# Patient Record
Sex: MALE | Race: WHITE | Employment: OTHER | URBAN - METROPOLITAN AREA
[De-identification: names, ages, dates, MRNs, and addresses within clinical notes are randomized per-mention and may not be internally consistent; named-entity substitution may affect disease eponyms.]

---

## 2017-01-09 ENCOUNTER — ANTI-COAG VISIT (OUTPATIENT)
Dept: INTERNAL MEDICINE CLINIC | Age: 72
End: 2017-01-09

## 2017-01-09 LAB — INR BLD: 2.1

## 2017-01-11 ENCOUNTER — OFFICE VISIT (OUTPATIENT)
Dept: INTERNAL MEDICINE CLINIC | Age: 72
End: 2017-01-11

## 2017-01-11 DIAGNOSIS — I48.0 PAROXYSMAL ATRIAL FIBRILLATION (HCC): ICD-10-CM

## 2017-01-11 DIAGNOSIS — E66.01 MORBID OBESITY WITH BMI OF 40.0-44.9, ADULT (HCC): ICD-10-CM

## 2017-01-11 DIAGNOSIS — N30.00 ACUTE CYSTITIS WITHOUT HEMATURIA: Primary | ICD-10-CM

## 2017-01-11 DIAGNOSIS — E21.3 HYPERPARATHYROIDISM (HCC): ICD-10-CM

## 2017-01-11 LAB
BILIRUBIN, POC: ABNORMAL
BLOOD URINE, POC: ABNORMAL
CLARITY, POC: ABNORMAL
COLOR, POC: ABNORMAL
GLUCOSE URINE, POC: ABNORMAL
KETONES, POC: ABNORMAL
LEUKOCYTE EST, POC: ABNORMAL
NITRITE, POC: ABNORMAL
PH, POC: ABNORMAL
PROTEIN, POC: ABNORMAL
SPECIFIC GRAVITY, POC: ABNORMAL
UROBILINOGEN, POC: ABNORMAL

## 2017-01-11 PROCEDURE — 81002 URINALYSIS NONAUTO W/O SCOPE: CPT | Performed by: NURSE PRACTITIONER

## 2017-01-11 PROCEDURE — 3017F COLORECTAL CA SCREEN DOC REV: CPT | Performed by: NURSE PRACTITIONER

## 2017-01-11 PROCEDURE — 1123F ACP DISCUSS/DSCN MKR DOCD: CPT | Performed by: NURSE PRACTITIONER

## 2017-01-11 PROCEDURE — 1036F TOBACCO NON-USER: CPT | Performed by: NURSE PRACTITIONER

## 2017-01-11 PROCEDURE — 4040F PNEUMOC VAC/ADMIN/RCVD: CPT | Performed by: NURSE PRACTITIONER

## 2017-01-11 PROCEDURE — 99213 OFFICE O/P EST LOW 20 MIN: CPT | Performed by: NURSE PRACTITIONER

## 2017-01-11 PROCEDURE — G8427 DOCREV CUR MEDS BY ELIG CLIN: HCPCS | Performed by: NURSE PRACTITIONER

## 2017-01-11 PROCEDURE — G8484 FLU IMMUNIZE NO ADMIN: HCPCS | Performed by: NURSE PRACTITIONER

## 2017-01-11 PROCEDURE — G8419 CALC BMI OUT NRM PARAM NOF/U: HCPCS | Performed by: NURSE PRACTITIONER

## 2017-01-11 RX ORDER — CEFUROXIME AXETIL 250 MG/1
250 TABLET ORAL 2 TIMES DAILY
Qty: 14 TABLET | Refills: 0 | Status: SHIPPED | OUTPATIENT
Start: 2017-01-11 | End: 2017-01-18

## 2017-01-11 ASSESSMENT — ENCOUNTER SYMPTOMS
NAUSEA: 0
SHORTNESS OF BREATH: 0
COUGH: 1
VOMITING: 0
RHINORRHEA: 1

## 2017-01-18 ENCOUNTER — TELEPHONE (OUTPATIENT)
Dept: INTERNAL MEDICINE CLINIC | Age: 72
End: 2017-01-18

## 2017-01-18 RX ORDER — SULFAMETHOXAZOLE AND TRIMETHOPRIM 800; 160 MG/1; MG/1
1 TABLET ORAL 2 TIMES DAILY
Qty: 14 TABLET | Refills: 0 | Status: SHIPPED | OUTPATIENT
Start: 2017-01-18 | End: 2017-01-18 | Stop reason: SDUPTHER

## 2017-01-18 RX ORDER — SULFAMETHOXAZOLE AND TRIMETHOPRIM 800; 160 MG/1; MG/1
1 TABLET ORAL 2 TIMES DAILY
Qty: 14 TABLET | Refills: 0 | Status: SHIPPED | OUTPATIENT
Start: 2017-01-18 | End: 2017-01-25

## 2017-01-18 RX ORDER — NITROFURANTOIN 25; 75 MG/1; MG/1
100 CAPSULE ORAL 2 TIMES DAILY
Qty: 14 CAPSULE | Refills: 0 | Status: SHIPPED | OUTPATIENT
Start: 2017-01-18 | End: 2017-01-25

## 2017-01-23 ENCOUNTER — ANTI-COAG VISIT (OUTPATIENT)
Dept: INTERNAL MEDICINE CLINIC | Age: 72
End: 2017-01-23

## 2017-01-23 ENCOUNTER — TELEPHONE (OUTPATIENT)
Dept: INTERNAL MEDICINE CLINIC | Age: 72
End: 2017-01-23

## 2017-01-23 LAB — INR BLD: 2.3

## 2017-01-24 VITALS
WEIGHT: 315 LBS | HEIGHT: 75 IN | HEART RATE: 70 BPM | DIASTOLIC BLOOD PRESSURE: 70 MMHG | RESPIRATION RATE: 16 BRPM | SYSTOLIC BLOOD PRESSURE: 120 MMHG | BODY MASS INDEX: 39.17 KG/M2

## 2017-01-25 ENCOUNTER — OFFICE VISIT (OUTPATIENT)
Dept: INTERNAL MEDICINE CLINIC | Age: 72
End: 2017-01-25

## 2017-01-25 VITALS
RESPIRATION RATE: 14 BRPM | BODY MASS INDEX: 39.17 KG/M2 | SYSTOLIC BLOOD PRESSURE: 110 MMHG | HEART RATE: 70 BPM | WEIGHT: 315 LBS | HEIGHT: 75 IN | DIASTOLIC BLOOD PRESSURE: 70 MMHG

## 2017-01-25 DIAGNOSIS — I48.0 PAROXYSMAL ATRIAL FIBRILLATION (HCC): ICD-10-CM

## 2017-01-25 DIAGNOSIS — E21.3 HYPERPARATHYROIDISM (HCC): ICD-10-CM

## 2017-01-25 DIAGNOSIS — R53.83 FATIGUE, UNSPECIFIED TYPE: ICD-10-CM

## 2017-01-25 DIAGNOSIS — R53.83 FATIGUE, UNSPECIFIED TYPE: Primary | ICD-10-CM

## 2017-01-25 DIAGNOSIS — I10 BENIGN ESSENTIAL HTN: ICD-10-CM

## 2017-01-25 LAB
A/G RATIO: 1.3 (ref 1.1–2.2)
ALBUMIN SERPL-MCNC: 3.9 G/DL (ref 3.4–5)
ALP BLD-CCNC: 92 U/L (ref 40–129)
ALT SERPL-CCNC: 22 U/L (ref 10–40)
ANION GAP SERPL CALCULATED.3IONS-SCNC: 16 MMOL/L (ref 3–16)
AST SERPL-CCNC: 19 U/L (ref 15–37)
BASOPHILS ABSOLUTE: 0.1 K/UL (ref 0–0.2)
BASOPHILS RELATIVE PERCENT: 1 %
BILIRUB SERPL-MCNC: 0.3 MG/DL (ref 0–1)
BUN BLDV-MCNC: 27 MG/DL (ref 7–20)
CALCIUM SERPL-MCNC: 8.9 MG/DL (ref 8.3–10.6)
CHLORIDE BLD-SCNC: 103 MMOL/L (ref 99–110)
CO2: 24 MMOL/L (ref 21–32)
CREAT SERPL-MCNC: 1 MG/DL (ref 0.8–1.3)
EOSINOPHILS ABSOLUTE: 0.4 K/UL (ref 0–0.6)
EOSINOPHILS RELATIVE PERCENT: 5 %
GFR AFRICAN AMERICAN: >60
GFR NON-AFRICAN AMERICAN: >60
GLOBULIN: 2.9 G/DL
GLUCOSE BLD-MCNC: 124 MG/DL (ref 70–99)
HCT VFR BLD CALC: 40.5 % (ref 40.5–52.5)
HEMOGLOBIN: 13.1 G/DL (ref 13.5–17.5)
LYMPHOCYTES ABSOLUTE: 1.4 K/UL (ref 1–5.1)
LYMPHOCYTES RELATIVE PERCENT: 18.4 %
MCH RBC QN AUTO: 28.6 PG (ref 26–34)
MCHC RBC AUTO-ENTMCNC: 32.3 G/DL (ref 31–36)
MCV RBC AUTO: 88.5 FL (ref 80–100)
MONOCYTES ABSOLUTE: 0.7 K/UL (ref 0–1.3)
MONOCYTES RELATIVE PERCENT: 9.8 %
NEUTROPHILS ABSOLUTE: 4.9 K/UL (ref 1.7–7.7)
NEUTROPHILS RELATIVE PERCENT: 65.8 %
PDW BLD-RTO: 16.8 % (ref 12.4–15.4)
PLATELET # BLD: 210 K/UL (ref 135–450)
PMV BLD AUTO: 9.3 FL (ref 5–10.5)
POTASSIUM SERPL-SCNC: 4.5 MMOL/L (ref 3.5–5.1)
RBC # BLD: 4.57 M/UL (ref 4.2–5.9)
SODIUM BLD-SCNC: 143 MMOL/L (ref 136–145)
TOTAL PROTEIN: 6.8 G/DL (ref 6.4–8.2)
WBC # BLD: 7.5 K/UL (ref 4–11)

## 2017-01-25 PROCEDURE — 3017F COLORECTAL CA SCREEN DOC REV: CPT | Performed by: NURSE PRACTITIONER

## 2017-01-25 PROCEDURE — 99213 OFFICE O/P EST LOW 20 MIN: CPT | Performed by: NURSE PRACTITIONER

## 2017-01-25 PROCEDURE — 1123F ACP DISCUSS/DSCN MKR DOCD: CPT | Performed by: NURSE PRACTITIONER

## 2017-01-25 PROCEDURE — 4040F PNEUMOC VAC/ADMIN/RCVD: CPT | Performed by: NURSE PRACTITIONER

## 2017-01-25 PROCEDURE — G8419 CALC BMI OUT NRM PARAM NOF/U: HCPCS | Performed by: NURSE PRACTITIONER

## 2017-01-25 PROCEDURE — 1036F TOBACCO NON-USER: CPT | Performed by: NURSE PRACTITIONER

## 2017-01-25 PROCEDURE — G8427 DOCREV CUR MEDS BY ELIG CLIN: HCPCS | Performed by: NURSE PRACTITIONER

## 2017-01-25 PROCEDURE — G8484 FLU IMMUNIZE NO ADMIN: HCPCS | Performed by: NURSE PRACTITIONER

## 2017-01-25 ASSESSMENT — ENCOUNTER SYMPTOMS
COUGH: 0
NAUSEA: 0
SORE THROAT: 0
VOMITING: 0
ABDOMINAL PAIN: 0

## 2017-01-30 ENCOUNTER — OFFICE VISIT (OUTPATIENT)
Dept: PULMONOLOGY | Age: 72
End: 2017-01-30

## 2017-01-30 VITALS
TEMPERATURE: 97.6 F | HEIGHT: 75 IN | SYSTOLIC BLOOD PRESSURE: 112 MMHG | HEART RATE: 65 BPM | DIASTOLIC BLOOD PRESSURE: 74 MMHG | OXYGEN SATURATION: 98 % | WEIGHT: 315 LBS | BODY MASS INDEX: 39.17 KG/M2 | RESPIRATION RATE: 18 BRPM

## 2017-01-30 DIAGNOSIS — G47.33 OSA (OBSTRUCTIVE SLEEP APNEA): Primary | ICD-10-CM

## 2017-01-30 DIAGNOSIS — R68.2 DRY MOUTH: ICD-10-CM

## 2017-01-30 DIAGNOSIS — E66.01 OBESITY, CLASS III, BMI 40-49.9 (MORBID OBESITY) (HCC): ICD-10-CM

## 2017-01-30 PROBLEM — E66.813 OBESITY, CLASS III, BMI 40-49.9 (MORBID OBESITY) (HCC): Status: ACTIVE | Noted: 2017-01-30

## 2017-01-30 PROCEDURE — G8484 FLU IMMUNIZE NO ADMIN: HCPCS | Performed by: NURSE PRACTITIONER

## 2017-01-30 PROCEDURE — G8427 DOCREV CUR MEDS BY ELIG CLIN: HCPCS | Performed by: NURSE PRACTITIONER

## 2017-01-30 PROCEDURE — G8419 CALC BMI OUT NRM PARAM NOF/U: HCPCS | Performed by: NURSE PRACTITIONER

## 2017-01-30 PROCEDURE — 1123F ACP DISCUSS/DSCN MKR DOCD: CPT | Performed by: NURSE PRACTITIONER

## 2017-01-30 PROCEDURE — 1036F TOBACCO NON-USER: CPT | Performed by: NURSE PRACTITIONER

## 2017-01-30 PROCEDURE — 3017F COLORECTAL CA SCREEN DOC REV: CPT | Performed by: NURSE PRACTITIONER

## 2017-01-30 PROCEDURE — 99213 OFFICE O/P EST LOW 20 MIN: CPT | Performed by: NURSE PRACTITIONER

## 2017-01-30 PROCEDURE — 4040F PNEUMOC VAC/ADMIN/RCVD: CPT | Performed by: NURSE PRACTITIONER

## 2017-01-30 ASSESSMENT — SLEEP AND FATIGUE QUESTIONNAIRES
HOW LIKELY ARE YOU TO NOD OFF OR FALL ASLEEP WHILE WATCHING TV: 3
HOW LIKELY ARE YOU TO NOD OFF OR FALL ASLEEP WHILE LYING DOWN TO REST IN THE AFTERNOON WHEN CIRCUMSTANCES PERMIT: 3
HOW LIKELY ARE YOU TO NOD OFF OR FALL ASLEEP WHEN YOU ARE A PASSENGER IN A CAR FOR AN HOUR WITHOUT A BREAK: 0
HOW LIKELY ARE YOU TO NOD OFF OR FALL ASLEEP WHILE SITTING AND READING: 2
NECK CIRCUMFERENCE (INCHES): 19.75
HOW LIKELY ARE YOU TO NOD OFF OR FALL ASLEEP WHILE SITTING QUIETLY AFTER LUNCH WITHOUT ALCOHOL: 3
HOW LIKELY ARE YOU TO NOD OFF OR FALL ASLEEP WHILE SITTING INACTIVE IN A PUBLIC PLACE: 0
ESS TOTAL SCORE: 11
HOW LIKELY ARE YOU TO NOD OFF OR FALL ASLEEP IN A CAR, WHILE STOPPED FOR A FEW MINUTES IN TRAFFIC: 0
HOW LIKELY ARE YOU TO NOD OFF OR FALL ASLEEP WHILE SITTING AND TALKING TO SOMEONE: 0

## 2017-02-03 RX ORDER — MONTELUKAST SODIUM 10 MG/1
10 TABLET ORAL DAILY
Qty: 90 TABLET | Refills: 0 | Status: SHIPPED | OUTPATIENT
Start: 2017-02-03 | End: 2017-03-24 | Stop reason: SDUPTHER

## 2017-02-03 RX ORDER — SPIRONOLACTONE 25 MG/1
25 TABLET ORAL DAILY
Qty: 180 TABLET | Refills: 0 | Status: SHIPPED | OUTPATIENT
Start: 2017-02-03 | End: 2017-02-13 | Stop reason: SDUPTHER

## 2017-02-06 ENCOUNTER — ANTI-COAG VISIT (OUTPATIENT)
Dept: INTERNAL MEDICINE CLINIC | Age: 72
End: 2017-02-06

## 2017-02-06 LAB — INR BLD: 2.3

## 2017-02-08 ENCOUNTER — HOSPITAL ENCOUNTER (OUTPATIENT)
Dept: CT IMAGING | Age: 72
Discharge: HOME | End: 2017-02-08
Attending: UROLOGY | Admitting: UROLOGY

## 2017-02-08 DIAGNOSIS — N39.0 URINARY TRACT INFECTION, SITE NOT SPECIFIED: ICD-10-CM

## 2017-02-08 DIAGNOSIS — N39.0 URINARY TRACT INFECTION: ICD-10-CM

## 2017-02-13 ENCOUNTER — OFFICE VISIT (OUTPATIENT)
Dept: CARDIOLOGY CLINIC | Age: 72
End: 2017-02-13

## 2017-02-13 ENCOUNTER — OFFICE VISIT (OUTPATIENT)
Dept: INTERNAL MEDICINE CLINIC | Age: 72
End: 2017-02-13

## 2017-02-13 VITALS
HEART RATE: 80 BPM | HEIGHT: 75 IN | WEIGHT: 315 LBS | OXYGEN SATURATION: 96 % | BODY MASS INDEX: 39.17 KG/M2 | SYSTOLIC BLOOD PRESSURE: 118 MMHG | DIASTOLIC BLOOD PRESSURE: 64 MMHG

## 2017-02-13 VITALS
HEIGHT: 75 IN | HEART RATE: 60 BPM | RESPIRATION RATE: 18 BRPM | DIASTOLIC BLOOD PRESSURE: 60 MMHG | SYSTOLIC BLOOD PRESSURE: 100 MMHG | BODY MASS INDEX: 39.17 KG/M2 | WEIGHT: 315 LBS

## 2017-02-13 DIAGNOSIS — I10 BENIGN ESSENTIAL HTN: Primary | ICD-10-CM

## 2017-02-13 DIAGNOSIS — I49.3 PVC (PREMATURE VENTRICULAR CONTRACTION): ICD-10-CM

## 2017-02-13 DIAGNOSIS — I10 BENIGN ESSENTIAL HTN: ICD-10-CM

## 2017-02-13 DIAGNOSIS — I48.0 PAROXYSMAL ATRIAL FIBRILLATION (HCC): Primary | ICD-10-CM

## 2017-02-13 DIAGNOSIS — G62.9 POLYNEUROPATHY: ICD-10-CM

## 2017-02-13 DIAGNOSIS — I48.0 PAROXYSMAL ATRIAL FIBRILLATION (HCC): ICD-10-CM

## 2017-02-13 DIAGNOSIS — G47.33 OSA (OBSTRUCTIVE SLEEP APNEA): ICD-10-CM

## 2017-02-13 PROCEDURE — 4040F PNEUMOC VAC/ADMIN/RCVD: CPT | Performed by: INTERNAL MEDICINE

## 2017-02-13 PROCEDURE — G8484 FLU IMMUNIZE NO ADMIN: HCPCS | Performed by: INTERNAL MEDICINE

## 2017-02-13 PROCEDURE — G8417 CALC BMI ABV UP PARAM F/U: HCPCS | Performed by: INTERNAL MEDICINE

## 2017-02-13 PROCEDURE — 3017F COLORECTAL CA SCREEN DOC REV: CPT | Performed by: INTERNAL MEDICINE

## 2017-02-13 PROCEDURE — 1036F TOBACCO NON-USER: CPT | Performed by: INTERNAL MEDICINE

## 2017-02-13 PROCEDURE — G8427 DOCREV CUR MEDS BY ELIG CLIN: HCPCS | Performed by: INTERNAL MEDICINE

## 2017-02-13 PROCEDURE — 1123F ACP DISCUSS/DSCN MKR DOCD: CPT | Performed by: INTERNAL MEDICINE

## 2017-02-13 PROCEDURE — 99214 OFFICE O/P EST MOD 30 MIN: CPT | Performed by: INTERNAL MEDICINE

## 2017-02-13 RX ORDER — SOTALOL HYDROCHLORIDE 80 MG/1
40 TABLET ORAL 2 TIMES DAILY
Qty: 90 TABLET | Refills: 3 | Status: SHIPPED | OUTPATIENT
Start: 2017-02-13 | End: 2017-05-11 | Stop reason: SINTOL

## 2017-02-13 RX ORDER — FUROSEMIDE 40 MG/1
40 TABLET ORAL DAILY
Qty: 90 TABLET | Refills: 3 | Status: SHIPPED | OUTPATIENT
Start: 2017-02-13 | End: 2017-05-11 | Stop reason: SDUPTHER

## 2017-02-13 RX ORDER — SPIRONOLACTONE 25 MG/1
25 TABLET ORAL DAILY
Qty: 90 TABLET | Refills: 3 | Status: SHIPPED | OUTPATIENT
Start: 2017-02-13 | End: 2017-06-15 | Stop reason: SDUPTHER

## 2017-02-13 ASSESSMENT — ENCOUNTER SYMPTOMS
CHEST TIGHTNESS: 0
COLOR CHANGE: 0
RHINORRHEA: 0

## 2017-02-17 ENCOUNTER — TELEPHONE (OUTPATIENT)
Dept: INTERNAL MEDICINE CLINIC | Age: 72
End: 2017-02-17

## 2017-02-17 DIAGNOSIS — R73.09 ELEVATED GLUCOSE: ICD-10-CM

## 2017-02-17 DIAGNOSIS — R73.09 ELEVATED GLUCOSE: Primary | ICD-10-CM

## 2017-02-18 LAB
ESTIMATED AVERAGE GLUCOSE: 131.2 MG/DL
HBA1C MFR BLD: 6.2 %

## 2017-02-20 ENCOUNTER — TELEPHONE (OUTPATIENT)
Dept: INTERNAL MEDICINE CLINIC | Age: 72
End: 2017-02-20

## 2017-02-20 ENCOUNTER — ANTI-COAG VISIT (OUTPATIENT)
Dept: INTERNAL MEDICINE CLINIC | Age: 72
End: 2017-02-20

## 2017-02-20 LAB — INR BLD: 2.1

## 2017-03-06 ENCOUNTER — ANTI-COAG VISIT (OUTPATIENT)
Dept: INTERNAL MEDICINE CLINIC | Age: 72
End: 2017-03-06

## 2017-03-20 ENCOUNTER — ANTI-COAG VISIT (OUTPATIENT)
Dept: INTERNAL MEDICINE CLINIC | Age: 72
End: 2017-03-20

## 2017-04-03 ENCOUNTER — ANTI-COAG VISIT (OUTPATIENT)
Dept: INTERNAL MEDICINE CLINIC | Age: 72
End: 2017-04-03

## 2017-04-03 ENCOUNTER — OFFICE VISIT (OUTPATIENT)
Dept: INTERNAL MEDICINE CLINIC | Age: 72
End: 2017-04-03

## 2017-04-03 VITALS
WEIGHT: 315 LBS | RESPIRATION RATE: 18 BRPM | SYSTOLIC BLOOD PRESSURE: 100 MMHG | BODY MASS INDEX: 39.17 KG/M2 | DIASTOLIC BLOOD PRESSURE: 70 MMHG | HEART RATE: 76 BPM | HEIGHT: 75 IN

## 2017-04-03 DIAGNOSIS — R42 DIZZY SPELLS: ICD-10-CM

## 2017-04-03 DIAGNOSIS — I10 BENIGN ESSENTIAL HTN: ICD-10-CM

## 2017-04-03 DIAGNOSIS — I48.0 PAROXYSMAL ATRIAL FIBRILLATION (HCC): Primary | ICD-10-CM

## 2017-04-03 LAB
BILIRUBIN, POC: NORMAL
BLOOD URINE, POC: NORMAL
CLARITY, POC: NORMAL
COLOR, POC: NORMAL
GLUCOSE URINE, POC: NORMAL
INR BLD: 2.1
KETONES, POC: NORMAL
LEUKOCYTE EST, POC: NORMAL
NITRITE, POC: NORMAL
PH, POC: NORMAL
PROTEIN, POC: NORMAL
SPECIFIC GRAVITY, POC: NORMAL
UROBILINOGEN, POC: NORMAL

## 2017-04-03 PROCEDURE — 99214 OFFICE O/P EST MOD 30 MIN: CPT | Performed by: INTERNAL MEDICINE

## 2017-04-03 PROCEDURE — 81002 URINALYSIS NONAUTO W/O SCOPE: CPT | Performed by: INTERNAL MEDICINE

## 2017-04-03 PROCEDURE — 4040F PNEUMOC VAC/ADMIN/RCVD: CPT | Performed by: INTERNAL MEDICINE

## 2017-04-03 PROCEDURE — G8427 DOCREV CUR MEDS BY ELIG CLIN: HCPCS | Performed by: INTERNAL MEDICINE

## 2017-04-03 PROCEDURE — 93010 ELECTROCARDIOGRAM REPORT: CPT | Performed by: INTERNAL MEDICINE

## 2017-04-03 PROCEDURE — G8417 CALC BMI ABV UP PARAM F/U: HCPCS | Performed by: INTERNAL MEDICINE

## 2017-04-03 PROCEDURE — 3017F COLORECTAL CA SCREEN DOC REV: CPT | Performed by: INTERNAL MEDICINE

## 2017-04-03 PROCEDURE — 1036F TOBACCO NON-USER: CPT | Performed by: INTERNAL MEDICINE

## 2017-04-03 PROCEDURE — 1123F ACP DISCUSS/DSCN MKR DOCD: CPT | Performed by: INTERNAL MEDICINE

## 2017-04-03 PROCEDURE — 93005 ELECTROCARDIOGRAM TRACING: CPT | Performed by: INTERNAL MEDICINE

## 2017-04-03 ASSESSMENT — ENCOUNTER SYMPTOMS
SHORTNESS OF BREATH: 1
CHEST TIGHTNESS: 0
COLOR CHANGE: 0
RHINORRHEA: 0
COUGH: 0

## 2017-04-06 ENCOUNTER — OFFICE VISIT (OUTPATIENT)
Dept: CARDIOLOGY CLINIC | Age: 72
End: 2017-04-06

## 2017-04-06 ENCOUNTER — TELEPHONE (OUTPATIENT)
Dept: CARDIOLOGY CLINIC | Age: 72
End: 2017-04-06

## 2017-04-06 VITALS
BODY MASS INDEX: 39.17 KG/M2 | DIASTOLIC BLOOD PRESSURE: 64 MMHG | HEIGHT: 75 IN | WEIGHT: 315 LBS | HEART RATE: 104 BPM | OXYGEN SATURATION: 96 % | SYSTOLIC BLOOD PRESSURE: 110 MMHG

## 2017-04-06 DIAGNOSIS — I49.3 PVC (PREMATURE VENTRICULAR CONTRACTION): ICD-10-CM

## 2017-04-06 DIAGNOSIS — I48.92 ATRIAL FLUTTER, UNSPECIFIED TYPE (HCC): ICD-10-CM

## 2017-04-06 DIAGNOSIS — R00.1 BRADYCARDIA: Primary | ICD-10-CM

## 2017-04-06 DIAGNOSIS — I48.0 PAROXYSMAL ATRIAL FIBRILLATION (HCC): ICD-10-CM

## 2017-04-06 PROCEDURE — 3017F COLORECTAL CA SCREEN DOC REV: CPT | Performed by: INTERNAL MEDICINE

## 2017-04-06 PROCEDURE — 1123F ACP DISCUSS/DSCN MKR DOCD: CPT | Performed by: INTERNAL MEDICINE

## 2017-04-06 PROCEDURE — 99214 OFFICE O/P EST MOD 30 MIN: CPT | Performed by: INTERNAL MEDICINE

## 2017-04-06 PROCEDURE — 1036F TOBACCO NON-USER: CPT | Performed by: INTERNAL MEDICINE

## 2017-04-06 PROCEDURE — 4040F PNEUMOC VAC/ADMIN/RCVD: CPT | Performed by: INTERNAL MEDICINE

## 2017-04-06 PROCEDURE — G8427 DOCREV CUR MEDS BY ELIG CLIN: HCPCS | Performed by: INTERNAL MEDICINE

## 2017-04-06 PROCEDURE — G8417 CALC BMI ABV UP PARAM F/U: HCPCS | Performed by: INTERNAL MEDICINE

## 2017-04-11 ENCOUNTER — TELEPHONE (OUTPATIENT)
Dept: CARDIOLOGY CLINIC | Age: 72
End: 2017-04-11

## 2017-04-14 ENCOUNTER — CARE COORDINATION (OUTPATIENT)
Dept: CASE MANAGEMENT | Age: 72
End: 2017-04-14

## 2017-04-16 ENCOUNTER — CARE COORDINATION (OUTPATIENT)
Dept: CASE MANAGEMENT | Age: 72
End: 2017-04-16

## 2017-04-17 ENCOUNTER — TELEPHONE (OUTPATIENT)
Dept: CARDIOLOGY CLINIC | Age: 72
End: 2017-04-17

## 2017-04-18 ENCOUNTER — ANTI-COAG VISIT (OUTPATIENT)
Dept: INTERNAL MEDICINE CLINIC | Age: 72
End: 2017-04-18

## 2017-04-18 ENCOUNTER — TELEPHONE (OUTPATIENT)
Dept: INTERNAL MEDICINE CLINIC | Age: 72
End: 2017-04-18

## 2017-04-18 ENCOUNTER — CARE COORDINATION (OUTPATIENT)
Dept: CASE MANAGEMENT | Age: 72
End: 2017-04-18

## 2017-04-18 LAB — INR BLD: 2.5

## 2017-04-20 ENCOUNTER — CARE COORDINATION (OUTPATIENT)
Dept: CASE MANAGEMENT | Age: 72
End: 2017-04-20

## 2017-04-25 ENCOUNTER — CARE COORDINATION (OUTPATIENT)
Dept: CASE MANAGEMENT | Age: 72
End: 2017-04-25

## 2017-04-25 ENCOUNTER — TELEPHONE (OUTPATIENT)
Dept: CARDIOLOGY CLINIC | Age: 72
End: 2017-04-25

## 2017-04-27 ENCOUNTER — TELEPHONE (OUTPATIENT)
Dept: CARDIOLOGY CLINIC | Age: 72
End: 2017-04-27

## 2017-04-27 DIAGNOSIS — I48.0 PAROXYSMAL ATRIAL FIBRILLATION (HCC): Primary | ICD-10-CM

## 2017-05-02 ENCOUNTER — TELEPHONE (OUTPATIENT)
Dept: INTERNAL MEDICINE CLINIC | Age: 72
End: 2017-05-02

## 2017-05-02 ENCOUNTER — ANTI-COAG VISIT (OUTPATIENT)
Dept: INTERNAL MEDICINE CLINIC | Age: 72
End: 2017-05-02

## 2017-05-02 LAB — INR BLD: 2.5

## 2017-05-09 ENCOUNTER — CARE COORDINATION (OUTPATIENT)
Dept: CARE COORDINATION | Age: 72
End: 2017-05-09

## 2017-05-11 ENCOUNTER — OFFICE VISIT (OUTPATIENT)
Dept: CARDIOLOGY CLINIC | Age: 72
End: 2017-05-11

## 2017-05-11 VITALS
OXYGEN SATURATION: 97 % | HEIGHT: 75 IN | WEIGHT: 315 LBS | SYSTOLIC BLOOD PRESSURE: 110 MMHG | DIASTOLIC BLOOD PRESSURE: 74 MMHG | BODY MASS INDEX: 39.17 KG/M2 | HEART RATE: 81 BPM

## 2017-05-11 DIAGNOSIS — R00.1 BRADYCARDIA: ICD-10-CM

## 2017-05-11 DIAGNOSIS — I48.0 PAROXYSMAL ATRIAL FIBRILLATION (HCC): Primary | ICD-10-CM

## 2017-05-11 DIAGNOSIS — I49.3 PVC (PREMATURE VENTRICULAR CONTRACTION): ICD-10-CM

## 2017-05-11 DIAGNOSIS — I48.4 ATYPICAL ATRIAL FLUTTER (HCC): ICD-10-CM

## 2017-05-11 DIAGNOSIS — R53.81 OTHER MALAISE: ICD-10-CM

## 2017-05-11 PROCEDURE — G8427 DOCREV CUR MEDS BY ELIG CLIN: HCPCS | Performed by: NURSE PRACTITIONER

## 2017-05-11 PROCEDURE — 1036F TOBACCO NON-USER: CPT | Performed by: NURSE PRACTITIONER

## 2017-05-11 PROCEDURE — 99214 OFFICE O/P EST MOD 30 MIN: CPT | Performed by: NURSE PRACTITIONER

## 2017-05-11 PROCEDURE — G8417 CALC BMI ABV UP PARAM F/U: HCPCS | Performed by: NURSE PRACTITIONER

## 2017-05-11 PROCEDURE — 1123F ACP DISCUSS/DSCN MKR DOCD: CPT | Performed by: NURSE PRACTITIONER

## 2017-05-11 PROCEDURE — 93000 ELECTROCARDIOGRAM COMPLETE: CPT | Performed by: NURSE PRACTITIONER

## 2017-05-11 PROCEDURE — 4040F PNEUMOC VAC/ADMIN/RCVD: CPT | Performed by: NURSE PRACTITIONER

## 2017-05-11 PROCEDURE — 3017F COLORECTAL CA SCREEN DOC REV: CPT | Performed by: NURSE PRACTITIONER

## 2017-05-11 RX ORDER — AMIODARONE HYDROCHLORIDE 200 MG/1
200 TABLET ORAL 2 TIMES DAILY
Qty: 45 TABLET | Refills: 3 | Status: SHIPPED | OUTPATIENT
Start: 2017-05-11 | End: 2017-05-15 | Stop reason: SINTOL

## 2017-05-11 RX ORDER — FUROSEMIDE 40 MG/1
20 TABLET ORAL DAILY
Qty: 90 TABLET | Refills: 3 | Status: ON HOLD
Start: 2017-05-11 | End: 2017-09-20 | Stop reason: DRUGHIGH

## 2017-05-12 ENCOUNTER — HOSPITAL ENCOUNTER (OUTPATIENT)
Dept: OTHER | Age: 72
Discharge: HOME | End: 2017-05-12
Attending: NURSE PRACTITIONER | Admitting: NURSE PRACTITIONER

## 2017-05-12 LAB — TSH REFLEX: 0.93 UIU/ML (ref 0.27–4.2)

## 2017-05-15 ENCOUNTER — ANTI-COAG VISIT (OUTPATIENT)
Dept: INTERNAL MEDICINE CLINIC | Age: 72
End: 2017-05-15

## 2017-05-15 ENCOUNTER — TELEPHONE (OUTPATIENT)
Dept: INTERNAL MEDICINE CLINIC | Age: 72
End: 2017-05-15

## 2017-05-15 LAB — INR BLD: 2.4

## 2017-05-15 PROCEDURE — 93272 ECG/REVIEW INTERPRET ONLY: CPT | Performed by: INTERNAL MEDICINE

## 2017-05-15 RX ORDER — DILTIAZEM HYDROCHLORIDE 180 MG/1
180 CAPSULE, COATED, EXTENDED RELEASE ORAL DAILY
Qty: 30 CAPSULE | Refills: 3 | Status: SHIPPED | OUTPATIENT
Start: 2017-05-15 | End: 2017-06-13

## 2017-05-17 DIAGNOSIS — I48.0 PAROXYSMAL ATRIAL FIBRILLATION (HCC): ICD-10-CM

## 2017-05-26 ENCOUNTER — OFFICE VISIT (OUTPATIENT)
Dept: CARDIOLOGY CLINIC | Age: 72
End: 2017-05-26

## 2017-05-26 VITALS
HEART RATE: 78 BPM | SYSTOLIC BLOOD PRESSURE: 122 MMHG | WEIGHT: 315 LBS | DIASTOLIC BLOOD PRESSURE: 76 MMHG | HEIGHT: 75 IN | BODY MASS INDEX: 39.17 KG/M2

## 2017-05-26 DIAGNOSIS — I49.3 PVC (PREMATURE VENTRICULAR CONTRACTION): Primary | ICD-10-CM

## 2017-05-26 DIAGNOSIS — I48.0 PAROXYSMAL ATRIAL FIBRILLATION (HCC): ICD-10-CM

## 2017-05-26 PROCEDURE — 99214 OFFICE O/P EST MOD 30 MIN: CPT | Performed by: INTERNAL MEDICINE

## 2017-05-26 PROCEDURE — 4040F PNEUMOC VAC/ADMIN/RCVD: CPT | Performed by: INTERNAL MEDICINE

## 2017-05-26 PROCEDURE — 1036F TOBACCO NON-USER: CPT | Performed by: INTERNAL MEDICINE

## 2017-05-26 PROCEDURE — G8417 CALC BMI ABV UP PARAM F/U: HCPCS | Performed by: INTERNAL MEDICINE

## 2017-05-26 PROCEDURE — 93000 ELECTROCARDIOGRAM COMPLETE: CPT | Performed by: INTERNAL MEDICINE

## 2017-05-26 PROCEDURE — G8427 DOCREV CUR MEDS BY ELIG CLIN: HCPCS | Performed by: INTERNAL MEDICINE

## 2017-05-26 PROCEDURE — 3017F COLORECTAL CA SCREEN DOC REV: CPT | Performed by: INTERNAL MEDICINE

## 2017-05-26 PROCEDURE — 1123F ACP DISCUSS/DSCN MKR DOCD: CPT | Performed by: INTERNAL MEDICINE

## 2017-05-30 ENCOUNTER — ANTI-COAG VISIT (OUTPATIENT)
Dept: INTERNAL MEDICINE CLINIC | Age: 72
End: 2017-05-30

## 2017-05-30 ENCOUNTER — TELEPHONE (OUTPATIENT)
Dept: INTERNAL MEDICINE CLINIC | Age: 72
End: 2017-05-30

## 2017-05-30 LAB — INR BLD: 2.5

## 2017-06-12 ENCOUNTER — TELEPHONE (OUTPATIENT)
Dept: CARDIOLOGY CLINIC | Age: 72
End: 2017-06-12

## 2017-06-13 ENCOUNTER — ANTI-COAG VISIT (OUTPATIENT)
Dept: INTERNAL MEDICINE CLINIC | Age: 72
End: 2017-06-13

## 2017-06-13 LAB — INR BLD: 2

## 2017-06-14 ENCOUNTER — TELEPHONE (OUTPATIENT)
Dept: CARDIOLOGY CLINIC | Age: 72
End: 2017-06-14

## 2017-06-15 DIAGNOSIS — G62.9 POLYNEUROPATHY: ICD-10-CM

## 2017-06-15 PROBLEM — I48.4 ATYPICAL ATRIAL FLUTTER (HCC): Status: ACTIVE | Noted: 2017-04-06

## 2017-06-15 RX ORDER — MONTELUKAST SODIUM 10 MG/1
TABLET ORAL
Qty: 90 TABLET | Refills: 0 | Status: SHIPPED | OUTPATIENT
Start: 2017-06-15 | End: 2017-09-11 | Stop reason: SDUPTHER

## 2017-06-15 RX ORDER — AMITRIPTYLINE HYDROCHLORIDE 50 MG/1
50 TABLET, FILM COATED ORAL NIGHTLY
Qty: 90 TABLET | Refills: 0 | Status: ON HOLD | OUTPATIENT
Start: 2017-06-15 | End: 2017-09-20 | Stop reason: ALTCHOICE

## 2017-06-15 RX ORDER — WARFARIN SODIUM 5 MG/1
TABLET ORAL
Qty: 82 TABLET | Refills: 11 | Status: ON HOLD | OUTPATIENT
Start: 2017-06-15 | End: 2017-09-19 | Stop reason: DRUGHIGH

## 2017-06-15 RX ORDER — SPIRONOLACTONE 25 MG/1
25 TABLET ORAL DAILY
Qty: 90 TABLET | Refills: 0 | Status: SHIPPED | OUTPATIENT
Start: 2017-06-15 | End: 2017-10-26 | Stop reason: SDUPTHER

## 2017-06-16 ENCOUNTER — TELEPHONE (OUTPATIENT)
Dept: PHARMACY | Facility: CLINIC | Age: 72
End: 2017-06-16

## 2017-06-23 ENCOUNTER — HOSPITAL ENCOUNTER (OUTPATIENT)
Dept: CARDIOLOGY | Facility: CLINIC | Age: 72
Discharge: HOME | End: 2017-06-23
Attending: INTERNAL MEDICINE | Admitting: INTERNAL MEDICINE

## 2017-06-23 LAB
LV EF: 58 %
LVEF MODALITY: NORMAL

## 2017-06-26 ENCOUNTER — TELEPHONE (OUTPATIENT)
Dept: CARDIOLOGY CLINIC | Age: 72
End: 2017-06-26

## 2017-06-26 ENCOUNTER — ANTI-COAG VISIT (OUTPATIENT)
Dept: INTERNAL MEDICINE CLINIC | Age: 72
End: 2017-06-26

## 2017-06-26 LAB — INR BLD: 2.5

## 2017-07-10 ENCOUNTER — ANTI-COAG VISIT (OUTPATIENT)
Dept: INTERNAL MEDICINE CLINIC | Age: 72
End: 2017-07-10

## 2017-07-10 ENCOUNTER — OFFICE VISIT (OUTPATIENT)
Dept: CARDIOLOGY CLINIC | Age: 72
End: 2017-07-10

## 2017-07-10 VITALS
HEIGHT: 75 IN | BODY MASS INDEX: 39.17 KG/M2 | SYSTOLIC BLOOD PRESSURE: 116 MMHG | HEART RATE: 78 BPM | OXYGEN SATURATION: 97 % | WEIGHT: 315 LBS | DIASTOLIC BLOOD PRESSURE: 64 MMHG

## 2017-07-10 DIAGNOSIS — I48.0 PAROXYSMAL ATRIAL FIBRILLATION (HCC): ICD-10-CM

## 2017-07-10 DIAGNOSIS — I10 BENIGN ESSENTIAL HTN: ICD-10-CM

## 2017-07-10 DIAGNOSIS — I48.4 ATYPICAL ATRIAL FLUTTER (HCC): Primary | ICD-10-CM

## 2017-07-10 DIAGNOSIS — R42 DIZZINESS: ICD-10-CM

## 2017-07-10 LAB — INR BLD: 2.3

## 2017-07-10 PROCEDURE — 1111F DSCHRG MED/CURRENT MED MERGE: CPT | Performed by: NURSE PRACTITIONER

## 2017-07-10 PROCEDURE — 3017F COLORECTAL CA SCREEN DOC REV: CPT | Performed by: NURSE PRACTITIONER

## 2017-07-10 PROCEDURE — 4040F PNEUMOC VAC/ADMIN/RCVD: CPT | Performed by: NURSE PRACTITIONER

## 2017-07-10 PROCEDURE — G8417 CALC BMI ABV UP PARAM F/U: HCPCS | Performed by: NURSE PRACTITIONER

## 2017-07-10 PROCEDURE — 1123F ACP DISCUSS/DSCN MKR DOCD: CPT | Performed by: NURSE PRACTITIONER

## 2017-07-10 PROCEDURE — 93000 ELECTROCARDIOGRAM COMPLETE: CPT | Performed by: NURSE PRACTITIONER

## 2017-07-10 PROCEDURE — 99214 OFFICE O/P EST MOD 30 MIN: CPT | Performed by: NURSE PRACTITIONER

## 2017-07-10 PROCEDURE — G8427 DOCREV CUR MEDS BY ELIG CLIN: HCPCS | Performed by: NURSE PRACTITIONER

## 2017-07-10 PROCEDURE — 93225 XTRNL ECG REC<48 HRS REC: CPT | Performed by: INTERNAL MEDICINE

## 2017-07-10 PROCEDURE — 1036F TOBACCO NON-USER: CPT | Performed by: NURSE PRACTITIONER

## 2017-07-13 ENCOUNTER — TELEPHONE (OUTPATIENT)
Dept: CARDIOLOGY CLINIC | Age: 72
End: 2017-07-13

## 2017-07-14 PROCEDURE — 93227 XTRNL ECG REC<48 HR R&I: CPT | Performed by: INTERNAL MEDICINE

## 2017-07-21 DIAGNOSIS — I48.0 PAROXYSMAL ATRIAL FIBRILLATION (HCC): Primary | ICD-10-CM

## 2017-07-24 ENCOUNTER — ANTI-COAG VISIT (OUTPATIENT)
Dept: INTERNAL MEDICINE CLINIC | Age: 72
End: 2017-07-24

## 2017-07-24 LAB — INR BLD: 2.9

## 2017-07-27 ENCOUNTER — TELEPHONE (OUTPATIENT)
Dept: CARDIOLOGY CLINIC | Age: 72
End: 2017-07-27

## 2017-08-01 RX ORDER — DOFETILIDE 0.5 MG/1
500 CAPSULE ORAL EVERY 12 HOURS SCHEDULED
Qty: 60 CAPSULE | Refills: 3 | Status: CANCELLED | OUTPATIENT
Start: 2017-08-01

## 2017-08-01 RX ORDER — DOFETILIDE 0.5 MG/1
500 CAPSULE ORAL EVERY 12 HOURS SCHEDULED
Qty: 60 CAPSULE | Refills: 3 | Status: SHIPPED | OUTPATIENT
Start: 2017-08-01 | End: 2017-12-11 | Stop reason: SDUPTHER

## 2017-08-01 RX ORDER — DILTIAZEM HYDROCHLORIDE 120 MG/1
120 TABLET, FILM COATED ORAL DAILY
Qty: 120 TABLET | Refills: 3 | Status: CANCELLED | OUTPATIENT
Start: 2017-08-01

## 2017-08-01 RX ORDER — DILTIAZEM HYDROCHLORIDE 120 MG/1
120 CAPSULE, COATED, EXTENDED RELEASE ORAL DAILY
Qty: 30 CAPSULE | Refills: 3 | Status: SHIPPED | OUTPATIENT
Start: 2017-08-01 | End: 2017-09-28 | Stop reason: SDUPTHER

## 2017-08-07 ENCOUNTER — TELEPHONE (OUTPATIENT)
Dept: INTERNAL MEDICINE CLINIC | Age: 72
End: 2017-08-07

## 2017-08-07 ENCOUNTER — ANTI-COAG VISIT (OUTPATIENT)
Dept: INTERNAL MEDICINE CLINIC | Age: 72
End: 2017-08-07

## 2017-08-07 LAB — INR BLD: 2.3

## 2017-08-21 ENCOUNTER — ANTI-COAG VISIT (OUTPATIENT)
Dept: INTERNAL MEDICINE CLINIC | Age: 72
End: 2017-08-21

## 2017-08-21 ENCOUNTER — TELEPHONE (OUTPATIENT)
Dept: INTERNAL MEDICINE CLINIC | Age: 72
End: 2017-08-21

## 2017-08-21 LAB — INR BLD: 2.7

## 2017-08-31 ENCOUNTER — CARE COORDINATION (OUTPATIENT)
Dept: CARE COORDINATION | Age: 72
End: 2017-08-31

## 2017-09-05 ENCOUNTER — ANTI-COAG VISIT (OUTPATIENT)
Dept: INTERNAL MEDICINE CLINIC | Age: 72
End: 2017-09-05

## 2017-09-05 LAB — INR BLD: 2.3

## 2017-09-06 ENCOUNTER — CARE COORDINATION (OUTPATIENT)
Dept: CARE COORDINATION | Age: 72
End: 2017-09-06

## 2017-09-11 ENCOUNTER — TELEPHONE (OUTPATIENT)
Dept: CARDIOLOGY CLINIC | Age: 72
End: 2017-09-11

## 2017-09-11 RX ORDER — MONTELUKAST SODIUM 10 MG/1
TABLET ORAL
Qty: 90 TABLET | Refills: 0 | Status: ON HOLD | OUTPATIENT
Start: 2017-09-11 | End: 2017-10-30 | Stop reason: SDUPTHER

## 2017-09-18 ENCOUNTER — TELEPHONE (OUTPATIENT)
Dept: CARDIOLOGY CLINIC | Age: 72
End: 2017-09-18

## 2017-09-18 ENCOUNTER — ANTI-COAG VISIT (OUTPATIENT)
Dept: INTERNAL MEDICINE CLINIC | Age: 72
End: 2017-09-18

## 2017-09-18 LAB — INR BLD: 2.3

## 2017-09-22 ENCOUNTER — CARE COORDINATION (OUTPATIENT)
Dept: CASE MANAGEMENT | Age: 72
End: 2017-09-22

## 2017-09-22 ENCOUNTER — TELEPHONE (OUTPATIENT)
Dept: PHARMACY | Facility: CLINIC | Age: 72
End: 2017-09-22

## 2017-09-26 ENCOUNTER — CARE COORDINATION (OUTPATIENT)
Dept: CASE MANAGEMENT | Age: 72
End: 2017-09-26

## 2017-09-28 RX ORDER — DILTIAZEM HYDROCHLORIDE 120 MG/1
120 CAPSULE, COATED, EXTENDED RELEASE ORAL DAILY
Qty: 90 CAPSULE | Refills: 0 | Status: SHIPPED | OUTPATIENT
Start: 2017-09-28 | End: 2017-10-26 | Stop reason: SDUPTHER

## 2017-09-29 ENCOUNTER — CARE COORDINATION (OUTPATIENT)
Dept: MEDSURG UNIT | Age: 72
End: 2017-09-29

## 2017-10-02 ENCOUNTER — CARE COORDINATION (OUTPATIENT)
Dept: CASE MANAGEMENT | Age: 72
End: 2017-10-02

## 2017-10-02 ENCOUNTER — ANTI-COAG VISIT (OUTPATIENT)
Dept: INTERNAL MEDICINE CLINIC | Age: 72
End: 2017-10-02

## 2017-10-03 ENCOUNTER — OFFICE VISIT (OUTPATIENT)
Dept: INTERNAL MEDICINE CLINIC | Age: 72
End: 2017-10-03

## 2017-10-03 VITALS
WEIGHT: 315 LBS | HEART RATE: 85 BPM | BODY MASS INDEX: 41.5 KG/M2 | SYSTOLIC BLOOD PRESSURE: 110 MMHG | DIASTOLIC BLOOD PRESSURE: 70 MMHG | RESPIRATION RATE: 16 BRPM

## 2017-10-03 DIAGNOSIS — R35.1 NOCTURIA: ICD-10-CM

## 2017-10-03 DIAGNOSIS — R35.0 URINARY FREQUENCY: Primary | ICD-10-CM

## 2017-10-03 PROCEDURE — 4040F PNEUMOC VAC/ADMIN/RCVD: CPT | Performed by: NURSE PRACTITIONER

## 2017-10-03 PROCEDURE — 99213 OFFICE O/P EST LOW 20 MIN: CPT | Performed by: NURSE PRACTITIONER

## 2017-10-03 PROCEDURE — G8417 CALC BMI ABV UP PARAM F/U: HCPCS | Performed by: NURSE PRACTITIONER

## 2017-10-03 PROCEDURE — G8427 DOCREV CUR MEDS BY ELIG CLIN: HCPCS | Performed by: NURSE PRACTITIONER

## 2017-10-03 PROCEDURE — 3017F COLORECTAL CA SCREEN DOC REV: CPT | Performed by: NURSE PRACTITIONER

## 2017-10-03 PROCEDURE — 1111F DSCHRG MED/CURRENT MED MERGE: CPT | Performed by: NURSE PRACTITIONER

## 2017-10-03 PROCEDURE — G8484 FLU IMMUNIZE NO ADMIN: HCPCS | Performed by: NURSE PRACTITIONER

## 2017-10-03 PROCEDURE — 1036F TOBACCO NON-USER: CPT | Performed by: NURSE PRACTITIONER

## 2017-10-03 PROCEDURE — 1123F ACP DISCUSS/DSCN MKR DOCD: CPT | Performed by: NURSE PRACTITIONER

## 2017-10-03 ASSESSMENT — ENCOUNTER SYMPTOMS
NAUSEA: 1
WHEEZING: 1
COUGH: 1
SHORTNESS OF BREATH: 1

## 2017-10-04 ENCOUNTER — CARE COORDINATION (OUTPATIENT)
Dept: CASE MANAGEMENT | Age: 72
End: 2017-10-04

## 2017-10-04 NOTE — CARE COORDINATION
[facility]    10/4/2017    Patient: Georgina Ocampo  Patient : 1945   MRN: [id number]  Reason for Admission: Ablation   Discharge Date: 17 RARS: Risk Score: 20.75 CMRS 10      left for patient informing him of final transition call. Advised patient that [facility] at PCP office would be coordinating care moving forward and provided patient with that contact information.      Follow Up  Future Appointments  Date Time Provider Arslan Ardon   10/11/2017 1:00 PM Estiven Park MD Cint [facility]   10/26/2017 10:40 AM Salo Cedeno MD [place] [facility] None   2017 12:45 PM Linh Poole MD P CLER CAR [facility]   2018 11:30 AM [facility]       Hansa Owen RN

## 2017-10-11 ENCOUNTER — OFFICE VISIT (OUTPATIENT)
Dept: CARDIOLOGY CLINIC | Age: 72
End: 2017-10-11

## 2017-10-11 VITALS
WEIGHT: 315 LBS | DIASTOLIC BLOOD PRESSURE: 72 MMHG | HEART RATE: 72 BPM | HEIGHT: 75 IN | SYSTOLIC BLOOD PRESSURE: 114 MMHG | BODY MASS INDEX: 39.17 KG/M2

## 2017-10-11 DIAGNOSIS — I48.4 ATYPICAL ATRIAL FLUTTER (HCC): ICD-10-CM

## 2017-10-11 DIAGNOSIS — I48.0 PAROXYSMAL ATRIAL FIBRILLATION (HCC): Primary | ICD-10-CM

## 2017-10-11 PROCEDURE — 4040F PNEUMOC VAC/ADMIN/RCVD: CPT | Performed by: INTERNAL MEDICINE

## 2017-10-11 PROCEDURE — 3017F COLORECTAL CA SCREEN DOC REV: CPT | Performed by: INTERNAL MEDICINE

## 2017-10-11 PROCEDURE — 1036F TOBACCO NON-USER: CPT | Performed by: INTERNAL MEDICINE

## 2017-10-11 PROCEDURE — 93000 ELECTROCARDIOGRAM COMPLETE: CPT | Performed by: INTERNAL MEDICINE

## 2017-10-11 PROCEDURE — 99214 OFFICE O/P EST MOD 30 MIN: CPT | Performed by: INTERNAL MEDICINE

## 2017-10-11 PROCEDURE — G8417 CALC BMI ABV UP PARAM F/U: HCPCS | Performed by: INTERNAL MEDICINE

## 2017-10-11 PROCEDURE — G8484 FLU IMMUNIZE NO ADMIN: HCPCS | Performed by: INTERNAL MEDICINE

## 2017-10-11 PROCEDURE — G8427 DOCREV CUR MEDS BY ELIG CLIN: HCPCS | Performed by: INTERNAL MEDICINE

## 2017-10-11 PROCEDURE — 1111F DSCHRG MED/CURRENT MED MERGE: CPT | Performed by: INTERNAL MEDICINE

## 2017-10-11 PROCEDURE — 1123F ACP DISCUSS/DSCN MKR DOCD: CPT | Performed by: INTERNAL MEDICINE

## 2017-10-11 RX ORDER — FUROSEMIDE 40 MG/1
40 TABLET ORAL 2 TIMES DAILY
Qty: 30 TABLET | Refills: 5 | Status: ON HOLD
Start: 2017-10-11 | End: 2017-10-30 | Stop reason: DRUGHIGH

## 2017-10-11 NOTE — PROGRESS NOTES
circulation, collateral; Cancer ([facility] [address] DVT (deep venous thrombosis) ([facility] [address] Histoplasmosis; History of knee replacement procedure of right knee; Hx of blood clots; Hypertension; Knee osteoarthritis; Left TKR; Neuromuscular disorder ([facility] [address] Palpitations; Sleep apnea; Stone, kidney; and UTI (urinary tract infection). Past Surgical History:   has a past surgical history that includes Cystoscopy; Tonsillectomy; Knee arthroscopy (4/19/2011); Colonoscopy; Cystocopy (2/8/13); ablation of dysrhythmic focus (2013); Carpal tunnel release (Right, 9-30-15); Bariatric Surgery (2013); Carpal tunnel release (Left, 3/20/16); Cardiac surgery (2013); joint replacement (Bilateral); skin biopsy; and Diagnostic Cardiac Cath Lab Procedure. Allergies:  Bactrim [sulfamethoxazole-trimethoprim]; Ciprofloxacin; Macrobid [nitrofurantoin monohyd macro]; Theophylline; Cefuroxime axetil; Cipro xr; Other; and Tape [adhesive tape]    Social History:   reports that he quit smoking about 41 years ago. His smoking use included Cigarettes. He has a 34.00 pack-year smoking history. He has never used smokeless tobacco. He reports that he does not drink alcohol or use drugs. Family History: family history includes Arthritis in his father; Cancer in his mother; Kidney Disease in his mother; Other in his father; Stroke in his father and mother; Substance Abuse in his father. Home Medications:    Prior to Admission medications    Medication Sig Start Date End Date Taking?  Authorizing Provider   diltiazem (CARDIZEM CD) 120 MG extended release capsule Take 1 capsule by mouth daily 9/28/17  Yes Shakila Sheikh MD   furosemide (LASIX) 40 MG tablet Take 1 tablet by mouth daily Indications: correct dose and frequency per patient 9/21/17  Yes Darron Lopez CNP   Biotin 5 MG TABS Take 5 mg by mouth daily   Yes Historical Provider, MD   Cholecalciferol (VITAMIN D3) 5000 units TABS Take 5,000 Units by mouth daily   Yes Historical Provider, MD Cyanocobalamin (VITAMIN B-12 CR PO) Take 1 tablet by mouth See Admin Instructions Three times a week    Yes Historical Provider, MD   warfarin (COUMADIN) 5 MG tablet Take 5 mg by mouth daily   Yes Historical Provider, MD   montelukast (SINGULAIR) 10 MG tablet Take 1 tablet by mouth  daily 9/11/17  Yes Vinicius Mariee MD   dofetilide (TIKOSYN) 500 MCG capsule Take 1 capsule by mouth every 12 hours 8/1/17  Yes Kim Fernando MD   spironolactone (ALDACTONE) 25 MG tablet Take 1 tablet by mouth daily 6/15/17  Yes Vinicius Mariee MD   Multiple Vitamins-Minerals (THERAPEUTIC MULTIVITAMIN-MINERALS) tablet Take 2 tablets by mouth daily    Yes Historical Provider, MD   magnesium oxide (MAG-OX) 400 MG tablet Take 400 mg by mouth daily. Yes Historical Provider, MD          REVIEW OF SYSTEMS:    · Constitutional: there has been no unanticipated weight loss. There's been no change in energy level, sleep pattern, or activity level. · Eyes: No visual changes or diplopia. No scleral icterus. · ENT: No Headaches, hearing loss or vertigo. No mouth sores or sore throat. · Cardiovascular: No for chest pain, Yes for dyspnea on exertion, Yes for palpitations or No for loss of consciousness. No cough, hemoptysis, No for pleuritic pain, or phlebitis. · Respiratory: No for cough or wheezing, no sputum production. No hematemesis. · Gastrointestinal: No abdominal pain, appetite loss, blood in stools. No change in bowel or bladder habits. · Genitourinary: No dysuria, trouble voiding, or hematuria. · Musculoskeletal:  No gait disturbance, No for weakness or joint complaints. · Integumentary: No rash or pruritis. · Neurological: No headache, diplopia, change in muscle strength, numbness or tingling. No change in gait, balance, coordination, mood, affect, memory,  mentation, behavior. · Psychiatric: No anxiety, or depression. · Endocrine: No temperature intolerance. No excessive thirst, fluid intake, or urination. No tremor. · Hematologic/Lymphatic: No abnormal bruising or bleeding, blood clots or swollen lymph nodes. · Allergic/Immunologic: No nasal congestion or hives. Physical Examination:    /72   Pulse 72   Ht 6' 3\" (1.905 m)   Wt (!) 335 lb (152 kg)   BMI 41.87 kg/m²      Constitutional and General Appearance:    alert, cooperative, no distress and appears stated age  [de-identified]:    PERRLA, no cervical lymphadenopathy. No masses palpable. Normal oral mucosa  Respiratory:  · Normal excursion and expansion without use of accessory muscles  · Resp Auscultation: Normal breath sounds without dullness or wheezing  Cardiovascular:  · The apical impulse is not displaced  · Heart tones are crisp and normal. regular S1 and S2.  · Jugular venous pulsation Normal  · The carotid upstroke is normal in amplitude and contour without delay or bruit  · Peripheral pulses are symmetrical and full  Abdomen:  · No masses or tenderness  · Bowel sounds present  Extremities:  ·  No Cyanosis or Clubbing  ·  Lower extremity edema: BLE 2+ edema   · Skin: Warm and dry  Neurological:  · Alert and oriented. · Moves all extremities well  · No abnormalities of mood, affect, memory, mentation, or behavior are noted    DATA:    ECG: SR 72  ECHO:     IMPRESSION:    1. Paroxysmal atrial flutter s/p ablation 4/2017, 9/19/17  2. Paroxysmal atrial fibrillation   3. Multiple different flutters      RECOMMENDATIONS:  1. Cardiac event monitor to attempt to further evaluate your symptoms. 2 week cardiac event monitor. 2. Increase Lasix to 40 mg twice daily. 3. Check BMP in 1 week. 4. Follow up with Jose Navarrete CNP in 4-6 weeks. QUALITY MEASURES  1. Tobacco Cessation Counseling: NA  2. Retake of BP if >140/90:   NA  3. Documentation to PCP/referring for new patient:  Sent to PCP at close of office visit  4. CAD patient on anti-platelet: NA  5. CAD patient on STATIN therapy:  NA  6.  Patient with CHF and aFib on anticoagulation:  Yes; Coumadin        All questions and concerns were addressed to the patient/family. Alternatives to my treatment were discussed. LIZZ Shah F.A.C.C. [facility] [address].   [place] [address]  Phone: [phone]  Fax: [phone]

## 2017-10-12 ENCOUNTER — TELEPHONE (OUTPATIENT)
Dept: CARDIOLOGY CLINIC | Age: 72
End: 2017-10-12

## 2017-10-13 ENCOUNTER — CARE COORDINATION (OUTPATIENT)
Dept: CARE COORDINATION | Age: 72
End: 2017-10-13

## 2017-10-13 NOTE — CARE COORDINATION
Ambulatory Care Coordination Note  10/13/2017  CM Risk Score: 10  Candis Mortality Risk Score: 6.03    ACC: Yoselin Barbosa RN    Summary Note: ACC spoke with patient today for follow up. He continues to have palpitations at times. Not much improved after ablation. He has completed his follow up with Dr. Jerod Murdock. Lasix was recently increased to 40 mg bid. He had some increased edema in his legs. Not weighing daily currently. Spoke to him about doing this to determine effectiveness of the medication. Stated he will start. He is in the process of trying to move and it has been hectic. He occasionally has positional dizziness but that is not new. No falls. He is now doing a holter monitor for the next 2 weeks. BMP planned in 1 week. Educated on how to monitor for s/s off chf.     Past Medical History:   Diagnosis Date    Arthritis     Asthma     past hx    Atrial fibrillation ([address]     Blood circulation, collateral     Cancer ([address]     RIGHT LUNG    DVT (deep venous thrombosis) (HCC)     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    History of knee replacement procedure of right knee     Hx of blood clots     2 DVT's    Hypertension     Knee osteoarthritis 1/17/2012    Left TKR 1/18/2012    Neuromuscular disorder (HCC)     Palpitations     stable with atenolol    Sleep apnea     uses CPAP    Stone, kidney     UTI (urinary tract infection) 01/23/2017     Plan   Ongoing care coordination  chf support  Evaluation of symptoms and services  Repeat bmp due in 1 week.      Ambulatory Care Coordination Assessment    Care Coordination Protocol  Program Enrollment:  Complex Care  Referral from Primary Care Provider:  No  Week 1 - Initial Assessment     Do you have all of your prescriptions and are they filled?:  Yes (Comment: no new prescriptions ordered)  Barriers to medication adherence:  None  Are you able to afford your medications?:  Yes  How often do you have trouble taking your medications the way you have been told to

## 2017-10-16 ENCOUNTER — ANTI-COAG VISIT (OUTPATIENT)
Dept: INTERNAL MEDICINE CLINIC | Age: 72
End: 2017-10-16

## 2017-10-16 LAB — INR BLD: 2.6

## 2017-10-19 ENCOUNTER — HOSPITAL ENCOUNTER (OUTPATIENT)
Dept: OTHER | Age: 72
Discharge: HOME | End: 2017-10-19
Attending: INTERNAL MEDICINE | Admitting: INTERNAL MEDICINE

## 2017-10-19 LAB
ANION GAP SERPL CALCULATED.3IONS-SCNC: 11 MMOL/L (ref 3–16)
BUN BLDV-MCNC: 25 MG/DL (ref 7–20)
CALCIUM SERPL-MCNC: 9.2 MG/DL (ref 8.3–10.6)
CHLORIDE BLD-SCNC: 100 MMOL/L (ref 99–110)
CO2: 29 MMOL/L (ref 21–32)
CREAT SERPL-MCNC: 1 MG/DL (ref 0.8–1.3)
GFR AFRICAN AMERICAN: >60
GFR NON-AFRICAN AMERICAN: >60
GLUCOSE BLD-MCNC: 120 MG/DL (ref 70–99)
POTASSIUM SERPL-SCNC: 4.2 MMOL/L (ref 3.5–5.1)
SODIUM BLD-SCNC: 140 MMOL/L (ref 136–145)

## 2017-10-20 ENCOUNTER — TELEPHONE (OUTPATIENT)
Dept: CARDIOLOGY CLINIC | Age: 72
End: 2017-10-20

## 2017-10-20 NOTE — TELEPHONE ENCOUNTER
Instructed pt to decrease lasix to once daily. Pt stated the increase dose of lasix did not help his SOB.

## 2017-10-20 NOTE — TELEPHONE ENCOUNTER
----- Message from Carmen Knox MD sent at 10/20/2017 11:52 AM EDT -----  So far heart monitor picked up one episode of heart rate of 110 otherwise normal. If he is having symptoms, increase cardizem to 120 bid  ----- Message -----  From: Charmaine Valencia  Sent: 10/19/2017   3:06 PM  To: [address], MD    Reports so far.

## 2017-10-26 ENCOUNTER — OFFICE VISIT (OUTPATIENT)
Dept: INTERNAL MEDICINE CLINIC | Age: 72
End: 2017-10-26

## 2017-10-26 ENCOUNTER — CARE COORDINATION (OUTPATIENT)
Dept: CARE COORDINATION | Age: 72
End: 2017-10-26

## 2017-10-26 VITALS
BODY MASS INDEX: 39.17 KG/M2 | HEIGHT: 75 IN | RESPIRATION RATE: 18 BRPM | DIASTOLIC BLOOD PRESSURE: 60 MMHG | HEART RATE: 60 BPM | WEIGHT: 315 LBS | SYSTOLIC BLOOD PRESSURE: 100 MMHG

## 2017-10-26 DIAGNOSIS — G47.33 OSA (OBSTRUCTIVE SLEEP APNEA): ICD-10-CM

## 2017-10-26 DIAGNOSIS — I48.0 PAROXYSMAL ATRIAL FIBRILLATION (HCC): ICD-10-CM

## 2017-10-26 DIAGNOSIS — I10 BENIGN ESSENTIAL HTN: Primary | ICD-10-CM

## 2017-10-26 DIAGNOSIS — G62.9 POLYNEUROPATHY: ICD-10-CM

## 2017-10-26 PROCEDURE — G8484 FLU IMMUNIZE NO ADMIN: HCPCS | Performed by: INTERNAL MEDICINE

## 2017-10-26 PROCEDURE — 99213 OFFICE O/P EST LOW 20 MIN: CPT | Performed by: INTERNAL MEDICINE

## 2017-10-26 PROCEDURE — G8427 DOCREV CUR MEDS BY ELIG CLIN: HCPCS | Performed by: INTERNAL MEDICINE

## 2017-10-26 PROCEDURE — 1123F ACP DISCUSS/DSCN MKR DOCD: CPT | Performed by: INTERNAL MEDICINE

## 2017-10-26 PROCEDURE — G8417 CALC BMI ABV UP PARAM F/U: HCPCS | Performed by: INTERNAL MEDICINE

## 2017-10-26 PROCEDURE — 3017F COLORECTAL CA SCREEN DOC REV: CPT | Performed by: INTERNAL MEDICINE

## 2017-10-26 PROCEDURE — 1036F TOBACCO NON-USER: CPT | Performed by: INTERNAL MEDICINE

## 2017-10-26 PROCEDURE — 4040F PNEUMOC VAC/ADMIN/RCVD: CPT | Performed by: INTERNAL MEDICINE

## 2017-10-26 RX ORDER — DILTIAZEM HYDROCHLORIDE 120 MG/1
240 CAPSULE, COATED, EXTENDED RELEASE ORAL DAILY
Qty: 180 CAPSULE | Refills: 0 | Status: ON HOLD | OUTPATIENT
Start: 2017-10-26 | End: 2017-11-01

## 2017-10-26 RX ORDER — SPIRONOLACTONE 25 MG/1
25 TABLET ORAL DAILY
Qty: 90 TABLET | Refills: 0 | Status: SHIPPED | OUTPATIENT
Start: 2017-10-26 | End: 2017-12-28 | Stop reason: SDUPTHER

## 2017-10-26 ASSESSMENT — PATIENT HEALTH QUESTIONNAIRE - PHQ9
SUM OF ALL RESPONSES TO PHQ9 QUESTIONS 1 & 2: 2
1. LITTLE INTEREST OR PLEASURE IN DOING THINGS: 1
2. FEELING DOWN, DEPRESSED OR HOPELESS: 1
SUM OF ALL RESPONSES TO PHQ QUESTIONS 1-9: 2

## 2017-10-26 NOTE — PROGRESS NOTES
MG tablet Take 1 tablet by mouth daily 90 tablet 0    Multiple Vitamins-Minerals (THERAPEUTIC MULTIVITAMIN-MINERALS) tablet Take 2 tablets by mouth daily       magnesium oxide (MAG-OX) 400 MG tablet Take 400 mg by mouth daily. No current facility-administered medications for this visit. Review of Systems  As above    Objective:   Physical Exam       General: obese, healthy appearing male  Awake, alert and oriented. Appears to be not in any distress  Mucous Membranes:  Pink , anicteric  Neck: No JVD, no carotid bruit, no thyromegaly  Chest:  Clear to auscultation bilaterally, no added sounds  Cardiovascular:  RRR S1S2 heard, no murmurs or gallops  Abdomen:  Soft, undistended, non tender, no organomegaly, BS present  Extremities: 2+ pedal edema  Distal pulses well felt  Neurological : grossly normal    ECHO   Normal systolic function with an estimated ejection fraction of 55-60%.  Mild to moderate concentric left ventricular hypertrophy.   No regional wall motion abnormalities are seen.   Left ventricular diastolic filling pressure is indeterminate.   Left atrium is moderately dilated.   No change from last echo on 11/24/1015. Assessment:      1. Benign essential HTN     2. Paroxysmal atrial fibrillation (HCC)     3. MARIA VICTORIA (obstructive sleep apnea)     4. Polyneuropathy ([facility] [address]             Plan:         AFibb - s/p ablation - f/w Dr. Mata Riggs in 2014 . With persistent symptoms,   Off amio, sotalol for severe symptoms  Had repeat Ablation in 4/17 and 9/17 with persistent symptoms. Now on cardizem  Just completed EVENT monitor    More sob, dizziness, fatigue noted   Pt has been frustated with unsuccessful meds and procedures .  Acknowledges MD attempts to help him but he is not feeling any better  Asking about going to [address] [place] [address] or other options        Polyneuropathy- seen Dr. Lamberto Luis and EMG showed sensorymotor neuropathy  Off B6 supplements as advised      Right lung nodule /mass- s.p excision  by

## 2017-10-26 NOTE — CARE COORDINATION
Ambulatory Care Coordination Note  10/26/2017  CM Risk Score: 10  Candis Mortality Risk Score: 4.54    ACC: Justin Escobar RN    Summary Note: ACC completed face to face visit in the office with patient. Patient was also seen by Dr. Jamar Romero today. He continues to have issues with fatigue and a pounding heart rate after his ablation. He is feeling like the racing feeling has improved but still feels a pounding. He denies any pain. He does have fatigue all the time. He reports to me today that he has had a 7 lb weight gain over the last week since he was decreased on his Lasix to 40 mg daily by cardiology. Advised him to follow up with cardiology related to weight increase. He also just finished a 2 week holter monitor and is awaiting results. Reinforced education today related to low sodium diet and monitoring fluid intake. Patient does not feel that he adds that much     Congestive Heart Failure Assessment           Symptoms:               Care Coordination Interventions    Program Enrollment:  Complex Care  Referral from Primary Care Provider:  No  Suggested Interventions and Community Resources         Goals Addressed     None          Prior to Admission medications    Medication Sig Start Date End Date Taking?  Authorizing Provider   diltiazem (CARDIZEM CD) 120 MG extended release capsule Take 2 capsules by mouth daily 10/26/17   Kaitlynn Kim MD   spironolactone (ALDACTONE) 25 MG tablet Take 1 tablet by mouth daily 10/26/17   Kaitlynn Kim MD   furosemide (LASIX) 40 MG tablet Take 1 tablet by mouth 2 times daily 10/11/17   Sadie Taylor MD   Biotin 5 MG TABS Take 5 mg by mouth daily    Historical Provider, MD   Cholecalciferol (VITAMIN D3) 5000 units TABS Take 5,000 Units by mouth daily    Historical Provider, MD   Cyanocobalamin (VITAMIN B-12 CR PO) Take 1 tablet by mouth See Admin Instructions Three times a week     Historical Provider, MD   warfarin (COUMADIN) 5 MG tablet Take 5 mg by mouth daily    Historical Provider, MD   montelukast (SINGULAIR) 10 MG tablet Take 1 tablet by mouth  daily 9/11/17   Aminah Gallagher MD   [facility]) 500 MCG capsule Take 1 capsule by mouth every 12 hours 8/1/17   Maureen Weinstein MD   Multiple Vitamins-Minerals (THERAPEUTIC MULTIVITAMIN-MINERALS) tablet Take 2 tablets by mouth daily     Historical Provider, MD   magnesium oxide (MAG-OX) 400 MG tablet Take 400 mg by mouth daily.     Historical Provider, MD       Future Appointments  Date Time Provider Arlsan Ardon   11/21/2017 11:30 AM LISSA Us [facility]   12/19/2017 12:45 PM Nando Tompkins MD P CLER CAR [facility]   2/19/2018 11:30 AM LISSA Roberts [facility]

## 2017-10-29 NOTE — COMMUNICATION BODY
circulation, collateral; Cancer ([facility] [address] DVT (deep venous thrombosis) ([facility] [address] Histoplasmosis; History of knee replacement procedure of right knee; Hx of blood clots; Hypertension; Knee osteoarthritis; Left TKR; Neuromuscular disorder ([facility] [address] Palpitations; Sleep apnea; Stone, kidney; and UTI (urinary tract infection). Past Surgical History:   has a past surgical history that includes Cystoscopy; Tonsillectomy; Knee arthroscopy (4/19/2011); Colonoscopy; Cystocopy (2/8/13); ablation of dysrhythmic focus (2013); Carpal tunnel release (Right, 9-30-15); Bariatric Surgery (2013); Carpal tunnel release (Left, 3/20/16); Cardiac surgery (2013); joint replacement (Bilateral); skin biopsy; and Diagnostic Cardiac Cath Lab Procedure. Allergies:  Bactrim [sulfamethoxazole-trimethoprim]; Ciprofloxacin; Macrobid [nitrofurantoin monohyd macro]; Theophylline; Cefuroxime axetil; Cipro xr; Other; and Tape [adhesive tape]    Social History:   reports that he quit smoking about 41 years ago. His smoking use included Cigarettes. He has a 34.00 pack-year smoking history. He has never used smokeless tobacco. He reports that he does not drink alcohol or use drugs. Family History: family history includes Arthritis in his father; Cancer in his mother; Kidney Disease in his mother; Other in his father; Stroke in his father and mother; Substance Abuse in his father. Home Medications:    Prior to Admission medications    Medication Sig Start Date End Date Taking?  Authorizing Provider   diltiazem (CARDIZEM CD) 120 MG extended release capsule Take 1 capsule by mouth daily 9/28/17  Yes Nicole Caba MD   furosemide (LASIX) 40 MG tablet Take 1 tablet by mouth daily Indications: correct dose and frequency per patient 9/21/17  Yes Jose Angel Stallings CNP   Biotin 5 MG TABS Take 5 mg by mouth daily   Yes Historical Provider, MD   Cholecalciferol (VITAMIN D3) 5000 units TABS Take 5,000 Units by mouth daily   Yes Historical Provider, MD No tremor. · Hematologic/Lymphatic: No abnormal bruising or bleeding, blood clots or swollen lymph nodes. · Allergic/Immunologic: No nasal congestion or hives. Physical Examination:    /72   Pulse 72   Ht 6' 3\" (1.905 m)   Wt (!) 335 lb (152 kg)   BMI 41.87 kg/m²       Constitutional and General Appearance:    alert, cooperative, no distress and appears stated age  [de-identified]:    PERRLA, no cervical lymphadenopathy. No masses palpable. Normal oral mucosa  Respiratory:  · Normal excursion and expansion without use of accessory muscles  · Resp Auscultation: Normal breath sounds without dullness or wheezing  Cardiovascular:  · The apical impulse is not displaced  · Heart tones are crisp and normal. regular S1 and S2.  · Jugular venous pulsation Normal  · The carotid upstroke is normal in amplitude and contour without delay or bruit  · Peripheral pulses are symmetrical and full  Abdomen:  · No masses or tenderness  · Bowel sounds present  Extremities:  ·  No Cyanosis or Clubbing  ·  Lower extremity edema: BLE 2+ edema   · Skin: Warm and dry  Neurological:  · Alert and oriented. · Moves all extremities well  · No abnormalities of mood, affect, memory, mentation, or behavior are noted    DATA:    ECG: SR 72  ECHO:     IMPRESSION:    1. Paroxysmal atrial flutter s/p ablation 4/2017, 9/19/17  2. Paroxysmal atrial fibrillation   3. Multiple different flutters      RECOMMENDATIONS:  1. Cardiac event monitor to attempt to further evaluate your symptoms. 2 week cardiac event monitor. 2. Increase Lasix to 40 mg twice daily. 3. Check BMP in 1 week. 4. Follow up with Majo Laboy CNP in 4-6 weeks. QUALITY MEASURES  1. Tobacco Cessation Counseling: NA  2. Retake of BP if >140/90:   NA  3. Documentation to PCP/referring for new patient:  Sent to PCP at close of office visit  4. CAD patient on anti-platelet: NA  5. CAD patient on STATIN therapy:  NA  6.  Patient with CHF and aFib on anticoagulation:  Yes;

## 2017-10-30 ENCOUNTER — ANTI-COAG VISIT (OUTPATIENT)
Dept: INTERNAL MEDICINE CLINIC | Age: 72
End: 2017-10-30

## 2017-10-30 ENCOUNTER — TELEPHONE (OUTPATIENT)
Dept: INTERNAL MEDICINE CLINIC | Age: 72
End: 2017-10-30

## 2017-10-30 ENCOUNTER — TELEPHONE (OUTPATIENT)
Dept: CARDIOLOGY CLINIC | Age: 72
End: 2017-10-30

## 2017-10-30 LAB — INR BLD: 2.5

## 2017-10-30 RX ORDER — MONTELUKAST SODIUM 10 MG/1
TABLET ORAL
Qty: 90 TABLET | Refills: 0 | Status: SHIPPED | OUTPATIENT
Start: 2017-10-30 | End: 2018-01-02 | Stop reason: SDUPTHER

## 2017-10-30 NOTE — TELEPHONE ENCOUNTER
----- Message from Juan Pina MD sent at 10/30/2017  2:15 PM EDT -----  Contact: [phone]  I told him    ----- Message -----  From: Paul Delacruz  Sent: 10/30/2017   1:30 PM  To: Juan Pina MD    Should I still call him, or will this be taken care of in the hospital?   ----- Message -----  From: Juan Pina MD  Sent: 10/30/2017   1:27 PM  To:  Niki Villalobos    No change  He is in hospital    ----- Message -----  From: Paul Delacruz  Sent: 10/30/2017   9:41 AM  To: Juan Pina MD        ----- Message -----  From: Paul Delacruz  Sent: 10/30/2017   9:41 AM  To: Ca Chappell MD    INR: 2.5

## 2017-10-30 NOTE — TELEPHONE ENCOUNTER
Pt called stating he has not been feeling well today. Symptoms have been persistent since visit on 10/11. Extreme fatigue with little to no activity. Ongoing SOB. Pt states he has sudden onset chest discomfort. States it is not pain, just discomfort. Non-exertional. Vitals are stable per pt. HR in 80s. Taking meds as prescribed. Please advise.

## 2017-11-01 PROCEDURE — 93272 ECG/REVIEW INTERPRET ONLY: CPT | Performed by: INTERNAL MEDICINE

## 2017-11-02 ENCOUNTER — CARE COORDINATION (OUTPATIENT)
Dept: CASE MANAGEMENT | Age: 72
End: 2017-11-02

## 2017-11-03 ENCOUNTER — OFFICE VISIT (OUTPATIENT)
Dept: PULMONOLOGY | Age: 72
End: 2017-11-03

## 2017-11-03 VITALS
HEIGHT: 75 IN | RESPIRATION RATE: 16 BRPM | DIASTOLIC BLOOD PRESSURE: 81 MMHG | TEMPERATURE: 98.3 F | SYSTOLIC BLOOD PRESSURE: 124 MMHG | HEART RATE: 80 BPM | BODY MASS INDEX: 39.17 KG/M2 | WEIGHT: 315 LBS | OXYGEN SATURATION: 97 %

## 2017-11-03 DIAGNOSIS — G47.33 OSA (OBSTRUCTIVE SLEEP APNEA): Primary | ICD-10-CM

## 2017-11-03 DIAGNOSIS — G47.00 INSOMNIA, UNSPECIFIED TYPE: ICD-10-CM

## 2017-11-03 DIAGNOSIS — E66.01 OBESITY, CLASS III, BMI 40-49.9 (MORBID OBESITY) (HCC): ICD-10-CM

## 2017-11-03 DIAGNOSIS — R53.83 FATIGUE, UNSPECIFIED TYPE: ICD-10-CM

## 2017-11-03 PROCEDURE — G8417 CALC BMI ABV UP PARAM F/U: HCPCS | Performed by: NURSE PRACTITIONER

## 2017-11-03 PROCEDURE — G8484 FLU IMMUNIZE NO ADMIN: HCPCS | Performed by: NURSE PRACTITIONER

## 2017-11-03 PROCEDURE — 1036F TOBACCO NON-USER: CPT | Performed by: NURSE PRACTITIONER

## 2017-11-03 PROCEDURE — 4040F PNEUMOC VAC/ADMIN/RCVD: CPT | Performed by: NURSE PRACTITIONER

## 2017-11-03 PROCEDURE — 99213 OFFICE O/P EST LOW 20 MIN: CPT | Performed by: NURSE PRACTITIONER

## 2017-11-03 PROCEDURE — 3017F COLORECTAL CA SCREEN DOC REV: CPT | Performed by: NURSE PRACTITIONER

## 2017-11-03 PROCEDURE — G8427 DOCREV CUR MEDS BY ELIG CLIN: HCPCS | Performed by: NURSE PRACTITIONER

## 2017-11-03 PROCEDURE — 1123F ACP DISCUSS/DSCN MKR DOCD: CPT | Performed by: NURSE PRACTITIONER

## 2017-11-03 ASSESSMENT — SLEEP AND FATIGUE QUESTIONNAIRES
HOW LIKELY ARE YOU TO NOD OFF OR FALL ASLEEP IN A CAR, WHILE STOPPED FOR A FEW MINUTES IN TRAFFIC: 0
HOW LIKELY ARE YOU TO NOD OFF OR FALL ASLEEP WHILE SITTING INACTIVE IN A PUBLIC PLACE: 0
HOW LIKELY ARE YOU TO NOD OFF OR FALL ASLEEP WHILE WATCHING TV: 1
NECK CIRCUMFERENCE (INCHES): 18.5
ESS TOTAL SCORE: 5
HOW LIKELY ARE YOU TO NOD OFF OR FALL ASLEEP WHEN YOU ARE A PASSENGER IN A CAR FOR AN HOUR WITHOUT A BREAK: 0
HOW LIKELY ARE YOU TO NOD OFF OR FALL ASLEEP WHILE SITTING AND TALKING TO SOMEONE: 0
HOW LIKELY ARE YOU TO NOD OFF OR FALL ASLEEP WHILE SITTING QUIETLY AFTER LUNCH WITHOUT ALCOHOL: 1
HOW LIKELY ARE YOU TO NOD OFF OR FALL ASLEEP WHILE LYING DOWN TO REST IN THE AFTERNOON WHEN CIRCUMSTANCES PERMIT: 2
HOW LIKELY ARE YOU TO NOD OFF OR FALL ASLEEP WHILE SITTING AND READING: 1

## 2017-11-03 NOTE — CARE COORDINATION
SLEEP None   11/21/2017 11:30 AM LISSA Tracy [facility]   12/19/2017 12:45 PM Jeneen Holstein, MD [facility]   2/19/2018 11:30 AM LISSA Flaherty [facility]       Grace Rodrigues RN

## 2017-11-03 NOTE — PATIENT INSTRUCTIONS
Uncomfortable bedding can prevent good sleep. Ensure your bedroom is quiet and comfortable. A cooler room along with enough blankets to stay warm is recommended. If your room is too noisy, try a white noise machine. If too bright, try black out shades or an eye mask. Dont take worries to bed. Leave worries about work, school etc. behind you when you go to bed. Some people find it helpful to assign a worry period in the evening or late afternoon to write down your worries and get them out of your system. CPAP Equipment Cleaning and Disinfecting Schedule  Equipment Cleaning Frequency Instructions  Disinfecting Frequency   Non-Disposable Filters  Weekly Mild soapy water, Rinse, Air Dry Not Required   Disposable Filters Change as needed  2-4 weeks Do Not Wash Not Required   Hose/tubing Daily Mild soapy water, Rinse, Air Dry Once a week   Mask / Nasal Pillows Daily Mild soapy water, Rinse, Air Dry Once a week   Headgear Weekly Hand wash, Mild soapy water, Rinse, Dry  Not Required   Humidifier Daily Empty water daily  Mild soapy water, Rinse well, Air Dry  Once a week   CPAP Unit As Needed Dust with damp cloth,  No detergents or sprays Not Required         Disinfect (per schedule) with 1 part white vinegar and 3 parts water- soak mask and water chamber for 30 minutes every 1-2 weeks, more often if sick. Allow water/vinegar mixture to run through tubing. Allow all equipment to air dry. Drying Hints:   Always hang tubing away from direct sunlight, as this will cause the tubing to become yellow, brittle and crack over a period of time. DO NOT attach the wet tubing to your CPAP unit to blow-dry it. The moisture from the tubing can drain back into your machine. Moisture in your unit can cause sudden pressure increases or short circuits  DO's and DON'Ts:  - Don't use alcohol-based products to clean your mask, because it can cause the materials to become hard and brittle.    - Don't put headgear in the washer or dryer  - Don't use any caustic or household cleaning solutions such as bleach on your CPAP   equipment.  - Do follow the recommended cleaning schedule. - Do change your disposable filter frequently. Adapted From: [employer]/cleaning. shtm. These are general suggestions for all models please follow specific s recommendations and specific instructions    Recommend Go! To Sleep online program with [facility]. Six weeks' worth of effective sleep therapy, online sleep log, daily sleep score, daily sleep improvement recommendations, activities to help get the sleep needed, daily e-mails from program , daily articles to help get the most out of the program, personal progress charts, six specially crafted relaxation practices, and motivational tips.

## 2017-11-03 NOTE — PROGRESS NOTES
CHIEF COMPLAINT: MARIA VICTORIA    Sanam Sarah is a 71 y.o. male in office for MARIA VICTORIA follow up. He was recently seen in ER for chest pain and was told to follow up here for ? new titration. He has had 2 ablations for afib. States he feels better after 2nd ablation. States has been more fatigued over past few years. Also he is not sleeping well. Patient is using CPAP 6-8 hrs/night. Using humidifier. No snoring on CPAP. The pressure is well tolerated. The mask is comfortable-nasal pillows. No mask leak. Some daytime sleepiness. No nodding off when driving. No dry nose or throat. ++fatigue. States more fatigue with exertion. Bedtime is  pm and rise time is 4-5 am.  Sometimes falls asleep at 830 in chair. Sleep onset is 5 minutes. Wakes up 4-6 times at night total. 2 nocturia. It takes 30-45 minutes to fall back a sleep. Sometimes naps during the day. No headache in am. No weight gain. 1 caffienated beverages during the day. No alcohol.  ESS is 5        Past Medical History:   Diagnosis Date    Arthritis     Asthma     past hx    Atrial fibrillation ([address]     Blood circulation, collateral     Cancer ([address]     RIGHT LUNG; nodule pt states that it was non-cancerous    DVT (deep venous thrombosis) ([address]     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    History of knee replacement procedure of right knee     Hx of blood clots     2 DVT's    Hypertension     Knee osteoarthritis 1/17/2012    Left TKR 1/18/2012    Neuromuscular disorder (HCC)     Palpitations     stable with atenolol    Sleep apnea     uses CPAP    Stone, kidney     UTI (urinary tract infection) 01/23/2017     Past Surgical History:   Procedure Laterality Date    ABLATION OF DYSRHYTHMIC FOCUS  2013    a-fib    BARIATRIC SURGERY  2013    GASTRIC SLEEVE    CARDIAC SURGERY  2013    ablation    CARPAL TUNNEL RELEASE Right 9-30-15    CARPAL TUNNEL RELEASE Left 3/20/16    COLONOSCOPY      CYSTOSCOPY      with removal stone    CYSTOSCOPY  2/8/13    with Laser Vaporization of Enlarged Prostate    DIAGNOSTIC CARDIAC CATH LAB PROCEDURE      JOINT REPLACEMENT Bilateral     right knee (2002) and left knee (2012)    KNEE ARTHROSCOPY  4/19/2011     left  knee    SKIN BIOPSY      skin Ca/SQUAMOUS CELL    TONSILLECTOMY       Allergies   Allergen Reactions    Bactrim [Sulfamethoxazole-Trimethoprim] Diarrhea    Ciprofloxacin      Bad headaches    Macrobid [Nitrofurantoin Monohyd Macro]     Theophylline      Theodur-caused severe headaches    Cefuroxime Axetil Rash and Itching    Cipro Xr Rash    Other Rash and Other (See Comments)     Ekg leads-glue    Tape [place] Tape] Rash     Skin irritaion  Eats away at skin, paper tape OK  Plastic tape         Current Medications:    Current Outpatient Prescriptions:     diltiazem (CARDIZEM 12 HR) 120 MG extended release capsule, Take 120 mg by mouth 2 times daily, Disp: , Rfl:     montelukast (SINGULAIR) 10 MG tablet, TAKE 1 TABLET BY MOUTH  DAILY, Disp: 90 tablet, Rfl: 0    furosemide (LASIX) 40 MG tablet, Take 40 mg by mouth daily, Disp: , Rfl:     spironolactone (ALDACTONE) 25 MG tablet, Take 1 tablet by mouth daily, Disp: 90 tablet, Rfl: 0    Biotin 5 MG TABS, Take 5 mg by mouth daily, Disp: , Rfl:     Cholecalciferol (VITAMIN D3) 5000 units TABS, Take 5,000 Units by mouth daily, Disp: , Rfl:     Cyanocobalamin (VITAMIN B-12 CR PO), Take 1 tablet by mouth See Admin Instructions Three times a week; takes Mon, Wed, and Fri, Disp: , Rfl:     warfarin (COUMADIN) 5 MG tablet, Take 5 mg by mouth daily, Disp: , Rfl:     dofetilide (TIKOSYN) 500 MCG capsule, Take 1 capsule by mouth every 12 hours, Disp: 60 capsule, Rfl: 3    Multiple Vitamins-Minerals (THERAPEUTIC MULTIVITAMIN-MINERALS) tablet, Take 2 tablets by mouth daily , Disp: , Rfl:     magnesium oxide (MAG-OX) 400 MG tablet, Take 400 mg by mouth daily. , Disp: , Rfl:         Objective:   PHYSICAL EXAM:    Blood pressure 124/81, pulse 80, temperature 98.3 °F hygiene  Cognitive behavioral therapy was discussed with patient including stimulus control and sleep restriction  Recommend Go! To Sleep online program with [facility] [place][note 2]. Six weeks' worth of effective sleep therapy, online sleep log, daily sleep score, daily sleep improvement recommendations, activities to help get the sleep needed, daily e-mails from program , daily articles to help get the most out of the program, personal progress charts, six specially crafted relaxation practices, and motivational tips. Set bed/wake times, no naps in daytime  Avoid sedatives, alcohol and caffeinated drinks at bed time. Patient counseled to never drive or operate heavy machinery while fatigue, drowsy or sleepy. Weight loss is recommended as a long-term intervention. Complications of MARIA VICTORIA if not treated were discussed with patient patient, including: systemic hypertension, pulmonary hypertension, cardiovascular morbidities, car accidents and all cause mortality.   Patient education handout provided regarding sleep tips and CPAP cleaning recommendations

## 2017-11-06 ENCOUNTER — HOSPITAL ENCOUNTER (OUTPATIENT)
Dept: SLEEP MEDICINE | Age: 72
Discharge: HOME | End: 2017-11-08

## 2017-11-06 DIAGNOSIS — I48.0 PAROXYSMAL ATRIAL FIBRILLATION (HCC): ICD-10-CM

## 2017-11-06 DIAGNOSIS — I48.4 ATYPICAL ATRIAL FLUTTER (HCC): ICD-10-CM

## 2017-11-06 DIAGNOSIS — R91.1 SOLITARY PULMONARY NODULE: ICD-10-CM

## 2017-11-07 ENCOUNTER — CARE COORDINATION (OUTPATIENT)
Dept: CASE MANAGEMENT | Age: 72
End: 2017-11-07

## 2017-11-13 ENCOUNTER — CARE COORDINATION (OUTPATIENT)
Dept: CASE MANAGEMENT | Age: 72
End: 2017-11-13

## 2017-11-13 NOTE — CARE COORDINATION
[facility] Transitions Follow Up Call    2017    Patient: Darlene Cabral  Patient : 1945   MRN: [id number]  Reason for Admission: CP obs  Discharge Date: 17 RARS: Risk Score: 20.75       Spoke with: Elza Rodríguez (the patient)    Non-face-to-face services provided:  Education of patient/family/caregiver/guardian to support self-management-review of CNP cardio appt      Care Transitions Subsequent and Final Call    Subsequent and Final Calls  Do you have any ongoing symptoms?:  No  Have your medications changed?:  No  Do you have any questions related to your medications?:  No  Do you currently have any active services?:  No  Do you have any needs or concerns that I can assist you with?:  No  Identified Barriers:  None  Care Transitions Interventions  No Identified Needs  Other Interventions:          Cristóbalyvan Anne denies any further episodes of CP, palpitations. Denies questions about medications. Has f/up with cardio NP next week. Handoff to [facility] at this time.      Future Appointments  Date Time Provider Arslan Ardon   2017 11:30 AM LISSA Spring [facility]   2017 12:45 PM MD SNEHA Dooley [facility]   2018 11:30 AM LISSA Trejo [facility]       Angela Ybarra RN

## 2017-11-14 ENCOUNTER — ANTI-COAG VISIT (OUTPATIENT)
Dept: INTERNAL MEDICINE CLINIC | Age: 72
End: 2017-11-14

## 2017-11-14 ENCOUNTER — TELEPHONE (OUTPATIENT)
Dept: INTERNAL MEDICINE CLINIC | Age: 72
End: 2017-11-14

## 2017-11-14 LAB — INR BLD: 2.5

## 2017-11-17 ENCOUNTER — CARE COORDINATION (OUTPATIENT)
Dept: CARE COORDINATION | Age: 72
End: 2017-11-17

## 2017-11-17 NOTE — PATIENT INSTRUCTIONS
you to chew 1 adult-strength or 2 to 4 low-dose aspirin. Wait for an ambulance. Do not try to drive yourself. Follow-up care is a key part of your treatment and safety. Be sure to make and go to all appointments, and call your doctor if you are having problems. It's also a good idea to know your test results and keep a list of the medicines you take. Where can you learn more? Go to https://[email or web address].Snap Trends. org and sign in to your Plex[employer] account. Enter T174 in the Viverae box to learn more about \"Learning About Heart Failure Zones. \"     If you do not have an account, please click on the \"Sign Up Now\" link. Current as of: February 23, 2017  Content Version: 11.3  © 3292-3853 [employer]. Care instructions adapted under license by [facility] ([facility]). If you have questions about a medical condition or this instruction, always ask your healthcare professional. [place] [address] any warranty or liability for your use of this information.

## 2017-11-21 ENCOUNTER — OFFICE VISIT (OUTPATIENT)
Dept: CARDIOLOGY CLINIC | Age: 72
End: 2017-11-21

## 2017-11-21 VITALS
DIASTOLIC BLOOD PRESSURE: 76 MMHG | BODY MASS INDEX: 39.17 KG/M2 | OXYGEN SATURATION: 97 % | WEIGHT: 315 LBS | HEIGHT: 75 IN | SYSTOLIC BLOOD PRESSURE: 124 MMHG | HEART RATE: 74 BPM

## 2017-11-21 DIAGNOSIS — I48.0 PAF (PAROXYSMAL ATRIAL FIBRILLATION) (HCC): Primary | ICD-10-CM

## 2017-11-21 DIAGNOSIS — I48.4 ATYPICAL ATRIAL FLUTTER (HCC): ICD-10-CM

## 2017-11-21 DIAGNOSIS — I10 ESSENTIAL HYPERTENSION: ICD-10-CM

## 2017-11-21 PROCEDURE — 3017F COLORECTAL CA SCREEN DOC REV: CPT | Performed by: NURSE PRACTITIONER

## 2017-11-21 PROCEDURE — 1123F ACP DISCUSS/DSCN MKR DOCD: CPT | Performed by: NURSE PRACTITIONER

## 2017-11-21 PROCEDURE — 93000 ELECTROCARDIOGRAM COMPLETE: CPT | Performed by: NURSE PRACTITIONER

## 2017-11-21 PROCEDURE — G8417 CALC BMI ABV UP PARAM F/U: HCPCS | Performed by: NURSE PRACTITIONER

## 2017-11-21 PROCEDURE — 99214 OFFICE O/P EST MOD 30 MIN: CPT | Performed by: NURSE PRACTITIONER

## 2017-11-21 PROCEDURE — G8427 DOCREV CUR MEDS BY ELIG CLIN: HCPCS | Performed by: NURSE PRACTITIONER

## 2017-11-21 PROCEDURE — 1036F TOBACCO NON-USER: CPT | Performed by: NURSE PRACTITIONER

## 2017-11-21 PROCEDURE — 4040F PNEUMOC VAC/ADMIN/RCVD: CPT | Performed by: NURSE PRACTITIONER

## 2017-11-21 PROCEDURE — G8484 FLU IMMUNIZE NO ADMIN: HCPCS | Performed by: NURSE PRACTITIONER

## 2017-11-21 NOTE — PATIENT INSTRUCTIONS
1. No changes today  2. Continue aerobic exercise  3. Consider cardiac rehab referral   4.  Follow up in January

## 2017-11-21 NOTE — PROGRESS NOTES
[address]   Electrophysiology  Note              Date:  November 21, 2017  Patient name: Elier Martins  YOB: 1945    Primary Care physician: Doree Hodgkin, MD    HISTORY OF PRESENT ILLNESS: The patient is a 71 y.o.  male with a history of paroxysmal atrial fibrillation, atrial flutter, AT, s/p PVC ablation, HTN, DVT, histoplasmosis (s/p thoracotomy), MARIA VICTORIA, chronic BLE edema, and polyneuropathy. He had a PVC ablation in 2014 and was followed by Dr. Matt Muse. He was seen by Dr. Jo Beltre on 4/6/2017 and his rhythm was atrial tach. On 4/12/2017 he had a cardioversion for atrial fibrillation, EP study, RFCA of typical atrial flutter, RFCA for bidirectional atrial flutter, and RFCA of left sided atrial flutter. He was discharged home and complained of complained of a fast heart rate. A 2 week monitor showed PAF and atypical atrial flutter. He was admitted on 6/13/2017 for Tikosyn initiation. Rhythm on admission was AT and he converted to sinus after his first dose of Tikosyn but he had recurrent AF on Tikosyn. On 9/19/2017 and had an EP study, PVI, and RFCA in the left atrium. In 10/2017 he complained of a fast heart rate and shortness of breath. A 2 week monitor worn in 10/2017 showed NSR and sinus tach. He was admitted on 10/30/2017 with chest pain. He had a normal stress test on 11/1/2017. Today he is being seen for paroxysmal atrial fibrillation and atrial flutter. EKG shows sinus rhythm. He complains of significant fatigue and states his HR gets up to 120 with minimal activity. He also complains of chronic neuropathic pain in his feet and legs. Denies chest pain and shortness of breath. Candy Bernal reports he is starting to exercise and started water aerobics 2 weeks ago. Past Medical History:   has a past medical history of Arthritis; Asthma; Atrial fibrillation ([address] Blood circulation, collateral; Cancer ([address] DVT (deep venous thrombosis) ([address] Histoplasmosis;  History of knee replacement procedure of right knee; Hx of blood clots; Hypertension; Knee osteoarthritis; Left TKR; Neuromuscular disorder ([address] Palpitations; Sleep apnea; Stone, kidney; and UTI (urinary tract infection). Past Surgical History:   has a past surgical history that includes Cystoscopy; Tonsillectomy; Knee arthroscopy (4/19/2011); Colonoscopy; Cystocopy (2/8/13); ablation of dysrhythmic focus (2013); Carpal tunnel release (Right, 9-30-15); Bariatric Surgery (2013); Carpal tunnel release (Left, 3/20/16); skin biopsy; Diagnostic Cardiac Cath Lab Procedure; joint replacement (Bilateral); Cardiac surgery (2013); and thoracotomy. Home Medications:    Prior to Admission medications    Medication Sig Start Date End Date Taking? Authorizing Provider   diltiazem (CARDIZEM 12 HR) 120 MG extended release capsule Take 120 mg by mouth 2 times daily   Yes Historical Provider, MD   montelukast (SINGULAIR) 10 MG tablet TAKE 1 TABLET BY MOUTH  DAILY 10/30/17  Yes Donald Young MD   furosemide (LASIX) 40 MG tablet Take 40 mg by mouth daily   Yes Historical Provider, MD   spironolactone (ALDACTONE) 25 MG tablet Take 1 tablet by mouth daily 10/26/17  Yes Donald Young MD   Biotin 5 MG TABS Take 5 mg by mouth daily   Yes Historical Provider, MD   Cholecalciferol (VITAMIN D3) 5000 units TABS Take 5,000 Units by mouth daily   Yes Historical Provider, MD   Cyanocobalamin (VITAMIN B-12 CR PO) Take 1 tablet by mouth See Admin Instructions Three times a week; takes Mon, Wed, and Fri   Yes Historical Provider, MD   warfarin (COUMADIN) 5 MG tablet Take 5 mg by mouth daily   Yes Historical Provider, MD   dofetilide (TIKOSYN) 500 MCG capsule Take 1 capsule by mouth every 12 hours 8/1/17  Yes J Luis De La Rosa MD   Multiple Vitamins-Minerals (THERAPEUTIC MULTIVITAMIN-MINERALS) tablet Take 2 tablets by mouth daily    Yes Historical Provider, MD   magnesium oxide (MAG-OX) 400 MG tablet Take 400 mg by mouth daily.    Yes Historical Provider, MD       Allergies:  Bactrim [sulfamethoxazole-trimethoprim]; Ciprofloxacin; Macrobid [nitrofurantoin monohyd macro]; Theophylline; Cefuroxime axetil; Cipro xr; Other; and Tape [adhesive tape]    Social History:   reports that he quit smoking about 41 years ago. His smoking use included Cigarettes. He has a 34.00 pack-year smoking history. He has never used smokeless tobacco. He reports that he does not drink alcohol or use drugs. Family History: family history includes Arthritis in his father; Cancer in his mother; Kidney Disease in his mother; Other in his father; Stroke in his father and mother; Substance Abuse in his father. Review of Systems   Constitutional: + fatigue   HENT: Negative. Eyes: Negative. Respiratory: Negative  Cardiovascular: see HPI  Gastrointestinal: Negative. Genitourinary: Negative. Musculoskeletal: Negative. Skin: chronic BLE discoloration with swelling  Neurological: + chronic pain in legs and feet  Hematological: Negative  Psychiatric/Behavioral: Negative. PHYSICAL EXAM:    Physical Examination:    /76   Pulse 74   Ht 6' 3\" (1.905 m)   Wt (!) 337 lb (152.9 kg)   SpO2 97%   BMI 42.12 kg/m²      Constitutional and general appearance: alert, cooperative, no distress and appears stated age  HEENT: PERRL, no cervical lymphadenopathy. No masses palpable.  Normal oral mucosa  Respiratory:  · Normal excursion and expansion without use of accessory muscles  · Resp auscultation: Normal breath sounds without dullness or wheezing  Cardiovascular:  · The apical impulse is not displaced  · Heart tones are crisp and normal. Regular S1 and S2.  · Jugular venous pulsation Normal  · The carotid upstroke is normal in amplitude and contour without delay or bruit  · Peripheral pulses are symmetrical and full   Abdomen:  · No masses or tenderness  · Bowel sounds present  Extremities:  ·  BLE steven  ·  Chronic 1+ BLE edema  ·  Skin: warm and

## 2017-11-27 ENCOUNTER — TELEPHONE (OUTPATIENT)
Dept: INTERNAL MEDICINE CLINIC | Age: 72
End: 2017-11-27

## 2017-11-27 ENCOUNTER — ANTI-COAG VISIT (OUTPATIENT)
Dept: INTERNAL MEDICINE CLINIC | Age: 72
End: 2017-11-27

## 2017-11-27 LAB — INR BLD: 2.4

## 2017-11-27 NOTE — TELEPHONE ENCOUNTER
----- Message from Guru Pennington MD sent at 11/27/2017  1:30 PM EST -----  Contact: [phone]  No change    ----- Message -----  From: Jae Alexander  Sent: 11/27/2017  12:32 PM  To: Guru Pennington MD    INR: 2.4

## 2017-12-01 NOTE — CARE COORDINATION
Ambulatory Care Coordination Note  11/17/17  CM Risk Score: 9  Candis Mortality Risk Score: 4.54    ACC: Vicky Horne RN    Summary Note: ACC spoke with patient over the phone for follow up. He feels he is having less issues with palpitations since ablation. He still is fatigued. Attempting to keep active. Follow up with cardiology next week. No s/s of chf noted. Reinforced daily weight, watching sodium and fluid intake. 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    History of knee replacement procedure of right knee     Hx of blood clots     2 DVT's    Hypertension     Knee osteoarthritis 1/17/2012    Left TKR 1/18/2012    Neuromuscular disorder (HCC)     Palpitations     stable with atenolol    Sleep apnea     uses CPAP    Stone, kidney     UTI (urinary tract infection) 01/23/2017     Plan     Ongoing care coordination  chf zones  Symptom monitoring         Care Coordination Interventions    Program Enrollment:  Complex Care  Referral from Primary Care Provider:  No  Suggested Interventions and Community Resources  Fall Risk Prevention:  Declined  Zone Management Tools: In Process (Comment: chf zones)         Goals Addressed     None          Prior to Admission medications    Medication Sig Start Date End Date Taking?  Authorizing Provider   diltiazem (CARDIZEM 12 HR) 120 MG extended release capsule Take 120 mg by mouth 2 times daily    Historical Provider, MD   montelukast (SINGULAIR) 10 MG tablet TAKE 1 TABLET BY MOUTH  DAILY 10/30/17   Clayela Gordon MD   furosemide (LASIX) 40 MG tablet Take 40 mg by mouth daily    Historical Provider, MD   spironolactone (ALDACTONE) 25 MG tablet Take 1 tablet by mouth daily 10/26/17   Clay Gordon MD

## 2017-12-11 ENCOUNTER — TELEPHONE (OUTPATIENT)
Dept: INTERNAL MEDICINE CLINIC | Age: 72
End: 2017-12-11

## 2017-12-11 ENCOUNTER — ANTI-COAG VISIT (OUTPATIENT)
Dept: INTERNAL MEDICINE CLINIC | Age: 72
End: 2017-12-11

## 2017-12-11 LAB — INR BLD: 2.2

## 2017-12-11 NOTE — TELEPHONE ENCOUNTER
----- Message from Zachery Belcher MD sent at 12/11/2017  1:31 PM EST -----  No change  Did he get sleep study    ----- Message -----  From: Prachi Ochoa  Sent: 12/11/2017  11:21 AM  To: Zachery Belcher MD    INR 2.2  Taking 5mg daily

## 2017-12-12 ENCOUNTER — TELEPHONE (OUTPATIENT)
Dept: CARDIOLOGY CLINIC | Age: 72
End: 2017-12-12

## 2017-12-12 NOTE — TELEPHONE ENCOUNTER
Mr Gilma Gentile notified that Tikosyn was sent into the pharmacy. We do not have any samples available.

## 2017-12-26 ENCOUNTER — ANTI-COAG VISIT (OUTPATIENT)
Dept: INTERNAL MEDICINE CLINIC | Age: 72
End: 2017-12-26

## 2017-12-26 ENCOUNTER — TELEPHONE (OUTPATIENT)
Dept: INTERNAL MEDICINE CLINIC | Age: 72
End: 2017-12-26

## 2017-12-26 LAB — INR BLD: 2

## 2017-12-26 NOTE — TELEPHONE ENCOUNTER
----- Message from Chanel Julian MD sent at 12/26/2017  3:37 PM EST -----  No antonio    ----- Message -----  From: Melvin Olivas  Sent: 12/26/2017   3:27 PM  To: Chanel Julian MD    INR 2

## 2017-12-28 RX ORDER — SPIRONOLACTONE 25 MG/1
25 TABLET ORAL DAILY
Qty: 90 TABLET | Refills: 0 | Status: SHIPPED | OUTPATIENT
Start: 2017-12-28 | End: 2018-02-16 | Stop reason: SDUPTHER

## 2018-01-02 RX ORDER — DILTIAZEM HYDROCHLORIDE 120 MG/1
120 CAPSULE, EXTENDED RELEASE ORAL 2 TIMES DAILY
Qty: 180 CAPSULE | Refills: 0 | Status: SHIPPED | OUTPATIENT
Start: 2018-01-02 | End: 2018-03-01 | Stop reason: SDUPTHER

## 2018-01-02 RX ORDER — MONTELUKAST SODIUM 10 MG/1
TABLET ORAL
Qty: 90 TABLET | Refills: 0 | Status: SHIPPED | OUTPATIENT
Start: 2018-01-02 | End: 2018-04-25 | Stop reason: SDUPTHER

## 2018-01-08 ENCOUNTER — ANTI-COAG VISIT (OUTPATIENT)
Dept: INTERNAL MEDICINE CLINIC | Age: 73
End: 2018-01-08

## 2018-01-08 ENCOUNTER — TELEPHONE (OUTPATIENT)
Dept: INTERNAL MEDICINE CLINIC | Age: 73
End: 2018-01-08

## 2018-01-08 LAB — INR BLD: 2.2

## 2018-01-22 ENCOUNTER — ANTI-COAG VISIT (OUTPATIENT)
Dept: INTERNAL MEDICINE CLINIC | Age: 73
End: 2018-01-22

## 2018-01-22 LAB — INR BLD: 2.3

## 2018-01-29 ENCOUNTER — OFFICE VISIT (OUTPATIENT)
Dept: CARDIOLOGY CLINIC | Age: 73
End: 2018-01-29

## 2018-01-29 ENCOUNTER — CARE COORDINATION (OUTPATIENT)
Dept: CARE COORDINATION | Age: 73
End: 2018-01-29

## 2018-01-29 VITALS
SYSTOLIC BLOOD PRESSURE: 118 MMHG | WEIGHT: 315 LBS | DIASTOLIC BLOOD PRESSURE: 62 MMHG | HEIGHT: 75 IN | HEART RATE: 76 BPM | BODY MASS INDEX: 39.17 KG/M2

## 2018-01-29 DIAGNOSIS — I48.0 PAF (PAROXYSMAL ATRIAL FIBRILLATION) (HCC): Primary | ICD-10-CM

## 2018-01-29 DIAGNOSIS — I48.4 ATYPICAL ATRIAL FLUTTER (HCC): ICD-10-CM

## 2018-01-29 PROCEDURE — G8484 FLU IMMUNIZE NO ADMIN: HCPCS | Performed by: INTERNAL MEDICINE

## 2018-01-29 PROCEDURE — 1036F TOBACCO NON-USER: CPT | Performed by: INTERNAL MEDICINE

## 2018-01-29 PROCEDURE — G8427 DOCREV CUR MEDS BY ELIG CLIN: HCPCS | Performed by: INTERNAL MEDICINE

## 2018-01-29 PROCEDURE — 1123F ACP DISCUSS/DSCN MKR DOCD: CPT | Performed by: INTERNAL MEDICINE

## 2018-01-29 PROCEDURE — 3017F COLORECTAL CA SCREEN DOC REV: CPT | Performed by: INTERNAL MEDICINE

## 2018-01-29 PROCEDURE — 99214 OFFICE O/P EST MOD 30 MIN: CPT | Performed by: INTERNAL MEDICINE

## 2018-01-29 PROCEDURE — 4040F PNEUMOC VAC/ADMIN/RCVD: CPT | Performed by: INTERNAL MEDICINE

## 2018-01-29 PROCEDURE — 93000 ELECTROCARDIOGRAM COMPLETE: CPT | Performed by: INTERNAL MEDICINE

## 2018-01-29 PROCEDURE — G8417 CALC BMI ABV UP PARAM F/U: HCPCS | Performed by: INTERNAL MEDICINE

## 2018-01-29 NOTE — PROGRESS NOTES
[address]   Electrophysiology  Note                Reason for visit: follow up for a history of atrial flutter     HISTORY OF PRESENT ILLNESS: Rodríguez Michelle is a 71 y.o. male who presents for follow up for a history of atrial flutter. He underwent a CV, a RFCA for typical atrial flutter on 4/12/17. He has a history of PVC ablation in 2014. His EKG from today shows sinus rhythm rate 77. He has been intolerant to amiodarone. He reports that after his ablation, he felt his symptoms of palpitations and dizziness were worse. He attributes this to his medications. He reports he has been adjusting his medications, Cardizem and Lasix, and feels well with the changes he has made. He has has not yet started his Cardizem and decreased his Lasix to 20 mg daily. He states he continues to have occasional episodes of palpitations. These episodes resolve on there own. He continues to take his Coumadin, which is managed by his PCP. He has MARIA VICTORIA that he uses CPAP for. He underwent EPS and aflutter ablation on 9/19/17    Interval history since last office visit:    Today he reports he has been having increased lower back and left hip pain. He is unable to obtain an appointment with [facility] soon. He has increased fatigue. He has been unable to exercise due to his back pain. He denies CP, SOB, dizziness or syncope. Patient had one episode of rapid pulse(140)lasting few minutes. Patient currently denies any weight gain, edema, palpitations, chest pain, shortness of breath, dizziness, and syncope. Past Medical History:   has a past medical history of Arthritis; Asthma; Atrial fibrillation ([address] Blood circulation, collateral; Cancer ([address] DVT (deep venous thrombosis) ([address] Histoplasmosis; History of knee replacement procedure of right knee; Hx of blood clots; Hypertension; Knee osteoarthritis; Left TKR; Neuromuscular disorder ([address] Palpitations; Sleep apnea; Stone, kidney; and UTI (urinary tract infection).     Past BY MOUTH EVERY 12 HOURS 12/11/17  Yes Carmela Jain MD   furosemide (LASIX) 40 MG tablet Take 40 mg by mouth daily   Yes Historical Provider, MD   Biotin 5 MG TABS Take 5 mg by mouth daily   Yes Historical Provider, MD   Cholecalciferol (VITAMIN D3) 5000 units TABS Take 5,000 Units by mouth daily   Yes Historical Provider, MD   Cyanocobalamin (VITAMIN B-12 CR PO) Take 1 tablet by mouth See Admin Instructions Three times a week; takes Mon, Wed, and Fri   Yes Historical Provider, MD   warfarin (COUMADIN) 5 MG tablet Take 5 mg by mouth daily   Yes Historical Provider, MD   Multiple Vitamins-Minerals (THERAPEUTIC MULTIVITAMIN-MINERALS) tablet Take 2 tablets by mouth daily    Yes Historical Provider, MD   magnesium oxide (MAG-OX) 400 MG tablet Take 400 mg by mouth daily. Yes Historical Provider, MD          REVIEW OF SYSTEMS:    · Constitutional: there has been no unanticipated weight loss. There's been no change in energy level, sleep pattern, or activity level. · Eyes: No visual changes or diplopia. No scleral icterus. · ENT: No Headaches, hearing loss or vertigo. No mouth sores or sore throat. · Cardiovascular: No for chest pain, Yes for dyspnea on exertion, Yes for palpitations or No for loss of consciousness. No cough, hemoptysis, No for pleuritic pain, or phlebitis. · Respiratory: No for cough or wheezing, no sputum production. No hematemesis. · Gastrointestinal: No abdominal pain, appetite loss, blood in stools. No change in bowel or bladder habits. · Genitourinary: No dysuria, trouble voiding, or hematuria. · Musculoskeletal:  No gait disturbance, No for weakness or joint complaints. · Integumentary: No rash or pruritis. · Neurological: No headache, diplopia, change in muscle strength, numbness or tingling. No change in gait, balance, coordination, mood, affect, memory,  mentation, behavior. · Psychiatric: No anxiety, or depression. · Endocrine: No temperature intolerance.  No excessive thirst, fluid intake, or urination. No tremor. · Hematologic/Lymphatic: No abnormal bruising or bleeding, blood clots or swollen lymph nodes. · Allergic/Immunologic: No nasal congestion or hives. Physical Examination:    /62   Pulse 76   Ht 6' 3\" (1.905 m)   Wt (!) 339 lb (153.8 kg)   BMI 42.37 kg/m²      Constitutional and General Appearance:    alert, cooperative, no distress and appears stated age  [de-identified]:    PERRLA, no cervical lymphadenopathy. No masses palpable. Normal oral mucosa  Respiratory:  · Normal excursion and expansion without use of accessory muscles  · Resp Auscultation: Normal breath sounds without dullness or wheezing  Cardiovascular:  · The apical impulse is not displaced  · Heart tones are crisp and normal. regular S1 and S2.  · Jugular venous pulsation Normal  · The carotid upstroke is normal in amplitude and contour without delay or bruit  · Peripheral pulses are symmetrical and full  Abdomen:  · No masses or tenderness  · Bowel sounds present  Extremities:  ·  No Cyanosis or Clubbing  ·  Lower extremity edema: BLE 2+ edema   · Skin: Warm and dry  Neurological:  · Alert and oriented. · Moves all extremities well  · No abnormalities of mood, affect, memory, mentation, or behavior are noted    DATA:    ECG: SR 72  ECHO: 06/23/17  Summary   Normal systolic function with an estimated ejection fraction of 55-60%. Mild to moderate concentric left ventricular hypertrophy. No regional wall motion abnormalities are seen. Left ventricular diastolic filling pressure is indeterminate. Left atrium is moderately dilated. No change from last echo on 11/24/1015. IMPRESSION:    1. Paroxysmal atrial flutter s/p ablation 4/2017, 9/19/17  2. Paroxysmal atrial fibrillation, no recent episodes  3. Multiple different flutters      RECOMMENDATIONS:  1. Will stay the course  2. No new rhythm issues  3. Follow up with Donya with back/hip pain  3.  Follow up with Jesus Bright NP in 1 year  4. Obesity: The patient is asked to make an attempt to improve diet and exercise patterns to aid in medical management of this problem. QUALITY MEASURES  1. Tobacco Cessation Counseling: NA  2. Retake of BP if >140/90:   NA  3. Documentation to PCP/referring for new patient:  Sent to PCP at close of office visit  4. CAD patient on anti-platelet: NA  5. CAD patient on STATIN therapy:  NA  6. Patient with CHF and aFib on anticoagulation:  Yes; Coumadin        All questions and concerns were addressed to the patient/family. Alternatives to my treatment were discussed. LIZZ Shah F.A.C.C. [facility]. [address] [place] [address]. [address].   [place], [id number]  Phone: [phone]  Fax: [phone]

## 2018-01-29 NOTE — LETTER
pulse(140)lasting few minutes. Patient currently denies any weight gain, edema, palpitations, chest pain, shortness of breath, dizziness, and syncope. Past Medical History:   has a past medical history of Arthritis; Asthma; Atrial fibrillation ([facility] [address] Blood circulation, collateral; Cancer ([facility] [address] DVT (deep venous thrombosis) ([facility] [address] Histoplasmosis; History of knee replacement procedure of right knee; Hx of blood clots; Hypertension; Knee osteoarthritis; Left TKR; Neuromuscular disorder ([facility] [address] Palpitations; Sleep apnea; Stone, kidney; and UTI (urinary tract infection). Past Surgical History:   has a past surgical history that includes Cystoscopy; Tonsillectomy; Knee arthroscopy (4/19/2011); Colonoscopy; Cystocopy (2/8/13); ablation of dysrhythmic focus (2013); Carpal tunnel release (Right, 9-30-15); Bariatric Surgery (2013); Carpal tunnel release (Left, 3/20/16); skin biopsy; Diagnostic Cardiac Cath Lab Procedure; joint replacement (Bilateral); Cardiac surgery (2013); and thoracotomy. Allergies:  Bactrim [sulfamethoxazole-trimethoprim]; Ciprofloxacin; Macrobid [nitrofurantoin monohyd macro]; Theophylline; Cefuroxime axetil; Cipro xr; Other; and Tape [adhesive tape]    Social History:   reports that he quit smoking about 41 years ago. His smoking use included Cigarettes. He has a 34.00 pack-year smoking history. He has never used smokeless tobacco. He reports that he does not drink alcohol or use drugs. Family History: family history includes Arthritis in his father; Cancer in his mother; Kidney Disease in his mother; Other in his father; Stroke in his father and mother; Substance Abuse in his father. Home Medications:    Prior to Admission medications    Medication Sig Start Date End Date Taking? Authorizing Provider   HYDROcodone-acetaminophen (NORCO) 5-325 MG per tablet Take 1 tablet by mouth every 6 hours as needed for Pain for up to 3 days .  Take lowest dose possible to manage pain. Earliest Fill Date: 1/25/18 1/26/18 1/29/18 Yes Christelle Cook PA-C   ondansetron (ZOFRAN) 4 MG tablet Take 1 tablet by mouth every 8 hours as needed for Nausea 1/26/18  Yes Christelle Cook PA-C   diltiazem (CARDIZEM 12 HR) 120 MG extended release capsule Take 1 capsule by mouth 2 times daily 1/2/18  Yes Riaz Carter MD   montelukast (SINGULAIR) 10 MG tablet TAKE 1 TABLET BY MOUTH  DAILY 1/2/18  Yes Riaz Carter MD   spironolactone (ALDACTONE) 25 MG tablet TAKE 1 TABLET BY MOUTH  DAILY 12/28/17  Yes Maritza Ayers MD   dofetilide (TIKOSYN) 500 MCG capsule TAKE 1 CAPSULE BY MOUTH EVERY 12 HOURS 12/11/17  Yes Nora Valencia MD   furosemide (LASIX) 40 MG tablet Take 40 mg by mouth daily   Yes Historical Provider, MD   Biotin 5 MG TABS Take 5 mg by mouth daily   Yes Historical Provider, MD   Cholecalciferol (VITAMIN D3) 5000 units TABS Take 5,000 Units by mouth daily   Yes Historical Provider, MD   Cyanocobalamin (VITAMIN B-12 CR PO) Take 1 tablet by mouth See Admin Instructions Three times a week; takes Mon, Wed, and Fri   Yes Historical Provider, MD   warfarin (COUMADIN) 5 MG tablet Take 5 mg by mouth daily   Yes Historical Provider, MD   Multiple Vitamins-Minerals (THERAPEUTIC MULTIVITAMIN-MINERALS) tablet Take 2 tablets by mouth daily    Yes Historical Provider, MD   magnesium oxide (MAG-OX) 400 MG tablet Take 400 mg by mouth daily. Yes Historical Provider, MD          REVIEW OF SYSTEMS:    · Constitutional: there has been no unanticipated weight loss. There's been no change in energy level, sleep pattern, or activity level. · Eyes: No visual changes or diplopia. No scleral icterus. · ENT: No Headaches, hearing loss or vertigo. No mouth sores or sore throat. · Cardiovascular: No for chest pain, Yes for dyspnea on exertion, Yes for palpitations or No for loss of consciousness. No cough, hemoptysis, No for pleuritic pain, or phlebitis.

## 2018-01-30 ENCOUNTER — CARE COORDINATION (OUTPATIENT)
Dept: CARE COORDINATION | Age: 73
End: 2018-01-30

## 2018-01-30 ENCOUNTER — OFFICE VISIT (OUTPATIENT)
Dept: ORTHOPEDIC SURGERY | Age: 73
End: 2018-01-30

## 2018-01-30 VITALS
DIASTOLIC BLOOD PRESSURE: 76 MMHG | SYSTOLIC BLOOD PRESSURE: 125 MMHG | HEART RATE: 70 BPM | BODY MASS INDEX: 39.17 KG/M2 | HEIGHT: 75 IN | WEIGHT: 315 LBS

## 2018-01-30 DIAGNOSIS — M25.552 LEFT HIP PAIN: ICD-10-CM

## 2018-01-30 DIAGNOSIS — M16.12 PRIMARY OSTEOARTHRITIS OF LEFT HIP: Primary | ICD-10-CM

## 2018-01-30 PROCEDURE — 1123F ACP DISCUSS/DSCN MKR DOCD: CPT | Performed by: PHYSICIAN ASSISTANT

## 2018-01-30 PROCEDURE — 4040F PNEUMOC VAC/ADMIN/RCVD: CPT | Performed by: PHYSICIAN ASSISTANT

## 2018-01-30 PROCEDURE — G8427 DOCREV CUR MEDS BY ELIG CLIN: HCPCS | Performed by: PHYSICIAN ASSISTANT

## 2018-01-30 PROCEDURE — 99213 OFFICE O/P EST LOW 20 MIN: CPT | Performed by: PHYSICIAN ASSISTANT

## 2018-01-30 PROCEDURE — 1036F TOBACCO NON-USER: CPT | Performed by: PHYSICIAN ASSISTANT

## 2018-01-30 PROCEDURE — G8417 CALC BMI ABV UP PARAM F/U: HCPCS | Performed by: PHYSICIAN ASSISTANT

## 2018-01-30 PROCEDURE — 3017F COLORECTAL CA SCREEN DOC REV: CPT | Performed by: PHYSICIAN ASSISTANT

## 2018-01-30 PROCEDURE — G8484 FLU IMMUNIZE NO ADMIN: HCPCS | Performed by: PHYSICIAN ASSISTANT

## 2018-01-30 NOTE — PROGRESS NOTES
slight   degenerative anterolisthesis of L4           Impression   1. No acute process demonstrated   2. Cholelithiasis   3. Colonic diverticulosis         Impression:  Encounter Diagnoses   Name Primary?  Left hip pain     Primary osteoarthritis of left hip Yes       Office Procedures:  Orders Placed This Encounter   Procedures    XR HIP LEFT (2-3 VIEWS)     [id number]     Order Specific Question:   Reason for exam:     Answer:   Pain       Treatment Plan:      I am concerned the patient may be developing AVN of his left hip. He also has evidence of inguinal hernia on CT scan. Have recommended he be evaluated by Dr. Deborah Jarrell for inguinal hernia. I have ordered him an MRI of the left hip to evaluate for AVN. Recommended that he use a walker max 50% weightbearing at this time. We have also discussed medical optimization, specifically his BMI is 42.38. If the patient does not have AVN would recommend medical optimization with returning to aquatic therapy.

## 2018-01-31 ENCOUNTER — HOSPITAL ENCOUNTER (OUTPATIENT)
Dept: MRI IMAGING | Age: 73
Discharge: HOME | End: 2018-01-31
Attending: PHYSICIAN ASSISTANT | Admitting: PHYSICIAN ASSISTANT

## 2018-01-31 DIAGNOSIS — M16.12 PRIMARY OSTEOARTHRITIS OF LEFT HIP: ICD-10-CM

## 2018-01-31 DIAGNOSIS — M25.552 LEFT HIP PAIN: ICD-10-CM

## 2018-02-05 ENCOUNTER — TELEPHONE (OUTPATIENT)
Dept: INTERNAL MEDICINE CLINIC | Age: 73
End: 2018-02-05

## 2018-02-05 ENCOUNTER — ANTI-COAG VISIT (OUTPATIENT)
Dept: INTERNAL MEDICINE CLINIC | Age: 73
End: 2018-02-05

## 2018-02-05 LAB — INR BLD: 2.1

## 2018-02-06 ENCOUNTER — OFFICE VISIT (OUTPATIENT)
Dept: ORTHOPEDIC SURGERY | Age: 73
End: 2018-02-06

## 2018-02-06 VITALS — WEIGHT: 315 LBS | HEIGHT: 75 IN | BODY MASS INDEX: 39.17 KG/M2

## 2018-02-06 DIAGNOSIS — M16.12 PRIMARY OSTEOARTHRITIS OF LEFT HIP: Primary | ICD-10-CM

## 2018-02-06 PROCEDURE — G8417 CALC BMI ABV UP PARAM F/U: HCPCS | Performed by: PHYSICIAN ASSISTANT

## 2018-02-06 PROCEDURE — G8427 DOCREV CUR MEDS BY ELIG CLIN: HCPCS | Performed by: PHYSICIAN ASSISTANT

## 2018-02-06 PROCEDURE — G8484 FLU IMMUNIZE NO ADMIN: HCPCS | Performed by: PHYSICIAN ASSISTANT

## 2018-02-06 PROCEDURE — 99213 OFFICE O/P EST LOW 20 MIN: CPT | Performed by: PHYSICIAN ASSISTANT

## 2018-02-06 PROCEDURE — 3017F COLORECTAL CA SCREEN DOC REV: CPT | Performed by: PHYSICIAN ASSISTANT

## 2018-02-06 PROCEDURE — 4040F PNEUMOC VAC/ADMIN/RCVD: CPT | Performed by: PHYSICIAN ASSISTANT

## 2018-02-06 PROCEDURE — 1036F TOBACCO NON-USER: CPT | Performed by: PHYSICIAN ASSISTANT

## 2018-02-06 PROCEDURE — 1123F ACP DISCUSS/DSCN MKR DOCD: CPT | Performed by: PHYSICIAN ASSISTANT

## 2018-02-06 NOTE — PROGRESS NOTES
PROVIDED HISTORY: Left hip pain   TECHNOLOGIST PROVIDED HISTORY:   Technologist Provided Reason for Exam: Symptoms started after water exercise   at the [facility] 2 weeks ago.  After shower patient was bending to put socks on   and pain started.  Left Hip pain.  Trouble walking.  ? AVN on xrays   Acuity: Acute   Type of Encounter: Subsequent/Follow-up       71-year-old male with left hip pain; difficulty walking; evaluate for AVN;   follow-up       FINDINGS:   BONE MARROW: Mild degenerative changes in the lower lumbar/lumbosacral spine. Visualized portions of the sacral ala, iliac wings, pubic rami, and proximal   femurs demonstrate normal bone marrow signal intensity.  No acute fracture or   gross dislocation is evident.  No signal changes at the femoral heads to   suggest femoral head AVN.     Subcortical cystic changes of the right acetabular labrum.       Subcortical cystic changes in the left acetabular labrum.       HIP JOINT: Both femoral heads properly located within the bilateral acetabula   without clear evidence for acute fracture, dislocation or femoral head   flattening.  Mild to moderate narrowing of the bilateral hip joint spaces   with associated marginal osteophyte spur formation.  This is consistent with   mild to moderate bilateral hip osteoarthrosis.  No sizable hip joint   effusions identified.       Mild to moderate chondromalacia of the bilateral hip joints.       LABRUM: Evaluation of the acetabular labrum limited in the absence of   intracapsular contrast administration.       Paralabral cyst formation along the anterior and superior aspect of the left   acetabular labrum suggesting subjacent degenerative labral tearing.    Bilateral diffuse labral degeneration is present.       BURSAE: No obvious iliopsoas or greater trochanteric bursal fluid collection   identified.       SCIATIC NERVE: Visualized portions of the bilateral proximal sciatic nerves   demonstrate normal course, contour, and caliber on limited coronal T1   weighted imaging.       MUSCLES / TENDONS: Low-grade partial tearing of the origin of the hamstring   musculature from the ischial tuberosities on coronal images 19 and 20, series   4.  Remainder of the visualized muscle/tendons appear grossly intact without   evidence of tearing.       INTRAPELVIC CONTENTS / SOFT TISSUES:  Enlarged prostate.  Correlate with PSA. Moderate to severe sigmoid diverticulosis.  Limited visualization of   intrapelvic and intra-abdominal structures is otherwise grossly unremarkable.           Impression   1. Mild to moderate bilateral hip osteoarthrosis. 2. No evidence for femoral head AVN. 3. Paralabral cyst formation along the anterior and superior left acetabular   labrum suggesting subjacent degenerative labral tearing.  Bilateral diffuse   labral degeneration. 4. Low-grade partial tearing of the origin of the hamstring musculature from   the ischial tuberosities. 5. Enlarged prostate.  Correlate with PSA. 6. Moderate to severe sigmoid diverticulosis. Impression:  Encounter Diagnosis   Name Primary?  Primary osteoarthritis of left hip Yes       Office Procedures:  No orders of the defined types were placed in this encounter. Treatment Plan:      Patient has no evidence of AVN on his MRI of his left hip. He can gradually discontinue his walker and consider using a cane for pain control and stability. He already has an appointment set up with Dr. Kylee Kruger to be evaluated for bilateral inguinal hernias seen on CT scan. He will discuss his enlarged prostate and his diverticulosis seen on MRI with his primary care physician. If his left hip pain continues to improve further orthopedic intervention will not be necessary. If his symptoms return as his activity level increases consideration for diagnostic versus therapeutic cortisone intra-articular injection in the operating room under sterile conditions with IV antibiotics.   In

## 2018-02-08 ENCOUNTER — INITIAL CONSULT (OUTPATIENT)
Dept: SURGERY | Age: 73
End: 2018-02-08

## 2018-02-08 VITALS
DIASTOLIC BLOOD PRESSURE: 78 MMHG | SYSTOLIC BLOOD PRESSURE: 132 MMHG | HEIGHT: 75 IN | WEIGHT: 315 LBS | BODY MASS INDEX: 39.17 KG/M2

## 2018-02-08 DIAGNOSIS — M25.552 PAIN OF LEFT HIP JOINT: Primary | ICD-10-CM

## 2018-02-08 DIAGNOSIS — K40.21 BILATERAL RECURRENT INGUINAL HERNIA WITHOUT OBSTRUCTION OR GANGRENE: ICD-10-CM

## 2018-02-08 PROCEDURE — 1036F TOBACCO NON-USER: CPT | Performed by: SURGERY

## 2018-02-08 PROCEDURE — G8427 DOCREV CUR MEDS BY ELIG CLIN: HCPCS | Performed by: SURGERY

## 2018-02-08 PROCEDURE — 99202 OFFICE O/P NEW SF 15 MIN: CPT | Performed by: SURGERY

## 2018-02-08 PROCEDURE — G8484 FLU IMMUNIZE NO ADMIN: HCPCS | Performed by: SURGERY

## 2018-02-08 PROCEDURE — G8417 CALC BMI ABV UP PARAM F/U: HCPCS | Performed by: SURGERY

## 2018-02-08 PROCEDURE — 4040F PNEUMOC VAC/ADMIN/RCVD: CPT | Performed by: SURGERY

## 2018-02-08 PROCEDURE — 3017F COLORECTAL CA SCREEN DOC REV: CPT | Performed by: SURGERY

## 2018-02-08 PROCEDURE — 1123F ACP DISCUSS/DSCN MKR DOCD: CPT | Performed by: SURGERY

## 2018-02-09 ENCOUNTER — OFFICE VISIT (OUTPATIENT)
Dept: INTERNAL MEDICINE CLINIC | Age: 73
End: 2018-02-09

## 2018-02-09 VITALS
RESPIRATION RATE: 16 BRPM | DIASTOLIC BLOOD PRESSURE: 78 MMHG | SYSTOLIC BLOOD PRESSURE: 136 MMHG | HEIGHT: 75 IN | TEMPERATURE: 98.9 F | WEIGHT: 315 LBS | BODY MASS INDEX: 39.17 KG/M2 | HEART RATE: 85 BPM

## 2018-02-09 DIAGNOSIS — J20.9 ACUTE BRONCHITIS, UNSPECIFIED ORGANISM: Primary | ICD-10-CM

## 2018-02-09 PROCEDURE — 99213 OFFICE O/P EST LOW 20 MIN: CPT | Performed by: NURSE PRACTITIONER

## 2018-02-09 PROCEDURE — G8417 CALC BMI ABV UP PARAM F/U: HCPCS | Performed by: NURSE PRACTITIONER

## 2018-02-09 PROCEDURE — G8484 FLU IMMUNIZE NO ADMIN: HCPCS | Performed by: NURSE PRACTITIONER

## 2018-02-09 PROCEDURE — 1036F TOBACCO NON-USER: CPT | Performed by: NURSE PRACTITIONER

## 2018-02-09 PROCEDURE — 1123F ACP DISCUSS/DSCN MKR DOCD: CPT | Performed by: NURSE PRACTITIONER

## 2018-02-09 PROCEDURE — G8427 DOCREV CUR MEDS BY ELIG CLIN: HCPCS | Performed by: NURSE PRACTITIONER

## 2018-02-09 PROCEDURE — 4040F PNEUMOC VAC/ADMIN/RCVD: CPT | Performed by: NURSE PRACTITIONER

## 2018-02-09 PROCEDURE — 3017F COLORECTAL CA SCREEN DOC REV: CPT | Performed by: NURSE PRACTITIONER

## 2018-02-09 RX ORDER — BENZONATATE 100 MG/1
100 CAPSULE ORAL 3 TIMES DAILY PRN
Qty: 21 CAPSULE | Refills: 0 | Status: SHIPPED | OUTPATIENT
Start: 2018-02-09 | End: 2018-02-16

## 2018-02-09 RX ORDER — PREDNISONE 20 MG/1
20 TABLET ORAL DAILY
Qty: 5 TABLET | Refills: 0 | Status: SHIPPED | OUTPATIENT
Start: 2018-02-09 | End: 2018-02-14

## 2018-02-09 RX ORDER — DOXYCYCLINE HYCLATE 100 MG
100 TABLET ORAL 2 TIMES DAILY
Qty: 14 TABLET | Refills: 0 | Status: SHIPPED | OUTPATIENT
Start: 2018-02-09 | End: 2018-02-16

## 2018-02-09 ASSESSMENT — ENCOUNTER SYMPTOMS
FLU SYMPTOMS: 1
ABDOMINAL PAIN: 0
SORE THROAT: 1
NAUSEA: 0
COUGH: 1
VOMITING: 0

## 2018-02-09 NOTE — PROGRESS NOTES
education: N/A     Occupational History    Not on file. Social History Main Topics    Smoking status: Former Smoker     Packs/day: 2.00     Years: 17.00     Types: Cigarettes     Quit date: 4/18/1976    Smokeless tobacco: Never Used    Alcohol use No    Drug use: No    Sexual activity: No     Other Topics Concern    Not on file     Social History Narrative    No narrative on file          Vitals:    02/08/18 0910   BP: 132/78   Weight: (!) 340 lb (154.2 kg)   Height: 6' 3\" (1.905 m)     Body mass index is 42.5 kg/m². Wt Readings from Last 3 Encounters:   02/08/18 (!) 340 lb (154.2 kg)   02/06/18 (!) 339 lb 1.1 oz (153.8 kg)   01/30/18 (!) 339 lb 1.1 oz (153.8 kg)     BP Readings from Last 3 Encounters:   02/08/18 132/78   01/30/18 125/76   01/29/18 118/62        ROS:  As per HPI, otherwise reviewed ×10 systems and negative    PE:  Constitutional:  Well developed, well nourished, no acute distress, non-toxic appearance   Eyes:  PERRL, conjunctiva normal   HENT:  Atraumatic, external ears normal, nose normal. Neck- normal range of motion, no tenderness, supple   Respiratory:  No respiratory distress, normal breath sounds, no rales, no wheezing   Cardiovascular:  Normal rate, normal rhythm  GI:  Bowel sounds positive, soft, nontender. On inguinal exam he has small, nontender, bilateral inguinal bulges  :  No costovertebral angle tenderness   Integument:  Well hydrated, no rash   Lymphatic:  No lymphadenopathy noted   Neurologic:  Alert & oriented x 3, no focal deficits noted   Psychiatric:  Speech and behavior appropriate       DATA:  Radiology Review:  CT images reviewed and show bilateral fat-containing inguinal hernias. MRI shows labral degeneration and degenerative labral tearing      Assessment:  1. Pain of left hip joint    2. Bilateral recurrent inguinal hernia without obstruction or gangrene        Plan: I suspect the pain is more related to the degenerative findings on the MRI.   His

## 2018-02-13 ENCOUNTER — OFFICE VISIT (OUTPATIENT)
Dept: INTERNAL MEDICINE CLINIC | Age: 73
End: 2018-02-13

## 2018-02-13 VITALS
HEIGHT: 75 IN | RESPIRATION RATE: 18 BRPM | DIASTOLIC BLOOD PRESSURE: 75 MMHG | SYSTOLIC BLOOD PRESSURE: 110 MMHG | WEIGHT: 315 LBS | BODY MASS INDEX: 39.17 KG/M2 | HEART RATE: 70 BPM

## 2018-02-13 DIAGNOSIS — G47.33 OSA (OBSTRUCTIVE SLEEP APNEA): ICD-10-CM

## 2018-02-13 DIAGNOSIS — E66.01 MORBID OBESITY WITH BMI OF 40.0-44.9, ADULT (HCC): ICD-10-CM

## 2018-02-13 DIAGNOSIS — I48.0 PAF (PAROXYSMAL ATRIAL FIBRILLATION) (HCC): ICD-10-CM

## 2018-02-13 DIAGNOSIS — N42.9 PROSTATE DISORDER: ICD-10-CM

## 2018-02-13 DIAGNOSIS — I10 ESSENTIAL HYPERTENSION: Primary | ICD-10-CM

## 2018-02-13 PROCEDURE — G8427 DOCREV CUR MEDS BY ELIG CLIN: HCPCS | Performed by: INTERNAL MEDICINE

## 2018-02-13 PROCEDURE — 1036F TOBACCO NON-USER: CPT | Performed by: INTERNAL MEDICINE

## 2018-02-13 PROCEDURE — 99214 OFFICE O/P EST MOD 30 MIN: CPT | Performed by: INTERNAL MEDICINE

## 2018-02-13 PROCEDURE — 4040F PNEUMOC VAC/ADMIN/RCVD: CPT | Performed by: INTERNAL MEDICINE

## 2018-02-13 PROCEDURE — G8417 CALC BMI ABV UP PARAM F/U: HCPCS | Performed by: INTERNAL MEDICINE

## 2018-02-13 PROCEDURE — G8484 FLU IMMUNIZE NO ADMIN: HCPCS | Performed by: INTERNAL MEDICINE

## 2018-02-13 PROCEDURE — 3017F COLORECTAL CA SCREEN DOC REV: CPT | Performed by: INTERNAL MEDICINE

## 2018-02-13 PROCEDURE — 1123F ACP DISCUSS/DSCN MKR DOCD: CPT | Performed by: INTERNAL MEDICINE

## 2018-02-14 NOTE — COMMUNICATION BODY
[facility]   Electrophysiology  Note                Reason for visit: follow up for a history of atrial flutter     HISTORY OF PRESENT ILLNESS: Jolee Goodpasture is a 71 y.o. male who presents for follow up for a history of atrial flutter. He underwent a CV, a RFCA for typical atrial flutter on 4/12/17. He has a history of PVC ablation in 2014. His EKG from today shows sinus rhythm rate 77. He has been intolerant to amiodarone. He reports that after his ablation, he felt his symptoms of palpitations and dizziness were worse. He attributes this to his medications. He reports he has been adjusting his medications, Cardizem and Lasix, and feels well with the changes he has made. He has has not yet started his Cardizem and decreased his Lasix to 20 mg daily. He states he continues to have occasional episodes of palpitations. These episodes resolve on there own. He continues to take his Coumadin, which is managed by his PCP. He has MARIA VICTORIA that he uses CPAP for. He underwent EPS and aflutter ablation on 9/19/17    Interval history since last office visit:    Today he reports he has been having increased lower back and left hip pain. He is unable to obtain an appointment with [facility] soon. He has increased fatigue. He has been unable to exercise due to his back pain. He denies CP, SOB, dizziness or syncope. Patient had one episode of rapid pulse(140)lasting few minutes. Patient currently denies any weight gain, edema, palpitations, chest pain, shortness of breath, dizziness, and syncope. Past Medical History:   has a past medical history of Arthritis; Asthma; Atrial fibrillation ([address] Blood circulation, collateral; Cancer ([address] DVT (deep venous thrombosis) ([address] Histoplasmosis; History of knee replacement procedure of right knee; Hx of blood clots; Hypertension; Knee osteoarthritis; Left TKR; Neuromuscular disorder ([address] Palpitations; Sleep apnea; Stone, kidney; and UTI (urinary tract infection).     Past Surgical History:   has a past surgical history that includes Cystoscopy; Tonsillectomy; Knee arthroscopy (4/19/2011); Colonoscopy; Cystocopy (2/8/13); ablation of dysrhythmic focus (2013); Carpal tunnel release (Right, 9-30-15); Bariatric Surgery (2013); Carpal tunnel release (Left, 3/20/16); skin biopsy; Diagnostic Cardiac Cath Lab Procedure; joint replacement (Bilateral); Cardiac surgery (2013); and thoracotomy. Allergies:  Bactrim [sulfamethoxazole-trimethoprim]; Ciprofloxacin; Macrobid [nitrofurantoin monohyd macro]; Theophylline; Cefuroxime axetil; Cipro xr; Other; and Tape [adhesive tape]    Social History:   reports that he quit smoking about 41 years ago. His smoking use included Cigarettes. He has a 34.00 pack-year smoking history. He has never used smokeless tobacco. He reports that he does not drink alcohol or use drugs. Family History: family history includes Arthritis in his father; Cancer in his mother; Kidney Disease in his mother; Other in his father; Stroke in his father and mother; Substance Abuse in his father. Home Medications:    Prior to Admission medications    Medication Sig Start Date End Date Taking? Authorizing Provider   HYDROcodone-acetaminophen (NORCO) 5-325 MG per tablet Take 1 tablet by mouth every 6 hours as needed for Pain for up to 3 days . Take lowest dose possible to manage pain.  Earliest Fill Date: 1/25/18 1/26/18 1/29/18 Yes Christelle Cook PA-C   ondansetron (ZOFRAN) 4 MG tablet Take 1 tablet by mouth every 8 hours as needed for Nausea 1/26/18  Yes Christelle Cook PA-C   diltiazem (CARDIZEM 12 HR) 120 MG extended release capsule Take 1 capsule by mouth 2 times daily 1/2/18  Yes Lewis Morrison MD   montelukast (SINGULAIR) 10 MG tablet TAKE 1 TABLET BY MOUTH  DAILY 1/2/18  Yes Lewis Morrison MD   spironolactone (ALDACTONE) 25 MG tablet TAKE 1 TABLET BY MOUTH  DAILY 12/28/17  Yes Rajeev Doran MD   dofetilide (TIKOSYN) 500 MCG capsule TAKE 1 CAPSULE BY MOUTH EVERY 12 HOURS 12/11/17  Yes Rachael Resendiz MD   furosemide (LASIX) 40 MG tablet Take 40 mg by mouth daily   Yes Historical Provider, MD   Biotin 5 MG TABS Take 5 mg by mouth daily   Yes Historical Provider, MD   Cholecalciferol (VITAMIN D3) 5000 units TABS Take 5,000 Units by mouth daily   Yes Historical Provider, MD   Cyanocobalamin (VITAMIN B-12 CR PO) Take 1 tablet by mouth See Admin Instructions Three times a week; takes Mon, Wed, and Fri   Yes Historical Provider, MD   warfarin (COUMADIN) 5 MG tablet Take 5 mg by mouth daily   Yes Historical Provider, MD   Multiple Vitamins-Minerals (THERAPEUTIC MULTIVITAMIN-MINERALS) tablet Take 2 tablets by mouth daily    Yes Historical Provider, MD   magnesium oxide (MAG-OX) 400 MG tablet Take 400 mg by mouth daily. Yes Historical Provider, MD          REVIEW OF SYSTEMS:    · Constitutional: there has been no unanticipated weight loss. There's been no change in energy level, sleep pattern, or activity level. · Eyes: No visual changes or diplopia. No scleral icterus. · ENT: No Headaches, hearing loss or vertigo. No mouth sores or sore throat. · Cardiovascular: No for chest pain, Yes for dyspnea on exertion, Yes for palpitations or No for loss of consciousness. No cough, hemoptysis, No for pleuritic pain, or phlebitis. · Respiratory: No for cough or wheezing, no sputum production. No hematemesis. · Gastrointestinal: No abdominal pain, appetite loss, blood in stools. No change in bowel or bladder habits. · Genitourinary: No dysuria, trouble voiding, or hematuria. · Musculoskeletal:  No gait disturbance, No for weakness or joint complaints. · Integumentary: No rash or pruritis. · Neurological: No headache, diplopia, change in muscle strength, numbness or tingling. No change in gait, balance, coordination, mood, affect, memory,  mentation, behavior. · Psychiatric: No anxiety, or depression. · Endocrine: No temperature intolerance.  No excessive in 1 year  4. Obesity: The patient is asked to make an attempt to improve diet and exercise patterns to aid in medical management of this problem. QUALITY MEASURES  1. Tobacco Cessation Counseling: NA  2. Retake of BP if >140/90:   NA  3. Documentation to PCP/referring for new patient:  Sent to PCP at close of office visit  4. CAD patient on anti-platelet: NA  5. CAD patient on STATIN therapy:  NA  6. Patient with CHF and aFib on anticoagulation:  Yes; Coumadin        All questions and concerns were addressed to the patient/family. Alternatives to my treatment were discussed. LIZZ Shah F.A.C.C. Electrophysiology  [facility]. [address]. [address].   [place], [facility]  Phone: [phone]  Fax: [phone]

## 2018-02-16 RX ORDER — SPIRONOLACTONE 25 MG/1
25 TABLET ORAL DAILY
Qty: 90 TABLET | Refills: 2 | Status: SHIPPED | OUTPATIENT
Start: 2018-02-16 | End: 2018-09-12 | Stop reason: SDUPTHER

## 2018-02-19 ENCOUNTER — OFFICE VISIT (OUTPATIENT)
Dept: PULMONOLOGY | Age: 73
End: 2018-02-19

## 2018-02-19 VITALS
WEIGHT: 315 LBS | BODY MASS INDEX: 39.17 KG/M2 | OXYGEN SATURATION: 98 % | RESPIRATION RATE: 18 BRPM | DIASTOLIC BLOOD PRESSURE: 72 MMHG | HEART RATE: 78 BPM | SYSTOLIC BLOOD PRESSURE: 110 MMHG | HEIGHT: 75 IN | TEMPERATURE: 97.6 F

## 2018-02-19 DIAGNOSIS — R68.2 DRY MOUTH: ICD-10-CM

## 2018-02-19 DIAGNOSIS — R53.83 OTHER FATIGUE: ICD-10-CM

## 2018-02-19 DIAGNOSIS — E66.01 OBESITY, CLASS III, BMI 40-49.9 (MORBID OBESITY) (HCC): ICD-10-CM

## 2018-02-19 DIAGNOSIS — G47.33 OSA (OBSTRUCTIVE SLEEP APNEA): Primary | ICD-10-CM

## 2018-02-19 PROCEDURE — G8417 CALC BMI ABV UP PARAM F/U: HCPCS | Performed by: NURSE PRACTITIONER

## 2018-02-19 PROCEDURE — 1123F ACP DISCUSS/DSCN MKR DOCD: CPT | Performed by: NURSE PRACTITIONER

## 2018-02-19 PROCEDURE — 3017F COLORECTAL CA SCREEN DOC REV: CPT | Performed by: NURSE PRACTITIONER

## 2018-02-19 PROCEDURE — 99213 OFFICE O/P EST LOW 20 MIN: CPT | Performed by: NURSE PRACTITIONER

## 2018-02-19 PROCEDURE — 1036F TOBACCO NON-USER: CPT | Performed by: NURSE PRACTITIONER

## 2018-02-19 PROCEDURE — G8484 FLU IMMUNIZE NO ADMIN: HCPCS | Performed by: NURSE PRACTITIONER

## 2018-02-19 PROCEDURE — 4040F PNEUMOC VAC/ADMIN/RCVD: CPT | Performed by: NURSE PRACTITIONER

## 2018-02-19 PROCEDURE — G8427 DOCREV CUR MEDS BY ELIG CLIN: HCPCS | Performed by: NURSE PRACTITIONER

## 2018-02-19 ASSESSMENT — SLEEP AND FATIGUE QUESTIONNAIRES
ESS TOTAL SCORE: 14
HOW LIKELY ARE YOU TO NOD OFF OR FALL ASLEEP WHILE SITTING AND TALKING TO SOMEONE: 0
HOW LIKELY ARE YOU TO NOD OFF OR FALL ASLEEP WHILE SITTING QUIETLY AFTER LUNCH WITHOUT ALCOHOL: 3
HOW LIKELY ARE YOU TO NOD OFF OR FALL ASLEEP WHILE SITTING INACTIVE IN A PUBLIC PLACE: 0
HOW LIKELY ARE YOU TO NOD OFF OR FALL ASLEEP WHEN YOU ARE A PASSENGER IN A CAR FOR AN HOUR WITHOUT A BREAK: 2
NECK CIRCUMFERENCE (INCHES): 18.25
HOW LIKELY ARE YOU TO NOD OFF OR FALL ASLEEP WHILE SITTING AND READING: 3
HOW LIKELY ARE YOU TO NOD OFF OR FALL ASLEEP IN A CAR, WHILE STOPPED FOR A FEW MINUTES IN TRAFFIC: 0
HOW LIKELY ARE YOU TO NOD OFF OR FALL ASLEEP WHILE LYING DOWN TO REST IN THE AFTERNOON WHEN CIRCUMSTANCES PERMIT: 3
HOW LIKELY ARE YOU TO NOD OFF OR FALL ASLEEP WHILE WATCHING TV: 3

## 2018-02-19 NOTE — PROGRESS NOTES
CHIEF COMPLAINT: MARIA VICTORIA    Karla Nj is a 71 y.o. male in office for MARIA VICTORIA follow up. CPAP titration was reviewed by me and noted below. Results were dicussed with patient and multiple good questions were answered. Patient is using CPAP 7-8 hrs/night. Sleeps better with CPAP, looks forward to using it. He is requesting new CPAP, knob on his is broken. Using humidifier. No snoring on CPAP. The pressure is well tolerated. The mask is comfortable- nasal pillows. No mask leak. Some daytime sleepiness. No nodding off when driving. +dry mouth. Some fatigue- he attributes to his heart condition. Bedtime is  pm and rise time is 530-7 am. Sleep onset is few minutes. Wakes up 1-2 times at night total. 1-2 nocturia. It takes usually few minutes to fall back a sleep. 1 naps during the day 60 min. No headache in am. No weight gain. 1 caffienated beverages during the day. No alcohol. ESS is 14    PLMS on titration.   Patient denies RLS symptoms    Past Medical History:   Diagnosis Date    Arthritis     Asthma     past hx    Atrial fibrillation ([address]     Blood circulation, collateral     Cancer ([address]     RIGHT LUNG; nodule pt states that it was non-cancerous    DVT (deep venous thrombosis) ([address]     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    History of knee replacement procedure of right knee     Hx of blood clots     2 DVT's    Hypertension     Knee osteoarthritis 1/17/2012    Left TKR 1/18/2012    Neuromuscular disorder (HCC)     Palpitations     stable with atenolol    Sleep apnea     uses CPAP    Stone, kidney     UTI (urinary tract infection) 01/23/2017     Past Surgical History:   Procedure Laterality Date    ABLATION OF DYSRHYTHMIC FOCUS  2013    a-fib    BARIATRIC SURGERY  2013    GASTRIC SLEEVE    CARDIAC SURGERY  2013    ablation    CARPAL TUNNEL RELEASE Right 9-30-15    CARPAL TUNNEL RELEASE Left 3/20/16    COLONOSCOPY      CYSTOSCOPY      with removal stone    CYSTOSCOPY  2/8/13    with (THERAPEUTIC MULTIVITAMIN-MINERALS) tablet, Take 2 tablets by mouth daily , Disp: , Rfl:     magnesium oxide (MAG-OX) 400 MG tablet, Take 400 mg by mouth daily. , Disp: , Rfl:         Objective:   PHYSICAL EXAM:    Blood pressure 110/72, pulse 78, temperature 97.6 °F (36.4 °C), temperature source Oral, resp. rate 18, height 6' 3\" (1.905 m), weight (!) 338 lb (153.3 kg), SpO2 98 %.' on RA  Gen: No acute distress. Obese. BMI 42.25  Eyes: PERRL. No sclera icterus. No conjunctival injection. ENT: No discharge. Pharynx clear. Mallampati class II. Neck: Trachea midline. No obvious mass. Neck circumference 18.25\"  Resp: No accessory muscle use. No crackles. No wheezes. No rhonchi. CV: Regular rate. Regular rhythm. No murmur or rub. Skin: Warm and dry. No nodule on exposed extremities. M/S: No cyanosis. No obvious joint deformity. Neuro: Awake. Alert. Moves all four extremities. Psych: Oriented x 3. No anxiety. DATA:   12/27/12 PSG AHI 18.8/ REM AHI 40.7  CPAP titration 2/14/2013 CPAP 9 cmH2O  11/6/17 CPAP titration controlled sleep-related breathing with CPAP, PLMS 20.2, recommendation CPAP 12 cm H2O    Echo 10/31/17 EF 55%    CPAP compliance data:  Compliance download report from 10/4/17 to 11/2/17 showed patient is using machine 7:21 hrs/night with 100% compliance and AHI 0.3 within this time frame. 30/30days with greater than 4 hours of machine use. CPAP 10 cm H20    Compliance download report from 1/20/18 to 2/18/18 reviewed today by me and showed patient is using machine 7:51 hrs/night with 100% compliance and AHI 0.6 within this time frame. 30/30days with greater than 4 hours of machine use. CPAP 12 cm H20    Assessment:       · Moderate MARIA VICTORIA. CPAP 12 cmH2O. Nasal pillow. Optimal compliance and efficacy on review today.   · Hypersomnia/fatigue  · HTN  · Afib s/p ablation x2 on cardizem and tikosyn  · Dry mouth      Plan:       Order for new CPAP 12 cm H2O  Heated tubing with new CPAP  Consider

## 2018-02-20 ENCOUNTER — ANTI-COAG VISIT (OUTPATIENT)
Dept: INTERNAL MEDICINE CLINIC | Age: 73
End: 2018-02-20

## 2018-02-20 LAB — INR BLD: 2.9

## 2018-03-01 RX ORDER — DILTIAZEM HYDROCHLORIDE 120 MG/1
CAPSULE, EXTENDED RELEASE ORAL
Qty: 180 CAPSULE | Refills: 2 | Status: SHIPPED | OUTPATIENT
Start: 2018-03-01 | End: 2018-07-30 | Stop reason: SDUPTHER

## 2018-03-02 ENCOUNTER — CARE COORDINATION (OUTPATIENT)
Dept: CARE COORDINATION | Age: 73
End: 2018-03-02

## 2018-03-02 NOTE — CARE COORDINATION
Ambulatory Care Coordination Note  3/2/2018  CM Risk Score: 10  [place]is Mortality Risk Score: 4.54    ACC: Millie Knight, RN    Summary Note: ACC spoke with patient today for follow up. He was brief on phone. Denies any issues currently. He still has some Left hip pain but it has greatly improved. He completed follow up for the hip and was told he could get a steroid injection if needed. Patient has resumed his exercise routine at [facility] and water aerobics. No further respiratory issues . Bronchitis has improved. No medication review at this time.    Past Medical History:   Diagnosis Date    Arthritis     Asthma     past hx    [facility] [address] [address]     Blood circulation, collateral     Cancer (HCC)     RIGHT LUNG; nodule pt states that it was non-cancerous    DVT (deep venous thrombosis) (HCC)     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    History of knee replacement procedure of right knee     Hx of blood clots     2 DVT's    Hypertension     Knee osteoarthritis 1/17/2012    Left TKR 1/18/2012    Neuromuscular disorder (HCC)     Palpitations     stable with atenolol    Sleep apnea     uses CPAP    Stone, kidney     UTI (urinary tract infection) 01/23/2017     Plan      Ongoing surveillance calls for care coordination         Care Coordination Interventions    Program Enrollment:  Complex Care  Referral from Primary Care Provider:  No  Suggested Interventions and Community Resources  Fall Risk Prevention:  [address]:  Declined  Senior Services:  Declined  Transportation Support:  Declined  Zone Management Tools:  Completed (Comment: chf zones)         Goals Addressed             Most Recent       Care Coordination     Patient Stated (pt-stated)   On track (3/2/2018)             Plan to start daily weight and monitoring    Barriers: stress  Plan for overcoming my barriers: N/A  Confidence: 8/10  Anticipated Goal Completion Date: 11/17         General     Care Coordination Self Management

## 2018-03-07 ENCOUNTER — ANTI-COAG VISIT (OUTPATIENT)
Dept: INTERNAL MEDICINE CLINIC | Age: 73
End: 2018-03-07

## 2018-03-07 ENCOUNTER — TELEPHONE (OUTPATIENT)
Dept: CARDIOLOGY CLINIC | Age: 73
End: 2018-03-07

## 2018-03-07 LAB — INR BLD: 2.8

## 2018-03-07 NOTE — TELEPHONE ENCOUNTER
Pt wants to speak with Beba again about dofetilide (TIKOSYN) 500 MCG capsule     Pt sts has been 2 weeks and has not heard anything.

## 2018-03-08 NOTE — TELEPHONE ENCOUNTER
Pt called again requesting a call about Tykosin. He states he will be needing it soon and is trying to figure out what's going on with it.

## 2018-03-19 ENCOUNTER — ANTI-COAG VISIT (OUTPATIENT)
Dept: INTERNAL MEDICINE CLINIC | Age: 73
End: 2018-03-19

## 2018-03-19 LAB — INR BLD: 2.6

## 2018-04-02 ENCOUNTER — TELEPHONE (OUTPATIENT)
Dept: INTERNAL MEDICINE CLINIC | Age: 73
End: 2018-04-02

## 2018-04-02 ENCOUNTER — ANTI-COAG VISIT (OUTPATIENT)
Dept: INTERNAL MEDICINE CLINIC | Age: 73
End: 2018-04-02

## 2018-04-02 LAB — INR BLD: 2.1

## 2018-04-16 ENCOUNTER — ANTI-COAG VISIT (OUTPATIENT)
Dept: INTERNAL MEDICINE CLINIC | Age: 73
End: 2018-04-16

## 2018-04-16 ENCOUNTER — TELEPHONE (OUTPATIENT)
Dept: INTERNAL MEDICINE CLINIC | Age: 73
End: 2018-04-16

## 2018-04-16 ENCOUNTER — TELEPHONE (OUTPATIENT)
Dept: CARDIOLOGY CLINIC | Age: 73
End: 2018-04-16

## 2018-04-16 LAB — INR BLD: 2.3

## 2018-04-23 ENCOUNTER — OFFICE VISIT (OUTPATIENT)
Dept: PULMONOLOGY | Age: 73
End: 2018-04-23

## 2018-04-23 VITALS
SYSTOLIC BLOOD PRESSURE: 128 MMHG | BODY MASS INDEX: 39.17 KG/M2 | WEIGHT: 315 LBS | DIASTOLIC BLOOD PRESSURE: 69 MMHG | RESPIRATION RATE: 22 BRPM | HEIGHT: 75 IN | OXYGEN SATURATION: 98 % | TEMPERATURE: 97.5 F | HEART RATE: 81 BPM

## 2018-04-23 DIAGNOSIS — Z71.89 CPAP USE COUNSELING: ICD-10-CM

## 2018-04-23 DIAGNOSIS — R68.2 DRY MOUTH: ICD-10-CM

## 2018-04-23 DIAGNOSIS — Z72.821 POOR SLEEP HYGIENE: ICD-10-CM

## 2018-04-23 DIAGNOSIS — G47.33 OSA (OBSTRUCTIVE SLEEP APNEA): Primary | ICD-10-CM

## 2018-04-23 DIAGNOSIS — E66.01 OBESITY, CLASS III, BMI 40-49.9 (MORBID OBESITY) (HCC): ICD-10-CM

## 2018-04-23 PROCEDURE — 1123F ACP DISCUSS/DSCN MKR DOCD: CPT | Performed by: NURSE PRACTITIONER

## 2018-04-23 PROCEDURE — G8417 CALC BMI ABV UP PARAM F/U: HCPCS | Performed by: NURSE PRACTITIONER

## 2018-04-23 PROCEDURE — 1036F TOBACCO NON-USER: CPT | Performed by: NURSE PRACTITIONER

## 2018-04-23 PROCEDURE — 4040F PNEUMOC VAC/ADMIN/RCVD: CPT | Performed by: NURSE PRACTITIONER

## 2018-04-23 PROCEDURE — 99213 OFFICE O/P EST LOW 20 MIN: CPT | Performed by: NURSE PRACTITIONER

## 2018-04-23 PROCEDURE — 3017F COLORECTAL CA SCREEN DOC REV: CPT | Performed by: NURSE PRACTITIONER

## 2018-04-23 PROCEDURE — G8427 DOCREV CUR MEDS BY ELIG CLIN: HCPCS | Performed by: NURSE PRACTITIONER

## 2018-04-23 ASSESSMENT — SLEEP AND FATIGUE QUESTIONNAIRES
NECK CIRCUMFERENCE (INCHES): 18.25
HOW LIKELY ARE YOU TO NOD OFF OR FALL ASLEEP WHILE SITTING AND READING: 1
HOW LIKELY ARE YOU TO NOD OFF OR FALL ASLEEP WHILE WATCHING TV: 1
HOW LIKELY ARE YOU TO NOD OFF OR FALL ASLEEP WHEN YOU ARE A PASSENGER IN A CAR FOR AN HOUR WITHOUT A BREAK: 0
HOW LIKELY ARE YOU TO NOD OFF OR FALL ASLEEP WHILE SITTING QUIETLY AFTER LUNCH WITHOUT ALCOHOL: 1
HOW LIKELY ARE YOU TO NOD OFF OR FALL ASLEEP IN A CAR, WHILE STOPPED FOR A FEW MINUTES IN TRAFFIC: 0
ESS TOTAL SCORE: 5
HOW LIKELY ARE YOU TO NOD OFF OR FALL ASLEEP WHILE SITTING AND TALKING TO SOMEONE: 0
HOW LIKELY ARE YOU TO NOD OFF OR FALL ASLEEP WHILE LYING DOWN TO REST IN THE AFTERNOON WHEN CIRCUMSTANCES PERMIT: 2
HOW LIKELY ARE YOU TO NOD OFF OR FALL ASLEEP WHILE SITTING INACTIVE IN A PUBLIC PLACE: 0

## 2018-04-26 RX ORDER — MONTELUKAST SODIUM 10 MG/1
TABLET ORAL
Qty: 90 TABLET | Refills: 1 | Status: SHIPPED | OUTPATIENT
Start: 2018-04-26 | End: 2018-09-12 | Stop reason: SDUPTHER

## 2018-04-30 ENCOUNTER — ANTI-COAG VISIT (OUTPATIENT)
Dept: INTERNAL MEDICINE CLINIC | Age: 73
End: 2018-04-30

## 2018-04-30 ENCOUNTER — TELEPHONE (OUTPATIENT)
Dept: INTERNAL MEDICINE CLINIC | Age: 73
End: 2018-04-30

## 2018-04-30 LAB — INR BLD: 2.2

## 2018-05-15 ENCOUNTER — OFFICE VISIT (OUTPATIENT)
Dept: INTERNAL MEDICINE CLINIC | Age: 73
End: 2018-05-15

## 2018-05-15 ENCOUNTER — TELEPHONE (OUTPATIENT)
Dept: INTERNAL MEDICINE CLINIC | Age: 73
End: 2018-05-15

## 2018-05-15 ENCOUNTER — ANTI-COAG VISIT (OUTPATIENT)
Dept: INTERNAL MEDICINE CLINIC | Age: 73
End: 2018-05-15

## 2018-05-15 VITALS — BODY MASS INDEX: 39.17 KG/M2 | HEIGHT: 75 IN | WEIGHT: 315 LBS

## 2018-05-15 DIAGNOSIS — L30.9 DERMATITIS: Primary | ICD-10-CM

## 2018-05-15 LAB — INR BLD: 2.2

## 2018-05-15 PROCEDURE — 99212 OFFICE O/P EST SF 10 MIN: CPT | Performed by: INTERNAL MEDICINE

## 2018-05-15 PROCEDURE — G8417 CALC BMI ABV UP PARAM F/U: HCPCS | Performed by: INTERNAL MEDICINE

## 2018-05-15 PROCEDURE — 1036F TOBACCO NON-USER: CPT | Performed by: INTERNAL MEDICINE

## 2018-05-15 PROCEDURE — 4040F PNEUMOC VAC/ADMIN/RCVD: CPT | Performed by: INTERNAL MEDICINE

## 2018-05-15 PROCEDURE — 3017F COLORECTAL CA SCREEN DOC REV: CPT | Performed by: INTERNAL MEDICINE

## 2018-05-15 PROCEDURE — G8427 DOCREV CUR MEDS BY ELIG CLIN: HCPCS | Performed by: INTERNAL MEDICINE

## 2018-05-15 PROCEDURE — 1123F ACP DISCUSS/DSCN MKR DOCD: CPT | Performed by: INTERNAL MEDICINE

## 2018-05-15 RX ORDER — TRIAMCINOLONE ACETONIDE 0.25 MG/G
OINTMENT TOPICAL
Qty: 60 G | Refills: 1 | Status: SHIPPED | OUTPATIENT
Start: 2018-05-15 | End: 2018-05-22

## 2018-05-15 ASSESSMENT — ENCOUNTER SYMPTOMS
ABDOMINAL PAIN: 0
DIARRHEA: 0

## 2018-05-18 ENCOUNTER — TELEPHONE (OUTPATIENT)
Dept: CARDIOLOGY CLINIC | Age: 73
End: 2018-05-18

## 2018-05-23 ENCOUNTER — CARE COORDINATION (OUTPATIENT)
Dept: CARE COORDINATION | Age: 73
End: 2018-05-23

## 2018-05-29 ENCOUNTER — TELEPHONE (OUTPATIENT)
Dept: INTERNAL MEDICINE CLINIC | Age: 73
End: 2018-05-29

## 2018-05-29 ENCOUNTER — ANTI-COAG VISIT (OUTPATIENT)
Dept: INTERNAL MEDICINE CLINIC | Age: 73
End: 2018-05-29

## 2018-05-29 LAB — INR BLD: 2.2

## 2018-06-11 ENCOUNTER — ANTI-COAG VISIT (OUTPATIENT)
Dept: INTERNAL MEDICINE CLINIC | Age: 73
End: 2018-06-11

## 2018-06-11 ENCOUNTER — TELEPHONE (OUTPATIENT)
Dept: INTERNAL MEDICINE CLINIC | Age: 73
End: 2018-06-11

## 2018-06-11 LAB — INR BLD: 2.4

## 2018-06-26 ENCOUNTER — ANTI-COAG VISIT (OUTPATIENT)
Dept: INTERNAL MEDICINE CLINIC | Age: 73
End: 2018-06-26

## 2018-06-26 ENCOUNTER — TELEPHONE (OUTPATIENT)
Dept: INTERNAL MEDICINE CLINIC | Age: 73
End: 2018-06-26

## 2018-06-26 LAB — INR BLD: 2.4

## 2018-07-09 ENCOUNTER — TELEPHONE (OUTPATIENT)
Dept: INTERNAL MEDICINE CLINIC | Age: 73
End: 2018-07-09

## 2018-07-09 ENCOUNTER — ANTI-COAG VISIT (OUTPATIENT)
Dept: INTERNAL MEDICINE CLINIC | Age: 73
End: 2018-07-09

## 2018-07-09 LAB — INR BLD: 2.3

## 2018-07-23 ENCOUNTER — TELEPHONE (OUTPATIENT)
Dept: CARDIOLOGY CLINIC | Age: 73
End: 2018-07-23

## 2018-07-23 ENCOUNTER — ANTI-COAG VISIT (OUTPATIENT)
Dept: INTERNAL MEDICINE CLINIC | Age: 73
End: 2018-07-23

## 2018-07-23 ENCOUNTER — TELEPHONE (OUTPATIENT)
Dept: INTERNAL MEDICINE CLINIC | Age: 73
End: 2018-07-23

## 2018-07-23 LAB — INR BLD: 2.1

## 2018-07-24 NOTE — TELEPHONE ENCOUNTER
Pt is trying to figure out if he needs to order or if they will autoship his Tykosin. Please advise. Ok to leave message.

## 2018-07-25 RX ORDER — WARFARIN SODIUM 5 MG/1
TABLET ORAL
Qty: 98 TABLET | Refills: 0 | Status: SHIPPED | OUTPATIENT
Start: 2018-07-25 | End: 2018-11-06

## 2018-07-25 NOTE — TELEPHONE ENCOUNTER
Pt called requesting to receive a return call from [employer] to explain the process of refilling his Tikosyn. Pt states he can be reached @ [phone] and if he is unable to get to the phone to please leave a message.

## 2018-07-30 RX ORDER — DILTIAZEM HYDROCHLORIDE 120 MG/1
CAPSULE, EXTENDED RELEASE ORAL
Qty: 180 CAPSULE | Refills: 2 | Status: SHIPPED | OUTPATIENT
Start: 2018-07-30 | End: 2019-01-15 | Stop reason: CLARIF

## 2018-08-01 ENCOUNTER — TELEPHONE (OUTPATIENT)
Dept: CARDIOLOGY CLINIC | Age: 73
End: 2018-08-01

## 2018-08-01 RX ORDER — DILTIAZEM HYDROCHLORIDE 120 MG/1
240 CAPSULE, COATED, EXTENDED RELEASE ORAL DAILY
Qty: 180 CAPSULE | Refills: 0 | Status: SHIPPED | OUTPATIENT
Start: 2018-08-01 | End: 2018-08-06

## 2018-08-03 ENCOUNTER — OFFICE VISIT (OUTPATIENT)
Dept: INTERNAL MEDICINE CLINIC | Age: 73
End: 2018-08-03

## 2018-08-03 VITALS
SYSTOLIC BLOOD PRESSURE: 125 MMHG | WEIGHT: 315 LBS | HEART RATE: 70 BPM | DIASTOLIC BLOOD PRESSURE: 75 MMHG | HEIGHT: 75 IN | BODY MASS INDEX: 39.17 KG/M2 | RESPIRATION RATE: 18 BRPM

## 2018-08-03 DIAGNOSIS — I48.0 PAF (PAROXYSMAL ATRIAL FIBRILLATION) (HCC): ICD-10-CM

## 2018-08-03 DIAGNOSIS — I48.4 ATYPICAL ATRIAL FLUTTER (HCC): ICD-10-CM

## 2018-08-03 DIAGNOSIS — L71.9 ROSACEA: ICD-10-CM

## 2018-08-03 DIAGNOSIS — E66.01 OBESITY, CLASS III, BMI 40-49.9 (MORBID OBESITY) (HCC): ICD-10-CM

## 2018-08-03 DIAGNOSIS — G47.33 OSA (OBSTRUCTIVE SLEEP APNEA): ICD-10-CM

## 2018-08-03 DIAGNOSIS — I10 ESSENTIAL HYPERTENSION: Primary | ICD-10-CM

## 2018-08-03 LAB
BASOPHILS ABSOLUTE: 0.1 K/UL (ref 0–0.2)
BASOPHILS RELATIVE PERCENT: 0.9 %
EOSINOPHILS ABSOLUTE: 0.1 K/UL (ref 0–0.6)
EOSINOPHILS RELATIVE PERCENT: 1.7 %
HCT VFR BLD CALC: 40.9 % (ref 40.5–52.5)
HEMOGLOBIN: 13.5 G/DL (ref 13.5–17.5)
LYMPHOCYTES ABSOLUTE: 1.3 K/UL (ref 1–5.1)
LYMPHOCYTES RELATIVE PERCENT: 15.3 %
MCH RBC QN AUTO: 28.7 PG (ref 26–34)
MCHC RBC AUTO-ENTMCNC: 32.9 G/DL (ref 31–36)
MCV RBC AUTO: 87.4 FL (ref 80–100)
MONOCYTES ABSOLUTE: 0.8 K/UL (ref 0–1.3)
MONOCYTES RELATIVE PERCENT: 9.1 %
NEUTROPHILS ABSOLUTE: 6.1 K/UL (ref 1.7–7.7)
NEUTROPHILS RELATIVE PERCENT: 73 %
PDW BLD-RTO: 16.2 % (ref 12.4–15.4)
PLATELET # BLD: 253 K/UL (ref 135–450)
PMV BLD AUTO: 8.3 FL (ref 5–10.5)
RBC # BLD: 4.68 M/UL (ref 4.2–5.9)
WBC # BLD: 8.4 K/UL (ref 4–11)

## 2018-08-03 PROCEDURE — 1123F ACP DISCUSS/DSCN MKR DOCD: CPT | Performed by: INTERNAL MEDICINE

## 2018-08-03 PROCEDURE — 1101F PT FALLS ASSESS-DOCD LE1/YR: CPT | Performed by: INTERNAL MEDICINE

## 2018-08-03 PROCEDURE — 99213 OFFICE O/P EST LOW 20 MIN: CPT | Performed by: INTERNAL MEDICINE

## 2018-08-03 PROCEDURE — G8427 DOCREV CUR MEDS BY ELIG CLIN: HCPCS | Performed by: INTERNAL MEDICINE

## 2018-08-03 PROCEDURE — 4040F PNEUMOC VAC/ADMIN/RCVD: CPT | Performed by: INTERNAL MEDICINE

## 2018-08-03 PROCEDURE — 3017F COLORECTAL CA SCREEN DOC REV: CPT | Performed by: INTERNAL MEDICINE

## 2018-08-03 PROCEDURE — 1036F TOBACCO NON-USER: CPT | Performed by: INTERNAL MEDICINE

## 2018-08-03 PROCEDURE — G8417 CALC BMI ABV UP PARAM F/U: HCPCS | Performed by: INTERNAL MEDICINE

## 2018-08-03 RX ORDER — METRONIDAZOLE 7.5 MG/G
GEL TOPICAL
Qty: 120 G | Refills: 0 | Status: SHIPPED | OUTPATIENT
Start: 2018-08-03 | End: 2020-01-03

## 2018-08-04 LAB
A/G RATIO: 1.5 (ref 1.1–2.2)
ALBUMIN SERPL-MCNC: 4.3 G/DL (ref 3.4–5)
ALP BLD-CCNC: 95 U/L (ref 40–129)
ALT SERPL-CCNC: 22 U/L (ref 10–40)
ANION GAP SERPL CALCULATED.3IONS-SCNC: 15 MMOL/L (ref 3–16)
AST SERPL-CCNC: 18 U/L (ref 15–37)
BILIRUB SERPL-MCNC: 0.6 MG/DL (ref 0–1)
BUN BLDV-MCNC: 24 MG/DL (ref 7–20)
CALCIUM SERPL-MCNC: 9.6 MG/DL (ref 8.3–10.6)
CHLORIDE BLD-SCNC: 101 MMOL/L (ref 99–110)
CO2: 26 MMOL/L (ref 21–32)
CREAT SERPL-MCNC: 1.3 MG/DL (ref 0.8–1.3)
GFR AFRICAN AMERICAN: >60
GFR NON-AFRICAN AMERICAN: 54
GLOBULIN: 2.8 G/DL
GLUCOSE BLD-MCNC: 116 MG/DL (ref 70–99)
POTASSIUM SERPL-SCNC: 4.3 MMOL/L (ref 3.5–5.1)
SODIUM BLD-SCNC: 142 MMOL/L (ref 136–145)
TOTAL PROTEIN: 7.1 G/DL (ref 6.4–8.2)

## 2018-08-06 ENCOUNTER — OFFICE VISIT (OUTPATIENT)
Dept: CARDIOLOGY CLINIC | Age: 73
End: 2018-08-06

## 2018-08-06 ENCOUNTER — TELEPHONE (OUTPATIENT)
Dept: INTERNAL MEDICINE CLINIC | Age: 73
End: 2018-08-06

## 2018-08-06 ENCOUNTER — ANTI-COAG VISIT (OUTPATIENT)
Dept: INTERNAL MEDICINE CLINIC | Age: 73
End: 2018-08-06

## 2018-08-06 VITALS
SYSTOLIC BLOOD PRESSURE: 126 MMHG | OXYGEN SATURATION: 96 % | HEIGHT: 75 IN | WEIGHT: 315 LBS | BODY MASS INDEX: 39.17 KG/M2 | DIASTOLIC BLOOD PRESSURE: 72 MMHG | HEART RATE: 82 BPM

## 2018-08-06 DIAGNOSIS — I10 ESSENTIAL HYPERTENSION: ICD-10-CM

## 2018-08-06 DIAGNOSIS — I48.4 ATYPICAL ATRIAL FLUTTER (HCC): ICD-10-CM

## 2018-08-06 DIAGNOSIS — I48.0 PAF (PAROXYSMAL ATRIAL FIBRILLATION) (HCC): Primary | ICD-10-CM

## 2018-08-06 LAB — INR BLD: 2

## 2018-08-06 PROCEDURE — G8427 DOCREV CUR MEDS BY ELIG CLIN: HCPCS | Performed by: INTERNAL MEDICINE

## 2018-08-06 PROCEDURE — 1101F PT FALLS ASSESS-DOCD LE1/YR: CPT | Performed by: INTERNAL MEDICINE

## 2018-08-06 PROCEDURE — 1123F ACP DISCUSS/DSCN MKR DOCD: CPT | Performed by: INTERNAL MEDICINE

## 2018-08-06 PROCEDURE — 99214 OFFICE O/P EST MOD 30 MIN: CPT | Performed by: INTERNAL MEDICINE

## 2018-08-06 PROCEDURE — 1036F TOBACCO NON-USER: CPT | Performed by: INTERNAL MEDICINE

## 2018-08-06 PROCEDURE — G8417 CALC BMI ABV UP PARAM F/U: HCPCS | Performed by: INTERNAL MEDICINE

## 2018-08-06 PROCEDURE — 3017F COLORECTAL CA SCREEN DOC REV: CPT | Performed by: INTERNAL MEDICINE

## 2018-08-06 PROCEDURE — 4040F PNEUMOC VAC/ADMIN/RCVD: CPT | Performed by: INTERNAL MEDICINE

## 2018-08-06 NOTE — LETTER
[address]  [address] [place] [id number]  Phone: [phone]  Fax: [phone]    Emerita Nazario MD        August 6, 2018     Juan Yanes, [address] [address]    Patient: Troy Anthony  MR Number: [id number]  YOB: 1945  Date of Visit: 8/6/2018    Dear Dr. Juan Yanes:    Thank you for the request for consultation for Zahrabrett Ambriz to me for the evaluation. Below are the relevant portions of my assessment and plan of care. [facility] Office Note  8/6/2018     Subjective:  Mr. Lina Lauren is a 68 y.o. male that presents today for cardiology follow up for PAF, HTN, palpitations. Today he reports at times  he can feel his heart beating fast.  He states he is very dizzy with these palpitations. It does not occur that often. Last week while going to post office he could feel his heart beating fast and felt dizzy/lightheaded. He denies chest pain, syncope, or shortness of breath. He remains on coumadin managed by coumadin clinic. He reports swimming at [facility] twice per week water aerobic classes. HPI: Troy Anthony  presented to the hospital on 7/31/2016 with complaints of palpitations. Patient is a prior patient of Dr. Aurelio Ryan with [facility]. Patient presents with prior history of PAF (sp ablation 2014), hypertension and MARIA VICTORIA (uses CPAP). Patient reported prior to admission he has had off an on felt palpitations that have been gradually getting worse over time. Stated palpitations occur with activity or when sitting up. Patient felt bad all day 7/30/2016 and was weak dizzy brought to ED found to be bradycardiac with PVC's. Patient became dizzy and lightheaded with associated nausea, thus he presented to the ER. Palpitations occur on a daily basis. Stated they last for a few minutes and then resolve on their own.  He was  on sotalol 120 mg in am and 80 mg in pm. Currently on coumadin and INR has been therapeutic. Patient reported to having HR in the 30's in the ER. EKG with NSR with first degree block. Echo with EF of 55-60%. Chest Xray with no disease process noted. Questionable R pulmonary nodule. Most recent 48 hour holter 01/05/201 Normal sinus rhythm with no significant pauses. PVCs (1200 / 24 hours) and NSVT (longest run 6 beats). Rare PACs, no sustained atrial arrhythmias. Symptoms noted during NSR. Lab values of BUN/Crea 23/1.0, , trop negative x 1, TSH 1.21 and H/H 13.1 and 40.4. 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 He was readmitted to [facility] in November 2016 AFIB. He has had an ablation in 2012 for PVC's (Dr. Camron Garcia). Review of Systems:   12 point ROS negative in all areas as listed below except as in [place]  Constitutional, EENT, Cardiovascular, pulmonary, GI, , Musculoskeletal, skin, neurological, hematological, endocrine, Psychiatric    Reviewed past medical history, social, and family history.    Former smoker  Mother: no cardiac history, CVA, cancer   Father: no cardiac history     Past Medical History:   Diagnosis Date    Arthritis     Asthma     past hx    Atrial fibrillation ([address]     Blood circulation, collateral     Cancer ([address]     RIGHT LUNG; nodule pt states that it was non-cancerous    DVT (deep venous thrombosis) ([address]     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    History of knee replacement procedure of right knee     Hx of blood clots     2 DVT's    Hypertension     Knee osteoarthritis 1/17/2012    Left TKR 1/18/2012    Neuromuscular disorder ([address]     Palpitations     stable with atenolol    Sleep apnea     uses CPAP    Stone, kidney     UTI (urinary tract infection) 01/23/2017     Past Surgical History:   Procedure Laterality Date    ABLATION OF DYSRHYTHMIC FOCUS  2013    a-fib    BARIATRIC SURGERY  2013    GASTRIC SLEEVE   [facility] CARDIAC SURGERY  2013    ablation  montelukast (SINGULAIR) 10 MG tablet TAKE 1 TABLET BY MOUTH  DAILY 90 tablet 1    spironolactone (ALDACTONE) 25 MG tablet TAKE 1 TABLET BY MOUTH  DAILY 90 tablet 2    furosemide (LASIX) 40 MG tablet TAKE 1 TABLET BY MOUTH  DAILY (Patient taking differently: TAKE 1 TABLET BY MOUTH  DAILY On Monday, Wednesday and Friday he takes 80 mg qd) 90 tablet 3    dofetilide (TIKOSYN) 500 MCG capsule TAKE 1 CAPSULE BY MOUTH EVERY 12 HOURS 60 capsule 5    Biotin 5 MG TABS Take 5 mg by mouth daily      Cholecalciferol (VITAMIN D3) 5000 units TABS Take 5,000 Units by mouth daily      Cyanocobalamin (VITAMIN B-12 CR PO) Take 1 tablet by mouth See Admin Instructions Three times a week; takes Mon, Wed, and Fri      warfarin (COUMADIN) 5 MG tablet Take 5 mg by mouth daily      Multiple Vitamins-Minerals (THERAPEUTIC MULTIVITAMIN-MINERALS) tablet Take 2 tablets by mouth daily       magnesium oxide (MAG-OX) 400 MG tablet Take 400 mg by mouth daily.  metroNIDAZOLE (METROGEL) 0.75 % gel Apply topically 2 times daily. 120 g 0     No facility-administered encounter medications on file as of 8/6/2018.          Lab Data:  CBC:   Recent Labs      08/03/18   1339   WBC  8.4   HGB  13.5   HCT  40.9   MCV  87.4   PLT  253     BMP:   Recent Labs      08/03/18   1339   NA  142   K  4.3   CL  101   CO2  26   BUN  24*   CREATININE  1.3     LIVER PROFILE:   Recent Labs      08/03/18   1339   AST  18   ALT  22   BILITOT  0.6   ALKPHOS  95     LIPID:   Lab Results   Component Value Date    CHOL 182 10/31/2017    CHOL 208 (H) 11/11/2016     Lab Results   Component Value Date    TRIG 126 10/31/2017    TRIG 161 (H) 11/11/2016     Lab Results   Component Value Date    HDL 33 (L) 10/31/2017    HDL 35 (L) 11/11/2016     Lab Results   Component Value Date    LDLCALC 124 (H) 10/31/2017    LDLCALC 141 (H) 11/11/2016     Lab Results   Component Value Date    LABVLDL 25 10/31/2017    LABVLDL 32 11/11/2016     No results found for: CHOLHDLRATIO PT/INR:   Recent Labs     08/06/18   INR  2.00     A1C:   Lab Results   Component Value Date    LABA1C 6.2 10/31/2017     BNP:  No results for input(s): BNP in the last 72 hours. IMAGING:  EKG 1/29/18  Sinus  Rhythm  -First degree A-V block   Yuli = 296 Nonspecific T-abnormality. Stress 11/1/17  Summary  There is no evidence of stress induced ischemia. Normal LV function with  ejection fraction of 66%. There are no regional wall motion abnormalities. Low risk study. ECHO 10/31/17  Summary   This is a limited study pericardial effusion and shunt.   Normal left ventricular systolic function with an estimated ejection   fraction of 55%.   A bubble study was performed and fails to show evidence of shunting.  Marcha Sang is a very small circumferential pericardial effusion noted. Assessment:  Brunilda Graham was seen today for 6 month follow-up, hypertension, atrial fibrillation, discuss labs, shortness of breath, dizziness, palpitations, fatigue and edema. Diagnoses and all orders for this visit:    PAF (paroxysmal atrial fibrillation) (HCC)    Atypical atrial flutter (HCC)    Essential hypertension        Afib in past s/p ablation and on sotalol dose recently reduced due to symptomatic bradycardia. Plan:  1. Continue all current medications reviewed ad no refills warranted today  2. No testing warranted at th is time  3. Follow up with me in 12 months   He has bilat leg edema  I advised him to wear compression socks. Blood work in epic reviewed      [address]  1. Tobacco Cessation Counseling: NA  2. Retake of BP if >140/90:   NA  3. Documentation to PCP/referring for new patient:  Sent to PCP at close of office visit  4. CAD patient on anti-platelet: on warfarin  5. CAD patient on STATIN therapy:  NA  6. Patient with CHF and aFib on anticoagulation:  Yes coumadin        [address] [place], MD 8/6/2018 12:54 PM           If you have questions, please do not hesitate to call me.  I look forward

## 2018-08-06 NOTE — TELEPHONE ENCOUNTER
----- Message from Sofya Connelly MD sent at 8/6/2018 10:56 AM EDT -----  Contact: [phone]  No change  ----- Message -----  From: Lian Gu  Sent: 8/6/2018  10:04 AM  To: Sofya Connelly MD    INR: 2.0

## 2018-08-06 NOTE — COMMUNICATION BODY
[address] Office Note  8/6/2018     Subjective:  Mr. James Del Rosario is a 68 y.o. male that presents today for cardiology follow up for PAF, HTN, palpitations. Today he reports at times  he can feel his heart beating fast.  He states he is very dizzy with these palpitations. It does not occur that often. Last week while going to post office he could feel his heart beating fast and felt dizzy/lightheaded. He denies chest pain, syncope, or shortness of breath. He remains on coumadin managed by coumadin clinic. He reports swimming at [employer] twice per week water aerobic classes. HPI: Jan Anderson  presented to the hospital on 7/31/2016 with complaints of palpitations. Patient is a prior patient of Dr. Marah Conde with [address] [facility]. Patient presents with prior history of PAF (sp ablation 2014), hypertension and MARIA VICTORIA (uses CPAP). Patient reported prior to admission he has had off an on felt palpitations that have been gradually getting worse over time. Stated palpitations occur with activity or when sitting up. Patient felt bad all day 7/30/2016 and was weak dizzy brought to ED found to be bradycardiac with PVC's. Patient became dizzy and lightheaded with associated nausea, thus he presented to the ER. Palpitations occur on a daily basis. Stated they last for a few minutes and then resolve on their own. He was  on sotalol 120 mg in am and 80 mg in pm. Currently on coumadin and INR has been therapeutic. Patient reported to having HR in the 30's in the ER. EKG with NSR with first degree block. Echo with EF of 55-60%. Chest Xray with no disease process noted. Questionable R pulmonary nodule. Most recent 48 hour holter 01/05/201 Normal sinus rhythm with no significant pauses. PVCs (1200 / 24 hours) and NSVT (longest run 6 beats). Rare PACs, no sustained atrial arrhythmias. Symptoms noted during NSR. Lab values of BUN/Crea 23/1.0, , trop negative x 1, TSH 1.21 and H/H 13.1 and 40.4.  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 He was readmitted to [facility] in November 2016 AFIB. He has had an ablation in 2012 for PVC's (Dr. Benito Reardon). Review of Systems:   12 point ROS negative in all areas as listed below except as in [place]  Constitutional, EENT, Cardiovascular, pulmonary, GI, , Musculoskeletal, skin, neurological, hematological, endocrine, Psychiatric    Reviewed past medical history, social, and family history.    Former smoker  Mother: no cardiac history, CVA, cancer   Father: no cardiac history     Past Medical History:   Diagnosis Date    Arthritis     Asthma     past hx    Atrial fibrillation ([address]     Blood circulation, collateral     Cancer ([address]     RIGHT LUNG; nodule pt states that it was non-cancerous    DVT (deep venous thrombosis) ([address]     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    History of knee replacement procedure of right knee     Hx of blood clots     2 DVT's    Hypertension     Knee osteoarthritis 1/17/2012    Left TKR 1/18/2012    Neuromuscular disorder ([address]     Palpitations     stable with atenolol    Sleep apnea     uses CPAP    Stone, kidney     UTI (urinary tract infection) 01/23/2017     Past Surgical History:   Procedure Laterality Date    ABLATION OF DYSRHYTHMIC FOCUS  2013    a-fib    BARIATRIC SURGERY  2013    GASTRIC SLEEVE    CARDIAC SURGERY  2013    ablation    CARPAL TUNNEL RELEASE Right 9-30-15    CARPAL TUNNEL RELEASE Left 3/20/16    COLONOSCOPY      CYSTOSCOPY      with removal stone    CYSTOSCOPY  2/8/13    with Laser Vaporization of Enlarged Prostate    DIAGNOSTIC CARDIAC CATH LAB PROCEDURE      JOINT REPLACEMENT Bilateral     right knee (2002) and left knee (2012)    KNEE ARTHROSCOPY  4/19/2011     left  knee    SKIN BIOPSY      skin Ca/SQUAMOUS CELL    THORACOTOMY      wedge resection    TONSILLECTOMY         Objective:   /72   Pulse 82   Ht 6' 3\" (1.905 m)   Wt (!) 345 lb (156.5 kg)   SpO2 96%   BMI 43.12 kg/m²      Wt Readings from Last 3 Encounters:   08/06/18 (!) 345 lb (156.5 kg)   08/03/18 (!) 342 lb (155.1 kg)   05/15/18 (!) 339 lb (153.8 kg)       Physical Exam:  General: No Respiratory distress, appears well developed and well nourished. Eyes:  Sclera nonicteric  Nose/Sinuses:  negative findings: nose shows no deformity, asymmetry, or inflammation, nasal mucosa normal, septum midline with no perforation or bleeding  Back:  no pain to palpation  Joint:  no active joint inflammation  Musculoskeletal:  negative  Skin:  Warm and dry  Neck:  Negative for JVD and Carotid Bruits. Chest:  Clear to auscultation, respiration easy  Cardiovascular:  RRR, S1S2 normal, no murmur, no rub or thrill.   Abdomen:  Soft normal liver and spleen  Extremities:   2+ bilat leg edema from chronic venous insuff, no clubbing, cyanosis,  Pulses: Femoral and pedal pulses are normal.  Neuro: intact    Medications:   Outpatient Encounter Prescriptions as of 8/6/2018   Medication Sig Dispense Refill    diltiazem (CARDIZEM CD) 120 MG extended release capsule Take 2 capsules by mouth daily 180 capsule 0    diltiazem (CARDIZEM 12 HR) 120 MG extended release capsule TAKE 1 CAPSULE BY MOUTH TWO TIMES DAILY 180 capsule 2    warfarin (COUMADIN) 5 MG tablet TAKE 1 TABLET BY MOUTH  DAILY, EXCEPT TAKE 1 AND  1/2 TABLETS ON WEDNESDAY AS DIRECTED 98 tablet 0    montelukast (SINGULAIR) 10 MG tablet TAKE 1 TABLET BY MOUTH  DAILY 90 tablet 1    spironolactone (ALDACTONE) 25 MG tablet TAKE 1 TABLET BY MOUTH  DAILY 90 tablet 2    furosemide (LASIX) 40 MG tablet TAKE 1 TABLET BY MOUTH  DAILY (Patient taking differently: TAKE 1 TABLET BY MOUTH  DAILY On Monday, Wednesday and Friday he takes 80 mg qd) 90 tablet 3    dofetilide (TIKOSYN) 500 MCG capsule TAKE 1 CAPSULE BY MOUTH EVERY 12 HOURS 60 capsule 5    Biotin 5 MG TABS Take 5 mg by mouth daily      Cholecalciferol (VITAMIN D3) 5000 units TABS Take 5,000 Units by mouth daily      Cyanocobalamin (VITAMIN B-12 CR PO) Take 1 tablet by mouth See Admin Instructions Three times a week; takes Mon, Wed, and Fri      warfarin (COUMADIN) 5 MG tablet Take 5 mg by mouth daily      Multiple Vitamins-Minerals (THERAPEUTIC MULTIVITAMIN-MINERALS) tablet Take 2 tablets by mouth daily       magnesium oxide (MAG-OX) 400 MG tablet Take 400 mg by mouth daily.  metroNIDAZOLE (METROGEL) 0.75 % gel Apply topically 2 times daily. 120 g 0     No facility-administered encounter medications on file as of 8/6/2018. Lab Data:  CBC:   Recent Labs      08/03/18   1339   WBC  8.4   HGB  13.5   HCT  40.9   MCV  87.4   PLT  253     BMP:   Recent Labs      08/03/18   1339   NA  142   K  4.3   CL  101   CO2  26   BUN  24*   CREATININE  1.3     LIVER PROFILE:   Recent Labs      08/03/18   1339   AST  18   ALT  22   BILITOT  0.6   ALKPHOS  95     LIPID:   Lab Results   Component Value Date    CHOL 182 10/31/2017    CHOL 208 (H) 11/11/2016     Lab Results   Component Value Date    TRIG 126 10/31/2017    TRIG 161 (H) 11/11/2016     Lab Results   Component Value Date    HDL 33 (L) 10/31/2017    HDL 35 (L) 11/11/2016     Lab Results   Component Value Date    LDLCALC 124 (H) 10/31/2017    LDLCALC 141 (H) 11/11/2016     Lab Results   Component Value Date    LABVLDL 25 10/31/2017    LABVLDL 32 11/11/2016     No results found for: CHOLHDLRATIO  PT/INR:   Recent Labs     08/06/18   INR  2.00     A1C:   Lab Results   Component Value Date    LABA1C 6.2 10/31/2017     BNP:  No results for input(s): BNP in the last 72 hours. IMAGING:  EKG 1/29/18  Sinus  Rhythm  -First degree A-V block   Yuli = 296 Nonspecific T-abnormality. Stress 11/1/17  Summary  There is no evidence of stress induced ischemia. Normal LV function with  ejection fraction of 66%. There are no regional wall motion abnormalities. Low risk study.      ECHO 10/31/17  Summary   This is a limited study pericardial effusion and shunt.   Normal left ventricular systolic

## 2018-08-06 NOTE — PROGRESS NOTES
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 He was readmitted to [facility] in November 2016 AFIB. He has had an ablation in 2012 for PVC's (Dr. Thomas Haro). Review of Systems:   12 point ROS negative in all areas as listed below except as in [place]  Constitutional, EENT, Cardiovascular, pulmonary, GI, , Musculoskeletal, skin, neurological, hematological, endocrine, Psychiatric    Reviewed past medical history, social, and family history.    Former smoker  Mother: no cardiac history, CVA, cancer   Father: no cardiac history     Past Medical History:   Diagnosis Date    Arthritis     Asthma     past hx    Atrial fibrillation ([address]     Blood circulation, collateral     Cancer ([address]     RIGHT LUNG; nodule pt states that it was non-cancerous    DVT (deep venous thrombosis) ([address]     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    History of knee replacement procedure of right knee     Hx of blood clots     2 DVT's    Hypertension     Knee osteoarthritis 1/17/2012    Left TKR 1/18/2012    Neuromuscular disorder ([address]     Palpitations     stable with atenolol    Sleep apnea     uses CPAP    Stone, kidney     UTI (urinary tract infection) 01/23/2017     Past Surgical History:   Procedure Laterality Date    ABLATION OF DYSRHYTHMIC FOCUS  2013    a-fib    BARIATRIC SURGERY  2013    GASTRIC SLEEVE    CARDIAC SURGERY  2013    ablation    CARPAL TUNNEL RELEASE Right 9-30-15    CARPAL TUNNEL RELEASE Left 3/20/16    COLONOSCOPY      CYSTOSCOPY      with removal stone    CYSTOSCOPY  2/8/13    with Laser Vaporization of Enlarged Prostate    DIAGNOSTIC CARDIAC CATH LAB PROCEDURE      JOINT REPLACEMENT Bilateral     right knee (2002) and left knee (2012)    KNEE ARTHROSCOPY  4/19/2011     left  knee    SKIN BIOPSY      skin Ca/SQUAMOUS CELL    THORACOTOMY      wedge resection    TONSILLECTOMY         Objective:   /72   Pulse 82   Ht 6' 3\" (1.905 m)   Wt (!) 345 lb (156.5 kg)   SpO2 96%   BMI 43.12 kg/m²     Wt Readings from Last 3 Encounters:   08/06/18 (!) 345 lb (156.5 kg)   08/03/18 (!) 342 lb (155.1 kg)   05/15/18 (!) 339 lb (153.8 kg)       Physical Exam:  General: No Respiratory distress, appears well developed and well nourished. Eyes:  Sclera nonicteric  Nose/Sinuses:  negative findings: nose shows no deformity, asymmetry, or inflammation, nasal mucosa normal, septum midline with no perforation or bleeding  Back:  no pain to palpation  Joint:  no active joint inflammation  Musculoskeletal:  negative  Skin:  Warm and dry  Neck:  Negative for JVD and Carotid Bruits. Chest:  Clear to auscultation, respiration easy  Cardiovascular:  RRR, S1S2 normal, no murmur, no rub or thrill.   Abdomen:  Soft normal liver and spleen  Extremities:   2+ bilat leg edema from chronic venous insuff, no clubbing, cyanosis,  Pulses: Femoral and pedal pulses are normal.  Neuro: intact    Medications:   Outpatient Encounter Prescriptions as of 8/6/2018   Medication Sig Dispense Refill    diltiazem (CARDIZEM CD) 120 MG extended release capsule Take 2 capsules by mouth daily 180 capsule 0    diltiazem (CARDIZEM 12 HR) 120 MG extended release capsule TAKE 1 CAPSULE BY MOUTH TWO TIMES DAILY 180 capsule 2    warfarin (COUMADIN) 5 MG tablet TAKE 1 TABLET BY MOUTH  DAILY, EXCEPT TAKE 1 AND  1/2 TABLETS ON WEDNESDAY AS DIRECTED 98 tablet 0    montelukast (SINGULAIR) 10 MG tablet TAKE 1 TABLET BY MOUTH  DAILY 90 tablet 1    spironolactone (ALDACTONE) 25 MG tablet TAKE 1 TABLET BY MOUTH  DAILY 90 tablet 2    furosemide (LASIX) 40 MG tablet TAKE 1 TABLET BY MOUTH  DAILY (Patient taking differently: TAKE 1 TABLET BY MOUTH  DAILY On Monday, Wednesday and Friday he takes 80 mg qd) 90 tablet 3    dofetilide (TIKOSYN) 500 MCG capsule TAKE 1 CAPSULE BY MOUTH EVERY 12 HOURS 60 capsule 5    Biotin 5 MG TABS Take 5 mg by mouth daily      Cholecalciferol (VITAMIN D3) 5000 units TABS Take 5,000 Units by mouth daily      Cyanocobalamin (VITAMIN B-12 CR PO) Take 1 tablet by mouth See Admin Instructions Three times a week; takes Mon, Wed, and Fri      warfarin (COUMADIN) 5 MG tablet Take 5 mg by mouth daily      Multiple Vitamins-Minerals (THERAPEUTIC MULTIVITAMIN-MINERALS) tablet Take 2 tablets by mouth daily       magnesium oxide (MAG-OX) 400 MG tablet Take 400 mg by mouth daily.  metroNIDAZOLE (METROGEL) 0.75 % gel Apply topically 2 times daily. 120 g 0     No facility-administered encounter medications on file as of 8/6/2018. Lab Data:  CBC:   Recent Labs      08/03/18   1339   WBC  8.4   HGB  13.5   HCT  40.9   MCV  87.4   PLT  253     BMP:   Recent Labs      08/03/18   1339   NA  142   K  4.3   CL  101   CO2  26   BUN  24*   CREATININE  1.3     LIVER PROFILE:   Recent Labs      08/03/18   1339   AST  18   ALT  22   BILITOT  0.6   ALKPHOS  95     LIPID:   Lab Results   Component Value Date    CHOL 182 10/31/2017    CHOL 208 (H) 11/11/2016     Lab Results   Component Value Date    TRIG 126 10/31/2017    TRIG 161 (H) 11/11/2016     Lab Results   Component Value Date    HDL 33 (L) 10/31/2017    HDL 35 (L) 11/11/2016     Lab Results   Component Value Date    LDLCALC 124 (H) 10/31/2017    LDLCALC 141 (H) 11/11/2016     Lab Results   Component Value Date    LABVLDL 25 10/31/2017    LABVLDL 32 11/11/2016     No results found for: CHOLHDLRATIO  PT/INR:   Recent Labs     08/06/18   INR  2.00     A1C:   Lab Results   Component Value Date    LABA1C 6.2 10/31/2017     BNP:  No results for input(s): BNP in the last 72 hours. IMAGING:  EKG 1/29/18  Sinus  Rhythm  -First degree A-V block   Yuli = 296 Nonspecific T-abnormality. Stress 11/1/17  Summary  There is no evidence of stress induced ischemia. Normal LV function with  ejection fraction of 66%. There are no regional wall motion abnormalities. Low risk study.      ECHO 10/31/17  Summary   This is a limited study pericardial effusion and shunt.   Normal left ventricular systolic

## 2018-08-20 ENCOUNTER — ANTI-COAG VISIT (OUTPATIENT)
Dept: INTERNAL MEDICINE CLINIC | Age: 73
End: 2018-08-20

## 2018-08-20 LAB — INR BLD: 2.3

## 2018-08-22 NOTE — PROGRESS NOTES
After Visit Summary   8/22/2018    Olena Gilmore    MRN: [id number]           Patient Information     Date Of Birth          2004        Visit Information        Provider Department      8/22/2018 3:30 PM Natasha Shi RD [facility]         Follow-ups after your visit        Your next 10 appointments already scheduled     Sep 24, 2018  3:30 PM CDT   Return Visit with Carmen Brock MD   [facility] ([facility]    [address] [place] [id number] [phone]              Who to contact     If you have questions or need follow up information about today's clinic visit or your schedule please contact [facility] directly at [phone].  Normal or non-critical lab and imaging results will be communicated to you by Beaminghart, letter or phone within 4 business days after the clinic has received the results. If you do not hear from us within 7 days, please contact the clinic through Beaminghart or phone. If you have a critical or abnormal lab result, we will notify you by phone as soon as possible.  Submit refill requests through TVbeat or call your pharmacy and they will forward the refill request to us. Please allow 3 business days for your refill to be completed.          Additional Information About Your Visit        Beaminghart Information     TVbeat gives you secure access to your electronic health record. If you see a primary care provider, you can also send messages to your care team and make appointments. If you have questions, please call your primary care clinic.  If you do not have a primary care provider, please call [phone] and they will assist you.      TVbeat is an electronic gateway that provides easy, online access to your medical records. With TVbeat, you can request a clinic appointment, read your test results, renew a prescription or communicate with your care team.     To access your  "existing account, please contact your [facility] or call [phone] for assistance.        Care EveryWhere ID     This is your Care EveryWhere ID. This could be used by other organizations to access your [place] medical records  [id number]        Your Vitals Were     Height BMI (Body Mass Index)                1.685 m (5' 6.34\") 26.44 kg/m2           Blood Pressure from Last 3 Encounters:   07/16/18 118/78   07/02/18 119/84   04/09/18 132/80    Weight from Last 3 Encounters:   08/22/18 75.1 kg (165 lb 8 oz) (97 %)*   07/16/18 73.2 kg (161 lb 6.4 oz) (97 %)*   07/02/18 72.9 kg (160 lb 11.2 oz) (97 %)*     * Growth percentiles are based on [facility] 2-20 Years data.              Today, you had the following     No orders found for display       Primary Care Provider Office Phone # Fax #    Katt Clark [phone] [address] [facility] [address]        Equal Access to Services     [facility]: Hadii aad ku hadasho Soomaali, waaxda luqadaha, qaybta kaalmada adeegyada, esha moreno . So [facility] [phone].    ATENCIÓN: Si habla español, tiene a arenas disposición servicios gratuitos de asistencia lingüística. [facility] [phone].    We comply with applicable federal civil rights laws and [place] laws. We do not discriminate on the basis of race, color, national origin, age, disability, sex, sexual orientation, or gender identity.            Thank you!     Thank you for choosing [facility]  for your care. Our goal is always to provide you with excellent care. Hearing back from our patients is one way we can continue to improve our services. Please take a few minutes to complete the written survey that you may receive in the mail after your visit with us. Thank you!             Your Updated Medication List - Protect others around you: Learn how to safely use, store and throw away your medicines at " (150.6 kg)  BMI 41.5 kg/m2    Current Medications  Current Outpatient Prescriptions   Medication Sig Dispense Refill    diltiazem (CARDIZEM CD) 120 MG extended release capsule Take 1 capsule by mouth daily 90 capsule 0    furosemide (LASIX) 40 MG tablet Take 1 tablet by mouth daily Indications: correct dose and frequency per patient 30 tablet 5    Biotin 5 MG TABS Take 5 mg by mouth daily      Cholecalciferol (VITAMIN D3) 5000 units TABS Take 5,000 Units by mouth daily      Cyanocobalamin (VITAMIN B-12 CR PO) Take 1 tablet by mouth See Admin Instructions Three times a week       warfarin (COUMADIN) 5 MG tablet Take 5 mg by mouth daily      montelukast (SINGULAIR) 10 MG tablet Take 1 tablet by mouth  daily 90 tablet 0    dofetilide (TIKOSYN) 500 MCG capsule Take 1 capsule by mouth every 12 hours 60 capsule 3    spironolactone (ALDACTONE) 25 MG tablet Take 1 tablet by mouth daily 90 tablet 0    Multiple Vitamins-Minerals (THERAPEUTIC MULTIVITAMIN-MINERALS) tablet Take 2 tablets by mouth daily       magnesium oxide (MAG-OX) 400 MG tablet Take 400 mg by mouth daily. No current facility-administered medications for this visit.         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          This list is accurate as of 8/22/18  4:06 PM.  Always use your most recent med list.                   Brand Name Dispense Instructions for use Diagnosis    ADVIL PO      Take by mouth as needed for moderate pain        CHILDRENS TYLENOL OR      Take by mouth as needed        hydrocortisone 2.5 % ointment     20 g    Apply topically 2 times daily    Other eczema       KIDS GUMMY BEAR VITAMINS PO      Take  by mouth.        MELATONIN PO      Take 3 mg by mouth At Bedtime        mupirocin 2 % ointment    BACTROBAN    1 g    Apply topically 2 times daily    Rash and nonspecific skin eruption          a normal mood and affect. His behavior is normal. Judgment and thought content normal.         Assessment and Plan    Urinary frequency, nocturia  - UA negative. He was started on Lasix and he is also on Aldactone. Right ear itching  - avoid using Q-tips. May use OTC ear solution. Has upcoming follow up with Dr. Josefina Doran. S/p Ablation for paroxysmal a-fib. If cough or other symptoms worsen or do not improve, follow with provider.       Vivi TIERNEY  10/3/2017

## 2018-09-04 ENCOUNTER — ANTI-COAG VISIT (OUTPATIENT)
Dept: INTERNAL MEDICINE CLINIC | Age: 73
End: 2018-09-04

## 2018-09-04 ENCOUNTER — TELEPHONE (OUTPATIENT)
Dept: INTERNAL MEDICINE CLINIC | Age: 73
End: 2018-09-04

## 2018-09-04 LAB
INR BLD: 2.5
INR BLD: 2.5

## 2018-09-04 NOTE — TELEPHONE ENCOUNTER
----- Message from Chuy Mendoza MD sent at 9/4/2018 11:06 AM EDT -----  Contact: [phone]  No changes  ----- Message -----  From: Miguel Angel Sahni  Sent: 9/4/2018   9:26 AM  To: Chuy Mendoza MD    INR 2.5  Taking 5 mg daily    Pt cyntiha not be able to answer phone- ok to leave message

## 2018-09-13 RX ORDER — MONTELUKAST SODIUM 10 MG/1
TABLET ORAL
Qty: 90 TABLET | Refills: 0 | Status: SHIPPED | OUTPATIENT
Start: 2018-09-13 | End: 2018-11-14 | Stop reason: SDUPTHER

## 2018-09-13 RX ORDER — SPIRONOLACTONE 25 MG/1
25 TABLET ORAL DAILY
Qty: 90 TABLET | Refills: 0 | Status: SHIPPED | OUTPATIENT
Start: 2018-09-13 | End: 2018-11-14 | Stop reason: SDUPTHER

## 2018-09-17 ENCOUNTER — TELEPHONE (OUTPATIENT)
Dept: INTERNAL MEDICINE CLINIC | Age: 73
End: 2018-09-17

## 2018-09-17 ENCOUNTER — ANTI-COAG VISIT (OUTPATIENT)
Dept: INTERNAL MEDICINE CLINIC | Age: 73
End: 2018-09-17

## 2018-09-17 LAB — INR BLD: 2.1

## 2018-10-01 ENCOUNTER — TELEPHONE (OUTPATIENT)
Dept: INTERNAL MEDICINE CLINIC | Age: 73
End: 2018-10-01

## 2018-10-01 ENCOUNTER — ANTI-COAG VISIT (OUTPATIENT)
Dept: INTERNAL MEDICINE CLINIC | Age: 73
End: 2018-10-01

## 2018-10-01 LAB — INR BLD: 2.4

## 2018-10-15 ENCOUNTER — ANTI-COAG VISIT (OUTPATIENT)
Dept: INTERNAL MEDICINE CLINIC | Age: 73
End: 2018-10-15

## 2018-10-15 ENCOUNTER — TELEPHONE (OUTPATIENT)
Dept: INTERNAL MEDICINE CLINIC | Age: 73
End: 2018-10-15

## 2018-10-15 LAB — INR BLD: 2.6

## 2018-10-23 ENCOUNTER — TELEPHONE (OUTPATIENT)
Dept: CARDIOLOGY CLINIC | Age: 73
End: 2018-10-23

## 2018-10-23 ENCOUNTER — OFFICE VISIT (OUTPATIENT)
Dept: INTERNAL MEDICINE CLINIC | Age: 73
End: 2018-10-23

## 2018-10-23 VITALS — HEIGHT: 75 IN | WEIGHT: 315 LBS | BODY MASS INDEX: 39.17 KG/M2

## 2018-10-23 DIAGNOSIS — I10 ESSENTIAL HYPERTENSION: ICD-10-CM

## 2018-10-23 DIAGNOSIS — G47.33 OSA (OBSTRUCTIVE SLEEP APNEA): ICD-10-CM

## 2018-10-23 DIAGNOSIS — J06.9 ACUTE URI: Primary | ICD-10-CM

## 2018-10-23 DIAGNOSIS — I48.0 PAF (PAROXYSMAL ATRIAL FIBRILLATION) (HCC): ICD-10-CM

## 2018-10-23 PROCEDURE — 4040F PNEUMOC VAC/ADMIN/RCVD: CPT | Performed by: INTERNAL MEDICINE

## 2018-10-23 PROCEDURE — G8427 DOCREV CUR MEDS BY ELIG CLIN: HCPCS | Performed by: INTERNAL MEDICINE

## 2018-10-23 PROCEDURE — 99213 OFFICE O/P EST LOW 20 MIN: CPT | Performed by: INTERNAL MEDICINE

## 2018-10-23 PROCEDURE — G8417 CALC BMI ABV UP PARAM F/U: HCPCS | Performed by: INTERNAL MEDICINE

## 2018-10-23 PROCEDURE — 1123F ACP DISCUSS/DSCN MKR DOCD: CPT | Performed by: INTERNAL MEDICINE

## 2018-10-23 PROCEDURE — 1101F PT FALLS ASSESS-DOCD LE1/YR: CPT | Performed by: INTERNAL MEDICINE

## 2018-10-23 PROCEDURE — 1036F TOBACCO NON-USER: CPT | Performed by: INTERNAL MEDICINE

## 2018-10-23 PROCEDURE — G8484 FLU IMMUNIZE NO ADMIN: HCPCS | Performed by: INTERNAL MEDICINE

## 2018-10-23 PROCEDURE — 3017F COLORECTAL CA SCREEN DOC REV: CPT | Performed by: INTERNAL MEDICINE

## 2018-10-23 RX ORDER — AMOXICILLIN 500 MG/1
500 CAPSULE ORAL 3 TIMES DAILY
Qty: 15 CAPSULE | Refills: 0 | Status: SHIPPED | OUTPATIENT
Start: 2018-10-23 | End: 2018-10-28

## 2018-10-23 NOTE — PROGRESS NOTES
oriented. Appears to be not in any distress  Mucous Membranes:  Pink , anicteric  Neck: No JVD, no carotid bruit, no thyromegaly  HEENT - MM clear. TM normal. Pharynx clear  No Submandibular LN palpable  Chest:  Clear to auscultation bilaterally, no added sounds  Cardiovascular:  RRR S1S2 heard, no murmurs or gallops  Abdomen:  Soft, undistended, non tender, no organomegaly, BS present  Extremities: 2+ pedal edema  Distal pulses well felt  Facial erythematous rash on both cheeks, frontal area noted  Neurological : grossly normal          Assessment:       Diagnosis Orders   1. Acute URI     2. PAF (paroxysmal atrial fibrillation) ([address]     3. Essential hypertension     4. MARIA VICTORIA (obstructive sleep apnea)             Plan:        Acute URI   - no fevers, mild sorethroat and fatigue, sinus pain   - add amoxil TID x 5 days  - monitor INR       AFibb -   s/p ablation -  in 2014 at [facility]. Had repeat Ablation in 4/17 and 9/17 with persistent symptoms.    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 Zakia Alford  No cancer per path - histoplasmosis noted  No treatment as it was excised completely     Elevated A1c - need f/w

## 2018-10-29 ENCOUNTER — TELEPHONE (OUTPATIENT)
Dept: INTERNAL MEDICINE CLINIC | Age: 73
End: 2018-10-29

## 2018-10-29 ENCOUNTER — HOSPITAL ENCOUNTER (OUTPATIENT)
Age: 73
Discharge: HOME | End: 2018-10-29
Payer: MEDICARE

## 2018-10-29 ENCOUNTER — HOSPITAL ENCOUNTER (OUTPATIENT)
Dept: GENERAL RADIOLOGY | Age: 73
Discharge: HOME | End: 2018-10-29
Payer: MEDICARE

## 2018-10-29 ENCOUNTER — ANTI-COAG VISIT (OUTPATIENT)
Dept: INTERNAL MEDICINE CLINIC | Age: 73
End: 2018-10-29

## 2018-10-29 DIAGNOSIS — R05.9 COUGH: ICD-10-CM

## 2018-10-29 DIAGNOSIS — R05.9 COUGH: Primary | ICD-10-CM

## 2018-10-29 LAB — INR BLD: 2.9

## 2018-10-29 PROCEDURE — 71046 X-RAY EXAM CHEST 2 VIEWS: CPT

## 2018-10-29 NOTE — TELEPHONE ENCOUNTER
----- Message from Lanny Cramer MD sent at 10/29/2018  2:21 PM EDT -----  No change    ----- Message -----  From: Álvaro Fatima  Sent: 10/29/2018   2:07 PM  To: Lanny Cramer MD    INR: 2.9

## 2018-10-31 ENCOUNTER — NURSE ONLY (OUTPATIENT)
Dept: INTERNAL MEDICINE CLINIC | Age: 73
End: 2018-10-31

## 2018-10-31 DIAGNOSIS — Z23 NEED FOR INFLUENZA VACCINATION: Primary | ICD-10-CM

## 2018-10-31 PROCEDURE — G0008 ADMIN INFLUENZA VIRUS VAC: HCPCS | Performed by: INTERNAL MEDICINE

## 2018-10-31 PROCEDURE — 90662 IIV NO PRSV INCREASED AG IM: CPT | Performed by: INTERNAL MEDICINE

## 2018-11-02 ENCOUNTER — TELEPHONE (OUTPATIENT)
Dept: CARDIOLOGY CLINIC | Age: 73
End: 2018-11-02

## 2018-11-06 ENCOUNTER — OFFICE VISIT (OUTPATIENT)
Dept: INTERNAL MEDICINE CLINIC | Age: 73
End: 2018-11-06

## 2018-11-06 VITALS
HEIGHT: 75 IN | BODY MASS INDEX: 39.17 KG/M2 | HEART RATE: 78 BPM | DIASTOLIC BLOOD PRESSURE: 75 MMHG | WEIGHT: 315 LBS | TEMPERATURE: 97.4 F | SYSTOLIC BLOOD PRESSURE: 120 MMHG

## 2018-11-06 DIAGNOSIS — R05.9 COUGH: Primary | ICD-10-CM

## 2018-11-06 PROCEDURE — 4040F PNEUMOC VAC/ADMIN/RCVD: CPT | Performed by: PHYSICIAN ASSISTANT

## 2018-11-06 PROCEDURE — G8427 DOCREV CUR MEDS BY ELIG CLIN: HCPCS | Performed by: PHYSICIAN ASSISTANT

## 2018-11-06 PROCEDURE — 1123F ACP DISCUSS/DSCN MKR DOCD: CPT | Performed by: PHYSICIAN ASSISTANT

## 2018-11-06 PROCEDURE — 1101F PT FALLS ASSESS-DOCD LE1/YR: CPT | Performed by: PHYSICIAN ASSISTANT

## 2018-11-06 PROCEDURE — 1036F TOBACCO NON-USER: CPT | Performed by: PHYSICIAN ASSISTANT

## 2018-11-06 PROCEDURE — 3017F COLORECTAL CA SCREEN DOC REV: CPT | Performed by: PHYSICIAN ASSISTANT

## 2018-11-06 PROCEDURE — G8482 FLU IMMUNIZE ORDER/ADMIN: HCPCS | Performed by: PHYSICIAN ASSISTANT

## 2018-11-06 PROCEDURE — G8417 CALC BMI ABV UP PARAM F/U: HCPCS | Performed by: PHYSICIAN ASSISTANT

## 2018-11-06 PROCEDURE — 99213 OFFICE O/P EST LOW 20 MIN: CPT | Performed by: PHYSICIAN ASSISTANT

## 2018-11-06 RX ORDER — AZELASTINE 1 MG/ML
1 SPRAY, METERED NASAL 2 TIMES DAILY
Qty: 1 BOTTLE | Refills: 1 | Status: SHIPPED | OUTPATIENT
Start: 2018-11-06 | End: 2019-02-14 | Stop reason: ALTCHOICE

## 2018-11-06 ASSESSMENT — ENCOUNTER SYMPTOMS
RHINORRHEA: 0
NAUSEA: 0
TROUBLE SWALLOWING: 0
WHEEZING: 0
SHORTNESS OF BREATH: 0
VOMITING: 0
COUGH: 1
ABDOMINAL PAIN: 0
SORE THROAT: 1

## 2018-11-06 NOTE — PATIENT INSTRUCTIONS
Patient Education     Try the azelastine nasal spray. If symptoms do not improve you can start using flonase with it. Chronic Cough: Care Instructions  Your Care Instructions    A cough is your body's response to something that bothers your throat or airways. Many things can cause a cough. You might cough because of a cold or the flu, bronchitis, or asthma. Smoking, postnasal drip, allergies, and stomach acid that backs up into your throat also can cause a cough. A cough can be short-term (acute) or long-term (chronic). A chronic cough lasts more than 3 weeks. A chronic cough is often caused by a long-term problem, such as asthma. Another cause might be a medicine, such as an ACE inhibitor. A cough is a symptom, not a disease. To treat a chronic cough, you may need to treat the problem that causes it. You can take a few steps at home to cough less and feel better. Some people cough or clear their throat out of habit for no clear reason. Follow-up care is a key part of your treatment and safety. Be sure to make and go to all appointments, and call your doctor if you are having problems. It's also a good idea to know your test results and keep a list of the medicines you take. How can you care for yourself at home? · Drink plenty of water and other fluids. This may help soothe a dry or sore throat. Honey or lemon juice in hot water or tea may ease a dry cough. · Prop up your head on pillows to help you breathe and ease a cough. · Do not smoke or allow others to smoke around you. Smoke can make a cough worse. If you need help quitting, talk to your doctor about stop-smoking programs and medicines. These can increase your chances of quitting for good. · Avoid exposure to smoke, dust, or other pollutants, or wear a face mask. Check with your doctor or pharmacist to find out which type of face mask will give you the most benefit. · Take cough medicine as directed by your doctor.   · Try cough drops to

## 2018-11-06 NOTE — PROGRESS NOTES
Uvula is midline, oropharynx is clear and moist and mucous membranes are normal.   Eyes: Conjunctivae are normal. Right eye exhibits no discharge. Left eye exhibits no discharge. Neck: Normal range of motion and full passive range of motion without pain. Neck supple. No JVD present. Cardiovascular: Normal rate and regular rhythm. 1+ BLE edema - he states this is unchanged from his baseline    Pulmonary/Chest: Effort normal and breath sounds normal. He has no decreased breath sounds. He has no wheezes. He has no rhonchi. He has no rales. Musculoskeletal: Normal range of motion. Neurological: He is alert and oriented to person, place, and time. Skin: Skin is warm and dry. He is not diaphoretic. Psychiatric: He has a normal mood and affect. Nursing note and vitals reviewed. CXR 10/29/18  No acute cardiopulmonary disease is appreciated. Assessment/Plan     1. Cough  - symptoms now persistent x >3 weeks. CXR clear. Treated with amoxil for 5 days without improvement  - has had weight gain, but unchanged peripheral edema, no history of CHF. He increased his aldactone for 5 days without improvement. Continues on lasix and aldactone now   - complains mostly of throat irritation and post nasal drip which would explain his current symptoms. Start:   - azelastine (ASTELIN) 0.1 % nasal spray; 1 spray by Nasal route 2 times daily Use in each nostril as directed  Dispense: 1 Bottle;  Refill: 1   - can use flonase OTC with this  - if symptoms persist will need further evaluation with chest CT considering his history of lung mass  - discussed with Dr. Shimon Aylaa.  Patient will call with symptom update in 1 week     Fatou Kennedy PA-C  11/6/2018

## 2018-11-12 ENCOUNTER — ANTI-COAG VISIT (OUTPATIENT)
Dept: INTERNAL MEDICINE CLINIC | Age: 73
End: 2018-11-12

## 2018-11-12 LAB — INR BLD: 3

## 2018-11-14 RX ORDER — MONTELUKAST SODIUM 10 MG/1
TABLET ORAL
Qty: 90 TABLET | Refills: 0 | Status: SHIPPED | OUTPATIENT
Start: 2018-11-14 | End: 2019-01-16 | Stop reason: SDUPTHER

## 2018-11-14 RX ORDER — SPIRONOLACTONE 25 MG/1
25 TABLET ORAL DAILY
Qty: 90 TABLET | Refills: 0 | Status: SHIPPED | OUTPATIENT
Start: 2018-11-14 | End: 2019-03-11 | Stop reason: SDUPTHER

## 2018-11-15 RX ORDER — FUROSEMIDE 40 MG/1
TABLET ORAL
Qty: 90 TABLET | Refills: 3 | Status: SHIPPED | OUTPATIENT
Start: 2018-11-15 | End: 2019-08-12 | Stop reason: ALTCHOICE

## 2018-11-19 ENCOUNTER — TELEPHONE (OUTPATIENT)
Dept: INTERNAL MEDICINE CLINIC | Age: 73
End: 2018-11-19

## 2018-11-26 ENCOUNTER — ANTI-COAG VISIT (OUTPATIENT)
Dept: INTERNAL MEDICINE CLINIC | Age: 73
End: 2018-11-26

## 2018-11-26 LAB — INR BLD: 2.7

## 2018-12-03 ENCOUNTER — OFFICE VISIT (OUTPATIENT)
Dept: INTERNAL MEDICINE CLINIC | Age: 73
End: 2018-12-03

## 2018-12-03 VITALS
WEIGHT: 315 LBS | RESPIRATION RATE: 18 BRPM | SYSTOLIC BLOOD PRESSURE: 115 MMHG | HEIGHT: 75 IN | DIASTOLIC BLOOD PRESSURE: 75 MMHG | BODY MASS INDEX: 39.17 KG/M2 | HEART RATE: 67 BPM

## 2018-12-03 DIAGNOSIS — G47.33 OSA (OBSTRUCTIVE SLEEP APNEA): ICD-10-CM

## 2018-12-03 DIAGNOSIS — Z00.00 MEDICARE ANNUAL WELLNESS VISIT, INITIAL: Primary | ICD-10-CM

## 2018-12-03 DIAGNOSIS — E66.01 OBESITY, CLASS III, BMI 40-49.9 (MORBID OBESITY) (HCC): ICD-10-CM

## 2018-12-03 DIAGNOSIS — Z13.6 SCREENING FOR CARDIOVASCULAR CONDITION: ICD-10-CM

## 2018-12-03 DIAGNOSIS — Z11.59 ENCOUNTER FOR HEPATITIS C SCREENING TEST FOR LOW RISK PATIENT: ICD-10-CM

## 2018-12-03 DIAGNOSIS — I48.4 ATYPICAL ATRIAL FLUTTER (HCC): ICD-10-CM

## 2018-12-03 DIAGNOSIS — Z00.00 ROUTINE GENERAL MEDICAL EXAMINATION AT A HEALTH CARE FACILITY: ICD-10-CM

## 2018-12-03 DIAGNOSIS — G62.9 POLYNEUROPATHY: ICD-10-CM

## 2018-12-03 DIAGNOSIS — I48.0 PAF (PAROXYSMAL ATRIAL FIBRILLATION) (HCC): ICD-10-CM

## 2018-12-03 DIAGNOSIS — I10 ESSENTIAL HYPERTENSION: ICD-10-CM

## 2018-12-03 PROCEDURE — G0438 PPPS, INITIAL VISIT: HCPCS | Performed by: INTERNAL MEDICINE

## 2018-12-03 PROCEDURE — G0446 INTENS BEHAVE THER CARDIO DX: HCPCS | Performed by: INTERNAL MEDICINE

## 2018-12-03 PROCEDURE — G8482 FLU IMMUNIZE ORDER/ADMIN: HCPCS | Performed by: INTERNAL MEDICINE

## 2018-12-03 PROCEDURE — 4040F PNEUMOC VAC/ADMIN/RCVD: CPT | Performed by: INTERNAL MEDICINE

## 2018-12-03 PROCEDURE — 81002 URINALYSIS NONAUTO W/O SCOPE: CPT | Performed by: INTERNAL MEDICINE

## 2018-12-03 ASSESSMENT — ANXIETY QUESTIONNAIRES
GAD7 TOTAL SCORE: 0
GAD7 TOTAL SCORE: 0

## 2018-12-03 ASSESSMENT — PATIENT HEALTH QUESTIONNAIRE - PHQ9
SUM OF ALL RESPONSES TO PHQ QUESTIONS 1-9: 0

## 2018-12-03 ASSESSMENT — LIFESTYLE VARIABLES: HOW OFTEN DO YOU HAVE A DRINK CONTAINING ALCOHOL: 0

## 2018-12-03 NOTE — PATIENT INSTRUCTIONS
white-flour pasta. ? Try low-fat cheeses and low-fat yogurt. ? Add more fruits and vegetables to meals and have them for snacks. ? Add lettuce, tomato, cucumber, and onion to sandwiches. ? Add fruit to yogurt and cereal.  Enjoy food  · You can still eat your favorite foods. You just may need to eat less of them. If your favorite foods are high in fat, salt, and sugar, limit how often you eat them, but do not cut them out entirely. · Eat a wide variety of foods. Make healthy choices when eating out  · The type of restaurant you choose can help you make healthy choices. Even fast-food chains are now offering more low-fat or healthier choices on the menu. · Choose smaller portions, or take half of your meal home. · When eating out, try:  ? A veggie pizza with a whole wheat crust or grilled chicken (instead of sausage or pepperoni). ? Pasta with roasted vegetables, grilled chicken, or marinara sauce instead of cream sauce. ? A vegetable wrap or grilled chicken wrap. ? Broiled or poached food instead of fried or breaded items. Make healthy choices easy  · Buy packaged, prewashed, ready-to-eat fresh vegetables and fruits, such as baby carrots, salad mixes, and chopped or shredded broccoli and cauliflower. · Buy packaged, presliced fruits, such as melon or pineapple. · Choose 100% fruit or vegetable juice instead of soda. Limit juice intake to 4 to 6 oz (½ to ¾ cup) a day. · Blend low-fat yogurt, fruit juice, and canned or frozen fruit to make a smoothie for breakfast or a snack. Where can you learn more? Go to [email or web address]://[employer]PI [email or web address] org and sign in to your RNDOMN account. Enter S183 in the [facility] box to learn more about \"Eating Healthy Foods: Care Instructions. \"     If you do not have an account, please click on the \"Sign Up Now\" link. Current as of: March 29, 2018  Content Version: 11.8  © 4708-7093 Healthwise, Incorporated.  Care instructions adapted under license by [facility] ([facility]). If you have questions about a medical condition or this instruction, always ask your healthcare professional. [place] [address] any warranty or liability for your use of this information. Personalized Preventive Plan for Jennifer Single - 12/3/2018  Medicare offers a range of preventive health benefits. Some of the tests and screenings are paid in full while other may be subject to a deductible, co-insurance, and/or copay. Some of these benefits include a comprehensive review of your medical history including lifestyle, illnesses that may run in your family, and various assessments and screenings as appropriate. After reviewing your medical record and screening and assessments performed today your provider may have ordered immunizations, labs, imaging, and/or referrals for you. A list of these orders (if applicable) as well as your Preventive Care list are included within your After Visit Summary for your review. Other Preventive Recommendations:    · A preventive eye exam performed by an eye specialist is recommended every 1-2 years to screen for glaucoma; cataracts, macular degeneration, and other eye disorders. · A preventive dental visit is recommended every 6 months. · Try to get at least 150 minutes of exercise per week or 10,000 steps per day on a pedometer . · Order or download the FREE \"Exercise & Physical Activity: Your Everyday Guide\" from [employer]. Call 4-201.421.7577 or search [employer] online. · You need 1180-7711 mg of calcium and 4101-9070 IU of vitamin D per day. It is possible to meet your calcium requirement with diet alone, but a vitamin D supplement is usually necessary to meet this goal.  · When exposed to the sun, use a sunscreen that protects against both UVA and UVB radiation with an SPF of 30 or greater. Reapply every 2 to 3 hours or after sweating, drying off with a towel, or swimming.   · Always

## 2018-12-11 ENCOUNTER — TELEPHONE (OUTPATIENT)
Dept: INTERNAL MEDICINE CLINIC | Age: 73
End: 2018-12-11

## 2018-12-11 ENCOUNTER — ANTI-COAG VISIT (OUTPATIENT)
Dept: INTERNAL MEDICINE CLINIC | Age: 73
End: 2018-12-11

## 2018-12-11 LAB — INR BLD: 2.6

## 2018-12-19 DIAGNOSIS — Z00.00 MEDICARE ANNUAL WELLNESS VISIT, INITIAL: ICD-10-CM

## 2018-12-19 DIAGNOSIS — Z11.59 ENCOUNTER FOR HEPATITIS C SCREENING TEST FOR LOW RISK PATIENT: ICD-10-CM

## 2018-12-19 LAB
A/G RATIO: 1.4 (ref 1.1–2.2)
ALBUMIN SERPL-MCNC: 4.2 G/DL (ref 3.4–5)
ALP BLD-CCNC: 95 U/L (ref 40–129)
ALT SERPL-CCNC: 18 U/L (ref 10–40)
ANION GAP SERPL CALCULATED.3IONS-SCNC: 12 MMOL/L (ref 3–16)
AST SERPL-CCNC: 16 U/L (ref 15–37)
BASOPHILS ABSOLUTE: 0.1 K/UL (ref 0–0.2)
BASOPHILS RELATIVE PERCENT: 1.1 %
BILIRUB SERPL-MCNC: 0.6 MG/DL (ref 0–1)
BUN BLDV-MCNC: 25 MG/DL (ref 7–20)
CALCIUM SERPL-MCNC: 9.2 MG/DL (ref 8.3–10.6)
CHLORIDE BLD-SCNC: 101 MMOL/L (ref 99–110)
CHOLESTEROL, TOTAL: 222 MG/DL (ref 0–199)
CO2: 27 MMOL/L (ref 21–32)
CREAT SERPL-MCNC: 1 MG/DL (ref 0.8–1.3)
EOSINOPHILS ABSOLUTE: 0.3 K/UL (ref 0–0.6)
EOSINOPHILS RELATIVE PERCENT: 3.1 %
GFR AFRICAN AMERICAN: >60
GFR NON-AFRICAN AMERICAN: >60
GLOBULIN: 2.9 G/DL
GLUCOSE BLD-MCNC: 154 MG/DL (ref 70–99)
HCT VFR BLD CALC: 42 % (ref 40.5–52.5)
HDLC SERPL-MCNC: 40 MG/DL (ref 40–60)
HEMOGLOBIN: 13.5 G/DL (ref 13.5–17.5)
HEPATITIS C ANTIBODY INTERPRETATION: NORMAL
LDL CHOLESTEROL CALCULATED: 150 MG/DL
LYMPHOCYTES ABSOLUTE: 1.3 K/UL (ref 1–5.1)
LYMPHOCYTES RELATIVE PERCENT: 15.9 %
MCH RBC QN AUTO: 28.5 PG (ref 26–34)
MCHC RBC AUTO-ENTMCNC: 32.2 G/DL (ref 31–36)
MCV RBC AUTO: 88.4 FL (ref 80–100)
MONOCYTES ABSOLUTE: 0.7 K/UL (ref 0–1.3)
MONOCYTES RELATIVE PERCENT: 8.4 %
NEUTROPHILS ABSOLUTE: 5.7 K/UL (ref 1.7–7.7)
NEUTROPHILS RELATIVE PERCENT: 71.5 %
PDW BLD-RTO: 15.9 % (ref 12.4–15.4)
PLATELET # BLD: 248 K/UL (ref 135–450)
PMV BLD AUTO: 8.4 FL (ref 5–10.5)
POTASSIUM SERPL-SCNC: 4.2 MMOL/L (ref 3.5–5.1)
PROSTATE SPECIFIC ANTIGEN: 0.5 NG/ML (ref 0–4)
RBC # BLD: 4.75 M/UL (ref 4.2–5.9)
SODIUM BLD-SCNC: 140 MMOL/L (ref 136–145)
TOTAL PROTEIN: 7.1 G/DL (ref 6.4–8.2)
TRIGL SERPL-MCNC: 159 MG/DL (ref 0–150)
TSH REFLEX: 1.33 UIU/ML (ref 0.27–4.2)
URIC ACID, SERUM: 6.8 MG/DL (ref 3.5–7.2)
VLDLC SERPL CALC-MCNC: 32 MG/DL
WBC # BLD: 8 K/UL (ref 4–11)

## 2018-12-20 ENCOUNTER — TELEPHONE (OUTPATIENT)
Dept: INTERNAL MEDICINE CLINIC | Age: 73
End: 2018-12-20

## 2018-12-20 DIAGNOSIS — R73.09 ELEVATED GLUCOSE: Primary | ICD-10-CM

## 2018-12-20 DIAGNOSIS — R73.09 ELEVATED GLUCOSE: ICD-10-CM

## 2018-12-20 LAB
ESTIMATED AVERAGE GLUCOSE: 171.4 MG/DL
HBA1C MFR BLD: 7.6 %

## 2018-12-21 ENCOUNTER — TELEPHONE (OUTPATIENT)
Dept: INTERNAL MEDICINE CLINIC | Age: 73
End: 2018-12-21

## 2018-12-21 RX ORDER — LANCETS
EACH MISCELLANEOUS
Qty: 100 EACH | Refills: 11 | Status: SHIPPED | OUTPATIENT
Start: 2018-12-21 | End: 2019-02-25 | Stop reason: SDUPTHER

## 2018-12-22 RX ORDER — BLOOD-GLUCOSE METER
EACH MISCELLANEOUS
Qty: 1 DEVICE | Refills: 0 | Status: SHIPPED | OUTPATIENT
Start: 2018-12-22

## 2018-12-24 ENCOUNTER — ANTI-COAG VISIT (OUTPATIENT)
Dept: INTERNAL MEDICINE CLINIC | Age: 73
End: 2018-12-24

## 2018-12-24 ENCOUNTER — TELEPHONE (OUTPATIENT)
Dept: INTERNAL MEDICINE CLINIC | Age: 73
End: 2018-12-24

## 2018-12-24 LAB — INR BLD: 2.7

## 2018-12-26 RX ORDER — LANCETS 32 GAUGE
EACH MISCELLANEOUS
Qty: 100 EACH | Refills: 3 | Status: SHIPPED | OUTPATIENT
Start: 2018-12-26 | End: 2020-03-23

## 2018-12-26 RX ORDER — LANCING DEVICE
EACH MISCELLANEOUS
Qty: 1 EACH | Refills: 0 | Status: SHIPPED | OUTPATIENT
Start: 2018-12-26

## 2019-01-07 ENCOUNTER — TELEPHONE (OUTPATIENT)
Dept: INTERNAL MEDICINE CLINIC | Age: 74
End: 2019-01-07

## 2019-01-07 ENCOUNTER — ANTI-COAG VISIT (OUTPATIENT)
Dept: INTERNAL MEDICINE CLINIC | Age: 74
End: 2019-01-07

## 2019-01-07 LAB — INR BLD: 2.2

## 2019-01-08 ENCOUNTER — TELEPHONE (OUTPATIENT)
Dept: CARDIOLOGY CLINIC | Age: 74
End: 2019-01-08

## 2019-01-08 ENCOUNTER — NURSE ONLY (OUTPATIENT)
Dept: CARDIOLOGY CLINIC | Age: 74
End: 2019-01-08

## 2019-01-08 VITALS — DIASTOLIC BLOOD PRESSURE: 52 MMHG | HEART RATE: 70 BPM | SYSTOLIC BLOOD PRESSURE: 84 MMHG

## 2019-01-08 DIAGNOSIS — I49.9 IRREGULAR HEART BEAT: Primary | ICD-10-CM

## 2019-01-08 PROCEDURE — 93000 ELECTROCARDIOGRAM COMPLETE: CPT | Performed by: INTERNAL MEDICINE

## 2019-01-08 RX ORDER — DILTIAZEM HYDROCHLORIDE 120 MG/1
120 CAPSULE, COATED, EXTENDED RELEASE ORAL DAILY
Qty: 30 CAPSULE | Refills: 0
Start: 2019-01-08 | End: 2019-01-08

## 2019-01-09 RX ORDER — WARFARIN SODIUM 5 MG/1
5 TABLET ORAL DAILY
Qty: 90 TABLET | Refills: 3 | Status: SHIPPED | OUTPATIENT
Start: 2019-01-09 | End: 2019-10-21 | Stop reason: SDUPTHER

## 2019-01-11 ENCOUNTER — TELEPHONE (OUTPATIENT)
Dept: CARDIOLOGY CLINIC | Age: 74
End: 2019-01-11

## 2019-01-15 RX ORDER — DILTIAZEM HYDROCHLORIDE 120 MG/1
120 TABLET, FILM COATED ORAL 2 TIMES DAILY
Qty: 180 TABLET | Refills: 1 | OUTPATIENT
Start: 2019-01-15 | End: 2019-07-09 | Stop reason: SDUPTHER

## 2019-01-16 RX ORDER — MONTELUKAST SODIUM 10 MG/1
TABLET ORAL
Qty: 90 TABLET | Refills: 0 | Status: SHIPPED | OUTPATIENT
Start: 2019-01-16 | End: 2019-07-09 | Stop reason: SDUPTHER

## 2019-01-21 ENCOUNTER — ANTI-COAG VISIT (OUTPATIENT)
Dept: INTERNAL MEDICINE CLINIC | Age: 74
End: 2019-01-21

## 2019-01-21 ENCOUNTER — TELEPHONE (OUTPATIENT)
Dept: INTERNAL MEDICINE CLINIC | Age: 74
End: 2019-01-21

## 2019-01-21 LAB — INR BLD: 2.9

## 2019-02-01 ENCOUNTER — TELEPHONE (OUTPATIENT)
Dept: CARDIOLOGY CLINIC | Age: 74
End: 2019-02-01

## 2019-02-04 ENCOUNTER — ANTI-COAG VISIT (OUTPATIENT)
Dept: INTERNAL MEDICINE CLINIC | Age: 74
End: 2019-02-04

## 2019-02-04 LAB — INR BLD: 2.2

## 2019-02-14 ENCOUNTER — HOSPITAL ENCOUNTER (EMERGENCY)
Age: 74
Discharge: HOME | End: 2019-02-15
Attending: EMERGENCY MEDICINE
Payer: MEDICARE

## 2019-02-14 ENCOUNTER — APPOINTMENT (OUTPATIENT)
Dept: GENERAL RADIOLOGY | Age: 74
End: 2019-02-14
Payer: MEDICARE

## 2019-02-14 ENCOUNTER — APPOINTMENT (OUTPATIENT)
Dept: CT IMAGING | Age: 74
End: 2019-02-14
Payer: MEDICARE

## 2019-02-14 DIAGNOSIS — R10.13 EPIGASTRIC PAIN: ICD-10-CM

## 2019-02-14 DIAGNOSIS — R11.2 NON-INTRACTABLE VOMITING WITH NAUSEA, UNSPECIFIED VOMITING TYPE: ICD-10-CM

## 2019-02-14 DIAGNOSIS — R07.89 OTHER CHEST PAIN: Primary | ICD-10-CM

## 2019-02-14 LAB
A/G RATIO: 1.2 (ref 1.1–2.2)
ALBUMIN SERPL-MCNC: 3.9 G/DL (ref 3.4–5)
ALP BLD-CCNC: 107 U/L (ref 40–129)
ALT SERPL-CCNC: 63 U/L (ref 10–40)
ANION GAP SERPL CALCULATED.3IONS-SCNC: 13 MMOL/L (ref 3–16)
AST SERPL-CCNC: 90 U/L (ref 15–37)
BASOPHILS ABSOLUTE: 0.1 K/UL (ref 0–0.2)
BASOPHILS RELATIVE PERCENT: 0.9 %
BILIRUB SERPL-MCNC: 0.8 MG/DL (ref 0–1)
BUN BLDV-MCNC: 27 MG/DL (ref 7–20)
CALCIUM SERPL-MCNC: 9 MG/DL (ref 8.3–10.6)
CHLORIDE BLD-SCNC: 98 MMOL/L (ref 99–110)
CO2: 24 MMOL/L (ref 21–32)
CREAT SERPL-MCNC: 1.2 MG/DL (ref 0.8–1.3)
EOSINOPHILS ABSOLUTE: 0.2 K/UL (ref 0–0.6)
EOSINOPHILS RELATIVE PERCENT: 1.9 %
GFR AFRICAN AMERICAN: >60
GFR NON-AFRICAN AMERICAN: 59
GLOBULIN: 3.3 G/DL
GLUCOSE BLD-MCNC: 157 MG/DL (ref 70–99)
HCT VFR BLD CALC: 38.6 % (ref 40.5–52.5)
HEMOGLOBIN: 12.9 G/DL (ref 13.5–17.5)
INR BLD: 2.66 (ref 0.86–1.14)
LIPASE: 69 U/L (ref 13–60)
LYMPHOCYTES ABSOLUTE: 1.4 K/UL (ref 1–5.1)
LYMPHOCYTES RELATIVE PERCENT: 15.5 %
MCH RBC QN AUTO: 29.2 PG (ref 26–34)
MCHC RBC AUTO-ENTMCNC: 33.4 G/DL (ref 31–36)
MCV RBC AUTO: 87.2 FL (ref 80–100)
MONOCYTES ABSOLUTE: 0.7 K/UL (ref 0–1.3)
MONOCYTES RELATIVE PERCENT: 8 %
NEUTROPHILS ABSOLUTE: 6.8 K/UL (ref 1.7–7.7)
NEUTROPHILS RELATIVE PERCENT: 73.7 %
PDW BLD-RTO: 15.9 % (ref 12.4–15.4)
PLATELET # BLD: 217 K/UL (ref 135–450)
PMV BLD AUTO: 7.1 FL (ref 5–10.5)
POTASSIUM SERPL-SCNC: 3.9 MMOL/L (ref 3.5–5.1)
PROTHROMBIN TIME: 30.3 SEC (ref 9.8–13)
RBC # BLD: 4.43 M/UL (ref 4.2–5.9)
SODIUM BLD-SCNC: 135 MMOL/L (ref 136–145)
TOTAL PROTEIN: 7.2 G/DL (ref 6.4–8.2)
TROPONIN: <0.01 NG/ML
WBC # BLD: 9.2 K/UL (ref 4–11)

## 2019-02-14 PROCEDURE — 85025 COMPLETE CBC W/AUTO DIFF WBC: CPT

## 2019-02-14 PROCEDURE — 6360000004 HC RX CONTRAST MEDICATION: Performed by: EMERGENCY MEDICINE

## 2019-02-14 PROCEDURE — 96361 HYDRATE IV INFUSION ADD-ON: CPT

## 2019-02-14 PROCEDURE — 71260 CT THORAX DX C+: CPT

## 2019-02-14 PROCEDURE — 93005 ELECTROCARDIOGRAM TRACING: CPT | Performed by: EMERGENCY MEDICINE

## 2019-02-14 PROCEDURE — 84484 ASSAY OF TROPONIN QUANT: CPT

## 2019-02-14 PROCEDURE — 99285 EMERGENCY DEPT VISIT HI MDM: CPT

## 2019-02-14 PROCEDURE — 96374 THER/PROPH/DIAG INJ IV PUSH: CPT

## 2019-02-14 PROCEDURE — 71046 X-RAY EXAM CHEST 2 VIEWS: CPT

## 2019-02-14 PROCEDURE — 83690 ASSAY OF LIPASE: CPT

## 2019-02-14 PROCEDURE — 2580000003 HC RX 258: Performed by: EMERGENCY MEDICINE

## 2019-02-14 PROCEDURE — 85610 PROTHROMBIN TIME: CPT

## 2019-02-14 PROCEDURE — 6360000002 HC RX W HCPCS: Performed by: EMERGENCY MEDICINE

## 2019-02-14 PROCEDURE — 96375 TX/PRO/DX INJ NEW DRUG ADDON: CPT

## 2019-02-14 PROCEDURE — 80053 COMPREHEN METABOLIC PANEL: CPT

## 2019-02-14 RX ORDER — 0.9 % SODIUM CHLORIDE 0.9 %
500 INTRAVENOUS SOLUTION INTRAVENOUS ONCE
Status: COMPLETED | OUTPATIENT
Start: 2019-02-14 | End: 2019-02-14

## 2019-02-14 RX ORDER — MORPHINE SULFATE 4 MG/ML
4 INJECTION, SOLUTION INTRAMUSCULAR; INTRAVENOUS ONCE
Status: COMPLETED | OUTPATIENT
Start: 2019-02-14 | End: 2019-02-14

## 2019-02-14 RX ORDER — ONDANSETRON 2 MG/ML
4 INJECTION INTRAMUSCULAR; INTRAVENOUS ONCE
Status: COMPLETED | OUTPATIENT
Start: 2019-02-14 | End: 2019-02-14

## 2019-02-14 RX ADMIN — IOPAMIDOL 75 ML: 755 INJECTION, SOLUTION INTRAVENOUS at 23:24

## 2019-02-14 RX ADMIN — MORPHINE SULFATE 4 MG: 4 INJECTION INTRAVENOUS at 22:27

## 2019-02-14 RX ADMIN — ONDANSETRON 4 MG: 2 INJECTION INTRAMUSCULAR; INTRAVENOUS at 22:27

## 2019-02-14 RX ADMIN — SODIUM CHLORIDE 500 ML: 9 INJECTION, SOLUTION INTRAVENOUS at 22:27

## 2019-02-14 ASSESSMENT — PAIN DESCRIPTION - PAIN TYPE: TYPE: ACUTE PAIN

## 2019-02-14 ASSESSMENT — PAIN SCALES - GENERAL
PAINLEVEL_OUTOF10: 7
PAINLEVEL_OUTOF10: 0
PAINLEVEL_OUTOF10: 6
PAINLEVEL_OUTOF10: 7

## 2019-02-14 ASSESSMENT — PAIN DESCRIPTION - LOCATION: LOCATION: CHEST

## 2019-02-14 ASSESSMENT — PAIN DESCRIPTION - DESCRIPTORS: DESCRIPTORS: ACHING;SHARP

## 2019-02-14 ASSESSMENT — PAIN DESCRIPTION - FREQUENCY: FREQUENCY: CONTINUOUS

## 2019-02-15 ENCOUNTER — APPOINTMENT (OUTPATIENT)
Dept: CT IMAGING | Age: 74
DRG: 418 | End: 2019-02-15
Payer: MEDICARE

## 2019-02-15 ENCOUNTER — TELEPHONE (OUTPATIENT)
Dept: INTERNAL MEDICINE CLINIC | Age: 74
End: 2019-02-15

## 2019-02-15 ENCOUNTER — APPOINTMENT (OUTPATIENT)
Dept: GENERAL RADIOLOGY | Age: 74
DRG: 418 | End: 2019-02-15
Payer: MEDICARE

## 2019-02-15 ENCOUNTER — HOSPITAL ENCOUNTER (INPATIENT)
Age: 74
LOS: 5 days | Discharge: HOME | DRG: 418 | End: 2019-02-20
Attending: EMERGENCY MEDICINE | Admitting: INTERNAL MEDICINE
Payer: MEDICARE

## 2019-02-15 VITALS
DIASTOLIC BLOOD PRESSURE: 69 MMHG | HEART RATE: 68 BPM | TEMPERATURE: 98.2 F | OXYGEN SATURATION: 95 % | RESPIRATION RATE: 18 BRPM | SYSTOLIC BLOOD PRESSURE: 126 MMHG | HEIGHT: 75 IN | WEIGHT: 315 LBS | BODY MASS INDEX: 39.17 KG/M2

## 2019-02-15 DIAGNOSIS — K81.9 CHOLECYSTITIS: Primary | ICD-10-CM

## 2019-02-15 DIAGNOSIS — K80.81 BILIARY CALCULUS OF OTHER SITE WITH OBSTRUCTION: ICD-10-CM

## 2019-02-15 DIAGNOSIS — R79.89 ELEVATED LFTS: ICD-10-CM

## 2019-02-15 PROBLEM — R10.9 ABDOMINAL PAIN: Status: ACTIVE | Noted: 2019-02-15

## 2019-02-15 LAB
A/G RATIO: 1.1 (ref 1.1–2.2)
ALBUMIN SERPL-MCNC: 4.1 G/DL (ref 3.4–5)
ALP BLD-CCNC: 164 U/L (ref 40–129)
ALT SERPL-CCNC: 357 U/L (ref 10–40)
ANION GAP SERPL CALCULATED.3IONS-SCNC: 13 MMOL/L (ref 3–16)
APTT: 45.6 SEC (ref 26–36)
AST SERPL-CCNC: 330 U/L (ref 15–37)
BASOPHILS ABSOLUTE: 0.1 K/UL (ref 0–0.2)
BASOPHILS RELATIVE PERCENT: 1 %
BILIRUB SERPL-MCNC: 1.9 MG/DL (ref 0–1)
BUN BLDV-MCNC: 22 MG/DL (ref 7–20)
CALCIUM SERPL-MCNC: 9.3 MG/DL (ref 8.3–10.6)
CHLORIDE BLD-SCNC: 96 MMOL/L (ref 99–110)
CO2: 22 MMOL/L (ref 21–32)
CREAT SERPL-MCNC: 1.2 MG/DL (ref 0.8–1.3)
EKG ATRIAL RATE: 74 BPM
EKG ATRIAL RATE: 85 BPM
EKG DIAGNOSIS: NORMAL
EKG DIAGNOSIS: NORMAL
EKG P AXIS: 101 DEGREES
EKG P AXIS: 80 DEGREES
EKG P-R INTERVAL: 234 MS
EKG P-R INTERVAL: 246 MS
EKG Q-T INTERVAL: 410 MS
EKG Q-T INTERVAL: 428 MS
EKG QRS DURATION: 74 MS
EKG QRS DURATION: 84 MS
EKG QTC CALCULATION (BAZETT): 475 MS
EKG QTC CALCULATION (BAZETT): 487 MS
EKG R AXIS: -1 DEGREES
EKG R AXIS: -2 DEGREES
EKG T AXIS: 15 DEGREES
EKG T AXIS: 22 DEGREES
EKG VENTRICULAR RATE: 74 BPM
EKG VENTRICULAR RATE: 85 BPM
EOSINOPHILS ABSOLUTE: 0.3 K/UL (ref 0–0.6)
EOSINOPHILS RELATIVE PERCENT: 2.7 %
GFR AFRICAN AMERICAN: >60
GFR NON-AFRICAN AMERICAN: 59
GLOBULIN: 3.9 G/DL
GLUCOSE BLD-MCNC: 106 MG/DL (ref 70–99)
GLUCOSE BLD-MCNC: 161 MG/DL (ref 70–99)
HCT VFR BLD CALC: 41.9 % (ref 40.5–52.5)
HEMOGLOBIN: 13.9 G/DL (ref 13.5–17.5)
INR BLD: 2.29 (ref 0.86–1.14)
LIPASE: 73 U/L (ref 13–60)
LYMPHOCYTES ABSOLUTE: 1.2 K/UL (ref 1–5.1)
LYMPHOCYTES RELATIVE PERCENT: 12.6 %
MCH RBC QN AUTO: 28.7 PG (ref 26–34)
MCHC RBC AUTO-ENTMCNC: 33.1 G/DL (ref 31–36)
MCV RBC AUTO: 86.9 FL (ref 80–100)
MONOCYTES ABSOLUTE: 0.9 K/UL (ref 0–1.3)
MONOCYTES RELATIVE PERCENT: 9.2 %
NEUTROPHILS ABSOLUTE: 7.1 K/UL (ref 1.7–7.7)
NEUTROPHILS RELATIVE PERCENT: 74.5 %
PDW BLD-RTO: 16.2 % (ref 12.4–15.4)
PERFORMED ON: ABNORMAL
PLATELET # BLD: 229 K/UL (ref 135–450)
PMV BLD AUTO: 7.7 FL (ref 5–10.5)
POTASSIUM SERPL-SCNC: 4.1 MMOL/L (ref 3.5–5.1)
PROTHROMBIN TIME: 26.1 SEC (ref 9.8–13)
RBC # BLD: 4.83 M/UL (ref 4.2–5.9)
SODIUM BLD-SCNC: 131 MMOL/L (ref 136–145)
TOTAL PROTEIN: 8 G/DL (ref 6.4–8.2)
TROPONIN: <0.01 NG/ML
WBC # BLD: 9.5 K/UL (ref 4–11)

## 2019-02-15 PROCEDURE — 85610 PROTHROMBIN TIME: CPT

## 2019-02-15 PROCEDURE — 2500000003 HC RX 250 WO HCPCS: Performed by: EMERGENCY MEDICINE

## 2019-02-15 PROCEDURE — 84484 ASSAY OF TROPONIN QUANT: CPT

## 2019-02-15 PROCEDURE — 93005 ELECTROCARDIOGRAM TRACING: CPT | Performed by: EMERGENCY MEDICINE

## 2019-02-15 PROCEDURE — 99285 EMERGENCY DEPT VISIT HI MDM: CPT

## 2019-02-15 PROCEDURE — 74177 CT ABD & PELVIS W/CONTRAST: CPT

## 2019-02-15 PROCEDURE — 96365 THER/PROPH/DIAG IV INF INIT: CPT

## 2019-02-15 PROCEDURE — 2060000000 HC ICU INTERMEDIATE R&B

## 2019-02-15 PROCEDURE — 2580000003 HC RX 258: Performed by: INTERNAL MEDICINE

## 2019-02-15 PROCEDURE — 96367 TX/PROPH/DG ADDL SEQ IV INF: CPT

## 2019-02-15 PROCEDURE — 83690 ASSAY OF LIPASE: CPT

## 2019-02-15 PROCEDURE — 6360000002 HC RX W HCPCS: Performed by: INTERNAL MEDICINE

## 2019-02-15 PROCEDURE — 85025 COMPLETE CBC W/AUTO DIFF WBC: CPT

## 2019-02-15 PROCEDURE — 93010 ELECTROCARDIOGRAM REPORT: CPT | Performed by: INTERNAL MEDICINE

## 2019-02-15 PROCEDURE — 6360000004 HC RX CONTRAST MEDICATION: Performed by: EMERGENCY MEDICINE

## 2019-02-15 PROCEDURE — 80053 COMPREHEN METABOLIC PANEL: CPT

## 2019-02-15 PROCEDURE — 2580000003 HC RX 258: Performed by: EMERGENCY MEDICINE

## 2019-02-15 PROCEDURE — 71045 X-RAY EXAM CHEST 1 VIEW: CPT

## 2019-02-15 PROCEDURE — 85730 THROMBOPLASTIN TIME PARTIAL: CPT

## 2019-02-15 RX ORDER — SODIUM CHLORIDE 0.9 % (FLUSH) 0.9 %
10 SYRINGE (ML) INJECTION EVERY 12 HOURS SCHEDULED
Status: DISCONTINUED | OUTPATIENT
Start: 2019-02-15 | End: 2019-02-20 | Stop reason: HOSPADM

## 2019-02-15 RX ORDER — ONDANSETRON 2 MG/ML
4 INJECTION INTRAMUSCULAR; INTRAVENOUS EVERY 6 HOURS PRN
Status: DISCONTINUED | OUTPATIENT
Start: 2019-02-15 | End: 2019-02-15

## 2019-02-15 RX ORDER — DOFETILIDE 0.5 MG/1
500 CAPSULE ORAL EVERY 12 HOURS SCHEDULED
Status: DISCONTINUED | OUTPATIENT
Start: 2019-02-15 | End: 2019-02-20 | Stop reason: HOSPADM

## 2019-02-15 RX ORDER — DILTIAZEM HYDROCHLORIDE 60 MG/1
120 TABLET, FILM COATED ORAL 2 TIMES DAILY
Status: DISCONTINUED | OUTPATIENT
Start: 2019-02-15 | End: 2019-02-20 | Stop reason: HOSPADM

## 2019-02-15 RX ORDER — ACETAMINOPHEN 325 MG/1
650 TABLET ORAL EVERY 4 HOURS PRN
Status: DISCONTINUED | OUTPATIENT
Start: 2019-02-15 | End: 2019-02-20 | Stop reason: HOSPADM

## 2019-02-15 RX ORDER — DEXTROSE MONOHYDRATE 50 MG/ML
100 INJECTION, SOLUTION INTRAVENOUS PRN
Status: DISCONTINUED | OUTPATIENT
Start: 2019-02-15 | End: 2019-02-20 | Stop reason: HOSPADM

## 2019-02-15 RX ORDER — SODIUM CHLORIDE 9 MG/ML
INJECTION, SOLUTION INTRAVENOUS CONTINUOUS
Status: DISCONTINUED | OUTPATIENT
Start: 2019-02-15 | End: 2019-02-19

## 2019-02-15 RX ORDER — SODIUM CHLORIDE 0.9 % (FLUSH) 0.9 %
10 SYRINGE (ML) INJECTION PRN
Status: DISCONTINUED | OUTPATIENT
Start: 2019-02-15 | End: 2019-02-20 | Stop reason: HOSPADM

## 2019-02-15 RX ORDER — MONTELUKAST SODIUM 10 MG/1
10 TABLET ORAL NIGHTLY
Status: DISCONTINUED | OUTPATIENT
Start: 2019-02-15 | End: 2019-02-20 | Stop reason: HOSPADM

## 2019-02-15 RX ORDER — ONDANSETRON 4 MG/1
4 TABLET, ORALLY DISINTEGRATING ORAL EVERY 8 HOURS PRN
Qty: 8 TABLET | Refills: 0 | Status: ON HOLD | OUTPATIENT
Start: 2019-02-15 | End: 2019-02-20 | Stop reason: HOSPADM

## 2019-02-15 RX ORDER — NICOTINE POLACRILEX 4 MG
15 LOZENGE BUCCAL PRN
Status: DISCONTINUED | OUTPATIENT
Start: 2019-02-15 | End: 2019-02-20 | Stop reason: HOSPADM

## 2019-02-15 RX ORDER — DEXTROSE MONOHYDRATE 25 G/50ML
12.5 INJECTION, SOLUTION INTRAVENOUS PRN
Status: DISCONTINUED | OUTPATIENT
Start: 2019-02-15 | End: 2019-02-20 | Stop reason: HOSPADM

## 2019-02-15 RX ADMIN — IOPAMIDOL 75 ML: 755 INJECTION, SOLUTION INTRAVENOUS at 17:21

## 2019-02-15 RX ADMIN — AZTREONAM 1 G: 1 INJECTION, POWDER, LYOPHILIZED, FOR SOLUTION INTRAMUSCULAR; INTRAVENOUS at 21:16

## 2019-02-15 RX ADMIN — SODIUM CHLORIDE: 9 INJECTION, SOLUTION INTRAVENOUS at 22:03

## 2019-02-15 RX ADMIN — METRONIDAZOLE 500 MG: 500 INJECTION, SOLUTION INTRAVENOUS at 20:09

## 2019-02-15 RX ADMIN — MEROPENEM 1 G: 1 INJECTION, POWDER, FOR SOLUTION INTRAVENOUS at 22:03

## 2019-02-15 ASSESSMENT — PAIN SCALES - GENERAL
PAINLEVEL_OUTOF10: 8
PAINLEVEL_OUTOF10: 0

## 2019-02-16 ENCOUNTER — APPOINTMENT (OUTPATIENT)
Dept: MRI IMAGING | Age: 74
DRG: 418 | End: 2019-02-16
Payer: MEDICARE

## 2019-02-16 LAB
A/G RATIO: 1.1 (ref 1.1–2.2)
ALBUMIN SERPL-MCNC: 3.5 G/DL (ref 3.4–5)
ALP BLD-CCNC: 171 U/L (ref 40–129)
ALT SERPL-CCNC: 336 U/L (ref 10–40)
ANION GAP SERPL CALCULATED.3IONS-SCNC: 7 MMOL/L (ref 3–16)
AST SERPL-CCNC: 209 U/L (ref 15–37)
BASOPHILS ABSOLUTE: 0 K/UL (ref 0–0.2)
BASOPHILS RELATIVE PERCENT: 0.5 %
BILIRUB SERPL-MCNC: 1 MG/DL (ref 0–1)
BUN BLDV-MCNC: 18 MG/DL (ref 7–20)
CALCIUM SERPL-MCNC: 8.4 MG/DL (ref 8.3–10.6)
CHLORIDE BLD-SCNC: 99 MMOL/L (ref 99–110)
CO2: 26 MMOL/L (ref 21–32)
CREAT SERPL-MCNC: 1 MG/DL (ref 0.8–1.3)
EOSINOPHILS ABSOLUTE: 0.3 K/UL (ref 0–0.6)
EOSINOPHILS RELATIVE PERCENT: 4.7 %
GFR AFRICAN AMERICAN: >60
GFR NON-AFRICAN AMERICAN: >60
GLOBULIN: 3.1 G/DL
GLUCOSE BLD-MCNC: 104 MG/DL (ref 70–99)
GLUCOSE BLD-MCNC: 110 MG/DL (ref 70–99)
GLUCOSE BLD-MCNC: 116 MG/DL (ref 70–99)
GLUCOSE BLD-MCNC: 122 MG/DL (ref 70–99)
GLUCOSE BLD-MCNC: 125 MG/DL (ref 70–99)
GLUCOSE BLD-MCNC: 99 MG/DL (ref 70–99)
HCT VFR BLD CALC: 37.7 % (ref 40.5–52.5)
HEMOGLOBIN: 12.4 G/DL (ref 13.5–17.5)
INR BLD: 2.63 (ref 0.86–1.14)
LYMPHOCYTES ABSOLUTE: 0.8 K/UL (ref 1–5.1)
LYMPHOCYTES RELATIVE PERCENT: 11.9 %
MCH RBC QN AUTO: 28.7 PG (ref 26–34)
MCHC RBC AUTO-ENTMCNC: 33 G/DL (ref 31–36)
MCV RBC AUTO: 86.9 FL (ref 80–100)
MONOCYTES ABSOLUTE: 0.6 K/UL (ref 0–1.3)
MONOCYTES RELATIVE PERCENT: 8.6 %
NEUTROPHILS ABSOLUTE: 5.3 K/UL (ref 1.7–7.7)
NEUTROPHILS RELATIVE PERCENT: 74.3 %
PDW BLD-RTO: 15.8 % (ref 12.4–15.4)
PERFORMED ON: ABNORMAL
PERFORMED ON: NORMAL
PLATELET # BLD: 190 K/UL (ref 135–450)
PMV BLD AUTO: 7.4 FL (ref 5–10.5)
POTASSIUM REFLEX MAGNESIUM: 3.7 MMOL/L (ref 3.5–5.1)
PROTHROMBIN TIME: 30 SEC (ref 9.8–13)
RBC # BLD: 4.34 M/UL (ref 4.2–5.9)
SODIUM BLD-SCNC: 132 MMOL/L (ref 136–145)
TOTAL PROTEIN: 6.6 G/DL (ref 6.4–8.2)
WBC # BLD: 7.1 K/UL (ref 4–11)

## 2019-02-16 PROCEDURE — 2060000000 HC ICU INTERMEDIATE R&B

## 2019-02-16 PROCEDURE — 6370000000 HC RX 637 (ALT 250 FOR IP): Performed by: INTERNAL MEDICINE

## 2019-02-16 PROCEDURE — 36415 COLL VENOUS BLD VENIPUNCTURE: CPT

## 2019-02-16 PROCEDURE — 85025 COMPLETE CBC W/AUTO DIFF WBC: CPT

## 2019-02-16 PROCEDURE — 74183 MRI ABD W/O CNTR FLWD CNTR: CPT

## 2019-02-16 PROCEDURE — 80053 COMPREHEN METABOLIC PANEL: CPT

## 2019-02-16 PROCEDURE — G0009 ADMIN PNEUMOCOCCAL VACCINE: HCPCS | Performed by: INTERNAL MEDICINE

## 2019-02-16 PROCEDURE — 6360000002 HC RX W HCPCS: Performed by: INTERNAL MEDICINE

## 2019-02-16 PROCEDURE — 2580000003 HC RX 258: Performed by: INTERNAL MEDICINE

## 2019-02-16 PROCEDURE — 99222 1ST HOSP IP/OBS MODERATE 55: CPT | Performed by: SURGERY

## 2019-02-16 PROCEDURE — 85610 PROTHROMBIN TIME: CPT

## 2019-02-16 PROCEDURE — 6360000004 HC RX CONTRAST MEDICATION: Performed by: INTERNAL MEDICINE

## 2019-02-16 PROCEDURE — A9579 GAD-BASE MR CONTRAST NOS,1ML: HCPCS | Performed by: INTERNAL MEDICINE

## 2019-02-16 PROCEDURE — 2500000003 HC RX 250 WO HCPCS: Performed by: EMERGENCY MEDICINE

## 2019-02-16 PROCEDURE — 90670 PCV13 VACCINE IM: CPT | Performed by: INTERNAL MEDICINE

## 2019-02-16 PROCEDURE — 99232 SBSQ HOSP IP/OBS MODERATE 35: CPT | Performed by: INTERNAL MEDICINE

## 2019-02-16 RX ORDER — DOFETILIDE 0.5 MG/1
500 CAPSULE ORAL ONCE
Status: COMPLETED | OUTPATIENT
Start: 2019-02-16 | End: 2019-02-16

## 2019-02-16 RX ADMIN — DOFETILIDE 500 MCG: 0.5 CAPSULE ORAL at 20:57

## 2019-02-16 RX ADMIN — GADOTERIDOL 30 ML: 279.3 INJECTION, SOLUTION INTRAVENOUS at 09:41

## 2019-02-16 RX ADMIN — MONTELUKAST SODIUM 10 MG: 10 TABLET, FILM COATED ORAL at 20:57

## 2019-02-16 RX ADMIN — DOFETILIDE 500 MCG: 0.5 CAPSULE ORAL at 10:52

## 2019-02-16 RX ADMIN — DILTIAZEM HYDROCHLORIDE 120 MG: 60 TABLET, FILM COATED ORAL at 10:52

## 2019-02-16 RX ADMIN — MEROPENEM 1 G: 1 INJECTION, POWDER, FOR SOLUTION INTRAVENOUS at 05:25

## 2019-02-16 RX ADMIN — METRONIDAZOLE 500 MG: 500 INJECTION, SOLUTION INTRAVENOUS at 03:24

## 2019-02-16 RX ADMIN — DILTIAZEM HYDROCHLORIDE 120 MG: 60 TABLET, FILM COATED ORAL at 20:57

## 2019-02-16 RX ADMIN — MEROPENEM 1 G: 1 INJECTION, POWDER, FOR SOLUTION INTRAVENOUS at 14:31

## 2019-02-16 RX ADMIN — ACETAMINOPHEN 650 MG: 325 TABLET ORAL at 05:31

## 2019-02-16 RX ADMIN — MEROPENEM 1 G: 1 INJECTION, POWDER, FOR SOLUTION INTRAVENOUS at 20:57

## 2019-02-16 RX ADMIN — PNEUMOCOCCAL 13-VALENT CONJUGATE VACCINE 0.5 ML: 2.2; 2.2; 2.2; 2.2; 2.2; 4.4; 2.2; 2.2; 2.2; 2.2; 2.2; 2.2; 2.2 INJECTION, SUSPENSION INTRAMUSCULAR at 14:31

## 2019-02-16 RX ADMIN — SODIUM CHLORIDE: 9 INJECTION, SOLUTION INTRAVENOUS at 14:34

## 2019-02-16 RX ADMIN — ENOXAPARIN SODIUM 150 MG: 150 INJECTION, SOLUTION INTRAVENOUS; SUBCUTANEOUS at 20:57

## 2019-02-16 ASSESSMENT — PAIN DESCRIPTION - LOCATION: LOCATION: HEAD

## 2019-02-16 ASSESSMENT — PAIN DESCRIPTION - PROGRESSION: CLINICAL_PROGRESSION: NOT CHANGED

## 2019-02-16 ASSESSMENT — PAIN DESCRIPTION - DESCRIPTORS: DESCRIPTORS: CONSTANT

## 2019-02-16 ASSESSMENT — PAIN - FUNCTIONAL ASSESSMENT: PAIN_FUNCTIONAL_ASSESSMENT: ACTIVITIES ARE NOT PREVENTED

## 2019-02-16 ASSESSMENT — PAIN DESCRIPTION - PAIN TYPE: TYPE: ACUTE PAIN

## 2019-02-16 ASSESSMENT — PAIN SCALES - GENERAL: PAINLEVEL_OUTOF10: 4

## 2019-02-16 ASSESSMENT — PAIN DESCRIPTION - ONSET: ONSET: ON-GOING

## 2019-02-16 ASSESSMENT — PAIN DESCRIPTION - FREQUENCY: FREQUENCY: CONTINUOUS

## 2019-02-17 LAB
ABO/RH: NORMAL
ALBUMIN SERPL-MCNC: 3.5 G/DL (ref 3.4–5)
ALP BLD-CCNC: 178 U/L (ref 40–129)
ALT SERPL-CCNC: 239 U/L (ref 10–40)
ANION GAP SERPL CALCULATED.3IONS-SCNC: 7 MMOL/L (ref 3–16)
ANTIBODY SCREEN: NORMAL
AST SERPL-CCNC: 99 U/L (ref 15–37)
BASOPHILS ABSOLUTE: 0 K/UL (ref 0–0.2)
BASOPHILS RELATIVE PERCENT: 0.7 %
BILIRUB SERPL-MCNC: 0.7 MG/DL (ref 0–1)
BILIRUBIN DIRECT: 0.3 MG/DL (ref 0–0.3)
BILIRUBIN, INDIRECT: 0.4 MG/DL (ref 0–1)
BUN BLDV-MCNC: 13 MG/DL (ref 7–20)
CALCIUM SERPL-MCNC: 8.5 MG/DL (ref 8.3–10.6)
CHLORIDE BLD-SCNC: 103 MMOL/L (ref 99–110)
CO2: 23 MMOL/L (ref 21–32)
CREAT SERPL-MCNC: 0.9 MG/DL (ref 0.8–1.3)
EOSINOPHILS ABSOLUTE: 0.4 K/UL (ref 0–0.6)
EOSINOPHILS RELATIVE PERCENT: 5.9 %
GFR AFRICAN AMERICAN: >60
GFR NON-AFRICAN AMERICAN: >60
GLUCOSE BLD-MCNC: 110 MG/DL (ref 70–99)
GLUCOSE BLD-MCNC: 111 MG/DL (ref 70–99)
GLUCOSE BLD-MCNC: 111 MG/DL (ref 70–99)
GLUCOSE BLD-MCNC: 88 MG/DL (ref 70–99)
GLUCOSE BLD-MCNC: 91 MG/DL (ref 70–99)
HCT VFR BLD CALC: 38.9 % (ref 40.5–52.5)
HEMOGLOBIN: 12.7 G/DL (ref 13.5–17.5)
INR BLD: 2.46 (ref 0.86–1.14)
LYMPHOCYTES ABSOLUTE: 1.1 K/UL (ref 1–5.1)
LYMPHOCYTES RELATIVE PERCENT: 15.5 %
MAGNESIUM: 2.3 MG/DL (ref 1.8–2.4)
MCH RBC QN AUTO: 29.1 PG (ref 26–34)
MCHC RBC AUTO-ENTMCNC: 32.7 G/DL (ref 31–36)
MCV RBC AUTO: 89 FL (ref 80–100)
MONOCYTES ABSOLUTE: 0.6 K/UL (ref 0–1.3)
MONOCYTES RELATIVE PERCENT: 8.8 %
NEUTROPHILS ABSOLUTE: 5.1 K/UL (ref 1.7–7.7)
NEUTROPHILS RELATIVE PERCENT: 69.1 %
PDW BLD-RTO: 16 % (ref 12.4–15.4)
PERFORMED ON: ABNORMAL
PERFORMED ON: ABNORMAL
PERFORMED ON: NORMAL
PERFORMED ON: NORMAL
PHOSPHORUS: 2.2 MG/DL (ref 2.5–4.9)
PLATELET # BLD: 202 K/UL (ref 135–450)
PMV BLD AUTO: 7.4 FL (ref 5–10.5)
POTASSIUM SERPL-SCNC: 4.5 MMOL/L (ref 3.5–5.1)
PROTHROMBIN TIME: 28.1 SEC (ref 9.8–13)
RBC # BLD: 4.37 M/UL (ref 4.2–5.9)
SODIUM BLD-SCNC: 133 MMOL/L (ref 136–145)
TOTAL PROTEIN: 6.7 G/DL (ref 6.4–8.2)
WBC # BLD: 7.4 K/UL (ref 4–11)

## 2019-02-17 PROCEDURE — 6360000002 HC RX W HCPCS: Performed by: INTERNAL MEDICINE

## 2019-02-17 PROCEDURE — 94761 N-INVAS EAR/PLS OXIMETRY MLT: CPT

## 2019-02-17 PROCEDURE — 84100 ASSAY OF PHOSPHORUS: CPT

## 2019-02-17 PROCEDURE — 2060000000 HC ICU INTERMEDIATE R&B

## 2019-02-17 PROCEDURE — 36430 TRANSFUSION BLD/BLD COMPNT: CPT

## 2019-02-17 PROCEDURE — 6370000000 HC RX 637 (ALT 250 FOR IP): Performed by: INTERNAL MEDICINE

## 2019-02-17 PROCEDURE — 80048 BASIC METABOLIC PNL TOTAL CA: CPT

## 2019-02-17 PROCEDURE — 86850 RBC ANTIBODY SCREEN: CPT

## 2019-02-17 PROCEDURE — 86900 BLOOD TYPING SEROLOGIC ABO: CPT

## 2019-02-17 PROCEDURE — 86901 BLOOD TYPING SEROLOGIC RH(D): CPT

## 2019-02-17 PROCEDURE — 85610 PROTHROMBIN TIME: CPT

## 2019-02-17 PROCEDURE — 83735 ASSAY OF MAGNESIUM: CPT

## 2019-02-17 PROCEDURE — 2580000003 HC RX 258: Performed by: INTERNAL MEDICINE

## 2019-02-17 PROCEDURE — 85025 COMPLETE CBC W/AUTO DIFF WBC: CPT

## 2019-02-17 PROCEDURE — 99231 SBSQ HOSP IP/OBS SF/LOW 25: CPT | Performed by: SURGERY

## 2019-02-17 PROCEDURE — 80076 HEPATIC FUNCTION PANEL: CPT

## 2019-02-17 PROCEDURE — P9017 PLASMA 1 DONOR FRZ W/IN 8 HR: HCPCS

## 2019-02-17 PROCEDURE — 36415 COLL VENOUS BLD VENIPUNCTURE: CPT

## 2019-02-17 PROCEDURE — 99233 SBSQ HOSP IP/OBS HIGH 50: CPT | Performed by: INTERNAL MEDICINE

## 2019-02-17 RX ORDER — 0.9 % SODIUM CHLORIDE 0.9 %
250 INTRAVENOUS SOLUTION INTRAVENOUS ONCE
Status: COMPLETED | OUTPATIENT
Start: 2019-02-17 | End: 2019-02-18

## 2019-02-17 RX ORDER — DIMETHICONE, OXYBENZONE, AND PADIMATE O 2; 2.5; 6.6 G/100G; G/100G; G/100G
STICK TOPICAL
Status: DISPENSED
Start: 2019-02-17 | End: 2019-02-18

## 2019-02-17 RX ADMIN — ACETAMINOPHEN 650 MG: 325 TABLET ORAL at 16:08

## 2019-02-17 RX ADMIN — ENOXAPARIN SODIUM 150 MG: 150 INJECTION, SOLUTION INTRAVENOUS; SUBCUTANEOUS at 08:47

## 2019-02-17 RX ADMIN — MONTELUKAST SODIUM 10 MG: 10 TABLET, FILM COATED ORAL at 21:01

## 2019-02-17 RX ADMIN — DOFETILIDE 500 MCG: 0.5 CAPSULE ORAL at 21:02

## 2019-02-17 RX ADMIN — MEROPENEM 1 G: 1 INJECTION, POWDER, FOR SOLUTION INTRAVENOUS at 21:02

## 2019-02-17 RX ADMIN — DILTIAZEM HYDROCHLORIDE 120 MG: 60 TABLET, FILM COATED ORAL at 08:45

## 2019-02-17 RX ADMIN — MEROPENEM 1 G: 1 INJECTION, POWDER, FOR SOLUTION INTRAVENOUS at 05:49

## 2019-02-17 RX ADMIN — MEROPENEM 1 G: 1 INJECTION, POWDER, FOR SOLUTION INTRAVENOUS at 14:17

## 2019-02-17 RX ADMIN — DOFETILIDE 500 MCG: 0.5 CAPSULE ORAL at 08:45

## 2019-02-17 RX ADMIN — SODIUM CHLORIDE 250 ML: 9 INJECTION, SOLUTION INTRAVENOUS at 12:36

## 2019-02-17 RX ADMIN — DILTIAZEM HYDROCHLORIDE 120 MG: 60 TABLET, FILM COATED ORAL at 21:01

## 2019-02-17 RX ADMIN — PROCHLORPERAZINE EDISYLATE 10 MG: 5 INJECTION INTRAMUSCULAR; INTRAVENOUS at 18:15

## 2019-02-17 RX ADMIN — SODIUM CHLORIDE: 9 INJECTION, SOLUTION INTRAVENOUS at 18:00

## 2019-02-17 RX ADMIN — SODIUM CHLORIDE: 9 INJECTION, SOLUTION INTRAVENOUS at 02:02

## 2019-02-17 RX ADMIN — Medication 10 ML: at 08:47

## 2019-02-17 ASSESSMENT — PAIN DESCRIPTION - PAIN TYPE: TYPE: ACUTE PAIN

## 2019-02-17 ASSESSMENT — PAIN DESCRIPTION - ONSET: ONSET: GRADUAL

## 2019-02-17 ASSESSMENT — PAIN DESCRIPTION - DESCRIPTORS: DESCRIPTORS: ACHING;HEADACHE

## 2019-02-17 ASSESSMENT — PAIN DESCRIPTION - ORIENTATION: ORIENTATION: MID

## 2019-02-17 ASSESSMENT — PAIN SCALES - GENERAL
PAINLEVEL_OUTOF10: 2
PAINLEVEL_OUTOF10: 4

## 2019-02-17 ASSESSMENT — PAIN DESCRIPTION - LOCATION: LOCATION: HEAD

## 2019-02-17 ASSESSMENT — PAIN DESCRIPTION - FREQUENCY: FREQUENCY: CONTINUOUS

## 2019-02-17 ASSESSMENT — PAIN DESCRIPTION - PROGRESSION
CLINICAL_PROGRESSION: GRADUALLY WORSENING
CLINICAL_PROGRESSION: GRADUALLY IMPROVING

## 2019-02-18 ENCOUNTER — ANESTHESIA (OUTPATIENT)
Dept: ENDOSCOPY | Age: 74
DRG: 418 | End: 2019-02-18
Payer: MEDICARE

## 2019-02-18 ENCOUNTER — TELEPHONE (OUTPATIENT)
Dept: INTERNAL MEDICINE CLINIC | Age: 74
End: 2019-02-18

## 2019-02-18 ENCOUNTER — ANESTHESIA EVENT (OUTPATIENT)
Dept: ENDOSCOPY | Age: 74
DRG: 418 | End: 2019-02-18
Payer: MEDICARE

## 2019-02-18 ENCOUNTER — TELEPHONE (OUTPATIENT)
Dept: CARDIOLOGY CLINIC | Age: 74
End: 2019-02-18

## 2019-02-18 ENCOUNTER — APPOINTMENT (OUTPATIENT)
Dept: GENERAL RADIOLOGY | Age: 74
DRG: 418 | End: 2019-02-18
Payer: MEDICARE

## 2019-02-18 VITALS
SYSTOLIC BLOOD PRESSURE: 119 MMHG | DIASTOLIC BLOOD PRESSURE: 73 MMHG | OXYGEN SATURATION: 99 % | RESPIRATION RATE: 16 BRPM

## 2019-02-18 LAB
BLOOD BANK DISPENSE STATUS: NORMAL
BLOOD BANK PRODUCT CODE: NORMAL
BPU ID: NORMAL
DESCRIPTION BLOOD BANK: NORMAL
GLUCOSE BLD-MCNC: 103 MG/DL (ref 70–99)
GLUCOSE BLD-MCNC: 106 MG/DL (ref 70–99)
GLUCOSE BLD-MCNC: 87 MG/DL (ref 70–99)
GLUCOSE BLD-MCNC: 98 MG/DL (ref 70–99)
INR BLD: 1.7 (ref 0.86–1.14)
PERFORMED ON: ABNORMAL
PERFORMED ON: ABNORMAL
PERFORMED ON: NORMAL
PERFORMED ON: NORMAL
PROTHROMBIN TIME: 19.4 SEC (ref 9.8–13)

## 2019-02-18 PROCEDURE — 6370000000 HC RX 637 (ALT 250 FOR IP): Performed by: INTERNAL MEDICINE

## 2019-02-18 PROCEDURE — 99232 SBSQ HOSP IP/OBS MODERATE 35: CPT | Performed by: INTERNAL MEDICINE

## 2019-02-18 PROCEDURE — 99231 SBSQ HOSP IP/OBS SF/LOW 25: CPT | Performed by: SURGERY

## 2019-02-18 PROCEDURE — 0FC98ZZ EXTIRPATION OF MATTER FROM COMMON BILE DUCT, VIA NATURAL OR ARTIFICIAL OPENING ENDOSCOPIC: ICD-10-PCS | Performed by: INTERNAL MEDICINE

## 2019-02-18 PROCEDURE — 6360000002 HC RX W HCPCS: Performed by: NURSE ANESTHETIST, CERTIFIED REGISTERED

## 2019-02-18 PROCEDURE — 7100000010 HC PHASE II RECOVERY - FIRST 15 MIN: Performed by: INTERNAL MEDICINE

## 2019-02-18 PROCEDURE — 3700000000 HC ANESTHESIA ATTENDED CARE: Performed by: INTERNAL MEDICINE

## 2019-02-18 PROCEDURE — 6360000002 HC RX W HCPCS: Performed by: INTERNAL MEDICINE

## 2019-02-18 PROCEDURE — 2580000003 HC RX 258: Performed by: INTERNAL MEDICINE

## 2019-02-18 PROCEDURE — 36415 COLL VENOUS BLD VENIPUNCTURE: CPT

## 2019-02-18 PROCEDURE — 2060000000 HC ICU INTERMEDIATE R&B

## 2019-02-18 PROCEDURE — BF131ZZ FLUOROSCOPY OF GALLBLADDER AND BILE DUCTS USING LOW OSMOLAR CONTRAST: ICD-10-PCS | Performed by: INTERNAL MEDICINE

## 2019-02-18 PROCEDURE — C1769 GUIDE WIRE: HCPCS | Performed by: INTERNAL MEDICINE

## 2019-02-18 PROCEDURE — 2720000010 HC SURG SUPPLY STERILE: Performed by: INTERNAL MEDICINE

## 2019-02-18 PROCEDURE — 36430 TRANSFUSION BLD/BLD COMPNT: CPT

## 2019-02-18 PROCEDURE — 74330 X-RAY BILE/PANC ENDOSCOPY: CPT

## 2019-02-18 PROCEDURE — 7100000011 HC PHASE II RECOVERY - ADDTL 15 MIN: Performed by: INTERNAL MEDICINE

## 2019-02-18 PROCEDURE — 85610 PROTHROMBIN TIME: CPT

## 2019-02-18 PROCEDURE — 2709999900 HC NON-CHARGEABLE SUPPLY: Performed by: INTERNAL MEDICINE

## 2019-02-18 PROCEDURE — P9017 PLASMA 1 DONOR FRZ W/IN 8 HR: HCPCS

## 2019-02-18 PROCEDURE — 3700000001 HC ADD 15 MINUTES (ANESTHESIA): Performed by: INTERNAL MEDICINE

## 2019-02-18 PROCEDURE — 2500000003 HC RX 250 WO HCPCS: Performed by: NURSE ANESTHETIST, CERTIFIED REGISTERED

## 2019-02-18 PROCEDURE — 3609014900 HC ERCP W/SPHINCTEROTOMY &/OR PAPILLOTOMY: Performed by: INTERNAL MEDICINE

## 2019-02-18 PROCEDURE — 2580000003 HC RX 258: Performed by: NURSE ANESTHETIST, CERTIFIED REGISTERED

## 2019-02-18 PROCEDURE — 3609015200 HC ERCP REMOVE CALCULI/DEBRIS BILIARY/PANCREAS DUCT: Performed by: INTERNAL MEDICINE

## 2019-02-18 RX ORDER — LABETALOL HYDROCHLORIDE 5 MG/ML
5 INJECTION, SOLUTION INTRAVENOUS EVERY 10 MIN PRN
Status: DISCONTINUED | OUTPATIENT
Start: 2019-02-18 | End: 2019-02-18

## 2019-02-18 RX ORDER — MEPERIDINE HYDROCHLORIDE 25 MG/ML
12.5 INJECTION INTRAMUSCULAR; INTRAVENOUS; SUBCUTANEOUS EVERY 5 MIN PRN
Status: DISCONTINUED | OUTPATIENT
Start: 2019-02-18 | End: 2019-02-18

## 2019-02-18 RX ORDER — MORPHINE SULFATE 10 MG/ML
2 INJECTION, SOLUTION INTRAMUSCULAR; INTRAVENOUS EVERY 5 MIN PRN
Status: DISCONTINUED | OUTPATIENT
Start: 2019-02-18 | End: 2019-02-18

## 2019-02-18 RX ORDER — 0.9 % SODIUM CHLORIDE 0.9 %
250 INTRAVENOUS SOLUTION INTRAVENOUS ONCE
Status: COMPLETED | OUTPATIENT
Start: 2019-02-18 | End: 2019-02-18

## 2019-02-18 RX ORDER — SODIUM CHLORIDE, SODIUM LACTATE, POTASSIUM CHLORIDE, CALCIUM CHLORIDE 600; 310; 30; 20 MG/100ML; MG/100ML; MG/100ML; MG/100ML
INJECTION, SOLUTION INTRAVENOUS CONTINUOUS PRN
Status: DISCONTINUED | OUTPATIENT
Start: 2019-02-18 | End: 2019-02-18 | Stop reason: SDUPTHER

## 2019-02-18 RX ORDER — HYDROMORPHONE HCL 110MG/55ML
0.5 PATIENT CONTROLLED ANALGESIA SYRINGE INTRAVENOUS EVERY 5 MIN PRN
Status: DISCONTINUED | OUTPATIENT
Start: 2019-02-18 | End: 2019-02-18

## 2019-02-18 RX ORDER — MORPHINE SULFATE 10 MG/ML
1 INJECTION, SOLUTION INTRAMUSCULAR; INTRAVENOUS EVERY 5 MIN PRN
Status: DISCONTINUED | OUTPATIENT
Start: 2019-02-18 | End: 2019-02-18

## 2019-02-18 RX ORDER — OXYCODONE HYDROCHLORIDE AND ACETAMINOPHEN 5; 325 MG/1; MG/1
1 TABLET ORAL PRN
Status: DISCONTINUED | OUTPATIENT
Start: 2019-02-18 | End: 2019-02-18

## 2019-02-18 RX ORDER — ROCURONIUM BROMIDE 10 MG/ML
INJECTION, SOLUTION INTRAVENOUS PRN
Status: DISCONTINUED | OUTPATIENT
Start: 2019-02-18 | End: 2019-02-18 | Stop reason: SDUPTHER

## 2019-02-18 RX ORDER — HYDRALAZINE HYDROCHLORIDE 20 MG/ML
5 INJECTION INTRAMUSCULAR; INTRAVENOUS EVERY 10 MIN PRN
Status: DISCONTINUED | OUTPATIENT
Start: 2019-02-18 | End: 2019-02-18

## 2019-02-18 RX ORDER — OXYCODONE HYDROCHLORIDE AND ACETAMINOPHEN 5; 325 MG/1; MG/1
2 TABLET ORAL PRN
Status: DISCONTINUED | OUTPATIENT
Start: 2019-02-18 | End: 2019-02-18

## 2019-02-18 RX ORDER — FUROSEMIDE 40 MG/1
40 TABLET ORAL DAILY
Status: DISCONTINUED | OUTPATIENT
Start: 2019-02-18 | End: 2019-02-20 | Stop reason: HOSPADM

## 2019-02-18 RX ORDER — DIPHENHYDRAMINE HYDROCHLORIDE 50 MG/ML
12.5 INJECTION INTRAMUSCULAR; INTRAVENOUS
Status: DISCONTINUED | OUTPATIENT
Start: 2019-02-18 | End: 2019-02-18

## 2019-02-18 RX ORDER — PROPOFOL 10 MG/ML
INJECTION, EMULSION INTRAVENOUS PRN
Status: DISCONTINUED | OUTPATIENT
Start: 2019-02-18 | End: 2019-02-18 | Stop reason: SDUPTHER

## 2019-02-18 RX ORDER — OXYCODONE HYDROCHLORIDE AND ACETAMINOPHEN 5; 325 MG/1; MG/1
1 TABLET ORAL ONCE
Status: COMPLETED | OUTPATIENT
Start: 2019-02-18 | End: 2019-02-18

## 2019-02-18 RX ORDER — GLYCOPYRROLATE 0.2 MG/ML
INJECTION INTRAMUSCULAR; INTRAVENOUS PRN
Status: DISCONTINUED | OUTPATIENT
Start: 2019-02-18 | End: 2019-02-18 | Stop reason: SDUPTHER

## 2019-02-18 RX ORDER — HYDROMORPHONE HCL 110MG/55ML
0.25 PATIENT CONTROLLED ANALGESIA SYRINGE INTRAVENOUS EVERY 5 MIN PRN
Status: DISCONTINUED | OUTPATIENT
Start: 2019-02-18 | End: 2019-02-18

## 2019-02-18 RX ORDER — FENTANYL CITRATE 50 UG/ML
INJECTION, SOLUTION INTRAMUSCULAR; INTRAVENOUS PRN
Status: DISCONTINUED | OUTPATIENT
Start: 2019-02-18 | End: 2019-02-18 | Stop reason: SDUPTHER

## 2019-02-18 RX ORDER — PROMETHAZINE HYDROCHLORIDE 25 MG/ML
6.25 INJECTION, SOLUTION INTRAMUSCULAR; INTRAVENOUS
Status: DISCONTINUED | OUTPATIENT
Start: 2019-02-18 | End: 2019-02-18

## 2019-02-18 RX ADMIN — DOFETILIDE 500 MCG: 0.5 CAPSULE ORAL at 09:09

## 2019-02-18 RX ADMIN — OXYCODONE HYDROCHLORIDE AND ACETAMINOPHEN 1 TABLET: 5; 325 TABLET ORAL at 06:23

## 2019-02-18 RX ADMIN — MEROPENEM 1 G: 1 INJECTION, POWDER, FOR SOLUTION INTRAVENOUS at 14:08

## 2019-02-18 RX ADMIN — ACETAMINOPHEN 650 MG: 325 TABLET ORAL at 16:46

## 2019-02-18 RX ADMIN — SODIUM CHLORIDE 250 ML: 9 INJECTION, SOLUTION INTRAVENOUS at 11:41

## 2019-02-18 RX ADMIN — SUGAMMADEX 400 MG: 100 INJECTION, SOLUTION INTRAVENOUS at 15:20

## 2019-02-18 RX ADMIN — ROCURONIUM BROMIDE 60 MG: 10 INJECTION, SOLUTION INTRAVENOUS at 14:58

## 2019-02-18 RX ADMIN — SODIUM CHLORIDE: 9 INJECTION, SOLUTION INTRAVENOUS at 06:25

## 2019-02-18 RX ADMIN — SODIUM CHLORIDE, POTASSIUM CHLORIDE, SODIUM LACTATE AND CALCIUM CHLORIDE: 600; 310; 30; 20 INJECTION, SOLUTION INTRAVENOUS at 14:55

## 2019-02-18 RX ADMIN — ACETAMINOPHEN 650 MG: 325 TABLET ORAL at 04:53

## 2019-02-18 RX ADMIN — DILTIAZEM HYDROCHLORIDE 120 MG: 60 TABLET, FILM COATED ORAL at 20:42

## 2019-02-18 RX ADMIN — FENTANYL CITRATE 100 MCG: 50 INJECTION INTRAMUSCULAR; INTRAVENOUS at 14:57

## 2019-02-18 RX ADMIN — MEROPENEM 1 G: 1 INJECTION, POWDER, FOR SOLUTION INTRAVENOUS at 21:32

## 2019-02-18 RX ADMIN — DILTIAZEM HYDROCHLORIDE 120 MG: 60 TABLET, FILM COATED ORAL at 09:09

## 2019-02-18 RX ADMIN — GLYCOPYRROLATE 0.2 MG: 0.2 INJECTION, SOLUTION INTRAMUSCULAR; INTRAVENOUS at 14:57

## 2019-02-18 RX ADMIN — MONTELUKAST SODIUM 10 MG: 10 TABLET, FILM COATED ORAL at 20:42

## 2019-02-18 RX ADMIN — FUROSEMIDE 40 MG: 40 TABLET ORAL at 16:46

## 2019-02-18 RX ADMIN — MEROPENEM 1 G: 1 INJECTION, POWDER, FOR SOLUTION INTRAVENOUS at 04:53

## 2019-02-18 RX ADMIN — DOFETILIDE 500 MCG: 0.5 CAPSULE ORAL at 20:43

## 2019-02-18 RX ADMIN — PROPOFOL 200 MG: 10 INJECTION, EMULSION INTRAVENOUS at 14:58

## 2019-02-18 ASSESSMENT — PAIN DESCRIPTION - LOCATION
LOCATION: HEAD
LOCATION: BACK
LOCATION: BACK;HIP;LEG

## 2019-02-18 ASSESSMENT — PULMONARY FUNCTION TESTS
PIF_VALUE: 24
PIF_VALUE: 23
PIF_VALUE: 24
PIF_VALUE: 16
PIF_VALUE: 3
PIF_VALUE: 4
PIF_VALUE: 23
PIF_VALUE: 1
PIF_VALUE: 25
PIF_VALUE: 25
PIF_VALUE: 24
PIF_VALUE: 25
PIF_VALUE: 22
PIF_VALUE: 18
PIF_VALUE: 24
PIF_VALUE: 0
PIF_VALUE: 11
PIF_VALUE: 21
PIF_VALUE: 2
PIF_VALUE: 1
PIF_VALUE: 24
PIF_VALUE: 5
PIF_VALUE: 15
PIF_VALUE: 23
PIF_VALUE: 24
PIF_VALUE: 25
PIF_VALUE: 23
PIF_VALUE: 38
PIF_VALUE: 2
PIF_VALUE: 14
PIF_VALUE: 23
PIF_VALUE: 23
PIF_VALUE: 21

## 2019-02-18 ASSESSMENT — PAIN SCALES - GENERAL
PAINLEVEL_OUTOF10: 1
PAINLEVEL_OUTOF10: 5
PAINLEVEL_OUTOF10: 4
PAINLEVEL_OUTOF10: 7
PAINLEVEL_OUTOF10: 2

## 2019-02-18 ASSESSMENT — PAIN DESCRIPTION - DESCRIPTORS
DESCRIPTORS: ACHING;HEADACHE
DESCRIPTORS: SPASM
DESCRIPTORS: ACHING;CONSTANT;DISCOMFORT;SHARP

## 2019-02-18 ASSESSMENT — PAIN DESCRIPTION - PROGRESSION
CLINICAL_PROGRESSION: GRADUALLY WORSENING
CLINICAL_PROGRESSION: GRADUALLY WORSENING
CLINICAL_PROGRESSION: RAPIDLY WORSENING

## 2019-02-18 ASSESSMENT — PAIN - FUNCTIONAL ASSESSMENT
PAIN_FUNCTIONAL_ASSESSMENT: ACTIVITIES ARE NOT PREVENTED
PAIN_FUNCTIONAL_ASSESSMENT: ACTIVITIES ARE NOT PREVENTED
PAIN_FUNCTIONAL_ASSESSMENT: PREVENTS OR INTERFERES SOME ACTIVE ACTIVITIES AND ADLS

## 2019-02-18 ASSESSMENT — PAIN DESCRIPTION - PAIN TYPE
TYPE: ACUTE PAIN

## 2019-02-18 ASSESSMENT — PAIN DESCRIPTION - ONSET
ONSET: PROGRESSIVE
ONSET: GRADUAL
ONSET: PROGRESSIVE

## 2019-02-18 ASSESSMENT — PAIN DESCRIPTION - ORIENTATION
ORIENTATION: RIGHT;LEFT;LOWER
ORIENTATION: MID
ORIENTATION: LEFT;LOWER

## 2019-02-18 ASSESSMENT — PAIN DESCRIPTION - FREQUENCY
FREQUENCY: CONTINUOUS
FREQUENCY: INTERMITTENT

## 2019-02-19 ENCOUNTER — ANESTHESIA (OUTPATIENT)
Dept: OPERATING ROOM | Age: 74
DRG: 418 | End: 2019-02-19
Payer: MEDICARE

## 2019-02-19 ENCOUNTER — APPOINTMENT (OUTPATIENT)
Dept: GENERAL RADIOLOGY | Age: 74
DRG: 418 | End: 2019-02-19
Payer: MEDICARE

## 2019-02-19 ENCOUNTER — ANESTHESIA EVENT (OUTPATIENT)
Dept: OPERATING ROOM | Age: 74
DRG: 418 | End: 2019-02-19
Payer: MEDICARE

## 2019-02-19 VITALS
DIASTOLIC BLOOD PRESSURE: 77 MMHG | OXYGEN SATURATION: 99 % | SYSTOLIC BLOOD PRESSURE: 147 MMHG | RESPIRATION RATE: 5 BRPM

## 2019-02-19 LAB
ALBUMIN SERPL-MCNC: 3.8 G/DL (ref 3.4–5)
ALP BLD-CCNC: 304 U/L (ref 40–129)
ALT SERPL-CCNC: 208 U/L (ref 10–40)
ANION GAP SERPL CALCULATED.3IONS-SCNC: 10 MMOL/L (ref 3–16)
AST SERPL-CCNC: 113 U/L (ref 15–37)
BASOPHILS ABSOLUTE: 0.1 K/UL (ref 0–0.2)
BASOPHILS RELATIVE PERCENT: 0.7 %
BILIRUB SERPL-MCNC: 0.7 MG/DL (ref 0–1)
BILIRUBIN DIRECT: 0.3 MG/DL (ref 0–0.3)
BILIRUBIN, INDIRECT: 0.4 MG/DL (ref 0–1)
BUN BLDV-MCNC: 9 MG/DL (ref 7–20)
CALCIUM SERPL-MCNC: 9 MG/DL (ref 8.3–10.6)
CHLORIDE BLD-SCNC: 103 MMOL/L (ref 99–110)
CO2: 24 MMOL/L (ref 21–32)
CREAT SERPL-MCNC: 0.9 MG/DL (ref 0.8–1.3)
EOSINOPHILS ABSOLUTE: 0.4 K/UL (ref 0–0.6)
EOSINOPHILS RELATIVE PERCENT: 4.9 %
GFR AFRICAN AMERICAN: >60
GFR NON-AFRICAN AMERICAN: >60
GLUCOSE BLD-MCNC: 100 MG/DL (ref 70–99)
GLUCOSE BLD-MCNC: 101 MG/DL (ref 70–99)
GLUCOSE BLD-MCNC: 102 MG/DL (ref 70–99)
GLUCOSE BLD-MCNC: 130 MG/DL (ref 70–99)
GLUCOSE BLD-MCNC: 130 MG/DL (ref 70–99)
HCT VFR BLD CALC: 38.7 % (ref 40.5–52.5)
HEMOGLOBIN: 12.8 G/DL (ref 13.5–17.5)
INR BLD: 1.49 (ref 0.86–1.14)
LYMPHOCYTES ABSOLUTE: 1.1 K/UL (ref 1–5.1)
LYMPHOCYTES RELATIVE PERCENT: 14.8 %
MAGNESIUM: 2.2 MG/DL (ref 1.8–2.4)
MCH RBC QN AUTO: 29.4 PG (ref 26–34)
MCHC RBC AUTO-ENTMCNC: 33.1 G/DL (ref 31–36)
MCV RBC AUTO: 88.8 FL (ref 80–100)
MONOCYTES ABSOLUTE: 0.6 K/UL (ref 0–1.3)
MONOCYTES RELATIVE PERCENT: 8.5 %
NEUTROPHILS ABSOLUTE: 5.2 K/UL (ref 1.7–7.7)
NEUTROPHILS RELATIVE PERCENT: 71.1 %
PDW BLD-RTO: 15.9 % (ref 12.4–15.4)
PERFORMED ON: ABNORMAL
PHOSPHORUS: 2.2 MG/DL (ref 2.5–4.9)
PLATELET # BLD: 203 K/UL (ref 135–450)
PMV BLD AUTO: 7.5 FL (ref 5–10.5)
POTASSIUM SERPL-SCNC: 4.1 MMOL/L (ref 3.5–5.1)
PROTHROMBIN TIME: 17 SEC (ref 9.8–13)
RBC # BLD: 4.35 M/UL (ref 4.2–5.9)
SODIUM BLD-SCNC: 137 MMOL/L (ref 136–145)
TOTAL PROTEIN: 7.4 G/DL (ref 6.4–8.2)
WBC # BLD: 7.3 K/UL (ref 4–11)

## 2019-02-19 PROCEDURE — 3700000000 HC ANESTHESIA ATTENDED CARE: Performed by: SURGERY

## 2019-02-19 PROCEDURE — 3600000004 HC SURGERY LEVEL 4 BASE: Performed by: SURGERY

## 2019-02-19 PROCEDURE — 84100 ASSAY OF PHOSPHORUS: CPT

## 2019-02-19 PROCEDURE — 80048 BASIC METABOLIC PNL TOTAL CA: CPT

## 2019-02-19 PROCEDURE — 47563 LAPARO CHOLECYSTECTOMY/GRAPH: CPT | Performed by: SURGERY

## 2019-02-19 PROCEDURE — 2580000003 HC RX 258: Performed by: SURGERY

## 2019-02-19 PROCEDURE — 6370000000 HC RX 637 (ALT 250 FOR IP): Performed by: SURGERY

## 2019-02-19 PROCEDURE — 6360000004 HC RX CONTRAST MEDICATION: Performed by: SURGERY

## 2019-02-19 PROCEDURE — 2580000003 HC RX 258: Performed by: INTERNAL MEDICINE

## 2019-02-19 PROCEDURE — 85025 COMPLETE CBC W/AUTO DIFF WBC: CPT

## 2019-02-19 PROCEDURE — 7100000001 HC PACU RECOVERY - ADDTL 15 MIN: Performed by: SURGERY

## 2019-02-19 PROCEDURE — 6360000002 HC RX W HCPCS: Performed by: NURSE ANESTHETIST, CERTIFIED REGISTERED

## 2019-02-19 PROCEDURE — 6360000002 HC RX W HCPCS: Performed by: ANESTHESIOLOGY

## 2019-02-19 PROCEDURE — 2580000003 HC RX 258: Performed by: NURSE ANESTHETIST, CERTIFIED REGISTERED

## 2019-02-19 PROCEDURE — 3700000001 HC ADD 15 MINUTES (ANESTHESIA): Performed by: SURGERY

## 2019-02-19 PROCEDURE — 2500000003 HC RX 250 WO HCPCS: Performed by: SURGERY

## 2019-02-19 PROCEDURE — 36415 COLL VENOUS BLD VENIPUNCTURE: CPT

## 2019-02-19 PROCEDURE — 83735 ASSAY OF MAGNESIUM: CPT

## 2019-02-19 PROCEDURE — 85610 PROTHROMBIN TIME: CPT

## 2019-02-19 PROCEDURE — 6370000000 HC RX 637 (ALT 250 FOR IP): Performed by: INTERNAL MEDICINE

## 2019-02-19 PROCEDURE — 2060000000 HC ICU INTERMEDIATE R&B

## 2019-02-19 PROCEDURE — 88304 TISSUE EXAM BY PATHOLOGIST: CPT

## 2019-02-19 PROCEDURE — 7100000000 HC PACU RECOVERY - FIRST 15 MIN: Performed by: SURGERY

## 2019-02-19 PROCEDURE — 2709999900 HC NON-CHARGEABLE SUPPLY: Performed by: SURGERY

## 2019-02-19 PROCEDURE — 3600000014 HC SURGERY LEVEL 4 ADDTL 15MIN: Performed by: SURGERY

## 2019-02-19 PROCEDURE — 2500000003 HC RX 250 WO HCPCS: Performed by: NURSE ANESTHETIST, CERTIFIED REGISTERED

## 2019-02-19 PROCEDURE — 6360000002 HC RX W HCPCS: Performed by: INTERNAL MEDICINE

## 2019-02-19 PROCEDURE — BF131ZZ FLUOROSCOPY OF GALLBLADDER AND BILE DUCTS USING LOW OSMOLAR CONTRAST: ICD-10-PCS | Performed by: SURGERY

## 2019-02-19 PROCEDURE — 99232 SBSQ HOSP IP/OBS MODERATE 35: CPT | Performed by: INTERNAL MEDICINE

## 2019-02-19 PROCEDURE — 80076 HEPATIC FUNCTION PANEL: CPT

## 2019-02-19 PROCEDURE — 74300 X-RAY BILE DUCTS/PANCREAS: CPT

## 2019-02-19 PROCEDURE — 0FT44ZZ RESECTION OF GALLBLADDER, PERCUTANEOUS ENDOSCOPIC APPROACH: ICD-10-PCS | Performed by: SURGERY

## 2019-02-19 RX ORDER — LABETALOL HYDROCHLORIDE 5 MG/ML
5 INJECTION, SOLUTION INTRAVENOUS EVERY 10 MIN PRN
Status: DISCONTINUED | OUTPATIENT
Start: 2019-02-19 | End: 2019-02-19 | Stop reason: HOSPADM

## 2019-02-19 RX ORDER — MORPHINE SULFATE 10 MG/ML
1 INJECTION, SOLUTION INTRAMUSCULAR; INTRAVENOUS EVERY 5 MIN PRN
Status: DISCONTINUED | OUTPATIENT
Start: 2019-02-19 | End: 2019-02-19 | Stop reason: HOSPADM

## 2019-02-19 RX ORDER — ROCURONIUM BROMIDE 10 MG/ML
INJECTION, SOLUTION INTRAVENOUS PRN
Status: DISCONTINUED | OUTPATIENT
Start: 2019-02-19 | End: 2019-02-19 | Stop reason: SDUPTHER

## 2019-02-19 RX ORDER — HYDROMORPHONE HCL 110MG/55ML
0.25 PATIENT CONTROLLED ANALGESIA SYRINGE INTRAVENOUS EVERY 5 MIN PRN
Status: DISCONTINUED | OUTPATIENT
Start: 2019-02-19 | End: 2019-02-19 | Stop reason: HOSPADM

## 2019-02-19 RX ORDER — FENTANYL CITRATE 50 UG/ML
INJECTION, SOLUTION INTRAMUSCULAR; INTRAVENOUS PRN
Status: DISCONTINUED | OUTPATIENT
Start: 2019-02-19 | End: 2019-02-19 | Stop reason: SDUPTHER

## 2019-02-19 RX ORDER — MAGNESIUM HYDROXIDE 1200 MG/15ML
LIQUID ORAL CONTINUOUS PRN
Status: COMPLETED | OUTPATIENT
Start: 2019-02-19 | End: 2019-02-19

## 2019-02-19 RX ORDER — OXYCODONE HYDROCHLORIDE 5 MG/1
5 TABLET ORAL EVERY 4 HOURS PRN
Status: DISCONTINUED | OUTPATIENT
Start: 2019-02-19 | End: 2019-02-20 | Stop reason: HOSPADM

## 2019-02-19 RX ORDER — MEPERIDINE HYDROCHLORIDE 25 MG/ML
12.5 INJECTION INTRAMUSCULAR; INTRAVENOUS; SUBCUTANEOUS EVERY 5 MIN PRN
Status: DISCONTINUED | OUTPATIENT
Start: 2019-02-19 | End: 2019-02-19 | Stop reason: HOSPADM

## 2019-02-19 RX ORDER — SODIUM CHLORIDE, SODIUM LACTATE, POTASSIUM CHLORIDE, AND CALCIUM CHLORIDE .6; .31; .03; .02 G/100ML; G/100ML; G/100ML; G/100ML
IRRIGANT IRRIGATION PRN
Status: DISCONTINUED | OUTPATIENT
Start: 2019-02-19 | End: 2019-02-19 | Stop reason: ALTCHOICE

## 2019-02-19 RX ORDER — SODIUM CHLORIDE 9 MG/ML
INJECTION, SOLUTION INTRAVENOUS CONTINUOUS
Status: DISCONTINUED | OUTPATIENT
Start: 2019-02-19 | End: 2019-02-20 | Stop reason: HOSPADM

## 2019-02-19 RX ORDER — OXYCODONE HYDROCHLORIDE AND ACETAMINOPHEN 5; 325 MG/1; MG/1
2 TABLET ORAL PRN
Status: DISCONTINUED | OUTPATIENT
Start: 2019-02-19 | End: 2019-02-19 | Stop reason: HOSPADM

## 2019-02-19 RX ORDER — OXYCODONE HYDROCHLORIDE 5 MG/1
10 TABLET ORAL EVERY 4 HOURS PRN
Status: DISCONTINUED | OUTPATIENT
Start: 2019-02-19 | End: 2019-02-20 | Stop reason: HOSPADM

## 2019-02-19 RX ORDER — HYDROMORPHONE HCL 110MG/55ML
0.5 PATIENT CONTROLLED ANALGESIA SYRINGE INTRAVENOUS EVERY 5 MIN PRN
Status: DISCONTINUED | OUTPATIENT
Start: 2019-02-19 | End: 2019-02-19 | Stop reason: HOSPADM

## 2019-02-19 RX ORDER — HYDRALAZINE HYDROCHLORIDE 20 MG/ML
5 INJECTION INTRAMUSCULAR; INTRAVENOUS
Status: DISCONTINUED | OUTPATIENT
Start: 2019-02-19 | End: 2019-02-19 | Stop reason: HOSPADM

## 2019-02-19 RX ORDER — PROPOFOL 10 MG/ML
INJECTION, EMULSION INTRAVENOUS PRN
Status: DISCONTINUED | OUTPATIENT
Start: 2019-02-19 | End: 2019-02-19 | Stop reason: SDUPTHER

## 2019-02-19 RX ORDER — WARFARIN SODIUM 5 MG/1
5 TABLET ORAL
Status: COMPLETED | OUTPATIENT
Start: 2019-02-19 | End: 2019-02-19

## 2019-02-19 RX ORDER — SODIUM CHLORIDE, SODIUM LACTATE, POTASSIUM CHLORIDE, CALCIUM CHLORIDE 600; 310; 30; 20 MG/100ML; MG/100ML; MG/100ML; MG/100ML
INJECTION, SOLUTION INTRAVENOUS CONTINUOUS PRN
Status: DISCONTINUED | OUTPATIENT
Start: 2019-02-19 | End: 2019-02-19 | Stop reason: SDUPTHER

## 2019-02-19 RX ORDER — BUPIVACAINE HYDROCHLORIDE 5 MG/ML
INJECTION, SOLUTION EPIDURAL; INTRACAUDAL PRN
Status: DISCONTINUED | OUTPATIENT
Start: 2019-02-19 | End: 2019-02-19 | Stop reason: ALTCHOICE

## 2019-02-19 RX ORDER — ONDANSETRON 2 MG/ML
INJECTION INTRAMUSCULAR; INTRAVENOUS PRN
Status: DISCONTINUED | OUTPATIENT
Start: 2019-02-19 | End: 2019-02-19 | Stop reason: SDUPTHER

## 2019-02-19 RX ORDER — MORPHINE SULFATE 10 MG/ML
2 INJECTION, SOLUTION INTRAMUSCULAR; INTRAVENOUS EVERY 5 MIN PRN
Status: DISCONTINUED | OUTPATIENT
Start: 2019-02-19 | End: 2019-02-19 | Stop reason: HOSPADM

## 2019-02-19 RX ORDER — GLYCOPYRROLATE 0.2 MG/ML
INJECTION INTRAMUSCULAR; INTRAVENOUS PRN
Status: DISCONTINUED | OUTPATIENT
Start: 2019-02-19 | End: 2019-02-19 | Stop reason: SDUPTHER

## 2019-02-19 RX ORDER — DIPHENHYDRAMINE HCL 25 MG
25 TABLET ORAL ONCE
Status: COMPLETED | OUTPATIENT
Start: 2019-02-19 | End: 2019-02-19

## 2019-02-19 RX ORDER — OXYCODONE HYDROCHLORIDE AND ACETAMINOPHEN 5; 325 MG/1; MG/1
1 TABLET ORAL PRN
Status: DISCONTINUED | OUTPATIENT
Start: 2019-02-19 | End: 2019-02-19 | Stop reason: HOSPADM

## 2019-02-19 RX ADMIN — OXYCODONE HYDROCHLORIDE 10 MG: 5 TABLET ORAL at 21:26

## 2019-02-19 RX ADMIN — PROPOFOL 200 MG: 10 INJECTION, EMULSION INTRAVENOUS at 08:45

## 2019-02-19 RX ADMIN — DIPHENHYDRAMINE HCL 25 MG: 25 TABLET ORAL at 15:29

## 2019-02-19 RX ADMIN — DOFETILIDE 500 MCG: 0.5 CAPSULE ORAL at 08:14

## 2019-02-19 RX ADMIN — HYDROMORPHONE HYDROCHLORIDE 0.5 MG: 2 INJECTION INTRAMUSCULAR; INTRAVENOUS; SUBCUTANEOUS at 10:08

## 2019-02-19 RX ADMIN — ROCURONIUM BROMIDE 60 MG: 10 INJECTION, SOLUTION INTRAVENOUS at 08:45

## 2019-02-19 RX ADMIN — ONDANSETRON 4 MG: 2 INJECTION, SOLUTION INTRAMUSCULAR; INTRAVENOUS at 09:44

## 2019-02-19 RX ADMIN — FENTANYL CITRATE 100 MCG: 50 INJECTION, SOLUTION INTRAMUSCULAR; INTRAVENOUS at 09:02

## 2019-02-19 RX ADMIN — MEROPENEM 1 G: 1 INJECTION, POWDER, FOR SOLUTION INTRAVENOUS at 15:29

## 2019-02-19 RX ADMIN — WARFARIN SODIUM 5 MG: 5 TABLET ORAL at 16:51

## 2019-02-19 RX ADMIN — MONTELUKAST SODIUM 10 MG: 10 TABLET, FILM COATED ORAL at 21:27

## 2019-02-19 RX ADMIN — GLYCOPYRROLATE 0.2 MG: 0.2 INJECTION, SOLUTION INTRAMUSCULAR; INTRAVENOUS at 08:44

## 2019-02-19 RX ADMIN — FENTANYL CITRATE 150 MCG: 50 INJECTION, SOLUTION INTRAMUSCULAR; INTRAVENOUS at 08:44

## 2019-02-19 RX ADMIN — DILTIAZEM HYDROCHLORIDE 120 MG: 60 TABLET, FILM COATED ORAL at 21:27

## 2019-02-19 RX ADMIN — OXYCODONE HYDROCHLORIDE 10 MG: 5 TABLET ORAL at 12:22

## 2019-02-19 RX ADMIN — OXYCODONE HYDROCHLORIDE 10 MG: 5 TABLET ORAL at 16:51

## 2019-02-19 RX ADMIN — SODIUM CHLORIDE: 9 INJECTION, SOLUTION INTRAVENOUS at 02:10

## 2019-02-19 RX ADMIN — DOFETILIDE 500 MCG: 0.5 CAPSULE ORAL at 21:26

## 2019-02-19 RX ADMIN — SUGAMMADEX 200 MG: 100 INJECTION, SOLUTION INTRAVENOUS at 09:44

## 2019-02-19 RX ADMIN — POTASSIUM PHOSPHATE, MONOBASIC AND POTASSIUM PHOSPHATE, DIBASIC 15 MMOL: 224; 236 INJECTION, SOLUTION INTRAVENOUS at 11:30

## 2019-02-19 RX ADMIN — DILTIAZEM HYDROCHLORIDE 120 MG: 60 TABLET, FILM COATED ORAL at 08:14

## 2019-02-19 RX ADMIN — MEROPENEM 1 G: 1 INJECTION, POWDER, FOR SOLUTION INTRAVENOUS at 21:27

## 2019-02-19 RX ADMIN — HYDROMORPHONE HYDROCHLORIDE 0.5 MG: 2 INJECTION INTRAMUSCULAR; INTRAVENOUS; SUBCUTANEOUS at 10:23

## 2019-02-19 RX ADMIN — SODIUM CHLORIDE: 9 INJECTION, SOLUTION INTRAVENOUS at 15:30

## 2019-02-19 RX ADMIN — MEROPENEM 1 G: 1 INJECTION, POWDER, FOR SOLUTION INTRAVENOUS at 06:36

## 2019-02-19 RX ADMIN — SODIUM CHLORIDE, POTASSIUM CHLORIDE, SODIUM LACTATE AND CALCIUM CHLORIDE: 600; 310; 30; 20 INJECTION, SOLUTION INTRAVENOUS at 08:38

## 2019-02-19 ASSESSMENT — PULMONARY FUNCTION TESTS
PIF_VALUE: 24
PIF_VALUE: 30
PIF_VALUE: 22
PIF_VALUE: 1
PIF_VALUE: 23
PIF_VALUE: 25
PIF_VALUE: 23
PIF_VALUE: 23
PIF_VALUE: 25
PIF_VALUE: 24
PIF_VALUE: 0
PIF_VALUE: 2
PIF_VALUE: 20
PIF_VALUE: 24
PIF_VALUE: 23
PIF_VALUE: 23
PIF_VALUE: 21
PIF_VALUE: 25
PIF_VALUE: 25
PIF_VALUE: 19
PIF_VALUE: 21
PIF_VALUE: 28
PIF_VALUE: 23
PIF_VALUE: 20
PIF_VALUE: 10
PIF_VALUE: 22
PIF_VALUE: 23
PIF_VALUE: 24
PIF_VALUE: 3
PIF_VALUE: 19
PIF_VALUE: 24
PIF_VALUE: 20
PIF_VALUE: 13
PIF_VALUE: 24
PIF_VALUE: 21
PIF_VALUE: 19
PIF_VALUE: 24
PIF_VALUE: 24
PIF_VALUE: 25
PIF_VALUE: 2
PIF_VALUE: 20
PIF_VALUE: 24
PIF_VALUE: 1
PIF_VALUE: 19
PIF_VALUE: 26
PIF_VALUE: 22
PIF_VALUE: 19
PIF_VALUE: 20
PIF_VALUE: 24
PIF_VALUE: 24
PIF_VALUE: 23
PIF_VALUE: 23
PIF_VALUE: 0
PIF_VALUE: 22
PIF_VALUE: 2
PIF_VALUE: 24
PIF_VALUE: 23
PIF_VALUE: 3
PIF_VALUE: 24
PIF_VALUE: 2
PIF_VALUE: 23
PIF_VALUE: 24
PIF_VALUE: 20
PIF_VALUE: 0
PIF_VALUE: 24
PIF_VALUE: 20
PIF_VALUE: 24
PIF_VALUE: 20

## 2019-02-19 ASSESSMENT — PAIN DESCRIPTION - ORIENTATION
ORIENTATION: MID

## 2019-02-19 ASSESSMENT — PAIN DESCRIPTION - PAIN TYPE
TYPE: SURGICAL PAIN
TYPE: ACUTE PAIN
TYPE: ACUTE PAIN;SURGICAL PAIN
TYPE: ACUTE PAIN;SURGICAL PAIN

## 2019-02-19 ASSESSMENT — PAIN SCALES - GENERAL
PAINLEVEL_OUTOF10: 0
PAINLEVEL_OUTOF10: 6
PAINLEVEL_OUTOF10: 6
PAINLEVEL_OUTOF10: 5
PAINLEVEL_OUTOF10: 6
PAINLEVEL_OUTOF10: 8
PAINLEVEL_OUTOF10: 7
PAINLEVEL_OUTOF10: 0
PAINLEVEL_OUTOF10: 9
PAINLEVEL_OUTOF10: 0
PAINLEVEL_OUTOF10: 0
PAINLEVEL_OUTOF10: 7
PAINLEVEL_OUTOF10: 4

## 2019-02-19 ASSESSMENT — PAIN DESCRIPTION - LOCATION
LOCATION: ABDOMEN

## 2019-02-19 ASSESSMENT — PAIN DESCRIPTION - PROGRESSION: CLINICAL_PROGRESSION: GRADUALLY WORSENING

## 2019-02-19 ASSESSMENT — PAIN DESCRIPTION - DESCRIPTORS
DESCRIPTORS: ACHING
DESCRIPTORS: ACHING

## 2019-02-19 ASSESSMENT — PAIN - FUNCTIONAL ASSESSMENT: PAIN_FUNCTIONAL_ASSESSMENT: PREVENTS OR INTERFERES SOME ACTIVE ACTIVITIES AND ADLS

## 2019-02-19 ASSESSMENT — PAIN DESCRIPTION - ONSET: ONSET: PROGRESSIVE

## 2019-02-19 ASSESSMENT — PAIN DESCRIPTION - FREQUENCY: FREQUENCY: CONTINUOUS

## 2019-02-20 VITALS
TEMPERATURE: 96.9 F | WEIGHT: 315 LBS | OXYGEN SATURATION: 97 % | HEIGHT: 75 IN | SYSTOLIC BLOOD PRESSURE: 117 MMHG | DIASTOLIC BLOOD PRESSURE: 66 MMHG | BODY MASS INDEX: 39.17 KG/M2 | HEART RATE: 73 BPM | RESPIRATION RATE: 16 BRPM

## 2019-02-20 LAB
GLUCOSE BLD-MCNC: 106 MG/DL (ref 70–99)
GLUCOSE BLD-MCNC: 95 MG/DL (ref 70–99)
INR BLD: 1.52 (ref 0.86–1.14)
PERFORMED ON: ABNORMAL
PERFORMED ON: NORMAL
PROTHROMBIN TIME: 17.3 SEC (ref 9.8–13)

## 2019-02-20 PROCEDURE — 6370000000 HC RX 637 (ALT 250 FOR IP): Performed by: SURGERY

## 2019-02-20 PROCEDURE — 6360000002 HC RX W HCPCS: Performed by: INTERNAL MEDICINE

## 2019-02-20 PROCEDURE — 6370000000 HC RX 637 (ALT 250 FOR IP): Performed by: INTERNAL MEDICINE

## 2019-02-20 PROCEDURE — 36415 COLL VENOUS BLD VENIPUNCTURE: CPT

## 2019-02-20 PROCEDURE — 2580000003 HC RX 258: Performed by: INTERNAL MEDICINE

## 2019-02-20 PROCEDURE — 99238 HOSP IP/OBS DSCHRG MGMT 30/<: CPT | Performed by: INTERNAL MEDICINE

## 2019-02-20 PROCEDURE — 99024 POSTOP FOLLOW-UP VISIT: CPT | Performed by: NURSE PRACTITIONER

## 2019-02-20 PROCEDURE — 85610 PROTHROMBIN TIME: CPT

## 2019-02-20 RX ORDER — WARFARIN SODIUM 5 MG/1
5 TABLET ORAL ONCE
Status: COMPLETED | OUTPATIENT
Start: 2019-02-20 | End: 2019-02-20

## 2019-02-20 RX ORDER — SPIRONOLACTONE 25 MG/1
25 TABLET ORAL DAILY
Status: DISCONTINUED | OUTPATIENT
Start: 2019-02-20 | End: 2019-02-20 | Stop reason: HOSPADM

## 2019-02-20 RX ORDER — OXYCODONE HYDROCHLORIDE 5 MG/1
5 TABLET ORAL EVERY 6 HOURS PRN
Qty: 20 TABLET | Refills: 0 | Status: SHIPPED | OUTPATIENT
Start: 2019-02-20 | End: 2019-02-25

## 2019-02-20 RX ORDER — DIPHENHYDRAMINE HCL 25 MG
25 TABLET ORAL EVERY 6 HOURS PRN
Status: DISCONTINUED | OUTPATIENT
Start: 2019-02-20 | End: 2019-02-20 | Stop reason: HOSPADM

## 2019-02-20 RX ADMIN — DIPHENHYDRAMINE HCL 25 MG: 25 TABLET ORAL at 01:17

## 2019-02-20 RX ADMIN — OXYCODONE HYDROCHLORIDE 10 MG: 5 TABLET ORAL at 10:11

## 2019-02-20 RX ADMIN — Medication 10 ML: at 08:54

## 2019-02-20 RX ADMIN — MEROPENEM 1 G: 1 INJECTION, POWDER, FOR SOLUTION INTRAVENOUS at 05:43

## 2019-02-20 RX ADMIN — WARFARIN SODIUM 5 MG: 5 TABLET ORAL at 09:28

## 2019-02-20 RX ADMIN — FUROSEMIDE 40 MG: 40 TABLET ORAL at 08:53

## 2019-02-20 RX ADMIN — DOFETILIDE 500 MCG: 0.5 CAPSULE ORAL at 08:53

## 2019-02-20 RX ADMIN — OXYCODONE HYDROCHLORIDE 10 MG: 5 TABLET ORAL at 05:43

## 2019-02-20 RX ADMIN — DIPHENHYDRAMINE HCL 25 MG: 25 TABLET ORAL at 09:28

## 2019-02-20 RX ADMIN — SPIRONOLACTONE 25 MG: 25 TABLET ORAL at 08:53

## 2019-02-20 RX ADMIN — DILTIAZEM HYDROCHLORIDE 120 MG: 60 TABLET, FILM COATED ORAL at 08:53

## 2019-02-20 ASSESSMENT — PAIN SCALES - GENERAL
PAINLEVEL_OUTOF10: 6
PAINLEVEL_OUTOF10: 9
PAINLEVEL_OUTOF10: 4

## 2019-02-21 ENCOUNTER — CARE COORDINATION (OUTPATIENT)
Dept: CASE MANAGEMENT | Age: 74
End: 2019-02-21

## 2019-02-21 DIAGNOSIS — I10 ESSENTIAL HYPERTENSION: Primary | ICD-10-CM

## 2019-02-21 PROCEDURE — 1111F DSCHRG MED/CURRENT MED MERGE: CPT | Performed by: INTERNAL MEDICINE

## 2019-02-22 ENCOUNTER — ANTI-COAG VISIT (OUTPATIENT)
Dept: INTERNAL MEDICINE CLINIC | Age: 74
End: 2019-02-22

## 2019-02-22 ENCOUNTER — TELEPHONE (OUTPATIENT)
Dept: INTERNAL MEDICINE CLINIC | Age: 74
End: 2019-02-22

## 2019-02-22 LAB — INR BLD: 1.8

## 2019-02-25 ENCOUNTER — OFFICE VISIT (OUTPATIENT)
Dept: INTERNAL MEDICINE CLINIC | Age: 74
End: 2019-02-25

## 2019-02-25 VITALS
TEMPERATURE: 97.9 F | SYSTOLIC BLOOD PRESSURE: 130 MMHG | DIASTOLIC BLOOD PRESSURE: 85 MMHG | WEIGHT: 315 LBS | BODY MASS INDEX: 39.17 KG/M2 | HEART RATE: 72 BPM | HEIGHT: 75 IN

## 2019-02-25 DIAGNOSIS — E11.9 TYPE 2 DIABETES MELLITUS WITHOUT COMPLICATION, WITHOUT LONG-TERM CURRENT USE OF INSULIN (HCC): ICD-10-CM

## 2019-02-25 DIAGNOSIS — Z09 HOSPITAL DISCHARGE FOLLOW-UP: Primary | ICD-10-CM

## 2019-02-25 DIAGNOSIS — K80.50 CHOLEDOCHOLITHIASIS: ICD-10-CM

## 2019-02-25 DIAGNOSIS — I48.0 PAF (PAROXYSMAL ATRIAL FIBRILLATION) (HCC): ICD-10-CM

## 2019-02-25 DIAGNOSIS — I10 ESSENTIAL HYPERTENSION: ICD-10-CM

## 2019-02-25 PROCEDURE — G8482 FLU IMMUNIZE ORDER/ADMIN: HCPCS | Performed by: PHYSICIAN ASSISTANT

## 2019-02-25 PROCEDURE — 1111F DSCHRG MED/CURRENT MED MERGE: CPT | Performed by: PHYSICIAN ASSISTANT

## 2019-02-25 PROCEDURE — 1101F PT FALLS ASSESS-DOCD LE1/YR: CPT | Performed by: PHYSICIAN ASSISTANT

## 2019-02-25 PROCEDURE — 1036F TOBACCO NON-USER: CPT | Performed by: PHYSICIAN ASSISTANT

## 2019-02-25 PROCEDURE — G8427 DOCREV CUR MEDS BY ELIG CLIN: HCPCS | Performed by: PHYSICIAN ASSISTANT

## 2019-02-25 PROCEDURE — 4040F PNEUMOC VAC/ADMIN/RCVD: CPT | Performed by: PHYSICIAN ASSISTANT

## 2019-02-25 PROCEDURE — G8417 CALC BMI ABV UP PARAM F/U: HCPCS | Performed by: PHYSICIAN ASSISTANT

## 2019-02-25 PROCEDURE — 2022F DILAT RTA XM EVC RTNOPTHY: CPT | Performed by: PHYSICIAN ASSISTANT

## 2019-02-25 PROCEDURE — 3017F COLORECTAL CA SCREEN DOC REV: CPT | Performed by: PHYSICIAN ASSISTANT

## 2019-02-25 PROCEDURE — 1123F ACP DISCUSS/DSCN MKR DOCD: CPT | Performed by: PHYSICIAN ASSISTANT

## 2019-02-25 PROCEDURE — 99214 OFFICE O/P EST MOD 30 MIN: CPT | Performed by: PHYSICIAN ASSISTANT

## 2019-02-25 PROCEDURE — 3046F HEMOGLOBIN A1C LEVEL >9.0%: CPT | Performed by: PHYSICIAN ASSISTANT

## 2019-02-25 RX ORDER — LANCETS
EACH MISCELLANEOUS
Qty: 100 EACH | Refills: 2 | Status: SHIPPED | OUTPATIENT
Start: 2019-02-25 | End: 2019-03-11 | Stop reason: SDUPTHER

## 2019-02-25 ASSESSMENT — ENCOUNTER SYMPTOMS
VOMITING: 0
CONSTIPATION: 0
NAUSEA: 0
ABDOMINAL PAIN: 0
SHORTNESS OF BREATH: 0
COUGH: 0
DIARRHEA: 0

## 2019-02-28 ENCOUNTER — CARE COORDINATION (OUTPATIENT)
Dept: CASE MANAGEMENT | Age: 74
End: 2019-02-28

## 2019-03-01 ENCOUNTER — ANTI-COAG VISIT (OUTPATIENT)
Dept: INTERNAL MEDICINE CLINIC | Age: 74
End: 2019-03-01

## 2019-03-01 ENCOUNTER — TELEPHONE (OUTPATIENT)
Dept: INTERNAL MEDICINE CLINIC | Age: 74
End: 2019-03-01

## 2019-03-01 LAB — INR BLD: 2.3

## 2019-03-06 ENCOUNTER — OFFICE VISIT (OUTPATIENT)
Dept: SURGERY | Age: 74
End: 2019-03-06

## 2019-03-06 VITALS
SYSTOLIC BLOOD PRESSURE: 124 MMHG | DIASTOLIC BLOOD PRESSURE: 70 MMHG | BODY MASS INDEX: 39.17 KG/M2 | WEIGHT: 315 LBS | HEIGHT: 75 IN

## 2019-03-06 DIAGNOSIS — Z09 SURGICAL FOLLOW-UP CARE: Primary | ICD-10-CM

## 2019-03-06 PROCEDURE — 99024 POSTOP FOLLOW-UP VISIT: CPT | Performed by: SURGERY

## 2019-03-07 DIAGNOSIS — E11.9 TYPE 2 DIABETES MELLITUS WITHOUT COMPLICATION, WITHOUT LONG-TERM CURRENT USE OF INSULIN (HCC): ICD-10-CM

## 2019-03-11 DIAGNOSIS — E11.9 TYPE 2 DIABETES MELLITUS WITHOUT COMPLICATION, WITHOUT LONG-TERM CURRENT USE OF INSULIN (HCC): ICD-10-CM

## 2019-03-11 RX ORDER — SPIRONOLACTONE 25 MG/1
25 TABLET ORAL DAILY
Qty: 90 TABLET | Refills: 0 | Status: SHIPPED | OUTPATIENT
Start: 2019-03-11 | End: 2019-07-18 | Stop reason: SDUPTHER

## 2019-03-11 RX ORDER — LANCETS
EACH MISCELLANEOUS
Qty: 100 EACH | Refills: 11 | Status: SHIPPED | OUTPATIENT
Start: 2019-03-11

## 2019-03-18 ENCOUNTER — ANTI-COAG VISIT (OUTPATIENT)
Dept: INTERNAL MEDICINE CLINIC | Age: 74
End: 2019-03-18

## 2019-03-18 ENCOUNTER — TELEPHONE (OUTPATIENT)
Dept: INTERNAL MEDICINE CLINIC | Age: 74
End: 2019-03-18

## 2019-03-18 LAB — INR BLD: 2.1

## 2019-04-02 ENCOUNTER — TELEPHONE (OUTPATIENT)
Dept: INTERNAL MEDICINE CLINIC | Age: 74
End: 2019-04-02

## 2019-04-02 ENCOUNTER — ANTI-COAG VISIT (OUTPATIENT)
Dept: INTERNAL MEDICINE CLINIC | Age: 74
End: 2019-04-02

## 2019-04-02 LAB — INR BLD: 1.9

## 2019-04-02 NOTE — TELEPHONE ENCOUNTER
----- Message from Shama Calle MD sent at 4/2/2019 12:42 PM EDT -----  No change  2 weeks  ----- Message -----  From: Lefty Khan  Sent: 4/2/2019  10:45 AM  To: Shama Calle MD    INR 1.9  Unable to reach pt for dose.

## 2019-04-02 NOTE — TELEPHONE ENCOUNTER
----- Message from Samy Clarke sent at 4/2/2019  9:04 AM EDT -----  Contact: pt [phone]  Pt called and said Dr. Dori Calvert diagnosed him with type 2 diabetes and he wanted him to go to an endocronology education clinic at [facility], but they need a referral.  Fax: [phone]  Phone: [phone] ext 1  Last appt. 12-3-19. Next appt. 4-3-19.

## 2019-04-03 ENCOUNTER — OFFICE VISIT (OUTPATIENT)
Dept: INTERNAL MEDICINE CLINIC | Age: 74
End: 2019-04-03

## 2019-04-03 VITALS
SYSTOLIC BLOOD PRESSURE: 122 MMHG | RESPIRATION RATE: 18 BRPM | HEIGHT: 75 IN | DIASTOLIC BLOOD PRESSURE: 82 MMHG | WEIGHT: 315 LBS | BODY MASS INDEX: 39.17 KG/M2 | HEART RATE: 89 BPM

## 2019-04-03 DIAGNOSIS — G62.9 POLYNEUROPATHY: ICD-10-CM

## 2019-04-03 DIAGNOSIS — I48.4 ATYPICAL ATRIAL FLUTTER (HCC): ICD-10-CM

## 2019-04-03 DIAGNOSIS — G47.33 OSA (OBSTRUCTIVE SLEEP APNEA): ICD-10-CM

## 2019-04-03 DIAGNOSIS — I10 ESSENTIAL HYPERTENSION: ICD-10-CM

## 2019-04-03 DIAGNOSIS — E11.40 TYPE 2 DIABETES MELLITUS WITH DIABETIC NEUROPATHY, WITHOUT LONG-TERM CURRENT USE OF INSULIN (HCC): ICD-10-CM

## 2019-04-03 DIAGNOSIS — E11.40 TYPE 2 DIABETES MELLITUS WITH DIABETIC NEUROPATHY, WITHOUT LONG-TERM CURRENT USE OF INSULIN (HCC): Primary | ICD-10-CM

## 2019-04-03 DIAGNOSIS — E66.01 MORBID OBESITY WITH BMI OF 40.0-44.9, ADULT (HCC): ICD-10-CM

## 2019-04-03 PROBLEM — R42 DIZZINESS: Status: RESOLVED | Noted: 2017-07-10 | Resolved: 2019-04-03

## 2019-04-03 PROBLEM — R10.9 ABDOMINAL PAIN: Status: RESOLVED | Noted: 2019-02-15 | Resolved: 2019-04-03

## 2019-04-03 LAB
A/G RATIO: 1.4 (ref 1.1–2.2)
ALBUMIN SERPL-MCNC: 4.5 G/DL (ref 3.4–5)
ALP BLD-CCNC: 110 U/L (ref 40–129)
ALT SERPL-CCNC: 22 U/L (ref 10–40)
ANION GAP SERPL CALCULATED.3IONS-SCNC: 13 MMOL/L (ref 3–16)
AST SERPL-CCNC: 24 U/L (ref 15–37)
BILIRUB SERPL-MCNC: 0.5 MG/DL (ref 0–1)
BILIRUBIN, POC: NORMAL
BLOOD URINE, POC: NORMAL
BUN BLDV-MCNC: 26 MG/DL (ref 7–20)
CALCIUM SERPL-MCNC: 9.4 MG/DL (ref 8.3–10.6)
CHLORIDE BLD-SCNC: 100 MMOL/L (ref 99–110)
CLARITY, POC: NORMAL
CO2: 26 MMOL/L (ref 21–32)
COLOR, POC: NORMAL
CREAT SERPL-MCNC: 1.1 MG/DL (ref 0.8–1.3)
CREATININE URINE: 61 MG/DL (ref 39–259)
GFR AFRICAN AMERICAN: >60
GFR NON-AFRICAN AMERICAN: >60
GLOBULIN: 3.2 G/DL
GLUCOSE BLD-MCNC: 130 MG/DL (ref 70–99)
GLUCOSE URINE, POC: NORMAL
KETONES, POC: NORMAL
LEUKOCYTE EST, POC: NORMAL
MICROALBUMIN UR-MCNC: <1.2 MG/DL
MICROALBUMIN/CREAT UR-RTO: NORMAL MG/G (ref 0–30)
NITRITE, POC: NORMAL
PH, POC: NORMAL
POTASSIUM SERPL-SCNC: 4.4 MMOL/L (ref 3.5–5.1)
PROTEIN, POC: NORMAL
SODIUM BLD-SCNC: 139 MMOL/L (ref 136–145)
SPECIFIC GRAVITY, POC: NORMAL
TOTAL PROTEIN: 7.7 G/DL (ref 6.4–8.2)
UROBILINOGEN, POC: NORMAL

## 2019-04-03 PROCEDURE — 81002 URINALYSIS NONAUTO W/O SCOPE: CPT | Performed by: INTERNAL MEDICINE

## 2019-04-03 PROCEDURE — G8427 DOCREV CUR MEDS BY ELIG CLIN: HCPCS | Performed by: INTERNAL MEDICINE

## 2019-04-03 PROCEDURE — 2022F DILAT RTA XM EVC RTNOPTHY: CPT | Performed by: INTERNAL MEDICINE

## 2019-04-03 PROCEDURE — 3046F HEMOGLOBIN A1C LEVEL >9.0%: CPT | Performed by: INTERNAL MEDICINE

## 2019-04-03 PROCEDURE — 99214 OFFICE O/P EST MOD 30 MIN: CPT | Performed by: INTERNAL MEDICINE

## 2019-04-03 PROCEDURE — 3017F COLORECTAL CA SCREEN DOC REV: CPT | Performed by: INTERNAL MEDICINE

## 2019-04-03 PROCEDURE — G8417 CALC BMI ABV UP PARAM F/U: HCPCS | Performed by: INTERNAL MEDICINE

## 2019-04-03 PROCEDURE — 1123F ACP DISCUSS/DSCN MKR DOCD: CPT | Performed by: INTERNAL MEDICINE

## 2019-04-03 PROCEDURE — 1036F TOBACCO NON-USER: CPT | Performed by: INTERNAL MEDICINE

## 2019-04-03 PROCEDURE — 4040F PNEUMOC VAC/ADMIN/RCVD: CPT | Performed by: INTERNAL MEDICINE

## 2019-04-03 ASSESSMENT — PATIENT HEALTH QUESTIONNAIRE - PHQ9
2. FEELING DOWN, DEPRESSED OR HOPELESS: 0
SUM OF ALL RESPONSES TO PHQ9 QUESTIONS 1 & 2: 0
1. LITTLE INTEREST OR PLEASURE IN DOING THINGS: 0
SUM OF ALL RESPONSES TO PHQ QUESTIONS 1-9: 0
SUM OF ALL RESPONSES TO PHQ QUESTIONS 1-9: 0

## 2019-04-03 NOTE — PATIENT INSTRUCTIONS
Patient Self-Management Goal for Health Maintenance  Goal: I will schedule a yearly preventative care visit.   Barriers: none  Plan for overcoming my barriers: N/A  Confidence: 10/10  Anticipated Goal Completion Date: 07/03/19

## 2019-04-03 NOTE — PROGRESS NOTES
Subjective:      Patient ID: Rodriguez Gallardo is a 68 y.o. male. HPI     68 y.o. 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 ,obestiy, MARIA VICTORIA , pedal edema here for sick visit      Since last time had cholecystectomy and did well in 2/19   Made a trip to [place] and no issues    DM- 2 dx in 12/18- now on metformin 500 mg daily   Fasting range from 110 - 170   No low sugars  Has chronic tingling numbness in feet  Reports constipation with metformin     Afibb s.p ablation, stable    pulm nodule - 2016 - Right sided  pulmonary nodule was taken out with resection by Dr. Leonides Jimenez 9/16  Pathology showed histoplasmosis    Obesity - has gastric sleeve surgery in 2013 and lost 100 lbs    MARIA VICTORIA - on home cpap, compliant with it      Current Outpatient Medications   Medication Sig Dispense Refill    spironolactone (ALDACTONE) 25 MG tablet TAKE 1 TABLET BY MOUTH  DAILY 90 tablet 0    ACCU-CHEK MULTICLIX LANCETS MISC Check sugars once daily. Dx;E11.9 100 each 11    blood glucose test strips (ASCENSIA AUTODISC VI;ONE TOUCH ULTRA TEST VI) strip Use to check daily. DX: E11.9 100 each 11    montelukast (SINGULAIR) 10 MG tablet TAKE 1 TABLET BY MOUTH  DAILY (Patient taking differently: Take 10 mg by mouth nightly TAKE 1 TABLET BY MOUTH  DAILY) 90 tablet 0    diltiazem (CARDIZEM) 120 MG tablet Take 1 tablet by mouth 2 times daily Take one tablet every 12 hours 180 tablet 1    metFORMIN (GLUCOPHAGE) 500 MG tablet Take 1 tablet daily. 90 tablet 3    warfarin (COUMADIN) 5 MG tablet Take 1 tablet by mouth daily 90 tablet 3    EASY TOUCH LANCETS 32G MISC Test Daily. DX: E11.9 100 each 3    Lancet Devices (EASY TOUCH LANCING DEVICE) MISC Test Daily. DX: E11.9 1 each 0    Blood Glucose Monitoring Suppl (TRUE METRIX METER) VINNIE Use to check daily. DX;E11.9 1 Device 0    Lancets Misc. (ACCU-CHEK MULTICLIX LANCET DEV) KIT Check sugars once daily.  DX;E11.9 1 kit 0    furosemide (LASIX) 40 MG tablet TAKE 1 TABLET BY MOUTH  DAILY 90 tablet 3    metroNIDAZOLE (METROGEL) 0.75 % gel Apply topically 2 times daily. 120 g 0    dofetilide (TIKOSYN) 500 MCG capsule TAKE 1 CAPSULE BY MOUTH EVERY 12 HOURS 60 capsule 5    Biotin 5 MG TABS Take 5 mg by mouth daily      Cholecalciferol (VITAMIN D3) 5000 units TABS Take 5,000 Units by mouth daily      Cyanocobalamin (VITAMIN B-12 CR PO) Take 1 tablet by mouth See Admin Instructions Three times a week; takes Mon, Wed, and Fri      Multiple Vitamins-Minerals (THERAPEUTIC MULTIVITAMIN-MINERALS) tablet Take 2 tablets by mouth daily       magnesium oxide (MAG-OX) 400 MG tablet Take 400 mg by mouth daily. No current facility-administered medications for this visit. Review of Systems  As above    Objective:   Physical Exam   Vitals:    04/03/19 0938   BP: 122/82   Pulse: 89   Resp: 18           General: obese, healthy appearing male  Awake, alert and oriented.  Appears to be not in any distress  Mucous Membranes:  Pink , anicteric  Neck: No JVD, no carotid bruit, no thyromegaly  Chest:  Clear to auscultation bilaterally, no added sounds  Cardiovascular:  RRR S1S2 heard, no murmurs or gallops  Abdomen:  Soft, morbidly obese, undistended, non tender, no organomegaly, BS present  Extremities: 1+ pedal edema  Distal pulses well felt  Facial erythematous rash on both cheeks, frontal area noted  Neurological : grossly normal  Visual inspection:  Deformity/amputation: absent  Skin lesions/pre-ulcerative calluses: absent  Edema: right- negative, left- trace    Sensory exam:  Monofilament sensation: impaired  (minimum of 5 random plantar locations tested, avoiding callused areas - > 1 area with absence of sensation is + for neuropathy)    Plus at least one of the following:  Pulses: normal,   Pinprick: Intact  Proprioception: Impaired  Vibration (128 Hz): impaired          Stress test 10/17- neg       TTE 10/31/2017     Summary   This is a limited study pericardial constipation  Have you stopped taking any of your medications? Is so, why? -  no    Have you seen any other physician or provider since your last visit? Yes - Records Obtained Dr will review in care everywhere  Have you had any other diagnostic tests since your last visit? No  Have you been seen in the emergency room and/or had an admission to a hospital since we last saw you? Yes - Records Obtained Dr will review in care everywhere  Have you had your annual diabetic retinal (eye) exam? No  Have you had your routine dental cleaning in the past 6 months? no    Have you activated your [employer] account? If not, what are your barriers?  Yes     Patient Care Team:  Shama Calle MD as PCP - General (Internal Medicine)  Shama Calle MD as PCP - S Attributed Provider  SERENA Ayoub - CNP as Consulting Physician (Family Nurse Practitioner)  Gilbert Auguste MD as Consulting Physician (Cardiology)         Medical History Review  Past Medical, Family, and Social History reviewed and does not contribute to the patient presenting condition    Health Maintenance   Topic Date Due    Diabetic foot exam  04/13/1955    Diabetic retinal exam  04/13/1956    Diabetic microalbuminuria test  04/13/1964    DTaP/Tdap/Td vaccine (1 - Tdap) 04/13/1965    Shingles Vaccine (1 of 2) 04/13/1996    A1C test (Diabetic or Prediabetic)  12/19/2019    Lipid screen  12/19/2019    Potassium monitoring  02/19/2020    Creatinine monitoring  02/19/2020    Colon cancer screen colonoscopy  03/10/2025    Flu vaccine  Completed    Pneumococcal 65+ years Vaccine  Completed    AAA screen  Completed    Hepatitis C screen  Completed

## 2019-04-04 LAB
ESTIMATED AVERAGE GLUCOSE: 154.2 MG/DL
HBA1C MFR BLD: 7 %

## 2019-04-15 ENCOUNTER — ANTI-COAG VISIT (OUTPATIENT)
Dept: INTERNAL MEDICINE CLINIC | Age: 74
End: 2019-04-15

## 2019-04-15 LAB — INR BLD: 1.8

## 2019-04-22 ENCOUNTER — OFFICE VISIT (OUTPATIENT)
Dept: PULMONOLOGY | Age: 74
End: 2019-04-22
Payer: MEDICARE

## 2019-04-22 VITALS
DIASTOLIC BLOOD PRESSURE: 61 MMHG | SYSTOLIC BLOOD PRESSURE: 113 MMHG | OXYGEN SATURATION: 97 % | WEIGHT: 315 LBS | TEMPERATURE: 97.7 F | BODY MASS INDEX: 39.17 KG/M2 | HEIGHT: 75 IN | HEART RATE: 84 BPM | RESPIRATION RATE: 16 BRPM

## 2019-04-22 DIAGNOSIS — G47.33 OSA (OBSTRUCTIVE SLEEP APNEA): Primary | ICD-10-CM

## 2019-04-22 DIAGNOSIS — Z71.89 CPAP USE COUNSELING: ICD-10-CM

## 2019-04-22 DIAGNOSIS — E66.01 OBESITY, CLASS III, BMI 40-49.9 (MORBID OBESITY) (HCC): ICD-10-CM

## 2019-04-22 DIAGNOSIS — R68.2 DRY MOUTH: ICD-10-CM

## 2019-04-22 PROCEDURE — 1036F TOBACCO NON-USER: CPT | Performed by: NURSE PRACTITIONER

## 2019-04-22 PROCEDURE — 3017F COLORECTAL CA SCREEN DOC REV: CPT | Performed by: NURSE PRACTITIONER

## 2019-04-22 PROCEDURE — G8417 CALC BMI ABV UP PARAM F/U: HCPCS | Performed by: NURSE PRACTITIONER

## 2019-04-22 PROCEDURE — G8427 DOCREV CUR MEDS BY ELIG CLIN: HCPCS | Performed by: NURSE PRACTITIONER

## 2019-04-22 PROCEDURE — 1123F ACP DISCUSS/DSCN MKR DOCD: CPT | Performed by: NURSE PRACTITIONER

## 2019-04-22 PROCEDURE — 99213 OFFICE O/P EST LOW 20 MIN: CPT | Performed by: NURSE PRACTITIONER

## 2019-04-22 PROCEDURE — 4040F PNEUMOC VAC/ADMIN/RCVD: CPT | Performed by: NURSE PRACTITIONER

## 2019-04-22 ASSESSMENT — SLEEP AND FATIGUE QUESTIONNAIRES
HOW LIKELY ARE YOU TO NOD OFF OR FALL ASLEEP WHEN YOU ARE A PASSENGER IN A CAR FOR AN HOUR WITHOUT A BREAK: 0
ESS TOTAL SCORE: 4
HOW LIKELY ARE YOU TO NOD OFF OR FALL ASLEEP WHILE WATCHING TV: 1
HOW LIKELY ARE YOU TO NOD OFF OR FALL ASLEEP WHILE SITTING AND READING: 0
HOW LIKELY ARE YOU TO NOD OFF OR FALL ASLEEP WHILE SITTING INACTIVE IN A PUBLIC PLACE: 0
HOW LIKELY ARE YOU TO NOD OFF OR FALL ASLEEP WHILE LYING DOWN TO REST IN THE AFTERNOON WHEN CIRCUMSTANCES PERMIT: 3
NECK CIRCUMFERENCE (INCHES): 18.25
HOW LIKELY ARE YOU TO NOD OFF OR FALL ASLEEP IN A CAR, WHILE STOPPED FOR A FEW MINUTES IN TRAFFIC: 0
HOW LIKELY ARE YOU TO NOD OFF OR FALL ASLEEP WHILE SITTING AND TALKING TO SOMEONE: 0
HOW LIKELY ARE YOU TO NOD OFF OR FALL ASLEEP WHILE SITTING QUIETLY AFTER LUNCH WITHOUT ALCOHOL: 0

## 2019-04-22 NOTE — PROGRESS NOTES
CHIEF COMPLAINT: MARIA VICTORIA    Sandy Kapoor is a 69 y.o. male in office for MARIA VICTORIA follow up. Patient is using CPAP  6-8 hrs/night. Using humidifier. Possibly snoring on CPAP- unsure if snore or mask/oral leak. The pressure is well tolerated. The mask is comfortable- nasal pillows- states chin strap was too small. Some mask leak. No significant daytime sleepiness. No nodding off when driving. No dry nose or throat. No fatigue. Bedtime is  pm and rise time is 630 am. Sleep onset is few minutes. Wakes up 1-2 times at night total. 1 nocturia. It takes few minutes to fall back a sleep. Occasional naps during the day. No headache in am. No weight gain. 1 caffienated beverages during the day. No alcohol. ESS is 4.     Past Medical History:   Diagnosis Date    Arthritis     Asthma     past hx    Atrial fibrillation ([address]     Blood circulation, collateral     Cancer ([address]     RIGHT LUNG; nodule pt states that it was non-cancerous    Diabetes mellitus ([address] 12/24/2018    DVT (deep venous thrombosis) (HCC)     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    History of knee replacement procedure of right knee     Hx of blood clots     2 DVT's    Hypertension     Knee osteoarthritis 1/17/2012    Left TKR 1/18/2012    Neuromuscular disorder (HCC)     Palpitations     stable with atenolol    Sleep apnea     uses CPAP    Stone, kidney     UTI (urinary tract infection) 01/23/2017     Past Surgical History:   Procedure Laterality Date    ABLATION OF DYSRHYTHMIC FOCUS  2013    a-fib    BARIATRIC SURGERY  2013    GASTRIC SLEEVE    CARDIAC SURGERY  2013    ablation    CARPAL TUNNEL RELEASE Right 9-30-15    CARPAL TUNNEL RELEASE Left 3/20/16    CHOLECYSTECTOMY, LAPAROSCOPIC N/A 2/19/2019    LAPAROSCOPIC CHOLECYSTECTOMY WITH CHOLANGIOGRAMS performed by Jenny Bhatt MD at [address]      with removal stone    CYSTOSCOPY  2/8/13    with Laser Vaporization of Enlarged Prostate    DIAGNOSTIC CARDIAC CATH LAB PROCEDURE      ERCP  02/18/2019    Sphincterotomy, stone removal    ERCP N/A 2/18/2019    ERCP SPHINCTER/PAPILLOTOMY performed by Tran Patton MD at [address] ERCP  2/18/2019    ERCP STONE REMOVAL performed by Tran Patton MD at [address] Bilateral     right knee (2002) and left knee (2012)    KNEE ARTHROSCOPY  4/19/2011     left  knee    SKIN BIOPSY      skin Ca/SQUAMOUS CELL    THORACOTOMY      wedge resection    TONSILLECTOMY       Allergies   Allergen Reactions    Bactrim [Sulfamethoxazole-Trimethoprim] Diarrhea    Ciprofloxacin      Bad headaches    Macrobid [Nitrofurantoin Monohyd Macro]     Theophylline      Theodur-caused severe headaches    Cefuroxime Axetil Rash and Itching    Cipro Xr Rash    Other Rash and Other (See Comments)     Ekg leads-glue    Tape [Adhesive Tape] Rash     Skin irritaion  Eats away at skin, paper tape OK  Plastic tape         Current Medications:    Current Outpatient Medications:     spironolactone (ALDACTONE) 25 MG tablet, TAKE 1 TABLET BY MOUTH  DAILY, Disp: 90 tablet, Rfl: 0    ACCU-CHEK MULTICLIX LANCETS MISC, Check sugars once daily. Dx;E11.9, Disp: 100 each, Rfl: 11    blood glucose test strips (ASCENSIA AUTODISC VI;ONE TOUCH ULTRA TEST VI) strip, Use to check daily. DX: E11.9, Disp: 100 each, Rfl: 11    montelukast (SINGULAIR) 10 MG tablet, TAKE 1 TABLET BY MOUTH  DAILY (Patient taking differently: Take 10 mg by mouth nightly TAKE 1 TABLET BY MOUTH  DAILY), Disp: 90 tablet, Rfl: 0    diltiazem (CARDIZEM) 120 MG tablet, Take 1 tablet by mouth 2 times daily Take one tablet every 12 hours, Disp: 180 tablet, Rfl: 1    metFORMIN (GLUCOPHAGE) 500 MG tablet, Take 1 tablet daily. , Disp: 90 tablet, Rfl: 3    warfarin (COUMADIN) 5 MG tablet, Take 1 tablet by mouth daily, Disp: 90 tablet, Rfl: 3    EASY TOUCH LANCETS 32G MISC, Test Daily.  DX: E11.9, Disp: 100 each, Rfl: 3  

## 2019-04-22 NOTE — PROGRESS NOTES
Orders verbalized by Vipin Mejía CNP;  1 yr F/U  Orders faxed to [facility]: FF Mask, Nasal mask Mask fitting

## 2019-04-22 NOTE — PATIENT INSTRUCTIONS
.Please keep all of your future appointments scheduled by [facility] Pulmonary office. Out of respect for other patients and providers, you may be asked to reschedule your appointment if you arrive later than your scheduled appointment time. Appointments cancelled less than 24hrs in advance will be considered a no show. Patients with three missed appointments within 1 year or four missed appointments within 2 years can be dismissed from the practice. Remember to bring your sleep machine, cord and mask to each appointment    You should have a follow up appointment scheduled with a sleep medicine provider within 12 months. Routine parts, masks, tubing and filters should all be obtainable from your DME (equipment) provider. Any problems related to mask fit, comfort or functioning of equipment should also be addressed directly with your DME provider. If you are unable to resolve problems, then please notify our office and schedule an appointment to be seen sooner than the usual follow up appointment. For all patients who are evaluated for sleep disorders, never drive or operate motorized equipment while sleepy, fatigued or groggy. Please bring in all of your sleep equipment to all of your appointments, including machine, mask, cords and hoses. Here are some tips to to getting better sleep  1- Avoid napping during the day: This will ensure you are tired at bedtime. If you have to take a nap, sleep less than one hour, before 3 pm.   2- Exercise regularly, but not right before bed: but the timing of the workout is important. Exercising in the morning or early afternoon will not interfere with sleep. Exercising within two hours before bedtime can decrease your ability to fall asleep. Regular exercise is recommended to help you deepen the sleep. 3- Avoid heavy, spicy, or sugary foods 4-6 hours before bedtime: These can affect your ability to stay asleep.   4- Have a light snack before bed: Having an empty stomach can interfere with your sleep. Dairy products and turkey contain tryptophan, which acts as a natural sleep inducer. 5- Stay away from caffeine, nicotine and alcohol at least 4-6 hours before bed: Caffeine and nicotine are stimulants that interfere with your ability to fall asleep. While alcohol has an immediate sleep-inducing effect, a few hours later, as alcohol levels in your blood start to fall, there is a stimulant effect and you will experience fragmented sleep. 6- Take a hot bath 90 minutes before bedtime:  A hot bath will raise your body temperature, but it is the drop in body temperature that may leave you feeling sleepy  7- Develop sleep rituals: it is important to give your body cues that it is time to slow down and sleep. Listen to relaxing music, read something soothing for 15 minutes, have a cup of caffeine free tea, or do relaxation exercises such as yoga or deep breathing help relieve anxiety and reduce muscle tension. 8- Fix a bedtime and an awakening time: Even on weekends! When your sleep cycle has a regular rhythm, you will feel better. 9- Sleep only when sleepy: This reduces the time you are awake in bed. 10- Get into your favorite sleeping position: If you can't fall asleep within 15-30 minutes, get up and do something boring until you feel sleepy. Sit quietly in the dark or read the warranty on your refrigerator. Don't expose yourself to bright light while you are up, it gives cues to your brain that it is time to wake up. 11- Only use your bed for sleeping: Dont use the bed as an office, workroom or recreation room. Let your body \"know\" that the bed is associated with sleeping  12- Use comfortable bedding. Uncomfortable bedding can prevent good sleep. Evaluate whether or not this is a source of your problem, and make appropriate changes. 13- Make sure your bed and bedroom are quiet and comfortable: A hot room can be uncomfortable.  A cooler room, along with enough blankets to stay warm is recommended. Get a blackout shade or wear a slumber mask and wear earplugs or get a \"white noise\" machine for light and noise distractions. 14- Use sunlight to set your biological clock: When you get up in the morning, go outside and turn your face to the sun for 15 minutes. 13- Dont take your worries to bed: Leave worries about job, school, daily life, etc., behind when you go to bed. Some people find it useful to assign a \"worry period\" during the evening or afternoon for these issues. CPAP Equipment Cleaning and Disinfecting Schedule  Equipment Cleaning Frequency Instructions  Disinfecting Frequency   Non-Disposable Filters  Weekly Mild soapy water, Rinse, Air Dry Not Required   Disposable Filters Change as needed  2-4 weeks Do Not Wash Not Required   Hose/tubing Daily Mild soapy water, Rinse, Air Dry Once a week   Mask / Nasal Pillows Daily Mild soapy water, Rinse, Air Dry Once a week   Headgear Weekly Hand wash, Mild soapy water, Rinse, Dry  Not Required   Humidifier Daily Empty water daily  Mild soapy water, Rinse well, Air Dry  Once a week   CPAP Unit As Needed Dust with damp cloth,  No detergents or sprays Not Required         Disinfect (per schedule) with 1 part white vinegar and 3 parts water- soak mask and water chamber for 30 minutes every 1-2 weeks, more often if sick. Allow water/vinegar mixture to run through tubing. Allow all equipment to air dry. Drying Hints:   Always hang tubing away from direct sunlight, as this will cause the tubing to become yellow, brittle and crack over a period of time. DO NOT attach the wet tubing to your CPAP unit to blow-dry it. The moisture from the tubing can drain back into your machine. Moisture in your unit can cause sudden pressure increases or short circuits  DO's and DON'Ts:  - Don't use alcohol-based products to clean your mask, because it can cause the materials to become hard and brittle.    - Don't put headgear in the washer or dryer  - Don't use any caustic or household cleaning solutions such as bleach on your CPAP   equipment.  - Do follow the recommended cleaning schedule. - Do change your disposable filter frequently. Adapted From: [employer]/cleaning. shtm.   These are general suggestions for all models please follow specific s recommendations and specific instructions

## 2019-04-29 ENCOUNTER — ANTI-COAG VISIT (OUTPATIENT)
Dept: INTERNAL MEDICINE CLINIC | Age: 74
End: 2019-04-29

## 2019-04-29 ENCOUNTER — TELEPHONE (OUTPATIENT)
Dept: INTERNAL MEDICINE CLINIC | Age: 74
End: 2019-04-29

## 2019-04-29 LAB — INR BLD: 1.8

## 2019-04-29 NOTE — TELEPHONE ENCOUNTER
----- Message from Jimmy Hwang MD sent at 4/29/2019  9:43 AM EDT -----  Why low  No change 2 weeks    ----- Message -----  From: Jaime Noriega  Sent: 4/29/2019   9:31 AM  To: Jimmy Hwang MD    INR 1.8  Taking 5 mg daily

## 2019-05-06 ENCOUNTER — TELEPHONE (OUTPATIENT)
Dept: PULMONOLOGY | Age: 74
End: 2019-05-06

## 2019-05-06 DIAGNOSIS — G47.33 OSA (OBSTRUCTIVE SLEEP APNEA): Primary | ICD-10-CM

## 2019-05-07 ENCOUNTER — TELEPHONE (OUTPATIENT)
Dept: CARDIOLOGY CLINIC | Age: 74
End: 2019-05-07

## 2019-05-13 ENCOUNTER — ANTI-COAG VISIT (OUTPATIENT)
Dept: INTERNAL MEDICINE CLINIC | Age: 74
End: 2019-05-13

## 2019-05-13 ENCOUNTER — TELEPHONE (OUTPATIENT)
Dept: PULMONOLOGY | Age: 74
End: 2019-05-13

## 2019-05-13 DIAGNOSIS — G47.33 MODERATE OBSTRUCTIVE SLEEP APNEA: Primary | ICD-10-CM

## 2019-05-13 LAB — INR BLD: 2.2

## 2019-05-13 NOTE — TELEPHONE ENCOUNTER
[facility] called and stated that they need an order for heated tubing placed and faxed. Order pended    4/22/19  Assessment:       · Moderate MARIA VICTORIA. CPAP 12 cmH2O. Nasal pillow. Optimal compliance and efficacy on review today. · Hypersomnia/fatigue-improved  · HTN  · Afib s/p ablation x2 on cardizem and tikosyn  · Dry mouth      Plan:       · Continue CPAP 12 cm H2O  · Heated tubing with new CPAP- will resend heated tubing order  · Consider trial bigger chin strap with nasal pillows or different full face mask to help with dry mouth  · Mask fit in office with RT-okay per patient  · Mask fit at [facility] resmed F30, lalito view or dreamwear full face may be good options  · Patient to call if snoring on CPAP    · Consider humidifier in room if continues dry mouth  · Advised to use CPAP 6-8 hrs at night and during naps. · Replacement of mask, tubing, head straps every 3-6 months or sooner if damaged. · Patient instructed to contact DME company for any mask, tubing or machine trouble shooting if problems arise. · Sleep hygiene  · Cognitive behavioral therapy was discussed with patient including stimulus control and sleep restriction  · Set bed/wake times, no naps in daytime  · Avoid sedatives, alcohol and caffeinated drinks at bed time. · Patient counseled to never drive or operate heavy machinery while fatigue, drowsy or sleepy. · Weight loss is recommended as a long-term intervention. · Complications of MARIA VICTORIA if not treated were discussed with patient patient, including: systemic hypertension, pulmonary hypertension, cardiovascular morbidities, car accidents and all cause mortality.   · Patient education handout provided regarding sleep tips and CPAP cleaning recommendations      Follow up 1 year, sooner if needed            Other Notes   All notes       Progress Notes from Parish Wall LPN

## 2019-05-24 ENCOUNTER — TELEPHONE (OUTPATIENT)
Dept: INTERNAL MEDICINE CLINIC | Age: 74
End: 2019-05-24

## 2019-05-24 RX ORDER — BENZONATATE 200 MG/1
200 CAPSULE ORAL 3 TIMES DAILY PRN
Qty: 30 CAPSULE | Refills: 0 | Status: SHIPPED | OUTPATIENT
Start: 2019-05-24 | End: 2019-05-31

## 2019-05-24 NOTE — TELEPHONE ENCOUNTER
----- Message from Tala Newman MD sent at 5/24/2019  4:34 PM EDT -----  Contact: pt called [phone]  Tessalon 200 mg po tid # 30  ----- Message -----  From: Oriana Lala  Sent: 5/24/2019   3:48 PM  To: Tala Newman MD    Pt of dr. Ousmane Lee has a really bad cough and would like something called in for him. He is on a really strong heart medication, and is unsure what he can take with it. Could you let him know or call him something into [facility] [phone]. Pt's phone [phone]. Symptoms started last night.

## 2019-05-28 ENCOUNTER — ANTI-COAG VISIT (OUTPATIENT)
Dept: INTERNAL MEDICINE CLINIC | Age: 74
End: 2019-05-28

## 2019-05-28 LAB — INR BLD: 2.1

## 2019-05-28 NOTE — TELEPHONE ENCOUNTER
----- Message from Pool Tolentino MD sent at 5/28/2019 11:47 AM EDT -----  Contact: pt  Please do refill as needed   Twice daily    ----- Message -----  From: Laney Lees  Sent: 5/28/2019   9:57 AM  To: Pool Tolentino MD    Pt called and said he has a device to test for his diabetes, he said his device is requiring him to test at least twice daily now so he needs a new prescription for his True Metrix test strips because he is running out. [address] [place] [address] appt. 4-3-19. Next appt. 8-14-19.

## 2019-06-03 ENCOUNTER — OFFICE VISIT (OUTPATIENT)
Dept: INTERNAL MEDICINE CLINIC | Age: 74
End: 2019-06-03

## 2019-06-03 VITALS
RESPIRATION RATE: 18 BRPM | BODY MASS INDEX: 39.17 KG/M2 | WEIGHT: 315 LBS | SYSTOLIC BLOOD PRESSURE: 145 MMHG | HEIGHT: 75 IN | HEART RATE: 70 BPM | DIASTOLIC BLOOD PRESSURE: 75 MMHG

## 2019-06-03 DIAGNOSIS — J20.9 ACUTE BRONCHITIS, UNSPECIFIED ORGANISM: Primary | ICD-10-CM

## 2019-06-03 PROCEDURE — 1123F ACP DISCUSS/DSCN MKR DOCD: CPT | Performed by: INTERNAL MEDICINE

## 2019-06-03 PROCEDURE — 99213 OFFICE O/P EST LOW 20 MIN: CPT | Performed by: INTERNAL MEDICINE

## 2019-06-03 PROCEDURE — G8417 CALC BMI ABV UP PARAM F/U: HCPCS | Performed by: INTERNAL MEDICINE

## 2019-06-03 PROCEDURE — 3017F COLORECTAL CA SCREEN DOC REV: CPT | Performed by: INTERNAL MEDICINE

## 2019-06-03 PROCEDURE — 4040F PNEUMOC VAC/ADMIN/RCVD: CPT | Performed by: INTERNAL MEDICINE

## 2019-06-03 PROCEDURE — 1036F TOBACCO NON-USER: CPT | Performed by: INTERNAL MEDICINE

## 2019-06-03 PROCEDURE — G8427 DOCREV CUR MEDS BY ELIG CLIN: HCPCS | Performed by: INTERNAL MEDICINE

## 2019-06-03 RX ORDER — ALBUTEROL SULFATE 90 UG/1
2 AEROSOL, METERED RESPIRATORY (INHALATION) 4 TIMES DAILY PRN
Qty: 1 INHALER | Refills: 0 | Status: SHIPPED | OUTPATIENT
Start: 2019-06-03 | End: 2019-06-26 | Stop reason: SDUPTHER

## 2019-06-03 RX ORDER — AZITHROMYCIN 250 MG/1
TABLET, FILM COATED ORAL
Qty: 1 PACKET | Refills: 0 | Status: SHIPPED | OUTPATIENT
Start: 2019-06-03 | End: 2019-08-14 | Stop reason: ALTCHOICE

## 2019-06-03 NOTE — PROGRESS NOTES
Subjective:      Patient ID: Odalis Patino is a 69 y.o. male. HPI     69 y.o. 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 ,obestiy, MARIA VICTORIA , pedal edema here for sick visit    Cough x 1 week, with clear sputum, wheeze + sob  No new pedal edema  No fevers or chills  No sick contacts  Remote hx of asthma but not active >4 weeks            Current Outpatient Medications   Medication Sig Dispense Refill    blood glucose test strips (TRUE METRIX BLOOD GLUCOSE TEST) strip Test twice daily. DX: E11.9 100 each 11    spironolactone (ALDACTONE) 25 MG tablet TAKE 1 TABLET BY MOUTH  DAILY 90 tablet 0    ACCU-CHEK MULTICLIX LANCETS MISC Check sugars once daily. Dx;E11.9 100 each 11    blood glucose test strips (ASCENSIA AUTODISC VI;ONE TOUCH ULTRA TEST VI) strip Use to check daily. DX: E11.9 100 each 11    montelukast (SINGULAIR) 10 MG tablet TAKE 1 TABLET BY MOUTH  DAILY (Patient taking differently: Take 10 mg by mouth nightly TAKE 1 TABLET BY MOUTH  DAILY) 90 tablet 0    diltiazem (CARDIZEM) 120 MG tablet Take 1 tablet by mouth 2 times daily Take one tablet every 12 hours 180 tablet 1    metFORMIN (GLUCOPHAGE) 500 MG tablet Take 1 tablet daily. 90 tablet 3    warfarin (COUMADIN) 5 MG tablet Take 1 tablet by mouth daily 90 tablet 3    EASY TOUCH LANCETS 32G MISC Test Daily. DX: E11.9 100 each 3    Lancet Devices (EASY TOUCH LANCING DEVICE) MISC Test Daily. DX: E11.9 1 each 0    Blood Glucose Monitoring Suppl (TRUE METRIX METER) VINNIE Use to check daily. DX;E11.9 1 Device 0    Lancets Misc. (ACCU-CHEK MULTICLIX LANCET DEV) KIT Check sugars once daily. DX;E11.9 1 kit 0    furosemide (LASIX) 40 MG tablet TAKE 1 TABLET BY MOUTH  DAILY 90 tablet 3    metroNIDAZOLE (METROGEL) 0.75 % gel Apply topically 2 times daily.  120 g 0    dofetilide (TIKOSYN) 500 MCG capsule TAKE 1 CAPSULE BY MOUTH EVERY 12 HOURS 60 capsule 5    Biotin 5 MG TABS Take 5 mg by mouth daily      Cholecalciferol (VITAMIN D3) 5000 units TABS Take 5,000 Units by mouth daily      Cyanocobalamin (VITAMIN B-12 CR PO) Take 1 tablet by mouth See Admin Instructions Three times a week; takes Mon, Wed, and Fri      Multiple Vitamins-Minerals (THERAPEUTIC MULTIVITAMIN-MINERALS) tablet Take 2 tablets by mouth daily       magnesium oxide (MAG-OX) 400 MG tablet Take 400 mg by mouth daily. No current facility-administered medications for this visit. Review of Systems  As above    Objective:   Physical Exam   Vitals:    06/03/19 1606   BP: (!) 145/75   Pulse: 70   Resp: 18           General: obese, healthy appearing male  Awake, alert and oriented.  Appears to be not in any distress  Mucous Membranes:  Pink , anicteric  Neck: No JVD, no carotid bruit, no thyromegaly  Chest: sergio air entry with scattered wheeze clears with coughing  Cardiovascular:  RRR S1S2 heard, no murmurs or gallops  Abdomen:  Soft, morbidly obese, undistended, non tender, no organomegaly, BS present  Extremities: 1+ pedal edema  Distal pulses well felt  Facial erythematous rash on both cheeks, frontal area noted  Neurological : grossly normal    Assessment and Plan    Acute bronchitis  With cough  - add z pack , albuterol inhaler   - has cough meds at home

## 2019-06-10 ENCOUNTER — TELEPHONE (OUTPATIENT)
Dept: INTERNAL MEDICINE CLINIC | Age: 74
End: 2019-06-10

## 2019-06-10 ENCOUNTER — ANTI-COAG VISIT (OUTPATIENT)
Dept: INTERNAL MEDICINE CLINIC | Age: 74
End: 2019-06-10

## 2019-06-10 LAB — INR BLD: 1.7

## 2019-06-10 NOTE — TELEPHONE ENCOUNTER
----- Message from Abdoulaye Mendieta MD sent at 6/10/2019  2:01 PM EDT -----  No change  1 week    ----- Message -----  From: Ladonna Clarke MA  Sent: 6/10/2019   1:51 PM  To: Abdoulaye Mendieta MD    INR is 1.7, currently taking 5 mg daily. Please advise.

## 2019-06-17 ENCOUNTER — TELEPHONE (OUTPATIENT)
Dept: INTERNAL MEDICINE CLINIC | Age: 74
End: 2019-06-17

## 2019-06-17 ENCOUNTER — ANTI-COAG VISIT (OUTPATIENT)
Dept: INTERNAL MEDICINE CLINIC | Age: 74
End: 2019-06-17

## 2019-06-17 LAB — INR BLD: 1.7

## 2019-06-17 NOTE — TELEPHONE ENCOUNTER
----- Message from Casimiro Valentin MD sent at 6/17/2019  4:41 PM EDT -----  Low why  Take extra one today, resume regular dose in am  1 week    ----- Message -----  From: Neo Nayak MA  Sent: 6/17/2019   3:30 PM  To: Casimiro Valentin MD    INR is 1.7, currently taking 5 mg daily. Please advise.

## 2019-06-21 ENCOUNTER — HOSPITAL ENCOUNTER (OUTPATIENT)
Age: 74
Discharge: HOME | End: 2019-06-21
Payer: MEDICARE

## 2019-06-21 LAB
A/G RATIO: 1.3 (ref 1.1–2.2)
ALBUMIN SERPL-MCNC: 4.4 G/DL (ref 3.4–5)
ALP BLD-CCNC: 105 U/L (ref 40–129)
ALT SERPL-CCNC: 22 U/L (ref 10–40)
ANION GAP SERPL CALCULATED.3IONS-SCNC: 13 MMOL/L (ref 3–16)
AST SERPL-CCNC: 19 U/L (ref 15–37)
BILIRUB SERPL-MCNC: 0.6 MG/DL (ref 0–1)
BUN BLDV-MCNC: 19 MG/DL (ref 7–20)
CALCIUM SERPL-MCNC: 9.6 MG/DL (ref 8.3–10.6)
CHLORIDE BLD-SCNC: 99 MMOL/L (ref 99–110)
CO2: 26 MMOL/L (ref 21–32)
CREAT SERPL-MCNC: 1.1 MG/DL (ref 0.8–1.3)
FERRITIN: 100.1 NG/ML (ref 30–400)
FOLATE: >20 NG/ML (ref 4.78–24.2)
GFR AFRICAN AMERICAN: >60
GFR NON-AFRICAN AMERICAN: >60
GLOBULIN: 3.3 G/DL
GLUCOSE BLD-MCNC: 123 MG/DL (ref 70–99)
HCT VFR BLD CALC: 38.6 % (ref 40.5–52.5)
HEMOGLOBIN: 12.7 G/DL (ref 13.5–17.5)
IRON SATURATION: 22 % (ref 20–50)
IRON: 59 UG/DL (ref 59–158)
MCH RBC QN AUTO: 29.6 PG (ref 26–34)
MCHC RBC AUTO-ENTMCNC: 33 G/DL (ref 31–36)
MCV RBC AUTO: 89.5 FL (ref 80–100)
PDW BLD-RTO: 16.5 % (ref 12.4–15.4)
PLATELET # BLD: 238 K/UL (ref 135–450)
PMV BLD AUTO: 8.2 FL (ref 5–10.5)
POTASSIUM SERPL-SCNC: 4.4 MMOL/L (ref 3.5–5.1)
RBC # BLD: 4.31 M/UL (ref 4.2–5.9)
SODIUM BLD-SCNC: 138 MMOL/L (ref 136–145)
TOTAL IRON BINDING CAPACITY: 264 UG/DL (ref 260–445)
TOTAL PROTEIN: 7.7 G/DL (ref 6.4–8.2)
TSH SERPL DL<=0.05 MIU/L-ACNC: 1.15 UIU/ML (ref 0.27–4.2)
VITAMIN B-12: >2000 PG/ML (ref 211–911)
VITAMIN D 25-HYDROXY: 66.6 NG/ML
WBC # BLD: 7.5 K/UL (ref 4–11)

## 2019-06-21 PROCEDURE — 84443 ASSAY THYROID STIM HORMONE: CPT

## 2019-06-21 PROCEDURE — 83550 IRON BINDING TEST: CPT

## 2019-06-21 PROCEDURE — 84207 ASSAY OF VITAMIN B-6: CPT

## 2019-06-21 PROCEDURE — 82728 ASSAY OF FERRITIN: CPT

## 2019-06-21 PROCEDURE — 82746 ASSAY OF FOLIC ACID SERUM: CPT

## 2019-06-21 PROCEDURE — 84630 ASSAY OF ZINC: CPT

## 2019-06-21 PROCEDURE — 85027 COMPLETE CBC AUTOMATED: CPT

## 2019-06-21 PROCEDURE — 82306 VITAMIN D 25 HYDROXY: CPT

## 2019-06-21 PROCEDURE — 84446 ASSAY OF VITAMIN E: CPT

## 2019-06-21 PROCEDURE — 80053 COMPREHEN METABOLIC PANEL: CPT

## 2019-06-21 PROCEDURE — 82607 VITAMIN B-12: CPT

## 2019-06-21 PROCEDURE — 83540 ASSAY OF IRON: CPT

## 2019-06-21 PROCEDURE — 36415 COLL VENOUS BLD VENIPUNCTURE: CPT

## 2019-06-21 PROCEDURE — 84425 ASSAY OF VITAMIN B-1: CPT

## 2019-06-24 ENCOUNTER — ANTI-COAG VISIT (OUTPATIENT)
Dept: INTERNAL MEDICINE CLINIC | Age: 74
End: 2019-06-24

## 2019-06-24 ENCOUNTER — TELEPHONE (OUTPATIENT)
Dept: INTERNAL MEDICINE CLINIC | Age: 74
End: 2019-06-24

## 2019-06-24 LAB
ALPHA-TOCOPHEROL: 10 MG/L (ref 5.5–18)
GAMMA-TOCOPHEROL: 1 MG/L (ref 0–6)
INR BLD: 1.8
REASON FOR REJECTION: NORMAL
REJECTED TEST: NORMAL

## 2019-06-24 NOTE — TELEPHONE ENCOUNTER
----- Message from Rosa Robbins MD sent at 6/24/2019  3:45 PM EDT -----  No change  1 week    ----- Message -----  From: Tasha Galeas MA  Sent: 6/24/2019   2:24 PM  To: Rosa Robbins MD    INR 1.8.  5 mg daily. Please advise.

## 2019-06-25 LAB
VITAMIN B1 WHOLE BLOOD: 74 NMOL/L (ref 70–180)
VITAMIN B6: 65.7 NMOL/L (ref 20–125)

## 2019-06-27 RX ORDER — ALBUTEROL SULFATE 90 UG/1
2 AEROSOL, METERED RESPIRATORY (INHALATION) 4 TIMES DAILY PRN
Qty: 1 INHALER | Refills: 1 | Status: SHIPPED | OUTPATIENT
Start: 2019-06-27 | End: 2020-10-04

## 2019-07-02 ENCOUNTER — TELEPHONE (OUTPATIENT)
Dept: INTERNAL MEDICINE CLINIC | Age: 74
End: 2019-07-02

## 2019-07-02 ENCOUNTER — ANTI-COAG VISIT (OUTPATIENT)
Dept: INTERNAL MEDICINE CLINIC | Age: 74
End: 2019-07-02

## 2019-07-02 LAB
INR BLD: 1.5
INR BLD: 1.5

## 2019-07-08 ENCOUNTER — TELEPHONE (OUTPATIENT)
Dept: INTERNAL MEDICINE CLINIC | Age: 74
End: 2019-07-08

## 2019-07-08 ENCOUNTER — ANTI-COAG VISIT (OUTPATIENT)
Dept: INTERNAL MEDICINE CLINIC | Age: 74
End: 2019-07-08

## 2019-07-08 LAB — INR BLD: 2

## 2019-07-09 RX ORDER — MONTELUKAST SODIUM 10 MG/1
TABLET ORAL
Qty: 90 TABLET | Refills: 0 | Status: SHIPPED | OUTPATIENT
Start: 2019-07-09 | End: 2019-10-21 | Stop reason: SDUPTHER

## 2019-07-09 RX ORDER — DILTIAZEM HYDROCHLORIDE 120 MG/1
120 TABLET, FILM COATED ORAL 2 TIMES DAILY
Qty: 180 TABLET | Refills: 0 | Status: SHIPPED | OUTPATIENT
Start: 2019-07-09 | End: 2019-08-12 | Stop reason: SDUPTHER

## 2019-07-15 ENCOUNTER — ANTI-COAG VISIT (OUTPATIENT)
Dept: INTERNAL MEDICINE CLINIC | Age: 74
End: 2019-07-15

## 2019-07-15 ENCOUNTER — TELEPHONE (OUTPATIENT)
Dept: INTERNAL MEDICINE CLINIC | Age: 74
End: 2019-07-15

## 2019-07-15 LAB — INR BLD: 2

## 2019-07-18 RX ORDER — SPIRONOLACTONE 25 MG/1
25 TABLET ORAL DAILY
Qty: 90 TABLET | Refills: 0 | Status: SHIPPED | OUTPATIENT
Start: 2019-07-18 | End: 2019-08-12 | Stop reason: SDUPTHER

## 2019-07-22 ENCOUNTER — TELEPHONE (OUTPATIENT)
Dept: CARDIOLOGY CLINIC | Age: 74
End: 2019-07-22

## 2019-07-29 ENCOUNTER — TELEPHONE (OUTPATIENT)
Dept: INTERNAL MEDICINE CLINIC | Age: 74
End: 2019-07-29

## 2019-07-29 ENCOUNTER — ANTI-COAG VISIT (OUTPATIENT)
Dept: INTERNAL MEDICINE CLINIC | Age: 74
End: 2019-07-29

## 2019-07-29 LAB — INR BLD: 2.1

## 2019-07-29 NOTE — TELEPHONE ENCOUNTER
----- Message from Roxanna Love MD sent at 7/29/2019  1:26 PM EDT -----  No change  2 weeks  ----- Message -----  From: Kael Warner  Sent: 7/29/2019   1:04 PM EDT  To: Roxanna Love MD    INR 2.1

## 2019-08-08 ENCOUNTER — TELEPHONE (OUTPATIENT)
Dept: INTERNAL MEDICINE CLINIC | Age: 74
End: 2019-08-08

## 2019-08-08 NOTE — TELEPHONE ENCOUNTER
Agency informed. ----- Message from Lee Canada MD sent at 8/8/2019  4:37 PM EDT -----  Contact: [facility], [address] to do every 2 weeks    ----- Message -----  From: Jp Ewing  Sent: 8/8/2019   4:17 PM EDT  To: Lee Canada MD    [facility], [phone], called requesting verbal orders for pt to test INR with his meter at home 2 times per month instead of once a week.

## 2019-08-08 NOTE — PROGRESS NOTES
was readmitted to [facility] in November 2016 AFIB. He has had an ablation in 2012 for PVC's (Dr. Diogo Orozco). Review of Systems:   12 point ROS negative in all areas as listed below except as in [place]  Constitutional, EENT, Cardiovascular, pulmonary, GI, , Musculoskeletal, skin, neurological, hematological, endocrine, Psychiatric    Reviewed past medical history, social, and family history.    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    History of knee replacement procedure of right knee     Hx of blood clots     2 DVT's    Hypertension     Knee osteoarthritis 1/17/2012    Left TKR 1/18/2012    Neuromuscular disorder (HCC)     Palpitations     stable with atenolol    Sleep apnea     uses CPAP    Stone, kidney     UTI (urinary tract infection) 01/23/2017     Past Surgical History:   Procedure Laterality Date    ABLATION OF DYSRHYTHMIC FOCUS  2013    a-fib    BARIATRIC SURGERY  2013    GASTRIC SLEEVE    CARDIAC SURGERY  2013    ablation    CARPAL TUNNEL RELEASE Right 9-30-15    CARPAL TUNNEL RELEASE Left 3/20/16    CHOLECYSTECTOMY, LAPAROSCOPIC N/A 2/19/2019    LAPAROSCOPIC CHOLECYSTECTOMY WITH CHOLANGIOGRAMS performed by Citlaly Brink MD at [address]      with removal stone    CYSTOSCOPY  2/8/13    with Laser Vaporization of Enlarged Prostate    DIAGNOSTIC CARDIAC CATH LAB PROCEDURE      ERCP  02/18/2019    Sphincterotomy, stone removal    ERCP N/A 2/18/2019    ERCP SPHINCTER/PAPILLOTOMY performed by Sheba James MD at [address] [facility] ERCP  2/18/2019    ERCP STONE REMOVAL performed by Latanya Weiner MD at [facility] Bilateral     right knee (2002) and left knee (2012)    KNEE ARTHROSCOPY  4/19/2011     left  knee    SKIN BIOPSY      skin Ca/SQUAMOUS CELL    THORACOTOMY      wedge resection    TONSILLECTOMY         Objective:   /64   Pulse 69   Ht 6' 3\" (1.905 m)   Wt (!) 337 lb (152.9 kg)   SpO2 98%   BMI 42.12 kg/m²     Wt Readings from Last 3 Encounters:   08/12/19 (!) 337 lb (152.9 kg)   06/03/19 (!) 329 lb (149.2 kg)   04/22/19 (!) 330 lb (149.7 kg)       Physical Exam:  General: No Respiratory distress, appears well developed and well nourished. Eyes:  Sclera nonicteric  Nose/Sinuses:  negative findings: nose shows no deformity, asymmetry, or inflammation, nasal mucosa normal, septum midline with no perforation or bleeding  Back:  no pain to palpation  Joint:  no active joint inflammation  Musculoskeletal:  negative  Skin:  Warm and dry  Neck:  Negative for JVD and Carotid Bruits. Chest:  Clear to auscultation, respiration easy  Cardiovascular:  RRR, S1S2 normal, no murmur, no rub or thrill. Abdomen:  Soft normal liver and spleen  Extremities:   2+ bilat leg edema from chronic venous insuff, no clubbing, cyanosis,  Pulses:  pedal pulses are normal.  Neuro: intact    Medications:   Outpatient Encounter Medications as of 8/12/2019   Medication Sig Dispense Refill    atorvastatin (LIPITOR) 40 MG tablet Take 40 mg by mouth daily      spironolactone (ALDACTONE) 25 MG tablet Take 1 tablet by mouth daily 90 tablet 0    diltiazem (CARDIZEM) 120 MG tablet Take 1 tablet by mouth 2 times daily Take one tablet every 12 hours 180 tablet 0    montelukast (SINGULAIR) 10 MG tablet TAKE 1 TABLET BY MOUTH  DAILY 90 tablet 0    blood glucose test strips (TRUE METRIX BLOOD GLUCOSE TEST) strip Test twice daily. DX: E11.9 100 each 11    ACCU-CHEK MULTICLIX LANCETS MISC Check sugars once daily.  Dx;E11.9 100 each 11    blood glucose test strips (ASCENSIA AUTODISC VI;ONE TOUCH ULTRA TEST VI) strip Use to check daily. DX: E11.9 100 each 11    metFORMIN (GLUCOPHAGE) 500 MG tablet Take 1 tablet daily. (Patient taking differently: 2 times daily (with meals) Take 1 tablet daily.) 90 tablet 3    warfarin (COUMADIN) 5 MG tablet Take 1 tablet by mouth daily 90 tablet 3    EASY TOUCH LANCETS 32G MISC Test Daily. DX: E11.9 100 each 3    Lancet Devices (EASY TOUCH LANCING DEVICE) MISC Test Daily. DX: E11.9 1 each 0    Blood Glucose Monitoring Suppl (TRUE METRIX METER) VINNIE Use to check daily. DX;E11.9 1 Device 0    Lancets Misc. (ACCU-CHEK MULTICLIX LANCET DEV) KIT Check sugars once daily. DX;E11.9 1 kit 0    dofetilide (TIKOSYN) 500 MCG capsule TAKE 1 CAPSULE BY MOUTH EVERY 12 HOURS 60 capsule 5    Biotin 5 MG TABS Take 5 mg by mouth daily      Cholecalciferol (VITAMIN D3) 5000 units TABS Take 5,000 Units by mouth daily      Cyanocobalamin (VITAMIN B-12 CR PO) Take 1 tablet by mouth See Admin Instructions Once a week      Multiple Vitamins-Minerals (THERAPEUTIC MULTIVITAMIN-MINERALS) tablet Take 2 tablets by mouth daily       magnesium oxide (MAG-OX) 400 MG tablet Take 400 mg by mouth daily.  albuterol sulfate  (90 Base) MCG/ACT inhaler Inhale 2 puffs into the lungs 4 times daily as needed for Wheezing (Patient not taking: Reported on 8/12/2019) 1 Inhaler 1    azithromycin (ZITHROMAX Z-CHITO) 250 MG tablet Use as directed (Patient not taking: Reported on 8/12/2019) 1 packet 0    [DISCONTINUED] furosemide (LASIX) 40 MG tablet TAKE 1 TABLET BY MOUTH  DAILY 90 tablet 3    metroNIDAZOLE (METROGEL) 0.75 % gel Apply topically 2 times daily. (Patient not taking: Reported on 8/12/2019) 120 g 0     No facility-administered encounter medications on file as of 8/12/2019. Lab Data:  CBC:   No results for input(s): WBC, HGB, HCT, MCV, PLT in the last 72 hours.   BMP:   No results for input(s): NA, K, CL, CO2, PHOS, BUN, CREATININE in the last 72 hours. Invalid input(s): CA  LIVER PROFILE:   No results for input(s): AST, ALT, LIPASE, BILIDIR, BILITOT, ALKPHOS in the last 72 hours. Invalid input(s): AMYLASE,  ALB  LIPID:   Lab Results   Component Value Date    CHOL 222 (H) 12/19/2018    CHOL 182 10/31/2017    CHOL 208 (H) 11/11/2016     Lab Results   Component Value Date    TRIG 159 (H) 12/19/2018    TRIG 126 10/31/2017    TRIG 161 (H) 11/11/2016     Lab Results   Component Value Date    HDL 40 12/19/2018    HDL 33 (L) 10/31/2017    HDL 35 (L) 11/11/2016     Lab Results   Component Value Date    LDLCALC 150 (H) 12/19/2018    LDLCALC 124 (H) 10/31/2017    LDLCALC 141 (H) 11/11/2016     Lab Results   Component Value Date    LABVLDL 32 12/19/2018    LABVLDL 25 10/31/2017    LABVLDL 32 11/11/2016     No results found for: CHOLHDLRATIO  PT/INR:   No results for input(s): PROTIME, INR in the last 72 hours. A1C:   Lab Results   Component Value Date    LABA1C 7.0 04/03/2019     BNP:  No results for input(s): BNP in the last 72 hours. IMAGING:  EKG 8/12/19  Sinus bradycardia  1 degree AV block QTc normal     EKG 2/15/19   Sinus rhythm with 1st degree A-V block with Premature atrial complexesOtherwise normal ECGWhen compared with ECG of 14-FEB-2019 21:34, (unconfirmed)Premature atrial complexes are now PresentConfirmed by Teresita Sparrow MD, [address] ([id number]) on 2/15/2019 1:53:11 PM    Chest Xray 2/15/19  No acute process. EKG 1/29/18  Sinus  Rhythm  -First degree A-V block   Yuli = 296 Nonspecific T-abnormality. Stress 11/1/17  Summary  There is no evidence of stress induced ischemia. Normal LV function with  ejection fraction of 66%. There are no regional wall motion abnormalities. Low risk study.      ECHO 10/31/17  Summary   This is a limited study pericardial effusion and shunt.   Normal left ventricular systolic function with an estimated ejection   fraction of 55%.   A bubble study was performed and fails to show evidence of

## 2019-08-12 ENCOUNTER — OFFICE VISIT (OUTPATIENT)
Dept: CARDIOLOGY CLINIC | Age: 74
End: 2019-08-12
Payer: MEDICARE

## 2019-08-12 ENCOUNTER — ANTI-COAG VISIT (OUTPATIENT)
Dept: INTERNAL MEDICINE CLINIC | Age: 74
End: 2019-08-12

## 2019-08-12 ENCOUNTER — TELEPHONE (OUTPATIENT)
Dept: INTERNAL MEDICINE CLINIC | Age: 74
End: 2019-08-12

## 2019-08-12 VITALS
BODY MASS INDEX: 39.17 KG/M2 | HEART RATE: 69 BPM | SYSTOLIC BLOOD PRESSURE: 114 MMHG | WEIGHT: 315 LBS | DIASTOLIC BLOOD PRESSURE: 64 MMHG | HEIGHT: 75 IN | OXYGEN SATURATION: 98 %

## 2019-08-12 DIAGNOSIS — I87.2 EDEMA OF BOTH LOWER LEGS DUE TO PERIPHERAL VENOUS INSUFFICIENCY: Primary | ICD-10-CM

## 2019-08-12 DIAGNOSIS — I48.0 PAF (PAROXYSMAL ATRIAL FIBRILLATION) (HCC): ICD-10-CM

## 2019-08-12 DIAGNOSIS — I10 ESSENTIAL HYPERTENSION: ICD-10-CM

## 2019-08-12 DIAGNOSIS — R60.0 EDEMA OF BOTH LOWER LEGS DUE TO PERIPHERAL VENOUS INSUFFICIENCY: Primary | ICD-10-CM

## 2019-08-12 LAB — INR BLD: 1.9

## 2019-08-12 PROCEDURE — 99214 OFFICE O/P EST MOD 30 MIN: CPT | Performed by: INTERNAL MEDICINE

## 2019-08-12 PROCEDURE — G8417 CALC BMI ABV UP PARAM F/U: HCPCS | Performed by: INTERNAL MEDICINE

## 2019-08-12 PROCEDURE — 93000 ELECTROCARDIOGRAM COMPLETE: CPT | Performed by: INTERNAL MEDICINE

## 2019-08-12 PROCEDURE — 1036F TOBACCO NON-USER: CPT | Performed by: INTERNAL MEDICINE

## 2019-08-12 PROCEDURE — G8427 DOCREV CUR MEDS BY ELIG CLIN: HCPCS | Performed by: INTERNAL MEDICINE

## 2019-08-12 PROCEDURE — 3017F COLORECTAL CA SCREEN DOC REV: CPT | Performed by: INTERNAL MEDICINE

## 2019-08-12 PROCEDURE — 4040F PNEUMOC VAC/ADMIN/RCVD: CPT | Performed by: INTERNAL MEDICINE

## 2019-08-12 PROCEDURE — 1123F ACP DISCUSS/DSCN MKR DOCD: CPT | Performed by: INTERNAL MEDICINE

## 2019-08-12 RX ORDER — ATORVASTATIN CALCIUM 40 MG/1
40 TABLET, FILM COATED ORAL DAILY
COMMUNITY
End: 2019-08-12 | Stop reason: SDUPTHER

## 2019-08-12 RX ORDER — DILTIAZEM HYDROCHLORIDE 120 MG/1
120 TABLET, FILM COATED ORAL 2 TIMES DAILY
Qty: 180 TABLET | Refills: 0 | Status: SHIPPED | OUTPATIENT
Start: 2019-08-12 | End: 2020-01-08

## 2019-08-12 RX ORDER — SPIRONOLACTONE 25 MG/1
25 TABLET ORAL DAILY
Qty: 90 TABLET | Refills: 3 | Status: SHIPPED | OUTPATIENT
Start: 2019-08-12 | End: 2020-09-03 | Stop reason: SDUPTHER

## 2019-08-12 RX ORDER — TORSEMIDE 20 MG/1
20 TABLET ORAL 2 TIMES DAILY
Qty: 180 TABLET | Refills: 3 | Status: SHIPPED | OUTPATIENT
Start: 2019-08-12 | End: 2020-05-27

## 2019-08-12 RX ORDER — ATORVASTATIN CALCIUM 40 MG/1
40 TABLET, FILM COATED ORAL DAILY
Qty: 90 TABLET | Refills: 3 | Status: SHIPPED | OUTPATIENT
Start: 2019-08-12 | End: 2020-08-17

## 2019-08-12 NOTE — LETTER
fungal histoplasmosis. He was readmitted to [facility] in November 2016 AFIB. He has had an ablation in 2012 for PVC's (Dr. Aranza Bautista). Review of Systems:   12 point ROS negative in all areas as listed below except as in [place]  Constitutional, EENT, Cardiovascular, pulmonary, GI, , Musculoskeletal, skin, neurological, hematological, endocrine, Psychiatric    Reviewed past medical history, social, and family history.    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    History of knee replacement procedure of right knee     Hx of blood clots     2 DVT's    Hypertension     Knee osteoarthritis 1/17/2012    Left TKR 1/18/2012    Neuromuscular disorder (HCC)     Palpitations     stable with atenolol    Sleep apnea     uses CPAP    Stone, kidney     UTI (urinary tract infection) 01/23/2017     Past Surgical History:   Procedure Laterality Date    ABLATION OF DYSRHYTHMIC FOCUS  2013    a-fib    BARIATRIC SURGERY  2013    GASTRIC SLEEVE    CARDIAC SURGERY  2013    ablation    CARPAL TUNNEL RELEASE Right 9-30-15    CARPAL TUNNEL RELEASE Left 3/20/16    CHOLECYSTECTOMY, LAPAROSCOPIC N/A 2/19/2019    LAPAROSCOPIC CHOLECYSTECTOMY WITH CHOLANGIOGRAMS performed by Florecita Chavira MD at [address]      with removal stone    CYSTOSCOPY  2/8/13    with Laser Vaporization of Enlarged Prostate    DIAGNOSTIC CARDIAC CATH LAB PROCEDURE      ERCP  02/18/2019    Sphincterotomy, stone removal    ERCP N/A 2/18/2019    ERCP SPHINCTER/PAPILLOTOMY performed by Kaylyn Rea MD at [address] [facility] none known

## 2019-08-12 NOTE — TELEPHONE ENCOUNTER
----- Message from Roxanna Love MD sent at 8/12/2019  3:23 PM EDT -----  No change  2 weeks    ----- Message -----  From: Radha Keys  Sent: 8/12/2019  11:11 AM EDT  To: Roxanna Love MD    INR: 1.9    5 MG everyday but Wednesday and Friday is 7.5 mg QD

## 2019-08-12 NOTE — PATIENT INSTRUCTIONS
Plan:  1. Meds reviewed. Refills as warranted   2. Keep track of your daily fluid consumption. Limit yourself to 64oz daily. 3. Follow low salt/sodium diet   4. Obtain OTC compression socks and wear for swelling. 5. Stop lasix and start taking torsemide 20mg 2 times daily   6. Ok to take lasix 80mg daily until you obtain torsemide   7. BMP and mag level in 3 weeks   8.  Follow up with me in 6 months

## 2019-08-14 ENCOUNTER — OFFICE VISIT (OUTPATIENT)
Dept: INTERNAL MEDICINE CLINIC | Age: 74
End: 2019-08-14

## 2019-08-14 VITALS
HEIGHT: 75 IN | WEIGHT: 315 LBS | BODY MASS INDEX: 39.17 KG/M2 | DIASTOLIC BLOOD PRESSURE: 65 MMHG | SYSTOLIC BLOOD PRESSURE: 105 MMHG | RESPIRATION RATE: 18 BRPM | HEART RATE: 65 BPM

## 2019-08-14 DIAGNOSIS — E66.01 MORBID OBESITY WITH BMI OF 40.0-44.9, ADULT (HCC): ICD-10-CM

## 2019-08-14 DIAGNOSIS — E11.9 TYPE 2 DIABETES MELLITUS WITHOUT COMPLICATION, WITHOUT LONG-TERM CURRENT USE OF INSULIN (HCC): ICD-10-CM

## 2019-08-14 DIAGNOSIS — G47.33 OSA (OBSTRUCTIVE SLEEP APNEA): ICD-10-CM

## 2019-08-14 DIAGNOSIS — I10 ESSENTIAL HYPERTENSION: ICD-10-CM

## 2019-08-14 DIAGNOSIS — E11.40 TYPE 2 DIABETES MELLITUS WITH DIABETIC NEUROPATHY, WITHOUT LONG-TERM CURRENT USE OF INSULIN (HCC): Primary | ICD-10-CM

## 2019-08-14 PROCEDURE — 1123F ACP DISCUSS/DSCN MKR DOCD: CPT | Performed by: INTERNAL MEDICINE

## 2019-08-14 PROCEDURE — G8427 DOCREV CUR MEDS BY ELIG CLIN: HCPCS | Performed by: INTERNAL MEDICINE

## 2019-08-14 PROCEDURE — 3045F PR MOST RECENT HEMOGLOBIN A1C LEVEL 7.0-9.0%: CPT | Performed by: INTERNAL MEDICINE

## 2019-08-14 PROCEDURE — 99213 OFFICE O/P EST LOW 20 MIN: CPT | Performed by: INTERNAL MEDICINE

## 2019-08-14 PROCEDURE — G8417 CALC BMI ABV UP PARAM F/U: HCPCS | Performed by: INTERNAL MEDICINE

## 2019-08-14 PROCEDURE — 4040F PNEUMOC VAC/ADMIN/RCVD: CPT | Performed by: INTERNAL MEDICINE

## 2019-08-14 PROCEDURE — 3017F COLORECTAL CA SCREEN DOC REV: CPT | Performed by: INTERNAL MEDICINE

## 2019-08-14 PROCEDURE — 2022F DILAT RTA XM EVC RTNOPTHY: CPT | Performed by: INTERNAL MEDICINE

## 2019-08-14 PROCEDURE — 1036F TOBACCO NON-USER: CPT | Performed by: INTERNAL MEDICINE

## 2019-08-14 NOTE — PROGRESS NOTES
Subjective:      Patient ID: Kyaw John is a 69 y.o. male. HPI     69 y.o. 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 ,obestiy, MARIA VICTORIA , pedal edema here for regular fw. DM- 2 dx in 12/18- now on metformin 500 mg bid  Fasting range from 110 - 140   No low sugars  Has chronic tingling numbness in feet  Reports constipation with metformin     Afibb s.p ablation, stable    pulm nodule - 2016 - Right sided  pulmonary nodule was taken out with resection by Dr. Elizabeth Vinson 9/16  Pathology showed histoplasmosis    Obesity - has gastric sleeve surgery in 2013 and lost 100 lbs    MARIA VICTORIA - on home cpap, compliant with it      Current Outpatient Medications   Medication Sig Dispense Refill    atorvastatin (LIPITOR) 40 MG tablet Take 1 tablet by mouth daily 90 tablet 3    spironolactone (ALDACTONE) 25 MG tablet Take 1 tablet by mouth daily 90 tablet 3    diltiazem (CARDIZEM) 120 MG tablet Take 1 tablet by mouth 2 times daily Take one tablet every 12 hours 180 tablet 0    torsemide (DEMADEX) 20 MG tablet Take 1 tablet by mouth 2 times daily 180 tablet 3    montelukast (SINGULAIR) 10 MG tablet TAKE 1 TABLET BY MOUTH  DAILY 90 tablet 0    albuterol sulfate  (90 Base) MCG/ACT inhaler Inhale 2 puffs into the lungs 4 times daily as needed for Wheezing (Patient not taking: Reported on 8/12/2019) 1 Inhaler 1    azithromycin (ZITHROMAX Z-CHITO) 250 MG tablet Use as directed (Patient not taking: Reported on 8/12/2019) 1 packet 0    blood glucose test strips (TRUE METRIX BLOOD GLUCOSE TEST) strip Test twice daily. DX: E11.9 100 each 11    ACCU-CHEK MULTICLIX LANCETS MISC Check sugars once daily. Dx;E11.9 100 each 11    blood glucose test strips (ASCENSIA AUTODISC VI;ONE TOUCH ULTRA TEST VI) strip Use to check daily. DX: E11.9 100 each 11    metFORMIN (GLUCOPHAGE) 500 MG tablet Take 1 tablet daily.  (Patient taking differently: 2 times daily (with meals) Take 1 tablet daily.) 90

## 2019-08-26 ENCOUNTER — ANTI-COAG VISIT (OUTPATIENT)
Dept: INTERNAL MEDICINE CLINIC | Age: 74
End: 2019-08-26

## 2019-08-26 LAB — INR BLD: 2.4

## 2019-09-10 ENCOUNTER — ANTI-COAG VISIT (OUTPATIENT)
Dept: INTERNAL MEDICINE CLINIC | Age: 74
End: 2019-09-10

## 2019-09-10 ENCOUNTER — TELEPHONE (OUTPATIENT)
Dept: INTERNAL MEDICINE CLINIC | Age: 74
End: 2019-09-10

## 2019-09-10 LAB — INR BLD: 2.5

## 2019-09-11 DIAGNOSIS — I10 ESSENTIAL HYPERTENSION: ICD-10-CM

## 2019-09-11 DIAGNOSIS — E11.9 TYPE 2 DIABETES MELLITUS WITHOUT COMPLICATION, WITHOUT LONG-TERM CURRENT USE OF INSULIN (HCC): ICD-10-CM

## 2019-09-11 DIAGNOSIS — I48.0 PAF (PAROXYSMAL ATRIAL FIBRILLATION) (HCC): ICD-10-CM

## 2019-09-11 LAB
ANION GAP SERPL CALCULATED.3IONS-SCNC: 16 MMOL/L (ref 3–16)
BUN BLDV-MCNC: 22 MG/DL (ref 7–20)
CALCIUM SERPL-MCNC: 9.4 MG/DL (ref 8.3–10.6)
CHLORIDE BLD-SCNC: 99 MMOL/L (ref 99–110)
CHOLESTEROL, FASTING: 143 MG/DL (ref 0–199)
CO2: 28 MMOL/L (ref 21–32)
CREAT SERPL-MCNC: 1.1 MG/DL (ref 0.8–1.3)
GFR AFRICAN AMERICAN: >60
GFR NON-AFRICAN AMERICAN: >60
GLUCOSE BLD-MCNC: 154 MG/DL (ref 70–99)
HDLC SERPL-MCNC: 40 MG/DL (ref 40–60)
LDL CHOLESTEROL CALCULATED: 72 MG/DL
MAGNESIUM: 2.4 MG/DL (ref 1.8–2.4)
POTASSIUM SERPL-SCNC: 4 MMOL/L (ref 3.5–5.1)
SODIUM BLD-SCNC: 143 MMOL/L (ref 136–145)
TRIGLYCERIDE, FASTING: 153 MG/DL (ref 0–150)
VLDLC SERPL CALC-MCNC: 31 MG/DL

## 2019-09-12 LAB
ESTIMATED AVERAGE GLUCOSE: 171.4 MG/DL
HBA1C MFR BLD: 7.6 %

## 2019-09-23 ENCOUNTER — ANTI-COAG VISIT (OUTPATIENT)
Dept: INTERNAL MEDICINE CLINIC | Age: 74
End: 2019-09-23

## 2019-09-23 LAB — INR BLD: 2

## 2019-10-08 ENCOUNTER — TELEPHONE (OUTPATIENT)
Dept: INTERNAL MEDICINE CLINIC | Age: 74
End: 2019-10-08

## 2019-10-08 ENCOUNTER — ANTI-COAG VISIT (OUTPATIENT)
Dept: INTERNAL MEDICINE CLINIC | Age: 74
End: 2019-10-08

## 2019-10-08 LAB — INR BLD: 2.4

## 2019-10-21 ENCOUNTER — TELEPHONE (OUTPATIENT)
Dept: CARDIOLOGY CLINIC | Age: 74
End: 2019-10-21

## 2019-10-21 RX ORDER — WARFARIN SODIUM 5 MG/1
5 TABLET ORAL DAILY
Qty: 90 TABLET | Refills: 0 | Status: SHIPPED | OUTPATIENT
Start: 2019-10-21 | End: 2020-03-24

## 2019-10-21 RX ORDER — MONTELUKAST SODIUM 10 MG/1
TABLET ORAL
Qty: 90 TABLET | Refills: 0 | Status: SHIPPED | OUTPATIENT
Start: 2019-10-21 | End: 2019-12-09 | Stop reason: SDUPTHER

## 2019-10-24 ENCOUNTER — ANTI-COAG VISIT (OUTPATIENT)
Dept: INTERNAL MEDICINE CLINIC | Age: 74
End: 2019-10-24

## 2019-10-24 ENCOUNTER — TELEPHONE (OUTPATIENT)
Dept: INTERNAL MEDICINE CLINIC | Age: 74
End: 2019-10-24

## 2019-10-24 LAB — INR BLD: 2.7

## 2019-11-04 ENCOUNTER — TELEPHONE (OUTPATIENT)
Dept: INTERNAL MEDICINE CLINIC | Age: 74
End: 2019-11-04

## 2019-11-04 ENCOUNTER — ANTI-COAG VISIT (OUTPATIENT)
Dept: INTERNAL MEDICINE CLINIC | Age: 74
End: 2019-11-04

## 2019-11-04 LAB — INR BLD: 2.6

## 2019-11-18 ENCOUNTER — ANTI-COAG VISIT (OUTPATIENT)
Dept: INTERNAL MEDICINE CLINIC | Age: 74
End: 2019-11-18

## 2019-11-18 ENCOUNTER — TELEPHONE (OUTPATIENT)
Dept: INTERNAL MEDICINE CLINIC | Age: 74
End: 2019-11-18

## 2019-11-18 LAB — INR BLD: 2.5

## 2019-12-02 ENCOUNTER — ANTI-COAG VISIT (OUTPATIENT)
Dept: INTERNAL MEDICINE CLINIC | Age: 74
End: 2019-12-02

## 2019-12-02 ENCOUNTER — TELEPHONE (OUTPATIENT)
Dept: INTERNAL MEDICINE CLINIC | Age: 74
End: 2019-12-02

## 2019-12-02 LAB — INR BLD: 3.2

## 2019-12-03 ENCOUNTER — TELEPHONE (OUTPATIENT)
Dept: CARDIOLOGY CLINIC | Age: 74
End: 2019-12-03

## 2019-12-04 ENCOUNTER — OFFICE VISIT (OUTPATIENT)
Dept: INTERNAL MEDICINE CLINIC | Age: 74
End: 2019-12-04

## 2019-12-04 VITALS
HEIGHT: 75 IN | HEART RATE: 70 BPM | WEIGHT: 315 LBS | BODY MASS INDEX: 39.17 KG/M2 | SYSTOLIC BLOOD PRESSURE: 115 MMHG | DIASTOLIC BLOOD PRESSURE: 75 MMHG | RESPIRATION RATE: 18 BRPM

## 2019-12-04 DIAGNOSIS — Z00.00 MEDICARE ANNUAL WELLNESS VISIT, SUBSEQUENT: ICD-10-CM

## 2019-12-04 DIAGNOSIS — I10 ESSENTIAL HYPERTENSION: ICD-10-CM

## 2019-12-04 DIAGNOSIS — Z00.00 ROUTINE GENERAL MEDICAL EXAMINATION AT A HEALTH CARE FACILITY: ICD-10-CM

## 2019-12-04 DIAGNOSIS — E66.01 MORBID OBESITY WITH BMI OF 40.0-44.9, ADULT (HCC): ICD-10-CM

## 2019-12-04 DIAGNOSIS — Z13.6 SCREENING FOR CARDIOVASCULAR CONDITION: ICD-10-CM

## 2019-12-04 DIAGNOSIS — I48.4 ATYPICAL ATRIAL FLUTTER (HCC): ICD-10-CM

## 2019-12-04 DIAGNOSIS — G62.9 POLYNEUROPATHY: ICD-10-CM

## 2019-12-04 DIAGNOSIS — E11.40 TYPE 2 DIABETES MELLITUS WITH DIABETIC NEUROPATHY, WITHOUT LONG-TERM CURRENT USE OF INSULIN (HCC): ICD-10-CM

## 2019-12-04 DIAGNOSIS — E11.40 TYPE 2 DIABETES MELLITUS WITH DIABETIC NEUROPATHY, WITHOUT LONG-TERM CURRENT USE OF INSULIN (HCC): Primary | ICD-10-CM

## 2019-12-04 DIAGNOSIS — I48.0 PAF (PAROXYSMAL ATRIAL FIBRILLATION) (HCC): ICD-10-CM

## 2019-12-04 DIAGNOSIS — G47.33 OSA (OBSTRUCTIVE SLEEP APNEA): ICD-10-CM

## 2019-12-04 PROBLEM — E66.813 OBESITY, CLASS III, BMI 40-49.9 (MORBID OBESITY): Status: RESOLVED | Noted: 2017-01-30 | Resolved: 2019-12-04

## 2019-12-04 LAB
A/G RATIO: 1.2 (ref 1.1–2.2)
ALBUMIN SERPL-MCNC: 4.5 G/DL (ref 3.4–5)
ALP BLD-CCNC: 108 U/L (ref 40–129)
ALT SERPL-CCNC: 24 U/L (ref 10–40)
ANION GAP SERPL CALCULATED.3IONS-SCNC: 17 MMOL/L (ref 3–16)
AST SERPL-CCNC: 25 U/L (ref 15–37)
BASOPHILS ABSOLUTE: 0.1 K/UL (ref 0–0.2)
BASOPHILS RELATIVE PERCENT: 0.6 %
BILIRUB SERPL-MCNC: 0.8 MG/DL (ref 0–1)
BILIRUBIN, POC: NORMAL
BLOOD URINE, POC: NORMAL
BUN BLDV-MCNC: 24 MG/DL (ref 7–20)
CALCIUM SERPL-MCNC: 9.7 MG/DL (ref 8.3–10.6)
CHLORIDE BLD-SCNC: 98 MMOL/L (ref 99–110)
CHOLESTEROL, FASTING: 140 MG/DL (ref 0–199)
CLARITY, POC: NORMAL
CO2: 22 MMOL/L (ref 21–32)
COLOR, POC: NORMAL
CREAT SERPL-MCNC: 1.2 MG/DL (ref 0.8–1.3)
EOSINOPHILS ABSOLUTE: 0.2 K/UL (ref 0–0.6)
EOSINOPHILS RELATIVE PERCENT: 2.1 %
GFR AFRICAN AMERICAN: >60
GFR NON-AFRICAN AMERICAN: 59
GLOBULIN: 3.9 G/DL
GLUCOSE BLD-MCNC: 148 MG/DL (ref 70–99)
GLUCOSE URINE, POC: NORMAL
HCT VFR BLD CALC: 41.3 % (ref 40.5–52.5)
HDLC SERPL-MCNC: 43 MG/DL (ref 40–60)
HEMOGLOBIN: 13.6 G/DL (ref 13.5–17.5)
KETONES, POC: NORMAL
LDL CHOLESTEROL CALCULATED: 66 MG/DL
LEUKOCYTE EST, POC: NORMAL
LYMPHOCYTES ABSOLUTE: 1.4 K/UL (ref 1–5.1)
LYMPHOCYTES RELATIVE PERCENT: 14 %
MCH RBC QN AUTO: 29.7 PG (ref 26–34)
MCHC RBC AUTO-ENTMCNC: 33 G/DL (ref 31–36)
MCV RBC AUTO: 90.1 FL (ref 80–100)
MONOCYTES ABSOLUTE: 0.8 K/UL (ref 0–1.3)
MONOCYTES RELATIVE PERCENT: 8.3 %
NEUTROPHILS ABSOLUTE: 7.3 K/UL (ref 1.7–7.7)
NEUTROPHILS RELATIVE PERCENT: 75 %
NITRITE, POC: NORMAL
PDW BLD-RTO: 17 % (ref 12.4–15.4)
PH, POC: NORMAL
PLATELET # BLD: 255 K/UL (ref 135–450)
PMV BLD AUTO: 8.7 FL (ref 5–10.5)
POTASSIUM SERPL-SCNC: 4.3 MMOL/L (ref 3.5–5.1)
PROSTATE SPECIFIC ANTIGEN: 0.44 NG/ML (ref 0–4)
PROTEIN, POC: NORMAL
RBC # BLD: 4.58 M/UL (ref 4.2–5.9)
SODIUM BLD-SCNC: 137 MMOL/L (ref 136–145)
SPECIFIC GRAVITY, POC: NORMAL
TOTAL PROTEIN: 8.4 G/DL (ref 6.4–8.2)
TRIGLYCERIDE, FASTING: 157 MG/DL (ref 0–150)
TSH REFLEX: 0.93 UIU/ML (ref 0.27–4.2)
UROBILINOGEN, POC: NORMAL
VLDLC SERPL CALC-MCNC: 31 MG/DL
WBC # BLD: 9.7 K/UL (ref 4–11)

## 2019-12-04 PROCEDURE — G0446 INTENS BEHAVE THER CARDIO DX: HCPCS | Performed by: INTERNAL MEDICINE

## 2019-12-04 PROCEDURE — 4040F PNEUMOC VAC/ADMIN/RCVD: CPT | Performed by: INTERNAL MEDICINE

## 2019-12-04 PROCEDURE — 1123F ACP DISCUSS/DSCN MKR DOCD: CPT | Performed by: INTERNAL MEDICINE

## 2019-12-04 PROCEDURE — G8482 FLU IMMUNIZE ORDER/ADMIN: HCPCS | Performed by: INTERNAL MEDICINE

## 2019-12-04 PROCEDURE — G0439 PPPS, SUBSEQ VISIT: HCPCS | Performed by: INTERNAL MEDICINE

## 2019-12-04 PROCEDURE — 81002 URINALYSIS NONAUTO W/O SCOPE: CPT | Performed by: INTERNAL MEDICINE

## 2019-12-04 PROCEDURE — 3017F COLORECTAL CA SCREEN DOC REV: CPT | Performed by: INTERNAL MEDICINE

## 2019-12-04 RX ORDER — CLOBETASOL PROPIONATE 0.5 MG/G
OINTMENT TOPICAL
Qty: 60 G | Refills: 0 | Status: SHIPPED | OUTPATIENT
Start: 2019-12-04 | End: 2019-12-31

## 2019-12-04 ASSESSMENT — PATIENT HEALTH QUESTIONNAIRE - PHQ9
SUM OF ALL RESPONSES TO PHQ QUESTIONS 1-9: 0
SUM OF ALL RESPONSES TO PHQ QUESTIONS 1-9: 0
SUM OF ALL RESPONSES TO PHQ9 QUESTIONS 1 & 2: 0
SUM OF ALL RESPONSES TO PHQ QUESTIONS 1-9: 0
2. FEELING DOWN, DEPRESSED OR HOPELESS: 0
SUM OF ALL RESPONSES TO PHQ QUESTIONS 1-9: 0
1. LITTLE INTEREST OR PLEASURE IN DOING THINGS: 0

## 2019-12-04 ASSESSMENT — LIFESTYLE VARIABLES: HOW OFTEN DO YOU HAVE A DRINK CONTAINING ALCOHOL: 0

## 2019-12-05 LAB
ESTIMATED AVERAGE GLUCOSE: 171.4 MG/DL
HBA1C MFR BLD: 7.6 %

## 2019-12-09 RX ORDER — MONTELUKAST SODIUM 10 MG/1
TABLET ORAL
Qty: 90 TABLET | Refills: 0 | Status: SHIPPED | OUTPATIENT
Start: 2019-12-09 | End: 2020-03-10

## 2019-12-17 ENCOUNTER — ANTI-COAG VISIT (OUTPATIENT)
Dept: INTERNAL MEDICINE CLINIC | Age: 74
End: 2019-12-17

## 2019-12-17 ENCOUNTER — TELEPHONE (OUTPATIENT)
Dept: INTERNAL MEDICINE CLINIC | Age: 74
End: 2019-12-17

## 2019-12-17 LAB — INR BLD: 2.9

## 2019-12-31 RX ORDER — CLOBETASOL PROPIONATE 0.5 MG/G
OINTMENT TOPICAL
Qty: 60 G | Refills: 0 | Status: SHIPPED | OUTPATIENT
Start: 2019-12-31 | End: 2021-10-04

## 2020-01-02 NOTE — H&P
The H&P was reviewed, the patient was examined, and no change has occurred in the patient's condition since the H&P was completed.     Nicole Cuello MD, PhD, FACS

## 2020-01-02 NOTE — OP NOTE
had Betadine and Alphagan solutions placed on the eye. The eye was covered with a metal shield. The patient went to the PACU in excellent condition, having tolerated the procedure extremely well, and will follow up with me tomorrow for postop day 1 care.     Nicole Cuello MD, PhD, FACS

## 2020-01-03 ENCOUNTER — OFFICE VISIT (OUTPATIENT)
Dept: INTERNAL MEDICINE CLINIC | Age: 75
End: 2020-01-03

## 2020-01-03 VITALS
WEIGHT: 315 LBS | HEIGHT: 75 IN | HEART RATE: 72 BPM | SYSTOLIC BLOOD PRESSURE: 105 MMHG | TEMPERATURE: 97.6 F | BODY MASS INDEX: 39.17 KG/M2 | DIASTOLIC BLOOD PRESSURE: 60 MMHG

## 2020-01-03 PROBLEM — H26.9 CATARACT OF BOTH EYES: Status: ACTIVE | Noted: 2020-01-03

## 2020-01-03 PROBLEM — Z86.718 HISTORY OF VENOUS THROMBOEMBOLISM: Status: ACTIVE | Noted: 2020-01-03

## 2020-01-03 PROCEDURE — G8427 DOCREV CUR MEDS BY ELIG CLIN: HCPCS | Performed by: PHYSICIAN ASSISTANT

## 2020-01-03 PROCEDURE — G8482 FLU IMMUNIZE ORDER/ADMIN: HCPCS | Performed by: PHYSICIAN ASSISTANT

## 2020-01-03 PROCEDURE — 1036F TOBACCO NON-USER: CPT | Performed by: PHYSICIAN ASSISTANT

## 2020-01-03 PROCEDURE — 2022F DILAT RTA XM EVC RTNOPTHY: CPT | Performed by: PHYSICIAN ASSISTANT

## 2020-01-03 PROCEDURE — 1123F ACP DISCUSS/DSCN MKR DOCD: CPT | Performed by: PHYSICIAN ASSISTANT

## 2020-01-03 PROCEDURE — 3017F COLORECTAL CA SCREEN DOC REV: CPT | Performed by: PHYSICIAN ASSISTANT

## 2020-01-03 PROCEDURE — G8417 CALC BMI ABV UP PARAM F/U: HCPCS | Performed by: PHYSICIAN ASSISTANT

## 2020-01-03 PROCEDURE — 4040F PNEUMOC VAC/ADMIN/RCVD: CPT | Performed by: PHYSICIAN ASSISTANT

## 2020-01-03 PROCEDURE — 99214 OFFICE O/P EST MOD 30 MIN: CPT | Performed by: PHYSICIAN ASSISTANT

## 2020-01-03 PROCEDURE — 3046F HEMOGLOBIN A1C LEVEL >9.0%: CPT | Performed by: PHYSICIAN ASSISTANT

## 2020-01-03 NOTE — PATIENT INSTRUCTIONS
carbohydrate serving. Count carbs  Counting carbs lets you know how much rapid-acting insulin to take before you eat. If you use an insulin pump, you get a constant rate of insulin during the day. So the pump must be programmed at meals. This gives you extra insulin to cover the rise in blood sugar after meals. If you take insulin:  · Learn your own insulin-to-carb ratio. You and your diabetes health professional will figure out the ratio. You can do this by testing your blood sugar after meals. For example, you may need a certain amount of insulin for every 15 grams of carbs. · Add up the carb grams in a meal. Then you can figure out how many units of insulin to take based on your insulin-to-carb ratio. · Exercise lowers blood sugar. You can use less insulin than you would if you were not doing exercise. Keep in mind that timing matters. If you exercise within 1 hour after a meal, your body may need less insulin for that meal than it would if you exercised 3 hours after the meal. Test your blood sugar to find out how exercise affects your need for insulin. If you do or don't take insulin:  · Look at labels on packaged foods. This can tell you how many carbs are in a serving. You can also use guides from the American Diabetes Association. · Be aware of portions, or serving sizes. If a package has two servings and you eat the whole package, you need to double the number of grams of carbohydrate listed for one serving. · Protein, fat, and fiber do not raise blood sugar as much as carbs do. If you eat a lot of these nutrients in a meal, your blood sugar will rise more slowly than it would otherwise. Eat from all food groups  · Eat at least three meals a day. · Plan meals to include food from all the food groups. The food groups include grains, fruits, dairy, proteins, and vegetables. · Talk to your dietitian or diabetes educator about ways to add limited amounts of sweets into your meal plan.   · If you drink restaurant food. · If the meal you order has too much carbohydrate (such as potatoes, corn, or baked beans), ask to have a low-carbohydrate food instead. Ask for a salad or green vegetables. · If you use insulin, check your blood sugar before and after eating out to help you plan how much to eat in the future. · If you eat more carbohydrate at a meal than you had planned, take a walk or do other exercise. This will help lower your blood sugar. What else should you know? · Limit saturated fat, such as the fat from meat and dairy products. This is a healthy choice because people who have diabetes are at higher risk of heart disease. So choose lean cuts of meat and nonfat or low-fat dairy products. Use olive or canola oil instead of butter or shortening when cooking. · Don't skip meals. Your blood sugar may drop too low if you skip meals and take insulin or certain medicines for diabetes. · Check with your doctor before you drink alcohol. Alcohol can cause your blood sugar to drop too low. Alcohol can also cause a bad reaction if you take certain diabetes medicines. Follow-up care is a key part of your treatment and safety. Be sure to make and go to all appointments, and call your doctor if you are having problems. It's also a good idea to know your test results and keep a list of the medicines you take. Where can you learn more? Go to https://Aelurosazaleaeb.Zoom. org and sign in to your RewardMe account. Enter E273 in the [facility] box to learn more about \"Learning About Diabetes Food Guidelines. \"     If you do not have an account, please click on the \"Sign Up Now\" link. Current as of: July 25, 2018  Content Version: 12.1  © 0310-0401 [employer]orporated. Care instructions adapted under license by [facility] ([facility]).  If you have questions about a medical condition or this instruction, always ask your healthcare professional. Lydia Hatch disclaims any warranty or liability out.  · Write down your questions about using the plate format. Talk to your doctor, a dietitian, or a diabetes educator about your concerns. Carbohydrate counting  With carbohydrate counting, you plan meals based on the amount of carbohydrate in each food. Carbohydrate raises blood sugar higher and more quickly than any other nutrient. It is found in desserts, breads and cereals, and fruit. It's also found in starchy vegetables such as potatoes and corn, grains such as rice and pasta, and milk and yogurt. Spreading carbohydrate throughout the day helps keep your blood sugar levels within your target range. Your daily amount depends on several things, including your weight, how active you are, which diabetes medicines you take, and what your goals are for your blood sugar levels. A registered dietitian or diabetes educator can help you plan how much carbohydrate to include in each meal and snack. A guideline for your daily amount of carbohydrate is:  · 45 to 60 grams at each meal. That's about the same as 3 to 4 carbohydrate servings. · 15 to 20 grams at each snack. That's about the same as 1 carbohydrate serving. The Nutrition Facts label on packaged foods tells you how much carbohydrate is in a serving of the food. First, look at the serving size on the food label. Is that the amount you eat in a serving? All of the nutrition information on a food label is based on that serving size. So if you eat more or less than that, you'll need to adjust the other numbers. Total carbohydrate is the next thing you need to look for on the label. If you count carbohydrate servings, one serving of carbohydrate is 15 grams. For foods that don't come with labels, such as fresh fruits and vegetables, you'll need a guide that lists carbohydrate in these foods. Ask your doctor, dietitian, or diabetes educator about books or other nutrition guides you can use.   If you take insulin, you need to know how many grams of carbohydrate

## 2020-01-03 NOTE — PROGRESS NOTES
Subjective:      Michell Girard is a 69 y.o. male with atrial fibrillation s/p ablation on warfarin who presents to the office today for a preoperative consultation at the request of surgeon Dr. Jillian Fenton who plans on performing phacoemulsification of cataract of right eye on 1/14/20 and left eye on 1/21/20.    Planned anesthesia is [address]    Patient Active Problem List    Diagnosis Date Noted    MARIA VICTORIA (obstructive sleep apnea) 01/04/2016     Priority: High    Essential hypertension 07/31/2015     Priority: High    PAF (paroxysmal atrial fibrillation) ([facility] 02/05/2012     Priority: High    Arthritis      Priority: Low    History of venous thromboembolism 01/03/2020    Cataract of both eyes 01/03/2020    Morbid obesity with BMI of 40.0-44.9, adult ([facility] 04/03/2019    Type 2 diabetes mellitus with diabetic neuropathy ([facility] 02/25/2019    Atypical atrial flutter ([facility] 04/06/2017    S/P thoracotomy     Polyneuropathy 01/04/2016    Numbness and tingling of both legs 01/04/2016    Carpal tunnel syndrome 08/31/2015     Past Surgical History:   Procedure Laterality Date    ABLATION OF DYSRHYTHMIC FOCUS  2013    a-fib    BARIATRIC SURGERY  2013    GASTRIC SLEEVE    CARDIAC SURGERY  2013    ablation    CARPAL TUNNEL RELEASE Right 9-30-15    CARPAL TUNNEL RELEASE Left 3/20/16    CHOLECYSTECTOMY, LAPAROSCOPIC N/A 2/19/2019    LAPAROSCOPIC CHOLECYSTECTOMY WITH CHOLANGIOGRAMS performed by Claribel Pope MD at [address]      with removal stone    CYSTOSCOPY  2/8/13    with Laser Vaporization of Enlarged Prostate    DIAGNOSTIC CARDIAC CATH LAB PROCEDURE      ERCP  02/18/2019    Sphincterotomy, stone removal    ERCP N/A 2/18/2019    ERCP SPHINCTER/PAPILLOTOMY performed by Armando Mcgrath MD at [address] ERCP  2/18/2019    ERCP STONE REMOVAL performed by Armando Mcgrath MD at [address] Bilateral     right knee Sig Dispense Refill    clobetasol (TEMOVATE) 0.05 % ointment APPLY TOPICALLY 2 TIMES DAILY. 60 g 0    montelukast (SINGULAIR) 10 MG tablet TAKE 1 TABLET BY MOUTH  DAILY 90 tablet 0    warfarin (COUMADIN) 5 MG tablet TAKE 1 TABLET BY MOUTH  DAILY 90 tablet 0    atorvastatin (LIPITOR) 40 MG tablet Take 1 tablet by mouth daily 90 tablet 3    spironolactone (ALDACTONE) 25 MG tablet Take 1 tablet by mouth daily 90 tablet 3    diltiazem (CARDIZEM) 120 MG tablet Take 1 tablet by mouth 2 times daily Take one tablet every 12 hours 180 tablet 0    torsemide (DEMADEX) 20 MG tablet Take 1 tablet by mouth 2 times daily 180 tablet 3    albuterol sulfate  (90 Base) MCG/ACT inhaler Inhale 2 puffs into the lungs 4 times daily as needed for Wheezing 1 Inhaler 1    metFORMIN (GLUCOPHAGE) 500 MG tablet Take 1 tablet daily. (Patient taking differently: 500 mg 2 times daily (with meals) ) 90 tablet 3    dofetilide (TIKOSYN) 500 MCG capsule TAKE 1 CAPSULE BY MOUTH EVERY 12 HOURS 60 capsule 5    Biotin 5 MG TABS Take 5 mg by mouth daily      Cholecalciferol (VITAMIN D3) 5000 units TABS Take 5,000 Units by mouth daily      Cyanocobalamin (VITAMIN B-12 CR PO) Take 5,000 mcg by mouth See Admin Instructions M,W,F      Multiple Vitamins-Minerals (THERAPEUTIC MULTIVITAMIN-MINERALS) tablet Take 2 tablets by mouth daily       magnesium oxide (MAG-OX) 400 MG tablet Take 400 mg by mouth daily.  blood glucose test strips (TRUE METRIX BLOOD GLUCOSE TEST) strip Test twice daily. DX: E11.9 100 each 11    ACCU-CHEK MULTICLIX LANCETS MISC Check sugars once daily. Dx;E11.9 100 each 11    blood glucose test strips (ASCENSIA AUTODISC VI;ONE TOUCH ULTRA TEST VI) strip Use to check daily. DX: E11.9 100 each 11    EASY TOUCH LANCETS 32G MISC Test Daily. DX: E11.9 100 each 3    Lancet Devices (EASY TOUCH LANCING DEVICE) MISC Test Daily.  DX: E11.9 1 each 0    Blood Glucose Monitoring Suppl (TRUE METRIX METER) VINNIE Use to check daily.  DX;E11.9 1 Device 0    Lancets Misc. (ACCU-CHEK MULTICLIX LANCET DEV) KIT Check sugars once daily. DX;E11.9 1 kit 0     No current facility-administered medications for this visit. Allergies   Allergen Reactions    Bactrim [Sulfamethoxazole-Trimethoprim] Diarrhea    Ciprofloxacin      Bad headaches    Macrobid [Nitrofurantoin Monohyd Macro]     Sulfamethoxazole Diarrhea    Theophylline      Theodur-caused severe headaches    Cefuroxime Axetil Rash and Itching    Cipro Xr Rash    Other Rash and Other (See Comments)     Ekg leads-glue    Tape [Adhesive Tape] Rash     Skin irritaion  Eats away at skin, paper tape OK  Plastic tape     Review of Systems  A comprehensive review of systems was negative. Planned anesthesia:  [address] [place] [address]  Known anesthesia problems: Denies  Bleeding risk:  Warfarin   Personal or FH of DVT/PE: Yes, recurrent   Patient objection to receiving blood products: Denies      Objective:     /60   Pulse 72   Temp 97.6 °F (36.4 °C)   Ht 6' 3\" (1.905 m)   Wt (!) 334 lb (151.5 kg)   BMI 41.75 kg/m²     Gen: No distress. Alert. Eyes: PERRL. No sclera icterus. No conjunctival injection. ENT: No discharge. Pharynx clear. Neck: No JVD. Trachea midline. Resp: No accessory muscle use. No crackles. No wheezes. No rhonchi. CV: Regular rate. Regular rhythm. No murmur. No rub. Trace BLE edema. GI: Non-tender. Non-distended. Normal bowel sounds. Skin: Warm and dry. No nodule on exposed extremities. No rash on exposed extremities. M/S: No cyanosis. No joint deformity. No clubbing. Neuro: Awake. Grossly nonfocal    Psych: Oriented x 3. No anxiety or agitation. Assessment:       Diagnosis Orders   1. Preop exam for internal medicine     2. Cataract of both eyes, unspecified cataract type     3. PAF (paroxysmal atrial fibrillation) ([address]     4. Type 2 diabetes mellitus with diabetic neuropathy, without long-term current use of insulin ([address]     5.  Essential hypertension     6. MARIA VICTORIA (obstructive sleep apnea)     7. History of venous thromboembolism           69 y.o. male with planned surgery as above. Plan:     Patient medically cleared for above planned procedure. He is on warfarin, he will verify with Dr. Janine Lux office that he does not need to hold this prior to procedure. If he is required to hold, he will call our office for instructions.      Frankie Oakley PA-C  1/3/2020 9:11 AM

## 2020-01-08 RX ORDER — DILTIAZEM HYDROCHLORIDE 120 MG/1
TABLET, FILM COATED ORAL
Qty: 180 TABLET | Refills: 3 | Status: SHIPPED | OUTPATIENT
Start: 2020-01-08 | End: 2020-09-03 | Stop reason: SDUPTHER

## 2020-01-09 ENCOUNTER — TELEPHONE (OUTPATIENT)
Dept: CARDIOLOGY CLINIC | Age: 75
End: 2020-01-09

## 2020-01-09 NOTE — TELEPHONE ENCOUNTER
Patient called requesting a written prescription for Tikosyn that he can  and take to the [facility] to be filled. Please call patient when prescription is ready to be picked up.  Patient states ok to LM

## 2020-01-10 RX ORDER — DOFETILIDE 0.5 MG/1
500 CAPSULE ORAL EVERY 12 HOURS SCHEDULED
Qty: 60 CAPSULE | Refills: 5 | Status: SHIPPED | OUTPATIENT
Start: 2020-01-10 | End: 2020-03-09 | Stop reason: SDUPTHER

## 2020-01-13 ENCOUNTER — TELEPHONE (OUTPATIENT)
Dept: INTERNAL MEDICINE CLINIC | Age: 75
End: 2020-01-13

## 2020-01-13 ENCOUNTER — ANTI-COAG VISIT (OUTPATIENT)
Dept: INTERNAL MEDICINE CLINIC | Age: 75
End: 2020-01-13

## 2020-01-13 ENCOUNTER — ANESTHESIA EVENT (OUTPATIENT)
Dept: OPERATING ROOM | Age: 75
End: 2020-01-13
Payer: MEDICARE

## 2020-01-13 LAB — INR BLD: 2.1

## 2020-01-13 NOTE — OP NOTE
Azamrachel Marcelino Gallegos    OPERATIVE NOTE    Preoperative Diagnosis: Cataract left eye    Postoperative Diagnosis: Cataract left eye     Procedure: Phacoemulsification with intraocular lens inplantation, left eye    Surgeon: Shantell Cheney M.D., Ph.D. Anesthesia: MAC with topical    Complications: none    Specimens: none    Estimated blood loss: none    Indications for procedure: The patient is a 69y.o. year old with decreased vision, glare and halos around lights, and trouble with activities of daily living. Examination revealed a visually significant cataract in the left eye. Risks, benefits, and alternatives to surgery were discussed with the patient and the patient elected to proceed with phacoemulsification with lens implantation. Details of the procedure: Following informed consent, the patient was taken to the operating room and placed in the supine position. The eye was prepped and draped in the usual sterile fashion using aseptic technique for cataract surgery. Following topical tetracaine drops a side port incision was made in the inferotemporal cornea. The eye was filled with Trypan blue followed by balanced salt solution. The eye was filled with 1% non-preserved lidocaine. The eye was filled with viscoelastic and a 2.2 mm keratome blade was used to make a 3-plane clear corneal incision in the superotemporal cornea. The cystitome was used to make a tear in the anterior capsule and a Utrata forceps was used to make a complete curvilinear capsulorrhexis. The lens was hydrodissected and freely rotated. Phacoemulsification was performed. Irrigation/aspiration was used to remove all cortical material from the capsular bag. The eye was filled with viscoelastic and a foldable posterior chamber intraocular lens was injected into the capsular bag. Irrigation/aspiration was used to remove all excess viscoelastic. The eye was pressurized and the wounds were check for leaks and none were found.   The

## 2020-01-14 ENCOUNTER — HOSPITAL ENCOUNTER (OUTPATIENT)
Age: 75
Setting detail: OUTPATIENT SURGERY
Discharge: HOME | End: 2020-01-14
Attending: OPHTHALMOLOGY | Admitting: OPHTHALMOLOGY
Payer: MEDICARE

## 2020-01-14 ENCOUNTER — ANESTHESIA (OUTPATIENT)
Dept: OPERATING ROOM | Age: 75
End: 2020-01-14
Payer: MEDICARE

## 2020-01-14 VITALS
SYSTOLIC BLOOD PRESSURE: 129 MMHG | RESPIRATION RATE: 18 BRPM | HEIGHT: 75 IN | WEIGHT: 315 LBS | HEART RATE: 66 BPM | BODY MASS INDEX: 39.17 KG/M2 | TEMPERATURE: 98 F | DIASTOLIC BLOOD PRESSURE: 71 MMHG | OXYGEN SATURATION: 98 %

## 2020-01-14 VITALS — SYSTOLIC BLOOD PRESSURE: 127 MMHG | DIASTOLIC BLOOD PRESSURE: 74 MMHG | OXYGEN SATURATION: 100 %

## 2020-01-14 LAB
GLUCOSE BLD-MCNC: 139 MG/DL (ref 70–99)
PERFORMED ON: ABNORMAL

## 2020-01-14 PROCEDURE — 2709999900 HC NON-CHARGEABLE SUPPLY: Performed by: OPHTHALMOLOGY

## 2020-01-14 PROCEDURE — 6360000002 HC RX W HCPCS: Performed by: OPHTHALMOLOGY

## 2020-01-14 PROCEDURE — 2580000003 HC RX 258: Performed by: ANESTHESIOLOGY

## 2020-01-14 PROCEDURE — 6370000000 HC RX 637 (ALT 250 FOR IP): Performed by: OPHTHALMOLOGY

## 2020-01-14 PROCEDURE — 3600000003 HC SURGERY LEVEL 3 BASE: Performed by: OPHTHALMOLOGY

## 2020-01-14 PROCEDURE — 6360000002 HC RX W HCPCS: Performed by: NURSE ANESTHETIST, CERTIFIED REGISTERED

## 2020-01-14 PROCEDURE — 3700000000 HC ANESTHESIA ATTENDED CARE: Performed by: OPHTHALMOLOGY

## 2020-01-14 PROCEDURE — 2500000003 HC RX 250 WO HCPCS: Performed by: OPHTHALMOLOGY

## 2020-01-14 PROCEDURE — V2632 POST CHMBR INTRAOCULAR LENS: HCPCS | Performed by: OPHTHALMOLOGY

## 2020-01-14 PROCEDURE — 7100000011 HC PHASE II RECOVERY - ADDTL 15 MIN: Performed by: OPHTHALMOLOGY

## 2020-01-14 PROCEDURE — 2580000003 HC RX 258: Performed by: OPHTHALMOLOGY

## 2020-01-14 PROCEDURE — 7100000010 HC PHASE II RECOVERY - FIRST 15 MIN: Performed by: OPHTHALMOLOGY

## 2020-01-14 DEVICE — ACRYSOF(R) IQ ASPHERIC IOL SP ACRYLIC FOLDABLELENS WULTRASERT(TM) DELIVERY SYSTEM UV WBLUE LIGHT FILTER. 13.0MM LENGTH 6.0MM ANTERIORASYMMETRIC BICONVEX OPTIC PLANAR HAPTICS.
Type: IMPLANTABLE DEVICE | Site: EYE | Status: FUNCTIONAL
Brand: ACRYSOF ULTRASERT

## 2020-01-14 RX ORDER — FLURBIPROFEN SODIUM 0.3 MG/ML
1 SOLUTION/ DROPS OPHTHALMIC EVERY 5 MIN PRN
Status: COMPLETED | OUTPATIENT
Start: 2020-01-14 | End: 2020-01-14

## 2020-01-14 RX ORDER — SODIUM CHLORIDE 0.9 % (FLUSH) 0.9 %
10 SYRINGE (ML) INJECTION EVERY 12 HOURS SCHEDULED
Status: DISCONTINUED | OUTPATIENT
Start: 2020-01-14 | End: 2020-01-14 | Stop reason: HOSPADM

## 2020-01-14 RX ORDER — TROPICAMIDE 10 MG/ML
1 SOLUTION/ DROPS OPHTHALMIC EVERY 5 MIN PRN
Status: COMPLETED | OUTPATIENT
Start: 2020-01-14 | End: 2020-01-14

## 2020-01-14 RX ORDER — MIDAZOLAM HYDROCHLORIDE 1 MG/ML
INJECTION INTRAMUSCULAR; INTRAVENOUS PRN
Status: DISCONTINUED | OUTPATIENT
Start: 2020-01-14 | End: 2020-01-14 | Stop reason: SDUPTHER

## 2020-01-14 RX ORDER — CYCLOPENTOLATE HYDROCHLORIDE 10 MG/ML
1 SOLUTION/ DROPS OPHTHALMIC PRN
Status: COMPLETED | OUTPATIENT
Start: 2020-01-14 | End: 2020-01-14

## 2020-01-14 RX ORDER — PHENYLEPHRINE HCL 2.5 %
1 DROPS OPHTHALMIC (EYE) EVERY 5 MIN PRN
Status: COMPLETED | OUTPATIENT
Start: 2020-01-14 | End: 2020-01-14

## 2020-01-14 RX ORDER — SODIUM CHLORIDE, SODIUM LACTATE, POTASSIUM CHLORIDE, CALCIUM CHLORIDE 600; 310; 30; 20 MG/100ML; MG/100ML; MG/100ML; MG/100ML
INJECTION, SOLUTION INTRAVENOUS CONTINUOUS
Status: DISCONTINUED | OUTPATIENT
Start: 2020-01-14 | End: 2020-01-14 | Stop reason: HOSPADM

## 2020-01-14 RX ORDER — SODIUM CHLORIDE 0.9 % (FLUSH) 0.9 %
10 SYRINGE (ML) INJECTION PRN
Status: DISCONTINUED | OUTPATIENT
Start: 2020-01-14 | End: 2020-01-14 | Stop reason: HOSPADM

## 2020-01-14 RX ORDER — MAGNESIUM HYDROXIDE 1200 MG/15ML
LIQUID ORAL PRN
Status: DISCONTINUED | OUTPATIENT
Start: 2020-01-14 | End: 2020-01-14 | Stop reason: ALTCHOICE

## 2020-01-14 RX ORDER — FENTANYL CITRATE 50 UG/ML
INJECTION, SOLUTION INTRAMUSCULAR; INTRAVENOUS PRN
Status: DISCONTINUED | OUTPATIENT
Start: 2020-01-14 | End: 2020-01-14 | Stop reason: SDUPTHER

## 2020-01-14 RX ADMIN — PHENYLEPHRINE HYDROCHLORIDE 1 DROP: 25 SOLUTION/ DROPS OPHTHALMIC at 12:30

## 2020-01-14 RX ADMIN — PHENYLEPHRINE HYDROCHLORIDE 1 DROP: 25 SOLUTION/ DROPS OPHTHALMIC at 12:20

## 2020-01-14 RX ADMIN — SODIUM CHLORIDE, POTASSIUM CHLORIDE, SODIUM LACTATE AND CALCIUM CHLORIDE: 600; 310; 30; 20 INJECTION, SOLUTION INTRAVENOUS at 12:28

## 2020-01-14 RX ADMIN — TROPICAMIDE 1 DROP: 10 SOLUTION/ DROPS OPHTHALMIC at 12:50

## 2020-01-14 RX ADMIN — FLURBIPROFEN SODIUM 1 DROP: 0.3 SOLUTION/ DROPS OPHTHALMIC at 12:50

## 2020-01-14 RX ADMIN — CYCLOPENTOLATE HYDROCHLORIDE 1 DROP: 10 SOLUTION/ DROPS OPHTHALMIC at 12:50

## 2020-01-14 RX ADMIN — CYCLOPENTOLATE HYDROCHLORIDE 1 DROP: 10 SOLUTION/ DROPS OPHTHALMIC at 12:30

## 2020-01-14 RX ADMIN — CYCLOPENTOLATE HYDROCHLORIDE 1 DROP: 10 SOLUTION/ DROPS OPHTHALMIC at 12:40

## 2020-01-14 RX ADMIN — TROPICAMIDE 1 DROP: 10 SOLUTION/ DROPS OPHTHALMIC at 12:40

## 2020-01-14 RX ADMIN — MIDAZOLAM HYDROCHLORIDE 2 MG: 2 INJECTION, SOLUTION INTRAMUSCULAR; INTRAVENOUS at 13:35

## 2020-01-14 RX ADMIN — FLURBIPROFEN SODIUM 1 DROP: 0.3 SOLUTION/ DROPS OPHTHALMIC at 12:30

## 2020-01-14 RX ADMIN — FLURBIPROFEN SODIUM 1 DROP: 0.3 SOLUTION/ DROPS OPHTHALMIC at 12:20

## 2020-01-14 RX ADMIN — PHENYLEPHRINE HYDROCHLORIDE 1 DROP: 25 SOLUTION/ DROPS OPHTHALMIC at 12:50

## 2020-01-14 RX ADMIN — FENTANYL CITRATE 50 MCG: 50 INJECTION INTRAMUSCULAR; INTRAVENOUS at 13:35

## 2020-01-14 RX ADMIN — SODIUM CHLORIDE, POTASSIUM CHLORIDE, SODIUM LACTATE AND CALCIUM CHLORIDE: 600; 310; 30; 20 INJECTION, SOLUTION INTRAVENOUS at 13:35

## 2020-01-14 RX ADMIN — TROPICAMIDE 1 DROP: 10 SOLUTION/ DROPS OPHTHALMIC at 12:20

## 2020-01-14 RX ADMIN — FLURBIPROFEN SODIUM 1 DROP: 0.3 SOLUTION/ DROPS OPHTHALMIC at 12:40

## 2020-01-14 RX ADMIN — TROPICAMIDE 1 DROP: 10 SOLUTION/ DROPS OPHTHALMIC at 12:30

## 2020-01-14 RX ADMIN — CYCLOPENTOLATE HYDROCHLORIDE 1 DROP: 10 SOLUTION/ DROPS OPHTHALMIC at 12:20

## 2020-01-14 RX ADMIN — PHENYLEPHRINE HYDROCHLORIDE 1 DROP: 25 SOLUTION/ DROPS OPHTHALMIC at 12:40

## 2020-01-14 ASSESSMENT — PULMONARY FUNCTION TESTS
PIF_VALUE: 0
PIF_VALUE: 1
PIF_VALUE: 0

## 2020-01-14 ASSESSMENT — PAIN SCALES - GENERAL: PAINLEVEL_OUTOF10: 0

## 2020-01-14 ASSESSMENT — PAIN - FUNCTIONAL ASSESSMENT: PAIN_FUNCTIONAL_ASSESSMENT: 0-10

## 2020-01-14 NOTE — PROGRESS NOTES
POCT      Blood Glucose:              Was 139      (Normal Range 70-99)    PT:      (Normal Range 9.8 - 13)    INR:      (Normal Range 0.86-1.14)

## 2020-01-14 NOTE — ANESTHESIA PRE PROCEDURE
mL Intravenous PRN Remy Overton MD        famotidine (PEPCID) injection 20 mg  20 mg Intravenous Once Remy Overton MD        epinephrine and lidocaine 2% PF in BSS injection (1 mL)   Intraocular Once Gilles Saucedo MD           Allergies:     Allergies   Allergen Reactions    Bactrim [Sulfamethoxazole-Trimethoprim] Diarrhea    Ciprofloxacin      Bad headaches    Macrobid [Nitrofurantoin Monohyd Macro]     Sulfamethoxazole Diarrhea    Theophylline      Theodur-caused severe headaches    Cefuroxime Axetil Rash and Itching    Cipro Xr Rash    Other Rash and Other (See Comments)     Ekg leads-glue    Tape [Adhesive Tape] Rash     Skin irritaion  Eats away at skin, paper tape OK  Plastic tape       Problem List:    Patient Active Problem List   Diagnosis Code    Arthritis M19.90    PAF (paroxysmal atrial fibrillation) ([facility]) I48.0    Essential hypertension I10    Carpal tunnel syndrome G56.00    Polyneuropathy G62.9    Numbness and tingling of both legs R20.0, R20.2    MARIA VICTORIA (obstructive sleep apnea) G47.33    S/P thoracotomy Z98.890    Atypical atrial flutter ([facility]) I48.4    Type 2 diabetes mellitus with diabetic neuropathy ([facility]) E11.40    Morbid obesity with BMI of 40.0-44.9, adult ([facility]) E66.01, Z68.41    History of venous thromboembolism Z86.718    Cataract of both eyes H26.9       Past Medical History:        Diagnosis Date    Arthritis     Asthma     past hx    Atrial fibrillation ([address]     Blood circulation, collateral     Diabetes mellitus ([facility] 12/24/2018    DVT (deep venous thrombosis) (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)     Palpitations     stable with atenolol    Sleep apnea     uses CPAP    Stone, kidney     UTI (urinary tract infection) 2017       Past Surgical History:        Procedure Laterality Date    ABLATION OF DYSRHYTHMIC FOCUS      a-fib    BARIATRIC SURGERY      GASTRIC SLEEVE    CARDIAC SURGERY  2013    ablation    CARPAL TUNNEL RELEASE Right 9-30-15    CARPAL TUNNEL RELEASE Left 3/20/16    CHOLECYSTECTOMY, LAPAROSCOPIC N/A 2019    LAPAROSCOPIC CHOLECYSTECTOMY WITH CHOLANGIOGRAMS performed by Augusta Mancia MD at [address]      with removal stone    CYSTOSCOPY  13    with Laser Vaporization of Enlarged Prostate    DIAGNOSTIC CARDIAC CATH LAB PROCEDURE      ERCP  2019    Sphincterotomy, stone removal    ERCP N/A 2019    ERCP SPHINCTER/PAPILLOTOMY performed by Chelsie Arias MD at [address] [facility] ERCP  2019    ERCP STONE REMOVAL performed by Chelsie Arias MD at [address] Bilateral     right knee () and left knee ()    KNEE ARTHROSCOPY  2011     left  knee    SKIN BIOPSY      skin Ca/SQUAMOUS CELL    THORACOTOMY      wedge resection    TONSILLECTOMY         Social History:    Social History     Tobacco Use    Smoking status: Former Smoker     Packs/day: 2.00     Years: 17.00     Pack years: 34.00     Types: Cigarettes     Last attempt to quit: 1976     Years since quittin.7    Smokeless tobacco: Never Used   Substance Use Topics    Alcohol use:  No                                Counseling given: Not Answered      Vital Signs (Current):   Vitals:    20 0943 20 1228   BP:  132/63   Pulse:  65   Resp:  20   Temp:  98 °F (36.7 °C)   TempSrc:  Tympanic   SpO2:  98%   Weight: (!) 334 lb (151.5 kg)    Height: 6' 3\" (1.905 m)                                               BP Readings from Last 3 Encounters:   20 132/63   20 105/60   19 115/75       NPO Status: Time of last liquid consumption: 0600

## 2020-01-14 NOTE — ANESTHESIA POSTPROCEDURE EVALUATION
Department of Anesthesiology  Postprocedure Note    Patient: Chikis Nash  MRN: [id number]  YOB: 1945  Date of evaluation: 1/14/2020  Time:  2:51 PM     Procedure Summary     Date:  01/14/20 Room / Location:  [facility] [address]    Anesthesia Start:  1533 Anesthesia Stop:  3930    Procedure:  PHACOEMULSIFICATION OF CATARACT RIGHT EYE WITH INTRAOCULAR LENS IMPLANT (Right Eye) Diagnosis:       Age-related nuclear cataract of right eye      (Age-related nuclear cataract of right eye [H25.11])    Surgeon:  Chely Lomas MD Responsible Provider:  Karissa Gonzales MD    Anesthesia Type:  MAC ASA Status:  3          Anesthesia Type: MAC    Jean Marie Phase I: Jean Marie Score: 10    Jean Marie Phase II: Jean Marie Score: 10    Last vitals: Reviewed and per EMR flowsheets.        Anesthesia Post Evaluation    Patient location during evaluation: PACU  Level of consciousness: awake  Airway patency: patent  Nausea & Vomiting: no nausea  Complications: no  Cardiovascular status: blood pressure returned to baseline  Respiratory status: acceptable  Hydration status: euvolemic

## 2020-01-20 ENCOUNTER — ANESTHESIA EVENT (OUTPATIENT)
Dept: OPERATING ROOM | Age: 75
End: 2020-01-20
Payer: MEDICARE

## 2020-01-21 ENCOUNTER — HOSPITAL ENCOUNTER (OUTPATIENT)
Age: 75
Setting detail: OUTPATIENT SURGERY
Discharge: HOME | End: 2020-01-21
Attending: OPHTHALMOLOGY | Admitting: OPHTHALMOLOGY
Payer: MEDICARE

## 2020-01-21 ENCOUNTER — ANESTHESIA (OUTPATIENT)
Dept: OPERATING ROOM | Age: 75
End: 2020-01-21
Payer: MEDICARE

## 2020-01-21 VITALS — DIASTOLIC BLOOD PRESSURE: 64 MMHG | SYSTOLIC BLOOD PRESSURE: 131 MMHG | OXYGEN SATURATION: 100 %

## 2020-01-21 VITALS
SYSTOLIC BLOOD PRESSURE: 113 MMHG | HEART RATE: 63 BPM | DIASTOLIC BLOOD PRESSURE: 65 MMHG | WEIGHT: 315 LBS | RESPIRATION RATE: 16 BRPM | TEMPERATURE: 97.2 F | OXYGEN SATURATION: 100 % | BODY MASS INDEX: 39.17 KG/M2 | HEIGHT: 75 IN

## 2020-01-21 LAB
GLUCOSE BLD-MCNC: 115 MG/DL (ref 70–99)
GLUCOSE BLD-MCNC: 136 MG/DL (ref 70–99)
PERFORMED ON: ABNORMAL
PERFORMED ON: ABNORMAL

## 2020-01-21 PROCEDURE — 7100000011 HC PHASE II RECOVERY - ADDTL 15 MIN: Performed by: OPHTHALMOLOGY

## 2020-01-21 PROCEDURE — 7100000010 HC PHASE II RECOVERY - FIRST 15 MIN: Performed by: OPHTHALMOLOGY

## 2020-01-21 PROCEDURE — 6370000000 HC RX 637 (ALT 250 FOR IP): Performed by: OPHTHALMOLOGY

## 2020-01-21 PROCEDURE — 2580000003 HC RX 258: Performed by: ANESTHESIOLOGY

## 2020-01-21 PROCEDURE — 3600000003 HC SURGERY LEVEL 3 BASE: Performed by: OPHTHALMOLOGY

## 2020-01-21 PROCEDURE — 3700000000 HC ANESTHESIA ATTENDED CARE: Performed by: OPHTHALMOLOGY

## 2020-01-21 PROCEDURE — V2632 POST CHMBR INTRAOCULAR LENS: HCPCS | Performed by: OPHTHALMOLOGY

## 2020-01-21 PROCEDURE — 2709999900 HC NON-CHARGEABLE SUPPLY: Performed by: OPHTHALMOLOGY

## 2020-01-21 PROCEDURE — 6360000002 HC RX W HCPCS: Performed by: OPHTHALMOLOGY

## 2020-01-21 PROCEDURE — 2500000003 HC RX 250 WO HCPCS: Performed by: OPHTHALMOLOGY

## 2020-01-21 PROCEDURE — 6360000002 HC RX W HCPCS: Performed by: NURSE ANESTHETIST, CERTIFIED REGISTERED

## 2020-01-21 PROCEDURE — 2580000003 HC RX 258: Performed by: NURSE ANESTHETIST, CERTIFIED REGISTERED

## 2020-01-21 DEVICE — ACRYSOF(R) IQ ASPHERIC IOL SP ACRYLIC FOLDABLELENS WULTRASERT(TM) DELIVERY SYSTEM UV WBLUE LIGHT FILTER. 13.0MM LENGTH 6.0MM ANTERIORASYMMETRIC BICONVEX OPTIC PLANAR HAPTICS.
Type: IMPLANTABLE DEVICE | Site: EYE | Status: FUNCTIONAL
Brand: ACRYSOF ULTRASERT

## 2020-01-21 RX ORDER — OXYCODONE HYDROCHLORIDE AND ACETAMINOPHEN 5; 325 MG/1; MG/1
2 TABLET ORAL PRN
Status: DISCONTINUED | OUTPATIENT
Start: 2020-01-21 | End: 2020-01-21 | Stop reason: HOSPADM

## 2020-01-21 RX ORDER — HYDRALAZINE HYDROCHLORIDE 20 MG/ML
5 INJECTION INTRAMUSCULAR; INTRAVENOUS EVERY 10 MIN PRN
Status: DISCONTINUED | OUTPATIENT
Start: 2020-01-21 | End: 2020-01-21 | Stop reason: HOSPADM

## 2020-01-21 RX ORDER — FENTANYL CITRATE 50 UG/ML
25 INJECTION, SOLUTION INTRAMUSCULAR; INTRAVENOUS EVERY 5 MIN PRN
Status: DISCONTINUED | OUTPATIENT
Start: 2020-01-21 | End: 2020-01-21 | Stop reason: HOSPADM

## 2020-01-21 RX ORDER — FLURBIPROFEN SODIUM 0.3 MG/ML
1 SOLUTION/ DROPS OPHTHALMIC EVERY 5 MIN PRN
Status: DISCONTINUED | OUTPATIENT
Start: 2020-01-21 | End: 2020-01-21 | Stop reason: HOSPADM

## 2020-01-21 RX ORDER — ONDANSETRON 2 MG/ML
4 INJECTION INTRAMUSCULAR; INTRAVENOUS
Status: DISCONTINUED | OUTPATIENT
Start: 2020-01-21 | End: 2020-01-21 | Stop reason: HOSPADM

## 2020-01-21 RX ORDER — FENTANYL CITRATE 50 UG/ML
INJECTION, SOLUTION INTRAMUSCULAR; INTRAVENOUS PRN
Status: DISCONTINUED | OUTPATIENT
Start: 2020-01-21 | End: 2020-01-21 | Stop reason: SDUPTHER

## 2020-01-21 RX ORDER — CYCLOPENTOLATE HYDROCHLORIDE 10 MG/ML
1 SOLUTION/ DROPS OPHTHALMIC EVERY 5 MIN PRN
Status: DISCONTINUED | OUTPATIENT
Start: 2020-01-21 | End: 2020-01-21 | Stop reason: HOSPADM

## 2020-01-21 RX ORDER — PHENYLEPHRINE HCL 2.5 %
1 DROPS OPHTHALMIC (EYE) EVERY 5 MIN PRN
Status: DISCONTINUED | OUTPATIENT
Start: 2020-01-21 | End: 2020-01-21 | Stop reason: HOSPADM

## 2020-01-21 RX ORDER — MEPERIDINE HYDROCHLORIDE 50 MG/ML
12.5 INJECTION INTRAMUSCULAR; INTRAVENOUS; SUBCUTANEOUS EVERY 5 MIN PRN
Status: DISCONTINUED | OUTPATIENT
Start: 2020-01-21 | End: 2020-01-21 | Stop reason: HOSPADM

## 2020-01-21 RX ORDER — PROMETHAZINE HYDROCHLORIDE 25 MG/ML
6.25 INJECTION, SOLUTION INTRAMUSCULAR; INTRAVENOUS
Status: DISCONTINUED | OUTPATIENT
Start: 2020-01-21 | End: 2020-01-21 | Stop reason: HOSPADM

## 2020-01-21 RX ORDER — TETRACAINE HYDROCHLORIDE 5 MG/ML
SOLUTION OPHTHALMIC PRN
Status: DISCONTINUED | OUTPATIENT
Start: 2020-01-21 | End: 2020-01-21 | Stop reason: ALTCHOICE

## 2020-01-21 RX ORDER — SODIUM CHLORIDE, SODIUM LACTATE, POTASSIUM CHLORIDE, CALCIUM CHLORIDE 600; 310; 30; 20 MG/100ML; MG/100ML; MG/100ML; MG/100ML
INJECTION, SOLUTION INTRAVENOUS CONTINUOUS
Status: DISCONTINUED | OUTPATIENT
Start: 2020-01-21 | End: 2020-01-21 | Stop reason: HOSPADM

## 2020-01-21 RX ORDER — SODIUM CHLORIDE, SODIUM LACTATE, POTASSIUM CHLORIDE, CALCIUM CHLORIDE 600; 310; 30; 20 MG/100ML; MG/100ML; MG/100ML; MG/100ML
INJECTION, SOLUTION INTRAVENOUS CONTINUOUS PRN
Status: DISCONTINUED | OUTPATIENT
Start: 2020-01-21 | End: 2020-01-21 | Stop reason: SDUPTHER

## 2020-01-21 RX ORDER — SODIUM CHLORIDE, POTASSIUM CHLORIDE, CALCIUM CHLORIDE, MAGNESIUM CHLORIDE, SODIUM ACETATE, AND SODIUM CITRATE 6.4; .75; .48; .3; 3.9; 1.7 MG/ML; MG/ML; MG/ML; MG/ML; MG/ML; MG/ML
SOLUTION IRRIGATION PRN
Status: DISCONTINUED | OUTPATIENT
Start: 2020-01-21 | End: 2020-01-21 | Stop reason: ALTCHOICE

## 2020-01-21 RX ORDER — OXYCODONE HYDROCHLORIDE AND ACETAMINOPHEN 5; 325 MG/1; MG/1
1 TABLET ORAL PRN
Status: DISCONTINUED | OUTPATIENT
Start: 2020-01-21 | End: 2020-01-21 | Stop reason: HOSPADM

## 2020-01-21 RX ORDER — MIDAZOLAM HYDROCHLORIDE 1 MG/ML
INJECTION INTRAMUSCULAR; INTRAVENOUS PRN
Status: DISCONTINUED | OUTPATIENT
Start: 2020-01-21 | End: 2020-01-21 | Stop reason: SDUPTHER

## 2020-01-21 RX ORDER — TROPICAMIDE 10 MG/ML
1 SOLUTION/ DROPS OPHTHALMIC EVERY 5 MIN PRN
Status: DISCONTINUED | OUTPATIENT
Start: 2020-01-21 | End: 2020-01-21 | Stop reason: HOSPADM

## 2020-01-21 RX ADMIN — CYCLOPENTOLATE HYDROCHLORIDE 1 DROP: 10 SOLUTION/ DROPS OPHTHALMIC at 12:27

## 2020-01-21 RX ADMIN — PHENYLEPHRINE HYDROCHLORIDE 1 DROP: 25 SOLUTION/ DROPS OPHTHALMIC at 12:08

## 2020-01-21 RX ADMIN — TROPICAMIDE 1 DROP: 10 SOLUTION/ DROPS OPHTHALMIC at 12:28

## 2020-01-21 RX ADMIN — PHENYLEPHRINE HYDROCHLORIDE 1 DROP: 25 SOLUTION/ DROPS OPHTHALMIC at 12:28

## 2020-01-21 RX ADMIN — FLURBIPROFEN SODIUM 1 DROP: 0.3 SOLUTION/ DROPS OPHTHALMIC at 12:07

## 2020-01-21 RX ADMIN — FLURBIPROFEN SODIUM 1 DROP: 0.3 SOLUTION/ DROPS OPHTHALMIC at 12:28

## 2020-01-21 RX ADMIN — CYCLOPENTOLATE HYDROCHLORIDE 1 DROP: 10 SOLUTION/ DROPS OPHTHALMIC at 12:22

## 2020-01-21 RX ADMIN — TROPICAMIDE 1 DROP: 10 SOLUTION/ DROPS OPHTHALMIC at 12:22

## 2020-01-21 RX ADMIN — FENTANYL CITRATE 50 MCG: 50 INJECTION INTRAMUSCULAR; INTRAVENOUS at 14:16

## 2020-01-21 RX ADMIN — FLURBIPROFEN SODIUM 1 DROP: 0.3 SOLUTION/ DROPS OPHTHALMIC at 12:16

## 2020-01-21 RX ADMIN — SODIUM CHLORIDE, POTASSIUM CHLORIDE, SODIUM LACTATE AND CALCIUM CHLORIDE: 600; 310; 30; 20 INJECTION, SOLUTION INTRAVENOUS at 14:16

## 2020-01-21 RX ADMIN — SODIUM CHLORIDE, POTASSIUM CHLORIDE, SODIUM LACTATE AND CALCIUM CHLORIDE: 600; 310; 30; 20 INJECTION, SOLUTION INTRAVENOUS at 12:40

## 2020-01-21 RX ADMIN — PHENYLEPHRINE HYDROCHLORIDE 1 DROP: 25 SOLUTION/ DROPS OPHTHALMIC at 12:22

## 2020-01-21 RX ADMIN — CYCLOPENTOLATE HYDROCHLORIDE 1 DROP: 10 SOLUTION/ DROPS OPHTHALMIC at 12:06

## 2020-01-21 RX ADMIN — PHENYLEPHRINE HYDROCHLORIDE 1 DROP: 25 SOLUTION/ DROPS OPHTHALMIC at 12:16

## 2020-01-21 RX ADMIN — TROPICAMIDE 1 DROP: 10 SOLUTION/ DROPS OPHTHALMIC at 12:09

## 2020-01-21 RX ADMIN — MIDAZOLAM HYDROCHLORIDE 2 MG: 2 INJECTION, SOLUTION INTRAMUSCULAR; INTRAVENOUS at 14:16

## 2020-01-21 RX ADMIN — TROPICAMIDE 1 DROP: 10 SOLUTION/ DROPS OPHTHALMIC at 12:16

## 2020-01-21 RX ADMIN — CYCLOPENTOLATE HYDROCHLORIDE 1 DROP: 10 SOLUTION/ DROPS OPHTHALMIC at 12:16

## 2020-01-21 RX ADMIN — FLURBIPROFEN SODIUM 1 DROP: 0.3 SOLUTION/ DROPS OPHTHALMIC at 12:22

## 2020-01-21 ASSESSMENT — PULMONARY FUNCTION TESTS
PIF_VALUE: 0

## 2020-01-21 ASSESSMENT — PAIN - FUNCTIONAL ASSESSMENT: PAIN_FUNCTIONAL_ASSESSMENT: 0-10

## 2020-01-27 ENCOUNTER — ANTI-COAG VISIT (OUTPATIENT)
Dept: INTERNAL MEDICINE CLINIC | Age: 75
End: 2020-01-27

## 2020-01-27 ENCOUNTER — TELEPHONE (OUTPATIENT)
Dept: INTERNAL MEDICINE CLINIC | Age: 75
End: 2020-01-27

## 2020-01-27 LAB — INR BLD: 1.9

## 2020-01-27 NOTE — TELEPHONE ENCOUNTER
----- Message from Radha Vásquez MD sent at 1/27/2020  1:44 PM EST -----  No change  2 weeks  ----- Message -----  From: Vivian Palomo  Sent: 1/27/2020  11:21 AM EST  To: Radha Vásquez MD    INR: 1.9  Currently taking 5 mg daily except on Wed and Friday 7.5 mg

## 2020-02-10 ENCOUNTER — ANTI-COAG VISIT (OUTPATIENT)
Dept: INTERNAL MEDICINE CLINIC | Age: 75
End: 2020-02-10

## 2020-02-10 ENCOUNTER — TELEPHONE (OUTPATIENT)
Dept: INTERNAL MEDICINE CLINIC | Age: 75
End: 2020-02-10

## 2020-02-10 LAB — INR BLD: 2.2

## 2020-02-10 NOTE — TELEPHONE ENCOUNTER
----- Message from Radha Vásquez MD sent at 2/10/2020  1:41 PM EST -----  No change  2 weeks  ----- Message -----  From: Heath Barajas  Sent: 2/10/2020  10:10 AM EST  To: Radha Vásquez MD    INR 2.2  Taking 5 mg daily except W and F takes 7.5 mg

## 2020-02-25 ENCOUNTER — TELEPHONE (OUTPATIENT)
Dept: INTERNAL MEDICINE CLINIC | Age: 75
End: 2020-02-25

## 2020-02-25 ENCOUNTER — ANTI-COAG VISIT (OUTPATIENT)
Dept: INTERNAL MEDICINE CLINIC | Age: 75
End: 2020-02-25

## 2020-02-25 LAB — INR BLD: 2.4

## 2020-03-09 ENCOUNTER — OFFICE VISIT (OUTPATIENT)
Dept: CARDIOLOGY CLINIC | Age: 75
End: 2020-03-09
Payer: MEDICARE

## 2020-03-09 VITALS — HEART RATE: 66 BPM | DIASTOLIC BLOOD PRESSURE: 72 MMHG | OXYGEN SATURATION: 98 % | SYSTOLIC BLOOD PRESSURE: 120 MMHG

## 2020-03-09 PROCEDURE — 93000 ELECTROCARDIOGRAM COMPLETE: CPT | Performed by: INTERNAL MEDICINE

## 2020-03-09 PROCEDURE — 1036F TOBACCO NON-USER: CPT | Performed by: INTERNAL MEDICINE

## 2020-03-09 PROCEDURE — G8417 CALC BMI ABV UP PARAM F/U: HCPCS | Performed by: INTERNAL MEDICINE

## 2020-03-09 PROCEDURE — G8482 FLU IMMUNIZE ORDER/ADMIN: HCPCS | Performed by: INTERNAL MEDICINE

## 2020-03-09 PROCEDURE — 4040F PNEUMOC VAC/ADMIN/RCVD: CPT | Performed by: INTERNAL MEDICINE

## 2020-03-09 PROCEDURE — G8427 DOCREV CUR MEDS BY ELIG CLIN: HCPCS | Performed by: INTERNAL MEDICINE

## 2020-03-09 PROCEDURE — 1123F ACP DISCUSS/DSCN MKR DOCD: CPT | Performed by: INTERNAL MEDICINE

## 2020-03-09 PROCEDURE — 3017F COLORECTAL CA SCREEN DOC REV: CPT | Performed by: INTERNAL MEDICINE

## 2020-03-09 PROCEDURE — 99214 OFFICE O/P EST MOD 30 MIN: CPT | Performed by: INTERNAL MEDICINE

## 2020-03-09 RX ORDER — DOFETILIDE 0.5 MG/1
500 CAPSULE ORAL EVERY 12 HOURS SCHEDULED
Qty: 180 CAPSULE | Refills: 3 | Status: SHIPPED | OUTPATIENT
Start: 2020-03-09 | End: 2021-06-18 | Stop reason: SDUPTHER

## 2020-03-09 RX ORDER — SPIRONOLACTONE 25 MG/1
25 TABLET ORAL DAILY
Qty: 90 TABLET | Refills: 3 | Status: CANCELLED | OUTPATIENT
Start: 2020-03-09

## 2020-03-09 RX ORDER — TORSEMIDE 20 MG/1
20 TABLET ORAL 2 TIMES DAILY
Qty: 180 TABLET | Refills: 3 | Status: CANCELLED | OUTPATIENT
Start: 2020-03-09

## 2020-03-09 RX ORDER — ATORVASTATIN CALCIUM 40 MG/1
40 TABLET, FILM COATED ORAL DAILY
Qty: 90 TABLET | Refills: 3 | Status: CANCELLED | OUTPATIENT
Start: 2020-03-09

## 2020-03-09 RX ORDER — WARFARIN SODIUM 5 MG/1
5 TABLET ORAL DAILY
Qty: 90 TABLET | Refills: 0 | Status: CANCELLED | OUTPATIENT
Start: 2020-03-09

## 2020-03-09 RX ORDER — DILTIAZEM HYDROCHLORIDE 120 MG/1
TABLET, FILM COATED ORAL
Qty: 180 TABLET | Refills: 3 | Status: CANCELLED | OUTPATIENT
Start: 2020-03-09

## 2020-03-09 NOTE — PROGRESS NOTES
2016 AFIB. He has had an ablation in 2012 for PVC's (Dr. Benjamin Macias). Review of Systems:   12 point ROS negative in all areas as listed below except as in [place]  Constitutional, EENT, Cardiovascular, pulmonary, GI, , Musculoskeletal, skin, neurological, hematological, endocrine, Psychiatric    Reviewed past medical history, social, and family history.    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    History of knee replacement procedure of right knee     Hx of blood clots     2 DVT's    Hyperlipidemia     Hypertension     Knee osteoarthritis 1/17/2012    Left TKR 1/18/2012    Lung nodule     due to histoplasmosis    Neuromuscular disorder ([address]     Palpitations     stable with atenolol    Sleep apnea     uses CPAP    Stone, kidney     UTI (urinary tract infection) 01/23/2017     Past Surgical History:   Procedure Laterality Date    ABLATION OF DYSRHYTHMIC FOCUS  2013    a-fib    BARIATRIC SURGERY  2013    GASTRIC SLEEVE    CARDIAC SURGERY  2013    ablation    CARPAL TUNNEL RELEASE Right 9-30-15    CARPAL TUNNEL RELEASE Left 3/20/16    CHOLECYSTECTOMY, LAPAROSCOPIC N/A 2/19/2019    LAPAROSCOPIC CHOLECYSTECTOMY WITH CHOLANGIOGRAMS performed by Tomi Elmore MD at [address] [facility] [address]      with removal stone    CYSTOSCOPY  2/8/13    with Laser Vaporization of Enlarged Prostate    DIAGNOSTIC CARDIAC CATH LAB PROCEDURE      ERCP  02/18/2019    Sphincterotomy, stone removal    ERCP N/A 2/18/2019    ERCP SPHINCTER/PAPILLOTOMY performed by Marcela Whitney MD at [address] ERCP  2/18/2019    ERCP STONE REMOVAL performed by Bertha Delaney TAKE 1 TABLET BY MOUTH  DAILY 90 tablet 0    atorvastatin (LIPITOR) 40 MG tablet Take 1 tablet by mouth daily 90 tablet 3    spironolactone (ALDACTONE) 25 MG tablet Take 1 tablet by mouth daily 90 tablet 3    torsemide (DEMADEX) 20 MG tablet Take 1 tablet by mouth 2 times daily 180 tablet 3    albuterol sulfate  (90 Base) MCG/ACT inhaler Inhale 2 puffs into the lungs 4 times daily as needed for Wheezing 1 Inhaler 1    ACCU-CHEK MULTICLIX LANCETS MISC Check sugars once daily. Dx;E11.9 100 each 11    metFORMIN (GLUCOPHAGE) 500 MG tablet Take 1 tablet daily. (Patient taking differently: 500 mg 2 times daily (with meals) ) 90 tablet 3    Biotin 5 MG TABS Take 5 mg by mouth daily      Cholecalciferol (VITAMIN D3) 5000 units TABS Take 5,000 Units by mouth daily      Cyanocobalamin (VITAMIN B-12 CR PO) Take 5,000 mcg by mouth every 7 days       Multiple Vitamins-Minerals (THERAPEUTIC MULTIVITAMIN-MINERALS) tablet Take 2 tablets by mouth daily       magnesium oxide (MAG-OX) 400 MG tablet Take 400 mg by mouth daily.  [DISCONTINUED] dofetilide (TIKOSYN) 500 MCG capsule Take 1 capsule by mouth every 12 hours 60 capsule 5    clobetasol (TEMOVATE) 0.05 % ointment APPLY TOPICALLY 2 TIMES DAILY. 60 g 0    blood glucose test strips (TRUE METRIX BLOOD GLUCOSE TEST) strip Test twice daily. DX: E11.9 100 each 11    blood glucose test strips (ASCENSIA AUTODISC VI;ONE TOUCH ULTRA TEST VI) strip Use to check daily. DX: E11.9 100 each 11    EASY TOUCH LANCETS 32G MISC Test Daily. DX: E11.9 100 each 3    Lancet Devices (EASY TOUCH LANCING DEVICE) MISC Test Daily. DX: E11.9 1 each 0    Blood Glucose Monitoring Suppl (TRUE METRIX METER) VINNIE Use to check daily. DX;E11.9 1 Device 0    Lancets Misc. (ACCU-CHEK MULTICLIX LANCET DEV) KIT Check sugars once daily. DX;E11.9 1 kit 0     No facility-administered encounter medications on file as of 3/9/2020.          Lab Data:  CBC:   No results for input(s): WBC, HGB,

## 2020-03-09 NOTE — LETTER
[address] Office Note  3/9/2020     Subjective:  Mr. Ena Rutherford is a 69 y.o. male that presents today for cardiology follow up for PAF, HTN. [place]:  Today he reports feeling well. Denies chest pain, shortness of breath, edema, dizziness, palpitations and syncope. He reports he is in [facility] unable to afford TE2 Matteo was offering assistance but is no longer available. He has appointment at [facility] tomorrow to get [place] Rx refilled. He follows Dr. Nancy Herndon for PT/INR checks. [facility]   Armando Jenkins  presented to the hospital on 7/31/2016 with complaints of palpitations. Patient is a prior patient of Dr. Richter Degree with [address]. Patient presents with prior history of PAF (sp ablation 2014), hypertension and MARIA VICTORIA (uses CPAP). Patient reported prior to admission he has had off an on felt palpitations that have been gradually getting worse over time. Stated palpitations occur with activity or when sitting up. Patient felt bad all day 7/30/2016 and was weak dizzy brought to ED found to be bradycardiac with PVC's. Patient became dizzy and lightheaded with associated nausea, thus he presented to the ER. Palpitations occur on a daily basis. Stated they last for a few minutes and then resolve on their own. He was  on sotalol 120 mg in am and 80 mg in pm. Currently on coumadin and INR has been therapeutic. Patient reported to having HR in the 30's in the ER. EKG with NSR with first degree block. Echo with EF of 55-60%. Chest Xray with no disease process noted. Questionable R pulmonary nodule. Most recent 48 hour holter 01/05/201 Normal sinus rhythm with no significant pauses. PVCs (1200 / 24 hours) and NSVT (longest run 6 beats). Rare PACs, no sustained atrial arrhythmias. Symptoms noted during NSR. Lab values of BUN/Crea 23/1.0, , trop negative x 1, TSH 1.21 and H/H 13.1 and 40.4.  He underwent lung nodule biopsy 9/29/16 which revealed  montelukast (SINGULAIR) 10 MG tablet TAKE 1 TABLET BY MOUTH  DAILY 90 tablet 0    warfarin (COUMADIN) 5 MG tablet TAKE 1 TABLET BY MOUTH  DAILY 90 tablet 0    atorvastatin (LIPITOR) 40 MG tablet Take 1 tablet by mouth daily 90 tablet 3    spironolactone (ALDACTONE) 25 MG tablet Take 1 tablet by mouth daily 90 tablet 3    torsemide (DEMADEX) 20 MG tablet Take 1 tablet by mouth 2 times daily 180 tablet 3    albuterol sulfate  (90 Base) MCG/ACT inhaler Inhale 2 puffs into the lungs 4 times daily as needed for Wheezing 1 Inhaler 1    ACCU-CHEK MULTICLIX LANCETS MISC Check sugars once daily. Dx;E11.9 100 each 11    metFORMIN (GLUCOPHAGE) 500 MG tablet Take 1 tablet daily. (Patient taking differently: 500 mg 2 times daily (with meals) ) 90 tablet 3    Biotin 5 MG TABS Take 5 mg by mouth daily      Cholecalciferol (VITAMIN D3) 5000 units TABS Take 5,000 Units by mouth daily      Cyanocobalamin (VITAMIN B-12 CR PO) Take 5,000 mcg by mouth every 7 days       Multiple Vitamins-Minerals (THERAPEUTIC MULTIVITAMIN-MINERALS) tablet Take 2 tablets by mouth daily       magnesium oxide (MAG-OX) 400 MG tablet Take 400 mg by mouth daily.  [DISCONTINUED] dofetilide (TIKOSYN) 500 MCG capsule Take 1 capsule by mouth every 12 hours 60 capsule 5    clobetasol (TEMOVATE) 0.05 % ointment APPLY TOPICALLY 2 TIMES DAILY. 60 g 0    blood glucose test strips (TRUE METRIX BLOOD GLUCOSE TEST) strip Test twice daily. DX: E11.9 100 each 11    blood glucose test strips (ASCENSIA AUTODISC VI;ONE TOUCH ULTRA TEST VI) strip Use to check daily. DX: E11.9 100 each 11    EASY TOUCH LANCETS 32G MISC Test Daily. DX: E11.9 100 each 3    Lancet Devices (EASY TOUCH LANCING DEVICE) MISC Test Daily. DX: E11.9 1 each 0    Blood Glucose Monitoring Suppl (TRUE METRIX METER) VINNIE Use to check daily. DX;E11.9 1 Device 0    Lancets Misc. (ACCU-CHEK MULTICLIX LANCET DEV) KIT Check sugars once daily.  DX;E11.9 1 kit 0 function with  ejection fraction of 66%. There are no regional wall motion abnormalities. Low risk study. ECHO 10/31/17  Summary   This is a limited study pericardial effusion and shunt.   Normal left ventricular systolic function with an estimated ejection   fraction of 55%.   A bubble study was performed and fails to show evidence of shunting.  Lupilloie Bill is a very small circumferential pericardial effusion noted. Assessment:  Vishnu Reyes was seen today for follow-up. Diagnoses and all orders for this visit:    PAF (paroxysmal atrial fibrillation) (HCC)  -     EKG 12 lead  -     EKG 12 lead  -     EKG 12 lead; Future  -     EKG 12 lead    Essential hypertension  -     EKG 12 lead  -     EKG 12 lead    Other orders  -     dofetilide (TIKOSYN) 500 MCG capsule; Take 1 capsule by mouth every 12 hours        Afib in past s/p ablation   Edema of legs due to chronic venous insufficiency both legs. Plan:  1. Medications reviewed. No refills per pt request   2. Sent paper RX for Tikosyn   3. Follow up in 6 months       This note was scribed in the presence of Shell Foster MD by Gideon Reagan RN    QUALITY MEASURES  1. Tobacco Cessation Counseling: NA  2. Retake of BP if >140/90:   NA  3. Documentation to PCP/referring for new patient:  Sent to PCP at close of office visit  4. CAD patient on anti-platelet: on warfarin  5. CAD patient on STATIN therapy:  NA  6. Patient with CHF and aFib on anticoagulation:  Yes coumadin       I, Dr. Alise Bobo, personally performed the services described in this documentation, as scribed by the above signed scribe in my presence. It is both accurate and complete to my knowledge. I agree with the details independently gathered by the clinical support staff, while the remaining scribed note accurately describes my personal service to the patient.          Santana Richard MD 3/9/2020 1:10 PM

## 2020-03-09 NOTE — LETTER
[id number]  [place] [address] [address]  Phone: [phone]  Fax: [phone]     Kika Juarez MD     3/18/2020     Dinh Caraballo MD   [address] [place] [address] [place] [id number]     Patient: Chikis Nash   MR Number: <[id number]>   YOB: 1945   Date of Visit: 3/9/2020     Dear Dr. Dinh Caraballo     Today I saw our mutual patient named above. Below are the relevant portions of my assessment and plan of care. If you have questions, please do not hesitate to call me. I look forward to following Mimi Memory along with you. [address] Office Note  3/9/2020     Subjective:  Mr. Chinedu Plaza is a 69 y.o. male that presents today for cardiology follow up for PAF, HTN. [place]:  Today he reports feeling well. Denies chest pain, shortness of breath, edema, dizziness, palpitations and syncope. He reports he is in [facility] unable to afford Adri[employer] / ?????.?? was offering assistance but is no longer available. He has appointment at [facility] tomorrow to get [place] Rx refilled. He follows Dr. Yosvany Junior for PT/INR checks. PMH   Chikis Nash  presented to the hospital on 7/31/2016 with complaints of palpitations. Patient is a prior patient of Dr. Kerline Tavarez with [address]. Patient presents with prior history of PAF (sp ablation 2014), hypertension and MARIA VICTORIA (uses CPAP). Patient reported prior to admission he has had off an on felt palpitations that have been gradually getting worse over time. Stated palpitations occur with activity or when sitting up. Patient felt bad all day 7/30/2016 and was weak dizzy brought to ED found to be bradycardiac with PVC's. Patient became dizzy and lightheaded with associated nausea, thus he presented to the ER. Palpitations occur on a daily basis. Stated they last for a few minutes and then resolve on their own. He was  on sotalol 120 mg in am and 80 mg in pm. Currently on coumadin and INR has been therapeutic.  Patient reported to having HR in the 30's in the ER. EKG with NSR with first degree block. Echo with EF of 55-60%. Chest Xray with no disease process noted. Questionable R pulmonary nodule. Most recent 48 hour holter 01/05/201 Normal sinus rhythm with no significant pauses. PVCs (1200 / 24 hours) and NSVT (longest run 6 beats). Rare PACs, no sustained atrial arrhythmias. Symptoms noted during NSR. Lab values of BUN/Crea 23/1.0, , trop negative x 1, TSH 1.21 and H/H 13.1 and 40.4. 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 He was readmitted to [facility] in November 2016 AFIB. He has had an ablation in 2012 for PVC's (Dr. Maria T Govea). Review of Systems:   12 point ROS negative in all areas as listed below except as in [place]  Constitutional, EENT, Cardiovascular, pulmonary, GI, , Musculoskeletal, skin, neurological, hematological, endocrine, Psychiatric    Reviewed past medical history, social, and family history.    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    History of knee replacement procedure of right knee     Hx of blood clots     2 DVT's    Hyperlipidemia     Hypertension     Knee osteoarthritis 1/17/2012    Left TKR 1/18/2012    Lung nodule     due to histoplasmosis    Neuromuscular disorder ([address]     Palpitations     stable with atenolol    Sleep apnea     uses CPAP    Stone, kidney     UTI (urinary tract infection) 01/23/2017     Past Surgical History:   Procedure Laterality Date    ABLATION OF DYSRHYTHMIC FOCUS  2013    a-fib    BARIATRIC SURGERY  2013    GASTRIC SLEEVE    CARDIAC SURGERY  2013    ablation    CARPAL TUNNEL RELEASE Right 9-30-15  CARPAL TUNNEL RELEASE Left 3/20/16    CHOLECYSTECTOMY, LAPAROSCOPIC N/A 2/19/2019    LAPAROSCOPIC CHOLECYSTECTOMY WITH CHOLANGIOGRAMS performed by Risa Merlos MD at [address]      with removal stone    CYSTOSCOPY  2/8/13    with Laser Vaporization of Enlarged Prostate    DIAGNOSTIC CARDIAC CATH LAB PROCEDURE      ERCP  02/18/2019    Sphincterotomy, stone removal    ERCP N/A 2/18/2019    ERCP SPHINCTER/PAPILLOTOMY performed by Elliot Cintron MD at [facility] ERCP  2/18/2019    ERCP STONE REMOVAL performed by Elliot Cintron MD at [address] Right 1/14/2020    PHACOEMULSIFICATION OF CATARACT RIGHT EYE WITH INTRAOCULAR LENS IMPLANT performed by Annel Lowery MD at [facility] Left 1/21/2020    PHACOEMULSIFICATION OF CATARACT LEFT EYE WITH INTRAOCULAR LENS IMPLANT -SLEEP APNEA- performed by Annel Lowery MD at [place] [address] Bilateral     right knee (2002) and left knee (2012)    KNEE ARTHROSCOPY  4/19/2011     left  knee    SKIN BIOPSY      skin Ca/SQUAMOUS CELL    THORACOTOMY      wedge resection    TONSILLECTOMY         Objective:   /72   Pulse 66   SpO2 98%     Wt Readings from Last 3 Encounters:   01/21/20 (!) 334 lb (151.5 kg)   01/08/20 (!) 334 lb (151.5 kg)   01/03/20 (!) 334 lb (151.5 kg)       Physical Exam:  General: No Respiratory distress, appears well developed and well nourished. Eyes:  Sclera nonicteric  Nose/Sinuses:  negative findings: nose shows no deformity, asymmetry, or inflammation, nasal mucosa normal, septum midline with no perforation or bleeding  Back:  no pain to palpation  Joint:  no active joint inflammation  Musculoskeletal:  negative  Skin:  Warm and dry  Neck:  Negative for JVD and Carotid Bruits.    Chest:  Clear to auscultation, respiration easy Sinus rhythm with 1st degree A-V block with Premature atrial complexesOtherwise normal ECGWhen compared with ECG of 14-FEB-2019 21:34, (unconfirmed)Premature atrial complexes are now PresentConfirmed by Vinicio Hastings MD, [address] (1986) on 2/15/2019 1:53:11 PM    Chest Xray 2/15/19  No acute process. EKG 1/29/18  Sinus  Rhythm  -First degree A-V block   Yuli = 296 Nonspecific T-abnormality. Stress 11/1/17  Summary  There is no evidence of stress induced ischemia. Normal LV function with  ejection fraction of 66%. There are no regional wall motion abnormalities. Low risk study. ECHO 10/31/17  Summary   This is a limited study pericardial effusion and shunt.   Normal left ventricular systolic function with an estimated ejection   fraction of 55%.   A bubble study was performed and fails to show evidence of shunting.  Jerome Chiquita is a very small circumferential pericardial effusion noted. Assessment:  Mimi Mendez was seen today for follow-up. Diagnoses and all orders for this visit:    PAF (paroxysmal atrial fibrillation) (HCC)  -     EKG 12 lead  -     EKG 12 lead  -     EKG 12 lead; Future  -     EKG 12 lead    Essential hypertension  -     EKG 12 lead  -     EKG 12 lead    Other orders  -     dofetilide (TIKOSYN) 500 MCG capsule; Take 1 capsule by mouth every 12 hours        Afib in past s/p ablation   Edema of legs due to chronic venous insufficiency both legs. Plan:  1. Medications reviewed. No refills per pt request   2. Sent paper RX for Tikosyn   3. Follow up in 6 months       This note was scribed in the presence of Paul Greenberg MD by Patria Alva RN    QUALITY MEASURES  1. Tobacco Cessation Counseling: NA  2. Retake of BP if >140/90:   NA  3. Documentation to PCP/referring for new patient:  Sent to PCP at close of office visit  4. CAD patient on anti-platelet: on warfarin  5. CAD patient on STATIN therapy:  NA  6.  Patient with CHF and aFib on anticoagulation:  Yes coumadin

## 2020-03-10 ENCOUNTER — ANTI-COAG VISIT (OUTPATIENT)
Dept: INTERNAL MEDICINE CLINIC | Age: 75
End: 2020-03-10

## 2020-03-10 ENCOUNTER — TELEPHONE (OUTPATIENT)
Dept: INTERNAL MEDICINE CLINIC | Age: 75
End: 2020-03-10

## 2020-03-10 LAB — INR BLD: 2.2

## 2020-03-10 RX ORDER — MONTELUKAST SODIUM 10 MG/1
TABLET ORAL
Qty: 90 TABLET | Refills: 0 | Status: SHIPPED | OUTPATIENT
Start: 2020-03-10 | End: 2020-05-26

## 2020-03-10 NOTE — TELEPHONE ENCOUNTER
----- Message from Fox Rocha MD sent at 3/10/2020  5:04 PM EDT -----  No change   2 week  ----- Message -----  From: Titi Hobbs  Sent: 3/10/2020   4:34 PM EDT  To: Fox Rocha MD    INR: 2.2

## 2020-03-23 ENCOUNTER — ANTI-COAG VISIT (OUTPATIENT)
Dept: INTERNAL MEDICINE CLINIC | Age: 75
End: 2020-03-23

## 2020-03-23 LAB — INR BLD: 2.8

## 2020-03-23 RX ORDER — LANCETS 32 GAUGE
EACH MISCELLANEOUS
Qty: 100 EACH | Refills: 3 | Status: SHIPPED | OUTPATIENT
Start: 2020-03-23

## 2020-03-23 RX ORDER — CALCIUM CITRATE/VITAMIN D3 200MG-6.25
TABLET ORAL
Qty: 100 EACH | Refills: 3 | Status: SHIPPED | OUTPATIENT
Start: 2020-03-23

## 2020-03-23 NOTE — TELEPHONE ENCOUNTER
----- Message from Buckley Bence, MD sent at 3/23/2020  4:22 PM EDT -----  No change  2 weeks  ----- Message -----  From: Angelica Coker  Sent: 3/23/2020   1:47 PM EDT  To: Buckley Bence, MD    INR- 2.8    Taking 7.5 mg on Wednesdays and Fridays, and 5 mg all other days.

## 2020-03-24 RX ORDER — WARFARIN SODIUM 5 MG/1
5 TABLET ORAL DAILY
Qty: 90 TABLET | Refills: 0 | Status: SHIPPED | OUTPATIENT
Start: 2020-03-24 | End: 2020-06-26

## 2020-04-07 ENCOUNTER — ANTI-COAG VISIT (OUTPATIENT)
Dept: INTERNAL MEDICINE CLINIC | Age: 75
End: 2020-04-07

## 2020-04-07 ENCOUNTER — TELEPHONE (OUTPATIENT)
Dept: INTERNAL MEDICINE CLINIC | Age: 75
End: 2020-04-07

## 2020-04-07 LAB — INR BLD: 2.9

## 2020-04-13 ENCOUNTER — TELEPHONE (OUTPATIENT)
Dept: PULMONOLOGY | Age: 75
End: 2020-04-13

## 2020-04-13 NOTE — TELEPHONE ENCOUNTER
the provision of medical care and treatment via Telehealth and the Service and you may not be able to receive diagnosis and/or treatment through the Service for every condition for which you seek diagnosis and/or treatment. 11. There are potential risks to the use of Telehealth, including but not limited to the risks described in this Telehealth Consent. 12. Your Provider(s) have discussed the use of Telehealth and the Service with you, including the benefits and risks of such and you have provided oral consent to your Provider(s) for the use of Telehealth and the Service. 15. You understand that it is your duty to provide your Provider(s) truthful, accurate and complete information, including all relevant information regarding care that you may have received or may be receiving from other healthcare providers outside of the Service. 14. You understand that each of your Provider(s) may determine in his or sole discretion that your condition is not suitable for diagnosis and/or treatment using the Service, and that you may need to seek medical care and treatment a specialist or other healthcare provider, outside of the Service. 15. You understand that you are fully responsible for payment for all services provided by Provider(s) or through use of the Service and that you may not be able to use third-party insurance. 16. You represent that (a) you have read this Telehealth Consent carefully, (b) you understand the risks and benefits of the Service and the use of Telehealth in the medical care and treatment provided to you by Provider(s) using the Service, and (c) you have the legal capacity and authority to provide this consent for yourself and/or the minor for which you are consenting under applicable federal and state laws, including laws relating to the age of [de-identified] and/or parental/guardian consent.    17. You give your informed consent to the use of Telehealth by Provider(s) using the Service under

## 2020-04-14 ENCOUNTER — VIRTUAL VISIT (OUTPATIENT)
Dept: INTERNAL MEDICINE CLINIC | Age: 75
End: 2020-04-14

## 2020-04-14 PROCEDURE — 99213 OFFICE O/P EST LOW 20 MIN: CPT | Performed by: INTERNAL MEDICINE

## 2020-04-14 NOTE — PROGRESS NOTES
torsemide (DEMADEX) 20 MG tablet Take 1 tablet by mouth 2 times daily  Dinesh Gardner MD   albuterol sulfate  (90 Base) MCG/ACT inhaler Inhale 2 puffs into the lungs 4 times daily as needed for Wheezing  Jeff Cornejo MD   blood glucose test strips (TRUE METRIX BLOOD GLUCOSE TEST) strip Test twice daily. DX: E11.9  Jeff Cornejo MD   ACCU-CHEK MULTICLIX LANCETS MISC Check sugars once daily. Dx;E11.9  Jeff Cornejo MD   metFORMIN (GLUCOPHAGE) 500 MG tablet Take 1 tablet daily. Patient taking differently: 500 mg 2 times daily (with meals)   Jeff Cornejo MD   Lancet Devices (EASY TOUCH LANCING DEVICE) MISC Test Daily. DX: E11.9  Jeff Corenjo MD   Blood Glucose Monitoring Suppl (TRUE METRIX METER) VINNIE Use to check daily. DX;E11.9  Jeff Cornejo MD   Lancets Misc. (ACCU-CHEK MULTICLIX LANCET DEV) KIT Check sugars once daily. DX;E11.9  Jeff Cornejo MD   Biotin 5 MG TABS Take 5 mg by mouth daily  Historical Provider, MD   Cholecalciferol (VITAMIN D3) 5000 units TABS Take 5,000 Units by mouth daily  Historical Provider, MD   Cyanocobalamin (VITAMIN B-12 CR PO) Take 5,000 mcg by mouth every 7 days   Historical Provider, MD   Multiple Vitamins-Minerals (THERAPEUTIC MULTIVITAMIN-MINERALS) tablet Take 2 tablets by mouth daily   Historical Provider, MD   magnesium oxide (MAG-OX) 400 MG tablet Take 400 mg by mouth daily.   Historical Provider, MD       Social History     Tobacco Use    Smoking status: Former Smoker     Packs/day: 2.00     Years: 17.00     Pack years: 34.00     Types: Cigarettes     Last attempt to quit: 1976     Years since quittin.0    Smokeless tobacco: Never Used   Substance Use Topics    Alcohol use: No    Drug use: No        Allergies   Allergen Reactions    Bactrim [Sulfamethoxazole-Trimethoprim] Diarrhea    Ciprofloxacin      Bad headaches    Macrobid [Nitrofurantoin Monohyd Macro]     Sulfamethoxazole Diarrhea   

## 2020-04-20 ENCOUNTER — TELEPHONE (OUTPATIENT)
Dept: INTERNAL MEDICINE CLINIC | Age: 75
End: 2020-04-20

## 2020-04-20 ENCOUNTER — VIRTUAL VISIT (OUTPATIENT)
Dept: PULMONOLOGY | Age: 75
End: 2020-04-20
Payer: MEDICARE

## 2020-04-20 ENCOUNTER — ANTI-COAG VISIT (OUTPATIENT)
Dept: INTERNAL MEDICINE CLINIC | Age: 75
End: 2020-04-20

## 2020-04-20 ENCOUNTER — TELEPHONE (OUTPATIENT)
Dept: PULMONOLOGY | Age: 75
End: 2020-04-20

## 2020-04-20 VITALS — HEIGHT: 75 IN | BODY MASS INDEX: 39.17 KG/M2 | WEIGHT: 315 LBS

## 2020-04-20 LAB — INR BLD: 2.6

## 2020-04-20 PROCEDURE — 1123F ACP DISCUSS/DSCN MKR DOCD: CPT | Performed by: NURSE PRACTITIONER

## 2020-04-20 PROCEDURE — G8427 DOCREV CUR MEDS BY ELIG CLIN: HCPCS | Performed by: NURSE PRACTITIONER

## 2020-04-20 PROCEDURE — 99213 OFFICE O/P EST LOW 20 MIN: CPT | Performed by: NURSE PRACTITIONER

## 2020-04-20 PROCEDURE — 3017F COLORECTAL CA SCREEN DOC REV: CPT | Performed by: NURSE PRACTITIONER

## 2020-04-20 PROCEDURE — 4040F PNEUMOC VAC/ADMIN/RCVD: CPT | Performed by: NURSE PRACTITIONER

## 2020-04-20 ASSESSMENT — SLEEP AND FATIGUE QUESTIONNAIRES
HOW LIKELY ARE YOU TO NOD OFF OR FALL ASLEEP WHILE LYING DOWN TO REST IN THE AFTERNOON WHEN CIRCUMSTANCES PERMIT: 2
HOW LIKELY ARE YOU TO NOD OFF OR FALL ASLEEP IN A CAR, WHILE STOPPED FOR A FEW MINUTES IN TRAFFIC: 0
HOW LIKELY ARE YOU TO NOD OFF OR FALL ASLEEP WHILE SITTING INACTIVE IN A PUBLIC PLACE: 0
HOW LIKELY ARE YOU TO NOD OFF OR FALL ASLEEP WHILE SITTING AND READING: 2
HOW LIKELY ARE YOU TO NOD OFF OR FALL ASLEEP WHILE SITTING QUIETLY AFTER LUNCH WITHOUT ALCOHOL: 2
HOW LIKELY ARE YOU TO NOD OFF OR FALL ASLEEP WHILE SITTING AND TALKING TO SOMEONE: 0
HOW LIKELY ARE YOU TO NOD OFF OR FALL ASLEEP WHEN YOU ARE A PASSENGER IN A CAR FOR AN HOUR WITHOUT A BREAK: 0
ESS TOTAL SCORE: 9
HOW LIKELY ARE YOU TO NOD OFF OR FALL ASLEEP WHILE WATCHING TV: 3

## 2020-04-20 NOTE — PROGRESS NOTES
Disp: 180 tablet, Rfl: 3    clobetasol (TEMOVATE) 0.05 % ointment, APPLY TOPICALLY 2 TIMES DAILY. , Disp: 60 g, Rfl: 0    atorvastatin (LIPITOR) 40 MG tablet, Take 1 tablet by mouth daily, Disp: 90 tablet, Rfl: 3    spironolactone (ALDACTONE) 25 MG tablet, Take 1 tablet by mouth daily, Disp: 90 tablet, Rfl: 3    torsemide (DEMADEX) 20 MG tablet, Take 1 tablet by mouth 2 times daily, Disp: 180 tablet, Rfl: 3    albuterol sulfate  (90 Base) MCG/ACT inhaler, Inhale 2 puffs into the lungs 4 times daily as needed for Wheezing, Disp: 1 Inhaler, Rfl: 1    metFORMIN (GLUCOPHAGE) 500 MG tablet, Take 1 tablet daily. (Patient taking differently: 500 mg 2 times daily (with meals) ), Disp: 90 tablet, Rfl: 3    Biotin 5 MG TABS, Take 5 mg by mouth daily, Disp: , Rfl:     Cholecalciferol (VITAMIN D3) 5000 units TABS, Take 5,000 Units by mouth daily, Disp: , Rfl:     Cyanocobalamin (VITAMIN B-12 CR PO), Take 5,000 mcg by mouth every 7 days , Disp: , Rfl:     Multiple Vitamins-Minerals (THERAPEUTIC MULTIVITAMIN-MINERALS) tablet, Take 2 tablets by mouth daily , Disp: , Rfl:     magnesium oxide (MAG-OX) 400 MG tablet, Take 400 mg by mouth daily. , Disp: , Rfl:     Easy Touch Lancets 32G/Twist MISC, TEST DAILY. DX: E11.9, Disp: 100 each, Rfl: 3    blood glucose test strips (TRUE METRIX BLOOD GLUCOSE TEST) strip, USE TO CHECK BLOOD GLUCOSE DAILY. DX: E11.9, Disp: 100 each, Rfl: 3    blood glucose test strips (TRUE METRIX BLOOD GLUCOSE TEST) strip, Test twice daily. DX: E11.9, Disp: 100 each, Rfl: 11    ACCU-CHEK MULTICLIX LANCETS MISC, Check sugars once daily. Dx;E11.9, Disp: 100 each, Rfl: 11    Lancet Devices (EASY TOUCH LANCING DEVICE) MISC, Test Daily. DX: E11.9, Disp: 1 each, Rfl: 0    Blood Glucose Monitoring Suppl (TRUE METRIX METER) VINNIE, Use to check daily. DX;E11.9, Disp: 1 Device, Rfl: 0    Lancets Misc. (ACCU-CHEK MULTICLIX LANCET DEV) KIT, Check sugars once daily.  DX;E11.9, Disp: 1 kit, Rfl: 0        Objective:   PHYSICAL EXAM:    Height 6' 3\" (1.905 m), weight (!) 332 lb (150.6 kg). ' on RA  Exam:  Gen: No acute distress, does not appear to be in pain. Resp:No visualized signs of difficulty breathing or respiratory distress, speaking in full sentences  Neuro: Awake. Alert. Psych: Oriented x 3. No anxiety. DATA:   12/27/12 PSG AHI 18.8/ REM AHI 40.7  CPAP titration 2/14/2013 CPAP 9 cmH2O  11/6/17 CPAP titration controlled sleep-related breathing with CPAP, PLMS 20.2, recommendation CPAP 12 cm H2O    Echo 10/31/17 EF 55%    CPAP compliance data:  Compliance download report from 10/4/17 to 11/2/17 showed patient is using machine 7:21 hrs/night with 100% compliance and AHI 0.3 within this time frame. 30/30days with greater than 4 hours of machine use. CPAP 10 cm H20    Compliance download report from 1/20/18 to 2/18/18showed patient is using machine 7:51 hrs/night with 100% compliance and AHI 0.6 within this time frame. 30/30days with greater than 4 hours of machine use. CPAP 12 cm H20    Compliance download report from 3/24/18to 4/22/18 showed patient is using machine 7:40 hrs/night with 100% compliance and AHI 0.7 within this time frame. 30/30days with greater than 4 hours of machine use. CPAP 12 cm H20    Compliance download report from 3/23/19 to 4/21/19 reviewed today by me and showed patient is using machine 7:55 hrs/night with 100% compliance and AHI 0.8 within this time frame. 30/30days with greater than 4 hours of machine use. CPAP 12 cm H20    4/20/20- unable to obtain CPAP compliance report    Assessment:       · Moderate MARIA VICTORIA. CPAP 12 cmH2O. Nasal pillow. Compliant per patient, symptoms appear well controlled  · Hypersomnia/fatigue-improved  · HTN  · Afib s/p ablation x2 on cardizem and tikosyn  · Dry mouth      Plan:       Continue CPAP 12 cm H2O  Heated tubing   Advised to use CPAP 6-8 hrs at night and during naps.   Replacement of mask, tubing, head straps every 3-6 months or sooner if damaged. Patient instructed to contact [employer] company for any mask, tubing or machine trouble shooting if problems arise. Sleep hygiene  Cognitive behavioral therapy was discussed with patient including stimulus control and sleep restriction  Set bed/wake times, no naps in daytime  Avoid sedatives, alcohol and caffeinated drinks at bed time. Patient counseled to never drive or operate heavy machinery while fatigue, drowsy or sleepy. Weight loss is recommended as a long-term intervention. Complications of MARIA VICTORIA if not treated were discussed with patient patient, including: systemic hypertension, pulmonary hypertension, cardiovascular morbidities, car accidents and all cause mortality. Patient education regarding sleep tips and CPAP cleaning recommendations     Follow up 1 year, sooner if needed    Consent for telehealth visit was obtained and is noted in chart      [address] Keagan Campa is a 77 y.o. male being evaluated by a Virtual Visit (video visit) encounter to address concerns as mentioned above. A caregiver was present when appropriate. Due to this being a TeleHealth encounter (During BFPKU-51 public health emergency), evaluation of the following organ systems was limited: Vitals/Constitutional/EENT/Resp/CV/GI//MS/Neuro/Skin/Heme-Lymph-Imm. Pursuant to the emergency declaration under the [facility] [address] authority and the [employer] Act, this Virtual Visit was conducted with patient's (and/or legal guardian's) consent, to reduce the patient's risk of exposure to COVID-19 and provide necessary medical care. The patient (and/or legal guardian) has also been advised to contact this office for worsening conditions or problems, and seek emergency medical treatment and/or call 911 if deemed necessary.      Services were provided through a video synchronous

## 2020-04-20 NOTE — TELEPHONE ENCOUNTER
Pt needs to bring machine into office for compliance download once COVID19 restrictions are lifted. Call pt to bring in machine for compliance download.

## 2020-04-22 ENCOUNTER — TELEPHONE (OUTPATIENT)
Dept: INTERNAL MEDICINE CLINIC | Age: 75
End: 2020-04-22

## 2020-04-22 RX ORDER — CEPHALEXIN 500 MG/1
500 CAPSULE ORAL 3 TIMES DAILY
Qty: 15 CAPSULE | Refills: 0 | Status: SHIPPED | OUTPATIENT
Start: 2020-04-22 | End: 2020-04-27

## 2020-04-22 NOTE — TELEPHONE ENCOUNTER
----- Message from Evelyn Gu MD sent at 4/22/2020 12:54 PM EDT -----  Lulu Harrington to do  ----- Message -----  From: Salome Turner  Sent: 4/22/2020  12:25 PM EDT  To: Evelyn Gu MD    Allergy contradiction to cefuroxime axetil  ----- Message -----  From: Evelyn Gu MD  Sent: 4/22/2020  12:05 PM EDT  To: Huy Ginathierry Maurice    Need abx  Keflex 500mg tid x 5 days  He has hx of uti  If not better, UA and urine cx  ----- Message -----  From: Salome Turner  Sent: 4/22/2020   8:16 AM EDT  To: Evelyn Gu MD    Pt is having burning pain when he urinates and possible restriction. This is the 3rd day he is experiencing these symptoms. No fever. Also having frequent urination during the night.     [employer]

## 2020-04-22 NOTE — TELEPHONE ENCOUNTER
----- Message from Evelyn Gu MD sent at 4/22/2020 12:05 PM EDT -----  Need abx  Keflex 500mg tid x 5 days  He has hx of uti  If not better, UA and urine cx  ----- Message -----  From: Salome Turner  Sent: 4/22/2020   8:16 AM EDT  To: Evelyn Gu MD    Pt is having burning pain when he urinates and possible restriction. This is the 3rd day he is experiencing these symptoms. No fever. Also having frequent urination during the night.     [employer]

## 2020-04-25 ENCOUNTER — APPOINTMENT (OUTPATIENT)
Dept: GENERAL RADIOLOGY | Age: 75
End: 2020-04-25
Payer: MEDICARE

## 2020-04-25 ENCOUNTER — HOSPITAL ENCOUNTER (EMERGENCY)
Age: 75
Discharge: HOME | End: 2020-04-25
Payer: MEDICARE

## 2020-04-25 VITALS
OXYGEN SATURATION: 97 % | RESPIRATION RATE: 21 BRPM | HEART RATE: 84 BPM | SYSTOLIC BLOOD PRESSURE: 141 MMHG | DIASTOLIC BLOOD PRESSURE: 85 MMHG | TEMPERATURE: 98.4 F | BODY MASS INDEX: 39.17 KG/M2 | WEIGHT: 315 LBS | HEIGHT: 75 IN

## 2020-04-25 LAB
A/G RATIO: 1 (ref 1.1–2.2)
ALBUMIN SERPL-MCNC: 3.7 G/DL (ref 3.4–5)
ALP BLD-CCNC: 109 U/L (ref 40–129)
ALT SERPL-CCNC: 99 U/L (ref 10–40)
ANION GAP SERPL CALCULATED.3IONS-SCNC: 14 MMOL/L (ref 3–16)
AST SERPL-CCNC: 93 U/L (ref 15–37)
BASOPHILS ABSOLUTE: 0.1 K/UL (ref 0–0.2)
BASOPHILS RELATIVE PERCENT: 1.2 %
BILIRUB SERPL-MCNC: 0.5 MG/DL (ref 0–1)
BILIRUBIN URINE: NEGATIVE
BLOOD, URINE: ABNORMAL
BUN BLDV-MCNC: 33 MG/DL (ref 7–20)
CALCIUM SERPL-MCNC: 9.1 MG/DL (ref 8.3–10.6)
CHLORIDE BLD-SCNC: 96 MMOL/L (ref 99–110)
CLARITY: ABNORMAL
CO2: 21 MMOL/L (ref 21–32)
COLOR: YELLOW
CREAT SERPL-MCNC: 1.2 MG/DL (ref 0.8–1.3)
EOSINOPHILS ABSOLUTE: 0.3 K/UL (ref 0–0.6)
EOSINOPHILS RELATIVE PERCENT: 3 %
EPITHELIAL CELLS, UA: ABNORMAL /HPF (ref 0–5)
GFR AFRICAN AMERICAN: >60
GFR NON-AFRICAN AMERICAN: 59
GLOBULIN: 3.6 G/DL
GLUCOSE BLD-MCNC: 189 MG/DL (ref 70–99)
GLUCOSE URINE: NEGATIVE MG/DL
HCT VFR BLD CALC: 36.7 % (ref 40.5–52.5)
HEMOGLOBIN: 12.1 G/DL (ref 13.5–17.5)
HYALINE CASTS: ABNORMAL /LPF (ref 0–2)
INR BLD: 3.36 (ref 0.86–1.14)
KETONES, URINE: NEGATIVE MG/DL
LEUKOCYTE ESTERASE, URINE: ABNORMAL
LIPASE: 21 U/L (ref 13–60)
LYMPHOCYTES ABSOLUTE: 1.3 K/UL (ref 1–5.1)
LYMPHOCYTES RELATIVE PERCENT: 12.5 %
MCH RBC QN AUTO: 29.1 PG (ref 26–34)
MCHC RBC AUTO-ENTMCNC: 33 G/DL (ref 31–36)
MCV RBC AUTO: 88.4 FL (ref 80–100)
MICROSCOPIC EXAMINATION: YES
MONOCYTES ABSOLUTE: 1.2 K/UL (ref 0–1.3)
MONOCYTES RELATIVE PERCENT: 11.8 %
NEUTROPHILS ABSOLUTE: 7.5 K/UL (ref 1.7–7.7)
NEUTROPHILS RELATIVE PERCENT: 71.5 %
NITRITE, URINE: NEGATIVE
PDW BLD-RTO: 16 % (ref 12.4–15.4)
PH UA: 6 (ref 5–8)
PLATELET # BLD: 235 K/UL (ref 135–450)
PMV BLD AUTO: 7.5 FL (ref 5–10.5)
POTASSIUM REFLEX MAGNESIUM: 4.7 MMOL/L (ref 3.5–5.1)
PRO-BNP: 640 PG/ML (ref 0–449)
PROTEIN UA: NEGATIVE MG/DL
PROTHROMBIN TIME: 39.5 SEC (ref 10–13.2)
RBC # BLD: 4.16 M/UL (ref 4.2–5.9)
RBC UA: ABNORMAL /HPF (ref 0–4)
SODIUM BLD-SCNC: 131 MMOL/L (ref 136–145)
SPECIFIC GRAVITY UA: 1.01 (ref 1–1.03)
TOTAL PROTEIN: 7.3 G/DL (ref 6.4–8.2)
TROPONIN: <0.01 NG/ML
URIC ACID, SERUM: 7.3 MG/DL (ref 3.5–7.2)
URINE REFLEX TO CULTURE: YES
URINE TYPE: ABNORMAL
UROBILINOGEN, URINE: 1 E.U./DL
WBC # BLD: 10.5 K/UL (ref 4–11)
WBC UA: ABNORMAL /HPF (ref 0–5)

## 2020-04-25 PROCEDURE — 99283 EMERGENCY DEPT VISIT LOW MDM: CPT

## 2020-04-25 PROCEDURE — 71046 X-RAY EXAM CHEST 2 VIEWS: CPT

## 2020-04-25 PROCEDURE — 87086 URINE CULTURE/COLONY COUNT: CPT

## 2020-04-25 PROCEDURE — 83880 ASSAY OF NATRIURETIC PEPTIDE: CPT

## 2020-04-25 PROCEDURE — 85610 PROTHROMBIN TIME: CPT

## 2020-04-25 PROCEDURE — 80053 COMPREHEN METABOLIC PANEL: CPT

## 2020-04-25 PROCEDURE — 85025 COMPLETE CBC W/AUTO DIFF WBC: CPT

## 2020-04-25 PROCEDURE — 81001 URINALYSIS AUTO W/SCOPE: CPT

## 2020-04-25 PROCEDURE — 84550 ASSAY OF BLOOD/URIC ACID: CPT

## 2020-04-25 PROCEDURE — 83690 ASSAY OF LIPASE: CPT

## 2020-04-25 PROCEDURE — 84484 ASSAY OF TROPONIN QUANT: CPT

## 2020-04-25 PROCEDURE — 51798 US URINE CAPACITY MEASURE: CPT

## 2020-04-25 PROCEDURE — 73140 X-RAY EXAM OF FINGER(S): CPT

## 2020-04-25 ASSESSMENT — ENCOUNTER SYMPTOMS
NAUSEA: 0
SORE THROAT: 0
SHORTNESS OF BREATH: 0
DIARRHEA: 0
RHINORRHEA: 0
COLOR CHANGE: 0
ABDOMINAL PAIN: 0
VOMITING: 0

## 2020-04-26 NOTE — ED PROVIDER NOTES
arthralgias and myalgias. Skin: Negative for color change and rash. Neurological: Negative for dizziness and light-headedness. Psychiatric/Behavioral: Negative for agitation. All other systems reviewed and are negative. Positives and Pertinent negatives as per HPI. Except as noted above in the ROS, all other systems were reviewed and negative.        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    History of knee replacement procedure of right knee     Hx of blood clots     2 DVT's    Hyperlipidemia     Hypertension     Knee osteoarthritis 1/17/2012    Left TKR 1/18/2012    Lung nodule     due to histoplasmosis    Neuromuscular disorder ([address]     Palpitations     stable with atenolol    Sleep apnea     uses CPAP    Stone, kidney     UTI (urinary tract infection) 01/23/2017         SURGICAL HISTORY       Past Surgical History:   Procedure Laterality Date    ABLATION OF DYSRHYTHMIC FOCUS  2013    a-fib    BARIATRIC SURGERY  2013    GASTRIC SLEEVE    CARDIAC SURGERY  2013    ablation    CARPAL TUNNEL RELEASE Right 9-30-15    CARPAL TUNNEL RELEASE Left 3/20/16    CHOLECYSTECTOMY, LAPAROSCOPIC N/A 2/19/2019    LAPAROSCOPIC CHOLECYSTECTOMY WITH CHOLANGIOGRAMS performed by Maureen Martinez MD at [address]      with removal stone    CYSTOSCOPY  2/8/13    with Laser Vaporization of Enlarged Prostate    DIAGNOSTIC CARDIAC CATH LAB PROCEDURE      ERCP  02/18/2019    Sphincterotomy, stone removal    ERCP N/A 2/18/2019    ERCP SPHINCTER/PAPILLOTOMY performed by Daphne Duron MD at [facility] ERCP  2/18/2019    ERCP STONE REMOVAL performed by Daphne Duron MD at [facility] INTRACAPSULAR CATARACT EXTRACTION Right 1/14/2020    PHACOEMULSIFICATION OF CATARACT RIGHT EYE WITH INTRAOCULAR LENS IMPLANT performed by Sissy Hernandez MD at [facility] Left 1/21/2020    PHACOEMULSIFICATION OF CATARACT LEFT EYE WITH INTRAOCULAR LENS IMPLANT -SLEEP APNEA- performed by Sissy Hernandez MD at [facility] Bilateral     right knee (2002) and left knee (2012)    KNEE ARTHROSCOPY  4/19/2011     left  knee    SKIN BIOPSY      skin Ca/SQUAMOUS CELL    THORACOTOMY      wedge resection    TONSILLECTOMY           CURRENT MEDICATIONS       Discharge Medication List as of 4/25/2020 10:40 PM      CONTINUE these medications which have NOT CHANGED    Details   cephALEXin (KEFLEX) 500 MG capsule Take 1 capsule by mouth 3 times daily for 5 days, Disp-15 capsule, R-0Normal      warfarin (COUMADIN) 5 MG tablet TAKE 1 TABLET BY MOUTH  DAILY, Disp-90 tablet, R-0Normal      montelukast (SINGULAIR) 10 MG tablet TAKE 1 TABLET BY MOUTH  DAILY, Disp-90 tablet, R-0Normal      dofetilide (TIKOSYN) 500 MCG capsule Take 1 capsule by mouth every 12 hours, Disp-180 capsule, R-3Print      diltiazem (CARDIZEM) 120 MG tablet TAKE 1 TABLET BY MOUTH  EVERY 12 HOURS, Disp-180 tablet, R-3Normal      clobetasol (TEMOVATE) 0.05 % ointment APPLY TOPICALLY 2 TIMES DAILY. , Disp-60 g, R-0, Normal      atorvastatin (LIPITOR) 40 MG tablet Take 1 tablet by mouth daily, Disp-90 tablet, R-3Normal      spironolactone (ALDACTONE) 25 MG tablet Take 1 tablet by mouth daily, Disp-90 tablet, R-3Normal      torsemide (DEMADEX) 20 MG tablet Take 1 tablet by mouth 2 times daily, Disp-180 tablet, R-3Normal      albuterol sulfate  (90 Base) MCG/ACT inhaler Inhale 2 puffs into the lungs 4 times daily as needed for Wheezing, Disp-1 Inhaler, R-1Normal      metFORMIN (GLUCOPHAGE) 500 MG tablet Take 1 tablet daily. , Disp-90 tablet, R-3Normal      Biotin 5 MG TABS Take 5 mg by mouth dailyHistorical Med      Cholecalciferol (VITAMIN D3) 5000 units TABS Take 5,000 Units by mouth dailyHistorical Med      Cyanocobalamin (VITAMIN B-12 CR PO) Take 5,000 mcg by mouth every 7 days Historical Med      Multiple Vitamins-Minerals (THERAPEUTIC MULTIVITAMIN-MINERALS) tablet Take 2 tablets by mouth daily       magnesium oxide (MAG-OX) 400 MG tablet Take 400 mg by mouth daily. !! Easy Touch Lancets 32G/Twist MISC Disp-100 each, R-3, NormalTEST DAILY. DX: E11.9      !! blood glucose test strips (TRUE METRIX BLOOD GLUCOSE TEST) strip USE TO CHECK BLOOD GLUCOSE DAILY. DX: E11.9, Disp-100 each, R-3Normal      !! blood glucose test strips (TRUE METRIX BLOOD GLUCOSE TEST) strip Disp-100 each, R-11, NormalTest twice daily. DX: E11.9      ! ! ACCU-CHEK MULTICLIX LANCETS MISC Disp-100 each, R-11, NormalCheck sugars once daily. Dx;E11.9      Lancet Devices (EASY TOUCH LANCING DEVICE) MISC Disp-1 each, R-0, NormalTest Daily. DX: E11.9      Blood Glucose Monitoring Suppl (TRUE METRIX METER) VINNIE Use to check daily. DX;E11.9, Disp-1 Device, R-0Normal      Lancets Misc. (ACCU-CHEK MULTICLIX LANCET DEV) KIT Disp-1 kit, R-0, NormalCheck sugars once daily. DX;E11.9       ! ! - Potential duplicate medications found. Please discuss with provider. ALLERGIES     Bactrim [sulfamethoxazole-trimethoprim]; Ciprofloxacin; Macrobid [nitrofurantoin monohyd macro]; Sulfamethoxazole; Theophylline; Cefuroxime axetil; Cipro xr;  Other; and Tape [adhesive tape]    FAMILY HISTORY       Family History   Problem Relation Age of Onset    Cancer Mother         ABDOMIN    Kidney Disease Mother     Stroke Mother     Arthritis Father     Substance Abuse Father     Other Father         hardening of the arteries    Stroke Father           SOCIAL HISTORY       Social History     Socioeconomic History    Marital status:      Spouse name: None    Number of children: None    Years of education: None    Highest education level: None Tenderness: There is no abdominal tenderness. Genitourinary:     Penis: Circumcised. Musculoskeletal: Normal range of motion. Skin:     General: Skin is warm and dry. Neurological:      Mental Status: He is alert and oriented to person, place, and time.          DIAGNOSTIC RESULTS   LABS:    Labs Reviewed   CBC WITH AUTO DIFFERENTIAL - Abnormal; Notable for the following components:       Result Value    RBC 4.16 (*)     Hemoglobin 12.1 (*)     Hematocrit 36.7 (*)     RDW 16.0 (*)     All other components within normal limits    Narrative:     Performed at:  [facility],  [employer]   Phone [phone]   COMPREHENSIVE METABOLIC PANEL W/ REFLEX TO MG FOR LOW K - Abnormal; Notable for the following components:    Sodium 131 (*)     Chloride 96 (*)     Glucose 189 (*)     BUN 33 (*)     GFR Non-African American 59 (*)     Albumin/Globulin Ratio 1.0 (*)     ALT 99 (*)     AST 93 (*)     All other components within normal limits    Narrative:     Performed at:  [place] [address],  [employer]   Phone [phone]   URINE RT REFLEX TO CULTURE - Abnormal; Notable for the following components:    Clarity, UA SL CLOUDY (*)     Blood, Urine TRACE-LYSED (*)     Leukocyte Esterase, Urine SMALL (*)     All other components within normal limits    Narrative:     Performed at:  [facility]) - [facility],  [email or web address], [employer]   Phone [phone]   BRAIN NATRIURETIC PEPTIDE - Abnormal; Notable for the following components:    Pro- (*)     All other components within normal limits    Narrative:     Performed at:  [facility]) - [facility],  [place], [employer]   Phone [phone] - Abnormal; Notable for the following components:    Protime 39.5 (*)     INR 3.36 (*)     All other components within normal limits Narrative:     Performed at:  [facility]) - [facility],  [place], [email or web address]   Phone [phone]   URIC ACID - Abnormal; Notable for the following components:    Uric Acid, Serum 7.3 (*)     All other components within normal limits    Narrative:     Performed at:  [facility] [address],  [place], [email or web address]   Phone [phone]   MICROSCOPIC URINALYSIS - Abnormal; Notable for the following components:    WBC, UA 6-10 (*)     All other components within normal limits    Narrative:     Performed at:  [facility] [address],  [place]   Phone [phone]   CULTURE, URINE   TROPONIN    Narrative:     Performed at:  [facility] [address],  [place]   Phone [phone]   LIPASE    Narrative:     Performed at:  [facility]) - [facility],  [place], [email or web address]   Phone [phone]   POCT GLUCOSE       All other labs werewithin normal range or not returned as of this dictation. EKG: All EKG's are interpreted by the Emergency Department Physician who either signs or Co-signs this chart in the absence of acardiologist.  Please see their note for interpretation of EKG. RADIOLOGY:     Chest x-ray interpreted by radiologist for no acute disease. Left finger x-ray interpreted by radiologist for soft tissue swelling without acute osseous abnormality. Interpretation per the Radiologist below, if available at the time of this note:    XR CHEST STANDARD (2 VW)   Final Result   No acute disease. XR FINGER LEFT (MIN 2 VIEWS)   Final Result   Soft tissue swelling without acute osseous abnormality. No results found.       PROCEDURES   Unless otherwise noted below, none     Procedures     CRITICAL CARE TIME   N/A    CONSULTS:  None      EMERGENCYDEPARTMENT COURSE and DIFFERENTIAL DIAGNOSIS/MDM:   Vitals:    Vitals:    04/25/20 2102 04/25/20 2132 04/25/20 2202 04/25/20 2233   BP: (!) 148/81 138/81 (!) 154/90 (!) 141/85   Pulse: 86  90 84   Resp:   23 21   Temp:       TempSrc:       SpO2: 94% 97%     Weight:       Height:           Patient was given the following medications:  Medications - No data to display    Patient was seen and evaluated by myself. Patient here for concerns for urinary frequency and dysuria. Patient also reports of his left thumb has been painful and he denies any injury. On exam he is awake and alert hemodynamically stable nontoxic in appearance. Lab does have been reviewed and interpreted. Patient did have an elevated liver enzymes. He also had elevated uric acid. I suspect that his left thumb is gout. Patient is on Coumadin. He did have an elevated INR at 3.36. Patient reports that he is being currently treated by his primary care doctor with Keflex for a UTI. I was able to review the previous cultures and Keflex is a susceptible drug. Today's urine was reviewed. At this point patient was encouraged to used over-the-counter Motrin for his hand. He was encouraged to continue using his Keflex for his urinary tract infection. He was also encouraged to skip 1 dose of his Coumadin. Will be given a GI referral for his elevated liver enzymes. And a urology referral.  He was encouraged to follow-up with his primary care doctor in the next few days and return to the ED for any worsening symptoms. Patient was ultimately discharged home with all questions answered. The patient tolerated their visit well. I have evaluated thispatient. My supervising physician was available for consultation. The patient and / or the family were informed of the results of any tests, a time was given to answer questions, a plan was proposed and they agreed Caleen Opitz. FINAL IMPRESSION      1. Urinary frequency    2. History of UTI    3. Anticoagulated    4.

## 2020-04-27 LAB — URINE CULTURE, ROUTINE: NORMAL

## 2020-04-30 ENCOUNTER — TELEPHONE (OUTPATIENT)
Dept: INTERNAL MEDICINE CLINIC | Age: 75
End: 2020-04-30

## 2020-04-30 RX ORDER — PHENAZOPYRIDINE HYDROCHLORIDE 100 MG/1
100 TABLET, FILM COATED ORAL 2 TIMES DAILY
Qty: 20 TABLET | Refills: 0 | Status: SHIPPED | OUTPATIENT
Start: 2020-04-30 | End: 2020-05-10

## 2020-04-30 NOTE — TELEPHONE ENCOUNTER
----- Message from Evelyn Contreras MD sent at 4/30/2020 10:05 AM EDT -----  Urine cx with minimal bacteria  Would add pyridium 100 mg bid for dysuria #20    It will change urine to orange color - nothing to worry    ----- Message -----  From: Donald Adriane  Sent: 4/30/2020   8:12 AM EDT  To: Evelyn Contreras MD    Pt states you gave him abx for UTI last week. He finished abx and Saturday night felt bad. He called and spoke with on call dr who sent him to ER. He had blood test and urine that showed no infection. Sunday he felt good but last night  had burning with urination and cloudy urine this morning. Please advise. [address] [facility] to talk to pt's spouse per pt.

## 2020-05-05 ENCOUNTER — ANTI-COAG VISIT (OUTPATIENT)
Dept: INTERNAL MEDICINE CLINIC | Age: 75
End: 2020-05-05

## 2020-05-05 ENCOUNTER — TELEPHONE (OUTPATIENT)
Dept: INTERNAL MEDICINE CLINIC | Age: 75
End: 2020-05-05

## 2020-05-05 LAB — INR BLD: 3.7

## 2020-05-05 NOTE — TELEPHONE ENCOUNTER
----- Message from Keara Godinez MD sent at 5/5/2020  3:32 PM EDT -----  Contact: [phone]  Hold today. Resume tomorrow  1 week  ----- Message -----  From: Xena Zafar  Sent: 5/5/2020  12:15 PM EDT  To: Keara Godinez MD    Pt called to report INR. His INR yesterday was 3.7. He takes 5 mg of coumadin every day and takes an extra 2 1/2 mg on Wednesday and Friday.  He did not take the 5mg of Coumadin this morning because he is waiting to hear back on what to do.     [phone]

## 2020-05-11 ENCOUNTER — ANTI-COAG VISIT (OUTPATIENT)
Dept: INTERNAL MEDICINE CLINIC | Age: 75
End: 2020-05-11

## 2020-05-11 LAB — INR BLD: 3.4

## 2020-05-14 ENCOUNTER — TELEPHONE (OUTPATIENT)
Dept: INTERNAL MEDICINE CLINIC | Age: 75
End: 2020-05-14

## 2020-05-19 ENCOUNTER — HOSPITAL ENCOUNTER (OUTPATIENT)
Age: 75
Discharge: HOME | End: 2020-05-19
Payer: MEDICARE

## 2020-05-19 ENCOUNTER — HOSPITAL ENCOUNTER (OUTPATIENT)
Dept: ULTRASOUND IMAGING | Age: 75
End: 2020-05-19
Payer: MEDICARE

## 2020-05-19 ENCOUNTER — HOSPITAL ENCOUNTER (OUTPATIENT)
Dept: ULTRASOUND IMAGING | Age: 75
Discharge: HOME | End: 2020-05-19
Payer: MEDICARE

## 2020-05-19 LAB
A/G RATIO: 1.1 (ref 1.1–2.2)
ALBUMIN SERPL-MCNC: 3.8 G/DL (ref 3.4–5)
ALP BLD-CCNC: 102 U/L (ref 40–129)
ALT SERPL-CCNC: 28 U/L (ref 10–40)
ANION GAP SERPL CALCULATED.3IONS-SCNC: 10 MMOL/L (ref 3–16)
AST SERPL-CCNC: 20 U/L (ref 15–37)
BASOPHILS ABSOLUTE: 0.1 K/UL (ref 0–0.2)
BASOPHILS RELATIVE PERCENT: 1 %
BILIRUB SERPL-MCNC: 0.6 MG/DL (ref 0–1)
BUN BLDV-MCNC: 25 MG/DL (ref 7–20)
CALCIUM SERPL-MCNC: 9.2 MG/DL (ref 8.3–10.6)
CHLORIDE BLD-SCNC: 102 MMOL/L (ref 99–110)
CO2: 26 MMOL/L (ref 21–32)
CREAT SERPL-MCNC: 1.1 MG/DL (ref 0.8–1.3)
EOSINOPHILS ABSOLUTE: 0.3 K/UL (ref 0–0.6)
EOSINOPHILS RELATIVE PERCENT: 4.1 %
ESTIMATED AVERAGE GLUCOSE: 191.5 MG/DL
GFR AFRICAN AMERICAN: >60
GFR NON-AFRICAN AMERICAN: >60
GLOBULIN: 3.6 G/DL
GLUCOSE BLD-MCNC: 202 MG/DL (ref 70–99)
HAV IGM SER IA-ACNC: NORMAL
HBA1C MFR BLD: 8.3 %
HCT VFR BLD CALC: 35.9 % (ref 40.5–52.5)
HEMOGLOBIN: 11.9 G/DL (ref 13.5–17.5)
HEPATITIS B CORE IGM ANTIBODY: NORMAL
HEPATITIS B SURFACE ANTIGEN INTERPRETATION: NORMAL
HEPATITIS C ANTIBODY INTERPRETATION: NORMAL
INR BLD: 3.53 (ref 0.86–1.14)
LYMPHOCYTES ABSOLUTE: 1.1 K/UL (ref 1–5.1)
LYMPHOCYTES RELATIVE PERCENT: 14.5 %
MCH RBC QN AUTO: 29.3 PG (ref 26–34)
MCHC RBC AUTO-ENTMCNC: 33.1 G/DL (ref 31–36)
MCV RBC AUTO: 88.5 FL (ref 80–100)
MONOCYTES ABSOLUTE: 0.7 K/UL (ref 0–1.3)
MONOCYTES RELATIVE PERCENT: 9.4 %
NEUTROPHILS ABSOLUTE: 5.3 K/UL (ref 1.7–7.7)
NEUTROPHILS RELATIVE PERCENT: 71 %
PDW BLD-RTO: 16.6 % (ref 12.4–15.4)
PLATELET # BLD: 215 K/UL (ref 135–450)
PMV BLD AUTO: 7.7 FL (ref 5–10.5)
POTASSIUM SERPL-SCNC: 4.4 MMOL/L (ref 3.5–5.1)
PROTHROMBIN TIME: 41.5 SEC (ref 10–13.2)
RBC # BLD: 4.06 M/UL (ref 4.2–5.9)
SODIUM BLD-SCNC: 138 MMOL/L (ref 136–145)
TOTAL PROTEIN: 7.4 G/DL (ref 6.4–8.2)
WBC # BLD: 7.4 K/UL (ref 4–11)

## 2020-05-19 PROCEDURE — 80074 ACUTE HEPATITIS PANEL: CPT

## 2020-05-19 PROCEDURE — 36415 COLL VENOUS BLD VENIPUNCTURE: CPT

## 2020-05-19 PROCEDURE — 83036 HEMOGLOBIN GLYCOSYLATED A1C: CPT

## 2020-05-19 PROCEDURE — 85025 COMPLETE CBC W/AUTO DIFF WBC: CPT

## 2020-05-19 PROCEDURE — 76705 ECHO EXAM OF ABDOMEN: CPT

## 2020-05-19 PROCEDURE — 85610 PROTHROMBIN TIME: CPT

## 2020-05-19 PROCEDURE — 80053 COMPREHEN METABOLIC PANEL: CPT

## 2020-05-21 RX ORDER — GLIPIZIDE 5 MG/1
5 TABLET ORAL EVERY MORNING
Qty: 30 TABLET | Refills: 2 | Status: SHIPPED | OUTPATIENT
Start: 2020-05-21 | End: 2020-10-04

## 2020-05-22 ENCOUNTER — ANTI-COAG VISIT (OUTPATIENT)
Dept: INTERNAL MEDICINE CLINIC | Age: 75
End: 2020-05-22

## 2020-05-22 ENCOUNTER — TELEPHONE (OUTPATIENT)
Dept: INTERNAL MEDICINE CLINIC | Age: 75
End: 2020-05-22

## 2020-05-26 RX ORDER — MONTELUKAST SODIUM 10 MG/1
TABLET ORAL
Qty: 90 TABLET | Refills: 0 | Status: SHIPPED | OUTPATIENT
Start: 2020-05-26 | End: 2020-10-19

## 2020-05-27 RX ORDER — TORSEMIDE 20 MG/1
TABLET ORAL
Qty: 180 TABLET | Refills: 3 | Status: SHIPPED | OUTPATIENT
Start: 2020-05-27 | End: 2021-01-18 | Stop reason: SDUPTHER

## 2020-06-01 ENCOUNTER — TELEPHONE (OUTPATIENT)
Dept: INTERNAL MEDICINE CLINIC | Age: 75
End: 2020-06-01

## 2020-06-01 ENCOUNTER — ANTI-COAG VISIT (OUTPATIENT)
Dept: INTERNAL MEDICINE CLINIC | Age: 75
End: 2020-06-01

## 2020-06-01 LAB — INR BLD: 3.4

## 2020-06-15 ENCOUNTER — ANTI-COAG VISIT (OUTPATIENT)
Dept: INTERNAL MEDICINE CLINIC | Age: 75
End: 2020-06-15

## 2020-06-15 ENCOUNTER — TELEPHONE (OUTPATIENT)
Dept: INTERNAL MEDICINE CLINIC | Age: 75
End: 2020-06-15

## 2020-06-15 LAB — INR BLD: 2.6

## 2020-06-15 NOTE — TELEPHONE ENCOUNTER
----- Message from Amirah Lyle MD sent at 6/15/2020  2:29 PM EDT -----  No changes  2 weeks  ----- Message -----  From: Armando Humphrey  Sent: 6/15/2020   9:34 AM EDT  To: Amirah Lyle MD    INR 2.6  Taking 5 mg daily

## 2020-06-26 RX ORDER — WARFARIN SODIUM 5 MG/1
5 TABLET ORAL DAILY
Qty: 90 TABLET | Refills: 1 | Status: SHIPPED | OUTPATIENT
Start: 2020-06-26 | End: 2020-11-30

## 2020-07-02 ENCOUNTER — TELEPHONE (OUTPATIENT)
Dept: INTERNAL MEDICINE CLINIC | Age: 75
End: 2020-07-02

## 2020-07-02 ENCOUNTER — ANTI-COAG VISIT (OUTPATIENT)
Dept: INTERNAL MEDICINE CLINIC | Age: 75
End: 2020-07-02

## 2020-07-02 LAB — INR BLD: 2.7

## 2020-07-02 NOTE — TELEPHONE ENCOUNTER
----- Message from Robby Roy MD sent at 7/2/2020 12:54 PM EDT -----  No changes  2 weeks  ----- Message -----  From: Jeni Weller  Sent: 7/2/2020   8:04 AM EDT  To: Robby Roy MD    INR 2.7

## 2020-07-07 ENCOUNTER — OFFICE VISIT (OUTPATIENT)
Dept: INTERNAL MEDICINE CLINIC | Age: 75
End: 2020-07-07

## 2020-07-07 VITALS
HEIGHT: 75 IN | HEART RATE: 70 BPM | SYSTOLIC BLOOD PRESSURE: 115 MMHG | RESPIRATION RATE: 18 BRPM | DIASTOLIC BLOOD PRESSURE: 78 MMHG | BODY MASS INDEX: 39.17 KG/M2 | WEIGHT: 315 LBS

## 2020-07-07 PROCEDURE — 3017F COLORECTAL CA SCREEN DOC REV: CPT | Performed by: INTERNAL MEDICINE

## 2020-07-07 PROCEDURE — 99213 OFFICE O/P EST LOW 20 MIN: CPT | Performed by: INTERNAL MEDICINE

## 2020-07-07 PROCEDURE — G8427 DOCREV CUR MEDS BY ELIG CLIN: HCPCS | Performed by: INTERNAL MEDICINE

## 2020-07-07 PROCEDURE — 4040F PNEUMOC VAC/ADMIN/RCVD: CPT | Performed by: INTERNAL MEDICINE

## 2020-07-07 PROCEDURE — 1036F TOBACCO NON-USER: CPT | Performed by: INTERNAL MEDICINE

## 2020-07-07 PROCEDURE — 1123F ACP DISCUSS/DSCN MKR DOCD: CPT | Performed by: INTERNAL MEDICINE

## 2020-07-07 PROCEDURE — G8417 CALC BMI ABV UP PARAM F/U: HCPCS | Performed by: INTERNAL MEDICINE

## 2020-07-07 NOTE — PROGRESS NOTES
Subjective:      Patient ID: Chely More is a 77 y.o. male. HPI     77 y.o. 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 ,obestiy, MARIA VICTORIA , pedal edema here for ongoing dizzy spells x 1 month    Reports he has mild dull headache with sinus issues and also notes mild exertional sob, dizziness intermittently along with above symptoms  No fevers. No chills  No new meds except for glipizide a month ago  Sugars stable  Complaint with meds    No low sugars. No low BP noted  No falls  No pedal edema or chest pain   Limited activity with covid , not going to [facility] for restrictions      Current Outpatient Medications   Medication Sig Dispense Refill    warfarin (COUMADIN) 5 MG tablet TAKE 1 TABLET BY MOUTH  DAILY 90 tablet 1    torsemide (DEMADEX) 20 MG tablet TAKE 1 TABLET BY MOUTH TWO  TIMES DAILY 180 tablet 3    montelukast (SINGULAIR) 10 MG tablet TAKE 1 TABLET BY MOUTH ONCE DAILY 90 tablet 0    glipiZIDE (GLUCOTROL) 5 MG tablet Take 1 tablet by mouth every morning 30 tablet 2    Easy Touch Lancets 32G/Twist MISC TEST DAILY. DX: E11.9 100 each 3    blood glucose test strips (TRUE METRIX BLOOD GLUCOSE TEST) strip USE TO CHECK BLOOD GLUCOSE DAILY. DX: E11.9 100 each 3    dofetilide (TIKOSYN) 500 MCG capsule Take 1 capsule by mouth every 12 hours 180 capsule 3    diltiazem (CARDIZEM) 120 MG tablet TAKE 1 TABLET BY MOUTH  EVERY 12 HOURS 180 tablet 3    clobetasol (TEMOVATE) 0.05 % ointment APPLY TOPICALLY 2 TIMES DAILY. 60 g 0    atorvastatin (LIPITOR) 40 MG tablet Take 1 tablet by mouth daily 90 tablet 3    spironolactone (ALDACTONE) 25 MG tablet Take 1 tablet by mouth daily 90 tablet 3    albuterol sulfate  (90 Base) MCG/ACT inhaler Inhale 2 puffs into the lungs 4 times daily as needed for Wheezing 1 Inhaler 1    blood glucose test strips (TRUE METRIX BLOOD GLUCOSE TEST) strip Test twice daily.  DX: E11.9 100 each 11    ACCU-CHEK MULTICLIX LANCETS MISC Check sugars once daily. Dx;E11.9 100 each 11    metFORMIN (GLUCOPHAGE) 500 MG tablet Take 1 tablet daily. (Patient taking differently: 500 mg 2 times daily (with meals) ) 90 tablet 3    Lancet Devices (EASY TOUCH LANCING DEVICE) MISC Test Daily. DX: E11.9 1 each 0    Blood Glucose Monitoring Suppl (TRUE METRIX METER) VNINIE Use to check daily. DX;E11.9 1 Device 0    Lancets Misc. (ACCU-CHEK MULTICLIX LANCET DEV) KIT Check sugars once daily. DX;E11.9 1 kit 0    Biotin 5 MG TABS Take 5 mg by mouth daily      Cholecalciferol (VITAMIN D3) 5000 units TABS Take 5,000 Units by mouth daily      Cyanocobalamin (VITAMIN B-12 CR PO) Take 5,000 mcg by mouth every 7 days       Multiple Vitamins-Minerals (THERAPEUTIC MULTIVITAMIN-MINERALS) tablet Take 2 tablets by mouth daily       magnesium oxide (MAG-OX) 400 MG tablet Take 400 mg by mouth daily. No current facility-administered medications for this visit. Review of Systems  As above    Objective:   Physical Exam   Vitals:    07/07/20 0812   BP: 115/78   Pulse: 70   Resp: 18           General: obese, healthy appearing male  Awake, alert and oriented.  Appears to be not in any distress  Mucous Membranes:  Pink , anicteric  Neck: No JVD, no carotid bruit, no thyromegaly  Chest:  Clear to auscultation bilaterally, no added sounds  Cardiovascular:  RRR S1S2 heard, no murmurs or gallops  Abdomen:  Soft, morbidly obese, undistended, non tender, no organomegaly, BS present  Extremities: 2+ pedal edema  Distal pulses well felt  Facial erythematous rash on both cheeks, frontal area noted  Neurological : grossly normal            Stress test 10/17- neg       TTE 10/31/2017     Summary   This is a limited study pericardial effusion and shunt.   Normal left ventricular systolic function with an estimated ejection   fraction of 55%.   A bubble study was performed and fails to show evidence of shunting.   There is a very small circumferential pericardial effusion noted.    Assessment and Plan    Dizziness, sob , headache - unsure of significance  No abn findings noted  Stop glipizide and see  Can change to  [place] [facility] [place]

## 2020-07-10 ENCOUNTER — TELEPHONE (OUTPATIENT)
Dept: INTERNAL MEDICINE CLINIC | Age: 75
End: 2020-07-10

## 2020-07-10 ENCOUNTER — NURSE ONLY (OUTPATIENT)
Dept: INTERNAL MEDICINE CLINIC | Age: 75
End: 2020-07-10

## 2020-07-10 LAB
BILIRUBIN, POC: NORMAL
BLOOD URINE, POC: NORMAL
CLARITY, POC: NORMAL
COLOR, POC: NORMAL
GLUCOSE URINE, POC: NORMAL
KETONES, POC: NORMAL
LEUKOCYTE EST, POC: NORMAL
NITRITE, POC: NORMAL
PH, POC: NORMAL
PROTEIN, POC: NORMAL
SPECIFIC GRAVITY, POC: NORMAL
UROBILINOGEN, POC: NORMAL

## 2020-07-10 PROCEDURE — 81002 URINALYSIS NONAUTO W/O SCOPE: CPT | Performed by: INTERNAL MEDICINE

## 2020-07-10 NOTE — TELEPHONE ENCOUNTER
----- Message from Lily Zavaleta MD sent at 7/10/2020  3:18 PM EDT -----  Send urine for culture and await results  ----- Message -----  From: Neela Odell  Sent: 7/10/2020   2:58 PM EDT  To: MD Dr. Brenda Ramirez wanted pt to leave urine sample for possible UTI. UA showed:  Small leuk, and  trace blood. Please advise.

## 2020-07-13 ENCOUNTER — TELEPHONE (OUTPATIENT)
Dept: INTERNAL MEDICINE CLINIC | Age: 75
End: 2020-07-13

## 2020-07-13 ENCOUNTER — ANTI-COAG VISIT (OUTPATIENT)
Dept: INTERNAL MEDICINE CLINIC | Age: 75
End: 2020-07-13

## 2020-07-13 LAB
INR BLD: 2.2
ORGANISM: ABNORMAL
URINE CULTURE, ROUTINE: ABNORMAL

## 2020-07-13 RX ORDER — AMOXICILLIN 500 MG/1
500 CAPSULE ORAL 3 TIMES DAILY
Qty: 21 CAPSULE | Refills: 0 | Status: SHIPPED | OUTPATIENT
Start: 2020-07-13 | End: 2020-07-20

## 2020-07-13 NOTE — TELEPHONE ENCOUNTER
----- Message from Krystal Wang MD sent at 7/13/2020  4:31 PM EDT -----  Keflex 500 mg tid x 5 days  Hold coumadin on 3 rd day  ----- Message -----  From: Rocael Shaw  Sent: 7/13/2020   2:54 PM EDT  To: Krystal Wang MD    You wanted to call in cipro for pt. Pt has allergy to cipro.

## 2020-07-13 NOTE — TELEPHONE ENCOUNTER
----- Message from Destinee Prescott MD sent at 7/13/2020  4:31 PM EDT -----  Keflex 500 mg tid x 5 days  Hold coumadin on 3 rd day  ----- Message -----  From: Elliott Conroy  Sent: 7/13/2020   2:54 PM EDT  To: Destinee Prescott MD    You wanted to call in cipro for pt. Pt has allergy to cipro.

## 2020-07-13 NOTE — TELEPHONE ENCOUNTER
----- Message from Trevon Crowe MD sent at 7/13/2020  2:23 PM EDT -----  uti noted  Need cipro 500 mg bid   Need to hold coumadin on 3rd day  INR 1 week

## 2020-07-20 ENCOUNTER — TELEPHONE (OUTPATIENT)
Dept: INTERNAL MEDICINE CLINIC | Age: 75
End: 2020-07-20

## 2020-07-20 ENCOUNTER — ANTI-COAG VISIT (OUTPATIENT)
Dept: INTERNAL MEDICINE CLINIC | Age: 75
End: 2020-07-20

## 2020-07-20 LAB — INR BLD: 1.6

## 2020-07-20 NOTE — TELEPHONE ENCOUNTER
----- Message from Dorian Diana MD sent at 7/20/2020 10:22 AM EDT -----  Contact: [phone]  Continue Davi Countess, refill 90 days  Resume previous dose of coumadin  1 week  ----- Message -----  From: Magda Zafar  Sent: 7/20/2020  10:02 AM EDT  To: Dorian Diana MD    Pt called to report INR. INR 1.6. He is taking an antibiotic and he will finish the antibiotic at noon today. He is taking Zyrtec. He is also taking Januvia and states its working well. He wanted to know if he should continue taking Januvia, if so can you call in a prescription to [facility] 90 day supply.      [phone]

## 2020-07-20 NOTE — TELEPHONE ENCOUNTER
----- Message from Daniele Villalba MD sent at 7/20/2020 10:22 AM EDT -----  Contact: [phone]  Continue Mckenzie Mai, refill 90 days  Resume previous dose of coumadin  1 week  ----- Message -----  From: Jo Ann Zafar  Sent: 7/20/2020  10:02 AM EDT  To: Daniele Villalba MD    Pt called to report INR. INR 1.6. He is taking an antibiotic and he will finish the antibiotic at noon today. He is taking Zyrtec. He is also taking Januvia and states its working well. He wanted to know if he should continue taking Januvia, if so can you call in a prescription to [facility] 90 day supply.      [phone]

## 2020-07-23 ENCOUNTER — OFFICE VISIT (OUTPATIENT)
Dept: INTERNAL MEDICINE CLINIC | Age: 75
End: 2020-07-23

## 2020-07-23 VITALS
RESPIRATION RATE: 18 BRPM | SYSTOLIC BLOOD PRESSURE: 135 MMHG | HEART RATE: 70 BPM | WEIGHT: 315 LBS | DIASTOLIC BLOOD PRESSURE: 75 MMHG | BODY MASS INDEX: 39.17 KG/M2 | HEIGHT: 75 IN

## 2020-07-23 DIAGNOSIS — G44.011 INTRACTABLE EPISODIC CLUSTER HEADACHE: ICD-10-CM

## 2020-07-23 DIAGNOSIS — R42 DIZZINESS: ICD-10-CM

## 2020-07-23 PROCEDURE — G8427 DOCREV CUR MEDS BY ELIG CLIN: HCPCS | Performed by: INTERNAL MEDICINE

## 2020-07-23 PROCEDURE — G8417 CALC BMI ABV UP PARAM F/U: HCPCS | Performed by: INTERNAL MEDICINE

## 2020-07-23 PROCEDURE — 1036F TOBACCO NON-USER: CPT | Performed by: INTERNAL MEDICINE

## 2020-07-23 PROCEDURE — 1123F ACP DISCUSS/DSCN MKR DOCD: CPT | Performed by: INTERNAL MEDICINE

## 2020-07-23 PROCEDURE — 3017F COLORECTAL CA SCREEN DOC REV: CPT | Performed by: INTERNAL MEDICINE

## 2020-07-23 PROCEDURE — 2022F DILAT RTA XM EVC RTNOPTHY: CPT | Performed by: INTERNAL MEDICINE

## 2020-07-23 PROCEDURE — 4040F PNEUMOC VAC/ADMIN/RCVD: CPT | Performed by: INTERNAL MEDICINE

## 2020-07-23 PROCEDURE — 3052F HG A1C>EQUAL 8.0%<EQUAL 9.0%: CPT | Performed by: INTERNAL MEDICINE

## 2020-07-23 PROCEDURE — 99213 OFFICE O/P EST LOW 20 MIN: CPT | Performed by: INTERNAL MEDICINE

## 2020-07-23 NOTE — PROGRESS NOTES
undistended, non tender, no organomegaly, BS present  Extremities: 2+ pedal edema  Distal pulses well felt  Facial erythematous rash on both cheeks, frontal area noted  Neurological : grossly normal            Stress test 10/17- neg       TTE 10/31/2017     Summary   This is a limited study pericardial effusion and shunt.   Normal left ventricular systolic function with an estimated ejection   fraction of 55%.   A bubble study was performed and fails to show evidence of shunting.  Lesleigh Bart is a very small circumferential pericardial effusion noted.     Assessment and Plan    Dizziness, headache - unsure of  origin  No abn findings noted physically except for pain with flexion and extension of neck    - obtain Ct head for hx of fall on coumadin 3 months  - BPnormal   - sugars above 100 on home readings  - no new meds  - treated for UTI last week , no urinary symptoms now    - recent blood work normal

## 2020-07-24 ENCOUNTER — HOSPITAL ENCOUNTER (OUTPATIENT)
Dept: CT IMAGING | Age: 75
Discharge: HOME | End: 2020-07-24
Payer: MEDICARE

## 2020-07-24 LAB
ANION GAP SERPL CALCULATED.3IONS-SCNC: 11 MMOL/L (ref 3–16)
BASOPHILS ABSOLUTE: 0.1 K/UL (ref 0–0.2)
BASOPHILS RELATIVE PERCENT: 0.8 %
BUN BLDV-MCNC: 24 MG/DL (ref 7–20)
CALCIUM SERPL-MCNC: 9.4 MG/DL (ref 8.3–10.6)
CHLORIDE BLD-SCNC: 99 MMOL/L (ref 99–110)
CO2: 27 MMOL/L (ref 21–32)
CREAT SERPL-MCNC: 1.2 MG/DL (ref 0.8–1.3)
EOSINOPHILS ABSOLUTE: 0.2 K/UL (ref 0–0.6)
EOSINOPHILS RELATIVE PERCENT: 2.5 %
GFR AFRICAN AMERICAN: >60
GFR NON-AFRICAN AMERICAN: 59
GLUCOSE BLD-MCNC: 95 MG/DL (ref 70–99)
HCT VFR BLD CALC: 37.9 % (ref 40.5–52.5)
HEMOGLOBIN: 12.4 G/DL (ref 13.5–17.5)
LYMPHOCYTES ABSOLUTE: 1.3 K/UL (ref 1–5.1)
LYMPHOCYTES RELATIVE PERCENT: 14.1 %
MCH RBC QN AUTO: 29.8 PG (ref 26–34)
MCHC RBC AUTO-ENTMCNC: 32.8 G/DL (ref 31–36)
MCV RBC AUTO: 90.9 FL (ref 80–100)
MONOCYTES ABSOLUTE: 0.9 K/UL (ref 0–1.3)
MONOCYTES RELATIVE PERCENT: 10.3 %
NEUTROPHILS ABSOLUTE: 6.5 K/UL (ref 1.7–7.7)
NEUTROPHILS RELATIVE PERCENT: 72.3 %
PDW BLD-RTO: 16.7 % (ref 12.4–15.4)
PLATELET # BLD: 248 K/UL (ref 135–450)
PMV BLD AUTO: 8.4 FL (ref 5–10.5)
POTASSIUM SERPL-SCNC: 4.5 MMOL/L (ref 3.5–5.1)
RBC # BLD: 4.16 M/UL (ref 4.2–5.9)
SODIUM BLD-SCNC: 137 MMOL/L (ref 136–145)
WBC # BLD: 9 K/UL (ref 4–11)

## 2020-07-24 PROCEDURE — 70450 CT HEAD/BRAIN W/O DYE: CPT

## 2020-07-27 ENCOUNTER — TELEPHONE (OUTPATIENT)
Dept: INTERNAL MEDICINE CLINIC | Age: 75
End: 2020-07-27

## 2020-07-27 NOTE — TELEPHONE ENCOUNTER
----- Message from Maxx Prasad MD sent at 7/27/2020  4:12 PM EDT -----  Yes I have sent  ----- Message -----  From: Jordan Park  Sent: 7/27/2020   2:17 PM EDT  To: Maxx Prasad MD    Pt called stating he spoke with you during his appointment on 7/23/2020 about his [place] being too expensive, but his pharmacy has not received anything. Please advise.

## 2020-07-28 ENCOUNTER — TELEPHONE (OUTPATIENT)
Dept: INTERNAL MEDICINE CLINIC | Age: 75
End: 2020-07-28

## 2020-07-28 RX ORDER — PIOGLITAZONEHYDROCHLORIDE 15 MG/1
15 TABLET ORAL DAILY
Qty: 90 TABLET | Refills: 0 | Status: SHIPPED | OUTPATIENT
Start: 2020-07-28 | End: 2020-08-28

## 2020-07-28 NOTE — TELEPHONE ENCOUNTER
----- Message from Kaitlynn Kim MD sent at 7/28/2020  3:31 PM EDT -----  Contact: pt- [phone]  Yes 15 mg actos     Let pt know  Stop Anya Martinez after getting actos  ----- Message -----  From: Karyle Song  Sent: 7/28/2020   1:39 PM EDT  To: Kaitlynn Kim MD    Actos would be fully covered. Ok to send?    ----- Message -----  From: Kaitlynn Kim MD  Sent: 7/28/2020   1:28 PM EDT  To: Karyle Song    Onglyza 5 mg or actos 15 mg or  Trajenta 5 mg    ----- Message -----  From: Karyle Song  Sent: 7/28/2020   1:25 PM EDT  To: Kaitlynn Kim MD    Pharmacy could not tell me what is covered.   They would need names of medication  ----- Message -----  From: Alison Fried  Sent: 7/28/2020   1:13 PM EDT  To: Kaitlynn Kim MD    He said the onglyza is too expensive and cant afford it - wanted to know if you would prescribe something cheaper- last appt- 7-31-53-next appt- 14-7-35-[facility].- please let him know at [phone]

## 2020-07-28 NOTE — TELEPHONE ENCOUNTER
Pt informed and will contact pharmacy to see how expensive onlyza will be. If too expensive pt will call us back.

## 2020-07-30 NOTE — TELEPHONE ENCOUNTER
Spoke to Mora and she said that the pt has not brought machine in for a download. Spoke to pt and he said that he would like to bring his card to the [facility] office. Pt said he will do this within the next week.

## 2020-08-03 ENCOUNTER — ANTI-COAG VISIT (OUTPATIENT)
Dept: INTERNAL MEDICINE CLINIC | Age: 75
End: 2020-08-03

## 2020-08-03 ENCOUNTER — TELEPHONE (OUTPATIENT)
Dept: INTERNAL MEDICINE CLINIC | Age: 75
End: 2020-08-03

## 2020-08-03 LAB — INR BLD: 2

## 2020-08-03 NOTE — TELEPHONE ENCOUNTER
----- Message from Jane Vyas MD sent at 8/3/2020  4:01 PM EDT -----  No change  2weeks  ----- Message -----  From: Ruben Richard  Sent: 8/3/2020   3:58 PM EDT  To: Jane Vyas MD    INR- 2.0    Taking 5 mg daily.

## 2020-08-04 ENCOUNTER — ANTI-COAG VISIT (OUTPATIENT)
Dept: INTERNAL MEDICINE CLINIC | Age: 75
End: 2020-08-04

## 2020-08-04 LAB — INR BLD: 2

## 2020-08-05 NOTE — TELEPHONE ENCOUNTER
Pt came into the office and we were unable to get a download due to the pts machine not having a SD card. Pt got a SD card from [employer] and said that he will come to the office Friday.  Pt informed to call first.

## 2020-08-09 NOTE — TELEPHONE ENCOUNTER
CPAP compliance report from 7/8/2020-8/6/2020 on CPAP 12 cm H2O reviewed. Compliance is great 100%. AHI is great 0.8.

## 2020-08-17 ENCOUNTER — TELEPHONE (OUTPATIENT)
Dept: INTERNAL MEDICINE CLINIC | Age: 75
End: 2020-08-17

## 2020-08-17 ENCOUNTER — ANTI-COAG VISIT (OUTPATIENT)
Dept: INTERNAL MEDICINE CLINIC | Age: 75
End: 2020-08-17

## 2020-08-17 LAB — INR BLD: 2.2

## 2020-08-17 RX ORDER — ATORVASTATIN CALCIUM 40 MG/1
40 TABLET, FILM COATED ORAL DAILY
Qty: 90 TABLET | Refills: 3 | Status: SHIPPED | OUTPATIENT
Start: 2020-08-17 | End: 2021-01-18 | Stop reason: SDUPTHER

## 2020-08-17 NOTE — TELEPHONE ENCOUNTER
----- Message from Akil Chambers MD sent at 8/17/2020  2:35 PM EDT -----  Contact: [phone]  No change  2 weeks  ----- Message -----  From: Jose Drake  Sent: 8/17/2020   2:06 PM EDT  To: Akil Chambers MD    INR: 2.2

## 2020-08-27 NOTE — PROGRESS NOTES
Right 1/14/2020    PHACOEMULSIFICATION OF CATARACT RIGHT EYE WITH INTRAOCULAR LENS IMPLANT performed by Melo Boone MD at [facility] Left 1/21/2020    PHACOEMULSIFICATION OF CATARACT LEFT EYE WITH INTRAOCULAR LENS IMPLANT -SLEEP APNEA- performed by Melo Boone MD at [place] [address] Bilateral     right knee (2002) and left knee (2012)    KNEE ARTHROSCOPY  4/19/2011     left  knee    SKIN BIOPSY      skin Ca/SQUAMOUS CELL    THORACOTOMY      wedge resection    TONSILLECTOMY         Objective:   /64   Pulse 84   Temp 98.4 °F (36.9 °C)   Ht 6' 3\" (1.905 m)   Wt (!) 334 lb (151.5 kg)   SpO2 98%   BMI 41.75 kg/m²     Wt Readings from Last 3 Encounters:   09/03/20 (!) 334 lb (151.5 kg)   07/23/20 (!) 331 lb (150.1 kg)   07/07/20 (!) 338 lb (153.3 kg)       Physical Exam:  General: No Respiratory distress, appears well developed and well nourished. Eyes:  Sclera nonicteric  Nose/Sinuses:  negative findings: nose shows no deformity, asymmetry, or inflammation, nasal mucosa normal, septum midline with no perforation or bleeding  Back:  no pain to palpation  Joint:  no active joint inflammation  Musculoskeletal:  negative  Skin:  Warm and dry  Neck:  Negative for JVD and Carotid Bruits. Chest:  Clear to auscultation, respiration easy  Cardiovascular:  RRR, S1S2 normal, no murmur, no rub or thrill.   Extremities:   1+ bilat ankle edema, no clubbing, cyanosis,  Pulses:  pedal pulses are normal.  Neuro: intact    Medications:   Outpatient Encounter Medications as of 9/3/2020   Medication Sig Dispense Refill    pioglitazone (ACTOS) 15 MG tablet Take 15 mg by mouth daily      dilTIAZem (CARDIZEM) 120 MG tablet TAKE 1 TABLET BY MOUTH  EVERY 12 HOURS 180 tablet 3    spironolactone (ALDACTONE) 25 MG tablet Take 1 tablet by mouth daily 90 tablet 3    atorvastatin (LIPITOR) 40 MG tablet TAKE 1 TABLET BY MOUTH  DAILY 90 tablet 3    warfarin (COUMADIN) 5 MG tablet TAKE 1 TABLET BY MOUTH  DAILY 90 tablet 1    torsemide (DEMADEX) 20 MG tablet TAKE 1 TABLET BY MOUTH TWO  TIMES DAILY 180 tablet 3    montelukast (SINGULAIR) 10 MG tablet TAKE 1 TABLET BY MOUTH ONCE DAILY 90 tablet 0    Easy Touch Lancets 32G/Twist MISC TEST DAILY. DX: E11.9 100 each 3    blood glucose test strips (TRUE METRIX BLOOD GLUCOSE TEST) strip USE TO CHECK BLOOD GLUCOSE DAILY. DX: E11.9 100 each 3    dofetilide (TIKOSYN) 500 MCG capsule Take 1 capsule by mouth every 12 hours 180 capsule 3    albuterol sulfate  (90 Base) MCG/ACT inhaler Inhale 2 puffs into the lungs 4 times daily as needed for Wheezing 1 Inhaler 1    blood glucose test strips (TRUE METRIX BLOOD GLUCOSE TEST) strip Test twice daily. DX: E11.9 100 each 11    ACCU-CHEK MULTICLIX LANCETS MISC Check sugars once daily. Dx;E11.9 100 each 11    metFORMIN (GLUCOPHAGE) 500 MG tablet Take 1 tablet daily. (Patient taking differently: 500 mg 2 times daily (with meals) ) 90 tablet 3    Lancet Devices (EASY TOUCH LANCING DEVICE) MISC Test Daily. DX: E11.9 1 each 0    Blood Glucose Monitoring Suppl (TRUE METRIX METER) VINNIE Use to check daily. DX;E11.9 1 Device 0    Lancets Misc. (ACCU-CHEK MULTICLIX LANCET DEV) KIT Check sugars once daily. DX;E11.9 1 kit 0    Biotin 5 MG TABS Take 5 mg by mouth daily      Cholecalciferol (VITAMIN D3) 5000 units TABS Take 5,000 Units by mouth daily      Cyanocobalamin (VITAMIN B-12 CR PO) Take 5,000 mcg by mouth every 7 days       Multiple Vitamins-Minerals (THERAPEUTIC MULTIVITAMIN-MINERALS) tablet Take 2 tablets by mouth daily       magnesium oxide (MAG-OX) 400 MG tablet Take 400 mg by mouth daily.       SITagliptin (JANUVIA) 50 MG tablet Take 1 tablet by mouth daily (Patient not taking: Reported on 9/3/2020) 90 tablet 3    [DISCONTINUED] pioglitazone (ACTOS) 15 MG tablet Take 1 tablet by mouth daily 90 tablet 0    glipiZIDE (GLUCOTROL) 5 MG tablet Take 1 tablet by mouth every morning (Patient not taking: Reported on 9/3/2020) 30 tablet 2    [DISCONTINUED] diltiazem (CARDIZEM) 120 MG tablet TAKE 1 TABLET BY MOUTH  EVERY 12 HOURS 180 tablet 3    clobetasol (TEMOVATE) 0.05 % ointment APPLY TOPICALLY 2 TIMES DAILY. (Patient not taking: Reported on 9/3/2020) 60 g 0    [DISCONTINUED] spironolactone (ALDACTONE) 25 MG tablet Take 1 tablet by mouth daily 90 tablet 3     No facility-administered encounter medications on file as of 9/3/2020. Lab Data:  CBC:   No results for input(s): WBC, HGB, HCT, MCV, PLT in the last 72 hours. BMP:   No results for input(s): NA, K, CL, CO2, PHOS, BUN, CREATININE in the last 72 hours. Invalid input(s): CA  LIVER PROFILE:   No results for input(s): AST, ALT, LIPASE, BILIDIR, BILITOT, ALKPHOS in the last 72 hours. Invalid input(s): AMYLASE,  ALB  LIPID:   Lab Results   Component Value Date    CHOL 222 (H) 12/19/2018    CHOL 182 10/31/2017    CHOL 208 (H) 11/11/2016     Lab Results   Component Value Date    TRIG 159 (H) 12/19/2018    TRIG 126 10/31/2017    TRIG 161 (H) 11/11/2016     Lab Results   Component Value Date    HDL 43 12/04/2019    HDL 40 09/11/2019    HDL 40 12/19/2018     Lab Results   Component Value Date    LDLCALC 66 12/04/2019    LDLCALC 72 09/11/2019    LDLCALC 150 (H) 12/19/2018     Lab Results   Component Value Date    LABVLDL 31 12/04/2019    LABVLDL 31 09/11/2019    LABVLDL 32 12/19/2018     No results found for: CHOLHDLRATIO  PT/INR:   No results for input(s): PROTIME, INR in the last 72 hours. A1C:   Lab Results   Component Value Date    LABA1C 8.3 05/19/2020     BNP:  No results for input(s): BNP in the last 72 hours. IMAGING:  EKG 9.3.20  Intervals normal NSR  CT head 7/24/20   No acute intracranial abnormality. CXR 4/5/20   No acute disease.        EKG today  3.9.20  NSR 62/min first degree AV block  msec    EKG 8/12/19  Sinus bradycardia  1 degree AV block QTc normal     EKG 2/15/19   Sinus rhythm with 1st degree A-V block with Premature atrial complexesOtherwise normal ECGWhen compared with ECG of 14-FEB-2019 21:34, (unconfirmed)Premature atrial complexes are now PresentConfirmed by Farzaneh Landin MD, [address] ([id number]) on 2/15/2019 1:53:11 PM    Chest Xray 2/15/19  No aute process. interc  EKG 1/29/18  Sinus  Rhythm  -First degree A-V block   Yuli = 296 Nonspecific T-abnormality. Stress 11/1/17  Summary  There is no evidence of stress induced ischemia. Normal LV function with  ejection fraction of 66%. There are no regional wall motion abnormalities. Low risk study. ECHO 10/31/17  Summary   This is a limited study pericardial effusion and shunt.   Normal left ventricular systolic function with an estimated ejection   fraction of 55%.   A bubble study was performed and fails to show evidence of shunting.  Vonzell Cunningham is a very small circumferential pericardial effusion noted. Assessment:  Alicia Hartley was seen today for 6 month follow-up and other. Diagnoses and all orders for this visit:    PAF (paroxysmal atrial fibrillation) (HCC)  -     EKG 12 lead    Essential hypertension  -     EKG 12 lead    Other orders  -     dilTIAZem (CARDIZEM) 120 MG tablet; TAKE 1 TABLET BY MOUTH  EVERY 12 HOURS  -     spironolactone (ALDACTONE) 25 MG tablet; Take 1 tablet by mouth daily        Afib in past s/p ablation   Edema of legs due to chronic venous insufficiency both legs. Most recent lipids 12/4/19 reviewed in epic     Plan:  1. Meds reviewed. Refills as above under orders . 2. No changes today. Continue current meds   3. Follow up in 6 months     This note was scribed in the presence of Billy Almonte MD by Don Archuleta RN      QUALITY MEASURES  1. Tobacco Cessation Counseling: NA  2. Retake of BP if >140/90:   NA  3. Documentation to PCP/referring for new patient:  Sent to PCP at close of office visit  4. CAD patient on anti-platelet: on warfarin  5. CAD patient on STATIN therapy:  NA  6.  Patient with CHF and aFib on anticoagulation:  Yes coumadin       I, Dr. Osei Dalton, personally performed the services described in this documentation, as scribed by the above signed scribe in my presence. It is both accurate and complete to my knowledge. I agree with the details independently gathered by the clinical support staff, while the remaining scribed note accurately describes my personal service to the patient.              Bienvenido Sneed MD 9/3/2020 2:09 PM

## 2020-08-28 ENCOUNTER — TELEPHONE (OUTPATIENT)
Dept: INTERNAL MEDICINE CLINIC | Age: 75
End: 2020-08-28

## 2020-08-28 NOTE — TELEPHONE ENCOUNTER
----- Message from Jil Greenfield sent at 8/28/2020  4:28 PM EDT -----  Contact: [phone]  Yes, ok to do?   ----- Message -----  From: Maxx Prasad MD  Sent: 8/28/2020   4:12 PM EDT  To: Jil Greenfield    Does he need Sean Awa script then  ----- Message -----  From: Jil Credit  Sent: 8/28/2020   3:30 PM EDT  To: Maxx Prasad MD    Patient states the Actos is not working for him like the [place]. [facility].  ----- Message -----  From: Maxx Prasad MD  Sent: 8/28/2020   3:07 PM EDT  To: Jil Greenfield    Yes  Actos 15 mg because of cost. [facility] Piictu is too costly for him    ----- Message -----  From: [place] [employer]  Sent: 8/28/2020   2:59 PM EDT  To: Maxx Prasad MD    Didn't you switch the Januvia to Actos? Please advise.   ----- Message -----  From: Kailee Fried  Sent: 8/28/2020   2:50 PM EDT  To: Mortimer Expose requested his 50mg Januvia prescription and recent blood-work be faxed to the [facility] @ [phone], care of Margaret Pulido (nurse) or Dr. Theodora Alcala. His last appointment was 07/23/20. His next appointment is 12/08/20.   MARYELLEN

## 2020-08-28 NOTE — TELEPHONE ENCOUNTER
----- Message from Tanya Fried sent at 8/28/2020  2:50 PM EDT -----  Contact: [phone]  Usha Alvarez requested his 50mg Januvia prescription and recent blood-work be faxed to the [facility] @ [phone], care of Salma Thomas (nurse) or Dr. Emerald Baker. His last appointment was 07/23/20. His next appointment is 12/08/20.   RP

## 2020-08-31 ENCOUNTER — TELEPHONE (OUTPATIENT)
Dept: INTERNAL MEDICINE CLINIC | Age: 75
End: 2020-08-31

## 2020-08-31 ENCOUNTER — ANTI-COAG VISIT (OUTPATIENT)
Dept: INTERNAL MEDICINE CLINIC | Age: 75
End: 2020-08-31

## 2020-08-31 LAB — INR BLD: 2.3

## 2020-08-31 NOTE — TELEPHONE ENCOUNTER
----- Message from Ashli English MD sent at 8/31/2020 12:01 PM EDT -----  No change  2 weeks  ----- Message -----  From: Ryan Bird  Sent: 8/31/2020  11:46 AM EDT  To: Ashli English MD    INR- 2.3

## 2020-09-03 ENCOUNTER — OFFICE VISIT (OUTPATIENT)
Dept: CARDIOLOGY CLINIC | Age: 75
End: 2020-09-03
Payer: MEDICARE

## 2020-09-03 VITALS
OXYGEN SATURATION: 98 % | SYSTOLIC BLOOD PRESSURE: 110 MMHG | DIASTOLIC BLOOD PRESSURE: 64 MMHG | TEMPERATURE: 98.4 F | BODY MASS INDEX: 39.17 KG/M2 | HEART RATE: 84 BPM | WEIGHT: 315 LBS | HEIGHT: 75 IN

## 2020-09-03 PROCEDURE — 1123F ACP DISCUSS/DSCN MKR DOCD: CPT | Performed by: INTERNAL MEDICINE

## 2020-09-03 PROCEDURE — 1036F TOBACCO NON-USER: CPT | Performed by: INTERNAL MEDICINE

## 2020-09-03 PROCEDURE — 99214 OFFICE O/P EST MOD 30 MIN: CPT | Performed by: INTERNAL MEDICINE

## 2020-09-03 PROCEDURE — 4040F PNEUMOC VAC/ADMIN/RCVD: CPT | Performed by: INTERNAL MEDICINE

## 2020-09-03 PROCEDURE — 3017F COLORECTAL CA SCREEN DOC REV: CPT | Performed by: INTERNAL MEDICINE

## 2020-09-03 PROCEDURE — G8427 DOCREV CUR MEDS BY ELIG CLIN: HCPCS | Performed by: INTERNAL MEDICINE

## 2020-09-03 PROCEDURE — G8417 CALC BMI ABV UP PARAM F/U: HCPCS | Performed by: INTERNAL MEDICINE

## 2020-09-03 RX ORDER — PIOGLITAZONEHYDROCHLORIDE 15 MG/1
15 TABLET ORAL DAILY
COMMUNITY
End: 2020-09-17

## 2020-09-03 RX ORDER — DILTIAZEM HYDROCHLORIDE 120 MG/1
TABLET, FILM COATED ORAL
Qty: 180 TABLET | Refills: 3 | Status: SHIPPED | OUTPATIENT
Start: 2020-09-03 | End: 2022-09-16

## 2020-09-03 RX ORDER — SPIRONOLACTONE 25 MG/1
25 TABLET ORAL DAILY
Qty: 90 TABLET | Refills: 3 | Status: SHIPPED | OUTPATIENT
Start: 2020-09-03 | End: 2021-01-18 | Stop reason: SDUPTHER

## 2020-09-03 NOTE — PATIENT INSTRUCTIONS
Plan:  1. Meds reviewed. Refills as warranted. 2. No changes today. Continue current meds   3.  Follow up in 6 months

## 2020-09-04 PROCEDURE — 93000 ELECTROCARDIOGRAM COMPLETE: CPT | Performed by: INTERNAL MEDICINE

## 2020-09-14 ENCOUNTER — TELEPHONE (OUTPATIENT)
Dept: INTERNAL MEDICINE CLINIC | Age: 75
End: 2020-09-14

## 2020-09-14 ENCOUNTER — ANTI-COAG VISIT (OUTPATIENT)
Dept: INTERNAL MEDICINE CLINIC | Age: 75
End: 2020-09-14

## 2020-09-14 LAB — INR BLD: 1.8

## 2020-09-14 NOTE — TELEPHONE ENCOUNTER
----- Message from Hannah Keen MD sent at 9/14/2020  2:41 PM EDT -----  Slightly low why  No changes   2 weeks  ----- Message -----  From: Danny Barrera  Sent: 9/14/2020   2:17 PM EDT  To: Hannah Keen MD    INR 1.8  Taking 5 mg daily

## 2020-09-17 RX ORDER — PIOGLITAZONEHYDROCHLORIDE 15 MG/1
TABLET ORAL
Qty: 90 TABLET | Refills: 3 | Status: SHIPPED | OUTPATIENT
Start: 2020-09-17 | End: 2020-12-08 | Stop reason: ALTCHOICE

## 2020-09-28 ENCOUNTER — ANTI-COAG VISIT (OUTPATIENT)
Dept: INTERNAL MEDICINE CLINIC | Age: 75
End: 2020-09-28

## 2020-09-28 ENCOUNTER — TELEPHONE (OUTPATIENT)
Dept: INTERNAL MEDICINE CLINIC | Age: 75
End: 2020-09-28

## 2020-09-28 LAB — INR BLD: 2

## 2020-09-28 NOTE — TELEPHONE ENCOUNTER
----- Message from Shivani Rushing MD sent at 9/28/2020 11:46 AM EDT -----  Same 2 w  ----- Message -----  From: Roxanne Bright PA-C  Sent: 9/28/2020  11:29 AM EDT  To: Shivani Rushing MD      ----- Message -----  From: Wanda Johns  Sent: 9/28/2020  10:54 AM EDT  To: Roxanne Bright PA-C    INR 2  5 mg daily

## 2020-10-04 ENCOUNTER — APPOINTMENT (OUTPATIENT)
Dept: GENERAL RADIOLOGY | Age: 75
End: 2020-10-04
Payer: MEDICARE

## 2020-10-04 ENCOUNTER — HOSPITAL ENCOUNTER (EMERGENCY)
Age: 75
Discharge: HOME | End: 2020-10-04
Payer: MEDICARE

## 2020-10-04 VITALS
RESPIRATION RATE: 18 BRPM | HEART RATE: 79 BPM | SYSTOLIC BLOOD PRESSURE: 150 MMHG | OXYGEN SATURATION: 98 % | TEMPERATURE: 98.7 F | DIASTOLIC BLOOD PRESSURE: 86 MMHG

## 2020-10-04 PROCEDURE — 6370000000 HC RX 637 (ALT 250 FOR IP): Performed by: PHYSICIAN ASSISTANT

## 2020-10-04 PROCEDURE — 90471 IMMUNIZATION ADMIN: CPT | Performed by: PHYSICIAN ASSISTANT

## 2020-10-04 PROCEDURE — 99283 EMERGENCY DEPT VISIT LOW MDM: CPT

## 2020-10-04 PROCEDURE — 90715 TDAP VACCINE 7 YRS/> IM: CPT | Performed by: PHYSICIAN ASSISTANT

## 2020-10-04 PROCEDURE — 73140 X-RAY EXAM OF FINGER(S): CPT

## 2020-10-04 PROCEDURE — 10060 I&D ABSCESS SIMPLE/SINGLE: CPT

## 2020-10-04 PROCEDURE — 6360000002 HC RX W HCPCS: Performed by: PHYSICIAN ASSISTANT

## 2020-10-04 RX ORDER — CLINDAMYCIN HYDROCHLORIDE 150 MG/1
450 CAPSULE ORAL 3 TIMES DAILY
Qty: 90 CAPSULE | Refills: 0 | Status: SHIPPED | OUTPATIENT
Start: 2020-10-04 | End: 2020-10-14

## 2020-10-04 RX ORDER — CLINDAMYCIN HYDROCHLORIDE 150 MG/1
450 CAPSULE ORAL ONCE
Status: COMPLETED | OUTPATIENT
Start: 2020-10-04 | End: 2020-10-04

## 2020-10-04 RX ADMIN — TETANUS TOXOID, REDUCED DIPHTHERIA TOXOID AND ACELLULAR PERTUSSIS VACCINE, ADSORBED 0.5 ML: 5; 2.5; 8; 8; 2.5 SUSPENSION INTRAMUSCULAR at 19:07

## 2020-10-04 RX ADMIN — CLINDAMYCIN HYDROCHLORIDE 450 MG: 150 CAPSULE ORAL at 19:07

## 2020-10-04 ASSESSMENT — ENCOUNTER SYMPTOMS
EYE DISCHARGE: 0
EYE REDNESS: 0
SHORTNESS OF BREATH: 0
CHEST TIGHTNESS: 0
COLOR CHANGE: 1
RHINORRHEA: 0
SORE THROAT: 0
SINUS PAIN: 0
NAUSEA: 0
ABDOMINAL PAIN: 0
SINUS PRESSURE: 0
COUGH: 0
CONSTIPATION: 0
VOMITING: 0
DIARRHEA: 0

## 2020-10-04 ASSESSMENT — PAIN DESCRIPTION - LOCATION: LOCATION: FINGER (COMMENT WHICH ONE)

## 2020-10-04 ASSESSMENT — PAIN SCALES - GENERAL: PAINLEVEL_OUTOF10: 7

## 2020-10-04 ASSESSMENT — PAIN DESCRIPTION - PAIN TYPE: TYPE: ACUTE PAIN

## 2020-10-04 NOTE — ED PROVIDER NOTES
[address] ED  EMERGENCY DEPARTMENT ENCOUNTER        Pt Name: Victorina Anderson  MRN: [id number]  [place] 1945  Date of evaluation: 10/4/2020  Provider: Melvin Berger PA-C  PCP: Sobeida Nuñez MD  ED Attending: No att. providers found      This patient was not seen and evaluated by the attending physician No att. providers found. I have independently evaluated this patient. CHIEF COMPLAINT       Chief Complaint   Patient presents with    Finger Pain       HISTORY OF PRESENT ILLNESS   (Location/Symptom, Timing/Onset, Context/Setting, Quality, Duration, Modifying Factors, Severity)  Note limiting factors. Victorina Anderson is a 77 y.o. male with history of diabetes, atrial fibrillation, and hypertension for 3 days of right thumb pain. Patient noticed pain and swelling 3 days ago but over the last few hours prior to ED arrival he noticed changes in coloration and development of a pus pocket. At that time he sterilized a needle and drained some of the pus which improved pain. He presents to the ED with throbbing thumb pain and rates it at a 7/10. Has never had this before  Denies fever, chills, injury, trauma, chest pain, and shortness of breath. Nursing Notes were all reviewed and agreed with or any disagreements were addressed  in the HPI. REVIEW OF SYSTEMS  (2-9 systems for level 4, 10 or more for level 5)     Review of Systems   Constitutional: Negative for chills and fever. HENT: Negative. Negative for congestion, rhinorrhea, sinus pressure, sinus pain and sore throat. Eyes: Negative for discharge, redness and visual disturbance. Respiratory: Negative for cough, chest tightness and shortness of breath. Cardiovascular: Negative for chest pain and palpitations. Gastrointestinal: Negative for abdominal pain, constipation, diarrhea, nausea and vomiting. Genitourinary: Negative for difficulty urinating, dysuria and frequency.    Musculoskeletal: Negative. Skin: Positive for color change. Neurological: Negative. Negative for dizziness, weakness, numbness and headaches. Psychiatric/Behavioral: Negative. All other systems reviewed and are negative. Positivesand Pertinent negatives as per HPI. Except as noted above in the ROS, all other systems were reviewed and negative.        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    History of knee replacement procedure of right knee     Hx of blood clots     2 DVT's    Hyperlipidemia     Hypertension     Knee osteoarthritis 1/17/2012    Left TKR 1/18/2012    Lung nodule     due to histoplasmosis    Neuromuscular disorder ([address]     Palpitations     stable with atenolol    Sleep apnea     uses CPAP    Stone, kidney     UTI (urinary tract infection) 01/23/2017         SURGICAL HISTORY       Past Surgical History:   Procedure Laterality Date    ABLATION OF DYSRHYTHMIC FOCUS  2013    a-fib    BARIATRIC SURGERY  2013    GASTRIC SLEEVE    CARDIAC SURGERY  2013    ablation    CARPAL TUNNEL RELEASE Right 9-30-15    CARPAL TUNNEL RELEASE Left 3/20/16    CHOLECYSTECTOMY, LAPAROSCOPIC N/A 2/19/2019    LAPAROSCOPIC CHOLECYSTECTOMY WITH CHOLANGIOGRAMS performed by Danica Hall MD at [address]      with removal stone    CYSTOSCOPY  2/8/13    with Laser Vaporization of Enlarged Prostate    DIAGNOSTIC CARDIAC CATH LAB PROCEDURE      ERCP  02/18/2019    Sphincterotomy, stone removal    ERCP N/A 2/18/2019    ERCP SPHINCTER/PAPILLOTOMY performed by Rosana Watson MD at [address] ERCP  2/18/2019    ERCP STONE REMOVAL performed by Rosana Watson MD at [facility] EXTRACTION Right 1/14/2020    PHACOEMULSIFICATION OF CATARACT RIGHT EYE WITH INTRAOCULAR LENS IMPLANT performed by Shannon Govea MD at [facility] Left 1/21/2020    PHACOEMULSIFICATION OF CATARACT LEFT EYE WITH INTRAOCULAR LENS IMPLANT -SLEEP APNEA- performed by Shannon Govea MD at [place] [address] Bilateral     right knee (2002) and left knee (2012)    KNEE ARTHROSCOPY  4/19/2011     left  knee    SKIN BIOPSY      skin Ca/SQUAMOUS CELL    THORACOTOMY      wedge resection    TONSILLECTOMY           CURRENT MEDICATIONS       Discharge Medication List as of 10/4/2020  8:02 PM      CONTINUE these medications which have NOT CHANGED    Details   pioglitazone (ACTOS) 15 MG tablet TAKE 1 TABLET BY MOUTH  DAILY, Disp-90 tablet,R-3Requesting 1 year supplyNormal      dilTIAZem (CARDIZEM) 120 MG tablet TAKE 1 TABLET BY MOUTH  EVERY 12 HOURS, Disp-180 tablet,R-3Normal      spironolactone (ALDACTONE) 25 MG tablet Take 1 tablet by mouth daily, Disp-90 tablet,R-3Normal      atorvastatin (LIPITOR) 40 MG tablet TAKE 1 TABLET BY MOUTH  DAILY, Disp-90 tablet,R-3Requesting 1 year supplyNormal      warfarin (COUMADIN) 5 MG tablet TAKE 1 TABLET BY MOUTH  DAILY, Disp-90 tablet, R-1Normal      torsemide (DEMADEX) 20 MG tablet TAKE 1 TABLET BY MOUTH TWO  TIMES DAILY, Disp-180 tablet, R-3Normal      montelukast (SINGULAIR) 10 MG tablet TAKE 1 TABLET BY MOUTH ONCE DAILY, Disp-90 tablet, R-0Normal      !! Easy Touch Lancets 32G/Twist MISC Disp-100 each, R-3, NormalTEST DAILY. DX: E11.9      !! blood glucose test strips (TRUE METRIX BLOOD GLUCOSE TEST) strip USE TO CHECK BLOOD GLUCOSE DAILY. DX: E11.9, Disp-100 each, R-3Normal      dofetilide (TIKOSYN) 500 MCG capsule Take 1 capsule by mouth every 12 hours, Disp-180 capsule, R-3Print      clobetasol (TEMOVATE) 0.05 % ointment APPLY TOPICALLY 2 TIMES DAILY. , Disp-60 g, R-0, Normal      !! blood glucose test strips (TRUE METRIX BLOOD GLUCOSE TEST) strip Disp-100 each, R-11, NormalTest twice daily. DX: E11.9      ! ! ACCU-CHEK MULTICLIX LANCETS MISC Disp-100 each, R-11, NormalCheck sugars once daily. Dx;E11.9      metFORMIN (GLUCOPHAGE) 500 MG tablet Take 1 tablet daily. , Disp-90 tablet, R-3Normal      Lancet Devices (EASY TOUCH LANCING DEVICE) MISC Disp-1 each, R-0, NormalTest Daily. DX: E11.9      Blood Glucose Monitoring Suppl (TRUE METRIX METER) VINNIE Use to check daily. DX;E11.9, Disp-1 Device, R-0Normal      Lancets Misc. (ACCU-CHEK MULTICLIX LANCET DEV) KIT Disp-1 kit, R-0, NormalCheck sugars once daily. DX;E11.9      Biotin 5 MG TABS Take 5 mg by mouth dailyHistorical Med      Cholecalciferol (VITAMIN D3) 5000 units TABS Take 5,000 Units by mouth dailyHistorical Med      Cyanocobalamin (VITAMIN B-12 CR PO) Take 5,000 mcg by mouth every 7 days Historical Med      Multiple Vitamins-Minerals (THERAPEUTIC MULTIVITAMIN-MINERALS) tablet Take 2 tablets by mouth daily       magnesium oxide (MAG-OX) 400 MG tablet Take 400 mg by mouth daily. !! - Potential duplicate medications found. Please discuss with provider. ALLERGIES     Bactrim [sulfamethoxazole-trimethoprim]; Ciprofloxacin; Macrobid [nitrofurantoin monohyd macro]; Sulfamethoxazole; Theophylline; Cefuroxime axetil; Cipro xr;  Other; and Tape [adhesive tape]    FAMILY HISTORY       Family History   Problem Relation Age of Onset    Cancer Mother         ABDOMIN    Kidney Disease Mother     Stroke Mother     Arthritis Father     Substance Abuse Father     Other Father         hardening of the arteries    Stroke Father          SOCIAL HISTORY       Social History     Socioeconomic History    Marital status:      Spouse name: None    Number of children: None    Years of education: None    Highest education level: None   Occupational History    None   Social Needs    Financial resource strain: None    Food insecurity     Worry: None     Inability: None    Transportation needs     Medical: None     Non-medical: None   Tobacco Use    Smoking status: Former Smoker     Packs/day: 2.00     Years: 17.00     Pack years: 34.00     Types: Cigarettes     Last attempt to quit: 1976     Years since quittin.4    Smokeless tobacco: Never Used   Substance and Sexual Activity    Alcohol use: No    Drug use: No    Sexual activity: Never     Partners: Female   Lifestyle    Physical activity     Days per week: None     Minutes per session: None    Stress: None   Relationships    Social connections     Talks on phone: None     Gets together: None     Attends Latter-day service: None     Active member of club or organization: None     Attends meetings of clubs or organizations: None     Relationship status: None    Intimate partner violence     Fear of current or ex partner: None     Emotionally abused: None     Physically abused: None     Forced sexual activity: None   Other Topics Concern    None   Social History Narrative    None       SCREENINGS     NIH Score       [place]a Coma Scale  Eye Opening: Spontaneous  Best Verbal Response: Oriented  Best Motor Response: Obeys commands  [place] Coma Scale Score: 15    [facility]     Heart Score         PHYSICAL EXAM    (up to 7 for level 4, 8 ormore for level 5)     ED Triage Vitals [10/04/20 1805]   BP Temp Temp Source Pulse Resp SpO2 Height Weight   (!) 150/86 98.7 °F (37.1 °C) Oral 79 18 98 % -- --       Physical Exam  Vitals signs and nursing note reviewed. Constitutional:       Appearance: He is well-developed. HENT:      Head: Normocephalic and atraumatic. Nose: Nose normal.   Eyes:      General:         Right eye: No discharge. Left eye: No discharge. Conjunctiva/sclera: Conjunctivae normal.      Pupils: Pupils are equal, round, and reactive to light. Neck:      Musculoskeletal: Normal range of motion and neck supple.    Cardiovascular:      Rate and Rhythm: Normal rate and regular rhythm. Heart sounds: Normal heart sounds. No murmur. No friction rub. No gallop. Pulmonary:      Effort: Pulmonary effort is normal. No respiratory distress. Breath sounds: Normal breath sounds. No wheezing, rhonchi or rales. Musculoskeletal: Normal range of motion. General: Swelling and tenderness present. No deformity or signs of injury. Skin:     General: Skin is warm and dry. Coloration: Skin is not pale. Findings: Lesion present. Comments: Lesion with ecchymosis at the right lateral/proximal nail fold. Neurological:      Mental Status: He is alert and oriented to person, place, and time. Psychiatric:         Behavior: Behavior normal.         DIAGNOSTIC RESULTS   LABS:    Labs Reviewed - No data to display    All other labs were within normal range or notreturned as of this dictation. EKG: All EKG's are interpreted by the Emergency Department Physician who either signs or Co-signs this chart in the absence of a cardiologist.  Please see their note for interpretation of EKG. RADIOLOGY:         Interpretation per the Radiologist below, if available at the time of this note:    XR FINGER RIGHT (MIN 2 VIEWS)   Final Result   Unremarkable appearing thumb                 PROCEDURES   PROCEDURE:  INCISION & DRAINAGE  Amrik Gallegos or their surrogate had an opportunity to ask questions, and the risks, benefits, and alternatives were discussed. The abscess was prepped and draped to maintain a sterile field. A local anesthetic was used to completely anesthetize the abscess. A cruciate incision was made to keep the abscess open so it will continue to drain. It was copiously irrigated with its loculations broken down. There were no complications during the procedure.      CONSULTS:  None      EMERGENCY DEPARTMENT COURSE and DIFFERENTIAL DIAGNOSIS/MDM:   Vitals:    Vitals:    10/04/20 1805   BP: (!) 150/86   Pulse: 79   Resp: 18   Temp: 98.7 °F (37.1 °C)   TempSrc: Oral SpO2: 98%       Patient was given the following medications:  Medications   Tetanus-Diphth-Acell Pertussis (BOOSTRIX) injection 0.5 mL (0.5 mLs Intramuscular Given 10/4/20 1907)   clindamycin (CLEOCIN) capsule 450 mg (450 mg Oral Given 10/4/20 1907)         Afebrile, stable, patient presents to the ED for evaluation. Nontoxic patient no acute distress SPO2 on room air of 198. Patient's tetanus is updated due to patient not having 1 and having this open wound on his finger that he has tried to flaco on his own I&D is performed the wound is copiously irrigated and bandaged patient is discharged in stable condition after receiving first dose of antibiotics in the ER. Follow up in 2 days. All questions are answered. Indications for return to the ED are discussed. Patient is advised if any new or worsening symptoms arise they should immediately return to the emergency room. Follow-up with primary care in 1-2 days. The patient tolerated their visit well. The patient and / or the family were informed of the results of any tests, a time was given to answer questions, a plan was proposed and they agreed Jaspal Primus. No results found for this visit on 10/04/20. I estimate there is LOW risk for COMPARTMENT SYNDROME, DEEP VENOUS THROMBOSIS, SEPTIC ARTHRITIS, TENDON OR NEUROVASCULAR INJURY, thus I consider the discharge disposition reasonable. Michael Kahn and I have discussed the diagnosis and risks, and we agree with discharging home to follow-up with their primary doctor or the referral orthopedist. We also discussed returning to the Emergency Department immediately if new or worsening symptoms occur. We have discussed the symptoms which are most concerning (e.g., changing or worsening pain, numbness, weakness) that necessitate immediate return. FINAL IMPRESSION      1.  Paronychia of finger of right hand          DISPOSITION/PLAN   DISPOSITION Decision To Discharge 10/04/2020 07:43:18 PM      PATIENT REFERRED TO:  [address]  [facility]  [phone]      for a recheck in 2-3  days      DISCHARGE MEDICATIONS:  Discharge Medication List as of 10/4/2020  8:02 PM      START taking these medications    Details   clindamycin (CLEOCIN) 150 MG capsule Take 3 capsules by mouth 3 times daily for 10 days, Disp-90 capsule,R-0Print             DISCONTINUED MEDICATIONS:  Discharge Medication List as of 10/4/2020  8:02 PM      STOP taking these medications       SITagliptin (JANUVIA) 50 MG tablet Comments:   Reason for Stopping:         glipiZIDE (GLUCOTROL) 5 MG tablet Comments:   Reason for Stopping:         albuterol sulfate  (90 Base) MCG/ACT inhaler Comments:   Reason for Stopping:                      (Please note that portions of this note were completed with a voice recognition program.  Efforts were made to edit the dictations but occasionally words are mis-transcribed.)    Edwin Murphy PA-C (electronically signed)        Edwin Murphy PA-C  10/04/20 [address], PA-C  10/04/20 7216

## 2020-10-05 ENCOUNTER — TELEPHONE (OUTPATIENT)
Dept: INTERNAL MEDICINE CLINIC | Age: 75
End: 2020-10-05

## 2020-10-05 NOTE — ED NOTES
Cleaned patients wound with water and soap. Applied  Guaze and guaze wrap bulky to pt thumb.  Pt understood instructions on the dressing     Shola Peña  08/48[birth year]05 1950       Shola Peña  42/47/79 2009

## 2020-10-05 NOTE — TELEPHONE ENCOUNTER
----- Message from Ana Riley MD sent at 10/5/2020  2:55 PM EDT -----  Contact: [phone]  2 week self quarantine. Call for symptoms  ----- Message -----  From: Paul Jerez  Sent: 10/5/2020  12:29 PM EDT  To: Ana Riley MD    Patient has been exposed to Covid. They want to know what precautions they need to take. She is not having any symptoms at this time.

## 2020-10-06 ENCOUNTER — TELEPHONE (OUTPATIENT)
Dept: INTERNAL MEDICINE CLINIC | Age: 75
End: 2020-10-06

## 2020-10-06 NOTE — TELEPHONE ENCOUNTER
----- Message from Jarocho Cordova MD sent at 10/6/2020  2:58 PM EDT -----  Tele visit tomorrow 330 with me   ----- Message -----  From: Sam Fields  Sent: 10/6/2020   1:27 PM EDT  To: Jarocho Cordova MD    PA's cannot do televisits. He was scheduled today with her but Per Dr Davidson Ha and Per Gibran Guzman, since he was exposed to covid and has to self quarantine for 14 days, he is not allowed to come to the office. ----- Message -----  From: Jarocho Cordova MD  Sent: 10/6/2020   1:15 PM EDT  To: [address] [place] [address] visit or visit with Kira   ----- Message -----  From: Sam Fields  Sent: 10/6/2020  10:17 AM EDT  To: Jarocho Cordova MD    Patient was recently exposed to [place]. He is doing the self quarantine. He was seen in the ER for an infected finger. Was prescribed Clindaymycin 150 mg 3 capsules TID for 10 days. States that it's looking worse today and there is pus coming out of it. Patient wanting to know what he should do?

## 2020-10-08 ENCOUNTER — VIRTUAL VISIT (OUTPATIENT)
Dept: INTERNAL MEDICINE CLINIC | Age: 75
End: 2020-10-08

## 2020-10-08 PROCEDURE — 99213 OFFICE O/P EST LOW 20 MIN: CPT | Performed by: INTERNAL MEDICINE

## 2020-10-08 NOTE — PROGRESS NOTES
Subjective:      Patient ID: Mahamed Gilliland is a 77 y.o. male. 10/8/2020    TELEHEALTH EVALUATION -- Audio/Visual ([facility])    HPI:    Mahamed Gilliland (:  1945) has requested an audio/video evaluation for the following concern(s):    77 y.o. 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 ,obestiy, MARIA VICTORIA , pedal edema called for finger infection and fatigue    Recent visit to Er for right thumb paraonychia which was drained in Er on 10/4 and placed on clindamycin. Pt reports he was also exposed to covid pt , but denies any fevers but has fatigue , had some palpitations overnight and now resolved  No cough or sob . Finger appears to be doing well now      Review of Systems   As above     Prior to Visit Medications    Medication Sig Taking? Authorizing Provider   clindamycin (CLEOCIN) 150 MG capsule Take 3 capsules by mouth 3 times daily for 10 days  Christelle Cook PA-C   pioglitazone (ACTOS) 15 MG tablet TAKE 1 TABLET BY MOUTH  DAILY  Ana Riley MD   dilTIAZem (CARDIZEM) 120 MG tablet TAKE 1 TABLET BY MOUTH  EVERY 12 HOURS  Kortney Ludwig MD   spironolactone (ALDACTONE) 25 MG tablet Take 1 tablet by mouth daily  Kortney Ludwig MD   atorvastatin (LIPITOR) 40 MG tablet TAKE 1 TABLET BY MOUTH  DAILY  Kortney Ludwig MD   warfarin (COUMADIN) 5 MG tablet TAKE 1 TABLET BY MOUTH  DAILY  Ana Riley MD   torsemide (DEMADEX) 20 MG tablet TAKE 1 TABLET BY MOUTH TWO  TIMES DAILY  Kortney Ludwig MD   montelukast (SINGULAIR) 10 MG tablet TAKE 1 TABLET BY MOUTH ONCE DAILY  Ana Riley MD   Easy Touch Lancets 32G/Twist MISC TEST DAILY. DX: E11.9  Ana Riley MD   blood glucose test strips (TRUE METRIX BLOOD GLUCOSE TEST) strip USE TO CHECK BLOOD GLUCOSE DAILY.  DX: E11.9  Ana Riley MD   dofetilide (TIKOSYN) 500 MCG capsule Take 1 capsule by mouth every 12 hours  Kortney Ludwig MD   clobetasol (TEMOVATE) 0.05 % ointment APPLY TOPICALLY 2 TIMES DAILY. Patient not taking: Reported on 9/3/2020  Milan Tate MD   blood glucose test strips (TRUE METRIX BLOOD GLUCOSE TEST) strip Test twice daily. DX: E11.9  Milan Tate MD   ACCU-CHEK MULTICLIX LANCETS MISC Check sugars once daily. Dx;E11.9  Milan Tate MD   metFORMIN (GLUCOPHAGE) 500 MG tablet Take 1 tablet daily. Patient taking differently: 500 mg 2 times daily (with meals)   Milan Tate MD   Lancet Devices (EASY TOUCH LANCING DEVICE) MISC Test Daily. DX: E11.9  Milan Tate MD   Blood Glucose Monitoring Suppl (TRUE METRIX METER) VINNIE Use to check daily. DX;E11.9  Milan Tate MD   Lancets Misc. (ACCU-CHEK MULTICLIX LANCET DEV) KIT Check sugars once daily. DX;E11.9  Milan Tate MD   Biotin 5 MG TABS Take 5 mg by mouth daily  Historical Provider, MD   Cholecalciferol (VITAMIN D3) 5000 units TABS Take 5,000 Units by mouth daily  Historical Provider, MD   Cyanocobalamin (VITAMIN B-12 CR PO) Take 5,000 mcg by mouth every 7 days   Historical Provider, MD   Multiple Vitamins-Minerals (THERAPEUTIC MULTIVITAMIN-MINERALS) tablet Take 2 tablets by mouth daily   Historical Provider, MD   magnesium oxide (MAG-OX) 400 MG tablet Take 400 mg by mouth daily.   Historical Provider, MD       Social History     Tobacco Use    Smoking status: Former Smoker     Packs/day: 2.00     Years: 17.00     Pack years: 34.00     Types: Cigarettes     Last attempt to quit: 1976     Years since quittin.5    Smokeless tobacco: Never Used   Substance Use Topics    Alcohol use: No    Drug use: No        Allergies   Allergen Reactions    Bactrim [Sulfamethoxazole-Trimethoprim] Diarrhea    Ciprofloxacin      Bad headaches    Macrobid [Nitrofurantoin Monohyd Macro]     Sulfamethoxazole Diarrhea    Theophylline      Theodur-caused severe headaches    Cefuroxime Axetil Rash and Itching    Cipro Xr Rash    Other Rash and Other (See Comments)     Ekg leads-glue    Tape Monica Peaks Tape] Rash     Skin irritaion  Eats away at skin, paper tape OK  Plastic tape       PHYSICAL EXAMINATION:  [ INSTRUCTIONS:  \"[x]\" Indicates a positive item  \"[]\" Indicates a negative item  -- DELETE ALL ITEMS NOT EXAMINED]  Vital Signs: (As obtained by patient/caregiver or practitioner observation)    Constitutional: [x] Appears well-developed and well-nourished [x] No apparent distress      [] Abnormal-   Mental status  [x] Alert and awake  [x] Oriented to person/place/time [x]Able to follow commands      Eyes:  EOM    [x]  Normal  [] Abnormal-  Sclera  [x]  Normal  [] Abnormal -         Discharge []  None visible  [] Abnormal -    HENT:   [x] Normocephalic, atraumatic. [] Abnormal   [x] Mouth/Throat: Mucous membranes are moist.     External Ears [x] Normal  [] Abnormal-     Neck: [x] No visualized mass     Pulmonary/Chest: [x] Respiratory effort normal.  [] No visualized signs of difficulty breathing or respiratory distress        [] Abnormal-      Musculoskeletal:   [] Normal gait with no signs of ataxia         [x] Normal range of motion of neck        [] Abnormal-       Neurological:        [x] No Facial Asymmetry (Cranial nerve 7 motor function) (limited exam to video visit)          [x] No gaze palsy        [] Abnormal-   Rt Thumb skin appears excoriated, healing infection noted          Other pertinent observable physical exam findings-     ASSESSMENT/PLAN:   Diagnosis Orders   1. Paronychia of finger of right hand     2. Fatigue, unspecified type       Improving infection per imaging and video appearance  Continue clinda  No need for covid testing       Return in about 3 months (around 1/8/2021). Dinh Brand is a 77 y.o. male being evaluated by a Virtual Visit (video visit) encounter to address concerns as mentioned above. A caregiver was present when appropriate.  Due to this being a TeleHealth encounter (During [facility] public health emergency),

## 2020-10-12 ENCOUNTER — TELEPHONE (OUTPATIENT)
Dept: INTERNAL MEDICINE CLINIC | Age: 75
End: 2020-10-12

## 2020-10-12 ENCOUNTER — ANTI-COAG VISIT (OUTPATIENT)
Dept: INTERNAL MEDICINE CLINIC | Age: 75
End: 2020-10-12

## 2020-10-12 LAB — INR BLD: 2.7

## 2020-10-12 NOTE — TELEPHONE ENCOUNTER
----- Message from Sobeida Nuñez MD sent at 10/12/2020  1:33 PM EDT -----  No change  ----- Message -----  From: Joel Pate  Sent: 10/12/2020  12:26 PM EDT  To: Sobeida Nuñez MD    INR 2.7  Taking 5 mg daily

## 2020-10-26 ENCOUNTER — ANTI-COAG VISIT (OUTPATIENT)
Dept: INTERNAL MEDICINE CLINIC | Age: 75
End: 2020-10-26

## 2020-10-26 ENCOUNTER — TELEPHONE (OUTPATIENT)
Dept: INTERNAL MEDICINE CLINIC | Age: 75
End: 2020-10-26

## 2020-10-26 LAB — INR BLD: 1.7

## 2020-10-26 NOTE — TELEPHONE ENCOUNTER
----- Message from Rosita aVlle MD sent at 10/26/2020  9:28 AM EDT -----  Take 7.5 for two days then same recheck 10 days  ----- Message -----  From: Servando Aquino  Sent: 10/26/2020   8:50 AM EDT  To: MD Dr Zuleyma Kiser patient. INR: 1.7   Currently taking 5 mg daily. Has not missed any doses.     Last INR: 2.7 on 10/12/2020

## 2020-11-09 ENCOUNTER — TELEPHONE (OUTPATIENT)
Dept: INTERNAL MEDICINE CLINIC | Age: 75
End: 2020-11-09

## 2020-11-09 ENCOUNTER — ANTI-COAG VISIT (OUTPATIENT)
Dept: INTERNAL MEDICINE CLINIC | Age: 75
End: 2020-11-09

## 2020-11-09 LAB — INR BLD: 2.5

## 2020-11-23 ENCOUNTER — ANTI-COAG VISIT (OUTPATIENT)
Dept: INTERNAL MEDICINE CLINIC | Age: 75
End: 2020-11-23

## 2020-11-23 LAB — INR BLD: 2.2

## 2020-12-01 ASSESSMENT — PATIENT HEALTH QUESTIONNAIRE - PHQ9
SUM OF ALL RESPONSES TO PHQ QUESTIONS 1-9: 0
SUM OF ALL RESPONSES TO PHQ QUESTIONS 1-9: 0
1. LITTLE INTEREST OR PLEASURE IN DOING THINGS: 0
2. FEELING DOWN, DEPRESSED OR HOPELESS: 0

## 2020-12-01 ASSESSMENT — LIFESTYLE VARIABLES
HOW OFTEN DO YOU HAVE A DRINK CONTAINING ALCOHOL: NEVER
AUDIT TOTAL SCORE: INCOMPLETE
AUDIT-C TOTAL SCORE: INCOMPLETE

## 2020-12-03 ENCOUNTER — TELEPHONE (OUTPATIENT)
Dept: INTERNAL MEDICINE CLINIC | Age: 75
End: 2020-12-03

## 2020-12-03 ENCOUNTER — NURSE ONLY (OUTPATIENT)
Dept: INTERNAL MEDICINE CLINIC | Age: 75
End: 2020-12-03

## 2020-12-03 PROCEDURE — 81002 URINALYSIS NONAUTO W/O SCOPE: CPT | Performed by: INTERNAL MEDICINE

## 2020-12-03 NOTE — TELEPHONE ENCOUNTER
----- Message from Mabel Felty, MD sent at 12/3/2020  3:54 PM EST -----  Contact: [phone]  Should check UA as he had UTI before  ----- Message -----  From: Sheila Chaudhry  Sent: 12/3/2020   3:06 PM EST  To: Mabel Felty, MD    Patient has not been feeling good Chills and shaking after breakfast said he is achy second time this week. Happens really sudden his sugar has been steady at 146 and feels nauseas with a headache the rest of the day. Didn't know if he could come in for an appointment has not been running a fever.

## 2020-12-04 ENCOUNTER — OFFICE VISIT (OUTPATIENT)
Dept: PRIMARY CARE CLINIC | Age: 75
End: 2020-12-04
Payer: MEDICARE

## 2020-12-04 PROCEDURE — 99211 OFF/OP EST MAY X REQ PHY/QHP: CPT | Performed by: NURSE PRACTITIONER

## 2020-12-04 PROCEDURE — G8428 CUR MEDS NOT DOCUMENT: HCPCS | Performed by: NURSE PRACTITIONER

## 2020-12-04 PROCEDURE — G8417 CALC BMI ABV UP PARAM F/U: HCPCS | Performed by: NURSE PRACTITIONER

## 2020-12-04 NOTE — PATIENT INSTRUCTIONS

## 2020-12-04 NOTE — PROGRESS NOTES
Ludy Briones Rosy received a viral test for COVID-19. They were educated on isolation and quarantine as appropriate. For any symptoms, they were directed to seek care from their PCP, given contact information to establish with a doctor, directed to an urgent care or the emergency room.

## 2020-12-05 LAB — SARS-COV-2: NOT DETECTED

## 2020-12-07 ENCOUNTER — TELEPHONE (OUTPATIENT)
Dept: INTERNAL MEDICINE CLINIC | Age: 75
End: 2020-12-07

## 2020-12-07 ENCOUNTER — ANTI-COAG VISIT (OUTPATIENT)
Dept: INTERNAL MEDICINE CLINIC | Age: 75
End: 2020-12-07

## 2020-12-07 LAB — INR BLD: 2.4

## 2020-12-07 NOTE — TELEPHONE ENCOUNTER
----- Message from Rachel Melton MD sent at 12/7/2020 11:19 AM EST -----  No change   2 weeks  ----- Message -----  From: Sangeetha Kimble  Sent: 12/7/2020  10:11 AM EST  To: Rachel Melton MD    INR 2.4

## 2020-12-08 ENCOUNTER — OFFICE VISIT (OUTPATIENT)
Dept: INTERNAL MEDICINE CLINIC | Age: 75
End: 2020-12-08

## 2020-12-08 VITALS
WEIGHT: 315 LBS | HEIGHT: 75 IN | RESPIRATION RATE: 18 BRPM | BODY MASS INDEX: 39.17 KG/M2 | TEMPERATURE: 97.5 F | DIASTOLIC BLOOD PRESSURE: 75 MMHG | SYSTOLIC BLOOD PRESSURE: 110 MMHG | HEART RATE: 55 BPM

## 2020-12-08 DIAGNOSIS — E11.9 TYPE 2 DIABETES MELLITUS WITHOUT COMPLICATION, WITHOUT LONG-TERM CURRENT USE OF INSULIN (HCC): ICD-10-CM

## 2020-12-08 DIAGNOSIS — Z00.00 MEDICARE ANNUAL WELLNESS VISIT, SUBSEQUENT: Primary | ICD-10-CM

## 2020-12-08 DIAGNOSIS — Z00.00 ROUTINE GENERAL MEDICAL EXAMINATION AT A HEALTH CARE FACILITY: ICD-10-CM

## 2020-12-08 DIAGNOSIS — E11.40 TYPE 2 DIABETES MELLITUS WITH DIABETIC NEUROPATHY, WITHOUT LONG-TERM CURRENT USE OF INSULIN (HCC): ICD-10-CM

## 2020-12-08 DIAGNOSIS — Z00.00 MEDICARE ANNUAL WELLNESS VISIT, SUBSEQUENT: ICD-10-CM

## 2020-12-08 DIAGNOSIS — Z86.718 HISTORY OF VENOUS THROMBOEMBOLISM: ICD-10-CM

## 2020-12-08 DIAGNOSIS — Z13.220 SCREENING FOR HYPERLIPIDEMIA: ICD-10-CM

## 2020-12-08 DIAGNOSIS — I48.0 PAF (PAROXYSMAL ATRIAL FIBRILLATION) (HCC): ICD-10-CM

## 2020-12-08 DIAGNOSIS — G47.33 OSA (OBSTRUCTIVE SLEEP APNEA): ICD-10-CM

## 2020-12-08 DIAGNOSIS — I10 ESSENTIAL HYPERTENSION: ICD-10-CM

## 2020-12-08 LAB
BASOPHILS ABSOLUTE: 0.1 K/UL (ref 0–0.2)
BASOPHILS RELATIVE PERCENT: 0.7 %
EOSINOPHILS ABSOLUTE: 0.2 K/UL (ref 0–0.6)
EOSINOPHILS RELATIVE PERCENT: 2.2 %
HCT VFR BLD CALC: 38.6 % (ref 40.5–52.5)
HEMOGLOBIN: 12.8 G/DL (ref 13.5–17.5)
LYMPHOCYTES ABSOLUTE: 1.7 K/UL (ref 1–5.1)
LYMPHOCYTES RELATIVE PERCENT: 17.3 %
MCH RBC QN AUTO: 29.4 PG (ref 26–34)
MCHC RBC AUTO-ENTMCNC: 33.1 G/DL (ref 31–36)
MCV RBC AUTO: 88.8 FL (ref 80–100)
MONOCYTES ABSOLUTE: 0.9 K/UL (ref 0–1.3)
MONOCYTES RELATIVE PERCENT: 8.9 %
NEUTROPHILS ABSOLUTE: 6.9 K/UL (ref 1.7–7.7)
NEUTROPHILS RELATIVE PERCENT: 70.9 %
PDW BLD-RTO: 16.3 % (ref 12.4–15.4)
PLATELET # BLD: 238 K/UL (ref 135–450)
PMV BLD AUTO: 8.5 FL (ref 5–10.5)
RBC # BLD: 4.35 M/UL (ref 4.2–5.9)
WBC # BLD: 9.7 K/UL (ref 4–11)

## 2020-12-08 PROCEDURE — 4040F PNEUMOC VAC/ADMIN/RCVD: CPT | Performed by: INTERNAL MEDICINE

## 2020-12-08 PROCEDURE — G8482 FLU IMMUNIZE ORDER/ADMIN: HCPCS | Performed by: INTERNAL MEDICINE

## 2020-12-08 PROCEDURE — 3017F COLORECTAL CA SCREEN DOC REV: CPT | Performed by: INTERNAL MEDICINE

## 2020-12-08 PROCEDURE — 3052F HG A1C>EQUAL 8.0%<EQUAL 9.0%: CPT | Performed by: INTERNAL MEDICINE

## 2020-12-08 PROCEDURE — 1123F ACP DISCUSS/DSCN MKR DOCD: CPT | Performed by: INTERNAL MEDICINE

## 2020-12-08 PROCEDURE — G0439 PPPS, SUBSEQ VISIT: HCPCS | Performed by: INTERNAL MEDICINE

## 2020-12-08 NOTE — PROGRESS NOTES
Medicare Annual Wellness Visit  Are Name: Karolina Garces Date: 2020   MRN: [id number] Sex: Male   Age: 77 y.o. Ethnicity: Non-/Non    : 1945 Race: Modesto Velez is here for Medicare AWV    Screenings for behavioral, psychosocial and functional/safety risks, and cognitive dysfunction are all negative except as indicated below. These results, as well as other patient data from the [address] form, are documented in Flowsheets linked to this Encounter.       77 y.o. male  with known h.o paroxysmal Afibb, sp ablation , chronic Arthritis , HTN,   DM - 2 ,obestiy, MARIA VICTORIA , pedal edema here for annual exam         DM- 2 dx in - now on metformin 500 mg bid and nesina 25 mg   Fasting range from 110 - 150  No low sugars  Recent A1c at [facility] is 7.4 on 10/20  Has chronic tingling numbness in feet  Reports constipation with metformin   Tingling and numbness in feet is chronic     Afibb s.p ablation, stable on tikosyn, cost issues noted and goes to [facility] for refills   On Coumadin for [facility] with no bleeding issues    pulm nodule -  - Right sided  pulmonary nodule was taken out with resection by Dr. Marilyn Caban   Pathology showed histoplasmosis    Obesity - has gastric sleeve surgery in  and lost 100 lbs    MARIA VICTORIA - on home cpap, compliant with it    Reports one fall in barn this fall, unable to get up at that time needing help from EMS  Now [facility] has provided him a lift device if he has to fall again       Independent of ADL and IADL    and lives with wife       Allergies   Allergen Reactions    Bactrim [Sulfamethoxazole-Trimethoprim] Diarrhea    Ciprofloxacin      Bad headaches    Macrobid [Nitrofurantoin Monohyd Macro]     Sulfamethoxazole Diarrhea    Theophylline      Theodur-caused severe headaches    Cefuroxime Axetil Rash and Itching    Cipro Xr Rash    Other Rash and Other (See Comments)     Ekg leads-glue    Tape Ramírez Ora Tape] Rash     Skin irritaion  Eats away at skin, paper tape OK  Plastic tape         Prior to Visit Medications    Medication Sig Taking? Authorizing Provider   metFORMIN (GLUCOPHAGE) 500 MG tablet Take 1 tablet daily. Meliton Coon MD   warfarin (COUMADIN) 5 MG tablet TAKE 1 TABLET BY MOUTH  DAILY  Meliton Coon MD   montelukast (SINGULAIR) 10 MG tablet TAKE 1 TABLET BY MOUTH ONCE DAILY  Meliton Coon MD   dilTIAZem (CARDIZEM) 120 MG tablet TAKE 1 TABLET BY MOUTH  EVERY 12 HOURS  Tello Huitron MD   spironolactone (ALDACTONE) 25 MG tablet Take 1 tablet by mouth daily  Tello Huitron MD   atorvastatin (LIPITOR) 40 MG tablet TAKE 1 TABLET BY MOUTH  DAILY  Tello Huitron MD   torsemide (DEMADEX) 20 MG tablet TAKE 1 TABLET BY MOUTH TWO  TIMES DAILY  Tello Huitron MD   Easy Touch Lancets 32G/Twist MISC TEST DAILY. DX: E11.9  Meliton Coon MD   blood glucose test strips (TRUE METRIX BLOOD GLUCOSE TEST) strip USE TO CHECK BLOOD GLUCOSE DAILY. DX: E11.9  Meliton Coon MD   dofetilide (TIKOSYN) 500 MCG capsule Take 1 capsule by mouth every 12 hours  Tello Huitron MD   clobetasol (TEMOVATE) 0.05 % ointment APPLY TOPICALLY 2 TIMES DAILY. Patient not taking: Reported on 9/3/2020  Meliton Coon MD   blood glucose test strips (TRUE METRIX BLOOD GLUCOSE TEST) strip Test twice daily. DX: E11.9  Meliton Coon MD   ACCU-CHEK MULTICLIX LANCETS MISC Check sugars once daily. Dx;E11.9  Meliton Coon MD   Lancet Devices (EASY TOUCH LANCING DEVICE) MISC Test Daily. DX: E11.9  Meliton Coon MD   Blood Glucose Monitoring Suppl (TRUE METRIX METER) VINNIE Use to check daily. DX;E11.9  Meliton Coon MD   Lancets Misc. (ACCU-CHEK MULTICLIX LANCET DEV) KIT Check sugars once daily.  DX;E11.9  Meliton Coon MD   Biotin 5 MG TABS Take 5 mg by mouth daily  Historical Provider, MD   Cholecalciferol (VITAMIN D3) 5000 units TABS Take 5,000 Units by mouth daily  Historical Provider, MD WITH INTRAOCULAR LENS IMPLANT performed by Wanda Ventura MD at [facility] 1/21/2020    PHACOEMULSIFICATION OF CATARACT LEFT EYE WITH INTRAOCULAR LENS IMPLANT -SLEEP APNEA- performed by Wanda Ventura MD at [facility] Bilateral     right knee (2002) and left knee (2012)    KNEE ARTHROSCOPY  4/19/2011     left  knee    SKIN BIOPSY      skin Ca/SQUAMOUS CELL    THORACOTOMY      wedge resection    TONSILLECTOMY           Family History   Problem Relation Age of Onset    Cancer Mother         ABDOMIN    Kidney Disease Mother     Stroke Mother     Arthritis Father     Substance Abuse Father     Other Father         hardening of the arteries    Stroke Father        CareTeam (Including outside providers/suppliers regularly involved in providing care):   Patient Care Team:  Shabana Cuellar MD as PCP - General (Internal Medicine)  Shabana Cuellar MD as PCP - [facility] Empaneled Provider  SERENA Cordero - CNP as Consulting Physician (Family Nurse Practitioner)  Jaxon Gandhi MD as Consulting Physician (Cardiology)    Wt Readings from Last 3 Encounters:   12/08/20 (!) 340 lb (154.2 kg)   09/03/20 (!) 334 lb (151.5 kg)   07/23/20 (!) 331 lb (150.1 kg)      No flowsheet data found. Body mass index is 42.5 kg/m². Based upon direct observation of the patient, evaluation of cognition reveals recent and remote memory intact. General:  Elderly male, obese,  Awake, alert and oriented. Appears to be not in any distress  Mucous Membranes:  Pink , anicteric  Neck: No JVD, no carotid bruit, no thyromegaly  Chest:  Clear to auscultation bilaterally, no added sounds  Cardiovascular:  RRR S1S2 heard, no murmurs or gallops  Abdomen:  Soft, obese  undistended, non tender, no organomegaly, BS present  Extremities:bilateral 2+ chronic pedal edema .  Distal pulses well felt  Neurological : grossly normal  CN 2 to 12 intact  Coordination normal , frequent eye blinking noted  Memory intact  Gait steady       Patient's complete Health Risk Assessment and screening values have been reviewed and are found in Flowsheets. The following problems were reviewed today and where indicated follow up appointments were made and/or referrals ordered.     Positive Risk Factor Screenings with Interventions:     Health Habits/Nutrition:  Health Habits/Nutrition  Do you exercise for at least 20 minutes 2-3 times per week?: (!) No  Have you lost any weight without trying in the past 3 months?: No  Do you eat fewer than 2 meals per day?: No  Have you seen a dentist within the past year?: (!) No  Body mass index: (!) 42.49  Health Habits/Nutrition Interventions:  · Dental exam overdue:  patient encouraged to make appointment with his/her dentist    Hearing/Vision:  No exam data present  Hearing/Vision  Do you or your family notice any trouble with your hearing?: No  Do you have difficulty driving, watching TV, or doing any of your daily activities because of your eyesight?: No  Have you had an eye exam within the past year?: (!) No  Hearing/Vision Interventions:  · none seen    Personalized Preventive Plan   Current Health Maintenance Status  Immunization History   Administered Date(s) Administered    Hepatitis A Adult (Havrix, Vaqta) 09/03/2014    Influenza Vaccine, unspecified formulation 09/17/2014    Influenza Virus Vaccine 01/18/2012, 10/15/2013, 12/01/2014, 10/23/2015, 10/16/2016, 09/01/2017, 10/01/2018    Influenza, High Dose (Fluzone 65 yrs and older) 10/21/2016, 10/31/2018, 11/04/2019    Influenza, MDCK Quadv, IM, PF (Flucelvax 4 yrs and older) 09/20/2017    MMR 01/01/2005    Pneumococcal Conjugate 13-valent ([id number]) 02/16/2019    Pneumococcal Conjugate 7-valent (Prevnar7) 10/22/2012    Pneumococcal Conjugate Vaccine 10/22/2012    Pneumococcal Polysaccharide ([id number]) 01/01/2001, 12/13/2011, 01/01/2013    Polio IPV (IPOL) 09/03/2014    Td vaccine examination at a health care facility

## 2020-12-09 LAB
CHOLESTEROL, FASTING: 146 MG/DL (ref 0–199)
CREATININE URINE: 134.2 MG/DL (ref 39–259)
HDLC SERPL-MCNC: 41 MG/DL (ref 40–60)
LDL CHOLESTEROL CALCULATED: 77 MG/DL
MICROALBUMIN UR-MCNC: 3.4 MG/DL
MICROALBUMIN/CREAT UR-RTO: 25.3 MG/G (ref 0–30)
PROSTATE SPECIFIC ANTIGEN: 0.53 NG/ML (ref 0–4)
TRIGLYCERIDE, FASTING: 141 MG/DL (ref 0–150)
TSH REFLEX: 1.5 UIU/ML (ref 0.27–4.2)
VLDLC SERPL CALC-MCNC: 28 MG/DL

## 2020-12-21 ENCOUNTER — ANTI-COAG VISIT (OUTPATIENT)
Dept: INTERNAL MEDICINE CLINIC | Age: 75
End: 2020-12-21

## 2020-12-21 ENCOUNTER — TELEPHONE (OUTPATIENT)
Dept: INTERNAL MEDICINE CLINIC | Age: 75
End: 2020-12-21

## 2020-12-21 LAB — INR BLD: 2.5

## 2020-12-30 ENCOUNTER — OFFICE VISIT (OUTPATIENT)
Dept: PRIMARY CARE CLINIC | Age: 75
End: 2020-12-30
Payer: MEDICARE

## 2020-12-30 DIAGNOSIS — Z11.59 SCREENING FOR VIRAL DISEASE: Primary | ICD-10-CM

## 2020-12-30 PROCEDURE — 99211 OFF/OP EST MAY X REQ PHY/QHP: CPT | Performed by: NURSE PRACTITIONER

## 2020-12-30 PROCEDURE — G8428 CUR MEDS NOT DOCUMENT: HCPCS | Performed by: NURSE PRACTITIONER

## 2020-12-30 PROCEDURE — G8417 CALC BMI ABV UP PARAM F/U: HCPCS | Performed by: NURSE PRACTITIONER

## 2020-12-30 NOTE — PROGRESS NOTES
Monmouthtali Rochagorge received a viral test for COVID-19. They were educated on isolation and quarantine as appropriate. For any symptoms, they were directed to seek care from their PCP, given contact information to establish with a doctor, directed to an urgent care or the emergency room.

## 2020-12-30 NOTE — PATIENT INSTRUCTIONS

## 2021-01-01 LAB — SARS-COV-2, NAA: DETECTED

## 2021-01-04 ENCOUNTER — TELEPHONE (OUTPATIENT)
Dept: INTERNAL MEDICINE CLINIC | Age: 76
End: 2021-01-04

## 2021-01-04 ENCOUNTER — ANTI-COAG VISIT (OUTPATIENT)
Dept: INTERNAL MEDICINE CLINIC | Age: 76
End: 2021-01-04

## 2021-01-04 LAB — INR BLD: 2

## 2021-01-04 NOTE — TELEPHONE ENCOUNTER
----- Message from Blanca Guzman MD sent at 1/4/2021  1:32 PM EST -----  Contact: [phone]  No change   2 weeks  ----- Message -----  From: Melanie Calvo  Sent: 1/4/2021  11:18 AM EST  To: Blanca Guzman MD    INR: 2.0

## 2021-01-18 ENCOUNTER — TELEPHONE (OUTPATIENT)
Dept: INTERNAL MEDICINE CLINIC | Age: 76
End: 2021-01-18

## 2021-01-18 ENCOUNTER — ANTI-COAG VISIT (OUTPATIENT)
Dept: INTERNAL MEDICINE CLINIC | Age: 76
End: 2021-01-18

## 2021-01-18 LAB — INR BLD: 3.4

## 2021-01-18 RX ORDER — ATORVASTATIN CALCIUM 40 MG/1
40 TABLET, FILM COATED ORAL DAILY
Qty: 90 TABLET | Refills: 0 | Status: SHIPPED | OUTPATIENT
Start: 2021-01-18 | End: 2021-05-26

## 2021-01-18 RX ORDER — TORSEMIDE 20 MG/1
TABLET ORAL
Qty: 180 TABLET | Refills: 0 | Status: SHIPPED | OUTPATIENT
Start: 2021-01-18 | End: 2021-10-04

## 2021-01-18 RX ORDER — WARFARIN SODIUM 5 MG/1
5 TABLET ORAL DAILY
Qty: 90 TABLET | Refills: 0 | Status: SHIPPED | OUTPATIENT
Start: 2021-01-18 | End: 2021-05-26

## 2021-01-18 RX ORDER — SPIRONOLACTONE 25 MG/1
25 TABLET ORAL DAILY
Qty: 90 TABLET | Refills: 0 | Status: SHIPPED | OUTPATIENT
Start: 2021-01-18 | End: 2021-05-26

## 2021-01-18 RX ORDER — CALCIUM CITRATE/VITAMIN D3 200MG-6.25
TABLET ORAL
Qty: 100 EACH | Refills: 3 | Status: SHIPPED | OUTPATIENT
Start: 2021-01-18 | End: 2022-01-20

## 2021-01-22 NOTE — TELEPHONE ENCOUNTER
----- Message from Gloria Edwards MD sent at 1/22/2021  2:35 PM EST -----  Contact: Ronny Padilla [phone]  Bid  ----- Message -----  From: Remberto Ulrich  Sent: 1/22/2021   2:19 PM EST  To: Gloria Edwards MD    What are correct directions for pt's metformin?  ----- Message -----  From: Jayden Boudreaux  Sent: 1/22/2021   1:21 PM EST  To: Dedra Conde    Patient states the directions on his prescription for Metformin 500 mg is not correct. It reads 1 tablet by mouth daily and he states it should be 1 tablet in the morning and 1 tablet in the evening. This shorted his 90 day supply by 45 tablets. Can you please correct for him?   Thank you     [employer] [address] [place] [address], [address] [place] [address] [phone]

## 2021-01-25 ENCOUNTER — TELEPHONE (OUTPATIENT)
Dept: INTERNAL MEDICINE CLINIC | Age: 76
End: 2021-01-25

## 2021-01-25 ENCOUNTER — ANTI-COAG VISIT (OUTPATIENT)
Dept: INTERNAL MEDICINE CLINIC | Age: 76
End: 2021-01-25

## 2021-01-25 LAB — INR BLD: 2

## 2021-01-25 NOTE — TELEPHONE ENCOUNTER
----- Message from José Miguel Rock MD sent at 1/25/2021  2:19 PM EST -----  Contact: [phone]  No change  2 weeks  ----- Message -----  From: Cinthia Rowan  Sent: 1/25/2021   1:56 PM EST  To: José Miguel Rock MD    Pt checked his INR today it was 2.0 he is taking 5 mg daily

## 2021-01-25 NOTE — TELEPHONE ENCOUNTER
----- Message from Kiel Williamson MD sent at 1/25/2021  2:19 PM EST -----  Contact: [phone]  No change  2 weeks  ----- Message -----  From: Antione Gil  Sent: 1/25/2021   1:56 PM EST  To: Kiel Williamson MD    Pt checked his INR today it was 2.0 he is taking 5 mg daily

## 2021-02-01 ENCOUNTER — TELEPHONE (OUTPATIENT)
Dept: INTERNAL MEDICINE CLINIC | Age: 76
End: 2021-02-01

## 2021-02-01 ENCOUNTER — ANTI-COAG VISIT (OUTPATIENT)
Dept: INTERNAL MEDICINE CLINIC | Age: 76
End: 2021-02-01

## 2021-02-01 LAB — INR BLD: 1.6

## 2021-02-01 RX ORDER — MONTELUKAST SODIUM 10 MG/1
TABLET ORAL
Qty: 90 TABLET | Refills: 0 | Status: SHIPPED | OUTPATIENT
Start: 2021-02-01 | End: 2021-08-24 | Stop reason: SDUPTHER

## 2021-02-01 NOTE — TELEPHONE ENCOUNTER
----- Message from Jayme Rosario MD sent at 2/1/2021  1:32 PM EST -----  Why low  Take 7.5 mg today   Rest 5 mg   1 week  ----- Message -----  From: Collette Bulla  Sent: 2/1/2021  11:48 AM EST  To: Jayme Rosario MD    INR- 1.6    Taking 5 mg daily.

## 2021-02-08 ENCOUNTER — TELEPHONE (OUTPATIENT)
Dept: INTERNAL MEDICINE CLINIC | Age: 76
End: 2021-02-08

## 2021-02-15 ENCOUNTER — ANTI-COAG VISIT (OUTPATIENT)
Dept: INTERNAL MEDICINE CLINIC | Age: 76
End: 2021-02-15

## 2021-02-15 ENCOUNTER — TELEPHONE (OUTPATIENT)
Dept: INTERNAL MEDICINE CLINIC | Age: 76
End: 2021-02-15

## 2021-02-15 LAB — INR BLD: 2

## 2021-02-17 ENCOUNTER — IMMUNIZATION (OUTPATIENT)
Dept: PRIMARY CARE CLINIC | Age: 76
End: 2021-02-17
Payer: MEDICARE

## 2021-02-17 PROCEDURE — 0011A COVID-19, MODERNA VACCINE 100MCG/0.5ML DOSE: CPT | Performed by: FAMILY MEDICINE

## 2021-02-17 PROCEDURE — 91301 COVID-19, MODERNA VACCINE 100MCG/0.5ML DOSE: CPT | Performed by: FAMILY MEDICINE

## 2021-03-01 ENCOUNTER — ANTI-COAG VISIT (OUTPATIENT)
Dept: INTERNAL MEDICINE CLINIC | Age: 76
End: 2021-03-01

## 2021-03-01 ENCOUNTER — NURSE ONLY (OUTPATIENT)
Dept: CARDIOLOGY CLINIC | Age: 76
End: 2021-03-01
Payer: MEDICARE

## 2021-03-01 ENCOUNTER — TELEPHONE (OUTPATIENT)
Dept: CARDIOLOGY CLINIC | Age: 76
End: 2021-03-01

## 2021-03-01 ENCOUNTER — TELEPHONE (OUTPATIENT)
Dept: INTERNAL MEDICINE CLINIC | Age: 76
End: 2021-03-01

## 2021-03-01 DIAGNOSIS — I48.0 PAF (PAROXYSMAL ATRIAL FIBRILLATION) (HCC): Primary | ICD-10-CM

## 2021-03-01 LAB — INR BLD: 1.8

## 2021-03-01 PROCEDURE — 93000 ELECTROCARDIOGRAM COMPLETE: CPT | Performed by: INTERNAL MEDICINE

## 2021-03-01 NOTE — TELEPHONE ENCOUNTER
----- Message from Tyshawn Dupree MD sent at 3/1/2021  2:58 PM EST -----  1 week  ----- Message -----  From: J Carlos Morgan  Sent: 3/1/2021   2:50 PM EST  To: Tyshawn Dupree MD    When to recheck?  ----- Message -----  From: Tyshawn Dupree MD  Sent: 3/1/2021   2:48 PM EST  To: Lianne Maurice    Take 7.5 mg once a week, preferably mondays  ----- Message -----  From: J Carlos Morgan  Sent: 3/1/2021   1:53 PM EST  To: Tyshawn Dupree MD    Taking 5 mg daily  ----- Message -----  From: Tyshwan Dupree MD  Sent: 3/1/2021  12:21 PM EST  To: J Carlos Morgan    Low   Dosing ?  ----- Message -----  From: J Carlos Morgan  Sent: 3/1/2021  11:49 AM EST  To: Tyshawn Dupree MD    INR 1.8

## 2021-03-01 NOTE — TELEPHONE ENCOUNTER
Patient came in for 2nd ECG 2 hours post initial ECG. GWENDOLYN informed of results. Per GWENDOLYN, QT interval normal. Informed patient to go to the ED if he experiences increased dizziness. V/u. Scheduled for 4 week follow up with GWENDOLYN (former RP patient).

## 2021-03-01 NOTE — TELEPHONE ENCOUNTER
RKG, please advise. Pt took 2 Tikosyn. Pt is to take 500mcg every 12 hrs. Last ov 9/3/20  Assessment:  Jarett Chamberlain was seen today for 6 month follow-up and other.     Diagnoses and all orders for this visit:     PAF (paroxysmal atrial fibrillation) ([facility] [address]  -     EKG 12 lead     Essential hypertension  -     EKG 12 lead     Other orders  -     dilTIAZem (CARDIZEM) 120 MG tablet; TAKE 1 TABLET BY MOUTH  EVERY 12 HOURS  -     spironolactone (ALDACTONE) 25 MG tablet; Take 1 tablet by mouth daily         Afib in past s/p ablation   Edema of legs due to chronic venous insufficiency both legs. Most recent lipids 12/4/19 reviewed in epic      Plan:  1. Meds reviewed. Refills as above under orders . 2. No changes today. Continue current meds   3.  Follow up in 6 months

## 2021-03-01 NOTE — TELEPHONE ENCOUNTER
Patient called back. I spoke with Umair Mackay who wants patient to come to the [facility] office for an EKG now. Patient is aware and is on his way.

## 2021-03-02 NOTE — TELEPHONE ENCOUNTER
Called patient to check in today- He is feeling well. He has another ECG done at the [facility] today and they said his QT/QTc was within normal range. They are faxing the ECG and labs to our office. Dr. Sheila Vo- Patient wanted to keep you in the loop. Please see message below regarding patients accidental overdose on Tikosyn.

## 2021-03-08 ENCOUNTER — ANTI-COAG VISIT (OUTPATIENT)
Dept: INTERNAL MEDICINE CLINIC | Age: 76
End: 2021-03-08

## 2021-03-08 ENCOUNTER — TELEPHONE (OUTPATIENT)
Dept: INTERNAL MEDICINE CLINIC | Age: 76
End: 2021-03-08

## 2021-03-08 LAB — INR BLD: 2.3

## 2021-03-08 NOTE — TELEPHONE ENCOUNTER
----- Message from Melinda Colin MD sent at 3/8/2021 11:55 AM EST -----  No change  2 weeks  ----- Message -----  From: Avni Ndiaye  Sent: 3/8/2021  11:13 AM EST  To: Melinda Colin MD    INR 2.3  Taking 5 mg daily

## 2021-03-17 ENCOUNTER — IMMUNIZATION (OUTPATIENT)
Dept: PRIMARY CARE CLINIC | Age: 76
End: 2021-03-17
Payer: MEDICARE

## 2021-03-17 PROCEDURE — 91301 COVID-19, MODERNA VACCINE 100MCG/0.5ML DOSE: CPT | Performed by: FAMILY MEDICINE

## 2021-03-17 PROCEDURE — 0012A COVID-19, MODERNA VACCINE 100MCG/0.5ML DOSE: CPT | Performed by: FAMILY MEDICINE

## 2021-03-22 ENCOUNTER — ANTI-COAG VISIT (OUTPATIENT)
Dept: INTERNAL MEDICINE CLINIC | Age: 76
End: 2021-03-22

## 2021-03-22 ENCOUNTER — TELEPHONE (OUTPATIENT)
Dept: INTERNAL MEDICINE CLINIC | Age: 76
End: 2021-03-22

## 2021-03-22 LAB — INR BLD: 1.6

## 2021-03-22 NOTE — TELEPHONE ENCOUNTER
----- Message from Nikita Thompson MD sent at 3/22/2021  2:23 PM EDT -----  Take 7.5 on mondays , rest 5 mg daily   1 week  ----- Message -----  From: Raiza Nielsen  Sent: 3/22/2021   1:02 PM EDT  To: Nikita Thompson MD    5 mg daily.   Pt unsure why low  ----- Message -----  From: Nikita Thompson MD  Sent: 3/22/2021  12:43 PM EDT  To: Raiza Nielsen    Why low  ----- Message -----  From: Raiza Nielsen  Sent: 3/22/2021  11:47 AM EDT  To: Nikita Thompson MD    INR 1.6  Unable to reach for dose

## 2021-03-29 ENCOUNTER — TELEPHONE (OUTPATIENT)
Dept: INTERNAL MEDICINE CLINIC | Age: 76
End: 2021-03-29

## 2021-03-29 ENCOUNTER — ANTI-COAG VISIT (OUTPATIENT)
Dept: INTERNAL MEDICINE CLINIC | Age: 76
End: 2021-03-29

## 2021-03-29 LAB — INR BLD: 2

## 2021-03-29 NOTE — TELEPHONE ENCOUNTER
----- Message from Berna Carlson MD sent at 3/29/2021 12:30 PM EDT -----  No change  2 weeks  ----- Message -----  From: Rosie Adam  Sent: 3/29/2021  10:54 AM EDT  To: Berna Carlson MD    INR 2

## 2021-03-31 ENCOUNTER — TELEPHONE (OUTPATIENT)
Dept: CARDIOLOGY CLINIC | Age: 76
End: 2021-03-31

## 2021-03-31 ENCOUNTER — OFFICE VISIT (OUTPATIENT)
Dept: CARDIOLOGY CLINIC | Age: 76
End: 2021-03-31
Payer: MEDICARE

## 2021-03-31 VITALS
BODY MASS INDEX: 39.17 KG/M2 | DIASTOLIC BLOOD PRESSURE: 68 MMHG | HEIGHT: 75 IN | HEART RATE: 70 BPM | WEIGHT: 315 LBS | SYSTOLIC BLOOD PRESSURE: 112 MMHG

## 2021-03-31 DIAGNOSIS — I48.0 PAF (PAROXYSMAL ATRIAL FIBRILLATION) (HCC): Primary | ICD-10-CM

## 2021-03-31 DIAGNOSIS — I48.4 ATYPICAL ATRIAL FLUTTER (HCC): ICD-10-CM

## 2021-03-31 DIAGNOSIS — R00.2 PALPITATION: ICD-10-CM

## 2021-03-31 DIAGNOSIS — G47.33 MODERATE OBSTRUCTIVE SLEEP APNEA: ICD-10-CM

## 2021-03-31 DIAGNOSIS — I49.3 PVC (PREMATURE VENTRICULAR CONTRACTION): ICD-10-CM

## 2021-03-31 PROCEDURE — 93000 ELECTROCARDIOGRAM COMPLETE: CPT | Performed by: INTERNAL MEDICINE

## 2021-03-31 PROCEDURE — 99204 OFFICE O/P NEW MOD 45 MIN: CPT | Performed by: INTERNAL MEDICINE

## 2021-03-31 PROCEDURE — G8427 DOCREV CUR MEDS BY ELIG CLIN: HCPCS | Performed by: INTERNAL MEDICINE

## 2021-03-31 PROCEDURE — 4040F PNEUMOC VAC/ADMIN/RCVD: CPT | Performed by: INTERNAL MEDICINE

## 2021-03-31 PROCEDURE — 1036F TOBACCO NON-USER: CPT | Performed by: INTERNAL MEDICINE

## 2021-03-31 PROCEDURE — 1123F ACP DISCUSS/DSCN MKR DOCD: CPT | Performed by: INTERNAL MEDICINE

## 2021-03-31 PROCEDURE — G8484 FLU IMMUNIZE NO ADMIN: HCPCS | Performed by: INTERNAL MEDICINE

## 2021-03-31 PROCEDURE — G8417 CALC BMI ABV UP PARAM F/U: HCPCS | Performed by: INTERNAL MEDICINE

## 2021-03-31 PROCEDURE — 3017F COLORECTAL CA SCREEN DOC REV: CPT | Performed by: INTERNAL MEDICINE

## 2021-03-31 RX ORDER — ALOGLIPTIN 25 MG/1
TABLET, FILM COATED ORAL
COMMUNITY
Start: 2020-09-08 | End: 2021-11-18

## 2021-03-31 NOTE — PATIENT INSTRUCTIONS
Plan:  1. 30 day event monitor to assess palpitations and recurrence of atrial or ventricular arrhythmias. 2. Encourage activity as tolerated. 3. Continue medications as prescribed. 4. Follow up with EP NP in 3 months, GWENDOLYN 6 months.

## 2021-03-31 NOTE — LETTER
[facility]  Phone: [phone]  Fax: [phone]     José Miguel Whiting MD     4/5/2021     [address] [place] [address]     Patient: Anahi Sinclair   MR Number: <[id number]>   YOB: 1945   Date of Visit: 3/31/2021     Dear Dr. Yeny Whitney     Today I saw our mutual patient named above. Below are the relevant portions of my assessment and plan of care. If you have questions, please do not hesitate to call me. I look forward to following Kwame Willingham along with you. [facility]   Cardiac Consultation    Date: 3/31/21  Patient Name: Anahi Sinclair  YOB: 1945    Primary Care Physician: Yeny Whitney MD    CHIEF COMPLAINT:   Chief Complaint   Patient presents with    New Patient    Atrial Fibrillation     per holter monitor report 9/2013, tracings not available.  Atrial Flutter     typical; RFCA 4/2017, 9/2017    Other     PVCs- RFCA 2014       HPI:  Anahi Sinclair is a 77 y.o. male with a past medical history of PAF (per cardiac event monitor report 9/2013, no ECG tracings or burden information available), AFL, MARIA VICTORIA, PVCs, type 2 DM, and HTN. He has a history of PVC ablation in 2014. He stated he continued to have occasional episodes of palpitations and was found to have atrial flutter. He underwent a CV, and an unsuccessful RFCA for typical atrial flutter on 4/12/17. Patient was started on dofetilide in June of 2017. He underwent EPS and aflutter ablation on 9/19/17. On 1/2018, he reported he had increased fatigue. He reported he had been unable to exercise due to his back pain. These episodes resolve on there own. His coumadin is managed by his PCP. He has MARIA VICTORIA and reports he is compliant with wearing his CPAP. Yasemin Huitron Today, 3/31/2021, patient reports he is doing fairly well.  He reports he has inadvertently taking a double dose of dofetilide twice in the past, most recently being 3/1/2021. Patient came in the the office for two timed ECGs that did not show prolongation of QT interval. He reports he experiences palpitations about twice per month with associated fatigue. The palpitations can last anywhere from several minutes up to an hour. He reports his most recent episode occurred yesterday while working in the yard. He reports the episodes typically occur with activity. He reports he is taking all medications as prescribed and tolerates them well. Patient denies any bleeding or bruising issue. Patient denies current edema, chest pain, sob, dizziness or syncope. Past Medical History:   has a past medical history of Arthritis, Asthma, Atrial fibrillation ([address] Blood circulation, collateral, Diabetes mellitus ([address] DVT (deep venous thrombosis) ([address] Histoplasmosis, History of knee replacement procedure of right knee, Hx of blood clots, Hyperlipidemia, Hypertension, Knee osteoarthritis, Left TKR, Lung nodule, Neuromuscular disorder ([address] Palpitations, Sleep apnea, Stone, kidney, and UTI (urinary tract infection). Surgical History:   has a past surgical history that includes Cystoscopy; Tonsillectomy; Knee arthroscopy (4/19/2011); Colonoscopy; Cystocopy (2/8/13); ablation of dysrhythmic focus (2013); Carpal tunnel release (Right, 9-30-15); Bariatric Surgery (2013); Carpal tunnel release (Left, 3/20/16); skin biopsy; Diagnostic Cardiac Cath Lab Procedure; joint replacement (Bilateral); Cardiac surgery (2013); thoracotomy; ERCP (02/18/2019); ERCP (N/A, 2/18/2019); ERCP (2/18/2019); Cholecystectomy, laparoscopic (N/A, 2/19/2019); Intracapsular cataract extraction (Right, 1/14/2020); and Intracapsular cataract extraction (Left, 1/21/2020). Social History:   reports that he quit smoking about 44 years ago. His smoking use included cigarettes. He has a 34.00 pack-year smoking history. He has never used smokeless tobacco. He reports that he does not drink alcohol or use drugs. Family History:  family history includes Arthritis in his father; Cancer in his mother; Kidney Disease in his mother; Other in his father; Stroke in his father and mother; Substance Abuse in his father. Home Medications:  Outpatient Encounter Medications as of 3/31/2021   Medication Sig Dispense Refill    alogliptin (NESINA) 25 MG TABS tablet TAKE ONE TABLET BY MOUTH ONCE DAILY FOR BLOOD SUGAR      metFORMIN (GLUCOPHAGE) 500 MG tablet TAKE 1 TABLET BY MOUTH  DAILY (Patient taking differently: Take 500 mg by mouth 2 times daily (with meals) TAKE 1 TABLET BY MOUTH  DAILY) 90 tablet 1    montelukast (SINGULAIR) 10 MG tablet TAKE 1 TABLET BY MOUTH ONCE DAILY 90 tablet 0    atorvastatin (LIPITOR) 40 MG tablet Take 1 tablet by mouth daily 90 tablet 0    spironolactone (ALDACTONE) 25 MG tablet Take 1 tablet by mouth daily 90 tablet 0    torsemide (DEMADEX) 20 MG tablet TAKE 1 TABLET BY MOUTH TWO  TIMES DAILY (Patient taking differently: 20 mg daily ) 180 tablet 0    warfarin (COUMADIN) 5 MG tablet Take 1 tablet by mouth daily 90 tablet 0    blood glucose test strips (TRUE METRIX BLOOD GLUCOSE TEST) strip Test daily. DX: E11.9 100 each 3    dilTIAZem (CARDIZEM) 120 MG tablet TAKE 1 TABLET BY MOUTH  EVERY 12 HOURS 180 tablet 3    Easy Touch Lancets 32G/Twist MISC TEST DAILY. DX: E11.9 100 each 3    blood glucose test strips (TRUE METRIX BLOOD GLUCOSE TEST) strip USE TO CHECK BLOOD GLUCOSE DAILY. DX: E11.9 100 each 3    dofetilide (TIKOSYN) 500 MCG capsule Take 1 capsule by mouth every 12 hours 180 capsule 3    ACCU-CHEK MULTICLIX LANCETS MISC Check sugars once daily. Dx;E11.9 100 each 11    Lancet Devices (EASY TOUCH LANCING DEVICE) MISC Test Daily. DX: E11.9 1 each 0    Blood Glucose Monitoring Suppl (TRUE METRIX METER) VINNIE Use to check daily. DX;E11.9 1 Device 0    Lancets Misc. (ACCU-CHEK MULTICLIX LANCET DEV) KIT Check sugars once daily.  DX;E11.9 1 kit 0    Biotin 5 MG TABS Take 5 mg by mouth daily      Cholecalciferol (VITAMIN D3) 5000 units TABS Take 5,000 Units by mouth daily      Cyanocobalamin (VITAMIN B-12 CR PO) Take 5,000 mcg by mouth every 7 days       Multiple Vitamins-Minerals (THERAPEUTIC MULTIVITAMIN-MINERALS) tablet Take 2 tablets by mouth daily       magnesium oxide (MAG-OX) 400 MG tablet Take 400 mg by mouth daily.  clobetasol (TEMOVATE) 0.05 % ointment APPLY TOPICALLY 2 TIMES DAILY. (Patient not taking: Reported on 9/3/2020) 60 g 0     No facility-administered encounter medications on file as of 3/31/2021. Allergies:  Bactrim [sulfamethoxazole-trimethoprim], Ciprofloxacin, Macrobid [nitrofurantoin monohyd macro], Sulfamethoxazole, Theophylline, Cefuroxime axetil, Cipro xr, Other, and Tape [adhesive tape]     Review of Systems   Constitutional: Negative. HENT: Negative. Eyes: Negative. Respiratory: Negative. Cardiovascular: Negative. Gastrointestinal: Negative. Genitourinary: Negative. Musculoskeletal: Negative. Skin: Negative. Neurological: Negative. Hematological: Negative. Psychiatric/Behavioral: Negative. /68   Pulse 70   Ht 6' 3\" (1.905 m)   Wt (!) 335 lb (152 kg)   BMI 41.87 kg/m²     Data:     LIMITED ECHO 10/30/2017:   Summary   This is a limited study pericardial effusion and shunt. Normal left ventricular systolic function with an estimated ejection   fraction of 55%. A bubble study was performed and fails to show evidence of shunting. There is a very small circumferential pericardial effusion noted. Objective:  Physical Exam   Constitutional: He is oriented to person, place, and time. He appears well-developed and well-nourished. HENT:   Head: Normocephalic and atraumatic. Eyes: Pupils are equal, round, and reactive to light. Neck: Normal range of motion. Cardiovascular: Normal rate, regular rhythm and normal heart sounds. Pulmonary/Chest: Effort normal and breath sounds normal.   Abdominal: Soft. No tenderness. Musculoskeletal: Normal range of motion. He exhibits no edema. Neurological: He is alert and oriented to person, place, and time. Skin: Skin is warm and dry. Psychiatric: He has a normal mood and affect. Assessment:  1. Palpitations-the etiology of these palpitations is not clear. 30 day monitor to assess palpitations and recurrence of atrial arrhythmias. 2. PAF- ? None since 2013. Cardiac event monitor 8/2016 and 10/2017 demonstrated no atrial arrhythmias. 30 day monitor to assess palpitations and recurrence of atrial arrhythmias. Plan:  1. 30 day event monitor to assess palpitations and recurrence of atrial arrhythmias. 2. Encourage activity as tolerated. 3. Continue medications as prescribed. 4. Follow up with EP NP in 3 months, JMB 6 months. QUALITY MEASURES  1. Tobacco Cessation Counseling: NA  2. Retake of BP if >140/90:   NA  3. Documentation to PCP/referring for new patient:  Sent to PCP at close of office visit  4. CAD patient on anti-platelet: NA  5. CAD patient on STATIN therapy:  NA  6. Patient with CHF and aFib on anticoagulation:  Yes, coumadin. This note has been scribed in the presence of Iraida Beverly MD by Jocelyn Spangler RN.       I, Dr. Iraida Beverly, personally performed the services described in this documentation as scribed by Jocelyn Spangler RN. in my presence, and it is both accurate and complete. Iraida Beverly M.D.               Sincerely,      Iraida Beverly MD

## 2021-03-31 NOTE — PROGRESS NOTES
sob, dizziness or syncope. Past Medical History:   has a past medical history of Arthritis, Asthma, Atrial fibrillation ([address] Blood circulation, collateral, Diabetes mellitus ([address] DVT (deep venous thrombosis) ([address] Histoplasmosis, History of knee replacement procedure of right knee, Hx of blood clots, Hyperlipidemia, Hypertension, Knee osteoarthritis, Left TKR, Lung nodule, Neuromuscular disorder ([address] Palpitations, Sleep apnea, Stone, kidney, and UTI (urinary tract infection). Surgical History:   has a past surgical history that includes Cystoscopy; Tonsillectomy; Knee arthroscopy (4/19/2011); Colonoscopy; Cystocopy (2/8/13); ablation of dysrhythmic focus (2013); Carpal tunnel release (Right, 9-30-15); Bariatric Surgery (2013); Carpal tunnel release (Left, 3/20/16); skin biopsy; Diagnostic Cardiac Cath Lab Procedure; joint replacement (Bilateral); Cardiac surgery (2013); thoracotomy; ERCP (02/18/2019); ERCP (N/A, 2/18/2019); ERCP (2/18/2019); Cholecystectomy, laparoscopic (N/A, 2/19/2019); Intracapsular cataract extraction (Right, 1/14/2020); and Intracapsular cataract extraction (Left, 1/21/2020). Social History:   reports that he quit smoking about 44 years ago. His smoking use included cigarettes. He has a 34.00 pack-year smoking history. He has never used smokeless tobacco. He reports that he does not drink alcohol or use drugs. Family History:  family history includes Arthritis in his father; Cancer in his mother; Kidney Disease in his mother; Other in his father; Stroke in his father and mother; Substance Abuse in his father.      Home Medications:  Outpatient Encounter Medications as of 3/31/2021   Medication Sig Dispense Refill    alogliptin (NESINA) 25 MG TABS tablet TAKE ONE TABLET BY MOUTH ONCE DAILY FOR BLOOD SUGAR      metFORMIN (GLUCOPHAGE) 500 MG tablet TAKE 1 TABLET BY MOUTH  DAILY (Patient taking differently: Take 500 mg by mouth 2 times daily (with meals) TAKE 1 TABLET BY MOUTH  DAILY) 90 tablet 1    montelukast (SINGULAIR) 10 MG tablet TAKE 1 TABLET BY MOUTH ONCE DAILY 90 tablet 0    atorvastatin (LIPITOR) 40 MG tablet Take 1 tablet by mouth daily 90 tablet 0    spironolactone (ALDACTONE) 25 MG tablet Take 1 tablet by mouth daily 90 tablet 0    torsemide (DEMADEX) 20 MG tablet TAKE 1 TABLET BY MOUTH TWO  TIMES DAILY (Patient taking differently: 20 mg daily ) 180 tablet 0    warfarin (COUMADIN) 5 MG tablet Take 1 tablet by mouth daily 90 tablet 0    blood glucose test strips (TRUE METRIX BLOOD GLUCOSE TEST) strip Test daily. DX: E11.9 100 each 3    dilTIAZem (CARDIZEM) 120 MG tablet TAKE 1 TABLET BY MOUTH  EVERY 12 HOURS 180 tablet 3    Easy Touch Lancets 32G/Twist MISC TEST DAILY. DX: E11.9 100 each 3    blood glucose test strips (TRUE METRIX BLOOD GLUCOSE TEST) strip USE TO CHECK BLOOD GLUCOSE DAILY. DX: E11.9 100 each 3    dofetilide (TIKOSYN) 500 MCG capsule Take 1 capsule by mouth every 12 hours 180 capsule 3    ACCU-CHEK MULTICLIX LANCETS MISC Check sugars once daily. Dx;E11.9 100 each 11    Lancet Devices (EASY TOUCH LANCING DEVICE) MISC Test Daily. DX: E11.9 1 each 0    Blood Glucose Monitoring Suppl (TRUE METRIX METER) VINNIE Use to check daily. DX;E11.9 1 Device 0    Lancets Misc. (ACCU-CHEK MULTICLIX LANCET DEV) KIT Check sugars once daily. DX;E11.9 1 kit 0    Biotin 5 MG TABS Take 5 mg by mouth daily      Cholecalciferol (VITAMIN D3) 5000 units TABS Take 5,000 Units by mouth daily      Cyanocobalamin (VITAMIN B-12 CR PO) Take 5,000 mcg by mouth every 7 days       Multiple Vitamins-Minerals (THERAPEUTIC MULTIVITAMIN-MINERALS) tablet Take 2 tablets by mouth daily       magnesium oxide (MAG-OX) 400 MG tablet Take 400 mg by mouth daily.  clobetasol (TEMOVATE) 0.05 % ointment APPLY TOPICALLY 2 TIMES DAILY. (Patient not taking: Reported on 9/3/2020) 60 g 0     No facility-administered encounter medications on file as of 3/31/2021. Allergies:  Bactrim [sulfamethoxazole-trimethoprim], Ciprofloxacin, Macrobid [nitrofurantoin monohyd macro], Sulfamethoxazole, Theophylline, Cefuroxime axetil, Cipro xr, Other, and Tape [adhesive tape]     Review of Systems   Constitutional: Negative. HENT: Negative. Eyes: Negative. Respiratory: Negative. Cardiovascular: Negative. Gastrointestinal: Negative. Genitourinary: Negative. Musculoskeletal: Negative. Skin: Negative. Neurological: Negative. Hematological: Negative. Psychiatric/Behavioral: Negative. /68   Pulse 70   Ht 6' 3\" (1.905 m)   Wt (!) 335 lb (152 kg)   BMI 41.87 kg/m²     Data:     LIMITED ECHO 10/30/2017:   Summary   This is a limited study pericardial effusion and shunt. Normal left ventricular systolic function with an estimated ejection   fraction of 55%. A bubble study was performed and fails to show evidence of shunting. There is a very small circumferential pericardial effusion noted. Objective:  Physical Exam   Constitutional: He is oriented to person, place, and time. He appears well-developed and well-nourished. HENT:   Head: Normocephalic and atraumatic. Eyes: Pupils are equal, round, and reactive to light. Neck: Normal range of motion. Cardiovascular: Normal rate, regular rhythm and normal heart sounds. Pulmonary/Chest: Effort normal and breath sounds normal.   Abdominal: Soft. No tenderness. Musculoskeletal: Normal range of motion. He exhibits no edema. Neurological: He is alert and oriented to person, place, and time. Skin: Skin is warm and dry. Psychiatric: He has a normal mood and affect. Assessment:  1. Palpitations-the etiology of these palpitations is not clear. 30 day monitor to assess palpitations and recurrence of atrial arrhythmias. 2. PAF- ? None since 2013. Cardiac event monitor 8/2016 and 10/2017 demonstrated no atrial arrhythmias.  30 day monitor to assess palpitations and recurrence of atrial arrhythmias. Plan:  1. 30 day event monitor to assess palpitations and recurrence of atrial arrhythmias. 2. Encourage activity as tolerated. 3. Continue medications as prescribed. 4. Follow up with EP NP in 3 months, GWENDOLYN 6 months. QUALITY MEASURES  1. Tobacco Cessation Counseling: NA  2. Retake of BP if >140/90:   NA  3. Documentation to PCP/referring for new patient:  Sent to PCP at close of office visit  4. CAD patient on anti-platelet: NA  5. CAD patient on STATIN therapy:  NA  6. Patient with CHF and aFib on anticoagulation:  Yes, coumadin. This note has been scribed in the presence of Rey Hernandez MD by Oz Lama RN.       I, Dr. Rey Hernandez, personally performed the services described in this documentation as scribed by Oz Lama RN. in my presence, and it is both accurate and complete.       Rey Hernandez M.D.

## 2021-03-31 NOTE — TELEPHONE ENCOUNTER
Monitor placed by Bowen Reardon, [facility]  Length of monitor 30 days   Monitor ordered by [facility]   Monitor Number [id number]    Activation successful prior to pt leaving office?  Yes

## 2021-04-01 ENCOUNTER — TELEPHONE (OUTPATIENT)
Dept: INTERNAL MEDICINE CLINIC | Age: 76
End: 2021-04-01

## 2021-04-01 RX ORDER — AMOXICILLIN AND CLAVULANATE POTASSIUM 875; 125 MG/1; MG/1
1 TABLET, FILM COATED ORAL 2 TIMES DAILY
Qty: 14 TABLET | Refills: 0 | Status: SHIPPED | OUTPATIENT
Start: 2021-04-01 | End: 2021-04-08 | Stop reason: SDUPTHER

## 2021-04-01 NOTE — TELEPHONE ENCOUNTER
augmentin sent per Dr. Corrina Cano. Pt informed by Dr. Corrina Cano to hold coumadin for one day while on abx and recheck INR on Monday.

## 2021-04-01 NOTE — TELEPHONE ENCOUNTER
----- Message from Rafaela Kehr sent at 4/1/2021  1:43 PM EDT -----    ----- Message -----  From: Rafaela Kehr  Sent: 4/1/2021   1:41 PM EDT  To: Ara Brooks MD      ----- Message -----  From: Ara Brooks MD  Sent: 4/1/2021   1:40 PM EDT  To: [place] Amaya    Call him  ----- Message -----  From: Alejandro Alfredo  Sent: 4/1/2021   8:08 AM EDT  To: Ara Brooks MD    Thinks that when he took his meds yesterday it went down the wrong pipe. He throat has been burning and he has been really coughing He is still wheezing bad  Wants to talk to you said that at one time he saw pinkish red when he was coughing.  Please call and talk to him Thanks [facility] his number is [phone]

## 2021-04-01 NOTE — TELEPHONE ENCOUNTER
----- Message from Cecy Garcia sent at 4/1/2021  1:43 PM EDT -----    ----- Message -----  From: Cecy Garcia  Sent: 4/1/2021   1:41 PM EDT  To: Shabana Cuellar MD      ----- Message -----  From: Shabana Cuellar MD  Sent: 4/1/2021   1:40 PM EDT  To: Jenn Mckay    Call him  ----- Message -----  From: Isael Shields  Sent: 4/1/2021   8:08 AM EDT  To: Shabana Cuellar MD    Thinks that when he took his meds yesterday it went down the wrong pipe. He throat has been burning and he has been really coughing He is still wheezing bad  Wants to talk to you said that at one time he saw pinkish red when he was coughing.  Please call and talk to him Thanks [facility] his number is [phone]

## 2021-04-05 ENCOUNTER — ANTI-COAG VISIT (OUTPATIENT)
Dept: INTERNAL MEDICINE CLINIC | Age: 76
End: 2021-04-05

## 2021-04-05 ENCOUNTER — TELEPHONE (OUTPATIENT)
Dept: INTERNAL MEDICINE CLINIC | Age: 76
End: 2021-04-05

## 2021-04-05 LAB — INR BLD: 1.5

## 2021-04-05 NOTE — TELEPHONE ENCOUNTER
----- Message from Diego Hansen MD sent at 4/5/2021 11:44 AM EDT -----  Contact: [phone]  Continue same and check on Thursday  ----- Message -----  From: [facility]  Sent: 4/5/2021   9:15 AM EDT  To: MD Dr Jovi Jackson patient. INR: 1.5. Patient states that he takes 5 mg daily, he is currently on an antibiotic and Dr Jovi Myers told patient to hold his coumadin on Sunday's.       Last INR: 2.0 on 3/29/2021

## 2021-04-08 ENCOUNTER — TELEPHONE (OUTPATIENT)
Dept: INTERNAL MEDICINE CLINIC | Age: 76
End: 2021-04-08

## 2021-04-08 ENCOUNTER — ANTI-COAG VISIT (OUTPATIENT)
Dept: INTERNAL MEDICINE CLINIC | Age: 76
End: 2021-04-08

## 2021-04-08 LAB — INR BLD: 1.3

## 2021-04-08 RX ORDER — AMOXICILLIN AND CLAVULANATE POTASSIUM 875; 125 MG/1; MG/1
1 TABLET, FILM COATED ORAL 2 TIMES DAILY
Qty: 4 TABLET | Refills: 0 | Status: SHIPPED | OUTPATIENT
Start: 2021-04-08 | End: 2021-04-10

## 2021-04-08 NOTE — TELEPHONE ENCOUNTER
Patient states that he lost the last 2 days left on his antibiotic. Per Dr Bruna Michael ok to send in for 2 days.

## 2021-04-08 NOTE — TELEPHONE ENCOUNTER
----- Message from Herve Waddell MD sent at 4/8/2021 12:03 PM EDT -----  Contact: [phone]  Resume regular dose and check 1 week  ----- Message -----  From: Rafa Burgos  Sent: 4/8/2021   8:37 AM EDT  To: Herve Waddell MD    INR: 1.3  INR on Monday was 1.5, patient said he is taking an antibiotic and you told him to hold on Sundays. Per Dr Tita Cuadra patient informed to take 5 mg daily and recheck today.

## 2021-04-20 ENCOUNTER — TELEPHONE (OUTPATIENT)
Dept: PULMONOLOGY | Age: 76
End: 2021-04-20

## 2021-04-20 ENCOUNTER — VIRTUAL VISIT (OUTPATIENT)
Dept: PULMONOLOGY | Age: 76
End: 2021-04-20
Payer: MEDICARE

## 2021-04-20 DIAGNOSIS — G47.33 MODERATE OBSTRUCTIVE SLEEP APNEA: Primary | ICD-10-CM

## 2021-04-20 DIAGNOSIS — Z71.89 CPAP USE COUNSELING: ICD-10-CM

## 2021-04-20 DIAGNOSIS — E66.01 MORBID OBESITY WITH BMI OF 40.0-44.9, ADULT (HCC): ICD-10-CM

## 2021-04-20 PROCEDURE — 3017F COLORECTAL CA SCREEN DOC REV: CPT | Performed by: NURSE PRACTITIONER

## 2021-04-20 PROCEDURE — G8427 DOCREV CUR MEDS BY ELIG CLIN: HCPCS | Performed by: NURSE PRACTITIONER

## 2021-04-20 PROCEDURE — 1123F ACP DISCUSS/DSCN MKR DOCD: CPT | Performed by: NURSE PRACTITIONER

## 2021-04-20 PROCEDURE — 4040F PNEUMOC VAC/ADMIN/RCVD: CPT | Performed by: NURSE PRACTITIONER

## 2021-04-20 PROCEDURE — 99213 OFFICE O/P EST LOW 20 MIN: CPT | Performed by: NURSE PRACTITIONER

## 2021-04-20 ASSESSMENT — SLEEP AND FATIGUE QUESTIONNAIRES
HOW LIKELY ARE YOU TO NOD OFF OR FALL ASLEEP WHILE WATCHING TV: 2
HOW LIKELY ARE YOU TO NOD OFF OR FALL ASLEEP WHILE LYING DOWN TO REST IN THE AFTERNOON WHEN CIRCUMSTANCES PERMIT: 3
HOW LIKELY ARE YOU TO NOD OFF OR FALL ASLEEP WHILE SITTING AND READING: 2

## 2021-04-20 NOTE — PATIENT INSTRUCTIONS

## 2021-04-20 NOTE — PROGRESS NOTES
CHIEF COMPLAINT: MARIA VICTORIA    Radha Salas is a 77 y.o. male for televideo appointment via video and audio doxy. me virtual visit for MARIA VICTORIA follow up. Patient is using CPAP 7-8 hrs/night. Using humidifier. No snoring on CPAP. The pressure is well tolerated. The mask is comfortable- nasal pillows. No mask leak. No significant daytime sleepiness. No nodding off when driving. No dry nose or throat. Some fatigue. Bedtime is 9 pm and rise time is 5 am. Sleep onset is 1 minutes. Wakes up 0-1 times at night total. 0-1 nocturia. It takes few minutes to fall back a sleep. Occasional naps during the day. No headache in am. No weight gain. 1 caffienated beverages during the day. No alcohol.  ESS is 9    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    History of knee replacement procedure of right knee     Hx of blood clots     2 DVT's    Hyperlipidemia     Hypertension     Knee osteoarthritis 1/17/2012    Left TKR 1/18/2012    Lung nodule     due to histoplasmosis    Neuromuscular disorder ([address]     Palpitations     stable with atenolol    Sleep apnea     uses CPAP    Stone, kidney     UTI (urinary tract infection) 01/23/2017     Past Surgical History:   Procedure Laterality Date    ABLATION OF DYSRHYTHMIC FOCUS  2013    a-fib    BARIATRIC SURGERY  2013    GASTRIC SLEEVE    CARDIAC SURGERY  2013    ablation    CARPAL TUNNEL RELEASE Right 9-30-15    CARPAL TUNNEL RELEASE Left 3/20/16    CHOLECYSTECTOMY, LAPAROSCOPIC N/A 2/19/2019    LAPAROSCOPIC CHOLECYSTECTOMY WITH CHOLANGIOGRAMS performed by Hazel Hummel MD at [address]      with removal stone    CYSTOSCOPY  2/8/13    with Laser Vaporization of Enlarged Prostate    DIAGNOSTIC CARDIAC CATH LAB PROCEDURE      ERCP 02/18/2019    Sphincterotomy, stone removal    ERCP N/A 2/18/2019    ERCP SPHINCTER/PAPILLOTOMY performed by Landy Bailey MD at [address] [facility] ERCP  2/18/2019    ERCP STONE REMOVAL performed by Landy Bailey MD at [address] Right 1/14/2020    PHACOEMULSIFICATION OF CATARACT RIGHT EYE WITH INTRAOCULAR LENS IMPLANT performed by Shahab Dangelo MD at [facility] Left 1/21/2020    PHACOEMULSIFICATION OF CATARACT LEFT EYE WITH INTRAOCULAR LENS IMPLANT -SLEEP APNEA- performed by Shahab Dangelo MD at [address] Bilateral     right knee (2002) and left knee (2012)    KNEE ARTHROSCOPY  4/19/2011     left  knee    SKIN BIOPSY      skin Ca/SQUAMOUS CELL    THORACOTOMY      wedge resection    TONSILLECTOMY       Allergies   Allergen Reactions    Bactrim [Sulfamethoxazole-Trimethoprim] Diarrhea    Ciprofloxacin      Bad headaches    Macrobid [Nitrofurantoin Monohyd Macro]     Sulfamethoxazole Diarrhea    Theophylline      Theodur-caused severe headaches    Cefuroxime Axetil Rash and Itching    Cipro Xr Rash    Other Rash and Other (See Comments)     Ekg leads-glue    Tape [Adhesive Tape] Rash     Skin irritaion  Eats away at skin, paper tape OK  Plastic tape         Current Medications:    Current Outpatient Medications:     alogliptin (NESINA) 25 MG TABS tablet, TAKE ONE TABLET BY MOUTH ONCE DAILY FOR BLOOD SUGAR, Disp: , Rfl:     metFORMIN (GLUCOPHAGE) 500 MG tablet, TAKE 1 TABLET BY MOUTH  DAILY (Patient taking differently: Take 500 mg by mouth 2 times daily (with meals) TAKE 1 TABLET BY MOUTH  DAILY), Disp: 90 tablet, Rfl: 1    montelukast (SINGULAIR) 10 MG tablet, TAKE 1 TABLET BY MOUTH ONCE DAILY, Disp: 90 tablet, Rfl: 0    atorvastatin (LIPITOR) 40 MG tablet, Take 1 tablet by mouth daily, Disp: 90 tablet, Rfl: 0    spironolactone (ALDACTONE) 25 MG tablet, Take 1 tablet by mouth daily, Disp: 90 tablet, Rfl: 0    torsemide (DEMADEX) 20 MG tablet, TAKE 1 TABLET BY MOUTH TWO  TIMES DAILY (Patient taking differently: 20 mg daily ), Disp: 180 tablet, Rfl: 0    warfarin (COUMADIN) 5 MG tablet, Take 1 tablet by mouth daily, Disp: 90 tablet, Rfl: 0    blood glucose test strips (TRUE METRIX BLOOD GLUCOSE TEST) strip, Test daily. DX: E11.9, Disp: 100 each, Rfl: 3    dilTIAZem (CARDIZEM) 120 MG tablet, TAKE 1 TABLET BY MOUTH  EVERY 12 HOURS, Disp: 180 tablet, Rfl: 3    Easy Touch Lancets 32G/Twist MISC, TEST DAILY. DX: E11.9, Disp: 100 each, Rfl: 3    blood glucose test strips (TRUE METRIX BLOOD GLUCOSE TEST) strip, USE TO CHECK BLOOD GLUCOSE DAILY. DX: E11.9, Disp: 100 each, Rfl: 3    dofetilide (TIKOSYN) 500 MCG capsule, Take 1 capsule by mouth every 12 hours, Disp: 180 capsule, Rfl: 3    ACCU-CHEK MULTICLIX LANCETS MISC, Check sugars once daily. Dx;E11.9, Disp: 100 each, Rfl: 11    Lancet Devices (EASY TOUCH LANCING DEVICE) MISC, Test Daily. DX: E11.9, Disp: 1 each, Rfl: 0    Blood Glucose Monitoring Suppl (TRUE METRIX METER) VINNIE, Use to check daily. DX;E11.9, Disp: 1 Device, Rfl: 0    Lancets Misc. (ACCU-CHEK MULTICLIX LANCET DEV) KIT, Check sugars once daily. DX;E11.9, Disp: 1 kit, Rfl: 0    Biotin 5 MG TABS, Take 5 mg by mouth daily, Disp: , Rfl:     Cholecalciferol (VITAMIN D3) 5000 units TABS, Take 5,000 Units by mouth daily, Disp: , Rfl:     Cyanocobalamin (VITAMIN B-12 CR PO), Take 5,000 mcg by mouth every 7 days , Disp: , Rfl:     Multiple Vitamins-Minerals (THERAPEUTIC MULTIVITAMIN-MINERALS) tablet, Take 2 tablets by mouth daily , Disp: , Rfl:     magnesium oxide (MAG-OX) 400 MG tablet, Take 400 mg by mouth daily. , Disp: , Rfl:     clobetasol (TEMOVATE) 0.05 % ointment, APPLY TOPICALLY 2 TIMES DAILY.  (Patient not taking: Reported on 9/3/2020), Disp: 60 g, Rfl: 0        Objective:   PHYSICAL EXAM:    There were no vitals taken for this visit.' on RA  Exam:  Gen: No acute distress, does not appear to be in pain. Appears well developed and nourished. HENT: Head is normocephalic and atraumatic. Normal appearing nose. External Ears normal.   Neck: No visualized mass. Trachea is midline   Eyes: EOM intact. No visible discharge. Resp:No visualized signs of difficulty breathing or respiratory distress, speaking in full sentences. Respiratory effort normal.  Neuro: Awake. Alert. Able to follow commands. No facial asymmetry. Skin: No significant lesions or discoloration noted on facial skin    Musculoskeletal: Normal range of motion of the neck. Psych: Oriented x 3. No anxiety. Normal affect. DATA:   12/27/12 PSG AHI 18.8/ REM AHI 40.7  CPAP titration 2/14/2013 CPAP 9 cmH2O  11/6/17 CPAP titration controlled sleep-related breathing with CPAP, PLMS 20.2, recommendation CPAP 12 cm H2O    Echo 10/31/17 EF 55%    CPAP compliance data:  Compliance download report from 10/4/17 to 11/2/17 showed patient is using machine 7:21 hrs/night with 100% compliance and AHI 0.3 within this time frame. 30/30days with greater than 4 hours of machine use. CPAP 10 cm H20    Compliance download report from 1/20/18 to 2/18/18showed patient is using machine 7:51 hrs/night with 100% compliance and AHI 0.6 within this time frame. 30/30days with greater than 4 hours of machine use. CPAP 12 cm H20    Compliance download report from 3/24/18to 4/22/18 showed patient is using machine 7:40 hrs/night with 100% compliance and AHI 0.7 within this time frame. 30/30days with greater than 4 hours of machine use. CPAP 12 cm H20    Compliance download report from 3/23/19 to 4/21/19 reviewed today by me and showed patient is using machine 7:55 hrs/night with 100% compliance and AHI 0.8 within this time frame. 30/30days with greater than 4 hours of machine use.   CPAP 12 cm H20    4/20/20- unable to obtain CPAP compliance report    Compliance download report from 3/20/21 to 4/18/21

## 2021-04-20 NOTE — TELEPHONE ENCOUNTER
the provision of medical care and treatment via Telehealth and the Service and you may not be able to receive diagnosis and/or treatment through the Service for every condition for which you seek diagnosis and/or treatment. 11. There are potential risks to the use of Telehealth, including but not limited to the risks described in this Telehealth Consent. 12. Your Provider(s) have discussed the use of Telehealth and the Service with you, including the benefits and risks of such and you have provided oral consent to your Provider(s) for the use of Telehealth and the Service. 15. You understand that it is your duty to provide your Provider(s) truthful, accurate and complete information, including all relevant information regarding care that you may have received or may be receiving from other healthcare providers outside of the Service. 14. You understand that each of your Provider(s) may determine in his or sole discretion that your condition is not suitable for diagnosis and/or treatment using the Service, and that you may need to seek medical care and treatment a specialist or other healthcare provider, outside of the Service. 15. You understand that you are fully responsible for payment for all services provided by Provider(s) or through use of the Service and that you may not be able to use third-party insurance. 16. You represent that (a) you have read this Telehealth Consent carefully, (b) you understand the risks and benefits of the Service and the use of Telehealth in the medical care and treatment provided to you by Provider(s) using the Service, and (c) you have the legal capacity and authority to provide this consent for yourself and/or the minor for which you are consenting under applicable federal and state laws, including laws relating to the age of [de-identified] and/or parental/guardian consent.    17. You give your informed consent to the use of Telehealth by Provider(s) using the Service under the terms described in the Terms of Service and this Telehealth Consent. The patient was read the following statement and has consented to the visit as of 4/20/21. The patient has been scheduled for their first telehealth visit on 4/20/21 with Micky Schmitz CNP.

## 2021-04-26 ENCOUNTER — ANTI-COAG VISIT (OUTPATIENT)
Dept: INTERNAL MEDICINE CLINIC | Age: 76
End: 2021-04-26

## 2021-04-26 ENCOUNTER — TELEPHONE (OUTPATIENT)
Dept: INTERNAL MEDICINE CLINIC | Age: 76
End: 2021-04-26

## 2021-04-26 LAB — INR BLD: 1.7

## 2021-04-26 NOTE — TELEPHONE ENCOUNTER
----- Message from Bill Rothman MD sent at 4/26/2021  1:49 PM EDT -----  Contact: [phone]  Ok  1 week  No change  ----- Message -----  From: [facility]  Sent: 4/26/2021  12:46 PM EDT  To: Bill Rothman MD    Patient had a dental appointment last week and was prescribed antibiotic by Dr Bk Tate for 3 days.     ----- Message -----  From: Bill Rothman MD  Sent: 4/26/2021  12:34 PM EDT  To: [facility]    Why low  ----- Message -----  From: [facility]  Sent: 4/26/2021   9:42 AM EDT  To: Bill Rothman MD    INR: 1.7  Currently taking 5 mg daily

## 2021-05-03 PROCEDURE — 93272 ECG/REVIEW INTERPRET ONLY: CPT | Performed by: INTERNAL MEDICINE

## 2021-05-04 ENCOUNTER — ANTI-COAG VISIT (OUTPATIENT)
Dept: INTERNAL MEDICINE CLINIC | Age: 76
End: 2021-05-04

## 2021-05-04 ENCOUNTER — TELEPHONE (OUTPATIENT)
Dept: INTERNAL MEDICINE CLINIC | Age: 76
End: 2021-05-04

## 2021-05-04 LAB — INR BLD: 1.7

## 2021-05-10 ENCOUNTER — TELEPHONE (OUTPATIENT)
Dept: CARDIOLOGY CLINIC | Age: 76
End: 2021-05-10

## 2021-05-10 DIAGNOSIS — I49.3 PVC (PREMATURE VENTRICULAR CONTRACTION): ICD-10-CM

## 2021-05-10 DIAGNOSIS — I47.29 NSVT (NONSUSTAINED VENTRICULAR TACHYCARDIA): Primary | ICD-10-CM

## 2021-05-10 DIAGNOSIS — I48.4 ATYPICAL ATRIAL FLUTTER (HCC): ICD-10-CM

## 2021-05-10 DIAGNOSIS — R00.2 PALPITATION: ICD-10-CM

## 2021-05-10 DIAGNOSIS — R06.02 SOB (SHORTNESS OF BREATH): ICD-10-CM

## 2021-05-10 DIAGNOSIS — I48.0 PAF (PAROXYSMAL ATRIAL FIBRILLATION) (HCC): ICD-10-CM

## 2021-05-10 NOTE — TELEPHONE ENCOUNTER
1.) Informed patient of results. He would like to know if he can stop Tikosyn given no AF on monitor. 2. )Patient did have some episodes of NSVT, PSVT, and PVCs (see media) that were symptomatic. Would you like to make any changes at this time?

## 2021-05-11 ENCOUNTER — ANTI-COAG VISIT (OUTPATIENT)
Dept: INTERNAL MEDICINE CLINIC | Age: 76
End: 2021-05-11

## 2021-05-11 ENCOUNTER — TELEPHONE (OUTPATIENT)
Dept: INTERNAL MEDICINE CLINIC | Age: 76
End: 2021-05-11

## 2021-05-11 LAB — INR BLD: 1.9

## 2021-05-11 NOTE — TELEPHONE ENCOUNTER
Informed patient of plan. Your provider has ordered testing for further evaluation. An order/prescription has been included in your paper work.  To schedule outpatient testing, contact Central Scheduling by calling [address] [place] [address] ([phone]).

## 2021-05-11 NOTE — TELEPHONE ENCOUNTER
----- Message from Jan Leonard MD sent at 5/11/2021 11:50 AM EDT -----  No change  2 weeks  ----- Message -----  From: Delilah Hernández  Sent: 5/11/2021   9:41 AM EDT  To: Jan Leonard MD    INR- 1.9    Taking 5 mg daily.

## 2021-05-19 ENCOUNTER — HOSPITAL ENCOUNTER (OUTPATIENT)
Dept: NON INVASIVE DIAGNOSTICS | Age: 76
Discharge: HOME | End: 2021-05-19
Payer: MEDICARE

## 2021-05-19 DIAGNOSIS — I47.29 NSVT (NONSUSTAINED VENTRICULAR TACHYCARDIA): ICD-10-CM

## 2021-05-19 DIAGNOSIS — R06.02 SOB (SHORTNESS OF BREATH): ICD-10-CM

## 2021-05-19 LAB
LV EF: 63 %
LVEF MODALITY: NORMAL

## 2021-05-19 PROCEDURE — A9502 TC99M TETROFOSMIN: HCPCS | Performed by: INTERNAL MEDICINE

## 2021-05-19 PROCEDURE — 3430000000 HC RX DIAGNOSTIC RADIOPHARMACEUTICAL: Performed by: INTERNAL MEDICINE

## 2021-05-19 PROCEDURE — 93017 CV STRESS TEST TRACING ONLY: CPT | Performed by: INTERNAL MEDICINE

## 2021-05-19 PROCEDURE — 78452 HT MUSCLE IMAGE SPECT MULT: CPT | Performed by: INTERNAL MEDICINE

## 2021-05-19 PROCEDURE — 6360000002 HC RX W HCPCS: Performed by: INTERNAL MEDICINE

## 2021-05-19 RX ADMIN — TETROFOSMIN 30 MILLICURIE: 1.38 INJECTION, POWDER, LYOPHILIZED, FOR SOLUTION INTRAVENOUS at 09:47

## 2021-05-19 RX ADMIN — REGADENOSON 0.4 MG: 0.08 INJECTION, SOLUTION INTRAVENOUS at 09:42

## 2021-05-19 RX ADMIN — TETROFOSMIN 10 MILLICURIE: 1.38 INJECTION, POWDER, LYOPHILIZED, FOR SOLUTION INTRAVENOUS at 08:09

## 2021-05-24 ENCOUNTER — TELEPHONE (OUTPATIENT)
Dept: INTERNAL MEDICINE CLINIC | Age: 76
End: 2021-05-24

## 2021-05-24 ENCOUNTER — ANTI-COAG VISIT (OUTPATIENT)
Dept: INTERNAL MEDICINE CLINIC | Age: 76
End: 2021-05-24

## 2021-05-24 LAB — INR BLD: 2.1

## 2021-05-24 NOTE — TELEPHONE ENCOUNTER
----- Message from Gladys Benítez MD sent at 5/24/2021 11:59 AM EDT -----  No change  2 week  ----- Message -----  From: [place] Amaya  Sent: 5/24/2021   9:53 AM EDT  To: Gladys Benítez MD    INR- 2.1

## 2021-05-25 NOTE — PROGRESS NOTES
[facility]   Cardiac Followup    Referring Provider:  Adams Crowe MD     Chief Complaint   Patient presents with    New Patient    Abnormal Test Results     stress test    Hypertension    Atrial Fibrillation    Shortness of Breath     sometimes    Edema     lower legs and feet    Palpitations        History of Present Illness:    Shabbir Hennessy is a 77 y.o. male who is here today to discuss an abnormal stress test. He has a past medical history of paroxysmal atrial fibrillation- (per cardiac event monitor report 9/2013, no ECG tracings or burden information available), atrial flutter, sleep apnea, hypertension, hyperlipidemia, DVT's. He has a stress test in 2011 which showed a small sized reversible perfusion defect involving the anteroseptal wall of the left ventricle to mid heart concerning for myocardial ischemia. He has a history of PVC ablation in 2014. He stated he continued to have occasional episodes of palpitations and was found to have atrial flutter. He underwent a CV, and an unsuccessful RFCA for typical atrial flutter on 4/12/17. Patient was started on dofetilide in June of 2017. He underwent EPS and aflutter ablation on 9/19/17. He had a normal stress test in 2017. Most recent stress test 5/19/2021 was abnornmal consistent with mixed ischemia and scar. Today he states he has been having palpitations that feels like a fast heart rate and also a hard beat which was the indications for his stress test. He states he has SOB that started months ago and is related to activity, no assoc chest pain. SOB resolves after he stops and rest and does not occur when resting. He states his SOB can occur daily and last for 10-15 minutes. Patient currently denies any weight gain, edema, palpitations, chest pain, dizziness, and syncope.       Past Medical History:   has a past medical history of Arthritis, Asthma, Atrial fibrillation ([address] Blood circulation, collateral, Diabetes mellitus Yes Historical Provider, MD   magnesium oxide (MAG-OX) 400 MG tablet Take 400 mg by mouth daily. Yes Historical Provider, MD   clobetasol (TEMOVATE) 0.05 % ointment APPLY TOPICALLY 2 TIMES DAILY. Patient not taking: Reported on 9/3/2020 12/31/19   Akash Oquendo MD        Allergies:  Bactrim [sulfamethoxazole-trimethoprim], Ciprofloxacin, Macrobid [nitrofurantoin monohyd macro], Sulfamethoxazole, Theophylline, Cefuroxime axetil, Cipro xr, Other, and Tape [adhesive tape]     Review of Systems:   · Constitutional: there has been no unanticipated weight loss. There's been no change in energy level, sleep pattern, or activity level. · Eyes: No visual changes or diplopia. No scleral icterus. · ENT: No Headaches, hearing loss or vertigo. No mouth sores or sore throat. · Cardiovascular: Reviewed in HPI  · Respiratory: No cough or wheezing, no sputum production. No hematemesis. · Gastrointestinal: No abdominal pain, appetite loss, blood in stools. No change in bowel or bladder habits. · Genitourinary: No dysuria, trouble voiding, or hematuria. · Musculoskeletal:  No gait disturbance, weakness or joint complaints. · Integumentary: No rash or pruritis. · Neurological: No headache, diplopia, change in muscle strength, numbness or tingling. No change in gait, balance, coordination, mood, affect, memory, mentation, behavior. · Psychiatric: No anxiety, no depression. · Endocrine: No malaise, fatigue or temperature intolerance. No excessive thirst, fluid intake, or urination. No tremor. · Hematologic/Lymphatic: No abnormal bruising or bleeding, blood clots or swollen lymph nodes. · Allergic/Immunologic: No nasal congestion or hives.     Physical Examination:    Vitals:    05/26/21 1256   BP: 118/72   Pulse: 80   SpO2: 96%        Constitutional and General Appearance: NAD   Respiratory:  · Normal excursion and expansion without use of accessory muscles  · Resp Auscultation: Normal breath sounds without dullness  Cardiovascular:  · The apical impulses not displaced  · Heart tones are crisp and normal  · Cervical veins are not engorged  · The carotid upstroke is normal in amplitude and contour without delay or bruit  · Normal S1S2, No S3, No Murmur  · Peripheral pulses are symmetrical and full  · There is no clubbing, cyanosis of the extremities. · No edema  · Femoral Arteries: 2+ and equal  · Pedal Pulses: 2+ and equal   Abdomen:  · No masses or tenderness  · Liver/Spleen: No Abnormalities Noted  Neurological/Psychiatric:  · Alert and oriented in all spheres  · Moves all extremities well  · Exhibits normal gait balance and coordination  · No abnormalities of mood, affect, memory, mentation, or behavior are noted      Stress test 12/23/11  Impression- 1. Small sized reversible perfusion defect involving the anteroseptal wall of the left ventricle to mid heart concerning for myocardial ischemia. Please correlate with EKG findings. 2. Left ventricular hypertrophy. 3. LVEF 59 %     NAVNEET/Cardioversion 2/7/2012  Summary-   1. A Navneet was performed without complications. 2.  LVEF 55   3. No thrombus or valvular vegetation identified. CARDIOVERSION-      After an adequate level of sedation was achieved, 150J, then 200J in   biphasic synchronized delivery was administered. No change in   rhythm. The patient awoke without complications. A post procedure   12 L ECG was ordered and reviewed. There were no complications encountered. 1.  Unsuccessful attempt at cardioversion. 2.  Patient remains in atrial fibrillation. Echocardiogram 11/24/15  Conclusions   Summary   Moderate concentric left ventricular hypertrophy is present. Global ejection fraction is normal and estimated from 55 % to 60 %. No obvious regional wall motion abnormalities are noted. Diastolic filling parameters suggests diastolic dysfunction. Moderately dilated left atrium. Aortic valve appears sclerotic but opens adequately. Trivial mitral and tricuspid regurgitation. Systolic pulmonary artery pressure (SPAP) is normal and estimated at 23 mmHg   (RA pressure 3 mm Hg). Echocardiogram 6/23/17  Conclusions      Summary   Normal systolic function with an estimated ejection fraction of 55-60%. Mild to moderate concentric left ventricular hypertrophy. No regional wall motion abnormalities are seen. Left ventricular diastolic filling pressure is indeterminate. Left atrium is moderately dilated. No change from last echo on 11/24/1015. Limited Echocardiogram 11/31/2017   Conclusions      Summary   This is a limited study pericardial effusion and shunt. Normal left ventricular systolic function with an estimated ejection   fraction of 55%. A bubble study was performed and fails to show evidence of shunting. There is a very small circumferential pericardial effusion noted. Stress test 11/1/2017  Conclusions    Summary  There is no evidence of stress induced ischemia. Normal LV function with  ejection fraction of 66%. There are no regional wall motion abnormalities. Low risk study. Stress test 5/19/2021  Conclusions    Summary  The overall quality of the study is fair. There is subdiaphragmatic  attenuation. Left ventricular cavity is noted to be normal size. The right ventricle is  normal in size. SPECT images demonstrate a small area of mildly decreased perfusion in the  apical lateral and mid-inferior lateral segments. The defect is mixed  ischemia and scar. There is associated wall motion abnormality, consistent  with ischemia. Gated SPECT imaging reveals normal myocardial thickening and  wall motion. Sum stress score of 7. No visual TID. Calculated TID of 0.99. The left ventricular ejection fraction is calculated to be 63%. Myocardial perfusion is abnormal. Consistent with mixed ischemia and scar. Low risk scan.      REECE 3/31-4/30/2021  No atrial fib   Predominately NSR  PSVT- symptomatic

## 2021-05-26 ENCOUNTER — TELEPHONE (OUTPATIENT)
Dept: CARDIOLOGY CLINIC | Age: 76
End: 2021-05-26

## 2021-05-26 ENCOUNTER — OFFICE VISIT (OUTPATIENT)
Dept: CARDIOLOGY CLINIC | Age: 76
End: 2021-05-26
Payer: MEDICARE

## 2021-05-26 VITALS
DIASTOLIC BLOOD PRESSURE: 72 MMHG | OXYGEN SATURATION: 96 % | HEART RATE: 80 BPM | SYSTOLIC BLOOD PRESSURE: 118 MMHG | BODY MASS INDEX: 39.17 KG/M2 | HEIGHT: 75 IN | WEIGHT: 315 LBS

## 2021-05-26 DIAGNOSIS — I48.0 PAF (PAROXYSMAL ATRIAL FIBRILLATION) (HCC): Primary | ICD-10-CM

## 2021-05-26 DIAGNOSIS — I10 ESSENTIAL HYPERTENSION: ICD-10-CM

## 2021-05-26 DIAGNOSIS — R06.02 SOB (SHORTNESS OF BREATH): ICD-10-CM

## 2021-05-26 DIAGNOSIS — I48.4 ATYPICAL ATRIAL FLUTTER (HCC): ICD-10-CM

## 2021-05-26 PROCEDURE — G8417 CALC BMI ABV UP PARAM F/U: HCPCS | Performed by: INTERNAL MEDICINE

## 2021-05-26 PROCEDURE — G8427 DOCREV CUR MEDS BY ELIG CLIN: HCPCS | Performed by: INTERNAL MEDICINE

## 2021-05-26 PROCEDURE — 99215 OFFICE O/P EST HI 40 MIN: CPT | Performed by: INTERNAL MEDICINE

## 2021-05-26 RX ORDER — ATORVASTATIN CALCIUM 40 MG/1
TABLET, FILM COATED ORAL
Qty: 90 TABLET | Refills: 0 | Status: SHIPPED | OUTPATIENT
Start: 2021-05-26 | End: 2021-10-19

## 2021-05-26 RX ORDER — SPIRONOLACTONE 25 MG/1
TABLET ORAL
Qty: 90 TABLET | Refills: 0 | Status: SHIPPED | OUTPATIENT
Start: 2021-05-26 | End: 2021-08-24

## 2021-05-26 RX ORDER — WARFARIN SODIUM 5 MG/1
TABLET ORAL
Qty: 90 TABLET | Refills: 0 | Status: SHIPPED | OUTPATIENT
Start: 2021-05-26 | End: 2021-08-24

## 2021-05-26 NOTE — LETTER
[address]   Cardiac Followup    Referring Provider:  Maya Moura MD     Chief Complaint   Patient presents with    New Patient    Abnormal Test Results     stress test    Hypertension    Atrial Fibrillation    Shortness of Breath     sometimes    Edema     lower legs and feet    Palpitations        History of Present Illness:    Yari Kay is a 77 y.o. male who is here today to discuss an abnormal stress test. He has a past medical history of paroxysmal atrial fibrillation- (per cardiac event monitor report 9/2013, no ECG tracings or burden information available), atrial flutter, sleep apnea, hypertension, hyperlipidemia, DVT's. He has a stress test in 2011 which showed a small sized reversible perfusion defect involving the anteroseptal wall of the left ventricle to mid heart concerning for myocardial ischemia. He has a history of PVC ablation in 2014. He stated he continued to have occasional episodes of palpitations and was found to have atrial flutter. He underwent a CV, and an unsuccessful RFCA for typical atrial flutter on 4/12/17. Patient was started on dofetilide in June of 2017. He underwent EPS and aflutter ablation on 9/19/17. He had a normal stress test in 2017. Most recent stress test 5/19/2021 was abnornmal consistent with mixed ischemia and scar. Today he states he has been having palpitations that feels like a fast heart rate and also a hard beat which was the indications for his stress test. He states he has SOB that started months ago and is related to activity, no assoc chest pain. SOB resolves after he stops and rest and does not occur when resting. He states his SOB can occur daily and last for 10-15 minutes. Patient currently denies any weight gain, edema, palpitations, chest pain, dizziness, and syncope.       Past Medical History:   has a past medical history of Arthritis, Asthma, Atrial fibrillation ([address] Blood circulation, collateral, Diabetes mellitus Yes Historical Provider, MD   metFORMIN (GLUCOPHAGE) 500 MG tablet TAKE 1 TABLET BY MOUTH  DAILY  Patient taking differently: Take 500 mg by mouth 2 times daily (with meals) TAKE 1 TABLET BY MOUTH  DAILY 2/2/21  Yes Mirta Márquez MD   montelukast (SINGULAIR) 10 MG tablet TAKE 1 TABLET BY MOUTH ONCE DAILY 2/1/21  Yes Mirta Márquez MD   torsemide (DEMADEX) 20 MG tablet TAKE 1 TABLET BY MOUTH TWO  TIMES DAILY  Patient taking differently: 20 mg daily  1/18/21  Yes Mirta Márquez MD   blood glucose test strips (TRUE METRIX BLOOD GLUCOSE TEST) strip Test daily. DX: E11.9 1/18/21  Yes Mirta Márquez MD   dilTIAZem (CARDIZEM) 120 MG tablet TAKE 1 TABLET BY MOUTH  EVERY 12 HOURS 9/3/20  Yes Jason Gallagher MD   Easy Touch Lancets 32G/Twist MISC TEST DAILY. DX: E11.9 3/23/20  Yes Mirta Márquez MD   blood glucose test strips (TRUE METRIX BLOOD GLUCOSE TEST) strip USE TO CHECK BLOOD GLUCOSE DAILY. DX: E11.9 3/23/20  Yes Mirta Márquez MD   [facility]) 500 MCG capsule Take 1 capsule by mouth every 12 hours 3/9/20  Yes Jason Gallagher MD   ACCU-CHEK MULTICLIX LANCETS MISC Check sugars once daily. Dx;E11.9 3/11/19  Yes Mirta Márquez MD   Lancet Devices (EASY TOUCH LANCING DEVICE) MISC Test Daily. DX: E11.9 12/26/18  Yes Mirta Márquez MD   Blood Glucose Monitoring Suppl (TRUE METRIX METER) VINNIE Use to check daily. DX;E11.9 12/22/18  Yes Mirta Márquez MD   Lancets Misc. (ACCU-CHEK MULTICLIX LANCET DEV) KIT Check sugars once daily.  DX;E11.9 12/21/18  Yes Mirta Márquez MD   Biotin 5 MG TABS Take 5 mg by mouth daily   Yes Historical Provider, MD   Cholecalciferol (VITAMIN D3) 5000 units TABS Take 5,000 Units by mouth daily   Yes Historical Provider, MD   Cyanocobalamin (VITAMIN B-12 CR PO) Take 5,000 mcg by mouth every 7 days    Yes Historical Provider, MD   Multiple Vitamins-Minerals (THERAPEUTIC MULTIVITAMIN-MINERALS) tablet Take 2 tablets by mouth daily dullness  Cardiovascular:  · The apical impulses not displaced  · Heart tones are crisp and normal  · Cervical veins are not engorged  · The carotid upstroke is normal in amplitude and contour without delay or bruit  · Normal S1S2, No S3, No Murmur  · Peripheral pulses are symmetrical and full  · There is no clubbing, cyanosis of the extremities. · No edema  · Femoral Arteries: 2+ and equal  · Pedal Pulses: 2+ and equal   Abdomen:  · No masses or tenderness  · Liver/Spleen: No Abnormalities Noted  Neurological/Psychiatric:  · Alert and oriented in all spheres  · Moves all extremities well  · Exhibits normal gait balance and coordination  · No abnormalities of mood, affect, memory, mentation, or behavior are noted      Stress test 12/23/11  Impression- 1. Small sized reversible perfusion defect involving the anteroseptal wall of the left ventricle to mid heart concerning for myocardial ischemia. Please correlate with EKG findings. 2. Left ventricular hypertrophy. 3. LVEF 59 %     NAVNEET/Cardioversion 2/7/2012  Summary-   1. A Navneet was performed without complications. 2.  LVEF 55   3. No thrombus or valvular vegetation identified. CARDIOVERSION-      After an adequate level of sedation was achieved, 150J, then 200J in   biphasic synchronized delivery was administered. No change in   rhythm. The patient awoke without complications. A post procedure   12 L ECG was ordered and reviewed. There were no complications encountered. 1.  Unsuccessful attempt at cardioversion. 2.  Patient remains in atrial fibrillation. Echocardiogram 11/24/15  Conclusions   Summary   Moderate concentric left ventricular hypertrophy is present. Global ejection fraction is normal and estimated from 55 % to 60 %. No obvious regional wall motion abnormalities are noted. Diastolic filling parameters suggests diastolic dysfunction. Moderately dilated left atrium. Aortic valve appears sclerotic but opens adequately. NSVT/PVC's-  Symptomatic  Nocturnal bradycardia     Assessment:   PANG - possible angina equivalent   Abnormal stress test  - results discussed w/ pt  Atrial fibrillation - see above. Defer to EP  Hypertension - controlled    Hyperlipidemia - stable  Sleep apnea   History of DVT   Diabetes Mellitus   Asthma       Plan:  Recommend an angiogram to evaluate shortness of breath- hold coumadin for 3 days prior to procedure. We want INR less than 1.7 the day of the procedure. Check the morning of the procedure and call us with the result    Echocardiogram   Start Aspirin 81 mg daily along with coumadin   Call if symptoms increase   Cardiac medications reviewed including indications and pertinent side effects    Check blood pressure and heart rate at home a few times per week- keep a log with dates and times and bring to office visit   Regular exercise and following a healthy diet encouraged   Follow up with me after the procedure     The scribes documentation has been prepared under my direction and personally reviewed by me in its entirety. I confirm that the note above accurately reflects all work, treatment, procedures, and medical decision making performed by me. Dr. Gladys Wiggins MD        Scribe's attestation: This note was scribed in the presence of Dr. Gladys Wiggins M.D. By Elizabeth Prince RN    Thank you for allowing me to participate in the care of this individual.      Marta Goldberg.  Natalee Perea M.D., Tammy Monae

## 2021-05-26 NOTE — PATIENT INSTRUCTIONS
Plan:  Recommend an angiogram to evaluate shortness of breath- hold coumadin for 3 days prior to procedure. We want INR less than 1.7 the day of the procedure.  Check the morning of the procedure and call us with the result    Echocardiogram   Start Aspirin 81 mg daily along with coumadin   Call if symptoms increase   Cardiac medications reviewed including indications and pertinent side effects    Check blood pressure and heart rate at home a few times per week- keep a log with dates and times and bring to office visit   Regular exercise and following a healthy diet encouraged   Follow up with me after the procedure

## 2021-05-26 NOTE — TELEPHONE ENCOUNTER
Please call patient to schedule a left heart Cath. Patient needs to wait until after 6/5/2021. He should hold coumadin for 3 days prior. He will need an echocardiogram as well.

## 2021-05-27 NOTE — TELEPHONE ENCOUNTER
Spoke with patient. Patient is scheduled with Dr. Jocelyn Boucher for [facility] 7am to the Cath Lab. Please have patient arrive to the main entrance of [facility] and check in with the registration desk. Remind patient to be NPO after midnight (8 hours prior). Do not apply lotions/creams on skin the day of procedure.

## 2021-06-07 ENCOUNTER — TELEPHONE (OUTPATIENT)
Dept: INTERNAL MEDICINE CLINIC | Age: 76
End: 2021-06-07

## 2021-06-07 NOTE — TELEPHONE ENCOUNTER
----- Message from Lauro Medina sent at 6/7/2021  1:02 PM EDT -----  Contact: Kg Taylor from [address] [facility]. She received info on [facility]. She still needed Lab Results  from Renal Panel and the most recent EKG.       Fax #   [phone]    BLM

## 2021-06-10 NOTE — TELEPHONE ENCOUNTER
Spoke to pt. ANGELINA Doss from the [facility] is lowering pt's Tikosyn from 500 mg BID to 200 mg BID, due to low kidney function in most recent lab results . Pt would like to discuss further with GWENDOLYN. Pt's last OV 3/31/21JMB. JMB please advise. Pt can be reached at [phone]. If needed you can reach ANGELINA Okeefe at [phone].       TY

## 2021-06-11 NOTE — TELEPHONE ENCOUNTER
TREVA for ANGELINA Whitney at the [place] to have pt's most recent lab results faxed over to us regarding his Tikosyn medication dose change.

## 2021-06-14 ENCOUNTER — OFFICE VISIT (OUTPATIENT)
Dept: INTERNAL MEDICINE CLINIC | Age: 76
End: 2021-06-14

## 2021-06-14 VITALS
DIASTOLIC BLOOD PRESSURE: 80 MMHG | SYSTOLIC BLOOD PRESSURE: 120 MMHG | HEIGHT: 75 IN | BODY MASS INDEX: 39.17 KG/M2 | HEART RATE: 62 BPM | WEIGHT: 315 LBS | RESPIRATION RATE: 18 BRPM

## 2021-06-14 DIAGNOSIS — I48.0 PAF (PAROXYSMAL ATRIAL FIBRILLATION) (HCC): ICD-10-CM

## 2021-06-14 DIAGNOSIS — I10 ESSENTIAL HYPERTENSION: ICD-10-CM

## 2021-06-14 DIAGNOSIS — E11.40 TYPE 2 DIABETES MELLITUS WITH DIABETIC NEUROPATHY, WITHOUT LONG-TERM CURRENT USE OF INSULIN (HCC): ICD-10-CM

## 2021-06-14 DIAGNOSIS — R94.39 ABNORMAL STRESS TEST: Primary | ICD-10-CM

## 2021-06-14 PROCEDURE — 1036F TOBACCO NON-USER: CPT | Performed by: INTERNAL MEDICINE

## 2021-06-14 PROCEDURE — 99212 OFFICE O/P EST SF 10 MIN: CPT | Performed by: INTERNAL MEDICINE

## 2021-06-14 PROCEDURE — 3017F COLORECTAL CA SCREEN DOC REV: CPT | Performed by: INTERNAL MEDICINE

## 2021-06-14 PROCEDURE — G8417 CALC BMI ABV UP PARAM F/U: HCPCS | Performed by: INTERNAL MEDICINE

## 2021-06-14 PROCEDURE — G8427 DOCREV CUR MEDS BY ELIG CLIN: HCPCS | Performed by: INTERNAL MEDICINE

## 2021-06-14 PROCEDURE — 4040F PNEUMOC VAC/ADMIN/RCVD: CPT | Performed by: INTERNAL MEDICINE

## 2021-06-14 PROCEDURE — 2022F DILAT RTA XM EVC RTNOPTHY: CPT | Performed by: INTERNAL MEDICINE

## 2021-06-14 PROCEDURE — 1123F ACP DISCUSS/DSCN MKR DOCD: CPT | Performed by: INTERNAL MEDICINE

## 2021-06-14 PROCEDURE — 3046F HEMOGLOBIN A1C LEVEL >9.0%: CPT | Performed by: INTERNAL MEDICINE

## 2021-06-16 ENCOUNTER — HOSPITAL ENCOUNTER (OUTPATIENT)
Dept: NON INVASIVE DIAGNOSTICS | Age: 76
Discharge: HOME | DRG: 247 | End: 2021-06-16
Attending: INTERNAL MEDICINE
Payer: MEDICARE

## 2021-06-16 ENCOUNTER — HOSPITAL ENCOUNTER (INPATIENT)
Dept: CARDIAC CATH/INVASIVE PROCEDURES | Age: 76
LOS: 1 days | Discharge: HOME | DRG: 247 | End: 2021-06-17
Attending: INTERNAL MEDICINE | Admitting: INTERNAL MEDICINE
Payer: MEDICARE

## 2021-06-16 DIAGNOSIS — I48.0 PAF (PAROXYSMAL ATRIAL FIBRILLATION) (HCC): ICD-10-CM

## 2021-06-16 DIAGNOSIS — I10 ESSENTIAL HYPERTENSION: ICD-10-CM

## 2021-06-16 DIAGNOSIS — R06.02 SOB (SHORTNESS OF BREATH): ICD-10-CM

## 2021-06-16 PROBLEM — I20.89 ANGINA AT REST: Status: ACTIVE | Noted: 2021-06-16

## 2021-06-16 PROBLEM — I20.8 ANGINA AT REST (HCC): Status: ACTIVE | Noted: 2021-06-16

## 2021-06-16 LAB
A/G RATIO: 1.2 (ref 1.1–2.2)
ALBUMIN SERPL-MCNC: 4.1 G/DL (ref 3.4–5)
ALP BLD-CCNC: 103 U/L (ref 40–129)
ALT SERPL-CCNC: 24 U/L (ref 10–40)
ANION GAP SERPL CALCULATED.3IONS-SCNC: 12 MMOL/L (ref 3–16)
AST SERPL-CCNC: 23 U/L (ref 15–37)
BILIRUB SERPL-MCNC: 0.8 MG/DL (ref 0–1)
BUN BLDV-MCNC: 26 MG/DL (ref 7–20)
CALCIUM SERPL-MCNC: 9.3 MG/DL (ref 8.3–10.6)
CHLORIDE BLD-SCNC: 100 MMOL/L (ref 99–110)
CHOLESTEROL, TOTAL: 139 MG/DL (ref 0–199)
CO2: 25 MMOL/L (ref 21–32)
CREAT SERPL-MCNC: 1.2 MG/DL (ref 0.8–1.3)
EKG ATRIAL RATE: 72 BPM
EKG DIAGNOSIS: NORMAL
EKG P AXIS: 70 DEGREES
EKG P-R INTERVAL: 276 MS
EKG Q-T INTERVAL: 452 MS
EKG QRS DURATION: 90 MS
EKG QTC CALCULATION (BAZETT): 494 MS
EKG R AXIS: 8 DEGREES
EKG T AXIS: 10 DEGREES
EKG VENTRICULAR RATE: 72 BPM
GFR AFRICAN AMERICAN: >60
GFR NON-AFRICAN AMERICAN: 59
GLOBULIN: 3.4 G/DL
GLUCOSE BLD-MCNC: 153 MG/DL (ref 70–99)
GLUCOSE BLD-MCNC: 160 MG/DL (ref 70–99)
HCT VFR BLD CALC: 36.2 % (ref 40.5–52.5)
HDLC SERPL-MCNC: 39 MG/DL (ref 40–60)
HEMOGLOBIN: 12.1 G/DL (ref 13.5–17.5)
INR BLD: 1.29 (ref 0.86–1.14)
LDL CHOLESTEROL CALCULATED: 75 MG/DL
LV EF: 55 %
LVEF MODALITY: NORMAL
MCH RBC QN AUTO: 29.3 PG (ref 26–34)
MCHC RBC AUTO-ENTMCNC: 33.4 G/DL (ref 31–36)
MCV RBC AUTO: 87.9 FL (ref 80–100)
PDW BLD-RTO: 16.4 % (ref 12.4–15.4)
PERFORMED ON: ABNORMAL
PLATELET # BLD: 184 K/UL (ref 135–450)
PMV BLD AUTO: 7 FL (ref 5–10.5)
POTASSIUM SERPL-SCNC: 4 MMOL/L (ref 3.5–5.1)
PROTHROMBIN TIME: 15 SEC (ref 10–13.2)
RBC # BLD: 4.12 M/UL (ref 4.2–5.9)
SODIUM BLD-SCNC: 137 MMOL/L (ref 136–145)
TOTAL PROTEIN: 7.5 G/DL (ref 6.4–8.2)
TRIGL SERPL-MCNC: 126 MG/DL (ref 0–150)
VLDLC SERPL CALC-MCNC: 25 MG/DL
WBC # BLD: 8.3 K/UL (ref 4–11)

## 2021-06-16 PROCEDURE — C1874 STENT, COATED/COV W/DEL SYS: HCPCS

## 2021-06-16 PROCEDURE — 2580000003 HC RX 258

## 2021-06-16 PROCEDURE — C9600 PERC DRUG-EL COR STENT SING: HCPCS

## 2021-06-16 PROCEDURE — 85027 COMPLETE CBC AUTOMATED: CPT

## 2021-06-16 PROCEDURE — 2500000003 HC RX 250 WO HCPCS

## 2021-06-16 PROCEDURE — C1769 GUIDE WIRE: HCPCS

## 2021-06-16 PROCEDURE — C9601 PERC DRUG-EL COR STENT BRAN: HCPCS

## 2021-06-16 PROCEDURE — 80061 LIPID PANEL: CPT

## 2021-06-16 PROCEDURE — 6360000002 HC RX W HCPCS

## 2021-06-16 PROCEDURE — 93458 L HRT ARTERY/VENTRICLE ANGIO: CPT

## 2021-06-16 PROCEDURE — 92928 PRQ TCAT PLMT NTRAC ST 1 LES: CPT | Performed by: INTERNAL MEDICINE

## 2021-06-16 PROCEDURE — 027135Z DILATION OF CORONARY ARTERY, TWO ARTERIES WITH TWO DRUG-ELUTING INTRALUMINAL DEVICES, PERCUTANEOUS APPROACH: ICD-10-PCS | Performed by: INTERNAL MEDICINE

## 2021-06-16 PROCEDURE — 02703DZ DILATION OF CORONARY ARTERY, ONE ARTERY WITH INTRALUMINAL DEVICE, PERCUTANEOUS APPROACH: ICD-10-PCS | Performed by: INTERNAL MEDICINE

## 2021-06-16 PROCEDURE — C1725 CATH, TRANSLUMIN NON-LASER: HCPCS

## 2021-06-16 PROCEDURE — 6360000004 HC RX CONTRAST MEDICATION

## 2021-06-16 PROCEDURE — 93306 TTE W/DOPPLER COMPLETE: CPT

## 2021-06-16 PROCEDURE — 80053 COMPREHEN METABOLIC PANEL: CPT

## 2021-06-16 PROCEDURE — 6370000000 HC RX 637 (ALT 250 FOR IP): Performed by: INTERNAL MEDICINE

## 2021-06-16 PROCEDURE — 99152 MOD SED SAME PHYS/QHP 5/>YRS: CPT | Performed by: INTERNAL MEDICINE

## 2021-06-16 PROCEDURE — 93010 ELECTROCARDIOGRAM REPORT: CPT | Performed by: INTERNAL MEDICINE

## 2021-06-16 PROCEDURE — 4A023N7 MEASUREMENT OF CARDIAC SAMPLING AND PRESSURE, LEFT HEART, PERCUTANEOUS APPROACH: ICD-10-PCS | Performed by: INTERNAL MEDICINE

## 2021-06-16 PROCEDURE — 93458 L HRT ARTERY/VENTRICLE ANGIO: CPT | Performed by: INTERNAL MEDICINE

## 2021-06-16 PROCEDURE — 2060000000 HC ICU INTERMEDIATE R&B

## 2021-06-16 PROCEDURE — C1894 INTRO/SHEATH, NON-LASER: HCPCS

## 2021-06-16 PROCEDURE — 92929 PR PRQ TRLUML CORONARY STENT W/ANGIO ADDL ART/BRNCH: CPT | Performed by: INTERNAL MEDICINE

## 2021-06-16 PROCEDURE — B2111ZZ FLUOROSCOPY OF MULTIPLE CORONARY ARTERIES USING LOW OSMOLAR CONTRAST: ICD-10-PCS | Performed by: INTERNAL MEDICINE

## 2021-06-16 PROCEDURE — 2709999900 HC NON-CHARGEABLE SUPPLY

## 2021-06-16 PROCEDURE — 85347 COAGULATION TIME ACTIVATED: CPT

## 2021-06-16 PROCEDURE — 85610 PROTHROMBIN TIME: CPT

## 2021-06-16 PROCEDURE — 2580000003 HC RX 258: Performed by: INTERNAL MEDICINE

## 2021-06-16 PROCEDURE — 93005 ELECTROCARDIOGRAM TRACING: CPT | Performed by: INTERNAL MEDICINE

## 2021-06-16 PROCEDURE — 6370000000 HC RX 637 (ALT 250 FOR IP)

## 2021-06-16 PROCEDURE — C1887 CATHETER, GUIDING: HCPCS

## 2021-06-16 RX ORDER — MIDAZOLAM HYDROCHLORIDE 5 MG/ML
INJECTION INTRAMUSCULAR; INTRAVENOUS
Status: COMPLETED | OUTPATIENT
Start: 2021-06-16 | End: 2021-06-16

## 2021-06-16 RX ORDER — ALPRAZOLAM 0.25 MG/1
0.5 TABLET ORAL EVERY 6 HOURS PRN
Status: DISCONTINUED | OUTPATIENT
Start: 2021-06-16 | End: 2021-06-17 | Stop reason: HOSPADM

## 2021-06-16 RX ORDER — FENTANYL CITRATE 50 UG/ML
INJECTION, SOLUTION INTRAMUSCULAR; INTRAVENOUS
Status: COMPLETED | OUTPATIENT
Start: 2021-06-16 | End: 2021-06-16

## 2021-06-16 RX ORDER — WARFARIN SODIUM 5 MG/1
5 TABLET ORAL DAILY
Status: DISCONTINUED | OUTPATIENT
Start: 2021-06-16 | End: 2021-06-17 | Stop reason: HOSPADM

## 2021-06-16 RX ORDER — SODIUM CHLORIDE 9 MG/ML
INJECTION, SOLUTION INTRAVENOUS CONTINUOUS
Status: ACTIVE | OUTPATIENT
Start: 2021-06-16 | End: 2021-06-16

## 2021-06-16 RX ORDER — ASPIRIN 81 MG/1
81 TABLET, CHEWABLE ORAL DAILY
Status: DISCONTINUED | OUTPATIENT
Start: 2021-06-17 | End: 2021-06-17 | Stop reason: HOSPADM

## 2021-06-16 RX ORDER — ACETAMINOPHEN 325 MG/1
650 TABLET ORAL EVERY 4 HOURS PRN
Status: DISCONTINUED | OUTPATIENT
Start: 2021-06-16 | End: 2021-06-17 | Stop reason: HOSPADM

## 2021-06-16 RX ORDER — DILTIAZEM HYDROCHLORIDE 60 MG/1
120 TABLET, FILM COATED ORAL EVERY 12 HOURS SCHEDULED
Status: DISCONTINUED | OUTPATIENT
Start: 2021-06-16 | End: 2021-06-17 | Stop reason: HOSPADM

## 2021-06-16 RX ORDER — TORSEMIDE 20 MG/1
20 TABLET ORAL DAILY
Status: DISCONTINUED | OUTPATIENT
Start: 2021-06-16 | End: 2021-06-17 | Stop reason: HOSPADM

## 2021-06-16 RX ORDER — ATORVASTATIN CALCIUM 40 MG/1
40 TABLET, FILM COATED ORAL DAILY
Status: DISCONTINUED | OUTPATIENT
Start: 2021-06-16 | End: 2021-06-17 | Stop reason: HOSPADM

## 2021-06-16 RX ORDER — DOFETILIDE 0.25 MG/1
500 CAPSULE ORAL EVERY 12 HOURS SCHEDULED
Status: DISCONTINUED | OUTPATIENT
Start: 2021-06-16 | End: 2021-06-17 | Stop reason: HOSPADM

## 2021-06-16 RX ORDER — ONDANSETRON 2 MG/ML
4 INJECTION INTRAMUSCULAR; INTRAVENOUS EVERY 6 HOURS PRN
Status: DISCONTINUED | OUTPATIENT
Start: 2021-06-16 | End: 2021-06-17 | Stop reason: HOSPADM

## 2021-06-16 RX ORDER — SODIUM CHLORIDE 9 MG/ML
1000 INJECTION, SOLUTION INTRAVENOUS CONTINUOUS
Status: DISCONTINUED | OUTPATIENT
Start: 2021-06-16 | End: 2021-06-17 | Stop reason: HOSPADM

## 2021-06-16 RX ADMIN — FENTANYL CITRATE 100 MCG: 50 INJECTION, SOLUTION INTRAMUSCULAR; INTRAVENOUS at 12:32

## 2021-06-16 RX ADMIN — FENTANYL CITRATE 50 MCG: 50 INJECTION, SOLUTION INTRAMUSCULAR; INTRAVENOUS at 08:54

## 2021-06-16 RX ADMIN — FENTANYL CITRATE 50 MCG: 50 INJECTION, SOLUTION INTRAMUSCULAR; INTRAVENOUS at 12:49

## 2021-06-16 RX ADMIN — MIDAZOLAM HYDROCHLORIDE 2 MG: 5 INJECTION INTRAMUSCULAR; INTRAVENOUS at 12:32

## 2021-06-16 RX ADMIN — FENTANYL CITRATE 50 MCG: 50 INJECTION, SOLUTION INTRAMUSCULAR; INTRAVENOUS at 12:52

## 2021-06-16 RX ADMIN — MIDAZOLAM HYDROCHLORIDE 1 MG: 5 INJECTION INTRAMUSCULAR; INTRAVENOUS at 13:22

## 2021-06-16 RX ADMIN — MIDAZOLAM HYDROCHLORIDE 2 MG: 5 INJECTION INTRAMUSCULAR; INTRAVENOUS at 08:54

## 2021-06-16 RX ADMIN — MIDAZOLAM HYDROCHLORIDE 1 MG: 5 INJECTION INTRAMUSCULAR; INTRAVENOUS at 13:13

## 2021-06-16 RX ADMIN — FENTANYL CITRATE 50 MCG: 50 INJECTION, SOLUTION INTRAMUSCULAR; INTRAVENOUS at 08:59

## 2021-06-16 RX ADMIN — MIDAZOLAM HYDROCHLORIDE 1 MG: 5 INJECTION INTRAMUSCULAR; INTRAVENOUS at 12:49

## 2021-06-16 RX ADMIN — ATORVASTATIN CALCIUM 40 MG: 40 TABLET, FILM COATED ORAL at 19:04

## 2021-06-16 RX ADMIN — TORSEMIDE 20 MG: 20 TABLET ORAL at 19:04

## 2021-06-16 RX ADMIN — ALPRAZOLAM 0.5 MG: 0.25 TABLET ORAL at 07:58

## 2021-06-16 RX ADMIN — DILTIAZEM HYDROCHLORIDE 120 MG: 60 TABLET, FILM COATED ORAL at 20:42

## 2021-06-16 RX ADMIN — FENTANYL CITRATE 50 MCG: 50 INJECTION, SOLUTION INTRAMUSCULAR; INTRAVENOUS at 12:32

## 2021-06-16 RX ADMIN — TICAGRELOR 90 MG: 90 TABLET ORAL at 20:42

## 2021-06-16 RX ADMIN — MIDAZOLAM HYDROCHLORIDE 1 MG: 5 INJECTION INTRAMUSCULAR; INTRAVENOUS at 08:59

## 2021-06-16 RX ADMIN — FENTANYL CITRATE 50 MCG: 50 INJECTION, SOLUTION INTRAMUSCULAR; INTRAVENOUS at 13:22

## 2021-06-16 RX ADMIN — FENTANYL CITRATE 50 MCG: 50 INJECTION, SOLUTION INTRAMUSCULAR; INTRAVENOUS at 13:13

## 2021-06-16 RX ADMIN — MIDAZOLAM HYDROCHLORIDE 1 MG: 5 INJECTION INTRAMUSCULAR; INTRAVENOUS at 09:09

## 2021-06-16 RX ADMIN — DOFETILIDE 500 MCG: 0.25 CAPSULE ORAL at 20:42

## 2021-06-16 RX ADMIN — WARFARIN SODIUM 5 MG: 5 TABLET ORAL at 19:04

## 2021-06-16 RX ADMIN — SODIUM CHLORIDE: 9 INJECTION, SOLUTION INTRAVENOUS at 20:42

## 2021-06-16 RX ADMIN — MIDAZOLAM HYDROCHLORIDE 1 MG: 5 INJECTION INTRAMUSCULAR; INTRAVENOUS at 12:52

## 2021-06-16 RX ADMIN — FENTANYL CITRATE 50 MCG: 50 INJECTION, SOLUTION INTRAMUSCULAR; INTRAVENOUS at 13:06

## 2021-06-16 ASSESSMENT — PAIN SCALES - GENERAL: PAINLEVEL_OUTOF10: 0

## 2021-06-16 NOTE — POST SEDATION
Patient:  Radha Salas   :   1945    A pre-sedation re-evaluation was performed immediately at the end of the procedure. Procedure:  Cardiac cath  Medications: Procedural sedation with minimal conscious sedation  Complications: None  Estimated Blood Loss: none  Specimens: Were not obtained        [place] Medication and Procedural Reconciliation:  I agree that the documented medications and procedures performed are true. The medications were given under my order. The procedures were performed under my direct supervision.

## 2021-06-16 NOTE — TELEPHONE ENCOUNTER
TREVA for ANGELINA Mandel to fax us the pt's most recent lab results regarding Tikosyn medication change.      TY

## 2021-06-16 NOTE — PRE SEDATION
Brief Pre-Op Note/Sedation Assessment      Mary Tyler  1945  Cath Pool Rm/NONE      [id number]  7:48 AM    Planned Procedure: Cardiac Catheterization Procedure    Post Procedure Plan: Return to same level of care    Consent: I have discussed with the patient and/or the patient representative the indication, alternatives, and the possible risks and/or complications of the planned procedure and the anesthesia methods. The patient and/or patient representative appear to understand and agree to proceed. Chief Complaint: Anginal Equivalent  Dyspnea on Exertion      Indications for Cath Procedure:  Suspected CAD  Anginal Classification within 2 weeks:  CCS III - Symptoms with everyday living activities, i.e., moderate limitation  NYHA Heart Failure Class within 2 weeks: No symptoms  Is Cath Lab Visit Valve-related?: No  Surgical Risk: N/A  Functional Type: N/A    Anti- Anginal Meds within 2 weeks:   Yes: Ca Channel Blockers    Stress or Imaging Studies Performed (within 6 months):  Stress Test with SPECT Result: Positive:  apical Risk/Extent of Ischemia:  Low      Vital Signs:  Ht 6' 3\" (1.905 m)   Wt (!) 335 lb 6.4 oz (152.1 kg)   BMI 41.92 kg/m²     Allergies:   Allergies   Allergen Reactions    Bactrim [Sulfamethoxazole-Trimethoprim] Diarrhea    Ciprofloxacin      Bad headaches    Macrobid [Nitrofurantoin Monohyd Macro]     Sulfamethoxazole Diarrhea    Theophylline      Theodur-caused severe headaches    Cefuroxime Axetil Rash and Itching    Cipro Xr Rash    Other Rash and Other (See Comments)     Ekg leads-glue    Tape Anne Dank Tape] Rash     Skin irritaion  Eats away at skin, paper tape OK  Plastic tape       Past Medical History:  Past Medical History:   Diagnosis Date    Arthritis     Asthma     past hx    Atrial fibrillation ([address]     Blood circulation, collateral     Diabetes mellitus ([facility] 12/24/2018    DVT (deep venous thrombosis) (HCC)     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    Route Frequency Provider Last Rate Last Admin    0.9 % sodium chloride infusion  1,000 mL Intravenous Continuous Rena Monteiro MD               Pre-Sedation:    Pre-Sedation Documentation and Exam:  I have assessed the patient and agree with the H&P present on the chart. Prior History of Anesthesia Complications:   none    Modified Mallampati:  II (soft palate, uvula, fauces visible)    ASA Classification:  Class 2 - A normal healthy patient with mild systemic disease      Jean Marie Scale: Activity:  2 - Able to move 4 extremities voluntarily on command  Respiration:  2 - Able to breathe deeply and cough freely  Circulation:  2 - BP+/- 20mmHg of normal  Consciousness:  2 - Fully awake  Oxygen Saturation (color):  2 - Able to maintain oxygen saturation >92% on room air    Sedation/Anesthesia Plan:  Guard the patient's safety and welfare. Minimize physical discomfort and pain. Minimize negative psychological responses to treatment by providing sedation and analgesia and maximize the potential amnesia. Patient to meet pre-procedure discharge plan.     Medication Planned:  midazolam intravenously    Patient is an appropriate candidate for plan of sedation: yes      Electronically signed by Rena Monteiro MD on 6/16/2021 at 7:48 AM

## 2021-06-16 NOTE — H&P
[facility]   Cardiac Followup    Referring Provider:  Jan Leonard MD     No chief complaint on file. History of Present Illness:    Radha Isabel is a 77 y.o. male who is here today to discuss an abnormal stress test. He has a past medical history of paroxysmal atrial fibrillation- (per cardiac event monitor report 9/2013, no ECG tracings or burden information available), atrial flutter, sleep apnea, hypertension, hyperlipidemia, DVT's. He has a stress test in 2011 which showed a small sized reversible perfusion defect involving the anteroseptal wall of the left ventricle to mid heart concerning for myocardial ischemia. He has a history of PVC ablation in 2014. He stated he continued to have occasional episodes of palpitations and was found to have atrial flutter. He underwent a CV, and an unsuccessful RFCA for typical atrial flutter on 4/12/17. Patient was started on dofetilide in June of 2017. He underwent EPS and aflutter ablation on 9/19/17. He had a normal stress test in 2017. Most recent stress test 5/19/2021 was abnornmal consistent with mixed ischemia and scar. Today he states he has been having palpitations that feels like a fast heart rate and also a hard beat which was the indications for his stress test. He states he has SOB that started months ago and is related to activity, no assoc chest pain. SOB resolves after he stops and rest and does not occur when resting. He states his SOB can occur daily and last for 10-15 minutes. Patient currently denies any weight gain, edema, palpitations, chest pain, dizziness, and syncope.       Past Medical History:   has a past medical history of Arthritis, Asthma, Atrial fibrillation ([address] Blood circulation, collateral, Diabetes mellitus ([address] DVT (deep venous thrombosis) ([address] Histoplasmosis, History of knee replacement procedure of right knee, Hx of blood clots, Hyperlipidemia, Hypertension, Knee osteoarthritis, Left TKR, Lung nodule, Neuromuscular disorder ([facility] Palpitations, Sleep apnea, Stone, kidney, and UTI (urinary tract infection). Surgical History:   has a past surgical history that includes Cystoscopy; Tonsillectomy; Knee arthroscopy (4/19/2011); Colonoscopy; Cystocopy (2/8/13); ablation of dysrhythmic focus (2013); Carpal tunnel release (Right, 9-30-15); Bariatric Surgery (2013); Carpal tunnel release (Left, 3/20/16); skin biopsy; Diagnostic Cardiac Cath Lab Procedure; joint replacement (Bilateral); Cardiac surgery (2013); thoracotomy; ERCP (02/18/2019); ERCP (N/A, 2/18/2019); ERCP (2/18/2019); Cholecystectomy, laparoscopic (N/A, 2/19/2019); Intracapsular cataract extraction (Right, 1/14/2020); and Intracapsular cataract extraction (Left, 1/21/2020). Social History:   reports that he quit smoking about 45 years ago. His smoking use included cigarettes. He has a 34.00 pack-year smoking history. He has never used smokeless tobacco. He reports that he does not drink alcohol and does not use drugs. Family History:  family history includes Arthritis in his father; Cancer in his mother; Kidney Disease in his mother; Other in his father; Stroke in his father and mother; Substance Abuse in his father. Home Medications:  Prior to Admission medications    Medication Sig Start Date End Date Taking?  Authorizing Provider   warfarin (COUMADIN) 5 MG tablet TAKE ONE TABLET BY MOUTH DAILY 5/26/21   Sarah Jones MD   spironolactone (ALDACTONE) 25 MG tablet TAKE ONE TABLET BY MOUTH DAILY 5/26/21   Sarah Jones MD   atorvastatin (LIPITOR) 40 MG tablet TAKE ONE TABLET BY MOUTH DAILY 5/26/21   Sarah Jones MD   alogliptin (NESINA) 25 MG TABS tablet TAKE ONE TABLET BY MOUTH ONCE DAILY FOR BLOOD SUGAR 9/8/20   Historical Provider, MD   metFORMIN (GLUCOPHAGE) 500 MG tablet TAKE 1 TABLET BY MOUTH  DAILY  Patient taking differently: Take 500 mg by mouth 2 times daily (with meals) TAKE 1 TABLET BY MOUTH  DAILY 2/2/21   Balwinder Quinones Provider, MD        Allergies:  Bactrim [sulfamethoxazole-trimethoprim], Ciprofloxacin, Macrobid [nitrofurantoin monohyd macro], Sulfamethoxazole, Theophylline, Cefuroxime axetil, Cipro xr, Other, and Tape [adhesive tape]     Review of Systems:   · Constitutional: there has been no unanticipated weight loss. There's been no change in energy level, sleep pattern, or activity level. · Eyes: No visual changes or diplopia. No scleral icterus. · ENT: No Headaches, hearing loss or vertigo. No mouth sores or sore throat. · Cardiovascular: Reviewed in HPI  · Respiratory: No cough or wheezing, no sputum production. No hematemesis. · Gastrointestinal: No abdominal pain, appetite loss, blood in stools. No change in bowel or bladder habits. · Genitourinary: No dysuria, trouble voiding, or hematuria. · Musculoskeletal:  No gait disturbance, weakness or joint complaints. · Integumentary: No rash or pruritis. · Neurological: No headache, diplopia, change in muscle strength, numbness or tingling. No change in gait, balance, coordination, mood, affect, memory, mentation, behavior. · Psychiatric: No anxiety, no depression. · Endocrine: No malaise, fatigue or temperature intolerance. No excessive thirst, fluid intake, or urination. No tremor. · Hematologic/Lymphatic: No abnormal bruising or bleeding, blood clots or swollen lymph nodes. · Allergic/Immunologic: No nasal congestion or hives. Physical Examination:    There were no vitals filed for this visit.      Constitutional and General Appearance: NAD   Respiratory:  · Normal excursion and expansion without use of accessory muscles  · Resp Auscultation: Normal breath sounds without dullness  Cardiovascular:  · The apical impulses not displaced  · Heart tones are crisp and normal  · Cervical veins are not engorged  · The carotid upstroke is normal in amplitude and contour without delay or bruit  · Normal S1S2, No S3, No Murmur  · Peripheral pulses are symmetrical and full  · There is no clubbing, cyanosis of the extremities. · No edema  · Femoral Arteries: 2+ and equal  · Pedal Pulses: 2+ and equal   Abdomen:  · No masses or tenderness  · Liver/Spleen: No Abnormalities Noted  Neurological/Psychiatric:  · Alert and oriented in all spheres  · Moves all extremities well  · Exhibits normal gait balance and coordination  · No abnormalities of mood, affect, memory, mentation, or behavior are noted      Stress test 12/23/11  Impression- 1. Small sized reversible perfusion defect involving the anteroseptal wall of the left ventricle to mid heart concerning for myocardial ischemia. Please correlate with EKG findings. 2. Left ventricular hypertrophy. 3. LVEF 59 %     NAVNEET/Cardioversion 2/7/2012  Summary-   1. A Navneet was performed without complications. 2.  LVEF 55   3. No thrombus or valvular vegetation identified. CARDIOVERSION-      After an adequate level of sedation was achieved, 150J, then 200J in   biphasic synchronized delivery was administered. No change in   rhythm. The patient awoke without complications. A post procedure   12 L ECG was ordered and reviewed. There were no complications encountered. 1.  Unsuccessful attempt at cardioversion. 2.  Patient remains in atrial fibrillation. Echocardiogram 11/24/15  Conclusions   Summary   Moderate concentric left ventricular hypertrophy is present. Global ejection fraction is normal and estimated from 55 % to 60 %. No obvious regional wall motion abnormalities are noted. Diastolic filling parameters suggests diastolic dysfunction. Moderately dilated left atrium. Aortic valve appears sclerotic but opens adequately. Trivial mitral and tricuspid regurgitation. Systolic pulmonary artery pressure (SPAP) is normal and estimated at 23 mmHg   (RA pressure 3 mm Hg). Echocardiogram 6/23/17  Conclusions      Summary   Normal systolic function with an estimated ejection fraction of 55-60%.    Mild to moderate concentric left ventricular hypertrophy. No regional wall motion abnormalities are seen. Left ventricular diastolic filling pressure is indeterminate. Left atrium is moderately dilated. No change from last echo on 11/24/1015. Limited Echocardiogram 11/31/2017   Conclusions      Summary   This is a limited study pericardial effusion and shunt. Normal left ventricular systolic function with an estimated ejection   fraction of 55%. A bubble study was performed and fails to show evidence of shunting. There is a very small circumferential pericardial effusion noted. Stress test 11/1/2017  Conclusions    Summary  There is no evidence of stress induced ischemia. Normal LV function with  ejection fraction of 66%. There are no regional wall motion abnormalities. Low risk study. Stress test 5/19/2021  Conclusions    Summary  The overall quality of the study is fair. There is subdiaphragmatic  attenuation. Left ventricular cavity is noted to be normal size. The right ventricle is  normal in size. SPECT images demonstrate a small area of mildly decreased perfusion in the  apical lateral and mid-inferior lateral segments. The defect is mixed  ischemia and scar. There is associated wall motion abnormality, consistent  with ischemia. Gated SPECT imaging reveals normal myocardial thickening and  wall motion. Sum stress score of 7. No visual TID. Calculated TID of 0.99. The left ventricular ejection fraction is calculated to be 63%. Myocardial perfusion is abnormal. Consistent with mixed ischemia and scar. Low risk scan. REECE 3/31-4/30/2021  No atrial fib   Predominately NSR  PSVT- symptomatic   NSVT/PVC's-  Symptomatic  Nocturnal bradycardia     Assessment:   PANG - possible angina equivalent   Abnormal stress test  - results discussed w/ pt  Atrial fibrillation - see above.  Defer to EP  Hypertension - controlled    Hyperlipidemia - stable  Sleep apnea   History of DVT

## 2021-06-16 NOTE — PROCEDURES
[facility]       Cardiac Catheterization Lab Report    PATIENT: Harleen Chavez  DATE: 2021  MRN: [id number]  CSN: [id number]  : 1945      Performing Physician: Suly Aguirre MD, [id number], [facility]  Primary Care Physician: Mirta Márquez MD  Admitting/Referring provider: Dl Augustine MD     Procedures Performed:   1. Selective left heart catheterization  2. Percutaneous coronary intervention of the left anterior descending and diagonal artery artery using complex bifurcation technique. Procedure Findings:  1. Critical LAD diagonal disease with angina classification 0, 0, 1 along with mid LAD 70% stenosis    Indications:   Patient Active Problem List   Diagnosis    Arthritis    PAF (paroxysmal atrial fibrillation) (HCC)    Essential hypertension    Carpal tunnel syndrome    Polyneuropathy    Numbness and tingling of both legs    Moderate obstructive sleep apnea    S/P thoracotomy    Atypical atrial flutter (HCC)    Type 2 diabetes mellitus with diabetic neuropathy (HCC)    Morbid obesity with BMI of 40.0-44.9, adult ([facility] [address]    History of venous thromboembolism    Cataract of both eyes    Abnormal stress test       Details:   Harleen Chavez was brought to the cardiac catheterization lab in a fasting state after informed consent was obtained. If the patient was able to provide written consent, it was obtained. The patient's vitals were monitored through out the procedure. The patient was sedated using the appropriate levels of sedation and ASA guidelines. The appropriate access site area was prepped and drapped in a sterile fashion. The area was anesthetized with 2% lidocaine. Using the modified Seldinger technique, an arterial sheath was changed out for the existing sheath. An [place] sheath was placed. The sheath was flushed and prepped in usual fashion. The patient had undergone diagnostic coronary angiogram by Dr. Myriam Soria.   His coronary angiogram demonstrated critical disease of the LAD and diagonal.  Interventional cardiology was asked to take over the patient's case. Initial , access was obtained by Dr. Spaulding Officer. The interventional portion of the procedure was completed utilizing a 7 Synesis system. XB 3.5 guide cath advanced into the left main coronary ostium. Initially a BMW wire was advanced into the distal LAD. And a second BMW wire was advanced into the first diagonal branch. We completed balloon angioplasty of the first diagonal branch initially with a 2.5 mm x 12 m trek balloon. We subsequently did balloon angioplasty of the mid LAD with a 2.5 x 12 mm trek balloon also. We subsequently stented the mid LAD with a 2.5 mm x 12 mm Abbott vascular Xience 0 drug-eluting stent. Finally we followed up with mini crush technique of the LAD. We placed a 2.75 x 18mm Abbott vascular Xience Kelly drug-eluting stent into the diagonal.  This was pulled and withdrawn into the LAD. This was inflated to nominal pressure. We separately did post dilate the ostial portion of the diagonal with a 3.0 mm x 8 mm balloon. Eventually we did mini crush of the stent utilizing a 3.5 mm x 12 mm noncompliant trek balloon separately placed a stent into the LAD a 4.0 mm x 15 mm. Eventually we did kissing simultaneous angioplasty of the LAD and diagonal utilizing a 4.0 mm x 12 mm trek balloon along with a 3.0 mm x 12 mm trek balloon. These were inflated to rated burst pressure. Posterior views after excellent results. Ht 6' 3\" (1.905 m)   Wt (!) 335 lb 6.4 oz (152.1 kg)   BMI 41.92 kg/m²     The access site was controlled with manual pressure and/or appropriate closure device. Moderate Conscious Sedation Details: An independent trained observer pushed medications at my direction. We monitoring the patient's level of consciousness and vital signs/physiologic status throughout the procedure.   CPT codes 40255 and 01508    Start time: 1227  Stop time: 1345  ASA class: 3    Sedation totals:  Versad - 8mg  Fentanyl - 1400mcg    EBL - minimal <5 cc blood loss    The patient was monitored continuously with the ECG, pulse oximetry, blood pressure, and direct observation. CONCLUSIONS:    1. Successful complex LAD diagonal stenting utilizing mini crush technique. ASSESSMENT/RECOMMENDATIONS:    1. Dual antiplatelet therapy along with anticoagulation for 30 days. After 30 days we can discontinue dual antiplatelet therapy and continue with single antiplatelet therapy along with oral anticoagulation.       Lali Harkins MD, [facility], [place] [address] Cardiology  [place] [address]  [phone] [facility] central office  [phone] [address] [facility]  6/16/2021  1:52 PM

## 2021-06-17 VITALS
OXYGEN SATURATION: 99 % | RESPIRATION RATE: 18 BRPM | TEMPERATURE: 97.8 F | SYSTOLIC BLOOD PRESSURE: 142 MMHG | HEART RATE: 86 BPM | BODY MASS INDEX: 39.17 KG/M2 | WEIGHT: 315 LBS | HEIGHT: 75 IN | DIASTOLIC BLOOD PRESSURE: 89 MMHG

## 2021-06-17 LAB
ANION GAP SERPL CALCULATED.3IONS-SCNC: 9 MMOL/L (ref 3–16)
BUN BLDV-MCNC: 20 MG/DL (ref 7–20)
CALCIUM SERPL-MCNC: 8.8 MG/DL (ref 8.3–10.6)
CHLORIDE BLD-SCNC: 103 MMOL/L (ref 99–110)
CO2: 24 MMOL/L (ref 21–32)
CREAT SERPL-MCNC: 1 MG/DL (ref 0.8–1.3)
EKG ATRIAL RATE: 74 BPM
EKG ATRIAL RATE: 76 BPM
EKG DIAGNOSIS: NORMAL
EKG DIAGNOSIS: NORMAL
EKG P AXIS: 64 DEGREES
EKG P AXIS: 70 DEGREES
EKG P-R INTERVAL: 244 MS
EKG P-R INTERVAL: 258 MS
EKG Q-T INTERVAL: 426 MS
EKG Q-T INTERVAL: 440 MS
EKG QRS DURATION: 82 MS
EKG QRS DURATION: 82 MS
EKG QTC CALCULATION (BAZETT): 479 MS
EKG QTC CALCULATION (BAZETT): 488 MS
EKG R AXIS: 1 DEGREES
EKG R AXIS: 5 DEGREES
EKG T AXIS: 6 DEGREES
EKG T AXIS: 8 DEGREES
EKG VENTRICULAR RATE: 74 BPM
EKG VENTRICULAR RATE: 76 BPM
GFR AFRICAN AMERICAN: >60
GFR NON-AFRICAN AMERICAN: >60
GLUCOSE BLD-MCNC: 151 MG/DL (ref 70–99)
GLUCOSE BLD-MCNC: 152 MG/DL (ref 70–99)
HCT VFR BLD CALC: 34.5 % (ref 40.5–52.5)
HEMOGLOBIN: 11.4 G/DL (ref 13.5–17.5)
INR BLD: 1.34 (ref 0.86–1.14)
MAGNESIUM: 2.3 MG/DL (ref 1.8–2.4)
MCH RBC QN AUTO: 29 PG (ref 26–34)
MCHC RBC AUTO-ENTMCNC: 33.1 G/DL (ref 31–36)
MCV RBC AUTO: 87.6 FL (ref 80–100)
PDW BLD-RTO: 16.3 % (ref 12.4–15.4)
PERFORMED ON: ABNORMAL
PLATELET # BLD: 180 K/UL (ref 135–450)
PMV BLD AUTO: 7.3 FL (ref 5–10.5)
POTASSIUM SERPL-SCNC: 4.3 MMOL/L (ref 3.5–5.1)
PROTHROMBIN TIME: 15.6 SEC (ref 10–13.2)
RBC # BLD: 3.93 M/UL (ref 4.2–5.9)
SODIUM BLD-SCNC: 136 MMOL/L (ref 136–145)
WBC # BLD: 10.1 K/UL (ref 4–11)

## 2021-06-17 PROCEDURE — 6370000000 HC RX 637 (ALT 250 FOR IP): Performed by: INTERNAL MEDICINE

## 2021-06-17 PROCEDURE — 93005 ELECTROCARDIOGRAM TRACING: CPT | Performed by: INTERNAL MEDICINE

## 2021-06-17 PROCEDURE — 83735 ASSAY OF MAGNESIUM: CPT

## 2021-06-17 PROCEDURE — 93010 ELECTROCARDIOGRAM REPORT: CPT | Performed by: INTERNAL MEDICINE

## 2021-06-17 PROCEDURE — 85027 COMPLETE CBC AUTOMATED: CPT

## 2021-06-17 PROCEDURE — 36415 COLL VENOUS BLD VENIPUNCTURE: CPT

## 2021-06-17 PROCEDURE — 99238 HOSP IP/OBS DSCHRG MGMT 30/<: CPT | Performed by: NURSE PRACTITIONER

## 2021-06-17 PROCEDURE — 80048 BASIC METABOLIC PNL TOTAL CA: CPT

## 2021-06-17 PROCEDURE — 85610 PROTHROMBIN TIME: CPT

## 2021-06-17 RX ORDER — ASPIRIN 81 MG/1
81 TABLET, CHEWABLE ORAL DAILY
Qty: 30 TABLET | Refills: 3 | Status: SHIPPED
Start: 2021-06-18 | End: 2021-10-04 | Stop reason: ALTCHOICE

## 2021-06-17 RX ADMIN — TICAGRELOR 90 MG: 90 TABLET ORAL at 09:05

## 2021-06-17 RX ADMIN — DILTIAZEM HYDROCHLORIDE 120 MG: 60 TABLET, FILM COATED ORAL at 09:05

## 2021-06-17 RX ADMIN — ASPIRIN 81 MG: 81 TABLET, CHEWABLE ORAL at 09:05

## 2021-06-17 RX ADMIN — DOFETILIDE 500 MCG: 0.25 CAPSULE ORAL at 09:09

## 2021-06-17 RX ADMIN — TORSEMIDE 20 MG: 20 TABLET ORAL at 09:05

## 2021-06-17 RX ADMIN — ATORVASTATIN CALCIUM 40 MG: 40 TABLET, FILM COATED ORAL at 09:05

## 2021-06-17 ASSESSMENT — PAIN SCALES - GENERAL: PAINLEVEL_OUTOF10: 0

## 2021-06-17 NOTE — PLAN OF CARE
Problem: Pain:  Goal: Recognizes and communicates pain  Description: Recognizes and communicates pain  Outcome: Ongoing  Goal: Pain level will decrease  Description: Pain level will decrease  Outcome: Ongoing     Problem: Tissue Perfusion - Cardiopulmonary, Altered:  Goal: Absence of angina  Description: Absence of angina  Outcome: Ongoing  Goal: Hemodynamic stability will improve  Description: Hemodynamic stability will improve  Outcome: Ongoing

## 2021-06-17 NOTE — DISCHARGE SUMMARY
[facility]  Discharge Summary  Patient ID:  Harleen Chavez  [id number] y.o. 1945    Admit date: 6/16/2021    Discharge date: 6/17/2021    Admitting Physician: Dl Augustine MD     Discharge Provider: Arslan Walter Course: Harleen Chavez presented 6/16/2021 as an outpatient for coronary angiography following an abnormal stress test. Findings significant for LAD/Diag lesions, LAD treated with FELIZ x2 and Diag FELIZ x1. Rhythm overnight has been sinus. This morning he feels good. Denies chest pain, shortness of breath, palpitations and dizziness. he has tolerated diet, ambulated, and voided without difficulty. Assessment:  CAD: s/p FELIZ x2 LAD and FELIZ Diag 6/16/2021  Paroxysmal atrial arrhythmias (afib and aflutter): stable    - [id number] score >2 on coumadin through PCP   - s/p aflutter ablation 4/2017   - on dofetilide since 2017  HTN: stable  HLD  MARIA VICTORIA  DM  History of DVT  History of PVC ablation 2014    Plan:  1. Activity restrictions reviewed  2. Continue aspirin, brilinta, coumadin; after 7/16/2021, consider stopping aspirin to limit triple therapy  3. Continue diltiazem and dofetilide per EP; [facility] states [facility] to 250 mcg BID due to renal insufficiency however based on current renal panel and QTc on EKG, no dose adjustment needed  4.  Follow up in office 6/29/2021    Admission Diagnoses: Abnormal result of other cardiovascular function study [R94.39]  Angina at rest [facility]) [I20.8]    Discharge Diagnoses:   Patient Active Problem List   Diagnosis    Arthritis    [facility]ox[facility]mal [facility] fibrillation) ([facility]    Essential hypertension    Carpal tunnel syndrome    Polyneuropathy    Numbness and tingling of both legs    Moderate obstructive sleep apnea    S/P thoracotomy    Atypical atrial flutter (HCC)    Type 2 diabetes mellitus with diabetic neuropathy ([facility]    Morbid obesity with BMI of 40.0-44.9, adult ([facility]    History of venous thromboembolism    Cataract of both eyes    Abnormal stress test    Angina at rest [facility])      Discharged Condition: stable  The patient was seen and examined on day of discharge and this discharge summary is in conjunction with any daily progress note from day of discharge. Consults:  IP CONSULT TO CARDIAC REHAB  Physical Exam:  BP (!) 142/89   Pulse 86   Temp 97.8 °F (36.6 °C) (Oral)   Resp 18   Ht 6' 3\" (1.905 m)   Wt (!) 329 lb 11.2 oz (149.6 kg)   SpO2 99%   BMI 41.21 kg/m²       Intake/Output Summary (Last 24 hours) at 6/17/2021 1007  Last data filed at 6/17/2021 0535  Gross per 24 hour   Intake 0 ml   Output 0 ml   Net 0 ml     General:  Awake, alert, NAD, obese  Skin:  Warm and dry  Neck:  JVD<8, no bruit  Chest:  Clear to auscultation, no wheezes/rhonchi/rales  Cardiovascular:  RRR S1S2  Abdomen:  Soft, nontender, +bowel sounds  Extremities: No edema  Right radial site soft, no hematoma, 2+ pulse    Significant Diagnostic Studies:     Coronary angiogram (intervention) 6/16/021:  1. Selective left heart catheterization  2. Percutaneous coronary intervention of the left anterior descending and diagonal artery artery using complex bifurcation technique. Procedure Findings:  1. Critical LAD diagonal disease with angina classification 0, 0, 1 along with mid LAD 70% stenosis  Details:              Torrance Cockayne was brought to the cardiac catheterization lab in a fasting state after informed consent was obtained. If the patient was able to provide written consent, it was obtained. The patient's vitals were monitored through out the procedure. The patient was sedated using the appropriate levels of sedation and ASA guidelines. The appropriate access site area was prepped and drapped in a sterile fashion. The area was anesthetized with 2% lidocaine. Using the modified Seldinger technique, an arterial sheath was changed out for the existing sheath.   An Western Ashleigh sheath was placed. The sheath was flushed and prepped in usual fashion. The patient had undergone diagnostic coronary angiogram by Dr. Georgette Reyna. His coronary angiogram demonstrated critical disease of the LAD and diagonal.  Interventional cardiology was asked to take over the patient's case. Initial , access was obtained by Dr. Georgette Reyna. The interventional portion of the procedure was completed utilizing a 7 Western Ashleigh system. XB 3.5 guide cath advanced into the left main coronary ostium. Initially a BMW wire was advanced into the distal LAD. And a second BMW wire was advanced into the first diagonal branch. We completed balloon angioplasty of the first diagonal branch initially with a 2.5 mm x 12 m trek balloon. We subsequently did balloon angioplasty of the mid LAD with a 2.5 x 12 mm trek balloon also. We subsequently stented the mid LAD with a 2.5 mm x 12 mm Abbott vascular Xience 0 drug-eluting stent. Finally we followed up with mini crush technique of the LAD. We placed a 2.75 x 18mm Abbott vascular Xience Kelly drug-eluting stent into the diagonal.  This was pulled and withdrawn into the LAD. This was inflated to nominal pressure. We separately did post dilate the ostial portion of the diagonal with a 3.0 mm x 8 mm balloon. Eventually we did mini crush of the stent utilizing a 3.5 mm x 12 mm noncompliant trek balloon separately placed a stent into the LAD a 4.0 mm x 15 mm. Eventually we did kissing simultaneous angioplasty of the LAD and diagonal utilizing a 4.0 mm x 12 mm trek balloon along with a 3.0 mm x 12 mm trek balloon. These were inflated to rated burst pressure. Posterior views after excellent results. CONCLUSIONS:  1. Successful complex LAD diagonal stenting utilizing mini crush technique. ASSESSMENT/RECOMMENDATIONS:  1. Dual antiplatelet therapy along with anticoagulation for 30 days.   After 30 days we can discontinue dual antiplatelet therapy and continue with single antiplatelet therapy along with oral anticoagulation. Coronary angiogram (diagnostic) 6/16/2021:  LM <20%  LAD 30% prox, 50-70% mid              D1 70% ostium  Cx 30%  RCA 30%              RPL 50%  LVEF 55-60%  Plan  FFR LAD    Echo 6/16/2021:   Left ventricular systolic function is normal with a visually estimated   ejection fraction of 55%. The left ventricle is normal in size with mild concentric hypertrophy. No obvious regional wall motion abnormalities noted. Increased left ventricular filling pressure. The left atrium appears moderately enlarged. The right atrium is severely enlarged. Right ventricle is enlarged but   normal function. Inadequate tricuspid valve regurgitation to estimate systolic pulmonary   artery pressure. The IVC is dilated in size (>2.1 cm) but appears to collapse >50% with   respiration consistent with elevated right atrial pressures (8 mmHg) . Labs:   Lab Results   Component Value Date    CREATININE 1.0 06/17/2021    BUN 20 06/17/2021     06/17/2021    K 4.3 06/17/2021     06/17/2021    CO2 24 06/17/2021      Lab Results   Component Value Date    WBC 10.1 06/17/2021    HGB 11.4 (L) 06/17/2021    HCT 34.5 (L) 06/17/2021    MCV 87.6 06/17/2021     06/17/2021      Lab Results   Component Value Date    INR 1.34 (H) 06/17/2021    PROTIME 15.6 (H) 06/17/2021      Lab Results   Component Value Date    BNP 53 09/29/2013     Disposition: home    Patient Instructions:    Linda Rose   Home Medication Instructions SNE:[id number]    Printed on:06/17/21 1056   Medication Information                      ACCU-CHEK MULTICLIX LANCETS MISC  Check sugars once daily.  Dx;E11.9             alogliptin (NESINA) 25 MG TABS tablet  TAKE ONE TABLET BY MOUTH ONCE DAILY FOR BLOOD SUGAR             aspirin 81 MG chewable tablet  Take 1 tablet by mouth daily             atorvastatin (LIPITOR) 40 MG tablet  TAKE ONE TABLET BY MOUTH DAILY             Biotin 5 MG TABS  Take 5 mg by mouth daily             Blood Glucose Monitoring Suppl (TRUE METRIX METER) VINNIE  Use to check daily. DX;E11.9             blood glucose test strips (TRUE METRIX BLOOD GLUCOSE TEST) strip  USE TO CHECK BLOOD GLUCOSE DAILY. DX: E11.9             blood glucose test strips (TRUE METRIX BLOOD GLUCOSE TEST) strip  Test daily. DX: E11.9             Cholecalciferol (VITAMIN D3) 5000 units TABS  Take 5,000 Units by mouth daily             clobetasol (TEMOVATE) 0.05 % ointment  APPLY TOPICALLY 2 TIMES DAILY. Cyanocobalamin (VITAMIN B-12 CR PO)  Take 5,000 mcg by mouth every 7 days              dilTIAZem (CARDIZEM) 120 MG tablet  TAKE 1 TABLET BY MOUTH  EVERY 12 HOURS             dofetilide (TIKOSYN) 500 MCG capsule  Take 1 capsule by mouth every 12 hours             Easy Touch Lancets 32G/Twist MISC  TEST DAILY. DX: E11.9             Lancet Devices (EASY TOUCH LANCING DEVICE) MISC  Test Daily. DX: E11.9             Lancets Misc. (ACCU-CHEK MULTICLIX LANCET DEV) KIT  Check sugars once daily. DX;E11.9             magnesium oxide (MAG-OX) 400 MG tablet  Take 400 mg by mouth daily.              metFORMIN (GLUCOPHAGE) 500 MG tablet  TAKE 1 TABLET BY MOUTH  DAILY             montelukast (SINGULAIR) 10 MG tablet  TAKE 1 TABLET BY MOUTH ONCE DAILY             Multiple Vitamins-Minerals (THERAPEUTIC MULTIVITAMIN-MINERALS) tablet  Take 2 tablets by mouth daily              spironolactone (ALDACTONE) 25 MG tablet  TAKE ONE TABLET BY MOUTH DAILY             ticagrelor (BRILINTA) 90 MG TABS tablet  Take 1 tablet by mouth 2 times daily             torsemide (DEMADEX) 20 MG tablet  TAKE 1 TABLET BY MOUTH TWO  TIMES DAILY             warfarin (COUMADIN) 5 MG tablet  TAKE ONE TABLET BY MOUTH DAILY               Activity: no heavy lifting for 1 week  Diet: cardiac diet  Wound Care: keep wound clean and dry    Follow-up on 6/29/2021    Signed:  SERENA Mcgarry CNP,  6/17/2021, 10:07 AM

## 2021-06-17 NOTE — TELEPHONE ENCOUNTER
Will forward to EP    Patient currently admitted following LHC/PCI and being discharged today. He needs a refill for dofetilide. Current renal panel appears stable, please review and refill for 500 mcg BID if appropriate. He has 1 dose left. Thanks!

## 2021-06-17 NOTE — PLAN OF CARE
Problem: Pain:  Goal: Recognizes and communicates pain  Description: Recognizes and communicates pain  6/17/2021 0738 by Sid Bird RN  Outcome: Ongoing     Problem: Pain:  Goal: Pain level will decrease  Description: Pain level will decrease  6/17/2021 0738 by Sid Bird RN  Outcome: Ongoing     Problem: Tissue Perfusion - Cardiopulmonary, Altered:  Goal: Absence of angina  Description: Absence of angina  6/17/2021 0738 by Sid Bird RN  Outcome: Ongoing

## 2021-06-18 ENCOUNTER — CARE COORDINATION (OUTPATIENT)
Dept: CASE MANAGEMENT | Age: 76
End: 2021-06-18

## 2021-06-18 ENCOUNTER — TELEPHONE (OUTPATIENT)
Dept: CARDIOLOGY CLINIC | Age: 76
End: 2021-06-18

## 2021-06-18 DIAGNOSIS — I20.8 ANGINA AT REST (HCC): Primary | ICD-10-CM

## 2021-06-18 LAB — POC ACT LR: 250 SEC

## 2021-06-18 PROCEDURE — 1111F DSCHRG MED/CURRENT MED MERGE: CPT | Performed by: INTERNAL MEDICINE

## 2021-06-18 RX ORDER — DOFETILIDE 0.5 MG/1
500 CAPSULE ORAL EVERY 12 HOURS SCHEDULED
Qty: 180 CAPSULE | Refills: 3 | Status: SHIPPED | OUTPATIENT
Start: 2021-06-18 | End: 2021-07-07

## 2021-06-18 NOTE — TELEPHONE ENCOUNTER
Patient last seen by you 3/31/2021. [facility] but have recently decreased this to 250 mg BID due to abnormal kidney function patient would like you opinion on dosing. A request for labs and other testing has been sent to the [place].

## 2021-06-18 NOTE — CARE COORDINATION
[facility] Transitions Initial Follow Up Call    Call within 2 business days of discharge: Yes    Patient: Vee Rodríguez Patient : 1945   MRN: [id number]  Reason for Admission: s/p cardiac cath   Discharge Date: 21 RARS: Readmission Risk Score: 12      Last Discharge [facility]       Complaint Diagnosis Description Type Department Provider    21   Admission (Discharged) Joshua Murry MD           Spoke with: [address] [place] [address] Avenue: [facility]     Transitions of Care Initial Call    Was this an external facility discharge? No Discharge Facility: n/a      Challenges to be reviewed by the provider   Additional needs identified to be addressed with provider: No  none             Method of communication with provider : none      Advance Care Planning:   Does patient have an Advance Directive:  reviewed and current. Was this a readmission? No  Patient stated reason for admission: angina   Patients top risk factors for readmission: medical condition-angina     Care Transition Nurse (CTN) contacted the patient by telephone to perform post hospital discharge assessment. Verified name and  with patient as identifiers. Provided introduction to self, and explanation of the CTN role. CTN reviewed discharge instructions, medical action plan and red flags with patient who verbalized understanding. Patient given an opportunity to ask questions and does not have any further questions or concerns at this time. Were discharge instructions available to patient? Yes. Reviewed appropriate site of care based on symptoms and resources available to patient including: PCP, Specialist and When to call 911. The patient agrees to contact the PCP office for questions related to their healthcare. Medication reconciliation was performed with patient, who verbalizes understanding of administration of home medications. Advised obtaining a 90-day supply of all daily and as-needed medications. Reviewed and educated patient on any new and changed medications related to discharge diagnosis. CTN provided contact information. Plan for follow-up call in 5-7 days based on severity of symptoms and risk factors. Spoke with patient who reported he is doing well and denied any cp. Patient reported his wrist cardiac cath site is CD&I and denied any redness, drainage, swelling. Patient reported he has apt with Dr. Ghulam Lees on 6/29 and with PCP on 6/29. CTN reviewed all medications with patient who reported he is taking all as prescribed. Patient to reach out to PCP regarding Torsemide dose which patient reported is 20 mg QD and discharge instructions reports patient to take 20 mg Torsemide BID. Denies any acute needs at present time. Agreeable to f/u calls. Educated on the use of urgent care or physicians 24 hr access line if assistance is needed after hours.              Care Transitions 24 Hour Call    Do you have any ongoing symptoms?: No  Do you have a copy of your discharge instructions?: Yes  Do you have all of your prescriptions and are they filled?: Yes  Have you been contacted by a [employer] [facility]?: No  Have you scheduled your follow up appointment?: Yes  How are you going to get to your appointment?: Car - family or friend to transport  Were you discharged with any Home Care or Post Acute Services: No  Patient DME: Chair lift, Walker, Commode, Straight cane, Other  Other Patient DME: grab bars in shower and around toilet, [place] shower  Do you have support at home?: Partner/Spouse/SO  Do you feel like you have everything you need to keep you well at home?: Yes  Are you an active caregiver in your home?: No  Care Transitions Interventions         Follow Up  Future Appointments   Date Time Provider Arslan Ardon   6/29/2021 11:40 AM MD Med [facility] Int None   6/29/2021 12:30 PM MD SNEHA Bynum [facility]   4/22/2022  9:20 AM SERENA Millan - LISSA BONE PULROCKY Pereyra RN

## 2021-06-18 NOTE — TELEPHONE ENCOUNTER
[facility] stated [facility] Tikosyn medication. He usually gets 500 mg however the [facility] is stating they will only give 250 mg. Pt wants to make sure this dosage is appropriate for him. The [facility] lowered his dose due to blood work showing low kidney function. Pt is very anxious to get clarification today. Please advise.

## 2021-06-21 ENCOUNTER — TELEPHONE (OUTPATIENT)
Dept: INTERNAL MEDICINE CLINIC | Age: 76
End: 2021-06-21

## 2021-06-21 ENCOUNTER — ANTI-COAG VISIT (OUTPATIENT)
Dept: INTERNAL MEDICINE CLINIC | Age: 76
End: 2021-06-21

## 2021-06-21 DIAGNOSIS — I48.0 PAF (PAROXYSMAL ATRIAL FIBRILLATION) (HCC): Primary | ICD-10-CM

## 2021-06-21 LAB — INR BLD: 1.3

## 2021-06-21 NOTE — TELEPHONE ENCOUNTER
----- Message from Brie Troncoso MD sent at 6/18/2021 10:46 AM EDT -----  Contact: Dm Griffinharjeet [phone]  That is a surprise  Call him  ----- Message -----  From: Joey Hernández  Sent: 6/18/2021   8:25 AM EDT  To: Brie Troncoso MD    Patient states he had 3 stents placed in his heart on 6/16/21 and they started him on Brilinta 90 mg bid and was told to inform you. He also states he has only been taking Toresemide 20 mg once a day instead of bid and weight is stable and he feels good. He is asking if that is ok. Please advise.  Thank you

## 2021-06-21 NOTE — PROCEDURES
[address]                            CARDIAC CATHETERIZATION    PATIENT NAME: Trinidad Modi                      :        1945  MED REC NO:   [id number]                          ROOM:       [id number]  ACCOUNT NO:   [de-identified]                           ADMIT DATE: 2021  PROVIDER:     Hernan Chou MD    DATE OF PROCEDURE:  2021    INDICATIONS:  Suspected coronary artery disease, unstable angina class  III, abnormal stress test.    PROCEDURE PERFORMED:  1. Left heart catheterization. 2.  Left ventriculogram.  3.  Coronary angiography. 4.  Sedation. ESTIMATED BLOOD LOSS:  Less than 20 mL. PROCEDURE:  After informed consent was obtained, the patient was taken  to the cath lab, where he was sterilely prepped and draped in the usual  manner. He was sedated with total of 4 mg of Versed and 100 mg of  fentanyl. O2 saturation and hemodynamics were monitored throughout the  procedure. Total observed sedation time was 25 minutes. The patient  was sterilely draped in the usual manner. Access was obtained via the  right radial artery with a 5/6 Slender sheath. Coronary angiography  performed using 5-[place] Medtronic Ultra catheter. At the completion of  the procedure, images were reviewed and determined the patient would  benefit from coronary angioplasty. He was taken off the table and  transferred to the holding area with a radial sheath remaining in place. ACT was checked and noted to be adequate anticoagulation. After review  of the films with the interventionalist, it was determined that the  patient was appropriate for coronary artery intervention. He was loaded  with antiplatelet therapy with anticipation of coronary angioplasty. There were no complications. The patient tolerated the procedure well. FINDINGS:  1. Left main coronary artery.   The left main coronary artery shows mild  atherosclerosis of less than 20%. 2.  Left anterior descending artery. The left anterior descending  artery has a 30% proximal stenosis and a 50% to 70% mid stenosis. There  was a 70% stenosis in the ostium with first large diagonal artery. 3.  Circumflex artery. The circumflex artery is mild atherosclerosis of  approximately 30%. 4.  Right coronary artery. The right coronary artery is large dominant  arteries. He has atherosclerosis of approximately 30%. There is a  right posterolateral branch of the distal right coronary artery, which  has a 50% mid stenosis. 5.  Left ventriculogram.  Left ventriculogram shows uniformly normal  wall motion. Ejection fraction appears to be 55% to 60%. RECOMMENDATION:  After discussion with the interventionalist, the  patient will be taken back to the cath lab for angioplasty of his left  anterior descending and diagonal arteries. ELIZABETH Dahl MD    D: 06/21/2021 15:14:24       T: 06/21/2021 17:22:42     [id number]  Job#: [id number]     Doc#: [id number]    CC:

## 2021-06-21 NOTE — TELEPHONE ENCOUNTER
----- Message from Brie Troncoso MD sent at 6/21/2021  1:15 PM EDT -----  Still INR need to be close to 2   Inr on Friday   No change  ----- Message -----  From: Fede Mayers  Sent: 6/21/2021   9:52 AM EDT  To: Brie Troncoso MD    INR 1.3  Taking 5 mg daily. Pt was off coumadin a couple days before surgery and started again Thursday. Pt is also now on brilenta which they told him could lower his INR and baby aspirin.

## 2021-06-22 ENCOUNTER — TELEPHONE (OUTPATIENT)
Dept: CARDIOLOGY CLINIC | Age: 76
End: 2021-06-22

## 2021-06-22 RX ORDER — CLOPIDOGREL BISULFATE 75 MG/1
75 TABLET ORAL DAILY
Qty: 90 TABLET | Refills: 3 | Status: SHIPPED | OUTPATIENT
Start: 2021-06-22 | End: 2022-05-23 | Stop reason: SDUPTHER

## 2021-06-22 NOTE — TELEPHONE ENCOUNTER
Pt called to report symptoms. He started taking Brilinta on 06/16 and has been SOB. Last night at rest his HR was 100. Pt is very concerned. Please advise.

## 2021-06-23 NOTE — TELEPHONE ENCOUNTER
Routing encounter to [facility]- Patient wants to be sure that [facility] is aware of med changes and stent placement.

## 2021-06-25 ENCOUNTER — ANTI-COAG VISIT (OUTPATIENT)
Dept: INTERNAL MEDICINE CLINIC | Age: 76
End: 2021-06-25

## 2021-06-25 ENCOUNTER — TELEPHONE (OUTPATIENT)
Dept: INTERNAL MEDICINE CLINIC | Age: 76
End: 2021-06-25

## 2021-06-25 ENCOUNTER — CARE COORDINATION (OUTPATIENT)
Dept: CASE MANAGEMENT | Age: 76
End: 2021-06-25

## 2021-06-25 DIAGNOSIS — I48.0 PAF (PAROXYSMAL ATRIAL FIBRILLATION) (HCC): Primary | ICD-10-CM

## 2021-06-25 LAB — INR BLD: 1.4

## 2021-06-25 NOTE — TELEPHONE ENCOUNTER
----- Message from Daisy Nazario MD sent at 6/25/2021  1:19 PM EDT -----  Take 7.5 mg today .  Then 5 mg daily   1 week  ----- Message -----  From: Wolf Griffin  Sent: 6/25/2021   9:02 AM EDT  To: Daisy Nazario MD    INR 1.4  Taking 5 mg daily

## 2021-06-25 NOTE — CARE COORDINATION
[facility] Transitions Follow Up Call    2021    Patient: Torrance Cockayne  Patient : 1945   MRN: [id number]  Reason for Admission:   Discharge Date: 21 RARS: Readmission Risk Score: 12         Spoke with: Torrance Cockayne    Spoke with patient who reported he is doing well and denied any cp. Patient reported he does get a little sob sometimes but overall feels things are improving. Denies any acute needs at present time. Agreeable to f/u calls. Educated on the use of urgent care or physicians 24 hr access line if assistance is needed after hours. Care Transitions Subsequent and Final Call    Subsequent and Final Calls  Do you have any ongoing symptoms?: No  Have your medications changed?: No  Do you have any questions related to your medications?: No  Do you currently have any active services?: No  Do you have any needs or concerns that I can assist you with?: No  Identified Barriers: None  Care Transitions Interventions  Other Interventions:            Follow Up  Future Appointments   Date Time Provider Arslan Ardon   2021 11:40 AM MD Med [facility] Int None   2021 12:30 PM MD SNEHA Bynum [facility]   2022  9:20 AM SERENA Millan - CNP CLER[facility] PULROCKY Watkins RN

## 2021-06-28 NOTE — PROGRESS NOTES
feel quivering in his heart. History of AFIB. Breathing improved after changing Brilinta to Plavix. Denies chest pain, edema, dizziness, palpitations and syncope. BP normal when cks at home. Past Medical History:   has a past medical history of Arthritis, Asthma, Atrial fibrillation ([address] Blood circulation, collateral, Diabetes mellitus ([address] DVT (deep venous thrombosis) ([facility] [address] Histoplasmosis, History of knee replacement procedure of right knee, Hx of blood clots, Hyperlipidemia, Hypertension, Knee osteoarthritis, Left TKR, Lung nodule, Neuromuscular disorder ([address] Palpitations, Sleep apnea, Stone, kidney, and UTI (urinary tract infection). Surgical History:   has a past surgical history that includes Cystoscopy; Tonsillectomy; Knee arthroscopy (4/19/2011); Colonoscopy; Cystocopy (2/8/13); ablation of dysrhythmic focus (2013); Carpal tunnel release (Right, 9-30-15); Bariatric Surgery (2013); Carpal tunnel release (Left, 3/20/16); skin biopsy; Diagnostic Cardiac Cath Lab Procedure; joint replacement (Bilateral); Cardiac surgery (2013); thoracotomy; ERCP (02/18/2019); ERCP (N/A, 2/18/2019); ERCP (2/18/2019); Cholecystectomy, laparoscopic (N/A, 2/19/2019); Intracapsular cataract extraction (Right, 1/14/2020); and Intracapsular cataract extraction (Left, 1/21/2020). Social History:   reports that he quit smoking about 45 years ago. His smoking use included cigarettes. He has a 34.00 pack-year smoking history. He has never used smokeless tobacco. He reports that he does not drink alcohol and does not use drugs. Family History:  family history includes Arthritis in his father; Cancer in his mother; Kidney Disease in his mother; Other in his father; Stroke in his father and mother; Substance Abuse in his father. Home Medications:  Prior to Admission medications    Medication Sig Start Date End Date Taking?  Authorizing Provider   clopidogrel (PLAVIX) 75 MG tablet Take 1 tablet by mouth daily 6/22/21  Yes Zachery Hammond MD   dofetilide (TIKOSYN) 500 MCG capsule Take 1 capsule by mouth every 12 hours 6/18/21  Yes Bruce Alonso MD   aspirin 81 MG chewable tablet Take 1 tablet by mouth daily 6/18/21  Yes SERENA Galo CNP   warfarin (COUMADIN) 5 MG tablet TAKE ONE TABLET BY MOUTH DAILY 5/26/21  Yes Shabana Cuellar MD   spironolactone (ALDACTONE) 25 MG tablet TAKE ONE TABLET BY MOUTH DAILY 5/26/21  Yes Shabana Cuellar MD   atorvastatin (LIPITOR) 40 MG tablet TAKE ONE TABLET BY MOUTH DAILY 5/26/21  Yes Shabana Cuellar MD   alogliptin (NESINA) 25 MG TABS tablet TAKE ONE TABLET BY MOUTH ONCE DAILY FOR BLOOD SUGAR 9/8/20  Yes Mayuri Guzman MD   metFORMIN (GLUCOPHAGE) 500 MG tablet TAKE 1 TABLET BY MOUTH  DAILY  Patient taking differently: Take 500 mg by mouth 2 times daily (with meals) TAKE 1 TABLET BY MOUTH  DAILY 2/2/21  Yes Shabana Cuellar MD   montelukast (SINGULAIR) 10 MG tablet TAKE 1 TABLET BY MOUTH ONCE DAILY 2/1/21  Yes Shabana Cuellar MD   torsemide (DEMADEX) 20 MG tablet TAKE 1 TABLET BY MOUTH TWO  TIMES DAILY  Patient taking differently: 20 mg daily  1/18/21  Yes Shabana Cuellar MD   blood glucose test strips (TRUE METRIX BLOOD GLUCOSE TEST) strip Test daily. DX: E11.9 1/18/21  Yes Shabana Cuellar MD   dilTIAZem (CARDIZEM) 120 MG tablet TAKE 1 TABLET BY MOUTH  EVERY 12 HOURS 9/3/20  Yes Jaxon Ganhdi MD   Easy Touch Lancets 32G/Twist MISC TEST DAILY. DX: E11.9 3/23/20  Yes Shabana Cuellar MD   blood glucose test strips (TRUE METRIX BLOOD GLUCOSE TEST) strip USE TO CHECK BLOOD GLUCOSE DAILY. DX: E11.9 3/23/20  Yes Shabana Cuellar MD   ACCU-CHEK MULTICLIX LANCETS MISC Check sugars once daily. Dx;E11.9 3/11/19  Yes Shabana Cuellar MD   Lancet Devices (EASY TOUCH LANCING DEVICE) MISC Test Daily. DX: E11.9 12/26/18  Yes Shabana Cuellar MD   Blood Glucose Monitoring Suppl (TRUE METRIX METER) VINNIE Use to check daily. DX; E11.9 12/22/18  Yes Kirk Brady MD   Lancets Misc. (ACCU-CHEK MULTICLIX LANCET DEV) KIT Check sugars once daily. DX;E11.9 12/21/18  Yes Kirk Brady MD   Biotin 5 MG TABS Take 5 mg by mouth daily   Yes Historical Provider, MD   Cholecalciferol (VITAMIN D3) 5000 units TABS Take 5,000 Units by mouth daily   Yes Historical Provider, MD   Cyanocobalamin (VITAMIN B-12 CR PO) Take 5,000 mcg by mouth every 7 days    Yes Historical Provider, MD   Multiple Vitamins-Minerals (THERAPEUTIC MULTIVITAMIN-MINERALS) tablet Take 2 tablets by mouth daily    Yes Historical Provider, MD   magnesium oxide (MAG-OX) 400 MG tablet Take 400 mg by mouth daily. Yes Historical Provider, MD   clobetasol (TEMOVATE) 0.05 % ointment APPLY TOPICALLY 2 TIMES DAILY. Patient not taking: Reported on 6/18/2021 12/31/19   Kirk Brady MD        Allergies:  Bactrim [sulfamethoxazole-trimethoprim], Ciprofloxacin, Macrobid [nitrofurantoin monohyd macro], Sulfamethoxazole, Theophylline, Cefuroxime axetil, Cipro xr, Other, and Tape [adhesive tape]     Review of Systems:   · Constitutional: there has been no unanticipated weight loss. There's been no change in energy level, sleep pattern, or activity level. · Eyes: No visual changes or diplopia. No scleral icterus. · ENT: No Headaches, hearing loss or vertigo. No mouth sores or sore throat. · Cardiovascular: Reviewed in HPI  · Respiratory: No cough or wheezing, no sputum production. No hematemesis. · Gastrointestinal: No abdominal pain, appetite loss, blood in stools. No change in bowel or bladder habits. · Genitourinary: No dysuria, trouble voiding, or hematuria. · Musculoskeletal:  No gait disturbance, weakness or joint complaints. · Integumentary: No rash or pruritis. · Neurological: No headache, diplopia, change in muscle strength, numbness or tingling. No change in gait, balance, coordination, mood, affect, memory, mentation, behavior.   · Psychiatric: No anxiety, no depression. · Endocrine: No malaise, fatigue or temperature intolerance. No excessive thirst, fluid intake, or urination. No tremor. · Hematologic/Lymphatic: No abnormal bruising or bleeding, blood clots or swollen lymph nodes. · Allergic/Immunologic: No nasal congestion or hives. Physical Examination:    Vitals:    06/29/21 1228   BP: 120/70   Pulse: 94   Temp: 98.3 °F (36.8 °C)   SpO2: 94%        Wt Readings from Last 3 Encounters:   06/29/21 (!) 327 lb (148.3 kg)   06/29/21 (!) 330 lb (149.7 kg)   06/17/21 (!) 329 lb 11.2 oz (149.6 kg)       Constitutional and General Appearance: NAD   Respiratory:  · Normal excursion and expansion without use of accessory muscles  · Resp Auscultation: Normal breath sounds without dullness  Cardiovascular:  · The apical impulses not displaced  · Heart tones are crisp and normal  · Cervical veins are not engorged  · The carotid upstroke is normal in amplitude and contour without delay or bruit  · Normal S1S2, No S3, No Murmur  · Peripheral pulses are symmetrical and full  · There is no clubbing, cyanosis of the extremities.   · No edema  · Femoral Arteries: 2+ and equal  · Pedal Pulses: 2+ and equal   Abdomen:  · No masses or tenderness  · Liver/Spleen: No Abnormalities Noted  Neurological/Psychiatric:  · Alert and oriented in all spheres  · Moves all extremities well  · Exhibits normal gait balance and coordination  · No abnormalities of mood, affect, memory, mentation, or behavior are noted  Lab Results   Component Value Date    CHOL 139 06/16/2021    CHOL 222 (H) 12/19/2018    CHOL 182 10/31/2017     Lab Results   Component Value Date    TRIG 126 06/16/2021    TRIG 159 (H) 12/19/2018    TRIG 126 10/31/2017     Lab Results   Component Value Date    HDL 39 (L) 06/16/2021    HDL 41 12/08/2020    HDL 43 12/04/2019     Lab Results   Component Value Date    LDLCALC 75 06/16/2021    LDLCALC 77 12/08/2020    [address] 66 12/04/2019     Lab Results   Component Value Date LABVLDL 25 06/16/2021    LABVLDL 28 12/08/2020    LABVLDL 31 12/04/2019     No results found for: CHOLHDLRATIO      Stress test 12/23/11  Impression- 1. Small sized reversible perfusion defect involving the anteroseptal wall of the left ventricle to mid heart concerning for myocardial ischemia. Please correlate with EKG findings. 2. Left ventricular hypertrophy. 3. LVEF 59 %     NAVNEET/Cardioversion 2/7/2012  Summary-   1. A Navneet was performed without complications. 2.  LVEF 55   3. No thrombus or valvular vegetation identified. CARDIOVERSION-      After an adequate level of sedation was achieved, 150J, then 200J in   biphasic synchronized delivery was administered. No change in   rhythm. The patient awoke without complications. A post procedure   12 L ECG was ordered and reviewed. There were no complications encountered. 1.  Unsuccessful attempt at cardioversion. 2.  Patient remains in atrial fibrillation. Echocardiogram 11/24/15  Conclusions   Summary   Moderate concentric left ventricular hypertrophy is present. Global ejection fraction is normal and estimated from 55 % to 60 %. No obvious regional wall motion abnormalities are noted. Diastolic filling parameters suggests diastolic dysfunction. Moderately dilated left atrium. Aortic valve appears sclerotic but opens adequately. Trivial mitral and tricuspid regurgitation. Systolic pulmonary artery pressure (SPAP) is normal and estimated at 23 mmHg   (RA pressure 3 mm Hg). Echocardiogram 6/23/17  Conclusions      Summary   Normal systolic function with an estimated ejection fraction of 55-60%. Mild to moderate concentric left ventricular hypertrophy. No regional wall motion abnormalities are seen. Left ventricular diastolic filling pressure is indeterminate. Left atrium is moderately dilated. No change from last echo on 11/24/1015.     Limited Echocardiogram 11/31/2017   Conclusions      Summary   This is a limited study pericardial effusion and shunt. Normal left ventricular systolic function with an estimated ejection   fraction of 55%. A bubble study was performed and fails to show evidence of shunting. There is a very small circumferential pericardial effusion noted. Stress test 11/1/2017  Conclusions    Summary  There is no evidence of stress induced ischemia. Normal LV function with  ejection fraction of 66%. There are no regional wall motion abnormalities. Low risk study. REECE 3/31-4/30/2021  No atrial fib   Predominately NSR  PSVT- symptomatic   NSVT/PVC's-  Symptomatic  Nocturnal bradycardia     Stress test 5/19/2021  Conclusions    Summary  The overall quality of the study is fair. There is subdiaphragmatic  attenuation. Left ventricular cavity is noted to be normal size. The right ventricle is  normal in size. SPECT images demonstrate a small area of mildly decreased perfusion in the  apical lateral and mid-inferior lateral segments. The defect is mixed  ischemia and scar. There is associated wall motion abnormality, consistent  with ischemia. Gated SPECT imaging reveals normal myocardial thickening and  wall motion. Sum stress score of 7. No visual TID. Calculated TID of 0.99. The left ventricular ejection fraction is calculated to be 63%. Myocardial perfusion is abnormal. Consistent with mixed ischemia and scar. Low risk scan. EKG 6/16/21  Sinus rhythm with marked sinus arrhythmia with 1st degree A-V blockLow voltage QRSProlonged QTAbnormal ECGConfirmed by Beatriz Dahl MD, [address] [place] [address] ([id number]) on 6/16/2021 4:41:38 PM    ECHO 6/16/21  Summary   Left ventricular systolic function is normal with a visually estimated   ejection fraction of 55%. The left ventricle is normal in size with mild concentric hypertrophy. No obvious regional wall motion abnormalities noted. Increased left ventricular filling pressure. The left atrium appears moderately enlarged.    The right atrium is severely enlarged. Right ventricle is enlarged but   normal function. Inadequate tricuspid valve regurgitation to estimate systolic pulmonary   artery pressure. The IVC is dilated in size (>2.1 cm) but appears to collapse >50% with   respiration consistent with elevated right atrial pressures (8 mmHg) . EKG 6/16/21  Sinus rhythm with 1st degree A-V block with Premature atrial complexesOtherwise normal ECGConfirmed by Adriel Gracia MD [id number]) on 6/17/2021 6:58:06 AM    EKG 6/16/21  Sinus rhythm with sinus arrhythmia with 1st degree A-V blockSeptal infarct , age undeterminedAbnormal ECGConfirmed by Adriel Gracia MD ([id number]) on 6/17/2021 6:58:18 AM    Cath 6/16/21  FINDINGS:  1. Left main coronary artery. The left main coronary artery shows mild  atherosclerosis of less than 20%. 2.  Left anterior descending artery. The left anterior descending  artery has a 30% proximal stenosis and a 50% to 70% mid stenosis. There  was a 70% stenosis in the ostium with first large diagonal artery. 3.  Circumflex artery. The circumflex artery is mild atherosclerosis of  approximately 30%. 4.  Right coronary artery. The right coronary artery is large dominant  arteries. He has atherosclerosis of approximately 30%. There is a  right posterolateral branch of the distal right coronary artery, which  has a 50% mid stenosis. 5.  Left ventriculogram.  Left ventriculogram shows uniformly normal  wall motion. Ejection fraction appears to be 55% to 60%.     RECOMMENDATION:  After discussion with the interventionalist, the  patient will be taken back to the cath lab for angioplasty of his left  anterior descending and diagonal arteries. CATH 6/17/21  LM <20%  LAD 30% prox, 50-70% mid              D1 70% ostium  Cx 30%  RCA 30%              RPL 50%  LVEF 55-60%     Plan  FFR LAD    Assessment:   CAD - stable. No angina  PANG - decreased post PCI  SOB due to Brilinta.  Improved post change to plavix  Atrial fibrillation -

## 2021-06-29 ENCOUNTER — OFFICE VISIT (OUTPATIENT)
Dept: CARDIOLOGY CLINIC | Age: 76
End: 2021-06-29
Payer: MEDICARE

## 2021-06-29 ENCOUNTER — OFFICE VISIT (OUTPATIENT)
Dept: INTERNAL MEDICINE CLINIC | Age: 76
End: 2021-06-29

## 2021-06-29 VITALS
TEMPERATURE: 98.3 F | OXYGEN SATURATION: 94 % | HEIGHT: 75 IN | HEART RATE: 94 BPM | DIASTOLIC BLOOD PRESSURE: 70 MMHG | SYSTOLIC BLOOD PRESSURE: 120 MMHG | BODY MASS INDEX: 39.17 KG/M2 | WEIGHT: 315 LBS

## 2021-06-29 VITALS
DIASTOLIC BLOOD PRESSURE: 65 MMHG | RESPIRATION RATE: 18 BRPM | SYSTOLIC BLOOD PRESSURE: 125 MMHG | WEIGHT: 315 LBS | HEIGHT: 75 IN | BODY MASS INDEX: 39.17 KG/M2 | HEART RATE: 68 BPM

## 2021-06-29 DIAGNOSIS — E11.40 TYPE 2 DIABETES MELLITUS WITH DIABETIC NEUROPATHY, WITHOUT LONG-TERM CURRENT USE OF INSULIN (HCC): Primary | ICD-10-CM

## 2021-06-29 DIAGNOSIS — Z95.5 STATUS POST INSERTION OF DRUG-ELUTING STENT INTO LEFT ANTERIOR DESCENDING (LAD) ARTERY FOR CORONARY ARTERY DISEASE: ICD-10-CM

## 2021-06-29 DIAGNOSIS — I48.0 PAF (PAROXYSMAL ATRIAL FIBRILLATION) (HCC): Primary | ICD-10-CM

## 2021-06-29 DIAGNOSIS — I25.10 CORONARY ARTERY DISEASE INVOLVING NATIVE CORONARY ARTERY OF NATIVE HEART WITHOUT ANGINA PECTORIS: ICD-10-CM

## 2021-06-29 DIAGNOSIS — R94.39 ABNORMAL STRESS TEST: ICD-10-CM

## 2021-06-29 DIAGNOSIS — I48.0 PAF (PAROXYSMAL ATRIAL FIBRILLATION) (HCC): ICD-10-CM

## 2021-06-29 DIAGNOSIS — Z98.61 POSTSURGICAL PERCUTANEOUS TRANSLUMINAL CORONARY ANGIOPLASTY (PTCA) STATUS: ICD-10-CM

## 2021-06-29 DIAGNOSIS — I10 ESSENTIAL HYPERTENSION: ICD-10-CM

## 2021-06-29 DIAGNOSIS — I25.118 CORONARY ARTERY DISEASE OF NATIVE ARTERY OF NATIVE HEART WITH STABLE ANGINA PECTORIS (HCC): ICD-10-CM

## 2021-06-29 PROCEDURE — 4040F PNEUMOC VAC/ADMIN/RCVD: CPT | Performed by: INTERNAL MEDICINE

## 2021-06-29 PROCEDURE — G8428 CUR MEDS NOT DOCUMENT: HCPCS | Performed by: INTERNAL MEDICINE

## 2021-06-29 PROCEDURE — 3017F COLORECTAL CA SCREEN DOC REV: CPT | Performed by: INTERNAL MEDICINE

## 2021-06-29 PROCEDURE — 99214 OFFICE O/P EST MOD 30 MIN: CPT | Performed by: INTERNAL MEDICINE

## 2021-06-29 PROCEDURE — 1036F TOBACCO NON-USER: CPT | Performed by: INTERNAL MEDICINE

## 2021-06-29 PROCEDURE — 1123F ACP DISCUSS/DSCN MKR DOCD: CPT | Performed by: INTERNAL MEDICINE

## 2021-06-29 PROCEDURE — 1111F DSCHRG MED/CURRENT MED MERGE: CPT | Performed by: INTERNAL MEDICINE

## 2021-06-29 PROCEDURE — G8417 CALC BMI ABV UP PARAM F/U: HCPCS | Performed by: INTERNAL MEDICINE

## 2021-06-29 PROCEDURE — 2022F DILAT RTA XM EVC RTNOPTHY: CPT | Performed by: INTERNAL MEDICINE

## 2021-06-29 PROCEDURE — 99212 OFFICE O/P EST SF 10 MIN: CPT | Performed by: INTERNAL MEDICINE

## 2021-06-29 PROCEDURE — 3046F HEMOGLOBIN A1C LEVEL >9.0%: CPT | Performed by: INTERNAL MEDICINE

## 2021-06-29 PROCEDURE — G8427 DOCREV CUR MEDS BY ELIG CLIN: HCPCS | Performed by: INTERNAL MEDICINE

## 2021-06-29 RX ORDER — METOPROLOL SUCCINATE 25 MG/1
25 TABLET, EXTENDED RELEASE ORAL DAILY
Qty: 90 TABLET | Refills: 3 | Status: SHIPPED | OUTPATIENT
Start: 2021-06-29 | End: 2022-03-14 | Stop reason: ALTCHOICE

## 2021-06-29 NOTE — PROGRESS NOTES
500 MG tablet TAKE 1 TABLET BY MOUTH  DAILY (Patient taking differently: Take 500 mg by mouth 2 times daily (with meals) TAKE 1 TABLET BY MOUTH  DAILY) 90 tablet 1    montelukast (SINGULAIR) 10 MG tablet TAKE 1 TABLET BY MOUTH ONCE DAILY 90 tablet 0    torsemide (DEMADEX) 20 MG tablet TAKE 1 TABLET BY MOUTH TWO  TIMES DAILY (Patient taking differently: 20 mg daily ) 180 tablet 0    blood glucose test strips (TRUE METRIX BLOOD GLUCOSE TEST) strip Test daily. DX: E11.9 100 each 3    dilTIAZem (CARDIZEM) 120 MG tablet TAKE 1 TABLET BY MOUTH  EVERY 12 HOURS 180 tablet 3    Easy Touch Lancets 32G/Twist MISC TEST DAILY. DX: E11.9 100 each 3    blood glucose test strips (TRUE METRIX BLOOD GLUCOSE TEST) strip USE TO CHECK BLOOD GLUCOSE DAILY. DX: E11.9 100 each 3    clobetasol (TEMOVATE) 0.05 % ointment APPLY TOPICALLY 2 TIMES DAILY. (Patient not taking: Reported on 6/18/2021) 60 g 0    ACCU-CHEK MULTICLIX LANCETS MISC Check sugars once daily. Dx;E11.9 100 each 11    Lancet Devices (EASY TOUCH LANCING DEVICE) MISC Test Daily. DX: E11.9 1 each 0    Blood Glucose Monitoring Suppl (TRUE METRIX METER) VINNIE Use to check daily. DX;E11.9 1 Device 0    Lancets Misc. (ACCU-CHEK MULTICLIX LANCET DEV) KIT Check sugars once daily. DX;E11.9 1 kit 0    Biotin 5 MG TABS Take 5 mg by mouth daily      Cholecalciferol (VITAMIN D3) 5000 units TABS Take 5,000 Units by mouth daily      Cyanocobalamin (VITAMIN B-12 CR PO) Take 5,000 mcg by mouth every 7 days       Multiple Vitamins-Minerals (THERAPEUTIC MULTIVITAMIN-MINERALS) tablet Take 2 tablets by mouth daily       magnesium oxide (MAG-OX) 400 MG tablet Take 400 mg by mouth daily. No current facility-administered medications for this visit. Review of Systems  As above    Objective:   Physical Exam   There were no vitals filed for this visit. General: obese, healthy appearing male  Awake, alert and oriented.  Appears to be not in any distress  Mucous Membranes:  Pink , anicteric  Neck: No JVD, no carotid bruit, no thyromegaly  Chest:  Clear to auscultation bilaterally, no added sounds  Cardiovascular:  RRR S1S2 heard, no murmurs or gallops  Abdomen:  Soft, morbidly obese, undistended, non tender, no organomegaly, BS present  Extremities: chronic 2+ pedal edema  Distal pulses well felt  chronic erythematous rash on both cheek  Neurological : grossly normal      Wt Readings from Last 3 Encounters:   06/29/21 (!) 330 lb (149.7 kg)   06/17/21 (!) 329 lb 11.2 oz (149.6 kg)   06/14/21 (!) 337 lb (152.9 kg)           Stress test 5/21          The overall quality of the study is fair. There is subdiaphragmatic    attenuation.        Left ventricular cavity is noted to be normal size. The right ventricle is    normal in size.        SPECT images demonstrate a small area of mildly decreased perfusion in the    apical lateral and mid-inferior lateral segments. The defect is mixed    ischemia and scar. There is associated wall motion abnormality, consistent    with ischemia. Gated SPECT imaging reveals normal myocardial thickening and    wall motion.        Sum stress score of 7. No visual TID. Calculated TID of 0.99.        The left ventricular ejection fraction is calculated to be 63%.        Myocardial perfusion is abnormal. Consistent with mixed ischemia and scar.    Low risk scan.           Angiogram 6/16        The interventional portion of the procedure was completed utilizing a 7 Krave-N system. XB 3.5 guide cath advanced into the left main coronary ostium. Initially a BMW wire was advanced into the distal LAD. And a second BMW wire was advanced into the first diagonal branch. We completed balloon angioplasty of the first diagonal branch initially with a 2.5 mm x 12 m trek balloon. We subsequently did balloon angioplasty of the mid LAD with a 2.5 x 12 mm trek balloon also.   We subsequently stented the mid LAD with a 2.5 mm x 12 mm Abbott vascular Xience 0 drug-eluting stent. Finally we followed up with mini crush technique of the LAD. We placed a 2.75 x 18mm Abbott vascular Xience Kelly drug-eluting stent into the diagonal.  This was pulled and withdrawn into the LAD. This was inflated to nominal pressure. We separately did post dilate the ostial portion of the diagonal with a 3.0 mm x 8 mm balloon. Eventually we did mini crush of the stent utilizing a 3.5 mm x 12 mm noncompliant trek balloon separately placed a stent into the LAD a 4.0 mm x 15 mm. Eventually we did kissing simultaneous angioplasty of the LAD and diagonal utilizing a 4.0 mm x 12 mm trek balloon along with a 3.0 mm x 12 mm trek balloon. These were inflated to rated burst pressure. Posterior views after excellent results.       1. Successful complex LAD diagonal stenting utilizing mini crush technique.     ASSESSMENT/RECOMMENDATIONS:     1.  Dual antiplatelet therapy along with anticoagulation for 30 days. After 30 days we can discontinue dual antiplatelet therapy and continue with single antiplatelet therapy along with oral anticoagulation.         Assessment and Plan    CAD s.p LAD stents 6/21   - on Plavix , ASA , statins   - no bleeding issues while on coumadin  - developed dyspnea post stent placement ( 1 week ) , taken off brillinta and now on plavix   - also notes his demadex is cut down to 20 mg daily     DM- 2, well controlled  Last A1c at 8.3  Now on metformin 500 mg bid  Urine microalbumin stable   Eye exam normal this year per pt   On statins      AFibb - s/p ablation - f/w Dr. Tommy Whitley in 2014 . With persistent symptoms    Had repeat Ablation in 4/17 and 9/17 with persistent symptoms. Now doing well with TIKOSYN 250 mg bid  and cardizem 120 mg bid  No further sob,  Dizziness or recurrence   Continued on coumadin with no issues.  Monitored at home       Polyneuropathy- dx 4 yrs  before DM but now worsening      seen Dr. Speedy Link and EMG showed sensory motor neuropathy  Off B6 supplements as advised   Stable since last time       Right lung nodule /mass- s.p excision  by Dr. Coby Burgos  No cancer per path - histoplasmosis noted  No treatment as it was excised completely        Pedal edema - echo stable EF. chronic, improved  Now on  demadex 20 mg bid, aldactone daily for fluid retention       MARIA VICTORIA - CPAP  compliant - follow  by Dr. Adelaide Judd - improved since last time with no intervention     Obesity noted - weight loss and life style modification along with dietary changes, increased physical activity encouraged      Recurrent UTI /prostate enlargement - normal PSA, now f/w urology as needed      Has living will  Does see eye MD yearly  No dental or hearing issues    Colonoscopy in 2015   Check blood work    upto date on vaccines  Had pna, prevnar and shingrix vaccines

## 2021-06-29 NOTE — PATIENT INSTRUCTIONS
Plan:   OK to stop aspirin July 16th 1 month post procedure    Will emanuel to remain on Plavix 1 yr post stenting  Will add Toprol XL 25 mg 1 daily to help suppress rapid heart rates  Call if symptoms increase   Will refer to cardiac rehab  Cardiac medications reviewed including indications and pertinent side effects    Check blood pressure and heart rate at home a few times per week- keep a log with dates and times and bring to office visit   Regular exercise and following a healthy diet encouraged   Follow up with NP  in 3 months

## 2021-07-01 ENCOUNTER — CARE COORDINATION (OUTPATIENT)
Dept: CASE MANAGEMENT | Age: 76
End: 2021-07-01

## 2021-07-01 NOTE — CARE COORDINATION
[facility] Transitions Follow Up Call    2021    Patient: Abdulaziz Session  Patient : 1945   MRN: [id number]  Reason for Admission: angina   Discharge Date: 21 RARS: Readmission Risk Score: 12         Spoke with: Abdulaziz Session    Spoke with patient who reported he is doing alright but feels a little shaky today. Patient denied any issues with BP or BS. Patient did reported he had apt with Dr. Fadumo Mercedes yesterday and everything looked good and was started on metoprolol. CTN encouraged patient to reach out to the doctor if shakiness continues . Also discussed could be related to starting the new medication. CTN advised patient of use of urgent care or physicians 24 hr access line if assistance is needed after hours. Care Transitions Subsequent and Final Call    Subsequent and Final Calls  Do you have any ongoing symptoms?: No  Have your medications changed?: No  Do you have any questions related to your medications?: No  Do you currently have any active services?: No  Do you have any needs or concerns that I can assist you with?: No  Identified Barriers: None  Care Transitions Interventions  Other Interventions:            Follow Up  Future Appointments   Date Time Provider Arslan Ardon   10/4/2021  9:15 AM SERENA Lewis CNP [facility]   2021 11:30 AM MD [facility] [place] [facility] None   2022  9:20 AM SERENA Palomino CNP, RN

## 2021-07-02 ENCOUNTER — ANTI-COAG VISIT (OUTPATIENT)
Dept: INTERNAL MEDICINE CLINIC | Age: 76
End: 2021-07-02

## 2021-07-02 ENCOUNTER — TELEPHONE (OUTPATIENT)
Dept: INTERNAL MEDICINE CLINIC | Age: 76
End: 2021-07-02

## 2021-07-02 LAB — INR BLD: 1.6

## 2021-07-06 NOTE — TELEPHONE ENCOUNTER
MICHEL Spoke with patient states that he is tolerating Tikosyn 250 mg twice a day. He is denying any symptoms with the decrease in dose. Should he continue this since it's been 2 weeks?  Or go back up to 500 mg

## 2021-07-07 RX ORDER — DOFETILIDE 0.25 MG/1
250 CAPSULE ORAL EVERY 12 HOURS SCHEDULED
Qty: 60 CAPSULE | Refills: 0 | Status: ON HOLD
Start: 2021-07-07 | End: 2022-09-09 | Stop reason: HOSPADM

## 2021-07-09 ENCOUNTER — ANTI-COAG VISIT (OUTPATIENT)
Dept: INTERNAL MEDICINE CLINIC | Age: 76
End: 2021-07-09

## 2021-07-09 ENCOUNTER — CARE COORDINATION (OUTPATIENT)
Dept: CASE MANAGEMENT | Age: 76
End: 2021-07-09

## 2021-07-09 ENCOUNTER — TELEPHONE (OUTPATIENT)
Dept: INTERNAL MEDICINE CLINIC | Age: 76
End: 2021-07-09

## 2021-07-09 LAB — INR BLD: 2

## 2021-07-09 NOTE — CARE COORDINATION
[facility] Transitions Follow Up Call    2021    Patient: Nicole Jarvis  Patient : 1945   MRN: [id number]  Reason for Admission: angina   Discharge Date: 21 RARS: Readmission Risk Score: 12         Spoke with: [facility] Transitions Follow Up Call    Needs to be reviewed by the provider   Additional needs identified to be addressed with provider: No  none             Method of communication with provider : phone      Care Transition Nurse (CTN) contacted the patient by telephone to follow up after admission. Verified name and  with patient as identifiers. Addressed changes since last contact: none  Discussed follow-up appointments. If no appointment was previously scheduled, appointment scheduling offered: Yes. Is follow up appointment scheduled within 7 days of discharge? Yes. Advance Care Planning:   Does patient have an Advance Directive: reviewed and current. CTN reviewed discharge instructions, medical action plan and red flags with patient and discussed any barriers to care and/or understanding of plan of care after discharge. Discussed appropriate site of care based on symptoms and resources available to patient including: PCP and Specialist. The patient agrees to contact the PCP office for questions related to their healthcare. Patients top risk factors for readmission: medical condition-angina     Patient verified  and was pleasant and agreeable to transition calls. Patient states he's doing well. Staying busy. Denies CP. Reports still feeling a little shaky. LPN CC reiterated with patient that if problems persist he should report to MD. Patient verbalized understanding. Patient had not taken BP recently. LPN CC encouraged patient to incorporate into daily routine at breakfast or before bed. Patient verbalized understanding. Glucose 138 this AM. F/u cardiology 10/4. Denies any acute needs at present time. Agreeable to f/u calls.   Educated on the use of urgent care or physicians 24 hr access line if assistance is needed after hours. CTN provided contact information for future needs. Plan for follow-up call in 7-10 days based on severity of symptoms and risk factors. Nixon Bender, [address] Transitions  [phone]    Care Transitions Subsequent and Final Call    Subsequent and Final Calls  Do you currently have any active services?: No  Care Transitions Interventions  Other Interventions:            Follow Up  Future Appointments   Date Time Provider Arslan Ardon   10/4/2021  9:15 AM SERENA Serrano CNP [facility]   12/9/2021 11:30 AM Jessika Hernández MD [facility] [facility] None   4/22/2022  9:20 AM SERENA Duncan CNP [facility] Bender LPN

## 2021-07-09 NOTE — TELEPHONE ENCOUNTER
----- Message from Rufus Victoria MD sent at 7/9/2021 11:30 AM EDT -----  Contact: [phone]  No change   2 weeks  ----- Message -----  From: Trang Hidalgo  Sent: 7/9/2021   9:36 AM EDT  To: Rufus Victoria MD    INR: 2.0

## 2021-07-16 ENCOUNTER — CARE COORDINATION (OUTPATIENT)
Dept: CASE MANAGEMENT | Age: 76
End: 2021-07-16

## 2021-07-16 NOTE — CARE COORDINATION
[facility] Transitions Follow Up Call    2021    Patient: Victorina Anderson  Patient : 1945   MRN: [id number]  Reason for Admission: Angina   Discharge Date: 21 RARS: Readmission Risk Score: 12         Spoke with: Victorina Anderson    Spoke with patient who reported he is doing well today. Patient denied any shakiness and reported his blood sugar has been stable around 140-150. Patient denied any cp or sob and reported he will be going on vacation in a couple of weeks. Patient denied any further needs at this time. CTN advised patient of use of urgent care or physicians 24 hr access line if assistance is needed after hours. Care Transitions Subsequent and Final Call    Subsequent and Final Calls  Do you have any ongoing symptoms?: No  Have your medications changed?: No  Do you have any questions related to your medications?: No  Do you currently have any active services?: No  Do you have any needs or concerns that I can assist you with?: No  Identified Barriers: None  Care Transitions Interventions  Other Interventions:            Follow Up  Future Appointments   Date Time Provider Arslan Ardon   10/4/2021  9:15 AM SERENA Gutierrez CNP [facility]   2021 11:30 AM Sobeida Nuñez MD [facility] None   2022  9:20 AM SERENA Huang CNP [facility]       Sathya Pedraza RN

## 2021-07-19 ENCOUNTER — TELEPHONE (OUTPATIENT)
Dept: INTERNAL MEDICINE CLINIC | Age: 76
End: 2021-07-19

## 2021-07-19 ENCOUNTER — ANTI-COAG VISIT (OUTPATIENT)
Dept: INTERNAL MEDICINE CLINIC | Age: 76
End: 2021-07-19

## 2021-07-19 LAB — INR BLD: 1.6

## 2021-07-19 NOTE — TELEPHONE ENCOUNTER
----- Message from Carleen Haney MD sent at 7/19/2021 12:32 PM EDT -----  Contact: [phone]  Take 7.5 mg today , 1.5 pills  Rest same  Change to 7.5 on mondays, rest 5 mg daily   2 weeks  ----- Message -----  From: Ayala Bear  Sent: 7/19/2021   9:37 AM EDT  To: Carleen Haney MD    INR: 1.6  Currently taking 5 mg daily

## 2021-08-02 ENCOUNTER — ANTI-COAG VISIT (OUTPATIENT)
Dept: INTERNAL MEDICINE CLINIC | Age: 76
End: 2021-08-02

## 2021-08-02 ENCOUNTER — TELEPHONE (OUTPATIENT)
Dept: INTERNAL MEDICINE CLINIC | Age: 76
End: 2021-08-02

## 2021-08-02 LAB — INR BLD: 1.6

## 2021-08-02 NOTE — TELEPHONE ENCOUNTER
Dr. Jocelyn Coleman spoke with pt, pt informed him that he had missed a few doses. Pt informed per Dr. Jocelyn Coleman to continue the same dose of coumadin and recheck in 2 weeks.

## 2021-08-02 NOTE — TELEPHONE ENCOUNTER
----- Message from Deni Chavira MD sent at 8/2/2021 11:50 AM EDT -----  Why is it low  Call him  ----- Message -----  From: Toshia Lamb  Sent: 8/2/2021  10:37 AM EDT  To: Deni Chavira MD    INR- 1.6

## 2021-08-16 ENCOUNTER — ANTI-COAG VISIT (OUTPATIENT)
Dept: INTERNAL MEDICINE CLINIC | Age: 76
End: 2021-08-16

## 2021-08-16 ENCOUNTER — TELEPHONE (OUTPATIENT)
Dept: INTERNAL MEDICINE CLINIC | Age: 76
End: 2021-08-16

## 2021-08-16 LAB — INR BLD: 2.2

## 2021-08-16 NOTE — TELEPHONE ENCOUNTER
----- Message from Jaylon Boone MD sent at 8/16/2021  2:05 PM EDT -----  No change   2 weeks  ----- Message -----  From: Jacklyn Roberts  Sent: 8/16/2021  11:16 AM EDT  To: Jaylon Boone MD    INR- 2.2

## 2021-08-24 RX ORDER — MONTELUKAST SODIUM 10 MG/1
TABLET ORAL
Qty: 90 TABLET | Refills: 1 | Status: SHIPPED | OUTPATIENT
Start: 2021-08-24 | End: 2022-07-01

## 2021-08-24 RX ORDER — WARFARIN SODIUM 5 MG/1
TABLET ORAL
Qty: 90 TABLET | Refills: 1 | Status: SHIPPED | OUTPATIENT
Start: 2021-08-24 | End: 2022-02-21

## 2021-08-24 RX ORDER — SPIRONOLACTONE 25 MG/1
TABLET ORAL
Qty: 90 TABLET | Refills: 1 | Status: SHIPPED | OUTPATIENT
Start: 2021-08-24 | End: 2021-12-06

## 2021-08-30 ENCOUNTER — TELEPHONE (OUTPATIENT)
Dept: INTERNAL MEDICINE CLINIC | Age: 76
End: 2021-08-30

## 2021-08-30 ENCOUNTER — ANTI-COAG VISIT (OUTPATIENT)
Dept: INTERNAL MEDICINE CLINIC | Age: 76
End: 2021-08-30

## 2021-08-30 LAB — INR BLD: 2

## 2021-08-30 NOTE — TELEPHONE ENCOUNTER
----- Message from Armand Patterson MD sent at 8/30/2021 12:59 PM EDT -----  No change  2 week  ----- Message -----  From: Eliezer Lomas  Sent: 8/30/2021   9:55 AM EDT  To: Armand Patterson MD    INR 2

## 2021-09-01 ENCOUNTER — HOSPITAL ENCOUNTER (OUTPATIENT)
Dept: CARDIAC REHAB | Age: 76
Setting detail: THERAPIES SERIES
Discharge: HOME | End: 2021-09-01
Payer: MEDICARE

## 2021-09-13 ENCOUNTER — HOSPITAL ENCOUNTER (OUTPATIENT)
Dept: CARDIAC REHAB | Age: 76
Setting detail: THERAPIES SERIES
Discharge: HOME | End: 2021-09-13
Payer: MEDICARE

## 2021-09-13 PROCEDURE — 93798 PHYS/QHP OP CAR RHAB W/ECG: CPT

## 2021-09-15 ENCOUNTER — HOSPITAL ENCOUNTER (OUTPATIENT)
Dept: CARDIAC REHAB | Age: 76
Setting detail: THERAPIES SERIES
Discharge: HOME | End: 2021-09-15
Payer: MEDICARE

## 2021-09-15 PROCEDURE — 93798 PHYS/QHP OP CAR RHAB W/ECG: CPT

## 2021-09-16 ENCOUNTER — ANTI-COAG VISIT (OUTPATIENT)
Dept: INTERNAL MEDICINE CLINIC | Age: 76
End: 2021-09-16

## 2021-09-16 ENCOUNTER — TELEPHONE (OUTPATIENT)
Dept: INTERNAL MEDICINE CLINIC | Age: 76
End: 2021-09-16

## 2021-09-16 LAB — INR BLD: 2.2

## 2021-09-16 NOTE — TELEPHONE ENCOUNTER
----- Message from Jessika Hernández MD sent at 9/16/2021  9:57 AM EDT -----  No change  2 week  ----- Message -----  From: Patricia Villafana  Sent: 9/16/2021   9:15 AM EDT  To: Jessika Hernández MD    INR 2.2  Taking 5 mg daily except Mon and Fri taking 7.5 mg

## 2021-09-17 ENCOUNTER — HOSPITAL ENCOUNTER (OUTPATIENT)
Dept: CARDIAC REHAB | Age: 76
Setting detail: THERAPIES SERIES
Discharge: HOME | End: 2021-09-17
Payer: MEDICARE

## 2021-09-17 LAB
GLUCOSE BLD-MCNC: 130 MG/DL (ref 70–99)
PERFORMED ON: ABNORMAL

## 2021-09-17 PROCEDURE — 93798 PHYS/QHP OP CAR RHAB W/ECG: CPT

## 2021-09-20 ENCOUNTER — HOSPITAL ENCOUNTER (OUTPATIENT)
Dept: CARDIAC REHAB | Age: 76
Setting detail: THERAPIES SERIES
Discharge: HOME | End: 2021-09-20
Payer: MEDICARE

## 2021-09-20 PROCEDURE — 93798 PHYS/QHP OP CAR RHAB W/ECG: CPT

## 2021-09-22 ENCOUNTER — HOSPITAL ENCOUNTER (OUTPATIENT)
Dept: CARDIAC REHAB | Age: 76
Setting detail: THERAPIES SERIES
Discharge: HOME | End: 2021-09-22
Payer: MEDICARE

## 2021-09-22 PROCEDURE — 93798 PHYS/QHP OP CAR RHAB W/ECG: CPT

## 2021-09-24 ENCOUNTER — HOSPITAL ENCOUNTER (OUTPATIENT)
Dept: CARDIAC REHAB | Age: 76
Setting detail: THERAPIES SERIES
Discharge: HOME | End: 2021-09-24
Payer: MEDICARE

## 2021-09-24 PROCEDURE — 93798 PHYS/QHP OP CAR RHAB W/ECG: CPT

## 2021-09-27 ENCOUNTER — TELEPHONE (OUTPATIENT)
Dept: INTERNAL MEDICINE CLINIC | Age: 76
End: 2021-09-27

## 2021-09-27 ENCOUNTER — HOSPITAL ENCOUNTER (OUTPATIENT)
Dept: CARDIAC REHAB | Age: 76
Setting detail: THERAPIES SERIES
Discharge: HOME | End: 2021-09-27
Payer: MEDICARE

## 2021-09-27 ENCOUNTER — ANTI-COAG VISIT (OUTPATIENT)
Dept: INTERNAL MEDICINE CLINIC | Age: 76
End: 2021-09-27

## 2021-09-27 LAB — INR BLD: 2.4

## 2021-09-27 PROCEDURE — 93798 PHYS/QHP OP CAR RHAB W/ECG: CPT

## 2021-09-27 NOTE — TELEPHONE ENCOUNTER
----- Message from Tadeo Swenson MD sent at 9/27/2021  4:22 PM EDT -----  No change  2 week  ----- Message -----  From: Liz Cornejo  Sent: 9/27/2021   3:48 PM EDT  To: Tadeo Swenson MD    INR 2.4  Taking 7.5 mg  M and F, and 5 mg all other days
Left detailed message informing patient.
Full range of motion of upper and lower extremities, no joint tenderness/swelling.

## 2021-09-29 ENCOUNTER — HOSPITAL ENCOUNTER (OUTPATIENT)
Dept: CARDIAC REHAB | Age: 76
Setting detail: THERAPIES SERIES
Discharge: HOME | End: 2021-09-29
Payer: MEDICARE

## 2021-09-29 PROCEDURE — 93798 PHYS/QHP OP CAR RHAB W/ECG: CPT

## 2021-10-01 ENCOUNTER — HOSPITAL ENCOUNTER (OUTPATIENT)
Dept: CARDIAC REHAB | Age: 76
Setting detail: THERAPIES SERIES
Discharge: HOME | End: 2021-10-01
Payer: MEDICARE

## 2021-10-01 PROCEDURE — 93798 PHYS/QHP OP CAR RHAB W/ECG: CPT

## 2021-10-04 ENCOUNTER — OFFICE VISIT (OUTPATIENT)
Dept: CARDIOLOGY CLINIC | Age: 76
End: 2021-10-04
Payer: MEDICARE

## 2021-10-04 VITALS
DIASTOLIC BLOOD PRESSURE: 78 MMHG | TEMPERATURE: 96.4 F | BODY MASS INDEX: 39.17 KG/M2 | OXYGEN SATURATION: 97 % | SYSTOLIC BLOOD PRESSURE: 120 MMHG | WEIGHT: 315 LBS | HEIGHT: 75 IN | HEART RATE: 69 BPM

## 2021-10-04 DIAGNOSIS — I48.0 PAF (PAROXYSMAL ATRIAL FIBRILLATION) (HCC): Primary | ICD-10-CM

## 2021-10-04 DIAGNOSIS — I47.29 NSVT (NONSUSTAINED VENTRICULAR TACHYCARDIA): ICD-10-CM

## 2021-10-04 DIAGNOSIS — I49.3 PVC (PREMATURE VENTRICULAR CONTRACTION): ICD-10-CM

## 2021-10-04 DIAGNOSIS — Z95.5 STATUS POST INSERTION OF DRUG-ELUTING STENT INTO LEFT ANTERIOR DESCENDING (LAD) ARTERY FOR CORONARY ARTERY DISEASE: ICD-10-CM

## 2021-10-04 DIAGNOSIS — I48.4 ATYPICAL ATRIAL FLUTTER (HCC): ICD-10-CM

## 2021-10-04 DIAGNOSIS — G47.33 MODERATE OBSTRUCTIVE SLEEP APNEA: ICD-10-CM

## 2021-10-04 DIAGNOSIS — E11.40 TYPE 2 DIABETES MELLITUS WITH DIABETIC NEUROPATHY, WITHOUT LONG-TERM CURRENT USE OF INSULIN (HCC): ICD-10-CM

## 2021-10-04 DIAGNOSIS — I10 ESSENTIAL HYPERTENSION: ICD-10-CM

## 2021-10-04 DIAGNOSIS — I25.118 CORONARY ARTERY DISEASE OF NATIVE ARTERY OF NATIVE HEART WITH STABLE ANGINA PECTORIS (HCC): ICD-10-CM

## 2021-10-04 PROCEDURE — 3046F HEMOGLOBIN A1C LEVEL >9.0%: CPT | Performed by: NURSE PRACTITIONER

## 2021-10-04 PROCEDURE — G8427 DOCREV CUR MEDS BY ELIG CLIN: HCPCS | Performed by: NURSE PRACTITIONER

## 2021-10-04 PROCEDURE — 1036F TOBACCO NON-USER: CPT | Performed by: NURSE PRACTITIONER

## 2021-10-04 PROCEDURE — 4040F PNEUMOC VAC/ADMIN/RCVD: CPT | Performed by: NURSE PRACTITIONER

## 2021-10-04 PROCEDURE — 1123F ACP DISCUSS/DSCN MKR DOCD: CPT | Performed by: NURSE PRACTITIONER

## 2021-10-04 PROCEDURE — G8417 CALC BMI ABV UP PARAM F/U: HCPCS | Performed by: NURSE PRACTITIONER

## 2021-10-04 PROCEDURE — G8484 FLU IMMUNIZE NO ADMIN: HCPCS | Performed by: NURSE PRACTITIONER

## 2021-10-04 PROCEDURE — 3017F COLORECTAL CA SCREEN DOC REV: CPT | Performed by: NURSE PRACTITIONER

## 2021-10-04 PROCEDURE — 99214 OFFICE O/P EST MOD 30 MIN: CPT | Performed by: NURSE PRACTITIONER

## 2021-10-04 PROCEDURE — 2022F DILAT RTA XM EVC RTNOPTHY: CPT | Performed by: NURSE PRACTITIONER

## 2021-10-04 RX ORDER — TORSEMIDE 20 MG/1
20 TABLET ORAL DAILY
Qty: 90 TABLET | Refills: 1
Start: 2021-10-04 | End: 2022-10-04 | Stop reason: SDUPTHER

## 2021-10-04 RX ORDER — DOXYCYCLINE HYCLATE 100 MG/1
100 CAPSULE ORAL 2 TIMES DAILY
Qty: 20 CAPSULE | Refills: 0 | Status: SHIPPED | OUTPATIENT
Start: 2021-10-04 | End: 2021-10-14

## 2021-10-04 NOTE — PROGRESS NOTES
[facility]   Cardiac Evaluation    Primary Care Doctor: Amey Lesch, MD    Chief Complaint   Patient presents with    3 Month Follow-Up          Assessment:    1. PAF (paroxysmal atrial fibrillation) ([facility]    2. Coronary artery disease of native artery of native heart with stable angina pectoris ([facility]    3. Essential hypertension    4. Atypical atrial flutter ([facility]    5. PVC (premature ventricular contraction)    6. NSVT (nonsustained ventricular tachycardia) ([facility])    7. Moderate obstructive sleep apnea    8. Type 2 diabetes mellitus with diabetic neuropathy, without long-term current use of insulin ([facility]    9. Status post insertion of drug-eluting stent into left anterior descending (LAD) artery for coronary artery disease          Plan:   1. Doxycycline 100 mg twice daily for 10 days  2. No change in current heart medicines  3. Keep warfarin at 5 mg daily for 1 week (do not take the higher 7.5 mg dose while on antibiotic). Check INR in 5 days and call to Amey Lesch, MD   4. Skip cardiac rehab this week then return next Monday  Avoid over the counter cold medications that contain pseudo-ephredrines, phenylephrines, neosynephrines = decongestants  Okay to take antihistamines without the decongestant (Claritin, Allegra, Zyrtec, Benadryl without the \"D\")  Okay to take guaifenesin (Mucinex or Robitussion) to help clear phlegm, okay to take dextromethorphan (Delsym) as cough suppressant.   5.  Follow up with Dr. Carolyn Verduzco in 6 months       Vitals:    10/04/21 0835   BP: 120/78   Pulse: 69   Temp: 96.4 °F (35.8 °C)   SpO2: 97%   Weight: (!) 330 lb (149.7 kg)   Height: 6' 3\" (1.905 m)       History of Present Illness:   I had the pleasure of seeing Grace Murders in follow up for CAD s/p PCI to the LAD, PAF/ Aflutter/ AT s/p RFA of PVC's and Afib, anticoagulation with warfarin, hypertension, hyperlipidemia, diabetes mellitus, MARIA VICTORIA, obesity status post gastric sleeve as well as history of DVT and histoplasmosis s/p thoracotomy. He stopped the ASA 1 month following PCI, then continued on Plavix and warfarin. Toprol 25 mg daily was added to suppress tachycardia. He started with sore throat on Wednesday, turned into more of a cough on Friday. The sore throat has resolved. Also can be irritated by burning brush. The cough does seem to be improving. It was productive of yellowish sputum, now also getting better. He is doing fair in cardiac rehab. This is limited due to neuropathy pain in both legs. He has fallen a couple of times due to imbalance but not syncopal.    Sleep is fair, better last night. Appetite is good. No issues with medicines. Yi Gallegos describes symptoms including dyspnea, fatigue, edema, cough but denies chest pain, palpitations, orthopnea, PND, early saiety, syncope. NYHA:   III  ACC/ AHA Stage:    C    Past Medical History:   has a past medical history of Arthritis, Asthma, Atrial fibrillation ([address] Blood circulation, collateral, Diabetes mellitus ([address] DVT (deep venous thrombosis) ([address] Histoplasmosis, History of knee replacement procedure of right knee, Hx of blood clots, Hyperlipidemia, Hypertension, Knee osteoarthritis, Left TKR, Lung nodule, Neuromuscular disorder ([address] Palpitations, Sleep apnea, Stone, kidney, and UTI (urinary tract infection). Surgical History:   has a past surgical history that includes Cystoscopy; Tonsillectomy; Knee arthroscopy (4/19/2011); Colonoscopy; Cystocopy (2/8/13); ablation of dysrhythmic focus (2013); Carpal tunnel release (Right, 9-30-15); Bariatric Surgery (2013); Carpal tunnel release (Left, 3/20/16); skin biopsy; Diagnostic Cardiac Cath Lab Procedure; joint replacement (Bilateral); Cardiac surgery (2013); thoracotomy; ERCP (02/18/2019); ERCP (N/A, 2/18/2019); ERCP (2/18/2019); Cholecystectomy, laparoscopic (N/A, 2/19/2019);  Intracapsular cataract extraction (Right, 1/14/2020); and Intracapsular cataract extraction (Left, 1/21/2020). Social History:   reports that he quit smoking about 45 years ago. His smoking use included cigarettes. He has a 34.00 pack-year smoking history. He has never used smokeless tobacco. He reports that he does not drink alcohol and does not use drugs. Family History:   Family History   Problem Relation Age of Onset    Cancer Mother         ABDOMIN    Kidney Disease Mother     Stroke Mother     Arthritis Father     Substance Abuse Father     Other Father         hardening of the arteries    Stroke Father        Home Medications:  Prior to Admission medications    Medication Sig Start Date End Date Taking? Authorizing Provider   warfarin (COUMADIN) 5 MG tablet TAKE ONE TABLET BY MOUTH DAILY 8/24/21  Yes Luis Hernandez MD   spironolactone (ALDACTONE) 25 MG tablet TAKE ONE TABLET BY MOUTH DAILY 8/24/21  Yes Luis Hernandez MD   montelukast (SINGULAIR) 10 MG tablet TAKE 1 TABLET BY MOUTH ONCE DAILY 8/24/21  Yes Luis Hernandez MD   metFORMIN (GLUCOPHAGE) 500 MG tablet TAKE ONE TABLET BY MOUTH TWICE A DAY 7/9/21  Yes Luis Hernandez MD   dofetilide [facility]) 250 MCG capsule Take 1 capsule by mouth every 12 hours 7/7/21  Yes Linda Tanner MD   metoprolol succinate (TOPROL XL) 25 MG extended release tablet Take 1 tablet by mouth daily 6/29/21  Yes Linda Tanner MD   clopidogrel (PLAVIX) 75 MG tablet Take 1 tablet by mouth daily 6/22/21  Yes Linda Tanner MD   atorvastatin (LIPITOR) 40 MG tablet TAKE ONE TABLET BY MOUTH DAILY 5/26/21  Yes Luis Hernandez MD   alogliptin (NESINA) 25 MG TABS tablet TAKE ONE TABLET BY MOUTH ONCE DAILY FOR BLOOD SUGAR 9/8/20  Yes Historical Provider, MD   torsemide (DEMADEX) 20 MG tablet TAKE 1 TABLET BY MOUTH TWO  TIMES DAILY  Patient taking differently: 20 mg daily  1/18/21  Yes Luis Hernandez MD   blood glucose test strips (TRUE METRIX BLOOD GLUCOSE TEST) strip Test daily.  DX: E11.9 1/18/21  Yes True Mock Kendal Delgado MD   dilTIAZem (CARDIZEM) 120 MG tablet TAKE 1 TABLET BY MOUTH  EVERY 12 HOURS 9/3/20  Yes Rea Vu MD   Easy Touch Lancets 32G/Twist MISC TEST DAILY. DX: E11.9 3/23/20  Yes Duarte Bond MD   blood glucose test strips (TRUE METRIX BLOOD GLUCOSE TEST) strip USE TO CHECK BLOOD GLUCOSE DAILY. DX: E11.9 3/23/20  Yes Duarte Bond MD   ACCU-CHEK MULTICLIX LANCETS MISC Check sugars once daily. Dx;E11.9 3/11/19  Yes Duarte Bond MD   Lancet Devices (EASY TOUCH LANCING DEVICE) MISC Test Daily. DX: E11.9 12/26/18  Yes Duarte Bond MD   Blood Glucose Monitoring Suppl (TRUE METRIX METER) VINNIE Use to check daily. DX;E11.9 12/22/18  Yes Duarte Bond MD   Lancets Misc. (ACCU-CHEK MULTICLIX LANCET DEV) KIT Check sugars once daily. DX;E11.9 12/21/18  Yes Duarte Bond MD   Biotin 5 MG TABS Take 5 mg by mouth daily   Yes Historical Provider, MD   Cholecalciferol (VITAMIN D3) 5000 units TABS Take 5,000 Units by mouth daily   Yes Historical Provider, MD   Cyanocobalamin (VITAMIN B-12 CR PO) Take 5,000 mcg by mouth every 7 days    Yes Historical Provider, MD   Multiple Vitamins-Minerals (THERAPEUTIC MULTIVITAMIN-MINERALS) tablet Take 2 tablets by mouth daily    Yes Historical Provider, MD   magnesium oxide (MAG-OX) 400 MG tablet Take 400 mg by mouth daily. Yes Historical Provider, MD   aspirin 81 MG chewable tablet Take 1 tablet by mouth daily  Patient not taking: Reported on 10/4/2021 6/18/21   [employer], APRN - CNP   clobetasol (TEMOVATE) 0.05 % ointment APPLY TOPICALLY 2 TIMES DAILY.   Patient not taking: Reported on 6/18/2021 12/31/19   Duarte Bond MD        Allergies:  Bactrim [sulfamethoxazole-trimethoprim], Ciprofloxacin, Macrobid [nitrofurantoin monohyd macro], Sulfamethoxazole, Theophylline, Cefuroxime axetil, Cipro xr, Other, and Tape [adhesive tape]     Physical Examination:    Vitals:    10/04/21 0835   BP: 120/78   Pulse: 69   Temp: 96.4 °F (35.8 °C)   SpO2: 97%   Weight: (!) 330 lb (149.7 kg)   Height: 6' 3\" (1.905 m)     Constitutional and General Appearance: Warm and dry, no apparent distress, normal coloration  HEENT:  Normocephalic, atraumatic  Respiratory:  · Normal excursion and expansion without use of accessory muscles  · Resp Auscultation: course t/o posteriorly bilaterally, no rales or wheezes  Cardiovascular:  · The apical impulses not displaced  · Heart tones are crisp and normal  · JVP 8 cm H2O  · Regular rate and rhythm, normal S1S2, no m/g/r  · Peripheral pulses are symmetrical and full  · There is no clubbing, cyanosis of the extremities.   · 1+ pretibial BLE edema  · Pedal Pulses: 2+ and equal   Abdomen:  · No masses or tenderness  · Liver/Spleen: No Abnormalities Noted  Neurological/Psychiatric:  · Alert and oriented in all spheres  · Moves all extremities well  · Exhibits normal gait balance and coordination  · No abnormalities of mood, affect, memory, mentation, or behavior are noted    Lab Data:  Most recent lab results below reviewed in office    CBC:   Lab Results   Component Value Date    WBC 10.1 06/17/2021    WBC 8.3 06/16/2021    WBC 9.7 12/08/2020    RBC 3.93 06/17/2021    RBC 4.12 06/16/2021    RBC 4.35 12/08/2020    HGB 11.4 06/17/2021    HGB 12.1 06/16/2021    HGB 12.8 12/08/2020    HCT 34.5 06/17/2021    HCT 36.2 06/16/2021    HCT 38.6 12/08/2020    MCV 87.6 06/17/2021    MCV 87.9 06/16/2021    MCV 88.8 12/08/2020    RDW 16.3 06/17/2021    RDW 16.4 06/16/2021    RDW 16.3 12/08/2020     06/17/2021     06/16/2021     12/08/2020     BMP:  Lab Results   Component Value Date     06/17/2021     06/16/2021     07/23/2020    K 4.3 06/17/2021    K 4.0 06/16/2021    K 4.5 07/23/2020    K 4.7 04/25/2020    K 3.7 02/16/2019    K 4.2 01/26/2018     06/17/2021     06/16/2021    CL 99 07/23/2020    CO2 24 06/17/2021    CO2 25 06/16/2021    CO2 27 07/23/2020    PHOS 2.2 02/19/2019    PHOS 2.2 02/17/2019    PHOS 2.0 11/10/2016    BUN 20 06/17/2021    BUN 26 06/16/2021    BUN 24 07/23/2020    CREATININE 1.0 06/17/2021    CREATININE 1.2 06/16/2021    CREATININE 1.2 07/23/2020     BNP:   Lab Results   Component Value Date    PROBNP 640 04/25/2020    PROBNP 169 06/13/2017    PROBNP 800 11/10/2016     LIPID:   Lab Results   Component Value Date    TRIG 126 06/16/2021    TRIG 159 12/19/2018    HDL 39 06/16/2021    HDL 41 12/08/2020    LDLCALC 75 06/16/2021    LDLCALC 77 12/08/2020       Cardiac Imaging:  CARDIAC CATH/ PCI 6/17/2021;   Procedures Performed:   1. Selective left heart catheterization  2. Percutaneous coronary intervention of the left anterior descending and diagonal artery artery using complex bifurcation technique. Procedure Findings:  1. Critical LAD diagonal disease with angina classification 0, 0, 1 along with mid LAD 70% stenosis  Details:              Grace Serrano was brought to the cardiac catheterization lab in a fasting state after informed consent was obtained. If the patient was able to provide written consent, it was obtained. The patient's vitals were monitored through out the procedure. The patient was sedated using the appropriate levels of sedation and ASA guidelines. The appropriate access site area was prepped and drapped in a sterile fashion. The area was anesthetized with 2% lidocaine. Using the modified Seldinger technique, an arterial sheath was changed out for the existing sheath. An Western Ashleigh sheath was placed. The sheath was flushed and prepped in usual fashion. The patient had undergone diagnostic coronary angiogram by Dr. Carolyn Verduzco. His coronary angiogram demonstrated critical disease of the LAD and diagonal.  Interventional cardiology was asked to take over the patient's case. Initial , access was obtained by Dr. Carolyn Verduzco. The interventional portion of the procedure was completed utilizing a 7 [place] Ashleigh system.   XB 3.5 guide cath advanced into the left main coronary ostium. Initially a BMW wire was advanced into the distal LAD. And a second BMW wire was advanced into the first diagonal branch. We completed balloon angioplasty of the first diagonal branch initially with a 2.5 mm x 12 m trek balloon. We subsequently did balloon angioplasty of the mid LAD with a 2.5 x 12 mm trek balloon also. We subsequently stented the mid LAD with a 2.5 mm x 12 mm Abbott vascular Xience 0 drug-eluting stent. Finally we followed up with mini crush technique of the LAD. We placed a 2.75 x 18mm Abbott vascular Xience Kelly drug-eluting stent into the diagonal.  This was pulled and withdrawn into the LAD. This was inflated to nominal pressure. We separately did post dilate the ostial portion of the diagonal with a 3.0 mm x 8 mm balloon. Eventually we did mini crush of the stent utilizing a 3.5 mm x 12 mm noncompliant trek balloon separately placed a stent into the LAD a 4.0 mm x 15 mm. Eventually we did kissing simultaneous angioplasty of the LAD and diagonal utilizing a 4.0 mm x 12 mm trek balloon along with a 3.0 mm x 12 mm trek balloon. These were inflated to rated burst pressure. Posterior views after excellent results. CONCLUSIONS:   1. Successful complex LAD diagonal stenting utilizing mini crush technique. ASSESSMENT/RECOMMENDATIONS:   1. Dual antiplatelet therapy along with anticoagulation for 30 days. After 30 days we can discontinue dual antiplatelet therapy and continue with single antiplatelet therapy along with oral anticoagulation. Cath 6/16/21  FINDINGS:  1. Left main coronary artery. The left main coronary artery shows mild atherosclerosis of less than 20%. 2.  Left anterior descending artery. The left anterior descending artery has a 30% proximal stenosis and a 50% to 70% mid stenosis. There was a 70% stenosis in the ostium with first large diagonal artery. 3.  Circumflex artery.   The circumflex artery is mild atherosclerosis of approximately 30%. 4.  Right coronary artery. The right coronary artery is large dominant arteries. He has atherosclerosis of approximately 30%. There is a right posterolateral branch of the distal right coronary artery, which has a 50% mid stenosis. 5.  Left  ventriculogram.  Left ventriculogram shows uniformly normal wall motion. Ejection fraction appears to be 55% to 60%. RECOMMENDATION:  After discussion with the interventionalist, the patient will be taken back to the cath lab for angioplasty of his left anterior descending and diagonal arteries. ECHO 6/16/21  Summary   Left ventricular systolic function is normal with a visually estimated ejection fraction of 55%. The left ventricle is normal in size with mild concentric hypertrophy. No obvious regional wall motion abnormalities noted. Increased left ventricular filling pressure. The left atrium appears moderately enlarged. The right atrium is severely enlarged. Right ventricle is enlarged but normal function. Inadequate tricuspid valve regurgitation to estimate systolic pulmonary artery pressure. The IVC is dilated in size (>2.1 cm) but appears to collapse >50% with respiration consistent with elevated right atrial pressures (8 mmHg) . Stress test 5/19/2021  Conclusions    Summary  The overall quality of the study is fair. There is subdiaphragmatic  attenuation. Left ventricular cavity is noted to be normal size. The right ventricle is  normal in size. SPECT images demonstrate a small area of mildly decreased perfusion in the  apical lateral and mid-inferior lateral segments. The defect is mixed  ischemia and scar. There is associated wall motion abnormality, consistent  with ischemia. Gated SPECT imaging reveals normal myocardial thickening and  wall motion. Sum stress score of 7. No visual TID. Calculated TID of 0.99. The left ventricular ejection fraction is calculated to be 63%. Myocardial perfusion is abnormal. Consistent with mixed ischemia and scar. Low risk scan. REECE 3/31-4/30/2021  No atrial fib   Predominately NSR  PSVT- symptomatic   NSVT/PVC's-  Symptomatic  Nocturnal bradycardia      Limited echo 10/31/2017: This is a limited study pericardial effusion and shunt. Normal left ventricular systolic function with an estimated ejection   fraction of 55%. A bubble study was performed and fails to show evidence of shunting. There is a very small circumferential pericardial effusion noted. Echo 8/17/1908:  Normal systolic function with an estimated ejection fraction of 55-60%. Mild to moderate concentric left ventricular hypertrophy. No regional wall motion abnormalities are seen. Left ventricular diastolic filling pressure is indeterminate. Left atrium is moderately dilated. No change from last echo on 11/24/1015. Echo 11/24/2015: Moderate concentric left ventricular hypertrophy is present. Global ejection fraction is normal and estimated from 55 % to 60 %. No obvious regional wall motion abnormalities are noted. Diastolic filling parameters suggests diastolic dysfunction. Moderately dilated left atrium. Aortic valve appears sclerotic but opens adequately. Trivial mitral and tricuspid regurgitation. Systolic pulmonary artery pressure (SPAP) is normal and estimated at 23 mmHg (RA pressure 3 mm Hg). Stress test 11/1/2017: There is no evidence of stress induced ischemia. Normal LV function with    ejection fraction of 66%. There are no regional wall motion abnormalities. Low risk study. [facility] (per Dr. Holley Sicard note) 2012:  2012 [facility] - 50% LCx lesion, non-obstructive disease in the remainder of his coronaries. Stress test 12/22/2011:   1. Small sized reversible perfusion defect involving the   anteroseptal wall of the left ventricle to mid heart concerning   for myocardial ischemia. Please correlate with EKG findings.    2. Left ventricular hypertrophy.    3. LVEF 59 %           I appreciate the opportunity of cooperating in the care of this individual.    Kavitha Mackay, SERENA - CNP, 10/4/2021, 8:49 AM

## 2021-10-04 NOTE — PATIENT INSTRUCTIONS
1. Doxycycline 100 mg twice daily for 10 days  2. No change in current heart medicines  3. Keep warfarin at 5 mg daily for 1 week (do not take the higher 7.5 mg dose while on antibiotic). Check INR in 5 days and call to Pooja Salinas MD   4. Skip cardiac rehab this week then return next Monday  Avoid over the counter cold medications that contain pseudo-ephredrines, phenylephrines, neosynephrines = decongestants  Okay to take antihistamines without the decongestant (Claritin, Allegra, Zyrtec, Benadryl without the \"D\")  Okay to take guaifenesin (Mucinex or Robitussion) to help clear phlegm, okay to take dextromethorphan (Delsym) as cough suppressant.   5.  Follow up with Dr. Vernon Melissa in 6 months

## 2021-10-11 ENCOUNTER — HOSPITAL ENCOUNTER (OUTPATIENT)
Age: 76
Discharge: HOME | End: 2021-10-11
Payer: MEDICARE

## 2021-10-11 ENCOUNTER — TELEPHONE (OUTPATIENT)
Dept: INTERNAL MEDICINE CLINIC | Age: 76
End: 2021-10-11

## 2021-10-11 ENCOUNTER — HOSPITAL ENCOUNTER (OUTPATIENT)
Dept: GENERAL RADIOLOGY | Age: 76
Discharge: HOME | End: 2021-10-11
Payer: MEDICARE

## 2021-10-11 DIAGNOSIS — R05.9 COUGH: ICD-10-CM

## 2021-10-11 DIAGNOSIS — R05.9 COUGH: Primary | ICD-10-CM

## 2021-10-11 PROCEDURE — 71046 X-RAY EXAM CHEST 2 VIEWS: CPT

## 2021-10-11 NOTE — TELEPHONE ENCOUNTER
----- Message from Annamaria Evans MD sent at 10/11/2021 10:48 AM EDT -----  Contact: Doroteo Lucama [phone]  Complete abx  If symptomatic, can do chest xray  ----- Message -----  From: Ummtali Herminio  Sent: 10/11/2021   8:10 AM EDT  To: Annamaria Evans MD    Patient called and is concerned he has been to see a cardiologist and they had put him on antibiotics. He said he has been tested for covid that came back negative. Patient states he is very congested and is coughing a lot. And that when he coughs that on the left side of his head he gets a bad pain. He feels he needs to be seen to see what is still going on.         [facility] [address] [address] - F [phone]

## 2021-10-12 ENCOUNTER — TELEPHONE (OUTPATIENT)
Dept: INTERNAL MEDICINE CLINIC | Age: 76
End: 2021-10-12

## 2021-10-12 ENCOUNTER — ANTI-COAG VISIT (OUTPATIENT)
Dept: INTERNAL MEDICINE CLINIC | Age: 76
End: 2021-10-12

## 2021-10-12 LAB — INR BLD: 2.4

## 2021-10-12 NOTE — TELEPHONE ENCOUNTER
----- Message from Arvind Gregg MD sent at 10/12/2021  9:44 AM EDT -----  Bridgette Mccallum to mucinex  ----- Message -----  From: Connie Gonzalez  Sent: 10/12/2021   8:09 AM EDT  To: MD Dr Shabbir Palma patient. Had a bad cold last week, he tested negative for covid. Dr Stewart Alberts office prescribed patient Doxycycline BID for 10 days and his last day will be Thursday. Dr Darron Pendleton ordered chest xray that came back normal.  Patient states that he is still coughing and has sinus drainage. He wants to know if he can take Mucinex with all his other medications? Is there something else you suggest?  Please advise.

## 2021-10-12 NOTE — TELEPHONE ENCOUNTER
----- Message from Pippa Botello MD sent at 10/12/2021  9:45 AM EDT -----  Same   ----- Message -----  From: Cecily Elaine  Sent: 10/12/2021   8:26 AM EDT  To: MD Dr Dipak Hinds patient   INR: 2.4    Last INR: 2.4 on 9/27/21  Patient currently on doxycyline and will be finished on Thursday

## 2021-10-13 ENCOUNTER — HOSPITAL ENCOUNTER (OUTPATIENT)
Dept: CARDIAC REHAB | Age: 76
Setting detail: THERAPIES SERIES
Discharge: HOME | End: 2021-10-13
Payer: MEDICARE

## 2021-10-13 PROCEDURE — 93798 PHYS/QHP OP CAR RHAB W/ECG: CPT

## 2021-10-15 ENCOUNTER — HOSPITAL ENCOUNTER (OUTPATIENT)
Dept: CARDIAC REHAB | Age: 76
Setting detail: THERAPIES SERIES
Discharge: HOME | End: 2021-10-15
Payer: MEDICARE

## 2021-10-15 PROCEDURE — 93798 PHYS/QHP OP CAR RHAB W/ECG: CPT

## 2021-10-18 ENCOUNTER — HOSPITAL ENCOUNTER (OUTPATIENT)
Dept: CARDIAC REHAB | Age: 76
Setting detail: THERAPIES SERIES
Discharge: HOME | End: 2021-10-18
Payer: MEDICARE

## 2021-10-18 PROCEDURE — 93798 PHYS/QHP OP CAR RHAB W/ECG: CPT

## 2021-10-19 ENCOUNTER — TELEPHONE (OUTPATIENT)
Dept: CARDIOLOGY CLINIC | Age: 76
End: 2021-10-19

## 2021-10-19 RX ORDER — DOFETILIDE 0.25 MG/1
250 CAPSULE ORAL 2 TIMES DAILY
Qty: 60 CAPSULE | Refills: 3 | Status: ON HOLD
Start: 2021-10-19 | End: 2022-09-09 | Stop reason: HOSPADM

## 2021-10-19 RX ORDER — ATORVASTATIN CALCIUM 40 MG/1
TABLET, FILM COATED ORAL
Qty: 90 TABLET | Refills: 0 | Status: SHIPPED | OUTPATIENT
Start: 2021-10-19 | End: 2022-01-20

## 2021-10-19 NOTE — TELEPHONE ENCOUNTER
Last OV with 03/31/2021 with GWENDOLYN   BMP 06/17/2021  EKG 06/16/2021  NO upcoming OV with EP  Please advise BREANA   TY

## 2021-10-19 NOTE — TELEPHONE ENCOUNTER
Pt needs refill on dofetilide (TIKOSYN) 250 MCG capsule. [facility] [address], [address] - F [phone]     Pt only has enough for tomorrow. Pt usually gets through [facility] but they can't get it to him by tomorrow.

## 2021-10-20 ENCOUNTER — HOSPITAL ENCOUNTER (OUTPATIENT)
Dept: CARDIAC REHAB | Age: 76
Setting detail: THERAPIES SERIES
Discharge: HOME | End: 2021-10-20
Payer: MEDICARE

## 2021-10-20 PROCEDURE — 93798 PHYS/QHP OP CAR RHAB W/ECG: CPT

## 2021-10-22 ENCOUNTER — HOSPITAL ENCOUNTER (OUTPATIENT)
Dept: CARDIAC REHAB | Age: 76
Setting detail: THERAPIES SERIES
Discharge: HOME | End: 2021-10-22
Payer: MEDICARE

## 2021-10-22 PROCEDURE — 93798 PHYS/QHP OP CAR RHAB W/ECG: CPT

## 2021-10-25 ENCOUNTER — HOSPITAL ENCOUNTER (OUTPATIENT)
Dept: CARDIAC REHAB | Age: 76
Setting detail: THERAPIES SERIES
Discharge: HOME | End: 2021-10-25
Payer: MEDICARE

## 2021-10-25 PROCEDURE — 93798 PHYS/QHP OP CAR RHAB W/ECG: CPT

## 2021-10-27 ENCOUNTER — HOSPITAL ENCOUNTER (OUTPATIENT)
Dept: CARDIAC REHAB | Age: 76
Setting detail: THERAPIES SERIES
Discharge: HOME | End: 2021-10-27
Payer: MEDICARE

## 2021-10-27 PROCEDURE — 93798 PHYS/QHP OP CAR RHAB W/ECG: CPT

## 2021-10-29 ENCOUNTER — HOSPITAL ENCOUNTER (OUTPATIENT)
Dept: CARDIAC REHAB | Age: 76
Setting detail: THERAPIES SERIES
Discharge: HOME | End: 2021-10-29
Payer: MEDICARE

## 2021-10-29 PROCEDURE — 93798 PHYS/QHP OP CAR RHAB W/ECG: CPT

## 2021-11-01 ENCOUNTER — HOSPITAL ENCOUNTER (OUTPATIENT)
Dept: CARDIAC REHAB | Age: 76
Setting detail: THERAPIES SERIES
Discharge: HOME | End: 2021-11-01
Payer: MEDICARE

## 2021-11-01 ENCOUNTER — ANTI-COAG VISIT (OUTPATIENT)
Dept: INTERNAL MEDICINE CLINIC | Age: 76
End: 2021-11-01

## 2021-11-01 ENCOUNTER — TELEPHONE (OUTPATIENT)
Dept: INTERNAL MEDICINE CLINIC | Age: 76
End: 2021-11-01

## 2021-11-01 LAB — INR BLD: 2.6

## 2021-11-01 PROCEDURE — 93798 PHYS/QHP OP CAR RHAB W/ECG: CPT

## 2021-11-03 ENCOUNTER — HOSPITAL ENCOUNTER (OUTPATIENT)
Dept: CARDIAC REHAB | Age: 76
Setting detail: THERAPIES SERIES
Discharge: HOME | End: 2021-11-03
Payer: MEDICARE

## 2021-11-03 PROCEDURE — 93798 PHYS/QHP OP CAR RHAB W/ECG: CPT

## 2021-11-05 ENCOUNTER — HOSPITAL ENCOUNTER (OUTPATIENT)
Dept: CARDIAC REHAB | Age: 76
Setting detail: THERAPIES SERIES
Discharge: HOME | End: 2021-11-05
Payer: MEDICARE

## 2021-11-05 PROCEDURE — 93798 PHYS/QHP OP CAR RHAB W/ECG: CPT

## 2021-11-08 ENCOUNTER — HOSPITAL ENCOUNTER (OUTPATIENT)
Dept: CARDIAC REHAB | Age: 76
Setting detail: THERAPIES SERIES
Discharge: HOME | End: 2021-11-08
Payer: MEDICARE

## 2021-11-08 ENCOUNTER — ANTI-COAG VISIT (OUTPATIENT)
Dept: INTERNAL MEDICINE CLINIC | Age: 76
End: 2021-11-08

## 2021-11-08 ENCOUNTER — TELEPHONE (OUTPATIENT)
Dept: INTERNAL MEDICINE CLINIC | Age: 76
End: 2021-11-08

## 2021-11-08 LAB — INR BLD: 2.4

## 2021-11-08 PROCEDURE — 93798 PHYS/QHP OP CAR RHAB W/ECG: CPT

## 2021-11-08 NOTE — TELEPHONE ENCOUNTER
----- Message from Annamaria Evans MD sent at 11/8/2021  9:33 AM EST -----  No change  2 week  ----- Message -----  From: Clearnce Riedel  Sent: 11/8/2021   9:32 AM EST  To: Annamaria Evans MD    INR- 2.4

## 2021-11-10 ENCOUNTER — HOSPITAL ENCOUNTER (OUTPATIENT)
Dept: CARDIAC REHAB | Age: 76
Setting detail: THERAPIES SERIES
Discharge: HOME | End: 2021-11-10
Payer: MEDICARE

## 2021-11-10 PROCEDURE — 93798 PHYS/QHP OP CAR RHAB W/ECG: CPT

## 2021-11-11 ENCOUNTER — TELEPHONE (OUTPATIENT)
Dept: INTERNAL MEDICINE CLINIC | Age: 76
End: 2021-11-11

## 2021-11-11 RX ORDER — SENNA PLUS 8.6 MG/1
1 TABLET ORAL 2 TIMES DAILY
Qty: 60 TABLET | Refills: 0 | Status: ON HOLD
Start: 2021-11-11 | End: 2022-09-09 | Stop reason: HOSPADM

## 2021-11-11 NOTE — TELEPHONE ENCOUNTER
----- Message from Vee Delgado MD sent at 11/11/2021  2:41 PM EST -----  Contact: Mirian Wang [phone]  Start on sennakot 1 tab bid 80 mg   Stop OTC supplements  Daily miralax  ----- Message -----  From: Wilfrido Thomas  Sent: 11/11/2021   2:11 PM EST  To: Vee Delgado MD    Patient states he is using a [employer] brand stool softener. He is currently taking 3 tablets daily.   ----- Message -----  From: Vee Delgado MD  Sent: 11/11/2021   2:00 PM EST  To: Michell Aguila Josafat    Try vaseline to nose at nights to avoid dryness  Which stool softners is he taking  ----- Message -----  From: Henna Saeed  Sent: 11/11/2021  11:09 AM EST  To: Vee Delgado MD    Patient takes Plavix and Warfarin. He is experiencing heavy nose bleeds in the morning, severe constipation (taking stool softeners). Patient uses a C-pap at night with humidifier. His INR tested 2.4 on 11/1/21. He is asking if there is something else he needs to do or be prescribed.      Thank  you

## 2021-11-12 ENCOUNTER — HOSPITAL ENCOUNTER (OUTPATIENT)
Dept: CARDIAC REHAB | Age: 76
Setting detail: THERAPIES SERIES
Discharge: HOME | End: 2021-11-12
Payer: MEDICARE

## 2021-11-12 PROCEDURE — 93798 PHYS/QHP OP CAR RHAB W/ECG: CPT

## 2021-11-15 ENCOUNTER — HOSPITAL ENCOUNTER (OUTPATIENT)
Dept: CARDIAC REHAB | Age: 76
Setting detail: THERAPIES SERIES
Discharge: HOME | End: 2021-11-15
Payer: MEDICARE

## 2021-11-15 PROCEDURE — 93798 PHYS/QHP OP CAR RHAB W/ECG: CPT

## 2021-11-17 ENCOUNTER — HOSPITAL ENCOUNTER (OUTPATIENT)
Dept: CARDIAC REHAB | Age: 76
Setting detail: THERAPIES SERIES
Discharge: HOME | End: 2021-11-17
Payer: MEDICARE

## 2021-11-17 PROCEDURE — 93798 PHYS/QHP OP CAR RHAB W/ECG: CPT

## 2021-11-18 ENCOUNTER — TELEPHONE (OUTPATIENT)
Dept: INTERNAL MEDICINE CLINIC | Age: 76
End: 2021-11-18

## 2021-11-18 RX ORDER — SEMAGLUTIDE 1.34 MG/ML
INJECTION, SOLUTION SUBCUTANEOUS
COMMUNITY
End: 2022-02-02 | Stop reason: ALTCHOICE

## 2021-11-18 RX ORDER — SEMAGLUTIDE 1.34 MG/ML
INJECTION, SOLUTION SUBCUTANEOUS
Status: CANCELLED | OUTPATIENT
Start: 2021-11-18

## 2021-11-18 NOTE — TELEPHONE ENCOUNTER
----- Message from Francesca Mtz MD sent at 11/18/2021  1:48 PM EST -----  Contact: Miracle Michaels [phone]  Yes  ----- Message -----  From: Francis Batty  Sent: 11/18/2021  12:42 PM EST  To: Francesca Mtz MD      ----- Message -----  From: Nay Eid  Sent: 11/18/2021   8:35 AM EST  To: Chepe Dobbins MD    Patient states he talked with the [facility] about the Trulicity that was previously discussed with you to begin taking. [facility] will not [facility] the Trulicity but will cover Ozempic. Do you approve of the Ozempic instead of the Trulicity?     Thank you

## 2021-11-18 NOTE — TELEPHONE ENCOUNTER
----- Message from Can Clark MD sent at 11/18/2021  2:34 PM EST -----  Contact: Edwin Perez [phone]  Stop nesina when started on ozempic  Ozempic 0.25 mg injection subcut once a week x 4 weeks. Then 1 mg weekly  ----- Message -----  From: [facility]  Sent: 11/18/2021   2:11 PM EST  To: Can Clark MD    Dose and directions?   ----- Message -----  From: Can Clark MD  Sent: 11/18/2021   1:48 PM EST  To: Nikolas Jacobo    Yes  ----- Message -----  From: Nikolas Jacboo  Sent: 11/18/2021  12:42 PM EST  To: Can Clark MD      ----- Message -----  From: Ike Buckley  Sent: 11/18/2021   8:35 AM EST  To: Nancy Hawkins MD    Patient states he talked with the [place] about the Trulicity that was previously discussed with you to begin taking. The [facility] will not cover the Trulicity but will cover Ozempic. Do you approve of the Ozempic instead of the Trulicity?     Thank you

## 2021-11-18 NOTE — TELEPHONE ENCOUNTER
Patient informed and states the [facility] will prescribe Ozempic.   Patient voiced understanding to stop [place]

## 2021-11-19 ENCOUNTER — TELEPHONE (OUTPATIENT)
Dept: INTERNAL MEDICINE CLINIC | Age: 76
End: 2021-11-19

## 2021-11-19 ENCOUNTER — HOSPITAL ENCOUNTER (OUTPATIENT)
Dept: CARDIAC REHAB | Age: 76
Setting detail: THERAPIES SERIES
Discharge: HOME | End: 2021-11-19
Payer: MEDICARE

## 2021-11-19 PROCEDURE — 93798 PHYS/QHP OP CAR RHAB W/ECG: CPT

## 2021-11-19 NOTE — TELEPHONE ENCOUNTER
----- Message from Kristan Chapman MD sent at 11/19/2021  1:56 PM EST -----  Contact: [phone]  Next week if still symptomatic  Cant see today   ----- Message -----  From: Shira Ramires MA  Sent: 11/19/2021   1:41 PM EST  To: Kristan Chapman MD    Pt called with a complaint of weakness, fatigued blurred vision and dizzy wanted an appointment if possible said his blood sugars are fine but not sure why he is feeling bad.

## 2021-11-22 ENCOUNTER — ANTI-COAG VISIT (OUTPATIENT)
Dept: INTERNAL MEDICINE CLINIC | Age: 76
End: 2021-11-22

## 2021-11-22 ENCOUNTER — HOSPITAL ENCOUNTER (OUTPATIENT)
Dept: CARDIAC REHAB | Age: 76
Setting detail: THERAPIES SERIES
Discharge: HOME | End: 2021-11-22
Payer: MEDICARE

## 2021-11-22 ENCOUNTER — TELEPHONE (OUTPATIENT)
Dept: INTERNAL MEDICINE CLINIC | Age: 76
End: 2021-11-22

## 2021-11-22 LAB — INR BLD: 2.7

## 2021-11-22 PROCEDURE — 93798 PHYS/QHP OP CAR RHAB W/ECG: CPT

## 2021-11-24 ENCOUNTER — HOSPITAL ENCOUNTER (OUTPATIENT)
Dept: CARDIAC REHAB | Age: 76
Setting detail: THERAPIES SERIES
Discharge: HOME | End: 2021-11-24
Payer: MEDICARE

## 2021-11-24 PROCEDURE — 93798 PHYS/QHP OP CAR RHAB W/ECG: CPT

## 2021-11-26 ENCOUNTER — APPOINTMENT (OUTPATIENT)
Dept: CARDIAC REHAB | Age: 76
End: 2021-11-26
Payer: MEDICARE

## 2021-11-29 ENCOUNTER — HOSPITAL ENCOUNTER (OUTPATIENT)
Dept: CARDIAC REHAB | Age: 76
Setting detail: THERAPIES SERIES
Discharge: HOME | End: 2021-11-29
Payer: MEDICARE

## 2021-11-29 PROCEDURE — 93798 PHYS/QHP OP CAR RHAB W/ECG: CPT

## 2021-12-03 ENCOUNTER — HOSPITAL ENCOUNTER (OUTPATIENT)
Dept: CARDIAC REHAB | Age: 76
Setting detail: THERAPIES SERIES
Discharge: HOME | End: 2021-12-03
Payer: MEDICARE

## 2021-12-03 ENCOUNTER — OFFICE VISIT (OUTPATIENT)
Dept: CARDIOLOGY CLINIC | Age: 76
End: 2021-12-03
Payer: MEDICARE

## 2021-12-03 VITALS
HEART RATE: 80 BPM | BODY MASS INDEX: 39.17 KG/M2 | DIASTOLIC BLOOD PRESSURE: 62 MMHG | OXYGEN SATURATION: 98 % | HEIGHT: 75 IN | WEIGHT: 315 LBS | SYSTOLIC BLOOD PRESSURE: 110 MMHG

## 2021-12-03 DIAGNOSIS — I10 ESSENTIAL HYPERTENSION: ICD-10-CM

## 2021-12-03 DIAGNOSIS — I48.0 PAF (PAROXYSMAL ATRIAL FIBRILLATION) (HCC): Primary | ICD-10-CM

## 2021-12-03 DIAGNOSIS — I48.0 PAF (PAROXYSMAL ATRIAL FIBRILLATION) (HCC): ICD-10-CM

## 2021-12-03 DIAGNOSIS — R53.83 OTHER FATIGUE: ICD-10-CM

## 2021-12-03 LAB
A/G RATIO: 1.6 (ref 1.1–2.2)
ALBUMIN SERPL-MCNC: 4.4 G/DL (ref 3.4–5)
ALP BLD-CCNC: 104 U/L (ref 40–129)
ALT SERPL-CCNC: 23 U/L (ref 10–40)
ANION GAP SERPL CALCULATED.3IONS-SCNC: 15 MMOL/L (ref 3–16)
AST SERPL-CCNC: 20 U/L (ref 15–37)
BILIRUB SERPL-MCNC: 0.5 MG/DL (ref 0–1)
BUN BLDV-MCNC: 29 MG/DL (ref 7–20)
CALCIUM SERPL-MCNC: 9.2 MG/DL (ref 8.3–10.6)
CHLORIDE BLD-SCNC: 100 MMOL/L (ref 99–110)
CO2: 24 MMOL/L (ref 21–32)
CREAT SERPL-MCNC: 1.4 MG/DL (ref 0.8–1.3)
GFR AFRICAN AMERICAN: 60
GFR NON-AFRICAN AMERICAN: 49
GLUCOSE BLD-MCNC: 245 MG/DL (ref 70–99)
HCT VFR BLD CALC: 37.5 % (ref 40.5–52.5)
HEMOGLOBIN: 12.3 G/DL (ref 13.5–17.5)
MAGNESIUM: 2.1 MG/DL (ref 1.8–2.4)
MCH RBC QN AUTO: 28.8 PG (ref 26–34)
MCHC RBC AUTO-ENTMCNC: 32.7 G/DL (ref 31–36)
MCV RBC AUTO: 88.1 FL (ref 80–100)
PDW BLD-RTO: 16.5 % (ref 12.4–15.4)
PLATELET # BLD: 226 K/UL (ref 135–450)
PMV BLD AUTO: 8.9 FL (ref 5–10.5)
POTASSIUM SERPL-SCNC: 4.3 MMOL/L (ref 3.5–5.1)
RBC # BLD: 4.26 M/UL (ref 4.2–5.9)
SODIUM BLD-SCNC: 139 MMOL/L (ref 136–145)
TOTAL PROTEIN: 7.1 G/DL (ref 6.4–8.2)
TSH REFLEX FT4: 1.56 UIU/ML (ref 0.27–4.2)
WBC # BLD: 9.7 K/UL (ref 4–11)

## 2021-12-03 PROCEDURE — 4040F PNEUMOC VAC/ADMIN/RCVD: CPT | Performed by: INTERNAL MEDICINE

## 2021-12-03 PROCEDURE — 93798 PHYS/QHP OP CAR RHAB W/ECG: CPT

## 2021-12-03 PROCEDURE — 99214 OFFICE O/P EST MOD 30 MIN: CPT | Performed by: INTERNAL MEDICINE

## 2021-12-03 PROCEDURE — 1036F TOBACCO NON-USER: CPT | Performed by: INTERNAL MEDICINE

## 2021-12-03 PROCEDURE — G8427 DOCREV CUR MEDS BY ELIG CLIN: HCPCS | Performed by: INTERNAL MEDICINE

## 2021-12-03 PROCEDURE — G8484 FLU IMMUNIZE NO ADMIN: HCPCS | Performed by: INTERNAL MEDICINE

## 2021-12-03 PROCEDURE — 1123F ACP DISCUSS/DSCN MKR DOCD: CPT | Performed by: INTERNAL MEDICINE

## 2021-12-03 PROCEDURE — 3017F COLORECTAL CA SCREEN DOC REV: CPT | Performed by: INTERNAL MEDICINE

## 2021-12-03 PROCEDURE — G8417 CALC BMI ABV UP PARAM F/U: HCPCS | Performed by: INTERNAL MEDICINE

## 2021-12-03 NOTE — PROGRESS NOTES
[address]   Cardiac Followup    Referring Provider:  Nicolle Hewitt MD     Chief Complaint   Patient presents with    Follow-up    Fatigue    Shortness of Breath     on exertion    Edema     bilateral ankles and legs    Other     high HR      Wade Aguilar   1945    History of Present Illness:    Wade Aguilar is a 77 y.o. male who is here today for follow up for a past medical history of coronary artery disease, abnormal stress test, paroxysmal atrial fibrillation/atrial flutter, PVC's,  anticoagulation with warfarin, hypertension, hyperlipidemia, diabetes mellitus, MARIA VICTORIA, obesity status post gastric sleeve as well as history of DVT and histoplasmosis s/p thoracotomy. He has a stress test in 2011 which showed a small sized reversible perfusion defect involving the anteroseptal wall of the left ventricle to mid heart concerning for myocardial ischemia. He has a history of PVC ablation in 2014. He stated he continued to have occasional episodes of palpitations and was found to have atrial flutter. He underwent a CV, and an unsuccessful RFCA for typical atrial flutter on 4/12/17. Patient was started on dofetilide in June of 2017. He underwent EPS and aflutter ablation on 9/19/17. He had a normal stress test in 2017. Stress test 5/19/2021 was abnornmal consistent with mixed ischemia and scar. He had a left heart cath 6/16/2021- Findings significant for LAD/Diag lesions, LAD treated with FELIZ x2 and Diag FELIZ x1. ASA stopped one month post procedure and patient continued on Plavix and warfarin. Today he states he has been having increased fatigue. He has attended cardiac rehab around 30 times and has not noted any change in his exercise tolerance over this time. He had recent changes to his diabetes medications. His blood pressure at home runs 108/60. He states he has sleep apnea and wears his CPAP nightly. He is interested in diet education for a heart healthy diet.  He has some leg numbness from his knees down but no pain with walking. Patient currently denies any weight gain, edema, palpitations, chest pain, shortness of breath, dizziness, and syncope. Past Medical History:   has a past medical history of Arthritis, Asthma, Atrial fibrillation ([address] Blood circulation, collateral, Diabetes mellitus ([address] DVT (deep venous thrombosis) ([facility] [address] Histoplasmosis, History of knee replacement procedure of right knee, Hx of blood clots, Hyperlipidemia, Hypertension, Knee osteoarthritis, Left TKR, Lung nodule, Neuromuscular disorder ([address] Palpitations, Sleep apnea, Stone, kidney, and UTI (urinary tract infection). Surgical History:   has a past surgical history that includes Cystoscopy; Tonsillectomy; Knee arthroscopy (4/19/2011); Colonoscopy; Cystocopy (2/8/13); ablation of dysrhythmic focus (2013); Carpal tunnel release (Right, 9-30-15); Bariatric Surgery (2013); Carpal tunnel release (Left, 3/20/16); skin biopsy; Diagnostic Cardiac Cath Lab Procedure; joint replacement (Bilateral); Cardiac surgery (2013); thoracotomy; ERCP (02/18/2019); ERCP (N/A, 2/18/2019); ERCP (2/18/2019); Cholecystectomy, laparoscopic (N/A, 2/19/2019); Intracapsular cataract extraction (Right, 1/14/2020); and Intracapsular cataract extraction (Left, 1/21/2020). Social History:   reports that he quit smoking about 45 years ago. His smoking use included cigarettes. He has a 34.00 pack-year smoking history. He has never used smokeless tobacco. He reports that he does not drink alcohol and does not use drugs. Family History:  family history includes Arthritis in his father; Cancer in his mother; Kidney Disease in his mother; Other in his father; Stroke in his father and mother; Substance Abuse in his father. Home Medications:  Prior to Admission medications    Medication Sig Start Date End Date Taking?  Authorizing Provider   Semaglutide,0.25 or 0.5MG/DOS, (OZEMPIC, 0.25 OR 0.5 MG/DOSE,) 2 MG/1.5ML SOPN Inject into the skin Yes Mayuri Guzman MD   senna (SENOKOT) 8.6 MG tablet Take 1 tablet by mouth 2 times daily 11/11/21  Yes Ilene Gallegos MD   metFORMIN (GLUCOPHAGE) 500 MG tablet TAKE ONE TABLET BY MOUTH TWICE A DAY 10/19/21  Yes Ilene Gallegos MD   atorvastatin (LIPITOR) 40 MG tablet TAKE ONE TABLET BY MOUTH DAILY 10/19/21  Yes Ilene Gallegos MD   [facility]) 250 MCG capsule Take 1 capsule by mouth 2 times daily 10/19/21  Yes Sarah Medrano MD   torsemide (DEMADEX) 20 MG tablet Take 1 tablet by mouth daily 10/4/21  Yes Rheta Standard, APRN - CNP   warfarin (COUMADIN) 5 MG tablet TAKE ONE TABLET BY MOUTH DAILY 8/24/21  Yes Ilene Gallegos MD   spironolactone (ALDACTONE) 25 MG tablet TAKE ONE TABLET BY MOUTH DAILY 8/24/21  Yes Ilene Gallegos MD   montelukast (SINGULAIR) 10 MG tablet TAKE 1 TABLET BY MOUTH ONCE DAILY 8/24/21  Yes Ilene Gallegos MD   [facility]) 250 MCG capsule Take 1 capsule by mouth every 12 hours 7/7/21  Yes Antonio Vargas MD   metoprolol succinate (TOPROL XL) 25 MG extended release tablet Take 1 tablet by mouth daily 6/29/21  Yes Antonio Vargas MD   clopidogrel (PLAVIX) 75 MG tablet Take 1 tablet by mouth daily 6/22/21  Yes Antonio Vargas MD   blood glucose test strips (TRUE METRIX BLOOD GLUCOSE TEST) strip Test daily. DX: E11.9 1/18/21  Yes Ilene Gallegos MD   dilTIAZem (CARDIZEM) 120 MG tablet TAKE 1 TABLET BY MOUTH  EVERY 12 HOURS 9/3/20  Yes Fiona Chappell MD   Easy Touch Lancets 32G/Twist MISC TEST DAILY. DX: E11.9 3/23/20  Yes Ilene Gallegos MD   blood glucose test strips (TRUE METRIX BLOOD GLUCOSE TEST) strip USE TO CHECK BLOOD GLUCOSE DAILY. DX: E11.9 3/23/20  Yes Ilene Gallegos MD   ACCU-CHEK MULTICLIX LANCETS MISC Check sugars once daily. Dx;E11.9 3/11/19  Yes Ilene Gallegos MD   Lancet Devices (EASY TOUCH LANCING DEVICE) MISC Test Daily.  DX: E11.9 12/26/18  Yes Ilene Gallegos MD   Blood Glucose Monitoring Suppl (TRUE METRIX METER) VINNIE Use to check daily. DX;E11.9 12/22/18  Yes Timoteo Dhillon MD   Lancets Misc. (ACCU-CHEK MULTICLIX LANCET DEV) KIT Check sugars once daily. DX;E11.9 12/21/18  Yes Timoteo Dhillon MD   Biotin 5 MG TABS Take 5 mg by mouth daily   Yes Historical Provider, MD   Cholecalciferol (VITAMIN D3) 5000 units TABS Take 5,000 Units by mouth daily   Yes Historical Provider, MD   Cyanocobalamin (VITAMIN B-12 CR PO) Take 5,000 mcg by mouth every 7 days    Yes Historical Provider, MD   Multiple Vitamins-Minerals (THERAPEUTIC MULTIVITAMIN-MINERALS) tablet Take 2 tablets by mouth daily    Yes Historical Provider, MD   magnesium oxide (MAG-OX) 400 MG tablet Take 400 mg by mouth daily. Yes Historical Provider, MD        Allergies:  Bactrim [sulfamethoxazole-trimethoprim], Ciprofloxacin, Macrobid [nitrofurantoin monohyd macro], Sulfamethoxazole, Theophylline, Cefuroxime axetil, Cipro xr, Other, and Tape [adhesive tape]     Review of Systems:   · Constitutional: there has been no unanticipated weight loss. There's been no change in energy level, sleep pattern, or activity level. · Eyes: No visual changes or diplopia. No scleral icterus. · ENT: No Headaches, hearing loss or vertigo. No mouth sores or sore throat. · Cardiovascular: Reviewed in HPI  · Respiratory: No cough or wheezing, no sputum production. No hematemesis. · Gastrointestinal: No abdominal pain, appetite loss, blood in stools. No change in bowel or bladder habits. · Genitourinary: No dysuria, trouble voiding, or hematuria. · Musculoskeletal:  No gait disturbance, weakness or joint complaints. · Integumentary: No rash or pruritis. · Neurological: No headache, diplopia, change in muscle strength, numbness or tingling. No change in gait, balance, coordination, mood, affect, memory, mentation, behavior. · Psychiatric: No anxiety, no depression. · Endocrine: No malaise, fatigue or temperature intolerance. No excessive thirst, fluid intake, or urination. No tremor. · Hematologic/Lymphatic: No abnormal bruising or bleeding, blood clots or swollen lymph nodes. · Allergic/Immunologic: No nasal congestion or hives. Physical Examination:    Vitals:    12/03/21 1310   BP: 110/62   Pulse: 80   SpO2: 98%        Wt Readings from Last 3 Encounters:   12/03/21 (!) 327 lb 3.2 oz (148.4 kg)   10/04/21 (!) 330 lb (149.7 kg)   06/29/21 (!) 327 lb (148.3 kg)       Constitutional and General Appearance: NAD   Respiratory:  · Normal excursion and expansion without use of accessory muscles  · Resp Auscultation: Normal breath sounds without dullness  Cardiovascular:  · The apical impulses not displaced  · Heart tones are crisp and normal  · Cervical veins are not engorged  · The carotid upstroke is normal in amplitude and contour without delay or bruit  · Normal S1S2, No S3, No Murmur  · Peripheral pulses are symmetrical and full  · There is no clubbing, cyanosis of the extremities.   · No edema  · Femoral Arteries: 2+ and equal  · Pedal Pulses: 2+ and equal   Abdomen:  · No masses or tenderness  · Liver/Spleen: No Abnormalities Noted  Neurological/Psychiatric:  · Alert and oriented in all spheres  · Moves all extremities well  · Exhibits normal gait balance and coordination  · No abnormalities of mood, affect, memory, mentation, or behavior are noted  Lab Results   Component Value Date    CHOL 139 06/16/2021    CHOL 222 (H) 12/19/2018    CHOL 182 10/31/2017     Lab Results   Component Value Date    TRIG 126 06/16/2021    TRIG 159 (H) 12/19/2018    TRIG 126 10/31/2017     Lab Results   Component Value Date    HDL 39 (L) 06/16/2021    HDL 41 12/08/2020    HDL 43 12/04/2019     Lab Results   Component Value Date    LDLCALC 75 06/16/2021    LDLCALC 77 12/08/2020    LDLCALC 66 12/04/2019     Lab Results   Component Value Date    LABVLDL 25 06/16/2021    LABVLDL 28 12/08/2020    LABVLDL 31 12/04/2019     No results found for: CHOLHDLRATIO      Stress test 12/23/11  Impression- 1. Small sized reversible perfusion defect involving the anteroseptal wall of the left ventricle to mid heart concerning for myocardial ischemia. Please correlate with EKG findings. 2. Left ventricular hypertrophy. 3. LVEF 59 %     NAVNEET/Cardioversion 2/7/2012  Summary-   1. A Navneet was performed without complications. 2.  LVEF 55   3. No thrombus or valvular vegetation identified. CARDIOVERSION-      After an adequate level of sedation was achieved, 150J, then 200J in   biphasic synchronized delivery was administered. No change in   rhythm. The patient awoke without complications. A post procedure   12 L ECG was ordered and reviewed. There were no complications encountered. 1.  Unsuccessful attempt at cardioversion. 2.  Patient remains in atrial fibrillation. Echocardiogram 11/24/15  Conclusions   Summary   Moderate concentric left ventricular hypertrophy is present. Global ejection fraction is normal and estimated from 55 % to 60 %. No obvious regional wall motion abnormalities are noted. Diastolic filling parameters suggests diastolic dysfunction. Moderately dilated left atrium. Aortic valve appears sclerotic but opens adequately. Trivial mitral and tricuspid regurgitation. Systolic pulmonary artery pressure (SPAP) is normal and estimated at 23 mmHg   (RA pressure 3 mm Hg). Echocardiogram 6/23/17  Conclusions      Summary   Normal systolic function with an estimated ejection fraction of 55-60%. Mild to moderate concentric left ventricular hypertrophy. No regional wall motion abnormalities are seen. Left ventricular diastolic filling pressure is indeterminate. Left atrium is moderately dilated. No change from last echo on 11/24/1015. Limited Echocardiogram 11/31/2017   Conclusions      Summary   This is a limited study pericardial effusion and shunt.    Normal left ventricular systolic function with an estimated ejection   fraction of 55%. A bubble study was performed and fails to show evidence of shunting. There is a very small circumferential pericardial effusion noted. Stress test 11/1/2017  Conclusions    Summary  There is no evidence of stress induced ischemia. Normal LV function with  ejection fraction of 66%. There are no regional wall motion abnormalities. Low risk study. REECE 3/31-4/30/2021  No atrial fib   Predominately NSR  PSVT- symptomatic   NSVT/PVC's-  Symptomatic  Nocturnal bradycardia     Stress test 5/19/2021  Conclusions    Summary  The overall quality of the study is fair. There is subdiaphragmatic  attenuation. Left ventricular cavity is noted to be normal size. The right ventricle is  normal in size. SPECT images demonstrate a small area of mildly decreased perfusion in the  apical lateral and mid-inferior lateral segments. The defect is mixed  ischemia and scar. There is associated wall motion abnormality, consistent  with ischemia. Gated SPECT imaging reveals normal myocardial thickening and  wall motion. Sum stress score of 7. No visual TID. Calculated TID of 0.99. The left ventricular ejection fraction is calculated to be 63%. Myocardial perfusion is abnormal. Consistent with mixed ischemia and scar. Low risk scan. EKG 6/16/21  Sinus rhythm with marked sinus arrhythmia with 1st degree A-V blockLow voltage QRSProlonged QTAbnormal ECGConfirmed by Josiah Montgomery MD, Shelley White ([id number]) on 6/16/2021 4:41:38 PM    ECHO 6/16/21  Summary   Left ventricular systolic function is normal with a visually estimated   ejection fraction of 55%. The left ventricle is normal in size with mild concentric hypertrophy. No obvious regional wall motion abnormalities noted. Increased left ventricular filling pressure. The left atrium appears moderately enlarged. The right atrium is severely enlarged. Right ventricle is enlarged but   normal function.    Inadequate tricuspid valve regurgitation to estimate systolic pulmonary   artery pressure. The IVC is dilated in size (>2.1 cm) but appears to collapse >50% with   respiration consistent with elevated right atrial pressures (8 mmHg) . EKG 6/16/21  Sinus rhythm with 1st degree A-V block with Premature atrial complexesOtherwise normal ECGConfirmed by Christine Bruner MD ([id number]) on 6/17/2021 6:58:06 AM    EKG 6/16/21  Sinus rhythm with sinus arrhythmia with 1st degree A-V blockSeptal infarct , age undeterminedAbnormal ECGConfirmed by Christine Brnuer MD ([id number]) on 6/17/2021 6:58:18 AM    Left heart cath 6/16/2021 [facility]  Procedures Performed:   1. Selective left heart catheterization  2. Percutaneous coronary intervention of the left anterior descending and diagonal artery artery using complex bifurcation technique. Procedure Findings:  1. Critical LAD diagonal disease with angina classification 0, 0, 1 along with mid LAD 70% stenosis       Assessment:   CAD - stable. No angina  PANG - decreased post PCI  Atrial fibrillation - stable  Hypertension - controlled    Hyperlipidemia - stable, controlled  Sleep apnea   History of DVT   Diabetes Mellitus   Asthma   Tachycardia - intermittent  Fatigue - do not suspect cardiac     Plan:  labs- CBC, CMP, magnesium, and TSH   Recommend follow up with sleep center for management of sleep apnea  Recommend the Mediterranean diet for a heart healthy option. For weight loss recommend counting calories with a program like Weight Watchers. also dicussed referral to the weight loss team   Cardiac medications reviewed including indications and pertinent side effects. No changes at this time. Check blood pressure and heart rate at home a few times per week- keep a log with dates and times and bring to office visit   Regular exercise and following a healthy diet encouraged   Follow up with me in 6 months       Scribe's attestation:   This note was scribed in the presence of Dr. Fito Box M.D. By Yovani Hunter Sameer Reyes RN    Thank you for allowing me to participate in the care of this individual.      Myrna John.  Burnis Osler, M.D., [place]

## 2021-12-03 NOTE — LETTER
[address]   Cardiac Followup    Referring Provider:  Angel Flaherty MD     Chief Complaint   Patient presents with    Follow-up    Fatigue    Shortness of Breath     on exertion    Edema     bilateral ankles and legs    Other     high HR      Kyle Gaines   1945    History of Present Illness:    Kyle Gaines is a 77 y.o. male who is here today for follow up for a past medical history of coronary artery disease, abnormal stress test, paroxysmal atrial fibrillation/atrial flutter, PVC's,  anticoagulation with warfarin, hypertension, hyperlipidemia, diabetes mellitus, MARIA VICTORIA, obesity status post gastric sleeve as well as history of DVT and histoplasmosis s/p thoracotomy. He has a stress test in 2011 which showed a small sized reversible perfusion defect involving the anteroseptal wall of the left ventricle to mid heart concerning for myocardial ischemia. He has a history of PVC ablation in 2014. He stated he continued to have occasional episodes of palpitations and was found to have atrial flutter. He underwent a CV, and an unsuccessful RFCA for typical atrial flutter on 4/12/17. Patient was started on dofetilide in June of 2017. He underwent EPS and aflutter ablation on 9/19/17. He had a normal stress test in 2017. Stress test 5/19/2021 was abnornmal consistent with mixed ischemia and scar. He had a left heart cath 6/16/2021- Findings significant for LAD/Diag lesions, LAD treated with FELIZ x2 and Diag FELIZ x1. ASA stopped one month post procedure and patient continued on Plavix and warfarin. Today he states he has been having increased fatigue. He has attended cardiac rehab around 30 times and has not noted any change in his exercise tolerance over this time. He had recent changes to his diabetes medications. His blood pressure at home runs 108/60. He states he has sleep apnea and wears his CPAP nightly. He is interested in diet education for a heart healthy diet.  He has some leg numbness from his knees down but no pain with walking. Patient currently denies any weight gain, edema, palpitations, chest pain, shortness of breath, dizziness, and syncope. Past Medical History:   has a past medical history of Arthritis, Asthma, Atrial fibrillation ([address] Blood circulation, collateral, Diabetes mellitus ([address] DVT (deep venous thrombosis) ([address] Histoplasmosis, History of knee replacement procedure of right knee, Hx of blood clots, Hyperlipidemia, Hypertension, Knee osteoarthritis, Left TKR, Lung nodule, Neuromuscular disorder ([address] Palpitations, Sleep apnea, Stone, kidney, and UTI (urinary tract infection). Surgical History:   has a past surgical history that includes Cystoscopy; Tonsillectomy; Knee arthroscopy (4/19/2011); Colonoscopy; Cystocopy (2/8/13); ablation of dysrhythmic focus (2013); Carpal tunnel release (Right, 9-30-15); Bariatric Surgery (2013); Carpal tunnel release (Left, 3/20/16); skin biopsy; Diagnostic Cardiac Cath Lab Procedure; joint replacement (Bilateral); Cardiac surgery (2013); thoracotomy; ERCP (02/18/2019); ERCP (N/A, 2/18/2019); ERCP (2/18/2019); Cholecystectomy, laparoscopic (N/A, 2/19/2019); Intracapsular cataract extraction (Right, 1/14/2020); and Intracapsular cataract extraction (Left, 1/21/2020). Social History:   reports that he quit smoking about 45 years ago. His smoking use included cigarettes. He has a 34.00 pack-year smoking history. He has never used smokeless tobacco. He reports that he does not drink alcohol and does not use drugs. Family History:  family history includes Arthritis in his father; Cancer in his mother; Kidney Disease in his mother; Other in his father; Stroke in his father and mother; Substance Abuse in his father. Home Medications:  Prior to Admission medications    Medication Sig Start Date End Date Taking?  Authorizing Provider   Semaglutide,0.25 or 0.5MG/DOS, (OZEMPIC, 0.25 OR 0.5 MG/DOSE,) 2 MG/1.5ML SOPN Inject into Blood Glucose Monitoring Suppl (TRUE METRIX METER) VINNIE Use to check daily. DX;E11.9 12/22/18  Yes Nicolle Hewitt MD   Lancets Misc. (ACCU-CHEK MULTICLIX LANCET DEV) KIT Check sugars once daily. DX;E11.9 12/21/18  Yes Nicolle Hewitt MD   Biotin 5 MG TABS Take 5 mg by mouth daily   Yes Historical Provider, MD   Cholecalciferol (VITAMIN D3) 5000 units TABS Take 5,000 Units by mouth daily   Yes Historical Provider, MD   Cyanocobalamin (VITAMIN B-12 CR PO) Take 5,000 mcg by mouth every 7 days    Yes Historical Provider, MD   Multiple Vitamins-Minerals (THERAPEUTIC MULTIVITAMIN-MINERALS) tablet Take 2 tablets by mouth daily    Yes Historical Provider, MD   magnesium oxide (MAG-OX) 400 MG tablet Take 400 mg by mouth daily. Yes Historical Provider, MD        Allergies:  Bactrim [sulfamethoxazole-trimethoprim], Ciprofloxacin, Macrobid [nitrofurantoin monohyd macro], Sulfamethoxazole, Theophylline, Cefuroxime axetil, Cipro xr, Other, and Tape [adhesive tape]     Review of Systems:   · Constitutional: there has been no unanticipated weight loss. There's been no change in energy level, sleep pattern, or activity level. · Eyes: No visual changes or diplopia. No scleral icterus. · ENT: No Headaches, hearing loss or vertigo. No mouth sores or sore throat. · Cardiovascular: Reviewed in HPI  · Respiratory: No cough or wheezing, no sputum production. No hematemesis. · Gastrointestinal: No abdominal pain, appetite loss, blood in stools. No change in bowel or bladder habits. · Genitourinary: No dysuria, trouble voiding, or hematuria. · Musculoskeletal:  No gait disturbance, weakness or joint complaints. · Integumentary: No rash or pruritis. · Neurological: No headache, diplopia, change in muscle strength, numbness or tingling. No change in gait, balance, coordination, mood, affect, memory, mentation, behavior. · Psychiatric: No anxiety, no depression.   · Endocrine: No malaise, fatigue or temperature intolerance. No excessive thirst, fluid intake, or urination. No tremor. · Hematologic/Lymphatic: No abnormal bruising or bleeding, blood clots or swollen lymph nodes. · Allergic/Immunologic: No nasal congestion or hives. Physical Examination:    Vitals:    12/03/21 1310   BP: 110/62   Pulse: 80   SpO2: 98%        Wt Readings from Last 3 Encounters:   12/03/21 (!) 327 lb 3.2 oz (148.4 kg)   10/04/21 (!) 330 lb (149.7 kg)   06/29/21 (!) 327 lb (148.3 kg)       Constitutional and General Appearance: NAD   Respiratory:  · Normal excursion and expansion without use of accessory muscles  · Resp Auscultation: Normal breath sounds without dullness  Cardiovascular:  · The apical impulses not displaced  · Heart tones are crisp and normal  · Cervical veins are not engorged  · The carotid upstroke is normal in amplitude and contour without delay or bruit  · Normal S1S2, No S3, No Murmur  · Peripheral pulses are symmetrical and full  · There is no clubbing, cyanosis of the extremities.   · No edema  · Femoral Arteries: 2+ and equal  · Pedal Pulses: 2+ and equal   Abdomen:  · No masses or tenderness  · Liver/Spleen: No Abnormalities Noted  Neurological/Psychiatric:  · Alert and oriented in all spheres  · Moves all extremities well  · Exhibits normal gait balance and coordination  · No abnormalities of mood, affect, memory, mentation, or behavior are noted  Lab Results   Component Value Date    CHOL 139 06/16/2021    CHOL 222 (H) 12/19/2018    CHOL 182 10/31/2017     Lab Results   Component Value Date    TRIG 126 06/16/2021    TRIG 159 (H) 12/19/2018    TRIG 126 10/31/2017     Lab Results   Component Value Date    HDL 39 (L) 06/16/2021    HDL 41 12/08/2020    HDL 43 12/04/2019     Lab Results   Component Value Date    LDLCALC 75 06/16/2021    LDLCALC 77 12/08/2020    LDLCALC 66 12/04/2019     Lab Results   Component Value Date    LABVLDL 25 06/16/2021    LABVLDL 28 12/08/2020    LABVLDL 31 12/04/2019     No results found for: CHOLHDLRATIO      Stress test 12/23/11  Impression- 1. Small sized reversible perfusion defect involving the anteroseptal wall of the left ventricle to mid heart concerning for myocardial ischemia. Please correlate with EKG findings. 2. Left ventricular hypertrophy. 3. LVEF 59 %     NAVNEET/Cardioversion 2/7/2012  Summary-   1. A Navneet was performed without complications. 2.  LVEF 55   3. No thrombus or valvular vegetation identified. CARDIOVERSION-      After an adequate level of sedation was achieved, 150J, then 200J in   biphasic synchronized delivery was administered. No change in   rhythm. The patient awoke without complications. A post procedure   12 L ECG was ordered and reviewed. There were no complications encountered. 1.  Unsuccessful attempt at cardioversion. 2.  Patient remains in atrial fibrillation. Echocardiogram 11/24/15  Conclusions   Summary   Moderate concentric left ventricular hypertrophy is present. Global ejection fraction is normal and estimated from 55 % to 60 %. No obvious regional wall motion abnormalities are noted. Diastolic filling parameters suggests diastolic dysfunction. Moderately dilated left atrium. Aortic valve appears sclerotic but opens adequately. Trivial mitral and tricuspid regurgitation. Systolic pulmonary artery pressure (SPAP) is normal and estimated at 23 mmHg   (RA pressure 3 mm Hg). Echocardiogram 6/23/17  Conclusions      Summary   Normal systolic function with an estimated ejection fraction of 55-60%. Mild to moderate concentric left ventricular hypertrophy. No regional wall motion abnormalities are seen. Left ventricular diastolic filling pressure is indeterminate. Left atrium is moderately dilated. No change from last echo on 11/24/1015. Limited Echocardiogram 11/31/2017   Conclusions      Summary   This is a limited study pericardial effusion and shunt.    Normal left ventricular systolic function with an estimated ejection   fraction of 55%. A bubble study was performed and fails to show evidence of shunting. There is a very small circumferential pericardial effusion noted. Stress test 11/1/2017  Conclusions    Summary  There is no evidence of stress induced ischemia. Normal LV function with  ejection fraction of 66%. There are no regional wall motion abnormalities. Low risk study. REECE 3/31-4/30/2021  No atrial fib   Predominately NSR  PSVT- symptomatic   NSVT/PVC's-  Symptomatic  Nocturnal bradycardia     Stress test 5/19/2021  Conclusions    Summary  The overall quality of the study is fair. There is subdiaphragmatic  attenuation. Left ventricular cavity is noted to be normal size. The right ventricle is  normal in size. SPECT images demonstrate a small area of mildly decreased perfusion in the  apical lateral and mid-inferior lateral segments. The defect is mixed  ischemia and scar. There is associated wall motion abnormality, consistent  with ischemia. Gated SPECT imaging reveals normal myocardial thickening and  wall motion. Sum stress score of 7. No visual TID. Calculated TID of 0.99. The left ventricular ejection fraction is calculated to be 63%. Myocardial perfusion is abnormal. Consistent with mixed ischemia and scar. Low risk scan. EKG 6/16/21  Sinus rhythm with marked sinus arrhythmia with 1st degree A-V blockLow voltage QRSProlonged QTAbnormal ECGConfirmed by Shabnam Dozier MD, Ramesh Ng ([id number]) on 6/16/2021 4:41:38 PM    ECHO 6/16/21  Summary   Left ventricular systolic function is normal with a visually estimated   ejection fraction of 55%. The left ventricle is normal in size with mild concentric hypertrophy. No obvious regional wall motion abnormalities noted. Increased left ventricular filling pressure. The left atrium appears moderately enlarged. The right atrium is severely enlarged. Right ventricle is enlarged but   normal function.    Inadequate tricuspid valve regurgitation to estimate systolic pulmonary   artery pressure. The IVC is dilated in size (>2.1 cm) but appears to collapse >50% with   respiration consistent with elevated right atrial pressures (8 mmHg) . EKG 6/16/21  Sinus rhythm with 1st degree A-V block with Premature atrial complexesOtherwise normal ECGConfirmed by Ezella Riles MD, Jacqulin Olszewski ([id number]) on 6/17/2021 6:58:06 AM    EKG 6/16/21  Sinus rhythm with sinus arrhythmia with 1st degree A-V blockSeptal infarct , age undeterminedAbnormal ECGConfirmed by Ezella Riles MD, Jacqulin Olszewski ([id number]) on 6/17/2021 6:58:18 AM    Left heart cath 6/16/2021 VSP  Procedures Performed:   1. Selective left heart catheterization  2. Percutaneous coronary intervention of the left anterior descending and diagonal artery artery using complex bifurcation technique. Procedure Findings:  1. Critical LAD diagonal disease with angina classification 0, 0, 1 along with mid LAD 70% stenosis       Assessment:   CAD - stable. No angina  PANG - decreased post PCI  Atrial fibrillation - stable  Hypertension - controlled    Hyperlipidemia - stable, controlled  Sleep apnea   History of DVT   Diabetes Mellitus   Asthma   Tachycardia - intermittent  Fatigue - do not suspect cardiac     Plan:  labs- CBC, CMP, magnesium, and TSH   Recommend follow up with sleep center for management of sleep apnea  Recommend the Mediterranean diet for a heart healthy option. For weight loss recommend counting calories with a program like Weight Watchers. also dicussed referral to the weight loss team   Cardiac medications reviewed including indications and pertinent side effects. No changes at this time. Check blood pressure and heart rate at home a few times per week- keep a log with dates and times and bring to office visit   Regular exercise and following a healthy diet encouraged   Follow up with me in 6 months       Scribe's attestation:   This note was scribed in the presence of Dr. Maria Dolores Saldaña M.D. By Elsie Adler

## 2021-12-03 NOTE — PATIENT INSTRUCTIONS
Plan:  labs- CBC, CMP, magnesium, and TSH   Recommend follow up with sleep center for management of sleep   Recommend the Mediterranean diet for a heart healthy option. For weight loss recommend counting calories with a program like Weight Watchers.  also dicussed referral to the weight loss team- Dr. Mal Ni   Cardiac medications reviewed including indications and pertinent side effects    Check blood pressure and heart rate at home a few times per week- keep a log with dates and times and bring to office visit   Regular exercise and following a healthy diet encouraged   Follow up with me in 6 months

## 2021-12-06 ENCOUNTER — TELEPHONE (OUTPATIENT)
Dept: INTERNAL MEDICINE CLINIC | Age: 76
End: 2021-12-06

## 2021-12-06 ENCOUNTER — HOSPITAL ENCOUNTER (OUTPATIENT)
Dept: CARDIAC REHAB | Age: 76
Setting detail: THERAPIES SERIES
Discharge: HOME | End: 2021-12-06
Payer: MEDICARE

## 2021-12-06 ENCOUNTER — ANTI-COAG VISIT (OUTPATIENT)
Dept: INTERNAL MEDICINE CLINIC | Age: 76
End: 2021-12-06

## 2021-12-06 ENCOUNTER — TELEPHONE (OUTPATIENT)
Dept: CARDIOLOGY CLINIC | Age: 76
End: 2021-12-06

## 2021-12-06 DIAGNOSIS — R79.89 ELEVATED SERUM CREATININE: Primary | ICD-10-CM

## 2021-12-06 LAB — INR BLD: 2.3

## 2021-12-06 PROCEDURE — 93798 PHYS/QHP OP CAR RHAB W/ECG: CPT

## 2021-12-06 NOTE — TELEPHONE ENCOUNTER
----- Message from Vee Delgado MD sent at 12/6/2021  3:39 PM EST -----  No change   2 weeks   ----- Message -----  From: Isha Diaz  Sent: 12/6/2021   2:05 PM EST  To: Vee Delgado MD    INR- 2.3

## 2021-12-06 NOTE — TELEPHONE ENCOUNTER
----- Message from Tristen Arias MD sent at 12/6/2021  8:25 AM EST -----  Call  Labs ok except kidney fxn is abnormal - not tolerating the 2 water pills  Stop aldactone  BMP in 10 days - order placed

## 2021-12-08 ENCOUNTER — HOSPITAL ENCOUNTER (OUTPATIENT)
Dept: CARDIAC REHAB | Age: 76
Setting detail: THERAPIES SERIES
Discharge: HOME | End: 2021-12-08
Payer: MEDICARE

## 2021-12-08 PROCEDURE — 93798 PHYS/QHP OP CAR RHAB W/ECG: CPT

## 2021-12-10 ENCOUNTER — HOSPITAL ENCOUNTER (OUTPATIENT)
Dept: CARDIAC REHAB | Age: 76
Setting detail: THERAPIES SERIES
Discharge: HOME | End: 2021-12-10
Payer: MEDICARE

## 2021-12-10 PROCEDURE — 93798 PHYS/QHP OP CAR RHAB W/ECG: CPT

## 2021-12-13 ENCOUNTER — HOSPITAL ENCOUNTER (OUTPATIENT)
Dept: CARDIAC REHAB | Age: 76
Setting detail: THERAPIES SERIES
Discharge: HOME | End: 2021-12-13
Payer: MEDICARE

## 2021-12-13 PROCEDURE — 93798 PHYS/QHP OP CAR RHAB W/ECG: CPT

## 2021-12-15 ENCOUNTER — HOSPITAL ENCOUNTER (OUTPATIENT)
Dept: CARDIAC REHAB | Age: 76
Setting detail: THERAPIES SERIES
Discharge: HOME | End: 2021-12-15
Payer: MEDICARE

## 2021-12-15 PROCEDURE — 93798 PHYS/QHP OP CAR RHAB W/ECG: CPT

## 2021-12-16 ENCOUNTER — HOSPITAL ENCOUNTER (OUTPATIENT)
Age: 76
Discharge: HOME | End: 2021-12-16
Payer: MEDICARE

## 2021-12-16 DIAGNOSIS — R79.89 ELEVATED SERUM CREATININE: ICD-10-CM

## 2021-12-16 LAB
ANION GAP SERPL CALCULATED.3IONS-SCNC: 12 MMOL/L (ref 3–16)
BUN BLDV-MCNC: 31 MG/DL (ref 7–20)
CALCIUM SERPL-MCNC: 9.2 MG/DL (ref 8.3–10.6)
CHLORIDE BLD-SCNC: 100 MMOL/L (ref 99–110)
CO2: 26 MMOL/L (ref 21–32)
CREAT SERPL-MCNC: 1.2 MG/DL (ref 0.8–1.3)
GFR AFRICAN AMERICAN: >60
GFR NON-AFRICAN AMERICAN: 59
GLUCOSE BLD-MCNC: 194 MG/DL (ref 70–99)
POTASSIUM SERPL-SCNC: 4.5 MMOL/L (ref 3.5–5.1)
SODIUM BLD-SCNC: 138 MMOL/L (ref 136–145)

## 2021-12-16 PROCEDURE — 36415 COLL VENOUS BLD VENIPUNCTURE: CPT

## 2021-12-16 PROCEDURE — 80048 BASIC METABOLIC PNL TOTAL CA: CPT

## 2021-12-17 ENCOUNTER — TELEPHONE (OUTPATIENT)
Dept: CARDIOLOGY CLINIC | Age: 76
End: 2021-12-17

## 2021-12-17 ENCOUNTER — HOSPITAL ENCOUNTER (OUTPATIENT)
Dept: CARDIAC REHAB | Age: 76
Setting detail: THERAPIES SERIES
Discharge: HOME | End: 2021-12-17
Payer: MEDICARE

## 2021-12-17 PROCEDURE — 93798 PHYS/QHP OP CAR RHAB W/ECG: CPT

## 2021-12-17 NOTE — TELEPHONE ENCOUNTER
----- Message from Celina Ovalle MD sent at 12/17/2021 11:27 AM EST -----  Call  Labs look ok  No changes

## 2021-12-20 ENCOUNTER — TELEPHONE (OUTPATIENT)
Dept: INTERNAL MEDICINE CLINIC | Age: 76
End: 2021-12-20

## 2021-12-20 ENCOUNTER — ANTI-COAG VISIT (OUTPATIENT)
Dept: INTERNAL MEDICINE CLINIC | Age: 76
End: 2021-12-20

## 2021-12-20 ENCOUNTER — APPOINTMENT (OUTPATIENT)
Dept: CARDIAC REHAB | Age: 76
End: 2021-12-20
Payer: MEDICARE

## 2021-12-20 LAB — INR BLD: 2.7

## 2021-12-20 NOTE — TELEPHONE ENCOUNTER
----- Message from Sharon Guerrero MD sent at 12/20/2021 12:18 PM EST -----  No change  2 weeks  ----- Message -----  From: Stephen Bond  Sent: 12/20/2021  12:03 PM EST  To: Sharon Guerrero MD    INR 2.7  Taking 5 mg daily, except taking 7.5 mg on Monday, Friday

## 2021-12-20 NOTE — TELEPHONE ENCOUNTER
----- Message from Duarte Bond MD sent at 12/20/2021  9:42 AM EST -----  Contact: Roni Laughlin [phone]  OTC zyrtec and mucinex daily  No abx  ----- Message -----  From: Ike Bowman  Sent: 12/20/2021   8:32 AM EST  To: Duarte Bond MD    Patient has headache, congestion, cough, fatigue. He has tested at [facility] for flu and Covid and both were negative on 12/19/21.      Ricardo Sandoval    Thank you

## 2021-12-21 ENCOUNTER — TELEPHONE (OUTPATIENT)
Dept: INTERNAL MEDICINE CLINIC | Age: 76
End: 2021-12-21

## 2021-12-21 NOTE — TELEPHONE ENCOUNTER
----- Message from Ilene Gallegos MD sent at 12/20/2021  5:36 PM EST -----    His hb has been around 12.3 for a long time    See results , check stool fit test      ----- Message -----  From: Raimundo Melton  Sent: 12/20/2021  12:40 PM EST  To: Ilene Gallegos MD    Pt is concerned because he had labs drawn for his cardiologist on 12/3/21 and is concerned because his hemoglobin was 12.3 and his cardiologist told him he could possibly have a slow-bleed and might need to follow up with you and possibly have a colonoscopy done. Pt thought they were going to recheck his hemoglobin when he had his blood drawn on 12/16/21 but they didn't. Wants to know if he should be concerned and what he should do. Please advise.

## 2021-12-22 ENCOUNTER — APPOINTMENT (OUTPATIENT)
Dept: CARDIAC REHAB | Age: 76
End: 2021-12-22
Payer: MEDICARE

## 2021-12-24 ENCOUNTER — APPOINTMENT (OUTPATIENT)
Dept: CARDIAC REHAB | Age: 76
End: 2021-12-24
Payer: MEDICARE

## 2021-12-27 ENCOUNTER — TELEPHONE (OUTPATIENT)
Dept: INTERNAL MEDICINE CLINIC | Age: 76
End: 2021-12-27

## 2021-12-27 ENCOUNTER — APPOINTMENT (OUTPATIENT)
Dept: CARDIAC REHAB | Age: 76
End: 2021-12-27
Payer: MEDICARE

## 2021-12-27 DIAGNOSIS — J06.9 UPPER RESPIRATORY TRACT INFECTION, UNSPECIFIED TYPE: Primary | ICD-10-CM

## 2021-12-27 RX ORDER — AZITHROMYCIN 250 MG/1
250 TABLET, FILM COATED ORAL SEE ADMIN INSTRUCTIONS
Qty: 6 TABLET | Refills: 0 | Status: SHIPPED
Start: 2021-12-27 | End: 2021-12-27 | Stop reason: ALTCHOICE

## 2021-12-27 RX ORDER — DOXYCYCLINE HYCLATE 100 MG
100 TABLET ORAL 2 TIMES DAILY
Qty: 14 TABLET | Refills: 0 | Status: SHIPPED | OUTPATIENT
Start: 2021-12-27 | End: 2022-01-03

## 2021-12-27 NOTE — TELEPHONE ENCOUNTER
----- Message from [facility] sent at 12/27/2021 11:21 AM EST -----  Contact: Sathya Wayne [phone]    ----- Message -----  From: Elisa Lewis MD  Sent: 12/27/2021  11:03 AM EST  To: Tristan nevarez  ----- Message -----  From: Vanessa Barnes  Sent: 12/27/2021  10:34 AM EST  To: Elisa Lewis MD    Patient of Dr. Taisha Quinones    Patient has had congestion, cough, and headache for 2 weeks. He has tested negative for Covid. He is doing much better but is still experiencing a clogged head and ears.  He can hardly hear and has been taking Mucinex and Zyrtec    Phineas Buckle [address] [facility], [address] [place] [address] Jacqui Cifuentes [phone]    Thank you

## 2021-12-27 NOTE — TELEPHONE ENCOUNTER
----- Message from Bobo Arnold MD sent at 12/27/2021  1:41 PM EST -----  Doxy 100 twice daily #14  ----- Message -----  From: Aracelis Cortez  Sent: 12/27/2021   1:24 PM EST  To: MD Dr. Shabbir Rodrigues pt-  Pharmacy called regarding zpack. Want to make sure you are okay with prescribing pt this as pt has afib, is on coumadin, and on Tikosyn. Please advise.     [employer]

## 2021-12-28 ENCOUNTER — NURSE ONLY (OUTPATIENT)
Dept: INTERNAL MEDICINE CLINIC | Age: 76
End: 2021-12-28

## 2021-12-28 DIAGNOSIS — Z12.11 COLON CANCER SCREENING: Primary | ICD-10-CM

## 2021-12-28 LAB
CONTROL: NORMAL
HEMOCCULT STL QL: NORMAL

## 2021-12-28 PROCEDURE — 82274 ASSAY TEST FOR BLOOD FECAL: CPT | Performed by: INTERNAL MEDICINE

## 2021-12-29 ENCOUNTER — APPOINTMENT (OUTPATIENT)
Dept: CARDIAC REHAB | Age: 76
End: 2021-12-29
Payer: MEDICARE

## 2021-12-31 ENCOUNTER — APPOINTMENT (OUTPATIENT)
Dept: CARDIAC REHAB | Age: 76
End: 2021-12-31
Payer: MEDICARE

## 2022-01-03 ENCOUNTER — ANTI-COAG VISIT (OUTPATIENT)
Dept: INTERNAL MEDICINE CLINIC | Age: 77
End: 2022-01-03

## 2022-01-03 ENCOUNTER — TELEPHONE (OUTPATIENT)
Dept: INTERNAL MEDICINE CLINIC | Age: 77
End: 2022-01-03

## 2022-01-03 DIAGNOSIS — I10 ESSENTIAL HYPERTENSION: Primary | ICD-10-CM

## 2022-01-03 LAB — INR BLD: 2.6

## 2022-01-03 NOTE — TELEPHONE ENCOUNTER
----- Message from Evan Alatorre sent at 1/3/2022  1:24 PM EST -----  Contact: Eduardo Barbour [phone]  Yes. Check BMP in 1 week. Per Dr. Isael Altman  ----- Message -----  From: Evan Alatorre  Sent: 1/3/2022  12:48 PM EST  To: Dayna Alvarado MD    Pt's wife wants to know if pt should restart the spironolactone or not. Please advise.  ----- Message -----  From: Dayna Alvarado MD  Sent: 1/3/2022  12:41 PM EST  To: Stephy Maurice    Take 2 torsemide pills for the next 5 days  And then go back to once a day   ----- Message -----  From: Butch Alford  Sent: 1/3/2022  10:58 AM EST  To: MD Dr. Julianna Powers patient    Patient's legs and feet are extremely swollen and tight. He takes torsemide (DEMADEX) 20 MG tablet. Dr. Andrew Vazquez, Cardiologist dc'd his Spironolactone (Aldactone) 25 mg on 12/6/21 due to kidney values. He would like to be seen.     Thank you

## 2022-01-03 NOTE — TELEPHONE ENCOUNTER
----- Message from Thom Snyder MD sent at 1/3/2022 12:41 PM EST -----  Contact: Katlyn Méndez [phone]  Take 2 torsemide pills for the next 5 days  And then go back to once a day   ----- Message -----  From: Ranjana Earl  Sent: 1/3/2022  10:58 AM EST  To: MD Dr. Kristian Davidson patient    Patient's legs and feet are extremely swollen and tight. He takes torsemide (DEMADEX) 20 MG tablet. Dr. Sarai Carrillo, Cardiologist dc'd his Spironolactone (Aldactone) 25 mg on 12/6/21 due to kidney values. He would like to be seen.     Thank you

## 2022-01-10 ENCOUNTER — HOSPITAL ENCOUNTER (OUTPATIENT)
Age: 77
Discharge: HOME | End: 2022-01-10
Payer: MEDICARE

## 2022-01-10 DIAGNOSIS — I10 ESSENTIAL HYPERTENSION: ICD-10-CM

## 2022-01-10 LAB
ANION GAP SERPL CALCULATED.3IONS-SCNC: 14 MMOL/L (ref 3–16)
BUN BLDV-MCNC: 30 MG/DL (ref 7–20)
CALCIUM SERPL-MCNC: 9.4 MG/DL (ref 8.3–10.6)
CHLORIDE BLD-SCNC: 101 MMOL/L (ref 99–110)
CO2: 24 MMOL/L (ref 21–32)
CREAT SERPL-MCNC: 1.2 MG/DL (ref 0.8–1.3)
GFR AFRICAN AMERICAN: >60
GFR NON-AFRICAN AMERICAN: 59
GLUCOSE BLD-MCNC: 179 MG/DL (ref 70–99)
POTASSIUM SERPL-SCNC: 3.9 MMOL/L (ref 3.5–5.1)
SODIUM BLD-SCNC: 139 MMOL/L (ref 136–145)

## 2022-01-10 PROCEDURE — 80048 BASIC METABOLIC PNL TOTAL CA: CPT

## 2022-01-10 PROCEDURE — 36415 COLL VENOUS BLD VENIPUNCTURE: CPT

## 2022-01-18 NOTE — TELEPHONE ENCOUNTER
Order faxed for nasal pillow mask to [facility]. Subjective   Rebeka Servin is a 61 y.o. female  Anxiety (follow up on anxiety, refill on alprazolam and Celexa) and Hyperlipidemia (follow up on cholesterol, refill on simvastatin)      History of Present Illness  Patient is a pleasant 61-year-old white female who comes in for follow-up of anxiety needs refill of Xanax and Celexa meds working well no problems or complaints no shortness of breath no chest pain, no SI/HI comes in for follow-up of hyperlipidemia needs refill on cholesterol medicine and lab work has been watching her diet and exercise.  Patient is past due on screening mammogram  The following portions of the patient's history were reviewed and updated as appropriate: allergies, current medications, past social history and problem list    Review of Systems   Constitutional: Negative for fatigue and unexpected weight change.   Respiratory: Negative for cough, chest tightness and shortness of breath.    Cardiovascular: Negative for chest pain, palpitations and leg swelling.   Gastrointestinal: Negative for nausea.   Skin: Negative for color change and rash.   Neurological: Negative for dizziness, syncope, weakness and headaches.       Objective     Vitals:    01/18/22 1629   BP: 118/76   Pulse: 71   Temp: 97.2 °F (36.2 °C)   SpO2: 97%       Physical Exam  Vitals and nursing note reviewed.   Constitutional:       General: She is not in acute distress.     Appearance: Normal appearance. She is well-developed and normal weight. She is not ill-appearing, toxic-appearing or diaphoretic.   Neck:      Vascular: No carotid bruit or JVD.   Cardiovascular:      Rate and Rhythm: Normal rate and regular rhythm.      Pulses: Normal pulses.      Heart sounds: Normal heart sounds. No murmur heard.      Pulmonary:      Effort: Pulmonary effort is normal. No respiratory distress.      Breath sounds: Normal breath sounds.   Abdominal:      Palpations: Abdomen is soft.      Tenderness: There is no abdominal tenderness.    Skin:     General: Skin is warm and dry.   Neurological:      Mental Status: She is alert.   Psychiatric:         Mood and Affect: Mood normal.         Behavior: Behavior normal.         Thought Content: Thought content normal.         Judgment: Judgment normal.         Assessment/Plan     Diagnoses and all orders for this visit:    1. Hyperlipidemia, unspecified hyperlipidemia type (Primary)  -     Lipid Panel; Future  -     Hepatic Function Panel; Future  -     simvastatin (ZOCOR) 40 MG tablet; Take 1 tablet by mouth Every Evening.  Dispense: 90 tablet; Refill: 3    2. Depression, unspecified depression type  -     citalopram (CeleXA) 20 MG tablet; Take 1 tablet by mouth Daily.  Dispense: 90 tablet; Refill: 3    3. Encounter for screening mammogram for malignant neoplasm of breast  -     Mammo Screening Digital Tomosynthesis Bilateral With CAD    Xanax 0.5 mg 1 p.o. 3 times daily dispense 90x3 refills      I spent 15 minutes in patient care: Reviewing records prior to the visit, examining the patient, entering orders and documentation    Part of this note may be an electronic transcription/translation of spoken language to printed text using the Dragon Dictation System.

## 2022-01-20 RX ORDER — ATORVASTATIN CALCIUM 40 MG/1
TABLET, FILM COATED ORAL
Qty: 90 TABLET | Refills: 0 | Status: SHIPPED | OUTPATIENT
Start: 2022-01-20 | End: 2022-04-11

## 2022-02-02 ENCOUNTER — TELEPHONE (OUTPATIENT)
Dept: INTERNAL MEDICINE CLINIC | Age: 77
End: 2022-02-02

## 2022-02-02 ENCOUNTER — ANTI-COAG VISIT (OUTPATIENT)
Dept: INTERNAL MEDICINE CLINIC | Age: 77
End: 2022-02-02

## 2022-02-02 LAB — INR BLD: 2.7

## 2022-02-02 RX ORDER — DULAGLUTIDE 0.75 MG/.5ML
0.75 INJECTION, SOLUTION SUBCUTANEOUS WEEKLY
COMMUNITY

## 2022-02-02 NOTE — TELEPHONE ENCOUNTER
----- Message from Yumiko Sotelo MD sent at 2/2/2022  8:29 AM EST -----  Contact: [phone]  No change  2 weeks  ----- Message -----  From: Niraj Lopez  Sent: 2/2/2022   7:37 AM EST  To: Yumiko Sotelo MD    INR: 2.7

## 2022-02-03 LAB
CATARACTS: NEGATIVE
DIABETIC RETINOPATHY: NEGATIVE
GLAUCOMA: NEGATIVE
INTRAOCULAR PRESSURE EYE: NORMAL
VISUAL ACUITY DISTANCE LEFT EYE: NORMAL
VISUAL ACUITY DISTANCE RIGHT EYE: NORMAL

## 2022-02-14 ENCOUNTER — ANTI-COAG VISIT (OUTPATIENT)
Dept: INTERNAL MEDICINE CLINIC | Age: 77
End: 2022-02-14

## 2022-02-14 ENCOUNTER — TELEPHONE (OUTPATIENT)
Dept: INTERNAL MEDICINE CLINIC | Age: 77
End: 2022-02-14

## 2022-02-14 LAB — INR BLD: 2.4

## 2022-02-14 NOTE — TELEPHONE ENCOUNTER
----- Message from Elgin Irving MD sent at 2/14/2022  1:12 PM EST -----  Contact: [phone]  No change  2 weeks  ----- Message -----  From: Daria Cobb  Sent: 2/14/2022   8:39 AM EST  To: Elgin Irving MD    INR:  2.4

## 2022-02-16 ENCOUNTER — TELEPHONE (OUTPATIENT)
Dept: CARDIOLOGY CLINIC | Age: 77
End: 2022-02-16

## 2022-02-16 NOTE — TELEPHONE ENCOUNTER
Pt stated that he has been sob and has been exhausted lately. Pt noticed this morning that his hr after taking a shower was 141. Before he took a shower he noticed that his hr was 71. Pt is lightheaded occasionally. Pt is not experiencing any chest pain or pressure at this time. Pt said we could leave a vm if if need be. Please advise.

## 2022-02-21 RX ORDER — WARFARIN SODIUM 5 MG/1
TABLET ORAL
Qty: 90 TABLET | Refills: 1 | Status: SHIPPED | OUTPATIENT
Start: 2022-02-21 | End: 2022-08-11

## 2022-02-22 ENCOUNTER — TELEPHONE (OUTPATIENT)
Dept: CARDIOLOGY CLINIC | Age: 77
End: 2022-02-22

## 2022-02-22 ENCOUNTER — HOSPITAL ENCOUNTER (OUTPATIENT)
Dept: GENERAL RADIOLOGY | Age: 77
Discharge: HOME | End: 2022-02-22
Payer: MEDICARE

## 2022-02-22 ENCOUNTER — OFFICE VISIT (OUTPATIENT)
Dept: CARDIOLOGY CLINIC | Age: 77
End: 2022-02-22
Payer: MEDICARE

## 2022-02-22 VITALS
BODY MASS INDEX: 39.17 KG/M2 | DIASTOLIC BLOOD PRESSURE: 76 MMHG | OXYGEN SATURATION: 97 % | SYSTOLIC BLOOD PRESSURE: 118 MMHG | HEART RATE: 92 BPM | WEIGHT: 315 LBS | HEIGHT: 75 IN

## 2022-02-22 DIAGNOSIS — R06.02 SOB (SHORTNESS OF BREATH): ICD-10-CM

## 2022-02-22 DIAGNOSIS — I10 ESSENTIAL HYPERTENSION: ICD-10-CM

## 2022-02-22 DIAGNOSIS — I48.0 PAF (PAROXYSMAL ATRIAL FIBRILLATION) (HCC): ICD-10-CM

## 2022-02-22 DIAGNOSIS — R94.39 ABNORMAL STRESS TEST: ICD-10-CM

## 2022-02-22 DIAGNOSIS — Z95.5 STATUS POST INSERTION OF DRUG-ELUTING STENT INTO LEFT ANTERIOR DESCENDING (LAD) ARTERY FOR CORONARY ARTERY DISEASE: ICD-10-CM

## 2022-02-22 DIAGNOSIS — I48.0 PAF (PAROXYSMAL ATRIAL FIBRILLATION) (HCC): Primary | ICD-10-CM

## 2022-02-22 LAB
HCT VFR BLD CALC: 39.3 % (ref 40.5–52.5)
HEMOGLOBIN: 13.1 G/DL (ref 13.5–17.5)
MCH RBC QN AUTO: 29.1 PG (ref 26–34)
MCHC RBC AUTO-ENTMCNC: 33.4 G/DL (ref 31–36)
MCV RBC AUTO: 87.1 FL (ref 80–100)
PDW BLD-RTO: 16.3 % (ref 12.4–15.4)
PLATELET # BLD: 221 K/UL (ref 135–450)
PMV BLD AUTO: 8.2 FL (ref 5–10.5)
PRO-BNP: 171 PG/ML (ref 0–449)
RBC # BLD: 4.51 M/UL (ref 4.2–5.9)
WBC # BLD: 10.1 K/UL (ref 4–11)

## 2022-02-22 PROCEDURE — G8417 CALC BMI ABV UP PARAM F/U: HCPCS | Performed by: INTERNAL MEDICINE

## 2022-02-22 PROCEDURE — G8427 DOCREV CUR MEDS BY ELIG CLIN: HCPCS | Performed by: INTERNAL MEDICINE

## 2022-02-22 PROCEDURE — 1123F ACP DISCUSS/DSCN MKR DOCD: CPT | Performed by: INTERNAL MEDICINE

## 2022-02-22 PROCEDURE — 1036F TOBACCO NON-USER: CPT | Performed by: INTERNAL MEDICINE

## 2022-02-22 PROCEDURE — 71046 X-RAY EXAM CHEST 2 VIEWS: CPT

## 2022-02-22 PROCEDURE — 99214 OFFICE O/P EST MOD 30 MIN: CPT | Performed by: INTERNAL MEDICINE

## 2022-02-22 PROCEDURE — G8484 FLU IMMUNIZE NO ADMIN: HCPCS | Performed by: INTERNAL MEDICINE

## 2022-02-22 PROCEDURE — 4040F PNEUMOC VAC/ADMIN/RCVD: CPT | Performed by: INTERNAL MEDICINE

## 2022-02-22 PROCEDURE — 3017F COLORECTAL CA SCREEN DOC REV: CPT | Performed by: INTERNAL MEDICINE

## 2022-02-22 NOTE — PROGRESS NOTES
[address]   Cardiac Followup    Referring Provider:  Robyn Foy MD     Chief Complaint   Patient presents with    Follow-up    Hypertension    Atrial Fibrillation    Shortness of Breath     a lot of sob    Palpitations     at times, feels racing    Fatigue     more tired. No energy. Jenny Benavidez   1945    History of Present Illness:    Jenny Benaivdez is a 77 y.o. male who is here today for follow up for a past medical history of coronary artery disease, abnormal stress test, paroxysmal atrial fibrillation/atrial flutter, PVC's,  anticoagulation with warfarin, hypertension, hyperlipidemia, diabetes mellitus, MARIA VICTORIA, obesity status post gastric sleeve as well as history of DVT and histoplasmosis s/p thoracotomy. He has a stress test in 2011 which showed a small sized reversible perfusion defect involving the anteroseptal wall of the left ventricle to mid heart concerning for myocardial ischemia. He has a history of PVC ablation in 2014. He stated he continued to have occasional episodes of palpitations and was found to have atrial flutter. He underwent a CV, and an unsuccessful RFCA for typical atrial flutter on 4/12/17. Patient was started on dofetilide in June of 2017. He underwent EPS and aflutter ablation on 9/19/17. He had a normal stress test in 2017. Stress test 5/19/2021 was abnornmal consistent with mixed ischemia and scar. He had a left heart cath 6/16/2021- Findings significant for LAD/Diag lesions, LAD treated with FELIZ x2 and Diag FELIZ x1. ASA stopped one month post procedure and patient continued on Plavix and warfarin. At his past visit he was advised to follow up with sleep center for management of CPAP and fatigue. Today he states he has been having fatigue and well as increased SOB with light activity. He states he has never had chest pain even prior to his last angiogram. SOB started a few months ago and seems to be getting worse.  Feels like he has to stop and rest frequently. He also noted his heart racing at times. Patient currently denies any weight gain, dizziness, and syncope. He has been attempting to lose weight. Past Medical History:   has a past medical history of Arthritis, Asthma, Atrial fibrillation ([address] Blood circulation, collateral, Diabetes mellitus ([address] DVT (deep venous thrombosis) ([facility] Histoplasmosis, History of knee replacement procedure of right knee, Hx of blood clots, Hyperlipidemia, Hypertension, Knee osteoarthritis, Left TKR, Lung nodule, Neuromuscular disorder ([facility] Palpitations, Sleep apnea, Stone, kidney, and UTI (urinary tract infection). Surgical History:   has a past surgical history that includes Cystoscopy; Tonsillectomy; Knee arthroscopy (4/19/2011); Colonoscopy; Cystocopy (2/8/13); ablation of dysrhythmic focus (2013); Carpal tunnel release (Right, 9-30-15); Bariatric Surgery (2013); Carpal tunnel release (Left, 3/20/16); skin biopsy; Diagnostic Cardiac Cath Lab Procedure; joint replacement (Bilateral); Cardiac surgery (2013); thoracotomy; ERCP (02/18/2019); ERCP (N/A, 2/18/2019); ERCP (2/18/2019); Cholecystectomy, laparoscopic (N/A, 2/19/2019); Intracapsular cataract extraction (Right, 1/14/2020); and Intracapsular cataract extraction (Left, 1/21/2020). Social History:   reports that he quit smoking about 45 years ago. His smoking use included cigarettes. He has a 34.00 pack-year smoking history. He has never used smokeless tobacco. He reports that he does not drink alcohol and does not use drugs. Family History:  family history includes Arthritis in his father; Cancer in his mother; Kidney Disease in his mother; Other in his father; Stroke in his father and mother; Substance Abuse in his father. Home Medications:  Prior to Admission medications    Medication Sig Start Date End Date Taking?  Authorizing Provider   warfarin (COUMADIN) 5 MG tablet TAKE ONE TABLET BY MOUTH DAILY 2/21/22  Yes Cleven Puff Emily Navarro MD   Dulaglutide (TRULICITY) 8.89 AJ/7.3IM SOPN Inject 0.75 mg into the skin once a week   Yes Mayuri Guzman MD   blood glucose test strips (TRUE METRIX BLOOD GLUCOSE TEST) strip TEST ONCE DAILY. DX:E11.9 1/20/22  Yes Tony Anderson MD   atorvastatin (LIPITOR) 40 MG tablet TAKE ONE TABLET BY MOUTH DAILY 1/20/22  Yes Tony Anderson MD   metFORMIN (GLUCOPHAGE) 500 MG tablet TAKE ONE TABLET BY MOUTH TWICE A DAY  Patient taking differently: 1,000 mg 2 times daily (with meals) TAKE ONE TABLET BY MOUTH TWICE A DAY 1/6/22  Yes Tony Anderson MD   [facility]) 250 MCG capsule Take 1 capsule by mouth 2 times daily 10/19/21  Yes Nickolas Irene MD   torsemide (DEMADEX) 20 MG tablet Take 1 tablet by mouth daily 10/4/21  Yes SERENA Verde - CNP   montelukast (SINGULAIR) 10 MG tablet TAKE 1 TABLET BY MOUTH ONCE DAILY 8/24/21  Yes Tony Anderson MD   [facility]) 250 MCG capsule Take 1 capsule by mouth every 12 hours 7/7/21  Yes Jaret Cui MD   clopidogrel (PLAVIX) 75 MG tablet Take 1 tablet by mouth daily 6/22/21  Yes Jaret Cui MD   dilTIAZem (CARDIZEM) 120 MG tablet TAKE 1 TABLET BY MOUTH  EVERY 12 HOURS 9/3/20  Yes Marysol Novak MD   Easy Touch Lancets 32G/Twist MISC TEST DAILY. DX: E11.9 3/23/20  Yes Tony Anderson MD   blood glucose test strips (TRUE METRIX BLOOD GLUCOSE TEST) strip USE TO CHECK BLOOD GLUCOSE DAILY. DX: E11.9 3/23/20  Yes Tony Anderson MD   ACCU-CHEK MULTICLIX LANCETS MISC Check sugars once daily. Dx;E11.9 3/11/19  Yes Tony Anderson MD   Lancet Devices (EASY TOUCH LANCING DEVICE) MISC Test Daily. DX: E11.9 12/26/18  Yes Tony Anderson MD   Blood Glucose Monitoring Suppl (TRUE METRIX METER) VINNIE Use to check daily. DX;E11.9 12/22/18  Yes Tony Anderson MD   Lancets Misc. (ACCU-CHEK MULTICLIX LANCET DEV) KIT Check sugars once daily.  DX;E11.9 12/21/18  Yes Tony Anderson MD Biotin 5 MG TABS Take 5 mg by mouth daily   Yes Historical Provider, MD   Cholecalciferol (VITAMIN D3) 5000 units TABS Take 5,000 Units by mouth daily   Yes Historical Provider, MD   Cyanocobalamin (VITAMIN B-12 CR PO) Take 5,000 mcg by mouth every 7 days    Yes Historical Provider, MD   Multiple Vitamins-Minerals (THERAPEUTIC MULTIVITAMIN-MINERALS) tablet Take 2 tablets by mouth daily    Yes Historical Provider, MD   magnesium oxide (MAG-OX) 400 MG tablet Take 400 mg by mouth daily. Yes Historical Provider, MD   senna (SENOKOT) 8.6 MG tablet Take 1 tablet by mouth 2 times daily  Patient not taking: Reported on 2/22/2022 11/11/21   Maegan Ojeda MD   metoprolol succinate (TOPROL XL) 25 MG extended release tablet Take 1 tablet by mouth daily  Patient not taking: Reported on 2/22/2022 6/29/21   Zoraida Miller MD        Allergies:  Bactrim [sulfamethoxazole-trimethoprim], Brilinta [ticagrelor], Ciprofloxacin, Macrobid [nitrofurantoin monohyd macro], Ozempic (0.25 or 0.5 mg-dose) [semaglutide(0.25 or 0.5mg-dos)], Sulfamethoxazole, Theophylline, Cefuroxime axetil, Cipro xr, Other, and Tape [adhesive tape]     Review of Systems:   · Constitutional: there has been no unanticipated weight loss. There's been no change in energy level, sleep pattern, or activity level. · Eyes: No visual changes or diplopia. No scleral icterus. · ENT: No Headaches, hearing loss or vertigo. No mouth sores or sore throat. · Cardiovascular: Reviewed in HPI  · Respiratory: No cough or wheezing, no sputum production. No hematemesis. · Gastrointestinal: No abdominal pain, appetite loss, blood in stools. No change in bowel or bladder habits. · Genitourinary: No dysuria, trouble voiding, or hematuria. · Musculoskeletal:  No gait disturbance, weakness or joint complaints. · Integumentary: No rash or pruritis. · Neurological: No headache, diplopia, change in muscle strength, numbness or tingling.  No change in gait, balance, coordination, mood, affect, memory, mentation, behavior. · Psychiatric: No anxiety, no depression. · Endocrine: No malaise, fatigue or temperature intolerance. No excessive thirst, fluid intake, or urination. No tremor. · Hematologic/Lymphatic: No abnormal bruising or bleeding, blood clots or swollen lymph nodes. · Allergic/Immunologic: No nasal congestion or hives. Physical Examination:    Vitals:    02/22/22 1017   BP: 118/76   Pulse: 92   SpO2: 97%        Wt Readings from Last 3 Encounters:   02/22/22 (!) 315 lb (142.9 kg)   12/03/21 (!) 327 lb 3.2 oz (148.4 kg)   10/04/21 (!) 330 lb (149.7 kg)       Constitutional and General Appearance: NAD   Respiratory:  · Normal excursion and expansion without use of accessory muscles  · Resp Auscultation: Normal breath sounds without dullness  Cardiovascular:  · The apical impulses not displaced  · Heart tones are crisp and normal  · Cervical veins are not engorged  · The carotid upstroke is normal in amplitude and contour without delay or bruit  · Normal S1S2, No S3, No Murmur  · Peripheral pulses are symmetrical and full  · There is no clubbing, cyanosis of the extremities.   · Trace BLE edema  · Femoral Arteries: 2+ and equal  · Pedal Pulses: 2+ and equal   Abdomen:  · No masses or tenderness  · Liver/Spleen: No Abnormalities Noted  Neurological/Psychiatric:  · Alert and oriented in all spheres  · Moves all extremities well  · Exhibits normal gait balance and coordination  · No abnormalities of mood, affect, memory, mentation, or behavior are noted  Lab Results   Component Value Date    CHOL 139 06/16/2021    CHOL 222 (H) 12/19/2018    CHOL 182 10/31/2017     Lab Results   Component Value Date    TRIG 126 06/16/2021    TRIG 159 (H) 12/19/2018    TRIG 126 10/31/2017     Lab Results   Component Value Date    HDL 39 (L) 06/16/2021    HDL 41 12/08/2020    HDL 43 12/04/2019     Lab Results   Component Value Date    LDLCALC 75 06/16/2021    [address] 12/08/2020 LDLCALC 66 12/04/2019     Lab Results   Component Value Date    LABVLDL 25 06/16/2021    LABVLDL 28 12/08/2020    LABVLDL 31 12/04/2019     No results found for: CHOLHDLRATIO      Stress test 12/23/11  Impression- 1. Small sized reversible perfusion defect involving the anteroseptal wall of the left ventricle to mid heart concerning for myocardial ischemia. Please correlate with EKG findings. 2. Left ventricular hypertrophy. 3. LVEF 59 %     NAVNEET/Cardioversion 2/7/2012  Summary-   1. A Navneet was performed without complications. 2.  LVEF 55   3. No thrombus or valvular vegetation identified. CARDIOVERSION-      After an adequate level of sedation was achieved, 150J, then 200J in   biphasic synchronized delivery was administered. No change in   rhythm. The patient awoke without complications. A post procedure   12 L ECG was ordered and reviewed. There were no complications encountered. 1.  Unsuccessful attempt at cardioversion. 2.  Patient remains in atrial fibrillation. Echocardiogram 11/24/15  Conclusions   Summary   Moderate concentric left ventricular hypertrophy is present. Global ejection fraction is normal and estimated from 55 % to 60 %. No obvious regional wall motion abnormalities are noted. Diastolic filling parameters suggests diastolic dysfunction. Moderately dilated left atrium. Aortic valve appears sclerotic but opens adequately. Trivial mitral and tricuspid regurgitation. Systolic pulmonary artery pressure (SPAP) is normal and estimated at 23 mmHg   (RA pressure 3 mm Hg). Echocardiogram 6/23/17  Conclusions      Summary   Normal systolic function with an estimated ejection fraction of 55-60%. Mild to moderate concentric left ventricular hypertrophy. No regional wall motion abnormalities are seen. Left ventricular diastolic filling pressure is indeterminate. Left atrium is moderately dilated. No change from last echo on 11/24/1015.     Limited Echocardiogram 11/31/2017   Conclusions      Summary   This is a limited study pericardial effusion and shunt. Normal left ventricular systolic function with an estimated ejection   fraction of 55%. A bubble study was performed and fails to show evidence of shunting. There is a very small circumferential pericardial effusion noted. Stress test 11/1/2017  Conclusions    Summary  There is no evidence of stress induced ischemia. Normal LV function with  ejection fraction of 66%. There are no regional wall motion abnormalities. Low risk study. REECE 3/31-4/30/2021  No atrial fib   Predominately NSR  PSVT- symptomatic   NSVT/PVC's-  Symptomatic  Nocturnal bradycardia     Stress test 5/19/2021  Conclusions    Summary  The overall quality of the study is fair. There is subdiaphragmatic  attenuation. Left ventricular cavity is noted to be normal size. The right ventricle is  normal in size. SPECT images demonstrate a small area of mildly decreased perfusion in the  apical lateral and mid-inferior lateral segments. The defect is mixed  ischemia and scar. There is associated wall motion abnormality, consistent  with ischemia. Gated SPECT imaging reveals normal myocardial thickening and  wall motion. Sum stress score of 7. No visual TID. Calculated TID of 0.99. The left ventricular ejection fraction is calculated to be 63%. Myocardial perfusion is abnormal. Consistent with mixed ischemia and scar. Low risk scan. EKG 6/16/21  Sinus rhythm with marked sinus arrhythmia with 1st degree A-V blockLow voltage QRSProlonged QTAbnormal ECGConfirmed by Sarah Abbott MD, Bello Hammond ([id number]) on 6/16/2021 4:41:38 PM    ECHO 6/16/21  Summary   Left ventricular systolic function is normal with a visually estimated   ejection fraction of 55%. The left ventricle is normal in size with mild concentric hypertrophy. No obvious regional wall motion abnormalities noted. Increased left ventricular filling pressure.    The left atrium appears moderately enlarged. The right atrium is severely enlarged. Right ventricle is enlarged but   normal function. Inadequate tricuspid valve regurgitation to estimate systolic pulmonary   artery pressure. The IVC is dilated in size (>2.1 cm) but appears to collapse >50% with   respiration consistent with elevated right atrial pressures (8 mmHg) . EKG 6/16/21  Sinus rhythm with 1st degree A-V block with Premature atrial complexesOtherwise normal ECGConfirmed by Nori Heart MD ([id number]) on 6/17/2021 6:58:06 AM    EKG 6/16/21  Sinus rhythm with sinus arrhythmia with 1st degree A-V blockSeptal infarct , age undeterminedAbnormal ECGConfirmed by Nori Heart MD ([id number]) on 6/17/2021 6:58:18 AM    Left heart cath 6/16/2021 VSP  Procedures Performed:   1. Selective left heart catheterization  2. Percutaneous coronary intervention of the left anterior descending and diagonal artery artery using complex bifurcation technique. Procedure Findings:  1. Critical LAD diagonal disease with angina classification 0, 0, 1 along with mid LAD 70% stenosis       Assessment:   CAD - consider progression   PANG - decreased post PCI but now recurrent. Suspect sig component deconditioning but consider recurrent ischemia   Atrial fibrillation - stable  Hypertension - controlled    Hyperlipidemia - stable, controlled  Sleep apnea   History of DVT   Diabetes Mellitus   Asthma   Tachycardia - intermittent  Fatigue - do not suspect cardiac     Plan:  2 weeks Cardiac event monitor to be mailed   Stress test- PanOpticaview   Chest Xray  Labs- BNP, BMP, CBC   Cardiac medications reviewed including indications and pertinent side effects. No changes pending test results. Refills as needed.     Check blood pressure and heart rate at home a few times per week- keep a log with dates and times and bring to office visit   Regular exercise and following a healthy diet encouraged   Follow up with me in 8 weeks     The scribes documentation has been prepared under my direction and personally reviewed by me in its entirety. I confirm that the note above accurately reflects all work, treatment, procedures, and medical decision making performed by me. Dr. Harshad Felipe MD    Scribsiri's attestation: This note was scribed in the presence of Dr. Harshad Felipe M.D. By Katharina Hopkins RN    Thank you for allowing me to participate in the care of this individual.      Kristyn Sotelo.  Karlos Abbott M.D., [place]

## 2022-02-22 NOTE — PATIENT INSTRUCTIONS
Plan:  2 weeks Cardiac event monitor to be mailed   Stress test- [employer] Myoview   Chest Xray  Labs- BNP, BMP, CBC   Cardiac medications reviewed including indications and pertinent side effects    Check blood pressure and heart rate at home a few times per week- keep a log with dates and times and bring to office visit   Regular exercise and following a healthy diet encouraged   Follow up with me in 8 weeks   Your provider has ordered testing for further evaluation. An order/prescription has been included in your paper work.  To schedule outpatient testing, contact Central Scheduling by calling [address] [place] [address] ([phone]).

## 2022-02-22 NOTE — TELEPHONE ENCOUNTER
Monitor placed by To be mailed to pt home  [address]  Length of monitor 14 days  Monitor ordered by Ronni Smith  Serial number   Kit ID   Activation successful prior to pt leaving office? NA        PATIENT INFORMATION  Enrollment date: 2022-Feb-22  Patient's first name: Angie Lawson  Patient's last name: Hernan Yoder  YOB: 1945-Apr-13  Gender:  M  F  : Not set       Street address: [address]ff[address] President: [facility]: [place]  ZIP: [address]  Language: Vivian Mattson       Cell phone: [phone]  Home phone:   Medical record ID:   Pacemaker: No / Unknown  Email address:   Notes and/or medical history:  [address] [place] [address] name: Medicare Policy no.: [id number] Group no.: plan [id number]  SECONDARY No    Insurance company name: None Policy no.:  Group no.:   ICD-10 CODES AS DOCUMENTED IN THE PATIENT'S MEDICAL RECORD  Selected codes: I48.0 - Paroxysmal atrial fibrillation  SERVICE TYPE SELECTION  Monitor type:PocketECG Telemetry Duration:14 days  Coverage Indications for Telemetry monitoring. The patient experiences infrequent symptoms (less than daily), such that Holter and/or other types of monitoring or testing is unlikely and/or not expected to capture and record the dysrhythmia.   Hookup Type:   Send to patient's home   In-office hookup   Ship as soon as possible   Delay monitor shipment  PHYSICIAN INFORMATION  Referring physician:   Ordering physician: Kristian Sheehan name: [address] [place] [address]  Address: [address] [place] [address]  Phone: [phone]  Fax: [phone]; [phone]    Enrolled by: Nani Martinez  Interpreting physician:   Group name:   Address:   Phone:   Fax:

## 2022-02-23 ENCOUNTER — TELEPHONE (OUTPATIENT)
Dept: CARDIOLOGY CLINIC | Age: 77
End: 2022-02-23

## 2022-02-25 PROCEDURE — 93228 REMOTE 30 DAY ECG REV/REPORT: CPT | Performed by: INTERNAL MEDICINE

## 2022-02-28 ENCOUNTER — HOSPITAL ENCOUNTER (OUTPATIENT)
Dept: NON INVASIVE DIAGNOSTICS | Age: 77
Discharge: HOME | End: 2022-02-28
Payer: MEDICARE

## 2022-02-28 ENCOUNTER — TELEPHONE (OUTPATIENT)
Dept: INTERNAL MEDICINE CLINIC | Age: 77
End: 2022-02-28

## 2022-02-28 ENCOUNTER — ANTI-COAG VISIT (OUTPATIENT)
Dept: INTERNAL MEDICINE CLINIC | Age: 77
End: 2022-02-28

## 2022-02-28 ENCOUNTER — HOSPITAL ENCOUNTER (OUTPATIENT)
Dept: NUCLEAR MEDICINE | Age: 77
Discharge: HOME | End: 2022-02-28
Payer: MEDICARE

## 2022-02-28 DIAGNOSIS — I48.0 PAF (PAROXYSMAL ATRIAL FIBRILLATION) (HCC): ICD-10-CM

## 2022-02-28 DIAGNOSIS — R06.02 SOB (SHORTNESS OF BREATH): ICD-10-CM

## 2022-02-28 LAB
INR BLD: 2.7
LV EF: 60 %
LVEF MODALITY: NORMAL

## 2022-02-28 PROCEDURE — 3430000000 HC RX DIAGNOSTIC RADIOPHARMACEUTICAL: Performed by: INTERNAL MEDICINE

## 2022-02-28 PROCEDURE — 78452 HT MUSCLE IMAGE SPECT MULT: CPT

## 2022-02-28 PROCEDURE — A9502 TC99M TETROFOSMIN: HCPCS | Performed by: INTERNAL MEDICINE

## 2022-02-28 PROCEDURE — 93017 CV STRESS TEST TRACING ONLY: CPT

## 2022-02-28 PROCEDURE — 6360000002 HC RX W HCPCS: Performed by: INTERNAL MEDICINE

## 2022-02-28 RX ADMIN — REGADENOSON 0.4 MG: 0.08 INJECTION, SOLUTION INTRAVENOUS at 09:20

## 2022-02-28 RX ADMIN — TETROFOSMIN 11.5 MILLICURIE: 1.38 INJECTION, POWDER, LYOPHILIZED, FOR SOLUTION INTRAVENOUS at 07:50

## 2022-02-28 RX ADMIN — TETROFOSMIN 34 MILLICURIE: 1.38 INJECTION, POWDER, LYOPHILIZED, FOR SOLUTION INTRAVENOUS at 09:20

## 2022-02-28 NOTE — TELEPHONE ENCOUNTER
----- Message from Maegan Ojeda MD sent at 2/28/2022  9:08 AM EST -----  No changes  2 weeks  ----- Message -----  From: Jackie Hearn  Sent: 2/28/2022   8:33 AM EST  To: Maegan Ojeda MD    INR- 2.7

## 2022-03-01 ENCOUNTER — TELEPHONE (OUTPATIENT)
Dept: CARDIOLOGY CLINIC | Age: 77
End: 2022-03-01

## 2022-03-01 NOTE — TELEPHONE ENCOUNTER
Created telephone encounter. Spoke with Monalisa Grimaldo relayed message per [address] regarding stress test. Pt verbalized understanding.

## 2022-03-01 NOTE — TELEPHONE ENCOUNTER
----- Message from Rae Escudero MD sent at 2/28/2022 12:30 PM EST -----  Call  Stress test is normal

## 2022-03-14 ENCOUNTER — OFFICE VISIT (OUTPATIENT)
Dept: INTERNAL MEDICINE CLINIC | Age: 77
End: 2022-03-14

## 2022-03-14 VITALS
DIASTOLIC BLOOD PRESSURE: 75 MMHG | HEART RATE: 78 BPM | RESPIRATION RATE: 18 BRPM | WEIGHT: 311 LBS | SYSTOLIC BLOOD PRESSURE: 105 MMHG | BODY MASS INDEX: 38.67 KG/M2 | HEIGHT: 75 IN

## 2022-03-14 DIAGNOSIS — I48.0 PAF (PAROXYSMAL ATRIAL FIBRILLATION) (HCC): ICD-10-CM

## 2022-03-14 DIAGNOSIS — I10 ESSENTIAL HYPERTENSION: ICD-10-CM

## 2022-03-14 DIAGNOSIS — I25.118 CORONARY ARTERY DISEASE OF NATIVE ARTERY OF NATIVE HEART WITH STABLE ANGINA PECTORIS (HCC): ICD-10-CM

## 2022-03-14 DIAGNOSIS — E11.40 TYPE 2 DIABETES MELLITUS WITH DIABETIC NEUROPATHY, WITHOUT LONG-TERM CURRENT USE OF INSULIN (HCC): ICD-10-CM

## 2022-03-14 DIAGNOSIS — Z00.00 MEDICARE ANNUAL WELLNESS VISIT, SUBSEQUENT: Primary | ICD-10-CM

## 2022-03-14 DIAGNOSIS — E66.01 MORBID OBESITY WITH BMI OF 40.0-44.9, ADULT (HCC): ICD-10-CM

## 2022-03-14 DIAGNOSIS — E11.9 TYPE 2 DIABETES MELLITUS WITHOUT COMPLICATION, WITHOUT LONG-TERM CURRENT USE OF INSULIN (HCC): ICD-10-CM

## 2022-03-14 DIAGNOSIS — E66.01 SEVERE OBESITY (BMI 35.0-39.9) WITH COMORBIDITY (HCC): ICD-10-CM

## 2022-03-14 PROCEDURE — 4040F PNEUMOC VAC/ADMIN/RCVD: CPT | Performed by: INTERNAL MEDICINE

## 2022-03-14 PROCEDURE — 81002 URINALYSIS NONAUTO W/O SCOPE: CPT | Performed by: INTERNAL MEDICINE

## 2022-03-14 PROCEDURE — G0439 PPPS, SUBSEQ VISIT: HCPCS | Performed by: INTERNAL MEDICINE

## 2022-03-14 PROCEDURE — 1123F ACP DISCUSS/DSCN MKR DOCD: CPT | Performed by: INTERNAL MEDICINE

## 2022-03-14 PROCEDURE — G8484 FLU IMMUNIZE NO ADMIN: HCPCS | Performed by: INTERNAL MEDICINE

## 2022-03-14 PROCEDURE — 3017F COLORECTAL CA SCREEN DOC REV: CPT | Performed by: INTERNAL MEDICINE

## 2022-03-14 PROCEDURE — 3046F HEMOGLOBIN A1C LEVEL >9.0%: CPT | Performed by: INTERNAL MEDICINE

## 2022-03-14 RX ORDER — SPIRONOLACTONE 25 MG/1
25 TABLET ORAL DAILY
Qty: 90 TABLET | Refills: 0
Start: 2022-03-14 | End: 2022-03-28

## 2022-03-14 ASSESSMENT — PATIENT HEALTH QUESTIONNAIRE - PHQ9
SUM OF ALL RESPONSES TO PHQ QUESTIONS 1-9: 0
1. LITTLE INTEREST OR PLEASURE IN DOING THINGS: 0
SUM OF ALL RESPONSES TO PHQ QUESTIONS 1-9: 0
SUM OF ALL RESPONSES TO PHQ9 QUESTIONS 1 & 2: 0
SUM OF ALL RESPONSES TO PHQ QUESTIONS 1-9: 0
2. FEELING DOWN, DEPRESSED OR HOPELESS: 0
2. FEELING DOWN, DEPRESSED OR HOPELESS: 0
SUM OF ALL RESPONSES TO PHQ QUESTIONS 1-9: 0
1. LITTLE INTEREST OR PLEASURE IN DOING THINGS: 0
SUM OF ALL RESPONSES TO PHQ9 QUESTIONS 1 & 2: 0
SUM OF ALL RESPONSES TO PHQ QUESTIONS 1-9: 0

## 2022-03-14 ASSESSMENT — LIFESTYLE VARIABLES: HOW OFTEN DO YOU HAVE A DRINK CONTAINING ALCOHOL: NEVER

## 2022-03-14 NOTE — PATIENT INSTRUCTIONS
Personalized Preventive Plan for Nagi Schwartz - 3/14/2022  Medicare offers a range of preventive health benefits. Some of the tests and screenings are paid in full while other may be subject to a deductible, co-insurance, and/or copay. Some of these benefits include a comprehensive review of your medical history including lifestyle, illnesses that may run in your family, and various assessments and screenings as appropriate. After reviewing your medical record and screening and assessments performed today your provider may have ordered immunizations, labs, imaging, and/or referrals for you. A list of these orders (if applicable) as well as your Preventive Care list are included within your After Visit Summary for your review. Other Preventive Recommendations:    · A preventive eye exam performed by an eye specialist is recommended every 1-2 years to screen for glaucoma; cataracts, macular degeneration, and other eye disorders. · A preventive dental visit is recommended every 6 months. · Try to get at least 150 minutes of exercise per week or 10,000 steps per day on a pedometer . · Order or download the FREE \"Exercise & Physical Activity: Your Everyday Guide\" from [employer]. Call 5-423.906.8466 or search [employer] online. · You need 2657-5676 mg of calcium and 4364-6831 IU of vitamin D per day. It is possible to meet your calcium requirement with diet alone, but a vitamin D supplement is usually necessary to meet this goal.  · When exposed to the sun, use a sunscreen that protects against both UVA and UVB radiation with an SPF of 30 or greater. Reapply every 2 to 3 hours or after sweating, drying off with a towel, or swimming. · Always wear a seat belt when traveling in a car. Always wear a helmet when riding a bicycle or motorcycle.

## 2022-03-14 NOTE — PROGRESS NOTES
Medicare Annual Wellness Visit    Chelsie Park is here for Medicare AWV    Assessment & Plan   Medicare annual wellness visit, subsequent  Type 2 diabetes mellitus with diabetic neuropathy, without long-term current use of insulin ([facility]  -      DIABETES FOOT EXAM  Coronary artery disease of native artery of native heart with stable angina pectoris (HCC)  PAF (paroxysmal atrial fibrillation) ([facility]  Essential hypertension  Type 2 diabetes mellitus without complication, without long-term current use of insulin (HCC)  Severe obesity (BMI 35.0-39. 9) with comorbidity ([facility]  Morbid obesity with BMI of 40.0-44.9, adult [facility])      Recommendations for Preventive Services Due: see orders and patient instructions/AVS.  Recommended screening schedule for the next 5-10 years is provided to the patient in written form: see Patient Instructions/AVS.     Return in about 4 months (around 7/14/2022) for htn dm. Subjective        77 y.o. 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 ,obestiy, MARIA VICTORIA , pedal edema here for annual exam     6/21- CAD with abnormal stress test leading to angiogram and  3 LAD stents. Placed on brillinta in addition to ASA but due increasing sob, changed to plavix and remains stable  Recent stress test stable with no ischemia     Did  well for few months but persistent dyspnea on minimal exertion and fatigue noted.  Recent stress test neg   Reports he has frequent palpitations and had holter done last week and awaiting results        Afibb s.p ablation, stable on tikosyn 250 mcg bid  Reports his Tikosyn has been decreased for elevated BUN recently    cost issues noted and goes to [place] for refills   On Coumadin for [facility] with no bleeding issues    DM- 2 dx in 12/18- now on metformin 1000 mg bid and now on trulicty weekly    Fasting range from 110 - 150  No low sugars  Recent A1c at [place] is 8.3 on 5/21 at outside lab  Has chronic tingling numbness in feet      pulm nodule - 2016 - Right sided  pulmonary nodule was taken out with resection by Dr. Triny Mondragon 9/16  Pathology showed histoplasmosis    Obesity - has gastric sleeve surgery in 2013 and lost 100 lbs    MARIA VICTORIA - on home cpap, compliant with it    Chronic pedal edema - on demadex and aldactone       Independent of ADL and IADL    and lives with wife   No depression issues         The following acute and/or chronic problems were also addressed today:  Palpitations    Patient's complete Health Risk Assessment and screening values have been reviewed and are found in Flowsheets. The following problems were reviewed today and where indicated follow up appointments were made and/or referrals ordered. Positive Risk Factor Screenings with Interventions:              Health Habits/Nutrition:     Physical Activity: Insufficiently Active    Days of Exercise per Week: 2 days    Minutes of Exercise per Session: 60 min     Have you lost any weight without trying in the past 3 months?: No  Body mass index: (!) 38.87  Have you seen the dentist within the past year?: Yes    Health Habits/Nutrition Interventions:  · Inadequate physical activity:  patient is not ready to increase his/her physical activity level at this time             Objective   Vitals:    03/14/22 1321   Weight: (!) 311 lb (141.1 kg)   Height: 6' 3\" (1.905 m)      Body mass index is 38.87 kg/m². General: obese, healthy appearing male  Awake, alert and oriented.  Appears to be not in any distress  Mucous Membranes:  Pink , anicteric  Neck: No JVD, no carotid bruit, no thyromegaly  Chest:  Clear to auscultation bilaterally, no added sounds  Cardiovascular:  RRR S1S2 heard, no murmurs or gallops  Abdomen:  Soft, morbidly obese, undistended, non tender, no organomegaly, BS present  Extremities: chronic 1+ pedal edema  Distal pulses well felt    Neurological : grossly normal  CN 2 to 12 intact, coordination normal   Gait steady  Visual inspection:  Deformity/amputation: absent  Skin lesions/pre-ulcerative calluses: absent  Edema: right- trace, left- trace    Sensory exam:  Monofilament sensation: diminished  (minimum of 5 random plantar locations tested, avoiding callused areas - > 1 area with absence of sensation is + for neuropathy)    Plus at least one of the following:  Pulses: normal,   Pinprick: Impaired  Proprioception: Impaired  Vibration (128 Hz): Impaired        Stress test 5/21          The overall quality of the study is fair. There is subdiaphragmatic    attenuation.        Left ventricular cavity is noted to be normal size. The right ventricle is    normal in size.        SPECT images demonstrate a small area of mildly decreased perfusion in the    apical lateral and mid-inferior lateral segments. The defect is mixed    ischemia and scar. There is associated wall motion abnormality, consistent    with ischemia. Gated SPECT imaging reveals normal myocardial thickening and    wall motion.        Sum stress score of 7. No visual TID. Calculated TID of 0.99.        The left ventricular ejection fraction is calculated to be 63%.        Myocardial perfusion is abnormal. Consistent with mixed ischemia and scar.    Low risk scan.           Angiogram 6/16        The interventional portion of the procedure was completed utilizing a [employer] system. XB 3.5 guide cath advanced into the left main coronary ostium. Initially a BMW wire was advanced into the distal LAD. And a second BMW wire was advanced into the first diagonal branch. We completed balloon angioplasty of the first diagonal branch initially with a 2.5 mm x 12 m trek balloon. We subsequently did balloon angioplasty of the mid LAD with a 2.5 x 12 mm trek balloon also. We subsequently stented the mid LAD with a 2.5 mm x 12 mm Abbott vascular Xience 0 drug-eluting stent. Finally we followed up with mini crush technique of the LAD.   We placed a 2.75 x 18mm Abbott vascular Xience Kelly drug-eluting stent into the diagonal.  This was pulled and withdrawn into the LAD. This was inflated to nominal pressure. We separately did post dilate the ostial portion of the diagonal with a 3.0 mm x 8 mm balloon. Eventually we did mini crush of the stent utilizing a 3.5 mm x 12 mm noncompliant trek balloon separately placed a stent into the LAD a 4.0 mm x 15 mm. Eventually we did kissing simultaneous angioplasty of the LAD and diagonal utilizing a 4.0 mm x 12 mm trek balloon along with a 3.0 mm x 12 mm trek balloon. These were inflated to rated burst pressure. Posterior views after excellent results.       1. Successful complex LAD diagonal stenting utilizing mini crush technique.     ASSESSMENT/RECOMMENDATIONS:     1.  Dual antiplatelet therapy along with anticoagulation for 30 days. After 30 days we can discontinue dual antiplatelet therapy and continue with single antiplatelet therapy along with oral anticoagulation.       Wt Readings from Last 3 Encounters:   03/14/22 (!) 311 lb (141.1 kg)   02/22/22 (!) 315 lb (142.9 kg)   12/03/21 (!) 327 lb 3.2 oz (148.4 kg)           Assessment and Plan    CAD s.p LAD stents 6/21   - on Plavix , statins   - no bleeding issues while on coumadin  - also notes his demadex is cut down to 20 mg daily   - off BB for fatigue     DM- 2, well controlled  Last A1c at 8.3  Now on metformin 4120 mg bid and trulicity- need to repeat A1c  Urine microalbumin stable   Eye exam normal this year per pt   On statins      AFibb - s/p ablation - f/w Dr. Morgan Hearn in 2014 . With persistent symptoms    Had repeat Ablation in 4/17 and 9/17 with persistent symptoms. Now doing well with TIKOSYN 250 mg bid  and cardizem 120 mg bid  Still with exertional  sob,  Dizziness   Continued on coumadin with no issues.  Monitored at home       Polyneuropathy- dx 4 yrs  before DM but now worsening      seen Dr. Bharti Palomo and EMG showed sensory motor neuropathy  Off B6 supplements as advised   Stable since last time       Right lung nodule /mass- s.p excision  by Dr. Ronal Christianson  No cancer per path - histoplasmosis noted  No treatment as it was excised completely        Pedal edema - echo stable EF. chronic, improved  Now on  demadex 20 mg bid, aldactone daily for fluid retention       MARIA VICTORIA - CPAP  compliant - follow  by Dr. Misty Mckinney - improved since last time with no intervention     Obesity noted - weight loss and life style modification along with dietary changes, increased physical activity encouraged      Recurrent UTI /prostate enlargement - normal PSA, now f/w urology as needed      Has living will  Does see eye MD yearly  No dental or hearing issues    Colonoscopy in 2015   Check blood work    upto date on vaccines  Had pna, prevnar and shingrix vaccines          Allergies   Allergen Reactions    Bactrim [Sulfamethoxazole-Trimethoprim] Diarrhea    Brilinta [Ticagrelor]      dyspnea    Ciprofloxacin      Bad headaches    Macrobid [Nitrofurantoin Monohyd Macro]     Ozempic (0.25 Or 0.5 Mg-Dose) [Semaglutide(0.25 Or 0.5mg-Dos)]     Sulfamethoxazole Diarrhea    Theophylline      Theodur-caused severe headaches    Cefuroxime Axetil Rash and Itching    Cipro Xr Rash    Other Rash and Other (See Comments)     Ekg leads-glue    Tape [Adhesive Tape] Rash     Skin irritaion  Eats away at skin, paper tape OK  Plastic tape     Prior to Visit Medications    Medication Sig Taking? Authorizing Provider   spironolactone (ALDACTONE) 25 MG tablet Take 1 tablet by mouth daily Yes Keara Wright MD   warfarin (COUMADIN) 5 MG tablet TAKE ONE TABLET BY MOUTH DAILY  Keara Wright MD   Dulaglutide (TRULICITY) 6.85 CA/3.4KO SOPN Inject 0.75 mg into the skin once a week  Historical Provider, MD   blood glucose test strips (TRUE METRIX BLOOD GLUCOSE TEST) strip TEST ONCE DAILY.   DX:E11.9  Keara Wright MD   atorvastatin (LIPITOR) 40 MG tablet TAKE ONE TABLET BY MOUTH DAILY  Keara Wright MD   metFORMIN (GLUCOPHAGE) 500 MG tablet TAKE ONE TABLET BY MOUTH TWICE A DAY  Patient taking differently: 1,000 mg 2 times daily (with meals) TAKE ONE TABLET BY MOUTH TWICE A DAY  Brandee Bernard MD   senna (SENOKOT) 8.6 MG tablet Take 1 tablet by mouth 2 times daily  Patient not taking: Reported on 2022  Brandee Bernard MD   dofetilide (TIKOSYN) 250 MCG capsule Take 1 capsule by mouth 2 times daily  Orbie Apgar., MD   torsemide (DEMADEX) 20 MG tablet Take 1 tablet by mouth daily  SERENA Elena - CNP   montelukast (SINGULAIR) 10 MG tablet TAKE 1 TABLET BY MOUTH ONCE DAILY  Brandee Bernard MD   dofetilide (TIKOSYN) 250 MCG capsule Take 1 capsule by mouth every 12 hours  Evin Whitney MD   clopidogrel (PLAVIX) 75 MG tablet Take 1 tablet by mouth daily  Evin Whitney MD   dilTIAZem (CARDIZEM) 120 MG tablet TAKE 1 TABLET BY MOUTH  EVERY 12 HOURS  Loyd Thapa MD   Easy Touch Lancets 32G/Twist MISC TEST DAILY. DX: E11.9  Brandee Bernard MD   blood glucose test strips (TRUE METRIX BLOOD GLUCOSE TEST) strip USE TO CHECK BLOOD GLUCOSE DAILY. DX: E11.9  Brandee Bernard MD   ACCU-CHEK MULTICLIX LANCETS MISC Check sugars once daily. Dx;E11.9  Brandee Bernard MD   Lancet Devices (EASY TOUCH LANCING DEVICE) MISC Test Daily. DX: E11.9  Brandee Bernard MD   Blood Glucose Monitoring Suppl (TRUE METRIX METER) VINNIE Use to check daily. DX;E11.9  Brandee Bernard MD   Lancets Misc. (ACCU-CHEK MULTICLIX LANCET DEV) KIT Check sugars once daily.  DX;E11.9  Brandee Bernard MD   Biotin 5 MG TABS Take 5 mg by mouth daily  Historical Provider, MD   Cholecalciferol (VITAMIN D3) 5000 units TABS Take 5,000 Units by mouth daily  Historical Provider, MD   Cyanocobalamin (VITAMIN B-12 CR PO) Take 5,000 mcg by mouth every 7 days   Historical Provider, MD   Multiple Vitamins-Minerals (THERAPEUTIC MULTIVITAMIN-MINERALS) tablet Take 2 tablets by mouth daily   Historical Provider, MD   magnesium oxide (MAG-OX) 400 MG tablet Take 400 mg by mouth daily. Historical Provider, MD Patel (Including outside providers/suppliers regularly involved in providing care):   Patient Care Team:  Tony Anderson MD as PCP - General (Internal Medicine)  Tony Anderson MD as PCP - [facility] Empaneled Provider  SERENA Sagastume CNP as Consulting Physician (Family Nurse Practitioner)  Marysol Novak MD as Consulting Physician (Cardiology)    Reviewed and updated this visit:  Tobacco  Allergies  Meds  Problems  Med Hx  Surg Hx  Soc Hx  Fam Hx                  Advance Care Planning   Advanced Care Planning: Discussed the patients choices for care and treatment in case of a health event that adversely affects decision-making abilities. Also discussed the patients long-term treatment options. Reviewed with the patient the appropriate state-specific advance directive documents. Reviewed the process of designating a competent adult as an Agent (or -in-fact) that could take make health care decisions for the patient if incompetent. Patient was asked to complete the declaration forms, either acknowledge the forms by a public notary or an eligible witness and provide a signed copy to the practice office.   Time spent (minutes): 2 min

## 2022-03-16 ENCOUNTER — TELEPHONE (OUTPATIENT)
Dept: INTERNAL MEDICINE CLINIC | Age: 77
End: 2022-03-16

## 2022-03-16 ENCOUNTER — ANTI-COAG VISIT (OUTPATIENT)
Dept: INTERNAL MEDICINE CLINIC | Age: 77
End: 2022-03-16

## 2022-03-16 LAB — INR BLD: 3.3

## 2022-03-16 NOTE — TELEPHONE ENCOUNTER
----- Message from Janessa Pro MD sent at 3/16/2022  4:40 PM EDT -----  Hold today   Resume tomorrow  One week  ----- Message -----  From: Kristian Fuentes  Sent: 3/16/2022   1:32 PM EDT  To: Janessa Pro MD    INR- 3.3    Taking 7.5 mg on Mondays and Fridays and 5 mg all other days. States he was starting to feel sick when he was in to see you on Monday and then started having diarrhea and vomiting but is feeling better now and did not take any medications for this.

## 2022-03-24 ENCOUNTER — HOSPITAL ENCOUNTER (OUTPATIENT)
Age: 77
Discharge: HOME | End: 2022-03-24
Payer: MEDICARE

## 2022-03-24 DIAGNOSIS — E11.9 TYPE 2 DIABETES MELLITUS WITHOUT COMPLICATION, WITHOUT LONG-TERM CURRENT USE OF INSULIN (HCC): ICD-10-CM

## 2022-03-24 DIAGNOSIS — I10 ESSENTIAL HYPERTENSION: ICD-10-CM

## 2022-03-24 DIAGNOSIS — R06.02 SOB (SHORTNESS OF BREATH): ICD-10-CM

## 2022-03-24 DIAGNOSIS — E11.40 TYPE 2 DIABETES MELLITUS WITH DIABETIC NEUROPATHY, WITHOUT LONG-TERM CURRENT USE OF INSULIN (HCC): ICD-10-CM

## 2022-03-24 DIAGNOSIS — Z00.00 MEDICARE ANNUAL WELLNESS VISIT, SUBSEQUENT: ICD-10-CM

## 2022-03-24 LAB
BILIRUBIN URINE: NEGATIVE
BLOOD, URINE: NEGATIVE
CHOLESTEROL, FASTING: 140 MG/DL (ref 0–199)
CLARITY: CLEAR
COLOR: YELLOW
CREATININE URINE: 23.1 MG/DL (ref 39–259)
GLUCOSE URINE: NEGATIVE MG/DL
HDLC SERPL-MCNC: 38 MG/DL (ref 40–60)
KETONES, URINE: NEGATIVE MG/DL
LDL CHOLESTEROL CALCULATED: 70 MG/DL
LEUKOCYTE ESTERASE, URINE: NEGATIVE
MICROALBUMIN UR-MCNC: <1.2 MG/DL
MICROALBUMIN/CREAT UR-RTO: ABNORMAL MG/G (ref 0–30)
MICROSCOPIC EXAMINATION: NORMAL
NITRITE, URINE: NEGATIVE
PH UA: 6.5 (ref 5–8)
PRO-BNP: 283 PG/ML (ref 0–449)
PROSTATE SPECIFIC ANTIGEN: 0.45 NG/ML (ref 0–4)
PROTEIN UA: NEGATIVE MG/DL
SPECIFIC GRAVITY UA: 1.01 (ref 1–1.03)
TRIGLYCERIDE, FASTING: 160 MG/DL (ref 0–150)
TSH REFLEX FT4: 1.36 UIU/ML (ref 0.27–4.2)
URIC ACID, SERUM: 8.9 MG/DL (ref 3.5–7.2)
URINE TYPE: NORMAL
UROBILINOGEN, URINE: 0.2 E.U./DL
VLDLC SERPL CALC-MCNC: 32 MG/DL

## 2022-03-24 PROCEDURE — 81003 URINALYSIS AUTO W/O SCOPE: CPT

## 2022-03-24 PROCEDURE — 82043 UR ALBUMIN QUANTITATIVE: CPT

## 2022-03-24 PROCEDURE — 82570 ASSAY OF URINE CREATININE: CPT

## 2022-03-24 PROCEDURE — 36415 COLL VENOUS BLD VENIPUNCTURE: CPT

## 2022-03-24 PROCEDURE — 80061 LIPID PANEL: CPT

## 2022-03-24 PROCEDURE — 84153 ASSAY OF PSA TOTAL: CPT

## 2022-03-24 PROCEDURE — 83880 ASSAY OF NATRIURETIC PEPTIDE: CPT

## 2022-03-24 PROCEDURE — 83036 HEMOGLOBIN GLYCOSYLATED A1C: CPT

## 2022-03-24 PROCEDURE — 84443 ASSAY THYROID STIM HORMONE: CPT

## 2022-03-24 PROCEDURE — 84550 ASSAY OF BLOOD/URIC ACID: CPT

## 2022-03-25 ENCOUNTER — TELEPHONE (OUTPATIENT)
Dept: CARDIOLOGY CLINIC | Age: 77
End: 2022-03-25

## 2022-03-25 ENCOUNTER — TELEPHONE (OUTPATIENT)
Dept: INTERNAL MEDICINE CLINIC | Age: 77
End: 2022-03-25

## 2022-03-25 LAB
ESTIMATED AVERAGE GLUCOSE: 162.8 MG/DL
HBA1C MFR BLD: 7.3 %

## 2022-03-25 RX ORDER — METOPROLOL SUCCINATE 25 MG/1
25 TABLET, EXTENDED RELEASE ORAL DAILY
Qty: 30 TABLET | Refills: 3 | Status: SHIPPED | OUTPATIENT
Start: 2022-03-25 | End: 2022-07-01

## 2022-03-25 NOTE — TELEPHONE ENCOUNTER
----- Message from Billy Burnett MD sent at 3/25/2022  3:25 PM EDT -----  Gout  ----- Message -----  From: Mukund Ozuna  Sent: 3/25/2022   2:25 PM EDT  To: Billy Burnett MD    Spouse wanting to know what problems could arise from high uric acid. Please advise.

## 2022-03-25 NOTE — TELEPHONE ENCOUNTER
----- Message from Natividad Cosby MD sent at 3/25/2022  7:50 AM EDT -----  Call  Still w/ occ fast heart rhythms  Add toprol 25 mg daily - script sent

## 2022-03-25 NOTE — TELEPHONE ENCOUNTER
I called patient and left a message to call back for monitor results.  Still w/ occ fast heart rhythms  Add toprol 25 mg daily - script sent

## 2022-03-28 ENCOUNTER — TELEPHONE (OUTPATIENT)
Dept: INTERNAL MEDICINE CLINIC | Age: 77
End: 2022-03-28

## 2022-03-28 RX ORDER — SPIRONOLACTONE 25 MG/1
TABLET ORAL
Qty: 90 TABLET | Refills: 0 | Status: SHIPPED | OUTPATIENT
Start: 2022-03-28 | End: 2022-07-01

## 2022-03-28 NOTE — TELEPHONE ENCOUNTER
----- Message from Nenita Almaguer sent at 3/28/2022  4:46 PM EDT -----  Contact: [phone] (H)  Pt called and stated they need refill(s) on:  metFORMIN (GLUCOPHAGE) 500 MG tablet    Sent to:  [address] [place] [address] Drive [address] [place] [address], [facility] [address] [phone] (Ph: [phone])    Thank you

## 2022-03-29 NOTE — TELEPHONE ENCOUNTER
Spoke with Chaparro Osorio relayed message per [address] regarding Monitor results. Pt verbalized understanding, and started medication this morning. David Abbott

## 2022-03-29 NOTE — TELEPHONE ENCOUNTER
----- Message from Kim Kwok sent at 3/29/2022  8:07 AM EDT -----  Contact: Remberto Madsen [phone]  Patient states he is now taking 1000 mgs of Metformin twice a day per Dr. Reyna Melissa and 500 mg were called in for refill. He is asking for the 1000 mg to be called in.      Maged Al [address] [place] [address], [address] [place] [address] [phone]    Thank you

## 2022-04-05 ENCOUNTER — TELEPHONE (OUTPATIENT)
Dept: INTERNAL MEDICINE CLINIC | Age: 77
End: 2022-04-05

## 2022-04-05 ENCOUNTER — ANTI-COAG VISIT (OUTPATIENT)
Dept: INTERNAL MEDICINE CLINIC | Age: 77
End: 2022-04-05

## 2022-04-05 LAB — INR BLD: 3

## 2022-04-05 NOTE — TELEPHONE ENCOUNTER
----- Message from Elvia Gallo MD sent at 4/5/2022 11:19 AM EDT -----  No change   2 week  ----- Message -----  From: Stacy Tatum  Sent: 4/5/2022   8:17 AM EDT  To: Elvia Gallo MD    INR- 3.0    Taking 5 mg daily.

## 2022-04-08 ENCOUNTER — OFFICE VISIT (OUTPATIENT)
Dept: CARDIOLOGY CLINIC | Age: 77
End: 2022-04-08
Payer: MEDICARE

## 2022-04-08 VITALS
OXYGEN SATURATION: 97 % | SYSTOLIC BLOOD PRESSURE: 118 MMHG | HEART RATE: 73 BPM | BODY MASS INDEX: 38.79 KG/M2 | WEIGHT: 312 LBS | DIASTOLIC BLOOD PRESSURE: 82 MMHG | HEIGHT: 75 IN

## 2022-04-08 DIAGNOSIS — I10 ESSENTIAL HYPERTENSION: ICD-10-CM

## 2022-04-08 DIAGNOSIS — I48.0 PAF (PAROXYSMAL ATRIAL FIBRILLATION) (HCC): Primary | ICD-10-CM

## 2022-04-08 PROCEDURE — 1123F ACP DISCUSS/DSCN MKR DOCD: CPT | Performed by: INTERNAL MEDICINE

## 2022-04-08 PROCEDURE — G8417 CALC BMI ABV UP PARAM F/U: HCPCS | Performed by: INTERNAL MEDICINE

## 2022-04-08 PROCEDURE — 1036F TOBACCO NON-USER: CPT | Performed by: INTERNAL MEDICINE

## 2022-04-08 PROCEDURE — 3017F COLORECTAL CA SCREEN DOC REV: CPT | Performed by: INTERNAL MEDICINE

## 2022-04-08 PROCEDURE — 4040F PNEUMOC VAC/ADMIN/RCVD: CPT | Performed by: INTERNAL MEDICINE

## 2022-04-08 PROCEDURE — 99214 OFFICE O/P EST MOD 30 MIN: CPT | Performed by: INTERNAL MEDICINE

## 2022-04-08 PROCEDURE — G8427 DOCREV CUR MEDS BY ELIG CLIN: HCPCS | Performed by: INTERNAL MEDICINE

## 2022-04-08 NOTE — PATIENT INSTRUCTIONS
Plan:  Cardiac medications reviewed including indications and pertinent side effects. Medication list updated at this visit.    Check blood pressure and heart rate at home a few times per week- keep a log with dates and times and bring to office visit   Regular exercise and following a healthy diet encouraged   Follow up with me in 6 months

## 2022-04-08 NOTE — PROGRESS NOTES
[address]   Cardiac Followup    Referring Provider:  Mel Goff MD     Chief Complaint   Patient presents with    Follow-up    Atrial Fibrillation    Hypertension    Shortness of Breath     at times      Sarah Cobb   1945    History of Present Illness:    Sarah Cobb is a 77 y.o. male who is here today for follow up for a past medical history of coronary artery disease, abnormal stress test, paroxysmal atrial fibrillation/atrial flutter, PVC's,  anticoagulation with warfarin, hypertension, hyperlipidemia, diabetes mellitus, MARIA VICTORIA, obesity status post gastric sleeve as well as history of DVT and histoplasmosis s/p thoracotomy. He has a stress test in 2011 which showed a small sized reversible perfusion defect involving the anteroseptal wall of the left ventricle to mid heart concerning for myocardial ischemia. He has a history of PVC ablation in 2014. He stated he continued to have occasional episodes of palpitations and was found to have atrial flutter. He underwent a CV, and an unsuccessful RFCA for typical atrial flutter on 4/12/17. Patient was started on dofetilide in June of 2017. He underwent EPS and aflutter ablation on 9/19/17. He had a normal stress test in 2017. Stress test 5/19/2021 was abnornmal consistent with mixed ischemia and scar. He had a left heart cath 6/16/2021- Findings significant for LAD/Diag lesions, LAD treated with FELIZ x2 and Diag FEILZ x1. ASA stopped one month post procedure and patient continued on Plavix and warfarin. Advised to have sleep study. Cardiac event monitor 2/25-3/11/2022  Average heart rate 79 (), nocturnal bradycardia 2:1 conduction PSVT, NSVT. Stress test 2/2022 showed no ischemia. Chest Xray negative. Today he states he has noted a decrease in palpitations on Metoprolol. He is compliant with his CPAP and wears it nightly.  A few weeks ago he had an episode of feeling weak and shaky, blood pressure was low and blood pressure monitor gave an irregular alert. Symptoms resolves after a few minutes. Blood work is monitored through the [facility]. Uric acid has been elevated and he plans to change his diet to decrease red meats and proteins. He continues to have some fatigue and weakness in his legs after standing for a long period. Patient currently denies any weight gain, chest pain, shortness of breath, dizziness, and syncope. He has lost 34 lbs due to watching his diet. Coumadin is managed by Armand Patterson MD      Past Medical History:   has a past medical history of Arthritis, Asthma, Atrial fibrillation ([address] Blood circulation, collateral, Diabetes mellitus ([address] DVT (deep venous thrombosis) ([address] Histoplasmosis, History of knee replacement procedure of right knee, Hx of blood clots, Hyperlipidemia, Hypertension, Knee osteoarthritis, Left TKR, Lung nodule, Neuromuscular disorder ([address] Palpitations, Sleep apnea, Stone, kidney, and UTI (urinary tract infection). Surgical History:   has a past surgical history that includes Cystoscopy; Tonsillectomy; Knee arthroscopy (4/19/2011); Colonoscopy; Cystocopy (2/8/13); ablation of dysrhythmic focus (2013); Carpal tunnel release (Right, 9-30-15); Bariatric Surgery (2013); Carpal tunnel release (Left, 3/20/16); skin biopsy; Diagnostic Cardiac Cath Lab Procedure; joint replacement (Bilateral); Cardiac surgery (2013); thoracotomy; ERCP (02/18/2019); ERCP (N/A, 2/18/2019); ERCP (2/18/2019); Cholecystectomy, laparoscopic (N/A, 2/19/2019); Intracapsular cataract extraction (Right, 1/14/2020); and Intracapsular cataract extraction (Left, 1/21/2020). Social History:   reports that he quit smoking about 46 years ago. His smoking use included cigarettes. He has a 34.00 pack-year smoking history. He has never used smokeless tobacco. He reports that he does not drink alcohol and does not use drugs.      Family History:  family history includes Arthritis in his father; Cancer in his mother; Kidney Disease in his mother; Other in his father; Stroke in his father and mother; Substance Abuse in his father. Home Medications:  Prior to Admission medications    Medication Sig Start Date End Date Taking? Authorizing Provider   metFORMIN (GLUCOPHAGE) 1000 MG tablet Take 1 tablet by mouth 2 times daily (with meals) 3/29/22  Yes Alena Amaya MD   spironolactone (ALDACTONE) 25 MG tablet TAKE ONE TABLET BY MOUTH DAILY 3/28/22  Yes Alena Amaya MD   metoprolol succinate (TOPROL XL) 25 MG extended release tablet Take 1 tablet by mouth daily 3/25/22  Yes Hayley Miller MD   warfarin (COUMADIN) 5 MG tablet TAKE ONE TABLET BY MOUTH DAILY 2/21/22  Yes Alena Amaya MD   Dulaglutide (TRULICITY) 0.64 SL/9.7DD SOPN Inject 0.75 mg into the skin once a week   Yes Historical Provider, MD   blood glucose test strips (TRUE METRIX BLOOD GLUCOSE TEST) strip TEST ONCE DAILY. DX:E11.9 1/20/22  Yes Alena Amaya MD   atorvastatin (LIPITOR) 40 MG tablet TAKE ONE TABLET BY MOUTH DAILY 1/20/22  Yes Alena Amaya MD   [facility]) 250 MCG capsule Take 1 capsule by mouth 2 times daily 10/19/21  Yes Sapphire De La Fuente MD   torsemide (DEMADEX) 20 MG tablet Take 1 tablet by mouth daily 10/4/21  Yes SERENA Lucero - CNP   montelukast (SINGULAIR) 10 MG tablet TAKE 1 TABLET BY MOUTH ONCE DAILY 8/24/21  Yes Alena Amaya MD   [facility]) 250 MCG capsule Take 1 capsule by mouth every 12 hours 7/7/21  Yes Hayley Miller MD   clopidogrel (PLAVIX) 75 MG tablet Take 1 tablet by mouth daily 6/22/21  Yes Hayley Miller MD   dilTIAZem (CARDIZEM) 120 MG tablet TAKE 1 TABLET BY MOUTH  EVERY 12 HOURS 9/3/20  Yes Lucila Peres MD   Easy Touch Lancets 32G/Twist MISC TEST DAILY. DX: E11.9 3/23/20  Yes Alena Amaya MD   blood glucose test strips (TRUE METRIX BLOOD GLUCOSE TEST) strip USE TO CHECK BLOOD GLUCOSE DAILY.  DX: E11.9 3/23/20  Yes Alena Amaya MD   ACCU-CHEK MULTICLIX LANCETS MISC Check sugars once daily. Dx;E11.9 3/11/19  Yes Carleen Haney MD   Lancet Devices (EASY TOUCH LANCING DEVICE) MISC Test Daily. DX: E11.9 12/26/18  Yes Carleen Haney MD   Blood Glucose Monitoring Suppl (TRUE METRIX METER) VINNIE Use to check daily. DX;E11.9 12/22/18  Yes Carleen Haney MD   Lancets Misc. (ACCU-CHEK MULTICLIX LANCET DEV) KIT Check sugars once daily. DX;E11.9 12/21/18  Yes Carleen Haney MD   Biotin 5 MG TABS Take 5 mg by mouth daily   Yes Historical Provider, MD   Cholecalciferol (VITAMIN D3) 5000 units TABS Take 5,000 Units by mouth daily   Yes Historical Provider, MD   Cyanocobalamin (VITAMIN B-12 CR PO) Take 5,000 mcg by mouth every 7 days    Yes Historical Provider, MD   Multiple Vitamins-Minerals (THERAPEUTIC MULTIVITAMIN-MINERALS) tablet Take 2 tablets by mouth daily    Yes Historical Provider, MD   magnesium oxide (MAG-OX) 400 MG tablet Take 400 mg by mouth daily. Yes Historical Provider, MD   senna (SENOKOT) 8.6 MG tablet Take 1 tablet by mouth 2 times daily  Patient not taking: Reported on 2/22/2022 11/11/21   Carleen Haney MD        Allergies:  Bactrim [sulfamethoxazole-trimethoprim], Brilinta [ticagrelor], Ciprofloxacin, Macrobid [nitrofurantoin monohyd macro], Ozempic (0.25 or 0.5 mg-dose) [semaglutide(0.25 or 0.5mg-dos)], Sulfamethoxazole, Theophylline, Cefuroxime axetil, Cipro xr, Other, and Tape [adhesive tape]     Review of Systems:   · Constitutional: there has been no unanticipated weight loss. There's been no change in energy level, sleep pattern, or activity level. · Eyes: No visual changes or diplopia. No scleral icterus. · ENT: No Headaches, hearing loss or vertigo. No mouth sores or sore throat. · Cardiovascular: Reviewed in HPI  · Respiratory: No cough or wheezing, no sputum production. No hematemesis. · Gastrointestinal: No abdominal pain, appetite loss, blood in stools.  No change in bowel or bladder habits. · Genitourinary: No dysuria, trouble voiding, or hematuria. · Musculoskeletal:  No gait disturbance, weakness or joint complaints. · Integumentary: No rash or pruritis. · Neurological: No headache, diplopia, change in muscle strength, numbness or tingling. No change in gait, balance, coordination, mood, affect, memory, mentation, behavior. · Psychiatric: No anxiety, no depression. · Endocrine: No malaise, fatigue or temperature intolerance. No excessive thirst, fluid intake, or urination. No tremor. · Hematologic/Lymphatic: No abnormal bruising or bleeding, blood clots or swollen lymph nodes. · Allergic/Immunologic: No nasal congestion or hives. Physical Examination:    Vitals:    04/08/22 1005   BP: 118/82   Pulse: 73   SpO2: 97%        Wt Readings from Last 3 Encounters:   04/08/22 (!) 312 lb (141.5 kg)   03/14/22 (!) 311 lb (141.1 kg)   02/22/22 (!) 315 lb (142.9 kg)       Constitutional and General Appearance: NAD   Respiratory:  · Normal excursion and expansion without use of accessory muscles  · Resp Auscultation: Normal breath sounds without dullness  Cardiovascular:  · The apical impulses not displaced  · Heart tones are crisp and normal  · Cervical veins are not engorged  · The carotid upstroke is normal in amplitude and contour without delay or bruit  · Normal S1S2, No S3, No Murmur  · Peripheral pulses are symmetrical and full  · There is no clubbing, cyanosis of the extremities.   · Trace 1+ BLE edema  · Femoral Arteries: 2+ and equal  · Pedal Pulses: 2+ and equal   Abdomen:  · No masses or tenderness  · Liver/Spleen: No Abnormalities Noted  Neurological/Psychiatric:  · Alert and oriented in all spheres  · Moves all extremities well  · Exhibits normal gait balance and coordination  · No abnormalities of mood, affect, memory, mentation, or behavior are noted  Lab Results   Component Value Date    CHOL 139 06/16/2021    CHOL 222 (H) 12/19/2018    CHOL 182 10/31/2017     Lab Results Component Value Date    TRIG 126 06/16/2021    TRIG 159 (H) 12/19/2018    TRIG 126 10/31/2017     Lab Results   Component Value Date    HDL 38 (L) 03/24/2022    HDL 39 (L) 06/16/2021    HDL 41 12/08/2020     Lab Results   Component Value Date    LDLCALC 70 03/24/2022    LDLCALC 75 06/16/2021    LDLCALC 77 12/08/2020     Lab Results   Component Value Date    LABVLDL 32 03/24/2022    LABVLDL 25 06/16/2021    LABVLDL 28 12/08/2020     No results found for: CHOLHDLRATIO      Stress test 12/23/11  Impression- 1. Small sized reversible perfusion defect involving the anteroseptal wall of the left ventricle to mid heart concerning for myocardial ischemia. Please correlate with EKG findings. 2. Left ventricular hypertrophy. 3. LVEF 59 %     NAVNEET/Cardioversion 2/7/2012  Summary-   1. A Navneet was performed without complications. 2.  LVEF 55   3. No thrombus or valvular vegetation identified. CARDIOVERSION-      After an adequate level of sedation was achieved, 150J, then 200J in   biphasic synchronized delivery was administered. No change in   rhythm. The patient awoke without complications. A post procedure   12 L ECG was ordered and reviewed. There were no complications encountered. 1.  Unsuccessful attempt at cardioversion. 2.  Patient remains in atrial fibrillation. Echocardiogram 11/24/15  Conclusions   Summary   Moderate concentric left ventricular hypertrophy is present. Global ejection fraction is normal and estimated from 55 % to 60 %. No obvious regional wall motion abnormalities are noted. Diastolic filling parameters suggests diastolic dysfunction. Moderately dilated left atrium. Aortic valve appears sclerotic but opens adequately. Trivial mitral and tricuspid regurgitation. Systolic pulmonary artery pressure (SPAP) is normal and estimated at 23 mmHg   (RA pressure 3 mm Hg).     Echocardiogram 6/23/17  Conclusions      Summary   Normal systolic function with an estimated ejection fraction of 55-60%. Mild to moderate concentric left ventricular hypertrophy. No regional wall motion abnormalities are seen. Left ventricular diastolic filling pressure is indeterminate. Left atrium is moderately dilated. No change from last echo on 11/24/1015. Limited Echocardiogram 11/31/2017   Conclusions      Summary   This is a limited study pericardial effusion and shunt. Normal left ventricular systolic function with an estimated ejection   fraction of 55%. A bubble study was performed and fails to show evidence of shunting. There is a very small circumferential pericardial effusion noted. Stress test 11/1/2017  Conclusions    Summary  There is no evidence of stress induced ischemia. Normal LV function with  ejection fraction of 66%. There are no regional wall motion abnormalities. Low risk study. REECE 3/31-4/30/2021  No atrial fib   Predominately NSR  PSVT- symptomatic   NSVT/PVC's-  Symptomatic  Nocturnal bradycardia     Stress test 5/19/2021  Conclusions    Summary  The overall quality of the study is fair. There is subdiaphragmatic  attenuation. Left ventricular cavity is noted to be normal size. The right ventricle is  normal in size. SPECT images demonstrate a small area of mildly decreased perfusion in the  apical lateral and mid-inferior lateral segments. The defect is mixed  ischemia and scar. There is associated wall motion abnormality, consistent  with ischemia. Gated SPECT imaging reveals normal myocardial thickening and  wall motion. Sum stress score of 7. No visual TID. Calculated TID of 0.99. The left ventricular ejection fraction is calculated to be 63%. Myocardial perfusion is abnormal. Consistent with mixed ischemia and scar. Low risk scan.      EKG 6/16/21  Sinus rhythm with marked sinus arrhythmia with 1st degree A-V blockLow voltage QRSProlonged QTAbnormal ECGConfirmed by Cyndie Schlatter MD, Tisha Morales ([id number]) on 6/16/2021 4:41:38 PM    ECHO 6/16/21  Summary   Left ventricular systolic function is normal with a visually estimated   ejection fraction of 55%. The left ventricle is normal in size with mild concentric hypertrophy. No obvious regional wall motion abnormalities noted. Increased left ventricular filling pressure. The left atrium appears moderately enlarged. The right atrium is severely enlarged. Right ventricle is enlarged but   normal function. Inadequate tricuspid valve regurgitation to estimate systolic pulmonary   artery pressure. The IVC is dilated in size (>2.1 cm) but appears to collapse >50% with   respiration consistent with elevated right atrial pressures (8 mmHg) . EKG 6/16/21  Sinus rhythm with 1st degree A-V block with Premature atrial complexesOtherwise normal ECGConfirmed by Pancho Pitts MD ([id number]) on 6/17/2021 6:58:06 AM    EKG 6/16/21  Sinus rhythm with sinus arrhythmia with 1st degree A-V blockSeptal infarct , age undeterminedAbnormal ECGConfirmed by Pancho Pitts MD ([id number]) on 6/17/2021 6:58:18 AM    Left heart cath 6/16/2021 [facility]  Procedures Performed:   1. Selective left heart catheterization  2. Percutaneous coronary intervention of the left anterior descending and diagonal artery artery using complex bifurcation technique. Procedure Findings:  1. Critical LAD diagonal disease with angina classification 0, 0, 1 along with mid LAD 70% stenosis       Echocardiogram 6/16/2021  Summary   Left ventricular systolic function is normal with a visually estimated   ejection fraction of 55%. The left ventricle is normal in size with mild concentric hypertrophy. No obvious regional wall motion abnormalities noted. Increased left ventricular filling pressure. The left atrium appears moderately enlarged. The right atrium is severely enlarged. Right ventricle is enlarged but   normal function. Inadequate tricuspid valve regurgitation to estimate systolic pulmonary   artery pressure.    The IVC is dilated in size (>2.1 cm) but appears to collapse >50% with   respiration consistent with elevated right atrial pressures (8 mmHg) . Chest Xray 2/22/2022  No acute process. Stress test 2/28/2022  Conclusions    Summary  Normal LV function. There is normal isotope uptake at stress and rest. There is no evidence of  myocardial ischemia or scar. Cardiac event monitor 2/25-3/11/2022  Average heart rate 79 (), nocturnal bradycardia 2:1 conduction PSVT, NSVT        Assessment:   CAD - stable. Stress test reviewed w/ pt   PANG - unchangedSuspect sig component deconditioning and obesity. Less sig cardiac   Atrial fibrillation - stable  Nocturnal bradycardia 2:1 due to MARIA VICTORIA  Hypertension - controlled    Hyperlipidemia - stable, controlled  Sleep apnea   History of DVT   Diabetes Mellitus   Asthma   Tachycardia - intermittent  Fatigue - do not suspect cardiac     Plan:  Continue Plavix. R/B/A/E discussed    Cardiac medications reviewed including indications and pertinent side effects. Medication list updated at this visit. Check blood pressure and heart rate at home a few times per week- keep a log with dates and times and bring to office visit   Regular exercise and following a healthy diet encouraged   Follow up with me in 6 months     Scribe's attestation: This note was scribed in the presence of Dr. Paul Phelps M.D. By Quinten Olea RN    The scribes documentation has been prepared under my direction and personally reviewed by me in its entirety. I confirm that the note above accurately reflects all work, treatment, procedures, and medical decision making performed by me. Dr. Paul Phelps MD    Thank you for allowing me to participate in the care of this individual.      Samantha Chiang.  Arleth Brewer M.D., Christie Lusby

## 2022-04-11 ENCOUNTER — TELEPHONE (OUTPATIENT)
Dept: PULMONOLOGY | Age: 77
End: 2022-04-11

## 2022-04-11 RX ORDER — ATORVASTATIN CALCIUM 40 MG/1
TABLET, FILM COATED ORAL
Qty: 90 TABLET | Refills: 0 | Status: SHIPPED | OUTPATIENT
Start: 2022-04-11 | End: 2022-07-01

## 2022-04-11 NOTE — TELEPHONE ENCOUNTER
CPAP download report from 3/12/2022-4/10/2022 on CPAP 12 cm H2O reviewed. Compliance is good 100%. AHI is good 1.1. Report is showing leak 69 L/min. AHI and compliance are good. Report shows patient is averaging over 8 hours a night with CPAP. If he feels CPAP is not functioning correctly he should take it to DME to check for functionality. Can send order if needed. Report is also showing large leak. This could be either mask leak or oral leak. Can send order for mask fitting if needed also can send order for chinstrap if needed.   Patient does have follow-up appointment 4/22/2022 however should call with further questions sooner if needed

## 2022-04-11 NOTE — TELEPHONE ENCOUNTER
Patient called stating that he feels that he stops breathing at night and then his cpap machine shuts down. He states that machine comes back on when he takes a deep breath and starts breath again. Compliance Scanned. Please advise.        Assessment: 4/20/21      · Moderate MARIA VICTORIA. CPAP 12 cmH2O. Nasal pillow. Optimal compliance and efficacy on review today. · Hypersomnia/fatigue-improved  · HTN  · Afib s/p ablation x2 on cardizem and tikosyn  · Dry mouth      Plan:       · Continue CPAP 12 cm H2O  · Heated tubing   · Advised to use CPAP 6-8 hrs at night and during naps. · Replacement of mask, tubing, head straps every 3-6 months or sooner if damaged. · Patient instructed to contact DME company for any mask, tubing or machine trouble shooting if problems arise. · Sleep hygiene  · Cognitive behavioral therapy was discussed with patient including stimulus control and sleep restriction  · Set bed/wake times, no naps in daytime  · Avoid sedatives, alcohol and caffeinated drinks at bed time. · Patient counseled to never drive or operate heavy machinery while fatigue, drowsy or sleepy. · Weight loss is recommended as a long-term intervention. · Complications of MARIA VICTORIA if not treated were discussed with patient patient, including: systemic hypertension, pulmonary hypertension, cardiovascular morbidities, car accidents and all cause mortality.   · Patient education regarding sleep tips and CPAP cleaning recommendations      Follow up 1 year, sooner if needed

## 2022-04-12 NOTE — TELEPHONE ENCOUNTER
Relayed Le's message to pt. He has tried the chinstrap & didn't like it and is currently using the nasal pillows. He doesn't recall whether he's using [address] ([id number]) of [facility] for CPAP supplies so he's going to call both to find out. Then if we need to send orders for functionality check and/or mask fitting, he'll let us know.

## 2022-04-18 ENCOUNTER — ANTI-COAG VISIT (OUTPATIENT)
Dept: INTERNAL MEDICINE CLINIC | Age: 77
End: 2022-04-18

## 2022-04-18 ENCOUNTER — TELEPHONE (OUTPATIENT)
Dept: INTERNAL MEDICINE CLINIC | Age: 77
End: 2022-04-18

## 2022-04-18 LAB — INR BLD: 2.1

## 2022-04-18 NOTE — TELEPHONE ENCOUNTER
----- Message from Tania Ambrose MD sent at 4/18/2022  9:06 AM EDT -----  No change  2 week  ----- Message -----  From: Sukhjinder Wright  Sent: 4/18/2022   8:58 AM EDT  To: Tania Ambrose MD    INR 2.1

## 2022-04-22 ENCOUNTER — TELEPHONE (OUTPATIENT)
Dept: PULMONOLOGY | Age: 77
End: 2022-04-22

## 2022-04-22 ENCOUNTER — SCHEDULED TELEPHONE ENCOUNTER (OUTPATIENT)
Dept: PULMONOLOGY | Age: 77
End: 2022-04-22
Payer: MEDICARE

## 2022-04-22 DIAGNOSIS — Z71.89 CPAP USE COUNSELING: ICD-10-CM

## 2022-04-22 DIAGNOSIS — R68.2 DRY MOUTH: ICD-10-CM

## 2022-04-22 DIAGNOSIS — E66.9 OBESITY (BMI 30-39.9): ICD-10-CM

## 2022-04-22 DIAGNOSIS — G47.33 MODERATE OBSTRUCTIVE SLEEP APNEA: Primary | ICD-10-CM

## 2022-04-22 PROCEDURE — 99442 PR PHYS/QHP TELEPHONE EVALUATION 11-20 MIN: CPT | Performed by: NURSE PRACTITIONER

## 2022-04-22 ASSESSMENT — SLEEP AND FATIGUE QUESTIONNAIRES
HOW LIKELY ARE YOU TO NOD OFF OR FALL ASLEEP WHEN YOU ARE A PASSENGER IN A CAR FOR AN HOUR WITHOUT A BREAK: 0
ESS TOTAL SCORE: 10
HOW LIKELY ARE YOU TO NOD OFF OR FALL ASLEEP WHILE SITTING INACTIVE IN A PUBLIC PLACE: 0
HOW LIKELY ARE YOU TO NOD OFF OR FALL ASLEEP IN A CAR, WHILE STOPPED FOR A FEW MINUTES IN TRAFFIC: 0
HOW LIKELY ARE YOU TO NOD OFF OR FALL ASLEEP WHILE SITTING AND TALKING TO SOMEONE: 0
HOW LIKELY ARE YOU TO NOD OFF OR FALL ASLEEP WHILE SITTING QUIETLY AFTER LUNCH WITHOUT ALCOHOL: 2
HOW LIKELY ARE YOU TO NOD OFF OR FALL ASLEEP WHILE WATCHING TV: 3
HOW LIKELY ARE YOU TO NOD OFF OR FALL ASLEEP WHILE SITTING AND READING: 2
HOW LIKELY ARE YOU TO NOD OFF OR FALL ASLEEP WHILE LYING DOWN TO REST IN THE AFTERNOON WHEN CIRCUMSTANCES PERMIT: 3

## 2022-04-22 NOTE — PROGRESS NOTES
CHIEF COMPLAINT: MARIA VICTORIA    Sarah Cobb is a 69 y.o. male for telephone only virtual visit for MARIA VICTORIA follow up. States he is doing pretty well with CPAP. States he felt like his CPAP was shutting off, states he went to DME to check it states also tried a chin strap but does not really like it, cannot tolerate chin strap. States he seems to be doing better on CPAP now not having issues with turning off at night. Patient is using CPAP 7-8 hrs/night. Using humidifier. No snoring on CPAP. The pressure is well tolerated. The mask is comfortable- nasal pillows. No mask leak. No significant daytime sleepiness. No nodding off when driving. No dry nose or throat. Some fatigue. Bedtime is 830-10 pm and rise time is 5-530 am. Sleep onset is few minutes. Wakes up 1-2 times at night total. 1 nocturia. It takes usually few minutes to fall back a sleep. Occasional naps during the day. No headache in am. No weight gain. 1 caffienated beverages during the day. No alcohol. ESS is 10.       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    History of knee replacement procedure of right knee     Hx of blood clots     2 DVT's    Hyperlipidemia     Hypertension     Knee osteoarthritis 1/17/2012    Left TKR 1/18/2012    Lung nodule     due to histoplasmosis    Neuromuscular disorder ([address]     Palpitations     stable with atenolol    Sleep apnea     uses CPAP    Stone, kidney     UTI (urinary tract infection) 01/23/2017     Past Surgical History:   Procedure Laterality Date    ABLATION OF DYSRHYTHMIC FOCUS  2013    a-fib    BARIATRIC SURGERY  2013    GASTRIC SLEEVE    CARDIAC SURGERY  2013    ablation    CARPAL TUNNEL RELEASE Right 9-30-15    CARPAL TUNNEL RELEASE Left 3/20/16    CHOLECYSTECTOMY, LAPAROSCOPIC N/A 2/19/2019    LAPAROSCOPIC CHOLECYSTECTOMY WITH CHOLANGIOGRAMS performed by Renan Venegas MD at [address]      with removal stone    CYSTOSCOPY  2/8/13    with Laser Vaporization of Enlarged Prostate    DIAGNOSTIC CARDIAC CATH LAB PROCEDURE      ERCP  02/18/2019    Sphincterotomy, stone removal    ERCP N/A 2/18/2019    ERCP SPHINCTER/PAPILLOTOMY performed by Monika Gee MD at [address] [facility] ERCP  2/18/2019    ERCP STONE REMOVAL performed by Monika Gee MD at [address] Right 1/14/2020    PHACOEMULSIFICATION OF CATARACT RIGHT EYE WITH INTRAOCULAR LENS IMPLANT performed by Tk Marsh MD at [facility] Left 1/21/2020    PHACOEMULSIFICATION OF CATARACT LEFT EYE WITH INTRAOCULAR LENS IMPLANT -SLEEP APNEA- performed by Tk Marsh MD at [place] [address] Bilateral     right knee (2002) and left knee (2012)    KNEE ARTHROSCOPY  4/19/2011     left  knee    SKIN BIOPSY      skin Ca/SQUAMOUS CELL    THORACOTOMY      wedge resection    TONSILLECTOMY       Allergies   Allergen Reactions    Bactrim [Sulfamethoxazole-Trimethoprim] Diarrhea    Brilinta [Ticagrelor]      dyspnea    Ciprofloxacin      Bad headaches    Macrobid [Nitrofurantoin Monohyd Macro]     Ozempic (0.25 Or 0.5 Mg-Dose) [Semaglutide(0.25 Or 0.5mg-Dos)]     Sulfamethoxazole Diarrhea    Theophylline      Theodur-caused severe headaches    Cefuroxime Axetil Rash and Itching    Cipro Xr Rash    Other Rash and Other (See Comments)     Ekg leads-glue    Tape [Adhesive Tape] Rash     Skin irritaion  Eats away at skin, paper tape OK  Plastic tape         Current Medications:    Current Outpatient Medications:     atorvastatin (LIPITOR) 40 MG tablet, TAKE ONE TABLET BY MOUTH DAILY, Disp: 90 tablet, Rfl: 0    metFORMIN (GLUCOPHAGE) 1000 MG tablet, Take 1 tablet by mouth 2 times daily (with meals), Disp: 180 tablet, Rfl: 0    spironolactone (ALDACTONE) 25 MG tablet, TAKE ONE TABLET BY MOUTH DAILY, Disp: 90 tablet, Rfl: 0    metoprolol succinate (TOPROL XL) 25 MG extended release tablet, Take 1 tablet by mouth daily, Disp: 30 tablet, Rfl: 3    warfarin (COUMADIN) 5 MG tablet, TAKE ONE TABLET BY MOUTH DAILY, Disp: 90 tablet, Rfl: 1    Dulaglutide (TRULICITY) 4.00 SK/6.6RW SOPN, Inject 0.75 mg into the skin once a week, Disp: , Rfl:     blood glucose test strips (TRUE METRIX BLOOD GLUCOSE TEST) strip, TEST ONCE DAILY. DX:E11.9, Disp: 100 strip, Rfl: 3    senna (SENOKOT) 8.6 MG tablet, Take 1 tablet by mouth 2 times daily, Disp: 60 tablet, Rfl: 0    dofetilide (TIKOSYN) 250 MCG capsule, Take 1 capsule by mouth 2 times daily, Disp: 60 capsule, Rfl: 3    torsemide (DEMADEX) 20 MG tablet, Take 1 tablet by mouth daily, Disp: 90 tablet, Rfl: 1    montelukast (SINGULAIR) 10 MG tablet, TAKE 1 TABLET BY MOUTH ONCE DAILY, Disp: 90 tablet, Rfl: 1    dofetilide (TIKOSYN) 250 MCG capsule, Take 1 capsule by mouth every 12 hours, Disp: 60 capsule, Rfl: 0    clopidogrel (PLAVIX) 75 MG tablet, Take 1 tablet by mouth daily, Disp: 90 tablet, Rfl: 3    dilTIAZem (CARDIZEM) 120 MG tablet, TAKE 1 TABLET BY MOUTH  EVERY 12 HOURS, Disp: 180 tablet, Rfl: 3    Easy Touch Lancets 32G/Twist MISC, TEST DAILY. DX: E11.9, Disp: 100 each, Rfl: 3    blood glucose test strips (TRUE METRIX BLOOD GLUCOSE TEST) strip, USE TO CHECK BLOOD GLUCOSE DAILY. DX: E11.9, Disp: 100 each, Rfl: 3    ACCU-CHEK MULTICLIX LANCETS MISC, Check sugars once daily. Dx;E11.9, Disp: 100 each, Rfl: 11    Lancet Devices (EASY TOUCH LANCING DEVICE) MISC, Test Daily. DX: E11.9, Disp: 1 each, Rfl: 0    Blood Glucose Monitoring Suppl (TRUE METRIX METER) VINNIE, Use to check daily. DX;E11.9, Disp: 1 Device, Rfl: 0    Lancets Misc. (ACCU-CHEK MULTICLIX LANCET DEV) KIT, Check sugars once daily.  DX;E11.9, Disp: 1 kit, Rfl: 0    Biotin 5 MG TABS, Take 5 mg by mouth daily, Disp: , Rfl:     Cholecalciferol (VITAMIN D3) 5000 units TABS, Take 5,000 Units by mouth daily, Disp: , Rfl:     Cyanocobalamin (VITAMIN B-12 CR PO), Take 5,000 mcg by mouth every 7 days , Disp: , Rfl:     Multiple Vitamins-Minerals (THERAPEUTIC MULTIVITAMIN-MINERALS) tablet, Take 2 tablets by mouth daily , Disp: , Rfl:     magnesium oxide (MAG-OX) 400 MG tablet, Take 400 mg by mouth daily. , Disp: , Rfl:         Objective:   PHYSICAL EXAM:    There were no vitals taken for this visit.' on RA         DATA:   12/27/12 PSG AHI 18.8/ REM AHI 40.7  CPAP titration 2/14/2013 CPAP 9 cmH2O  11/6/17 CPAP titration controlled sleep-related breathing with CPAP, PLMS 20.2, recommendation CPAP 12 cm H2O    Echo 10/31/17 EF 55%    CPAP compliance data:  Compliance download report from 10/4/17 to 11/2/17 showed patient is using machine 7:21 hrs/night with 100% compliance and AHI 0.3 within this time frame. 30/30days with greater than 4 hours of machine use. CPAP 10 cm H20    Compliance download report from 1/20/18 to 2/18/18showed patient is using machine 7:51 hrs/night with 100% compliance and AHI 0.6 within this time frame. 30/30days with greater than 4 hours of machine use. CPAP 12 cm H20    Compliance download report from 3/24/18to 4/22/18 showed patient is using machine 7:40 hrs/night with 100% compliance and AHI 0.7 within this time frame. 30/30days with greater than 4 hours of machine use. CPAP 12 cm H20    Compliance download report from 3/23/19 to 4/21/19 reviewed today by me and showed patient is using machine 7:55 hrs/night with 100% compliance and AHI 0.8 within this time frame. 30/30days with greater than 4 hours of machine use.   CPAP 12 cm H20    4/20/20- unable to obtain CPAP compliance report    Compliance download report from 3/20/21 to 4/18/21 reviewed today by me and showed patient is using machine 7:52 hrs/night with 100% compliance and AHI 0.7 within this time frame. 30/30days with greater than 4 hours of machine use. CPAP 12 cm H20     Compliance download report from 3/19/22 to 4/17/22 reviewed today by me and showed patient is using machine 8:16 hrs/night with 100% compliance and AHI 1.3 within this time frame. 30/30days with greater than 4 hours of machine use. CPAP 12 cm H20     Assessment:       · Moderate MARIA VICTORIA. CPAP 12 cmH2O. Nasal pillow. Optimal compliance and efficacy on review today. · Hypersomnia/fatigue-improved  · HTN  · Afib s/p ablation x2 on cardizem and tikosyn  · Dry mouth      Plan:       Continue CPAP 12 cm H2O  Heated tubing   Advised to use CPAP 6-8 hrs at night and during naps. Replacement of mask, tubing, head straps every 3-6 months or sooner if damaged. Patient instructed to contact [employer] company for any mask, tubing or machine trouble shooting if problems arise. Sleep hygiene  Cognitive behavioral therapy was discussed with patient including stimulus control and sleep restriction  Set bed/wake times, no naps in daytime  Avoid sedatives, alcohol and caffeinated drinks at bed time. Patient counseled to never drive or operate heavy machinery while fatigue, drowsy or sleepy. Weight loss is recommended as a long-term intervention. Complications of MARIA VICTORIA if not treated were discussed with patient patient, including: systemic hypertension, pulmonary hypertension, cardiovascular morbidities, car accidents and all cause mortality. Patient education regarding sleep tips and CPAP cleaning recommendations     Follow up 1 year, sooner if needed    Shawn Martinez is a 69 y.o. male evaluated via telephone on 4/22/2022 for 1 Year Follow Up (sleep)  . Total Time: minutes: 11-20 minutes    Shawn Martinez was evaluated through a synchronous (real-time) audio encounter. Patient identification was verified at the start of the visit. He (or guardian if applicable) is aware that this is a billable service, which includes applicable co-pays.  This visit was conducted with the patient's (and/or legal guardian's) verbal consent. He has not had a related appointment within my department in the past 7 days or scheduled within the next 24 hours. The patient was located in a state where the provider was licensed to provide care.     Note: not billable if this call serves to triage the patient into an appointment for the relevant concern    Thuy Auguste, APRN - CNP

## 2022-04-22 NOTE — PATIENT INSTRUCTIONS

## 2022-04-22 NOTE — TELEPHONE ENCOUNTER
Within this Telehealth Consent, the terms you and yours refer to the person using the Telehealth Service (Service), or in the case of a use of the Service by or on behalf of a minor, you and yours refer to and include (i) the parent or legal guardian who provides consent to the use of the Service by such minor or uses the Service on behalf of such minor, and (ii) the minor for whom consent is being provided or on whose behalf the Service is being utilized. When using Service, you will be consulting with your health care providers via the use of Telehealth.   Telehealth involves the delivery of healthcare services using electronic communications, information technology or other means between a healthcare provider and a patient who are not in the same physical location. Telehealth may be used for diagnosis, treatment, follow-up and/or patient education, and may include, but is not limited to, one or more of the following:    Electronic transmission of medical records, photo images, personal health information or other data between a patient and a healthcare provider    Interactions between a patient and healthcare provider via audio, video and/or data communications    Use of output data from medical devices, sound and video files    Anticipated Benefits   The use of Telehealth by your Provider(s) through the Service may have the following possible benefits:    Making it easier and more efficient for you to access medical care and treatment for the conditions treated by such Provider(s) utilizing the Service    Allowing you to obtain medical care and treatment by Provider(s) at times that are convenient for you    Enabling you to interact with Provider(s) without the necessity of an in-office appointment     Possible Risks   While the use of Telehealth can provide potential benefits for you, there are also potential risks associated with the use of Telehealth.  These risks include, but may not be limited to the following:    Your Provider(s) may not able to provide medical treatment for your particular condition and you may be required to seek alternative healthcare or emergency care services.  The electronic systems or other security protocols or safeguards used in the Service could fail, causing a breach of privacy of your medical or other information.  Given regulatory requirements in certain jurisdictions, your Provider(s) diagnosis and/or treatment options, especially pertaining to certain prescriptions, may be limited. Acceptance   1. You understand that Services will be provided via Telehealth. This process involves the use of HIPAA compliant and secure, real-time audio-visual interfacing with a qualified and appropriately trained provider located at [facility]. 2. You understand that, under no circumstances, will this session be recorded. 3. You understand that the Provider(s) at [facility] and other clinical participants will be party to the information obtained during the Telehealth session in accordance with best medical practices. 4. You understand that the information obtained during the Telehealth session will be used to help determine the most appropriate treatment options. 5. You understand that You have the right to revoke this consent at any point in time. 6. You understand that Telehealth is voluntary, and that continued treatment is not dependent upon consent. 7. You understand that, in the event of non-consent to Telehealth services and/or technical difficulties, you will obtain services as typically provided in the absence of Telehealth technology. 8. You understand that this consent will be kept in Your medical record. 9. No potential benefits from the use of Telehealth or specific results can be guaranteed. Your condition may not be cured or improved and, in some cases, may get worse.    10. There are limitations in the provision of medical care and treatment via Telehealth and the Service and you may not be able to receive diagnosis and/or treatment through the Service for every condition for which you seek diagnosis and/or treatment. 11. There are potential risks to the use of Telehealth, including but not limited to the risks described in this Telehealth Consent. 12. Your Provider(s) have discussed the use of Telehealth and the Service with you, including the benefits and risks of such and you have provided oral consent to your Provider(s) for the use of Telehealth and the Service. 15. You understand that it is your duty to provide your Provider(s) truthful, accurate and complete information, including all relevant information regarding care that you may have received or may be receiving from other healthcare providers outside of the Service. 14. You understand that each of your Provider(s) may determine in his or sole discretion that your condition is not suitable for diagnosis and/or treatment using the Service, and that you may need to seek medical care and treatment a specialist or other healthcare provider, outside of the Service. 15. You understand that you are fully responsible for payment for all services provided by Provider(s) or through use of the Service and that you may not be able to use third-party insurance. 16. You represent that (a) you have read this Telehealth Consent carefully, (b) you understand the risks and benefits of the Service and the use of Telehealth in the medical care and treatment provided to you by Provider(s) using the Service, and (c) you have the legal capacity and authority to provide this consent for yourself and/or the minor for which you are consenting under applicable federal and state laws, including laws relating to the age of [de-identified] and/or parental/guardian consent.    17. You give your informed consent to the use of Telehealth by Provider(s) using the Service under the terms described in the Terms of Service and this Telehealth Consent. The patient was read the following statement and has consented to the visit as of 4/22/22. The patient has been scheduled for their first telehealth visit on 04/22/2022 with Frank Garcia CNP.

## 2022-05-02 ENCOUNTER — ANTI-COAG VISIT (OUTPATIENT)
Dept: INTERNAL MEDICINE CLINIC | Age: 77
End: 2022-05-02

## 2022-05-02 ENCOUNTER — TELEPHONE (OUTPATIENT)
Dept: INTERNAL MEDICINE CLINIC | Age: 77
End: 2022-05-02

## 2022-05-02 LAB — INR BLD: 2.8

## 2022-05-02 NOTE — TELEPHONE ENCOUNTER
----- Message from Carleen Haney MD sent at 5/2/2022  2:43 PM EDT -----  No change  2 week  ----- Message -----  From: Taiwo Finney  Sent: 5/2/2022   1:51 PM EDT  To: Carleen Haney MD    INR 2.8  Taking 5 mg daily, except 7.5 on Wednesday

## 2022-05-16 ENCOUNTER — ANTI-COAG VISIT (OUTPATIENT)
Dept: INTERNAL MEDICINE CLINIC | Age: 77
End: 2022-05-16

## 2022-05-16 ENCOUNTER — TELEPHONE (OUTPATIENT)
Dept: INTERNAL MEDICINE CLINIC | Age: 77
End: 2022-05-16

## 2022-05-16 LAB — INR BLD: 2.9

## 2022-05-16 NOTE — TELEPHONE ENCOUNTER
----- Message from Toni Jacques MD sent at 5/16/2022 12:37 PM EDT -----  No change   2 week  ----- Message -----  From: JAGDISH [facility]  Sent: 5/16/2022   8:59 AM EDT  To: Toni Jacques MD    INR: 2.9

## 2022-05-23 RX ORDER — CLOPIDOGREL BISULFATE 75 MG/1
75 TABLET ORAL DAILY
Qty: 90 TABLET | Refills: 3 | Status: ON HOLD | OUTPATIENT
Start: 2022-05-23 | End: 2022-09-09 | Stop reason: SDUPTHER

## 2022-05-30 LAB — INR BLD: 2.1

## 2022-06-02 ENCOUNTER — ANTI-COAG VISIT (OUTPATIENT)
Dept: INTERNAL MEDICINE CLINIC | Age: 77
End: 2022-06-02

## 2022-06-02 ENCOUNTER — TELEPHONE (OUTPATIENT)
Dept: INTERNAL MEDICINE CLINIC | Age: 77
End: 2022-06-02

## 2022-06-02 NOTE — TELEPHONE ENCOUNTER
----- Message from Mahamed Sheridan MD sent at 6/2/2022  8:46 AM EDT -----  Contact: [phone]  No changes  2 weeks  ----- Message -----  From: Sancho Mckinney  Sent: 6/2/2022   8:01 AM EDT  To: Mahamed Sheridan MD    INR: 2.1 on 5/30/2022

## 2022-06-13 ENCOUNTER — ANTI-COAG VISIT (OUTPATIENT)
Dept: INTERNAL MEDICINE CLINIC | Age: 77
End: 2022-06-13

## 2022-06-13 ENCOUNTER — TELEPHONE (OUTPATIENT)
Dept: INTERNAL MEDICINE CLINIC | Age: 77
End: 2022-06-13

## 2022-06-13 LAB — INR BLD: 2.2

## 2022-06-13 NOTE — TELEPHONE ENCOUNTER
----- Message from Jessika Hernández MD sent at 6/13/2022 10:46 AM EDT -----  Contact: [phone]  No change  2 week  ----- Message -----  From: Francisca Wright  Sent: 6/13/2022   9:53 AM EDT  To: Jessika Hernández MD    INR: 2.2

## 2022-06-28 ENCOUNTER — TELEPHONE (OUTPATIENT)
Dept: INTERNAL MEDICINE CLINIC | Age: 77
End: 2022-06-28

## 2022-06-28 ENCOUNTER — ANTI-COAG VISIT (OUTPATIENT)
Dept: INTERNAL MEDICINE CLINIC | Age: 77
End: 2022-06-28

## 2022-06-28 LAB — INR BLD: 1.9

## 2022-06-28 NOTE — TELEPHONE ENCOUNTER
----- Message from Diamond Hansen MD sent at 6/28/2022  4:45 PM EDT -----  No change   2 week  ----- Message -----  From: Kala Rico  Sent: 6/28/2022   1:47 PM EDT  To: Diamond Hansen MD    Pt rechecked his INR and it was 1.9 after he took his 5 mg of coumadin. Please advise.  ----- Message -----  From: Kala Rico  Sent: 6/28/2022   1:36 PM EDT  To: Diamond Hansen MD    Pt has not missed any doses of coumadin, is not on any abx, and has not changed his diet any. States he did check his INR before he took his coumadin.  Please advise.  ----- Message -----  From: Diamond Hansen MD  Sent: 6/28/2022   1:24 PM EDT  To: Kala Rico    Low today why  ----- Message -----  From: Kala Rico  Sent: 6/28/2022   8:44 AM EDT  To: Diamond Hansen MD    INR- 1.7    Taking 5 mg daily

## 2022-07-01 RX ORDER — SPIRONOLACTONE 25 MG/1
TABLET ORAL
Qty: 90 TABLET | Refills: 0 | Status: SHIPPED | OUTPATIENT
Start: 2022-07-01 | End: 2022-09-15

## 2022-07-01 RX ORDER — MONTELUKAST SODIUM 10 MG/1
TABLET ORAL
Qty: 90 TABLET | Refills: 1 | Status: SHIPPED | OUTPATIENT
Start: 2022-07-01

## 2022-07-01 RX ORDER — ATORVASTATIN CALCIUM 40 MG/1
TABLET, FILM COATED ORAL
Qty: 90 TABLET | Refills: 0 | Status: SHIPPED | OUTPATIENT
Start: 2022-07-01 | End: 2022-10-17

## 2022-07-01 RX ORDER — METOPROLOL SUCCINATE 25 MG/1
TABLET, EXTENDED RELEASE ORAL
Qty: 90 TABLET | Refills: 3 | Status: ON HOLD
Start: 2022-07-01 | End: 2022-09-21 | Stop reason: HOSPADM

## 2022-07-13 ENCOUNTER — ANTI-COAG VISIT (OUTPATIENT)
Dept: INTERNAL MEDICINE CLINIC | Age: 77
End: 2022-07-13

## 2022-07-13 LAB — INR BLD: 2.1

## 2022-07-20 ENCOUNTER — OFFICE VISIT (OUTPATIENT)
Dept: INTERNAL MEDICINE CLINIC | Age: 77
End: 2022-07-20

## 2022-07-20 VITALS — HEIGHT: 75 IN | WEIGHT: 305 LBS | BODY MASS INDEX: 37.92 KG/M2

## 2022-07-20 DIAGNOSIS — E11.40 TYPE 2 DIABETES MELLITUS WITH DIABETIC NEUROPATHY, WITHOUT LONG-TERM CURRENT USE OF INSULIN (HCC): Primary | ICD-10-CM

## 2022-07-20 PROCEDURE — G8427 DOCREV CUR MEDS BY ELIG CLIN: HCPCS | Performed by: INTERNAL MEDICINE

## 2022-07-20 PROCEDURE — 1036F TOBACCO NON-USER: CPT | Performed by: INTERNAL MEDICINE

## 2022-07-20 PROCEDURE — 99212 OFFICE O/P EST SF 10 MIN: CPT | Performed by: INTERNAL MEDICINE

## 2022-07-20 PROCEDURE — 1123F ACP DISCUSS/DSCN MKR DOCD: CPT | Performed by: INTERNAL MEDICINE

## 2022-07-20 PROCEDURE — G8417 CALC BMI ABV UP PARAM F/U: HCPCS | Performed by: INTERNAL MEDICINE

## 2022-07-20 PROCEDURE — 3051F HG A1C>EQUAL 7.0%<8.0%: CPT | Performed by: INTERNAL MEDICINE

## 2022-07-20 NOTE — PROGRESS NOTES
Subjective:      Patient ID: Wenda Harada is a 69 y.o. male. HPI     69 y.o. 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 ,obestiy, MARIA VICTORIA , pedal edema here for regular f.w     Since last time, pt had another covid infection , symptoms of fatigue continued for 2 weeks now and lost some weight   No fevers  no sob or cough    6/21- CAD s.p  3 LAD stents. Placed on brillinta in addition to ASA but due increasing sob, eventually changed to plavix   Now off ASA, and continued on plavix    Doing well wiith occasional dyspnea.  No bleed, INR numbers remain low   Remains on low dose tikosyn    DM- 2 dx in 12/18- now on metformin 057 mg bid and Trulicity  Recently W2C at 6.8 at [place]    Fasting range from 110 - 150  No low sugars  Has chronic tingling numbness in feet      Afibb s.p ablation, stable on tikosyn, cost issues noted and goes to [place] for refills   On Coumadin for [facility] with no bleeding issues  Home readings remain close to 2    pulm nodule - 2016 - Right sided  pulmonary nodule was taken out with resection by Dr. Carrie Seay 9/16  Pathology showed histoplasmosis    Obesity - has gastric sleeve surgery in 2013 and lost 100 lbs    MARIA VICTORIA - on home cpap, compliant with it    Chronic pedal edema - on demadex and aldactone       Independent of ADL and IADL    and lives with wife     Allergies   Allergen Reactions    Bactrim [Sulfamethoxazole-Trimethoprim] Diarrhea    Brilinta [Ticagrelor]      dyspnea    Ciprofloxacin      Bad headaches    Macrobid [Nitrofurantoin Monohyd Macro]     Ozempic (0.25 Or 0.5 Mg-Dose) [Semaglutide(0.25 Or 0.5mg-Dos)]     Sulfamethoxazole Diarrhea    Theophylline      Theodur-caused severe headaches    Cefuroxime Axetil Rash and Itching    Cipro Xr Rash    Other Rash and Other (See Comments)     Ekg leads-glue    Tape [Adhesive Tape] Rash     Skin irritaion  Eats away at skin, paper tape OK  Plastic tape         Current Outpatient Medications   Medication Sig Dispense Refill    montelukast (SINGULAIR) 10 MG tablet TAKE ONE TABLET BY MOUTH DAILY 90 tablet 1    spironolactone (ALDACTONE) 25 MG tablet TAKE ONE TABLET BY MOUTH DAILY 90 tablet 0    metoprolol succinate (TOPROL XL) 25 MG extended release tablet TAKE ONE TABLET BY MOUTH DAILY 90 tablet 3    atorvastatin (LIPITOR) 40 MG tablet TAKE ONE TABLET BY MOUTH DAILY 90 tablet 0    clopidogrel (PLAVIX) 75 MG tablet Take 1 tablet by mouth daily 90 tablet 3    metFORMIN (GLUCOPHAGE) 1000 MG tablet Take 1 tablet by mouth 2 times daily (with meals) 180 tablet 0    warfarin (COUMADIN) 5 MG tablet TAKE ONE TABLET BY MOUTH DAILY 90 tablet 1    Dulaglutide (TRULICITY) 0.21 IV/6.1PX SOPN Inject 0.75 mg into the skin once a week      blood glucose test strips (TRUE METRIX BLOOD GLUCOSE TEST) strip TEST ONCE DAILY. DX:E11.9 100 strip 3    senna (SENOKOT) 8.6 MG tablet Take 1 tablet by mouth 2 times daily 60 tablet 0    dofetilide (TIKOSYN) 250 MCG capsule Take 1 capsule by mouth 2 times daily 60 capsule 3    torsemide (DEMADEX) 20 MG tablet Take 1 tablet by mouth daily 90 tablet 1    dofetilide (TIKOSYN) 250 MCG capsule Take 1 capsule by mouth every 12 hours 60 capsule 0    dilTIAZem (CARDIZEM) 120 MG tablet TAKE 1 TABLET BY MOUTH  EVERY 12 HOURS 180 tablet 3    Easy Touch Lancets 32G/Twist MISC TEST DAILY. DX: E11.9 100 each 3    blood glucose test strips (TRUE METRIX BLOOD GLUCOSE TEST) strip USE TO CHECK BLOOD GLUCOSE DAILY. DX: E11.9 100 each 3    ACCU-CHEK MULTICLIX LANCETS MISC Check sugars once daily. Dx;E11.9 100 each 11    Lancet Devices (EASY TOUCH LANCING DEVICE) MISC Test Daily. DX: E11.9 1 each 0    Blood Glucose Monitoring Suppl (TRUE METRIX METER) VINNIE Use to check daily. DX;E11.9 1 Device 0    Lancets Misc. (ACCU-CHEK MULTICLIX LANCET DEV) KIT Check sugars once daily.  DX;E11.9 1 kit 0    Biotin 5 MG TABS Take 5 mg by mouth daily      Cholecalciferol (VITAMIN D3) 5000 units TABS Take 5,000 Units by mouth daily Cyanocobalamin (VITAMIN B-12 CR PO) Take 5,000 mcg by mouth every 7 days       Multiple Vitamins-Minerals (THERAPEUTIC MULTIVITAMIN-MINERALS) tablet Take 2 tablets by mouth daily       magnesium oxide (MAG-OX) 400 MG tablet Take 400 mg by mouth daily. No current facility-administered medications for this visit. Review of Systems  As above    Objective:   Physical Exam   There were no vitals filed for this visit. General: obese, healthy appearing male  Awake, alert and oriented. Appears to be not in any distress  Mucous Membranes:  Pink , anicteric  Neck: No JVD, no carotid bruit, no thyromegaly  Chest:  Clear to auscultation bilaterally, no added sounds  Cardiovascular:  RRR S1S2 heard, no murmurs or gallops  Abdomen:  Soft, morbidly obese, undistended, non tender, no organomegaly, BS present  Extremities: chronic 1+ pedal edema  Distal pulses well felt          Stress test 5/21          The overall quality of the study is fair. There is subdiaphragmatic    attenuation. Left ventricular cavity is noted to be normal size. The right ventricle is    normal in size. SPECT images demonstrate a small area of mildly decreased perfusion in the    apical lateral and mid-inferior lateral segments. The defect is mixed    ischemia and scar. There is associated wall motion abnormality, consistent    with ischemia. Gated SPECT imaging reveals normal myocardial thickening and    wall motion. Sum stress score of 7. No visual TID. Calculated TID of 0.99. The left ventricular ejection fraction is calculated to be 63%. Myocardial perfusion is abnormal. Consistent with mixed ischemia and scar. Low risk scan. Angiogram 6/16        The interventional portion of the procedure was completed utilizing a Intapp system. XB 3.5 guide cath advanced into the left main coronary ostium. Initially a BMW wire was advanced into the distal LAD.   And a second BMW wire was advanced into the first diagonal branch. We completed balloon angioplasty of the first diagonal branch initially with a 2.5 mm x 12 m trek balloon. We subsequently did balloon angioplasty of the mid LAD with a 2.5 x 12 mm trek balloon also. We subsequently stented the mid LAD with a 2.5 mm x 12 mm Abbott vascular Xience 0 drug-eluting stent. Finally we followed up with mini crush technique of the LAD. We placed a 2.75 x 18mm Abbott vascular Xience Kelly drug-eluting stent into the diagonal.  This was pulled and withdrawn into the LAD. This was inflated to nominal pressure. We separately did post dilate the ostial portion of the diagonal with a 3.0 mm x 8 mm balloon. Eventually we did mini crush of the stent utilizing a 3.5 mm x 12 mm noncompliant trek balloon separately placed a stent into the LAD a 4.0 mm x 15 mm. Eventually we did kissing simultaneous angioplasty of the LAD and diagonal utilizing a 4.0 mm x 12 mm trek balloon along with a 3.0 mm x 12 mm trek balloon. These were inflated to rated burst pressure. Posterior views after excellent results. 1.  Successful complex LAD diagonal stenting utilizing mini crush technique. ASSESSMENT/RECOMMENDATIONS:     1. Dual antiplatelet therapy along with anticoagulation for 30 days. After 30 days we can discontinue dual antiplatelet therapy and continue with single antiplatelet therapy along with oral anticoagulation. Wt Readings from Last 3 Encounters:   07/20/22 (!) 305 lb (138.3 kg)   04/08/22 (!) 312 lb (141.5 kg)   03/14/22 (!) 311 lb (141.1 kg)           Assessment and Plan    CAD s.p LAD stents 6/21   - on Plavix , statins   - no bleeding issues while on coumadin  - off BB for fatigue     DM- 2, well controlled  Last A1c at 6.8  Now on metformin 756 mg bid and trulicity- need to repeat A1c  Urine microalbumin stable   Eye exam normal this year per pt   On statins      AFibb - s/p ablation - f/w Dr. Dewey Grimaldo in 2014 .    With persistent symptoms    Had repeat Ablation in 4/17 and 9/17 with persistent symptoms. Now doing well with TIKOSYN 250 mg bid  and cardizem 120 mg bid  Still with exertional  sob,  Dizziness   Remains in NSR per recent EKG at [place]   Continued on coumadin with no issues.  Monitored at home       Polyneuropathy- dx 4 yrs  before DM     seen Dr. Arielle Smith and EMG showed sensory motor neuropathy  Off B6 supplements as advised   Stable since last time       Right lung nodule /mass- s.p excision  by Dr. Juana Pichardo  No cancer per path - histoplasmosis noted  No treatment as it was excised completely        Pedal edema - echo stable EF. chronic, improved  Now on  demadex 20 mg bid, aldactone daily for fluid retention  Hold for ongoing poor oral intake for covid        MARIA VICTORIA - CPAP  compliant - follow  by Dr. Payton Snyder     Obesity noted - weight loss and life style modification along with dietary changes, increased physical activity encouraged      Recurrent UTI /prostate enlargement - normal PSA,   No recent issues  now f/w urology as needed      Has living will  Does see eye MD yearly  No dental or hearing issues    Colonoscopy in 2015   Check blood work    upto date on vaccines  Had pna, prevnar and shingrix vaccines

## 2022-07-25 ENCOUNTER — ANTI-COAG VISIT (OUTPATIENT)
Dept: INTERNAL MEDICINE CLINIC | Age: 77
End: 2022-07-25

## 2022-07-25 ENCOUNTER — TELEPHONE (OUTPATIENT)
Dept: INTERNAL MEDICINE CLINIC | Age: 77
End: 2022-07-25

## 2022-07-25 LAB — INR BLD: 2.5

## 2022-07-25 NOTE — TELEPHONE ENCOUNTER
----- Message from Jimi Saldana MD sent at 7/25/2022  8:57 AM EDT -----  Same 2 w  ----- Message -----  From: Lauryn Irving  Sent: 7/25/2022   8:35 AM EDT  To: MD Dr. Alma Delia Woods pt-    INR- 2.5    Last INR- 2.1

## 2022-08-01 ENCOUNTER — TELEPHONE (OUTPATIENT)
Dept: INTERNAL MEDICINE CLINIC | Age: 77
End: 2022-08-01

## 2022-08-01 NOTE — TELEPHONE ENCOUNTER
----- Message from Vivi Parks MD sent at 8/1/2022 12:53 PM EDT -----  Contact: [phone] (H)  Double up o nthe torsemide for about 5 days and see if it makes a difference   Check daily weights   ----- Message -----  From: Shari Francois MA  Sent: 8/1/2022  12:42 PM EDT  To: MD Dr Trini Ramírez Pt called stating he noticed over the weekend his right leg is leaking fluid and his left had blood spots.  Both of his legs are swollen no pain or difficulty walking he does take a water pill but not sure why his legs are leaking fluid, please advise        Davidson Carroll [id number] - [facility], [place] - [address] - F [phone] (Ph: [phone])

## 2022-08-02 ENCOUNTER — TELEPHONE (OUTPATIENT)
Dept: CARDIOLOGY CLINIC | Age: 77
End: 2022-08-02

## 2022-08-02 DIAGNOSIS — R06.02 SOB (SHORTNESS OF BREATH): Primary | ICD-10-CM

## 2022-08-02 NOTE — TELEPHONE ENCOUNTER
According to our records he is on torsemide, not furosemide  Regardless, wendi to double cm, but not just 3 days, stay on the higher dose  BMP and BNP in 2 weeks  Sched next avail OV if he would like

## 2022-08-02 NOTE — TELEPHONE ENCOUNTER
Pt sts he extremely fatigued. Both legs are swollen but the right leg is weeping so bad pt thought his ceiling was leaking when he looked down at floor. Pt called his PCP who instructed him to double up on his Lasix for 3 days. Pt is SOB at times, denies any CP, or dizziness. Today pt's weight is 308 and has been that for awhile pt states. Pt also said he is anxious, easily gets upset. Pt had Covid 2-3 weeks ago, was being followed by PCP, pt states feels better in that regard. Please advise.

## 2022-08-02 NOTE — TELEPHONE ENCOUNTER
Last seen 4/8/2022. Assessment:  CAD - stable. Stress test reviewed w/ pt   PANG - unchangedSuspect sig component deconditioning and obesity. Less sig cardiac   Atrial fibrillation - stable  Nocturnal bradycardia 2:1 due to MARIA VICTORIA  Hypertension - controlled    Hyperlipidemia - stable, controlled  Sleep apnea  History of DVT  Diabetes Mellitus  Asthma  Tachycardia - intermittent  Fatigue - do not suspect cardiac     Plan:  Continue Plavix. R/B/A/E discussed    Cardiac medications reviewed including indications and pertinent side effects. Medication list updated at this visit.   Check blood pressure and heart rate at home a few times per week- keep a log with dates and times and bring to office visit  Regular exercise and following a healthy diet encouraged  Follow up with me in 6 months

## 2022-08-02 NOTE — TELEPHONE ENCOUNTER
Pt called back. Gave pt [facility] message. Pt is going on vacation starting /12/22 thru 8/29. Pt wanted know if [facility] [place] ok. If pt goes on vacation he will be OOT when [facility] wants labs done. Pt said he could do before going OOT on  8/11 if that's ok with [facility]. Pt will continue to take increased dosage of medication. Please advise.

## 2022-08-09 LAB — INR BLD: 2.2

## 2022-08-10 ENCOUNTER — ANTI-COAG VISIT (OUTPATIENT)
Dept: INTERNAL MEDICINE CLINIC | Age: 77
End: 2022-08-10

## 2022-08-11 ENCOUNTER — HOSPITAL ENCOUNTER (OUTPATIENT)
Age: 77
Discharge: HOME | End: 2022-08-11
Payer: MEDICARE

## 2022-08-11 DIAGNOSIS — R06.02 SOB (SHORTNESS OF BREATH): ICD-10-CM

## 2022-08-11 LAB
ANION GAP SERPL CALCULATED.3IONS-SCNC: 18 MMOL/L (ref 3–16)
BUN BLDV-MCNC: 31 MG/DL (ref 7–20)
CALCIUM SERPL-MCNC: 9.1 MG/DL (ref 8.3–10.6)
CHLORIDE BLD-SCNC: 98 MMOL/L (ref 99–110)
CO2: 26 MMOL/L (ref 21–32)
CREAT SERPL-MCNC: 1.6 MG/DL (ref 0.8–1.3)
GFR AFRICAN AMERICAN: 51
GFR NON-AFRICAN AMERICAN: 42
GLUCOSE BLD-MCNC: 150 MG/DL (ref 70–99)
POTASSIUM SERPL-SCNC: 4.1 MMOL/L (ref 3.5–5.1)
PRO-BNP: 422 PG/ML (ref 0–449)
SODIUM BLD-SCNC: 142 MMOL/L (ref 136–145)

## 2022-08-11 PROCEDURE — 36415 COLL VENOUS BLD VENIPUNCTURE: CPT

## 2022-08-11 PROCEDURE — 83880 ASSAY OF NATRIURETIC PEPTIDE: CPT

## 2022-08-11 PROCEDURE — 80048 BASIC METABOLIC PNL TOTAL CA: CPT

## 2022-08-11 RX ORDER — WARFARIN SODIUM 5 MG/1
TABLET ORAL
Qty: 90 TABLET | Refills: 1 | Status: ON HOLD | OUTPATIENT
Start: 2022-08-11 | End: 2022-10-19 | Stop reason: SDUPTHER

## 2022-08-12 ENCOUNTER — TELEPHONE (OUTPATIENT)
Dept: CARDIOLOGY CLINIC | Age: 77
End: 2022-08-12

## 2022-08-12 DIAGNOSIS — R06.02 SOB (SHORTNESS OF BREATH): Primary | ICD-10-CM

## 2022-08-12 NOTE — TELEPHONE ENCOUNTER
----- Message from Perez Solis MD sent at 8/12/2022  9:35 AM EDT -----  Please notify patient that their lab results show kidneys not tolerating the higher dose of water pill  Try taking  1 and 1/2 tab torsemide daily - ie 30 mg daily  Repeat BMP in 7-10 days

## 2022-08-22 ENCOUNTER — TELEPHONE (OUTPATIENT)
Dept: INTERNAL MEDICINE CLINIC | Age: 77
End: 2022-08-22

## 2022-08-22 ENCOUNTER — ANTI-COAG VISIT (OUTPATIENT)
Dept: INTERNAL MEDICINE CLINIC | Age: 77
End: 2022-08-22

## 2022-08-22 LAB — INR BLD: 2.3

## 2022-08-22 NOTE — TELEPHONE ENCOUNTER
----- Message from Pooja Salinas MD sent at 8/22/2022 11:05 AM EDT -----  No change   2 weeks  ----- Message -----  From: Adams Mendez  Sent: 8/22/2022  10:25 AM EDT  To: Pooja Salinas MD    INR 2.3

## 2022-08-23 ENCOUNTER — TELEPHONE (OUTPATIENT)
Dept: CARDIOLOGY CLINIC | Age: 77
End: 2022-08-23

## 2022-08-23 NOTE — TELEPHONE ENCOUNTER
Pt called back sts while at hospital they also told him he was in A-fib. Pt sts he does not feel well today. Pt is going to get Keflex filled but if [facility] was him to take something else let him know.

## 2022-08-23 NOTE — TELEPHONE ENCOUNTER
Will defer to [place] provider but seems appropriate   Be aware antibiotics cabn effect warfarin level and need to have closer monitoring.   Should contact whoever is adjusting the dose for instructions

## 2022-08-23 NOTE — TELEPHONE ENCOUNTER
Pt currently in [place]. Pt went to a [facility] last night due to swelling,redness on right leg/ankle. Pt was Dx with cellulitis and started on Ceflex 500 mg 4 x's a day. Pt wants to make sure this is compatible to his heart medications. If not pt wants [facility] to call something to [facility] in [address].

## 2022-08-24 ENCOUNTER — TELEPHONE (OUTPATIENT)
Dept: INTERNAL MEDICINE CLINIC | Age: 77
End: 2022-08-24

## 2022-08-24 NOTE — TELEPHONE ENCOUNTER
----- Message from Pooja Salinas MD sent at 8/24/2022  2:28 PM EDT -----  Contact: Paulo Michelle wife [phone]  Next week yes    ----- Message -----  From: Adams Mendez  Sent: 8/24/2022   9:13 AM EDT  To: Pooja Salinas MD    Spouse wanting to know if you want to see patient when he gets back? Spouse also wanting to let you know that patient was in afib  ----- Message -----  From: Pooja Salinas MD  Sent: 8/24/2022   8:45 AM EDT  To: Adams Mendez    Yes ok to travel back but for any worsening fevers, go to ER    ----- Message -----  From: Vipin Perea  Sent: 8/24/2022   8:25 AM EDT  To: Pooja Salinas MD    Caller states the patient went to the ER and was diagnosed with cellulitis in his right leg and was prescribed cephalexin. Caller states the patient has been shaking when standing, short of breath often, and weak. Caller states where the patients cellulitis is now showing up, that leg split open about 2 weeks ago and was draining water so his water pill dose was increased. Patient is in [place] and asking if it would be safe to travel back. Please advise.

## 2022-08-29 ENCOUNTER — HOSPITAL ENCOUNTER (OUTPATIENT)
Age: 77
Discharge: HOME | End: 2022-08-29
Payer: MEDICARE

## 2022-08-29 ENCOUNTER — OFFICE VISIT (OUTPATIENT)
Dept: INTERNAL MEDICINE CLINIC | Age: 77
End: 2022-08-29

## 2022-08-29 ENCOUNTER — HOSPITAL ENCOUNTER (OUTPATIENT)
Dept: GENERAL RADIOLOGY | Age: 77
Discharge: HOME | End: 2022-08-29
Payer: MEDICARE

## 2022-08-29 VITALS
WEIGHT: 309 LBS | DIASTOLIC BLOOD PRESSURE: 78 MMHG | RESPIRATION RATE: 18 BRPM | HEIGHT: 75 IN | HEART RATE: 62 BPM | BODY MASS INDEX: 38.42 KG/M2 | SYSTOLIC BLOOD PRESSURE: 136 MMHG

## 2022-08-29 DIAGNOSIS — R06.02 SHORTNESS OF BREATH: Primary | ICD-10-CM

## 2022-08-29 DIAGNOSIS — I50.31 ACUTE DIASTOLIC CONGESTIVE HEART FAILURE (HCC): ICD-10-CM

## 2022-08-29 DIAGNOSIS — R06.02 SHORTNESS OF BREATH: ICD-10-CM

## 2022-08-29 DIAGNOSIS — L03.115 CELLULITIS OF RIGHT LEG: ICD-10-CM

## 2022-08-29 DIAGNOSIS — E11.40 TYPE 2 DIABETES MELLITUS WITH DIABETIC NEUROPATHY, WITHOUT LONG-TERM CURRENT USE OF INSULIN (HCC): ICD-10-CM

## 2022-08-29 DIAGNOSIS — I48.4 ATYPICAL ATRIAL FLUTTER (HCC): ICD-10-CM

## 2022-08-29 DIAGNOSIS — I48.0 PAF (PAROXYSMAL ATRIAL FIBRILLATION) (HCC): ICD-10-CM

## 2022-08-29 PROCEDURE — G8427 DOCREV CUR MEDS BY ELIG CLIN: HCPCS | Performed by: INTERNAL MEDICINE

## 2022-08-29 PROCEDURE — 71046 X-RAY EXAM CHEST 2 VIEWS: CPT

## 2022-08-29 PROCEDURE — 93000 ELECTROCARDIOGRAM COMPLETE: CPT | Performed by: INTERNAL MEDICINE

## 2022-08-29 PROCEDURE — 1036F TOBACCO NON-USER: CPT | Performed by: INTERNAL MEDICINE

## 2022-08-29 PROCEDURE — 1123F ACP DISCUSS/DSCN MKR DOCD: CPT | Performed by: INTERNAL MEDICINE

## 2022-08-29 PROCEDURE — 99213 OFFICE O/P EST LOW 20 MIN: CPT | Performed by: INTERNAL MEDICINE

## 2022-08-29 PROCEDURE — 81002 URINALYSIS NONAUTO W/O SCOPE: CPT | Performed by: INTERNAL MEDICINE

## 2022-08-29 PROCEDURE — 3051F HG A1C>EQUAL 7.0%<8.0%: CPT | Performed by: INTERNAL MEDICINE

## 2022-08-29 PROCEDURE — G8417 CALC BMI ABV UP PARAM F/U: HCPCS | Performed by: INTERNAL MEDICINE

## 2022-08-29 NOTE — PROGRESS NOTES
Subjective:      Patient ID: Alexander Christie is a 69 y.o. male. HPI     69 y.o. 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 ,obestiy, MARIA VICTORIA , pedal edema here for  recent hospital dc f/w      Since last time, pt had another covid infection in 7/22  , symptoms of fatigue continued with no new sob or cough or fevers. Seen Dr. Rick Ferrer for regular f/w , noted increased pedal edema, changed demadex to 40 mg daily but creatinine increased and he was instructed to take 30 mg daily   Last week he went to [place] for vacation and reports continued fatigue and developed new blister in right Leg and started weeping with development of cellulitis . Seen at urgent care, venous doppler neg for blood clot. Given keflex and improved over last week . But today pt continues to complain of fatigue and exertional dyspnea, unable to do any activity and gets tired easily . No fevers or chills. No dysuria. No diarrhea        6/21- CAD s.p  3 LAD stents.  Placed on brillinta in addition to ASA but due increasing sob, eventually changed to plavix   Now off ASA, and continued on plavix     DM- 2 dx in 12/18- now on metformin 432 mg bid and Trulicity  Recently N3C at 6.8 at [place]    Fasting range from 110 - 150  No low sugars  Has chronic tingling numbness in feet      Afibb s.p ablation, stable on tikosyn, cost issues noted and goes to [place] for refills   On Coumadin for [facility] with no bleeding issues  Home readings remain stable  pt reports recurrence of Afibb on monitor noted in ER but no EKG noted     pulm nodule - 2016 - Right sided  pulmonary nodule was taken out with resection by Dr. Kaylene Serrano 9/16  Pathology showed histoplasmosis    Obesity - has gastric sleeve surgery in 2013 and lost 100 lbs    MARIA VICTORIA - on home cpap, compliant with it      Independent of ADL and IADL    and lives with wife     Allergies   Allergen Reactions    Bactrim [Sulfamethoxazole-Trimethoprim] Diarrhea    Brilinta [Ticagrelor] dyspnea    Ciprofloxacin      Bad headaches    Macrobid [Nitrofurantoin Monohyd Macro]     Ozempic (0.25 Or 0.5 Mg-Dose) [Semaglutide(0.25 Or 0.5mg-Dos)]     Sulfamethoxazole Diarrhea    Theophylline      Theodur-caused severe headaches    Cefuroxime Axetil Rash and Itching    Cipro Xr Rash    Other Rash and Other (See Comments)     Ekg leads-glue    Tape Clemetine [place] Tape] Rash     Skin irritaion  Eats away at skin, paper tape OK  Plastic tape         Current Outpatient Medications   Medication Sig Dispense Refill    warfarin (COUMADIN) 5 MG tablet TAKE ONE TABLET BY MOUTH DAILY 90 tablet 1    metFORMIN (GLUCOPHAGE) 1000 MG tablet Take 0.5 tablets by mouth in the morning and 0.5 tablets in the evening. Take with meals. 180 tablet 0    montelukast (SINGULAIR) 10 MG tablet TAKE ONE TABLET BY MOUTH DAILY 90 tablet 1    spironolactone (ALDACTONE) 25 MG tablet TAKE ONE TABLET BY MOUTH DAILY 90 tablet 0    metoprolol succinate (TOPROL XL) 25 MG extended release tablet TAKE ONE TABLET BY MOUTH DAILY 90 tablet 3    atorvastatin (LIPITOR) 40 MG tablet TAKE ONE TABLET BY MOUTH DAILY 90 tablet 0    clopidogrel (PLAVIX) 75 MG tablet Take 1 tablet by mouth daily 90 tablet 3    Dulaglutide (TRULICITY) 5.04 DR/3.0AE SOPN Inject 0.75 mg into the skin once a week      blood glucose test strips (TRUE METRIX BLOOD GLUCOSE TEST) strip TEST ONCE DAILY. DX:E11.9 100 strip 3    senna (SENOKOT) 8.6 MG tablet Take 1 tablet by mouth 2 times daily 60 tablet 0    dofetilide (TIKOSYN) 250 MCG capsule Take 1 capsule by mouth 2 times daily 60 capsule 3    torsemide (DEMADEX) 20 MG tablet Take 1 tablet by mouth daily 90 tablet 1    dofetilide (TIKOSYN) 250 MCG capsule Take 1 capsule by mouth every 12 hours 60 capsule 0    dilTIAZem (CARDIZEM) 120 MG tablet TAKE 1 TABLET BY MOUTH  EVERY 12 HOURS 180 tablet 3    Easy Touch Lancets 32G/Twist MISC TEST DAILY.  DX: E11.9 100 each 3    blood glucose test strips (TRUE METRIX BLOOD GLUCOSE TEST) strip USE TO CHECK BLOOD GLUCOSE DAILY. DX: E11.9 100 each 3    ACCU-CHEK MULTICLIX LANCETS MISC Check sugars once daily. Dx;E11.9 100 each 11    Lancet Devices (EASY TOUCH LANCING DEVICE) MISC Test Daily. DX: E11.9 1 each 0    Blood Glucose Monitoring Suppl (TRUE METRIX METER) VINNIE Use to check daily. DX;E11.9 1 Device 0    Lancets Misc. (ACCU-CHEK MULTICLIX LANCET DEV) KIT Check sugars once daily. DX;E11.9 1 kit 0    Biotin 5 MG TABS Take 5 mg by mouth daily      Cholecalciferol (VITAMIN D3) 5000 units TABS Take 5,000 Units by mouth daily      Cyanocobalamin (VITAMIN B-12 CR PO) Take 5,000 mcg by mouth every 7 days       Multiple Vitamins-Minerals (THERAPEUTIC MULTIVITAMIN-MINERALS) tablet Take 2 tablets by mouth daily       magnesium oxide (MAG-OX) 400 MG tablet Take 400 mg by mouth daily. No current facility-administered medications for this visit. Review of Systems  As above    Objective:   Physical Exam   Vitals:    08/29/22 1335   BP: 136/78   Pulse: 62   Resp: 18             General: obese, healthy appearing male  Awake, alert and oriented. Appears to be not in any distress  Mucous Membranes:  Pink , anicteric  Neck: No JVD, no carotid bruit, no thyromegaly  Chest:  Clear to auscultation bilaterally,diminished in bases with crackles   Cardiovascular:  RRR S1S2 heard, no murmurs or gallops  Abdomen:  Soft, morbidly obese, undistended, non tender, no organomegaly, BS present  Extremities: chronic 1+ pedal edema  resolving 2 + edema on right leg  No cellulitis noted Distal pulses well felt          Stress test 5/21          The overall quality of the study is fair. There is subdiaphragmatic    attenuation. Left ventricular cavity is noted to be normal size. The right ventricle is    normal in size. SPECT images demonstrate a small area of mildly decreased perfusion in the    apical lateral and mid-inferior lateral segments. The defect is mixed    ischemia and scar.  There is associated wall motion abnormality, consistent    with ischemia. Gated SPECT imaging reveals normal myocardial thickening and    wall motion. Sum stress score of 7. No visual TID. Calculated TID of 0.99. The left ventricular ejection fraction is calculated to be 63%. Myocardial perfusion is abnormal. Consistent with mixed ischemia and scar. Low risk scan. Angiogram 6/16        The interventional portion of the procedure was completed utilizing a 7 ViSra system. XB 3.5 guide cath advanced into the left main coronary ostium. Initially a BMW wire was advanced into the distal LAD. And a second BMW wire was advanced into the first diagonal branch. We completed balloon angioplasty of the first diagonal branch initially with a 2.5 mm x 12 m trek balloon. We subsequently did balloon angioplasty of the mid LAD with a 2.5 x 12 mm trek balloon also. We subsequently stented the mid LAD with a 2.5 mm x 12 mm Abbott vascular Xience 0 drug-eluting stent. Finally we followed up with mini crush technique of the LAD. We placed a 2.75 x 18mm Abbott vascular Xience Kelly drug-eluting stent into the diagonal.  This was pulled and withdrawn into the LAD. This was inflated to nominal pressure. We separately did post dilate the ostial portion of the diagonal with a 3.0 mm x 8 mm balloon. Eventually we did mini crush of the stent utilizing a 3.5 mm x 12 mm noncompliant trek balloon separately placed a stent into the LAD a 4.0 mm x 15 mm. Eventually we did kissing simultaneous angioplasty of the LAD and diagonal utilizing a 4.0 mm x 12 mm trek balloon along with a 3.0 mm x 12 mm trek balloon. These were inflated to rated burst pressure. Posterior views after excellent results. 1.  Successful complex LAD diagonal stenting utilizing mini crush technique. ASSESSMENT/RECOMMENDATIONS:     1. Dual antiplatelet therapy along with anticoagulation for 30 days.   After 30 days we can discontinue dual antiplatelet therapy and continue with single antiplatelet therapy along with oral anticoagulation. Wt Readings from Last 3 Encounters:   08/29/22 (!) 309 lb (140.2 kg)   07/20/22 (!) 305 lb (138.3 kg)   04/08/22 (!) 312 lb (141.5 kg)           Assessment and Plan    Cellulitis of right leg - resolved. Can stop keflex today     Pedal edema /dyspnea on exertion and at rest - consistent with CHF acute on chronic diastolic   - pt already on increased dose of demadex   - continue adactone  - check BNP, BMP, CBC   - obtain cxr 2 view     CAD s.p LAD stents 6/21   - on Plavix , statins   - no bleeding issues while on coumadin  - off BB for fatigue        AFibb - s/p ablation - f/w Dr. Carlo Oneal in 2014 . With persistent symptoms    Had repeat Ablation in 4/17 and 9/17 with persistent symptoms. Now doing well with TIKOSYN 250 mg bid  and cardizem 120 mg bid  Still with exertional  sob,  Dizziness   Remains in NSR per recent EKG at [place]   Continued on coumadin with no issues.  Monitored at home       DM- 2, well controlled  Last A1c at 6.8  Now on metformin 898 mg bid and trulicity- need to repeat A1c  Urine microalbumin stable   Eye exam normal this year per pt   On statins     Polyneuropathy- dx 4 yrs  before DM     seen Dr. Mary Ann Porras and EMG showed sensory motor neuropathy  Off B6 supplements as advised   Stable since last time       Right lung nodule /mass- s.p excision  by Dr. Adalberto Contreras  No cancer per path - histoplasmosis noted  No treatment as it was excised completely        Pedal edema - echo stable EF. chronic, improved  Now on  demadex 30 mg daily , aldactone daily for fluid retention       MARIA VICTORIA - CPAP  compliant - follow  by Dr. Ashanti Bernal

## 2022-08-30 LAB
ANION GAP SERPL CALCULATED.3IONS-SCNC: 13 MMOL/L (ref 3–16)
BUN BLDV-MCNC: 26 MG/DL (ref 7–20)
CALCIUM SERPL-MCNC: 9.2 MG/DL (ref 8.3–10.6)
CHLORIDE BLD-SCNC: 96 MMOL/L (ref 99–110)
CO2: 25 MMOL/L (ref 21–32)
CREAT SERPL-MCNC: 1.2 MG/DL (ref 0.8–1.3)
ESTIMATED AVERAGE GLUCOSE: 157.1 MG/DL
GFR AFRICAN AMERICAN: >60
GFR NON-AFRICAN AMERICAN: 59
GLUCOSE BLD-MCNC: 125 MG/DL (ref 70–99)
HBA1C MFR BLD: 7.1 %
HCT VFR BLD CALC: 35.1 % (ref 40.5–52.5)
HEMOGLOBIN: 11.8 G/DL (ref 13.5–17.5)
MCH RBC QN AUTO: 30 PG (ref 26–34)
MCHC RBC AUTO-ENTMCNC: 33.8 G/DL (ref 31–36)
MCV RBC AUTO: 88.8 FL (ref 80–100)
PDW BLD-RTO: 15.9 % (ref 12.4–15.4)
PLATELET # BLD: 290 K/UL (ref 135–450)
PMV BLD AUTO: 7.6 FL (ref 5–10.5)
POTASSIUM SERPL-SCNC: 4.1 MMOL/L (ref 3.5–5.1)
PRO-BNP: 510 PG/ML (ref 0–449)
RBC # BLD: 3.95 M/UL (ref 4.2–5.9)
SODIUM BLD-SCNC: 134 MMOL/L (ref 136–145)
WBC # BLD: 9.6 K/UL (ref 4–11)

## 2022-09-02 ENCOUNTER — TELEPHONE (OUTPATIENT)
Dept: CARDIOLOGY CLINIC | Age: 77
End: 2022-09-02

## 2022-09-02 ENCOUNTER — OFFICE VISIT (OUTPATIENT)
Dept: CARDIOLOGY CLINIC | Age: 77
End: 2022-09-02
Payer: MEDICARE

## 2022-09-02 VITALS
HEIGHT: 75 IN | SYSTOLIC BLOOD PRESSURE: 108 MMHG | BODY MASS INDEX: 37.92 KG/M2 | DIASTOLIC BLOOD PRESSURE: 74 MMHG | OXYGEN SATURATION: 97 % | HEART RATE: 78 BPM | WEIGHT: 305 LBS

## 2022-09-02 DIAGNOSIS — I48.0 PAF (PAROXYSMAL ATRIAL FIBRILLATION) (HCC): ICD-10-CM

## 2022-09-02 DIAGNOSIS — I20.0 UNSTABLE ANGINA (HCC): Primary | ICD-10-CM

## 2022-09-02 PROCEDURE — G8417 CALC BMI ABV UP PARAM F/U: HCPCS | Performed by: INTERNAL MEDICINE

## 2022-09-02 PROCEDURE — G8427 DOCREV CUR MEDS BY ELIG CLIN: HCPCS | Performed by: INTERNAL MEDICINE

## 2022-09-02 PROCEDURE — 99215 OFFICE O/P EST HI 40 MIN: CPT | Performed by: INTERNAL MEDICINE

## 2022-09-02 PROCEDURE — 1036F TOBACCO NON-USER: CPT | Performed by: INTERNAL MEDICINE

## 2022-09-02 PROCEDURE — 1123F ACP DISCUSS/DSCN MKR DOCD: CPT | Performed by: INTERNAL MEDICINE

## 2022-09-02 NOTE — TELEPHONE ENCOUNTER
~ NPO (nothing to eat or drink) after midnight  ~ Have transportation arrangements  ~ Discontinue Coumadin 3 days prior to procedure  ~ Hold metformin 48 hours prior to procedure  ~ Hold Torsemide and Spironolactone morning of procedure  ~ Check blood glucose prior to coming into hospital  ~ No lotions of creams applied to skin.

## 2022-09-02 NOTE — TELEPHONE ENCOUNTER
Monitor placed by [address] [place] [address] [employer]  Length of monitor 28 days   Monitor ordered by [facility] number [id number]  Activation successful prior to pt leaving office?  Yes

## 2022-09-02 NOTE — PROGRESS NOTES
[address]   Cardiac Followup    Referring Provider:  Can Clark MD     Chief Complaint   Patient presents with    Follow-up    Atrial Fibrillation    Hypertension    Chest Pain     Discomfort yesteday    Shortness of Breath     With any movement    Fatigue     Tired. Edema     Lower legs and feet. Right ankle had cellulitis a week ago. Paul Vo   1945    History of Present Illness:    Paul Vo is a 69 y.o. male who is here today for follow up for a past medical history of coronary artery disease, abnormal stress test, paroxysmal atrial fibrillation/atrial flutter, PVC's,  anticoagulation with warfarin, hypertension, hyperlipidemia, diabetes mellitus, MARIA VICTORAI, obesity status post gastric sleeve as well as history of DVT and histoplasmosis s/p thoracotomy. He has a stress test in 2011 which showed a small sized reversible perfusion defect involving the anteroseptal wall of the left ventricle to mid heart concerning for myocardial ischemia. He has a history of PVC ablation in 2014. He stated he continued to have occasional episodes of palpitations and was found to have atrial flutter. He underwent a CV, and an unsuccessful RFCA for typical atrial flutter on 4/12/17. Patient was started on dofetilide in June of 2017. He underwent EPS and aflutter ablation on 9/19/17. He had a normal stress test in 2017. Stress test 5/19/2021 was abnornmal consistent with mixed ischemia and scar. He had a left heart cath 6/16/2021- Findings significant for LAD/Diag lesions, LAD treated with FELIZ x2 and Diag FELIZ x1. ASA stopped one month post procedure and patient continued on Plavix and warfarin. Advised to have sleep study. Cardiac event monitor 2/25-3/11/2022  Average heart rate 79 (), nocturnal bradycardia 2:1 conduction PSVT, NSVT. Coumadin is managed by Can Clark MD Stress test 2/2022 showed no ischemia. Chest Xray negative.     Last office visit patient reported decrease in palpitations on Metoprolol. He reported compliant with his CPAP and wears it nightly. Today he states his cellulitis to RLE is healing well. He has completed antibiotic therapy. He is currently taking 30 mg torsemide, tolerating well. Swelling improved. He states SOB is worse. He states walking from bed room to living room. He had COVID diagnosis July 2022 but believes the SOB pre-dated this. Gets associated chest tightness w/ the SOB. He had a follow up chest xray negative. He reports Echo was completed at [address] [facility]. Patient currently denies any weight gain, palpitations, chest pain, dizziness, and syncope. He reports 30 pound weight loss. He is compliant with CPAP. Past Medical History:   has a past medical history of Arthritis, Asthma, Atrial fibrillation ([address] Blood circulation, collateral, Diabetes mellitus ([address] DVT (deep venous thrombosis) ([address] Histoplasmosis, History of knee replacement procedure of right knee, Hx of blood clots, Hyperlipidemia, Hypertension, Knee osteoarthritis, Left TKR, Lung nodule, Neuromuscular disorder ([address] Palpitations, Sleep apnea, Stone, kidney, and UTI (urinary tract infection). Surgical History:   has a past surgical history that includes Cystoscopy; Tonsillectomy; Knee arthroscopy (4/19/2011); Colonoscopy; Cystocopy (2/8/13); ablation of dysrhythmic focus (2013); Carpal tunnel release (Right, 9-30-15); Bariatric Surgery (2013); Carpal tunnel release (Left, 3/20/16); skin biopsy; Diagnostic Cardiac Cath Lab Procedure; joint replacement (Bilateral); Cardiac surgery (2013); thoracotomy; ERCP (02/18/2019); ERCP (N/A, 2/18/2019); ERCP (2/18/2019); Cholecystectomy, laparoscopic (N/A, 2/19/2019); Intracapsular cataract extraction (Right, 1/14/2020); and Intracapsular cataract extraction (Left, 1/21/2020). Social History:   reports that he quit smoking about 46 years ago. His smoking use included cigarettes. He has a 34.00 pack-year smoking history.  He has never used smokeless tobacco. He reports that he does not drink alcohol and does not use drugs. Family History:  family history includes Arthritis in his father; Cancer in his mother; Kidney Disease in his mother; Other in his father; Stroke in his father and mother; Substance Abuse in his father. Home Medications:  Prior to Admission medications    Medication Sig Start Date End Date Taking? Authorizing Provider   warfarin (COUMADIN) 5 MG tablet TAKE ONE TABLET BY MOUTH DAILY 8/11/22  Yes Camryn Lockett MD   metFORMIN (GLUCOPHAGE) 1000 MG tablet Take 0.5 tablets by mouth in the morning and 0.5 tablets in the evening. Take with meals. 7/20/22  Yes Camryn Lockett MD   montelukast (SINGULAIR) 10 MG tablet TAKE ONE TABLET BY MOUTH DAILY 7/1/22  Yes Camryn Lockett MD   spironolactone (ALDACTONE) 25 MG tablet TAKE ONE TABLET BY MOUTH DAILY 7/1/22  Yes Camryn Lockett MD   metoprolol succinate (TOPROL XL) 25 MG extended release tablet TAKE ONE TABLET BY MOUTH DAILY 7/1/22  Yes Perez Solis MD   atorvastatin (LIPITOR) 40 MG tablet TAKE ONE TABLET BY MOUTH DAILY 7/1/22  Yes Camryn Lockett MD   clopidogrel (PLAVIX) 75 MG tablet Take 1 tablet by mouth daily 5/23/22  Yes Perez Solis MD   Dulaglutide (TRULICITY) 9.93 PH/9.2BS SOPN Inject 0.75 mg into the skin once a week   Yes Historical Provider, MD   blood glucose test strips (TRUE METRIX BLOOD GLUCOSE TEST) strip TEST ONCE DAILY.   DX:E11.9 1/20/22  Yes Camryn Lockett MD   dofetilide [facility]) 250 MCG capsule Take 1 capsule by mouth 2 times daily 10/19/21  Yes Sherif Mendez MD   torsemide (DEMADEX) 20 MG tablet Take 1 tablet by mouth daily  Patient taking differently: Take 20 mg by mouth daily Takes 30 mg daily= 1.5 tablets daily 10/4/21  Yes SERENA Coker - CNP   dofetilide (TIKOSYN) 250 MCG capsule Take 1 capsule by mouth every 12 hours 7/7/21  Yes Perez Solis MD   dilTIAZem (CARDIZEM) 120 MG tablet TAKE 1 TABLET BY MOUTH  EVERY 12 HOURS 9/3/20  Yes Chapincito Curry MD   Easy Touch Lancets 32G/Twist MISC TEST DAILY. DX: E11.9 3/23/20  Yes Kristan Chapman MD   blood glucose test strips (TRUE METRIX BLOOD GLUCOSE TEST) strip USE TO CHECK BLOOD GLUCOSE DAILY. DX: E11.9 3/23/20  Yes Kristan Chapman MD   ACCU-CHEK MULTICLIX LANCETS MISC Check sugars once daily. Dx;E11.9 3/11/19  Yes Kristan Chapman MD   Lancet Devices (EASY TOUCH LANCING DEVICE) MISC Test Daily. DX: E11.9 12/26/18  Yes Kristan Chapman MD   Blood Glucose Monitoring Suppl (TRUE METRIX METER) VINNIE Use to check daily. DX;E11.9 12/22/18  Yes Kristan Chapman MD   Lancets Misc. (ACCU-CHEK MULTICLIX LANCET DEV) KIT Check sugars once daily. DX;E11.9 12/21/18  Yes Kristan Chapman MD   Biotin 5 MG TABS Take 5 mg by mouth daily   Yes Historical Provider, MD   Cholecalciferol (VITAMIN D3) 5000 units TABS Take 5,000 Units by mouth daily   Yes Historical Provider, MD   Cyanocobalamin (VITAMIN B-12 CR PO) Take 5,000 mcg by mouth every 7 days    Yes Historical Provider, MD   Multiple Vitamins-Minerals (THERAPEUTIC MULTIVITAMIN-MINERALS) tablet Take 2 tablets by mouth daily    Yes Historical Provider, MD   magnesium oxide (MAG-OX) 400 MG tablet Take 400 mg by mouth daily. Yes Historical Provider, MD   senna (SENOKOT) 8.6 MG tablet Take 1 tablet by mouth 2 times daily  Patient not taking: Reported on 9/2/2022 11/11/21   Kristan Chapman MD        Allergies:  Bactrim [sulfamethoxazole-trimethoprim], Brilinta [ticagrelor], Ciprofloxacin, Macrobid [nitrofurantoin monohyd macro], Ozempic (0.25 or 0.5 mg-dose) [semaglutide(0.25 or 0.5mg-dos)], Sulfamethoxazole, Theophylline, Cefuroxime axetil, Cipro xr, Other, and Tape [adhesive tape]     Review of Systems:   Constitutional: there has been no unanticipated weight loss. There's been no change in energy level, sleep pattern, or activity level. Eyes: No visual changes or diplopia.  No scleral icterus. ENT: No Headaches, hearing loss or vertigo. No mouth sores or sore throat. Cardiovascular: Reviewed in HPI  Respiratory: No cough or wheezing, no sputum production. No hematemesis. Gastrointestinal: No abdominal pain, appetite loss, blood in stools. No change in bowel or bladder habits. Genitourinary: No dysuria, trouble voiding, or hematuria. Musculoskeletal:  No gait disturbance, weakness or joint complaints. Integumentary: No rash or pruritis. Neurological: No headache, diplopia, change in muscle strength, numbness or tingling. No change in gait, balance, coordination, mood, affect, memory, mentation, behavior. Psychiatric: No anxiety, no depression. Endocrine: No malaise, fatigue or temperature intolerance. No excessive thirst, fluid intake, or urination. No tremor. Hematologic/Lymphatic: No abnormal bruising or bleeding, blood clots or swollen lymph nodes. Allergic/Immunologic: No nasal congestion or hives. Physical Examination:    Vitals:    09/02/22 1300   BP: 108/74   Pulse: 78   SpO2: 97%          Wt Readings from Last 3 Encounters:   09/02/22 (!) 305 lb (138.3 kg)   08/29/22 (!) 309 lb (140.2 kg)   07/20/22 (!) 305 lb (138.3 kg)       Constitutional and General Appearance: NAD   Respiratory:  Normal excursion and expansion without use of accessory muscles  Resp Auscultation: Normal breath sounds without dullness  Cardiovascular: The apical impulses not displaced  Heart tones are crisp and normal  Cervical veins are not engorged  The carotid upstroke is normal in amplitude and contour without delay or bruit  Normal S1S2, No S3, No Murmur  Peripheral pulses are symmetrical and full  There is no clubbing, cyanosis of the extremities.   Trace 1+ BLE edema  Femoral Arteries: 2+ and equal  Pedal Pulses: 2+ and equal   Abdomen:  No masses or tenderness  Liver/Spleen: No Abnormalities Noted  Neurological/Psychiatric:  Alert and oriented in all spheres  Moves all extremities well  Exhibits normal gait balance and coordination  No abnormalities of mood, affect, memory, mentation, or behavior are noted  Lab Results   Component Value Date    CHOL 139 06/16/2021    CHOL 222 (H) 12/19/2018    CHOL 182 10/31/2017     Lab Results   Component Value Date    TRIG 126 06/16/2021    TRIG 159 (H) 12/19/2018    TRIG 126 10/31/2017     Lab Results   Component Value Date    HDL 38 (L) 03/24/2022    HDL 39 (L) 06/16/2021    HDL 41 12/08/2020     Lab Results   Component Value Date    LDLCALC 70 03/24/2022    LDLCALC 75 06/16/2021    LDLCALC 77 12/08/2020     Lab Results   Component Value Date    LABVLDL 32 03/24/2022    LABVLDL 25 06/16/2021    LABVLDL 28 12/08/2020     No results found for: CHOLHDLRATIO      Stress test 12/23/11  Impression- 1. Small sized reversible perfusion defect involving the anteroseptal wall of the left ventricle to mid heart concerning for myocardial ischemia. Please correlate with EKG findings. 2. Left ventricular hypertrophy. 3. LVEF 59 %     NAVNEET/Cardioversion 2/7/2012  Summary-   1. A Navneet was performed without complications. 2.  LVEF 55   3. No thrombus or valvular vegetation identified. CARDIOVERSION-      After an adequate level of sedation was achieved, 150J, then 200J in   biphasic synchronized delivery was administered. No change in   rhythm. The patient awoke without complications. A post procedure   12 L ECG was ordered and reviewed. There were no complications encountered. 1.  Unsuccessful attempt at cardioversion. 2.  Patient remains in atrial fibrillation. Echocardiogram 11/24/15  Conclusions   Summary   Moderate concentric left ventricular hypertrophy is present. Global ejection fraction is normal and estimated from 55 % to 60 %. No obvious regional wall motion abnormalities are noted. Diastolic filling parameters suggests diastolic dysfunction. Moderately dilated left atrium. Aortic valve appears sclerotic but opens adequately.    Trivial mitral and tricuspid regurgitation. Systolic pulmonary artery pressure (SPAP) is normal and estimated at 23 mmHg   (RA pressure 3 mm Hg). Echocardiogram 6/23/17  Conclusions      Summary   Normal systolic function with an estimated ejection fraction of 55-60%. Mild to moderate concentric left ventricular hypertrophy. No regional wall motion abnormalities are seen. Left ventricular diastolic filling pressure is indeterminate. Left atrium is moderately dilated. No change from last echo on 11/24/1015. Limited Echocardiogram 11/31/2017   Conclusions      Summary   This is a limited study pericardial effusion and shunt. Normal left ventricular systolic function with an estimated ejection   fraction of 55%. A bubble study was performed and fails to show evidence of shunting. There is a very small circumferential pericardial effusion noted. Stress test 11/1/2017  Conclusions    Summary  There is no evidence of stress induced ischemia. Normal LV function with  ejection fraction of 66%. There are no regional wall motion abnormalities. Low risk study. REECE 3/31-4/30/2021  No atrial fib   Predominately NSR  PSVT- symptomatic   NSVT/PVC's-  Symptomatic  Nocturnal bradycardia     Stress test 5/19/2021  Conclusions    Summary  The overall quality of the study is fair. There is subdiaphragmatic  attenuation. Left ventricular cavity is noted to be normal size. The right ventricle is  normal in size. SPECT images demonstrate a small area of mildly decreased perfusion in the  apical lateral and mid-inferior lateral segments. The defect is mixed  ischemia and scar. There is associated wall motion abnormality, consistent  with ischemia. Gated SPECT imaging reveals normal myocardial thickening and  wall motion. Sum stress score of 7. No visual TID. Calculated TID of 0.99. The left ventricular ejection fraction is calculated to be 63%.     Myocardial perfusion is abnormal. Consistent with mixed ischemia and scar. Low risk scan. EKG 6/16/21  Sinus rhythm with marked sinus arrhythmia with 1st degree A-V blockLow voltage QRSProlonged QTAbnormal ECGConfirmed by Tere Lewis MD ([id number]) on 6/16/2021 4:41:38 PM    ECHO 6/16/21  Summary   Left ventricular systolic function is normal with a visually estimated   ejection fraction of 55%. The left ventricle is normal in size with mild concentric hypertrophy. No obvious regional wall motion abnormalities noted. Increased left ventricular filling pressure. The left atrium appears moderately enlarged. The right atrium is severely enlarged. Right ventricle is enlarged but   normal function. Inadequate tricuspid valve regurgitation to estimate systolic pulmonary   artery pressure. The IVC is dilated in size (>2.1 cm) but appears to collapse >50% with   respiration consistent with elevated right atrial pressures (8 mmHg) . EKG 6/16/21  Sinus rhythm with 1st degree A-V block with Premature atrial complexesOtherwise normal ECGConfirmed by Tere Lewis MD ([id number]) on 6/17/2021 6:58:06 AM    EKG 6/16/21  Sinus rhythm with sinus arrhythmia with 1st degree A-V blockSeptal infarct , age undeterminedAbnormal ECGConfirmed by Tere Lewis MD ([id number]) on 6/17/2021 6:58:18 AM    Left heart cath 6/16/2021 [facility]  Procedures Performed:   1. Selective left heart catheterization  2. Percutaneous coronary intervention of the left anterior descending and diagonal artery artery using complex bifurcation technique. Procedure Findings:  1. Critical LAD diagonal disease with angina classification 0, 0, 1 along with mid LAD 70% stenosis       Echocardiogram 6/16/2021  Summary   Left ventricular systolic function is normal with a visually estimated   ejection fraction of 55%. The left ventricle is normal in size with mild concentric hypertrophy. No obvious regional wall motion abnormalities noted. Increased left ventricular filling pressure.    The left atrium appears moderately enlarged. The right atrium is severely enlarged. Right ventricle is enlarged but   normal function. Inadequate tricuspid valve regurgitation to estimate systolic pulmonary   artery pressure. The IVC is dilated in size (>2.1 cm) but appears to collapse >50% with   respiration consistent with elevated right atrial pressures (8 mmHg) . Chest Xray 2/22/2022  No acute process. Stress test 2/28/2022  Conclusions    Summary  Normal LV function. There is normal isotope uptake at stress and rest. There is no evidence of  myocardial ischemia or scar. Cardiac event monitor 2/25-3/11/2022  Average heart rate 79 (), nocturnal bradycardia 2:1 conduction PSVT, NSVT        Assessment:   CAD - stable. Stress test normal 2/2022  PANG - significant progression to unable to perform even minima activities. Attempt to increase diuretics has lead to elevated Cr and litle improvement in symptoms. Although worse since covid 6 weeks ago, it was severe prior to covid. Cardiac w/u earlier this year unremarkable. Patient would like to proceed with cardiac cath, which is reasonable given his history and severity of symptoms. Atrial fibrillation - possible recurrent. NSR today. Nocturnal bradycardia 2:1 due to MARIA VICTORIA  Hypertension - controlled    Hyperlipidemia - stable, controlled  Sleep apnea on CPAP  History of DVT   Diabetes Mellitus   Asthma   Tachycardia - intermittent  Fatigue     Plan:  Order left heart catheterization and right heart catheterization ~ unstable angina. R/B/A/E discussed. Explained elevated risk due to CKD.   ~ NPO (nothing to eat or drink) after midnight  ~ Have transportation arrangements  ~ Discontinue Coumadin 3 days prior to procedure  ~ Hold metformin 48 hours prior to procedure  ~ Hold Torsemide and Spironolactone morning of procedure  ~ Check blood glucose prior to coming into hospital  ~ No lotions of creams applied to skin.      Discussion of long COVID syndrome may be culprit to symptom complex  Order cardiac event monitor ~ Vital connect 4 weeks assess for atrial fibrillation   Follow up in one month  Cardiac medications reviewed including indications and pertinent side effects. Medication list updated at this visit. Remain on torsemide at 30 mg daily. Check blood pressure and heart rate at home a few times per week- keep a log with dates and times and bring to office visit   Regular exercise and following a healthy diet encouraged     Scribe's attestation: This note was scribed in the presence of Dr. Valerie Mcgregor M.D. By Jimi Gamez RN     The scribes documentation has been prepared under my direction and personally reviewed by me in its entirety. I confirm that the note above accurately reflects all work, treatment, procedures, and medical decision making performed by me. Dr. Valerie Mcgregor MD      Thank you for allowing me to participate in the care of this individual.      Sarah Marvin.  Saman Davila M.D., Monserrat Medrano

## 2022-09-02 NOTE — PATIENT INSTRUCTIONS
Plan:  Order left heart catheterization and right heart catheterization ~ unstable angina   ~ NPO (nothing to eat or drink) after midnight  ~ Have transportation arrangements  ~ Discontinue Coumadin 3 days prior to procedure  ~ Hold metformin 48 hours prior to procedure  ~ Hold Torsemide and Spironolactone morning of procedure  ~ Check blood glucose prior to coming into hospital  ~ No lotions of creams applied to skin.      Discussion of long COVID syndrome may be culprit to symptom complex  Order cardiac event monitor ~ Vital connect 4 weeks assess for atrial fibrillation   Follow up in one month

## 2022-09-04 ENCOUNTER — APPOINTMENT (OUTPATIENT)
Dept: GENERAL RADIOLOGY | Age: 77
DRG: 178 | End: 2022-09-04
Payer: MEDICARE

## 2022-09-04 ENCOUNTER — HOSPITAL ENCOUNTER (INPATIENT)
Age: 77
LOS: 3 days | Discharge: ANOTHER HOSPITAL | DRG: 178 | End: 2022-09-07
Attending: STUDENT IN AN ORGANIZED HEALTH CARE EDUCATION/TRAINING PROGRAM | Admitting: INTERNAL MEDICINE
Payer: MEDICARE

## 2022-09-04 DIAGNOSIS — R53.1 GENERALIZED WEAKNESS: ICD-10-CM

## 2022-09-04 DIAGNOSIS — R79.89 ELEVATED BRAIN NATRIURETIC PEPTIDE (BNP) LEVEL: ICD-10-CM

## 2022-09-04 DIAGNOSIS — U07.1 COVID-19: Primary | ICD-10-CM

## 2022-09-04 LAB
A/G RATIO: 1.1 (ref 1.1–2.2)
ALBUMIN SERPL-MCNC: 3.9 G/DL (ref 3.4–5)
ALP BLD-CCNC: 111 U/L (ref 40–129)
ALT SERPL-CCNC: 23 U/L (ref 10–40)
ANION GAP SERPL CALCULATED.3IONS-SCNC: 11 MMOL/L (ref 3–16)
AST SERPL-CCNC: 19 U/L (ref 15–37)
BASOPHILS ABSOLUTE: 0.1 K/UL (ref 0–0.2)
BASOPHILS RELATIVE PERCENT: 1 %
BILIRUB SERPL-MCNC: 0.8 MG/DL (ref 0–1)
BILIRUBIN URINE: NEGATIVE
BLOOD, URINE: NEGATIVE
BUN BLDV-MCNC: 31 MG/DL (ref 7–20)
CALCIUM SERPL-MCNC: 9.6 MG/DL (ref 8.3–10.6)
CHLORIDE BLD-SCNC: 97 MMOL/L (ref 99–110)
CLARITY: CLEAR
CO2: 26 MMOL/L (ref 21–32)
COLOR: NORMAL
CREAT SERPL-MCNC: 1.3 MG/DL (ref 0.8–1.3)
EKG ATRIAL RATE: 288 BPM
EKG DIAGNOSIS: NORMAL
EKG Q-T INTERVAL: 424 MS
EKG QRS DURATION: 80 MS
EKG QTC CALCULATION (BAZETT): 510 MS
EKG R AXIS: 8 DEGREES
EKG T AXIS: 20 DEGREES
EKG VENTRICULAR RATE: 87 BPM
EOSINOPHILS ABSOLUTE: 0.2 K/UL (ref 0–0.6)
EOSINOPHILS RELATIVE PERCENT: 1.6 %
GFR AFRICAN AMERICAN: >60
GFR NON-AFRICAN AMERICAN: 54
GLUCOSE BLD-MCNC: 111 MG/DL (ref 70–99)
GLUCOSE BLD-MCNC: 192 MG/DL (ref 70–99)
GLUCOSE URINE: NEGATIVE MG/DL
HCT VFR BLD CALC: 38.1 % (ref 40.5–52.5)
HEMOGLOBIN: 12.5 G/DL (ref 13.5–17.5)
INFLUENZA A: NOT DETECTED
INFLUENZA B: NOT DETECTED
INR BLD: 2.12 (ref 0.87–1.14)
KETONES, URINE: NEGATIVE MG/DL
LACTIC ACID, SEPSIS: 1.5 MMOL/L (ref 0.4–1.9)
LACTIC ACID, SEPSIS: 2.5 MMOL/L (ref 0.4–1.9)
LEUKOCYTE ESTERASE, URINE: NEGATIVE
LIPASE: 25 U/L (ref 13–60)
LYMPHOCYTES ABSOLUTE: 1.2 K/UL (ref 1–5.1)
LYMPHOCYTES RELATIVE PERCENT: 12.5 %
MCH RBC QN AUTO: 28.7 PG (ref 26–34)
MCHC RBC AUTO-ENTMCNC: 32.8 G/DL (ref 31–36)
MCV RBC AUTO: 87.3 FL (ref 80–100)
MICROSCOPIC EXAMINATION: NORMAL
MONOCYTES ABSOLUTE: 0.9 K/UL (ref 0–1.3)
MONOCYTES RELATIVE PERCENT: 9.1 %
NEUTROPHILS ABSOLUTE: 7.2 K/UL (ref 1.7–7.7)
NEUTROPHILS RELATIVE PERCENT: 75.8 %
NITRITE, URINE: NEGATIVE
PDW BLD-RTO: 16.4 % (ref 12.4–15.4)
PERFORMED ON: ABNORMAL
PH UA: 6.5 (ref 5–8)
PLATELET # BLD: 248 K/UL (ref 135–450)
PMV BLD AUTO: 7 FL (ref 5–10.5)
POTASSIUM REFLEX MAGNESIUM: 3.9 MMOL/L (ref 3.5–5.1)
PRO-BNP: 1755 PG/ML (ref 0–449)
PROTEIN UA: NEGATIVE MG/DL
PROTHROMBIN TIME: 23.6 SEC (ref 11.7–14.5)
RBC # BLD: 4.36 M/UL (ref 4.2–5.9)
SARS-COV-2 RNA, RT PCR: DETECTED
SODIUM BLD-SCNC: 134 MMOL/L (ref 136–145)
SPECIFIC GRAVITY UA: <=1.005 (ref 1–1.03)
TOTAL PROTEIN: 7.6 G/DL (ref 6.4–8.2)
TROPONIN: <0.01 NG/ML
TSH SERPL DL<=0.05 MIU/L-ACNC: 2.1 UIU/ML (ref 0.27–4.2)
URINE REFLEX TO CULTURE: NORMAL
URINE TYPE: NORMAL
UROBILINOGEN, URINE: 0.2 E.U./DL
WBC # BLD: 9.6 K/UL (ref 4–11)

## 2022-09-04 PROCEDURE — 84439 ASSAY OF FREE THYROXINE: CPT

## 2022-09-04 PROCEDURE — 36415 COLL VENOUS BLD VENIPUNCTURE: CPT

## 2022-09-04 PROCEDURE — 83690 ASSAY OF LIPASE: CPT

## 2022-09-04 PROCEDURE — 83036 HEMOGLOBIN GLYCOSYLATED A1C: CPT

## 2022-09-04 PROCEDURE — 84484 ASSAY OF TROPONIN QUANT: CPT

## 2022-09-04 PROCEDURE — 80053 COMPREHEN METABOLIC PANEL: CPT

## 2022-09-04 PROCEDURE — 71046 X-RAY EXAM CHEST 2 VIEWS: CPT

## 2022-09-04 PROCEDURE — 93005 ELECTROCARDIOGRAM TRACING: CPT | Performed by: STUDENT IN AN ORGANIZED HEALTH CARE EDUCATION/TRAINING PROGRAM

## 2022-09-04 PROCEDURE — 85610 PROTHROMBIN TIME: CPT

## 2022-09-04 PROCEDURE — 87040 BLOOD CULTURE FOR BACTERIA: CPT

## 2022-09-04 PROCEDURE — 2060000000 HC ICU INTERMEDIATE R&B

## 2022-09-04 PROCEDURE — 93010 ELECTROCARDIOGRAM REPORT: CPT | Performed by: INTERNAL MEDICINE

## 2022-09-04 PROCEDURE — 83605 ASSAY OF LACTIC ACID: CPT

## 2022-09-04 PROCEDURE — 6370000000 HC RX 637 (ALT 250 FOR IP): Performed by: INTERNAL MEDICINE

## 2022-09-04 PROCEDURE — 2580000003 HC RX 258: Performed by: INTERNAL MEDICINE

## 2022-09-04 PROCEDURE — 84443 ASSAY THYROID STIM HORMONE: CPT

## 2022-09-04 PROCEDURE — 85025 COMPLETE CBC W/AUTO DIFF WBC: CPT

## 2022-09-04 PROCEDURE — 83880 ASSAY OF NATRIURETIC PEPTIDE: CPT

## 2022-09-04 PROCEDURE — 87636 SARSCOV2 & INF A&B AMP PRB: CPT

## 2022-09-04 PROCEDURE — 81003 URINALYSIS AUTO W/O SCOPE: CPT

## 2022-09-04 PROCEDURE — 99285 EMERGENCY DEPT VISIT HI MDM: CPT

## 2022-09-04 PROCEDURE — 6360000002 HC RX W HCPCS: Performed by: INTERNAL MEDICINE

## 2022-09-04 RX ORDER — DOFETILIDE 0.25 MG/1
250 CAPSULE ORAL EVERY 12 HOURS SCHEDULED
Status: DISCONTINUED | OUTPATIENT
Start: 2022-09-04 | End: 2022-09-05

## 2022-09-04 RX ORDER — SODIUM CHLORIDE 0.9 % (FLUSH) 0.9 %
5-40 SYRINGE (ML) INJECTION PRN
Status: DISCONTINUED | OUTPATIENT
Start: 2022-09-04 | End: 2022-09-07 | Stop reason: HOSPADM

## 2022-09-04 RX ORDER — LANOLIN ALCOHOL/MO/W.PET/CERES
400 CREAM (GRAM) TOPICAL DAILY
Status: DISCONTINUED | OUTPATIENT
Start: 2022-09-05 | End: 2022-09-07 | Stop reason: HOSPADM

## 2022-09-04 RX ORDER — BIOTIN 5 MG
5 TABLET ORAL DAILY
Status: DISCONTINUED | OUTPATIENT
Start: 2022-09-04 | End: 2022-09-04 | Stop reason: CLARIF

## 2022-09-04 RX ORDER — WARFARIN SODIUM 5 MG/1
1 TABLET ORAL DAILY
Status: DISCONTINUED | OUTPATIENT
Start: 2022-09-04 | End: 2022-09-04 | Stop reason: DRUGHIGH

## 2022-09-04 RX ORDER — MONTELUKAST SODIUM 10 MG/1
10 TABLET ORAL DAILY
Status: DISCONTINUED | OUTPATIENT
Start: 2022-09-05 | End: 2022-09-07 | Stop reason: HOSPADM

## 2022-09-04 RX ORDER — M-VIT,TX,IRON,MINS/CALC/FOLIC 27MG-0.4MG
2 TABLET ORAL DAILY
Status: DISCONTINUED | OUTPATIENT
Start: 2022-09-05 | End: 2022-09-07 | Stop reason: HOSPADM

## 2022-09-04 RX ORDER — ATORVASTATIN CALCIUM 40 MG/1
40 TABLET, FILM COATED ORAL DAILY
Status: DISCONTINUED | OUTPATIENT
Start: 2022-09-04 | End: 2022-09-07 | Stop reason: HOSPADM

## 2022-09-04 RX ORDER — POLYETHYLENE GLYCOL 3350 17 G/17G
17 POWDER, FOR SOLUTION ORAL DAILY PRN
Status: DISCONTINUED | OUTPATIENT
Start: 2022-09-04 | End: 2022-09-07 | Stop reason: HOSPADM

## 2022-09-04 RX ORDER — METOPROLOL SUCCINATE 25 MG/1
25 TABLET, EXTENDED RELEASE ORAL DAILY
Status: DISCONTINUED | OUTPATIENT
Start: 2022-09-05 | End: 2022-09-07 | Stop reason: HOSPADM

## 2022-09-04 RX ORDER — SPIRONOLACTONE 25 MG/1
25 TABLET ORAL DAILY
Status: DISCONTINUED | OUTPATIENT
Start: 2022-09-05 | End: 2022-09-07 | Stop reason: HOSPADM

## 2022-09-04 RX ORDER — CLOPIDOGREL BISULFATE 75 MG/1
75 TABLET ORAL DAILY
Status: DISCONTINUED | OUTPATIENT
Start: 2022-09-04 | End: 2022-09-07 | Stop reason: HOSPADM

## 2022-09-04 RX ORDER — LISINOPRIL 5 MG/1
5 TABLET ORAL DAILY
Status: DISCONTINUED | OUTPATIENT
Start: 2022-09-04 | End: 2022-09-07 | Stop reason: HOSPADM

## 2022-09-04 RX ORDER — ONDANSETRON 4 MG/1
4 TABLET, ORALLY DISINTEGRATING ORAL EVERY 8 HOURS PRN
Status: DISCONTINUED | OUTPATIENT
Start: 2022-09-04 | End: 2022-09-04 | Stop reason: ALTCHOICE

## 2022-09-04 RX ORDER — SODIUM CHLORIDE 0.9 % (FLUSH) 0.9 %
5-40 SYRINGE (ML) INJECTION EVERY 12 HOURS SCHEDULED
Status: DISCONTINUED | OUTPATIENT
Start: 2022-09-04 | End: 2022-09-07 | Stop reason: HOSPADM

## 2022-09-04 RX ORDER — FUROSEMIDE 10 MG/ML
40 INJECTION INTRAMUSCULAR; INTRAVENOUS 2 TIMES DAILY
Status: DISCONTINUED | OUTPATIENT
Start: 2022-09-04 | End: 2022-09-05

## 2022-09-04 RX ORDER — DEXTROSE MONOHYDRATE 100 MG/ML
INJECTION, SOLUTION INTRAVENOUS CONTINUOUS PRN
Status: DISCONTINUED | OUTPATIENT
Start: 2022-09-04 | End: 2022-09-07 | Stop reason: HOSPADM

## 2022-09-04 RX ORDER — ACETAMINOPHEN 325 MG/1
650 TABLET ORAL EVERY 6 HOURS PRN
Status: DISCONTINUED | OUTPATIENT
Start: 2022-09-04 | End: 2022-09-07 | Stop reason: HOSPADM

## 2022-09-04 RX ORDER — INSULIN LISPRO 100 [IU]/ML
0-4 INJECTION, SOLUTION INTRAVENOUS; SUBCUTANEOUS NIGHTLY
Status: DISCONTINUED | OUTPATIENT
Start: 2022-09-04 | End: 2022-09-07 | Stop reason: HOSPADM

## 2022-09-04 RX ORDER — ONDANSETRON 2 MG/ML
4 INJECTION INTRAMUSCULAR; INTRAVENOUS EVERY 6 HOURS PRN
Status: DISCONTINUED | OUTPATIENT
Start: 2022-09-04 | End: 2022-09-04 | Stop reason: ALTCHOICE

## 2022-09-04 RX ORDER — INSULIN LISPRO 100 [IU]/ML
0-4 INJECTION, SOLUTION INTRAVENOUS; SUBCUTANEOUS
Status: DISCONTINUED | OUTPATIENT
Start: 2022-09-04 | End: 2022-09-07 | Stop reason: HOSPADM

## 2022-09-04 RX ORDER — SODIUM CHLORIDE 9 MG/ML
INJECTION, SOLUTION INTRAVENOUS PRN
Status: DISCONTINUED | OUTPATIENT
Start: 2022-09-04 | End: 2022-09-07 | Stop reason: HOSPADM

## 2022-09-04 RX ORDER — DILTIAZEM HYDROCHLORIDE 60 MG/1
120 TABLET, FILM COATED ORAL EVERY 12 HOURS SCHEDULED
Status: DISCONTINUED | OUTPATIENT
Start: 2022-09-04 | End: 2022-09-07 | Stop reason: HOSPADM

## 2022-09-04 RX ORDER — CHOLECALCIFEROL (VITAMIN D3) 125 MCG
5000 CAPSULE ORAL
Status: DISCONTINUED | OUTPATIENT
Start: 2022-09-11 | End: 2022-09-07 | Stop reason: HOSPADM

## 2022-09-04 RX ORDER — ACETAMINOPHEN 650 MG/1
650 SUPPOSITORY RECTAL EVERY 6 HOURS PRN
Status: DISCONTINUED | OUTPATIENT
Start: 2022-09-04 | End: 2022-09-07 | Stop reason: HOSPADM

## 2022-09-04 RX ADMIN — FUROSEMIDE 40 MG: 10 INJECTION, SOLUTION INTRAMUSCULAR; INTRAVENOUS at 17:54

## 2022-09-04 RX ADMIN — DILTIAZEM HYDROCHLORIDE 120 MG: 60 TABLET, FILM COATED ORAL at 21:47

## 2022-09-04 RX ADMIN — SODIUM CHLORIDE, PRESERVATIVE FREE 5 ML: 5 INJECTION INTRAVENOUS at 21:53

## 2022-09-04 RX ADMIN — DOFETILIDE 250 MCG: 0.25 CAPSULE ORAL at 21:52

## 2022-09-04 ASSESSMENT — ENCOUNTER SYMPTOMS
SORE THROAT: 0
RHINORRHEA: 0
BACK PAIN: 0
NAUSEA: 0
DIARRHEA: 0
VOMITING: 0
EYE PAIN: 0
ABDOMINAL PAIN: 0
CONSTIPATION: 0
COUGH: 0
SHORTNESS OF BREATH: 1

## 2022-09-04 ASSESSMENT — PAIN - FUNCTIONAL ASSESSMENT: PAIN_FUNCTIONAL_ASSESSMENT: NONE - DENIES PAIN

## 2022-09-04 NOTE — ED PROVIDER NOTES
[address] ED  EMERGENCY DEPARTMENT ENCOUNTER        Pt Name: Wade Aguilar  MRN: [id number]  [place] 1945  Date of evaluation: 9/4/2022  Provider: Eliana Burleson PA-C  PCP: Nicolle Hewitt MD  Note Started: 2:34 PM EDT       I have seen and evaluated this patient with my supervising physician Kayy Cleaning MD.      [address] GEORGE 50.       Chief Complaint   Patient presents with    Fatigue     Dx with cellulitis x 1 week ago. Seen in office for fatigue, on cardiac monitor. Reports increasing generalized weakness, shortness of breath         HISTORY OF PRESENT ILLNESS   (Location/Symptom, Timing/Onset, Context/Setting, Quality, Duration, Modifying Factors, Severity)  Note limiting factors. Chief Complaint: Generalized weakness and fatigue    Wade Aguilar is a 69 y.o. male who presents to the emergency department, the patient states that he just felt weak, fatigued, short of breath, generally not well for the past several days, but it is much worse today. Recently traveled to [place], was diagnosed with cellulitis, this was about 10 days ago. He was on antibiotics for 7 or 8 days, he came back home, saw his family doctor for 5 days ago, who did an EKG, chest x-ray, blood test, stated that this looked all relatively good but that if it continued or his fatigue got worse that he would be admitted. He states that recently his cardiologist changed his Lasix from 20 mg up to 40 mg because of increased leg swelling, when he saw Dr. Fidel Su his kidney function was abnormal and he was reduced down to 30 mg of Lasix orally. 3 days ago he saw Dr. Daphne More, and he states that he is supposed to call Tuesday to get/make arrangements for an angiogram.  His last stent was a year ago. He admits to just generally not feeling well, denies any chest pain, denies any increased leg swelling. Denies any nausea or vomiting.     Nursing Notes were all reviewed and agreed with or any disagreements were addressed in the HPI. REVIEW OF SYSTEMS    (2-9 systems for level 4, 10 or more for level 5)     Review of Systems   Constitutional:  Positive for fatigue. Negative for chills, diaphoresis and fever. HENT:  Negative for congestion, ear pain, rhinorrhea and sore throat. Eyes:  Negative for pain and visual disturbance. Respiratory:  Positive for shortness of breath. Negative for cough. Cardiovascular:  Negative for chest pain, palpitations and leg swelling. Gastrointestinal:  Negative for abdominal pain, constipation, diarrhea, nausea and vomiting. Genitourinary:  Negative for decreased urine volume, dysuria, frequency and urgency. Musculoskeletal:  Negative for back pain and neck pain. Skin:  Negative for rash and wound. Neurological:  Negative for dizziness and light-headedness.      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    History of knee replacement procedure of right knee     Hx of blood clots     2 DVT's    Hyperlipidemia     Hypertension     Knee osteoarthritis 1/17/2012    Left TKR 1/18/2012    Lung nodule     due to histoplasmosis    Neuromuscular disorder ([address]     Palpitations     stable with atenolol    Sleep apnea     uses CPAP    Stone, kidney     UTI (urinary tract infection) 01/23/2017       SURGICAL HISTORY     Past Surgical History:   Procedure Laterality Date    ABLATION OF DYSRHYTHMIC FOCUS  2013    a-fib    BARIATRIC SURGERY  2013    GASTRIC SLEEVE    CARDIAC SURGERY  2013    ablation    CARPAL TUNNEL RELEASE Right 9-30-15    CARPAL TUNNEL RELEASE Left 3/20/16    CHOLECYSTECTOMY, LAPAROSCOPIC N/A 2/19/2019    LAPAROSCOPIC CHOLECYSTECTOMY WITH CHOLANGIOGRAMS performed by Sabrina Ugalde MD at [facility] [employer] CYSTOSCOPY      with removal stone    CYSTOSCOPY  2/8/13    with Laser Vaporization of Enlarged Prostate    DIAGNOSTIC CARDIAC CATH LAB PROCEDURE      ERCP  02/18/2019    Sphincterotomy, stone removal    ERCP N/A 2/18/2019    ERCP SPHINCTER/PAPILLOTOMY performed by Gabby Parker MD at [address]    ERCP  2/18/2019    ERCP STONE REMOVAL performed by Gabby Parker MD at [facility] Right 1/14/2020    PHACOEMULSIFICATION OF CATARACT RIGHT EYE WITH INTRAOCULAR LENS IMPLANT performed by Alvina Dumont MD at [facility] Left 1/21/2020    PHACOEMULSIFICATION OF CATARACT LEFT EYE WITH INTRAOCULAR LENS IMPLANT -SLEEP APNEA- performed by Alvina Dumont MD at [address] Bilateral     right knee (2002) and left knee (2012)    KNEE ARTHROSCOPY  4/19/2011     left  knee    SKIN BIOPSY      skin Ca/SQUAMOUS CELL    THORACOTOMY      wedge resection    TONSILLECTOMY         CURRENTMEDICATIONS       Previous Medications    ACCU-CHEK MULTICLIX LANCETS MISC    Check sugars once daily. Dx;E11.9    ATORVASTATIN (LIPITOR) 40 MG TABLET    TAKE ONE TABLET BY MOUTH DAILY    BIOTIN 5 MG TABS    Take 5 mg by mouth daily    BLOOD GLUCOSE MONITORING SUPPL (TRUE METRIX METER) VINNIE    Use to check daily. DX;E11.9    BLOOD GLUCOSE TEST STRIPS (TRUE METRIX BLOOD GLUCOSE TEST) STRIP    USE TO CHECK BLOOD GLUCOSE DAILY. DX: E11.9    BLOOD GLUCOSE TEST STRIPS (TRUE METRIX BLOOD GLUCOSE TEST) STRIP    TEST ONCE DAILY.   DX:E11.9    CHOLECALCIFEROL (VITAMIN D3) 5000 UNITS TABS    Take 5,000 Units by mouth daily    CLOPIDOGREL (PLAVIX) 75 MG TABLET    Take 1 tablet by mouth daily    CYANOCOBALAMIN (VITAMIN B-12 CR PO)    Take 5,000 mcg by mouth every 7 days     DILTIAZEM (CARDIZEM) 120 MG TABLET    TAKE 1 TABLET BY MOUTH  EVERY 12 HOURS    DOFETILIDE (TIKOSYN) 250 MCG CAPSULE    Take 1 capsule by mouth every 12 hours DOFETILIDE (TIKOSYN) 250 MCG CAPSULE    Take 1 capsule by mouth 2 times daily    DULAGLUTIDE (TRULICITY) 1.84 KA/1.7QU SOPN    Inject 0.75 mg into the skin once a week    EASY TOUCH LANCETS 32G/TWIST MISC    TEST DAILY. DX: E11.9    LANCET DEVICES (EASY TOUCH LANCING DEVICE) MISC    Test Daily. DX: E11.9    LANCETS MISC. (ACCU-CHEK MULTICLIX LANCET DEV) KIT    Check sugars once daily. DX;E11.9    MAGNESIUM OXIDE (MAG-OX) 400 MG TABLET    Take 400 mg by mouth daily. METFORMIN (GLUCOPHAGE) 1000 MG TABLET    Take 0.5 tablets by mouth in the morning and 0.5 tablets in the evening. Take with meals.     METOPROLOL SUCCINATE (TOPROL XL) 25 MG EXTENDED RELEASE TABLET    TAKE ONE TABLET BY MOUTH DAILY    MONTELUKAST (SINGULAIR) 10 MG TABLET    TAKE ONE TABLET BY MOUTH DAILY    MULTIPLE VITAMINS-MINERALS (THERAPEUTIC MULTIVITAMIN-MINERALS) TABLET    Take 2 tablets by mouth daily     SENNA (SENOKOT) 8.6 MG TABLET    Take 1 tablet by mouth 2 times daily    SPIRONOLACTONE (ALDACTONE) 25 MG TABLET    TAKE ONE TABLET BY MOUTH DAILY    TORSEMIDE (DEMADEX) 20 MG TABLET    Take 1 tablet by mouth daily    WARFARIN (COUMADIN) 5 MG TABLET    TAKE ONE TABLET BY MOUTH DAILY       ALLERGIES     Bactrim [sulfamethoxazole-trimethoprim], Brilinta [ticagrelor], Ciprofloxacin, Macrobid [nitrofurantoin monohyd macro], Ozempic (0.25 or 0.5 mg-dose) [semaglutide(0.25 or 0.5mg-dos)], Sulfamethoxazole, Theophylline, Cefuroxime axetil, Cipro xr, Other, and Tape [adhesive tape]    FAMILYHISTORY       Family History   Problem Relation Age of Onset    Cancer Mother         ABDOMIN    Kidney Disease Mother     Stroke Mother     Arthritis Father     Substance Abuse Father     Other Father         hardening of the arteries    Stroke Father         SOCIAL HISTORY       Social History     Socioeconomic History    Marital status:      Spouse name: None    Number of children: None    Years of education: None    Highest education level: None   Tobacco Use    Smoking status: Former     Packs/day: 2.00     Years: 17.00     Pack years: 34.00     Types: Cigarettes     Quit date: 1976     Years since quittin.4    Smokeless tobacco: Never   Vaping Use    Vaping Use: Never used   Substance and Sexual Activity    Alcohol use: No    Drug use: No    Sexual activity: Never     Partners: Female     Social Determinants of Health     Physical Activity: Insufficiently Active    Days of Exercise per Week: 2 days    Minutes of Exercise per Session: 60 min       SCREENINGS    [place] Coma Scale  Eye Opening: Spontaneous  Best Verbal Response: Oriented  Best Motor Response: Obeys commands  [place] Coma Scale Score: 15        PHYSICAL EXAM    (up to 7 for level 4, 8 or more for level 5)     ED Triage Vitals [22 1128]   BP Temp Temp Source Heart Rate Resp SpO2 Height Weight   116/73 98.4 °F (36.9 °C) Oral 92 16 100 % 6' 3\" (1.905 m) (!) 305 lb (138.3 kg)       Physical Exam  Vitals and nursing note reviewed. Constitutional:       Appearance: Normal appearance. He is well-developed. He is not ill-appearing or diaphoretic. HENT:      Head: Normocephalic and atraumatic. Right Ear: External ear normal.      Left Ear: External ear normal.      Nose: Nose normal.   Eyes:      General:         Right eye: No discharge. Left eye: No discharge. Cardiovascular:      Rate and Rhythm: Normal rate and regular rhythm. Heart sounds: Normal heart sounds. No murmur heard. No friction rub. No gallop. Pulmonary:      Effort: Pulmonary effort is normal. No respiratory distress. Breath sounds: Normal breath sounds. No stridor. No wheezing, rhonchi or rales. Chest:      Chest wall: No tenderness. Musculoskeletal:         General: Normal range of motion. Cervical back: Normal range of motion and neck supple. Right lower leg: Edema present. Left lower leg: Edema present. Skin:     General: Skin is warm and dry.       Coloration: Skin is not pale. Neurological:      General: No focal deficit present. Mental Status: He is alert and oriented to person, place, and time. Psychiatric:         Mood and Affect: Mood normal.         Behavior: Behavior normal.       DIAGNOSTIC RESULTS   LABS:    Labs Reviewed   COVID-19 & INFLUENZA COMBO - Abnormal; Notable for the following components:       Result Value    SARS-CoV-2 RNA, RT PCR DETECTED (*)     All other components within normal limits    Narrative:     Ophelia Miller tel. [phone],  Hematology results called to and read back by Ritesh Lassitre RN, 09/04/2022  14:23, by [place] [employer]   CBC WITH AUTO DIFFERENTIAL - Abnormal; Notable for the following components:    Hemoglobin 12.5 (*)     Hematocrit 38.1 (*)     RDW 16.4 (*)     All other components within normal limits   COMPREHENSIVE METABOLIC PANEL W/ REFLEX TO MG FOR LOW K - Abnormal; Notable for the following components:    Sodium 134 (*)     Chloride 97 (*)     Glucose 111 (*)     BUN 31 (*)     GFR Non- 54 (*)     All other components within normal limits   BRAIN NATRIURETIC PEPTIDE - Abnormal; Notable for the following components:    Pro-BNP 1,755 (*)     All other components within normal limits   CULTURE, BLOOD 1   CULTURE, BLOOD 2   LIPASE   LACTATE, SEPSIS   TROPONIN   URINALYSIS WITH REFLEX TO CULTURE   LACTATE, SEPSIS       When ordered, only abnormal lab results are displayed. All other labs were within normal range or not returned as of this dictation. EKG: When ordered, EKG's are interpreted by the Emergency Department Physician in the absence of a cardiologist.  Please see their note for interpretation of EKG.       RADIOLOGY:   Non-plain film images such as CT, Ultrasound and MRI are read by the radiologist. Plain radiographic images are visualized andpreliminarily interpreted by the  ED Provider with the below findings:        Interpretation [facility] Radiologist below, if available at the time of this note:    XR CHEST (2 VW)   Final Result   No acute cardiopulmonary disease. XR CHEST (2 VW)    Result Date: 9/4/2022  EXAMINATION: TWO XRAY VIEWS OF THE CHEST 9/4/2022 12:51 pm COMPARISON: 08/29/2022 HISTORY: ORDERING SYSTEM PROVIDED HISTORY: SOB TECHNOLOGIST PROVIDED HISTORY: Reason for exam:->SOB Reason for Exam: SOB, fatigue FINDINGS: Cardiac leads project over the chest.  Generator pack projects over the left chest.  No confluent airspace disease. No pleural effusion or pneumothorax. Cardiomegaly. Cardiac and mediastinal silhouettes are similar to prior. DISH of the thoracic spine. Hypertrophy of the anterior 1st ribs bilaterally, similar to prior. No acute cardiopulmonary disease. PROCEDURES   Unless otherwise noted below, none     Procedures    CRITICAL CARE TIME   N/A    CONSULTS:  None      EMERGENCY DEPARTMENT COURSE and DIFFERENTIAL DIAGNOSIS/MDM:   Vitals:    Vitals:    09/04/22 1128 09/04/22 1345   BP: 116/73 113/61   Pulse: 92 72   Resp: 16 13   Temp: 98.4 °F (36.9 °C)    TempSrc: Oral    SpO2: 100% 96%   Weight: (!) 305 lb (138.3 kg)    Height: 6' 3\" (1.905 m)        Patient was given thefollowing medications:  Medications - No data to display     Is this patient to be included in the SEP-1 Core Measure due to severe sepsis or septic shock? No   Exclusion criteria - the patient is NOT to be included for SEP-1 Core Measure due to: Infection is not suspected    The differential diagnosis does include multiple etiologies for generalized fatigue. There is no evidence of pneumonia, his BNP was elevated, implying that his Lasix should actually be closer to 40 mg. He does have COVID-19, because of the COVID-19 and elevated BNP we will admit the patient for continued evaluation. There was no evidence of pneumonia or hypoxia at this time. I am the Primary Clinician of Record. FINAL IMPRESSION      1. COVID-19    2. Elevated brain natriuretic peptide (BNP) level    3.  Generalized weakness          DISPOSITION/PLAN   DISPOSITION Admitted 09/04/2022 03:52:04 PM        PATIENT REFERREDTO:  No follow-up provider specified.     DISCHARGE MEDICATIONS:  New Prescriptions    No medications on file       DISCONTINUED MEDICATIONS:  Discontinued Medications    No medications on file              (Please note that portions ofthis note were completed with a voice recognition program.  Efforts were made to edit the dictations but occasionally words are mis-transcribed.)    Kailey Cowan PA-C (electronically signed)             Kailey Cowan PA-C  09/04/22 0963

## 2022-09-04 NOTE — PROGRESS NOTES
4 Eyes Skin Assessment     The patient is being assess for   Admission    I agree that 2 RN's have performed a thorough Head to Toe Skin Assessment on the patient. ALL assessment sites listed below have been assessed. Areas assessed for pressure by both nurses:   [x]   Head, Face, and Ears   [x]   Shoulders, Back, and Chest, Abdomen  [x]   Arms, Elbows, and Hands   [x]   Coccyx, Sacrum, and Ischium  [x]   Legs, Feet, and Heels  Scattered bruises and abrasions. Small abrasion to L elbow     Skin Assessed Under all Medical Devices by both nurses:  none               All Mepilex Borders were peeled back and area peeked at by both nurses:  No: none present on admissions  Please list where Mepilex Borders are located:  none present on admission             **SHARE this note so that the co-signing nurse is able to place an eSignature**    Co-signer eSignature: Electronically signed by Minna Cano RN on 9/4/22 at 7:05 PM EDT    Does the Patient have Skin Breakdown related to pressure?   No     (Insert Photo here)         Ferdinand Prevention initiated:  No   Wound Care Orders initiated:  No      WOC nurse consulted for Pressure Injury (Stage 3,4, Unstageable, DTI, NWPT, Complex wounds)and New or Established Ostomies:  No      Primary Nurse eSignature: Electronically signed by Eligio Chung RN on 9/4/22 at 7:04 PM EDT

## 2022-09-04 NOTE — ED NOTES
Provider @ bedside; pt updated on plan of care; pt taken box lunch and water       Yvan Montero RN  09/04/22 2166

## 2022-09-04 NOTE — ED NOTES
1200  ECG completed, given to Dr. Teresa Hanson for review     [facility] Tammy  09/04/22 1200

## 2022-09-04 NOTE — ED NOTES
1st and 2nd set of blood cultures obtained from 2 sites; using sterile technique, pt tolerated procedure well.       Yvan Montero RN  09/04/22 1003

## 2022-09-04 NOTE — PLAN OF CARE
28-year-old male presenting with increasing fatigue for 1 week. Recently treated for leg cellulitis, has been having issues with leg edema, torsemide dose being adjusted as an outpatient. .    Work-up in the ED COVID-19 positive, patient not hypoxic. BNP elevated. .  Creatinine stable at 1.3.. Hemoglobin stable. .    Admitted for further management of fatigue, likely from decompensated CHF. He is also positive for COVID-19 although unsure if this is contributing. His sats are stable. Patient recently saw cardiologist and was supposed to get an outpatient cardiac cath. Admit to PCU, hold torsemide and start IV Lasix. Check echocardiogram  Droplet plus precautions. Sliding scale insulin, hold metformin.   Continue Coumadin

## 2022-09-04 NOTE — ED NOTES
Tele sent from PCU; placed on pt; pt left ED in stable condition.  BP (!) (P) 116/51   Pulse (P) 62   Temp (P) 98.2 °F (36.8 °C) (Oral)   Resp (P) 15   Ht 6' 3\" (1.905 m)   Wt (!) 305 lb (138.3 kg)   SpO2 (P) 94%   BMI 38.12 kg/m²       Angelica Matos RN  09/04/22 6441

## 2022-09-04 NOTE — FLOWSHEET NOTE
09/04/22 1710   Vital Signs   Temp 98.2 °F (36.8 °C)   Temp Source Oral   Heart Rate 62   Heart Rate Source Monitor   Resp 15   BP (!) 116/51   BP Location Right upper arm   BP Method Automatic   MAP (Calculated) 72.67   Patient Position Sitting   Level of Consciousness 0   MEWS Score 1   Pain Assessment   Pain Assessment None - Denies Pain   Oxygen Therapy   SpO2 94 %   Pulse Oximetry Type Intermittent   O2 Device None (Room air)   Patient admitted to PCU. Telemetry confirmed with [facility]. Orders released. Patient is able to demonstrate the ability to move from a reclining position to an upright position within the recliner. Patient states he took his meds this AM. Holter monitor placed from cardiology office is present. Patient states he had an echo on Tuesday with the [facility]. Will attempt to retrieve results.

## 2022-09-04 NOTE — PROGRESS NOTES
[facility]   Herbal and Nutritional Product Restrictions      The following herbal, alternative, and/or nutritional/dietary supplement product(s) has been discontinued per [employer] approved policy:    Biotin 5 mg daily    Please reorder upon discharge if appropriate.     Thank you,  Kimberly Redmond, [facility]  9/4/2022 5:31 PM

## 2022-09-05 ENCOUNTER — APPOINTMENT (OUTPATIENT)
Dept: GENERAL RADIOLOGY | Age: 77
DRG: 178 | End: 2022-09-05
Payer: MEDICARE

## 2022-09-05 PROBLEM — I25.83 CORONARY ARTERY DISEASE DUE TO LIPID RICH PLAQUE: Status: ACTIVE | Noted: 2022-09-05

## 2022-09-05 PROBLEM — I25.10 CORONARY ARTERY DISEASE DUE TO LIPID RICH PLAQUE: Status: ACTIVE | Noted: 2022-09-05

## 2022-09-05 PROBLEM — I50.33 ACUTE ON CHRONIC HEART FAILURE WITH PRESERVED EJECTION FRACTION (HCC): Status: ACTIVE | Noted: 2022-09-05

## 2022-09-05 LAB
A/G RATIO: 1.1 (ref 1.1–2.2)
ALBUMIN SERPL-MCNC: 3.6 G/DL (ref 3.4–5)
ALP BLD-CCNC: 101 U/L (ref 40–129)
ALT SERPL-CCNC: 19 U/L (ref 10–40)
ANION GAP SERPL CALCULATED.3IONS-SCNC: 9 MMOL/L (ref 3–16)
AST SERPL-CCNC: 15 U/L (ref 15–37)
BASOPHILS ABSOLUTE: 0.1 K/UL (ref 0–0.2)
BASOPHILS RELATIVE PERCENT: 0.8 %
BILIRUB SERPL-MCNC: 0.7 MG/DL (ref 0–1)
BUN BLDV-MCNC: 28 MG/DL (ref 7–20)
CALCIUM SERPL-MCNC: 9.2 MG/DL (ref 8.3–10.6)
CHLORIDE BLD-SCNC: 99 MMOL/L (ref 99–110)
CHOLESTEROL, TOTAL: 119 MG/DL (ref 0–199)
CO2: 27 MMOL/L (ref 21–32)
CREAT SERPL-MCNC: 1.2 MG/DL (ref 0.8–1.3)
EKG ATRIAL RATE: 79 BPM
EKG DIAGNOSIS: NORMAL
EKG Q-T INTERVAL: 388 MS
EKG QRS DURATION: 80 MS
EKG QTC CALCULATION (BAZETT): 485 MS
EKG R AXIS: 14 DEGREES
EKG T AXIS: 41 DEGREES
EKG VENTRICULAR RATE: 94 BPM
EOSINOPHILS ABSOLUTE: 0.2 K/UL (ref 0–0.6)
EOSINOPHILS RELATIVE PERCENT: 3.2 %
ESTIMATED AVERAGE GLUCOSE: 157.1 MG/DL
GFR AFRICAN AMERICAN: >60
GFR NON-AFRICAN AMERICAN: 59
GLUCOSE BLD-MCNC: 134 MG/DL (ref 70–99)
GLUCOSE BLD-MCNC: 140 MG/DL (ref 70–99)
GLUCOSE BLD-MCNC: 157 MG/DL (ref 70–99)
GLUCOSE BLD-MCNC: 162 MG/DL (ref 70–99)
GLUCOSE BLD-MCNC: 165 MG/DL (ref 70–99)
HBA1C MFR BLD: 7.1 %
HCT VFR BLD CALC: 34.6 % (ref 40.5–52.5)
HDLC SERPL-MCNC: 30 MG/DL (ref 40–60)
HEMOGLOBIN: 11.9 G/DL (ref 13.5–17.5)
INR BLD: 2.36 (ref 0.87–1.14)
LDL CHOLESTEROL CALCULATED: 60 MG/DL
LYMPHOCYTES ABSOLUTE: 1.2 K/UL (ref 1–5.1)
LYMPHOCYTES RELATIVE PERCENT: 15.5 %
MAGNESIUM: 2.3 MG/DL (ref 1.8–2.4)
MCH RBC QN AUTO: 29.4 PG (ref 26–34)
MCHC RBC AUTO-ENTMCNC: 34.3 G/DL (ref 31–36)
MCV RBC AUTO: 85.7 FL (ref 80–100)
MONOCYTES ABSOLUTE: 0.8 K/UL (ref 0–1.3)
MONOCYTES RELATIVE PERCENT: 10.9 %
NEUTROPHILS ABSOLUTE: 5.3 K/UL (ref 1.7–7.7)
NEUTROPHILS RELATIVE PERCENT: 69.6 %
PDW BLD-RTO: 16.1 % (ref 12.4–15.4)
PERFORMED ON: ABNORMAL
PLATELET # BLD: 230 K/UL (ref 135–450)
PMV BLD AUTO: 6.7 FL (ref 5–10.5)
POTASSIUM REFLEX MAGNESIUM: 4 MMOL/L (ref 3.5–5.1)
PROTHROMBIN TIME: 25.7 SEC (ref 11.7–14.5)
RBC # BLD: 4.03 M/UL (ref 4.2–5.9)
SODIUM BLD-SCNC: 135 MMOL/L (ref 136–145)
T4 FREE: 1.3 NG/DL (ref 0.9–1.8)
TOTAL PROTEIN: 7 G/DL (ref 6.4–8.2)
TRIGL SERPL-MCNC: 147 MG/DL (ref 0–150)
VITAMIN D 25-HYDROXY: 82.4 NG/ML
VLDLC SERPL CALC-MCNC: 29 MG/DL
WBC # BLD: 7.6 K/UL (ref 4–11)

## 2022-09-05 PROCEDURE — 6360000002 HC RX W HCPCS: Performed by: INTERNAL MEDICINE

## 2022-09-05 PROCEDURE — 36415 COLL VENOUS BLD VENIPUNCTURE: CPT

## 2022-09-05 PROCEDURE — 99223 1ST HOSP IP/OBS HIGH 75: CPT | Performed by: INTERNAL MEDICINE

## 2022-09-05 PROCEDURE — 83735 ASSAY OF MAGNESIUM: CPT

## 2022-09-05 PROCEDURE — 6370000000 HC RX 637 (ALT 250 FOR IP): Performed by: INTERNAL MEDICINE

## 2022-09-05 PROCEDURE — 2060000000 HC ICU INTERMEDIATE R&B

## 2022-09-05 PROCEDURE — 93005 ELECTROCARDIOGRAM TRACING: CPT | Performed by: INTERNAL MEDICINE

## 2022-09-05 PROCEDURE — 82306 VITAMIN D 25 HYDROXY: CPT

## 2022-09-05 PROCEDURE — 85610 PROTHROMBIN TIME: CPT

## 2022-09-05 PROCEDURE — 80061 LIPID PANEL: CPT

## 2022-09-05 PROCEDURE — 71045 X-RAY EXAM CHEST 1 VIEW: CPT

## 2022-09-05 PROCEDURE — 85025 COMPLETE CBC W/AUTO DIFF WBC: CPT

## 2022-09-05 PROCEDURE — 80053 COMPREHEN METABOLIC PANEL: CPT

## 2022-09-05 PROCEDURE — 2580000003 HC RX 258: Performed by: INTERNAL MEDICINE

## 2022-09-05 PROCEDURE — 93010 ELECTROCARDIOGRAM REPORT: CPT | Performed by: INTERNAL MEDICINE

## 2022-09-05 RX ORDER — DIPHENHYDRAMINE HCL 25 MG
25 TABLET ORAL ONCE
Status: COMPLETED | OUTPATIENT
Start: 2022-09-05 | End: 2022-09-05

## 2022-09-05 RX ORDER — DIPHENHYDRAMINE HCL 25 MG
25 TABLET ORAL EVERY 6 HOURS PRN
Status: DISCONTINUED | OUTPATIENT
Start: 2022-09-05 | End: 2022-09-07 | Stop reason: HOSPADM

## 2022-09-05 RX ORDER — WARFARIN SODIUM 5 MG/1
5 TABLET ORAL
Status: COMPLETED | OUTPATIENT
Start: 2022-09-05 | End: 2022-09-05

## 2022-09-05 RX ADMIN — DILTIAZEM HYDROCHLORIDE 120 MG: 60 TABLET, FILM COATED ORAL at 09:18

## 2022-09-05 RX ADMIN — CLOPIDOGREL BISULFATE 75 MG: 75 TABLET ORAL at 09:18

## 2022-09-05 RX ADMIN — DILTIAZEM HYDROCHLORIDE 120 MG: 60 TABLET, FILM COATED ORAL at 21:16

## 2022-09-05 RX ADMIN — ATORVASTATIN CALCIUM 40 MG: 40 TABLET, FILM COATED ORAL at 09:19

## 2022-09-05 RX ADMIN — DOFETILIDE 250 MCG: 0.25 CAPSULE ORAL at 09:19

## 2022-09-05 RX ADMIN — WARFARIN SODIUM 5 MG: 5 TABLET ORAL at 16:24

## 2022-09-05 RX ADMIN — Medication 400 MG: at 09:19

## 2022-09-05 RX ADMIN — SODIUM CHLORIDE, PRESERVATIVE FREE 5 ML: 5 INJECTION INTRAVENOUS at 21:19

## 2022-09-05 RX ADMIN — MULTIPLE VITAMINS W/ MINERALS TAB 2 TABLET: TAB at 09:19

## 2022-09-05 RX ADMIN — DIPHENHYDRAMINE HCL 25 MG: 25 TABLET ORAL at 10:02

## 2022-09-05 RX ADMIN — SPIRONOLACTONE 25 MG: 25 TABLET ORAL at 09:19

## 2022-09-05 RX ADMIN — Medication 5000 UNITS: at 09:18

## 2022-09-05 RX ADMIN — METOPROLOL SUCCINATE 25 MG: 25 TABLET, EXTENDED RELEASE ORAL at 09:19

## 2022-09-05 RX ADMIN — DIPHENHYDRAMINE HYDROCHLORIDE 25 MG: 25 TABLET, FILM COATED ORAL at 16:24

## 2022-09-05 RX ADMIN — SODIUM CHLORIDE, PRESERVATIVE FREE 10 ML: 5 INJECTION INTRAVENOUS at 09:19

## 2022-09-05 RX ADMIN — MONTELUKAST SODIUM 10 MG: 10 TABLET, COATED ORAL at 09:18

## 2022-09-05 RX ADMIN — FUROSEMIDE 40 MG: 10 INJECTION, SOLUTION INTRAMUSCULAR; INTRAVENOUS at 09:19

## 2022-09-05 NOTE — FLOWSHEET NOTE
09/04/22 2100   Vital Signs   Temp 98.2 °F (36.8 °C)   Temp Source Oral   Heart Rate 60   Heart Rate Source Monitor   Resp 16   BP (!) 115/54   BP Location Left upper arm   BP Method Automatic   MAP (Calculated) 74.33   Patient Position Semi fowlers   Level of Consciousness 0   MEWS Score 1   Oxygen Therapy   SpO2 98 %   O2 Device None (Room air)     Shift assessment completed see flow sheet. Patient in bed alert and oriented X4. Patient on room air, showing no signs of distress. Evening medications given per order. No other needs at this time. Call light in reach.

## 2022-09-05 NOTE — CARE COORDINATION
Case Management Assessment  Initial Evaluation      Patient Name: Laquita Hopson  YOB: 1945  Diagnosis: Generalized weakness [R53.1]  Elevated brain natriuretic peptide (BNP) level [R79.89]  COVID-19 [U07.1]  Date / Time: 9/4/2022 11:24 AM    Admission status/Date:09/04/2022 Inpatient   Chart Reviewed: Yes      Patient Interviewed: Yes   Family Interviewed:  No      Hospitalization in the last 30 days:  No    Health Care Decision Maker :   Primary Decision Maker: Kayy Gallegos - Spouse - [phone]    (CM - must 1st enter selection under Navigator - emergency contact- [place] Relationship and pick relationship)   Who do you trust or have selected to make healthcare decisions for you    Met with: pt   Interview conducted  (bedside/phone): cell phone [phone]    Current PCP: Jailene Ralph MD    Financial  Medicare and [facility] [address] required for SNF : N          3 night stay required -  N    ADLS  Support Systems/Care Needs: Spouse/Significant Other, Family Members  Transportation: self    Meal Preparation: wife mostly but he can when needed    Housing  Living Arrangements: pt lives at home with his wife  Steps: 1  Intent for return to present living arrangements: Yes  Identified Issues: Michele Golden  Active with [address] : No Agency:(Services)  Type of Home Care Services: None  Passport/Waiver : No  :                      Phone Number:    Passport/Waiver Services: n/a          Durable Medical Equiptment   DME Provider: n/a  Equipment:   Walker___Cane___RTS___ BSC___Shower Chair___Hospital Bed___W/C____Other_electric scooter that he uses in the community at times; pulse ox_______  02 at ____Liter(s)---wears(frequency)_______ HHN _x (doesn't use)__ CPAP_x__ BiPap___   N/A____    Home O2 Use :  No ; on room air per vitals in [facility]     Community Service Affiliation  Dialysis:  No    Agency:  Location:  Dialysis Schedule:  Phone: Fax:    Other Community Services: n/a    DISCHARGE PLAN: Explained Case Management role/services. Chart review completed. Spoke with pt via his cell phone ([phone]) due to covid isolation. Pt stated he is independent at home and will return when discharged. He stated he has been ambulating in his room. Discussed skilled [facility] for RN/OT/PT and he declined stating he won't need this. He stated he will have a ride home. CM will follow due to covid isolation. Please notify CM if needs or concerns arise.      Caitlin Gibson MSW, DOUG-S

## 2022-09-05 NOTE — PROGRESS NOTES
Hand off report give to ANGELINA Castaneda. Patient is stable showing no signs of distress and has no current needs at this time. Call light is in reach and bed is in lowest position. Care is transferred at this time.

## 2022-09-05 NOTE — PROGRESS NOTES
Comprehensive Nutrition Assessment    Type and Reason for Visit:  Initial, Positive Nutrition Screen, Consult (+ screen for MST = 3; consult for CHF nutrition education)    Nutrition Recommendations/Plan:   Continue ADULT DIET; Regular; 4 carb choices per meal; Low-Na diet order. Monitor appetite and po intake. Monitor nutrition-related labs, bowel function, and weight trends. Malnutrition Assessment:  Malnutrition Status: At risk for malnutrition (09/05/22 1238)    Context:  Acute Illness     Findings of the 6 clinical characteristics of malnutrition:  Energy Intake:  Mild decrease in energy intake   Weight Loss:  No significant weight loss     Body Fat Loss:  Unable to assess (COVID-19 +)     Muscle Mass Loss:  Unable to assess (COVID-19 +)    Fluid Accumulation:  No significant fluid accumulation     Strength:  Not Performed    Nutrition Assessment:    patient was nutritionally compromised upon admission AEB feeling weak/fatigued, cardiac dysfunction, and diagnosis of COVID-19 + upon admission, however, he is improved from a nutritional standpoint AEB he consumed % of his breakfast this am; will continue ADULT DIET; Regular; 4 carb choices per meal; Low-Na diet order and monitor nutrition status    Nutrition Related Findings:    patient is A & O x 4; patient presented to ED with c/o fatigue, generalized weakness, and SOB; he has low-dose SSI, vitamin D3, vitamin B12, and MVI ordered at this time; he consumed % of his breakfast today; patient is from home where he lives with his wife; patient's wife does most of the meal prep but patient can if he needs to; he was IPTA Wound Type: None       Current Nutrition Intake & Therapies:    Average Meal Intake: % (breakfast this am)  Average Supplements Intake: None Ordered  ADULT DIET; Regular; 4 carb choices (60 gm/meal);  Low Sodium (2 gm)    Anthropometric Measures:  Height: 6' 3\" (190.5 cm)  Ideal Body Weight (IBW): 196 lbs (89 kg) Admission Body Weight: 308 lb 9.6 oz (140 kg) (obtained on 9/5/22; actual weight)  Current Body Weight: 308 lb 9.6 oz (140 kg) (obtained on 9/5/22; actual weight), 157.4 % IBW.  Weight Source: Bed Scale  Current BMI (kg/m2): 38.6  Usual Body Weight: 327 lb 3 oz (148.4 kg) (obtained on 12/3/21; actual weight)  % Weight Change (Calculated): -5.7  Weight Adjustment For: No Adjustment                 BMI Categories: Obese Class 2 (BMI 35.0 -39.9)    Estimated Daily Nutrient Needs:  Energy Requirements Based On: Kcal/kg  Weight Used for Energy Requirements: Current  Energy (kcal/day): 2100 - 2520 kcals based on 15-18 kcals/kg/CBW  Weight Used for Protein Requirements: Ideal  Protein (g/day): 107 - 134 g protein based on 1.2-1.5 g/kg/IBW  Method Used for Fluid Requirements: 1 ml/kcal  Fluid (ml/day): 2100 - 2520 ml    Nutrition Diagnosis:   Altered nutrition-related lab values related to cardiac dysfunction, impaired respiratory function as evidenced by lab values    Nutrition Interventions:   Food and/or Nutrient Delivery: Continue Current Diet  Nutrition Education/Counseling: Education not appropriate (patient is COVID-19 +)  Coordination of Nutrition Care: Continue to monitor while inpatient, Coordination of Care       Goals:     Goals: Meet at least 75% of estimated needs, by next RD assessment       Nutrition Monitoring and Evaluation:   Behavioral-Environmental Outcomes: None Identified  Food/Nutrient Intake Outcomes: Food and Nutrient Intake, Vitamin/Mineral Intake  Physical Signs/Symptoms Outcomes: Biochemical Data, Meal Time Behavior, Skin, Weight    Discharge Planning:    Continue current diet     Miroslava Olsen, [address]  Contact: [phone]

## 2022-09-05 NOTE — PROGRESS NOTES
PRN benadryl given for patient complains of itchiness related to electrodes. RN attempted to find hypoallergenic electrodes. Franck sanches also attempted to locate electrodes.

## 2022-09-05 NOTE — FLOWSHEET NOTE
09/05/22 0230   Vital Signs   Temp 96.9 °F (36.1 °C)   Temp Source Oral   Heart Rate 84   Heart Rate Source Monitor   Resp 18   BP (!) 115/57   BP Method Automatic   MAP (Calculated) 76.33   Patient Position Sitting   Level of Consciousness 0   MEWS Score 1   Oxygen Therapy   SpO2 96 %   O2 Device None (Room air)   Height and Weight   Weight (!) 308 lb 9.6 oz (140 kg)   Weight Method Bed scale   BMI (Calculated) 38.7     Vitals taken, shown above. Patient is stable with no current needs at this time. Call light in reach. Bed in lowest position.

## 2022-09-05 NOTE — FLOWSHEET NOTE
09/05/22 1339   Vital Signs   Temp 97.3 °F (36.3 °C)   Temp Source Oral   Heart Rate 78   Heart Rate Source Monitor   Resp 18   Blood Pressure Lying 117/58   Pulse Lying 78 PER MINUTE   Blood Pressure Sitting 94/64   Pulse Sitting 62 PER MINUTE   Blood Pressure Standing 94/60   Pulse Standing 62 PER MINUTE   Level of Consciousness 0   Pain Assessment   Pain Assessment None - Denies Pain   Oxygen Therapy   SpO2 98 %   Pulse Oximetry Type Intermittent   O2 Device None (Room air)   Patient resting. Wife at bedside. Patient symptomatic. Lightheaded when standing. Bed alarm placed, results sent to

## 2022-09-05 NOTE — PROGRESS NOTES
[facility]  [phone]      Chief Complaint   Patient presents with    Fatigue     Dx with cellulitis x 1 week ago. Seen in office for fatigue, on cardiac monitor. Reports increasing generalized weakness, shortness of breath            History of Present Illness:  Casandra Burkitt is a 69 y.o. patient who presented to the hospital with complaints of shortness of breath. I have been asked to provide consultation regarding further management and testing. The history was obtained from the patient. He reports that he has been having worsening shortness of breath. He states that the symptoms are severe. They are associated with dizziness and lightheadedness. He reports some improvement with rest. He recently saw his outpatient cardiologist, who recommended a rhc/lhc to evaluate his symptoms. He states that he came to the hospital because he felt worse. No chest pain or pressure. He does not know when he is in afib. He was found to have covid 19. He is currently wearing a holter monitor, which he states makes him itchy      Past Medical History:   has a past medical history of Arthritis, Asthma, Atrial fibrillation ([address] Blood circulation, collateral, Diabetes mellitus ([address] DVT (deep venous thrombosis) ([address] Histoplasmosis, History of knee replacement procedure of right knee, Hx of blood clots, Hyperlipidemia, Hypertension, Knee osteoarthritis, Left TKR, Lung nodule, Neuromuscular disorder ([address] Palpitations, Sleep apnea, Stone, kidney, and UTI (urinary tract infection). Surgical History:   has a past surgical history that includes Cystoscopy; Tonsillectomy; Knee arthroscopy (4/19/2011); Colonoscopy; Cystocopy (2/8/13); ablation of dysrhythmic focus (2013); Carpal tunnel release (Right, 9-30-15); Bariatric Surgery (2013); Carpal tunnel release (Left, 3/20/16); skin biopsy; Diagnostic Cardiac Cath Lab Procedure; joint replacement (Bilateral);  Cardiac surgery (2013); thoracotomy; ERCP (02/18/2019); ERCP (N/A, 2/18/2019); ERCP (2/18/2019); Cholecystectomy, laparoscopic (N/A, 2/19/2019); Intracapsular cataract extraction (Right, 1/14/2020); and Intracapsular cataract extraction (Left, 1/21/2020). Social History:   reports that he quit smoking about 46 years ago. His smoking use included cigarettes. He has a 34.00 pack-year smoking history. He has never used smokeless tobacco. He reports that he does not drink alcohol and does not use drugs. Family History:  No family history of premature coronary artery disease, aortic disease, or valve disease. Home Medications:  Were reviewed and are listed in nursing record. and/or listed below  Prior to Admission medications    Medication Sig Start Date End Date Taking? Authorizing Provider   warfarin (COUMADIN) 5 MG tablet TAKE ONE TABLET BY MOUTH DAILY 8/11/22   Kaylin Golden MD   metFORMIN (GLUCOPHAGE) 1000 MG tablet Take 0.5 tablets by mouth in the morning and 0.5 tablets in the evening. Take with meals. 7/20/22   Kaylin Golden MD   montelukast (SINGULAIR) 10 MG tablet TAKE ONE TABLET BY MOUTH DAILY 7/1/22   Kaylin Golden MD   spironolactone (ALDACTONE) 25 MG tablet TAKE ONE TABLET BY MOUTH DAILY 7/1/22   Kaylin Golden MD   metoprolol succinate (TOPROL XL) 25 MG extended release tablet TAKE ONE TABLET BY MOUTH DAILY 7/1/22   Damian Servin MD   atorvastatin (LIPITOR) 40 MG tablet TAKE ONE TABLET BY MOUTH DAILY 7/1/22   Kaylin Golden MD   clopidogrel (PLAVIX) 75 MG tablet Take 1 tablet by mouth daily 5/23/22   Damian Servin MD   Dulaglutide (TRULICITY) 3.73 NO/3.3VO SOPN Inject 0.75 mg into the skin once a week    Historical Provider, MD   blood glucose test strips (TRUE METRIX BLOOD GLUCOSE TEST) strip TEST ONCE DAILY.   DX:E11.9 1/20/22   Kaylin Golden MD   senna (SENOKOT) 8.6 MG tablet Take 1 tablet by mouth 2 times daily  Patient not taking: Reported on 9/2/2022 11/11/21   Micky Macdonald Jeff Kaufman MD   dofetilide [facility]) 250 MCG capsule Take 1 capsule by mouth 2 times daily 10/19/21   SOTERO Hanna MD   torsemide (DEMADEX) 20 MG tablet Take 1 tablet by mouth daily  Patient taking differently: Take 20 mg by mouth daily Takes 30 mg daily= 1.5 tablets daily 10/4/21   Jackeline Ashraf, APRN - CNP   dofetilide (TIKOSYN) 250 MCG capsule Take 1 capsule by mouth every 12 hours 7/7/21   Melissa Mcginnis MD   dilTIAZem (CARDIZEM) 120 MG tablet TAKE 1 TABLET BY MOUTH  EVERY 12 HOURS 9/3/20   Mike Castellon MD   Easy Touch Lancets 32G/Twist MISC TEST DAILY. DX: E11.9 3/23/20   Asha Trejo MD   blood glucose test strips (TRUE METRIX BLOOD GLUCOSE TEST) strip USE TO CHECK BLOOD GLUCOSE DAILY. DX: E11.9 3/23/20   Asha Trejo MD   ACCU-CHEK MULTICLIX LANCETS MISC Check sugars once daily. Dx;E11.9 3/11/19   Asha Trejo MD   Lancet Devices (EASY TOUCH LANCING DEVICE) MISC Test Daily. DX: E11.9 12/26/18   Asha Trejo MD   Blood Glucose Monitoring Suppl (TRUE METRIX METER) VINNIE Use to check daily. DX;E11.9 12/22/18   Asha Trejo MD   Lancets Misc. (ACCU-CHEK MULTICLIX LANCET DEV) KIT Check sugars once daily. DX;E11.9 12/21/18   Asha Trejo MD   Biotin 5 MG TABS Take 5 mg by mouth daily    Historical Provider, MD   Cholecalciferol (VITAMIN D3) 5000 units TABS Take 5,000 Units by mouth daily    Historical Provider, MD   Cyanocobalamin (VITAMIN B-12 CR PO) Take 5,000 mcg by mouth every 7 days     Historical Provider, MD   Multiple Vitamins-Minerals (THERAPEUTIC MULTIVITAMIN-MINERALS) tablet Take 2 tablets by mouth daily     Historical Provider, MD   magnesium oxide (MAG-OX) 400 MG tablet Take 400 mg by mouth daily.     Historical Provider, MD        Current Medications:  Current Facility-Administered Medications   Medication Dose Route Frequency Provider Last Rate Last Admin    warfarin (COUMADIN) tablet 5 mg  5 mg Oral Once Abril Donaldson MD atorvastatin (LIPITOR) tablet 40 mg  40 mg Oral Daily Kenia Pat MD        vitamin D3 (CHOLECALCIFEROL) tablet 5,000 Units  5,000 Units Oral Daily Kenia Pat MD        clopidogrel (PLAVIX) tablet 75 mg  75 mg Oral Daily Kenia Pat MD        [START ON 9/11/2022] vitamin B-12 (CYANOCOBALAMIN) tablet 5,000 mcg  5,000 mcg Oral Q7 Days Kenia Pat MD        dilTIAZem (CARDIZEM) tablet 120 mg  120 mg Oral 2 times per day Kenia Pat MD   120 mg at 09/04/22 2147    dofetilide (TIKOSYN) capsule 250 mcg (Patient Supplied)  250 mcg Oral 2 times per day Kenia Pat MD   250 mcg at 09/04/22 2152    magnesium oxide (MAG-OX) tablet 400 mg  400 mg Oral Daily Kenia Pat MD        metoprolol succinate (TOPROL XL) extended release tablet 25 mg  25 mg Oral Daily Kenia Pat MD        montelukast (SINGULAIR) tablet 10 mg  10 mg Oral Daily Kenia Pat MD        therapeutic multivitamin-minerals 2 tablet  2 tablet Oral Daily Kenia Pat MD        spironolactone (ALDACTONE) tablet 25 mg  25 mg Oral Daily Kenia Pat MD        glucose chewable tablet 16 g  4 tablet Oral PRN Kenia Pat MD        dextrose bolus 10% 125 mL  125 mL IntraVENous PRN Kenia Pat MD        Or    dextrose bolus 10% 250 mL  250 mL IntraVENous PRN Kenia Pat MD        glucagon (rDNA) injection 1 mg  1 mg SubCUTAneous PRN Kenia Pat MD        dextrose 10 % infusion   IntraVENous Continuous PRN Kenia Pat MD        sodium chloride flush 0.9 % injection 5-40 mL  5-40 mL IntraVENous 2 times per day Kenia Pat MD   5 mL at 09/04/22 2153    sodium chloride flush 0.9 % injection 5-40 mL  5-40 mL IntraVENous PRN Kenia Pat MD        0.9 % sodium chloride infusion   IntraVENous PRN Kenia Pat MD        polyethylene glycol (GLYCOLAX) packet 17 g  17 g Oral Daily PRN Kenia Pat MD        acetaminophen (TYLENOL) tablet 650 mg  650 mg Oral Q6H PRN Kenia Pat MD        Or acetaminophen (TYLENOL) suppository 650 mg  650 mg Rectal Q6H PRN Kirsty Feng MD        insulin lispro (HUMALOG) injection vial 0-4 Units  0-4 Units SubCUTAneous TID WC Kirsty Feng MD        insulin lispro (HUMALOG) injection vial 0-4 Units  0-4 Units SubCUTAneous Nightly Kirsty Feng MD        lisinopril (PRINIVIL;ZESTRIL) tablet 5 mg  5 mg Oral Daily Kirsty Feng MD        furosemide (LASIX) injection 40 mg  40 mg IntraVENous BID Kirsty Feng MD   40 mg at 09/04/22 1754    warfarin placeholder: dosing by pharmacy   Other [id number] Bhanu Choudhury MD            Allergies:  Bactrim [sulfamethoxazole-trimethoprim], Brilinta [ticagrelor], Ciprofloxacin, Macrobid [nitrofurantoin monohyd macro], Ozempic (0.25 or 0.5 mg-dose) [semaglutide(0.25 or 0.5mg-dos)], Sulfamethoxazole, Theophylline, Cefuroxime axetil, Cipro xr, Other, and Tape [adhesive tape]     Review of Systems:     Constitutional: there has been no unanticipated weight loss. +fatigue  Eyes: No visual changes or diplopia. No scleral icterus. ENT: No Headaches, hearing loss or vertigo. No mouth sores or sore throat. Cardiovascular: Reviewed in HPI  Respiratory: +shortness of breath  Gastrointestinal: No abdominal pain, appetite loss, blood in stools. No change in bowel or bladder habits. Genitourinary: No dysuria, trouble voiding, or hematuria. Musculoskeletal:  No gait disturbance, weakness or joint complaints. Integumentary: No rash or pruritis. Neurological: No headache, diplopia, change in muscle strength, numbness or tingling. No change in gait, balance, coordination, mood, affect, memory, mentation, behavior. Psychiatric: No anxiety, no depression. Endocrine: No malaise, fatigue or temperature intolerance. No excessive thirst, fluid intake, or urination. No tremor. Hematologic/Lymphatic: No abnormal bruising or bleeding, blood clots or swollen lymph nodes. Allergic/Immunologic: No nasal congestion or hives.       Physical Examination:    Vitals:    09/05/22 0230   BP: (!) 115/57   Pulse: 84   Resp: 18   Temp: 96.9 °F (36.1 °C)   SpO2: 96%    Weight: (!) 308 lb 9.6 oz (140 kg)         General Appearance:  Alert, +anxious   Head:  Normocephalic, without obvious abnormality, atraumatic   Eyes:  PERRL, conjunctiva/corneas clear, fluttering tic of eyelids       Nose: Nares normal, no drainage or sinus tenderness   Throat: Lips, mucosa, and tongue normal   Neck: Supple, symmetrical, trachea midline, no adenopathy, thyroid: not enlarged, symmetric, no tenderness/mass/nodules, no carotid bruit or JVD       Lungs:   Clear to auscultation bilaterally, respirations unlabored   Chest Wall:  No tenderness or deformity   Heart:  Irregularly irregular, 1/6 murmur   Abdomen:   Soft, non-tender, bowel sounds active all four quadrants,  no masses, no organomegaly           Extremities: Extremities normal, atraumatic, +1 edema   Pulses: 2+ and symmetric   Skin: Skin color, texture, turgor normal, no rashes or lesions   Pysch: Normal mood and affect   Neurologic: Normal gross motor and sensory exam.         Labs  CBC:   Lab Results   Component Value Date/Time    WBC 7.6 09/05/2022 04:37 AM    RBC 4.03 09/05/2022 04:37 AM    HGB 11.9 09/05/2022 04:37 AM    HCT 34.6 09/05/2022 04:37 AM    MCV 85.7 09/05/2022 04:37 AM    RDW 16.1 09/05/2022 04:37 AM     09/05/2022 04:37 AM     CMP:    Lab Results   Component Value Date/Time     09/05/2022 04:37 AM    K 4.0 09/05/2022 04:37 AM    CL 99 09/05/2022 04:37 AM    CO2 27 09/05/2022 04:37 AM    BUN 28 09/05/2022 04:37 AM    CREATININE 1.2 09/05/2022 04:37 AM    GFRAA >60 09/05/2022 04:37 AM    GFRAA >60 03/15/2013 09:34 AM    AGRATIO 1.1 09/05/2022 04:37 AM    LABGLOM 59 09/05/2022 04:37 AM    LABGLOM 65 09/24/2015 12:10 PM    GLUCOSE 140 09/05/2022 04:37 AM    PROT 7.0 09/05/2022 04:37 AM    PROT 7.1 10/27/2012 02:50 PM    CALCIUM 9.2 09/05/2022 04:37 AM    BILITOT 0.7 09/05/2022 04:37 AM    ALKPHOS 101 09/05/2022 04:37 AM    AST 15 09/05/2022 04:37 AM    ALT 19 09/05/2022 04:37 AM     PT/INR:  No results found for: PTINR  Lab Results   Component Value Date    TROPONINI <0.01 09/04/2022       EKG:  I have reviewed EKG with the following interpretation:  Impression:  atrial fibrillation     Echo:  Left ventricular systolic function is normal with a visually estimated   ejection fraction of 55%. The left ventricle is normal in size with mild concentric hypertrophy. No obvious regional wall motion abnormalities noted. Increased left ventricular filling pressure. The left atrium appears moderately enlarged. The right atrium is severely enlarged. Right ventricle is enlarged but   normal function. Inadequate tricuspid valve regurgitation to estimate systolic pulmonary   artery pressure. The IVC is dilated in size (>2.1 cm) but appears to collapse >50% with   respiration consistent with elevated right atrial pressures (8 mmHg) . Stress:    Summary    Normal LV function. There is normal isotope uptake at stress and rest. There is no evidence of    myocardial ischemia or scar. Cath: Left heart cath 6/16/2021 [facility]  Procedures Performed:   1. Selective left heart catheterization  2. Percutaneous coronary intervention of the left anterior descending and diagonal artery artery using complex bifurcation technique. Procedure Findings:  1.   Critical LAD diagonal disease with angina classification 0, 0, 1 along with mid LAD 70% stenosis    Old notes reviewed  Telemetry reviewd  Ekg personally reviewed  Chest xray personally reviewed  Echo, cath, and   Medications and labs reviewed  high complexity/medical decision making due to extensive data review, extensive history review, independent review of data  high risk due to acute illness, evaluation of drug-drug interactions, medication management and diagnostic interventions      Assessment  Patient Active Problem List   Diagnosis    Arthritis    PAF (paroxysmal atrial fibrillation) (HCC)    Essential hypertension    Carpal tunnel syndrome    Polyneuropathy    Numbness and tingling of both legs    Moderate obstructive sleep apnea    S/P thoracotomy    Atypical atrial flutter (HCC)    Type 2 diabetes mellitus with diabetic neuropathy (HCC)    Morbid obesity with BMI of 40.0-44.9, adult (HCC)    History of venous thromboembolism    Cataract of both eyes    Abnormal stress test    Angina at rest [facility])    Status post insertion of drug-eluting stent into left anterior descending (LAD) artery for coronary artery disease    Other fatigue    Obesity (BMI 30-39. 9)    COVID-19         Plan:    I had the opportunity to review the clinical symptoms and presentation of Treva Cruz. Assessment/Plan:  Covid  - new problem  Plan:  - steroids    2. CAD s/p pci '21  - stable  - ruled out for mi  Plan:  - continue betablocker, statin, clopidogrel  - echo  - may need transfer to [facility] [place] pending echo results    3. Symptomatic Afib  - new problem  - wearing monitor  Plan:  - hold warfarin given potential need for procedures, start lovenox 1mg/kg when INR < 2  - continue metoprolol and dofetilide  - check tsh and mag  - daily ekg to monitor qtc on dofetilide. I would prefer to stop it has been ineffective and qtc is prolonged    4. Hefpef  - new problem  Plan:  - diuresis    5.  Episode of 15 s nsvt on holter earlier this year  History of PVC and flutter ablation   Plan:  - EP consult      Becky Barrera DO, 9/5/2022 8:18 AM

## 2022-09-05 NOTE — CONSULTS
Warfarin Consult  Dx: A-Fib  Goal INR range 2-3   Home Warfarin dose: 5 mg daily     Date                 INR                  Warfarin  9/4                  2.12                      5 mg (given at home)  9/5                  2.36                      5 mg      Recommend Warfarin 5 mg tonight x1. Daily INR ordered. Rx will continue to manage therapy per consult order.     Anjel Hall, PharmD 9/5/2022 7:41 AM

## 2022-09-05 NOTE — ACP (ADVANCE CARE PLANNING)
Advance Care Planning     General Advance Care Planning (ACP) Conversation    Date of Conversation: 9/4/2022  Conducted with: Patient with Decision Making Capacity    Healthcare Decision Maker:    Primary Decision Maker: Yayo Barnesr - [phone]  Click here to complete Healthcare Decision Makers including selection of the Healthcare Decision Maker Relationship (ie \"Primary\"). Today we documented Decision Maker(s) consistent with Legal Next of Kin hierarchy.     Content/Action Overview:    Reviewed DNR/DNI and patient elects Full Code (Attempt Resuscitation)    Length of Voluntary ACP Conversation in minutes:  <16 minutes (Non-Billable)    Norris Kirkpatrick MSW, DOUG-S

## 2022-09-05 NOTE — H&P
Hospital Medicine History & Physical      PCP: Vee Delgado MD    Date of Admission: 9/4/2022    Date of Service: Pt seen/examined on 9/5/2022  and Admitted to Inpatient      Chief Complaint:  SOB. History Of Present Illness: The patient is a 69 y.o. male Hx of Afib, on tikosyn, warfarin, Also Hx of CAD on plavix, who presents with SOB. Pt reports few weeks ago he noticed markedly worsening b/l LE edema - called his cardiologist, started increased dose lasix. Excellent diuresis afterwards and felt better, though follow up lab work showed Cr bump and so lasix dose was decreased. He then went to [place] with his wife. While in [place], developed RLE foot and ankle cellulitis and treated with Abx. Cut the trip short and returned to [place]. Seen his PCP. Was supposed to call Tuesday for cath lab procedure to be scheduled per his cardiologist recommendation. Friday developing worsening dyspnea and came to ED instead. Pt today complains primarily of feeling lightheaded and SOB with ambulation and body position changes. He feels comfortable at rest. He is lying flat in bed and denies orthopnea today as well.          Past Medical History:        Diagnosis Date    Arthritis     Asthma     past hx    Atrial fibrillation ([address]     Blood circulation, collateral     Diabetes mellitus ([address] 12/24/2018    DVT (deep venous thrombosis) (HCC)     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    History of knee replacement procedure of right knee     Hx of blood clots     2 DVT's    Hyperlipidemia     Hypertension     Knee osteoarthritis 1/17/2012    Left TKR 1/18/2012    Lung nodule     due to histoplasmosis    Neuromuscular disorder ([address]     Palpitations     stable with atenolol    Sleep apnea     uses CPAP    Stone, kidney     UTI (urinary tract infection) 01/23/2017 Past Surgical History:        Procedure Laterality Date    ABLATION OF DYSRHYTHMIC FOCUS  2013    a-fib    BARIATRIC SURGERY  2013    GASTRIC SLEEVE    CARDIAC SURGERY  2013    ablation    CARPAL TUNNEL RELEASE Right 9-30-15    CARPAL TUNNEL RELEASE Left 3/20/16    CHOLECYSTECTOMY, LAPAROSCOPIC N/A 2/19/2019    LAPAROSCOPIC CHOLECYSTECTOMY WITH CHOLANGIOGRAMS performed by Lidia Varner MD at [address] [place] [address]      with removal stone    CYSTOSCOPY  2/8/13    with Laser Vaporization of Enlarged Prostate    DIAGNOSTIC CARDIAC CATH LAB PROCEDURE      ERCP  02/18/2019    Sphincterotomy, stone removal    ERCP N/A 2/18/2019    ERCP SPHINCTER/PAPILLOTOMY performed by Sharon Menjivar MD at [address] [facility]    ERCP  2/18/2019    ERCP STONE REMOVAL performed by Sharon Menjivar MD at [facility] Right 1/14/2020    PHACOEMULSIFICATION OF CATARACT RIGHT EYE WITH INTRAOCULAR LENS IMPLANT performed by Jarvis Pizano MD at [place] [address] Left 1/21/2020    PHACOEMULSIFICATION OF CATARACT LEFT EYE WITH INTRAOCULAR LENS IMPLANT -SLEEP APNEA- performed by Jarvis Pizano MD at [facility] Bilateral     right knee (2002) and left knee (2012)    KNEE ARTHROSCOPY  4/19/2011     left  knee    SKIN BIOPSY      skin Ca/SQUAMOUS CELL    THORACOTOMY      wedge resection    TONSILLECTOMY         Medications Prior to Admission:    Prior to Admission medications    Medication Sig Start Date End Date Taking? Authorizing Provider   warfarin (COUMADIN) 5 MG tablet TAKE ONE TABLET BY MOUTH DAILY 8/11/22   Pooja Salinas MD   metFORMIN (GLUCOPHAGE) 1000 MG tablet Take 0.5 tablets by mouth in the morning and 0.5 tablets in the evening. Take with meals.  7/20/22   Pooja Salinas MD   montelukast (SINGULAIR) 10 MG tablet TAKE ONE TABLET BY MOUTH DAILY 7/1/22   Pooja Salinas MD spironolactone (ALDACTONE) 25 MG tablet TAKE ONE TABLET BY MOUTH DAILY 7/1/22   Pedro Pablo Gill MD   metoprolol succinate (TOPROL XL) 25 MG extended release tablet TAKE ONE TABLET BY MOUTH DAILY 7/1/22   Gurwinder Egan MD   atorvastatin (LIPITOR) 40 MG tablet TAKE ONE TABLET BY MOUTH DAILY 7/1/22   Pedro Pablo Gill MD   clopidogrel (PLAVIX) 75 MG tablet Take 1 tablet by mouth daily 5/23/22   Gurwinder Egan MD   Dulaglutide (TRULICITY) 5.14 AY/2.7SX SOPN Inject 0.75 mg into the skin once a week    Historical Provider, MD   blood glucose test strips (TRUE METRIX BLOOD GLUCOSE TEST) strip TEST ONCE DAILY. DX:E11.9 1/20/22   Pedro Pablo Gill MD   senna (SENOKOT) 8.6 MG tablet Take 1 tablet by mouth 2 times daily  Patient not taking: Reported on 9/2/2022 11/11/21   Pedro Pablo Gill MD   dofetilide [facility]) 250 MCG capsule Take 1 capsule by mouth 2 times daily 10/19/21   Genia Mcgregor MD   torsemide (DEMADEX) 20 MG tablet Take 1 tablet by mouth daily  Patient taking differently: Take 20 mg by mouth daily Takes 30 mg daily= 1.5 tablets daily 10/4/21   SERENA Mendoza - CNP   dofetilide (TIKOSYN) 250 MCG capsule Take 1 capsule by mouth every 12 hours 7/7/21   Gurwinder Egan MD   dilTIAZem (CARDIZEM) 120 MG tablet TAKE 1 TABLET BY MOUTH  EVERY 12 HOURS 9/3/20   Pavan Guillen MD   Easy Touch Lancets 32G/Twist MISC TEST DAILY. DX: E11.9 3/23/20   Pedro Pablo Gill MD   blood glucose test strips (TRUE METRIX BLOOD GLUCOSE TEST) strip USE TO CHECK BLOOD GLUCOSE DAILY. DX: E11.9 3/23/20   Pedro Pablo Gill MD   ACCU-CHEK MULTICLIX LANCETS MISC Check sugars once daily. Dx;E11.9 3/11/19   Pedro Pablo Gill MD   Lancet Devices (EASY TOUCH LANCING DEVICE) MISC Test Daily. DX: E11.9 12/26/18   Pedro Pablo Gill MD   Blood Glucose Monitoring Suppl (TRUE METRIX METER) VINNIE Use to check daily.   DX;E11.9 12/22/18   Pedro Pablo Gill MD   [facility] LANCET DEV) KIT Check sugars once daily. DX;E11.9 12/21/18   Camryn Lockett MD   Biotin 5 MG TABS Take 5 mg by mouth daily    Historical Provider, MD   Cholecalciferol (VITAMIN D3) 5000 units TABS Take 5,000 Units by mouth daily    Historical Provider, MD   Cyanocobalamin (VITAMIN B-12 CR PO) Take 5,000 mcg by mouth every 7 days     Historical Provider, MD   Multiple Vitamins-Minerals (THERAPEUTIC MULTIVITAMIN-MINERALS) tablet Take 2 tablets by mouth daily     Historical Provider, MD   magnesium oxide (MAG-OX) 400 MG tablet Take 400 mg by mouth daily. Historical Provider, MD       Allergies:  Bactrim [sulfamethoxazole-trimethoprim], Brilinta [ticagrelor], Ciprofloxacin, Macrobid [nitrofurantoin monohyd macro], Ozempic (0.25 or 0.5 mg-dose) [semaglutide(0.25 or 0.5mg-dos)], Sulfamethoxazole, Theophylline, Cefuroxime axetil, Cipro xr, Other, and Tape [adhesive tape]    Social History:  The patient currently lives at home. TOBACCO:   reports that he quit smoking about 46 years ago. His smoking use included cigarettes. He has a 34.00 pack-year smoking history. He has never used smokeless tobacco.  ETOH:   reports no history of alcohol use. Family History:  Reviewed in detail and negative for DM, Early CAD, Cancer, CVA. Positive as follows:        Problem Relation Age of Onset    Cancer Mother         ABDOMIN    Kidney Disease Mother     Stroke Mother     Arthritis Father     Substance Abuse Father     Other Father         hardening of the arteries    Stroke Father        REVIEW OF SYSTEMS:   As noted in the HPI. All other systems reviewed and negative. PHYSICAL EXAM:    /76   Pulse 85   Temp 97.7 °F (36.5 °C) (Oral)   Resp 17   Ht 6' 3\" (1.905 m)   Wt (!) 308 lb 9.6 oz (140 kg)   SpO2 97%   BMI 38.57 kg/m²     General appearance: No apparent distress appears stated age and cooperative. HEENT Normal cephalic, atraumatic without obvious deformity.   Pupils equal, round, and reactive to light.  Extra ocular muscles intact. Conjunctivae/corneas clear. Neck: Supple, No jugular venous distention/bruits. Trachea midline without thyromegaly or adenopathy with full range of motion. Lungs: Clear to auscultation, bilaterally without Rales/Wheezes/Rhonchi with good respiratory effort. Heart: Regular rate and rhythm with Normal S1/S2 without murmurs, rubs or gallops, point of maximum impulse non-displaced  Abdomen: Soft, non-tender or non-distended without rigidity or guarding and positive bowel sounds all four quadrants. Extremities: No clubbing, cyanosis, or edema bilaterally. Full range of motion without deformity and normal gait intact. Skin: Skin color, texture, turgor normal.  No rashes or lesions. Neurologic: Alert and oriented X 3, neurovascularly intact with sensory/motor intact upper extremities/lower extremities, bilaterally. Cranial nerves: II-XII intact, grossly non-focal.  Mental status: Alert, oriented, thought content appropriate.   Capillary Refill: Acceptable  < 3 seconds  Peripheral Pulses: +3 Easily felt, not easily obliterated with pressure        CBC   Recent Labs     09/04/22 1249 09/05/22 0437   WBC 9.6 7.6   HGB 12.5* 11.9*   HCT 38.1* 34.6*    230      RENAL  Recent Labs     09/04/22 1249 09/05/22 0437   * 135*   K 3.9 4.0   CL 97* 99   CO2 26 27   BUN 31* 28*   CREATININE 1.3 1.2     LFT'S  Recent Labs     09/04/22 1249 09/05/22 0437   AST 19 15   ALT 23 19   BILITOT 0.8 0.7   ALKPHOS 111 101     COAG  Recent Labs     09/04/22 1753 09/05/22 0437   INR 2.12* 2.36*     CARDIAC ENZYMES  Recent Labs     09/04/22 1249 09/04/22 1753 09/04/22 2014   TROPONINI <0.01 <0.01 <0.01       U/A:    Lab Results   Component Value Date/Time    NITRITE neg 08/29/2022 02:47 PM    COLORU Straw 09/04/2022 02:27 PM    WBCUA 6-10 04/25/2020 09:35 PM    RBCUA 3-4 04/25/2020 09:35 PM    BACTERIA 3+ 11/10/2016 03:30 PM    CLARITYU Clear 09/04/2022 02:27 PM    SPECGRAV <=1.005 09/04/2022 02:27 PM    LEUKOCYTESUR Negative 09/04/2022 02:27 PM    BLOODU Negative 09/04/2022 02:27 PM    GLUCOSEU Negative 09/04/2022 02:27 PM    GLUCOSEU NEGATIVE 02/05/2012 09:00 AM       ABG  No results found for: JGS9OEC, BEART, J7KGSCGQ, PHART, THGBART, XXK0KIW, PO2ART, QXD3HTL        Active Hospital Problems    Diagnosis Date Noted    COVID-19 [U07.1] 09/04/2022     Priority: Medium         PHYSICIANS CERTIFICATION:    I certify that Selinda Fothergill is expected to be hospitalized for more than 2 midnights based on the following assessment and plan:      ASSESSMENT/PLAN:      Lightheaded and SOB. I believe pt had acute CHF exacerbation several weeks prior with weeping leg edema and dyspnea and orthopnea - and treated appropriately by his cardiologist with increased dose lasix. He then went to [place] - had cellulitis - treated with Abx. He is now presenting with dyspnea and feeling lightheaded which I think are related to Afib and likely overdiuresis - his weeping leg edema has now resolved and he shows no other clinical signs of acute CHF  Plan:   - hold lasix. - ECHO to follow. - cardiology consult. - check orthostatic vitals today. Afib - prior ablation. Unfortunately back in Afib now. - on warfarin  - dilt 120 BID  - tikosyn   Cardiology consult. Hx of CAD s/p PTCA. Plavix, statin, toprol XL, aldactone. Lasix presently on hold. Incidental COVID infection - possibly contributory to Afib and feeling of weakness and fatigue. Prior vaccinated  Prior episode of COVID infection. Stable on RA. No PNA on imaging. Supportive care. DVT Prophylaxis: warfarin - daily INR  Diet: ADULT DIET; Regular; 4 carb choices (60 gm/meal); Low Sodium (2 gm)  Code Status: Full Code      Dispo - cc pending ECHO and cardiology input. Check orthostatic vitals.         Jerson Markham MD    Thank you Kristan Chapman MD for the opportunity to be involved in this patient's care. If you have any questions or concerns please feel free to contact me at [phone].

## 2022-09-05 NOTE — PROGRESS NOTES
Patient and wife at bedside asking questions about CHF diagnosis, discontinuation of medications. RN attempted to educate patient and wife but they were still having a lot of questions. RN paged Dr. Tato Gibson for follow up. Patient states he is most worried about stopping tikosyn. RN paged  who explained why the drug is not effective for patient and why patient is safe to stop medication since he will be monitored in the hospital. Kenny Carmona states he will speak with them in AM and patient and wife were appreciative and now understanding of the stopping of tikosyn. Patient states that he and his wife have no further questions at this time.

## 2022-09-06 LAB
ANION GAP SERPL CALCULATED.3IONS-SCNC: 7 MMOL/L (ref 3–16)
BUN BLDV-MCNC: 28 MG/DL (ref 7–20)
CALCIUM SERPL-MCNC: 9 MG/DL (ref 8.3–10.6)
CHLORIDE BLD-SCNC: 100 MMOL/L (ref 99–110)
CO2: 27 MMOL/L (ref 21–32)
CREAT SERPL-MCNC: 1.3 MG/DL (ref 0.8–1.3)
GFR AFRICAN AMERICAN: >60
GFR NON-AFRICAN AMERICAN: 54
GLUCOSE BLD-MCNC: 130 MG/DL (ref 70–99)
GLUCOSE BLD-MCNC: 141 MG/DL (ref 70–99)
GLUCOSE BLD-MCNC: 146 MG/DL (ref 70–99)
GLUCOSE BLD-MCNC: 180 MG/DL (ref 70–99)
GLUCOSE BLD-MCNC: 218 MG/DL (ref 70–99)
INR BLD: 2.42 (ref 0.87–1.14)
LV EF: 58 %
LVEF MODALITY: NORMAL
MAGNESIUM: 2.4 MG/DL (ref 1.8–2.4)
PERFORMED ON: ABNORMAL
POTASSIUM SERPL-SCNC: 4.3 MMOL/L (ref 3.5–5.1)
PROTHROMBIN TIME: 26.3 SEC (ref 11.7–14.5)
SODIUM BLD-SCNC: 134 MMOL/L (ref 136–145)
TSH REFLEX FT4: 1.62 UIU/ML (ref 0.27–4.2)

## 2022-09-06 PROCEDURE — 6370000000 HC RX 637 (ALT 250 FOR IP): Performed by: INTERNAL MEDICINE

## 2022-09-06 PROCEDURE — 2580000003 HC RX 258: Performed by: INTERNAL MEDICINE

## 2022-09-06 PROCEDURE — 80048 BASIC METABOLIC PNL TOTAL CA: CPT

## 2022-09-06 PROCEDURE — 93306 TTE W/DOPPLER COMPLETE: CPT

## 2022-09-06 PROCEDURE — 83735 ASSAY OF MAGNESIUM: CPT

## 2022-09-06 PROCEDURE — 84443 ASSAY THYROID STIM HORMONE: CPT

## 2022-09-06 PROCEDURE — 85610 PROTHROMBIN TIME: CPT

## 2022-09-06 PROCEDURE — 99233 SBSQ HOSP IP/OBS HIGH 50: CPT | Performed by: INTERNAL MEDICINE

## 2022-09-06 PROCEDURE — 36415 COLL VENOUS BLD VENIPUNCTURE: CPT

## 2022-09-06 PROCEDURE — 2060000000 HC ICU INTERMEDIATE R&B

## 2022-09-06 RX ORDER — DIPHENHYDRAMINE HYDROCHLORIDE, ZINC ACETATE 2; .1 G/100G; G/100G
CREAM TOPICAL 3 TIMES DAILY PRN
Status: DISCONTINUED | OUTPATIENT
Start: 2022-09-06 | End: 2022-09-07 | Stop reason: HOSPADM

## 2022-09-06 RX ORDER — WARFARIN SODIUM 5 MG/1
5 TABLET ORAL
Status: ACTIVE | OUTPATIENT
Start: 2022-09-06 | End: 2022-09-07

## 2022-09-06 RX ADMIN — DIPHENHYDRAMINE HYDROCHLORIDE 25 MG: 25 TABLET, FILM COATED ORAL at 00:51

## 2022-09-06 RX ADMIN — DIPHENHYDRAMINE HYDROCHLORIDE 25 MG: 25 TABLET, FILM COATED ORAL at 09:45

## 2022-09-06 RX ADMIN — INSULIN LISPRO 1 UNITS: 100 INJECTION, SOLUTION INTRAVENOUS; SUBCUTANEOUS at 17:28

## 2022-09-06 RX ADMIN — DIPHENHYDRAMINE HYDROCHLORIDE 25 MG: 25 TABLET, FILM COATED ORAL at 17:27

## 2022-09-06 RX ADMIN — Medication 400 MG: at 09:46

## 2022-09-06 RX ADMIN — DILTIAZEM HYDROCHLORIDE 120 MG: 60 TABLET, FILM COATED ORAL at 20:09

## 2022-09-06 RX ADMIN — ATORVASTATIN CALCIUM 40 MG: 40 TABLET, FILM COATED ORAL at 09:46

## 2022-09-06 RX ADMIN — SODIUM CHLORIDE, PRESERVATIVE FREE 10 ML: 5 INJECTION INTRAVENOUS at 20:09

## 2022-09-06 RX ADMIN — DILTIAZEM HYDROCHLORIDE 120 MG: 60 TABLET, FILM COATED ORAL at 09:46

## 2022-09-06 RX ADMIN — METOPROLOL SUCCINATE 25 MG: 25 TABLET, EXTENDED RELEASE ORAL at 09:46

## 2022-09-06 RX ADMIN — ACETAMINOPHEN 650 MG: 325 TABLET ORAL at 09:45

## 2022-09-06 RX ADMIN — DIPHENHYDRAMINE HYDROCHLORIDE, ZINC ACETATE: 2; .1 CREAM TOPICAL at 17:28

## 2022-09-06 RX ADMIN — Medication 5000 UNITS: at 09:46

## 2022-09-06 RX ADMIN — MONTELUKAST SODIUM 10 MG: 10 TABLET, COATED ORAL at 09:46

## 2022-09-06 RX ADMIN — MULTIPLE VITAMINS W/ MINERALS TAB 2 TABLET: TAB at 09:46

## 2022-09-06 RX ADMIN — SODIUM CHLORIDE, PRESERVATIVE FREE 10 ML: 5 INJECTION INTRAVENOUS at 09:44

## 2022-09-06 ASSESSMENT — PAIN SCALES - GENERAL: PAINLEVEL_OUTOF10: 6

## 2022-09-06 ASSESSMENT — PAIN - FUNCTIONAL ASSESSMENT: PAIN_FUNCTIONAL_ASSESSMENT: ACTIVITIES ARE NOT PREVENTED

## 2022-09-06 ASSESSMENT — PAIN DESCRIPTION - DESCRIPTORS: DESCRIPTORS: ACHING;DISCOMFORT;THROBBING

## 2022-09-06 ASSESSMENT — PAIN DESCRIPTION - LOCATION: LOCATION: HEAD

## 2022-09-06 NOTE — PROGRESS NOTES
Warfarin Consult  Dx: A-Fib  Goal INR range 2-3   Home Warfarin dose: 5 mg daily     Date                 INR                  Warfarin  9/4                  2.12                      5 mg (given at home)  9/5                  2.36                      5 mg    9/6                  2.42                      5mg    Recommend Warfarin 5 mg tonight x1. Daily INR ordered. Rx will continue to manage therapy per consult order.     Taqueria Spencer RP

## 2022-09-06 NOTE — FLOWSHEET NOTE
09/06/22 0941   Vital Signs   Temp 97.2 °F (36.2 °C)   Temp Source Oral   Heart Rate 73   Heart Rate Source Monitor   Resp 18   /81   BP Location Left upper arm   BP Method Automatic   MAP (Calculated) 94   Patient Position Semi fowlers   Level of Consciousness 0   MEWS Score 1   Pain Assessment   Pain Assessment None - Denies Pain   Oxygen Therapy   SpO2 98 %   O2 Device None (Room air)       Shift assessment complete, see flowsheet. Pt A&O x4. Denies any feelings of SOB, but continues to have slight dizziness with change in position. Pt refusing to have bed alarm in place and active at this time. Pt states he will use call light for staff assistance when needed. Scheduled morning medications administered per eMAR with sips of water. Pt remains NPO for cardio consult. No further needs expressed by pt at this time. Call light left within reach.

## 2022-09-06 NOTE — FLOWSHEET NOTE
09/06/22 0048   Vital Signs   Temp 98.4 °F (36.9 °C)   Temp Source Oral   Heart Rate 59   Heart Rate Source Monitor   Resp 18   /69   BP Location Left upper arm   BP Method Automatic   MAP (Calculated) 84.67   Patient Position Semi fowlers   Level of Consciousness 0   MEWS Score 1   Oxygen Therapy   SpO2 99 %   O2 Device None (Room air)   Height and Weight   Weight (!) 308 lb 1.6 oz (139.8 kg)   Weight Method Bed scale   BMI (Calculated) 38.6     Vitals taken, shown above. Patient is stable with no current needs at this time. Call light in reach. Bed in lowest position.

## 2022-09-06 NOTE — PROGRESS NOTES
BMP:   Recent Labs     09/04/22  1249 09/05/22  0437 09/06/22  0459   * 135* 134*   K 3.9 4.0 4.3   CL 97* 99 100   CO2 26 27 27   BUN 31* 28* 28*   CREATININE 1.3 1.2 1.3     LIVER PROFILE:   Recent Labs     09/04/22  1249 09/05/22  0437   AST 19 15   ALT 23 19   LIPASE 25.0  --    BILITOT 0.8 0.7   ALKPHOS 111 101     PT/INR:   Recent Labs     09/04/22  1753 09/05/22  0437 09/06/22  0459   PROTIME 23.6* 25.7* 26.3*   INR 2.12* 2.36* 2.42*       6/16/21 ECHO Summary   Left ventricular systolic function is normal with a visually estimated   ejection fraction of 55%. The left ventricle is normal in size with mild concentric hypertrophy. No obvious regional wall motion abnormalities noted. Increased left ventricular filling pressure. The left atrium appears moderately enlarged. The right atrium is severely enlarged. Right ventricle is enlarged but   normal function. Inadequate tricuspid valve regurgitation to estimate systolic pulmonary   artery pressure. The IVC is dilated in size (>2.1 cm) but appears to collapse >50% with   respiration consistent with elevated right atrial pressures (8 mmHg). Cath: Left heart cath 6/16/2021 [facility]  Procedures Performed:   1. Selective left heart catheterization  2. Percutaneous coronary intervention of the left anterior descending and diagonal artery artery using complex bifurcation technique. Procedure Findings:  1. Critical LAD diagonal disease with angina classification 0, 0, 1 along with mid LAD 70% stenosis      Assessment:    Afib:  -remains afib on telemetry   -HR controlled   -EKG afib with prolonged QT 510ms   -repeat EKG with improved QTc   -I agree with d/c Tikosyn and will not restart given not maintaining NSR and QTc   -continue cardizem 120mg qd and Toprol XL 25mg qd   -continue warfarin for [facility] INR 2.42    2.    CAD:   -s/p PCI of LAD and Diagonal in 6/21   -There are no concerning symptoms for angina currently.     -continue plavix 75mg

## 2022-09-06 NOTE — CARE COORDINATION
INTERDISCIPLINARY PLAN OF CARE CONFERENCE    Date/Time: 9/6/2022 12:04 PM  Completed by: Capri Hale RN, Case Management      Patient Name:  Gerald Thomas  YOB: 1945  Admitting Diagnosis: Generalized weakness [R53.1]  Elevated brain natriuretic peptide (BNP) level [R79.89]  COVID-19 [U07.1]     Admit Date/Time:  9/4/2022 11:24 AM    Chart reviewed. Interdisciplinary team contacted or reviewed plan related to patient progress and discharge plans. Disciplines included Case Management, Nursing, and Dietitian. Current Status: IP 09/04/2022  PT/OT recommendation for discharge plan of care:     Expected D/C Disposition:  Home    Discharge Plan Comments: ECHO pending. Possible TXF to [facility] pending result of ECHO. DCP; Home with sp. IPTA. Has home CPAP.      Home O2 in place on admit: No  Pt informed of need to bring portable home O2 tank on day of discharge for nursing to connect prior to leaving:  No  Verbalized agreement/Understanding:  No

## 2022-09-06 NOTE — PROGRESS NOTES
Regular electrodes removed. Pt chest washed with soap and water, dried well. Hypo-allergenic electrodes placed at this time. Perfect serve sent to MD regarding pt receiving topical anti-itch cream for areas to chest per pt request.    Verbal orders received for PRN topical benadryl TID.

## 2022-09-06 NOTE — PROGRESS NOTES
Pt with complaints of itching, burning, and redness to chest where electrodes placed. PRN oral benadryl given. Called central supply requesting hypo-allergenic electrodes be provided for pt.

## 2022-09-06 NOTE — PROGRESS NOTES
Hospitalist Progress Note      PCP: Camryn Lockett MD    Date of Admission: 9/4/2022    Chief Complaint: dizzy lightheaded when ambulating. Subjective:   Asymptomatic when at rest. Still gets lightheaded when ambulating. +orthostatic SBP drop over 20 points from lying supine to standing. Medications:  Reviewed    Infusion Medications    dextrose      sodium chloride       Scheduled Medications    warfarin  5 mg Oral Once    atorvastatin  40 mg Oral Daily    vitamin D3  5,000 Units Oral Daily    clopidogrel  75 mg Oral Daily    [START ON 9/11/2022] vitamin B-12  5,000 mcg Oral Q7 Days    dilTIAZem  120 mg Oral 2 times per day    magnesium oxide  400 mg Oral Daily    metoprolol succinate  25 mg Oral Daily    montelukast  10 mg Oral Daily    therapeutic multivitamin-minerals  2 tablet Oral Daily    [Held by provider] spironolactone  25 mg Oral Daily    sodium chloride flush  5-40 mL IntraVENous 2 times per day    insulin lispro  0-4 Units SubCUTAneous TID WC    insulin lispro  0-4 Units SubCUTAneous Nightly    lisinopril  5 mg Oral Daily    warfarin placeholder: dosing by pharmacy   Other RX Placeholder     PRN Meds: diphenhydrAMINE, glucose, dextrose bolus **OR** dextrose bolus, glucagon (rDNA), dextrose, sodium chloride flush, sodium chloride, polyethylene glycol, acetaminophen **OR** acetaminophen      Intake/Output Summary (Last 24 hours) at 9/6/2022 1354  Last data filed at 9/6/2022 0950  Gross per 24 hour   Intake 240 ml   Output 825 ml   Net -585 ml       Physical Exam Performed:    /81   Pulse 73   Temp 97.2 °F (36.2 °C) (Oral)   Resp 18   Ht 6' 3\" (1.905 m)   Wt (!) 308 lb 1.6 oz (139.8 kg)   SpO2 98%   BMI 38.51 kg/m²     General appearance: No apparent distress, appears stated age and cooperative. HEENT: Pupils equal, round, and reactive to light. Conjunctivae/corneas clear. Neck: Supple, with full range of motion. No jugular venous distention.  Trachea midline. Respiratory:  Normal respiratory effort. Clear to auscultation, bilaterally without Rales/Wheezes/Rhonchi. Cardiovascular: Regular rate and rhythm with normal S1/S2 without murmurs, rubs or gallops. Abdomen: Soft, non-tender, non-distended with normal bowel sounds. Musculoskeletal: No clubbing, cyanosis or edema bilaterally. Full range of motion without deformity. Skin: Skin color, texture, turgor normal.  No rashes or lesions. Neurologic:  Neurovascularly intact without any focal sensory/motor deficits. Cranial nerves: II-XII intact, grossly non-focal.  Psychiatric: Alert and oriented, thought content appropriate, normal insight  Capillary Refill: Brisk, 3 seconds, normal   Peripheral Pulses: +2 palpable, equal bilaterally       Labs:   Recent Labs     09/04/22 1249 09/05/22 0437   WBC 9.6 7.6   HGB 12.5* 11.9*   HCT 38.1* 34.6*    230     Recent Labs     09/04/22 1249 09/05/22 0437 09/06/22  0459   * 135* 134*   K 3.9 4.0 4.3   CL 97* 99 100   CO2 26 27 27   BUN 31* 28* 28*   CREATININE 1.3 1.2 1.3   CALCIUM 9.6 9.2 9.0     Recent Labs     09/04/22 1249 09/05/22  0437   AST 19 15   ALT 23 19   BILITOT 0.8 0.7   ALKPHOS 111 101     Recent Labs     09/04/22 1753 09/05/22 0437 09/06/22  0459   INR 2.12* 2.36* 2.42*     Recent Labs     09/04/22 1249 09/04/22 1753 09/04/22 2014   Serena Peraza <0.01 <0.01 <0.01       Urinalysis:      Lab Results   Component Value Date/Time    NITRU Negative 09/04/2022 02:27 PM    WBCUA 6-10 04/25/2020 09:35 PM    BACTERIA 3+ 11/10/2016 03:30 PM    RBCUA 3-4 04/25/2020 09:35 PM    BLOODU Negative 09/04/2022 02:27 PM    SPECGRAV <=1.005 09/04/2022 02:27 PM    GLUCOSEU Negative 09/04/2022 02:27 PM    GLUCOSEU NEGATIVE 02/05/2012 09:00 AM       Radiology:  XR CHEST PORTABLE   Final Result   1. Mild persistent enlargement of the cardiac silhouette. 2. Otherwise, no convincing acute cardiopulmonary abnormality.          XR CHEST (2 VW)   Final Result   No acute cardiopulmonary disease. Assessment/Plan:    Active Hospital Problems    Diagnosis     SOB (shortness of breath) [R06.02]      Priority: High    Atrial fibrillation ([address] [I48.91]      Priority: High    Acute on chronic heart failure with preserved ejection fraction (HCC) [I50.33]      Priority: Medium    Coronary artery disease involving native coronary artery of native heart [I25.10]      Priority: Medium    COVID-19 [U07.1]      Priority: Medium         Lightheaded and SOB. I believe pt had acute CHF exacerbation several weeks prior with weeping leg edema and dyspnea and orthopnea - and treated appropriately by his cardiologist with increased dose lasix. He then went to [place] - had cellulitis - treated with Abx. He is now presenting with dyspnea and feeling lightheaded which I think are related to Afib and likely overdiuresis - his weeping leg edema has now resolved and he shows no other clinical signs of acute CHF  Plan:   - cont to hold lasix and aldactone. - ECHO pending today   - cardiology involved  - orthostatic vitals checked 9/5 and positive. Afib - prior Hx of ablation. Unfortunately back in Afib now. - on warfarin  - dilt 120 BID  - tikosyn - stopped per cardiology due to prolonged QT  Cardiology consult. Hx of CAD s/p PTCA. Plavix, statin, toprol XL,   Hold aldactone. Hold Lasix. Incidental COVID infection - possibly contributory to Afib and feeling of weakness and fatigue. Prior vaccinated  Prior episode of COVID infection. Stable on RA. No PNA on imaging. Supportive care. DVT Prophylaxis: warfarin - daily INR  Diet: ADULT DIET; Regular; 4 carb choices (60 gm/meal); Low Sodium (2 gm)  Code Status: Full Code        Dispo - cc pending ECHO and cardiology input. Clinically doing very well today.           Deb Addison MD

## 2022-09-06 NOTE — PLAN OF CARE
Problem: Discharge Planning  Goal: Discharge to home or other facility with appropriate resources  Outcome: Progressing     Problem: ABCDS Injury Assessment  Goal: Absence of physical injury  Outcome: Progressing     Problem: Nutrition Deficit:  Goal: Optimize nutritional status  Outcome: Progressing     Problem: Chronic Conditions and Co-morbidities  Goal: Patient's chronic conditions and co-morbidity symptoms are monitored and maintained or improved  Outcome: Progressing

## 2022-09-06 NOTE — PROGRESS NOTES
Hand off report give to  Asa Yeager RN. Patient is stable showing no signs of distress and has no current needs at this time. Call light is in reach and bed is in lowest position. Care is transferred at this time.

## 2022-09-06 NOTE — PROGRESS NOTES
Verbal order received to resume patients diet. Dietary notified. Make NPO at midnight for possible cath in AM. Cardio to review ECHO and discuss case.

## 2022-09-06 NOTE — PROGRESS NOTES
Shift report given to ANGELINA Bruno. Pt stable. Denies any further needs. Care of pt transferred at this time.

## 2022-09-06 NOTE — TELEPHONE ENCOUNTER
[facility], I spoke with patient. He is currently admitted. Echo is done. He mentioned it may get done as inpatient possibly. Please let me know.

## 2022-09-06 NOTE — FLOWSHEET NOTE
09/05/22 2043   Vital Signs   Temp 97.6 °F (36.4 °C)   Temp Source Oral   Heart Rate 56   Heart Rate Source Monitor   Resp 18   /71   BP Location Left upper arm   BP Method Automatic   MAP (Calculated) 82.33   Patient Position Semi fowlers   Level of Consciousness 0   MEWS Score 1   Oxygen Therapy   SpO2 97 %   O2 Device PAP (positive airway pressure)   O2 Flow Rate (L/min) 12 L/min     Shift assessment completed see flow sheet. Patient in bed alert and oriented X4. Patient on home cpap, showing no signs of distress. Evening medications given per order. No other needs at this time. Call light in reach.

## 2022-09-07 ENCOUNTER — HOSPITAL ENCOUNTER (INPATIENT)
Age: 77
LOS: 2 days | Discharge: HOME | DRG: 246 | End: 2022-09-09
Attending: INTERNAL MEDICINE | Admitting: INTERNAL MEDICINE
Payer: MEDICARE

## 2022-09-07 VITALS
RESPIRATION RATE: 16 BRPM | HEART RATE: 76 BPM | TEMPERATURE: 97.3 F | HEIGHT: 75 IN | SYSTOLIC BLOOD PRESSURE: 109 MMHG | WEIGHT: 306.3 LBS | OXYGEN SATURATION: 98 % | BODY MASS INDEX: 38.09 KG/M2 | DIASTOLIC BLOOD PRESSURE: 65 MMHG

## 2022-09-07 LAB
ANION GAP SERPL CALCULATED.3IONS-SCNC: 10 MMOL/L (ref 3–16)
BUN BLDV-MCNC: 30 MG/DL (ref 7–20)
CALCIUM SERPL-MCNC: 9 MG/DL (ref 8.3–10.6)
CHLORIDE BLD-SCNC: 100 MMOL/L (ref 99–110)
CO2: 27 MMOL/L (ref 21–32)
CREAT SERPL-MCNC: 1.4 MG/DL (ref 0.8–1.3)
GFR AFRICAN AMERICAN: 60
GFR NON-AFRICAN AMERICAN: 49
GLUCOSE BLD-MCNC: 149 MG/DL (ref 70–99)
GLUCOSE BLD-MCNC: 152 MG/DL (ref 70–99)
GLUCOSE BLD-MCNC: 205 MG/DL (ref 70–99)
GLUCOSE BLD-MCNC: 83 MG/DL (ref 70–99)
INR BLD: 2.51 (ref 0.87–1.14)
MAGNESIUM: 2.4 MG/DL (ref 1.8–2.4)
PERFORMED ON: ABNORMAL
PERFORMED ON: ABNORMAL
PERFORMED ON: NORMAL
POTASSIUM SERPL-SCNC: 4.3 MMOL/L (ref 3.5–5.1)
PRO-BNP: 1442 PG/ML (ref 0–449)
PROTHROMBIN TIME: 27 SEC (ref 11.7–14.5)
SODIUM BLD-SCNC: 137 MMOL/L (ref 136–145)

## 2022-09-07 PROCEDURE — 80048 BASIC METABOLIC PNL TOTAL CA: CPT

## 2022-09-07 PROCEDURE — 99239 HOSP IP/OBS DSCHRG MGMT >30: CPT | Performed by: INTERNAL MEDICINE

## 2022-09-07 PROCEDURE — 99233 SBSQ HOSP IP/OBS HIGH 50: CPT | Performed by: INTERNAL MEDICINE

## 2022-09-07 PROCEDURE — 2580000003 HC RX 258: Performed by: INTERNAL MEDICINE

## 2022-09-07 PROCEDURE — 6370000000 HC RX 637 (ALT 250 FOR IP): Performed by: INTERNAL MEDICINE

## 2022-09-07 PROCEDURE — 85610 PROTHROMBIN TIME: CPT

## 2022-09-07 PROCEDURE — 2060000000 HC ICU INTERMEDIATE R&B

## 2022-09-07 PROCEDURE — 83735 ASSAY OF MAGNESIUM: CPT

## 2022-09-07 PROCEDURE — 83880 ASSAY OF NATRIURETIC PEPTIDE: CPT

## 2022-09-07 PROCEDURE — 36415 COLL VENOUS BLD VENIPUNCTURE: CPT

## 2022-09-07 RX ORDER — LANOLIN ALCOHOL/MO/W.PET/CERES
400 CREAM (GRAM) TOPICAL DAILY
Status: DISCONTINUED | OUTPATIENT
Start: 2022-09-08 | End: 2022-09-09 | Stop reason: HOSPADM

## 2022-09-07 RX ORDER — SODIUM CHLORIDE 0.9 % (FLUSH) 0.9 %
5-40 SYRINGE (ML) INJECTION EVERY 12 HOURS SCHEDULED
Status: DISCONTINUED | OUTPATIENT
Start: 2022-09-07 | End: 2022-09-09 | Stop reason: HOSPADM

## 2022-09-07 RX ORDER — M-VIT,TX,IRON,MINS/CALC/FOLIC 27MG-0.4MG
2 TABLET ORAL DAILY
Status: DISCONTINUED | OUTPATIENT
Start: 2022-09-08 | End: 2022-09-09 | Stop reason: HOSPADM

## 2022-09-07 RX ORDER — VITAMIN B COMPLEX
5000 TABLET ORAL DAILY
Status: DISCONTINUED | OUTPATIENT
Start: 2022-09-08 | End: 2022-09-09 | Stop reason: HOSPADM

## 2022-09-07 RX ORDER — DEXTROSE MONOHYDRATE 100 MG/ML
INJECTION, SOLUTION INTRAVENOUS CONTINUOUS PRN
Status: DISCONTINUED | OUTPATIENT
Start: 2022-09-07 | End: 2022-09-09 | Stop reason: HOSPADM

## 2022-09-07 RX ORDER — INSULIN LISPRO 100 [IU]/ML
0-4 INJECTION, SOLUTION INTRAVENOUS; SUBCUTANEOUS NIGHTLY
Status: DISCONTINUED | OUTPATIENT
Start: 2022-09-07 | End: 2022-09-09 | Stop reason: HOSPADM

## 2022-09-07 RX ORDER — PHYTONADIONE 5 MG/1
5 TABLET ORAL ONCE
Status: COMPLETED | OUTPATIENT
Start: 2022-09-07 | End: 2022-09-07

## 2022-09-07 RX ORDER — SODIUM CHLORIDE 0.9 % (FLUSH) 0.9 %
5-40 SYRINGE (ML) INJECTION PRN
Status: DISCONTINUED | OUTPATIENT
Start: 2022-09-07 | End: 2022-09-09 | Stop reason: HOSPADM

## 2022-09-07 RX ORDER — METOPROLOL SUCCINATE 25 MG/1
25 TABLET, EXTENDED RELEASE ORAL DAILY
Status: DISCONTINUED | OUTPATIENT
Start: 2022-09-08 | End: 2022-09-09 | Stop reason: HOSPADM

## 2022-09-07 RX ORDER — POLYETHYLENE GLYCOL 3350 17 G/17G
17 POWDER, FOR SOLUTION ORAL DAILY PRN
Status: DISCONTINUED | OUTPATIENT
Start: 2022-09-07 | End: 2022-09-09 | Stop reason: HOSPADM

## 2022-09-07 RX ORDER — SPIRONOLACTONE 25 MG/1
25 TABLET ORAL DAILY
Status: DISCONTINUED | OUTPATIENT
Start: 2022-09-08 | End: 2022-09-09 | Stop reason: HOSPADM

## 2022-09-07 RX ORDER — DILTIAZEM HYDROCHLORIDE 60 MG/1
120 TABLET, FILM COATED ORAL EVERY 12 HOURS SCHEDULED
Status: DISCONTINUED | OUTPATIENT
Start: 2022-09-07 | End: 2022-09-09 | Stop reason: HOSPADM

## 2022-09-07 RX ORDER — INSULIN LISPRO 100 [IU]/ML
0-4 INJECTION, SOLUTION INTRAVENOUS; SUBCUTANEOUS
Status: DISCONTINUED | OUTPATIENT
Start: 2022-09-08 | End: 2022-09-09 | Stop reason: HOSPADM

## 2022-09-07 RX ORDER — MONTELUKAST SODIUM 10 MG/1
10 TABLET ORAL DAILY
Status: DISCONTINUED | OUTPATIENT
Start: 2022-09-08 | End: 2022-09-09 | Stop reason: HOSPADM

## 2022-09-07 RX ORDER — ACETAMINOPHEN 650 MG/1
650 SUPPOSITORY RECTAL EVERY 6 HOURS PRN
Status: DISCONTINUED | OUTPATIENT
Start: 2022-09-07 | End: 2022-09-09 | Stop reason: HOSPADM

## 2022-09-07 RX ORDER — LISINOPRIL 5 MG/1
5 TABLET ORAL DAILY
Status: DISCONTINUED | OUTPATIENT
Start: 2022-09-08 | End: 2022-09-09 | Stop reason: HOSPADM

## 2022-09-07 RX ORDER — ACETAMINOPHEN 325 MG/1
650 TABLET ORAL EVERY 6 HOURS PRN
Status: DISCONTINUED | OUTPATIENT
Start: 2022-09-07 | End: 2022-09-09 | Stop reason: HOSPADM

## 2022-09-07 RX ORDER — DIPHENHYDRAMINE HCL 25 MG
25 TABLET ORAL EVERY 6 HOURS PRN
Status: DISCONTINUED | OUTPATIENT
Start: 2022-09-07 | End: 2022-09-09 | Stop reason: HOSPADM

## 2022-09-07 RX ORDER — ATORVASTATIN CALCIUM 40 MG/1
40 TABLET, FILM COATED ORAL DAILY
Status: DISCONTINUED | OUTPATIENT
Start: 2022-09-08 | End: 2022-09-09 | Stop reason: HOSPADM

## 2022-09-07 RX ORDER — SODIUM CHLORIDE 9 MG/ML
INJECTION, SOLUTION INTRAVENOUS PRN
Status: DISCONTINUED | OUTPATIENT
Start: 2022-09-07 | End: 2022-09-09 | Stop reason: HOSPADM

## 2022-09-07 RX ORDER — CLOPIDOGREL BISULFATE 75 MG/1
75 TABLET ORAL DAILY
Status: DISCONTINUED | OUTPATIENT
Start: 2022-09-08 | End: 2022-09-09

## 2022-09-07 RX ADMIN — SODIUM CHLORIDE, PRESERVATIVE FREE 10 ML: 5 INJECTION INTRAVENOUS at 08:51

## 2022-09-07 RX ADMIN — DIPHENHYDRAMINE HYDROCHLORIDE 25 MG: 25 TABLET, FILM COATED ORAL at 17:26

## 2022-09-07 RX ADMIN — MONTELUKAST SODIUM 10 MG: 10 TABLET, COATED ORAL at 10:13

## 2022-09-07 RX ADMIN — DIPHENHYDRAMINE HYDROCHLORIDE, ZINC ACETATE: 2; .1 CREAM TOPICAL at 01:43

## 2022-09-07 RX ADMIN — METOPROLOL SUCCINATE 25 MG: 25 TABLET, EXTENDED RELEASE ORAL at 10:13

## 2022-09-07 RX ADMIN — INSULIN LISPRO 1 UNITS: 100 INJECTION, SOLUTION INTRAVENOUS; SUBCUTANEOUS at 12:07

## 2022-09-07 RX ADMIN — ATORVASTATIN CALCIUM 40 MG: 40 TABLET, FILM COATED ORAL at 10:13

## 2022-09-07 RX ADMIN — MULTIPLE VITAMINS W/ MINERALS TAB 2 TABLET: TAB at 10:13

## 2022-09-07 RX ADMIN — Medication 400 MG: at 10:13

## 2022-09-07 RX ADMIN — DIPHENHYDRAMINE HYDROCHLORIDE 25 MG: 25 TABLET, FILM COATED ORAL at 10:13

## 2022-09-07 RX ADMIN — CLOPIDOGREL BISULFATE 75 MG: 75 TABLET ORAL at 10:13

## 2022-09-07 RX ADMIN — PHYTONADIONE 5 MG: 5 TABLET ORAL at 12:47

## 2022-09-07 RX ADMIN — DILTIAZEM HYDROCHLORIDE 120 MG: 60 TABLET, FILM COATED ORAL at 10:13

## 2022-09-07 RX ADMIN — DILTIAZEM HYDROCHLORIDE 120 MG: 60 TABLET, FILM COATED ORAL at 22:12

## 2022-09-07 RX ADMIN — DIPHENHYDRAMINE HCL 25 MG: 25 TABLET ORAL at 22:31

## 2022-09-07 RX ADMIN — DIPHENHYDRAMINE HYDROCHLORIDE 25 MG: 25 TABLET, FILM COATED ORAL at 01:42

## 2022-09-07 RX ADMIN — DIPHENHYDRAMINE HYDROCHLORIDE, ZINC ACETATE: 2; .1 CREAM TOPICAL at 10:16

## 2022-09-07 RX ADMIN — Medication 5000 UNITS: at 10:13

## 2022-09-07 RX ADMIN — ACETAMINOPHEN 650 MG: 325 TABLET ORAL at 10:16

## 2022-09-07 RX ADMIN — DIPHENHYDRAMINE HYDROCHLORIDE, ZINC ACETATE: 2; .1 CREAM TOPICAL at 17:56

## 2022-09-07 ASSESSMENT — PAIN SCALES - GENERAL
PAINLEVEL_OUTOF10: 6
PAINLEVEL_OUTOF10: 0

## 2022-09-07 ASSESSMENT — PAIN DESCRIPTION - DESCRIPTORS: DESCRIPTORS: ACHING;DISCOMFORT;POUNDING

## 2022-09-07 ASSESSMENT — PAIN DESCRIPTION - PAIN TYPE: TYPE: ACUTE PAIN

## 2022-09-07 ASSESSMENT — PAIN DESCRIPTION - LOCATION: LOCATION: HEAD

## 2022-09-07 ASSESSMENT — PAIN - FUNCTIONAL ASSESSMENT: PAIN_FUNCTIONAL_ASSESSMENT: ACTIVITIES ARE NOT PREVENTED

## 2022-09-07 ASSESSMENT — PAIN DESCRIPTION - ORIENTATION: ORIENTATION: MID

## 2022-09-07 NOTE — PROGRESS NOTES
[address] Daily Progress Note      Admit Date:  9/4/2022    Subjective:  Mr. Sheila Steiner is being seen today for f/u afib and SOB. Still with PANG walking to bathroom. Wife at bedside.  Tele afib 63bpm    Objective:   /67   Pulse 59   Temp 98.3 °F (36.8 °C) (Oral)   Resp 16   Ht 6' 3\" (1.905 m)   Wt (!) 306 lb 4.8 oz (138.9 kg)   SpO2 99%   BMI 38.28 kg/m²     Intake/Output Summary (Last 24 hours) at 9/7/2022 0737  Last data filed at 9/6/2022 1939  Gross per 24 hour   Intake 0 ml   Output 850 ml   Net -850 ml       TELEMETRY: afib 63bpm     Physical Exam:  General:  Awake, alert, NAD  Skin:  Warm and dry  Neck:  JVD none obvious  Chest:  Clear to auscultation, respiration easy  Cardiovascular:  +irregularly irregular S1S2  Abdomen:  Soft NT  Extremities:  No edema    Medications:    atorvastatin  40 mg Oral Daily    vitamin D3  5,000 Units Oral Daily    clopidogrel  75 mg Oral Daily    [START ON 9/11/2022] vitamin B-12  5,000 mcg Oral Q7 Days    dilTIAZem  120 mg Oral 2 times per day    magnesium oxide  400 mg Oral Daily    metoprolol succinate  25 mg Oral Daily    montelukast  10 mg Oral Daily    therapeutic multivitamin-minerals  2 tablet Oral Daily    [Held by provider] spironolactone  25 mg Oral Daily    sodium chloride flush  5-40 mL IntraVENous 2 times per day    insulin lispro  0-4 Units SubCUTAneous TID WC    insulin lispro  0-4 Units SubCUTAneous Nightly    lisinopril  5 mg Oral Daily    warfarin placeholder: dosing by pharmacy   Other RX Placeholder      dextrose      sodium chloride       diphenhydrAMINE-zinc acetate, diphenhydrAMINE, glucose, dextrose bolus **OR** dextrose bolus, glucagon (rDNA), dextrose, sodium chloride flush, sodium chloride, polyethylene glycol, acetaminophen **OR** acetaminophen    Lab Data:  CBC:   Recent Labs     09/04/22  1249 09/05/22  0437   WBC 9.6 7.6   HGB 12.5* 11.9*   HCT 38.1* 34.6*   MCV 87.3 85.7    230     BMP:   Recent Labs 09/05/22  0437 09/06/22  0459 09/07/22  0440   * 134* 137   K 4.0 4.3 4.3   CL 99 100 100   CO2 27 27 27   BUN 28* 28* 30*   CREATININE 1.2 1.3 1.4*     LIVER PROFILE:   Recent Labs     09/04/22  1249 09/05/22  0437   AST 19 15   ALT 23 19   LIPASE 25.0  --    BILITOT 0.8 0.7   ALKPHOS 111 101     PT/INR:   Recent Labs     09/05/22 0437 09/06/22  0459 09/07/22  0440   PROTIME 25.7* 26.3* 27.0*   INR 2.36* 2.42* 2.51*       6/16/21 ECHO Summary   Left ventricular systolic function is normal with a visually estimated   ejection fraction of 55%. The left ventricle is normal in size with mild concentric hypertrophy. No obvious regional wall motion abnormalities noted. Increased left ventricular filling pressure. The left atrium appears moderately enlarged. The right atrium is severely enlarged. Right ventricle is enlarged but   normal function. Inadequate tricuspid valve regurgitation to estimate systolic pulmonary   artery pressure. The IVC is dilated in size (>2.1 cm) but appears to collapse >50% with   respiration consistent with elevated right atrial pressures (8 mmHg). Cath: Left heart cath 6/16/2021 [facility]  Procedures Performed:   1. Selective left heart catheterization  2. Percutaneous coronary intervention of the left anterior descending and diagonal artery artery using complex bifurcation technique. Procedure Findings:  1. Critical LAD diagonal disease with angina classification 0, 0, 1 along with mid LAD 70% stenosis      ECHO 9/6/22 Summary   LV systolic function is normal with EF estimated at 55-60%. No regional wall motion abnormalities are noted. There is moderate concentric left ventricular hypertrophy. Normal left ventricular diastolic filling pressure. Biatrial enlargement. Mild posterior mitral annular calcification is present. Aortic valve appears sclerotic but opens adequately. Mild mitral and tricuspid regurgitation.    Systolic pulmonary artery pressure (SPAP) estimated at 46mmHg (RA pressure 3   mmHg), consistent with mild pulmonary hypertension. 6- 55%, LVH, LAE, ANNIE    Assessment:    Afib:  -remains afib on telemetry   -HR controlled   -EKG afib with prolonged QT 510ms   -repeat EKG with improved QTc   -I agree with d/c Tikosyn and will not restart given not maintaining NSR and QTc   -continue cardizem 120mg qd and Toprol XL 25mg qd   -holding warfarin for future cath; INR 2.51 today    -will give po vitamin K 5mg and recheck INR tomorrow    2. CAD:   -s/p PCI of LAD and Diagonal in 6/21   -There are no concerning symptoms for angina currently.     -continue plavix 75mg qd, lipitor 40mg qd, lisinopril 5mg qd    3. Covid +:   -hx Covid in July   -treatment if any per IM team    4. SOB:   -? Anginal equivalent or CHF or due to afib   -ECHO EF=55-60%; LVH; normal diastolic pressure (no change 6/21)   -I discussed case with Dr. Sarah Beth Good and he would like to proceed with inpatient RHC/LHC given concern for PANG being anginal equivalent and hx PCI in 6/21   -Based on these findings I recommend left and right heart cath for definitive evaluation of coronary arteries. Risks, benefits, expectations, and alternative treatments were discussed. Questions appropriately answered. Samuel Perry agrees to proceed and verbalized understanding.     -will delay cath until tomorrow to let INR decrease to avoid bleeding complications.       Patient Active Problem List    Diagnosis Date Noted    SOB (shortness of breath)     Moderate obstructive sleep apnea 01/04/2016    Essential hypertension 07/31/2015    Atrial fibrillation ([facility] [address] 02/05/2012    Acute on chronic heart failure with preserved ejection fraction ([address] 09/05/2022    Coronary artery disease involving native coronary artery of native heart 09/05/2022    COVID-19 09/04/2022    Obesity (BMI 30-39.9) 04/22/2022    Atypical atrial flutter ([address] 04/06/2017    S/P thoracotomy     Polyneuropathy 01/04/2016 Numbness and tingling of both legs 01/04/2016    Carpal tunnel syndrome 08/31/2015    Arthritis     Other fatigue 12/03/2021    Status post insertion of drug-eluting stent into left anterior descending (LAD) artery for coronary artery disease 06/29/2021    Angina at rest [facility]) 06/16/2021    Abnormal stress test 06/14/2021    History of venous thromboembolism 01/03/2020    Cataract of both eyes 01/03/2020    Morbid obesity with BMI of 40.0-44.9, adult ([facility] 04/03/2019    Type 2 diabetes mellitus with diabetic neuropathy ([facility] 02/25/2019     Valerie Nash MD, MD 9/7/2022 7:37 AM

## 2022-09-07 NOTE — FLOWSHEET NOTE
09/07/22 1013   Pain Assessment   Pain Assessment 0-10   Pain Level 6   Patient's Stated Pain Goal 1   Pain Location Head   Pain Orientation Mid   Pain Descriptors Aching;Discomfort;Pounding   Functional Pain Assessment Activities are not prevented   Pain Type Acute pain   Non-Pharmaceutical Pain Intervention(s) Rest;Food       PRN tylenol given for headache. Scheduled morning medications administered at this time as well per eMAR.

## 2022-09-07 NOTE — FLOWSHEET NOTE
09/06/22 1934   Vital Signs   Temp 97.4 °F (36.3 °C)   Temp Source Oral   Heart Rate 69   Heart Rate Source Monitor   Resp 18   /69   BP Location Left upper arm   BP Method Automatic   MAP (Calculated) 91.33   Patient Position Semi fowlers   Level of Consciousness 0   MEWS Score 1   Oxygen Therapy   SpO2 99 %   O2 Device None (Room air)     Shift assessment completed see flow sheet. Patient in bed alert and oriented X4. Patient on room air, showing no signs of distress. Evening medications given per order. No other needs at this time. Call light in reach.

## 2022-09-07 NOTE — CONSULTS
Clinical Pharmacy Consult Note    Pharmacy was consulted by Dr. Gavin Antonio to dose Warfarin for A.Fib. We will sign off of the case, as this therapy has since been discontinued. Please consider consulting Pharmacy again if Warfarin is re-started. Thank you for allowing us to participate in the care of this patient. Zohra Govea Pharm. D., [facility]  [phone]  9/7/2022 8:18 AM

## 2022-09-07 NOTE — PLAN OF CARE
Problem: ABCDS Injury Assessment  Goal: Absence of physical injury  Recent Flowsheet Documentation  Taken 9/7/2022 0353 by Shama Mena RN  Absence of Physical Injury: Implement safety measures based on patient assessment  9/6/2022 1837 by Darryl Leary RN  Outcome: Progressing     Problem: Discharge Planning  Goal: Discharge to home or other facility with appropriate resources  9/7/2022 0354 by Shama Mena RN  Outcome: Progressing  Flowsheets (Taken 9/6/2022 2005)  Discharge to home or other facility with appropriate resources: Identify barriers to discharge with patient and caregiver  9/6/2022 1837 by Darryl Leary RN  Outcome: Progressing

## 2022-09-07 NOTE — CARE COORDINATION
DISCHARGE ORDER  Date/Time 2022 1:20 PM  Completed by: Cristiane Jovel RN, Case Management    Patient Name: Teri Mayen      : 1945  Admitting Diagnosis: Generalized weakness [R53.1]  Elevated brain natriuretic peptide (BNP) level [R79.89]  COVID-19 [U07.1]      Admit order Date and Status:IP 2022  (verify MD's last order for status of admission)      Noted discharge order. TXF to [facility] for Cardiology/cath lab. +C19. Transportation arrangement per [employer].

## 2022-09-07 NOTE — PROGRESS NOTES
Pt made aware of ETA for transport and provided room number. Pt signed transfer papers at this time.

## 2022-09-07 NOTE — PROGRESS NOTES
Hospitalist Progress Note      PCP: Ridge Cameron MD    Date of Admission: 9/4/2022    Chief Complaint: dizzy lightheaded when ambulating. Subjective:       Feels better. ECHO reviewed. Discussed with cardiology - recommend transfer to [facility] under hospitalist service for [facility]        Medications:  Reviewed    Infusion Medications    dextrose      sodium chloride       Scheduled Medications    phytonadione  5 mg Oral Once    atorvastatin  40 mg Oral Daily    vitamin D3  5,000 Units Oral Daily    [Held by provider] clopidogrel  75 mg Oral Daily    [START ON 9/11/2022] vitamin B-12  5,000 mcg Oral Q7 Days    dilTIAZem  120 mg Oral 2 times per day    magnesium oxide  400 mg Oral Daily    metoprolol succinate  25 mg Oral Daily    montelukast  10 mg Oral Daily    therapeutic multivitamin-minerals  2 tablet Oral Daily    [Held by provider] spironolactone  25 mg Oral Daily    sodium chloride flush  5-40 mL IntraVENous 2 times per day    insulin lispro  0-4 Units SubCUTAneous TID WC    insulin lispro  0-4 Units SubCUTAneous Nightly    lisinopril  5 mg Oral Daily     PRN Meds: diphenhydrAMINE-zinc acetate, diphenhydrAMINE, glucose, dextrose bolus **OR** dextrose bolus, glucagon (rDNA), dextrose, sodium chloride flush, sodium chloride, polyethylene glycol, acetaminophen **OR** acetaminophen      Intake/Output Summary (Last 24 hours) at 9/7/2022 1219  Last data filed at 9/7/2022 0724  Gross per 24 hour   Intake --   Output 950 ml   Net -950 ml         Physical Exam Performed:    /61   Pulse 72   Temp 97.5 °F (36.4 °C) (Oral)   Resp 18   Ht 6' 3\" (1.905 m)   Wt (!) 306 lb 4.8 oz (138.9 kg)   SpO2 99%   BMI 38.28 kg/m²     General appearance: No apparent distress, appears stated age and cooperative. HEENT: Pupils equal, round, and reactive to light. Conjunctivae/corneas clear. Neck: Supple, with full range of motion. No jugular venous distention. Trachea midline.   Respiratory: Normal respiratory effort. Clear to auscultation, bilaterally without Rales/Wheezes/Rhonchi. Cardiovascular: Regular rate and rhythm with normal S1/S2 without murmurs, rubs or gallops. Abdomen: Soft, non-tender, non-distended with normal bowel sounds. Musculoskeletal: No clubbing, cyanosis or edema bilaterally. Full range of motion without deformity. Skin: Skin color, texture, turgor normal.  No rashes or lesions. Neurologic:  Neurovascularly intact without any focal sensory/motor deficits. Cranial nerves: II-XII intact, grossly non-focal.  Psychiatric: Alert and oriented, thought content appropriate, normal insight  Capillary Refill: Brisk, 3 seconds, normal   Peripheral Pulses: +2 palpable, equal bilaterally       Labs:   Recent Labs     09/04/22  1249 09/05/22  0437   WBC 9.6 7.6   HGB 12.5* 11.9*   HCT 38.1* 34.6*    230       Recent Labs     09/05/22  0437 09/06/22  0459 09/07/22  0440   * 134* 137   K 4.0 4.3 4.3   CL 99 100 100   CO2 27 27 27   BUN 28* 28* 30*   CREATININE 1.2 1.3 1.4*   CALCIUM 9.2 9.0 9.0       Recent Labs     09/04/22  1249 09/05/22  0437   AST 19 15   ALT 23 19   BILITOT 0.8 0.7   ALKPHOS 111 101       Recent Labs     09/05/22  0437 09/06/22  0459 09/07/22  0440   INR 2.36* 2.42* 2.51*       Recent Labs     09/04/22  1249 09/04/22  1753 09/04/22 2014   Crystal Nailer <0.01 <0.01 <0.01         Urinalysis:      Lab Results   Component Value Date/Time    NITRU Negative 09/04/2022 02:27 PM    WBCUA 6-10 04/25/2020 09:35 PM    BACTERIA 3+ 11/10/2016 03:30 PM    RBCUA 3-4 04/25/2020 09:35 PM    BLOODU Negative 09/04/2022 02:27 PM    SPECGRAV <=1.005 09/04/2022 02:27 PM    GLUCOSEU Negative 09/04/2022 02:27 PM    GLUCOSEU NEGATIVE 02/05/2012 09:00 AM       Radiology:  XR CHEST PORTABLE   Final Result   1. Mild persistent enlargement of the cardiac silhouette. 2. Otherwise, no convincing acute cardiopulmonary abnormality.          XR CHEST (2 VW)   Final Result   No acute cardiopulmonary disease. Assessment/Plan:    Active Hospital Problems    Diagnosis     SOB (shortness of breath) [R06.02]      Priority: High    Atrial fibrillation ([facility] [I48.91]      Priority: High    Acute on chronic heart failure with preserved ejection fraction (HCC) [I50.33]      Priority: Medium    Coronary artery disease involving native coronary artery of native heart [I25.10]      Priority: Medium    COVID-19 [U07.1]      Priority: Medium         Lightheaded and SOB. I believe pt had acute CHF exacerbation several weeks prior with weeping leg edema and dyspnea and orthopnea - and treated appropriately by his cardiologist with increased dose lasix. He then went to [place] - had cellulitis - treated with Abx. He is now presenting with dyspnea and feeling lightheaded which I think are related to Afib and likely overdiuresis - his weeping leg edema has now resolved and he shows no other clinical signs of acute CHF  Plan:   - cont to hold lasix and aldactone. - ECHO reviewed - preserved EF 55-60%,Mod LVH, Mild Pulm HTN. - cardiology involved  - orthostatic vitals checked 9/5 and positive. Afib - prior Hx of ablation. Unfortunately back in Afib now. - on warfarin - held  - dilt 120 BID  - tikosyn - stopped per cardiology due to prolonged QT  Cardiology consult. Hx of CAD s/p PTCA. Plavix - held starting 9/7 forcath lab. Statin, toprol XL,   Hold aldactone. Hold Lasix. Incidental COVID infection - possibly contributory to Afib and feeling of weakness and fatigue. Prior vaccinated  Prior episode of COVID infection. Stable on RA. No PNA on imaging. Supportive care. Coagulopathy - due to coumadin. Coumadin on hold for cath lab - will receive VitK PO today. (INR 2.51 today). DVT Prophylaxis: warfarin - daily INR  Diet: ADULT DIET; Regular; 4 carb choices (60 gm/meal);  Low Sodium (2 gm)  Code Status: Full Code        Dispo - cardiology requested transfer to [place] today under Hospitalist service for cath lab tomorrow.        Chandu Rodriguez MD

## 2022-09-07 NOTE — PROGRESS NOTES
Received call from [facility] at [facility] access center. Provided room number for admission at [facility], [address]. Call report to [phone]. Pick-up scheduled with [facility]. ETA 1900.

## 2022-09-07 NOTE — PROGRESS NOTES
Hand off report give to  ANGELINA Perez. Patient is stable showing no signs of distress and has no current needs at this time. Call light is in reach and bed is in lowest position. Care is transferred at this time.

## 2022-09-07 NOTE — DISCHARGE SUMMARY
Hospital Medicine Discharge Summary    Patient ID: Sheila Overall      Patient's PCP: Romana Just, MD    Admit Date: 9/4/2022     Discharge Date:   9/7/2022  Admitting Provider: Candice Tee MD     Discharge Provider: Gerhardt Portugal, MD     Discharge Diagnoses: Active Hospital Problems    Diagnosis     SOB (shortness of breath) [R06.02]      Priority: High    Atrial fibrillation (HCC) [I48.91]      Priority: High    Acute on chronic heart failure with preserved ejection fraction (HCC) [I50.33]      Priority: Medium    Coronary artery disease involving native coronary artery of native heart [I25.10]      Priority: Medium    COVID-19 [U07.1]      Priority: Medium       The patient was seen and examined on day of discharge and this discharge summary is in conjunction with any daily progress note from day of discharge. Hospital Course: The patient is a 69 y.o. male Hx of Afib, on tikosyn, warfarin, Also Hx of CAD on plavix, who presents with SOB. Pt reports few weeks ago he noticed markedly worsening b/l LE edema - called his cardiologist, started increased dose lasix. Excellent diuresis afterwards and felt better, though follow up lab work showed Cr bump and so lasix dose was decreased. He then went to [place] with his wife. While in [place], developed RLE foot and ankle cellulitis and treated with Abx. Cut the trip short and returned to [place]. Seen his PCP. Was supposed to call Tuesday for cath lab procedure to be scheduled per his cardiologist recommendation. Friday developing worsening dyspnea and came to ED instead. Pt today complains primarily of feeling lightheaded and SOB with ambulation and body position changes. He feels comfortable at rest. He is lying flat in bed and denies orthopnea today as well. Lightheaded and SOB.    I believe pt had acute CHF exacerbation several weeks prior with weeping leg edema and dyspnea and orthopnea - and treated appropriately by his cardiologist with increased dose lasix. He then went to [place] - had cellulitis - treated with Abx. He is now presenting with dyspnea and feeling lightheaded which I think are related to Afib and likely overdiuresis - his weeping leg edema has now resolved and he shows no other clinical signs of acute CHF  Plan:   - cont to hold lasix and aldactone. - ECHO reviewed - preserved EF 55-60%,Mod LVH, Mild Pulm HTN. - cardiology involved  - orthostatic vitals checked 9/5 and positive. Afib - prior Hx of ablation. Unfortunately back in Afib now. - on warfarin - held  - dilt 120 BID  - tikosyn - stopped per cardiology due to prolonged QT  Cardiology consult. Hx of CAD s/p PTCA. Plavix - held starting 9/7 forcath lab. Statin, toprol XL,   Hold aldactone. Hold Lasix. Incidental COVID infection - possibly contributory to Afib and feeling of weakness and fatigue. Prior vaccinated  Prior episode of COVID infection. Stable on RA. No PNA on imaging. Supportive care. Coagulopathy - due to coumadin. Coumadin on hold for cath lab - will receive VitK PO today. (INR 2.51 today). Plan to reverse coumadin with 5mg of PO VitK today and transfer to [facility] under Hospitalist service per cardiology request for [facility] tomorrow. Physical Exam Performed:     /61   Pulse 72   Temp 97.5 °F (36.4 °C) (Oral)   Resp 18   Ht 6' 3\" (1.905 m)   Wt (!) 306 lb 4.8 oz (138.9 kg)   SpO2 99%   BMI 38.28 kg/m²       General appearance:  No apparent distress, appears stated age and cooperative. HEENT:  Normal cephalic, atraumatic without obvious deformity. Pupils equal, round, and reactive to light. Extra ocular muscles intact. Conjunctivae/corneas clear. Neck: Supple, with full range of motion. No jugular venous distention. Trachea midline. Respiratory:  Normal respiratory effort.  Clear to auscultation, bilaterally without Rales/Wheezes/Rhonchi. Cardiovascular:  Regular rate and rhythm with normal S1/S2 without murmurs, rubs or gallops. Abdomen: Soft, non-tender, non-distended with normal bowel sounds. Musculoskeletal:  No clubbing, cyanosis or edema bilaterally. Full range of motion without deformity. Skin: Skin color, texture, turgor normal.  No rashes or lesions. Neurologic:  Neurovascularly intact without any focal sensory/motor deficits. Cranial nerves: II-XII intact, grossly non-focal.  Psychiatric:  Alert and oriented, thought content appropriate, normal insight  Capillary Refill: Brisk,< 3 seconds   Peripheral Pulses: +2 palpable, equal bilaterally       Labs: For convenience and continuity at follow-up the following most recent labs are provided:      CBC:    Lab Results   Component Value Date/Time    WBC 7.6 09/05/2022 04:37 AM    HGB 11.9 09/05/2022 04:37 AM    HCT 34.6 09/05/2022 04:37 AM     09/05/2022 04:37 AM       Renal:    Lab Results   Component Value Date/Time     09/07/2022 04:40 AM    K 4.3 09/07/2022 04:40 AM    K 4.0 09/05/2022 04:37 AM     09/07/2022 04:40 AM    CO2 27 09/07/2022 04:40 AM    BUN 30 09/07/2022 04:40 AM    CREATININE 1.4 09/07/2022 04:40 AM    CALCIUM 9.0 09/07/2022 04:40 AM    PHOS 2.2 02/19/2019 05:06 AM         Significant Diagnostic Studies    Radiology:   XR CHEST PORTABLE   Final Result   1. Mild persistent enlargement of the cardiac silhouette. 2. Otherwise, no convincing acute cardiopulmonary abnormality. XR CHEST (2 VW)   Final Result   No acute cardiopulmonary disease. Consults:     IP CONSULT TO CARDIOLOGY  IP CONSULT TO HEART FAILURE NURSE/COORDINATOR  IP CONSULT TO DIETITIAN  IP CONSULT TO PHARMACY    Disposition: [facility]    Condition at Discharge: Stable    Discharge Instructions/Follow-up:  PCP 1 week.      Code Status:  Full Code     Activity: activity as tolerated    Diet: regular diet      Discharge Medications: Current Discharge Medication List             Details   warfarin (COUMADIN) 5 MG tablet TAKE ONE TABLET BY MOUTH DAILY  Qty: 90 tablet, Refills: 1      metFORMIN (GLUCOPHAGE) 1000 MG tablet Take 0.5 tablets by mouth in the morning and 0.5 tablets in the evening. Take with meals. Qty: 180 tablet, Refills: 0    Comments: Dose Adjustment. montelukast (SINGULAIR) 10 MG tablet TAKE ONE TABLET BY MOUTH DAILY  Qty: 90 tablet, Refills: 1      spironolactone (ALDACTONE) 25 MG tablet TAKE ONE TABLET BY MOUTH DAILY  Qty: 90 tablet, Refills: 0      metoprolol succinate (TOPROL XL) 25 MG extended release tablet TAKE ONE TABLET BY MOUTH DAILY  Qty: 90 tablet, Refills: 3      atorvastatin (LIPITOR) 40 MG tablet TAKE ONE TABLET BY MOUTH DAILY  Qty: 90 tablet, Refills: 0      clopidogrel (PLAVIX) 75 MG tablet Take 1 tablet by mouth daily  Qty: 90 tablet, Refills: 3      Dulaglutide (TRULICITY) 0.41 GG/2.2GV SOPN Inject 0.75 mg into the skin once a week      !! blood glucose test strips (TRUE METRIX BLOOD GLUCOSE TEST) strip TEST ONCE DAILY. DX:E11.9  Qty: 100 strip, Refills: 3      senna (SENOKOT) 8.6 MG tablet Take 1 tablet by mouth 2 times daily  Qty: 60 tablet, Refills: 0      !! dofetilide (TIKOSYN) 250 MCG capsule Take 1 capsule by mouth 2 times daily  Qty: 60 capsule, Refills: 3      torsemide (DEMADEX) 20 MG tablet Take 1 tablet by mouth daily  Qty: 90 tablet, Refills: 1      !! dofetilide (TIKOSYN) 250 MCG capsule Take 1 capsule by mouth every 12 hours  Qty: 60 capsule, Refills: 0      dilTIAZem (CARDIZEM) 120 MG tablet TAKE 1 TABLET BY MOUTH  EVERY 12 HOURS  Qty: 180 tablet, Refills: 3      !! Easy Touch Lancets 32G/Twist MISC TEST DAILY. DX: E11.9  Qty: 100 each, Refills: 3      !! blood glucose test strips (TRUE METRIX BLOOD GLUCOSE TEST) strip USE TO CHECK BLOOD GLUCOSE DAILY.  DX: E11.9  Qty: 100 each, Refills: 3    Associated Diagnoses: Type 2 diabetes mellitus without complication, without long-term current use of insulin ([facility] [address]      ! ! ACCU-CHEK MULTICLIX LANCETS MISC Check sugars once daily. Dx;E11.9  Qty: 100 each, Refills: 11    Associated Diagnoses: Type 2 diabetes mellitus without complication, without long-term current use of insulin (HCC)      Lancet Devices (EASY TOUCH LANCING DEVICE) MISC Test Daily. DX: E11.9  Qty: 1 each, Refills: 0      Blood Glucose Monitoring Suppl (TRUE METRIX METER) VINNIE Use to check daily. DX;E11.9  Qty: 1 Device, Refills: 0      Lancets Misc. (ACCU-CHEK MULTICLIX LANCET DEV) KIT Check sugars once daily. DX;E11.9  Qty: 1 kit, Refills: 0      Biotin 5 MG TABS Take 5 mg by mouth daily      Cholecalciferol (VITAMIN D3) 5000 units TABS Take 5,000 Units by mouth daily      Cyanocobalamin (VITAMIN B-12 CR PO) Take 5,000 mcg by mouth every 7 days       Multiple Vitamins-Minerals (THERAPEUTIC MULTIVITAMIN-MINERALS) tablet Take 2 tablets by mouth daily       magnesium oxide (MAG-OX) 400 MG tablet Take 400 mg by mouth daily. !! - Potential duplicate medications found. Please discuss with provider. Time Spent on discharge is more than 30 minutes in the examination, evaluation, counseling and review of medications and discharge plan. Signed:    Crista Hampton MD   9/7/2022      Thank you Vasquez Grissom MD for the opportunity to be involved in this patient's care. If you have any questions or concerns, please feel free to contact me at [phone].

## 2022-09-07 NOTE — ED PROVIDER NOTES
I independently examined and evaluated Madalyn Richards. I personally saw the patient and performed a substantive portion of the visit including all aspects of the medical decision making. I am the primary physician of record. In brief, Madalyn Richards is a 69 y.o. male with a past medical history of hypertension, diabetes, coronary artery disease, hyperlipidemia, hypertension, atrial fibrillation, who presents to the ED complaining of fatigue and weakness. No palliative or provocative factors. Patient reports generalized malaise over the past several days, much worse today. He has recently traveled to [place] and was diagnosed with cellulitis during that time and was treated with an unknown antibiotic. Patient was seen by his PCP 5 days ago and had reassuring work-up but who stated that if symptoms worsen that he should be admitted. Patient also was seen by his cardiologist recently who changed his Lasix dosing due to increased leg swelling. Cardiologist is planning for angiogram, last stent was a year ago. He does reportedly have a history of a \"silent MI\". REVIEW OF SYSTEMS  All systems reviewed, pertinent positives per HPI otherwise noted to be negative. Focused exam revealed   PHYSICAL EXAM  BP (P) 116/73   Pulse 92   Temp (P) 98.4 °F  (Oral)   Resp (P) 16   Ht 6' 3\" (1.905 m)   Wt (!) (P) 306 lb 4.8 oz (138.9 kg)   SpO2 100%   BMI (P) 38.28 kg/m²    GENERAL APPEARANCE: Awake and alert. Cooperative. no distress. HENT: Normocephalic. Atraumatic. Mucous membranes are moist.  NECK: Supple. Full range of motion of the neck without stiffness or pain. No meningismus  EYES: PERRL. EOM's grossly intact. HEART/CHEST: RRR. No murmurs. Chest wall is not tender to palpation. LUNGS: Respirations unlabored. CTAB. Good air exchange. Speaking comfortably in full sentences. ABDOMEN: No tenderness. Soft. Non-distended. No masses. No organomegaly. No guarding or rebound.    MUSCULOSKELETAL: Bilateral this patient to be included in the SEP-1 Core Measure due to severe sepsis or septic shock? No   Exclusion criteria - the patient is NOT to be included for SEP-1 Core Measure due to:  Viral etiology found or highly suspected (including COVID-19) without concomitant bacterial infection    Based on results of work-up, I am concerned for COVID and fluid overload. Also plan to admit the patient for. At this time, do feel the patient requires admission for further work-up and management. Discussed the patient with hospital team.    CLINICAL IMPRESSION  1. COVID-19    2. Elevated brain natriuretic peptide (BNP) level    3. Generalized weakness        Blood pressure 130/67, pulse 59, temperature 98.3 °F (36.8 °C), temperature source Oral, resp. rate 16, height 6' 3\" (1.905 m), weight (!) 306 lb 4.8 oz (138.9 kg), SpO2 99 %. DISPOSITION  Neftaly Butler was admitted in stable condition. All diagnostic, treatment, and disposition decisions were made by myself in conjunction with the advanced practice provider. For all further details of the patient's emergency department visit, please see the advanced practice provider's documentation. Comment: Please note this report has been produced using speech recognition software and may contain errors related to that system including errors in grammar, punctuation, and spelling, as well as words and phrases that may be inappropriate. If there are any questions or concerns please feel free to contact the dictating provider for clarification.        Alexy Castañeda MD  09/07/22 [place]

## 2022-09-07 NOTE — PROGRESS NOTES
Report given to crew with [facility]. Telemetry monitor #[id number] removed and returned to [address] [place] [address]. EMS placed pt on mobile monitor at this time. Pt left facility in stable condition to [facility] with all of their personal belongings.

## 2022-09-07 NOTE — FLOWSHEET NOTE
09/07/22 0130   Vital Signs   Temp 98.3 °F (36.8 °C)   Temp Source Oral   Heart Rate 59   Heart Rate Source Monitor   Resp 16   /67   BP Location Left upper arm   BP Method Automatic   MAP (Calculated) 88   Patient Position Semi fowlers   Level of Consciousness 0   MEWS Score 1   Oxygen Therapy   SpO2 99 %   O2 Device None (Room air)   Height and Weight   Weight (!) 306 lb 4.8 oz (138.9 kg)   Weight Method Bed scale   BMI (Calculated) 38.4     Vitals taken, shown above. Patient is stable, comfortable in bed. Prn benadryl topical and po provided. Call light in reach. Bed in lowest position.

## 2022-09-07 NOTE — PROGRESS NOTES
Shift report given to Marleny Ramos RN. Pt stable, denies are further needs. Care of pt transferred at this time.

## 2022-09-08 ENCOUNTER — TELEPHONE (OUTPATIENT)
Dept: INTERNAL MEDICINE CLINIC | Age: 77
End: 2022-09-08

## 2022-09-08 ENCOUNTER — APPOINTMENT (OUTPATIENT)
Dept: CARDIAC CATH/INVASIVE PROCEDURES | Age: 77
DRG: 246 | End: 2022-09-08
Attending: INTERNAL MEDICINE
Payer: MEDICARE

## 2022-09-08 PROBLEM — I20.89 ANGINAL EQUIVALENT: Status: ACTIVE | Noted: 2022-09-08

## 2022-09-08 PROBLEM — I20.8 ANGINAL EQUIVALENT (HCC): Status: ACTIVE | Noted: 2022-09-08

## 2022-09-08 LAB
ANION GAP SERPL CALCULATED.3IONS-SCNC: 10 MMOL/L (ref 3–16)
BUN BLDV-MCNC: 28 MG/DL (ref 7–20)
CALCIUM SERPL-MCNC: 8.9 MG/DL (ref 8.3–10.6)
CHLORIDE BLD-SCNC: 103 MMOL/L (ref 99–110)
CO2: 23 MMOL/L (ref 21–32)
CREAT SERPL-MCNC: 1.2 MG/DL (ref 0.8–1.3)
EKG ATRIAL RATE: 68 BPM
EKG DIAGNOSIS: NORMAL
EKG Q-T INTERVAL: 466 MS
EKG QRS DURATION: 80 MS
EKG QTC CALCULATION (BAZETT): 457 MS
EKG R AXIS: 191 DEGREES
EKG T AXIS: 210 DEGREES
EKG VENTRICULAR RATE: 58 BPM
GFR AFRICAN AMERICAN: >60
GFR NON-AFRICAN AMERICAN: 59
GLUCOSE BLD-MCNC: 116 MG/DL (ref 70–99)
GLUCOSE BLD-MCNC: 125 MG/DL (ref 70–99)
GLUCOSE BLD-MCNC: 129 MG/DL (ref 70–99)
GLUCOSE BLD-MCNC: 136 MG/DL (ref 70–99)
GLUCOSE BLD-MCNC: 153 MG/DL (ref 70–99)
INR BLD: 1.88 (ref 0.87–1.14)
LV EF: 58 %
LVEF MODALITY: NORMAL
MAGNESIUM: 2.6 MG/DL (ref 1.8–2.4)
PERFORMED ON: ABNORMAL
POTASSIUM SERPL-SCNC: 4.2 MMOL/L (ref 3.5–5.1)
PROTHROMBIN TIME: 21.4 SEC (ref 11.7–14.5)
SODIUM BLD-SCNC: 136 MMOL/L (ref 136–145)

## 2022-09-08 PROCEDURE — 92978 ENDOLUMINL IVUS OCT C 1ST: CPT | Performed by: INTERNAL MEDICINE

## 2022-09-08 PROCEDURE — 93458 L HRT ARTERY/VENTRICLE ANGIO: CPT

## 2022-09-08 PROCEDURE — 93458 L HRT ARTERY/VENTRICLE ANGIO: CPT | Performed by: INTERNAL MEDICINE

## 2022-09-08 PROCEDURE — C1874 STENT, COATED/COV W/DEL SYS: HCPCS

## 2022-09-08 PROCEDURE — C9600 PERC DRUG-EL COR STENT SING: HCPCS

## 2022-09-08 PROCEDURE — 6360000002 HC RX W HCPCS

## 2022-09-08 PROCEDURE — 2500000003 HC RX 250 WO HCPCS

## 2022-09-08 PROCEDURE — 2060000000 HC ICU INTERMEDIATE R&B

## 2022-09-08 PROCEDURE — 36415 COLL VENOUS BLD VENIPUNCTURE: CPT

## 2022-09-08 PROCEDURE — 2580000003 HC RX 258: Performed by: INTERNAL MEDICINE

## 2022-09-08 PROCEDURE — C1753 CATH, INTRAVAS ULTRASOUND: HCPCS

## 2022-09-08 PROCEDURE — 99223 1ST HOSP IP/OBS HIGH 75: CPT | Performed by: INTERNAL MEDICINE

## 2022-09-08 PROCEDURE — 6360000002 HC RX W HCPCS: Performed by: INTERNAL MEDICINE

## 2022-09-08 PROCEDURE — 2709999900 HC NON-CHARGEABLE SUPPLY

## 2022-09-08 PROCEDURE — 80048 BASIC METABOLIC PNL TOTAL CA: CPT

## 2022-09-08 PROCEDURE — B241ZZ3 ULTRASONOGRAPHY OF MULTIPLE CORONARY ARTERIES, INTRAVASCULAR: ICD-10-PCS | Performed by: INTERNAL MEDICINE

## 2022-09-08 PROCEDURE — 85610 PROTHROMBIN TIME: CPT

## 2022-09-08 PROCEDURE — 99152 MOD SED SAME PHYS/QHP 5/>YRS: CPT | Performed by: INTERNAL MEDICINE

## 2022-09-08 PROCEDURE — C1887 CATHETER, GUIDING: HCPCS

## 2022-09-08 PROCEDURE — 4A023N7 MEASUREMENT OF CARDIAC SAMPLING AND PRESSURE, LEFT HEART, PERCUTANEOUS APPROACH: ICD-10-PCS | Performed by: INTERNAL MEDICINE

## 2022-09-08 PROCEDURE — B2151ZZ FLUOROSCOPY OF LEFT HEART USING LOW OSMOLAR CONTRAST: ICD-10-PCS | Performed by: INTERNAL MEDICINE

## 2022-09-08 PROCEDURE — 83735 ASSAY OF MAGNESIUM: CPT

## 2022-09-08 PROCEDURE — 92978 ENDOLUMINL IVUS OCT C 1ST: CPT

## 2022-09-08 PROCEDURE — 2580000003 HC RX 258

## 2022-09-08 PROCEDURE — 027034Z DILATION OF CORONARY ARTERY, ONE ARTERY WITH DRUG-ELUTING INTRALUMINAL DEVICE, PERCUTANEOUS APPROACH: ICD-10-PCS | Performed by: INTERNAL MEDICINE

## 2022-09-08 PROCEDURE — C1725 CATH, TRANSLUMIN NON-LASER: HCPCS

## 2022-09-08 PROCEDURE — 6370000000 HC RX 637 (ALT 250 FOR IP): Performed by: INTERNAL MEDICINE

## 2022-09-08 PROCEDURE — C1776 JOINT DEVICE (IMPLANTABLE): HCPCS

## 2022-09-08 PROCEDURE — C1894 INTRO/SHEATH, NON-LASER: HCPCS

## 2022-09-08 PROCEDURE — 92928 PRQ TCAT PLMT NTRAC ST 1 LES: CPT | Performed by: INTERNAL MEDICINE

## 2022-09-08 PROCEDURE — B2111ZZ FLUOROSCOPY OF MULTIPLE CORONARY ARTERIES USING LOW OSMOLAR CONTRAST: ICD-10-PCS | Performed by: INTERNAL MEDICINE

## 2022-09-08 PROCEDURE — C1769 GUIDE WIRE: HCPCS

## 2022-09-08 PROCEDURE — 6360000004 HC RX CONTRAST MEDICATION

## 2022-09-08 RX ORDER — SODIUM CHLORIDE 0.9 % (FLUSH) 0.9 %
5-40 SYRINGE (ML) INJECTION PRN
Status: DISCONTINUED | OUTPATIENT
Start: 2022-09-08 | End: 2022-09-09 | Stop reason: HOSPADM

## 2022-09-08 RX ORDER — MIDAZOLAM HYDROCHLORIDE 1 MG/ML
INJECTION INTRAMUSCULAR; INTRAVENOUS
Status: COMPLETED | OUTPATIENT
Start: 2022-09-08 | End: 2022-09-08

## 2022-09-08 RX ORDER — CLOPIDOGREL 300 MG/1
TABLET, FILM COATED ORAL
Status: COMPLETED | OUTPATIENT
Start: 2022-09-08 | End: 2022-09-08

## 2022-09-08 RX ORDER — FENTANYL CITRATE 50 UG/ML
INJECTION, SOLUTION INTRAMUSCULAR; INTRAVENOUS
Status: COMPLETED | OUTPATIENT
Start: 2022-09-08 | End: 2022-09-08

## 2022-09-08 RX ORDER — SODIUM CHLORIDE 9 MG/ML
INJECTION, SOLUTION INTRAVENOUS CONTINUOUS
Status: ACTIVE | OUTPATIENT
Start: 2022-09-08 | End: 2022-09-08

## 2022-09-08 RX ORDER — SODIUM CHLORIDE 0.9 % (FLUSH) 0.9 %
5-40 SYRINGE (ML) INJECTION EVERY 12 HOURS SCHEDULED
Status: DISCONTINUED | OUTPATIENT
Start: 2022-09-08 | End: 2022-09-09 | Stop reason: HOSPADM

## 2022-09-08 RX ORDER — SODIUM CHLORIDE 9 MG/ML
INJECTION, SOLUTION INTRAVENOUS PRN
Status: DISCONTINUED | OUTPATIENT
Start: 2022-09-08 | End: 2022-09-09 | Stop reason: HOSPADM

## 2022-09-08 RX ORDER — ACETAMINOPHEN 325 MG/1
650 TABLET ORAL EVERY 4 HOURS PRN
Status: DISCONTINUED | OUTPATIENT
Start: 2022-09-08 | End: 2022-09-09 | Stop reason: HOSPADM

## 2022-09-08 RX ORDER — ASPIRIN 81 MG/1
81 TABLET, CHEWABLE ORAL DAILY
Status: DISCONTINUED | OUTPATIENT
Start: 2022-09-09 | End: 2022-09-09 | Stop reason: HOSPADM

## 2022-09-08 RX ORDER — ONDANSETRON 2 MG/ML
4 INJECTION INTRAMUSCULAR; INTRAVENOUS EVERY 6 HOURS PRN
Status: DISCONTINUED | OUTPATIENT
Start: 2022-09-08 | End: 2022-09-09 | Stop reason: HOSPADM

## 2022-09-08 RX ADMIN — FENTANYL CITRATE 25 MCG: 50 INJECTION INTRAMUSCULAR; INTRAVENOUS at 15:57

## 2022-09-08 RX ADMIN — BIVALIRUDIN 1.75 MG/KG/HR: 250 INJECTION, POWDER, LYOPHILIZED, FOR SOLUTION INTRAVENOUS at 15:47

## 2022-09-08 RX ADMIN — FENTANYL CITRATE 25 MCG: 50 INJECTION INTRAMUSCULAR; INTRAVENOUS at 15:25

## 2022-09-08 RX ADMIN — ATORVASTATIN CALCIUM 40 MG: 40 TABLET, FILM COATED ORAL at 09:17

## 2022-09-08 RX ADMIN — MONTELUKAST SODIUM 10 MG: 10 TABLET ORAL at 09:17

## 2022-09-08 RX ADMIN — DIPHENHYDRAMINE HCL 25 MG: 25 TABLET ORAL at 12:07

## 2022-09-08 RX ADMIN — LISINOPRIL 5 MG: 5 TABLET ORAL at 09:17

## 2022-09-08 RX ADMIN — SODIUM CHLORIDE, PRESERVATIVE FREE 8 ML: 5 INJECTION INTRAVENOUS at 12:56

## 2022-09-08 RX ADMIN — MIDAZOLAM HYDROCHLORIDE 1 MG: 2 INJECTION, SOLUTION INTRAMUSCULAR; INTRAVENOUS at 15:25

## 2022-09-08 RX ADMIN — SODIUM CHLORIDE: 9 INJECTION, SOLUTION INTRAVENOUS at 18:28

## 2022-09-08 RX ADMIN — METOPROLOL SUCCINATE 25 MG: 25 TABLET, EXTENDED RELEASE ORAL at 09:17

## 2022-09-08 RX ADMIN — MIDAZOLAM HYDROCHLORIDE 1 MG: 2 INJECTION, SOLUTION INTRAMUSCULAR; INTRAVENOUS at 15:59

## 2022-09-08 RX ADMIN — DILTIAZEM HYDROCHLORIDE 120 MG: 60 TABLET, FILM COATED ORAL at 09:17

## 2022-09-08 RX ADMIN — MIDAZOLAM HYDROCHLORIDE 1 MG: 2 INJECTION, SOLUTION INTRAMUSCULAR; INTRAVENOUS at 15:41

## 2022-09-08 RX ADMIN — FENTANYL CITRATE 50 MCG: 50 INJECTION INTRAMUSCULAR; INTRAVENOUS at 15:20

## 2022-09-08 RX ADMIN — MIDAZOLAM HYDROCHLORIDE 2 MG: 2 INJECTION, SOLUTION INTRAMUSCULAR; INTRAVENOUS at 15:20

## 2022-09-08 RX ADMIN — DIPHENHYDRAMINE HCL 25 MG: 25 TABLET ORAL at 05:02

## 2022-09-08 RX ADMIN — CLOPIDOGREL 300 MG: 300 TABLET, FILM COATED ORAL at 16:11

## 2022-09-08 RX ADMIN — BIVALIRUDIN 1.75 MG/KG/HR: 250 INJECTION, POWDER, LYOPHILIZED, FOR SOLUTION INTRAVENOUS at 15:33

## 2022-09-08 NOTE — PROGRESS NOTES
Labels and Orders are correct and during bedside report all IV tubing has been physically traced from labeled bag, to channel, to IV site and verified by oncoming and off going nurse.        Report given to Bill ALFARO  Cath Lab RNElectronically signed by Zainab Helms RN on 9/8/22 at 4:31 PM EDT    Accepting RN{Esignature:[id number]}

## 2022-09-08 NOTE — PROCEDURES
[facility]       Cardiac Catheterization Lab Report    PATIENT: Parker Martell  DATE: 2022  MRN: [id number]  CSN: [id number]  : 1945      Performing Physician: Danii Hopkins MD, [place], [place]  Primary Care Physician: Annamaria Evans MD  Admitting/Referring provider: Sara Mendez MD     Procedures Performed:   1. Left heart catheterization  2. Selective left and right coronary angiogram  3. Left ventriculography   4. Intravascular ultrasound of the left anterior sending artery  5. Percutaneous coronary invention of the mid left anterior descending artery utilizing a 3.5 mm x 12 mm Abbott vascular Xience Kelly drug-eluting stent    Procedure Findings:  1. Single-vessel critical coronary disease with 70% stenosis of the mid left anterior descending artery   ~Previously placed stents within the LAD and diagonal bifurcation demonstrating patency. Stent within the mid LAD noted to have mild under deployment. 2. Normal left ventricular function with EF estimated at 55-60%  3. Normal left heart hemodynamics    Indications:   Patient Active Problem List   Diagnosis    Arthritis    Atrial fibrillation (HCC)    Essential hypertension    Carpal tunnel syndrome    Polyneuropathy    Numbness and tingling of both legs    Moderate obstructive sleep apnea    S/P thoracotomy    Atypical atrial flutter (HCC)    SOB (shortness of breath)    Type 2 diabetes mellitus with diabetic neuropathy ([facility] [address]    Morbid obesity with BMI of 40.0-44.9, adult ([facility] [address]    History of venous thromboembolism    Cataract of both eyes    Abnormal stress test    Angina at rest [facility])    Status post insertion of drug-eluting stent into left anterior descending (LAD) artery for coronary artery disease    Other fatigue    Obesity (BMI 30-39. 9)    COVID-19    Acute on chronic heart failure with preserved ejection fraction (HCC)    Coronary artery disease involving native coronary artery of native heart Anginal equivalent [facility])       Details:   Nazia Lee was brought to the cardiac catheterization lab in a fasting state after informed consent was obtained. If the patient was able to provide written consent, it was obtained. The patient's vitals were monitored through out the procedure. The patient was sedated using the appropriate levels of sedation and ASA guidelines. The appropriate access site area was prepped and drapped in a sterile fashion. The area was anesthetized with 2% lidocaine. Using the modified Seldinger technique, an arterial sheath was introduced into the arterial access site using a single anterior wall puncture. The sheath was flushed and prepped in usual fashion. Catheters used during the procedure included 5 Cambodian TIG 4.0 catheter. The catheters were advanced and removed over a .035\" wire, into the appropriate positions. Multiple angiographic views were obtained. An LV gram was obtained. The interventional portion of the procedure was completed utilizing a [employer] system. An XB 3.5 guide catheter advanced into the left main coronary ostium. Adjunctive pharmacotherapy included aspirin, Plavix, and Angiomax. Initially a BMW wire was advanced but would not cross our LAD lesion. We subsequently changed out for a choice PT wire and follow this with eventually a Runthrough wire. We did intravascular ultrasound of the left hand descending artery. The demonstrated to be de aishwarya disease within the mid LAD that was 70% and soft plaque. There is also noted to be under deployment of the previously placed stent within the mid LAD. We subsequently did balloon angioplasty of this vessel utilizing a 3.5 x 12 mm noncompliant Euphora balloon.   We stented due to advanced and Pump! Xience Kelly 4.0 mm x 12 mm stent this would not cross 7 then were able to advance a 3.5 mm x 12 mm and then postdilated with a noncompliant Euphora 4.0 mm x 12mm to 23 sadaf which was a total of 4.3 mm.    Findings:    1. Left main coronary artery was normal. It gave off the left anterior descending artery and left circumflex. 2. Left anterior descending artery has severe atherosclerotic disease. It was large in size. It gave off septal perforators and a moderate sized diagonal branch. The LAD covered the entire apex of the left ventricle. ~There is evidence for Denovo disease of about 70% within the mid LAD. This is confirmed by intravascular ultrasound. Furthermore there is a previously placed stent within the mid LAD that was under deployed and under expanded. 3. Left circumflex has mild atherosclerotic disease. It was moderate in size. There was a moderate sized obtuse marginal branch. 4. Right coronary artery has mild atherosclerotic disease. It was moderate in size and was the dominant artery. 5. Left ventriculogram showed normal LVEF at 55-60%. Wall motion was normal . There was no significant mitral valve or aortic valve disease noted. LVEDP was normal. There was no gradient noted across the aortic valve during pullback of the catheter. /81   Pulse (P) 69   Temp 97.7 °F (36.5 °C) (Oral)   Resp 18   Ht 6' 3\" (1.905 m)   Wt (!) 301 lb 8 oz (136.8 kg)   SpO2 97%   BMI 37.68 kg/m²     The access site was controlled with manual pressure and/or appropriate closure device. Moderate Conscious Sedation Details: An independent trained observer pushed medications at my direction. We monitoring the patient's level of consciousness and vital signs/physiologic status throughout the procedure. CPT codes 35868 and 24558      Start time: 1524  Stop time: 1612  ASA class: 3    Sedation totals:  Versad - 4mg  Fentanyl - 75mcg    EBL - minimal <5 cc blood loss    The patient was monitored continuously with the ECG, pulse oximetry, blood pressure, and direct observation. CONCLUSIONS:    1.   Successful intravascular ultrasound-guided drug-eluting stent insertion to the left anterior descending artery    ASSESSMENT/RECOMMENDATIONS:    1. Continue dual antiplatelet therapy and guideline directed medical therapy. 2.  Follow-up with patient's primary cardiologist after discharge.       Danii Hopkins MD, [place], [place] [address] Cardiology  [place] [address]  [phone] Main central office  [phone] [place] office  9/8/2022  4:18 PM

## 2022-09-08 NOTE — H&P
Hospital Medicine History & Physical      PCP: Iqra Blanc MD    Date of Admission: 9/7/2022    Date of Service: Pt seen/examined on 9/8/2022 and Admitted to Inpatient with expected LOS greater than two midnights due to medical therapy. Chief Complaint: Transfer from [facility] for cardiac angiogram      History Of Present Illness:    69 y.o. male who presented to [facility] with above complaints  Patient with PMH of A. fib on Coumadin and Tikosyn, DM2, CAD, CHF presented to [facility] ED on 9/4 with shortness of breath. On arrival he was orthostatic positive his diuretics were held. The Tikosyn was held as his QTC was prolonged. Given ongoing shortness of breath there was suspicion for anginal equivalent. So cardiology recommended that the patient be transferred to [facility] to get RHC/LHC especially given history of recent PCI to LAD in 6/21  Currently patient denies any shortness of breath at rest, he is currently on PAP therapy. Denies any cough at this time, no chest pain. Earlier today patient's Coumadin was held, was given a dose of vitamin K to reverse his anticoagulation so that he can get angiogram today.     Past Medical History:          Diagnosis Date    Arthritis     Asthma     past hx    Atrial fibrillation ([address]     Blood circulation, collateral     Diabetes mellitus ([address] 12/24/2018    DVT (deep venous thrombosis) (HCC)     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    History of knee replacement procedure of right knee     Hx of blood clots     2 DVT's    Hyperlipidemia     Hypertension     Knee osteoarthritis 1/17/2012    Left TKR 1/18/2012    Lung nodule     due to histoplasmosis    Neuromuscular disorder ([address]     Palpitations     stable with atenolol    Sleep apnea     uses CPAP    Stone, kidney     UTI (urinary tract infection) 01/23/2017       Past Surgical History:          Procedure Laterality Date    ABLATION OF DYSRHYTHMIC FOCUS  2013    a-fib    BARIATRIC SURGERY  2013    GASTRIC SLEEVE    CARDIAC SURGERY  2013    ablation    CARPAL TUNNEL RELEASE Right 9-30-15    CARPAL TUNNEL RELEASE Left 3/20/16    CHOLECYSTECTOMY, LAPAROSCOPIC N/A 2/19/2019    LAPAROSCOPIC CHOLECYSTECTOMY WITH CHOLANGIOGRAMS performed by Guy Gutierrez MD at [address] [place] [address]      with removal stone    CYSTOSCOPY  2/8/13    with Laser Vaporization of Enlarged Prostate    DIAGNOSTIC CARDIAC CATH LAB PROCEDURE      ERCP  02/18/2019    Sphincterotomy, stone removal    ERCP N/A 2/18/2019    ERCP SPHINCTER/PAPILLOTOMY performed by Chicho Membreno MD at [address] [place] [address] Real    ERCP  2/18/2019    ERCP STONE REMOVAL performed by Chicho Membreno MD at [facility] Right 1/14/2020    PHACOEMULSIFICATION OF CATARACT RIGHT EYE WITH INTRAOCULAR LENS IMPLANT performed by Faith Harding MD at [place] [address] Left 1/21/2020    PHACOEMULSIFICATION OF CATARACT LEFT EYE WITH INTRAOCULAR LENS IMPLANT -SLEEP APNEA- performed by Faith Harding MD at [facility] Bilateral     right knee (2002) and left knee (2012)    KNEE ARTHROSCOPY  4/19/2011     left  knee    SKIN BIOPSY      skin Ca/SQUAMOUS CELL    THORACOTOMY      wedge resection    TONSILLECTOMY         Medications Prior to Admission:      Prior to Admission medications    Medication Sig Start Date End Date Taking? Authorizing Provider   warfarin (COUMADIN) 5 MG tablet TAKE ONE TABLET BY MOUTH DAILY 8/11/22   Amey Lesch, MD   metFORMIN (GLUCOPHAGE) 1000 MG tablet Take 0.5 tablets by mouth in the morning and 0.5 tablets in the evening. Take with meals.  7/20/22   Amey Lesch, MD   montelukast (SINGULAIR) 10 MG tablet TAKE ONE TABLET BY MOUTH DAILY 7/1/22   Amey Lesch, MD   spironolactone (ALDACTONE) 25 MG tablet TAKE ONE TABLET BY MOUTH DAILY 7/1/22   Amey Lesch, MD   metoprolol succinate (TOPROL XL) 25 MG extended release tablet TAKE ONE TABLET BY MOUTH DAILY 7/1/22   Leonid Rowland MD   atorvastatin (LIPITOR) 40 MG tablet TAKE ONE TABLET BY MOUTH DAILY 7/1/22   Ridge Cameron MD   clopidogrel (PLAVIX) 75 MG tablet Take 1 tablet by mouth daily 5/23/22   Leonid Rowland MD   Dulaglutide (TRULICITY) 9.18 SP/3.4CF SOPN Inject 0.75 mg into the skin once a week    Historical Provider, MD   blood glucose test strips (TRUE METRIX BLOOD GLUCOSE TEST) strip TEST ONCE DAILY. DX:E11.9 1/20/22   Ridge Cameron MD   senna (SENOKOT) 8.6 MG tablet Take 1 tablet by mouth 2 times daily  Patient not taking: No sig reported 11/11/21   Ridge Cameron MD   dofetilide [facility]) 250 MCG capsule Take 1 capsule by mouth 2 times daily  Patient not taking: Reported on 9/7/2022 10/19/21   SOTERO Fischer MD   torsemide (DEMADEX) 20 MG tablet Take 1 tablet by mouth daily  Patient taking differently: Take 20 mg by mouth daily Takes 30 mg daily= 1.5 tablets daily 10/4/21   SERENA Mcduffie CNP   dofetilide (TIKOSYN) 250 MCG capsule Take 1 capsule by mouth every 12 hours  Patient not taking: Reported on 9/7/2022 7/7/21   Leonid Rowland MD   dilTIAZem (CARDIZEM) 120 MG tablet TAKE 1 TABLET BY MOUTH  EVERY 12 HOURS 9/3/20   Yaya Echavarria MD   Easy Touch Lancets 32G/Twist MISC TEST DAILY. DX: E11.9 3/23/20   Ridge Cameron MD   blood glucose test strips (TRUE METRIX BLOOD GLUCOSE TEST) strip USE TO CHECK BLOOD GLUCOSE DAILY. DX: E11.9 3/23/20   Ridge Cameron MD   ACCU-CHEK MULTICLIX LANCETS MISC Check sugars once daily. Dx;E11.9 3/11/19   Ridge Cameron MD   Lancet Devices (EASY TOUCH LANCING DEVICE) MISC Test Daily. DX: E11.9 12/26/18   Ridge Cameron MD   Blood Glucose Monitoring Suppl (TRUE METRIX METER) VINNIE Use to check daily. DX;E11.9 12/22/18   Ridge Cameron MD   Lancets [facility]. (ACCU-CHEK MULTICLIX LANCET DEV) KIT Check sugars once daily.  DX;E11.9 12/21/18   Sharon Guerrero MD   Biotin 5 MG TABS Take 5 mg by mouth daily    Historical Provider, MD   Cholecalciferol (VITAMIN D3) 5000 units TABS Take 5,000 Units by mouth daily    Historical Provider, MD   Cyanocobalamin (VITAMIN B-12 CR PO) Take 5,000 mcg by mouth every 7 days     Historical Provider, MD   Multiple Vitamins-Minerals (THERAPEUTIC MULTIVITAMIN-MINERALS) tablet Take 2 tablets by mouth daily     Historical Provider, MD   magnesium oxide (MAG-OX) 400 MG tablet Take 400 mg by mouth daily. Historical Provider, MD       Allergies:  Bactrim [sulfamethoxazole-trimethoprim], Brilinta [ticagrelor], Ciprofloxacin, Macrobid [nitrofurantoin monohyd macro], Ozempic (0.25 or 0.5 mg-dose) [semaglutide(0.25 or 0.5mg-dos)], Sulfamethoxazole, Theophylline, Cefuroxime axetil, Cipro xr, Other, and Tape [adhesive tape]    Social History:      The patient currently lives at home    TOBACCO:   reports that he quit smoking about 46 years ago. His smoking use included cigarettes. He has a 34.00 pack-year smoking history. He has never used smokeless tobacco.  ETOH:   reports no history of alcohol use. E-cigarette/Vaping       Questions Responses    E-cigarette/Vaping Use Never User    Start Date     Passive Exposure     Quit Date     Counseling Given     Comments               Family History:      Reviewed and negative in regards to presenting illness/complaint. Problem Relation Age of Onset    Cancer Mother         ABDOMIN    Kidney Disease Mother     Stroke Mother     Arthritis Father     Substance Abuse Father     Other Father         hardening of the arteries    Stroke Father        REVIEW OF SYSTEMS COMPLETED:   Pertinent positives as noted in the HPI. All other systems reviewed and negative.     PHYSICAL EXAM PERFORMED:    /78   Pulse 73   Temp 98 °F (36.7 °C) (Oral)   Resp 16   Ht 6' 3\" (1.905 m)   Wt (!) 301 lb 8 oz (136.8 kg)   SpO2 99%   BMI 37.68 kg/m²     General appearance:  No apparent distress, appears stated age and cooperative. HEENT:  Normal cephalic, atraumatic without obvious deformity. Pupils equal, round, and reactive to light. Extra ocular muscles intact. Conjunctivae/corneas clear. Neck: Supple, with full range of motion. No jugular venous distention. Trachea midline. Respiratory:  Normal respiratory effort. Clear to auscultation, bilaterally without Rales/Wheezes/Rhonchi. Cardiovascular: Irregularly irregular rhythm with normal S1/S2 without murmurs, rubs or gallops. Abdomen: Soft, non-tender, non-distended with normal bowel sounds. Musculoskeletal:  No clubbing, cyanosis or edema bilaterally. Full range of motion without deformity. Skin: Skin color, texture, turgor normal.  No rashes or lesions. Neurologic:  Neurovascularly intact without any focal sensory/motor deficits. Cranial nerves: II-XII intact, grossly non-focal.  Psychiatric:  Alert and oriented, thought content appropriate, normal insight  Capillary Refill: Brisk,3 seconds, normal  Peripheral Pulses: +2 palpable, equal bilaterally       Labs:     Recent Labs     09/05/22 0437   WBC 7.6   HGB 11.9*   HCT 34.6*        Recent Labs     09/05/22 0437 09/06/22 0459 09/07/22  0440   * 134* 137   K 4.0 4.3 4.3   CL 99 100 100   CO2 27 27 27   BUN 28* 28* 30*   CREATININE 1.2 1.3 1.4*   CALCIUM 9.2 9.0 9.0     Recent Labs     09/05/22 0437   AST 15   ALT 19   BILITOT 0.7   ALKPHOS 101     Recent Labs     09/05/22 0437 09/06/22 0459 09/07/22  0440   INR 2.36* 2.42* 2.51*     No results for input(s): Kena Meigs in the last 72 hours.     Urinalysis:      Lab Results   Component Value Date/Time    NITRU Negative 09/04/2022 02:27 PM    [address] 04/25/2020 09:35 PM    BACTERIA 3+ 11/10/2016 03:30 PM    RBCUA 3-4 04/25/2020 09:35 PM    BLOODU Negative 09/04/2022 02:27 PM    SPECGRAV <=1.005 09/04/2022 02:27 PM    GLUCOSEU Negative 09/04/2022 02:27 PM    GLUCOSEU NEGATIVE 02/05/2012 09:00 AM Radiology:   XR CHEST PORTABLE   Final Result   1. Mild persistent enlargement of the cardiac silhouette. 2. Otherwise, no convincing acute cardiopulmonary abnormality. EKG:  I have reviewed the EKG with the following interpretation: A. fib, rate 94, no acute ischemic changes    ECHO 9/6/22 Summary   LV systolic function is normal with EF estimated at 55-60%. No regional wall motion abnormalities are noted. There is moderate concentric left ventricular hypertrophy. Normal left ventricular diastolic filling pressure. Biatrial enlargement. Mild posterior mitral annular calcification is present. Aortic valve appears sclerotic but opens adequately. Mild mitral and tricuspid regurgitation. Systolic pulmonary artery pressure (SPAP) estimated at 46mmHg (RA pressure 3   mmHg), consistent with mild pulmonary hypertension. 6- 55%, LVH, LAE, ANNIE    Consults:    IP CONSULT TO CARDIOLOGY  IP CONSULT TO HEART FAILURE NURSE/COORDINATOR    ASSESSMENT:PLAN:    Shortness of breath  ? Anginal equivalent versus A. fib symptomatic versus CHF  -Sent to [facility] to get LHC/RHC to evaluate his coronaries given known CAD with stents  -Consulted cardiology  -Reversed Coumadin with vitamin K , check INR in a.m. A. fib, persistent  -Rate controlled, resume Toprol-XL and Cardizem CD  -Coumadin on hold for potential cath  -Monitor on telemetry  -Tikosyn discontinued due to prolonged QTC    CAD s/p PCI to LAD and diagonal on 6/21. Continue statin, lisinopril, metoprolol and Plavix. Plavix held 9/7 for Cath Lab. CHF-currently volume compensated, Lasix and Aldactone on hold. Continue beta-blocker, ACE inhibitor. Recent echo on 9/6 showed normal EF, normal diastolic parameters    [id number] -incidentally found on 9/4 with rapid COVID test positive, mild dyspnea. Not hypoxemic. No treatment indicated at this time.   Can DC isolation for 5 days on 9/9    Obstructive sleep apnea-compliant with PAP therapy, resume CPAP at night    Morbid obesity BMI 37.6-had gastric sleeve surgery in 2013. Encouraged continued weight loss    HLD-statin resumed    DM2-controlled, placed on insulin sliding scale, Accu-Cheks, hypoglycemia protocol    DVT Prophylaxis: Was on Coumadin INR 2-3  Diet: Diet NPO Exceptions are: Ice Chips, Sips of Water with Meds  Code Status: Full Code    PT/OT Eval Status: Ambulatory    Dispo -IP stay, admit to PCU       Ryan Cho MD    Thank you Atilio Cotto MD for the opportunity to be involved in this patient's care. If you have any questions or concerns please feel free to contact me at [phone].

## 2022-09-08 NOTE — TELEPHONE ENCOUNTER
----- Message from Camryn Lockett MD sent at 9/8/2022 10:38 AM EDT -----  No need to call    ----- Message -----  From: Daniel Duncan  Sent: 9/8/2022   9:38 AM EDT  To: Camryn Lockett MD    INR 2.4  Taking 5 mg daily    Patient states he is currently admitted and they are monitoring his INR

## 2022-09-08 NOTE — CONSULTS
[address] [facility]   Cardiovascular Evaluation    PATIENT: Dante Soriano  DATE: 2022  MRN: [id number]  CSN: [id number]  : 1945    Primary Care Doctor/Referring provider: Charity Coles MD, Edwin Aguirre MD     Reason for evaluation/Chief complaint:   SOB    Subjective:    History of present illness on initial date of evaluation:   Dante Soriano is a 69 y.o. patient who presents as a referral to the [facility] for formal interventional cardiology consultation. Patient is known to the cardiac practice and has a history of ischemic heart disease for which he underwent stenting of the left anterior descending artery and diagonal branch in . Patient was admitted to [facility] with shortness of breath. This shortness of breath was concerning for possible anginal equivalent. The patient was referred to [facility] for formal cardiology consultation. Patient denies any chest pain or chest pressure. His previous admission last year which resulted in treatment was during an acute coronary syndrome event that was also in the setting of shortness of breath. Patient did not have chest pain at that time either. Patient was subsequently seen by the medical team and cardiology team at [facility] and referred to [facility] for further evaluation. The patient was also diagnosed with having COVID pneumonia. He denies any fever chills or cough currently.       Patient Active Problem List   Diagnosis    Arthritis    Atrial fibrillation (HCC)    Essential hypertension    Carpal tunnel syndrome    Polyneuropathy    Numbness and tingling of both legs    Moderate obstructive sleep apnea    S/P thoracotomy    Atypical atrial flutter (HCC)    SOB (shortness of breath)    Type 2 diabetes mellitus with diabetic neuropathy ([address]    Morbid obesity with BMI of 40.0-44.9, adult ([facility]    History of venous thromboembolism    Cataract of both eyes Abnormal stress test    Angina at rest [facility])    Status post insertion of drug-eluting stent into left anterior descending (LAD) artery for coronary artery disease    Other fatigue    Obesity (BMI 30-39. 9)    COVID-19    Acute on chronic heart failure with preserved ejection fraction (HCC)    Coronary artery disease involving native coronary artery of native heart    Anginal equivalent [facility])         Cardiac Testing: I have reviewed the findings below. EKG:  ECHO:   STRESS TEST:  CATH:  BYPASS:  VASCULAR:    Past Medical History:   has a past medical history of Arthritis, Asthma, Atrial fibrillation ([address] Blood circulation, collateral, Diabetes mellitus ([address] DVT (deep venous thrombosis) ([address] Histoplasmosis, History of knee replacement procedure of right knee, Hx of blood clots, Hyperlipidemia, Hypertension, Knee osteoarthritis, Left TKR, Lung nodule, Neuromuscular disorder ([address] Palpitations, Sleep apnea, Stone, kidney, and UTI (urinary tract infection). Surgical History:   has a past surgical history that includes Cystoscopy; Tonsillectomy; Knee arthroscopy (4/19/2011); Colonoscopy; Cystocopy (2/8/13); ablation of dysrhythmic focus (2013); Carpal tunnel release (Right, 9-30-15); Bariatric Surgery (2013); Carpal tunnel release (Left, 3/20/16); skin biopsy; Diagnostic Cardiac Cath Lab Procedure; joint replacement (Bilateral); Cardiac surgery (2013); thoracotomy; ERCP (02/18/2019); ERCP (N/A, 2/18/2019); ERCP (2/18/2019); Cholecystectomy, laparoscopic (N/A, 2/19/2019); Intracapsular cataract extraction (Right, 1/14/2020); and Intracapsular cataract extraction (Left, 1/21/2020). Social History:   reports that he quit smoking about 46 years ago. His smoking use included cigarettes. He has a 34.00 pack-year smoking history. He has never used smokeless tobacco. He reports that he does not drink alcohol and does not use drugs.      Family History:  No evidence for sudden cardiac death or premature CAD    Medications:  Reviewed and are listed in nursing record. and/or listed below  Outpatient Medications:  Prior to Admission medications    Medication Sig Start Date End Date Taking? Authorizing Provider   warfarin (COUMADIN) 5 MG tablet TAKE ONE TABLET BY MOUTH DAILY 8/11/22   Amey Lesch, MD   metFORMIN (GLUCOPHAGE) 1000 MG tablet Take 0.5 tablets by mouth in the morning and 0.5 tablets in the evening. Take with meals. 7/20/22   Amey Lesch, MD   montelukast (SINGULAIR) 10 MG tablet TAKE ONE TABLET BY MOUTH DAILY 7/1/22   Amey Lesch, MD   spironolactone (ALDACTONE) 25 MG tablet TAKE ONE TABLET BY MOUTH DAILY 7/1/22   Amey Lesch, MD   metoprolol succinate (TOPROL XL) 25 MG extended release tablet TAKE ONE TABLET BY MOUTH DAILY 7/1/22   Francis Virgen MD   atorvastatin (LIPITOR) 40 MG tablet TAKE ONE TABLET BY MOUTH DAILY 7/1/22   Amey Lesch, MD   clopidogrel (PLAVIX) 75 MG tablet Take 1 tablet by mouth daily 5/23/22   Francis Virgen MD   Dulaglutide (TRULICITY) 1.83 WN/4.1MJ SOPN Inject 0.75 mg into the skin once a week    Historical Provider, MD   blood glucose test strips (TRUE METRIX BLOOD GLUCOSE TEST) strip TEST ONCE DAILY.   DX:E11.9 1/20/22   Amey Lesch, MD   senna (SENOKOT) 8.6 MG tablet Take 1 tablet by mouth 2 times daily  Patient not taking: No sig reported 11/11/21   Amey Lesch, MD   dofetilide [facility]) 250 MCG capsule Take 1 capsule by mouth 2 times daily  Patient not taking: Reported on 9/7/2022 10/19/21   SOTERO Smith MD   torsemide (DEMADEX) 20 MG tablet Take 1 tablet by mouth daily  Patient taking differently: Take 20 mg by mouth daily Takes 30 mg daily= 1.5 tablets daily 10/4/21   SERENA Gutierres - CNP   dofetilide (TIKOSYN) 250 MCG capsule Take 1 capsule by mouth every 12 hours  Patient not taking: Reported on 9/7/2022 7/7/21   Francis Virgen MD   dilTIAZem (CARDIZEM) 120 MG tablet TAKE 1 TABLET BY MOUTH  EVERY 12 HOURS 9/3/20   Sushila Trinidad MD   Easy Touch Lancets 32G/Twist MISC TEST DAILY. DX: E11.9 3/23/20   Tahira Dickens MD   blood glucose test strips (TRUE METRIX BLOOD GLUCOSE TEST) strip USE TO CHECK BLOOD GLUCOSE DAILY. DX: E11.9 3/23/20   Tahira Dickens MD   ACCU-CHEK MULTICLIX LANCETS MISC Check sugars once daily. Dx;E11.9 3/11/19   Tahira Dickens MD   Lancet Devices (EASY TOUCH LANCING DEVICE) MISC Test Daily. DX: E11.9 12/26/18   Tahira Dickens MD   Blood Glucose Monitoring Suppl (TRUE METRIX METER) VINNIE Use to check daily. DX;E11.9 12/22/18   Tahira Dickens MD   Lancets Misc. (ACCU-CHEK MULTICLIX LANCET DEV) KIT Check sugars once daily. DX;E11.9 12/21/18   Tahira Dickens MD   Biotin 5 MG TABS Take 5 mg by mouth daily    Historical Provider, MD   Cholecalciferol (VITAMIN D3) 5000 units TABS Take 5,000 Units by mouth daily    Historical Provider, MD   Cyanocobalamin (VITAMIN B-12 CR PO) Take 5,000 mcg by mouth every 7 days     Historical Provider, MD   Multiple Vitamins-Minerals (THERAPEUTIC MULTIVITAMIN-MINERALS) tablet Take 2 tablets by mouth daily     Historical Provider, MD   magnesium oxide (MAG-OX) 400 MG tablet Take 400 mg by mouth daily.     Historical Provider, MD       In-patient schedule medications:   atorvastatin  40 mg Oral Daily    dilTIAZem  120 mg Oral 2 times per day    insulin lispro  0-4 Units SubCUTAneous TID WC    insulin lispro  0-4 Units SubCUTAneous Nightly    lisinopril  5 mg Oral Daily    magnesium oxide  400 mg Oral Daily    metoprolol succinate  25 mg Oral Daily    montelukast  10 mg Oral Daily    sodium chloride flush  5-40 mL IntraVENous 2 times per day    therapeutic multivitamin-minerals  2 tablet Oral Daily    Vitamin D  5,000 Units Oral Daily    [Held by provider] clopidogrel  75 mg Oral Daily    [Held by provider] spironolactone  25 mg Oral Daily         Infusion Medications:   sodium chloride      dextrose Allergies:  Bactrim [sulfamethoxazole-trimethoprim], Brilinta [ticagrelor], Ciprofloxacin, Macrobid [nitrofurantoin monohyd macro], Ozempic (0.25 or 0.5 mg-dose) [semaglutide(0.25 or 0.5mg-dos)], Sulfamethoxazole, Theophylline, Cefuroxime axetil, Cipro xr, Other, and Tape [adhesive tape]     Review of Systems:   All 14 point review of symptoms completed. Pertinent positives identified in the HPI, all other review of symptoms findings as below.      Review of Systems - History obtained from the patient  General ROS: negative for - chills, fever or night sweats  Psychological ROS: negative for - disorientation or hallucinations  Ophthalmic ROS: negative for - dry eyes, eye pain or loss of vision  ENT ROS: negative for - nasal discharge or sore throat  Allergy and Immunology ROS: negative for - hives or itchy/watery eyes  Hematological and Lymphatic ROS: negative for - jaundice or night sweats  Endocrine ROS: negative for - mood swings or temperature intolerance  Breast ROS: deferred  Respiratory ROS: negative for - hemoptysis or stridor  Gastrointestinal ROS: no abdominal pain, change in bowel habits, or black or bloody stools  Genito-Urinary ROS: no dysuria, trouble voiding, or hematuria  Musculoskeletal ROS: negative for - gait disturbance, joint pain or joint stiffness  Neurological ROS: negative for - seizures or speech problems  Dermatological ROS: negative for - rash or skin lesion changes      Physical Examination:    [unfilled]  /84   Pulse 68   Temp 97.5 °F (36.4 °C) (Oral)   Resp 18   Ht 6' 3\" (1.905 m)   Wt (!) 301 lb 8 oz (136.8 kg)   SpO2 98%   BMI 37.68 kg/m²    Weight: (!) 301 lb 8 oz (136.8 kg)     Wt Readings from Last 3 Encounters:   09/07/22 (!) 301 lb 8 oz (136.8 kg)   09/07/22 (!) 306 lb 4.8 oz (138.9 kg)   09/02/22 (!) 305 lb (138.3 kg)     No intake or output data in the 24 hours ending 09/08/22 1316    Due to the current efforts to prevent transmission of COVID-19 and also the need to preserve PPE for other caregivers, an observational only full face-to-face encounter with the patient was performed. That being said, all relevant records and diagnostic tests were reviewed, including laboratory results and imaging. Please reference any relevant documentation elsewhere. Care will be coordinated with the primary service    General Appearance:  Alert, cooperative, no distress, appears stated age   Head:  Normocephalic, without obvious abnormality, atraumatic   Eyes:  PERRL, conjunctiva/corneas clear       Nose: Nares normal, no drainage or sinus tenderness   Throat: Lips, mucosa, and tongue normal   Neck: Supple, symmetrical, trachea midline, no adenopathy, thyroid: not enlarged, symmetric, no JVD       Lungs:   Respirations unlabored and no distress   Chest Wall:  deferred   Heart:  irregular rhythm and normal rate   Abdomen:   Not distended           Extremities: Extremities normal, atraumatic, no cyanosis or edema   Pulses: deferred   Skin: Skin color, texture, turgor normal, no rashes or lesions   Pysch: Normal mood and affect   Neurologic: Observational exam with normal gross motor and sensory exam.           Labs  No results for input(s): WBC, HGB, HCT, MCV, PLT in the last 72 hours. Recent Labs     09/07/22  0440 09/08/22  0444   CREATININE 1.4* 1.2   BUN 30* 28*    136   K 4.3 4.2    103   CO2 27 23     Recent Labs     09/07/22  0440 09/08/22  0444   INR 2.51* 1.88*   PROTIME 27.0* 21.4*     No results for input(s): TROPONINI in the last 72 hours. Invalid input(s): PRO-BNP  No results for input(s): CHOL, LDL, HDL in the last 72 hours. Invalid input(s): TG      Imaging:  I have reviewed the below testing personally and my interpretation is below.   EKG:  Atrial fibrillation  ST & T wave abnormality, consider inferior ischemia  Abnormal ECG  When compared with ECG of 04-SEP-2022 11:55,  Nonspecific T wave abnormality, worse in Inferior leads  Nonspecific T wave abnormality now evident in Lateral leads  Confirmed by [facility] MD, Silvio Colindres ([id number]) on 9/5/2022 10:40:53 AM    CXR:      Assessment:  69 y.o. patient with:  Principal Problem:    Anginal equivalent ([address]  Resolved Problems:    * No resolved hospital problems. *      Plan:  1. I had the opportunity to review the Parker Martell clinical presentation and the available pertinent data. With the patient's clinical risk factors and symptoms, there is a high pretest likelihood for CAD. I have asked Parker Martell to undergo cardiac angiography for further diagnostic testing. The procedure was explained in depth. All questions and alternate treatment strategies were discussed. The patient agrees to proceed. They understand all the risks associated with the procedure, including myocardial infarction, stroke, death, vascular complications, and the possible need for emergent surgery. 2. Serial troponin levels will be ordered and reviewed  3. The patient has been given instructions on addressing diet, regular exercise, weight control, smoking abstention, medication compliance, and stress minimization. 4. The patient should be treated with these therapies unless contraindicated:   ~asa daily   ~beta-blockers   ~statin therapy   ~ACE/ARB   ~repeat troponin until down trending   ~EKG as clinically needed  5. Keep NPO   6. Pre-cath orders will be placed. Medical Decision Making: The following items were considered in medical decision making:  Independent review of images  Review / order clinical lab tests  Review / order radiology tests  Decision to obtain old records  Review and summation of old records as accessed through [facility] (a summary of my findings in these old records)            All questions and concerns were addressed to the patient/family. Alternatives to my treatment were discussed. The note was completed using EMR.  Every effort was made to ensure accuracy; however, inadvertent computerized transcription errors may be present.     Ottie Leyden, MD, [id number], [facility]  [phone] [place] office  [phone] [facility]  9/8/2022  1:16 PM

## 2022-09-08 NOTE — POST SEDATION
Patient:  Laquita Hopson   :   1945    A pre-sedation re-evaluation was performed immediately at the end of the procedure. Procedure:  Cardiac cath  Medications: Procedural sedation with minimal conscious sedation  Complications: None  Estimated Blood Loss: none  Specimens: Were not obtained        [facility] [place] Medication and Procedural Reconciliation:  I agree that the documented medications and procedures performed are true. The medications were given under my order. The procedures were performed under my direct supervision.

## 2022-09-08 NOTE — PRE SEDATION
Brief Pre-Op Note/Sedation Assessment      Rafaela Chinchilla  1945  [id number]  8:45 AM    Planned Procedure: Cardiac Catheterization Procedure  Post Procedure Plan: Return to same level of care  Consent: I have discussed with the patient and/or the patient representative the indication, alternatives, and the possible risks and/or complications of the planned procedure and the anesthesia methods. The patient and/or patient representative appear to understand and agree to proceed. Chief Complaint:   Anginal Equivalent  Dyspnea on Exertion      Indications for Cath Procedure:  Presentation:  New Onset Angina <= 2 months  2. Anginal Classification within 2 weeks:  CCS III - Symptoms with everyday living activities, i.e., moderate limitation  3. Angina Symptoms Assessment:  Atypical Chest Pain  4. Heart Failure Class within last 2 weeks:  Yes:  Heart Failure Type: Diastolic Severity:  Class III - Symptoms of HF on less-than-ordinary exertion  5. Cardiovascular Instability:  No    Prior Ischemic Workup/Eval:  Pre-Procedural Medications: Yes: ACE/ARB/ARNI, Aspirin, Beta Blockers, Ca Channel Blockers, and STATIN  2. Stress Test Completed? No    Does Patient need surgery? Cath Valve Surgery:  No    Pre-Procedure Medical History:  Vital Signs:  /84   Pulse 68   Temp 97.5 °F (36.4 °C) (Oral)   Resp 18   Ht 6' 3\" (1.905 m)   Wt (!) 301 lb 8 oz (136.8 kg)   SpO2 98%   BMI 37.68 kg/m²     Allergies:     Allergies   Allergen Reactions    Bactrim [Sulfamethoxazole-Trimethoprim] Diarrhea    Brilinta [Ticagrelor]      dyspnea    Ciprofloxacin      Bad headaches    Macrobid [Nitrofurantoin Monohyd Macro]     Ozempic (0.25 Or 0.5 Mg-Dose) [Semaglutide(0.25 Or 0.5mg-Dos)]     Sulfamethoxazole Diarrhea    Theophylline      Theodur-caused severe headaches    Cefuroxime Axetil Rash and Itching    Cipro Xr Rash    Other Rash and Other (See Comments)     Ekg leads-glue    Tape Sangita Glass Tape] Rash     Skin irritaion  Eats away at skin, paper tape OK  Plastic tape     Medications:    Current Facility-Administered Medications   Medication Dose Route Frequency Provider Last Rate Last Admin    atorvastatin (LIPITOR) tablet 40 mg  40 mg Oral Daily Gilda Bunch MD        dilTIAZem (CARDIZEM) tablet 120 mg  120 mg Oral 2 times per day Gilda Bunch MD   120 mg at 09/07/22 2212    insulin lispro (HUMALOG) injection vial 0-4 Units  0-4 Units SubCUTAneous TID WC Gilda Bunch MD        insulin lispro (HUMALOG) injection vial 0-4 Units  0-4 Units SubCUTAneous Nightly Gilda Bunch MD        lisinopril (PRINIVIL;ZESTRIL) tablet 5 mg  5 mg Oral Daily Gilda Bunch MD        magnesium oxide (MAG-OX) tablet 400 mg  400 mg Oral Daily Gilda Bunch MD        metoprolol succinate (TOPROL XL) extended release tablet 25 mg  25 mg Oral Daily Gilda Bunch MD        montelukast (SINGULAIR) tablet 10 mg  10 mg Oral Daily Gilda Bunch MD        sodium chloride flush 0.9 % injection 5-40 mL  5-40 mL IntraVENous 2 times per day Gilda Bunch MD        therapeutic multivitamin-minerals 2 tablet  2 tablet Oral Daily Gilda Bunch MD        Vitamin D (CHOLECALCIFEROL) tablet 5,000 Units  5,000 Units Oral Daily Gilda Bunch MD        0.9 % sodium chloride infusion   IntraVENous PRN Gilda Bunch MD        acetaminophen (TYLENOL) tablet 650 mg  650 mg Oral Q6H PRN Gilda Bunch MD        Or    acetaminophen (TYLENOL) suppository 650 mg  650 mg Rectal Q6H PRN Gilda Bunch MD        dextrose 10 % infusion   IntraVENous Continuous PRN Gilda Bunch MD        dextrose bolus 10% 125 mL  125 mL IntraVENous PRN Gilda Bunch MD        Or    dextrose bolus 10% 250 mL  250 mL IntraVENous PRN Gilda Bunch MD        diphenhydrAMINE (BENADRYL) tablet 25 mg  25 mg Oral Q6H PRN Gilda Bunch MD   25 mg at 09/08/22 7562    glucagon Marianela Sorto MD at [id number]  [place] [address]    ERCP  2/18/2019    ERCP STONE REMOVAL performed by Chicho Membreno MD at [place] Right 1/14/2020    PHACOEMULSIFICATION OF CATARACT RIGHT EYE WITH INTRAOCULAR LENS IMPLANT performed by Faith Harding MD at [address] Left 1/21/2020    PHACOEMULSIFICATION OF CATARACT LEFT EYE WITH INTRAOCULAR LENS IMPLANT -SLEEP APNEA- performed by Faith Harding MD at [address] Bilateral     right knee (2002) and left knee (2012)    KNEE ARTHROSCOPY  4/19/2011     left  knee    SKIN BIOPSY      skin Ca/SQUAMOUS CELL    THORACOTOMY      wedge resection    TONSILLECTOMY               Pre-Sedation:  Pre-Sedation Documentation and Exam:  I have assessed the patient and reviewed the H&P on the chart. Prior History of Anesthesia Complications:   none    Modified Mallampati:  III (soft palate, base of uvula visible)    ASA Classification:  Class 3 - A patient with severe systemic disease that limits activity but is not incapacitating    Jean Marie Scale: Activity:  2 - Able to move 4 extremities voluntarily on command  Respiration:  2 - Able to breathe deeply and cough freely  Circulation:  2 - BP+/- 20mmHg of normal  Consciousness:  2 - Fully awake  Oxygen Saturation (color):  2 - Able to maintain oxygen saturation >92% on room air    Sedation/Anesthesia Plan:  Guard the patient's safety and welfare. Minimize physical discomfort and pain. Minimize negative psychological responses to treatment by providing sedation and analgesia and maximize the potential amnesia. Patient to meet pre-procedure discharge plan.     Medication Planned:  midazolam intravenously and fentanyl intravenously    Patient is an appropriate candidate for plan of sedation:   yes      Electronically signed by Damaso Erwin MD on 9/8/2022 at 8:45 AM

## 2022-09-09 VITALS
SYSTOLIC BLOOD PRESSURE: 100 MMHG | DIASTOLIC BLOOD PRESSURE: 69 MMHG | WEIGHT: 301 LBS | HEART RATE: 79 BPM | BODY MASS INDEX: 37.42 KG/M2 | RESPIRATION RATE: 16 BRPM | HEIGHT: 75 IN | OXYGEN SATURATION: 99 % | TEMPERATURE: 97.9 F

## 2022-09-09 PROBLEM — I25.10 CORONARY ARTERY DISEASE, UNSPECIFIED VESSEL OR LESION TYPE, UNSPECIFIED WHETHER ANGINA PRESENT, UNSPECIFIED WHETHER NATIVE OR TRANSPLANTED HEART: Status: ACTIVE | Noted: 2022-09-09

## 2022-09-09 LAB
ANION GAP SERPL CALCULATED.3IONS-SCNC: 7 MMOL/L (ref 3–16)
BLOOD CULTURE, ROUTINE: NORMAL
BUN BLDV-MCNC: 28 MG/DL (ref 7–20)
CALCIUM SERPL-MCNC: 8.8 MG/DL (ref 8.3–10.6)
CHLORIDE BLD-SCNC: 104 MMOL/L (ref 99–110)
CO2: 24 MMOL/L (ref 21–32)
CREAT SERPL-MCNC: 1.2 MG/DL (ref 0.8–1.3)
CULTURE, BLOOD 2: NORMAL
EKG ATRIAL RATE: 312 BPM
EKG DIAGNOSIS: NORMAL
EKG Q-T INTERVAL: 432 MS
EKG QRS DURATION: 70 MS
EKG QTC CALCULATION (BAZETT): 427 MS
EKG R AXIS: 22 DEGREES
EKG T AXIS: -1 DEGREES
EKG VENTRICULAR RATE: 59 BPM
GFR AFRICAN AMERICAN: >60
GFR NON-AFRICAN AMERICAN: 59
GLUCOSE BLD-MCNC: 122 MG/DL (ref 70–99)
GLUCOSE BLD-MCNC: 128 MG/DL (ref 70–99)
GLUCOSE BLD-MCNC: 187 MG/DL (ref 70–99)
GLUCOSE BLD-MCNC: 85 MG/DL (ref 70–99)
HCT VFR BLD CALC: 34.1 % (ref 40.5–52.5)
HEMOGLOBIN: 11.3 G/DL (ref 13.5–17.5)
INR BLD: 1.68 (ref 0.87–1.14)
MAGNESIUM: 2.4 MG/DL (ref 1.8–2.4)
MCH RBC QN AUTO: 29.7 PG (ref 26–34)
MCHC RBC AUTO-ENTMCNC: 33.1 G/DL (ref 31–36)
MCV RBC AUTO: 89.8 FL (ref 80–100)
PDW BLD-RTO: 16.9 % (ref 12.4–15.4)
PERFORMED ON: ABNORMAL
PERFORMED ON: ABNORMAL
PERFORMED ON: NORMAL
PLATELET # BLD: 222 K/UL (ref 135–450)
PMV BLD AUTO: 7.5 FL (ref 5–10.5)
POTASSIUM SERPL-SCNC: 4.6 MMOL/L (ref 3.5–5.1)
PROTHROMBIN TIME: 19.6 SEC (ref 11.7–14.5)
RBC # BLD: 3.79 M/UL (ref 4.2–5.9)
SODIUM BLD-SCNC: 135 MMOL/L (ref 136–145)
WBC # BLD: 9.4 K/UL (ref 4–11)

## 2022-09-09 PROCEDURE — 83735 ASSAY OF MAGNESIUM: CPT

## 2022-09-09 PROCEDURE — 6370000000 HC RX 637 (ALT 250 FOR IP): Performed by: INTERNAL MEDICINE

## 2022-09-09 PROCEDURE — 93005 ELECTROCARDIOGRAM TRACING: CPT | Performed by: INTERNAL MEDICINE

## 2022-09-09 PROCEDURE — 36415 COLL VENOUS BLD VENIPUNCTURE: CPT

## 2022-09-09 PROCEDURE — 85610 PROTHROMBIN TIME: CPT

## 2022-09-09 PROCEDURE — 85027 COMPLETE CBC AUTOMATED: CPT

## 2022-09-09 PROCEDURE — 80048 BASIC METABOLIC PNL TOTAL CA: CPT

## 2022-09-09 PROCEDURE — 99233 SBSQ HOSP IP/OBS HIGH 50: CPT | Performed by: INTERNAL MEDICINE

## 2022-09-09 RX ORDER — CLOPIDOGREL BISULFATE 75 MG/1
75 TABLET ORAL DAILY
Status: DISCONTINUED | OUTPATIENT
Start: 2022-09-09 | End: 2022-09-09 | Stop reason: HOSPADM

## 2022-09-09 RX ORDER — WARFARIN SODIUM 5 MG/1
5 TABLET ORAL
Status: COMPLETED | OUTPATIENT
Start: 2022-09-09 | End: 2022-09-09

## 2022-09-09 RX ORDER — ASPIRIN 81 MG/1
81 TABLET, CHEWABLE ORAL DAILY
Qty: 30 TABLET | Refills: 11 | Status: ON HOLD | OUTPATIENT
Start: 2022-09-10 | End: 2022-10-19 | Stop reason: SDUPTHER

## 2022-09-09 RX ORDER — CLOPIDOGREL BISULFATE 75 MG/1
75 TABLET ORAL DAILY
Qty: 30 TABLET | Refills: 0 | Status: ON HOLD | OUTPATIENT
Start: 2022-09-09 | End: 2022-10-19

## 2022-09-09 RX ORDER — LISINOPRIL 5 MG/1
5 TABLET ORAL DAILY
Qty: 30 TABLET | Refills: 3 | Status: ON HOLD | OUTPATIENT
Start: 2022-09-10 | End: 2022-09-21 | Stop reason: HOSPADM

## 2022-09-09 RX ORDER — LOPERAMIDE HYDROCHLORIDE 2 MG/1
2 CAPSULE ORAL 4 TIMES DAILY PRN
Status: DISCONTINUED | OUTPATIENT
Start: 2022-09-09 | End: 2022-09-09 | Stop reason: HOSPADM

## 2022-09-09 RX ORDER — WARFARIN SODIUM 5 MG/1
5 TABLET ORAL DAILY
Status: DISCONTINUED | OUTPATIENT
Start: 2022-09-09 | End: 2022-09-09

## 2022-09-09 RX ORDER — CLOPIDOGREL BISULFATE 75 MG/1
75 TABLET ORAL DAILY
Status: DISCONTINUED | OUTPATIENT
Start: 2022-09-10 | End: 2022-09-09

## 2022-09-09 RX ADMIN — ATORVASTATIN CALCIUM 40 MG: 40 TABLET, FILM COATED ORAL at 08:57

## 2022-09-09 RX ADMIN — LOPERAMIDE HYDROCHLORIDE 2 MG: 2 CAPSULE ORAL at 11:26

## 2022-09-09 RX ADMIN — MONTELUKAST SODIUM 10 MG: 10 TABLET ORAL at 08:56

## 2022-09-09 RX ADMIN — Medication 2 TABLET: at 08:56

## 2022-09-09 RX ADMIN — DIPHENHYDRAMINE HCL 25 MG: 25 TABLET ORAL at 11:26

## 2022-09-09 RX ADMIN — Medication 5000 UNITS: at 08:56

## 2022-09-09 RX ADMIN — Medication: at 11:26

## 2022-09-09 RX ADMIN — WARFARIN SODIUM 5 MG: 5 TABLET ORAL at 17:00

## 2022-09-09 RX ADMIN — Medication 400 MG: at 08:57

## 2022-09-09 RX ADMIN — CLOPIDOGREL BISULFATE 75 MG: 75 TABLET ORAL at 14:03

## 2022-09-09 RX ADMIN — LISINOPRIL 5 MG: 5 TABLET ORAL at 08:57

## 2022-09-09 RX ADMIN — ASPIRIN 81 MG 81 MG: 81 TABLET ORAL at 08:56

## 2022-09-09 NOTE — PROGRESS NOTES
[id number]  [facility]   Cardiovascular Evaluation    PATIENT: Rissa Medel  DATE: 2022  MRN: [id number]  CSN: [id number]  : 1945    Primary Care Doctor/Referring provider: Zahraa Junior MD, Velasquez Grimaldo MD     Reason for evaluation/Chief complaint:   SOB    Subjective: Patient feeling better this morning with no shortness of breath. He did develop diarrhea and overall fatigue yesterday evening. History of present illness on initial date of evaluation:   Rissa Medel is a 69 y.o. patient who presents as a referral to the [facility] for formal interventional cardiology consultation. Patient is known to the cardiac practice and has a history of ischemic heart disease for which he underwent stenting of the left anterior descending artery and diagonal branch in . Patient was admitted to [facility] with shortness of breath. This shortness of breath was concerning for possible anginal equivalent. The patient was referred to [facility] for formal cardiology consultation. Patient denies any chest pain or chest pressure. His previous admission last year which resulted in treatment was during an acute coronary syndrome event that was also in the setting of shortness of breath. Patient did not have chest pain at that time either. Patient was subsequently seen by the medical team and cardiology team at [facility] and referred to [facility] for further evaluation. The patient was also diagnosed with having COVID pneumonia. He denies any fever chills or cough currently.       Patient Active Problem List   Diagnosis    Arthritis    Atrial fibrillation (HCC)    Essential hypertension    Carpal tunnel syndrome    Polyneuropathy    Numbness and tingling of both legs    Moderate obstructive sleep apnea    S/P thoracotomy    Atypical atrial flutter (HCC)    SOB (shortness of breath)    Type 2 diabetes mellitus with diabetic neuropathy ([facility]    Morbid obesity with BMI of 40.0-44.9, adult ([facility]    History of venous thromboembolism    Cataract of both eyes    Abnormal stress test    Angina at rest [facility])    Status post insertion of drug-eluting stent into left anterior descending (LAD) artery for coronary artery disease    Other fatigue    Obesity (BMI 30-39. 9)    COVID-19    Acute on chronic heart failure with preserved ejection fraction (HCC)    Coronary artery disease involving native coronary artery of native heart    Anginal equivalent [facility])         Cardiac Testing: I have reviewed the findings below. EKG:  ECHO:   STRESS TEST:  CATH:  BYPASS:  VASCULAR:    Past Medical History:   has a past medical history of Arthritis, Asthma, Atrial fibrillation ([facility] Blood circulation, collateral, Diabetes mellitus ([facility] DVT (deep venous thrombosis) ([facility] Histoplasmosis, History of knee replacement procedure of right knee, Hx of blood clots, Hyperlipidemia, Hypertension, Knee osteoarthritis, Left TKR, Lung nodule, Neuromuscular disorder ([facility] Palpitations, Sleep apnea, Stone, kidney, and UTI (urinary tract infection). Surgical History:   has a past surgical history that includes Cystoscopy; Tonsillectomy; Knee arthroscopy (4/19/2011); Colonoscopy; Cystocopy (2/8/13); ablation of dysrhythmic focus (2013); Carpal tunnel release (Right, 9-30-15); Bariatric Surgery (2013); Carpal tunnel release (Left, 3/20/16); skin biopsy; Diagnostic Cardiac Cath Lab Procedure; joint replacement (Bilateral); Cardiac surgery (2013); thoracotomy; ERCP (02/18/2019); ERCP (N/A, 2/18/2019); ERCP (2/18/2019); Cholecystectomy, laparoscopic (N/A, 2/19/2019); Intracapsular cataract extraction (Right, 1/14/2020); and Intracapsular cataract extraction (Left, 1/21/2020). Social History:   reports that he quit smoking about 46 years ago. His smoking use included cigarettes. He has a 34.00 pack-year smoking history.  He has never used smokeless tobacco. He reports that he does not drink alcohol and does not use drugs. Family History:  No evidence for sudden cardiac death or premature CAD    Medications:  Reviewed and are listed in nursing record. and/or listed below  Outpatient Medications:  Prior to Admission medications    Medication Sig Start Date End Date Taking? Authorizing Provider   warfarin (COUMADIN) 5 MG tablet TAKE ONE TABLET BY MOUTH DAILY 8/11/22   Alex Hudson MD   metFORMIN (GLUCOPHAGE) 1000 MG tablet Take 0.5 tablets by mouth in the morning and 0.5 tablets in the evening. Take with meals. 7/20/22   Alex Hudson MD   montelukast (SINGULAIR) 10 MG tablet TAKE ONE TABLET BY MOUTH DAILY 7/1/22   Alex Hudson MD   spironolactone (ALDACTONE) 25 MG tablet TAKE ONE TABLET BY MOUTH DAILY 7/1/22   Alex Hudson MD   metoprolol succinate (TOPROL XL) 25 MG extended release tablet TAKE ONE TABLET BY MOUTH DAILY 7/1/22   Leonardo Broussard MD   atorvastatin (LIPITOR) 40 MG tablet TAKE ONE TABLET BY MOUTH DAILY 7/1/22   Alex Hudson MD   clopidogrel (PLAVIX) 75 MG tablet Take 1 tablet by mouth daily 5/23/22   Leonardo Broussard MD   Dulaglutide (TRULICITY) 4.99 AJ/3.8RJ SOPN Inject 0.75 mg into the skin once a week    Historical Provider, MD   blood glucose test strips (TRUE METRIX BLOOD GLUCOSE TEST) strip TEST ONCE DAILY.   DX:E11.9 1/20/22   Alex Hudson MD   senna (SENOKOT) 8.6 MG tablet Take 1 tablet by mouth 2 times daily  Patient not taking: No sig reported 11/11/21   Alex Hudson MD   dofetilide [facility]) 250 MCG capsule Take 1 capsule by mouth 2 times daily  Patient not taking: Reported on 9/7/2022 10/19/21   J Severa Som., MD   torsemide (DEMADEX) 20 MG tablet Take 1 tablet by mouth daily  Patient taking differently: Take 20 mg by mouth daily Takes 30 mg daily= 1.5 tablets daily 10/4/21   Adolfo Prader, APRN - CNP   dofetilide (TIKOSYN) 250 MCG capsule Take 1 capsule by mouth every 12 hours  Patient not taking: Reported on 9/7/2022 7/7/21   Dylan Copeland MD   dilTIAZem (CARDIZEM) 120 MG tablet TAKE 1 TABLET BY MOUTH  EVERY 12 HOURS 9/3/20   Sherman Morales MD   Easy Touch Lancets 32G/Twist MISC TEST DAILY. DX: E11.9 3/23/20   Sheng Lynn MD   blood glucose test strips (TRUE METRIX BLOOD GLUCOSE TEST) strip USE TO CHECK BLOOD GLUCOSE DAILY. DX: E11.9 3/23/20   Sheng Lynn MD   ACCU-CHEK MULTICLIX LANCETS MISC Check sugars once daily. Dx;E11.9 3/11/19   Sheng Lynn MD   Lancet Devices (EASY TOUCH LANCING DEVICE) MISC Test Daily. DX: E11.9 12/26/18   Sheng Lynn MD   Blood Glucose Monitoring Suppl (TRUE METRIX METER) VINNIE Use to check daily. DX;E11.9 12/22/18   Sheng Lynn MD   Lancets Misc. (ACCU-CHEK MULTICLIX LANCET DEV) KIT Check sugars once daily. DX;E11.9 12/21/18   Sheng Lynn MD   Biotin 5 MG TABS Take 5 mg by mouth daily    Historical Provider, MD   Cholecalciferol (VITAMIN D3) 5000 units TABS Take 5,000 Units by mouth daily    Historical Provider, MD   Cyanocobalamin (VITAMIN B-12 CR PO) Take 5,000 mcg by mouth every 7 days     Historical Provider, MD   Multiple Vitamins-Minerals (THERAPEUTIC MULTIVITAMIN-MINERALS) tablet Take 2 tablets by mouth daily     Historical Provider, MD   magnesium oxide (MAG-OX) 400 MG tablet Take 400 mg by mouth daily.     Historical Provider, MD       In-patient schedule medications:   sodium chloride flush  5-40 mL IntraVENous 2 times per day    aspirin  81 mg Oral Daily    atorvastatin  40 mg Oral Daily    dilTIAZem  120 mg Oral 2 times per day    insulin lispro  0-4 Units SubCUTAneous TID WC    insulin lispro  0-4 Units SubCUTAneous Nightly    lisinopril  5 mg Oral Daily    magnesium oxide  400 mg Oral Daily    metoprolol succinate  25 mg Oral Daily    montelukast  10 mg Oral Daily    sodium chloride flush  5-40 mL IntraVENous 2 times per day    therapeutic multivitamin-minerals  2 tablet Oral Daily    Vitamin D 5,000 Units Oral Daily    [Held by provider] clopidogrel  75 mg Oral Daily    [Held by provider] spironolactone  25 mg Oral Daily         Infusion Medications:   sodium chloride      sodium chloride      dextrose           Allergies:  Bactrim [sulfamethoxazole-trimethoprim], Brilinta [ticagrelor], Ciprofloxacin, Macrobid [nitrofurantoin monohyd macro], Ozempic (0.25 or 0.5 mg-dose) [semaglutide(0.25 or 0.5mg-dos)], Sulfamethoxazole, Theophylline, Cefuroxime axetil, Cipro xr, Other, and Tape [adhesive tape]     Review of Systems:   All 14 point review of symptoms completed. Pertinent positives identified in the HPI, all other review of symptoms findings as below. Physical Examination:    [unfilled]  /67   Pulse 58   Temp 97.4 °F (36.3 °C) (Oral)   Resp 18   Ht 6' 3\" (1.905 m)   Wt (!) 301 lb (136.5 kg)   SpO2 98%   BMI 37.62 kg/m²    Weight: (!) 301 lb (136.5 kg)     Wt Readings from Last 3 Encounters:   09/09/22 (!) 301 lb (136.5 kg)   09/07/22 (!) 306 lb 4.8 oz (138.9 kg)   09/02/22 (!) 305 lb (138.3 kg)     No intake or output data in the 24 hours ending 09/09/22 1039    Due to the current efforts to prevent transmission of COVID-19 and also the need to preserve PPE for other caregivers, an observational only full face-to-face encounter with the patient was performed. That being said, all relevant records and diagnostic tests were reviewed, including laboratory results and imaging. Please reference any relevant documentation elsewhere.  Care will be coordinated with the primary service    General Appearance:  Alert, cooperative, no distress, appears stated age   Head:  Normocephalic, without obvious abnormality, atraumatic   Eyes:  PERRL, conjunctiva/corneas clear       Nose: Nares normal, no drainage or sinus tenderness   Throat: Lips, mucosa, and tongue normal   Neck: Supple, symmetrical, trachea midline, no adenopathy, thyroid: not enlarged, symmetric, no JVD       Lungs:   Respirations unlabored and no distress   Chest Wall:  deferred   Heart:  irregular rhythm and normal rate   Abdomen:   Not distended   Cath site evaluated. The radial cath site in the right wrist is stable. There is normal pulse. There is no evidence of erythema or infection. There is no significant pain on evaluation       Extremities: Extremities normal, atraumatic, no cyanosis or edema   Pulses: deferred   Skin: Skin color, texture, turgor normal, no rashes or lesions   Pysch: Normal mood and affect   Neurologic: Observational exam with normal gross motor and sensory exam.           Labs  Recent Labs     09/09/22  0454   WBC 9.4   HGB 11.3*   HCT 34.1*   MCV 89.8        Recent Labs     09/08/22 0444 09/09/22  0454   CREATININE 1.2 1.2   BUN 28* 28*    135*   K 4.2 4.6    104   CO2 23 24     Recent Labs     09/08/22 0444 09/09/22  0454   INR 1.88* 1.68*   PROTIME 21.4* 19.6*     No results for input(s): TROPONINI in the last 72 hours. Invalid input(s): PRO-BNP  No results for input(s): CHOL, LDL, HDL in the last 72 hours. Invalid input(s): TG      Imaging:  I have reviewed the below testing personally and my interpretation is below. EKG:  Atrial fibrillation  ST & T wave abnormality, consider inferior ischemia  Abnormal ECG  When compared with ECG of 04-SEP-2022 11:55,  Nonspecific T wave abnormality, worse in Inferior leads  Nonspecific T wave abnormality now evident in Lateral leads  Confirmed by Angeles Briceno MD, Burak Hester ([id number]) on 9/5/2022 10:40:53 AM    CXR:      Assessment:  69 y.o. patient with:  Principal Problem:    Anginal equivalent ([address]  Active Problems:    Atrial fibrillation (HCC)    SOB (shortness of breath)  Resolved Problems:    * No resolved hospital problems. *      Plan:  Patient appears to be recovering well from his stent procedure yesterday. He will require dual antiplatelet therapy in addition to his oral warfarin for at least 4 to 6 weeks.    ~Would have goal INR in the 2-2.5 range while on dual antiplatelet therapy. Defer diarrhea management to the medical service. ~Okay to hold blood pressure medications in the meantime due to dehydration and overall fatigue. Patient can follow-up with us in the office and see Dr. Angi Jo his primary cardiologist in a few weeks after discharge. Medical Decision Making: The following items were considered in medical decision making:  Independent review of images  Review / order clinical lab tests  Review / order radiology tests  Decision to obtain old records  Review and summation of old records as accessed through [facility] (a summary of my findings in these old records)            All questions and concerns were addressed to the patient/family. Alternatives to my treatment were discussed. The note was completed using EMR. Every effort was made to ensure accuracy; however, inadvertent computerized transcription errors may be present.     Mehrdad Cramer MD, [place], [place]  [phone] [facility] office  [phone] [facility] [place]  9/9/2022  10:39 AM

## 2022-09-09 NOTE — PROGRESS NOTES
TR band removed and transparent dressing applied. Patient instructed to not use right wrist. Patient verbalizes understanding and has no further needs at this time.

## 2022-09-09 NOTE — CONSULTS
Pharmacy Note  Warfarin Consult  Dx: Afib  Goal INR range: cardiology says 2-2.5 while on DAPT  Home Warfarin dose: 5 mg daily    Date  INR  Warfarin  9/9                  1.68                   5 mg              Recommend Warfarin 5 mg tonight x1. Daily INR ordered. Rx will continue to manage therapy per consult order.     Abdelrahman Parikh, PharmD 9/9/2022 1:07 PM

## 2022-09-09 NOTE — DISCHARGE SUMMARY
Hospital Medicine Discharge Summary    Patient ID: Wenda Harada      Patient's PCP: Vee Delgado MD    Admit Date: 9/7/2022     Discharge Date:   09/09/22     Admitting Provider: Gurjit Melton MD     Discharge Provider: Temi Mcgregor MD     Discharge Diagnoses: Active Hospital Problems    Diagnosis     SOB (shortness of breath) [R06.02]      Priority: High    Atrial fibrillation (HCC) [I48.91]      Priority: High    Coronary artery disease, unspecified vessel or lesion type, unspecified whether angina present, unspecified whether native or transplanted heart [I25.10]      Priority: Medium    Anginal equivalent ([address] [I20.8]      Priority: Medium       The patient was seen and examined on day of discharge and this discharge summary is in conjunction with any daily progress note from day of discharge. Hospital Course:  \"Patient with PMH of A. fib on Coumadin and Tikosyn, DM2, CAD, CHF presented to [facility] ED on 9/4 with shortness of breath. On arrival he was orthostatic positive his diuretics were held. The Tikosyn was held as his QTC was prolonged. Given ongoing shortness of breath there was suspicion for anginal equivalent. So cardiology recommended that the patient be transferred to [facility] to get RHC/LHC especially given history of recent PCI to LAD in 6/21. \"      Anginal equivalent. LHC required a FELIZ to the mid LAD on 9/8. Aspirin indefinitely, clopidogrel for one month (he is also on warfarin). Statin, metoprolol. PAF. Metoprolol, dilt. Stopped dofetilide due to QTc prolongation. Chronic diastolic CHF. Continue torsemide and spironolactone. Continue metoprolol and lisinopril. COVID-19. He does not need to isolation any longer after discharge, but he still does need to wear a mask for 5 more days at home. DM2. A1c 7.1. Continue metformin, dulaglutide. PCP could consider empagliflozin. MARIA VICTORIA.  CAP.           Physical Exam Performed:     /69   Pulse 79   Temp 97.9 °F (36.6 °C) (Oral)   Resp 16   Ht 6' 3\" (1.905 m)   Wt (!) 301 lb (136.5 kg)   SpO2 99%   BMI 37.62 kg/m²       General appearance:  No apparent distress, appears stated age and cooperative. HEENT:  Normal cephalic, atraumatic without obvious deformity. Pupils equal, round, and reactive to light. Extra ocular muscles intact. Conjunctivae/corneas clear. Neck: Supple, with full range of motion. No jugular venous distention. Trachea midline. Respiratory:  Normal respiratory effort. Clear to auscultation, bilaterally without Rales/Wheezes/Rhonchi. Cardiovascular:  Regular rate and rhythm with normal S1/S2 without murmurs, rubs or gallops. Abdomen: Soft, non-tender, non-distended with normal bowel sounds. Musculoskeletal:  No clubbing, cyanosis or edema bilaterally. Full range of motion without deformity. Skin: Skin color, texture, turgor normal.  No rashes or lesions. Neurologic:  Neurovascularly intact without any focal sensory/motor deficits. Cranial nerves: II-XII intact, grossly non-focal.  Psychiatric:  Alert and oriented, thought content appropriate, normal insight  Capillary Refill: Brisk,< 3 seconds   Peripheral Pulses: +2 palpable, equal bilaterally       Labs:  For convenience and continuity at follow-up the following most recent labs are provided:      CBC:    Lab Results   Component Value Date/Time    WBC 9.4 09/09/2022 04:54 AM    HGB 11.3 09/09/2022 04:54 AM    HCT 34.1 09/09/2022 04:54 AM     09/09/2022 04:54 AM       Renal:    Lab Results   Component Value Date/Time     09/09/2022 04:54 AM    K 4.6 09/09/2022 04:54 AM    K 4.0 09/05/2022 04:37 AM     09/09/2022 04:54 AM    CO2 24 09/09/2022 04:54 AM    BUN 28 09/09/2022 04:54 AM    CREATININE 1.2 09/09/2022 04:54 AM    CALCIUM 8.8 09/09/2022 04:54 AM    PHOS 2.2 02/19/2019 05:06 AM         Significant Diagnostic Studies    Radiology:   No orders to display Consults:     IP CONSULT TO CARDIOLOGY  IP CONSULT TO HEART FAILURE NURSE/COORDINATOR  IP CONSULT TO CARDIAC REHAB  IP CONSULT TO CARDIAC REHAB  IP CONSULT TO PHARMACY  PHARMACY TO DOSE WARFARIN    Disposition:  home     Condition at Discharge: Stable    Discharge Instructions/Follow-up:  Follow up with PCP within 1-2 weeks. Code Status:  Full Code     Activity: activity as tolerated    Diet: diabetic diet      Discharge Medications:     Current Discharge Medication List             Details   aspirin 81 MG chewable tablet Take 1 tablet by mouth daily  Qty: 30 tablet, Refills: 11      lisinopril (PRINIVIL;ZESTRIL) 5 MG tablet Take 1 tablet by mouth daily  Qty: 30 tablet, Refills: 3                Details   clopidogrel (PLAVIX) 75 MG tablet Take 1 tablet by mouth daily  Qty: 30 tablet, Refills: 0                Details   warfarin (COUMADIN) 5 MG tablet TAKE ONE TABLET BY MOUTH DAILY  Qty: 90 tablet, Refills: 1      metFORMIN (GLUCOPHAGE) 1000 MG tablet Take 0.5 tablets by mouth in the morning and 0.5 tablets in the evening. Take with meals. Qty: 180 tablet, Refills: 0    Comments: Dose Adjustment. montelukast (SINGULAIR) 10 MG tablet TAKE ONE TABLET BY MOUTH DAILY  Qty: 90 tablet, Refills: 1      spironolactone (ALDACTONE) 25 MG tablet TAKE ONE TABLET BY MOUTH DAILY  Qty: 90 tablet, Refills: 0      metoprolol succinate (TOPROL XL) 25 MG extended release tablet TAKE ONE TABLET BY MOUTH DAILY  Qty: 90 tablet, Refills: 3      atorvastatin (LIPITOR) 40 MG tablet TAKE ONE TABLET BY MOUTH DAILY  Qty: 90 tablet, Refills: 0      Dulaglutide (TRULICITY) 2.75 SQ/9.2MO SOPN Inject 0.75 mg into the skin once a week      !! blood glucose test strips (TRUE METRIX BLOOD GLUCOSE TEST) strip TEST ONCE DAILY.   DX:E11.9  Qty: 100 strip, Refills: 3      torsemide (DEMADEX) 20 MG tablet Take 1 tablet by mouth daily  Qty: 90 tablet, Refills: 1      dilTIAZem (CARDIZEM) 120 MG tablet TAKE 1 TABLET BY MOUTH  EVERY 12 HOURS  Qty: 180 tablet, Refills: 3      !! Easy Touch Lancets 32G/Twist MISC TEST DAILY. DX: E11.9  Qty: 100 each, Refills: 3      !! blood glucose test strips (TRUE METRIX BLOOD GLUCOSE TEST) strip USE TO CHECK BLOOD GLUCOSE DAILY. DX: E11.9  Qty: 100 each, Refills: 3    Associated Diagnoses: Type 2 diabetes mellitus without complication, without long-term current use of insulin ([address]      ! ! ACCU-CHEK MULTICLIX LANCETS MISC Check sugars once daily. Dx;E11.9  Qty: 100 each, Refills: 11    Associated Diagnoses: Type 2 diabetes mellitus without complication, without long-term current use of insulin ([facility])      Lancet Devices (EASY TOUCH LANCING DEVICE) MISC Test Daily. DX: E11.9  Qty: 1 each, Refills: 0      Blood Glucose Monitoring Suppl (TRUE METRIX METER) VINNIE Use to check daily. DX;E11.9  Qty: 1 Device, Refills: 0      Lancets Misc. (ACCU-CHEK MULTICLIX LANCET DEV) KIT Check sugars once daily. DX;E11.9  Qty: 1 kit, Refills: 0      Biotin 5 MG TABS Take 5 mg by mouth daily      Cholecalciferol (VITAMIN D3) 5000 units TABS Take 5,000 Units by mouth daily      Cyanocobalamin (VITAMIN B-12 CR PO) Take 5,000 mcg by mouth every 7 days       Multiple Vitamins-Minerals (THERAPEUTIC MULTIVITAMIN-MINERALS) tablet Take 2 tablets by mouth daily       magnesium oxide (MAG-OX) 400 MG tablet Take 400 mg by mouth daily. !! - Potential duplicate medications found. Please discuss with provider. Time Spent on discharge is more than 30 minutes in the examination, evaluation, counseling and review of medications and discharge plan. Signed:    Cynthia Griffin MD   9/9/2022      Thank you Ilene Gallegos MD for the opportunity to be involved in this patient's care. If you have any questions or concerns, please feel free to contact me at [phone].

## 2022-09-09 NOTE — PROGRESS NOTES
Physician Progress Note      PATIENT:               Juliet Ochoa  CSN #:                  [id number]  :                       1945  ADMIT DATE:       2022 11:24 AM  DISCH DATE:        2022 7:55 PM  RESPONDING  PROVIDER #:        Cecilia Black MD          QUERY TEXT:    Pt admitted with Afib and has CHF documented. If possible, please document in   progress notes and discharge summary further specificity regarding the type   and acuity of CHF:    The medical record reflects the following:  Risk Factors:  noted  acute CHF  Clinical Indicators: ECHO- EF estimated at 55-60%. noted- Anginal equivalent   or CHF or due to afib  -ECHO EF=55-60%; LVH; normal diastolic pressure (no change )  Treatment:  recommend left and right heart cath, cardiology consult, monitor   labs, cont to hold lasix and aldactone. Thank You Hortencia Barber RN, [email or web address]  Options provided:  -- Acute on Chronic Systolic CHF/HFrEF  -- Acute on Chronic Diastolic CHF/HFpEF  -- Acute on Chronic Systolic and Diastolic CHF  -- Acute Systolic CHF/HFrEF  -- Acute Diastolic CHF/HFpEF  -- Acute Systolic and Diastolic CHF  -- Chronic Systolic CHF/HFrEF  -- Chronic Diastolic CHF/HFpEF  -- Chronic Systolic and Diastolic CHF  -- Other - I will add my own diagnosis  -- Disagree - Not applicable / Not valid  -- Disagree - Clinically unable to determine / Unknown  -- Refer to Clinical Documentation Reviewer    PROVIDER RESPONSE TEXT:    This patient has chronic diastolic CHF/HFpEF.     Query created by: Michelle Ventura on 2022 9:12 AM      Electronically signed by:  Cecilia Black MD 2022 3:45 PM

## 2022-09-12 ENCOUNTER — ANTI-COAG VISIT (OUTPATIENT)
Dept: INTERNAL MEDICINE CLINIC | Age: 77
End: 2022-09-12

## 2022-09-12 ENCOUNTER — CARE COORDINATION (OUTPATIENT)
Dept: CASE MANAGEMENT | Age: 77
End: 2022-09-12

## 2022-09-12 ENCOUNTER — FOLLOWUP TELEPHONE ENCOUNTER (OUTPATIENT)
Dept: TELEMETRY | Age: 77
End: 2022-09-12

## 2022-09-12 ENCOUNTER — TELEPHONE (OUTPATIENT)
Dept: CARDIOLOGY CLINIC | Age: 77
End: 2022-09-12

## 2022-09-12 ENCOUNTER — TELEPHONE (OUTPATIENT)
Dept: INTERNAL MEDICINE CLINIC | Age: 77
End: 2022-09-12

## 2022-09-12 LAB — INR BLD: 1.4

## 2022-09-12 NOTE — TELEPHONE ENCOUNTER
Call  Since BP and HR both low: stop aldactone and toprol  Cont to monitor  Call if not better in few days

## 2022-09-12 NOTE — TELEPHONE ENCOUNTER
Called and spoke to patient. He said he is still not feeling well, c/o sob and dizziness. States walking to the bathroom or just walking around his house, he becomes very fatigued. States he is okay as long as he is in his recliner.  has sent several \"event\" reports that are attached to this encounter. On 9/10 at 14:32 he had a 3.4 sec pause, patient states he was not sleeping, he was watching television. He also states his BP has been running lower and gave some values from the last day including this AM - 9/12 98/60, 111/72(taken during our phone call). Yesterday numbers were 100/68, 90/60. He is also concerned about being started on Lisinopril 5 mg qd. He also states \"at the hospital\" they did orthostatics on him, and upon standing there was a \"significant\" drop. INR today 1.4, states he has been off of warfarin due to recent procedures. INR's managed by his PCP.

## 2022-09-12 NOTE — CARE COORDINATION
[facility] Transitions Initial Follow Up Call    Call within 2 business days of discharge: Yes    Patient: Wade Aguilar Patient : 1945   MRN: [id number]  Reason for Admission: angina/covid +  Discharge Date: 22 RARS: Readmission Risk Score: 15.2      Last Discharge [facility]       Date Complaint Diagnosis Description Type Department Provider    22   Admission (Discharged) Estiven Sanchez MD             Spoke with: [address]: [facility]    Non-face-to-face services provided:  Obtained and reviewed discharge summary and/or continuity of care documents    Transitions of Care Initial Call  Challenges to be reviewed by the provider   Additional needs identified to be addressed with provider: Yes  Patient has spoken to [facility] this morning in regards to symptoms, but continues to feel \"bad\". Patient continues to be short of breath with minimal exertion, dizzy,  and fatigued since d/c from hospital. Patient has not taken any of his morning medications with fear of lowering his blood pressure. Patient stated his blood pressure is already running low and monitor provider called him in regards to a 3-4 second pause. Waiting to hear from Dr Daphne More office with further instruction. Method of communication with provider : chart routing    Advance Care Planning:   Does patient have an Advance Directive: reviewed and current. Care Transition Nurse contacted the patient by telephone to perform post hospital discharge assessment. Verified name and  with patient as identifiers. Provided introduction to self, and explanation of the CTN role. CTN reviewed discharge instructions, medical action plan and red flags with patient who verbalized understanding. Patient given an opportunity to ask questions and does not have any further questions or concerns at this time. Were discharge instructions available to patient? Yes.  Reviewed appropriate site of care based on symptoms and resources available to patient including: PCP  Specialist. The patient agrees to contact the PCP office for questions related to their healthcare. Medication reconciliation was not performed with patient, who verbalizes understanding of administration of home medications. Advised obtaining a 90-day supply of all daily and as-needed medications. Patient declined to reviewed medication at time of initial transition call    Was patient discharged with a pulse oximeter? no    Patient answered call and verified . Patient pleasant and agreeable to transition call. Patient continues to feel \"bad\". Patient reached out to I and spoke to nurse Beba. Beba's note reviewed and in system. Patient continues to be dizzy, short of breath with minimal exertion, and fatigue since discharge from the hospital. Patient stated that his blood pressure is being checked on routine at his home and results have been low. Patient has not taken any morning medications due to fear of blood pressure going lower. Patient is waiting to hear back from cardiology office with further treatment recommendations. CTN to route message to provider as well. CTN provided contact information. Plan for follow-up call in 1-2 days based on severity of symptoms and risk factors.   Plan for next call:  follow up instructions from [address] 24 Hour Call    Do you have a copy of your discharge instructions?: Yes  Do you have all of your prescriptions and are they filled?: Yes  Have you been contacted by a [address]?: No  Have you scheduled your follow up appointment?: Yes  How are you going to get to your appointment?: Car - family or friend to transport  Patient DME: Chair lift, Walker, [employer], Straight cane, Other  Other Patient DME: grab bars in shower and around toilet, [facility] shower  Do you have support at home?: Partner/Spouse/SO  Do you feel like you have everything you need to keep you well at home?: Yes  Are you an active caregiver in your home?: No  Care Transitions Interventions         Follow Up  Future Appointments   Date Time Provider Arslan Reva   9/15/2022  1:45 PM Duarte Bond MD [place] [facility] None   10/4/2022  9:45 AM Yoan Martinez MD [facility] [place]   11/23/2022  1:10 PM Duarte Bond MD [facility] None   4/17/2023  1:20 PM SERENA Craig - CNP [facility] [place]       Neal Tran RN

## 2022-09-12 NOTE — TELEPHONE ENCOUNTER
Pt called [facility] and stated he had a heart cath placed last Thursday and stated since then he is still very shaky, dizzy and sob. Stated is wearing 30 day monitor and has had a lot of reports, stated he is also in AFIB, please advise.

## 2022-09-12 NOTE — TELEPHONE ENCOUNTER
----- Message from Sheng Lynn MD sent at 9/12/2022  1:34 PM EDT -----  He was off coumadin for procedure   Repeat Friday  No change in dose    ----- Message -----  From: Enrrique Balderas  Sent: 9/12/2022   9:50 AM EDT  To: Sheng Lynn MD    INR 1.4  Taking 5 mg daily

## 2022-09-12 NOTE — TELEPHONE ENCOUNTER
[facility] 45 Transitions Initial Follow Up Call    Call within 2 business days of discharge: Yes     Patient: Grace Serrano Patient : 1945 MRN: [id number]    [unfilled]    RARS: Readmission Risk Score: 15.2       Spoke with: patient    Discharge department/facility: home    Non-face-to-face services provided:  Scheduled appointment with PCP-9/15/22    Spoke to patient for hospital follow up. Pt states he is still not feeling well. He has a cardiac monitor in place and has been in contact with Dr Carolyn Verduzco today. States that the monitor company called him for pauses and pt verbalized fear with that. He did speak to Dr Carolyn Verduzco RN who made medication adjustments. Continuing to monitor.      Follow Up  Future Appointments   Date Time Provider Arslan Ardon   9/15/2022  1:45 PM Amey Lesch, MD [place] [facility] None   10/4/2022  9:45 AM Francis Virgen MD I [facility]   2022  1:10 PM Amey Lesch, MD [place] [facility] None   2023  1:20 PM SERENA Vieira - CNP [facility] [place]DA Mackey RN

## 2022-09-15 ENCOUNTER — TELEPHONE (OUTPATIENT)
Dept: OTHER | Facility: CLINIC | Age: 77
End: 2022-09-15

## 2022-09-15 ENCOUNTER — HOSPITAL ENCOUNTER (INPATIENT)
Age: 77
LOS: 3 days | Discharge: NURSING, REHAB OR LONG-TERM CARE | DRG: 309 | End: 2022-09-21
Attending: STUDENT IN AN ORGANIZED HEALTH CARE EDUCATION/TRAINING PROGRAM | Admitting: INTERNAL MEDICINE
Payer: MEDICARE

## 2022-09-15 ENCOUNTER — OFFICE VISIT (OUTPATIENT)
Dept: PULMONOLOGY | Age: 77
End: 2022-09-15
Payer: MEDICARE

## 2022-09-15 ENCOUNTER — OFFICE VISIT (OUTPATIENT)
Dept: INTERNAL MEDICINE CLINIC | Age: 77
End: 2022-09-15

## 2022-09-15 ENCOUNTER — APPOINTMENT (OUTPATIENT)
Dept: CT IMAGING | Age: 77
DRG: 309 | End: 2022-09-15
Payer: MEDICARE

## 2022-09-15 VITALS
HEART RATE: 74 BPM | WEIGHT: 308.2 LBS | SYSTOLIC BLOOD PRESSURE: 128 MMHG | RESPIRATION RATE: 16 BRPM | HEIGHT: 75 IN | DIASTOLIC BLOOD PRESSURE: 78 MMHG | BODY MASS INDEX: 38.32 KG/M2 | OXYGEN SATURATION: 97 % | TEMPERATURE: 96.9 F

## 2022-09-15 VITALS — BODY MASS INDEX: 38.42 KG/M2 | WEIGHT: 309 LBS | HEIGHT: 75 IN

## 2022-09-15 DIAGNOSIS — Z09 HOSPITAL DISCHARGE FOLLOW-UP: Primary | ICD-10-CM

## 2022-09-15 DIAGNOSIS — R06.09 DOE (DYSPNEA ON EXERTION): Primary | ICD-10-CM

## 2022-09-15 DIAGNOSIS — I50.32 CHRONIC DIASTOLIC CONGESTIVE HEART FAILURE (HCC): ICD-10-CM

## 2022-09-15 DIAGNOSIS — G47.33 OSA (OBSTRUCTIVE SLEEP APNEA): ICD-10-CM

## 2022-09-15 DIAGNOSIS — E66.9 CLASS 2 OBESITY WITHOUT SERIOUS COMORBIDITY WITH BODY MASS INDEX (BMI) OF 37.0 TO 37.9 IN ADULT, UNSPECIFIED OBESITY TYPE: ICD-10-CM

## 2022-09-15 DIAGNOSIS — I25.118 CORONARY ARTERY DISEASE OF NATIVE ARTERY OF NATIVE HEART WITH STABLE ANGINA PECTORIS (HCC): ICD-10-CM

## 2022-09-15 DIAGNOSIS — R06.02 SHORTNESS OF BREATH: ICD-10-CM

## 2022-09-15 DIAGNOSIS — R06.02 SOB (SHORTNESS OF BREATH): Primary | ICD-10-CM

## 2022-09-15 DIAGNOSIS — J45.909 UNCOMPLICATED ASTHMA, UNSPECIFIED ASTHMA SEVERITY, UNSPECIFIED WHETHER PERSISTENT: ICD-10-CM

## 2022-09-15 DIAGNOSIS — R06.03 ACUTE RESPIRATORY DISTRESS: ICD-10-CM

## 2022-09-15 DIAGNOSIS — I50.32 CHRONIC DIASTOLIC CHF (CONGESTIVE HEART FAILURE) (HCC): ICD-10-CM

## 2022-09-15 LAB
A/G RATIO: 0.9 (ref 1.1–2.2)
ALBUMIN SERPL-MCNC: 4.2 G/DL (ref 3.4–5)
ALP BLD-CCNC: 111 U/L (ref 40–129)
ALT SERPL-CCNC: 29 U/L (ref 10–40)
ANION GAP SERPL CALCULATED.3IONS-SCNC: 10 MMOL/L (ref 3–16)
AST SERPL-CCNC: 21 U/L (ref 15–37)
BACTERIA: ABNORMAL /HPF
BASE EXCESS VENOUS: -1.1 MMOL/L (ref -3–3)
BASOPHILS ABSOLUTE: 0.1 K/UL (ref 0–0.2)
BASOPHILS RELATIVE PERCENT: 1 %
BILIRUB SERPL-MCNC: 0.5 MG/DL (ref 0–1)
BILIRUBIN URINE: NEGATIVE
BLOOD, URINE: NEGATIVE
BUN BLDV-MCNC: 33 MG/DL (ref 7–20)
CALCIUM SERPL-MCNC: 9.7 MG/DL (ref 8.3–10.6)
CARBOXYHEMOGLOBIN: 2.3 % (ref 0–1.5)
CHLORIDE BLD-SCNC: 102 MMOL/L (ref 99–110)
CLARITY: CLEAR
CO2: 26 MMOL/L (ref 21–32)
COLOR: YELLOW
CREAT SERPL-MCNC: 1.3 MG/DL (ref 0.8–1.3)
EKG ATRIAL RATE: 74 BPM
EKG DIAGNOSIS: NORMAL
EKG P AXIS: 31 DEGREES
EKG P-R INTERVAL: 112 MS
EKG Q-T INTERVAL: 396 MS
EKG QRS DURATION: 74 MS
EKG QTC CALCULATION (BAZETT): 448 MS
EKG R AXIS: -3 DEGREES
EKG T AXIS: 6 DEGREES
EKG VENTRICULAR RATE: 77 BPM
EOSINOPHILS ABSOLUTE: 0.3 K/UL (ref 0–0.6)
EOSINOPHILS RELATIVE PERCENT: 3.9 %
EPITHELIAL CELLS, UA: ABNORMAL /HPF (ref 0–5)
FENO: 13 PPB
GFR AFRICAN AMERICAN: >60
GFR NON-AFRICAN AMERICAN: 54
GLUCOSE BLD-MCNC: 178 MG/DL (ref 70–99)
GLUCOSE URINE: NEGATIVE MG/DL
HCO3 VENOUS: 24.8 MMOL/L (ref 23–29)
HCT VFR BLD CALC: 35.8 % (ref 40.5–52.5)
HEMOGLOBIN: 11.7 G/DL (ref 13.5–17.5)
INFLUENZA A: NOT DETECTED
INFLUENZA B: NOT DETECTED
INR BLD: 0.95 (ref 0.87–1.14)
KETONES, URINE: NEGATIVE MG/DL
LACTIC ACID: 1.2 MMOL/L (ref 0.4–2)
LACTIC ACID: 2.5 MMOL/L (ref 0.4–2)
LEUKOCYTE ESTERASE, URINE: ABNORMAL
LYMPHOCYTES ABSOLUTE: 1.1 K/UL (ref 1–5.1)
LYMPHOCYTES RELATIVE PERCENT: 14.4 %
MCH RBC QN AUTO: 28.7 PG (ref 26–34)
MCHC RBC AUTO-ENTMCNC: 32.7 G/DL (ref 31–36)
MCV RBC AUTO: 87.7 FL (ref 80–100)
METHEMOGLOBIN VENOUS: 0.3 %
MICROSCOPIC EXAMINATION: YES
MONOCYTES ABSOLUTE: 0.8 K/UL (ref 0–1.3)
MONOCYTES RELATIVE PERCENT: 10.1 %
MUCUS: ABNORMAL /LPF
NEUTROPHILS ABSOLUTE: 5.6 K/UL (ref 1.7–7.7)
NEUTROPHILS RELATIVE PERCENT: 70.6 %
NITRITE, URINE: NEGATIVE
O2 SAT, VEN: 53 %
O2 THERAPY: ABNORMAL
PCO2, VEN: 46 MMHG (ref 40–50)
PDW BLD-RTO: 16.9 % (ref 12.4–15.4)
PH UA: 6 (ref 5–8)
PH VENOUS: 7.35 (ref 7.35–7.45)
PLATELET # BLD: 228 K/UL (ref 135–450)
PMV BLD AUTO: 7.2 FL (ref 5–10.5)
PO2, VEN: 29.6 MMHG (ref 25–40)
POTASSIUM REFLEX MAGNESIUM: 4.4 MMOL/L (ref 3.5–5.1)
PRO-BNP: 906 PG/ML (ref 0–449)
PROCALCITONIN: 0.08 NG/ML (ref 0–0.15)
PROTEIN UA: NEGATIVE MG/DL
PROTHROMBIN TIME: 12.5 SEC (ref 11.7–14.5)
RBC # BLD: 4.08 M/UL (ref 4.2–5.9)
RBC UA: ABNORMAL /HPF (ref 0–4)
SARS-COV-2 RNA, RT PCR: NOT DETECTED
SODIUM BLD-SCNC: 138 MMOL/L (ref 136–145)
SPECIFIC GRAVITY UA: 1.01 (ref 1–1.03)
TCO2 CALC VENOUS: 26 MMOL/L
TOTAL PROTEIN: 8.7 G/DL (ref 6.4–8.2)
TROPONIN: <0.01 NG/ML
TROPONIN: <0.01 NG/ML
URINE TYPE: ABNORMAL
UROBILINOGEN, URINE: 0.2 E.U./DL
WBC # BLD: 7.9 K/UL (ref 4–11)
WBC UA: ABNORMAL /HPF (ref 0–5)

## 2022-09-15 PROCEDURE — 82803 BLOOD GASES ANY COMBINATION: CPT

## 2022-09-15 PROCEDURE — 6370000000 HC RX 637 (ALT 250 FOR IP): Performed by: PHYSICIAN ASSISTANT

## 2022-09-15 PROCEDURE — 99496 TRANSJ CARE MGMT HIGH F2F 7D: CPT | Performed by: INTERNAL MEDICINE

## 2022-09-15 PROCEDURE — 1111F DSCHRG MED/CURRENT MED MERGE: CPT | Performed by: INTERNAL MEDICINE

## 2022-09-15 PROCEDURE — 87636 SARSCOV2 & INF A&B AMP PRB: CPT

## 2022-09-15 PROCEDURE — 95012 NITRIC OXIDE EXP GAS DETER: CPT | Performed by: INTERNAL MEDICINE

## 2022-09-15 PROCEDURE — 81001 URINALYSIS AUTO W/SCOPE: CPT

## 2022-09-15 PROCEDURE — 85025 COMPLETE CBC W/AUTO DIFF WBC: CPT

## 2022-09-15 PROCEDURE — 1123F ACP DISCUSS/DSCN MKR DOCD: CPT | Performed by: INTERNAL MEDICINE

## 2022-09-15 PROCEDURE — 1036F TOBACCO NON-USER: CPT | Performed by: INTERNAL MEDICINE

## 2022-09-15 PROCEDURE — 83605 ASSAY OF LACTIC ACID: CPT

## 2022-09-15 PROCEDURE — 36415 COLL VENOUS BLD VENIPUNCTURE: CPT

## 2022-09-15 PROCEDURE — 84484 ASSAY OF TROPONIN QUANT: CPT

## 2022-09-15 PROCEDURE — G8427 DOCREV CUR MEDS BY ELIG CLIN: HCPCS | Performed by: INTERNAL MEDICINE

## 2022-09-15 PROCEDURE — G8417 CALC BMI ABV UP PARAM F/U: HCPCS | Performed by: INTERNAL MEDICINE

## 2022-09-15 PROCEDURE — 83880 ASSAY OF NATRIURETIC PEPTIDE: CPT

## 2022-09-15 PROCEDURE — G0378 HOSPITAL OBSERVATION PER HR: HCPCS

## 2022-09-15 PROCEDURE — 85610 PROTHROMBIN TIME: CPT

## 2022-09-15 PROCEDURE — 6370000000 HC RX 637 (ALT 250 FOR IP): Performed by: INTERNAL MEDICINE

## 2022-09-15 PROCEDURE — 84145 PROCALCITONIN (PCT): CPT

## 2022-09-15 PROCEDURE — 93005 ELECTROCARDIOGRAM TRACING: CPT | Performed by: STUDENT IN AN ORGANIZED HEALTH CARE EDUCATION/TRAINING PROGRAM

## 2022-09-15 PROCEDURE — 71260 CT THORAX DX C+: CPT | Performed by: PHYSICIAN ASSISTANT

## 2022-09-15 PROCEDURE — 80053 COMPREHEN METABOLIC PANEL: CPT

## 2022-09-15 PROCEDURE — 99204 OFFICE O/P NEW MOD 45 MIN: CPT | Performed by: INTERNAL MEDICINE

## 2022-09-15 PROCEDURE — 99285 EMERGENCY DEPT VISIT HI MDM: CPT

## 2022-09-15 PROCEDURE — 6360000004 HC RX CONTRAST MEDICATION: Performed by: STUDENT IN AN ORGANIZED HEALTH CARE EDUCATION/TRAINING PROGRAM

## 2022-09-15 RX ORDER — POTASSIUM CHLORIDE 20 MEQ/1
40 TABLET, EXTENDED RELEASE ORAL PRN
Status: DISCONTINUED | OUTPATIENT
Start: 2022-09-15 | End: 2022-09-21 | Stop reason: HOSPADM

## 2022-09-15 RX ORDER — WARFARIN SODIUM 5 MG/1
1 TABLET ORAL DAILY
Status: DISCONTINUED | OUTPATIENT
Start: 2022-09-16 | End: 2022-09-15 | Stop reason: ALTCHOICE

## 2022-09-15 RX ORDER — MAGNESIUM SULFATE IN WATER 40 MG/ML
2000 INJECTION, SOLUTION INTRAVENOUS PRN
Status: DISCONTINUED | OUTPATIENT
Start: 2022-09-15 | End: 2022-09-21 | Stop reason: HOSPADM

## 2022-09-15 RX ORDER — ACETAMINOPHEN 500 MG
1000 TABLET ORAL ONCE
Status: COMPLETED | OUTPATIENT
Start: 2022-09-15 | End: 2022-09-15

## 2022-09-15 RX ORDER — ASPIRIN 81 MG/1
81 TABLET, CHEWABLE ORAL DAILY
Status: DISCONTINUED | OUTPATIENT
Start: 2022-09-16 | End: 2022-09-21 | Stop reason: HOSPADM

## 2022-09-15 RX ORDER — SODIUM CHLORIDE 9 MG/ML
INJECTION, SOLUTION INTRAVENOUS CONTINUOUS
Status: DISCONTINUED | OUTPATIENT
Start: 2022-09-15 | End: 2022-09-16

## 2022-09-15 RX ORDER — ENOXAPARIN SODIUM 150 MG/ML
1 INJECTION SUBCUTANEOUS 2 TIMES DAILY
Status: DISCONTINUED | OUTPATIENT
Start: 2022-09-15 | End: 2022-09-21 | Stop reason: HOSPADM

## 2022-09-15 RX ORDER — ALBUTEROL SULFATE 90 UG/1
2 AEROSOL, METERED RESPIRATORY (INHALATION) 4 TIMES DAILY PRN
Status: DISCONTINUED | OUTPATIENT
Start: 2022-09-15 | End: 2022-09-21 | Stop reason: HOSPADM

## 2022-09-15 RX ORDER — DEXTROSE MONOHYDRATE 100 MG/ML
INJECTION, SOLUTION INTRAVENOUS CONTINUOUS PRN
Status: DISCONTINUED | OUTPATIENT
Start: 2022-09-15 | End: 2022-09-21 | Stop reason: HOSPADM

## 2022-09-15 RX ORDER — M-VIT,TX,IRON,MINS/CALC/FOLIC 27MG-0.4MG
2 TABLET ORAL DAILY
Status: DISCONTINUED | OUTPATIENT
Start: 2022-09-16 | End: 2022-09-21 | Stop reason: HOSPADM

## 2022-09-15 RX ORDER — ONDANSETRON 4 MG/1
4 TABLET, ORALLY DISINTEGRATING ORAL EVERY 8 HOURS PRN
Status: DISCONTINUED | OUTPATIENT
Start: 2022-09-15 | End: 2022-09-21 | Stop reason: HOSPADM

## 2022-09-15 RX ORDER — MONTELUKAST SODIUM 10 MG/1
10 TABLET ORAL NIGHTLY
Status: DISCONTINUED | OUTPATIENT
Start: 2022-09-15 | End: 2022-09-21 | Stop reason: HOSPADM

## 2022-09-15 RX ORDER — ATORVASTATIN CALCIUM 40 MG/1
40 TABLET, FILM COATED ORAL DAILY
Status: DISCONTINUED | OUTPATIENT
Start: 2022-09-16 | End: 2022-09-21 | Stop reason: HOSPADM

## 2022-09-15 RX ORDER — ALBUTEROL SULFATE 90 UG/1
2 AEROSOL, METERED RESPIRATORY (INHALATION) 4 TIMES DAILY PRN
Qty: 54 G | Refills: 1 | Status: ON HOLD | OUTPATIENT
Start: 2022-09-15 | End: 2022-09-18 | Stop reason: SDUPTHER

## 2022-09-15 RX ORDER — INSULIN LISPRO 100 [IU]/ML
0-4 INJECTION, SOLUTION INTRAVENOUS; SUBCUTANEOUS EVERY 6 HOURS
Status: DISCONTINUED | OUTPATIENT
Start: 2022-09-16 | End: 2022-09-21 | Stop reason: HOSPADM

## 2022-09-15 RX ORDER — LANOLIN ALCOHOL/MO/W.PET/CERES
400 CREAM (GRAM) TOPICAL DAILY
Status: DISCONTINUED | OUTPATIENT
Start: 2022-09-16 | End: 2022-09-21 | Stop reason: HOSPADM

## 2022-09-15 RX ORDER — POTASSIUM CHLORIDE 7.45 MG/ML
10 INJECTION INTRAVENOUS PRN
Status: DISCONTINUED | OUTPATIENT
Start: 2022-09-15 | End: 2022-09-21 | Stop reason: HOSPADM

## 2022-09-15 RX ORDER — SODIUM CHLORIDE 9 MG/ML
INJECTION, SOLUTION INTRAVENOUS PRN
Status: DISCONTINUED | OUTPATIENT
Start: 2022-09-15 | End: 2022-09-21 | Stop reason: HOSPADM

## 2022-09-15 RX ORDER — ENOXAPARIN SODIUM 100 MG/ML
40 INJECTION SUBCUTANEOUS DAILY
Status: DISCONTINUED | OUTPATIENT
Start: 2022-09-16 | End: 2022-09-15 | Stop reason: DRUGHIGH

## 2022-09-15 RX ORDER — DILTIAZEM HYDROCHLORIDE 60 MG/1
120 TABLET, FILM COATED ORAL EVERY 12 HOURS SCHEDULED
Status: DISCONTINUED | OUTPATIENT
Start: 2022-09-15 | End: 2022-09-16

## 2022-09-15 RX ORDER — ACETAMINOPHEN 325 MG/1
650 TABLET ORAL EVERY 6 HOURS PRN
Status: DISCONTINUED | OUTPATIENT
Start: 2022-09-15 | End: 2022-09-21 | Stop reason: HOSPADM

## 2022-09-15 RX ORDER — POLYETHYLENE GLYCOL 3350 17 G/17G
17 POWDER, FOR SOLUTION ORAL DAILY PRN
Status: DISCONTINUED | OUTPATIENT
Start: 2022-09-15 | End: 2022-09-21 | Stop reason: HOSPADM

## 2022-09-15 RX ORDER — METOPROLOL SUCCINATE 25 MG/1
25 TABLET, EXTENDED RELEASE ORAL DAILY
Status: DISCONTINUED | OUTPATIENT
Start: 2022-09-16 | End: 2022-09-16

## 2022-09-15 RX ORDER — LISINOPRIL 5 MG/1
5 TABLET ORAL DAILY
Status: DISCONTINUED | OUTPATIENT
Start: 2022-09-16 | End: 2022-09-20

## 2022-09-15 RX ORDER — ONDANSETRON 2 MG/ML
4 INJECTION INTRAMUSCULAR; INTRAVENOUS EVERY 6 HOURS PRN
Status: DISCONTINUED | OUTPATIENT
Start: 2022-09-15 | End: 2022-09-21 | Stop reason: HOSPADM

## 2022-09-15 RX ORDER — SODIUM CHLORIDE 0.9 % (FLUSH) 0.9 %
5-40 SYRINGE (ML) INJECTION PRN
Status: DISCONTINUED | OUTPATIENT
Start: 2022-09-15 | End: 2022-09-21 | Stop reason: HOSPADM

## 2022-09-15 RX ORDER — ACETAMINOPHEN 650 MG/1
650 SUPPOSITORY RECTAL EVERY 6 HOURS PRN
Status: DISCONTINUED | OUTPATIENT
Start: 2022-09-15 | End: 2022-09-21 | Stop reason: HOSPADM

## 2022-09-15 RX ORDER — CLOPIDOGREL BISULFATE 75 MG/1
75 TABLET ORAL DAILY
Status: DISCONTINUED | OUTPATIENT
Start: 2022-09-16 | End: 2022-09-21 | Stop reason: HOSPADM

## 2022-09-15 RX ORDER — SODIUM CHLORIDE 0.9 % (FLUSH) 0.9 %
5-40 SYRINGE (ML) INJECTION EVERY 12 HOURS SCHEDULED
Status: DISCONTINUED | OUTPATIENT
Start: 2022-09-15 | End: 2022-09-21 | Stop reason: HOSPADM

## 2022-09-15 RX ADMIN — IOPAMIDOL 85 ML: 755 INJECTION, SOLUTION INTRAVENOUS at 17:10

## 2022-09-15 RX ADMIN — ACETAMINOPHEN 1000 MG: 500 TABLET ORAL at 17:04

## 2022-09-15 ASSESSMENT — SLEEP AND FATIGUE QUESTIONNAIRES
HOW LIKELY ARE YOU TO NOD OFF OR FALL ASLEEP IN A CAR, WHILE STOPPED FOR A FEW MINUTES IN TRAFFIC: 0
HOW LIKELY ARE YOU TO NOD OFF OR FALL ASLEEP WHEN YOU ARE A PASSENGER IN A CAR FOR AN HOUR WITHOUT A BREAK: 2
HOW LIKELY ARE YOU TO NOD OFF OR FALL ASLEEP WHILE SITTING INACTIVE IN A PUBLIC PLACE: 0
HOW LIKELY ARE YOU TO NOD OFF OR FALL ASLEEP WHILE SITTING QUIETLY AFTER LUNCH WITHOUT ALCOHOL: 1
HOW LIKELY ARE YOU TO NOD OFF OR FALL ASLEEP WHILE SITTING AND READING: 3
HOW LIKELY ARE YOU TO NOD OFF OR FALL ASLEEP WHILE SITTING AND TALKING TO SOMEONE: 0
HOW LIKELY ARE YOU TO NOD OFF OR FALL ASLEEP WHILE LYING DOWN TO REST IN THE AFTERNOON WHEN CIRCUMSTANCES PERMIT: 3
HOW LIKELY ARE YOU TO NOD OFF OR FALL ASLEEP WHILE WATCHING TV: 3
ESS TOTAL SCORE: 12

## 2022-09-15 ASSESSMENT — PULMONARY FUNCTION TESTS: FENO: 13

## 2022-09-15 ASSESSMENT — ENCOUNTER SYMPTOMS
ORTHOPNEA: 0
HEMOPTYSIS: 0
VOMITING: 0
WHEEZING: 0
EYES NEGATIVE: 1
GASTROINTESTINAL NEGATIVE: 1
ABDOMINAL PAIN: 0
SHORTNESS OF BREATH: 1
SORE THROAT: 0
ALLERGIC/IMMUNOLOGIC NEGATIVE: 1
SWOLLEN GLANDS: 0
RHINORRHEA: 0
SPUTUM PRODUCTION: 0

## 2022-09-15 ASSESSMENT — PAIN SCALES - GENERAL: PAINLEVEL_OUTOF10: 7

## 2022-09-15 ASSESSMENT — PAIN - FUNCTIONAL ASSESSMENT
PAIN_FUNCTIONAL_ASSESSMENT: NONE - DENIES PAIN
PAIN_FUNCTIONAL_ASSESSMENT: 0-10

## 2022-09-15 NOTE — ED PROVIDER NOTES
I independently performed a history and physical on Shakira's. All diagnostic, treatment, and disposition decisions were made by myself in conjunction with the advanced practice provider/resident. Labs Reviewed   CBC WITH AUTO DIFFERENTIAL - Abnormal; Notable for the following components:       Result Value    RBC 4.08 (*)     Hemoglobin 11.7 (*)     Hematocrit 35.8 (*)     RDW 16.9 (*)     All other components within normal limits   COMPREHENSIVE METABOLIC PANEL W/ REFLEX TO MG FOR LOW K - Abnormal; Notable for the following components:    Glucose 178 (*)     BUN 33 (*)     GFR Non- 54 (*)     Total Protein 8.7 (*)     Albumin/Globulin Ratio 0.9 (*)     All other components within normal limits   BRAIN NATRIURETIC PEPTIDE - Abnormal; Notable for the following components:    Pro- (*)     All other components within normal limits   BLOOD GAS, VENOUS - Abnormal; Notable for the following components:    pH, Gabriel 7.349 (*)     Carboxyhemoglobin 2.3 (*)     All other components within normal limits   LACTIC ACID - Abnormal; Notable for the following components:    Lactic Acid 2.5 (*)     All other components within normal limits   URINALYSIS WITH MICROSCOPIC - Abnormal; Notable for the following components:    Leukocyte Esterase, Urine TRACE (*)     Mucus, UA 1+ (*)     Bacteria, UA Rare (*)     All other components within normal limits   COVID-19 & INFLUENZA COMBO   TROPONIN   PROTIME-INR   PROCALCITONIN   LACTIC ACID   TROPONIN     CT CHEST PULMONARY EMBOLISM W CONTRAST   Preliminary Result   No evidence of pulmonary embolism or acute pulmonary abnormality. Enlarged right thyroid lobe, extending to the upper mediastinum. This could   be further assessed with an outpatient thyroid ultrasound, if not recently   performed. For further details of [facility] emergency department encounter, please see the ДМИТРИЙ/resident's documentation.     I personally saw the patient and performed a substantive portion of the visit including all aspects of the medical decision making. Briefly, this is a 71-year-old male, presenting with concerns for severe dyspnea on exertion. He had a cardiac stent placed last week. States that he is still having ongoing dyspnea on exertion which has become severe to the point where he feels he cannot even walk about 20 feet before he gets extremely short of breath and feels he may pass out. He denies any associated chest pain. Labs are performed and are reassuring. CT chest with pulmonary embolism protocol was performed which did not reveal evidence of pulmonary embolism or acute pulmonary abnormality. Patient will be hospitalized for further work-up and treatment of his condition. He is comfortable in agreement with plan of care. I personally saw the patient and independently provided 0 minutes of non-concurrent critical care out of the total shared critical care time provided. I, Dr. Val Cordoba, am the primary clinician of record. 1. PANG (dyspnea on exertion)      Comment: Please note this report has been produced using speech recognition software and may contain errors related to that system including errors in grammar, punctuation, and spelling, as well as words and phrases that may be inappropriate. If there are any questions or concerns please feel free to contact the dictating provider for clarification.        Lauren Donaldson MD  09/15/22 [id number]

## 2022-09-15 NOTE — H&P
Hospital Medicine History & Physical      PCP: Iqra Blanc MD    Date of Service: Pt seen/examined on 9/15/22 and admitted on 9/15/22 to Observation    Chief Complaint   Patient presents with    Shortness of Breath     SOB 02 77, fatigue, covid 09/04, . Referral       History Of Present Illness: The patient is a 69 y.o. male with PMH below, presents with severe PANG. PT was sent by Dr. Lisa Sawant office for severe dyspnea with only a few steps. He recently admitted here w/ similar Sx and was ultimately transferred to [facility] for cardiac cath. He underwent a stent to mid LAD on 9/7. He says his breathing has not really improved since. Additionally in 6/2021 he had similar presentation and got 2 stents at that time. He has not had veda chest pain. He is currently wearing a halter monitor. Of note, he recently had COVID. His PCR was negative today.       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    History of knee replacement procedure of right knee     Hx of blood clots     2 DVT's    Hyperlipidemia     Hypertension     Knee osteoarthritis 1/17/2012    Left TKR 1/18/2012    Lung nodule     due to histoplasmosis    Neuromuscular disorder ([address]     Palpitations     stable with atenolol    Sleep apnea     uses CPAP    Stone, kidney     UTI (urinary tract infection) 01/23/2017       Past Surgical History:        Procedure Laterality Date    ABLATION OF DYSRHYTHMIC FOCUS  2013    a-fib    BARIATRIC SURGERY  2013    GASTRIC SLEEVE    CARDIAC SURGERY  2013    ablation    CARPAL TUNNEL RELEASE Right 9-30-15    CARPAL TUNNEL RELEASE Left 3/20/16    CHOLECYSTECTOMY, LAPAROSCOPIC N/A 2/19/2019    LAPAROSCOPIC CHOLECYSTECTOMY WITH CHOLANGIOGRAMS performed by Aster Vega MD at [facility] COLONOSCOPY      CYSTOSCOPY      with removal stone    CYSTOSCOPY  2/8/13    with Laser Vaporization of Enlarged Prostate    DIAGNOSTIC CARDIAC CATH LAB PROCEDURE      ERCP  02/18/2019    Sphincterotomy, stone removal    ERCP N/A 2/18/2019    ERCP SPHINCTER/PAPILLOTOMY performed by Gabby Parker MD at [address]    ERCP  2/18/2019    ERCP STONE REMOVAL performed [facility] Parker MD at [facility] Right 1/14/2020    PHACOEMULSIFICATION OF CATARACT RIGHT EYE WITH INTRAOCULAR LENS IMPLANT performed by Alvina Dumont MD at [facility] Left 1/21/2020    PHACOEMULSIFICATION OF CATARACT LEFT EYE WITH INTRAOCULAR LENS IMPLANT -SLEEP APNEA- performed by Alvina Dumont MD at [address] Bilateral     right knee (2002) and left knee (2012)    KNEE ARTHROSCOPY  4/19/2011     left  knee    SKIN BIOPSY      skin Ca/SQUAMOUS CELL    THORACOTOMY      wedge resection    TONSILLECTOMY         Medications Prior to Admission:    Prior to Admission medications    Medication Sig Start Date End Date Taking? Authorizing Provider   albuterol sulfate HFA (VENTOLIN HFA) 108 (90 Base) MCG/ACT inhaler Inhale 2 puffs into the lungs 4 times daily as needed for Wheezing 9/15/22   Christine Santana MD   aspirin 81 MG chewable tablet Take 1 tablet by mouth daily 9/10/22   Mary Jane Oquendo MD   lisinopril (PRINIVIL;ZESTRIL) 5 MG tablet Take 1 tablet by mouth daily 9/10/22   Mary Jane Oquendo MD   clopidogrel (PLAVIX) 75 MG tablet Take 1 tablet by mouth daily 9/9/22 10/9/22  Mary Jane Oquendo MD   warfarin (COUMADIN) 5 MG tablet TAKE ONE TABLET BY MOUTH DAILY 8/11/22   Benjamin Cherry MD   metFORMIN (GLUCOPHAGE) 1000 MG tablet Take 0.5 tablets by mouth in the morning and 0.5 tablets in the evening. Take with meals.  7/20/22   Benjamin Cherry MD   montelukast (SINGULAIR) 10 MG tablet TAKE ONE TABLET BY MOUTH DAILY 7/1/22   Kaylin Golden MD   spironolactone (ALDACTONE) 25 MG tablet TAKE ONE TABLET BY MOUTH DAILY  Patient not taking: No sig reported 7/1/22   Kaylin Golden MD   metoprolol succinate (TOPROL XL) 25 MG extended release tablet TAKE ONE TABLET BY MOUTH DAILY  Patient not taking: No sig reported 7/1/22   Damian Servin MD   atorvastatin (LIPITOR) 40 MG tablet TAKE ONE TABLET BY MOUTH DAILY 7/1/22   Kaylin Golden MD   Dulaglutide (TRULICITY) 0.24 KU/9.0FS SOPN Inject 0.75 mg into the skin once a week    Historical Provider, MD   blood glucose test strips (TRUE METRIX BLOOD GLUCOSE TEST) strip TEST ONCE DAILY. DX:E11.9 1/20/22   Kaylin Golden MD   [facility]) 250 MCG capsule Take 1 capsule by mouth 2 times daily  Patient not taking: Reported on 9/7/2022 10/19/21 9/9/22  SOTERO Dennis MD   torsemide (DEMADEX) 20 MG tablet Take 1 tablet by mouth daily  Patient not taking: No sig reported 10/4/21   Isha Chambers APRN - LISSA   dilTIAZem (CARDIZEM) 120 MG tablet TAKE 1 TABLET BY MOUTH  EVERY 12 HOURS 9/3/20   Traci Gould MD   Easy Touch Lancets 32G/Twist MISC TEST DAILY. DX: E11.9 3/23/20   Kaylin Golden MD   blood glucose test strips (TRUE METRIX BLOOD GLUCOSE TEST) strip USE TO CHECK BLOOD GLUCOSE DAILY. DX: E11.9 3/23/20   Kaylin Golden MD   ACCU-CHEK MULTICLIX LANCETS MISC Check sugars once daily. Dx;E11.9 3/11/19   Kaylin Golden MD   Lancet Devices (EASY TOUCH LANCING DEVICE) MISC Test Daily. DX: E11.9 12/26/18   Kaylin Golden MD   Blood Glucose Monitoring Suppl (TRUE METRIX METER) VINNIE Use to check daily. DX;E11.9 12/22/18   Kaylin Golden MD   Lancets Misc. (ACCU-CHEK MULTICLIX LANCET DEV) KIT Check sugars once daily.  DX;E11.9 12/21/18   Kaylin Golden MD   Biotin 5 MG TABS Take 5 mg by mouth daily    Historical Provider, MD   Cholecalciferol (VITAMIN D3) 5000 units TABS Take 5,000 Units by mouth daily    Historical lesions on visible skin. Chart review shows recent radiographs:  ECHO Complete 2D W Doppler W Color    Result Date: 9/6/2022  Transthoracic Echocardiography Report (TTE)  Demographics   Patient Name       PB Hart   Date of Study      09/06/2022        Gender              Male   Patient Number     [id number]        Date of Birth       1945   Visit Number       [id number]         Age                 69 year(s)   Accession Number   [id number]        Room Number            Corporate ID       [id number]          [address] [facility]   Ordering Physician Ishmael Mcneal MD Interpreting        Physician JUVENTINO Lerma, [facility]  Procedure Type of Study   TTE procedure:ECHOCARDIOGRAM COMPLETE 2D W DOPPLER W COLOR. Procedure Date Date: 09/06/2022 Start: 10:49 AM Study Location: [address] [place] [address] - Echo Lab Technical Quality: Adequate visualization Indications:Dyspnea/SOB. Patient Status: Routine Height: 75 inches Weight: 308.01 pounds BSA: 2.64 m2 BMI: 38.5 kg/m2 BP: 116/69 mmHg  Conclusions   Summary  LV systolic function is normal with EF estimated at 55-60%. No regional wall motion abnormalities are noted. There is moderate concentric left ventricular hypertrophy. Normal left ventricular diastolic filling pressure. Biatrial enlargement. Mild posterior mitral annular calcification is present. Aortic valve appears sclerotic but opens adequately. Mild mitral and tricuspid regurgitation. Systolic pulmonary artery pressure (SPAP) estimated at 46mmHg (RA pressure 3  mmHg), consistent with mild pulmonary hypertension.   6- 55%, LVH, LAE, ANNIE   Signature   ------------------------------------------------------------------  Electronically signed by Amberly Watson MD, [facility]  (Interpreting physician) on 09/06/2022 at 05:24 PM  ------------------------------------------------------------------   Findings   Left Ventricle  LV systolic function is normal with ejection fraction estimated at 55-60 %. No regional wall motion abnormalities are noted. Left ventricle size is normal.  There is moderate concentric left ventricular hypertrophy. Normal left ventricular diastolic filling pressure. Mitral Valve  Mild posterior mitral annular calcification is present. The mitral valve leaflets are normal in appearance and mobility. No evidence of mitral valve stenosis. Mild mitral regurgitation. Left Atrium  The left atrium is mildly dilated in size. Aortic Valve  Aortic valve appears sclerotic but opens adequately. There is no significant aortic regurgitation. Aorta  The aortic root is upper normal in size. Right Ventricle  The right ventricle is normal in size and function. Tricuspid Valve  Tricuspid valve is structurally normal.  Tricuspid valve leaflet mobility is normal.  Mild tricuspid regurgitation. Systolic pulmonary artery pressure (SPAP) estimated at 46mmHg (RA pressure 3  mmHg), consistent with mild pulmonary hypertension. Right Atrium  The right atrium is moderately dilated. Pulmonic Valve  The pulmonic valve is not well visualized. No evidence of pulmonic valve stenosis. No evidence of pulmonic valve regurgitation. Pericardial Effusion  No pericardial effusion noted. Pleural Effusion  No pleural effusion. Miscellaneous  The IVC is normal (< = 2.1 cm) and collapses > 50% with respiration  consistent with normal RA pressure (3 mmHg). No obvious masses, thrombi, or vegetations are noted.   M-Mode/2D Measurements (cm)   LV Diastolic Dimension: 9.59 cm LV Systolic Dimension: 0.30 cm  LV Septum Diastolic: 2.37 cm  LV PW Diastolic: 3.08 cm        AO Root Dimension: 3.8 cm                                   LA Area: 26.6 cm2                                  LA volume/Index: 81.67 ml /31 ml/m2  Doppler Measurements   AV Peak Velocity: 120 cm/s     MV Peak E-Wave: 103 cm/s  AV Peak Gradient: 5.76 mmHg  AV Mean PORTABLE    Result Date: 9/5/2022  EXAMINATION: ONE XRAY VIEW OF THE CHEST 9/5/2022 8:15 am COMPARISON: 09/04/2022. HISTORY: ORDERING SYSTEM PROVIDED HISTORY: chf TECHNOLOGIST PROVIDED HISTORY: Reason for exam:->chf Reason for Exam: CHF Initial evaluation. FINDINGS: The cardiac silhouette appears mildly enlarged, but unchanged the prior exam. There is mild tortuosity of the aorta. No focal consolidation. No pleural effusion or pneumothorax. 1. Mild persistent enlargement of the cardiac silhouette. 2. Otherwise, no convincing acute cardiopulmonary abnormality. CT CHEST PULMONARY EMBOLISM W CONTRAST    Result Date: 9/15/2022  EXAMINATION: CTA OF THE CHEST 9/15/2022 5:12 pm TECHNIQUE: CTA of the chest was performed after the administration of intravenous contrast.  Multiplanar reformatted images are provided for review. MIP images are provided for review. Automated exposure control, iterative reconstruction, and/or weight based adjustment of the mA/kV was utilized to reduce the radiation dose to as low as reasonably achievable. COMPARISON: Chest radiograph 09/05/2022 CT abdomen and pelvis 08/01/2016 HISTORY: ORDERING SYSTEM PROVIDED HISTORY: SOB hx covid TECHNOLOGIST PROVIDED HISTORY: Reason for exam:->SOB hx covid Decision Support Exception - unselect if not a suspected or confirmed emergency medical condition->Emergency Medical Condition ([place]) Reason for Exam: SOB hx covid FINDINGS: Pulmonary Arteries: Pulmonary arteries are adequately opacified for evaluation. No evidence of intraluminal filling defect to suggest pulmonary embolism. Main pulmonary artery is normal in caliber. Mediastinum: Enlarged right thyroid gland, extending into the upper mediastinum. No lymphadenopathy. No pericardial effusion. The ascending thoracic aorta measures 3.8 cm in diameter. Lungs/pleura: The central airways are patent. No pleural effusion or pneumothorax is seen. Postsurgical changes are seen in the right lung.   Mild scarring is seen in the right lung. No focal lung consolidation is identified. Stable 4 mm pulmonary nodule at the medial aspect of the right lower lobe (series 2, image 107), when compared to the CT from 08/01/2016, for which no follow-up is recommended. Upper Abdomen: Limited images of the upper abdomen demonstrate no acute abnormality. Soft Tissues/Bones: No acute bony abnormality is seen. No evidence of pulmonary embolism or acute pulmonary abnormality. Enlarged right thyroid lobe, extending to the upper mediastinum. This could be further assessed with an outpatient thyroid ultrasound, if not recently performed.      EKG:    EKG 12 Lead [[id number]]    Collected: 09/15/22 1407    Updated: 09/15/22 1744     Ventricular Rate 77 BPM    Atrial Rate 74 BPM    P-R Interval 112 ms    QRS Duration 74 ms    Q-T Interval 396 ms    QTc Calculation (Bazett) 448 ms    P Axis 31 degrees    R Axis -3 degrees    T Axis 6 degrees    Diagnosis --    Atrial fibrillationLow voltage Pre-cordial leadsNonspecific ST abnormalityAbnormal ECGWhen compared with ECG of 09-SEP-2022 05:07,Nonspecific T wave abnormality has replaced inverted T waves in Inferior leadsConfirmed by Anthony Polo ([id number]) on 9/15/2022 5:44:07 PM      CBC:  Recent Labs     09/15/22  1552   WBC 7.9   HGB 11.7*   HCT 35.8*         RENAL  Recent Labs     09/15/22  1552      K 4.4      CO2 26   BUN 33*   CREATININE 1.3   GLUCOSE 178*     Hemoglobin a1c:  Lab Results   Component Value Date    LABA1C 7.1 09/04/2022    LABA1C 7.1 08/29/2022    LABA1C 7.3 03/24/2022     LFT'S:  Recent Labs     09/15/22  1552   AST 21   ALT 29   BILITOT 0.5   ALKPHOS 111     COAG:  Recent Labs     09/15/22  1552   INR 0.95     CARDIAC ENZYMES:   Recent Labs     09/15/22  1552   TROPONINI <0.01     Lab Results   Component Value Date    PROBNP 906 (H) 09/15/2022    PROBNP 1,442 (H) 09/07/2022    PROBNP 1,755 (H) 09/04/2022     LACTIC ACID:  Recent Labs 09/15/22  1552   LACTA 2.5*     U/A:  Recent Labs     09/15/22  1600   LEUKOCYTESUR TRACE*   BACTERIA Rare*   WBCUA 3-5   COLORU Yellow   RBCUA 0-2   MUCUS 1+*   CLARITYU Clear   SPECGRAV 1.015   BLOODU Negative   GLUCOSEU Negative     VBG:  Recent Labs     09/15/22  1612   PHVEN 7.349*   DDQ6IXH 46.0   MKR6FWP 24.8   PO2VEN 29.6   W5DFAGKA 48        PHYSICIAN CERTIFICATION  I certify that Sheila Overall is expected to be hospitalized for <2 midnights based on the following assessment and plan:    ASSESSMENT/PLAN:  Severe PNAG w/ recent Hx of similar presentation and Dx w/ anginal equivalent. BNP is 906 which is significantly downtrended from previous values earlier this month - see above. Currently has a halter monitor in place. Last admit earlier this month, sent to [facility] by Cards for cath. Cath at [facility] on 9/7 at stented w/ FELIZ to LAD. Also had similar presentation in 6/2021 at which time he required 2 stents (LAD and diag). Stresses and work up were reportedly negative when pt has undergone stenting in the past.  EKG shows a fib w/ rate controlled. He was recently taken off of Tikosyn 2/2 prolonged QTc. Also taken off diuretics. Chk orthostatics q shift x2. Non-acute appearing, trop neg. OK to stay here per Dr. Gamal Marin. Admit PCU obs, tele, serial trops and repeat EKG in am.  D/w Dr. Tenisha Richter (PCP). Pt may need rehab. PT/OT eval.  Cont ASA, statin, IBDCards and Pulm c/s. Lactic acidosis, 2.5, IVF and repeats ordered. Possibly 2/2 metformin, held. PAF, currently in a fib, rate controlled. Cont home regimen. INR added on and then daily. Pharm c/s. Cont coumadin. INR subtherapeutic at 0.95, recently restarted his Coumadin. Lx bridging protocol. INR goal 2.0-2.5 (while also on DAPT planned 4-6 wks from 9/9) per Dr. Mcgee Bolds last progress note. Mgmt as above. Anemia, HGB stable. DM2, hold oral Rx, A1c 7.1 on 9/4, add Low SSI q6h. COVID, has had twice recently. COVID/FLU PCR neg.      DVT Prophylaxis: Lx bridging  Diet: CC, NPO aft MN for Cards c/s. Code Status: Full Code   PT/OT Eval Status: Will order if needed and as patient condition allows  Dispo - Admit to PCU Obs    Jayden Sawant MD    Thank you Luis Hernandez MD for the opportunity to be involved in this patient's care. If you have any questions or concerns please feel free to contact me via the [employer] at [phone]. This chart was generated using the [address] [place], [place] [address]Th  dictation system. I created this record but it may contain dictation errors given the limitations of this technology.

## 2022-09-15 NOTE — PROGRESS NOTES
Ricardo Felix (: 1945 ) is a 69 y.o. male here for an evaluation of   Chief Complaint   Patient presents with    New Patient     Pulm/sleep           ASSESSMENT/PLAN:   Diagnosis Orders   1. SOB (shortness of breath)        2. Uncomplicated asthma, unspecified asthma severity, unspecified whether persistent        3. Chronic diastolic congestive heart failure ([address]        4. MARIA VICTORIA (obstructive sleep apnea)        5. Class 2 obesity without serious comorbidity with body mass index (BMI) of 37.0 to 37.9 in adult, unspecified obesity type              Chest x-ray done 2022  Impression   1. Mild persistent enlargement of the cardiac silhouette. 2. Otherwise, no convincing acute cardiopulmonary abnormality. Asthma  Pulmonary function test done 2016-shows spirometry to be normal, volumes to be normal and diffusion to be normal, no bronchodilator effect      Last eosinophil level 200 done on 2022  TSH normal done on 2022  Bicarbonate in the normal range less than 32  Creatinine has fluctuated between upper lower normal and slightly abnormal between 1.2-1.6, GFR less than 60      9/15/2022  FENO 13 ppb    We will get  PFT  Methacholine challenge  6-minute walk test      We will give  Albuterol 2 puffs as needed to start using when short of breath          Diastolic dysfunction, CHF    Echo done 2022 prior to catheterization   Summary   LV systolic function is normal with EF estimated at 55-60%. No regional wall motion abnormalities are noted. There is moderate concentric left ventricular hypertrophy. Normal left ventricular diastolic filling pressure. Biatrial enlargement. Mild posterior mitral annular calcification is present. Aortic valve appears sclerotic but opens adequately. Mild mitral and tricuspid regurgitation. Systolic pulmonary artery pressure (SPAP) estimated at 46mmHg (RA pressure 3   mmHg), consistent with mild pulmonary hypertension.    2021 55%, LVH, LAE, ANNIE    Cardiac cath done 9/8/2022  Findings:     1. Left main coronary artery was normal. It gave off the left anterior descending artery and left circumflex. 2. Left anterior descending artery has severe atherosclerotic disease. It was large in size. It gave off septal perforators and a moderate sized diagonal branch. The LAD covered the entire apex of the left ventricle. ~There is evidence for Denovo disease of about 70% within the mid LAD. This is confirmed by intravascular ultrasound. Furthermore there is a previously placed stent within the mid LAD that was under deployed and under expanded. 3. Left circumflex has mild atherosclerotic disease. It was moderate in size. There was a moderate sized obtuse marginal branch. 4. Right coronary artery has mild atherosclerotic disease. It was moderate in size and was the dominant artery. 5. Left ventriculogram showed normal LVEF at 55-60%. Wall motion was normal . There was no significant mitral valve or aortic valve disease noted. LVEDP was normal. There was no gradient noted across the aortic valve during pullback of the catheter. CONCLUSIONS:     1. Successful intravascular ultrasound-guided drug-eluting stent insertion to the left anterior descending artery        May need to consider having a nephrologist see the patient because of the diastolic dysfunction and renal insufficiency    May also consider starting on Jardiance          Obstructive sleep apnea  Initial sleep study done in 2012  AHI of 16              SN [id number]      Cpap is set at 12 cwp  Nasal pillows  Dme is     Epr is2  Using 100% of time  Using 8.5 h/night    Leak at 65 lpm  Ahi is 8.4    I have access via airview    Cpap was changed  Autopap min of 10 and max of 15  Epr is 3  Standard response    Call me in 1-2 weeks about pressure change      RTC in 6 weeks. No follow-ups on file.          I have personally reviewed and summarized the old records and/or obtained further history from someone other than the patient. I have independently reviewed the images and reviewed with patient    I have reviewed the lab tests, radiology reports and medications    I have downloaded and interpreted the cpap/bipap/pap data. I have made adjustments as described    Reviewed present meds and side effects. Continue present meds. Stay compliant. Call if worsens. Reviewed proper inhaler usage          SUBJECTIVE/OBJECTIVE:    Consult from Dr. Álvaro Self  For shortness of breath  Initially seen 9/15/2022            Patient was recently in the hospital admitted because of issues with shortness of breath, admitted and discharged on 9/9/2022      Hospital Course:  \"Patient with PMH of A. fib on Coumadin and Tikosyn, DM2, CAD, CHF presented to [facility] ED on 9/4 with shortness of breath. On arrival he was orthostatic positive his diuretics were held. The Tikosyn was held as his QTC was prolonged. Given ongoing shortness of breath there was suspicion for anginal equivalent. So cardiology recommended that the patient be transferred to [facility] to get RHC/LHC especially given history of recent PCI to LAD in 6/21. \"        Anginal equivalent. LHC required a FELIZ to the mid LAD on 9/8. Aspirin indefinitely, clopidogrel for one month (he is also on warfarin). Statin, metoprolol. PAF. Metoprolol, dilt. Stopped dofetilide due to QTc prolongation. Chronic diastolic CHF. Continue torsemide and spironolactone. Continue metoprolol and lisinopril. COVID-19. He does not need to isolation any longer after discharge, but he still does need to wear a mask for 5 more days at home. DM2. A1c 7.1. Continue metformin, dulaglutide. PCP could consider empagliflozin. MARIA VICTORIA.  CAP. At time of admission on the same hospitalization  ASSESSMENT:PLAN:     Shortness of breath  ?   Anginal equivalent versus A. fib symptomatic versus CHF  -Sent to [facility] to get C/RHC to evaluate his coronaries given known CAD with stents  -Consulted cardiology  -Reversed Coumadin with vitamin K , check INR in a.m. A. fib, persistent  -Rate controlled, resume Toprol-XL and Cardizem CD  -Coumadin on hold for potential cath  -Monitor on telemetry  -Tikosyn discontinued due to prolonged QTC     CAD s/p PCI to LAD and diagonal on 6/21. Continue statin, lisinopril, metoprolol and Plavix. Plavix held 9/7 for Cath Lab. CHF-currently volume compensated, Lasix and Aldactone on hold. Continue beta-blocker, ACE inhibitor. Recent echo on 9/6 showed normal EF, normal diastolic parameters     [id number] -incidentally found on 9/4 with rapid COVID test positive, mild dyspnea. Not hypoxemic. No treatment indicated at this time. Can DC isolation for 5 days on 9/9     Obstructive sleep apnea-compliant with PAP therapy, resume CPAP at night     Morbid obesity BMI 37.6-had gastric sleeve surgery in 2013. Encouraged continued weight loss     HLD-statin resumed     DM2-controlled, placed on insulin sliding scale, Accu-Cheks, hypoglycemia protocol          Since hospitalization  Sob has been going on for the last  Some cough, non productive  Some wheezing  Getting sob, worse    Was having issues with fluid status and was on turosemide  And now on lasix  Out of breath and not really helping    Patient has been having episodes of snoring at times  He is being monitored with a cardiac monitor and apparently has had episodes of asystole or arrhythmias at night in particular,\" missed beats\"    His biggest issue is shortness of breath that has not relieved itself despite being diuresed      Had a history of severe asthma when he was living in [place] and was on many inhalers and when he came to [place] his inhalers were taken off and he has not been using inhaler since      Shortness of Breath  This is a chronic problem. The current episode started more than 1 month ago.  The problem occurs intermittently. The problem has been waxing and waning. Associated symptoms include leg swelling. Pertinent negatives include no abdominal pain, chest pain, claudication, coryza, ear pain, fever, headaches, hemoptysis, leg pain, neck pain, orthopnea, PND, rash, rhinorrhea, sore throat, sputum production, swollen glands, syncope, vomiting or wheezing. Review of Systems   Constitutional: Negative. Negative for fever. HENT: Negative. Negative for ear pain, rhinorrhea and sore throat. Eyes: Negative. Respiratory:  Positive for shortness of breath. Negative for hemoptysis, sputum production and wheezing. Cardiovascular:  Positive for leg swelling. Negative for chest pain, orthopnea, claudication, syncope and PND. Gastrointestinal: Negative. Negative for abdominal pain and vomiting. Endocrine: Negative. Genitourinary: Negative. Musculoskeletal: Negative. Negative for neck pain. Skin: Negative. Negative for rash. Allergic/Immunologic: Negative. Neurological: Negative. Negative for headaches. Hematological: Negative. Psychiatric/Behavioral: Negative. Vitals:    09/15/22 1046   BP: 128/78   Site: Left Lower Arm   Position: Sitting   Cuff Size: Medium Adult   Pulse: 74   Resp: 16   Temp: 96.9 °F (36.1 °C)   TempSrc: Temporal   SpO2: 97%   Weight: (!) 308 lb 3.2 oz (139.8 kg)   Height: 6' 3\" (1.905 m)        Physical Exam  Vitals and nursing note reviewed. Constitutional:       General: He is not in acute distress. Appearance: Normal appearance. He is obese. He is not ill-appearing. HENT:      Head: Normocephalic and atraumatic. Right Ear: External ear normal.      Left Ear: External ear normal.      Nose: Nose normal.      Mouth/Throat:      Mouth: Mucous membranes are moist.      Pharynx: Oropharynx is clear. Comments: Mallampati 3  Eyes:      General: No scleral icterus. Extraocular Movements: Extraocular movements intact.       Conjunctiva/sclera: Conjunctivae normal.      Pupils: Pupils are equal, round, and reactive to light. Cardiovascular:      Rate and Rhythm: Normal rate and regular rhythm. Pulses: Normal pulses. Heart sounds: Normal heart sounds. No murmur heard. No friction rub. Pulmonary:      Effort: No respiratory distress. Breath sounds: No stridor. No rhonchi. Abdominal:      General: Abdomen is flat. Bowel sounds are normal. There is no distension. Tenderness: There is no abdominal tenderness. There is no guarding. Musculoskeletal:         General: No swelling or tenderness. Normal range of motion. Cervical back: Normal range of motion and neck supple. No rigidity. Comments: Trace edema in the legs bilaterally   Skin:     General: Skin is warm and dry. Coloration: Skin is not jaundiced. Neurological:      General: No focal deficit present. Mental Status: He is alert and oriented to person, place, and time. Mental status is at baseline. Cranial Nerves: No cranial nerve deficit. Sensory: No sensory deficit. Motor: No weakness. Gait: Gait normal.   Psychiatric:         Mood and Affect: Mood normal.         Thought Content:  Thought content normal.         Judgment: Judgment normal.            Anabel Young MD

## 2022-09-15 NOTE — PROGRESS NOTES
Post-Discharge Transitional Care  Follow Up      Alexander Christie   YOB: 1945    Date of Office Visit:  9/15/2022  Date of Hospital Admission: 9/7/22  Date of Hospital Discharge: 9/9/22  Risk of hospital readmission (high >=14%. Medium >=10%) :Readmission Risk Score: 15.2      Care management risk score Rising risk (score 2-5) and Complex Care (Scores >=6): No Risk Score On File     Non face to face  following discharge, date last encounter closed (first attempt may have been earlier): 09/12/2022    Call initiated 2 business days of discharge: Yes    ASSESSMENT/PLAN:   Hospital discharge follow-up  Acute respiratory distress  -     CTA PULMONARY W CONTRAST; Future  Shortness of breath  Coronary artery disease of native artery of native heart with stable angina pectoris (HCC)  Chronic diastolic CHF (congestive heart failure) [facility])    Medical Decision Making: high complexity  No follow-ups on file. On this date 9/15/2022 I have spent 30 minutes reviewing previous notes, test results and face to face with the patient discussing the diagnosis and importance of compliance with the treatment plan as well as documenting on the day of the visit. Subjective:       69 y.o. 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 ,obestiy, MARIA VICTORIA , pedal edema here for  recent hospital dc f/w      Since last time, pt had another covid infection in 7/22  and in 9/22 , symptoms of fatigue continued with no new sob or cough or fevers. Apparently he was admitted to [facility] for fatigue and dyspnea. Cardiology transferred him to [facility] and cath done with another stent placed in LAD , back on plavix and resumed coumadin    Pt reports his tikosyn was stopped for prolonged qtc, remains in Afibb,   He is currently on holter monitor and this weekend an event ( unknown ) noted by monitoring company and his cardiology stopped BB .  His diuretics were also cut down to half for dizziness and low BP     Changes to meds or stents did not help him at all and remains fatigued and exertional dyspnea remains . Unable to make few steps and for the first time in life used wheelchair       Obesity - has gastric sleeve surgery in 2013 and lost 100 lbs    MARIA VICTORIA - on home cpap, compliant with it    Inpatient course: Discharge summary reviewed- see chart. Interval history/Current status: home     Patient Active Problem List   Diagnosis    Arthritis    Atrial fibrillation (HCC)    Essential hypertension    Carpal tunnel syndrome    Polyneuropathy    Numbness and tingling of both legs    Moderate obstructive sleep apnea    S/P thoracotomy    Atypical atrial flutter (HCC)    SOB (shortness of breath)    Type 2 diabetes mellitus with diabetic neuropathy ([facility]    Morbid obesity with BMI of 40.0-44.9, adult ([facility]    History of venous thromboembolism    Cataract of both eyes    Abnormal stress test    Angina at rest [facility])    Status post insertion of drug-eluting stent into left anterior descending (LAD) artery for coronary artery disease    Other fatigue    Obesity (BMI 30-39. 9)    COVID-19    Acute on chronic heart failure with preserved ejection fraction (HCC)    Coronary artery disease involving native coronary artery of native heart    Anginal equivalent ([facility]    Coronary artery disease, unspecified vessel or lesion type, unspecified whether angina present, unspecified whether native or transplanted heart       Medications listed as ordered at the time of discharge from hospital     Medication List            Accurate as of September 15, 2022  1:51 PM. If you have any questions, ask your nurse or doctor.                 START taking these medications      albuterol sulfate  (90 Base) MCG/ACT inhaler  Commonly known as: Ventolin HFA  Inhale 2 puffs into the lungs 4 times daily as needed for Wheezing  Started by: Luis Gutiérrez MD            CONTINUE taking these medications      Accu-Chek Multiclix Lancet Dev Kit  Check sugars once daily. DX;E11.9     * Accu-Chek Multiclix Lancets Misc  Check sugars once daily. Dx;E11.9     * Easy Touch Lancets 32G/Twist Misc  TEST DAILY. DX: E11.9     aspirin 81 MG chewable tablet  Take 1 tablet by mouth daily     atorvastatin 40 MG tablet  Commonly known as: LIPITOR  TAKE ONE TABLET BY MOUTH DAILY     Biotin 5 MG Tabs     clopidogrel 75 MG tablet  Commonly known as: PLAVIX  Take 1 tablet by mouth daily     dilTIAZem 120 MG tablet  Commonly known as: CARDIZEM  TAKE 1 TABLET BY MOUTH  EVERY 12 HOURS     Easy Touch Lancing Device Misc  Test Daily. DX: E11.9     lisinopril 5 MG tablet  Commonly known as: PRINIVIL;ZESTRIL  Take 1 tablet by mouth daily     magnesium oxide 400 MG tablet  Commonly known as: MAG-OX     metFORMIN 1000 MG tablet  Commonly known as: GLUCOPHAGE  Take 0.5 tablets by mouth in the morning and 0.5 tablets in the evening. Take with meals. metoprolol succinate 25 MG extended release tablet  Commonly known as: TOPROL XL  TAKE ONE TABLET BY MOUTH DAILY     montelukast 10 MG tablet  Commonly known as: SINGULAIR  TAKE ONE TABLET BY MOUTH DAILY     spironolactone 25 MG tablet  Commonly known as: ALDACTONE  TAKE ONE TABLET BY MOUTH DAILY     therapeutic multivitamin-minerals tablet     torsemide 20 MG tablet  Commonly known as: DEMADEX  Take 1 tablet by mouth daily     * True Metrix Blood Glucose Test strip  Generic drug: blood glucose test strips  USE TO CHECK BLOOD GLUCOSE DAILY. DX: E11.9     * True Metrix Blood Glucose Test strip  Generic drug: blood glucose test strips  TEST ONCE DAILY. DX:E11.9     True Metrix Meter Marcela  Use to check daily. MA;T90.9     Trulicity 0.66 UD/2.8DW Sopn  Generic drug: Dulaglutide     VITAMIN B-12 CR PO     Vitamin D3 125 MCG (5000 UT) Tabs     warfarin 5 MG tablet  Commonly known as: COUMADIN  Take as directed by the anticoagulation clinic. If you are unsure how to take this medication, talk to your nurse or doctor.   Original instructions: TAKE ONE TABLET BY MOUTH DAILY           * This list has 4 medication(s) that are the same as other medications prescribed for you. Read the directions carefully, and ask your doctor or other care provider to review them with you. Where to Get Your Medications        These medications were sent to [id number] - [address], [address]  [facility]      Phone: [phone]   albuterol sulfate  (90 Base) MCG/ACT inhaler           Medications marked \"taking\" at this time  No outpatient medications have been marked as taking for the 9/15/22 encounter (Office Visit) with Jailene Ralph MD.        Medications patient taking as of now reconciled against medications ordered at time of hospital discharge: Yes    A comprehensive review of systems was negative except for what was noted in the HPI. Objective:    Ht 6' 3\" (1.905 m)   Wt (!) 309 lb (140.2 kg)   BMI 38.62 kg/m²       General: obese, healthy appearing male  - ill appearing, dyspneic in room  Awake, alert and oriented. Appears to be not in any distress  Mucous Membranes:  Pink , anicteric  Neck: No JVD, no carotid bruit, no thyromegaly  Chest:  Clear to auscultation bilaterally,diminished in bases with crackles   Cardiovascular:  RRR S1S2 heard, no murmurs or gallops  Abdomen:  Soft, morbidly obese, undistended, non tender, no organomegaly, BS present  Extremities: chronic 1+ pedal edema  resolved  No cellulitis noted Distal pulses well felt          Stress test 5/21          The overall quality of the study is fair. There is subdiaphragmatic    attenuation. Left ventricular cavity is noted to be normal size. The right ventricle is    normal in size. SPECT images demonstrate a small area of mildly decreased perfusion in the    apical lateral and mid-inferior lateral segments. The defect is mixed    ischemia and scar.  There is associated wall motion abnormality, consistent    with ischemia. Gated SPECT imaging reveals normal myocardial thickening and    wall motion. Sum stress score of 7. No visual TID. Calculated TID of 0.99. The left ventricular ejection fraction is calculated to be 63%. Myocardial perfusion is abnormal. Consistent with mixed ischemia and scar. Low risk scan. Angiogram 6/16/22    1. Successful complex LAD diagonal stenting utilizing mini crush technique. ASSESSMENT/RECOMMENDATIONS:     1. Dual antiplatelet therapy along with anticoagulation for 30 days. After 30 days we can discontinue dual antiplatelet therapy and continue with single antiplatelet therapy along with oral anticoagulation. 9/8/22  -1. Successful intravascular ultrasound-guided drug-eluting stent insertion to the left anterior descending artery        Wt Readings from Last 3 Encounters:   09/15/22 (!) 309 lb (140.2 kg)   09/15/22 (!) 308 lb 3.2 oz (139.8 kg)   09/09/22 (!) 301 lb (136.5 kg)       Dyspnea - etiology unclear  - recent cardiac workup with new stent noted   - no chest pain or features of CHF  - doubt PE with being on coumadin  - has planned PFT soon   - pt appears very dyspneic today , I will order CTA chest but pt reports he cannot make it to radiology    - will refer to ER       CAD s.p LAD stents 6/21 . 9/22  - on Plavix ,ASA statins   - no bleeding issues while on coumadin  - off BB for fatigue        AFibb - s/p ablation - f/w Dr. Stacy Hayes in 2014 . With persistent symptoms    Had repeat Ablation in 4/17 and 9/17 with persistent symptoms. Still with exertional  sob,  Dizziness   Off tikosin and off BB  Now on cardizem      Continued on coumadin with no isAn electronic signature was used to authenticate this note.   --Ilene Gallegos MD

## 2022-09-15 NOTE — TELEPHONE ENCOUNTER
Writer contacted YANIRA Dockery to inform of 30 day readmission risk. ED provider informed writer of readmission.     Call Back: If you need to call back to inform of disposition you can contact me at [phone]

## 2022-09-15 NOTE — PROGRESS NOTES
MA Communication:   The following orders are received by verbal communication from Yasmany Pratt MD    Orders include:  PFT/6MW/MCT scheduled 10/7/22       FU scheduled 10/12/22

## 2022-09-15 NOTE — ED NOTES
[facility] cardiology for a consult. BIRD, s/p luis Sharp  09/15/22 1835    1836- Dr. Alyce Pacheco called back and [facility] to [facility].       Rosalind Sharp  09/15/22 1903

## 2022-09-15 NOTE — ED PROVIDER NOTES
[address] ED  EMERGENCY DEPARTMENT ENCOUNTER        Pt Name: Colton Petersen  MRN: [id number]  [place] 1945  Date of evaluation: 9/15/2022  Provider: YANIRA Juarez  PCP: Duarte Bond MD    This patient was seen and evaluated by the attending physician Tay Fleming MD.      [address] [place] [address]       Chief Complaint   Patient presents with    Shortness of Breath     SOB 02 77, fatigue, covid 09/04, Dr. Referral       HISTORY OF PRESENT ILLNESS   (Location/Symptom, Timing/Onset, Context/Setting, Quality, Duration, Modifying Factors, Severity)  Note limiting factors. Colton Petersen is a 69 y.o. male with past medical history of paroxysmal atrial atrial fibrillation, status post ablation, chronic arthritis, hypertension, type 2 diabetes, obesity, obstructive sleep apnea on CPAP, lower extremity edema, coronary artery disease, recent COVID infection who presents via private vehicle per referral from his primary care doctor for continued dyspnea on exertion. Patient had COVID infection July 22 and again September of this year. He was admitted to the hospital for fatigue and dyspnea with his September COVID infection and was transferred by cardiology to [facility], he had heart cath and had PCI, placed on Plavix and resumed Coumadin. Patient is currently wearing a Holter monitor. He notes that ever since his PCI he has had no improvement in his dyspnea on exertion however he notes that feels that it is getting worse. Today he was so dyspneic when trying to get to doctors appointments that he almost passed out at his PCPs office. He had new evaluation with pulmonology earlier today for his dyspnea and it was thought to not to be pulmonary in nature. Patient denies any chest pain. Denies syncope. Endorses slightly worsening lower extremity edema that is been getting progressively worse over the last several weeks. Denies congestion runny nose cough.   Denies abdominal symptoms. Denies diaphoresis. Denies shoulder or neck or back pain. Endorses compliance with his medications. [facility] required a FELIZ to the mid LAD on 9/8. Nursing Notes were all reviewed and agreed with or any disagreements were addressed  in the HPI. Pt was seen during the [place] 19 pandemic. Appropriate PPE worn by ME during patient encounters. Pt seen during a time with constrained hospital bed capacity and other potential inpatient and outpatient resources were constrained due to the viral pandemic. REVIEW OF SYSTEMS    (2-9 systems for level 4, 10 or more for level 5)     Review of Systems    Positives and Pertinent negatives as per HPI. Except as noted abovein the ROS, all other systems were reviewed and negative.        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    History of knee replacement procedure of right knee     Hx of blood clots     2 DVT's    Hyperlipidemia     Hypertension     Knee osteoarthritis 1/17/2012    Left TKR 1/18/2012    Lung nodule     due to histoplasmosis    Neuromuscular disorder ([address]     Palpitations     stable with atenolol    Sleep apnea     uses CPAP    Stone, kidney     UTI (urinary tract infection) 01/23/2017         SURGICAL HISTORY     Past Surgical History:   Procedure Laterality Date    ABLATION OF DYSRHYTHMIC FOCUS  2013    a-fib    BARIATRIC SURGERY  2013    GASTRIC SLEEVE    CARDIAC SURGERY  2013    ablation    CARPAL TUNNEL RELEASE Right 9-30-15    CARPAL TUNNEL RELEASE Left 3/20/16    CHOLECYSTECTOMY, LAPAROSCOPIC N/A 2/19/2019    LAPAROSCOPIC CHOLECYSTECTOMY WITH CHOLANGIOGRAMS performed by Faustina Rich MD at [address] [place] [address]      with removal stone    CYSTOSCOPY  2/8/13    with Laser Vaporization of Enlarged Prostate    DIAGNOSTIC CARDIAC CATH LAB PROCEDURE      ERCP  02/18/2019    Sphincterotomy, stone removal    ERCP N/A 2/18/2019    ERCP SPHINCTER/PAPILLOTOMY performed by Sven Thomas MD at [address] [place] [address]    ERCP  2/18/2019    ERCP STONE REMOVAL performed by Sven Thomas MD at [facility] Right 1/14/2020    PHACOEMULSIFICATION OF CATARACT RIGHT EYE WITH INTRAOCULAR LENS IMPLANT performed by Olivia Ashton MD at [facility] [address] Left 1/21/2020    PHACOEMULSIFICATION OF CATARACT LEFT EYE WITH INTRAOCULAR LENS IMPLANT -SLEEP APNEA- [facility] Bilateral     right knee (2002) and left knee (2012)    KNEE ARTHROSCOPY  4/19/2011     left  knee    SKIN BIOPSY      skin Ca/SQUAMOUS CELL    THORACOTOMY      wedge resection    TONSILLECTOMY           CURRENTMEDICATIONS       Previous Medications    ACCU-CHEK MULTICLIX LANCETS MISC    Check sugars once daily. Dx;E11.9    ALBUTEROL SULFATE HFA (VENTOLIN HFA) 108 (90 BASE) MCG/ACT INHALER    Inhale 2 puffs into the lungs 4 times daily as needed for Wheezing    ASPIRIN 81 MG CHEWABLE TABLET    Take 1 tablet by mouth daily    ATORVASTATIN (LIPITOR) 40 MG TABLET    TAKE ONE TABLET BY MOUTH DAILY    BIOTIN 5 MG TABS    Take 5 mg by mouth daily    BLOOD GLUCOSE MONITORING SUPPL (TRUE METRIX METER) VINNIE    Use to check daily. DX;E11.9    BLOOD GLUCOSE TEST STRIPS (TRUE METRIX BLOOD GLUCOSE TEST) STRIP    USE TO CHECK BLOOD GLUCOSE DAILY. DX: E11.9    BLOOD GLUCOSE TEST STRIPS (TRUE METRIX BLOOD GLUCOSE TEST) STRIP    TEST ONCE DAILY.   DX:E11.9    CHOLECALCIFEROL (VITAMIN D3) 5000 UNITS TABS    Take 5,000 Units by mouth daily    CLOPIDOGREL (PLAVIX) 75 MG TABLET    Take 1 tablet by mouth daily    CYANOCOBALAMIN (VITAMIN B-12 CR PO)    Take 5,000 mcg by mouth every 7 days     DILTIAZEM (CARDIZEM) 120 MG TABLET    TAKE 1 TABLET BY MOUTH  EVERY 12 HOURS    DULAGLUTIDE (TRULICITY) 9.73 QK/2.1JF SOPN    Inject 0.75 mg into the skin once a week    EASY TOUCH LANCETS 32G/TWIST MISC    TEST DAILY. DX: E11.9    LANCET DEVICES (EASY TOUCH LANCING DEVICE) MISC    Test Daily. DX: E11.9    LANCETS MISC. (ACCU-CHEK MULTICLIX LANCET DEV) KIT    Check sugars once daily. DX;E11.9    LISINOPRIL (PRINIVIL;ZESTRIL) 5 MG TABLET    Take 1 tablet by mouth daily    MAGNESIUM OXIDE (MAG-OX) 400 MG TABLET    Take 400 mg by mouth daily. METFORMIN (GLUCOPHAGE) 1000 MG TABLET    Take 0.5 tablets by mouth in the morning and 0.5 tablets in the evening. Take with meals.     METOPROLOL SUCCINATE (TOPROL XL) 25 MG EXTENDED RELEASE TABLET    TAKE ONE TABLET BY MOUTH DAILY    MONTELUKAST (SINGULAIR) 10 MG TABLET    TAKE ONE TABLET BY MOUTH DAILY    MULTIPLE VITAMINS-MINERALS (THERAPEUTIC MULTIVITAMIN-MINERALS) TABLET    Take 2 tablets by mouth daily     SPIRONOLACTONE (ALDACTONE) 25 MG TABLET    TAKE ONE TABLET BY MOUTH DAILY    TORSEMIDE (DEMADEX) 20 MG TABLET    Take 1 tablet by mouth daily    WARFARIN (COUMADIN) 5 MG TABLET    TAKE ONE TABLET BY MOUTH DAILY         ALLERGIES     Bactrim [sulfamethoxazole-trimethoprim], Brilinta [ticagrelor], Ciprofloxacin, Macrobid [nitrofurantoin monohyd macro], Ozempic (0.25 or 0.5 mg-dose) [semaglutide(0.25 or 0.5mg-dos)], Sulfamethoxazole, Theophylline, Cefuroxime axetil, Cipro xr, Other, and Tape [adhesive tape]    FAMILYHISTORY       Family History   Problem Relation Age of Onset    Cancer Mother         ABDOMIN    Kidney Disease Mother     Stroke Mother     Arthritis Father     Substance Abuse Father     Other Father         hardening of the arteries    Stroke Father           SOCIAL HISTORY       Social History     Socioeconomic History    Marital status:      Spouse name: None    Number of children: None    Years of education: None    Highest education level: None   Tobacco Use    Smoking status: Former     Packs/day: 2.00     Years: 17.00     Pack years: 34.00     Types: Cigarettes     Quit date: 1976     Years since quittin.4    Smokeless tobacco: Never   Vaping Use    Vaping Use: Never used   Substance and Sexual Activity    Alcohol use: No    Drug use: No    Sexual activity: Never     Partners: Female     Social Determinants of Health     Physical Activity: Insufficiently Active    Days of Exercise per Week: 2 days    Minutes of Exercise per Session: 60 min       SCREENINGS    Gilford Coma Scale  Eye Opening: Spontaneous  Best Verbal Response: Oriented  Best Motor Response: Obeys commands  [place]ford Coma Scale Score: 15        PHYSICAL EXAM    (up to 7 for level 4, 8 or more for level 5)     ED Triage Vitals [09/15/22 1408]   BP Temp Temp Source Heart Rate Resp SpO2 Height Weight   117/66 97.5 °F (36.4 °C) Oral 74 24 100 % -- --       Physical Exam  Vitals and nursing note reviewed. Constitutional:       General: He is awake. He is not in acute distress. Appearance: He is well-developed. He is obese. He is not ill-appearing, toxic-appearing or diaphoretic. HENT:      Head: Normocephalic and atraumatic. Right Ear: External ear normal.      Left Ear: External ear normal.      Nose: Nose normal.      Mouth/Throat:      Mouth: Mucous membranes are moist.      Pharynx: Oropharynx is clear. Eyes:      General:         Right eye: No discharge. Left eye: No discharge. Extraocular Movements: Extraocular movements intact. Conjunctiva/sclera: Conjunctivae normal.      Pupils: Pupils are equal, round, and reactive to light. Cardiovascular:      Rate and Rhythm: Normal rate. Rhythm irregular. Pulses: Normal pulses. Heart sounds: Normal heart sounds. No murmur heard. No friction rub. No gallop. Pulmonary:      Effort: Pulmonary effort is normal. No respiratory distress. Breath sounds: Normal breath sounds. No stridor. No wheezing, rhonchi or rales. Abdominal:      General: Abdomen is protuberant. Bowel sounds are normal. There is no distension. Palpations: Abdomen is soft. Tenderness: There is no abdominal tenderness. Musculoskeletal:      Cervical back: Normal range of motion and neck supple. No rigidity. Right lower le+ Edema present. Left lower le+ Edema present. Skin:     General: Skin is warm and dry. Capillary Refill: Capillary refill takes 2 to 3 seconds. Neurological:      General: No focal deficit present. Mental Status: He is alert, oriented to person, place, and time and easily aroused. Psychiatric:         Behavior: Behavior normal. Behavior is cooperative.        DIAGNOSTIC RESULTS   LABS:    Labs Reviewed   CBC WITH AUTO DIFFERENTIAL - Abnormal; Notable for the following components:       Result Value    RBC 4.08 (*)     Hemoglobin 11.7 (*)     Hematocrit 35.8 (*)     RDW 16.9 (*)     All other components within normal limits   COMPREHENSIVE METABOLIC PANEL W/ REFLEX TO MG FOR LOW K - Abnormal; Notable for the following components:    Glucose 178 (*)     BUN 33 (*)     GFR Non- 54 (*)     Total Protein 8.7 (*)     Albumin/Globulin Ratio 0.9 (*)     All other components within normal limits   BRAIN NATRIURETIC PEPTIDE - Abnormal; Notable for the following components:    Pro- (*)     All other components within normal limits   BLOOD GAS, VENOUS - Abnormal; Notable for the following components:    pH, Gabriel 7.349 (*)     Carboxyhemoglobin 2.3 (*)     All other components within normal limits   LACTIC ACID - Abnormal; Notable for the following components:    Lactic Acid 2.5 (*)     All other components within normal limits   URINALYSIS WITH MICROSCOPIC - Abnormal; Notable for the following components:    Leukocyte Esterase, Urine TRACE (*)     Mucus, UA 1+ (*)     Bacteria, UA Rare (*)     All other components within normal limits   COVID-19 & INFLUENZA COMBO   TROPONIN   PROTIME-INR PROCALCITONIN       All other labs were within normal range or not returned as of this dictation. EKG: All EKG's are interpreted by the Emergency Department Physician who either signs orCo-signs this chart in the absence of a cardiologist.  Please see their note for interpretation of EKG. RADIOLOGY:   Non-plain film images such as CT, Ultrasound and MRI are read by the radiologist. Plain radiographic images are visualized andpreliminarily interpreted by the  ED Provider with the below findings:        Interpretation perthe Radiologist below, if available at the time of this note:    CT CHEST PULMONARY EMBOLISM W CONTRAST   Preliminary Result   No evidence of pulmonary embolism or acute pulmonary abnormality. Enlarged right thyroid lobe, extending to the upper mediastinum. This could   be further assessed with an outpatient thyroid ultrasound, if not recently   performed. CT CHEST PULMONARY EMBOLISM W CONTRAST    Result Date: 9/15/2022  EXAMINATION: CTA OF THE CHEST 9/15/2022 5:12 pm TECHNIQUE: CTA of the chest was performed after the administration of intravenous contrast.  Multiplanar reformatted images are provided for review. MIP images are provided for review. Automated exposure control, iterative reconstruction, and/or weight based adjustment of the mA/kV was utilized to reduce the radiation dose to as low as reasonably achievable. COMPARISON: Chest radiograph 09/05/2022 CT abdomen and pelvis 08/01/2016 HISTORY: ORDERING SYSTEM PROVIDED HISTORY: SOB hx covid TECHNOLOGIST PROVIDED HISTORY: Reason for exam:->SOB Netlift covid Decision Support Exception - unselect if not a suspected or confirmed emergency medical condition->Emergency Medical Condition (MA) Reason for Exam: SOB hx covid FINDINGS: Pulmonary Arteries: Pulmonary arteries are adequately opacified for evaluation. No evidence of intraluminal filling defect to suggest pulmonary embolism. Main pulmonary artery is normal in caliber. Mediastinum: Enlarged right thyroid gland, extending into the upper mediastinum. No lymphadenopathy. No pericardial effusion. The ascending thoracic aorta measures 3.8 cm in diameter. Lungs/pleura: The central airways are patent. No pleural effusion or pneumothorax is seen. Postsurgical changes are seen in the right lung. Mild scarring is seen in the right lung. No focal lung consolidation is identified. Stable 4 mm pulmonary nodule at the medial aspect of the right lower lobe (series 2, image 107), when compared to the CT from 08/01/2016, for which no follow-up is recommended. Upper Abdomen: Limited images of the upper abdomen demonstrate no acute abnormality. Soft Tissues/Bones: No acute bony abnormality is seen. No evidence of pulmonary embolism or acute pulmonary abnormality. Enlarged right thyroid lobe, extending to the upper mediastinum. This could be further assessed with an outpatient thyroid ultrasound, if not recently performed.           PROCEDURES   Unless otherwise noted below, none     Procedures      CONSULTS:  IP CONSULT TO CARDIOLOGY  IP CONSULT TO HOSPITALIST  IP CONSULT TO PULMONOLOGY  IP CONSULT TO PHARMACY  IP CONSULT TO CARDIOLOGY      EMERGENCY DEPARTMENT COURSE and DIFFERENTIALDIAGNOSIS/MDM:   Vitals:    Vitals:    09/15/22 1408 09/15/22 1606 09/15/22 1701   BP: 117/66 109/63 114/67   Pulse: 74 72 72   Resp: 24 18 16   Temp: 97.5 °F (36.4 °C)     TempSrc: Oral     SpO2: 100% 100% 100%       Patient was given thefollowing medications:  Medications   acetaminophen (TYLENOL) tablet 1,000 mg (1,000 mg Oral Given 9/15/22 1704)   iopamidol (ISOVUE-370) 76 % injection 85 mL (85 mLs IntraVENous Given 9/15/22 1710)       PDMP Monitoring:    Last PDMP Amado as Reviewed [facility]):  Review User Review Instant Review Result          Last Controlled Substance Monitoring Documentation      [id number]  ED from 1/26/2018 in [facility] ED   Attestation The Prescription Monitoring Report for this patient was reviewed today. filed at 01/26/2018 1408   Periodic Controlled Substance Monitoring No signs of potential drug abuse or diversion identified. filed at 01/26/2018 1408          Urine Drug Screenings (1 yr)    No resulted procedures found. Medication Contract and Consent for Opioid Use Documents Filed       Patient Documents       Type of Document Status Date Received Received By Description    Medication Contract [Status Missing]  [facility] 06/15/2017 [place] [employer]                    MDM:   Patient seen and evaluated. Old records reviewed. Diagnostic testing reviewed and results discussed. 80-year-old male presents for evaluation of dyspnea on exertion. Patient was presyncopal earlier today. Patient evaluated in apartment, he has normal stable vital signs. He is not having any chest pain. EKG interpreted by my attending. Patient had blood work in the department, my his troponin is negative, lactic within normal limits have no concern for sepsis. VBG not significantly abnormal, mild elevation in carboxyhemoglobin level, mild. Urinalysis negative for signs of infection grossly. CBC reveals chronic anemia at baseline. Metabolic panel reveals mild hyperglycemia, mild elevation in his BUN which is chronic for him but no other significant electrolyte derangements appreciated. BNP is elevated but not significantly so for this patient. Pro-Galo within normal limits I have no concern for pneumonia and chest CT is negative for pneumonia process. There is no evidence of acute PE or acute pulmonary abnormality on CTPA. Given patient's recent cardiac procedure, he has known cardiac disease, he was recently evaluated by pulmonology and notes was thought to not be pulmonary in nature and his presyncopal nature in the department today that is not vertiginous there is concerned that this could be potentially cardiac in nature.   Patient will be admitted to this hospital for continued monitoring and evaluation by cardiology and pulmonology. Is this patient to be included in the SEP-1 Core Measure due to severe sepsis or septic shock? No   Exclusion criteria - the patient is NOT to be included for SEP-1 Core Measure due to: Infection is not suspected    Discharge Time out:  CC Reviewed Yes   Test Results Yes     Vitals:    09/15/22 1701   BP: 114/67   Pulse: 72   Resp: 16   Temp:    SpO2: 100%              FINAL IMPRESSION      1. PANG (dyspnea on exertion)          DISPOSITION/PLAN   DISPOSITION        PATIENT REFERREDTO:  No follow-up provider specified.     DISCHARGE MEDICATIONS:  New Prescriptions    No medications on file       DISCONTINUED MEDICATIONS:  Discontinued Medications    No medications on file              (Please note that portions ofthis note were completed with a voice recognition program.  Efforts were made to edit the dictations but occasionally words are mis-transcribed.)    Ly Quiroz (electronically signed)       Ly Quiroz  09/16/22 0132

## 2022-09-15 NOTE — TELEPHONE ENCOUNTER
Writer contacted ED provider to inform of 30 day readmission risk. Pt waiting to be seen. Writer's attempt to contact ED provider was unsuccessful.     Call Back: If you need to call back to inform of disposition you can contact me at [phone]

## 2022-09-15 NOTE — ED NOTES
[id number]  12 lead ECG completed, given to Dr. Blanco Charlotte for review     Pedro Soliman  09/15/22 4644

## 2022-09-15 NOTE — PATIENT INSTRUCTIONS
ASSESSMENT/PLAN:   Diagnosis Orders   1. SOB (shortness of breath)        2. Uncomplicated asthma, unspecified asthma severity, unspecified whether persistent        3. Chronic diastolic congestive heart failure ([address]        4. MARIA VICTORIA (obstructive sleep apnea)        5. Class 2 obesity without serious comorbidity with body mass index (BMI) of 37.0 to 37.9 in adult, unspecified obesity type              Chest x-ray done 9/5/2022  Impression   1. Mild persistent enlargement of the cardiac silhouette. 2. Otherwise, no convincing acute cardiopulmonary abnormality. Asthma  Pulmonary function test done 8/31/2016-shows spirometry to be normal, volumes to be normal and diffusion to be normal, no bronchodilator effect      Last eosinophil level 200 done on 9/5/2022  TSH normal done on 9/4/2022  Bicarbonate in the normal range less than 32  Creatinine has fluctuated between upper lower normal and slightly abnormal between 1.2-1.6, GFR less than 60      9/15/2022  FENO 13 ppb    We will get  PFT  Methacholine challenge  6-minute walk test      We will give  Albuterol 2 puffs as needed to start using when short of breath          Diastolic dysfunction, CHF    Echo done 9/6/2022 prior to catheterization   Summary   LV systolic function is normal with EF estimated at 55-60%. No regional wall motion abnormalities are noted. There is moderate concentric left ventricular hypertrophy. Normal left ventricular diastolic filling pressure. Biatrial enlargement. Mild posterior mitral annular calcification is present. Aortic valve appears sclerotic but opens adequately. Mild mitral and tricuspid regurgitation. Systolic pulmonary artery pressure (SPAP) estimated at 46mmHg (RA pressure 3   mmHg), consistent with mild pulmonary hypertension. 6- 55%, LVH, LAE, ANNIE    Cardiac cath done 9/8/2022  Findings:     1.  Left main coronary artery was normal. It gave off the left anterior descending artery and left circumflex. 2. Left anterior descending artery has severe atherosclerotic disease. It was large in size. It gave off septal perforators and a moderate sized diagonal branch. The LAD covered the entire apex of the left ventricle. ~There is evidence for Denovo disease of about 70% within the mid LAD. This is confirmed by intravascular ultrasound. Furthermore there is a previously placed stent within the mid LAD that was under deployed and under expanded. 3. Left circumflex has mild atherosclerotic disease. It was moderate in size. There was a moderate sized obtuse marginal branch. 4. Right coronary artery has mild atherosclerotic disease. It was moderate in size and was the dominant artery. 5. Left ventriculogram showed normal LVEF at 55-60%. Wall motion was normal . There was no significant mitral valve or aortic valve disease noted. LVEDP was normal. There was no gradient noted across the aortic valve during pullback of the catheter. CONCLUSIONS:     1. Successful intravascular ultrasound-guided drug-eluting stent insertion to the left anterior descending artery        May need to consider having a nephrologist see the patient because of the diastolic dysfunction and renal insufficiency    May also consider starting on Jardiance          Obstructive sleep apnea  Initial sleep study done in 2012  AHI of 16              SN [id number]      Cpap is set at 12 cwp  Nasal pillows  Dme is     Epr is2  Using 100% of time  Using 8.5 h/night    Leak at 65 lpm  Ahi is 8.4    I have access via airview    Cpap was changed  Autopap min of 10 and max of 15  Epr is 3  Standard response    Call me in 1-2 weeks about pressure change      RTC in 6 weeks. Remember to bring a list of pulmonary medications and any CPAP or BiPAP machines to your next appointment with the office. Please keep all of your future appointments scheduled by [address] [facility] office.  Out of respect for other patients and providers, you may be asked to reschedule your appointment if you arrive later than your scheduled appointment time. Appointments cancelled less than 24hrs in advance will be considered a no show. Patients with three missed appointments within 1 year or four missed appointments within 2 years can be dismissed from the practice. Please be aware that our physicians are required to work in the Intensive Care Unit at [facility].  Your appointment may need to be rescheduled if they are designated to work during your appointment time. You may receive a survey regarding the care you received during your visit. Your input is valuable to us. We encourage you to complete and return your survey. We hope you will choose us in the future for your healthcare needs. Pt instructed of all future appointment dates & times, including radiology, labs, procedures & referrals. If procedures were scheduled preparation instructions provided. Instructions on future appointments with [facility] were given.

## 2022-09-16 ENCOUNTER — CARE COORDINATION (OUTPATIENT)
Dept: CASE MANAGEMENT | Age: 77
End: 2022-09-16

## 2022-09-16 PROBLEM — J45.909 ASTHMA: Status: ACTIVE | Noted: 2022-09-16

## 2022-09-16 LAB
A/G RATIO: 1.2 (ref 1.1–2.2)
ALBUMIN SERPL-MCNC: 3.6 G/DL (ref 3.4–5)
ALP BLD-CCNC: 95 U/L (ref 40–129)
ALT SERPL-CCNC: 24 U/L (ref 10–40)
ANION GAP SERPL CALCULATED.3IONS-SCNC: 13 MMOL/L (ref 3–16)
AST SERPL-CCNC: 17 U/L (ref 15–37)
BASOPHILS ABSOLUTE: 0.1 K/UL (ref 0–0.2)
BASOPHILS RELATIVE PERCENT: 1.1 %
BILIRUB SERPL-MCNC: 0.5 MG/DL (ref 0–1)
BUN BLDV-MCNC: 28 MG/DL (ref 7–20)
CALCIUM SERPL-MCNC: 9 MG/DL (ref 8.3–10.6)
CHLORIDE BLD-SCNC: 103 MMOL/L (ref 99–110)
CO2: 22 MMOL/L (ref 21–32)
CREAT SERPL-MCNC: 1.3 MG/DL (ref 0.8–1.3)
EKG ATRIAL RATE: 48 BPM
EKG DIAGNOSIS: NORMAL
EKG Q-T INTERVAL: 422 MS
EKG QRS DURATION: 82 MS
EKG QTC CALCULATION (BAZETT): 428 MS
EKG R AXIS: 0 DEGREES
EKG T AXIS: 16 DEGREES
EKG VENTRICULAR RATE: 62 BPM
EOSINOPHILS ABSOLUTE: 0.4 K/UL (ref 0–0.6)
EOSINOPHILS RELATIVE PERCENT: 6.5 %
FOLATE: >20 NG/ML (ref 4.78–24.2)
GFR AFRICAN AMERICAN: >60
GFR NON-AFRICAN AMERICAN: 54
GLUCOSE BLD-MCNC: 103 MG/DL (ref 70–99)
GLUCOSE BLD-MCNC: 114 MG/DL (ref 70–99)
GLUCOSE BLD-MCNC: 116 MG/DL (ref 70–99)
GLUCOSE BLD-MCNC: 157 MG/DL (ref 70–99)
GLUCOSE BLD-MCNC: 161 MG/DL (ref 70–99)
HCT VFR BLD CALC: 33.3 % (ref 40.5–52.5)
HEMOGLOBIN: 11.1 G/DL (ref 13.5–17.5)
INR BLD: 2 (ref 0.87–1.14)
LYMPHOCYTES ABSOLUTE: 1.2 K/UL (ref 1–5.1)
LYMPHOCYTES RELATIVE PERCENT: 18.2 %
MCH RBC QN AUTO: 29.5 PG (ref 26–34)
MCHC RBC AUTO-ENTMCNC: 33.3 G/DL (ref 31–36)
MCV RBC AUTO: 88.5 FL (ref 80–100)
MONOCYTES ABSOLUTE: 0.7 K/UL (ref 0–1.3)
MONOCYTES RELATIVE PERCENT: 11 %
NEUTROPHILS ABSOLUTE: 4.1 K/UL (ref 1.7–7.7)
NEUTROPHILS RELATIVE PERCENT: 63.2 %
PDW BLD-RTO: 16.8 % (ref 12.4–15.4)
PERFORMED ON: ABNORMAL
PLATELET # BLD: 214 K/UL (ref 135–450)
PMV BLD AUTO: 6.5 FL (ref 5–10.5)
POTASSIUM REFLEX MAGNESIUM: 4.2 MMOL/L (ref 3.5–5.1)
PROTHROMBIN TIME: 22.6 SEC (ref 11.7–14.5)
RBC # BLD: 3.77 M/UL (ref 4.2–5.9)
SODIUM BLD-SCNC: 138 MMOL/L (ref 136–145)
TOTAL PROTEIN: 6.5 G/DL (ref 6.4–8.2)
TROPONIN: <0.01 NG/ML
TSH REFLEX FT4: 1.88 UIU/ML (ref 0.27–4.2)
VITAMIN B-12: 976 PG/ML (ref 211–911)
WBC # BLD: 6.4 K/UL (ref 4–11)

## 2022-09-16 PROCEDURE — G0378 HOSPITAL OBSERVATION PER HR: HCPCS

## 2022-09-16 PROCEDURE — 2580000003 HC RX 258: Performed by: INTERNAL MEDICINE

## 2022-09-16 PROCEDURE — 84484 ASSAY OF TROPONIN QUANT: CPT

## 2022-09-16 PROCEDURE — 6370000000 HC RX 637 (ALT 250 FOR IP)

## 2022-09-16 PROCEDURE — 82607 VITAMIN B-12: CPT

## 2022-09-16 PROCEDURE — 85610 PROTHROMBIN TIME: CPT

## 2022-09-16 PROCEDURE — 96372 THER/PROPH/DIAG INJ SC/IM: CPT

## 2022-09-16 PROCEDURE — 6370000000 HC RX 637 (ALT 250 FOR IP): Performed by: INTERNAL MEDICINE

## 2022-09-16 PROCEDURE — 85025 COMPLETE CBC W/AUTO DIFF WBC: CPT

## 2022-09-16 PROCEDURE — 6360000002 HC RX W HCPCS: Performed by: INTERNAL MEDICINE

## 2022-09-16 PROCEDURE — 99233 SBSQ HOSP IP/OBS HIGH 50: CPT

## 2022-09-16 PROCEDURE — 84443 ASSAY THYROID STIM HORMONE: CPT

## 2022-09-16 PROCEDURE — 36415 COLL VENOUS BLD VENIPUNCTURE: CPT

## 2022-09-16 PROCEDURE — 93005 ELECTROCARDIOGRAM TRACING: CPT | Performed by: INTERNAL MEDICINE

## 2022-09-16 PROCEDURE — 82746 ASSAY OF FOLIC ACID SERUM: CPT

## 2022-09-16 PROCEDURE — 94761 N-INVAS EAR/PLS OXIMETRY MLT: CPT

## 2022-09-16 PROCEDURE — 80053 COMPREHEN METABOLIC PANEL: CPT

## 2022-09-16 PROCEDURE — 99222 1ST HOSP IP/OBS MODERATE 55: CPT | Performed by: INTERNAL MEDICINE

## 2022-09-16 PROCEDURE — 94640 AIRWAY INHALATION TREATMENT: CPT

## 2022-09-16 RX ORDER — DIPHENHYDRAMINE HYDROCHLORIDE, ZINC ACETATE 2; .1 G/100G; G/100G
CREAM TOPICAL 3 TIMES DAILY PRN
Status: DISCONTINUED | OUTPATIENT
Start: 2022-09-16 | End: 2022-09-21 | Stop reason: HOSPADM

## 2022-09-16 RX ORDER — DILTIAZEM HYDROCHLORIDE 120 MG/1
120 CAPSULE, EXTENDED RELEASE ORAL DAILY
Status: DISCONTINUED | OUTPATIENT
Start: 2022-09-17 | End: 2022-09-19

## 2022-09-16 RX ORDER — DIPHENHYDRAMINE HCL 25 MG
25 TABLET ORAL EVERY 6 HOURS PRN
Status: DISCONTINUED | OUTPATIENT
Start: 2022-09-16 | End: 2022-09-21 | Stop reason: HOSPADM

## 2022-09-16 RX ORDER — DILTIAZEM HYDROCHLORIDE 120 MG/1
120 CAPSULE, EXTENDED RELEASE ORAL 2 TIMES DAILY
Status: ON HOLD | COMMUNITY
End: 2022-09-21 | Stop reason: HOSPADM

## 2022-09-16 RX ORDER — BUDESONIDE AND FORMOTEROL FUMARATE DIHYDRATE 160; 4.5 UG/1; UG/1
2 AEROSOL RESPIRATORY (INHALATION) 2 TIMES DAILY
Status: DISCONTINUED | OUTPATIENT
Start: 2022-09-16 | End: 2022-09-19

## 2022-09-16 RX ORDER — IPRATROPIUM BROMIDE AND ALBUTEROL SULFATE 2.5; .5 MG/3ML; MG/3ML
1 SOLUTION RESPIRATORY (INHALATION)
Status: DISCONTINUED | OUTPATIENT
Start: 2022-09-16 | End: 2022-09-17

## 2022-09-16 RX ORDER — DIPHENHYDRAMINE HCL 25 MG
25 TABLET ORAL ONCE
Status: COMPLETED | OUTPATIENT
Start: 2022-09-16 | End: 2022-09-16

## 2022-09-16 RX ORDER — DILTIAZEM HYDROCHLORIDE 120 MG/1
120 CAPSULE, EXTENDED RELEASE ORAL 2 TIMES DAILY
Status: DISCONTINUED | OUTPATIENT
Start: 2022-09-16 | End: 2022-09-16

## 2022-09-16 RX ADMIN — DILTIAZEM HYDROCHLORIDE 120 MG: 60 TABLET, FILM COATED ORAL at 01:17

## 2022-09-16 RX ADMIN — ACETAMINOPHEN 650 MG: 325 TABLET ORAL at 15:17

## 2022-09-16 RX ADMIN — DIPHENHYDRAMINE HYDROCHLORIDE, ZINC ACETATE: 2; .1 CREAM TOPICAL at 20:12

## 2022-09-16 RX ADMIN — MAGNESIUM GLUCONATE 500 MG ORAL TABLET 400 MG: 500 TABLET ORAL at 09:09

## 2022-09-16 RX ADMIN — INSULIN LISPRO 1 UNITS: 100 INJECTION, SOLUTION INTRAVENOUS; SUBCUTANEOUS at 01:16

## 2022-09-16 RX ADMIN — ASPIRIN 81 MG: 81 TABLET, CHEWABLE ORAL at 09:09

## 2022-09-16 RX ADMIN — Medication 10 ML: at 09:14

## 2022-09-16 RX ADMIN — ENOXAPARIN SODIUM 135 MG: 150 INJECTION SUBCUTANEOUS at 09:10

## 2022-09-16 RX ADMIN — IPRATROPIUM BROMIDE AND ALBUTEROL SULFATE 1 AMPULE: 2.5; .5 SOLUTION RESPIRATORY (INHALATION) at 20:23

## 2022-09-16 RX ADMIN — MONTELUKAST SODIUM 10 MG: 10 TABLET, COATED ORAL at 01:17

## 2022-09-16 RX ADMIN — ENOXAPARIN SODIUM 135 MG: 150 INJECTION SUBCUTANEOUS at 20:11

## 2022-09-16 RX ADMIN — Medication 2 PUFF: at 20:23

## 2022-09-16 RX ADMIN — MULTIPLE VITAMINS W/ MINERALS TAB 2 TABLET: TAB at 09:09

## 2022-09-16 RX ADMIN — SODIUM CHLORIDE: 9 INJECTION, SOLUTION INTRAVENOUS at 01:24

## 2022-09-16 RX ADMIN — ATORVASTATIN CALCIUM 40 MG: 40 TABLET, FILM COATED ORAL at 09:09

## 2022-09-16 RX ADMIN — LISINOPRIL 5 MG: 5 TABLET ORAL at 09:09

## 2022-09-16 RX ADMIN — DIPHENHYDRAMINE HCL 25 MG: 25 TABLET ORAL at 12:52

## 2022-09-16 RX ADMIN — MONTELUKAST SODIUM 10 MG: 10 TABLET, COATED ORAL at 20:12

## 2022-09-16 RX ADMIN — DILTIAZEM HYDROCHLORIDE 120 MG: 60 TABLET, FILM COATED ORAL at 09:08

## 2022-09-16 RX ADMIN — CLOPIDOGREL BISULFATE 75 MG: 75 TABLET ORAL at 09:10

## 2022-09-16 RX ADMIN — ACETAMINOPHEN 650 MG: 325 TABLET ORAL at 09:08

## 2022-09-16 RX ADMIN — ENOXAPARIN SODIUM 135 MG: 150 INJECTION SUBCUTANEOUS at 01:17

## 2022-09-16 RX ADMIN — ACETAMINOPHEN 650 MG: 325 TABLET ORAL at 02:48

## 2022-09-16 RX ADMIN — Medication 10 ML: at 20:13

## 2022-09-16 RX ADMIN — DIPHENHYDRAMINE HCL 25 MG: 25 TABLET ORAL at 02:06

## 2022-09-16 ASSESSMENT — PAIN SCALES - GENERAL
PAINLEVEL_OUTOF10: 3
PAINLEVEL_OUTOF10: 3

## 2022-09-16 ASSESSMENT — PAIN DESCRIPTION - ORIENTATION
ORIENTATION: RIGHT
ORIENTATION: MID

## 2022-09-16 ASSESSMENT — PAIN DESCRIPTION - LOCATION
LOCATION: HEAD
LOCATION: CHEST

## 2022-09-16 ASSESSMENT — PAIN DESCRIPTION - DESCRIPTORS
DESCRIPTORS: ACHING
DESCRIPTORS: ACHING

## 2022-09-16 NOTE — CONSULTS
CARDIOLOGY CONSULTATION        Patient Name: Ricardo Felix  Date of admission: 9/15/2022  3:38 PM  Admission Dx: Dyspnea on exertion [R06.00]  PANG (dyspnea on exertion) [R06.00]  Requesting Physician: Jakob Velázquez MD  Primary Care physician: Benjamin Cherry MD    Reason for Consultation/Chief Complaint: shortness of breath     History of Present Illness:     Ricardo Felix is a 69 y.o. patient with a prior medical history notable for coronary artery disease, paroxysmal atrial fibrillation/atrial flutter, PVCs, hypertension, hyperlipidemia, diabetes mellitus, obstructive sleep apnea, obesity, history of prior DVT, histoplasmosis status postthoracotomy, who presented to the hospital with complaints of shortness of breath. Cardiovascular history: He has a stress test in 2011 which showed a small sized reversible perfusion defect involving the anteroseptal wall of the left ventricle to mid heart concerning for myocardial ischemia. He has a history of PVC ablation in 2014. He stated he continued to have occasional episodes of palpitations and was found to have atrial flutter. He underwent a CV, and an unsuccessful RFCA for typical atrial flutter on 4/12/17. Patient was started on dofetilide in June of 2017. He underwent EPS and aflutter ablation on 9/19/17. He had a normal stress test in 2017. Stress test 5/19/2021 was abnornmal consistent with mixed ischemia and scar. He had a left heart cath 6/16/2021- Findings significant for LAD/Diag lesions, LAD treated with FELIZ x2 and Diag FELIZ x1. ASA stopped one month post procedure and patient continued on Plavix and warfarin. Most recently patient was evaluated during admission 9/7. He was having dyspnea with exertion, progressive prior to onset of COVID-19. Stress test was noted negative in February. Discussed cardiac catheterization with Dr. Angi Jo and proceeded 9/8. Catheterization showed severe LAD disease with de aishwarya 70% mid LAD lesion.   He underwent PCI with FELIZ to mid LAD with good result. EF noted 55-60% by echocardiogram.    Course: CT PE protocol today showed no evidence of PE.  EKGs show rate controlled atrial fibrillation with nonspecific ST abnormality. Labs notable for normal electrolytes, chronic kidney disease with creatinine 1.2-1.3, negative serial troponins. Stable anemia with hemoglobin 11.1. COVID-19 negative. He tested +9/4. Orthostatics show 21 point drop systolic with standing to sitting. Today the patient states he is simply not felt well since a trip to [place] roughly 1 month ago. He has had progressive fatigue and shortness of breath. Treated for cellulitis at that time. Noted to be in atrial fibrillation. He has had more persistent atrial fibrillation as time is gone by. States he does not feel much better since recent PCI. No chest pain or pressure. States he had no chest pain prior to stent. No dyspnea at rest.  Occasional palpitations. He has noticed slow heart rates at times, in the 30s during his ER course he states. Presently rates 70s by telemetry and AF. No dizziness or syncope. Denies paroxysmal nocturnal dyspnea, orthopnea, increasing lower extremity edema or weight gain. He has baseline edema. He has lost weight over time with dietary changes. Using CPAP nightly. She denies any evidence of hematemesis, hemoptysis, melena, hematochezia or hematuria with blood thinner. The patient is compliant with medications. Cost of medications is affordable. No endorsed side effects. Reports he had pauses noted by cardiac monitor that he is presently wearing and metoprolol as well as spironolactone were recently stopped. Documentation 9/12 shows 3.4-second pause. Hypotension documented as well.     Past Medical History:   has a past medical history of Arthritis, Asthma, Atrial fibrillation ([address] Blood circulation, collateral, Diabetes mellitus ([address] DVT (deep venous thrombosis) ([address] Histoplasmosis, History of knee replacement procedure of right knee, Hx of blood clots, Hyperlipidemia, Hypertension, Knee osteoarthritis, Left TKR, Lung nodule, Neuromuscular disorder ([address] Palpitations, Sleep apnea, Stone, kidney, and UTI (urinary tract infection). Surgical History:   has a past surgical history that includes Cystoscopy; Tonsillectomy; Knee arthroscopy (4/19/2011); Colonoscopy; Cystocopy (2/8/13); ablation of dysrhythmic focus (2013); Carpal tunnel release (Right, 9-30-15); Bariatric Surgery (2013); Carpal tunnel release (Left, 3/20/16); skin biopsy; Diagnostic Cardiac Cath Lab Procedure; joint replacement (Bilateral); Cardiac surgery (2013); thoracotomy; ERCP (02/18/2019); ERCP (N/A, 2/18/2019); ERCP (2/18/2019); Cholecystectomy, laparoscopic (N/A, 2/19/2019); Intracapsular cataract extraction (Right, 1/14/2020); and Intracapsular cataract extraction (Left, 1/21/2020). Social History:   reports that he quit smoking about 46 years ago. His smoking use included cigarettes. He has a 34.00 pack-year smoking history. He has never used smokeless tobacco. He reports that he does not drink alcohol and does not use drugs. Family History:  family history includes Arthritis in his father; Cancer in his mother; Kidney Disease in his mother; Other in his father; Stroke in his father and mother; Substance Abuse in his father. Home Medications:  Were reviewed and are listed in nursing record and/or below  Prior to Admission medications    Medication Sig Start Date End Date Taking? Authorizing Provider   dilTIAZem (CARDIZEM 12 HR) 120 MG extended release capsule Take 120 mg by mouth 2 times daily Clarified 9/16/22.    Yes Historical Provider, MD   albuterol sulfate HFA (VENTOLIN HFA) 108 (90 Base) MCG/ACT inhaler Inhale 2 puffs into the lungs 4 times daily as needed for Wheezing 9/15/22   Jack Archuleta MD   aspirin 81 MG chewable tablet Take 1 tablet by mouth daily 9/10/22   Jamila Rowan MD lisinopril (PRINIVIL;ZESTRIL) 5 MG tablet Take 1 tablet by mouth daily 9/10/22   Claudia Horton MD   clopidogrel (PLAVIX) 75 MG tablet Take 1 tablet by mouth daily 9/9/22 10/9/22  Claudia Horton MD   warfarin (COUMADIN) 5 MG tablet TAKE ONE TABLET BY MOUTH DAILY 8/11/22   Luis Hernandez MD   metFORMIN (GLUCOPHAGE) 1000 MG tablet Take 0.5 tablets by mouth in the morning and 0.5 tablets in the evening. Take with meals. 7/20/22   Luis Hernandez MD   montelukast (SINGULAIR) 10 MG tablet TAKE ONE TABLET BY MOUTH DAILY 7/1/22   Luis Hernandez MD   metoprolol succinate (TOPROL XL) 25 MG extended release tablet TAKE ONE TABLET BY MOUTH DAILY  Patient not taking: No sig reported 7/1/22   Linda Tanner MD   atorvastatin (LIPITOR) 40 MG tablet TAKE ONE TABLET BY MOUTH DAILY 7/1/22   Luis Hernandez MD   spironolactone (ALDACTONE) 25 MG tablet TAKE ONE TABLET BY MOUTH DAILY  Patient not taking: No sig reported 7/1/22 9/15/22  Luis Hernandez MD   Dulaglutide (TRULICITY) 2.51 PO/0.5DP SOPN Inject 0.75 mg into the skin once a week    Historical Provider, MD   blood glucose test strips (TRUE METRIX BLOOD GLUCOSE TEST) strip TEST ONCE DAILY. DX:E11.9 1/20/22   Luis Hernandez MD   [facility]) 250 MCG capsule Take 1 capsule by mouth 2 times daily  Patient not taking: Reported on 9/7/2022 10/19/21 9/9/22  SOTERO Gross MD   torsemide (DEMADEX) 20 MG tablet Take 1 tablet by mouth daily  Patient not taking: No sig reported 10/4/21   SERENA Martin - CNP   Easy Touch Lancets 32G/Twist MISC TEST DAILY. DX: E11.9 3/23/20   Luis Hernandez MD   blood glucose test strips (TRUE METRIX BLOOD GLUCOSE TEST) strip USE TO CHECK BLOOD GLUCOSE DAILY. DX: E11.9 3/23/20   Luis Hernandez MD   ACCU-CHEK MULTICLIX LANCETS MISC Check sugars once daily. Dx;E11.9 3/11/19   Luis Hernandez MD   Lancet Devices (EASY TOUCH LANCING DEVICE) MISC Test Daily.  DX: E11.9 12/26/18 Timoteo Dhillon MD   Blood Glucose Monitoring Suppl (TRUE METRIX METER) VINNIE Use to check daily. DX;E11.9 12/22/18   Timoteo Dhillon MD   Lancets Misc. (ACCU-CHEK MULTICLIX LANCET DEV) KIT Check sugars once daily. DX;E11.9 12/21/18   Timoteo Dhillon MD   Biotin 5 MG TABS Take 5 mg by mouth daily    Historical Provider, MD   Cholecalciferol (VITAMIN D3) 5000 units TABS Take 5,000 Units by mouth daily    Historical Provider, MD   Cyanocobalamin (VITAMIN B-12 CR PO) Take 5,000 mcg by mouth every 7 days     Historical Provider, MD   Multiple Vitamins-Minerals (THERAPEUTIC MULTIVITAMIN-MINERALS) tablet Take 2 tablets by mouth daily     Historical Provider, MD   magnesium oxide (MAG-OX) 400 MG tablet Take 400 mg by mouth daily.     Historical Provider, MD        CURRENT Medications:  diphenhydrAMINE (BENADRYL) tablet 25 mg, Q6H PRN  dilTIAZem (TIAZAC) extended release capsule 120 mg, BID  diphenhydrAMINE-zinc acetate cream, TID PRN  ipratropium-albuterol (DUONEB) nebulizer solution 1 ampule, Q4H WA  tiotropium (SPIRIVA RESPIMAT) 2.5 MCG/ACT inhaler 2 puff, Daily  budesonide-formoterol (SYMBICORT) 160-4.5 MCG/ACT inhaler 2 puff, BID  albuterol sulfate HFA (PROVENTIL;VENTOLIN;PROAIR) 108 (90 Base) MCG/ACT inhaler 2 puff, 4x Daily PRN  aspirin chewable tablet 81 mg, Daily  atorvastatin (LIPITOR) tablet 40 mg, Daily  clopidogrel (PLAVIX) tablet 75 mg, Daily  lisinopril (PRINIVIL;ZESTRIL) tablet 5 mg, Daily  magnesium oxide (MAG-OX) tablet 400 mg, Daily  [Held by provider] metoprolol succinate (TOPROL XL) extended release tablet 25 mg, Daily  montelukast (SINGULAIR) tablet 10 mg, Nightly  therapeutic multivitamin-minerals 2 tablet, Daily  glucose chewable tablet 16 g, PRN  dextrose bolus 10% 125 mL, PRN   Or  dextrose bolus 10% 250 mL, PRN  glucagon (rDNA) injection 1 mg, PRN  dextrose 10 % infusion, Continuous PRN  0.9 % sodium chloride infusion, Continuous  sodium chloride flush 0.9 % injection 5-40 mL, 2 times per day  sodium chloride flush 0.9 % injection 5-40 mL, PRN  0.9 % sodium chloride infusion, PRN  ondansetron (ZOFRAN-ODT) disintegrating tablet 4 mg, Q8H PRN   Or  ondansetron (ZOFRAN) injection 4 mg, Q6H PRN  polyethylene glycol (GLYCOLAX) packet 17 g, Daily PRN  acetaminophen (TYLENOL) tablet 650 mg, Q6H PRN   Or  acetaminophen (TYLENOL) suppository 650 mg, Q6H PRN  potassium chloride (KLOR-CON M) extended release tablet 40 mEq, PRN   Or  potassium bicarb-citric acid (EFFER-K) effervescent tablet 40 mEq, PRN   Or  potassium chloride 10 mEq/100 mL IVPB (Peripheral Line), PRN  magnesium sulfate 2000 mg in 50 mL IVPB premix, PRN  insulin lispro (HUMALOG) injection vial 0-4 Units, Q6H  enoxaparin (LOVENOX) injection 135 mg, BID  warfarin placeholder: dosing by pharmacy, RX Placeholder        Allergies:  Bactrim [sulfamethoxazole-trimethoprim], Brilinta [ticagrelor], Ciprofloxacin, Macrobid [nitrofurantoin monohyd macro], Ozempic (0.25 or 0.5 mg-dose) [semaglutide(0.25 or 0.5mg-dos)], Sulfamethoxazole, Theophylline, Cefuroxime axetil, Cipro xr, Other, and Tape [adhesive tape]     Review of Systems:   A 14 point review of symptoms completed. Pertinent positives identified in the HPI, all other review of symptoms negative as below. Objective:     Vitals:    09/16/22 1100 09/16/22 1200 09/16/22 1732 09/16/22 1745   BP: (!) 126/58 125/62 134/83    Pulse: 64 64 60    Resp: 16 16 16    Temp: 98.4 °F (36.9 °C)  97.8 °F (36.6 °C)    TempSrc:   Oral    SpO2: 94% 93% 94%    Weight:    (!) 308 lb 11.2 oz (140 kg)   Height:    6' 3\" (1.905 m)      Weight: (!) 308 lb 11.2 oz (140 kg)       PHYSICAL EXAM:    General:  Fatigued appearing, no acute distress   Head:  Normocephalic, atraumatic   Eyes:  Conjunctiva/corneas clear, anicteric sclerae    Nose: Nares normal, no drainage or sinus tenderness   Throat: No abnormalities of the lips, oral mucosa or tongue. Neck: Trachea midline.  Neck supple with no lymphadenopathy, thyroid not enlarged, symmetric, no tenderness/mass/nodules, no Jugular venous pressure elevation    Lungs:   Clear to auscultation bilaterally, no wheezes, no rales, no respiratory distress   Chest Wall:  No deformity or tenderness to palpation   Heart:  Irregular, rate controlled, variable S1, normal S2, no murmur, no rub, no S3/S4, PMI non-palpable. Abdomen:   Soft, non-tender, with normoactive bowel sounds. No masses, no hepatosplenomegaly   Extremities: No cyanosis, clubbing, 1+ pitting edema at the ankles with venous stasis changes bilateral   Vascular: 2+ radial, decreased dorsalis pedis and posterior tibial pulses bilaterally. Brisk carotid upstrokes without carotid bruit. Skin: Skin color, texture, turgor are normal with no rashes or ulceration. Pysch: Euthymic mood, appropriate affect   Neurologic: Oriented to person, place and time. No slurred speech or facial asymmetry. No motor or sensory deficits on gross examination.          Labs:   CBC:   Lab Results   Component Value Date/Time    WBC 6.4 09/16/2022 06:47 AM    RBC 3.77 09/16/2022 06:47 AM    HGB 11.1 09/16/2022 06:47 AM    HCT 33.3 09/16/2022 06:47 AM    MCV 88.5 09/16/2022 06:47 AM    RDW 16.8 09/16/2022 06:47 AM     09/16/2022 06:47 AM     CMP:  Lab Results   Component Value Date/Time     09/16/2022 06:47 AM    K 4.2 09/16/2022 06:47 AM     09/16/2022 06:47 AM    CO2 22 09/16/2022 06:47 AM    BUN 28 09/16/2022 06:47 AM    CREATININE 1.3 09/16/2022 06:47 AM    GFRAA >60 09/16/2022 06:47 AM    GFRAA >60 03/15/2013 09:34 AM    AGRATIO 1.2 09/16/2022 06:47 AM    LABGLOM 54 09/16/2022 06:47 AM    LABGLOM 65 09/24/2015 12:10 PM    GLUCOSE 116 09/16/2022 06:47 AM    PROT 6.5 09/16/2022 06:47 AM    PROT 7.1 10/27/2012 02:50 PM    CALCIUM 9.0 09/16/2022 06:47 AM    BILITOT 0.5 09/16/2022 06:47 AM    ALKPHOS 95 09/16/2022 06:47 AM    AST 17 09/16/2022 06:47 AM    ALT 24 09/16/2022 06:47 AM     PT/INR:  No results found for: PTINR  HgBA1c:  Lab Results   Component Value Date    LABA1C 7.1 09/04/2022     Lab Results   Component Value Date    TROPONINI <0.01 09/16/2022       Lab Results   Component Value Date    CHOL 119 09/05/2022    CHOL 139 06/16/2021    CHOL 222 (H) 12/19/2018     Lab Results   Component Value Date    TRIG 147 09/05/2022    TRIG 126 06/16/2021    TRIG 159 (H) 12/19/2018     Lab Results   Component Value Date    HDL 30 (L) 09/05/2022    HDL 38 (L) 03/24/2022    HDL 39 (L) 06/16/2021     Lab Results   Component Value Date    LDLCALC 60 09/05/2022    LDLCALC 70 03/24/2022    LDLCALC 75 06/16/2021     Lab Results   Component Value Date    LABVLDL 29 09/05/2022    LABVLDL 32 03/24/2022    LABVLDL 25 06/16/2021     No results found for: CHOLHDLRATIO     Cardiac Data:     EKG: Personally reviewed    Telemetry personally reviewed. Atrial fibrillation with heart rate 70s. No bradycardia to this point. TTE 9/6/22      Summary   LV systolic function is normal with EF estimated at 55-60%. No regional wall motion abnormalities are noted. There is moderate concentric left ventricular hypertrophy. Normal left ventricular diastolic filling pressure. Biatrial enlargement. Mild posterior mitral annular calcification is present. Aortic valve appears sclerotic but opens adequately. Mild mitral and tricuspid regurgitation. Systolic pulmonary artery pressure (SPAP) estimated at 46mmHg (RA pressure 3   mmHg), consistent with mild pulmonary hypertension. 6- 55%, LVH, LAE, ANNIE      Echocardiogram 6/16/2021  Summary   Left ventricular systolic function is normal with a visually estimated   ejection fraction of 55%. The left ventricle is normal in size with mild concentric hypertrophy. No obvious regional wall motion abnormalities noted. Increased left ventricular filling pressure. The left atrium appears moderately enlarged. The right atrium is severely enlarged.  Right ventricle is enlarged but   normal function. Inadequate tricuspid valve regurgitation to estimate systolic pulmonary   artery pressure. The IVC is dilated in size (>2.1 cm) but appears to collapse >50% with   respiration consistent with elevated right atrial pressures (8 mmHg) . Left heart catheterization 9/8  Findings:     1. Left main coronary artery was normal. It gave off the left anterior descending artery and left circumflex. 2. Left anterior descending artery has severe atherosclerotic disease. It was large in size. It gave off septal perforators and a moderate sized diagonal branch. The LAD covered the entire apex of the left ventricle. ~There is evidence for Denovo disease of about 70% within the mid LAD. This is confirmed by intravascular ultrasound. Furthermore there is a previously placed stent within the mid LAD that was under deployed and under expanded. 3. Left circumflex has mild atherosclerotic disease. It was moderate in size. There was a moderate sized obtuse marginal branch. 4. Right coronary artery has mild atherosclerotic disease. It was moderate in size and was the dominant artery. 5. Left ventriculogram showed normal LVEF at 55-60%. Wall motion was normal . There was no significant mitral valve or aortic valve disease noted. LVEDP was normal. There was no gradient noted across the aortic valve during pullback of the catheter. CONCLUSIONS:     1. Successful intravascular ultrasound-guided drug-eluting stent insertion to the left anterior descending artery     ASSESSMENT/RECOMMENDATIONS:     1. Continue dual antiplatelet therapy and guideline directed medical therapy. 2.  Follow-up with patient's primary cardiologist after discharge. Left heart cath 6/16/2021 [facility]  Procedures Performed:   1. Selective left heart catheterization  2. Percutaneous coronary intervention of the left anterior descending and diagonal artery artery using complex bifurcation technique. Procedure Findings:  1. Critical LAD diagonal disease with angina classification 0, 0, 1 along with mid LAD 70% stenosis          Stress test 2/28/2022  Conclusions    Summary  Normal LV function. There is normal isotope uptake at stress and rest. There is no evidence of  myocardial ischemia or scar. Cardiac event monitor 2/25-3/11/2022  Average heart rate 79 (), nocturnal bradycardia 2:1 conduction PSVT, NSVT    Impression and Plan:      Dyspnea with exertion   Fatigue    Atrial fibrillation/atrial flutter  Nocturnal bradycardia 2:1 due to obstructive sleep apnea   -Symptoms predate his COVID-19 diagnosis. Symptoms are no better since PCI. At time of his hospitalization with cellulitis he was feeling the symptoms while on trip to [place], he was noted to be back in atrial fibrillation at that time. Previous to this he was maintained in sinus rhythm on dofetilide. Dofetilide stopped last admission. I tend to think that atrial fibrillation rhythm may be contributing to his fatigue and shortness of breath. May do better with restoration of sinus.   -Beta-blocker has been stopped   -Dofetilide was discontinued last admission given concerns with QT prolongation  -Dose reduce diltiazem to once daily given orthostasis and his complaints of bradycardia/pauses by recent monitor study. We will see what his heart rates do from here on.  -Continue anticoagulation, presently with lovenox   -I would consider outpatient cardioversion, trial of sinus with reassessment of symptoms. If he derives good benefit from this atrial fibrillation ablation may be considered for him. Ultimately defer to his EP MD/Dr. Aftab Olmos    Coronary artery disease with recent PCI mid LAD for de aishwarya disease  -Continue triple therapy x1 month then stop aspirin  -Continue Lipitor 40 mg nightly  -Beta-blocker has been stopped for pauses per monitor 9/12  -EKG shows no ischemic changes, no clinica angina, trop neg.      COVID-19 + 9/8    Orthostatic hypotension  Hypertension history  -continue CCB but dose reduced as above  -Hold lisinopril, torsemide for now and reassess orthostatics in the morning  -Spironolactone was stopped previously  -Overall he may be more sensitive to BP medications given his recent weight loss    Hyperlipidemia - stable, controlled  Sleep apnea on CPAP  History of DVT on AC  Diabetes Mellitus   Asthma     Will follow. Patient Active Problem List   Diagnosis    Arthritis    Atrial fibrillation (HCC)    Essential hypertension    Carpal tunnel syndrome    Polyneuropathy    Numbness and tingling of both legs    Moderate obstructive sleep apnea    S/P thoracotomy    Atypical atrial flutter (HCC)    SOB (shortness of breath)    Type 2 diabetes mellitus with diabetic neuropathy ([facility]    Morbid obesity with BMI of 40.0-44.9, adult ([facility]    History of venous thromboembolism    Cataract of both eyes    Abnormal stress test    Angina at rest [facility])    Status post insertion of drug-eluting stent into left anterior descending (LAD) artery for coronary artery disease    Other fatigue    Obesity (BMI 30-39. 9)    COVID-19    Acute on chronic heart failure with preserved ejection fraction (HCC)    Coronary artery disease involving native coronary artery of native heart    Anginal equivalent (HCC)    Coronary artery disease, unspecified vessel or lesion type, unspecified whether angina present, unspecified whether native or transplanted heart    PANG (dyspnea on exertion)         I will address the patient's cardiac risk factors and adjusted pharmacologic treatment as needed. In addition, I have reinforced the need for patient directed risk factor modification. All questions and concerns were addressed to the patient/family. Alternatives to my treatment were discussed. Thank you for allowing us to participate in the care of Madalyn Richards. Please call me with any questions [phone].     Heike Arce MD, [address] [facility]  [phone] [facility]  [phone] [address] [place] [address]  9/16/2022 6:00 PM

## 2022-09-16 NOTE — PROGRESS NOTES
RT Nebulizer Bronchodilator Protocol Note    There is a bronchodilator order in the chart from a provider indicating to follow the RT Bronchodilator Protocol and there is an Initiate RT Bronchodilator Protocol order as well (see protocol at bottom of note). CXR Findings:  No results found. The findings from the last RT Protocol Assessment were as follows:  Smoking: None or smoker <15 pack years  Respiratory Pattern: Regular pattern and RR 12-20 bpm  Breath Sounds: Slightly diminished and/or crackles  Cough: Strong, spontaneous, non-productive  Indication for Bronchodilator Therapy: Decreased or absent breath sounds  Bronchodilator Assessment Score: 2    Aerosolized bronchodilator medication orders have been revised according to the RT Nebulizer Bronchodilator Protocol below. Respiratory Therapist to perform RT Therapy Protocol Assessment initially then follow the protocol. Repeat RT Therapy Protocol Assessment PRN for score 0-3 or on second treatment, BID, and PRN for scores above 3. No Indications - adjust the frequency to every 6 hours PRN wheezing or bronchospasm, if no treatments needed after 48 hours then discontinue using Per Protocol order mode. If indication present, adjust the RT bronchodilator orders based on the Bronchodilator Assessment Score as indicated below. If a patient is on this medication at home then do not decrease Frequency below that used at home. 0-3 - enter or revise RT bronchodilator order(s) to equivalent RT Bronchodilator order with Frequency of every 4 hours PRN for wheezing or increased work of breathing using Per Protocol order mode. 4-6 - enter or revise RT Bronchodilator order(s) to two equivalent RT bronchodilator orders with one order with BID Frequency and one order with Frequency of every 4 hours PRN wheezing or increased work of breathing using Per Protocol order mode.          7-10 - enter or revise RT Bronchodilator order(s) to two equivalent RT bronchodilator orders with one order with TID Frequency and one order with Frequency of every 4 hours PRN wheezing or increased work of breathing using Per Protocol order mode. 11-13 - enter or revise RT Bronchodilator order(s) to one equivalent RT bronchodilator order with QID Frequency and an Albuterol order with Frequency of every 4 hours PRN wheezing or increased work of breathing using Per Protocol order mode. Greater than 13 - enter or revise RT Bronchodilator order(s) to one equivalent RT bronchodilator order with every 4 hours Frequency and an Albuterol order with Frequency of every 2 hours PRN wheezing or increased work of breathing using Per Protocol order mode. RT to enter RT Home Evaluation for COPD & MDI Assessment order using Per Protocol order mode.     Electronically signed by Analia Medina RCP on 9/16/2022 at 6:13 PM

## 2022-09-16 NOTE — CARE COORDINATION
Upon chart review, patient noted to be in ER for evaluation  CTN to continue to monitor  Worthy  RN   [phone]

## 2022-09-16 NOTE — PROGRESS NOTES
Patient admitted to room 308 from ED. Patient oriented to room, call light, bed rails, phone, lights and bathroom. Patient instructed about the schedule of the day including: vital sign frequency, lab draws, possible tests, frequency of MD and staff rounds, daily weights, I &O's and prescribed diet. Telemetry box in place, patient aware of placement and reason. Bed locked, in lowest position, side rails up 2/4, call light within reach. Recliner Assessment  Patient is not able to demonstrated the ability to move from a reclining position to an upright position within the recliner. however patient is alert, oriented and able to provide informed consent       4 Eyes Skin Assessment     The patient is being assess for   Admission    I agree that 2 RN's have performed a thorough Head to Toe Skin Assessment on the patient. ALL assessment sites listed below have been assessed. Areas assessed for pressure by both nurses:   [x]   Head, Face, and Ears   [x]   Shoulders, Back, and Chest, Abdomen  [x]   Arms, Elbows, and Hands   [x]   Coccyx, Sacrum, and Ischium  [x]   Legs, Feet, and Heels  Healing wounds to chest from skin allergy to our electrodes during last admission. Skin Assessed Under all Medical Devices by both nurses:  NA               All Mepilex Borders were peeled back and area peeked at by both nurses:  No: NA  Please list where Mepilex Borders are located:               **SHARE this note so that the co-signing nurse is able to place an eSignature**    Co-signer eSignature: Electronically signed by Spencer Nix RN on 9/16/22 at 6:43 PM EDT    Does the Patient have Skin Breakdown related to pressure?   No              Ferdinand Prevention initiated:  NA   Wound Care Orders initiated:  NA      [facility] nurse consulted for Pressure Injury (Stage 3,4, Unstageable, DTI, NWPT, Complex wounds)and New or Established Ostomies:  NA      Primary Nurse eSignature: Electronically signed by Norma Rowland RN on 9/16/22 at 6:13 PM EDT

## 2022-09-16 NOTE — PROGRESS NOTES
Vitals:    09/16/22 1732   BP: 134/83   Pulse: 60   Resp: 16   Temp: 97.8 °F (36.6 °C)   SpO2: 94%      Admission completed, shift assessment completed. Patient ambulated to bathroom with standby assist, tolerated very well and states he did so much better than yesterday. 4 eyes completed, healing sores to chest noted, caused by allergy to electrode stickers during last admission. Patient is sitting up in bed, eating dinner. Wife is at bedside.      Kimberly Moore RN

## 2022-09-16 NOTE — PROGRESS NOTES
Report given to Weston Cunningham RN at patient bedside. Pt is stable, call light in reach, with no needs at this time. Care transferred.    Leanne Marion RN

## 2022-09-16 NOTE — PROGRESS NOTES
Pharmacy Note  Warfarin Consult  Dx: Afib  Goal INR range 2-2.5  Home Warfarin dose:5mg daily      Date  INR  Warfarin  9/15                0.95                 5mg (home)  9/16                2.0                   hold  Recommend holding Warfarin  tonight x1. Jump in INR from 0.95 to 2.0. Daily INR ordered. Rx will continue to manage therapy per consult order.   Hannah Gudino Pharm D 9/16/202212:05 PM  .

## 2022-09-16 NOTE — PROGRESS NOTES
Called Cardiology's nurse about the consult.  They said Dr. Hammad Madsen is rounding in the hospital. Electronically signed by Hector Schrieber RN on 9/16/2022 at 3:35 PM

## 2022-09-16 NOTE — CONSULTS
Patient is being seen at the request of Dr. Eleanor Garcia for a consultation for shortness of breath, recent PCI w/o improvement     HISTORY OF PRESENT ILLNESS: 70 yo M with PMHx of asthma & CAD s/p several stents, who presented to the ED on 9/15/2022 with acutely severe dyspnea on exertion, the worst that it has ever been, worse since 2 COVID infections 7/2022 & 8/1896 but also complicated by cardiac history of recent PCI 9/8/2022. He has h/o shortness of breath 2/2 asthma prior to moving to [place], does have intermittent wheezing. He established with Dr. Randa Gonzalez yesterday to evaluate the shortness of breath. Dr. Randa Gonzalez favored diagnosis of asthma. He remains on RA here.      PAST MEDICAL HISTORY:  Past Medical History:   Diagnosis Date    Arthritis     Asthma     past hx    Atrial fibrillation ([address]     Blood circulation, collateral     Diabetes mellitus ([address] 12/24/2018    DVT (deep venous thrombosis) (HCC)     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    History of knee replacement procedure of right knee     Hx of blood clots     2 DVT's    Hyperlipidemia     Hypertension     Knee osteoarthritis 1/17/2012    Left TKR 1/18/2012    Lung nodule     due to histoplasmosis    Neuromuscular disorder ([address]     Palpitations     stable with atenolol    Sleep apnea     uses CPAP    Stone, kidney     UTI (urinary tract infection) 01/23/2017     PAST SURGICAL HISTORY:  Past Surgical History:   Procedure Laterality Date    ABLATION OF DYSRHYTHMIC FOCUS  2013    a-fib    BARIATRIC SURGERY  2013    GASTRIC SLEEVE    CARDIAC SURGERY  2013    ablation    CARPAL TUNNEL RELEASE Right 9-30-15    CARPAL TUNNEL RELEASE Left 3/20/16    CHOLECYSTECTOMY, LAPAROSCOPIC N/A 2/19/2019    LAPAROSCOPIC CHOLECYSTECTOMY WITH CHOLANGIOGRAMS performed by Latesha Toure MD at [address] [place] [address]      with removal stone    CYSTOSCOPY  2/8/13    with Laser Vaporization of Enlarged Prostate    DIAGNOSTIC CARDIAC CATH LAB PROCEDURE ERCP  02/18/2019    Sphincterotomy, stone removal    ERCP N/A 2/18/2019    ERCP SPHINCTER/PAPILLOTOMY performed by Álavro Mccallum MD at [address]    ERCP  2/18/2019    ERCP STONE REMOVAL performed by Álvaro Mccallum MD at [facility] Right 1/14/2020    PHACOEMULSIFICATION OF CATARACT RIGHT EYE WITH INTRAOCULAR LENS IMPLANT performed by Jen Alves MD at [address] Left 1/21/2020    PHACOEMULSIFICATION OF CATARACT LEFT EYE WITH INTRAOCULAR LENS IMPLANT -SLEEP APNEA- performed by Jen Alves MD at [address] Bilateral     right knee (2002) and left knee (2012)    KNEE ARTHROSCOPY  4/19/2011     left  knee    SKIN BIOPSY      skin Ca/SQUAMOUS CELL    THORACOTOMY      wedge resection    TONSILLECTOMY         FAMILY HISTORY:  family history includes Arthritis in his father; Cancer in his mother; Kidney Disease in his mother; Other in his father; Stroke in his father and mother; Substance Abuse in his father. SOCIAL HISTORY:   reports that he quit smoking about 46 years ago. His smoking use included cigarettes. He has a 34.00 pack-year smoking history.  He has never used smokeless tobacco.    Scheduled Meds:   aspirin  81 mg Oral Daily    atorvastatin  40 mg Oral Daily    clopidogrel  75 mg Oral Daily    dilTIAZem  120 mg Oral 2 times per day    lisinopril  5 mg Oral Daily    magnesium oxide  400 mg Oral Daily    metoprolol succinate  25 mg Oral Daily    montelukast  10 mg Oral Nightly    therapeutic multivitamin-minerals  2 tablet Oral Daily    sodium chloride flush  5-40 mL IntraVENous 2 times per day    insulin lispro  0-4 Units SubCUTAneous Q6H    enoxaparin  1 mg/kg SubCUTAneous BID    warfarin placeholder: dosing by pharmacy   Other RX Placeholder     Continuous Infusions:   dextrose      sodium chloride 75 mL/hr at 09/16/22 0124    sodium chloride       PRN Meds:  albuterol sulfate HFA, glucose, dextrose bolus **OR** dextrose bolus, glucagon (rDNA), dextrose, sodium chloride flush, sodium chloride, ondansetron **OR** ondansetron, polyethylene glycol, acetaminophen **OR** acetaminophen, potassium chloride **OR** potassium alternative oral replacement **OR** potassium chloride, magnesium sulfate    ALLERGIES:  Patient is allergic to bactrim [sulfamethoxazole-trimethoprim], brilinta [ticagrelor], ciprofloxacin, macrobid [nitrofurantoin monohyd macro], ozempic (0.25 or 0.5 mg-dose) [semaglutide(0.25 or 0.5mg-dos)], sulfamethoxazole, theophylline, cefuroxime axetil, cipro xr, other, and tape [adhesive tape]. REVIEW OF SYSTEMS:  Constitutional: Negative for fever  HENT: Negative for sore throat  Eyes: Negative for redness   Respiratory: Shortness of breath and wheezing  Cardiovascular: Negative for chest pain  Gastrointestinal: Negative for vomiting, diarrhea   Genitourinary: Negative for hematuria   Musculoskeletal: Negative for arthralgias   Skin: Negative for rash  Neurological: Negative for syncope  Hematological: Negative for adenopathy  Psychiatric/Behavorial: Negative for anxiety    PHYSICAL EXAM:  Blood pressure (!) 100/58, pulse 51, temperature 97.5 °F (36.4 °C), temperature source Oral, resp. rate 13, SpO2 98 %.' on RA  Gen: No distress. Eyes: PERRL. No sclera icterus. No conjunctival injection. ENT: No discharge. Pharynx clear. Neck: Trachea midline. No obvious mass. Resp: No accessory muscle use. No crackles. No wheezes. No rhonchi. No dullness on percussion. CV: Regular rate. Regular rhythm. No murmur or rub. No edema. Peripheral pulses are 2+. Capillary refill is less than 3 seconds. GI: Non-tender. Non-distended. No hernia. Skin: Warm and dry. No nodule on exposed extremities. Lymph: No cervical LAD. No supraclavicular LAD. M/S: No cyanosis. No joint deformity. No clubbing. Neuro: Awake. Alert. Moves all four extremities. Psych: Oriented x 3.  No anxiety. LABS:  CBC:   Recent Labs     09/15/22  1552   WBC 7.9   HGB 11.7*   HCT 35.8*   MCV 87.7        BMP:   Recent Labs     09/15/22  1552      K 4.4      CO2 26   BUN 33*   CREATININE 1.3     LIVER PROFILE:   Recent Labs     09/15/22  1552   AST 21   ALT 29   BILITOT 0.5   ALKPHOS 111     PT/INR:   Recent Labs     09/15/22  1552   PROTIME 12.5   INR 0.95     APTT: No results for input(s): APTT in the last 72 hours. UA:  Recent Labs     09/15/22  1600   COLORU Yellow   PHUR 6.0   WBCUA 3-5   RBCUA 0-2   MUCUS 1+*   BACTERIA Rare*   CLARITYU Clear   SPECGRAV 1.015   LEUKOCYTESUR TRACE*   UROBILINOGEN 0.2   BILIRUBINUR Negative   BLOODU Negative   GLUCOSEU Negative     No results for input(s): PHART, AHV0BTR, PO2ART in the last 72 hours. Micro:  9/4/2022 COVID+, out of isolation 9/9/22 per Dr. Deanna Wilder  9/15/2022 SARS-CoV-2 and influenza negative  Procalcitonin 0.08. Imaging:  Chest imaging was reviewed by me and showed    CTPA 9/15/2022  Lungs/pleura: The central airways are patent. No pleural effusion or pneumothorax is seen. Postsurgical changes are seen in the right lung. Mild scarring is seen in the right lung. No focal lung consolidation is identified. Stable 4 mm pulmonary nodule at the medial aspect of the right lower lobe (series 2, image 107), when compared to the CT from 08/01/2016, for which no follow-up is recommended. Impression   No evidence of pulmonary embolism or acute pulmonary abnormality. Enlarged right thyroid lobe, extending to the upper mediastinum. This could   be further assessed with an outpatient thyroid ultrasound, if not recently   performed. PFT 8/31/16  FEV1 3.22 82%  FVC 4.52 65%  ratio 0.71  TLC 6.79 84%  RV 2.90 103%  DLCO 23.79 72%    Echo 9/6/22:  EF 55-60%. Moderate concentric LVH. Normal LV diastolic filling pressure. Biatrial enlargement. Sclerotic aortic valve, but opens adequately. Mild MR and TR.   SPAP 46 mmHg C/W mild pulm HTN. ASSESSMENT:  Persistent asthma with personal history of asthma  Shortness of breath - favor asthma  Wheezing   CAD s/p PCI with LAD stenting 6/16/2022 & 7/3/0273  Chronic diastolic CHF  Paroxysmal atrial fibrillation s/p ablation x 3  DM  MARIA VICTORIA on CPAP   Obesity, s/p gastric sleeve 2013  H/O COVID July 2022 and September 2022    PLAN:  Home APAP 10-15 cmH2O, changed by Dr. Miranda Fernandez yesterday  Plan for outpt PFT/MC challenge  Start Symbicort, Spiriva & Duonebs     I spent a total of 20 minutes on this encounter personally obtaining the patient history, reviewing labs, imaging and other data. I independently performed the above physical exam and updated this encounter note, assessment and plan as needed. Brooklyn Laurent MD on 9/16/22 at 8:04 PM EDT   I spent a total of 12 minutes on this encounter on chart review, history taking and review of data. I independently performed a physical exam and updated this encounter note as needed.    Regan Rios PA-C on 9/16/22 at 6:48 AM EDT

## 2022-09-16 NOTE — PROGRESS NOTES
Progress Note    Admit Date:  9/15/2022    68 y/o male with pmhx of atrial fibrillation s/p ablation, hx of VTE, DM, HTN, HLD, MARIA VICTORIA     Subjective:  Mr. Ajit Jha today is not feeling so well. Still feeling short of breath with any type of exertion, even just sitting up. He is feeling very weak and fatigued, not able to ambulate without symptoms. No chest pain or cough. Objective:   Patient Vitals for the past 4 hrs:   BP Pulse Resp SpO2   09/16/22 0900 136/63 73 16 95 %   09/16/22 0702 137/70 56 18 --   09/16/22 0602 112/75 (!) 44 13 98 %        No intake or output data in the 24 hours ending 09/16/22 1000    Physical Exam:    Gen: No distress. Alert. Pleasant elderly obese male +ticks   Eyes: PERRL. No sclera icterus. No conjunctival injection. Neck: No JVD. Trachea midline. Resp: No accessory muscle use. No crackles. No wheezes. No rhonchi. CV: Irregular rate and rhythm. No murmur. No rub. Trace bilateral edema. Peripheral Pulses: +2 palpable, equal bilaterally   GI: Non-tender. Non-distended. Normal bowel sounds. Skin: Warm and dry. No nodule on exposed extremities. No rash on exposed extremities. M/S: No cyanosis. No joint deformity. No clubbing. Moving all 3 extremities spontaneously  Neuro: Awake. Grossly nonfocal    Psych: Oriented x 3. No anxiety or agitation.          Medications:  aspirin, 81 mg, Daily  atorvastatin, 40 mg, Daily  clopidogrel, 75 mg, Daily  dilTIAZem, 120 mg, 2 times per day  lisinopril, 5 mg, Daily  magnesium oxide, 400 mg, Daily  metoprolol succinate, 25 mg, Daily  montelukast, 10 mg, Nightly  therapeutic multivitamin-minerals, 2 tablet, Daily  sodium chloride flush, 5-40 mL, 2 times per day  insulin lispro, 0-4 Units, Q6H  enoxaparin, 1 mg/kg, BID  warfarin placeholder: dosing by pharmacy, , RX Placeholder      PRN Medications:  albuterol sulfate HFA, 2 puff, 4x Daily PRN  glucose, 4 tablet, PRN  dextrose bolus, 125 mL, PRN   Or  dextrose bolus, 250 mL, PRN  glucagon (rDNA), 1 mg, PRN  dextrose, , Continuous PRN  sodium chloride flush, 5-40 mL, PRN  sodium chloride, , PRN  ondansetron, 4 mg, Q8H PRN   Or  ondansetron, 4 mg, Q6H PRN  polyethylene glycol, 17 g, Daily PRN  acetaminophen, 650 mg, Q6H PRN   Or  acetaminophen, 650 mg, Q6H PRN  potassium chloride, 40 mEq, PRN   Or  potassium alternative oral replacement, 40 mEq, PRN   Or  potassium chloride, 10 mEq, PRN  magnesium sulfate, 2,000 mg, PRN          Data:  CBC:   Recent Labs     09/15/22  1552 09/16/22  0647   WBC 7.9 6.4   HGB 11.7* 11.1*   HCT 35.8* 33.3*   MCV 87.7 88.5    214     BMP:   Recent Labs     09/15/22  1552 09/16/22  0647    138   K 4.4 4.2    103   CO2 26 22   BUN 33* 28*   CREATININE 1.3 1.3     LIVER PROFILE:   Recent Labs     09/15/22  1552 09/16/22  0647   AST 21 17   ALT 29 24   BILITOT 0.5 0.5   ALKPHOS 111 95     PT/INR:   Recent Labs     09/15/22  1552 09/16/22  0647   PROTIME 12.5 22.6*   INR 0.95 2.00*       CULTURES  COVID and Flu not detected   COVID + 9/4/22     RADIOLOGY  CT CHEST PULMONARY EMBOLISM W CONTRAST   Preliminary Result   No evidence of pulmonary embolism or acute pulmonary abnormality. Enlarged right thyroid lobe, extending to the upper mediastinum. This could   be further assessed with an outpatient thyroid ultrasound, if not recently   performed. Echocardiogram from 9/4/22  Summary   LV systolic function is normal with EF estimated at 55-60%. No regional wall motion abnormalities are noted. There is moderate concentric left ventricular hypertrophy. Normal left ventricular diastolic filling pressure. Biatrial enlargement. Mild posterior mitral annular calcification is present. Aortic valve appears sclerotic but opens adequately. Mild mitral and tricuspid regurgitation. Systolic pulmonary artery pressure (SPAP) estimated at 46mmHg (RA pressure 3   mmHg), consistent with mild pulmonary hypertension.    6- 55%, LVH, LAE, ANNIE      Assessment/Plan:  #Dyspnea upon exertion   #Chest pain   #CAD with recent stents   -with holter monitor on currently   -similar presentation earlier this month and ended up with stents placed 6/16 and 9/8  -echo from 9/4/22 as above   -trop <0.01 x 2   -EKG with no acute ischemic changes   -on ASA, statin, plavix   -cards consulted     #Generalized weakness   #Debility   -feel like it is multifactorial, recent COVID infxn, new a fib, recent stent placement, MARIA VICTORIA  -I think patient would very much benefit from ARU, discussed with family they are agreeable   -PT/OT   -case management consulted    #Atrial fibrillation    -rate controlled   -recently taken off tikosyn 2.2 to prolonged Qtc   -has had 3 failed ablations in the past   -on coumadin for AC   -on cardizem   -off toprol XL     #Subtherapeutic INR   -on coumadin   -bridging with lovenox, pharmacy consulted   -INR goal 2-2.5  -INR 2.0 today     #Asthma/COPD without AE   -continue prn albuterol   -plan for outpatient PFTs per pulmonology     #DM type 2   -holding oral regimen   -low dose SSI     #Recent COVID infection     #HTN   -on lisinopril and cardizem   -off aldactone     #HLD   -on statin     #Hx of DVT  -on coumadin     #MARIA VICTORIA   -on CPAP     #Hx of gastric sleeve   -2013     DVT Prophylaxis: lovenox bridge   Diet: Diet NPO Exceptions are: Sips of Water with Meds, Ice Chips  Code Status: Full Code    YANIRA Hou  09/16/22  12:00 PM

## 2022-09-17 LAB
GLUCOSE BLD-MCNC: 113 MG/DL (ref 70–99)
GLUCOSE BLD-MCNC: 149 MG/DL (ref 70–99)
GLUCOSE BLD-MCNC: 158 MG/DL (ref 70–99)
GLUCOSE BLD-MCNC: 98 MG/DL (ref 70–99)
INR BLD: 1.81 (ref 0.87–1.14)
PERFORMED ON: ABNORMAL
PERFORMED ON: NORMAL
PROTHROMBIN TIME: 20.8 SEC (ref 11.7–14.5)

## 2022-09-17 PROCEDURE — 6360000002 HC RX W HCPCS: Performed by: INTERNAL MEDICINE

## 2022-09-17 PROCEDURE — 6370000000 HC RX 637 (ALT 250 FOR IP): Performed by: INTERNAL MEDICINE

## 2022-09-17 PROCEDURE — 36415 COLL VENOUS BLD VENIPUNCTURE: CPT

## 2022-09-17 PROCEDURE — 96372 THER/PROPH/DIAG INJ SC/IM: CPT

## 2022-09-17 PROCEDURE — G0378 HOSPITAL OBSERVATION PER HR: HCPCS

## 2022-09-17 PROCEDURE — 2580000003 HC RX 258: Performed by: INTERNAL MEDICINE

## 2022-09-17 PROCEDURE — 99226 PR SBSQ OBSERVATION CARE/DAY 35 MINUTES: CPT | Performed by: INTERNAL MEDICINE

## 2022-09-17 PROCEDURE — 99232 SBSQ HOSP IP/OBS MODERATE 35: CPT | Performed by: INTERNAL MEDICINE

## 2022-09-17 PROCEDURE — 85610 PROTHROMBIN TIME: CPT

## 2022-09-17 PROCEDURE — 94761 N-INVAS EAR/PLS OXIMETRY MLT: CPT

## 2022-09-17 PROCEDURE — 94640 AIRWAY INHALATION TREATMENT: CPT

## 2022-09-17 RX ORDER — IPRATROPIUM BROMIDE AND ALBUTEROL SULFATE 2.5; .5 MG/3ML; MG/3ML
1 SOLUTION RESPIRATORY (INHALATION) 4 TIMES DAILY
Status: DISCONTINUED | OUTPATIENT
Start: 2022-09-17 | End: 2022-09-18

## 2022-09-17 RX ADMIN — ENOXAPARIN SODIUM 135 MG: 150 INJECTION SUBCUTANEOUS at 09:41

## 2022-09-17 RX ADMIN — IPRATROPIUM BROMIDE AND ALBUTEROL SULFATE 1 AMPULE: 2.5; .5 SOLUTION RESPIRATORY (INHALATION) at 11:20

## 2022-09-17 RX ADMIN — Medication 2 PUFF: at 07:39

## 2022-09-17 RX ADMIN — Medication 10 ML: at 09:42

## 2022-09-17 RX ADMIN — MONTELUKAST SODIUM 10 MG: 10 TABLET, COATED ORAL at 20:17

## 2022-09-17 RX ADMIN — ACETAMINOPHEN 650 MG: 325 TABLET ORAL at 17:43

## 2022-09-17 RX ADMIN — Medication 10 ML: at 20:19

## 2022-09-17 RX ADMIN — DIPHENHYDRAMINE HCL 25 MG: 25 TABLET ORAL at 15:46

## 2022-09-17 RX ADMIN — Medication 2 PUFF: at 20:03

## 2022-09-17 RX ADMIN — IPRATROPIUM BROMIDE AND ALBUTEROL SULFATE 1 AMPULE: 2.5; .5 SOLUTION RESPIRATORY (INHALATION) at 07:39

## 2022-09-17 RX ADMIN — DIPHENHYDRAMINE HYDROCHLORIDE, ZINC ACETATE: 2; .1 CREAM TOPICAL at 09:47

## 2022-09-17 RX ADMIN — DIPHENHYDRAMINE HCL 25 MG: 25 TABLET ORAL at 01:11

## 2022-09-17 RX ADMIN — ACETAMINOPHEN 650 MG: 325 TABLET ORAL at 05:48

## 2022-09-17 RX ADMIN — IPRATROPIUM BROMIDE AND ALBUTEROL SULFATE 1 AMPULE: 2.5; .5 SOLUTION RESPIRATORY (INHALATION) at 15:24

## 2022-09-17 RX ADMIN — IPRATROPIUM BROMIDE AND ALBUTEROL SULFATE 1 AMPULE: 2.5; .5 SOLUTION RESPIRATORY (INHALATION) at 20:03

## 2022-09-17 RX ADMIN — ASPIRIN 81 MG: 81 TABLET, CHEWABLE ORAL at 09:41

## 2022-09-17 RX ADMIN — MULTIPLE VITAMINS W/ MINERALS TAB 2 TABLET: TAB at 09:41

## 2022-09-17 RX ADMIN — ACETAMINOPHEN 650 MG: 325 TABLET ORAL at 11:34

## 2022-09-17 RX ADMIN — DIPHENHYDRAMINE HCL 25 MG: 25 TABLET ORAL at 09:50

## 2022-09-17 RX ADMIN — ATORVASTATIN CALCIUM 40 MG: 40 TABLET, FILM COATED ORAL at 09:41

## 2022-09-17 RX ADMIN — MAGNESIUM GLUCONATE 500 MG ORAL TABLET 400 MG: 500 TABLET ORAL at 09:41

## 2022-09-17 RX ADMIN — CLOPIDOGREL BISULFATE 75 MG: 75 TABLET ORAL at 09:41

## 2022-09-17 RX ADMIN — ENOXAPARIN SODIUM 135 MG: 150 INJECTION SUBCUTANEOUS at 21:39

## 2022-09-17 RX ADMIN — DILTIAZEM HYDROCHLORIDE 120 MG: 120 CAPSULE, EXTENDED RELEASE ORAL at 09:41

## 2022-09-17 RX ADMIN — DIPHENHYDRAMINE HCL 25 MG: 25 TABLET ORAL at 21:39

## 2022-09-17 ASSESSMENT — PAIN DESCRIPTION - LOCATION
LOCATION: HEAD
LOCATION: HEAD

## 2022-09-17 ASSESSMENT — PAIN DESCRIPTION - DESCRIPTORS
DESCRIPTORS: ACHING;THROBBING
DESCRIPTORS: ACHING

## 2022-09-17 ASSESSMENT — PAIN SCALES - GENERAL
PAINLEVEL_OUTOF10: 8
PAINLEVEL_OUTOF10: 9

## 2022-09-17 NOTE — PROGRESS NOTES
Report given to [facility]. Pt stable, care transferred at this time.  Elizabeth Silverio RN

## 2022-09-17 NOTE — PROGRESS NOTES
Vitals:    09/17/22 0930   BP: 108/64   Pulse: 78   Resp: 17   Temp: 97 °F (36.1 °C)   SpO2: 99%     Shift assessment completed, morning medications given per mar. VSS. Patient is sitting up in bed, denies pain at this time.      Jon Matt RN

## 2022-09-17 NOTE — PROGRESS NOTES
Physical /OccupationalTherapy  Patient cleared by RN for this consult. Upon entrance to room,patient states that he feels shaky. Checked in with monitor,patient was in a.fib with rate . Discussed with RN and decided to hold this evaluation until patients symptoms resolve. Patient advised to contact RN for assist OOB  Primitivo Anne OTR/NATALI DKUES/[address], PT #[id number]

## 2022-09-17 NOTE — PROGRESS NOTES
Physical /OccupationalTherapy  Patient cleared by RN for this consult. Upon entrance to room,patient states that he feels shaky. Checked in with monitor,patient was in a.fib with rate . Discussed with RN and decided to hold this evaluation until patients symptoms resolve.  Patient advised to contact RN for assist Opal Mason, [facility] #[id number]

## 2022-09-17 NOTE — PROGRESS NOTES
Pharmacy Note  Warfarin Consult  Dx: Afib  Goal INR range 2-2.5  Home Warfarin dose:5mg daily      Date  INR  Warfarin  9/15                0.95                 5mg (home)  9/16                2.0                   hold  9/17                1.81                 4mg    Recommend Warfarin 4mg tonight x1. Patient is currently on a Lovenox Bridge. Daily INR ordered. Rx will continue to manage therapy per consult order.   Maryse Bone [facility] PharmD 9/17/2022 6:48 AM

## 2022-09-17 NOTE — PROGRESS NOTES
Monitor tech informed this RN of 5 consecutive beats PVCs, hospitalist notified. Patient denies symptoms.      Milka Paez RN

## 2022-09-17 NOTE — PROGRESS NOTES
214     BMP:   Recent Labs     09/15/22  1552 09/16/22  0647    138   K 4.4 4.2    103   CO2 26 22   BUN 33* 28*   CREATININE 1.3 1.3     Micro:  9/4/2022 COVID+, out of isolation 9/9/22 per Dr. Brad Penaloza  9/15/2022 SARS-CoV-2 and influenza negative  Procalcitonin 0.08. Imaging:  CTPA 9/15/2022  Lungs/pleura: The central airways are patent. No pleural effusion or pneumothorax is seen. Postsurgical changes are seen in the right lung. Mild scarring is seen in the right lung. No focal lung consolidation is identified. Stable 4 mm pulmonary nodule at the medial aspect of the right lower lobe (series 2, image 107), when compared to the CT from 08/01/2016, for which no follow-up is recommended. Impression   No evidence of pulmonary embolism or acute pulmonary abnormality. Enlarged right thyroid lobe, extending to the upper mediastinum. This could   be further assessed with an outpatient thyroid ultrasound, if not recently   performed. PFT 8/31/16  FEV1 3.22 82%  FVC 4.52 65%  ratio 0.71  TLC 6.79 84%  RV 2.90 103%  DLCO 23.79 72%    Echo 9/6/22:  EF 55-60%. Moderate concentric LVH. Normal LV diastolic filling pressure. Biatrial enlargement. Sclerotic aortic valve, but opens adequately. Mild MR and TR. SPAP 46 mmHg C/W mild pulm HTN. ASSESSMENT:  Persistent asthma with personal history of asthma  CAD s/p PCI with LAD stenting 6/16/21 & 8/7/92  Chronic diastolic CHF  pAFIB s/p ablation x3  DM  MARIA VICTORIA on CPAP   Obesity, s/p gastric sleeve 2013  H/O COVID Jul 2022 & Sep 2022    PLAN:  Home APAP 10-15 cmH2O, recently adjusted by Dr. Rosa Goldberg for outpt PFT/MC challenge  Continue  Symbicort, Spiriva & Duonebs   Cardiac/EP evaluation per cardiology     I spent a total of 10 minutes on this encounter personally obtaining the patient history, reviewing labs, imaging and other data.   I independently performed the above physical exam and updated this encounter note, assessment and plan as needed. Delano Cheek MD on 9/17/22 at 12:27 PM EDT    I spent a total of 6 minutes on this encounter on chart review, history taking and review of data. I independently performed a physical exam and updated this encounter note as needed.    COLIN Dennis on 9/17/22 at 7:38 AM EDT

## 2022-09-17 NOTE — PROGRESS NOTES
09/17/22 0200   RT Protocol   History Pulmonary Disease 0   Respiratory pattern 0   Breath sounds 2   Cough 0   Indications for Bronchodilator Therapy Decreased or absent breath sounds   Bronchodilator Assessment Score 2   RT Inhaler-Nebulizer Bronchodilator Protocol Note    There is a bronchodilator order in the chart from a provider indicating to follow the RT Bronchodilator Protocol and there is an Initiate RT Inhaler-Nebulizer Bronchodilator Protocol order as well (see protocol at bottom of note). CXR Findings:  No results found. The findings from the last RT Protocol Assessment were as follows:   History Pulmonary Disease: None or smoker <15 pack years  Respiratory Pattern: Regular pattern and RR 12-20 bpm  Breath Sounds: Slightly diminished and/or crackles  Cough: Strong, spontaneous, non-productive  Indication for Bronchodilator Therapy: Decreased or absent breath sounds  Bronchodilator Assessment Score: 2    Aerosolized bronchodilator medication orders have been revised according to the RT Inhaler-Nebulizer Bronchodilator Protocol below. Respiratory Therapist to perform RT Therapy Protocol Assessment initially then follow the protocol. Repeat RT Therapy Protocol Assessment PRN for score 0-3 or on second treatment, BID, and PRN for scores above 3. No Indications - adjust the frequency to every 6 hours PRN wheezing or bronchospasm, if no treatments needed after 48 hours then discontinue using Per Protocol order mode. If indication present, adjust the RT bronchodilator orders based on the Bronchodilator Assessment Score as indicated below. Use Inhaler orders unless patient has one or more of the following: on home nebulizer, not able to hold breath for 10 seconds, is not alert and oriented, cannot activate and use MDI correctly, or respiratory rate 25 breaths per minute or more, then use the equivalent nebulizer order(s) with same Frequency and PRN reasons based on the score.   If a patient is on this medication at home then do not decrease Frequency below that used at home. 0-3 - enter or revise RT bronchodilator order(s) to equivalent RT Bronchodilator order with Frequency of every 4 hours PRN for wheezing or increased work of breathing using Per Protocol order mode. 4-6 - enter or revise RT Bronchodilator order(s) to two equivalent RT bronchodilator orders with one order with BID Frequency and one order with Frequency of every 4 hours PRN wheezing or increased work of breathing using Per Protocol order mode. 7-10 - enter or revise RT Bronchodilator order(s) to two equivalent RT bronchodilator orders with one order with TID Frequency and one order with Frequency of every 4 hours PRN wheezing or increased work of breathing using Per Protocol order mode. 11-13 - enter or revise RT Bronchodilator order(s) to one equivalent RT bronchodilator order with QID Frequency and an Albuterol order with Frequency of every 4 hours PRN wheezing or increased work of breathing using Per Protocol order mode. Greater than 13 - enter or revise RT Bronchodilator order(s) to one equivalent RT bronchodilator order with every 4 hours Frequency and an Albuterol order with Frequency of every 2 hours PRN wheezing or increased work of breathing using Per Protocol order mode.        Electronically signed by Peterson Michelle RCP on 9/17/2022 at 2:03 AM

## 2022-09-17 NOTE — PROGRESS NOTES
Hospitalist Progress Note      PCP: Amey Lesch, MD    Date of Admission: 9/15/2022    Subjective: HR still uncontrolled, worse even with minimal exertion    Medications:  Reviewed    Infusion Medications    dextrose      sodium chloride       Scheduled Medications    ipratropium-albuterol  1 ampule Inhalation 4x daily    tiotropium  2 puff Inhalation Daily    budesonide-formoterol  2 puff Inhalation BID    dilTIAZem  120 mg Oral Daily    aspirin  81 mg Oral Daily    atorvastatin  40 mg Oral Daily    clopidogrel  75 mg Oral Daily    [Held by provider] lisinopril  5 mg Oral Daily    magnesium oxide  400 mg Oral Daily    montelukast  10 mg Oral Nightly    therapeutic multivitamin-minerals  2 tablet Oral Daily    sodium chloride flush  5-40 mL IntraVENous 2 times per day    insulin lispro  0-4 Units SubCUTAneous Q6H    enoxaparin  1 mg/kg SubCUTAneous BID    warfarin placeholder: dosing by pharmacy   Other RX Placeholder     PRN Meds: diphenhydrAMINE, diphenhydrAMINE-zinc acetate, albuterol sulfate HFA, glucose, dextrose bolus **OR** dextrose bolus, glucagon (rDNA), dextrose, sodium chloride flush, sodium chloride, ondansetron **OR** ondansetron, polyethylene glycol, acetaminophen **OR** acetaminophen, potassium chloride **OR** potassium alternative oral replacement **OR** potassium chloride, magnesium sulfate      Intake/Output Summary (Last 24 hours) at 9/17/2022 1541  Last data filed at 9/17/2022 1215  Gross per 24 hour   Intake 402 ml   Output 375 ml   Net 27 ml       Physical Exam Performed:    /71   Pulse 71   Temp 97.2 °F (36.2 °C) (Oral)   Resp 16   Ht 6' 3\" (1.905 m)   Wt (!) 308 lb 14.4 oz (140.1 kg)   SpO2 99%   BMI 38.61 kg/m²     Gen: No distress. Alert. Pleasant elderly obese male +ticks   Eyes: PERRL. No sclera icterus. No conjunctival injection. Neck: No JVD. Trachea midline. Resp: No accessory muscle use. No crackles. No wheezes. No rhonchi.    CV: Irregular rate and rhythm. No murmur. No rub. Trace bilateral edema. Peripheral Pulses: +2 palpable, equal bilaterally   GI: Non-tender. Non-distended. Normal bowel sounds. Skin: Warm and dry. No nodule on exposed extremities. No rash on exposed extremities. M/S: No cyanosis. No joint deformity. No clubbing. Moving all 3 extremities spontaneously  Neuro: Awake. Grossly nonfocal    Psych: Oriented x 3. No anxiety or agitation. Labs:   Recent Labs     09/15/22  1552 09/16/22  0647   WBC 7.9 6.4   HGB 11.7* 11.1*   HCT 35.8* 33.3*    214     Recent Labs     09/15/22  1552 09/16/22  0647    138   K 4.4 4.2    103   CO2 26 22   BUN 33* 28*   CREATININE 1.3 1.3   CALCIUM 9.7 9.0     Recent Labs     09/15/22  1552 09/16/22  0647   AST 21 17   ALT 29 24   BILITOT 0.5 0.5   ALKPHOS 111 95     Recent Labs     09/15/22  1552 09/16/22  0647 09/17/22  0439   INR 0.95 2.00* 1.81*     Recent Labs     09/15/22  1552 09/15/22  2148 09/16/22  0647   TROPONINI <0.01 <0.01 <0.01       Urinalysis:      Lab Results   Component Value Date/Time    NITRU Negative 09/15/2022 04:00 PM    WBCUA 3-5 09/15/2022 04:00 PM    BACTERIA Rare 09/15/2022 04:00 PM    RBCUA 0-2 09/15/2022 04:00 PM    BLOODU Negative 09/15/2022 04:00 PM    SPECGRAV 1.015 09/15/2022 04:00 PM    GLUCOSEU Negative 09/15/2022 04:00 PM    GLUCOSEU NEGATIVE 02/05/2012 09:00 AM       Radiology:  CT CHEST PULMONARY EMBOLISM W CONTRAST   Final Result   No evidence of pulmonary embolism or acute pulmonary abnormality. Enlarged right thyroid lobe, extending to the upper mediastinum. This could   be further assessed with an outpatient thyroid ultrasound, if not recently   performed.                  Assessment/Plan:    Active Hospital Problems    Diagnosis     Asthma [J45.909]      Priority: Medium    PANG (dyspnea on exertion) [R06.00]      Priority: Medium         #Dyspnea upon exertion   #Chest pain   #CAD with recent stents   -with holter monitor on currently -similar presentation earlier this month and ended up with stents placed 6/16 and 9/8  -echo from 9/4/22 as above   -trop <0.01 x 2   -EKG with no acute ischemic changes   -on ASA, statin, plavix   -cards consulted      #Generalized weakness   #Debility   -feel like it is multifactorial, recent COVID infxn, new a fib, recent stent placement, MARIA VICTORIA  -I think patient would very much benefit from ARU, discussed with family they are agreeable   -PT/OT   -case management consulted     #Atrial fibrillation    -rate controlled   -recently taken off tikosyn 2.2 to prolonged Qtc   -has had 3 failed ablations in the past   -on coumadin for [facility]   -on cardizem   -off toprol XL   - await EP eval on monday     #Subtherapeutic INR   -on coumadin   -bridging with lovenox, pharmacy consulted   -INR goal 2-2.5  -INR 2.0 today      #Asthma/COPD without AE   -continue prn albuterol   -plan for outpatient PFTs per pulmonology      #DM type 2   -holding oral regimen   -low dose SSI      #Recent COVID infection      #HTN   -on lisinopril and cardizem   -off aldactone      #HLD   -on statin      #Hx of DVT  -on coumadin/lovenox     #MARIA VICTORIA   -on CPAP      #Hx of gastric sleeve   -2013        DVT Prophylaxis: lovenox/bridge   Diet: ADULT DIET;  Regular; 4 carb choices (60 gm/meal)  Code Status: Full Code  PT/OT Eval Status: ordered    Tammie Whiting MD

## 2022-09-17 NOTE — PROGRESS NOTES
[facility]   Progress Note  Cardiology    CC:  Dyspnea, Afib, CAD    HPI:  He becomes dyspneic and weak with minimal walking. Remains in afib. Controlled at rest, tachy with activity      Medications/Labs all Reviewed    Lab Results   Component Value Date    WBC 6.4 09/16/2022    HGB 11.1 (L) 09/16/2022    HCT 33.3 (L) 09/16/2022    MCV 88.5 09/16/2022     09/16/2022     Lab Results   Component Value Date    CREATININE 1.3 09/16/2022    BUN 28 (H) 09/16/2022     09/16/2022    K 4.2 09/16/2022     09/16/2022    CO2 22 09/16/2022     Lab Results   Component Value Date    INR 1.81 (H) 09/17/2022    PROTIME 20.8 (H) 09/17/2022        PHYSICAL EXAM   /79   Pulse 69   Temp 97 °F (36.1 °C) (Oral)   Resp 16   Ht 6' 3\" (1.905 m)   Wt (!) 308 lb 14.4 oz (140.1 kg)   SpO2 100%   BMI 38.61 kg/m²    Obese male appearing mildly dyspneic  Respiratory:  Resp Assessment: Normal respiratory effort  Resp Auscultation: Clear to auscultation bilaterally   Cardiovascular: Auscultation: tachycardic irregular rate and rhythm, normal S1S2, no murmur, rub or gallop  Palpation:  Nl PMI  JVP:  normal  Extremities: No LE Edema  Abdomen:  Soft, non-tender  Normal bowel sounds  Extremities:   No Cyanosis or Clubbing  Neurological/Psychiatric:  Oriented to time, place, and person  Non-anxious  Skin:   Warm and dry    TELE; (tracings personally reviewed)  Afib, generally controlled in 60's. With movement increases to 130-150's. Currently 130-140's up eating breakfast.    ECHO 9/6/2022   Summary   LV systolic function is normal with EF estimated at 55-60%. No regional wall motion abnormalities are noted. There is moderate concentric left ventricular hypertrophy. Normal left ventricular diastolic filling pressure. Biatrial enlargement. Mild posterior mitral annular calcification is present. Aortic valve appears sclerotic but opens adequately. Mild mitral and tricuspid regurgitation. Systolic pulmonary artery pressure (SPAP) estimated at 46mmHg (RA pressure 3   mmHg), consistent with mild pulmonary hypertension. 6- 55%, LVH, LAE, ANNIE    CXR 9/5  1. Mild persistent enlargement of the cardiac silhouette. 2. Otherwise, no convincing acute cardiopulmonary abnormality. CARDIAC CATH/PCI 9/8/2022  Procedure Findings:  1. Single-vessel critical coronary disease with 70% stenosis of the mid left anterior descending artery              ~Previously placed stents within the LAD and diagonal bifurcation demonstrating patency. Stent within the mid LAD noted to have mild under deployment. 2. Normal left ventricular function with EF estimated at 55-60%  3. Normal left heart hemodynamics    1. Successful intravascular ultrasound-guided drug-eluting stent insertion to the left anterior descending artery           ASSESSMENT    AFIB:  S/p remote ablations  Maintained on Dofetilide until admit a few weeks ago due to being in afib and QT being prolonged  He has apparently had some bradycardia and rate control meds decreased  He is now on slow side at rest but becomes very tachy with any activity  Unclear if this was true before meds decreased  He had MCOT but results unavailable. Need to discuss with EP/Isael on Monday for suggestions  Continue anticoagulation-on lovenox-Warfarin at home.   Continue warfarin with pharmacy dosing and lovenox until INR >2, Currently 1.8    CAD:  PCI LAD 9/8/2022  Triple therapy 1 month then stop ASA  Stable    COVID 19  Recent infection  Stable    H/O DVT  On coumadin    Dyspnea:  Etiology unclear  No improvement with PCI LAD  Seems to have been present prior to [facility]  He currently quickly becomes tachy with any activity and that could cause symptoms  Unclear if this was going on before meds were recently decreased due to Bradycardia and orthostasis  Also orthostatic hypotension could cause symptoms  Recheck orthostatics  Need EP input Monday    Orthostatic

## 2022-09-17 NOTE — PROGRESS NOTES
Pt awake and alert in bed. Vitals obtained. Oriented times 4. Shift assessment completed, see flow sheet. PRN anti-itch cream applied to areas of chest. Pt denies any pain at this time. Scheduled meds given, see MAR. Pt denies any further needs at this time. Call light in reach.    Martha Macdonald RN

## 2022-09-18 DIAGNOSIS — J45.909 PERSISTENT ASTHMA WITHOUT COMPLICATION, UNSPECIFIED ASTHMA SEVERITY: Primary | ICD-10-CM

## 2022-09-18 PROBLEM — I48.91 ATRIAL FIBRILLATION WITH RVR (HCC): Status: ACTIVE | Noted: 2022-09-18

## 2022-09-18 LAB
GLUCOSE BLD-MCNC: 116 MG/DL (ref 70–99)
GLUCOSE BLD-MCNC: 119 MG/DL (ref 70–99)
GLUCOSE BLD-MCNC: 187 MG/DL (ref 70–99)
GLUCOSE BLD-MCNC: 189 MG/DL (ref 70–99)
INR BLD: 1.72 (ref 0.87–1.14)
PERFORMED ON: ABNORMAL
PROTHROMBIN TIME: 20 SEC (ref 11.7–14.5)

## 2022-09-18 PROCEDURE — 6370000000 HC RX 637 (ALT 250 FOR IP): Performed by: INTERNAL MEDICINE

## 2022-09-18 PROCEDURE — 36415 COLL VENOUS BLD VENIPUNCTURE: CPT

## 2022-09-18 PROCEDURE — 2060000000 HC ICU INTERMEDIATE R&B

## 2022-09-18 PROCEDURE — 99232 SBSQ HOSP IP/OBS MODERATE 35: CPT | Performed by: INTERNAL MEDICINE

## 2022-09-18 PROCEDURE — 85610 PROTHROMBIN TIME: CPT

## 2022-09-18 PROCEDURE — 96372 THER/PROPH/DIAG INJ SC/IM: CPT

## 2022-09-18 PROCEDURE — 99233 SBSQ HOSP IP/OBS HIGH 50: CPT | Performed by: INTERNAL MEDICINE

## 2022-09-18 PROCEDURE — 2580000003 HC RX 258: Performed by: INTERNAL MEDICINE

## 2022-09-18 PROCEDURE — 94761 N-INVAS EAR/PLS OXIMETRY MLT: CPT

## 2022-09-18 PROCEDURE — 6370000000 HC RX 637 (ALT 250 FOR IP)

## 2022-09-18 PROCEDURE — 6360000002 HC RX W HCPCS: Performed by: INTERNAL MEDICINE

## 2022-09-18 PROCEDURE — 94640 AIRWAY INHALATION TREATMENT: CPT

## 2022-09-18 RX ORDER — ALBUTEROL SULFATE 2.5 MG/3ML
2.5 SOLUTION RESPIRATORY (INHALATION) EVERY 4 HOURS PRN
Qty: 1080 ML | Refills: 3 | Status: SHIPPED | OUTPATIENT
Start: 2022-09-18 | End: 2022-09-22 | Stop reason: SDUPTHER

## 2022-09-18 RX ORDER — IPRATROPIUM BROMIDE AND ALBUTEROL SULFATE 2.5; .5 MG/3ML; MG/3ML
1 SOLUTION RESPIRATORY (INHALATION) EVERY 4 HOURS PRN
Status: DISCONTINUED | OUTPATIENT
Start: 2022-09-18 | End: 2022-09-19

## 2022-09-18 RX ORDER — ALBUTEROL SULFATE 90 UG/1
2 AEROSOL, METERED RESPIRATORY (INHALATION) 4 TIMES DAILY PRN
Qty: 1 EACH | Refills: 3 | Status: SHIPPED | OUTPATIENT
Start: 2022-09-18

## 2022-09-18 RX ORDER — WARFARIN SODIUM 5 MG/1
5 TABLET ORAL
Status: COMPLETED | OUTPATIENT
Start: 2022-09-18 | End: 2022-09-18

## 2022-09-18 RX ADMIN — MULTIPLE VITAMINS W/ MINERALS TAB 2 TABLET: TAB at 08:14

## 2022-09-18 RX ADMIN — DIPHENHYDRAMINE HCL 25 MG: 25 TABLET ORAL at 07:33

## 2022-09-18 RX ADMIN — DILTIAZEM HYDROCHLORIDE 120 MG: 120 CAPSULE, EXTENDED RELEASE ORAL at 08:14

## 2022-09-18 RX ADMIN — ACETAMINOPHEN 650 MG: 325 TABLET ORAL at 07:33

## 2022-09-18 RX ADMIN — DIPHENHYDRAMINE HCL 25 MG: 25 TABLET ORAL at 15:02

## 2022-09-18 RX ADMIN — CLOPIDOGREL BISULFATE 75 MG: 75 TABLET ORAL at 08:14

## 2022-09-18 RX ADMIN — Medication 2 PUFF: at 19:49

## 2022-09-18 RX ADMIN — ENOXAPARIN SODIUM 135 MG: 150 INJECTION SUBCUTANEOUS at 08:15

## 2022-09-18 RX ADMIN — ATORVASTATIN CALCIUM 40 MG: 40 TABLET, FILM COATED ORAL at 08:14

## 2022-09-18 RX ADMIN — ENOXAPARIN SODIUM 135 MG: 150 INJECTION SUBCUTANEOUS at 21:13

## 2022-09-18 RX ADMIN — Medication 10 ML: at 21:06

## 2022-09-18 RX ADMIN — METOPROLOL TARTRATE 12.5 MG: 25 TABLET, FILM COATED ORAL at 10:05

## 2022-09-18 RX ADMIN — WARFARIN SODIUM 5 MG: 5 TABLET ORAL at 18:01

## 2022-09-18 RX ADMIN — ACETAMINOPHEN 650 MG: 325 TABLET ORAL at 15:02

## 2022-09-18 RX ADMIN — ACETAMINOPHEN 650 MG: 325 TABLET ORAL at 21:01

## 2022-09-18 RX ADMIN — ASPIRIN 81 MG: 81 TABLET, CHEWABLE ORAL at 08:14

## 2022-09-18 RX ADMIN — Medication 10 ML: at 08:16

## 2022-09-18 RX ADMIN — DIPHENHYDRAMINE HCL 25 MG: 25 TABLET ORAL at 21:01

## 2022-09-18 RX ADMIN — MAGNESIUM GLUCONATE 500 MG ORAL TABLET 400 MG: 500 TABLET ORAL at 08:15

## 2022-09-18 RX ADMIN — TIOTROPIUM BROMIDE INHALATION SPRAY 2 PUFF: 3.12 SPRAY, METERED RESPIRATORY (INHALATION) at 07:37

## 2022-09-18 RX ADMIN — METOPROLOL TARTRATE 12.5 MG: 25 TABLET, FILM COATED ORAL at 21:00

## 2022-09-18 RX ADMIN — MONTELUKAST SODIUM 10 MG: 10 TABLET, COATED ORAL at 21:00

## 2022-09-18 RX ADMIN — IPRATROPIUM BROMIDE AND ALBUTEROL SULFATE 1 AMPULE: 2.5; .5 SOLUTION RESPIRATORY (INHALATION) at 07:37

## 2022-09-18 RX ADMIN — Medication 2 PUFF: at 07:37

## 2022-09-18 ASSESSMENT — PAIN DESCRIPTION - DESCRIPTORS
DESCRIPTORS: ACHING
DESCRIPTORS: ACHING

## 2022-09-18 ASSESSMENT — PAIN SCALES - GENERAL
PAINLEVEL_OUTOF10: 6
PAINLEVEL_OUTOF10: 7

## 2022-09-18 ASSESSMENT — PAIN DESCRIPTION - ORIENTATION: ORIENTATION: MID

## 2022-09-18 ASSESSMENT — PAIN - FUNCTIONAL ASSESSMENT
PAIN_FUNCTIONAL_ASSESSMENT: ACTIVITIES ARE NOT PREVENTED
PAIN_FUNCTIONAL_ASSESSMENT: ACTIVITIES ARE NOT PREVENTED

## 2022-09-18 ASSESSMENT — PAIN DESCRIPTION - LOCATION
LOCATION: HEAD
LOCATION: HEAD

## 2022-09-18 NOTE — PROGRESS NOTES
[facility]   Progress Note  Cardiology    CC:  Dyspnea, Afib, CAD    HPI:  He becomes dyspneic and weak with minimal walking. Remains in afib. HR controlled at rest. Quickly goes to 140 with any activity  Not orthostatic with checks yesterday      Medications/Labs all Reviewed    Lab Results   Component Value Date    WBC 6.4 09/16/2022    HGB 11.1 (L) 09/16/2022    HCT 33.3 (L) 09/16/2022    MCV 88.5 09/16/2022     09/16/2022     Lab Results   Component Value Date    CREATININE 1.3 09/16/2022    BUN 28 (H) 09/16/2022     09/16/2022    K 4.2 09/16/2022     09/16/2022    CO2 22 09/16/2022     Lab Results   Component Value Date    INR 1.72 (H) 09/18/2022    PROTIME 20.0 (H) 09/18/2022        PHYSICAL EXAM   /74   Pulse 95   Temp 97.6 °F (36.4 °C) (Oral)   Resp 17   Ht 6' 3\" (1.905 m)   Wt (!) 311 lb 3.2 oz (141.2 kg)   SpO2 95%   BMI 38.90 kg/m²    Obese male appearing mildly dyspneic  Respiratory:  Resp Assessment: Normal respiratory effort  Resp Auscultation: Clear to auscultation bilaterally   Cardiovascular: Auscultation: tachycardic irregular rate and rhythm, normal S1S2, no murmur, rub or gallop  Palpation:  Nl PMI  JVP:  normal  Extremities: No LE Edema  Abdomen:  Soft, non-tender  Normal bowel sounds  Extremities:   No Cyanosis or Clubbing  Neurological/Psychiatric:  Oriented to time, place, and person  Non-anxious  Skin:   Warm and dry    TELE; (tracings personally reviewed)  Afib about 65 at rest  Quickly increases to 140 with any activity    ECHO 9/6/2022   Summary   LV systolic function is normal with EF estimated at 55-60%. No regional wall motion abnormalities are noted. There is moderate concentric left ventricular hypertrophy. Normal left ventricular diastolic filling pressure. Biatrial enlargement. Mild posterior mitral annular calcification is present. Aortic valve appears sclerotic but opens adequately.    Mild mitral and tricuspid regurgitation. Systolic pulmonary artery pressure (SPAP) estimated at 46mmHg (RA pressure 3   mmHg), consistent with mild pulmonary hypertension. 6- 55%, LVH, LAE, ANNIE    CXR 9/5  1. Mild persistent enlargement of the cardiac silhouette. 2. Otherwise, no convincing acute cardiopulmonary abnormality. CARDIAC CATH/PCI 9/8/2022  Procedure Findings:  1. Single-vessel critical coronary disease with 70% stenosis of the mid left anterior descending artery              ~Previously placed stents within the LAD and diagonal bifurcation demonstrating patency. Stent within the mid LAD noted to have mild under deployment. 2. Normal left ventricular function with EF estimated at 55-60%  3. Normal left heart hemodynamics    1. Successful intravascular ultrasound-guided drug-eluting stent insertion to the left anterior descending artery           ASSESSMENT    AFIB:  S/p remote ablations  Maintained on Dofetilide until admit a few weeks ago due to being in afib and QT being prolonged  He has apparently had some bradycardia and rate control meds decreased  He is now normal (65) at rest but becomes very tachy with any activity  Unclear if this was true before meds decreased  He had MCOT but results unavailable. Add lopressor 12.5 BID    Need to discuss with BESSIE/Isael on Monday am for suggestions  Continue anticoagulation-on lovenox-Warfarin at home.   Continue warfarin with pharmacy dosing and lovenox until INR >2, Currently 1.8  Will make NPO after midnight in case any thing planned (NOTHING SCHEDULED- He says he was told that there my be a cardioversion-I think we need a better long term solution)    CAD:  PCI LAD 9/8/2022  Triple therapy 1 month then stop ASA  Stable    COVID 19  Recent infection  Stable    H/O DVT  On coumadin    Dyspnea:  Etiology unclear  No improvement with PCI LAD  Seems to have been present prior to [place]  He currently quickly becomes tachy with any activity and that could cause symptoms  Unclear if this was going on before meds were recently decreased due to Bradycardia and orthostasis  Also orthostatic hypotension could cause symptoms  Repeat orthostatics negative  Need EP input Monday    Orthostatic hypotension:  Improved with meds decreased    Jorge Beckford MD, 9/18/2022 9:33 AM

## 2022-09-18 NOTE — PROGRESS NOTES
RT Inhaler-Nebulizer Bronchodilator Protocol Note    There is a bronchodilator order in the chart from a provider indicating to follow the RT Bronchodilator Protocol and there is an Initiate RT Inhaler-Nebulizer Bronchodilator Protocol order as well (see protocol at bottom of note). CXR Findings:  No results found. The findings from the last RT Protocol Assessment were as follows:   History Pulmonary Disease: (P) None or smoker <15 pack years  Respiratory Pattern: (P) Regular pattern and RR 12-20 bpm  Breath Sounds: (P) Slightly diminished and/or crackles  Cough: (P) Strong, spontaneous, non-productive  Indication for Bronchodilator Therapy: (P) Decreased or absent breath sounds  Bronchodilator Assessment Score: (P) 2    Aerosolized bronchodilator medication orders have been revised according to the RT Inhaler-Nebulizer Bronchodilator Protocol below. Respiratory Therapist to perform RT Therapy Protocol Assessment initially then follow the protocol. Repeat RT Therapy Protocol Assessment PRN for score 0-3 or on second treatment, BID, and PRN for scores above 3. No Indications - adjust the frequency to every 6 hours PRN wheezing or bronchospasm, if no treatments needed after 48 hours then discontinue using Per Protocol order mode. If indication present, adjust the RT bronchodilator orders based on the Bronchodilator Assessment Score as indicated below. Use Inhaler orders unless patient has one or more of the following: on home nebulizer, not able to hold breath for 10 seconds, is not alert and oriented, cannot activate and use MDI correctly, or respiratory rate 25 breaths per minute or more, then use the equivalent nebulizer order(s) with same Frequency and PRN reasons based on the score. If a patient is on this medication at home then do not decrease Frequency below that used at home.     0-3 - enter or revise RT bronchodilator order(s) to equivalent RT Bronchodilator order with Frequency of every 4 hours PRN for wheezing or increased work of breathing using Per Protocol order mode. 4-6 - enter or revise RT Bronchodilator order(s) to two equivalent RT bronchodilator orders with one order with BID Frequency and one order with Frequency of every 4 hours PRN wheezing or increased work of breathing using Per Protocol order mode. 7-10 - enter or revise RT Bronchodilator order(s) to two equivalent RT bronchodilator orders with one order with TID Frequency and one order with Frequency of every 4 hours PRN wheezing or increased work of breathing using Per Protocol order mode. 11-13 - enter or revise RT Bronchodilator order(s) to one equivalent RT bronchodilator order with QID Frequency and an Albuterol order with Frequency of every 4 hours PRN wheezing or increased work of breathing using Per Protocol order mode. Greater than 13 - enter or revise RT Bronchodilator order(s) to one equivalent RT bronchodilator order with every 4 hours Frequency and an Albuterol order with Frequency of every 2 hours PRN wheezing or increased work of breathing using Per Protocol order mode. RT to enter RT Home Evaluation for COPD & MDI Assessment order using Per Protocol order mode.     Electronically signed by Denise Cooper RCP on 9/18/2022 at 7:40 AM

## 2022-09-18 NOTE — FLOWSHEET NOTE
09/18/22 0738   Vital Signs   Temp 97.6 °F (36.4 °C)   Temp Source Oral   Heart Rate 72   Heart Rate Source Monitor   Resp 17   /74   BP Location Right lower arm   BP Method Automatic   MAP (Calculated) 93   Patient Position Semi fowlers   Oxygen Therapy   SpO2 95 %   O2 Device None (Room air)   Shift assessment completed, morning medications administered per mar. PRN tylenol given for headache. Patient states \"I feel fine when I am just laying in bed, but when I get up or move around, I feel my heart racing. \" HR has been sustaining 70s-90 but upon movement has spiked to 140s for a short time and then returns to normal. Patient is laying in bed, denies further needs at this time.      Leah Jasmine RN

## 2022-09-18 NOTE — PROGRESS NOTES
09/17/22 2000   RT Protocol   History Pulmonary Disease 0   Respiratory pattern 0   Breath sounds 2   Cough 0   Indications for Bronchodilator Therapy Decreased or absent breath sounds   Bronchodilator Assessment Score 2   RT Inhaler-Nebulizer Bronchodilator Protocol Note    There is a bronchodilator order in the chart from a provider indicating to follow the RT Bronchodilator Protocol and there is an Initiate RT Inhaler-Nebulizer Bronchodilator Protocol order as well (see protocol at bottom of note). CXR Findings:  No results found. The findings from the last RT Protocol Assessment were as follows:   History Pulmonary Disease: None or smoker <15 pack years  Respiratory Pattern: Regular pattern and RR 12-20 bpm  Breath Sounds: Slightly diminished and/or crackles  Cough: Strong, spontaneous, non-productive  Indication for Bronchodilator Therapy: Decreased or absent breath sounds  Bronchodilator Assessment Score: 2    Aerosolized bronchodilator medication orders have been revised according to the RT Inhaler-Nebulizer Bronchodilator Protocol below. Respiratory Therapist to perform RT Therapy Protocol Assessment initially then follow the protocol. Repeat RT Therapy Protocol Assessment PRN for score 0-3 or on second treatment, BID, and PRN for scores above 3. No Indications - adjust the frequency to every 6 hours PRN wheezing or bronchospasm, if no treatments needed after 48 hours then discontinue using Per Protocol order mode. If indication present, adjust the RT bronchodilator orders based on the Bronchodilator Assessment Score as indicated below. Use Inhaler orders unless patient has one or more of the following: on home nebulizer, not able to hold breath for 10 seconds, is not alert and oriented, cannot activate and use MDI correctly, or respiratory rate 25 breaths per minute or more, then use the equivalent nebulizer order(s) with same Frequency and PRN reasons based on the score.   If a patient is on this medication at home then do not decrease Frequency below that used at home. 0-3 - enter or revise RT bronchodilator order(s) to equivalent RT Bronchodilator order with Frequency of every 4 hours PRN for wheezing or increased work of breathing using Per Protocol order mode. 4-6 - enter or revise RT Bronchodilator order(s) to two equivalent RT bronchodilator orders with one order with BID Frequency and one order with Frequency of every 4 hours PRN wheezing or increased work of breathing using Per Protocol order mode. 7-10 - enter or revise RT Bronchodilator order(s) to two equivalent RT bronchodilator orders with one order with TID Frequency and one order with Frequency of every 4 hours PRN wheezing or increased work of breathing using Per Protocol order mode. 11-13 - enter or revise RT Bronchodilator order(s) to one equivalent RT bronchodilator order with QID Frequency and an Albuterol order with Frequency of every 4 hours PRN wheezing or increased work of breathing using Per Protocol order mode. Greater than 13 - enter or revise RT Bronchodilator order(s) to one equivalent RT bronchodilator order with every 4 hours Frequency and an Albuterol order with Frequency of every 2 hours PRN wheezing or increased work of breathing using Per Protocol order mode.        Electronically signed by Edwin Dietz RCP on 9/17/2022 at 8:08 PM

## 2022-09-18 NOTE — PROGRESS NOTES
Pharmacy Note  Warfarin Consult  Dx: Afib  Goal INR range 2-2.5  Home Warfarin dose:5mg daily      Date  INR  Warfarin  9/15                0.95                 5mg (home)  9/16                2.0                   hold  9/17                1.81                 none given  9/18                1.72                 5mg    Recommend Warfarin 5mg tonight x1. Patient is currently on a Lovenox Bridge. Daily INR ordered. Rx will continue to manage therapy per consult order.   CLAIRE Tristan [facility]O [facility] PharmD 9/18/2022 7:00 AM

## 2022-09-18 NOTE — PROGRESS NOTES
Labs     09/15/22  1552 09/16/22  0647   WBC 7.9 6.4   HGB 11.7* 11.1*   HCT 35.8* 33.3*   MCV 87.7 88.5    214     BMP:   Recent Labs     09/15/22  1552 09/16/22  0647    138   K 4.4 4.2    103   CO2 26 22   BUN 33* 28*   CREATININE 1.3 1.3     Micro:  9/4/2022 COVID+, out of isolation 9/9/22 per Dr. Harry Gip  9/15/2022 SARS-CoV-2 and influenza negative  Procalcitonin 0.08. Imaging:  CTPA 9/15/2022  Lungs/pleura: The central airways are patent. No pleural effusion or pneumothorax is seen. Postsurgical changes are seen in the right lung. Mild scarring is seen in the right lung. No focal lung consolidation is identified. Stable 4 mm pulmonary nodule at the medial aspect of the right lower lobe (series 2, image 107), when compared to the CT from 08/01/2016, for which no follow-up is recommended. Impression   No evidence of pulmonary embolism or acute pulmonary abnormality. Enlarged right thyroid lobe, extending to the upper mediastinum. This could   be further assessed with an outpatient thyroid ultrasound, if not recently   performed. PFT 8/31/16  FEV1 3.22 82%  FVC 4.52 65%  ratio 0.71  TLC 6.79 84%  RV 2.90 103%  DLCO 23.79 72%    Echo 9/6/22:  EF 55-60%. Moderate concentric LVH. Normal LV diastolic filling pressure. Biatrial enlargement. Sclerotic aortic valve, but opens adequately. Mild MR and TR. SPAP 46 mmHg C/W mild pulm HTN.      ASSESSMENT:  Severe PANG   pAFIB s/p ablation x3 - in afib with variable HR this admission   Persistent asthma with personal history of asthma   CAD s/p PCI with LAD stenting 6/16/21 & 9/8/22   Chronic diastolic CHF   DM   MARIA VICTORIA on CPAP   Obesity, s/p gastric sleeve 2013   H/O COVID Jul 2022 & Sep 2022     PLAN:  Home APAP 10-15 cmH2O HS, recently adjusted by Dr. Mary Tse for outpt PFT/MC challenge & f/u will be with Dr. Charissa Melendrez, [facility] Cristo, home nebulizer order sent to [address] [place] [address] evaluation per cardiology     I spent a total of 12 minutes on this encounter personally obtaining the patient history, reviewing labs, imaging and other data. I independently performed the above physical exam and updated this encounter note, assessment and plan as needed. Delano Cheek MD on 9/18/22 at 12:36 PM EDT    I spent a total of 11 minutes on this encounter on chart review, history taking and review of data. I independently performed a physical exam and updated this encounter note as needed.    Regan Rios PA-C on 9/18/22 at 6:44 AM EDT

## 2022-09-18 NOTE — PROGRESS NOTES
Monitor tech informed this RN of patients heart rate dropping as low as 37, maintaining in the 50's. Patient states he did feel lightheaded for a brief moment, but is now sitting up in bed and denies symptoms. Perfect serve sent to hospitalist to notify of change.     Norma Rowland RN

## 2022-09-18 NOTE — PROGRESS NOTES
Call to Dr. Archana Merritt concerning the patients HR on 9/18/22. Spoke with [facility].  5:36 PM    Octavia Mems  9/18/2022

## 2022-09-18 NOTE — PROGRESS NOTES
Hospitalist Progress Note      PCP: Regine Mcdaniel MD    Date of Admission: 9/15/2022    Subjective: still with SOB, requesting his thyroid scans. Still SOB with ambulation    Medications:  Reviewed    Infusion Medications    dextrose      sodium chloride       Scheduled Medications    warfarin  5 mg Oral Once    metoprolol tartrate  12.5 mg Oral BID    tiotropium  2 puff Inhalation Daily    budesonide-formoterol  2 puff Inhalation BID    dilTIAZem  120 mg Oral Daily    aspirin  81 mg Oral Daily    atorvastatin  40 mg Oral Daily    clopidogrel  75 mg Oral Daily    [Held by provider] lisinopril  5 mg Oral Daily    magnesium oxide  400 mg Oral Daily    montelukast  10 mg Oral Nightly    therapeutic multivitamin-minerals  2 tablet Oral Daily    sodium chloride flush  5-40 mL IntraVENous 2 times per day    insulin lispro  0-4 Units SubCUTAneous Q6H    enoxaparin  1 mg/kg SubCUTAneous BID    warfarin placeholder: dosing by pharmacy   Other RX Placeholder     PRN Meds: ipratropium-albuterol, diphenhydrAMINE, diphenhydrAMINE-zinc acetate, albuterol sulfate HFA, glucose, dextrose bolus **OR** dextrose bolus, glucagon (rDNA), dextrose, sodium chloride flush, sodium chloride, ondansetron **OR** ondansetron, polyethylene glycol, acetaminophen **OR** acetaminophen, potassium chloride **OR** potassium alternative oral replacement **OR** potassium chloride, magnesium sulfate      Intake/Output Summary (Last 24 hours) at 9/18/2022 1621  Last data filed at 9/18/2022 1256  Gross per 24 hour   Intake 850 ml   Output 500 ml   Net 350 ml       Physical Exam Performed:    /68   Pulse 70   Temp 97 °F (36.1 °C) (Oral)   Resp 18   Ht 6' 3\" (1.905 m)   Wt (!) 311 lb 3.2 oz (141.2 kg)   SpO2 97%   BMI 38.90 kg/m²        Gen: No distress. Alert. Pleasant elderly obese male +ticks   Eyes: PERRL. No sclera icterus. No conjunctival injection. Neck: No JVD. Trachea midline. Resp: No accessory muscle use. No crackles. No wheezes. No rhonchi. CV: Irregular rate and rhythm. No murmur. No rub. Trace bilateral edema. Peripheral Pulses: +2 palpable, equal bilaterally   GI: Non-tender. Non-distended. Normal bowel sounds. Skin: Warm and dry. No nodule on exposed extremities. No rash on exposed extremities. M/S: No cyanosis. No joint deformity. No clubbing. Moving all 3 extremities spontaneously  Neuro: Awake. Grossly nonfocal    Psych: Oriented x 3. No anxiety or agitation. Labs:   Recent Labs     09/16/22  0647   WBC 6.4   HGB 11.1*   HCT 33.3*        Recent Labs     09/16/22  0647      K 4.2      CO2 22   BUN 28*   CREATININE 1.3   CALCIUM 9.0     Recent Labs     09/16/22  0647   AST 17   ALT 24   BILITOT 0.5   ALKPHOS 95     Recent Labs     09/16/22  0647 09/17/22  0439 09/18/22  0433   INR 2.00* 1.81* 1.72*     Recent Labs     09/15/22  2148 09/16/22  0647   TROPONINI <0.01 <0.01       Urinalysis:      Lab Results   Component Value Date/Time    NITRU Negative 09/15/2022 04:00 PM    WBCUA 3-5 09/15/2022 04:00 PM    BACTERIA Rare 09/15/2022 04:00 PM    RBCUA 0-2 09/15/2022 04:00 PM    BLOODU Negative 09/15/2022 04:00 PM    SPECGRAV 1.015 09/15/2022 04:00 PM    GLUCOSEU Negative 09/15/2022 04:00 PM    GLUCOSEU NEGATIVE 02/05/2012 09:00 AM       Radiology:  CT CHEST PULMONARY EMBOLISM W CONTRAST   Final Result   No evidence of pulmonary embolism or acute pulmonary abnormality. Enlarged right thyroid lobe, extending to the upper mediastinum. This could   be further assessed with an outpatient thyroid ultrasound, if not recently   performed.                  Assessment/Plan:    Active Hospital Problems    Diagnosis     Atrial fibrillation with RVR (HCC) [I48.91]      Priority: Medium    Asthma [J45.909]      Priority: Medium    PAGN (dyspnea on exertion) [R06.00]      Priority: Medium         #Dyspnea upon exertion   #Chest pain   #CAD with recent stents   -with holter monitor on currently   - CT chest neg for PE  -similar presentation earlier this month and ended up with stents placed 6/16 and 9/8  -echo from 9/4/22 as above   -trop <0.01 x 2   -EKG with no acute ischemic changes   -on ASA, statin, plavix   -cardio consulted      #Generalized weakness   #Debility   -feel like it is multifactorial, recent COVID infxn, new a fib, recent stent placement, MARIA VICTORIA  -I think patient would very much benefit from ARU, discussed with family they are agreeable   -PT/OT   -case management consulted    #thyroid enlargement  - reviewed on CT chest with enlarged rt goiter extending upto upper mediastinum.   - Last check TSH/T4 on 9/4/22 WNL  - will need [facility] thyroid inpt vs outpt     #Atrial fibrillation    -rate controlled   -recently taken off tikosyn 2.2 to prolonged Qtc   -has had 3 failed ablations in the past   -on coumadin for AC   -on cardizem   -off toprol XL   - await EP eval on monday     #Subtherapeutic INR   -on coumadin   -bridging with lovenox, pharmacy consulted   -INR goal 2-2.5  -INR 1.8-->1.7 today      #Asthma/COPD without AE   -continue prn albuterol   -plan for outpatient PFTs per pulmonology      #DM type 2   -holding oral regimen   -low dose SSI      #Recent COVID infection      #HTN   -on lisinopril and cardizem   -off aldactone      #HLD   -on statin      #Hx of DVT  -on coumadin/lovenox     #MARIA VICTORIA   -on CPAP      #Hx of gastric sleeve   -2013         DVT Prophylaxis: lovenox/coumadin bridge   Diet: ADULT DIET;  Regular; 4 carb choices (60 gm/meal)  Diet NPO Exceptions are: Sips of Water with Meds, Ice Chips  Code Status: Full Code  PT/OT Eval Status: ordered    Dispo - cont care, awaiting EP eval Carrie Cordero MD

## 2022-09-18 NOTE — PROGRESS NOTES
Pt awake in bed. Assessment completed and medications given per MAR. VS as charted in flowsheet. A&Ox4. PRN benadryl given at this time per pt request. Pt denies any further needs at this time. Call light in reach and bed in lowest position.

## 2022-09-18 NOTE — PROGRESS NOTES
Report given to  Shazia Gatica RN at patient bedside. Pt is stable, call light in reach, with no needs at this time. Care transferred.      Saud Qiu RN

## 2022-09-18 NOTE — FLOWSHEET NOTE
09/18/22 0031   Vital Signs   Temp 97.8 °F (36.6 °C)   Temp Source Oral   Heart Rate 73   Heart Rate Source Monitor   Resp 18   /68   BP Method Automatic   MAP (Calculated) 84.33   Patient Position Semi fowlers   Level of Consciousness 0   MEWS Score 1   Oxygen Therapy   SpO2 97 %   O2 Device PAP (positive airway pressure)   Height and Weight   Weight (!) 311 lb 3.2 oz (141.2 kg)   Weight Method Actual;Standing scale   BMI (Calculated) 39     Pt awake in bed. VS as shown above. Pt denies any further needs at this time. Call light in reach and bed in lowest position.

## 2022-09-19 ENCOUNTER — TELEPHONE (OUTPATIENT)
Dept: CARDIOLOGY CLINIC | Age: 77
End: 2022-09-19

## 2022-09-19 ENCOUNTER — ANESTHESIA EVENT (OUTPATIENT)
Dept: ENDOSCOPY | Age: 77
DRG: 309 | End: 2022-09-19
Payer: MEDICARE

## 2022-09-19 LAB
ALBUMIN SERPL-MCNC: 3.6 G/DL (ref 3.4–5)
ANION GAP SERPL CALCULATED.3IONS-SCNC: 10 MMOL/L (ref 3–16)
BASOPHILS ABSOLUTE: 0.1 K/UL (ref 0–0.2)
BASOPHILS RELATIVE PERCENT: 1.2 %
BUN BLDV-MCNC: 21 MG/DL (ref 7–20)
CALCIUM SERPL-MCNC: 8.7 MG/DL (ref 8.3–10.6)
CHLORIDE BLD-SCNC: 102 MMOL/L (ref 99–110)
CO2: 21 MMOL/L (ref 21–32)
CREAT SERPL-MCNC: 1.1 MG/DL (ref 0.8–1.3)
EOSINOPHILS ABSOLUTE: 0.5 K/UL (ref 0–0.6)
EOSINOPHILS RELATIVE PERCENT: 8.3 %
GFR AFRICAN AMERICAN: >60
GFR NON-AFRICAN AMERICAN: >60
GLUCOSE BLD-MCNC: 110 MG/DL (ref 70–99)
GLUCOSE BLD-MCNC: 116 MG/DL (ref 70–99)
GLUCOSE BLD-MCNC: 128 MG/DL (ref 70–99)
GLUCOSE BLD-MCNC: 155 MG/DL (ref 70–99)
GLUCOSE BLD-MCNC: 163 MG/DL (ref 70–99)
GLUCOSE BLD-MCNC: 164 MG/DL (ref 70–99)
HCT VFR BLD CALC: 31.4 % (ref 40.5–52.5)
HEMOGLOBIN: 10.4 G/DL (ref 13.5–17.5)
INR BLD: 1.54 (ref 0.87–1.14)
LYMPHOCYTES ABSOLUTE: 1.2 K/UL (ref 1–5.1)
LYMPHOCYTES RELATIVE PERCENT: 18 %
MCH RBC QN AUTO: 29 PG (ref 26–34)
MCHC RBC AUTO-ENTMCNC: 33.1 G/DL (ref 31–36)
MCV RBC AUTO: 87.7 FL (ref 80–100)
MONOCYTES ABSOLUTE: 0.6 K/UL (ref 0–1.3)
MONOCYTES RELATIVE PERCENT: 9.3 %
NEUTROPHILS ABSOLUTE: 4.2 K/UL (ref 1.7–7.7)
NEUTROPHILS RELATIVE PERCENT: 63.2 %
PDW BLD-RTO: 16.7 % (ref 12.4–15.4)
PERFORMED ON: ABNORMAL
PHOSPHORUS: 3.1 MG/DL (ref 2.5–4.9)
PLATELET # BLD: 196 K/UL (ref 135–450)
PMV BLD AUTO: 8 FL (ref 5–10.5)
POTASSIUM SERPL-SCNC: 4.4 MMOL/L (ref 3.5–5.1)
PROTHROMBIN TIME: 18.3 SEC (ref 11.7–14.5)
RBC # BLD: 3.58 M/UL (ref 4.2–5.9)
SODIUM BLD-SCNC: 133 MMOL/L (ref 136–145)
TSH SERPL DL<=0.05 MIU/L-ACNC: 2.18 UIU/ML (ref 0.27–4.2)
WBC # BLD: 6.6 K/UL (ref 4–11)

## 2022-09-19 PROCEDURE — 99233 SBSQ HOSP IP/OBS HIGH 50: CPT | Performed by: INTERNAL MEDICINE

## 2022-09-19 PROCEDURE — 6370000000 HC RX 637 (ALT 250 FOR IP): Performed by: INTERNAL MEDICINE

## 2022-09-19 PROCEDURE — 2580000003 HC RX 258: Performed by: INTERNAL MEDICINE

## 2022-09-19 PROCEDURE — 99233 SBSQ HOSP IP/OBS HIGH 50: CPT | Performed by: NURSE PRACTITIONER

## 2022-09-19 PROCEDURE — 94761 N-INVAS EAR/PLS OXIMETRY MLT: CPT

## 2022-09-19 PROCEDURE — 84481 FREE ASSAY (FT-3): CPT

## 2022-09-19 PROCEDURE — 2060000000 HC ICU INTERMEDIATE R&B

## 2022-09-19 PROCEDURE — 85025 COMPLETE CBC W/AUTO DIFF WBC: CPT

## 2022-09-19 PROCEDURE — 84439 ASSAY OF FREE THYROXINE: CPT

## 2022-09-19 PROCEDURE — 6360000002 HC RX W HCPCS: Performed by: INTERNAL MEDICINE

## 2022-09-19 PROCEDURE — 84443 ASSAY THYROID STIM HORMONE: CPT

## 2022-09-19 PROCEDURE — 80069 RENAL FUNCTION PANEL: CPT

## 2022-09-19 PROCEDURE — 85610 PROTHROMBIN TIME: CPT

## 2022-09-19 PROCEDURE — 36415 COLL VENOUS BLD VENIPUNCTURE: CPT

## 2022-09-19 RX ORDER — SODIUM CHLORIDE 0.9 % (FLUSH) 0.9 %
5-40 SYRINGE (ML) INJECTION EVERY 12 HOURS SCHEDULED
OUTPATIENT
Start: 2022-09-19

## 2022-09-19 RX ORDER — WARFARIN SODIUM 5 MG/1
5 TABLET ORAL
Status: COMPLETED | OUTPATIENT
Start: 2022-09-19 | End: 2022-09-19

## 2022-09-19 RX ORDER — SODIUM CHLORIDE 9 MG/ML
INJECTION, SOLUTION INTRAVENOUS PRN
OUTPATIENT
Start: 2022-09-19

## 2022-09-19 RX ORDER — SODIUM CHLORIDE 0.9 % (FLUSH) 0.9 %
5-40 SYRINGE (ML) INJECTION PRN
OUTPATIENT
Start: 2022-09-19

## 2022-09-19 RX ADMIN — WARFARIN SODIUM 5 MG: 5 TABLET ORAL at 18:13

## 2022-09-19 RX ADMIN — ASPIRIN 81 MG: 81 TABLET, CHEWABLE ORAL at 09:28

## 2022-09-19 RX ADMIN — ENOXAPARIN SODIUM 135 MG: 150 INJECTION SUBCUTANEOUS at 20:38

## 2022-09-19 RX ADMIN — ENOXAPARIN SODIUM 135 MG: 150 INJECTION SUBCUTANEOUS at 09:28

## 2022-09-19 RX ADMIN — Medication 10 ML: at 09:28

## 2022-09-19 RX ADMIN — MONTELUKAST SODIUM 10 MG: 10 TABLET, COATED ORAL at 20:37

## 2022-09-19 RX ADMIN — MAGNESIUM GLUCONATE 500 MG ORAL TABLET 400 MG: 500 TABLET ORAL at 09:38

## 2022-09-19 RX ADMIN — ACETAMINOPHEN 650 MG: 325 TABLET ORAL at 05:12

## 2022-09-19 RX ADMIN — DIPHENHYDRAMINE HCL 25 MG: 25 TABLET ORAL at 20:36

## 2022-09-19 RX ADMIN — TIOTROPIUM BROMIDE INHALATION SPRAY 2 PUFF: 3.12 SPRAY, METERED RESPIRATORY (INHALATION) at 07:18

## 2022-09-19 RX ADMIN — ACETAMINOPHEN 650 MG: 325 TABLET ORAL at 13:18

## 2022-09-19 RX ADMIN — ACETAMINOPHEN 650 MG: 325 TABLET ORAL at 20:36

## 2022-09-19 RX ADMIN — ATORVASTATIN CALCIUM 40 MG: 40 TABLET, FILM COATED ORAL at 09:28

## 2022-09-19 RX ADMIN — Medication 10 ML: at 20:38

## 2022-09-19 RX ADMIN — Medication 2 PUFF: at 07:18

## 2022-09-19 RX ADMIN — CLOPIDOGREL BISULFATE 75 MG: 75 TABLET ORAL at 09:28

## 2022-09-19 RX ADMIN — METOPROLOL TARTRATE 12.5 MG: 25 TABLET, FILM COATED ORAL at 20:37

## 2022-09-19 RX ADMIN — MULTIPLE VITAMINS W/ MINERALS TAB 2 TABLET: TAB at 09:28

## 2022-09-19 ASSESSMENT — PAIN DESCRIPTION - LOCATION
LOCATION: HEAD

## 2022-09-19 ASSESSMENT — ENCOUNTER SYMPTOMS
SHORTNESS OF BREATH: 1
GASTROINTESTINAL NEGATIVE: 1
SHORTNESS OF BREATH: 1

## 2022-09-19 ASSESSMENT — PAIN - FUNCTIONAL ASSESSMENT
PAIN_FUNCTIONAL_ASSESSMENT: ACTIVITIES ARE NOT PREVENTED

## 2022-09-19 ASSESSMENT — PAIN SCALES - GENERAL
PAINLEVEL_OUTOF10: 3
PAINLEVEL_OUTOF10: 1
PAINLEVEL_OUTOF10: 8
PAINLEVEL_OUTOF10: 0
PAINLEVEL_OUTOF10: 3

## 2022-09-19 ASSESSMENT — PAIN DESCRIPTION - ORIENTATION
ORIENTATION: MID
ORIENTATION: MID

## 2022-09-19 ASSESSMENT — PAIN DESCRIPTION - DESCRIPTORS
DESCRIPTORS: ACHING

## 2022-09-19 ASSESSMENT — PAIN DESCRIPTION - PAIN TYPE: TYPE: ACUTE PAIN

## 2022-09-19 NOTE — FLOWSHEET NOTE
09/19/22 0010   Vital Signs   Temp 97.4 °F (36.3 °C)   Temp Source Oral   Heart Rate 69   Heart Rate Source Monitor   Resp 18   BP (!) 98/55   BP Location Right lower arm   MAP (Calculated) 69.33   Level of Consciousness 0   MEWS Score 2   Oxygen Therapy   SpO2 97 %   Pulse Oximeter Device Mode Intermittent   Pulse Oximeter Device Location Finger   O2 Device None (Room air)     Pt awake in bed. VS as shown above. Pt denies any further needs at this time. Call light in reach and bed in lowest position.

## 2022-09-19 NOTE — PROGRESS NOTES
Pharmacy Note  Warfarin Consult  Dx: Afib  Goal INR range 2-2.5  Home Warfarin dose:5mg daily      Date  INR  Warfarin  9/15                0.95                 5mg (home)  9/16                2.0                   hold  9/17                1.81                 none given  9/18                1.72                 5mg  9/19                1.54                  5mg    Recommend Warfarin 5mg tonight x1. Patient is currently on a Lovenox Bridge. Daily INR ordered. Rx will continue to manage therapy per consult order.   Dheeraj PADRON 9/19/202211:23 AM  .

## 2022-09-19 NOTE — PROGRESS NOTES
Pt awake in bed. Assessment completed and medications given per MAR. VS as charted in flowsheet. A&Ox4. Pt complaining of headache and pervious electrode burns itching. PRN tylenol and benadryl given at this time. Pt denies any further needs at this time. Call light in reach and bed in lowest position.

## 2022-09-19 NOTE — CARE COORDINATION
[facility] - Acute Rehab Unit   After review, this patient is felt to be:       []  Appropriate for Acute Inpatient Rehab    []  Appropriate for Acute Inpatient Rehab Pending Insurance Authorization    []  Not appropriate for Acute Inpatient Rehab    [x]  Referral received and ARU reviewing patient; Evaluation ongoing. Will follow for PT/OT recs and bed availability. D/w Loren BUTLER. Will notify DCP with further updates.  Thank you for the referral.    Kathy Mckeon CCC-SLP/ CBIS   Clinical Liaison

## 2022-09-19 NOTE — PROGRESS NOTES
Hospitalist Progress Note      PCP: Amari Ulrich MD    Date of Admission: 9/15/2022    Subjective:     Ongoing palpitations and exertional dyspnea. Medications:  Reviewed    Infusion Medications    dextrose      sodium chloride       Scheduled Medications    warfarin  5 mg Oral Once    metoprolol tartrate  12.5 mg Oral BID    aspirin  81 mg Oral Daily    atorvastatin  40 mg Oral Daily    clopidogrel  75 mg Oral Daily    [Held by provider] lisinopril  5 mg Oral Daily    magnesium oxide  400 mg Oral Daily    montelukast  10 mg Oral Nightly    therapeutic multivitamin-minerals  2 tablet Oral Daily    sodium chloride flush  5-40 mL IntraVENous 2 times per day    insulin lispro  0-4 Units SubCUTAneous Q6H    enoxaparin  1 mg/kg SubCUTAneous BID    warfarin placeholder: dosing by pharmacy   Other RX Placeholder     PRN Meds: diphenhydrAMINE, diphenhydrAMINE-zinc acetate, albuterol sulfate HFA, glucose, dextrose bolus **OR** dextrose bolus, glucagon (rDNA), dextrose, sodium chloride flush, sodium chloride, ondansetron **OR** ondansetron, polyethylene glycol, acetaminophen **OR** acetaminophen, potassium chloride **OR** potassium alternative oral replacement **OR** potassium chloride, magnesium sulfate      Intake/Output Summary (Last 24 hours) at 9/19/2022 1333  Last data filed at 9/19/2022 0813  Gross per 24 hour   Intake 540 ml   Output 900 ml   Net -360 ml         Physical Exam Performed:    /71   Pulse 71   Temp 98.1 °F (36.7 °C) (Oral)   Resp 16   Ht 6' 3\" (1.905 m)   Wt (!) 314 lb 6.4 oz (142.6 kg)   SpO2 97%   BMI 39.30 kg/m²        Gen: No distress. Alert. Pleasant elderly obese male +ticks   Eyes: PERRL. No sclera icterus. No conjunctival injection. Neck: No JVD. Trachea midline. Resp: No accessory muscle use. No crackles. No wheezes. No rhonchi. CV: Irregular rate and rhythm. No murmur. No rub. Trace bilateral edema.    Peripheral Pulses: +2 palpable, equal bilaterally GI: Non-tender. Non-distended. Normal bowel sounds. Skin: Warm and dry. No nodule on exposed extremities. No rash on exposed extremities. M/S: No cyanosis. No joint deformity. No clubbing. Moving all 3 extremities spontaneously  Neuro: Awake. Grossly nonfocal    Psych: Oriented x 3. No anxiety or agitation. Labs:   Recent Labs     09/19/22  0507   WBC 6.6   HGB 10.4*   HCT 31.4*          Recent Labs     09/19/22  0507   *   K 4.4      CO2 21   BUN 21*   CREATININE 1.1   CALCIUM 8.7   PHOS 3.1       No results for input(s): AST, ALT, BILIDIR, BILITOT, ALKPHOS in the last 72 hours. Recent Labs     09/17/22  0439 09/18/22  0433 09/19/22  0507   INR 1.81* 1.72* 1.54*       No results for input(s): CKTOTAL, TROPONINI in the last 72 hours. Urinalysis:      Lab Results   Component Value Date/Time    NITRU Negative 09/15/2022 04:00 PM    WBCUA 3-5 09/15/2022 04:00 PM    BACTERIA Rare 09/15/2022 04:00 PM    RBCUA 0-2 09/15/2022 04:00 PM    BLOODU Negative 09/15/2022 04:00 PM    SPECGRAV 1.015 09/15/2022 04:00 PM    GLUCOSEU Negative 09/15/2022 04:00 PM    GLUCOSEU NEGATIVE 02/05/2012 09:00 AM       Radiology:  CT CHEST PULMONARY EMBOLISM W CONTRAST   Final Result   No evidence of pulmonary embolism or acute pulmonary abnormality. Enlarged right thyroid lobe, extending to the upper mediastinum. This could   be further assessed with an outpatient thyroid ultrasound, if not recently   performed.                  Assessment/Plan:    Active Hospital Problems    Diagnosis     Atrial fibrillation with RVR (HCC) [I48.91]      Priority: Medium    Asthma [J45.909]      Priority: Medium    PANG (dyspnea on exertion) [R06.00]      Priority: Medium         #Dyspnea upon exertion   #Chest pain   #CAD with recent stents   -with holter monitor on currently   - CT chest neg for PE  -similar presentation earlier this month and had stents placed 6/16 and 9/8 (LAD)  -echo from 9/4/22 as above   -trop <0.01 x 2   -EKG with no acute ischemic changes   -on ASA, statin, plavix   -cardio consulted        #Generalized weakness   #Debility   -feel like it is multifactorial, recent COVID infxn, ongoing a fib, recent stent placement, MARIA VICTORIA  -I think patient would very much benefit from ARU, discussed with family they are agreeable   -PT/OT   -case management consulted    #thyroid enlargement  - reviewed on CT chest with enlarged Right side goiter extending upto upper mediastinum.   - Last check TSH/T4 on 9/4/22 WNL  - will need USG thyroid    - had prior biopsy of this in 2012 - benign, though appears to have grown over the years. - discussed with cardiology - will not hold AC or AP given just recent stent placement unless this appears to involve the airway, which it presently does not. - will need OP endocrine follow up and posisbly repeat biopsy in the next several months. #Atrial fibrillation - persistent afib.   -rate not controlled. -recently taken off tikosyn 2.2 to prolonged Qtc   -has had 3 failed ablations in the past   -on coumadin for [facility]   -on cardizem   -off toprol XL   - presently plan for PIERO and cardioversion tomorrow. #Subtherapeutic INR   -on coumadin   -bridging with lovenox, pharmacy consulted   -INR goal 2-2.5       #Asthma/COPD without AE   -continue prn albuterol   -plan for outpatient PFTs per pulmonology on the 7th of Oct.        #DM type 2   -holding oral regimen   -low dose SSI        #Recent COVID infection        #HTN   -on lisinopril and cardizem   -off aldactone        #HLD   -on statin        #Hx of DVT  -on coumadin/lovenox       #MARIA VICTORIA   -on CPAP        #Hx of gastric sleeve   -2013           DVT Prophylaxis: lovenox/coumadin bridge   Diet: ADULT DIET;  Regular; 4 carb choices (60 gm/meal)  Diet NPO Exceptions are: Sips of Water with Meds  Code Status: Full Code  PT/OT Eval Status: ordered    Slim Pond MD

## 2022-09-19 NOTE — PROGRESS NOTES
[address]  Cardiology  Progress Note    Admission date:  9/15/2022    Reason for follow up visit: Dyspnea, afib, CAD    HPI/CC: Samuel Perry is a 69 y.o. male who presented 9/15/2022 for dyspnea and fatigue. Noted to be orthostatic. He has also been treated for asthma and afib. Rhythm has been afib 60s. Subjective: Ongoing fatigue and SOB with minimal exertion. No chest pain. Vitals:  Blood pressure 128/70, pulse 71, temperature 97.6 °F (36.4 °C), temperature source Oral, resp. rate 16, height 6' 3\" (1.905 m), weight (!) 314 lb 6.4 oz (142.6 kg), SpO2 98 %.   Temp  Av.4 °F (36.3 °C)  Min: 97 °F (36.1 °C)  Max: 97.6 °F (36.4 °C)  Pulse  Av.6  Min: 69  Max: 73  BP  Min: 96/53  Max: 128/70  SpO2  Av.6 %  Min: 97 %  Max: 98 %    24 hour I/O    Intake/Output Summary (Last 24 hours) at 2022 1005  Last data filed at 2022 0813  Gross per 24 hour   Intake 900 ml   Output 900 ml   Net 0 ml     Current Facility-Administered Medications   Medication Dose Route Frequency Provider Last Rate Last Admin    metoprolol tartrate (LOPRESSOR) tablet 12.5 mg  12.5 mg Oral BID Lucia Byers MD   12.5 mg at 22 2100    ipratropium-albuterol (DUONEB) nebulizer solution 1 ampule  1 ampule Inhalation Q4H PRN Terra Bone PA-C        diphenhydrAMINE (BENADRYL) tablet 25 mg  25 mg Oral Q6H PRN Iqra Blanc MD   25 mg at 22 210    diphenhydrAMINE-zinc acetate cream   Topical TID PRN Iqra Blanc MD   Given at 22 0947    budesonide-formoterol (SYMBICORT) 160-4.5 MCG/ACT inhaler 2 puff  2 puff Inhalation BID Terra Bone PA-C   2 puff at 22 0718    albuterol sulfate HFA (PROVENTIL;VENTOLIN;PROAIR) 108 (90 Base) MCG/ACT inhaler 2 puff  2 puff Inhalation 4x Daily PRN Elizabeth Noriega MD        aspirin chewable tablet 81 mg  81 mg Oral Daily Elizabeth Noriega MD   81 mg at 22 0928    atorvastatin (LIPITOR) tablet 40 mg  40 mg Oral Daily Yasmin Calle MD   40 mg at 09/19/22 5821    clopidogrel (PLAVIX) tablet 75 mg  75 mg Oral Daily Yasmin Calle MD   75 mg at 09/19/22 0928    [Held by provider] lisinopril (PRINIVIL;ZESTRIL) tablet 5 mg  5 mg Oral Daily Yasmin Calle MD   5 mg at 09/16/22 0909    magnesium oxide (MAG-OX) tablet 400 mg  400 mg Oral Daily Yasmin Calle MD   400 mg at 09/19/22 0938    montelukast (SINGULAIR) tablet 10 mg  10 mg Oral Nightly Yasmin Calle MD   10 mg at 09/18/22 2100    therapeutic multivitamin-minerals 2 tablet  2 tablet Oral Daily Yasmin Calle MD   2 tablet at 09/19/22 8628    glucose chewable tablet 16 g  4 tablet Oral PRN Yasmin Calle MD        dextrose bolus 10% 125 mL  125 mL IntraVENous PRN Yasmin Calle MD        Or    dextrose bolus 10% 250 mL  250 mL IntraVENous PRN Yasmin Calle MD        glucagon (rDNA) injection 1 mg  1 mg SubCUTAneous PRN Yasmin Calle MD        dextrose 10 % infusion   IntraVENous Continuous PRN Yasmin Calle MD        sodium chloride flush 0.9 % injection 5-40 mL  5-40 mL IntraVENous 2 times per day Yasmin Calle MD   10 mL at 09/19/22 8753    sodium chloride flush 0.9 % injection 5-40 mL  5-40 mL IntraVENous PRN Yasmin Calle MD        0.9 % sodium chloride infusion   IntraVENous PRN Yasmin Calle MD        ondansetron (ZOFRAN-ODT) disintegrating tablet 4 mg  4 mg Oral Q8H PRN Yasmin Calle MD        Or    ondansetron [facility]) injection 4 mg  4 mg IntraVENous Q6H PRN Yasmin Calle MD        polyethylene glycol (GLYCOLAX) packet 17 g  17 g Oral Daily PRN Yasmin Calle MD        acetaminophen (TYLENOL) tablet 650 mg  650 mg Oral Q6H PRN Yasmin Calle MD   650 mg at 09/19/22 9163    Or    acetaminophen (TYLENOL) suppository 650 mg  650 mg Rectal Q6H PRN Yasmin Calle MD        potassium chloride Cristóbal Dominguez M) extended release tablet 40 mEq  40 mEq Oral PRN Yasmin Calle MD        Or    potassium bicarb-citric acid (EFFER-K) effervescent tablet 40 mEq  40 mEq Oral PRN Art Nye MD        Or    potassium chloride 10 mEq/100 mL IVPB (Peripheral Line)  10 mEq IntraVENous PRN Art Nye MD        magnesium sulfate 2000 mg in 50 mL IVPB premix  2,000 mg IntraVENous PRN Art Nye MD        insulin lispro (HUMALOG) injection vial 0-4 Units  0-4 Units SubCUTAneous Q6H Art Nye MD   1 Units at 09/16/22 0116    enoxaparin (LOVENOX) injection 135 mg  1 mg/kg SubCUTAneous BID Art Nye MD   135 mg at 09/19/22 1264    warfarin placeholder: dosing by pharmacy   Other RX Pippa Valero MD         Review of Systems   Constitutional:  Positive for fatigue. Respiratory:  Positive for shortness of breath. Cardiovascular: Negative. Gastrointestinal: Negative. Neurological: Negative. Objective:     Telemetry monitor: AF 60s    Physical Exam:  Constitutional:  Comfortable and alert, NAD, appears stated age  Eyes: PERRL, sclera nonicteric  Neck:  Supple, no masses, no thyroidmegaly, no JVD  Skin:  Warm and dry; no rash or lesions  Heart:  Irregular, normal apex, S1 and S2 normal, no M/G/R  Lungs:  Normal respiratory effort; clear; no wheezing/rhonchi/rales  Abdomen: soft, non tender, + bowel sounds  Extremities:  No edema or cyanosis; no clubbing  Neuro: alert and oriented, moves legs and arms equally, normal mood and affect    Data Reviewed:    Echo 5/7203:  LV systolic function is normal with EF estimated at 55-60%. No regional wall motion abnormalities are noted. There is moderate concentric left ventricular hypertrophy. Normal left ventricular diastolic filling pressure. Biatrial enlargement. Mild posterior mitral annular calcification is present. Aortic valve appears sclerotic but opens adequately. Mild mitral and tricuspid regurgitation.    Systolic pulmonary artery pressure (SPAP) estimated at 46mmHg (RA pressure 3   mmHg), consistent with mild pulmonary hypertension. 6- 55%, LVH, LAE, ANNIE    Coronary angiogram 9/2022:  1. Left main coronary artery was normal. It gave off the left anterior descending artery and left circumflex. 2. Left anterior descending artery has severe atherosclerotic disease. It was large in size. It gave off septal perforators and a moderate sized diagonal branch. The LAD covered the entire apex of the left ventricle. ~There is evidence for Denovo disease of about 70% within the mid LAD. This is confirmed by intravascular ultrasound. Furthermore there is a previously placed stent within the mid LAD that was under deployed and under expanded. 3. Left circumflex has mild atherosclerotic disease. It was moderate in size. There was a moderate sized obtuse marginal branch. 4. Right coronary artery has mild atherosclerotic disease. It was moderate in size and was the dominant artery. 5. Left ventriculogram showed normal LVEF at 55-60%. Wall motion was normal . There was no significant mitral valve or aortic valve disease noted. LVEDP was normal. There was no gradient noted across the aortic valve during pullback of the catheter. CONCLUSIONS:  1. Successful intravascular ultrasound-guided drug-eluting stent insertion to the left anterior descending artery  ASSESSMENT/RECOMMENDATIONS:  1. Continue dual antiplatelet therapy and guideline directed medical therapy. 2.  Follow-up with patient's primary cardiologist after discharge.     Lab Reviewed:     Renal Profile:  Lab Results   Component Value Date/Time    CREATININE 1.1 09/19/2022 05:07 AM    BUN 21 09/19/2022 05:07 AM     09/19/2022 05:07 AM    K 4.4 09/19/2022 05:07 AM    K 4.2 09/16/2022 06:47 AM     09/19/2022 05:07 AM    CO2 21 09/19/2022 05:07 AM     CBC:    Lab Results   Component Value Date/Time    WBC 6.6 09/19/2022 05:07 AM    RBC 3.58 09/19/2022 05:07 AM    HGB 10.4 09/19/2022 05:07 AM    HCT 31.4 09/19/2022 05:07 AM    MCV 87.7 09/19/2022 05:07 AM    RDW 16.7 09/19/2022 05:07 AM     09/19/2022 05:07 AM     BNP:    Lab Results   Component Value Date/Time    PROBNP 906 09/15/2022 03:52 PM    PROBNP 1,442 09/07/2022 04:40 AM    PROBNP 1,755 09/04/2022 12:49 PM    PROBNP 510 08/29/2022 02:15 PM    PROBNP 422 08/11/2022 08:09 AM     Fasting Lipid Panel:    Lab Results   Component Value Date/Time    CHOL 119 09/05/2022 04:37 AM    HDL 30 09/05/2022 04:37 AM    TRIG 147 09/05/2022 04:37 AM     Cardiac Enzymes:  CK/MbTroponin  Lab Results   Component Value Date/Time    TROPONINI <0.01 09/16/2022 06:47 AM     PT/ INR   Lab Results   Component Value Date/Time    INR 1.54 09/19/2022 05:07 AM    INR 1.72 09/18/2022 04:33 AM    INR 1.81 09/17/2022 04:39 AM    PROTIME 18.3 09/19/2022 05:07 AM    PROTIME 20.0 09/18/2022 04:33 AM    PROTIME 20.8 09/17/2022 04:39 AM     PTT No results found for: PTT   Lab Results   Component Value Date/Time    MG 2.40 09/09/2022 04:54 AM      Lab Results   Component Value Date/Time    TSH 2.18 09/19/2022 05:07 AM     All labs and imaging reviewed today    Assessment:  Shortness of breath: no improvement post PCI  Fatigue: no improvement post PCI  Persistent atrial fibrillation, appears symptomatic: ongoing    - [id number] score >2 on coumadin through PCP              - s/p aflutter ablation 4/2017              - on dofetilide since 2017, stopped due to prolonged QTc  CAD: s/p FELIZ LAD 9/8/2022; s/p FELIZ x2 LAD and FELIZ Diag 6/16/2021  PVC ablation 2014  MARIA VICTORIA  HTN  HLD  DM  History of DVT    Plan:   1. Due to fatigue/PANG that did not improve post PCI, will arrange for PIERO/DCCV tomorrow as symptoms could be due to persistent afib. He will follow up in EP clinic 9/27/2022 to discuss ablation, especially if symptoms improve in SR.   2. Holding diltiazem and metoprolol due to overnight bradycardia.   3. Monitor telemetry with ambulation, HR 140s reported with short walking yesterday but currently afib 60s    Plan has been discussed with Dr. Maria Esther Conklin and Dr. Miguel Angel Aguirre. I also discussed with patient and his wife for >40 minutes today.      SERENA Pacheco-CNP  [facility], [place], [facility]  [phone]

## 2022-09-19 NOTE — TELEPHONE ENCOUNTER
Patient currently admitted to [facility] and monitored. Medications have been adjusted. He will be having a PIERO/DCCV 9/20/2022. He will need EP follow up to discuss afib ablation. Staff: Please schedule appt for 9/27/2022 with Dr. Stacy Garcia (ok to add on). Thank you!

## 2022-09-19 NOTE — ANESTHESIA PRE PROCEDURE
TAKE ONE TABLET BY MOUTH DAILY  Patient not taking: No sig reported 7/1/22 9/15/22  Charity Coles MD   Dulaglutide (TRULICITY) 2.00 SD/2.8PT SOPN Inject 0.75 mg into the skin once a week    Historical Provider, MD   blood glucose test strips (TRUE METRIX BLOOD GLUCOSE TEST) strip TEST ONCE DAILY. DX:E11.9 1/20/22   Charity Coles MD   [facility]) 250 MCG capsule Take 1 capsule by mouth 2 times daily  Patient not taking: Reported on 9/7/2022 10/19/21 9/9/22  SOTERO Pearson MD   torsemide (DEMADEX) 20 MG tablet Take 1 tablet by mouth daily  Patient not taking: No sig reported 10/4/21   SERENA Schultz - CNP   Easy Touch Lancets 32G/Twist MISC TEST DAILY. DX: E11.9 3/23/20   Charity Coles MD   blood glucose test strips (TRUE METRIX BLOOD GLUCOSE TEST) strip USE TO CHECK BLOOD GLUCOSE DAILY. DX: E11.9 3/23/20   Charity Coles MD   ACCU-CHEK MULTICLIX LANCETS MISC Check sugars once daily. Dx;E11.9 3/11/19   Charity Coles MD   Lancet Devices (EASY TOUCH LANCING DEVICE) MISC Test Daily. DX: E11.9 12/26/18   Charity Coles MD   Blood Glucose Monitoring Suppl (TRUE METRIX METER) VINNIE Use to check daily. DX;E11.9 12/22/18   Charity Coles MD   Lancets Misc. (ACCU-CHEK MULTICLIX LANCET DEV) KIT Check sugars once daily. DX;E11.9 12/21/18   Charity Coles MD   Biotin 5 MG TABS Take 5 mg by mouth daily    Historical Provider, MD   Cholecalciferol (VITAMIN D3) 5000 units TABS Take 5,000 Units by mouth daily    Historical Provider, MD   Cyanocobalamin (VITAMIN B-12 CR PO) Take 5,000 mcg by mouth every 7 days     Historical Provider, MD   Multiple Vitamins-Minerals (THERAPEUTIC MULTIVITAMIN-MINERALS) tablet Take 2 tablets by mouth daily     Historical Provider, MD   magnesium oxide (MAG-OX) 400 MG tablet Take 400 mg by mouth daily.     Historical Provider, MD       Current medications:    Current Facility-Administered Medications   Medication Dose Route Frequency Provider Last Rate Last Admin    warfarin (COUMADIN) tablet 5 mg  5 mg Oral Once Liset De La Cruz MD        metoprolol tartrate (LOPRESSOR) tablet 12.5 mg  12.5 mg Oral BID Hazel Clements MD   12.5 mg at 09/18/22 2100    diphenhydrAMINE (BENADRYL) tablet 25 mg  25 mg Oral Q6H PRN Dimas Ramirez MD   25 mg at 09/18/22 2101    diphenhydrAMINE-zinc acetate cream   Topical TID PRN Dimas Ramirez MD   Given at 09/17/22 0947    albuterol sulfate HFA (PROVENTIL;VENTOLIN;PROAIR) 108 (90 Base) MCG/ACT inhaler 2 puff  2 puff Inhalation 4x Daily PRN Liset De La Cruz MD        aspirin chewable tablet 81 mg  81 mg Oral Daily Liset De La Cruz MD   81 mg at 09/19/22 0928    atorvastatin (LIPITOR) tablet 40 mg  40 mg Oral Daily Liset De La Cruz MD   40 mg at 09/19/22 0928    clopidogrel (PLAVIX) tablet 75 mg  75 mg Oral Daily Liset De La Cruz MD   75 mg at 09/19/22 0928    [Held by provider] lisinopril (PRINIVIL;ZESTRIL) tablet 5 mg  5 mg Oral Daily Liset De La Cruz MD   5 mg at 09/16/22 0909    magnesium oxide (MAG-OX) tablet 400 mg  400 mg Oral Daily Liset De La Cruz MD   400 mg at 09/19/22 3173    montelukast (SINGULAIR) tablet 10 mg  10 mg Oral Nightly Liset De La Cruz MD   10 mg at 09/18/22 2100    therapeutic multivitamin-minerals 2 tablet  2 tablet Oral Daily Liset De La Cruz MD   2 tablet at 09/19/22 1616    glucose chewable tablet 16 g  4 tablet Oral PRN Liset De La Cruz MD        dextrose bolus 10% 125 mL  125 mL IntraVENous PRN Liset De La Cruz MD        Or    dextrose bolus 10% 250 mL  250 mL IntraVENous PRN Liset De La Cruz MD        glucagon (rDNA) injection 1 mg  1 mg SubCUTAneous PRN Liset De La Cruz MD        dextrose 10 % infusion   IntraVENous Continuous PRN Liset De La Cruz MD        sodium chloride flush 0.9 % injection 5-40 mL  5-40 mL IntraVENous 2 times per day Liset De La Cruz MD   10 mL at 09/19/22 0928    sodium chloride flush 0.9 % injection 5-40 irritaion  Eats away at skin, paper tape OK  Plastic tape       Problem List:    Patient Active Problem List   Diagnosis Code    Arthritis M19.90    Atrial fibrillation (HCC) I48.91    Essential hypertension I10    Carpal tunnel syndrome G56.00    Polyneuropathy G62.9    Numbness and tingling of both legs R20.0, R20.2    Moderate obstructive sleep apnea G47.33    S/P thoracotomy Z98.890    Atypical atrial flutter ([facility]) I48.4    SOB (shortness of breath) R06.02    Type 2 diabetes mellitus with diabetic neuropathy ([facility]) E11.40    Morbid obesity with BMI of 40.0-44.9, adult ([facility]) E66.01, Z68.41    History of venous thromboembolism Z86.718    Cataract of both eyes H26.9    Abnormal stress test R94.39    Angina at rest ([facility] [address] I20.8    Status post insertion of drug-eluting stent into left anterior descending (LAD) artery for coronary artery disease Z95.5    Other fatigue R53.83    Obesity (BMI 30-39. 9) E66.9    COVID-19 U07.1    Acute on chronic heart failure with preserved ejection fraction ([facility]) I50.33    Coronary artery disease involving native coronary artery of native heart I25.10    Anginal equivalent ([facility]) I20.8    Coronary artery disease, unspecified vessel or lesion type, unspecified whether angina present, unspecified whether native or transplanted heart I25.10    PANG (dyspnea on exertion) R06.00    Asthma J45.909    Atrial fibrillation with RVR ([facility]) I48.91       Past Medical History:        Diagnosis Date    Arthritis     Asthma     past hx    Atrial fibrillation ([address]     Blood circulation, collateral     Diabetes mellitus ([address] 12/24/2018    DVT (deep venous thrombosis) (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    History of knee replacement procedure of right knee     Hx of blood clots     2 DVT's    Hyperlipidemia     Hypertension     Knee osteoarthritis 1/17/2012    Left TKR 1/18/2012    Lung nodule     due to histoplasmosis    Neuromuscular disorder ([address]     Palpitations     stable with atenolol    Sleep apnea     uses CPAP    Stone, kidney     UTI (urinary tract infection) 2017       Past Surgical History:        Procedure Laterality Date    ABLATION OF DYSRHYTHMIC FOCUS      a-fib    BARIATRIC SURGERY      GASTRIC SLEEVE    CARDIAC SURGERY  2013    ablation    CARPAL TUNNEL RELEASE Right 9-30-15    CARPAL TUNNEL RELEASE Left 3/20/16    CHOLECYSTECTOMY, LAPAROSCOPIC N/A 2019    LAPAROSCOPIC CHOLECYSTECTOMY WITH CHOLANGIOGRAMS performed by Lidia Varner MD at [address]      with removal stone    CYSTOSCOPY  13    with Laser Vaporization of Enlarged Prostate    DIAGNOSTIC CARDIAC CATH LAB PROCEDURE      ERCP  2019    Sphincterotomy, stone removal    ERCP N/A 2019    ERCP SPHINCTER/PAPILLOTOMY performed by Sharon Menjivar MD at [address] [facility] ERCP  2019    ERCP STONE REMOVAL performed by Sharon Menjivar MD at [address] [facility] Right 2020    PHACOEMULSIFICATION OF CATARACT RIGHT EYE WITH INTRAOCULAR LENS IMPLANT performed by Jarvis Pizano MD at [facility] Left 2020    PHACOEMULSIFICATION OF CATARACT LEFT EYE WITH INTRAOCULAR LENS IMPLANT -SLEEP APNEA- performed by Jarvis Pizano MD at [address] [facility] Bilateral     right knee () and left knee ()    KNEE ARTHROSCOPY  2011     left  knee    SKIN BIOPSY      skin Ca/SQUAMOUS CELL    THORACOTOMY      wedge resection    TONSILLECTOMY         Social History:    Social History     Tobacco Use    Smoking status: Former     Packs/day: 2.00     Years: 17.00     Pack years: 34.00     Types: Cigarettes     Quit date: 1976     Years since quittin.4    Smokeless tobacco: Never   Substance Use Topics    Alcohol use:  No                                Counseling given: Not Answered      Vital Signs (Current):   Vitals:    09/19/22 0100 09/19/22 0215 09/19/22 0800 09/19/22 1316   BP: (!) 96/53 112/64 128/70 124/71   Pulse:   71 71   Resp:   16 16   Temp:   97.6 °F (36.4 °C) 98.1 °F (36.7 °C)   TempSrc:   Oral Oral   SpO2:   98% 97%   Weight:       Height:                                                  BP Readings from Last 3 Encounters:   09/19/22 124/71   09/15/22 128/78   09/09/22 100/69       NPO Status:                                                                                 BMI:   Wt Readings from Last 3 Encounters:   09/19/22 (!) 314 lb 6.4 oz (142.6 kg)   09/15/22 (!) 309 lb (140.2 kg)   09/15/22 (!) 308 lb 3.2 oz (139.8 kg)     Body mass index is 39.3 kg/m².     CBC:   Lab Results   Component Value Date/Time    WBC 6.6 09/19/2022 05:07 AM    RBC 3.58 09/19/2022 05:07 AM    HGB 10.4 09/19/2022 05:07 AM    HCT 31.4 09/19/2022 05:07 AM    MCV 87.7 09/19/2022 05:07 AM    RDW 16.7 09/19/2022 05:07 AM     09/19/2022 05:07 AM       CMP:   Lab Results   Component Value Date/Time     09/19/2022 05:07 AM    K 4.4 09/19/2022 05:07 AM    K 4.2 09/16/2022 06:47 AM     09/19/2022 05:07 AM    CO2 21 09/19/2022 05:07 AM    BUN 21 09/19/2022 05:07 AM    CREATININE 1.1 09/19/2022 05:07 AM    GFRAA >60 09/19/2022 05:07 AM    GFRAA >60 03/15/2013 09:34 AM    AGRATIO 1.2 09/16/2022 06:47 AM    LABGLOM >60 09/19/2022 05:07 AM    LABGLOM 65 09/24/2015 12:10 PM    GLUCOSE 110 09/19/2022 05:07 AM    PROT 6.5 09/16/2022 06:47 AM    PROT 7.1 10/27/2012 02:50 PM    CALCIUM 8.7 09/19/2022 05:07 AM    BILITOT 0.5 09/16/2022 06:47 AM    ALKPHOS 95 09/16/2022 06:47 AM    AST 17 09/16/2022 06:47 AM    ALT 24 09/16/2022 06:47 AM       POC Tests:   Recent Labs     09/19/22  1200   POCGLU 128*       Coags:   Lab Results   Component Value Date/Time    PROTIME 18.3 09/19/2022 05:07 AM    INR 1.54 09/19/2022 05:07 AM    APTT 45.6 02/15/2019 01:47 PM       HCG (If Applicable): No results found for: PREGTESTUR, PREGSERUM, HCG, HCGQUANT     ABGs: No results found for: PHART, PO2ART, EER4BWE, NNW0LJW, BEART, [id number]     Type & Screen (If Applicable):  Lab Results   Component Value Date    LABABO A 01/11/2012    [address] Negative 01/11/2012       Drug/Infectious Status (If Applicable):  No results found for: HIV, HEPCAB    COVID-19 Screening (If Applicable):   Lab Results   Component Value Date/Time    COVID19 NOT DETECTED 09/15/2022 03:52 PM    COVID19 DETECTED 12/30/2020 11:16 AM           Anesthesia Evaluation  Patient summary reviewed and Nursing notes reviewed no history of anesthetic complications:   Airway: Mallampati: II  TM distance: >3 FB   Neck ROM: full  Mouth opening: > = 3 FB   Dental:    (+) upper dentures      Pulmonary:   (+) shortness of breath:  sleep apnea: on CPAP,  asthma:                            Cardiovascular:    (+) hypertension:, angina:, CAD:, CABG/stent (stent):, dysrhythmias: atrial fibrillation, CHF:, PANG:,                   Neuro/Psych:   (+) neuromuscular disease (Neuropathy):,             GI/Hepatic/Renal: Neg GI/Hepatic/Renal ROS       (-) GERD, liver disease and no renal disease       Endo/Other:    (+) DiabetesType II DM, , .                 Abdominal:             Vascular:   + DVT, . Other Findings:           Anesthesia Plan      MAC     ASA 3       Induction: intravenous. Anesthetic plan and risks discussed with patient. Plan discussed with KIRSTIN Gutierrez MD   9/19/2022

## 2022-09-19 NOTE — FLOWSHEET NOTE
09/19/22 0800   Vital Signs   Temp 97.6 °F (36.4 °C)   Temp Source Oral   Heart Rate 71   Heart Rate Source Monitor   Resp 16   /70   BP Location Right lower arm   MAP (Calculated) 89.33   Level of Consciousness 0   MEWS Score 1   Oxygen Therapy   SpO2 98 %   Pulse Oximetry Type Intermittent   O2 Device None (Room air)   Patient is resting showing no s/s of distress. Patient is alert and oriented. Meds were given, see MAR. Patient is denying any needs. Bed is in lowest position and call light is within reach. Will continue to monitor. Shift assessment complete, see flowsheets. RN HELD metoprolol and diltiazem due to HR dropping overnight per Cards team. RN ambulated the patient. HR was 62 while sitting and raised to 75 while ambulating. Patient was fed and made aware of cardioversion/PIERO in AM. Patient verbalized understanding, requesting Pooja Ramirez NP to come speak with wife. Manan agreeable and states she will be back after office.

## 2022-09-19 NOTE — CARE COORDINATION
Case Management Assessment  Initial Evaluation      Patient Name: Madalyn Richards  YOB: 1945  Diagnosis: Dyspnea on exertion [R06.00]  PANG (dyspnea on exertion) [R06.00]  Atrial fibrillation with RVR ([address] [I48.91]  Date / Time: 9/15/2022  3:38 PM    Admission status/Date: 09/18/2022  Chart Reviewed: Yes      Patient Interviewed: Yes   Family Interviewed:  No      Hospitalization in the last 30 days:  Yes      Health Care Decision Maker :   Primary Decision Maker: Kayy Gallegos - Spouse - [phone]    (CM - must 1st enter selection under Navigator - emergency contact- Health Care Decision Maker Relationship and pick relationship)   Who do you trust or have selected to make healthcare decisions for you    Current PCP: Dr Sandrine Sanchez required for SNF : NO       3 night stay required - YES    ADLS  Support Systems/Care Needs: Spouse/Significant Other, Children, Family Members  Transportation: self    Meal Preparation: self    Housing  Living Arrangements: Lives w/sp, IPTA  Steps: one  Intent for return to present living arrangements: Yes  Identified Issues: [address] [address] [facility] with [address] : No Agency:(Services)  Type of Home Care Services: None  Passport/Waiver : No  :                      Phone Number:    Passport/Waiver Services: NA          Durable Medical Equiptment   DME Provider:   Equipment:   Walker___Cane___RTS___ BSC___Shower Chair___Hospital Bed___W/C____Other________  02 at ____Liter(s)---wears(frequency)_______ HHN __x_ CPAP__X_ BiPap___   N/A____      Home O2 Use :  No  97%RA    Informed of need for care provider to bring portable home O2 tank on day of discharge for nursing to connect prior to leaving:   No  Verbalized agreement/Understanding:   Not Indicated    Community Service Affiliation  Dialysis:  No    Agency:  Location:  Dialysis Schedule:  Phone:   Fax:     Other Community Services: (ex:PT/OT,Mental Health,Wound Clinic, Cardio/Pul [address])    DISCHARGE PLAN: Explained Case Management role/services. Chart reviewed. Readmission. Sent to ED by Dr. Hitesh Moraes MD, for PANG, New Afib. Plan: Pt will have Cardioversion tomorrow. Recent Covid infection. PT/OT and possible ARU admission. Pt/fam agreeable. Pt and spouse would like referral to [facility] is preferred. Per admission at [facility] they have limited beds at this time but will review and follow for potential admit later this week. Will need to discuss alternate choices after Cardioversion and PT/OT evals.  Had been to [facility] [place] in the past. +CM following

## 2022-09-19 NOTE — FLOWSHEET NOTE
09/19/22 1316   Vital Signs   Temp 98.1 °F (36.7 °C)   Temp Source Oral   Heart Rate 71   Heart Rate Source Monitor   Resp 16   /71   BP Location Right lower arm   BP Method Automatic   MAP (Calculated) 88.67   Patient Position Semi fowlers   Level of Consciousness 0   MEWS Score 1   Pain Assessment   Pain Assessment 0-10   Pain Level 3   Patient's Stated Pain Goal 0 - No pain   Pain Location Head   Pain Orientation Mid   Pain Descriptors Aching   Functional Pain Assessment Activities are not prevented   Pain Type Acute pain   Non-Pharmaceutical Pain Intervention(s) Repositioned   Response to Pain Intervention Patient satisfied   Oxygen Therapy   SpO2 97 %   Pulse Oximetry Type Intermittent   Pulse Oximeter Device Location Finger   O2 Device None (Room air)   PRN acetaminophen given at this time for complaints for a headache. Consent for PIERO/cardioversion obtained at this time.

## 2022-09-19 NOTE — PROGRESS NOTES
Pulmonary Progress Note  CC: SOB    Subjective:  No wheeze or cough, + PANG      EXAM: /70   Pulse 71   Temp 97.6 °F (36.4 °C) (Oral)   Resp 16   Ht 6' 3\" (1.905 m)   Wt (!) 314 lb 6.4 oz (142.6 kg)   SpO2 98%   BMI 39.30 kg/m²  on   Constitutional:  No acute distress. Eyes: PERRL. Conjunctivae anicteric. ENT: Normal nose. Normal tongue. Neck:  Trachea is midline. No thyroid tenderness. Respiratory: No accessory muscle usage. decreased breath sounds. No wheezes. No rales. No Rhonchi. Cardiovascular: Normal S1S2. No digit clubbing. No digit cyanosis. No LE edema. Psychiatric: No anxiety or Agitation. Alert and Oriented to person, place and time.     Scheduled Meds:   metoprolol tartrate  12.5 mg Oral BID    tiotropium  2 puff Inhalation Daily    budesonide-formoterol  2 puff Inhalation BID    dilTIAZem  120 mg Oral Daily    aspirin  81 mg Oral Daily    atorvastatin  40 mg Oral Daily    clopidogrel  75 mg Oral Daily    [Held by provider] lisinopril  5 mg Oral Daily    magnesium oxide  400 mg Oral Daily    montelukast  10 mg Oral Nightly    therapeutic multivitamin-minerals  2 tablet Oral Daily    sodium chloride flush  5-40 mL IntraVENous 2 times per day    insulin lispro  0-4 Units SubCUTAneous Q6H    enoxaparin  1 mg/kg SubCUTAneous BID    warfarin placeholder: dosing by pharmacy   Other RX Placeholder     Continuous Infusions:   dextrose      sodium chloride       PRN Meds:  ipratropium-albuterol, diphenhydrAMINE, diphenhydrAMINE-zinc acetate, albuterol sulfate HFA, glucose, dextrose bolus **OR** dextrose bolus, glucagon (rDNA), dextrose, sodium chloride flush, sodium chloride, ondansetron **OR** ondansetron, polyethylene glycol, acetaminophen **OR** acetaminophen, potassium chloride **OR** potassium alternative oral replacement **OR** potassium chloride, magnesium sulfate    Labs:  CBC:   Recent Labs     09/19/22  0507   WBC 6.6   HGB 10.4*   HCT 31.4*   MCV 87.7        BMP:   Recent Labs     09/19/22  0507   CO2 21   PHOS 3.1   BUN 21*   CREATININE 1.1     Micro:  9/4/2022 COVID+, out of isolation 9/9/22 per Dr. Anup Tomlin  9/15/2022 SARS-CoV-2 and influenza negative  Procalcitonin 0.08. Imaging:  CTPA 9/15/2022  Lungs/pleura: The central airways are patent. No pleural effusion or pneumothorax is seen. Postsurgical changes are seen in the right lung. Mild scarring is seen in the right lung. No focal lung consolidation is identified. Stable 4 mm pulmonary nodule at the medial aspect of the right lower lobe (series 2, image 107), when compared to the CT from 08/01/2016, for which no follow-up is recommended. Impression   No evidence of pulmonary embolism or acute pulmonary abnormality. Enlarged right thyroid lobe, extending to the upper mediastinum. This could   be further assessed with an outpatient thyroid ultrasound, if not recently   performed. PFT 8/31/16  FEV1 3.22 82%  FVC 4.52 65%  ratio 0.71  TLC 6.79 84%  RV 2.90 103%  DLCO 23.79 72%     Echo 9/6/22:  EF 55-60%. Moderate concentric LVH. Normal LV diastolic filling pressure. Biatrial enlargement. Sclerotic aortic valve, but opens adequately. Mild MR and TR. SPAP 46 mmHg C/W mild pulm HTN.       ASSESSMENT:  Severe PANG   pAFIB s/p ablation x3 - in afib with variable HR this admission   Persistent asthma with personal history of asthma   CAD s/p PCI with LAD stenting 6/16/21 & 9/8/22   Chronic diastolic CHF   DM   MARIA VICTORIA on CPAP   Obesity, s/p gastric sleeve 2013   H/O COVID Jul 2022 & Sep 2022      PLAN:  Home APAP 10-15 cmH2O HS, recently adjusted by Dr. Magan Belle for outpt PFT/MC challenge & f/u will be with Dr. Anup Tomlni  PRN albuterol - bronchodilators have not impacted his symptoms  Cardiac/EP evaluation per cardiology   Please call with questions

## 2022-09-19 NOTE — PROGRESS NOTES
Occupational Therapy/Physical Therapy  Attempted evaluation this morning. RN requesting to hold for possible cardioversion this morning. OT/PT will follow up this afternoon as appropriate.        LEEANNA AroraR/L #[id number]  Tamara Verma MS PT, # [id number]

## 2022-09-19 NOTE — TELEPHONE ENCOUNTER
09/19 Per [facility] note, pt is currently admitted in hospital. Pt has been scheduled for 09/27 at 11:30am with [address]. This appt will reflect on pt d/c summary.

## 2022-09-19 NOTE — PROGRESS NOTES
Pt started on Metoprolol 12.5mg BID today HR has been dropping down into the 30's but coming back up to the 60's. Pt is in afib and asymptomatic last BP was 112/64.  MD notified

## 2022-09-19 NOTE — PROGRESS NOTES
Physician Progress Note      Keiko Avila  [facility] #:                  [id number]  :                       1945  ADMIT DATE:       9/15/2022 3:38 PM  [address] DATE:  RESPONDING  PROVIDER #:        Taylor Cote MD        QUERY TEXT:    Stage of Chronic Kidney Disease: Please provide further specificity, if known. Clinical indicators include: chronic kidney disease, creatinine, cr, bun  Options provided:  -- Chronic kidney disease stage 1  -- Chronic kidney disease stage 2  -- Chronic kidney disease stage 3  -- Chronic kidney disease stage 3a  -- Chronic kidney disease stage 3b  -- Chronic kidney disease stage 4  -- Chronic kidney disease stage 5  -- Chronic kidney disease stage 5, requiring dialysis  -- End stage renal disease  -- Other - I will add my own diagnosis  -- Disagree - Not applicable / Not valid  -- Disagree - Clinically Unable to determine / Unknown        PROVIDER RESPONSE TEXT:    Provider dismissed this query because it was not applicable to the patient or   not a valid query.   not caring for this problem - management per IM      Electronically signed by:  Taylor Cote MD 2022 7:16 PM

## 2022-09-19 NOTE — PROGRESS NOTES
RT Inhaler-Nebulizer Bronchodilator Protocol Note    There is a bronchodilator order in the chart from a provider indicating to follow the RT Bronchodilator Protocol and there is an Initiate RT Inhaler-Nebulizer Bronchodilator Protocol order as well (see protocol at bottom of note). CXR Findings:  No results found. The findings from the last RT Protocol Assessment were as follows:   History Pulmonary Disease: (P) None or smoker <15 pack years  Respiratory Pattern: (P) Regular pattern and RR 12-20 bpm  Breath Sounds: (P) Slightly diminished and/or crackles  Cough: (P) Strong, spontaneous, non-productive  Indication for Bronchodilator Therapy: (P) Decreased or absent breath sounds  Bronchodilator Assessment Score: (P) 2    Aerosolized bronchodilator medication orders have been revised according to the RT Inhaler-Nebulizer Bronchodilator Protocol below. Respiratory Therapist to perform RT Therapy Protocol Assessment initially then follow the protocol. Repeat RT Therapy Protocol Assessment PRN for score 0-3 or on second treatment, BID, and PRN for scores above 3. No Indications - adjust the frequency to every 6 hours PRN wheezing or bronchospasm, if no treatments needed after 48 hours then discontinue using Per Protocol order mode. If indication present, adjust the RT bronchodilator orders based on the Bronchodilator Assessment Score as indicated below. Use Inhaler orders unless patient has one or more of the following: on home nebulizer, not able to hold breath for 10 seconds, is not alert and oriented, cannot activate and use MDI correctly, or respiratory rate 25 breaths per minute or more, then use the equivalent nebulizer order(s) with same Frequency and PRN reasons based on the score. If a patient is on this medication at home then do not decrease Frequency below that used at home.     0-3 - enter or revise RT bronchodilator order(s) to equivalent RT Bronchodilator order with Frequency of every 4 hours PRN for wheezing or increased work of breathing using Per Protocol order mode. 4-6 - enter or revise RT Bronchodilator order(s) to two equivalent RT bronchodilator orders with one order with BID Frequency and one order with Frequency of every 4 hours PRN wheezing or increased work of breathing using Per Protocol order mode. 7-10 - enter or revise RT Bronchodilator order(s) to two equivalent RT bronchodilator orders with one order with TID Frequency and one order with Frequency of every 4 hours PRN wheezing or increased work of breathing using Per Protocol order mode. 11-13 - enter or revise RT Bronchodilator order(s) to one equivalent RT bronchodilator order with QID Frequency and an Albuterol order with Frequency of every 4 hours PRN wheezing or increased work of breathing using Per Protocol order mode. Greater than 13 - enter or revise RT Bronchodilator order(s) to one equivalent RT bronchodilator order with every 4 hours Frequency and an Albuterol order with Frequency of every 2 hours PRN wheezing or increased work of breathing using Per Protocol order mode.          Electronically signed by Prince Salgado RCP on 9/19/2022 at 8:09 AM

## 2022-09-20 ENCOUNTER — ANESTHESIA (OUTPATIENT)
Dept: ENDOSCOPY | Age: 77
DRG: 309 | End: 2022-09-20
Payer: MEDICARE

## 2022-09-20 ENCOUNTER — TELEPHONE (OUTPATIENT)
Dept: CARDIOLOGY CLINIC | Age: 77
End: 2022-09-20

## 2022-09-20 DIAGNOSIS — J45.909 PERSISTENT ASTHMA WITHOUT COMPLICATION, UNSPECIFIED ASTHMA SEVERITY: Primary | ICD-10-CM

## 2022-09-20 DIAGNOSIS — R06.02 SOB (SHORTNESS OF BREATH): ICD-10-CM

## 2022-09-20 LAB
EKG ATRIAL RATE: 312 BPM
EKG ATRIAL RATE: 74 BPM
EKG ATRIAL RATE: 85 BPM
EKG DIAGNOSIS: NORMAL
EKG P AXIS: 83 DEGREES
EKG P-R INTERVAL: 272 MS
EKG P-R INTERVAL: 280 MS
EKG Q-T INTERVAL: 382 MS
EKG Q-T INTERVAL: 392 MS
EKG Q-T INTERVAL: 396 MS
EKG QRS DURATION: 70 MS
EKG QRS DURATION: 74 MS
EKG QRS DURATION: 76 MS
EKG QTC CALCULATION (BAZETT): 424 MS
EKG QTC CALCULATION (BAZETT): 435 MS
EKG QTC CALCULATION (BAZETT): 454 MS
EKG R AXIS: -2 DEGREES
EKG R AXIS: -2 DEGREES
EKG R AXIS: -5 DEGREES
EKG T AXIS: 37 DEGREES
EKG T AXIS: 42 DEGREES
EKG T AXIS: 47 DEGREES
EKG VENTRICULAR RATE: 69 BPM
EKG VENTRICULAR RATE: 74 BPM
EKG VENTRICULAR RATE: 85 BPM
GLUCOSE BLD-MCNC: 101 MG/DL (ref 70–99)
GLUCOSE BLD-MCNC: 111 MG/DL (ref 70–99)
GLUCOSE BLD-MCNC: 138 MG/DL (ref 70–99)
GLUCOSE BLD-MCNC: 153 MG/DL (ref 70–99)
GLUCOSE BLD-MCNC: 171 MG/DL (ref 70–99)
INR BLD: 1.57 (ref 0.87–1.14)
LV EF: 55 %
LVEF MODALITY: NORMAL
PERFORMED ON: ABNORMAL
PROTHROMBIN TIME: 18.6 SEC (ref 11.7–14.5)
T3 FREE: 2.7 PG/ML (ref 2.3–4.2)
T4 FREE: 1.3 NG/DL (ref 0.9–1.8)

## 2022-09-20 PROCEDURE — 3700000001 HC ADD 15 MINUTES (ANESTHESIA): Performed by: INTERNAL MEDICINE

## 2022-09-20 PROCEDURE — 97535 SELF CARE MNGMENT TRAINING: CPT

## 2022-09-20 PROCEDURE — 7100000010 HC PHASE II RECOVERY - FIRST 15 MIN: Performed by: INTERNAL MEDICINE

## 2022-09-20 PROCEDURE — 2580000003 HC RX 258: Performed by: NURSE ANESTHETIST, CERTIFIED REGISTERED

## 2022-09-20 PROCEDURE — 93005 ELECTROCARDIOGRAM TRACING: CPT | Performed by: INTERNAL MEDICINE

## 2022-09-20 PROCEDURE — 2580000003 HC RX 258: Performed by: INTERNAL MEDICINE

## 2022-09-20 PROCEDURE — B24BZZ4 ULTRASONOGRAPHY OF HEART WITH AORTA, TRANSESOPHAGEAL: ICD-10-PCS | Performed by: INTERNAL MEDICINE

## 2022-09-20 PROCEDURE — 93312 ECHO TRANSESOPHAGEAL: CPT | Performed by: INTERNAL MEDICINE

## 2022-09-20 PROCEDURE — 92960 CARDIOVERSION ELECTRIC EXT: CPT | Performed by: INTERNAL MEDICINE

## 2022-09-20 PROCEDURE — 97530 THERAPEUTIC ACTIVITIES: CPT

## 2022-09-20 PROCEDURE — 93010 ELECTROCARDIOGRAM REPORT: CPT | Performed by: INTERNAL MEDICINE

## 2022-09-20 PROCEDURE — 99233 SBSQ HOSP IP/OBS HIGH 50: CPT | Performed by: INTERNAL MEDICINE

## 2022-09-20 PROCEDURE — 6360000002 HC RX W HCPCS: Performed by: NURSE ANESTHETIST, CERTIFIED REGISTERED

## 2022-09-20 PROCEDURE — 6360000002 HC RX W HCPCS: Performed by: INTERNAL MEDICINE

## 2022-09-20 PROCEDURE — 85610 PROTHROMBIN TIME: CPT

## 2022-09-20 PROCEDURE — 94761 N-INVAS EAR/PLS OXIMETRY MLT: CPT

## 2022-09-20 PROCEDURE — 5A2204Z RESTORATION OF CARDIAC RHYTHM, SINGLE: ICD-10-PCS | Performed by: INTERNAL MEDICINE

## 2022-09-20 PROCEDURE — 97116 GAIT TRAINING THERAPY: CPT

## 2022-09-20 PROCEDURE — 6370000000 HC RX 637 (ALT 250 FOR IP): Performed by: INTERNAL MEDICINE

## 2022-09-20 PROCEDURE — 97161 PT EVAL LOW COMPLEX 20 MIN: CPT

## 2022-09-20 PROCEDURE — 36415 COLL VENOUS BLD VENIPUNCTURE: CPT

## 2022-09-20 PROCEDURE — 2709999900 HC NON-CHARGEABLE SUPPLY: Performed by: INTERNAL MEDICINE

## 2022-09-20 PROCEDURE — 7100000011 HC PHASE II RECOVERY - ADDTL 15 MIN: Performed by: INTERNAL MEDICINE

## 2022-09-20 PROCEDURE — 2500000003 HC RX 250 WO HCPCS: Performed by: NURSE ANESTHETIST, CERTIFIED REGISTERED

## 2022-09-20 PROCEDURE — 97110 THERAPEUTIC EXERCISES: CPT

## 2022-09-20 PROCEDURE — 2060000000 HC ICU INTERMEDIATE R&B

## 2022-09-20 PROCEDURE — 97166 OT EVAL MOD COMPLEX 45 MIN: CPT

## 2022-09-20 PROCEDURE — 3700000000 HC ANESTHESIA ATTENDED CARE: Performed by: INTERNAL MEDICINE

## 2022-09-20 PROCEDURE — 93312 ECHO TRANSESOPHAGEAL: CPT

## 2022-09-20 RX ORDER — PROPOFOL 10 MG/ML
INJECTION, EMULSION INTRAVENOUS PRN
Status: DISCONTINUED | OUTPATIENT
Start: 2022-09-20 | End: 2022-09-20 | Stop reason: SDUPTHER

## 2022-09-20 RX ORDER — SODIUM CHLORIDE, SODIUM LACTATE, POTASSIUM CHLORIDE, CALCIUM CHLORIDE 600; 310; 30; 20 MG/100ML; MG/100ML; MG/100ML; MG/100ML
INJECTION, SOLUTION INTRAVENOUS CONTINUOUS PRN
Status: DISCONTINUED | OUTPATIENT
Start: 2022-09-20 | End: 2022-09-20 | Stop reason: SDUPTHER

## 2022-09-20 RX ORDER — SODIUM CHLORIDE 0.9 % (FLUSH) 0.9 %
5-40 SYRINGE (ML) INJECTION EVERY 12 HOURS SCHEDULED
Status: DISCONTINUED | OUTPATIENT
Start: 2022-09-20 | End: 2022-09-21 | Stop reason: HOSPADM

## 2022-09-20 RX ORDER — PHENYLEPHRINE HCL IN 0.9% NACL 1 MG/10 ML
SYRINGE (ML) INTRAVENOUS PRN
Status: DISCONTINUED | OUTPATIENT
Start: 2022-09-20 | End: 2022-09-20 | Stop reason: SDUPTHER

## 2022-09-20 RX ORDER — LIDOCAINE HYDROCHLORIDE 20 MG/ML
INJECTION, SOLUTION INFILTRATION; PERINEURAL PRN
Status: DISCONTINUED | OUTPATIENT
Start: 2022-09-20 | End: 2022-09-20 | Stop reason: SDUPTHER

## 2022-09-20 RX ORDER — SODIUM CHLORIDE 0.9 % (FLUSH) 0.9 %
5-40 SYRINGE (ML) INJECTION PRN
Status: DISCONTINUED | OUTPATIENT
Start: 2022-09-20 | End: 2022-09-21 | Stop reason: HOSPADM

## 2022-09-20 RX ORDER — SODIUM CHLORIDE 9 MG/ML
INJECTION, SOLUTION INTRAVENOUS PRN
Status: DISCONTINUED | OUTPATIENT
Start: 2022-09-20 | End: 2022-09-21 | Stop reason: HOSPADM

## 2022-09-20 RX ORDER — WARFARIN SODIUM 7.5 MG/1
7.5 TABLET ORAL
Status: COMPLETED | OUTPATIENT
Start: 2022-09-20 | End: 2022-09-20

## 2022-09-20 RX ADMIN — MAGNESIUM GLUCONATE 500 MG ORAL TABLET 400 MG: 500 TABLET ORAL at 11:00

## 2022-09-20 RX ADMIN — ATORVASTATIN CALCIUM 40 MG: 40 TABLET, FILM COATED ORAL at 11:00

## 2022-09-20 RX ADMIN — DIPHENHYDRAMINE HCL 25 MG: 25 TABLET ORAL at 14:36

## 2022-09-20 RX ADMIN — PROPOFOL 200 MG: 10 INJECTION, EMULSION INTRAVENOUS at 09:09

## 2022-09-20 RX ADMIN — Medication 10 ML: at 21:09

## 2022-09-20 RX ADMIN — ACETAMINOPHEN 650 MG: 325 TABLET ORAL at 14:36

## 2022-09-20 RX ADMIN — Medication 100 MCG: at 09:21

## 2022-09-20 RX ADMIN — ASPIRIN 81 MG: 81 TABLET, CHEWABLE ORAL at 11:00

## 2022-09-20 RX ADMIN — ACETAMINOPHEN 650 MG: 325 TABLET ORAL at 07:34

## 2022-09-20 RX ADMIN — ACETAMINOPHEN 650 MG: 325 TABLET ORAL at 21:06

## 2022-09-20 RX ADMIN — ENOXAPARIN SODIUM 135 MG: 150 INJECTION SUBCUTANEOUS at 21:07

## 2022-09-20 RX ADMIN — MONTELUKAST SODIUM 10 MG: 10 TABLET, COATED ORAL at 21:07

## 2022-09-20 RX ADMIN — METOPROLOL TARTRATE 12.5 MG: 25 TABLET, FILM COATED ORAL at 21:07

## 2022-09-20 RX ADMIN — CLOPIDOGREL BISULFATE 75 MG: 75 TABLET ORAL at 11:00

## 2022-09-20 RX ADMIN — FLUOCINONIDE: 0.5 CREAM TOPICAL at 14:38

## 2022-09-20 RX ADMIN — Medication 20 ML: at 21:08

## 2022-09-20 RX ADMIN — DIPHENHYDRAMINE HCL 25 MG: 25 TABLET ORAL at 21:07

## 2022-09-20 RX ADMIN — LIDOCAINE HYDROCHLORIDE 60 MG: 20 INJECTION, SOLUTION INFILTRATION; PERINEURAL at 09:09

## 2022-09-20 RX ADMIN — FLUOCINONIDE: 0.5 CREAM TOPICAL at 21:09

## 2022-09-20 RX ADMIN — MULTIPLE VITAMINS W/ MINERALS TAB 2 TABLET: TAB at 11:00

## 2022-09-20 RX ADMIN — SODIUM CHLORIDE, POTASSIUM CHLORIDE, SODIUM LACTATE AND CALCIUM CHLORIDE: 600; 310; 30; 20 INJECTION, SOLUTION INTRAVENOUS at 09:08

## 2022-09-20 RX ADMIN — METOPROLOL TARTRATE 12.5 MG: 25 TABLET, FILM COATED ORAL at 11:00

## 2022-09-20 RX ADMIN — WARFARIN SODIUM 7.5 MG: 7.5 TABLET ORAL at 17:43

## 2022-09-20 ASSESSMENT — PAIN - FUNCTIONAL ASSESSMENT: PAIN_FUNCTIONAL_ASSESSMENT: 0-10

## 2022-09-20 ASSESSMENT — PAIN SCALES - GENERAL
PAINLEVEL_OUTOF10: 6
PAINLEVEL_OUTOF10: 5

## 2022-09-20 ASSESSMENT — PAIN DESCRIPTION - LOCATION
LOCATION: HEAD

## 2022-09-20 ASSESSMENT — PAIN DESCRIPTION - DESCRIPTORS
DESCRIPTORS: ACHING
DESCRIPTORS: ACHING

## 2022-09-20 NOTE — FLOWSHEET NOTE
09/19/22 2029   Vital Signs   Temp 97.1 °F (36.2 °C)   Temp Source Oral   Heart Rate 71   Heart Rate Source Monitor   Resp 16   /67   BP Location Right lower arm   MAP (Calculated) 82   Patient Position Semi fowlers   Level of Consciousness 0   MEWS Score 1   Oxygen Therapy   SpO2 99 %   O2 Device None (Room air)   Pt assessment complete. Pt lying in bed quietly. No s/s of distress noted. Lung sounds diminished. Pt remains in Afib per monitor with HR in the 70's. Nightly medications given. PRN tylenol and benadryl given per patient request.  Pt NPO after midnight for cardioversion in am.  Denies any further needs at this time. Call light within reach.

## 2022-09-20 NOTE — PROGRESS NOTES
Inpatient Physical Therapy Evaluation and Treatment    Unit: PCU  Date:  9/20/2022  Patient Name:    Dante Soriano  Admitting diagnosis:  Dyspnea on exertion [R06.00]  PANG (dyspnea on exertion) [R06.00]  Atrial fibrillation with RVR ([address] [I48.91]  Admit Date:  9/15/2022  Precautions/Restrictions/WB Status/ Lines/ Wounds/ Oxygen: Fall risk, Bed/chair alarm, Lines -IV, Telemetry, Continuous pulse oximetry, and monitor SpO2 with functional mobility    Treatment Time:  1629 - 1722  Treatment Number:  1   Timed Code Treatment Minutes: 43 minutes  Total Treatment Minutes:  53  minutes    Patient Goals for Therapy: \" Go home \"          Discharge Recommendations: SNF (if refused SNF then will need home with 24hr assist with home PT)  DME needs for discharge:  Bariatric RW       Therapy recommendation for EMS Transport: can transport by wheelchair    Therapy recommendations for staff:   Assist of 1 with use of rolling walker (RW) and gait belt for all transfers and ambulation to/from chair  to/from bathroom  within room    History of Present Illness: H & P as per Kiki Mccauley MD's note dated 9/15/2022  The patient is a 69 y.o. male with PMH below, presents with severe PANG. PT was sent by Dr. Krishna Warren office for severe dyspnea with only a few steps. He recently admitted here w/ similar Sx and was ultimately transferred to [facility] for cardiac cath. He underwent a stent to mid LAD on 9/7. He says his breathing has not really improved since. Additionally in 6/2021 he had similar presentation and got 2 stents at that time. He has not had veda chest pain. He is currently wearing a halter monitor. Of note, he recently had COVID. His PCR was negative today. 9/20: S/P PIERO W/ANES CARDIOVERSION W/ANES. Home Health S4 Level Recommendation:  Level 3 Safety  AM-PAC Mobility Score    AM-PAC Inpatient Mobility Raw Score : 17       Preadmission Environment    Pt.  Lives with spouse  Home environment:  one story ft  Deviations (firm surface/linoleum):  decreased yadi, increased URSULA, decreased step length bilaterally, and decreased stance time bilaterally  Assistive Device Used:    gait belt and rolling walker (RW)  Level of Assist:    CGA  Comment: Patient was limited in walking distance due to moderate to severe SOB. Stair Training deferred, pt unsafe/ not appropriate to complete stairs at this time    Activity Tolerance   Pt completed therapy session with SOB noted with ambulation and recovered to baseline after sitting down within 3 min  BP: 132/88  SpO2: 100 bpm  HR: 82 bpm    After ambulation  SpO2: dropped to 67% on RA, recovered to baseline 94% within 3 min with PLB  HR: 105 bpm    Positioning Needs   Pt up in chair, alarm set, positioned in proper neutral alignment and pressure relief provided. Call light provided and all needs within reach    Exercises Initiated  all completed bilaterally unless indicated  Ankle Pumps x 10 reps  LAQ x 10 reps    Other  None. Patient/Family Education   Pt educated on role of inpatient PT, POC, importance of continued activity, DC recommendations, safety awareness, transfer techniques, pursed lip breathing, energy conservation, pacing activity, and calling for assist with mobility. Assessment  Pt seen for Physical Therapy evaluation in acute care setting. Pt demonstrated decreased Activity tolerance, Balance, Safety, and Strength as well as decreased independence with Ambulation, Bed Mobility , and Transfers. Recommending SNF upon discharge as patient functioning well below baseline, demonstrates good rehab potential and unable to return home due to limited or no family support, inability to negotiate stairs to enter home/bedroom/bathroom, burden of care beyond caregiver ability, home environment not conducive to patient recovery, and limited safety awareness. If goes home then will need home with 24hr assist with home PT and bariatric RW. Goals :    To be met in 3 visits:  1). Independent with LE Ex x 10 reps    To be met in 6 visits:  1). Supine to/from sit: Modified Independent  2). Sit to/from stand: Modified Independent  3). Bed to chair: Modified Independent  4). Gait: Ambulate 150 ft.  with  Supervision and use of LRAD (least restrictive assistive device) with mild SOB  5). Tolerate B LE exercises 3 sets of 10-15 reps  6). Ascend/descend 1 steps with SBA with use of no handrail and LRAD (least restrictive assistive device)    Rehabilitation Potential: Good  Strengths for achieving goals include:   Pt motivated, Family Support, and Pt cooperative   Barriers to achieving goals include:    Complexity of condition    Plan    To be seen 3-5 x / week  while in acute care setting for therapeutic exercises, bed mobility, transfers, progressive gait training, balance training, and family/patient education. Signature: [place] MS PT, # [id number]    If patient discharges from this facility prior to next visit, this note will serve as the Discharge Summary.

## 2022-09-20 NOTE — CARE COORDINATION
INTERDISCIPLINARY PLAN OF CARE CONFERENCE    Date/Time: 9/20/2022 4:10 PM  Completed by: Tianna Castle RN, Case Management      Patient Name:  Ashley Montoya  YOB: 1945  Admitting Diagnosis: Dyspnea on exertion [R06.00]  PANG (dyspnea on exertion) [R06.00]  Atrial fibrillation with RVR ([address] [I48.91]     Admit Date/Time:  9/15/2022  3:38 PM    Chart reviewed. Interdisciplinary team contacted or reviewed plan related to patient progress and discharge plans. Disciplines included Case Management, Nursing, and Dietitian. Current Status: 09/18/2022  PT/OT recommendation for discharge plan of care: ordered    Expected D/C Disposition:  TBD - Home [facility] vs SNF  Confirmed plan with patient and spouse  Discharge Plan Comments: Chart reviewed. Met with pt at bedside and explained the role of the CM. Plan: TBD. Cardioversion today. Now awaiting PT/OT eval and recs. Pt unsure if he will need SNF placement for rehab for strengthening or if he will return home with [facility]. SN choices if recommended if not [facility] pt would like;#1 [facility], #2 [facility] or #3 [facility]. +CM will follow up in am once PT/OT eval and recs completed.        Home O2 in place on admit: No  Pt informed of need to bring portable home O2 tank on day of discharge for nursing to connect prior to leaving:  No  Verbalized agreement/Understanding:  No

## 2022-09-20 NOTE — PROGRESS NOTES
Occupational/Physical Therapy  Attempted to see patient this am for evaluation, patient off floor for procedure. Will reattempt as patient availability permits.   Phuc Grullon, OTR/L [id number]  Lysas Brower, MS PT, # [id number]

## 2022-09-20 NOTE — PROGRESS NOTES
Inpatient Occupational Therapy  Evaluation and Treatment    Unit: PCU  Date:  9/20/2022  Patient Name:    Delray Fothergill  Admitting diagnosis:  Dyspnea on exertion [R06.00]  PANG (dyspnea on exertion) [R06.00]  Atrial fibrillation with RVR ([address] [I48.91]  Admit Date:  9/15/2022  Precautions/Restrictions/WB Status/ Lines/ Wounds/ Oxygen: fall risk, IV, and bed/chair alarm, watch SpO2/HR    Treatment Time:  5438-5424  Treatment Number: 1     Billable Treatment Time: 39 minutes   Total Treatment Time:   49   minutes    Patient Goals for Therapy:  \" go to a rehab if I need to \"      Discharge Recommendations: SNF  DME needs for discharge: defer to facility       Therapy recommendations for staff:   Assist of 1 with use of rolling walker (RW) and gait belt for all transfers and ambulation to/from bathroom  to/from chair, watch HR/SpO2    History of Present Illness: Per Dr Lavern Kenney H&P 9/15/22:  \"The patient is a 69 y.o. male with PMH below, presents with severe PANG. PT was sent by Dr. Rain [place] office for severe dyspnea with only a few steps. He recently admitted here w/ similar Sx and was ultimately transferred to [facility] for cardiac cath. He underwent a stent to mid LAD on 9/7. He says his breathing has not really improved since. Additionally in 6/2021 he had similar presentation and got 2 stents at that time. He has not had veda chest pain. He is currently wearing a halter monitor. Of note, he recently had COVID. His PCR was negative today. \"    Home Health S4 Level Recommendation:  Level 1 Standard if going home with 24 hour assist despite SNF recommendation  AM-PAC Score: AM-PAC Inpatient Daily Activity Raw Score: 17    Preadmission Environment    Pt. Lives with spouse  Home environment:    one story home  Steps to enter first floor:  1 steps to enter  Steps to second floor: 1 step to sunken family room, furniture present to hold on.   Bathroom: tub/shower unit, walk in shower, comfort height toilet, grab bars, and built in shower seat  Equipment owned: RW, [facility], lift chair, and CPAP in the night , shoe horn, mobility scooter, has [address] emergency lift chair (400lbs lifting capacity) to  fallen person from the floor     Preadmission Status:  Pt. Able to drive: Yes  Pt Fully independent with ADLs: Yes  Pt. Required assistance from family for: Laundry   Pt. independent for transfers and gait and walked with No Device, occassionally uses SPC, uses scooter for community mobility for e.g for shopping  History of falls no        History of fall 1 yr ago, called 911 himself    Pain  No  Rating:NA  Location:  Pain Medicine Status: No request made      Cognition    A&O x4   Able to follow 2 step commands    Subjective  Patient lying supine in bed with spouse present   Pt agreeable to this OT eval & tx. Upper Extremity ROM:    WFL    Upper Extremity Strength:    Strength Assessment (measured on a 0-5 scale):  4/5 overall bilaterally    Upper Extremity Sensation    WFL    Upper Extremity Proprioception:  WFL    Coordination and Tone  WFL    Balance  Functional Sitting Balance:  WFL  Functional Standing Balance:Impaired CGA with RW    Bed mobility:    Supine to sit:   Not Tested  Sit to supine:   Not Tested  Rolling:    Not Tested  Scooting in sitting:  CGA  Scooting to head of bed:   Not Tested    Bridging:   Not Tested    Transfers:    Sit to stand:  Min A, with use of RW, and VC for hand placement  Stand to sit:  Min A, with use of RW, and VC for hand placement  Bed to chair:   Min A, with use of RW, and VC for hand placement  Standard toilet: Stood at toilet to urinate in urinal, CGA for stance and increased SOB noted, SpO2 67% on RA; RN notified.   Bed to [facility]:  Not Tested    Dressing:      UE:   Not Tested  LE:    Max A adjust socks    Bathing:    UE:  Not Tested  LE:  Not Tested    Eating:   Independent    Toileting:  CGA for stance to urinate in urinal, severe SOB noted    Grooming: CGA wash hands in stance    Activity Tolerance   Pt completed therapy session with SOB noted with exertion/activity  At rest at EOB:  BP: 132/88  SpO2: 100 bpm  HR: 82 bpm    In stance after urinating:  SpO2 67% on RA      At rest in chair after returning from bathroom:  SpO2 91% on RA      Positioning Needs:   Up in chair, call light and needs in reach. Alarm Set    Exercise / Activities Initiated:   N/A    Patient/Family Education:   Role of OT  Recommendations for DC  Energy conservation techniques  Safe RW use/hand placement    Assessment of Deficits: Pt seen for Occupational therapy evaluation in acute care setting. Pt demonstrated decreased Activity tolerance, ADLs, IADLs, Bed mobility, Strength, and Transfers. Pt functioning below baseline and will likely benefit from skilled occupational therapy services to maximize safety and independence. Goal(s) : To be met in 3 Visits:  1). Bed to toilet/BSC: SBA    To be met in 5 Visits:  1). Supine to/from Sit:  SBA  2). Upper Body Bathing:   SBA  3). Lower Body Bathing:   CGA  4). Upper Body Dressing:  SBA  5). Lower Body Dressing:  CGA  6). Pt to demonstrate UE exs x 15 reps with minimal cues    Rehabilitation Potential:  Good for goals listed above. Strengths for achieving goals include: Pt motivated, PLOF, Family Support, and Pt cooperative  Barriers to achieving goals include:  Complexity of condition     Plan: To be seen 3-5 x/wk while in acute care setting for therapeutic exercises, bed mobility, transfers, dressing, bathing, family/patient education, ADL/IADL retraining, energy conservation training.      Wellington Roberts, OTR/L 0702            If patient discharges from this facility prior to next visit, this note will serve as the Discharge Summary

## 2022-09-20 NOTE — TELEPHONE ENCOUNTER
As per Mayra Rosario Previous note: Patient currently admitted to [facility] and monitored. Medications have been adjusted. He will be having a PIERO/DCCV Today.      Katlyn Juarez MD, [address] [facility]  [phone] [facility]  [phone] [address] [place] [address]

## 2022-09-20 NOTE — PROGRESS NOTES
Vitals are stable. Patient is resting in bed with wife present. Patient denies further needs at this time.

## 2022-09-20 NOTE — PROGRESS NOTES
Hospitalist Progress Note      PCP: Regine Mcdaniel MD    Date of Admission: 9/15/2022    Subjective:     Cardioversion this am.     Feels weak. No chest pain. Medications:  Reviewed    Infusion Medications    sodium chloride      dextrose      sodium chloride       Scheduled Medications    sodium chloride flush  5-40 mL IntraVENous 2 times per day    warfarin  7.5 mg Oral Once    fluocinonide   Topical TID    metoprolol tartrate  12.5 mg Oral BID    aspirin  81 mg Oral Daily    atorvastatin  40 mg Oral Daily    clopidogrel  75 mg Oral Daily    magnesium oxide  400 mg Oral Daily    montelukast  10 mg Oral Nightly    therapeutic multivitamin-minerals  2 tablet Oral Daily    sodium chloride flush  5-40 mL IntraVENous 2 times per day    insulin lispro  0-4 Units SubCUTAneous Q6H    enoxaparin  1 mg/kg SubCUTAneous BID    warfarin placeholder: dosing by pharmacy   Other RX Placeholder     PRN Meds: sodium chloride flush, sodium chloride, diphenhydrAMINE, diphenhydrAMINE-zinc acetate, albuterol sulfate HFA, glucose, dextrose bolus **OR** dextrose bolus, glucagon (rDNA), dextrose, sodium chloride flush, sodium chloride, ondansetron **OR** ondansetron, polyethylene glycol, acetaminophen **OR** acetaminophen, potassium chloride **OR** potassium alternative oral replacement **OR** potassium chloride, magnesium sulfate      Intake/Output Summary (Last 24 hours) at 9/20/2022 1512  Last data filed at 9/20/2022 0935  Gross per 24 hour   Intake 440 ml   Output 575 ml   Net -135 ml         Physical Exam Performed:    /74   Pulse 80   Temp 97.7 °F (36.5 °C) (Oral)   Resp 16   Ht 6' 3\" (1.905 m)   Wt (!) 313 lb 14.4 oz (142.4 kg)   SpO2 96%   BMI 39.23 kg/m²        Gen: No distress. Alert. Pleasant elderly obese male +ticks   Eyes: PERRL. No sclera icterus. No conjunctival injection. Neck: No JVD. Trachea midline. Resp: No accessory muscle use. No crackles. No wheezes. No rhonchi.    CV: Irregular rate and rhythm. No murmur. No rub. Trace bilateral edema. Peripheral Pulses: +2 palpable, equal bilaterally   GI: Non-tender. Non-distended. Normal bowel sounds. Skin: Warm and dry. No nodule on exposed extremities. No rash on exposed extremities. M/S: No cyanosis. No joint deformity. No clubbing. Moving all 3 extremities spontaneously  Neuro: Awake. Grossly nonfocal    Psych: Oriented x 3. No anxiety or agitation. Labs:   Recent Labs     09/19/22  0507   WBC 6.6   HGB 10.4*   HCT 31.4*          Recent Labs     09/19/22  0507   *   K 4.4      CO2 21   BUN 21*   CREATININE 1.1   CALCIUM 8.7   PHOS 3.1       No results for input(s): AST, ALT, BILIDIR, BILITOT, ALKPHOS in the last 72 hours. Recent Labs     09/18/22  0433 09/19/22  0507 09/20/22  0440   INR 1.72* 1.54* 1.57*       No results for input(s): CKTOTAL, TROPONINI in the last 72 hours. Urinalysis:      Lab Results   Component Value Date/Time    NITRU Negative 09/15/2022 04:00 PM    WBCUA 3-5 09/15/2022 04:00 PM    BACTERIA Rare 09/15/2022 04:00 PM    RBCUA 0-2 09/15/2022 04:00 PM    BLOODU Negative 09/15/2022 04:00 PM    SPECGRAV 1.015 09/15/2022 04:00 PM    GLUCOSEU Negative 09/15/2022 04:00 PM    GLUCOSEU NEGATIVE 02/05/2012 09:00 AM       Radiology:  CT CHEST PULMONARY EMBOLISM W CONTRAST   Final Result   No evidence of pulmonary embolism or acute pulmonary abnormality. Enlarged right thyroid lobe, extending to the upper mediastinum. This could   be further assessed with an outpatient thyroid ultrasound, if not recently   performed.                  Assessment/Plan:    Active Hospital Problems    Diagnosis     Atrial fibrillation with RVR (HCC) [I48.91]      Priority: Medium    Asthma [J45.909]      Priority: Medium    PANG (dyspnea on exertion) [R06.00]      Priority: Medium         #Dyspnea upon exertion   #Chest pain   #CAD with recent stents   -with holter monitor on currently   - CT chest neg for PE  -similar presentation earlier this month and had stents placed 6/16 and 9/8 (LAD)  -echo from 9/4/22 as above   -trop <0.01 x 2   -EKG with no acute ischemic changes   -on ASA, statin, plavix   -cardio consulted        #Generalized weakness   #Debility   -feel like it is multifactorial, recent COVID infxn, ongoing a fib, recent stent placement, MARIA VICTORIA  -I think patient would very much benefit from ARU, discussed with family they are agreeable   -PT/OT   -case management consulted      #thyroid enlargement  - reviewed on CT chest with enlarged Right side goiter extending upto upper mediastinum.   - Last check TSH/T4 on 9/4/22 WNL  - will need USG thyroid    - had prior biopsy of this in 2012 - benign, though appears to have grown over the years. - discussed with cardiology - will not hold AC or AP given just recent stent placement unless this appears to involve the airway, which it presently does not. - will need OP endocrine follow up and posisbly repeat biopsy in the next several months. #Atrial fibrillation - persistent afib.   -rate not controlled. -recently taken off tikosyn 2.2 to prolonged Qtc   -has had 3 failed ablations in the past   -on coumadin for [facility]   -on cardizem   -off toprol XL   - PIERO and cardioversion 9/20   - start ambulation. #Subtherapeutic INR   -on coumadin   -bridging with lovenox, pharmacy consulted   -INR goal 2-2.5       #Asthma/COPD without AE   -continue prn albuterol   -plan for outpatient PFTs per pulmonology on the 7th of Oct.        #DM type 2   -holding oral regimen   -low dose SSI        #Recent COVID infection        #HTN   -on lisinopril and cardizem   -off aldactone        #HLD   -on statin        #Hx of DVT  -on coumadin/lovenox       #MARIA VICTORIA   -on CPAP        #Hx of gastric sleeve   -2013           DVT Prophylaxis: lovenox/coumadin bridge   Diet: ADULT DIET;  Regular  Code Status: Full Code  PT/OT Eval Status: ordered    Dispo - cont care        Eve Reynoso Chelsie Gonzalez MD

## 2022-09-20 NOTE — ANESTHESIA POSTPROCEDURE EVALUATION
Department of Anesthesiology  Postprocedure Note    Patient: Nazia Lee  MRN: [id number]  YOB: 1945  Date of evaluation: 9/20/2022      Procedure Summary     Date: 09/20/22 Room / Location: [place] [address] / [facility]    Anesthesia Start: 0897 Anesthesia Stop: 1155    Procedures:       PIERO W/ANES. (9:00)      CARDIOVERSION W/ANES. Diagnosis:       Persistent atrial fibrillation ([address]      (PERSISTENT ATRIAL FIBRILLATION)    Surgeons: Mary Ann Kahn MD Responsible Provider: Padmini Calle MD    Anesthesia Type: MAC ASA Status: 3          Anesthesia Type: No value filed.     Jean Marie Phase I: Jean Marie Score: 10    Jean Marie Phase II: Jean Marie Score: 8      Anesthesia Post Evaluation    Patient location during evaluation: PACU  Level of consciousness: awake  Airway patency: patent  Nausea & Vomiting: no nausea  Complications: no  Cardiovascular status: blood pressure returned to baseline  Respiratory status: acceptable  Hydration status: euvolemic

## 2022-09-20 NOTE — TELEPHONE ENCOUNTER
[place], PA-C  [place], [place]  Please send orders for home nebulizer to [address]. I already sent neb solution. Order pended, needs faxed to [facility] once signed.

## 2022-09-20 NOTE — PROGRESS NOTES
Ambulated patient. Patient was able to ambulate twice across the room and to the bathroom. Heart rate remained between 85-95, although patient became very winded and SOB.

## 2022-09-20 NOTE — PROGRESS NOTES
Patient arrived to room from PIERO. Vitals are stable. Shift assessment completed. See flow sheet. Medications  given. Patient is A&O x4. HR stable at 75. Patient currently in NSR. Call light within reach.

## 2022-09-20 NOTE — OP NOTE
Op note  Procedure   Transesophageal gram  Direct current cardioversion  Indications  Dyspnea fatigue  Paroxysmal atrial fibrillations  Sedated by propofol by anesthesia  PIERO no intracardiac thrombus  Left atrial appendage poorly visualized no thrombus seen velocity >40cm/sec  Then a single synchronized shock of 200 Joules applied converted to NSR with occasional PAC's. Will transfer to recovery. He had a lot of secretions while doing PIERO requiring frequent suctioning.

## 2022-09-20 NOTE — PROGRESS NOTES
Bedside report and transfer of care given to [facility], RN. Pt currently resting in bed with the call light within reach. Pt denies any other care needs at this time. Pt stable at this time.

## 2022-09-21 VITALS
DIASTOLIC BLOOD PRESSURE: 85 MMHG | BODY MASS INDEX: 39.17 KG/M2 | TEMPERATURE: 98 F | RESPIRATION RATE: 16 BRPM | HEART RATE: 90 BPM | WEIGHT: 315 LBS | SYSTOLIC BLOOD PRESSURE: 140 MMHG | HEIGHT: 75 IN | OXYGEN SATURATION: 98 %

## 2022-09-21 LAB
ALBUMIN SERPL-MCNC: 3.4 G/DL (ref 3.4–5)
ANION GAP SERPL CALCULATED.3IONS-SCNC: 9 MMOL/L (ref 3–16)
BASOPHILS ABSOLUTE: 0.1 K/UL (ref 0–0.2)
BASOPHILS RELATIVE PERCENT: 0.6 %
BUN BLDV-MCNC: 21 MG/DL (ref 7–20)
CALCIUM SERPL-MCNC: 8.8 MG/DL (ref 8.3–10.6)
CHLORIDE BLD-SCNC: 106 MMOL/L (ref 99–110)
CO2: 22 MMOL/L (ref 21–32)
CREAT SERPL-MCNC: 1.1 MG/DL (ref 0.8–1.3)
EOSINOPHILS ABSOLUTE: 0.6 K/UL (ref 0–0.6)
EOSINOPHILS RELATIVE PERCENT: 6.5 %
GFR AFRICAN AMERICAN: >60
GFR NON-AFRICAN AMERICAN: >60
GLUCOSE BLD-MCNC: 108 MG/DL (ref 70–99)
GLUCOSE BLD-MCNC: 116 MG/DL (ref 70–99)
GLUCOSE BLD-MCNC: 133 MG/DL (ref 70–99)
GLUCOSE BLD-MCNC: 138 MG/DL (ref 70–99)
GLUCOSE BLD-MCNC: 79 MG/DL (ref 70–99)
HCT VFR BLD CALC: 32.4 % (ref 40.5–52.5)
HEMOGLOBIN: 10.8 G/DL (ref 13.5–17.5)
INR BLD: 1.65 (ref 0.87–1.14)
LYMPHOCYTES ABSOLUTE: 0.8 K/UL (ref 1–5.1)
LYMPHOCYTES RELATIVE PERCENT: 9.2 %
MAGNESIUM: 2.3 MG/DL (ref 1.8–2.4)
MCH RBC QN AUTO: 29.5 PG (ref 26–34)
MCHC RBC AUTO-ENTMCNC: 33.3 G/DL (ref 31–36)
MCV RBC AUTO: 88.6 FL (ref 80–100)
MONOCYTES ABSOLUTE: 0.8 K/UL (ref 0–1.3)
MONOCYTES RELATIVE PERCENT: 8.5 %
NEUTROPHILS ABSOLUTE: 6.8 K/UL (ref 1.7–7.7)
NEUTROPHILS RELATIVE PERCENT: 75.2 %
PDW BLD-RTO: 17.4 % (ref 12.4–15.4)
PERFORMED ON: ABNORMAL
PERFORMED ON: NORMAL
PHOSPHORUS: 3.1 MG/DL (ref 2.5–4.9)
PLATELET # BLD: 191 K/UL (ref 135–450)
PMV BLD AUTO: 7.4 FL (ref 5–10.5)
POTASSIUM SERPL-SCNC: 4.8 MMOL/L (ref 3.5–5.1)
PROTHROMBIN TIME: 19.3 SEC (ref 11.7–14.5)
RBC # BLD: 3.66 M/UL (ref 4.2–5.9)
SODIUM BLD-SCNC: 137 MMOL/L (ref 136–145)
WBC # BLD: 9.1 K/UL (ref 4–11)

## 2022-09-21 PROCEDURE — 80069 RENAL FUNCTION PANEL: CPT

## 2022-09-21 PROCEDURE — 6370000000 HC RX 637 (ALT 250 FOR IP): Performed by: INTERNAL MEDICINE

## 2022-09-21 PROCEDURE — 85025 COMPLETE CBC W/AUTO DIFF WBC: CPT

## 2022-09-21 PROCEDURE — 99239 HOSP IP/OBS DSCHRG MGMT >30: CPT | Performed by: INTERNAL MEDICINE

## 2022-09-21 PROCEDURE — 99232 SBSQ HOSP IP/OBS MODERATE 35: CPT | Performed by: NURSE PRACTITIONER

## 2022-09-21 PROCEDURE — 83735 ASSAY OF MAGNESIUM: CPT

## 2022-09-21 PROCEDURE — 36415 COLL VENOUS BLD VENIPUNCTURE: CPT

## 2022-09-21 PROCEDURE — 85610 PROTHROMBIN TIME: CPT

## 2022-09-21 RX ORDER — TORSEMIDE 20 MG/1
20 TABLET ORAL DAILY
Status: DISCONTINUED | OUTPATIENT
Start: 2022-09-21 | End: 2022-09-21 | Stop reason: HOSPADM

## 2022-09-21 RX ORDER — WARFARIN SODIUM 7.5 MG/1
7.5 TABLET ORAL
Status: COMPLETED | OUTPATIENT
Start: 2022-09-21 | End: 2022-09-21

## 2022-09-21 RX ADMIN — TORSEMIDE 20 MG: 20 TABLET ORAL at 08:36

## 2022-09-21 RX ADMIN — CLOPIDOGREL BISULFATE 75 MG: 75 TABLET ORAL at 08:36

## 2022-09-21 RX ADMIN — ASPIRIN 81 MG: 81 TABLET, CHEWABLE ORAL at 08:36

## 2022-09-21 RX ADMIN — ATORVASTATIN CALCIUM 40 MG: 40 TABLET, FILM COATED ORAL at 08:36

## 2022-09-21 RX ADMIN — WARFARIN SODIUM 7.5 MG: 7.5 TABLET ORAL at 17:19

## 2022-09-21 RX ADMIN — MAGNESIUM GLUCONATE 500 MG ORAL TABLET 400 MG: 500 TABLET ORAL at 08:36

## 2022-09-21 RX ADMIN — METOPROLOL TARTRATE 12.5 MG: 25 TABLET, FILM COATED ORAL at 08:36

## 2022-09-21 RX ADMIN — FLUOCINONIDE: 0.5 CREAM TOPICAL at 08:36

## 2022-09-21 RX ADMIN — ACETAMINOPHEN 650 MG: 325 TABLET ORAL at 17:20

## 2022-09-21 RX ADMIN — ACETAMINOPHEN 650 MG: 325 TABLET ORAL at 05:15

## 2022-09-21 RX ADMIN — DIPHENHYDRAMINE HCL 25 MG: 25 TABLET ORAL at 05:15

## 2022-09-21 RX ADMIN — MULTIPLE VITAMINS W/ MINERALS TAB 2 TABLET: TAB at 08:36

## 2022-09-21 ASSESSMENT — PAIN DESCRIPTION - LOCATION: LOCATION: HEAD

## 2022-09-21 ASSESSMENT — PAIN SCALES - GENERAL
PAINLEVEL_OUTOF10: 4
PAINLEVEL_OUTOF10: 5

## 2022-09-21 ASSESSMENT — ENCOUNTER SYMPTOMS
SHORTNESS OF BREATH: 1
GASTROINTESTINAL NEGATIVE: 1

## 2022-09-21 ASSESSMENT — PAIN DESCRIPTION - DESCRIPTORS: DESCRIPTORS: ACHING

## 2022-09-21 NOTE — TELEPHONE ENCOUNTER
Received call from [facility] who stated they are in need of notes as well as qualifying dx on order for neb.  Dx is SOB, they are needing a qualifying dx such as asthma, emphysema, COPD, etc. Latonia Taylor can be reached at [phone] for questions or concerns or notes can be sent to fax number [phone]

## 2022-09-21 NOTE — CARE COORDINATION
[facility] - Acute Rehab Unit   After review, this patient is felt to be:       [x]  Appropriate for Acute Inpatient Rehab but do not have a bed until Saturday/or Sunday. Discussed with Sil Zheng [facility]    []  Appropriate for Acute Inpatient Rehab Pending Insurance Authorization    []  Not appropriate for Acute Inpatient Rehab    []  Referral received and ARU reviewing patient; Evaluation ongoing.   Thank you for the referral. Vinnie Machado RN

## 2022-09-21 NOTE — DISCHARGE INSTR - COC
Continuity of Care Form    Patient Name: Sheila Matthew   :  1945  MRN:  [id number]    Admit date:  9/15/2022  Discharge date:  2022    Code Status Order: Full Code   Advance Directives:   [facility] Documentation       Date/Time Healthcare Directive Type of Healthcare Directive Copy in [address] Agent's Name Healthcare Agent's Phone Number    [phone] Yes, patient has an advance directive for healthcare treatment Living will -- Healthcare power of  Will Edvin --            Admitting Physician:  Elisa Lewis MD  PCP: Romana Just, MD    Discharging Nurse: [facility] Unit/Room#: /6850-71  Discharging Unit Phone Number: ***    Emergency Contact:   Extended Emergency Contact Information  Primary Emergency Contact: Kayy Gallegos  Address: [address] Phone: [phone]  Mobile Phone: [phone]  Relation: Spouse  Secondary Emergency Contact: Fede Gr  Address: [address] Phone: [phone]  Mobile Phone: [phone]  Relation: Child    Past Surgical History:  Past Surgical History:   Procedure Laterality Date    ABLATION OF DYSRHYTHMIC FOCUS  2013    a-fib    BARIATRIC SURGERY  2013    GASTRIC SLEEVE    CARDIAC SURGERY  2013    ablation    CARDIOVERSION N/A 2022    CARDIOVERSION W/ANES.  performed by Talat Shaw MD at [facility] Right 9-30-15    CARPAL TUNNEL RELEASE Left 3/20/16    CHOLECYSTECTOMY, LAPAROSCOPIC N/A 2019    LAPAROSCOPIC CHOLECYSTECTOMY WITH CHOLANGIOGRAMS performed by Tomy You MD at [address]      with removal stone    CYSTOSCOPY  13    with Laser Vaporization of Enlarged Prostate    DIAGNOSTIC CARDIAC CATH LAB PROCEDURE      ERCP  2019    Sphincterotomy, stone removal    ERCP N/A 2019 ERCP SPHINCTER/PAPILLOTOMY performed by Faustino Gavin MD at [address] [employer]    ERCP  2/18/2019    ERCP STONE REMOVAL performed by Faustino Gavin MD at [facility] Right 1/14/2020    PHACOEMULSIFICATION OF CATARACT RIGHT EYE WITH INTRAOCULAR LENS IMPLANT performed by Shannan Brooks MD at [facility] Left 1/21/2020    PHACOEMULSIFICATION OF CATARACT LEFT EYE WITH INTRAOCULAR LENS IMPLANT -SLEEP APNEA- [facility] Bilateral     right knee (2002) and left knee (2012)    KNEE ARTHROSCOPY  4/19/2011     left  knee    SKIN BIOPSY      skin Ca/SQUAMOUS CELL    THORACOTOMY      wedge resection    TONSILLECTOMY      TRANSESOPHAGEAL ECHOCARDIOGRAM N/A 9/20/2022    PIERO W/ANES.  (9:00) performed by Yenny Reed MD at [address]       Immunization History:   Immunization History   Administered Date(s) Administered    COVID-19, [address] [facility] border, Primary or Immunocompromised, (age 12y+), IM, 100 mcg/0.5mL 02/17/2021, 03/17/2021, 01/11/2022    Hepatitis A Adult (Havrix, Vaqta) 09/03/2014    Influenza Vaccine, unspecified formulation 09/17/2014    Influenza Virus Vaccine 01/18/2012, 10/15/2013, 12/01/2014, 10/23/2015, 10/21/2016, 09/01/2017, 10/01/2018, 10/07/2021    Influenza, FLUAD, (age 72 y+), Adjuvanted, 0.5mL 10/08/2020    Influenza, FLUARIX, FLULAVAL, FLUZONE (age 10 mo+) AND AFLURIA, (age 1 y+), PF, 0.5mL 10/08/2020    Influenza, FLUCELVAX, (age 10 mo+), MDCK, PF, 0.5mL 09/20/2017    Influenza, High Dose (Fluzone 65 yrs and older) 10/21/2016, 10/31/2018, 11/04/2019    MMR 01/01/2005    Pneumococcal Conjugate 13-valent ([id number]) 02/16/2019    Pneumococcal Conjugate 7-valent (Prevnar7) 10/22/2012    Pneumococcal Conjugate Vaccine 10/22/2012    Pneumococcal Polysaccharide (Xkuxudobe49) 01/01/2001, 12/13/2011, 01/01/2013    Polio IPV (IPOL) 09/03/2014 Td vaccine (adult) 01/01/2013    Tdap (Boostrix, Adacel) 10/04/2020    Tetanus 08/27/2014    Tetanus Toxoid, absorbed 08/27/2014    Zoster Recombinant (Shingrix) 11/16/2018, 12/04/2019       Active Problems:  Patient Active Problem List   Diagnosis Code    Arthritis M19.90    Atrial fibrillation ([facility] I48.91    Essential hypertension I10    Carpal tunnel syndrome G56.00    Polyneuropathy G62.9    Numbness and tingling of both legs R20.0, R20.2    Moderate obstructive sleep apnea G47.33    S/P thoracotomy Z98.890    Atypical atrial flutter (HCC) I48.4    SOB (shortness of breath) R06.02    Type 2 diabetes mellitus with diabetic neuropathy (HCC) E11.40    Morbid obesity with BMI of 40.0-44.9, adult (HCC) E66.01, Z68.41    History of venous thromboembolism Z86.718    Cataract of both eyes H26.9    Abnormal stress test R94.39    Angina at rest [facility]) I20.8    Status post insertion of drug-eluting stent into left anterior descending (LAD) artery for coronary artery disease Z95.5    Other fatigue R53.83    Obesity (BMI 30-39. 9) E66.9    COVID-19 U07.1    Acute on chronic heart failure with preserved ejection fraction (HCC) I50.33    Coronary artery disease involving native coronary artery of native heart I25.10    Anginal equivalent (HCC) I20.8    Coronary artery disease, unspecified vessel or lesion type, unspecified whether angina present, unspecified whether native or transplanted heart I25.10    PANG (dyspnea on exertion) R06.00    Asthma J45.909    Atrial fibrillation with RVR ([facility] I48.91       Isolation/Infection:   Isolation            No Isolation          Patient Infection Status       Infection Onset Added Last Indicated Last Indicated By Review Planned Expiration Resolved Resolved By    None active    Resolved    COVID-19 (Rule Out) 09/15/22 09/15/22 09/15/22 COVID-19 & Influenza Combo (Ordered)   09/15/22 Rule-Out Test Resulted    COVID-19 09/04/22 09/04/22 09/04/22 COVID-19 & Influenza Combo   09/19/22 Jonny Shearer RN    Per PCU staff, pt us asymptomatic and does not meet criteria for covid isolation    COVID-19 (Rule Out) 22 COVID-19 & Influenza Combo (Ordered)   22 Rule-Out Test Resulted    COVID-19 20 COVID-19   21             Nurse Assessment:  Last Vital Signs: /80   Pulse 90   Temp 98 °F (36.7 °C) (Oral)   Resp 16   Ht 6' 3\" (1.905 m)   Wt (!) 316 lb (143.3 kg)   SpO2 98%   BMI 39.50 kg/m²     Last documented pain score (0-10 scale): Pain Level: 4  Last Weight:   Wt Readings from Last 1 Encounters:   22 (!) 316 lb (143.3 kg)     Mental Status:  alert    IV Access:  - None    Nursing Mobility/ADLs:  Walking   Independent  Transfer  Independent  Bathing  Independent  Dressing  Independent  Toileting  Independent  Feeding  Independent  Med Admin  Assisted  Med Delivery   none    Wound Care Documentation and Therapy:        Elimination:  Continence: Bowel: Yes  Bladder: Yes  Urinary Catheter: None   Colostomy/Ileostomy/Ileal Conduit: No       Date of Last BM: 2022      Intake/Output Summary (Last 24 hours) at 2022 1004  Last data filed at 2022 0657  Gross per 24 hour   Intake 180 ml   Output 675 ml   Net -495 ml     I/O last 3 completed shifts: In: 200 [P.O.:180; I.V.:200]  Out: 1250 [Urine:1250]    Safety Concerns: At Risk for Falls    Impairments/Disabilities:      None    Nutrition Therapy:  Current Nutrition Therapy:   - Oral Diet:  General    Routes of Feeding: Oral  Liquids: No Restrictions  Daily Fluid Restriction: no  Last Modified Barium Swallow with Video (Video Swallowing Test): not done    Treatments at the Time of Hospital Discharge:   Respiratory Treatments:   Oxygen Therapy:  is not on home oxygen therapy.   Ventilator:    - No ventilator support    Rehab Therapies: Physical Therapy and Occupational Therapy  Weight Bearing Status/Restrictions: No weight bearing restrictions  Other Medical Equipment (for information only, NOT a DME order):  cane and walker  Other Treatments:     Patient's personal belongings (please select all that are sent with patient):      RN SIGNATURE:  Electronically signed by Mitzi Robbins RN on 9/21/22 at 1:11 PM EDT    CASE MANAGEMENT/SOCIAL WORK SECTION    Inpatient Status Date: 09/19/2022    Readmission Risk Assessment Score:  Readmission Risk              Risk of Unplanned Readmission:  28           Discharging to Facility/ Agency   Name: [facility]        Address: [address] [facility] [place], [facility]  Phone: [phone]  Fax: [phone]   Dialysis Facility (if applicable)   Name:  Address:  Dialysis Schedule:  Phone:  Fax:    / signature: Electronically signed by Padmini Rodriguez RN on 9/21/22 at 10:06 AM EDT    PHYSICIAN SECTION    Prognosis: Fair    Condition at Discharge: Stable    Rehab Potential (if transferring to Rehab): Good    Recommended Labs or Other Treatments After Discharge:       ECF with PTOT. Needs EP follow up - scheduled per cardiology team.       Physician Certification: I certify the above information and transfer of Ricardo Felix  is necessary for the continuing treatment of the diagnosis listed and that he requires [facility] for less 30 days.      Update Admission H&P: No change in H&P    PHYSICIAN SIGNATURE:  Electronically signed by Stephanie Garrido MD on 9/21/22 at 1:05 PM EDT

## 2022-09-21 NOTE — FLOWSHEET NOTE
09/21/22 0745   Vital Signs   Temp 98 °F (36.7 °C)   Temp Source Oral   Heart Rate 90   Heart Rate Source Monitor   /80   BP Location Left Arm   MAP (Calculated) 98.33   Patient Position Semi fowlers   Pain Assessment   Pain Assessment 0-10   Pain Level 4   Care Plan - Pain Goals   Verbalizes/displays adequate comfort level or baseline comfort level Assess pain using appropriate pain scale;Encourage patient to monitor pain and request assistance   Oxygen Therapy   SpO2 98 %   O2 Device None (Room air)   Pt sitting up in bed no s/s of distress noted no needs voiced at this time see flowsheet for full assessment call light within reach and bed in low position

## 2022-09-21 NOTE — PROGRESS NOTES
EMS at [facility] pt transferred to [address] without incident / dc papers given

## 2022-09-21 NOTE — PROGRESS NOTES
Report called to [facility] ([place]) spoke to [place] review of care and all systems reviewed , ETA for tranroma is 1800

## 2022-09-21 NOTE — CARE COORDINATION
INTERDISCIPLINARY PLAN OF CARE CONFERENCE    Date/Time: 9/21/2022 9:28 AM  Completed by: Deep Gorman RN, Case Management      Patient Name:  Sidney Dobson  YOB: 1945  Admitting Diagnosis: Dyspnea on exertion [R06.00]  PANG (dyspnea on exertion) [R06.00]  Atrial fibrillation with RVR ([address] [I48.91]     Admit Date/Time:  9/15/2022  3:38 PM    Chart reviewed. Interdisciplinary team contacted or reviewed plan related to patient progress and discharge plans. Disciplines included Case Management, Nursing, and Dietitian. Current Status:09/18/2022  PT/OT recommendation for discharge plan of care: [facility] 17  Discharge Recommendations: SNF (if refused SNF then will need home with 24hr assist with home PT)  DME needs for discharge:  Bariatric RW     Expected D/C Disposition:  Skilled nursing facility  Confirmed plan with patient   Discharge Plan Comments: Chart reviewed. Met with pt at bedside and explained the role of the CM. Chooses #1 [facility]ULL[facility] ( referral under review (Alt [facility], [facility]). Anticipate DC later today. Has met [facility] LOS.        Home O2 in place on admit: No  Pt informed of need to bring portable home O2 tank on day of discharge for nursing to connect prior to leaving:  No  Verbalized agreement/Understanding:  No

## 2022-09-21 NOTE — DISCHARGE SUMMARY
Hospital Medicine Discharge Summary    Patient ID: Jeanette [id number]      Patient's PCP: Camryn Lockett MD    Admit Date: 9/15/2022     Discharge Date:   9/21/2022    Admitting Provider: Rahul Farrell MD     Discharge Provider: Nir Doyle MD     Discharge Diagnoses: Active Hospital Problems    Diagnosis     Atrial fibrillation with RVR (HCC) [I48.91]      Priority: Medium    Asthma [J45.909]      Priority: Medium    PANG (dyspnea on exertion) [R06.00]      Priority: Medium       The patient was seen and examined on day of discharge and this discharge summary is in conjunction with any daily progress note from day of discharge. Hospital Course: 75yo man Hx of Afib s/p prior failed ablation and cardioversion and multiple medications, hx of CAD just recently had LAD stent placed - he is on ASA, plavix and coumadin. Presented to see his PCP - severe exertional dyspnea and uncontrolled HR even with the short walk from his car to the office - was directed to go to ED for further evaluation.          #Dyspnea upon exertion   #Chest pain   #CAD with recent stents (LAD)  -with holter monitor on currently   - CT chest neg for PE  -similar presentation earlier this month and had stents placed 6/16 and 9/8 (LAD)  -echo from 9/4/22 as above   -trop <0.01 x 2   -EKG with no acute ischemic changes   -on ASA, statin, plavix   -cardio consulted         #Generalized weakness   #Debility   -feel like it is multifactorial, recent COVID infxn, ongoing a fib, recent stent placement, MARIA VICTORIA  -I think patient would very much benefit from ARU, discussed with family they are agreeable - rehab no availability - ECF with PT[facility] instead.    -case management consulted        #thyroid enlargement  - reviewed on CT chest with enlarged Right side goiter extending upto upper mediastinum.   - Last check TSH/T4 on 9/4/22 WNL  - will need USG thyroid               - had prior biopsy of this in 2012 - benign, though appears to have grown over the years. - discussed with cardiology - will not hold AC or AP given just recent stent placement unless this appears to involve the airway, which it presently does not. - will need OP endocrine follow up and posisbly repeat biopsy in the next several months. #Atrial fibrillation - persistent afib.   -rate not controlled. -recently taken off tikosyn 2.2 to prolonged Qtc   -has had 3 failed ablations in the past   -[facility] [place]   -on cardizem   -off toprol XL   - PIERO and cardioversion 9/20              - start ambulation. - appears sinus though still with runs of Afib overnight   - will discharge on BB and coumadin as per cardiology     - resume PO torsemide on dsicharge   - has appointment with Dr. Stacy Garcia 9/27/2022        #Subtherapeutic INR   -on coumadin   -bridging with lovenox, pharmacy consulted   -INR goal 2-2.5        #Asthma/COPD without AE   -continue prn albuterol   -plan for outpatient PFTs per pulmonology on the 7th of Oct.         #DM type 2   -holding oral regimen   -low dose SSI         #Recent COVID infection         #HTN   -on lisinopril and cardizem   -off aldactone         #HLD   -on statin         #Hx of DVT  -on coumadin        #MARIA VICTORIA   -on CPAP         #Hx of gastric sleeve   -2013                Physical Exam Performed:     /80   Pulse 90   Temp 98 °F (36.7 °C) (Oral)   Resp 16   Ht 6' 3\" (1.905 m)   Wt (!) 316 lb (143.3 kg)   SpO2 98%   BMI 39.50 kg/m²          Gen: No distress. Alert. Pleasant elderly obese male +ticks   Eyes: PERRL. No sclera icterus. No conjunctival injection. Neck: No JVD. Trachea midline. Resp: No accessory muscle use. No crackles. No wheezes. No rhonchi. CV: Irregular rate and rhythm. No murmur. No rub. Trace bilateral edema. Peripheral Pulses: +2 palpable, equal bilaterally   GI: Non-tender. Non-distended. Normal bowel sounds. Skin: Warm and dry.  No nodule on exposed extremities. No rash on exposed extremities. M/S: No cyanosis. No joint deformity. No clubbing. Moving all 3 extremities spontaneously  Neuro: Awake. Grossly nonfocal    Psych: Oriented x 3. No anxiety or agitation. Labs: For convenience and continuity at follow-up the following most recent labs are provided:      CBC:    Lab Results   Component Value Date/Time    WBC 9.1 09/21/2022 04:16 AM    HGB 10.8 09/21/2022 04:16 AM    HCT 32.4 09/21/2022 04:16 AM     09/21/2022 04:16 AM       Renal:    Lab Results   Component Value Date/Time     09/21/2022 04:16 AM    K 4.8 09/21/2022 04:16 AM    K 4.2 09/16/2022 06:47 AM     09/21/2022 04:16 AM    CO2 22 09/21/2022 04:16 AM    BUN 21 09/21/2022 04:16 AM    CREATININE 1.1 09/21/2022 04:16 AM    CALCIUM 8.8 09/21/2022 04:16 AM    PHOS 3.1 09/21/2022 04:16 AM         Significant Diagnostic Studies    Radiology:   CT CHEST PULMONARY EMBOLISM W CONTRAST   Final Result   No evidence of pulmonary embolism or acute pulmonary abnormality. Enlarged right thyroid lobe, extending to the upper mediastinum. This could   be further assessed with an outpatient thyroid ultrasound, if not recently   performed. Consults:     IP CONSULT TO CARDIOLOGY  IP CONSULT TO HOSPITALIST  IP CONSULT TO PULMONOLOGY  IP CONSULT TO PHARMACY  IP CONSULT TO CARDIOLOGY  IP CONSULT TO CASE MANAGEMENT    Disposition:  ECF    Condition at Discharge: Stable    Discharge Instructions/Follow-up:  EP as above. PCP 1 week.      Code Status:  Full Code     Activity: activity as tolerated    Diet: cardiac diet      Discharge Medications:     Current Discharge Medication List             Details   metoprolol tartrate (LOPRESSOR) 25 MG tablet Take 0.5 tablets by mouth 2 times daily  Qty: 60 tablet, Refills: 3                Details   albuterol sulfate HFA (VENTOLIN HFA) 108 (90 Base) MCG/ACT inhaler Inhale 2 puffs into the lungs 4 times daily as needed for Wheezing or Shortness of Breath  Qty: 1 each, Refills: 3    Comments: Auto sub proair, ventolin or proventil based upon lowest cost please      albuterol (PROVENTIL) (2.5 MG/3ML) 0.083% nebulizer solution Take 3 mLs by nebulization every 4 hours as needed for Wheezing or Shortness of Breath  Qty: 1080 mL, Refills: 3    Associated Diagnoses: Persistent asthma without complication, unspecified asthma severity      aspirin 81 MG chewable tablet Take 1 tablet by mouth daily  Qty: 30 tablet, Refills: 11      clopidogrel (PLAVIX) 75 MG tablet Take 1 tablet by mouth daily  Qty: 30 tablet, Refills: 0      warfarin (COUMADIN) 5 MG tablet TAKE ONE TABLET BY MOUTH DAILY  Qty: 90 tablet, Refills: 1      metFORMIN (GLUCOPHAGE) 1000 MG tablet Take 0.5 tablets by mouth in the morning and 0.5 tablets in the evening. Take with meals. Qty: 180 tablet, Refills: 0    Comments: Dose Adjustment. montelukast (SINGULAIR) 10 MG tablet TAKE ONE TABLET BY MOUTH DAILY  Qty: 90 tablet, Refills: 1      atorvastatin (LIPITOR) 40 MG tablet TAKE ONE TABLET BY MOUTH DAILY  Qty: 90 tablet, Refills: 0      Dulaglutide (TRULICITY) 4.52 TL/1.7ZK SOPN Inject 0.75 mg into the skin once a week      !! blood glucose test strips (TRUE METRIX BLOOD GLUCOSE TEST) strip TEST ONCE DAILY. DX:E11.9  Qty: 100 strip, Refills: 3      torsemide (DEMADEX) 20 MG tablet Take 1 tablet by mouth daily  Qty: 90 tablet, Refills: 1      !! Easy Touch Lancets 32G/Twist MISC TEST DAILY. DX: E11.9  Qty: 100 each, Refills: 3      !! blood glucose test strips (TRUE METRIX BLOOD GLUCOSE TEST) strip USE TO CHECK BLOOD GLUCOSE DAILY. DX: E11.9  Qty: 100 each, Refills: 3    Associated Diagnoses: Type 2 diabetes mellitus without complication, without long-term current use of insulin ([address]      ! ! ACCU-CHEK MULTICLIX LANCETS MISC Check sugars once daily.  Dx;E11.9  Qty: 100 each, Refills: 11    Associated Diagnoses: Type 2 diabetes mellitus without complication, without long-term current use of insulin (HCC)      Lancet Devices (EASY TOUCH LANCING DEVICE) MISC Test Daily. DX: E11.9  Qty: 1 each, Refills: 0      Blood Glucose Monitoring Suppl (TRUE METRIX METER) VINNIE Use to check daily. DX;E11.9  Qty: 1 Device, Refills: 0      Lancets Misc. (ACCU-CHEK MULTICLIX LANCET DEV) KIT Check sugars once daily. DX;E11.9  Qty: 1 kit, Refills: 0      Biotin 5 MG TABS Take 5 mg by mouth daily      Cholecalciferol (VITAMIN D3) 5000 units TABS Take 5,000 Units by mouth daily      Cyanocobalamin (VITAMIN B-12 CR PO) Take 5,000 mcg by mouth every 7 days       Multiple Vitamins-Minerals (THERAPEUTIC MULTIVITAMIN-MINERALS) tablet Take 2 tablets by mouth daily       magnesium oxide (MAG-OX) 400 MG tablet Take 400 mg by mouth daily. !! - Potential duplicate medications found. Please discuss with provider. Time Spent on discharge is more than 30 minutes in the examination, evaluation, counseling and review of medications and discharge plan. Signed:    Gissel Ibarra MD   9/21/2022      Thank you Jailene Ralph MD for the opportunity to be involved in this patient's care. If you have any questions or concerns, please feel free to contact me at [phone].

## 2022-09-21 NOTE — PLAN OF CARE
Problem: Safety - Adult  Goal: Free from fall injury  Outcome: Progressing     Problem: Pain  Goal: Verbalizes/displays adequate comfort level or baseline comfort level  Outcome: Progressing  Flowsheets (Taken 9/21/2022 0116)  Verbalizes/displays adequate comfort level or baseline comfort level:   Encourage patient to monitor pain and request assistance   Assess pain using appropriate pain scale   Administer analgesics based on type and severity of pain and evaluate response   Implement non-pharmacological measures as appropriate and evaluate response

## 2022-09-21 NOTE — PROGRESS NOTES
[address]  Cardiology  Progress Note    Admission date:  9/15/2022    Reason for follow up visit: Dyspnea, afib, CAD    HPI/CC: Delray Fothergill is a 69 y.o. male who presented 9/15/2022 for dyspnea and fatigue. Noted to be orthostatic. He has also been treated for asthma and afib. On 2022 he had PIERO/CV. Rhythm overnight has been sinus 60s. Subjective: Feels better emotionally today knowing he is in SR. No significant change in dyspnea so far, still has dyspnea and lightheadedness with ambulation. No chest pain. Vitals:  Blood pressure 135/80, pulse 90, temperature 98 °F (36.7 °C), temperature source Oral, resp. rate 16, height 6' 3\" (1.905 m), weight (!) 316 lb (143.3 kg), SpO2 98 %.   Temp  Av.6 °F (36.4 °C)  Min: 97.3 °F (36.3 °C)  Max: 98 °F (36.7 °C)  Pulse  Av  Min: 73  Max: 91  BP  Min: 107/61  Max: 144/74  SpO2  Av.9 %  Min: 96 %  Max: 100 %    24 hour I/O    Intake/Output Summary (Last 24 hours) at 2022 0904  Last data filed at 2022 9326  Gross per 24 hour   Intake 380 ml   Output 675 ml   Net -295 ml       Current Facility-Administered Medications   Medication Dose Route Frequency Provider Last Rate Last Admin    torsemide (DEMADEX) tablet 20 mg  20 mg Oral Daily Awilda Baez MD   20 mg at 22 0836    sodium chloride flush 0.9 % injection 5-40 mL  5-40 mL IntraVENous 2 times per day Ann Williamson MD   20 mL at 22 2108    sodium chloride flush 0.9 % injection 5-40 mL  5-40 mL IntraVENous PRN Ann Williamson MD        0.9 % sodium chloride infusion   IntraVENous PRN Ann Williamson MD        fluocinonide (LIDEX) 0.05 % cream   Topical TID Awilda Baez MD   Given at 22 0836    metoprolol tartrate (LOPRESSOR) tablet 12.5 mg  12.5 mg Oral BID Jose R Mckeon MD   12.5 mg at 22 0836    diphenhydrAMINE (BENADRYL) tablet 25 mg  25 mg Oral Q6H PRN Regine Mcdaniel MD   25 mg at 22 0515    diphenhydrAMINE-zinc acetate cream   Topical TID PRN Angel Flaherty MD   Given at 09/17/22 0947    albuterol sulfate HFA (PROVENTIL;VENTOLIN;PROAIR) 108 (90 Base) MCG/ACT inhaler 2 puff  2 puff Inhalation 4x Daily PRN Mehdi Bolton MD        aspirin chewable tablet 81 mg  81 mg Oral Daily Mehdi Bolton MD   81 mg at 09/21/22 0836    atorvastatin (LIPITOR) tablet 40 mg  40 mg Oral Daily Mehdi Bolton MD   40 mg at 09/21/22 0836    clopidogrel (PLAVIX) tablet 75 mg  75 mg Oral Daily Mehdi Bolton MD   75 mg at 09/21/22 0836    magnesium oxide (MAG-OX) tablet 400 mg  400 mg Oral Daily Mehdi Bolton MD   400 mg at 09/21/22 0836    montelukast (SINGULAIR) tablet 10 mg  10 mg Oral Nightly Mehdi Bolton MD   10 mg at 09/20/22 2107    therapeutic multivitamin-minerals 2 tablet  2 tablet Oral Daily Mehdi Bolton MD   2 tablet at 09/21/22 0836    glucose chewable tablet 16 g  4 tablet Oral PRN Mehdi Bolton MD        dextrose bolus 10% 125 mL  125 mL IntraVENous PRN Mehdi Bolton MD        Or    dextrose bolus 10% 250 mL  250 mL IntraVENous PRN Mehdi Bolton MD        glucagon (rDNA) injection 1 mg  1 mg SubCUTAneous PRN Mehdi Bolton MD        dextrose 10 % infusion   IntraVENous Continuous PRN Mehdi Bolton MD        sodium chloride flush 0.9 % injection 5-40 mL  5-40 mL IntraVENous 2 times per day Mehdi Bolton MD   10 mL at 09/20/22 2109    sodium chloride flush 0.9 % injection 5-40 mL  5-40 mL IntraVENous PRN Mehdi Bolton MD        0.9 % sodium chloride infusion   IntraVENous PRN Mehdi Bolton MD        ondansetron (ZOFRAN-ODT) disintegrating tablet 4 mg  4 mg Oral Q8H PRN Mehdi Bolton MD        Or    ondansetron [facility]) injection 4 mg  4 mg IntraVENous Q6H PRN Mehdi Bolton MD        polyethylene glycol [facility]) packet 17 g  17 g Oral Daily PRN Mehdi Bolton MD        acetaminophen (TYLENOL) tablet 650 mg  650 mg Oral Q6H PRN Mehdi Bolton MD   650 mg at 09/21/22 6160    Or    acetaminophen (TYLENOL) suppository 650 mg  650 mg Rectal Q6H PRN Keren Muñoz MD        potassium chloride (KLOR-CON M) extended release tablet 40 mEq  40 mEq Oral PRN Keren Muñoz MD        Or    potassium bicarb-citric acid (EFFER-K) effervescent tablet 40 mEq  40 mEq Oral PRN Keren Muñoz MD        Or    potassium chloride 10 mEq/100 mL IVPB (Peripheral Line)  10 mEq IntraVENous PRN Keren Muñoz MD        magnesium sulfate 2000 mg in 50 mL IVPB premix  2,000 mg IntraVENous PRN Keren Muñoz MD        insulin lispro (HUMALOG) injection vial 0-4 Units  0-4 Units SubCUTAneous Q6H Keren Muñoz MD   1 Units at 09/16/22 0116    enoxaparin (LOVENOX) injection 135 mg  1 mg/kg SubCUTAneous BID Keren Muñoz MD   135 mg at 09/20/22 2107    warfarin placeholder: dosing by pharmacy   Other RX Vani Gordon MD         Review of Systems   Constitutional:  Positive for fatigue. Respiratory:  Positive for shortness of breath. Cardiovascular: Negative. Gastrointestinal: Negative. Neurological: Negative. Objective:     Telemetry monitor: SR    Physical Exam:  Constitutional:  Comfortable and alert, NAD, appears stated age  Eyes: PERRL, sclera nonicteric  Neck:  Supple, no masses, no thyroidmegaly, no JVD  Skin:  Warm and dry; no rash or lesions  Heart:  Regular, normal apex, S1 and S2 normal, no M/G/R  Lungs:  Normal respiratory effort; clear; no wheezing/rhonchi/rales  Abdomen: soft, non tender, + bowel sounds  Extremities:  No edema or cyanosis; no clubbing  Neuro: alert and oriented, moves legs and arms equally, normal mood and affect    Data Reviewed:    Echo 0/0262:  LV systolic function is normal with EF estimated at 55-60%. No regional wall motion abnormalities are noted. There is moderate concentric left ventricular hypertrophy. Normal left ventricular diastolic filling pressure. Biatrial enlargement.    Mild posterior mitral annular 04:16 AM    CO2 22 09/21/2022 04:16 AM     CBC:    Lab Results   Component Value Date/Time    WBC 9.1 09/21/2022 04:16 AM    RBC 3.66 09/21/2022 04:16 AM    HGB 10.8 09/21/2022 04:16 AM    HCT 32.4 09/21/2022 04:16 AM    MCV 88.6 09/21/2022 04:16 AM    RDW 17.4 09/21/2022 04:16 AM     09/21/2022 04:16 AM     BNP:    Lab Results   Component Value Date/Time    PROBNP 906 09/15/2022 03:52 PM    PROBNP 1,442 09/07/2022 04:40 AM    PROBNP 1,755 09/04/2022 12:49 PM    PROBNP 510 08/29/2022 02:15 PM    PROBNP 422 08/11/2022 08:09 AM     Fasting Lipid Panel:    Lab Results   Component Value Date/Time    CHOL 119 09/05/2022 04:37 AM    HDL 30 09/05/2022 04:37 AM    TRIG 147 09/05/2022 04:37 AM     Cardiac Enzymes:  CK/MbTroponin  Lab Results   Component Value Date/Time    TROPONINI <0.01 09/16/2022 06:47 AM     PT/ INR   Lab Results   Component Value Date/Time    INR 1.65 09/21/2022 04:16 AM    INR 1.57 09/20/2022 04:40 AM    INR 1.54 09/19/2022 05:07 AM    PROTIME 19.3 09/21/2022 04:16 AM    PROTIME 18.6 09/20/2022 04:40 AM    PROTIME 18.3 09/19/2022 05:07 AM     PTT No results found for: PTT   Lab Results   Component Value Date/Time    MG 2.30 09/21/2022 04:16 AM      Lab Results   Component Value Date/Time    TSH 2.18 09/19/2022 05:07 AM     All labs and imaging reviewed today    Assessment:  Shortness of breath: no improvement post PCI  Fatigue: no improvement post PCI  Paroxysmal atrial fibrillation, formerly persistent, unclear if symptomatic: stable  - s/p PIERO/CV 9/20/2022    - [id number] score >2 on coumadin through PCP              - s/p aflutter ablation 4/2017              - on dofetilide since 2017, stopped 9/2022 due to prolonged QTc  CAD: s/p FELIZ LAD 9/8/2022; s/p FELIZ x2 LAD and FLEIZ Diag 6/16/2021  PVC ablation 2014  MARIA VICTORIA: compliant with CPAP  HTN  HLD  DM  History of DVT    Plan:   1. He remains in SR post CV 9/20/2022. No significant improvement in dyspnea thus far but may be too soon to be sure.    2. Diltiazem stopped due to bradycardia; spironolactone stopped due to hypotension previously  3. Continue metoprolol 12.5 mg BID, HR and BP tolerating  4. Continue aspirin, plavix, coumadin; after 10/8/2022, plan to stop aspirin to avoid triple therapy  5. Continue statin, torsemide  6. Possible SNF at discharge  7. He will follow up with Dr. Lucero Wharton 9/27/2022 to evaluate symptoms in SR and discuss afib ablation. He will also follow up with Dr. Daphne More  8. Cardiology will sign off, please call with questions.     Mehdi Scott, APRN-CNP  [address]  [phone]

## 2022-09-21 NOTE — TELEPHONE ENCOUNTER
New order pended with asthma DX, consult notes faxed to [facility]. F/u to make sure they are received.

## 2022-09-21 NOTE — PROGRESS NOTES
Pt resting quietly on CPAP from home no needs noted or voiced at this time pt ready for discharge to [facility]

## 2022-09-21 NOTE — PROGRESS NOTES
Ambulated pt on room air approx 25 feet outside of room , SPO2 maintained 100% HR 81. Pt noted increased respiration and reported feeling SOB and dizzy.  This RN educated pt  and demonstrated slow breathing pt to bed without incident

## 2022-09-21 NOTE — PROGRESS NOTES
Pt awake and alert in bed. Vitals obtained. Oriented times 4. Shift assessment completed, see flow sheet. PRN Tylenol, and benadryl given per request. Scheduled meds given, see MAR. Pt ambulated with walker and gait belt. HR went up to 90s and pt got a little SOB, but recovered quickly Pt denies any further needs at this time. Call light in reach.    Latonya Beckett RN

## 2022-09-22 RX ORDER — ALBUTEROL SULFATE 2.5 MG/3ML
2.5 SOLUTION RESPIRATORY (INHALATION) EVERY 4 HOURS PRN
Qty: 1080 ML | Refills: 3 | Status: SHIPPED | OUTPATIENT
Start: 2022-09-22 | End: 2023-09-22

## 2022-09-22 NOTE — TELEPHONE ENCOUNTER
Received a call from [facility] saying they'd be able to fill pt's Duoneb medication and run it through Medicare part B as opposed to having his local [employer] run it through part D. Pt is currently a resident at [facility] (Room 205) and he said he was fine for us to send nebulizer solution Rx to [facility]. Rx pended and set to print. Pls fax to [facility] at [phone] and they will send it with his nebulizer. Make sure [facility] knows pt is not living at home right now and that he's a resident at [facility]) and that's where they should deliver his nebulizer. Also need to call [employer] to cx Rx that was sent electronically to them on 9/18/22, if ok with Nikolas Palomo.

## 2022-09-22 NOTE — TELEPHONE ENCOUNTER
Spoke to [place] [facility] [place] at [address] and canceled pt's nebulizer medication. Informed christofer to only cancel the neb meds and not the albuterol inhaler.  Faxed paper script to [facility] at [phone]

## 2022-09-22 NOTE — PROGRESS NOTES
Physician Progress Note      Joao Soto  CSN #:                  [id number]  :                       1945  ADMIT DATE:       9/15/2022 3:38 PM  [address] [place] DATE:        2022 6:27 PM  RESPONDING  PROVIDER #:        Brenda Wilkins MD          QUERY TEXT:    Pt admitted with PANG. Noted documentation per cardiology:  Due to fatigue/PANG   that did not improve post PCI, will arrange for PIERO/DCCV tomorrow as symptoms   could be due to persistent afib. If possible, please document in progress   notes and discharge summary:    The medical record reflects the following:  Risk Factors: advanced age, weakness, debility, recent covid infection, afib,   asthma, htn  Clinical Indicators: cardiology note on : Due to fatigue/PANG that did not   improve post PCI, will arrange for PIERO/DCCV tomorrow as symptoms could be due   to persistent afib. He will follow up in EP clinic 2022 to discuss   ablation, especially if symptoms improve in SR. Treatment: cardiology consult, tele, plan for PIERO/DCCV    Thank you for your assistance,  Aneta Monteiro RN,BSN,CCDS,CRCR  Options provided:  -- PANG and chest pain due persistent afib  -- PANG and chest pain unrelated to persistent afib  -- Other - I will add my own diagnosis  -- Disagree - Not applicable / Not valid  -- Disagree - Clinically unable to determine / Unknown  -- Refer to Clinical Documentation Reviewer    PROVIDER RESPONSE TEXT:    The PANG and chest pain due to persistent afib.     Query created by: Quinton Olivier on 2022 7:12 AM      Electronically signed by:  Brenda Wilkins MD 2022 12:26 PM

## 2022-09-23 NOTE — TELEPHONE ENCOUNTER
Called and spoke with bhavin at [place] [address]. Address for [place] was given to bhavin to ship 's nebulizer and meds to. Huy Aguirre updated the address for shipment.

## 2022-09-27 ENCOUNTER — TELEPHONE (OUTPATIENT)
Dept: CARDIOLOGY CLINIC | Age: 77
End: 2022-09-27

## 2022-09-27 ENCOUNTER — OFFICE VISIT (OUTPATIENT)
Dept: CARDIOLOGY CLINIC | Age: 77
End: 2022-09-27
Payer: MEDICARE

## 2022-09-27 VITALS
HEIGHT: 75 IN | WEIGHT: 314.4 LBS | OXYGEN SATURATION: 97 % | DIASTOLIC BLOOD PRESSURE: 78 MMHG | SYSTOLIC BLOOD PRESSURE: 136 MMHG | HEART RATE: 80 BPM | BODY MASS INDEX: 39.09 KG/M2

## 2022-09-27 DIAGNOSIS — I25.10 CORONARY ARTERY DISEASE INVOLVING NATIVE CORONARY ARTERY OF NATIVE HEART, UNSPECIFIED WHETHER ANGINA PRESENT: Primary | ICD-10-CM

## 2022-09-27 DIAGNOSIS — I48.4 ATYPICAL ATRIAL FLUTTER (HCC): ICD-10-CM

## 2022-09-27 DIAGNOSIS — I48.91 ATRIAL FIBRILLATION WITH RVR (HCC): Primary | ICD-10-CM

## 2022-09-27 DIAGNOSIS — Z95.5 S/P CORONARY ARTERY STENT PLACEMENT: ICD-10-CM

## 2022-09-27 PROCEDURE — 93000 ELECTROCARDIOGRAM COMPLETE: CPT | Performed by: INTERNAL MEDICINE

## 2022-09-27 PROCEDURE — G8427 DOCREV CUR MEDS BY ELIG CLIN: HCPCS | Performed by: INTERNAL MEDICINE

## 2022-09-27 PROCEDURE — 99214 OFFICE O/P EST MOD 30 MIN: CPT | Performed by: INTERNAL MEDICINE

## 2022-09-27 PROCEDURE — G8417 CALC BMI ABV UP PARAM F/U: HCPCS | Performed by: INTERNAL MEDICINE

## 2022-09-27 NOTE — PATIENT INSTRUCTIONS
RECOMMENDATIONS:  Discussed risks and benefits of ablation for atrial fibrillation. Discussed [employer] or [address] to monitor for atrial fib at home. Discussed risks and benefits of loop monitor for long term atrial fib monitoring. Patient would like to proceed with this option. We will call you to set this up. Await monitor results- we will call you with results. Continue current medications. Follow up in 3 months.

## 2022-09-27 NOTE — LETTER
[facility] Cardiology - [address] [place] [id number] [facility]  Phone: [phone]  Fax: [phone]    Radha Issa MD        October 14, 2022      Giovanny Nava is scheduled with Dr. Monica Crawley for Loop Implant with [employer] on 10/28/22 at [facility], arrival time of 8:30am to the Cath Lab. Please arrive to the main entrance of [facility] and check in with the registration desk. No medications need held that morning. Do not apply lotions/creams on skin the day of procedure.       Sincerely,        Radha Issa MD

## 2022-09-27 NOTE — PROGRESS NOTES
[address]   Electrophysiology Consult Note              Date: 9/27/22  Patient Name: Sheila Matthew  YOB: 1945    Primary Care Physician: Romana Just, MD    CHIEF COMPLAINT:   Chief Complaint   Patient presents with    New Patient    Atrial Fibrillation    Other     Atrial flutter      HISTORY OF PRESENT ILLNESS: Sheila Matthew is a 69 y.o. male with a PMH significant for PAF (s/p PIERO/CV), AFL s/p ablation 2017, CAD s/p FELIZ 2022, PVC ablation 2014. Patient has previously been on dofetilide and it was stopped due to prolonged QTc. Patient was hospitalized in 9/2022 with c/o BLE edema and was in AF at the time. QTc was prolonged and Tikosyn was stopped. Patient underwent LHC and RHC for SOB on 9/8/2022 which demonstrated successful FELIZ to LAD. He presented again to hospital in 9/15/2022 with dyspnea and fatigue and was in rate controlled AF. He underwent PIERO and DCCV on 9/20/2022. Today, 9/27/2022, ECG demonstrates SR (83). He reports that he is not doing well lately. He has increased fatigue and weakness. He feels much better since being in rhythm. He is taking his medications as prescribed. Denies recent issues with bleeding or bruising. Patient denies current edema, chest pain, sob, palpitations, dizziness or syncope. Past Medical History:   has a past medical history of Arthritis, Asthma, Atrial fibrillation ([address] Blood circulation, collateral, Diabetes mellitus ([address] DVT (deep venous thrombosis) ([address] Histoplasmosis, History of knee replacement procedure of right knee, Hx of blood clots, Hyperlipidemia, Hypertension, Knee osteoarthritis, Left TKR, Lung nodule, Neuromuscular disorder ([address] Palpitations, Sleep apnea, Stone, kidney, and UTI (urinary tract infection). Past Surgical History:   has a past surgical history that includes Cystoscopy; Tonsillectomy; Knee arthroscopy (4/19/2011); Colonoscopy; Cystocopy (2/8/13); ablation of dysrhythmic focus (2013);  Carpal ONE TABLET BY MOUTH DAILY 8/11/22  Yes Amey Lesch, MD   metFORMIN (GLUCOPHAGE) 1000 MG tablet Take 0.5 tablets by mouth in the morning and 0.5 tablets in the evening. Take with meals. 7/20/22  Yes Amey Lesch, MD   montelukast (SINGULAIR) 10 MG tablet TAKE ONE TABLET BY MOUTH DAILY 7/1/22  Yes Amey Lesch, MD   atorvastatin (LIPITOR) 40 MG tablet TAKE ONE TABLET BY MOUTH DAILY 7/1/22  Yes Amey Lesch, MD   Dulaglutide (TRULICITY) 4.77 GF/9.9MM SOPN Inject 0.75 mg into the skin once a week   Yes Historical Provider, MD   blood glucose test strips (TRUE METRIX BLOOD GLUCOSE TEST) strip TEST ONCE DAILY. DX:E11.9 1/20/22  Yes Amey Lesch, MD   torsemide [facility]) 20 MG tablet Take 1 tablet by mouth daily 10/4/21  Yes SERENA Gutierres CNP   Easy Touch Lancets 32G/Twist MISC TEST DAILY. DX: E11.9 3/23/20  Yes Amey Lesch, MD   blood glucose test strips (TRUE METRIX BLOOD GLUCOSE TEST) strip USE TO CHECK BLOOD GLUCOSE DAILY. DX: E11.9 3/23/20  Yes Amey Lesch, MD   Lancet Devices (EASY TOUCH LANCING DEVICE) MISC Test Daily. DX: E11.9 12/26/18  Yes Amey Lesch, MD   Blood Glucose Monitoring Suppl (TRUE METRIX METER) VINNIE Use to check daily. DX;E11.9 12/22/18  Yes Amey Lesch, MD   Lancets Misc. (ACCU-CHEK MULTICLIX LANCET DEV) KIT Check sugars once daily. DX;E11.9 12/21/18  Yes Amey Lesch, MD   Biotin 5 MG TABS Take 5 mg by mouth daily   Yes Historical Provider, MD   Cyanocobalamin (VITAMIN B-12 CR PO) Take 5,000 mcg by mouth every 7 days    Yes Historical Provider, MD   Multiple Vitamins-Minerals (THERAPEUTIC MULTIVITAMIN-MINERALS) tablet Take 2 tablets by mouth daily    Yes Historical Provider, MD   magnesium oxide (MAG-OX) 400 MG tablet Take 400 mg by mouth daily.    Yes Historical Provider, MD   albuterol sulfate HFA (VENTOLIN HFA) 108 (90 Base) MCG/ACT inhaler Inhale 2 puffs into the lungs 4 times daily as needed for Wheezing or Shortness of Breath 9/18/22   COLIN Dennis   lisinopril (PRINIVIL;ZESTRIL) 5 MG tablet Take 1 tablet by mouth daily 9/10/22 9/21/22  Mary Najera MD   spironolactone (ALDACTONE) 25 MG tablet TAKE ONE TABLET BY MOUTH DAILY  Patient not taking: No sig reported 7/1/22 9/15/22  Amey Lesch, MD   metoprolol succinate (TOPROL XL) 25 MG extended release tablet TAKE ONE TABLET BY MOUTH DAILY  Patient not taking: No sig reported 7/1/22 9/21/22  Francis Virgen MD   [facility]) 250 MCG capsule Take 1 capsule by mouth 2 times daily  Patient not taking: Reported on 9/7/2022 10/19/21 9/9/22  SOTERO Smith MD   ACCU-CHEK MULTICLIX LANCETS MISC Check sugars once daily. Dx;E11.9 3/11/19   Amey Lesch, MD   Cholecalciferol (VITAMIN D3) 5000 units TABS Take 5,000 Units by mouth daily    Historical Provider, MD       REVIEW OF SYSTEMS:    All 14-point review of systems are completed and  pertinent positives are mentioned in the history of present illness. Other  systems are reviewed and are negative. Physical Examination:    /78   Pulse 80   Ht 6' 3\" (1.905 m)   Wt (!) 314 lb 6.4 oz (142.6 kg)   SpO2 97%   BMI 39.30 kg/m²      Constitutional and General Appearance:    alert, cooperative, no distress, and appears stated age  [de-identified]:    PERRLA, no cervical lymphadenopathy. No masses palpable. Normal oral  mucosa  Respiratory:  Normal excursion and expansion without use of accessory muscles  Resp Auscultation: Normal breath sounds without dullness or wheezing  Cardiovascular: The apical impulse is not displaced  RRR S1S2 w/o M/G/R  Abdomen:  No masses or tenderness  Bowel sounds present  Extremities:   No Cyanosis or Clubbing   Lower extremity edema: No  Skin: Warm and dry  Neurological:  Alert and oriented. Moves all extremities well  No abnormalities of mood, affect, memory, mentation, or behavior are noted    DATA:    ECG 9/27/22: Personally reviewed.      Echo 9/6/2022   Summary   LV systolic function is normal with EF estimated at 55-60%. No regional wall motion abnormalities are noted. There is moderate concentric left ventricular hypertrophy. Normal left ventricular diastolic filling pressure. Biatrial enlargement. Mild posterior mitral annular calcification is present. Aortic valve appears sclerotic but opens adequately. Mild mitral and tricuspid regurgitation. Systolic pulmonary artery pressure (SPAP) estimated at 46mmHg (RA pressure 3   mmHg), consistent with mild pulmonary hypertension. 6- 55%, LVH, LAE, ANNIE    Cardiac cath 9/8/2022  Findings:     1. Left main coronary artery was normal. It gave off the left anterior descending artery and left circumflex. 2. Left anterior descending artery has severe atherosclerotic disease. It was large in size. It gave off septal perforators and a moderate sized diagonal branch. The LAD covered the entire apex of the left ventricle. ~There is evidence for Denovo disease of about 70% within the mid LAD. This is confirmed by intravascular ultrasound. Furthermore there is a previously placed stent within the mid LAD that was under deployed and under expanded. 3. Left circumflex has mild atherosclerotic disease. It was moderate in size. There was a moderate sized obtuse marginal branch. 4. Right coronary artery has mild atherosclerotic disease. It was moderate in size and was the dominant artery. 5. Left ventriculogram showed normal LVEF at 55-60%. Wall motion was normal . There was no significant mitral valve or aortic valve disease noted. LVEDP was normal. There was no gradient noted across the aortic valve during pullback of the catheter.        IMPRESSION:    Paroxysmal atrial fibrillation ([id number] score 4).  9/27/2022  Patient is a pleasant 69year old male with a medical history significant for paroxysmal atrial fibrillation (previously on dofetilide but discontinued for prolonged Qtc - Qtc didn't appear long to me in review), atrial flutter (typical and atypical) status post ablation, ischemic cardiomyopathy, morbid obesity, heart failure with preserved ejection fraction, and hypertension who presents from home to [facility] care. Patient recently underwent a cardioversion and his dofetilide was discontinued. He remains in normal sinus rhythm and has had no recurrence. We discussed options for long term monitoring such as [employer], [employer], and ILR. We discussed antiarrhythmics vs ablation. At this, patient would like an ILR for atrial fibrillation management. He would like to hold off on antiarrhythmics and ablation pending recurrence of his symptomatic atrial fibrillation.  - Schedule ILR post monitor.  - Continue warfarin with goal INR 2-3.  - Continue metoprolol.  - Follow up in 3 months. RECOMMENDATIONS:  Discussed risks and benefits of ablation for atrial fibrillation. Discussed [employer] or [address] to monitor for atrial fib at home. Discussed risks and benefits of loop monitor for long term atrial fib monitoring. Patient would like to proceed with this option. We will call you to set this up. Await monitor results- we will call you with results. Continue current medications. Follow up in 3 months. QUALITY MEASURES  1. Tobacco Cessation Counseling: NA  2. Retake of BP if >140/90:   NA  3. Documentation to PCP/referring for new patient:  Sent to PCP at close of office visit  4. CAD patient on anti-platelet: Yes  5. CAD patient on STATIN therapy:  Yes  6. Patient with CHF and aFib on anticoagulation:  Yes     All questions and concerns were addressed to the patient/family. Alternatives to my treatment were discussed. Dr. Effie Sandhoff MD  Electrophysiology  [facility]. [address] [facility] [address]. [address].   [place]  Phone: [phone]  Fax: [phone]     NOTE: This report was transcribed using voice recognition software. Every effort was made to ensure accuracy, however, inadvertent computerized transcription errors may be present. Tamiko Bland RN, am scribing for and in the presence of Dr. Godfrey Sharp. 09/27/22 12:15 PM   Maryjane Miles RN    The scribe's documentation has been prepared under my direction and personally reviewed by me in its entirety. I confirm that the note above accurately reflects all work, physical examination, the discussion of treatments and procedures, and medical decision making performed by me. Priscilla Jurado MD personally performed the services described in this documentation as scribed by nurse in my presence, and is both accurate and complete.     Electronically signed by Clay Alvarez MD on 9/27/2022 at 1:17 PM

## 2022-09-27 NOTE — TELEPHONE ENCOUNTER
Received DWO from [employer]. Will have abiodun sign today 9/27/2022 and fax back to [employer].

## 2022-09-27 NOTE — TELEPHONE ENCOUNTER
Patient was seen in office 9/27/2022. ILR discussed and agreed upon by [id number] and patient. Medications not discussed. Patient will need covid testing prior. Thank you! *patient is currently wearing an event monitor. Should be completed by next week.

## 2022-09-29 NOTE — TELEPHONE ENCOUNTER
Order placed for [facility] cardiac rehab. Called and spoke with patient request information be sent through [address] [place] [address] [address].

## 2022-09-30 ENCOUNTER — TELEPHONE (OUTPATIENT)
Dept: INTERNAL MEDICINE CLINIC | Age: 77
End: 2022-09-30

## 2022-09-30 NOTE — TELEPHONE ENCOUNTER
----- Message from Regine Mcdaniel MD sent at 9/30/2022  7:39 AM EDT -----  Yes    ----- Message -----  From: Alcario Fothergill  Sent: 9/29/2022   3:56 PM EDT  To: Regine Mcdaniel MD    Asking for verbal orders that you will follow this patient.      Augustina/Corin [phone]

## 2022-10-03 ENCOUNTER — TELEPHONE (OUTPATIENT)
Dept: CARDIOLOGY CLINIC | Age: 77
End: 2022-10-03

## 2022-10-03 ENCOUNTER — CARE COORDINATION (OUTPATIENT)
Dept: CASE MANAGEMENT | Age: 77
End: 2022-10-03

## 2022-10-03 ENCOUNTER — TELEPHONE (OUTPATIENT)
Dept: INTERNAL MEDICINE CLINIC | Age: 77
End: 2022-10-03

## 2022-10-03 ENCOUNTER — ANTI-COAG VISIT (OUTPATIENT)
Dept: INTERNAL MEDICINE CLINIC | Age: 77
End: 2022-10-03

## 2022-10-03 DIAGNOSIS — I48.91 ATRIAL FIBRILLATION, UNSPECIFIED TYPE (HCC): Primary | ICD-10-CM

## 2022-10-03 LAB — INR BLD: 1.6

## 2022-10-03 PROCEDURE — 1111F DSCHRG MED/CURRENT MED MERGE: CPT | Performed by: INTERNAL MEDICINE

## 2022-10-03 NOTE — TELEPHONE ENCOUNTER
Pt is being [facility] and then will be followed home health. Pt was getting PT/INR's twice daily. Should this continue with HH. Please advise.

## 2022-10-03 NOTE — PROGRESS NOTES
[address]   Cardiac Followup    Referring Provider:  Mavis Cooley MD     Chief Complaint   Patient presents with    Follow-up    Hypertension    Atrial Fibrillation    Coronary Artery Disease    Edema     Feet and lower legs          Daphne Ashby   1945    History of Present Illness:    Daphne Ashby is a 69 y.o. male who is here today for follow up for a past medical history of coronary artery disease, abnormal stress test, paroxysmal atrial fibrillation/atrial flutter, PVC's,  anticoagulation with warfarin, hypertension, hyperlipidemia, diabetes mellitus, MARIA VICTORIA, obesity status post gastric sleeve as well as history of DVT and histoplasmosis s/p thoracotomy. He has a stress test in 2011 which showed a small sized reversible perfusion defect involving the anteroseptal wall of the left ventricle to mid heart concerning for myocardial ischemia. He has a history of PVC ablation in 2014. He stated he continued to have occasional episodes of palpitations and was found to have atrial flutter. He underwent a CV, and an unsuccessful RFCA for typical atrial flutter on 4/12/17. Patient was started on dofetilide in June of 2017. He underwent EPS and aflutter ablation on 9/19/17. He had a normal stress test in 2017. Stress test 5/19/2021 was abnornmal consistent with mixed ischemia and scar. He had a left heart cath 6/16/2021- Findings significant for LAD/Diag lesions, LAD treated with FELIZ x2 and Diag FELIZ x1. ASA stopped one month post procedure and patient continued on Plavix and warfarin. Advised to have sleep study. Cardiac event monitor 2/25-3/11/2022  Average heart rate 79 (), nocturnal bradycardia 2:1 conduction PSVT, NSVT. Coumadin is managed by Mavis Cooley MD Stress test 2/2022 showed no ischemia. Chest Xray negative. Last visit he reported having cellulitis to RLE. Shania Brooke He had COVID diagnosis July 2022 but believes the SOB pre-dated this.   Gets associated chest tightness w/ the SOB. States he had an echocardiogram through the [place]. Patient was hospitalized in 9/2022 with c/o BLE edema and was in AF at the time. QTc was prolonged and Tikosyn was stopped. Patient underwent LHC and RHC for SOB on 9/8/2022 which demonstrated successful FELIZ to LAD. He presented again to hospital in 9/15/2022 with dyspnea and fatigue and was in rate controlled AF. He underwent PIERO and DCCV on 9/20/2022. patient was COVID + 9/4/2022. Today he states he has been feeling 100% better since his cardioversion. States that he has more energy and is able to walk further. Went to rehab after leaving the hospital and initially was not even able to walk across the room. States yesterday and he was walking up and down the street. He is currently wearing a cardiac event monitor and plan for loop recorder placement. Patient currently denies any weight gain, palpitations, chest pain, dizziness, and syncope. Past Medical History:   has a past medical history of Arthritis, Asthma, Atrial fibrillation ([address] Blood circulation, collateral, Diabetes mellitus ([address] DVT (deep venous thrombosis) ([address] Histoplasmosis, History of knee replacement procedure of right knee, Hx of blood clots, Hyperlipidemia, Hypertension, Knee osteoarthritis, Left TKR, Lung nodule, Neuromuscular disorder ([address] Palpitations, Sleep apnea, Stone, kidney, and UTI (urinary tract infection). Surgical History:   has a past surgical history that includes Cystoscopy; Tonsillectomy; Knee arthroscopy (4/19/2011); Colonoscopy; Cystocopy (2/8/13); ablation of dysrhythmic focus (2013); Carpal tunnel release (Right, 9-30-15); Bariatric Surgery (2013); Carpal tunnel release (Left, 3/20/16); skin biopsy; Diagnostic Cardiac Cath Lab Procedure; joint replacement (Bilateral); Cardiac surgery (2013); thoracotomy; ERCP (02/18/2019); ERCP (N/A, 2/18/2019); ERCP (2/18/2019); Cholecystectomy, laparoscopic (N/A, 2/19/2019);  Intracapsular cataract extraction (Right, 1/14/2020); Intracapsular cataract extraction (Left, 1/21/2020); transesophageal echocardiogram (N/A, 9/20/2022); and Cardioversion (N/A, 9/20/2022). Social History:   reports that he quit smoking about 46 years ago. His smoking use included cigarettes. He has a 34.00 pack-year smoking history. He has never used smokeless tobacco. He reports that he does not drink alcohol and does not use drugs. Family History:  family history includes Arthritis in his father; Cancer in his mother; Kidney Disease in his mother; Other in his father; Stroke in his father and mother; Substance Abuse in his father. Home Medications:  Prior to Admission medications    Medication Sig Start Date End Date Taking? Authorizing Provider   albuterol (PROVENTIL) (2.5 MG/3ML) 0.083% nebulizer solution Take 3 mLs by nebulization every 4 hours as needed for Wheezing or Shortness of Breath 9/22/22 9/22/23 Yes COLIN Dennis   metoprolol tartrate (LOPRESSOR) 25 MG tablet Take 0.5 tablets by mouth 2 times daily 9/21/22  Yes Patricia Vázquez MD   albuterol sulfate HFA (VENTOLIN HFA) 108 (90 Base) MCG/ACT inhaler Inhale 2 puffs into the lungs 4 times daily as needed for Wheezing or Shortness of Breath 9/18/22  Yes COLIN Dennis   aspirin 81 MG chewable tablet Take 1 tablet by mouth daily 9/10/22  Yes Avril Vargas MD   clopidogrel (PLAVIX) 75 MG tablet Take 1 tablet by mouth daily 9/9/22 10/9/22 Yes Avril Vargas MD   warfarin (COUMADIN) 5 MG tablet TAKE ONE TABLET BY MOUTH DAILY 8/11/22  Yes Avel Hoff MD   metFORMIN (GLUCOPHAGE) 1000 MG tablet Take 0.5 tablets by mouth in the morning and 0.5 tablets in the evening. Take with meals.  7/20/22  Yes Avel Hoff MD   montelukast (SINGULAIR) 10 MG tablet TAKE ONE TABLET BY MOUTH DAILY 7/1/22  Yes Avel Hoff MD   atorvastatin (LIPITOR) 40 MG tablet TAKE ONE TABLET BY MOUTH DAILY  Patient taking differently: Takes at night 7/1/22  Yes Rashida Brown MD   Dulaglutide (TRULICITY) 9.53 UH/1.7GC SOPN Inject 0.75 mg into the skin once a week   Yes Historical Provider, MD   blood glucose test strips (TRUE METRIX BLOOD GLUCOSE TEST) strip TEST ONCE DAILY. DX:E11.9 1/20/22  Yes Rashida Brown MD   torsemide [facility]) 20 MG tablet Take 1 tablet by mouth daily 10/4/21  Yes SREENA Castañeda CNP   Easy Touch Lancets 32G/Twist MISC TEST DAILY. DX: E11.9 3/23/20  Yes Rashida Brown MD   blood glucose test strips (TRUE METRIX BLOOD GLUCOSE TEST) strip USE TO CHECK BLOOD GLUCOSE DAILY. DX: E11.9 3/23/20  Yes Rashida Brown MD   ACCU-CHEK MULTICLIX LANCETS MISC Check sugars once daily. Dx;E11.9 3/11/19  Yes Rashida Brown MD   Lancet Devices (EASY TOUCH LANCING DEVICE) MISC Test Daily. DX: E11.9 12/26/18  Yes Rashida Brown MD   Blood Glucose Monitoring Suppl (TRUE METRIX METER) VINNIE Use to check daily. DX;E11.9 12/22/18  Yes Rashida Brown MD   Lancets Misc. (ACCU-CHEK MULTICLIX LANCET DEV) KIT Check sugars once daily. DX;E11.9 12/21/18  Yes Rashida Brown MD   Biotin 5 MG TABS Take 5 mg by mouth daily   Yes Historical Provider, MD   Cholecalciferol (VITAMIN D3) 5000 units TABS Take 5,000 Units by mouth daily   Yes Historical Provider, MD   Cyanocobalamin (VITAMIN B-12 CR PO) Take 5,000 mcg by mouth every 7 days    Yes Historical Provider, MD   Multiple Vitamins-Minerals (THERAPEUTIC MULTIVITAMIN-MINERALS) tablet Take 2 tablets by mouth daily    Yes Historical Provider, MD   magnesium oxide (MAG-OX) 400 MG tablet Take 400 mg by mouth daily.    Yes Historical Provider, MD   lisinopril (PRINIVIL;ZESTRIL) 5 MG tablet Take 1 tablet by mouth daily 9/10/22 9/21/22  Tarun Cuadra MD   spironolactone (ALDACTONE) 25 MG tablet TAKE ONE TABLET BY MOUTH DAILY  Patient not taking: No sig reported 7/1/22 9/15/22  Rashida Brown MD   metoprolol succinate (TOPROL XL) 25 MG extended release tablet TAKE ONE TABLET BY MOUTH DAILY  Patient not taking: No sig reported 7/1/22 9/21/22  Imani Knight MD   dofetilide [facility]) 250 MCG capsule Take 1 capsule by mouth 2 times daily  Patient not taking: Reported on 9/7/2022 10/19/21 9/9/22  Woody Cohen MD        Allergies:  Bactrim [sulfamethoxazole-trimethoprim], Brilinta [ticagrelor], Ciprofloxacin, Macrobid [nitrofurantoin monohyd macro], Ozempic (0.25 or 0.5 mg-dose) [semaglutide(0.25 or 0.5mg-dos)], Sulfamethoxazole, Theophylline, Cefuroxime axetil, Cipro xr, Other, and Tape [adhesive tape]     Review of Systems:   Constitutional: there has been no unanticipated weight loss. There's been no change in energy level, sleep pattern, or activity level. Eyes: No visual changes or diplopia. No scleral icterus. ENT: No Headaches, hearing loss or vertigo. No mouth sores or sore throat. Cardiovascular: Reviewed in HPI  Respiratory: No cough or wheezing, no sputum production. No hematemesis. Gastrointestinal: No abdominal pain, appetite loss, blood in stools. No change in bowel or bladder habits. Genitourinary: No dysuria, trouble voiding, or hematuria. Musculoskeletal:  No gait disturbance, weakness or joint complaints. Integumentary: No rash or pruritis. Neurological: No headache, diplopia, change in muscle strength, numbness or tingling. No change in gait, balance, coordination, mood, affect, memory, mentation, behavior. Psychiatric: No anxiety, no depression. Endocrine: No malaise, fatigue or temperature intolerance. No excessive thirst, fluid intake, or urination. No tremor. Hematologic/Lymphatic: No abnormal bruising or bleeding, blood clots or swollen lymph nodes. Allergic/Immunologic: No nasal congestion or hives.     Physical Examination:    Vitals:    10/04/22 0942   BP: 126/74   Pulse: 63   SpO2: 94%            Wt Readings from Last 3 Encounters:   10/04/22 (!) 317 lb 9.6 oz (144.1 kg)   09/27/22 (!) 314 lb 6.4 oz (142.6 kg)   09/21/22 (!) 316 lb (143.3 kg)       Constitutional and General Appearance: NAD   Respiratory:  Normal excursion and expansion without use of accessory muscles  Resp Auscultation: Normal breath sounds without dullness  Cardiovascular: The apical impulses not displaced  Heart tones are crisp and normal  Cervical veins are not engorged  The carotid upstroke is normal in amplitude and contour without delay or bruit  Normal S1S2, No S3, No Murmur  Peripheral pulses are symmetrical and full  There is no clubbing, cyanosis of the extremities. Trace 2+ BLE edema  Femoral Arteries: 2+ and equal  Pedal Pulses: 2+ and equal   Abdomen:  No masses or tenderness  Liver/Spleen: No Abnormalities Noted  Neurological/Psychiatric:  Alert and oriented in all spheres  Moves all extremities well  Exhibits normal gait balance and coordination  No abnormalities of mood, affect, memory, mentation, or behavior are noted  Lab Results   Component Value Date    CHOL 119 09/05/2022    CHOL 139 06/16/2021    CHOL 222 (H) 12/19/2018     Lab Results   Component Value Date    TRIG 147 09/05/2022    TRIG 126 06/16/2021    TRIG 159 (H) 12/19/2018     Lab Results   Component Value Date    HDL 30 (L) 09/05/2022    HDL 38 (L) 03/24/2022    HDL 39 (L) 06/16/2021     Lab Results   Component Value Date    LDLCALC 60 09/05/2022    LDLCALC 70 03/24/2022    LDLCALC 75 06/16/2021     Lab Results   Component Value Date    LABVLDL 29 09/05/2022    LABVLDL 32 03/24/2022    LABVLDL 25 06/16/2021     No results found for: CHOLHDLRATIO      Stress test 12/23/11  Impression- 1. Small sized reversible perfusion defect involving the anteroseptal wall of the left ventricle to mid heart concerning for myocardial ischemia. Please correlate with EKG findings. 2. Left ventricular hypertrophy. 3. LVEF 59 %     NAVNEET/Cardioversion 2/7/2012  Summary-   1. A Navneet was performed without complications. 2.  LVEF 55   3. No thrombus or valvular vegetation identified.    CARDIOVERSION- After an adequate level of sedation was achieved, 150J, then 200J in   biphasic synchronized delivery was administered. No change in   rhythm. The patient awoke without complications. A post procedure   12 L ECG was ordered and reviewed. There were no complications encountered. 1.  Unsuccessful attempt at cardioversion. 2.  Patient remains in atrial fibrillation. Echocardiogram 11/24/15  Conclusions   Summary   Moderate concentric left ventricular hypertrophy is present. Global ejection fraction is normal and estimated from 55 % to 60 %. No obvious regional wall motion abnormalities are noted. Diastolic filling parameters suggests diastolic dysfunction. Moderately dilated left atrium. Aortic valve appears sclerotic but opens adequately. Trivial mitral and tricuspid regurgitation. Systolic pulmonary artery pressure (SPAP) is normal and estimated at 23 mmHg   (RA pressure 3 mm Hg). Echocardiogram 6/23/17  Conclusions      Summary   Normal systolic function with an estimated ejection fraction of 55-60%. Mild to moderate concentric left ventricular hypertrophy. No regional wall motion abnormalities are seen. Left ventricular diastolic filling pressure is indeterminate. Left atrium is moderately dilated. No change from last echo on 11/24/1015. Limited Echocardiogram 11/31/2017   Conclusions      Summary   This is a limited study pericardial effusion and shunt. Normal left ventricular systolic function with an estimated ejection   fraction of 55%. A bubble study was performed and fails to show evidence of shunting. There is a very small circumferential pericardial effusion noted. Stress test 11/1/2017  Conclusions    Summary  There is no evidence of stress induced ischemia. Normal LV function with  ejection fraction of 66%. There are no regional wall motion abnormalities. Low risk study.        REECE 3/31-4/30/2021  No atrial fib   Predominately NSR  PSVT- symptomatic   NSVT/PVC's-  Symptomatic  Nocturnal bradycardia     Stress test 5/19/2021  Conclusions    Summary  The overall quality of the study is fair. There is subdiaphragmatic  attenuation. Left ventricular cavity is noted to be normal size. The right ventricle is  normal in size. SPECT images demonstrate a small area of mildly decreased perfusion in the  apical lateral and mid-inferior lateral segments. The defect is mixed  ischemia and scar. There is associated wall motion abnormality, consistent  with ischemia. Gated SPECT imaging reveals normal myocardial thickening and  wall motion. Sum stress score of 7. No visual TID. Calculated TID of 0.99. The left ventricular ejection fraction is calculated to be 63%. Myocardial perfusion is abnormal. Consistent with mixed ischemia and scar. Low risk scan. EKG 6/16/21  Sinus rhythm with marked sinus arrhythmia with 1st degree A-V blockLow voltage QRSProlonged QTAbnormal ECGConfirmed by Erin Menard MD ([id number]) on 6/16/2021 4:41:38 PM    ECHO 6/16/21  Summary   Left ventricular systolic function is normal with a visually estimated   ejection fraction of 55%. The left ventricle is normal in size with mild concentric hypertrophy. No obvious regional wall motion abnormalities noted. Increased left ventricular filling pressure. The left atrium appears moderately enlarged. The right atrium is severely enlarged. Right ventricle is enlarged but   normal function. Inadequate tricuspid valve regurgitation to estimate systolic pulmonary   artery pressure. The IVC is dilated in size (>2.1 cm) but appears to collapse >50% with   respiration consistent with elevated right atrial pressures (8 mmHg) .     EKG 6/16/21  Sinus rhythm with 1st degree A-V block with Premature atrial complexesOtherwise normal ECGConfirmed by ELIZABETH Menard MD ([id number]) on 6/17/2021 6:58:06 AM    EKG 6/16/21  Sinus rhythm with sinus arrhythmia with 1st degree A-V blockSeptal infarct , age undeterminedAbnormal ECGConfirmed by Emily Nolasco MD, Neri Alvarez ([id number]) on 6/17/2021 6:58:18 AM    Left heart cath 6/16/2021 [facility]  Procedures Performed:   1. Selective left heart catheterization  2. Percutaneous coronary intervention of the left anterior descending and diagonal artery artery using complex bifurcation technique. Procedure Findings:  1. Critical LAD diagonal disease with angina classification 0, 0, 1 along with mid LAD 70% stenosis       Echocardiogram 6/16/2021  Summary   Left ventricular systolic function is normal with a visually estimated   ejection fraction of 55%. The left ventricle is normal in size with mild concentric hypertrophy. No obvious regional wall motion abnormalities noted. Increased left ventricular filling pressure. The left atrium appears moderately enlarged. The right atrium is severely enlarged. Right ventricle is enlarged but   normal function. Inadequate tricuspid valve regurgitation to estimate systolic pulmonary   artery pressure. The IVC is dilated in size (>2.1 cm) but appears to collapse >50% with   respiration consistent with elevated right atrial pressures (8 mmHg) . Chest Xray 2/22/2022  No acute process. Stress test 2/28/2022  Conclusions    Summary  Normal LV function. There is normal isotope uptake at stress and rest. There is no evidence of  myocardial ischemia or scar. Cardiac event monitor 2/25-3/11/2022  Average heart rate 79 (), nocturnal bradycardia 2:1 conduction PSVT, NSVT    Left heart cath VSP 9/8/2022  CONCLUSIONS:     1. Successful intravascular ultrasound-guided drug-eluting stent insertion to the left anterior descending artery  ASSESSMENT/RECOMMENDATIONS:     1. Continue dual antiplatelet therapy and guideline directed medical therapy. 2.  Follow-up with patient's primary cardiologist after discharge.     PIERO cardioversion LUG 9/20/2022  Op note  Procedure   Transesophageal gram  Direct current cardioversion  Indications  Dyspnea fatigue  Paroxysmal atrial fibrillations  Sedated by propofol by anesthesia  PIERO no intracardiac thrombus  Left atrial appendage poorly visualized no thrombus seen velocity >40cm/sec  Then a single synchronized shock of 200 Joules applied converted to NSR with occasional PAC's. Will transfer to recovery. He had a lot of secretions while doing PIERO requiring frequent suctioning. Latest Reference Range & Units 9/5/22 04:37   CHOLESTEROL, TOTAL, 162070 0 - 199 mg/dL 119   HDL Cholesterol 40 - 60 mg/dL 30 (L)   LDL Calculated <100 mg/dL 60   Triglycerides 0 - 150 mg/dL 147   VLDL Cholesterol Calculated Not Established mg/dL 29          Assessment:   CAD - stable. S/p PCI 9/8/22. Noted little to no improvement in symptoms post PCI  PANG - multifactorial. Increased diuresis has had little effect and lead to SULAIMAN in past. PCI had little to no effect. Significant improvement post DCCV to NSR. Also suspect sig effect of covid (positive 12/2020 and 9/2022)  PAF - s/p DCCV. Followed by EP. Planning ILR later this month   Nocturnal bradycardia 2:1 due to MARIA VICTORIA  Hypertension - controlled    Hyperlipidemia - stable, controlled  Sleep apnea on CPAP  History of DVT   Diabetes Mellitus   Asthma   Tachycardia - intermittent  Fatigue - improved w/ return to NSR  COVID + 9/4/2022    Plan:  Labs BNP and BMP Monday   Take torsemide 30 mg daily ( increased dose)   Cardiac medications reviewed including indications and pertinent side effects. Medication list updated at this visit. Check blood pressure and heart rate at home a few times per week- keep a log with dates and times and bring to office visit   Regular exercise and following a healthy diet encouraged   Follow up with me in 6 weeks  You will get a call with monitor results    ILR later this month    Scribe's attestation:   This note was scribed in the presence of Dr. Nawaf Davis M.D. By Apple De La Torre RN    The scribes documentation has been prepared under my direction and personally reviewed by me in its entirety. I confirm that the note above accurately reflects all work, treatment, procedures, and medical decision making performed by me. Dr. Evelyn Tolentino MD            Thank you for allowing me to participate in the care of this individual.      Josh Ospina.  Tamie Pemberton M.D., Reshma Aldrich

## 2022-10-03 NOTE — TELEPHONE ENCOUNTER
----- Message from Honey Gomez MD sent at 10/3/2022 12:44 PM EDT -----  Contact: [phone]  1 week   No change   Call him    ----- Message -----  From: Micheal Howe  Sent: 10/3/2022  10:05 AM EDT  To: Honey Gomez MD    INR: 1.6  Currently taking 5 mg daily. Was recently in the hospital and had a high INR and was told to hold for 2 days. Started back on Saturday. Patient also requesting a hospital follow up. Please advise.

## 2022-10-03 NOTE — TELEPHONE ENCOUNTER
Spoke with patient. Patient is scheduled with Dr. Iliana Ovalle for Loop Implant with [employer] on 10/28/22 at [facility], arrival time of 8:30am to the Cath Lab. Please have patient arrive to the main entrance of [facility] and check in with the registration desk. Please call patient regarding medication instructions. Do not apply lotions/creams on skin the day of procedure.

## 2022-10-03 NOTE — CARE COORDINATION
[facility] Discharge Call    10/3/2022    Patient: Elham Zaidi Patient : 1945   MRN: [id number]  Reason for Admission: PIERO and cardioversion for afib  Discharge Date: 22 RARS: Readmission Risk Score: 19         Discharge Facility: [facility]    Acute Care Course:   Pt to [place] from 9/15 to  with a PIERO and cardioversion. Had a stent on . Pt then to [address] [facility] for 10 days with d/c on 10/1 to home with [facility] made:  10[facility] Madeleine Byers.  10/10 with Dr Gillermina Hashimoto Hx:   Afib with rvr and NSR after conversion. CAD, DMII with polyneuropathy, recent CoVID, MARIA VICTORIA with CPAP    DME: none    Conversation:   Spoke with Radhika after 2 IDs. Pt is doing well. He saw Dr Madeleine Byers this morning. This is his [facility]. Elio Hull has a nurse coming Wednesday but cannot remember name of [address]. Will see PCP on Monday. When [address] is done he will go to Cardiac Rehab at [place]. He is sleeping with his CPAP and is waking up a night. He can ambulate by self and is doing [facility] better since procedure and therapy at snf. He has no issues getting up or taking shower. He only has one step going into house. His FBS was 130 and INR 1.6 which he reported to MD. He also has had a 15 # weight gain. Reported to MD who stated to take 30 mg of torsemide for 3 days then go back to 20. Educated on CHF zone. He is on a low salt diet and drinks water. He saw Dr Brent Fleming from pulmonology while in the snf and nebulizer was ordered. He has received the nebulizer but not the medine. Reshma Meza is managing. Educated to report SOB, weakness, CP. Reviewed meds with pt as to purpose and how to take. Agreeable to calls. Follow up plan:   Will pass of [place] Transitions Post Acute Facility Transition      Do you have any ongoing symptoms?: No   Do you have all of your prescriptions and are they filled?: No   Patient Reports: awaiting nebulizer solution   Do you have any questions related to your medications?: No   Have you scheduled your follow up appointment?: Yes   How are you going to get to your appointment?: Car - family or friend to transport   Were you [facility] with any [facility] or  [facility] or do you currently have any active services?: Yes   Post Acute Services: Via Lauren Alicea you have support at home?: Partner/Spouse/SO   Do you feel like you have everything you need to keep you well at home?: Yes   Patient DME: Chair lift, Walker, Commode, Straight cane, Other   Other Patient DME: grab bars in shower and around toilet, [facility] shower   Care Transitions Interventions         Future Appointments   Date Time Provider Arslan Ardon   10/4/2022  9:45 AM MD ABRAN Esquivel   10/7/2022  2:00 PM SCHEDULE, LESLY Vincent   10/10/2022  1:00 PM MD Med Snyder Int None   10/12/2022  1:00 PM Santino Deutsch MD AND DEMOND CROFT   2022  1:10 PM MD Med [facility] Int None   2022 10:15 AM MD Shanae Dunn   2023  1:20 PM SERENA Pickett - CNP CLERM PULM [facility]     Transitions of Care Initial Call    Was this an external facility discharge? Yes, 10/1/22  Discharge Facility: [facility]. Challenges to be reviewed by the provider   Additional needs identified to be addressed with provider: No  none             Method of communication with provider : none    Advance Care Planning:   Does patient have an Advance Directive: reviewed and current. Care Transition Nurse contacted the patient by telephone to perform post hospital discharge assessment. Verified name and  with patient as identifiers. Provided introduction to self, and explanation of the CTN role. CTN reviewed discharge instructions, medical action plan and red flags with patient who verbalized understanding.  Patient given an opportunity to ask questions and does not have any further questions or concerns at this time. Were discharge instructions available to patient? Yes. Reviewed appropriate site of care based on symptoms and resources available to patient including: PCP. The patient agrees to contact the PCP office for questions related to their healthcare. Medication reconciliation was performed with patient, who verbalizes understanding of administration of home medications. Advised obtaining a 90-day supply of all daily and as-needed medications. Was patient discharged with a pulse oximeter? no    CTN provided contact information. Plan for follow-up call in 5-7 days based on severity of symptoms and risk factors.   Plan for next call: referral to ambulatory care manager-Mary Ware, EULALIAN, RN   [address] [place] [address] Transition Nurse  [phone]

## 2022-10-03 NOTE — TELEPHONE ENCOUNTER
Spoke with [facility] Tuesday, relayed [address] message. N v/u and will contact coumadin clinic.

## 2022-10-04 ENCOUNTER — OFFICE VISIT (OUTPATIENT)
Dept: CARDIOLOGY CLINIC | Age: 77
End: 2022-10-04
Payer: MEDICARE

## 2022-10-04 VITALS
HEART RATE: 63 BPM | HEIGHT: 75 IN | SYSTOLIC BLOOD PRESSURE: 126 MMHG | BODY MASS INDEX: 39.17 KG/M2 | DIASTOLIC BLOOD PRESSURE: 74 MMHG | OXYGEN SATURATION: 94 % | WEIGHT: 315 LBS

## 2022-10-04 DIAGNOSIS — R94.39 ABNORMAL STRESS TEST: ICD-10-CM

## 2022-10-04 DIAGNOSIS — R06.02 SOB (SHORTNESS OF BREATH): ICD-10-CM

## 2022-10-04 DIAGNOSIS — I25.10 CORONARY ARTERY DISEASE INVOLVING NATIVE CORONARY ARTERY OF NATIVE HEART WITHOUT ANGINA PECTORIS: Primary | ICD-10-CM

## 2022-10-04 DIAGNOSIS — I48.91 ATRIAL FIBRILLATION, UNSPECIFIED TYPE (HCC): ICD-10-CM

## 2022-10-04 DIAGNOSIS — I10 ESSENTIAL HYPERTENSION: ICD-10-CM

## 2022-10-04 PROCEDURE — 1123F ACP DISCUSS/DSCN MKR DOCD: CPT | Performed by: INTERNAL MEDICINE

## 2022-10-04 PROCEDURE — 1111F DSCHRG MED/CURRENT MED MERGE: CPT | Performed by: INTERNAL MEDICINE

## 2022-10-04 PROCEDURE — G8484 FLU IMMUNIZE NO ADMIN: HCPCS | Performed by: INTERNAL MEDICINE

## 2022-10-04 PROCEDURE — 1036F TOBACCO NON-USER: CPT | Performed by: INTERNAL MEDICINE

## 2022-10-04 PROCEDURE — G8427 DOCREV CUR MEDS BY ELIG CLIN: HCPCS | Performed by: INTERNAL MEDICINE

## 2022-10-04 PROCEDURE — G8417 CALC BMI ABV UP PARAM F/U: HCPCS | Performed by: INTERNAL MEDICINE

## 2022-10-04 PROCEDURE — 99214 OFFICE O/P EST MOD 30 MIN: CPT | Performed by: INTERNAL MEDICINE

## 2022-10-04 RX ORDER — TORSEMIDE 20 MG/1
TABLET ORAL
Qty: 135 TABLET | Refills: 0 | Status: ON HOLD
Start: 2022-10-04 | End: 2022-10-19 | Stop reason: HOSPADM

## 2022-10-04 NOTE — PATIENT INSTRUCTIONS
Plan:  Labs BNP and BMP Monday   Take torsemide 30 mg daily   Cardiac medications reviewed including indications and pertinent side effects. Medication list updated at this visit.    Check blood pressure and heart rate at home a few times per week- keep a log with dates and times and bring to office visit   Regular exercise and following a healthy diet encouraged   Follow up with me in 6 weeks  You will get a call with monitor results    Refills sen to for torsemide

## 2022-10-05 ENCOUNTER — APPOINTMENT (OUTPATIENT)
Dept: GENERAL RADIOLOGY | Age: 77
DRG: 274 | End: 2022-10-05
Payer: MEDICARE

## 2022-10-05 ENCOUNTER — TELEPHONE (OUTPATIENT)
Dept: INTERNAL MEDICINE CLINIC | Age: 77
End: 2022-10-05

## 2022-10-05 ENCOUNTER — HOSPITAL ENCOUNTER (INPATIENT)
Age: 77
LOS: 3 days | Discharge: HOME HEALTH | DRG: 274 | End: 2022-10-08
Attending: EMERGENCY MEDICINE | Admitting: INTERNAL MEDICINE
Payer: MEDICARE

## 2022-10-05 ENCOUNTER — TELEPHONE (OUTPATIENT)
Dept: OTHER | Facility: CLINIC | Age: 77
End: 2022-10-05

## 2022-10-05 ENCOUNTER — TELEPHONE (OUTPATIENT)
Dept: CARDIOLOGY CLINIC | Age: 77
End: 2022-10-05

## 2022-10-05 DIAGNOSIS — R53.1 GENERALIZED WEAKNESS: ICD-10-CM

## 2022-10-05 DIAGNOSIS — R06.09 DOE (DYSPNEA ON EXERTION): Primary | ICD-10-CM

## 2022-10-05 DIAGNOSIS — I48.91 ATRIAL FIBRILLATION, UNSPECIFIED TYPE (HCC): ICD-10-CM

## 2022-10-05 PROBLEM — I48.0 PAF (PAROXYSMAL ATRIAL FIBRILLATION) (HCC): Status: ACTIVE | Noted: 2022-10-05

## 2022-10-05 PROBLEM — Z98.61 CAD S/P PERCUTANEOUS CORONARY ANGIOPLASTY: Status: ACTIVE | Noted: 2022-09-09

## 2022-10-05 LAB
A/G RATIO: 1.1 (ref 1.1–2.2)
ALBUMIN SERPL-MCNC: 3.6 G/DL (ref 3.4–5)
ALP BLD-CCNC: 88 U/L (ref 40–129)
ALT SERPL-CCNC: 31 U/L (ref 10–40)
ANION GAP SERPL CALCULATED.3IONS-SCNC: 13 MMOL/L (ref 3–16)
AST SERPL-CCNC: 28 U/L (ref 15–37)
BASOPHILS ABSOLUTE: 0.1 K/UL (ref 0–0.2)
BASOPHILS RELATIVE PERCENT: 0.9 %
BILIRUB SERPL-MCNC: 0.5 MG/DL (ref 0–1)
BILIRUBIN URINE: NEGATIVE
BLOOD, URINE: NEGATIVE
BUN BLDV-MCNC: 26 MG/DL (ref 7–20)
CALCIUM SERPL-MCNC: 8.9 MG/DL (ref 8.3–10.6)
CHLORIDE BLD-SCNC: 101 MMOL/L (ref 99–110)
CLARITY: CLEAR
CO2: 25 MMOL/L (ref 21–32)
COLOR: YELLOW
CREAT SERPL-MCNC: 1.4 MG/DL (ref 0.8–1.3)
EKG ATRIAL RATE: 78 BPM
EKG DIAGNOSIS: NORMAL
EKG Q-T INTERVAL: 388 MS
EKG QRS DURATION: 78 MS
EKG QTC CALCULATION (BAZETT): 403 MS
EKG R AXIS: -5 DEGREES
EKG T AXIS: 12 DEGREES
EKG VENTRICULAR RATE: 65 BPM
EOSINOPHILS ABSOLUTE: 0.4 K/UL (ref 0–0.6)
EOSINOPHILS RELATIVE PERCENT: 4.6 %
GFR AFRICAN AMERICAN: 60
GFR NON-AFRICAN AMERICAN: 49
GLUCOSE BLD-MCNC: 124 MG/DL (ref 70–99)
GLUCOSE BLD-MCNC: 74 MG/DL (ref 70–99)
GLUCOSE BLD-MCNC: 94 MG/DL (ref 70–99)
GLUCOSE URINE: NEGATIVE MG/DL
HCT VFR BLD CALC: 35.9 % (ref 40.5–52.5)
HEMOGLOBIN: 11.9 G/DL (ref 13.5–17.5)
INR BLD: 1.84 (ref 0.87–1.14)
KETONES, URINE: NEGATIVE MG/DL
LEUKOCYTE ESTERASE, URINE: NEGATIVE
LYMPHOCYTES ABSOLUTE: 1 K/UL (ref 1–5.1)
LYMPHOCYTES RELATIVE PERCENT: 11.7 %
MAGNESIUM: 2.2 MG/DL (ref 1.8–2.4)
MCH RBC QN AUTO: 29.4 PG (ref 26–34)
MCHC RBC AUTO-ENTMCNC: 33.1 G/DL (ref 31–36)
MCV RBC AUTO: 88.7 FL (ref 80–100)
MICROSCOPIC EXAMINATION: NORMAL
MONOCYTES ABSOLUTE: 1 K/UL (ref 0–1.3)
MONOCYTES RELATIVE PERCENT: 10.8 %
NEUTROPHILS ABSOLUTE: 6.4 K/UL (ref 1.7–7.7)
NEUTROPHILS RELATIVE PERCENT: 72 %
NITRITE, URINE: NEGATIVE
PDW BLD-RTO: 16.9 % (ref 12.4–15.4)
PERFORMED ON: ABNORMAL
PERFORMED ON: NORMAL
PH UA: 6.5 (ref 5–8)
PLATELET # BLD: 250 K/UL (ref 135–450)
PMV BLD AUTO: 6.8 FL (ref 5–10.5)
POTASSIUM SERPL-SCNC: 4.3 MMOL/L (ref 3.5–5.1)
PRO-BNP: 1634 PG/ML (ref 0–449)
PROTEIN UA: NEGATIVE MG/DL
PROTHROMBIN TIME: 21.3 SEC (ref 11.7–14.5)
RBC # BLD: 4.05 M/UL (ref 4.2–5.9)
SODIUM BLD-SCNC: 139 MMOL/L (ref 136–145)
SPECIFIC GRAVITY UA: 1.01 (ref 1–1.03)
TOTAL PROTEIN: 7 G/DL (ref 6.4–8.2)
TROPONIN: <0.01 NG/ML
URINE TYPE: NORMAL
UROBILINOGEN, URINE: 0.2 E.U./DL
WBC # BLD: 8.9 K/UL (ref 4–11)

## 2022-10-05 PROCEDURE — 93010 ELECTROCARDIOGRAM REPORT: CPT | Performed by: INTERNAL MEDICINE

## 2022-10-05 PROCEDURE — 1200000000 HC SEMI PRIVATE

## 2022-10-05 PROCEDURE — 6370000000 HC RX 637 (ALT 250 FOR IP): Performed by: INTERNAL MEDICINE

## 2022-10-05 PROCEDURE — 80053 COMPREHEN METABOLIC PANEL: CPT

## 2022-10-05 PROCEDURE — 81003 URINALYSIS AUTO W/O SCOPE: CPT

## 2022-10-05 PROCEDURE — 84484 ASSAY OF TROPONIN QUANT: CPT

## 2022-10-05 PROCEDURE — 99223 1ST HOSP IP/OBS HIGH 75: CPT | Performed by: INTERNAL MEDICINE

## 2022-10-05 PROCEDURE — 85610 PROTHROMBIN TIME: CPT

## 2022-10-05 PROCEDURE — 85025 COMPLETE CBC W/AUTO DIFF WBC: CPT

## 2022-10-05 PROCEDURE — 99285 EMERGENCY DEPT VISIT HI MDM: CPT

## 2022-10-05 PROCEDURE — 93005 ELECTROCARDIOGRAM TRACING: CPT | Performed by: EMERGENCY MEDICINE

## 2022-10-05 PROCEDURE — 83735 ASSAY OF MAGNESIUM: CPT

## 2022-10-05 PROCEDURE — 83880 ASSAY OF NATRIURETIC PEPTIDE: CPT

## 2022-10-05 PROCEDURE — 71045 X-RAY EXAM CHEST 1 VIEW: CPT

## 2022-10-05 RX ORDER — POTASSIUM CHLORIDE 20 MEQ/1
40 TABLET, EXTENDED RELEASE ORAL PRN
Status: DISCONTINUED | OUTPATIENT
Start: 2022-10-05 | End: 2022-10-06

## 2022-10-05 RX ORDER — POTASSIUM CHLORIDE 7.45 MG/ML
10 INJECTION INTRAVENOUS PRN
Status: DISCONTINUED | OUTPATIENT
Start: 2022-10-05 | End: 2022-10-06

## 2022-10-05 RX ORDER — ASPIRIN 81 MG/1
81 TABLET, CHEWABLE ORAL DAILY
Status: DISCONTINUED | OUTPATIENT
Start: 2022-10-06 | End: 2022-10-08 | Stop reason: HOSPADM

## 2022-10-05 RX ORDER — INSULIN GLARGINE 100 [IU]/ML
0.15 INJECTION, SOLUTION SUBCUTANEOUS NIGHTLY
Status: DISCONTINUED | OUTPATIENT
Start: 2022-10-05 | End: 2022-10-05

## 2022-10-05 RX ORDER — ACETAMINOPHEN 650 MG/1
650 SUPPOSITORY RECTAL EVERY 6 HOURS PRN
Status: DISCONTINUED | OUTPATIENT
Start: 2022-10-05 | End: 2022-10-08 | Stop reason: HOSPADM

## 2022-10-05 RX ORDER — ONDANSETRON 4 MG/1
4 TABLET, ORALLY DISINTEGRATING ORAL EVERY 8 HOURS PRN
Status: DISCONTINUED | OUTPATIENT
Start: 2022-10-05 | End: 2022-10-08 | Stop reason: HOSPADM

## 2022-10-05 RX ORDER — DEXTROSE MONOHYDRATE 100 MG/ML
INJECTION, SOLUTION INTRAVENOUS CONTINUOUS PRN
Status: DISCONTINUED | OUTPATIENT
Start: 2022-10-05 | End: 2022-10-08 | Stop reason: HOSPADM

## 2022-10-05 RX ORDER — ACETAMINOPHEN 325 MG/1
650 TABLET ORAL EVERY 6 HOURS PRN
Status: DISCONTINUED | OUTPATIENT
Start: 2022-10-05 | End: 2022-10-08 | Stop reason: HOSPADM

## 2022-10-05 RX ORDER — ONDANSETRON 2 MG/ML
4 INJECTION INTRAMUSCULAR; INTRAVENOUS EVERY 6 HOURS PRN
Status: DISCONTINUED | OUTPATIENT
Start: 2022-10-05 | End: 2022-10-08 | Stop reason: HOSPADM

## 2022-10-05 RX ORDER — INSULIN LISPRO 100 [IU]/ML
0-8 INJECTION, SOLUTION INTRAVENOUS; SUBCUTANEOUS
Status: DISCONTINUED | OUTPATIENT
Start: 2022-10-05 | End: 2022-10-05

## 2022-10-05 RX ORDER — INSULIN GLARGINE 100 [IU]/ML
0.15 INJECTION, SOLUTION SUBCUTANEOUS NIGHTLY
Status: DISCONTINUED | OUTPATIENT
Start: 2022-10-06 | End: 2022-10-07

## 2022-10-05 RX ORDER — INSULIN LISPRO 100 [IU]/ML
0-8 INJECTION, SOLUTION INTRAVENOUS; SUBCUTANEOUS EVERY 4 HOURS
Status: DISCONTINUED | OUTPATIENT
Start: 2022-10-05 | End: 2022-10-07

## 2022-10-05 RX ORDER — SODIUM CHLORIDE 9 MG/ML
INJECTION, SOLUTION INTRAVENOUS PRN
Status: DISCONTINUED | OUTPATIENT
Start: 2022-10-05 | End: 2022-10-08 | Stop reason: HOSPADM

## 2022-10-05 RX ORDER — CALCIUM CARBONATE 200(500)MG
1000 TABLET,CHEWABLE ORAL 3 TIMES DAILY PRN
Status: DISCONTINUED | OUTPATIENT
Start: 2022-10-05 | End: 2022-10-08 | Stop reason: HOSPADM

## 2022-10-05 RX ORDER — WARFARIN SODIUM 5 MG/1
1 TABLET ORAL EVERY EVENING
Status: DISCONTINUED | OUTPATIENT
Start: 2022-10-05 | End: 2022-10-05 | Stop reason: DRUGHIGH

## 2022-10-05 RX ORDER — ATORVASTATIN CALCIUM 40 MG/1
40 TABLET, FILM COATED ORAL NIGHTLY
Status: DISCONTINUED | OUTPATIENT
Start: 2022-10-05 | End: 2022-10-08 | Stop reason: HOSPADM

## 2022-10-05 RX ORDER — SODIUM CHLORIDE 0.9 % (FLUSH) 0.9 %
10 SYRINGE (ML) INJECTION PRN
Status: DISCONTINUED | OUTPATIENT
Start: 2022-10-05 | End: 2022-10-08 | Stop reason: HOSPADM

## 2022-10-05 RX ORDER — MAGNESIUM SULFATE 1 G/100ML
1000 INJECTION INTRAVENOUS PRN
Status: DISCONTINUED | OUTPATIENT
Start: 2022-10-05 | End: 2022-10-06

## 2022-10-05 RX ORDER — MONTELUKAST SODIUM 10 MG/1
10 TABLET ORAL NIGHTLY
Status: DISCONTINUED | OUTPATIENT
Start: 2022-10-05 | End: 2022-10-08 | Stop reason: HOSPADM

## 2022-10-05 RX ORDER — INSULIN LISPRO 100 [IU]/ML
0.05 INJECTION, SOLUTION INTRAVENOUS; SUBCUTANEOUS
Status: DISCONTINUED | OUTPATIENT
Start: 2022-10-05 | End: 2022-10-07

## 2022-10-05 RX ORDER — PANTOPRAZOLE SODIUM 40 MG/1
40 TABLET, DELAYED RELEASE ORAL
Status: DISCONTINUED | OUTPATIENT
Start: 2022-10-06 | End: 2022-10-06

## 2022-10-05 RX ORDER — LANOLIN ALCOHOL/MO/W.PET/CERES
3 CREAM (GRAM) TOPICAL NIGHTLY PRN
Status: DISCONTINUED | OUTPATIENT
Start: 2022-10-05 | End: 2022-10-08 | Stop reason: HOSPADM

## 2022-10-05 RX ORDER — INSULIN LISPRO 100 [IU]/ML
0-4 INJECTION, SOLUTION INTRAVENOUS; SUBCUTANEOUS NIGHTLY
Status: DISCONTINUED | OUTPATIENT
Start: 2022-10-05 | End: 2022-10-05

## 2022-10-05 RX ORDER — TORSEMIDE 20 MG/1
30 TABLET ORAL DAILY
Status: DISCONTINUED | OUTPATIENT
Start: 2022-10-06 | End: 2022-10-08 | Stop reason: HOSPADM

## 2022-10-05 RX ADMIN — INSULIN LISPRO 7 UNITS: 100 INJECTION, SOLUTION INTRAVENOUS; SUBCUTANEOUS at 18:45

## 2022-10-05 RX ADMIN — ACETAMINOPHEN 650 MG: 325 TABLET ORAL at 17:46

## 2022-10-05 RX ADMIN — METOPROLOL TARTRATE 12.5 MG: 25 TABLET, FILM COATED ORAL at 20:09

## 2022-10-05 RX ADMIN — ATORVASTATIN CALCIUM 40 MG: 40 TABLET, FILM COATED ORAL at 20:09

## 2022-10-05 RX ADMIN — MONTELUKAST SODIUM 10 MG: 10 TABLET ORAL at 20:09

## 2022-10-05 ASSESSMENT — PAIN DESCRIPTION - LOCATION
LOCATION: HEAD
LOCATION: HEAD

## 2022-10-05 ASSESSMENT — PAIN DESCRIPTION - DESCRIPTORS
DESCRIPTORS: ACHING
DESCRIPTORS: ACHING

## 2022-10-05 ASSESSMENT — PAIN DESCRIPTION - ORIENTATION
ORIENTATION: MID
ORIENTATION: MID

## 2022-10-05 ASSESSMENT — PAIN SCALES - GENERAL
PAINLEVEL_OUTOF10: 5
PAINLEVEL_OUTOF10: 6

## 2022-10-05 ASSESSMENT — PAIN - FUNCTIONAL ASSESSMENT
PAIN_FUNCTIONAL_ASSESSMENT: NONE - DENIES PAIN
PAIN_FUNCTIONAL_ASSESSMENT: ACTIVITIES ARE NOT PREVENTED
PAIN_FUNCTIONAL_ASSESSMENT: ACTIVITIES ARE NOT PREVENTED

## 2022-10-05 ASSESSMENT — PAIN DESCRIPTION - PAIN TYPE: TYPE: ACUTE PAIN

## 2022-10-05 NOTE — TELEPHONE ENCOUNTER
I reviewed the patients chart prior to calling him and it appears he is currently in the emergency department. MICHEL TOUSSAINT.

## 2022-10-05 NOTE — TELEPHONE ENCOUNTER
Pt stated that last night he started feeling dizzy and lightheaded. This morning he has been sob and fatigued. [facility] and pt had his bp checked by a paramedic there and they stated that his bp was normal but advised pt to call his cardiologist. Pt is on his way over to the [facility] in case agk wants him to go to er.

## 2022-10-05 NOTE — CONSULTS
Electrophysiology Consultation   Date: 10/5/2022  Admit Date:  10/5/2022  Reason for Consultation: Symptomatic rate controlled atrial fibrillation  Consult Requesting Physician: Westley Rashid MD     Chief Complaint   Patient presents with    Shortness of Breath     Pt reporting an extensive cardiac hx and is scheduled for a loop recorder on 10/28. Had a cardioversion x 2 weeks ago and is wearing a halter monitor. Pt reports he went through rehab and felt great. However over the past two days he's had some dizziness and some SOB. Today he said he was at the [facility] when he felt dizzy SOB and flushed. Came to the ED for evaluation     Dizziness     HPI:   Mr. Tiff Santiago is a pleasant 69year old male  with a medical history significant for symptomatic paroxysmal atrial fibrillation (previous on dofetilide [prolonged Qtc] and amiodarone [side effects]), atrial flutter status post ablation (2017 - Benito Phillip), premature ventricular contractions status post ablation (2014), ischemic cardiomyopathy status post PCI (09/08/2022), hypertension, diabetes mellitus type II, history of DVT, and obstructive sleep apnea who presents from home with symptomatic atrial fibrillation with controlled rates. Patient was recently seen by me in the office on 09/2 7/6/2022 rediscussed alternative antiarrhythmics, repeat ablation, and a loop monitor. Patient is doing well after our visit until this morning when he awoke with dizziness, lethargy lightheadedness, and lethargy. He felt very unsteady on his feet. He was unable to ambulate very far. He went with his wife for breakfast this morning and became even more dizzy and lightheaded. He asked emergency service providers who are doing a screening to check his vital signs. He is found to be in atrial fibrillation with controlled rates. Given his symptoms patient presented [facility] for [facility] evaluation and care.     Of note, patient previously on dofetilide however this was discontinued due to concerns for prolonged QTC and ineffectiveness. He was previously on amiodarone but this was discontinued due to side effects however patient is not clear what the side effects were. Patient's had multiple ablations including PVCs and her atrial flutter ablation. Patient denies fevers, chest pain, orthopnea, PND, lower extremity edema, abdominal swelling, chills, visual changes, headaches, sore throat, cough, abdominal pain, nausea, vomiting, bleeding, bruising, dysuria, muscle/joint pain, confusion, depression, anxiety, skin lesions, etc.    Emergency Room/Hospital Course:  Patient presented from home today. His CMP was notable for elevated creatinine (baseline 1.1). His CBC was reassuring. His chest radiograph was reassuring. His EKG showed rate controlled atrial fibrillation with PVC vs aberrancy.     Current rhythm: Rate controlled atrial fibrillation  Known history of atrial fibrillation: yes - paroxysmal  Valvular disease: No  Associated symptoms: palpitations and shortness of breath  Heart rate is  controlled  Previous cardioversion and/or ablation: yes - flutter ablation and cardioversion  History of CAD: Yes  History of sleep apnea: Yes  History of ETOH abuse/drug abuse: No  History of thyroid disease: No  Elevated BNP: No  Left atrial size is Abnormal  mildly dilated    Past Medical History:   Diagnosis Date    Arthritis     Asthma     past hx    Atrial fibrillation ([address]     Blood circulation, collateral     Diabetes mellitus ([address] 12/24/2018    DVT (deep venous thrombosis) ([address]     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    History of knee replacement procedure of right knee     Hx of blood clots     2 DVT's    Hyperlipidemia     Hypertension     Knee osteoarthritis 1/17/2012    Left TKR 1/18/2012    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 performed by Kalina Buitrago MD at [address] [place] [address] Right 9-30-15    CARPAL TUNNEL RELEASE Left 3/20/16    CHOLECYSTECTOMY, LAPAROSCOPIC N/A 2/19/2019    LAPAROSCOPIC CHOLECYSTECTOMY WITH CHOLANGIOGRAMS performed by Sandhya Gutierrez MD at [facility]      with removal stone    CYSTOSCOPY  2/8/13    with Laser Vaporization of Enlarged Prostate    DIAGNOSTIC CARDIAC CATH LAB PROCEDURE      ERCP  02/18/2019    Sphincterotomy, stone removal    ERCP N/A 2/18/2019    ERCP SPHINCTER/PAPILLOTOMY performed by Sabrina Valle MD at [address] [place] [address]    ERCP  2/18/2019    ERCP STONE REMOVAL performed by Sabrina Valle MD at [facility] Right 1/14/2020    PHACOEMULSIFICATION OF CATARACT RIGHT EYE WITH INTRAOCULAR LENS IMPLANT performed by Hunter Shukla MD at [place] [address] Left 1/21/2020    PHACOEMULSIFICATION OF CATARACT LEFT EYE WITH INTRAOCULAR LENS IMPLANT -SLEEP APNEA- performed by Hunter Shukla MD at [place] [address] Bilateral     right knee (2002) and left knee (2012)    KNEE ARTHROSCOPY  4/19/2011     left  knee    SKIN BIOPSY      skin Ca/SQUAMOUS CELL    THORACOTOMY      wedge resection    TONSILLECTOMY      TRANSESOPHAGEAL ECHOCARDIOGRAM N/A 9/20/2022    PIERO W/ANES.  (9:00) performed by Kalina Buitrago MD at [address] [facility]       Allergies   Allergen Reactions    Bactrim [Sulfamethoxazole-Trimethoprim] Diarrhea    Brilinta [Ticagrelor]      dyspnea    Ciprofloxacin      Bad headaches    Macrobid [Nitrofurantoin Monohyd Macro]     Ozempic (0.25 Or 0.5 Mg-Dose) [Semaglutide(0.25 Or 0.5mg-Dos)]     Sulfamethoxazole Diarrhea    Theophylline      Theodur-caused severe headaches    Cefuroxime Axetil Rash and Itching    Cipro Xr Rash    Other Rash and Other (See Comments)     Ekg leads-glue    Tape Carry Otter Tape] Rash     Skin irritaion  Eats away at skin, paper tape OK  Plastic tape       Social History:  Reviewed. reports that he quit smoking about 46 years ago. His smoking use included cigarettes. He has a 34.00 pack-year smoking history. He has never used smokeless tobacco. He reports that he does not drink alcohol and does not use drugs. Family History:  Reviewed. family history includes Arthritis in his father; Cancer in his mother; Kidney Disease in his mother; Other in his father; Stroke in his father and mother; Substance Abuse in his father. No premature CAD. Review of System:  All other systems reviewed except for that noted above. Pertinent negatives and positives are:     General: negative for fever, chills   Ophthalmic ROS: negative for - eye pain or loss of vision  ENT ROS: negative for - headaches, sore throat   Respiratory: negative for - cough, sputum  Cardiovascular: Reviewed in HPI  Gastrointestinal: negative for - abdominal pain, diarrhea, N/V  Hematology: negative for - bleeding, blood clots, bruising or jaundice  Genito-Urinary:  negative for - Dysuria or incontinence  Musculoskeletal: negative for - Joint swelling, muscle pain  Neurological: negative for - confusion, dizziness, headaches   Psychiatric: No anxiety, no depression. Dermatological: negative for - rash    Physical Examination:  Vitals:    10/05/22 1237   BP: 131/74   Pulse: 78   Resp: 19   Temp:    SpO2: 99%      No intake or output data in the 24 hours ending 10/05/22 1506  No intake/output data recorded.    Wt Readings from Last 3 Encounters:   10/05/22 (!) 317 lb 9.6 oz (144.1 kg)   10/04/22 (!) 317 lb 9.6 oz (144.1 kg)   22 (!) 314 lb 6.4 oz (142.6 kg)     Temp  Av.8 °F (36.6 °C)  Min: 97.8 °F (36.6 °C)  Max: 97.8 °F (36.6 °C)  Pulse  Av  Min: 78  Max: 80  BP  Min: 130/70  Max: 131/74  SpO2  Av.5 %  Min: 99 %  Max: 100 %    Telemetry: Atrial fibrillation with controlled rates. Constitutional: Alert. Oriented to person, place, and time. No distress. Head: Normocephalic and atraumatic. Mouth/Throat: Lips appear moist. Oropharynx is clear and moist.  Eyes: Conjunctivae normal. EOM are normal.   Neck: Neck supple. No lymphadenopathy. No rigidity. No JVD present. Cardiovascular: Irregular rate and rhythm w/o M/G/R  Pulmonary/Chest: Bilateral respiratory sounds present. No respiratory accessory muscle use. Abdominal: Soft. Normal bowel sounds present. No distension, No tenderness. No splenomegaly. No hernia. Musculoskeletal: No tenderness. No edema    Neurological: Alert and oriented. Cranial nerve II-XII grossly intact. Skin: Skin is warm and dry. No rash, lesions, ulcerations noted. Psychiatric: No anxiety nor agitation. Labs:  Reviewed. Recent Labs     10/05/22  1132      K 4.3      CO2 25   BUN 26*   CREATININE 1.4*     Recent Labs     10/05/22  1132   WBC 8.9   HGB 11.9*   HCT 35.9*   MCV 88.7        Lab Results   Component Value Date/Time    TROPONINI <0.01 10/05/2022 11:32 AM     Lab Results   Component Value Date/Time    BNP 53 2013 11:30 PM    BNP 74 09/15/2013 06:30 PM    BNP 37 10/27/2012 02:50 PM     Lab Results   Component Value Date/Time    PROTIME 21.3 10/05/2022 11:32 AM    PROTIME 19.3 2022 04:16 AM    PROTIME 18.6 2022 04:40 AM    INR 1.84 10/05/2022 11:32 AM    INR 1.60 10/03/2022 12:00 AM    INR 1.65 2022 04:16 AM     Lab Results   Component Value Date/Time    CHOL 119 2022 04:37 AM    HDL 30 2022 04:37 AM    TRIG 147 2022 04:37 AM       Diagnostic and imaging results reviewed. ECG: Atrial fibrillation with controlled rates with aberrancy vs PVC. Non-specific TW changes.     Echo: 09/06/2022   Summary   LV systolic function is normal with EF estimated at 55-60%. No regional wall motion abnormalities are noted. There is moderate concentric left ventricular hypertrophy. Normal left ventricular diastolic filling pressure. Biatrial enlargement. Mild posterior mitral annular calcification is present. Aortic valve appears sclerotic but opens adequately. Mild mitral and tricuspid regurgitation. Systolic pulmonary artery pressure (SPAP) estimated at 46mmHg (RA pressure 3   mmHg), consistent with mild pulmonary hypertension. 6- 55%, LVH, LAE, ANNIE    Cath: 09/08/2022  Findings:     1. Left main coronary artery was normal. It gave off the left anterior descending artery and left circumflex. 2. Left anterior descending artery has severe atherosclerotic disease. It was large in size. It gave off septal perforators and a moderate sized diagonal branch. The LAD covered the entire apex of the left ventricle. ~There is evidence for Denovo disease of about 70% within the mid LAD. This is confirmed by intravascular ultrasound. Furthermore there is a previously placed stent within the mid LAD that was under deployed and under expanded. 3. Left circumflex has mild atherosclerotic disease. It was moderate in size. There was a moderate sized obtuse marginal branch. 4. Right coronary artery has mild atherosclerotic disease. It was moderate in size and was the dominant artery. 5. Left ventriculogram showed normal LVEF at 55-60%. Wall motion was normal . There was no significant mitral valve or aortic valve disease noted. LVEDP was normal. There was no gradient noted across the aortic valve during pullback of the catheter.      /81   Pulse (P) 69   Temp 97.7 °F (36.5 °C) (Oral)   Resp 18   Ht 6' 3\" (1.905 m)   Wt (!) 301 lb 8 oz (136.8 kg)   SpO2 97%   BMI 37.68 kg/m²      The access site was controlled with manual pressure and/or appropriate closure device. Moderate Conscious Sedation Details: An independent trained observer pushed medications at my direction. We monitoring the patient's level of consciousness and vital signs/physiologic status throughout the procedure. CPT codes 11762 and 63272        Start time: 1524  Stop time: 1612  ASA class: 3     Sedation totals:  Versad - 4mg  Fentanyl - 75mcg     EBL - minimal <5 cc blood loss     The patient was monitored continuously with the ECG, pulse oximetry, blood pressure, and direct observation. CONCLUSIONS:     1. Successful intravascular ultrasound-guided drug-eluting stent insertion to the left anterior descending artery     ASSESSMENT/RECOMMENDATIONS:     1. Continue dual antiplatelet therapy and guideline directed medical therapy. 2.  Follow-up with patient's primary cardiologist after discharge. I independently reviewed the ECG and telemetry. Scheduled Meds:  Continuous Infusions:  PRN Meds:.      Assessment:   Patient Active Problem List    Diagnosis Date Noted    SOB (shortness of breath)     Moderate obstructive sleep apnea 01/04/2016    Essential hypertension 07/31/2015    Atrial fibrillation ([facility] 02/05/2012    PAF (paroxysmal atrial fibrillation) ([facility] 10/05/2022    Atrial fibrillation with RVR ([facility] 09/18/2022    Asthma 09/16/2022    PANG (dyspnea on exertion) 09/15/2022    CAD S/P percutaneous coronary angioplasty 09/09/2022    Anginal equivalent ([facility] 09/08/2022    Acute on chronic heart failure with preserved ejection fraction ([facility] 09/05/2022    Coronary artery disease involving native coronary artery of native heart 09/05/2022    COVID-19 09/04/2022    Obesity (BMI 30-39.9) 04/22/2022    Arthritis     Other fatigue 12/03/2021    Status post insertion of drug-eluting stent into left anterior descending (LAD) artery for coronary artery disease 06/29/2021    Angina at rest [facility]) 06/16/2021    Abnormal stress test 06/14/2021    History of venous thromboembolism 01/03/2020    Cataract of both eyes 01/03/2020    Morbid obesity with BMI of 40.0-44.9, adult ([email or web address] 04/03/2019    Type 2 diabetes mellitus with diabetic neuropathy ([email or web address] 02/25/2019    Atypical atrial flutter ([email or web address] 04/06/2017    S/P thoracotomy     Polyneuropathy 01/04/2016    Numbness and tingling of both legs 01/04/2016    Carpal tunnel syndrome 08/31/2015      Active Hospital Problems    Diagnosis Date Noted    Moderate obstructive sleep apnea [G47.33] 01/04/2016     Priority: High    PAF (paroxysmal atrial fibrillation) ([email or web address] [I48.0] 10/05/2022     Priority: Medium    CAD S/P percutaneous coronary angioplasty [I25.10, Z98.61] 09/09/2022     Priority: Medium    History of venous thromboembolism [Z86.718] 01/03/2020    Morbid obesity with BMI of 40.0-44.9, adult ([email or web address] [E66.01, Z68.41] 04/03/2019    Type 2 diabetes mellitus with diabetic neuropathy ([email or web address] [E11.40] 02/25/2019     Mr. Ramona Lindquist is a pleasant 69year old male  with a medical history significant for symptomatic paroxysmal atrial fibrillation (previous on dofetilide [prolonged Qtc] and amiodarone [side effects]), atrial flutter status post ablation (2017 - Mohsen Blanco), premature ventricular contractions status post ablation (2014), ischemic cardiomyopathy status post PCI (09/08/2022), hypertension, diabetes mellitus type II, history of DVT, and obstructive sleep apnea who presents from home with symptomatic atrial fibrillation with controlled rates. Patient was recently seen by me in the office on 09/2 7/6/2022 rediscussed alternative antiarrhythmics, repeat ablation, and a loop monitor. Problem List:  1. Symptomatic paroxysmal atrial fibrillation ([id number] 4). 2. Ischemic cardiomyopathy status post PCI. Assessment and Plan:  1. Symptomatic paroxysmal atrial fibrillation ([id number] 4).   Patient is a pleasant 27-year-old male with a medical history significant for paroxysmal atrial fibrillation, atrial flutter status post ablation, ischemic cardiomyopathy status post PCI, hypertension, diabetes mellitus type 2, and history of DVT who presents from home with symptomatic atrial fibrillation with controlled rates. Given his symptoms we discussed potentially trying him on some dronedarone given his antiarrhythmic options are limited with a cardioversion versus an atrial fibrillation ablation given his severe symptoms. Patient prefer an ablation at this point. We will plan on doing an ablation tomorrow afternoon and starting him on dronedarone for 3 months. If he is intolerant we will try low-dose amiodarone. - Continue warfarin with goal INR 2-3.  - Continue metoprolol.  - NPO at midnight for PIERO atrial fibrillation ablation.  - We will continue to follow along with you. 2. Ischemic cardiomyopathy status post PCI. Stable. - Continue clopidogrel.   - Continue to monitor clinically. Thank you for allowing me to participate in the care of Alize Jane . If you have any questions/comments, please do not hesitate to contact us.     Eden Richard MD  Cardiac Electrophysiology  [address] [facility]  [phone] [facility]

## 2022-10-05 NOTE — PROGRESS NOTES
4 Eyes Skin Assessment     NAME:  Kimmie Verduzco  YOB: 1945  MEDICAL RECORD NUMBER:  [id number]    The patient is being assess for  Admission    I agree that 2 RN's have performed a thorough Head to Toe Skin Assessment on the patient. ALL assessment sites listed below have been assessed. Areas assessed by both nurses:    Head, Face, Ears, Shoulders, Back, Chest, Arms, Elbows, Hands, Sacrum. Buttock, Coccyx, Ischium, and Legs. Feet and Heels        Does the Patient have a Wound?  No noted wound(s)       Ferdinand Prevention initiated:  NA   Wound Care Orders initiated:  NA    Pressure Injury (Stage 3,4, Unstageable, DTI, NWPT, and Complex wounds) if present place referral/consult order under [de-identified] NA    New and Established Ostomies if present place consult order under : NA      Nurse 1 eSignature: Electronically signed by Madalyn Alcantar RN on 10/5/22 at 5:59 PM EDT    **SHARE this note so that the co-signing nurse is able to place an eSignature**    Nurse 2 eSignature: {Esignature:[id number]}

## 2022-10-05 NOTE — CONSULTS
Pharmacy Note  Warfarin Consult  Dx: Afib  Goal INR range 2-3   Home Warfarin dose: 5mg daily , managed by Dr. Hackett Neigh       Date  INR  Warfarin  10/5                1.84                5 mg ( took in am at home)    Pt.already took dose this morning ,  Daily INR ordered. Rx will continue to manage therapy per Dr. Jonathan Greer consult order.   Martha Schwarz/Nathaniel. 10/5/22 4:59 PM EDT

## 2022-10-05 NOTE — TELEPHONE ENCOUNTER
Patient informed, but states that he is currently at [facility]. Stable.

## 2022-10-05 NOTE — ACP (ADVANCE CARE PLANNING)
Advance Care Planning     General Advance Care Planning (ACP) Conversation    Date of Conversation: 10/5/2022  Conducted with: Patient with Decision Making Capacity    Healthcare Decision Maker:    Primary Decision Maker: Alyson Flynn - [phone]  Click here to complete Healthcare Decision Makers including selection of the Healthcare Decision Maker Relationship (ie \"Primary\"). Today we documented Decision Maker(s) consistent with Legal Next of Kin hierarchy.     Content/Action Overview:  Has NO ACP documents/care preferences - information provided, considering goals and options          Length of Voluntary ACP Conversation in minutes:  <16 minutes (Non-Billable)    CHANCE Alicia

## 2022-10-05 NOTE — H&P
Hospital Medicine History & Physical      Patient: Yasmin Rose  :  1945  MRN:  [id number]    Date of Service: 10/05/22    Chief Complaint   Patient presents with    Shortness of Breath     Pt reporting an extensive cardiac hx and is scheduled for a loop recorder on 10/28. Had a cardioversion x 2 weeks ago and is wearing a halter monitor. Pt reports he went through rehab and felt great. However over the past two days he's had some dizziness and some SOB. Today he said he was at the [facility] when he felt dizzy SOB and flushed. Came to the ED for evaluation     Dizziness       HISTORY OF PRESENT ILLNESS:    Yasmin Rose is a 69 y.o. male. He presented to the ER from home by private vehicle. He has a h/o persistent atrial fibrillation. He has undergone extensive evaluation to determine the cause for chronic exertional dyspnea. It turns out that symptomatically he benefits quite a bit from being in normal sinus rhythm. He was just in afib during a hospitalization in early . During that stay he underwent PIERO/DCCV on 22. He felt much better in terms of dyspnea afterward  However last night he started to feel light-headed with standing and ambulation and dyspneic with low level exertion. He was already wearing a cardiac monitor following his last hospitalization. He presented to the ER and was again found to be in atrial fibrillation. He is being admitted for afib ablation by Dr. Gabby Humphrey. He underwent LHC and RHC on 2022 to evaluate causes underlying his dyspnea. One stenotic lesion in the LAD was dilated and stented. He didn't notice any change in his symptoms though. Review of Systems:  All pertinent positives and negatives are as noted in the HPI section. All other systems were reviewed and are negative.     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    History of knee replacement procedure of right knee     Hx of blood clots     2 DVT's    Hyperlipidemia     Hypertension     Knee osteoarthritis 1/17/2012    Left TKR 1/18/2012    Lung nodule     due to histoplasmosis    Neuromuscular disorder ([facility]     Palpitations     stable with atenolol    Sleep apnea     uses CPAP    Stone, kidney     UTI (urinary tract infection) 01/23/2017       Past Surgical History:   Procedure Laterality Date    ABLATION OF DYSRHYTHMIC FOCUS  2013    a-fib    BARIATRIC SURGERY  2013    GASTRIC SLEEVE    CARDIAC SURGERY  2013    ablation    CARDIOVERSION N/A 9/20/2022    CARDIOVERSION W/ANES.  performed by Tiff Lopez MD at [address] [place] [address] Right 9-30-15    CARPAL TUNNEL RELEASE Left 3/20/16    CHOLECYSTECTOMY, LAPAROSCOPIC N/A 2/19/2019    LAPAROSCOPIC CHOLECYSTECTOMY WITH CHOLANGIOGRAMS performed by Jersey Flowers MD at [address] [place] [address]      with removal stone    CYSTOSCOPY  2/8/13    with Laser Vaporization of Enlarged Prostate    DIAGNOSTIC CARDIAC CATH LAB PROCEDURE      ERCP  02/18/2019    Sphincterotomy, stone removal    ERCP N/A 2/18/2019    ERCP SPHINCTER/PAPILLOTOMY performed by Richard Horton MD at [address] [facility]    ERCP  2/18/2019    ERCP STONE REMOVAL performed by Richard Horton MD at [facility] Right 1/14/2020    PHACOEMULSIFICATION OF CATARACT RIGHT EYE WITH INTRAOCULAR LENS IMPLANT performed by Heidy Ward MD at [place] [address] Left 1/21/2020    PHACOEMULSIFICATION OF CATARACT LEFT EYE WITH INTRAOCULAR LENS IMPLANT -SLEEP APNEA- performed by Heidy Ward MD at [place] [address] Bilateral     right knee (2002) and left knee (2012)    KNEE ARTHROSCOPY  4/19/2011     left  knee    SKIN BIOPSY      skin Ca/SQUAMOUS CELL    THORACOTOMY      wedge resection    TONSILLECTOMY      TRANSESOPHAGEAL ECHOCARDIOGRAM N/A 9/20/2022    PIERO W/ANES. (9:00) performed by Silvestre Lee MD at [id number] [place] [address]         Prior to Admission medications    Medication Sig Start Date End Date Taking? Authorizing Provider   torsemide (DEMADEX) 20 MG tablet Take 1.5 tablets daily 10/4/22   Horace Cooper MD   albuterol (PROVENTIL) (2.5 MG/3ML) 0.083% nebulizer solution Take 3 mLs by nebulization every 4 hours as needed for Wheezing or Shortness of Breath 9/22/22 9/22/23  COLIN Dennis   metoprolol tartrate (LOPRESSOR) 25 MG tablet Take 0.5 tablets by mouth 2 times daily 9/21/22   Earnestine Hatchet, MD   albuterol sulfate HFA (VENTOLIN HFA) 108 (90 Base) MCG/ACT inhaler Inhale 2 puffs into the lungs 4 times daily as needed for Wheezing or Shortness of Breath 9/18/22   COLIN Dennis   aspirin 81 MG chewable tablet Take 1 tablet by mouth daily 9/10/22   Andrea Aguirre MD   clopidogrel (PLAVIX) 75 MG tablet Take 1 tablet by mouth daily 9/9/22 10/9/22  Andrea Aguirre MD   lisinopril (PRINIVIL;ZESTRIL) 5 MG tablet Take 1 tablet by mouth daily 9/10/22 9/21/22  Andrea Aguirre MD   warfarin (COUMADIN) 5 MG tablet TAKE ONE TABLET BY MOUTH DAILY 8/11/22   Brody Broussard MD   metFORMIN (GLUCOPHAGE) 1000 MG tablet Take 0.5 tablets by mouth in the morning and 0.5 tablets in the evening. Take with meals.  7/20/22   Brody Broussard MD   montelukast (SINGULAIR) 10 MG tablet TAKE ONE TABLET BY MOUTH DAILY 7/1/22   Brody Broussard MD   atorvastatin (LIPITOR) 40 MG tablet TAKE ONE TABLET BY MOUTH DAILY  Patient taking differently: Takes at night 7/1/22   Brody Broussard MD   spironolactone (ALDACTONE) 25 MG tablet TAKE ONE TABLET BY MOUTH DAILY  Patient not taking: No sig reported 7/1/22 9/15/22  Brody Broussard MD   metoprolol succinate (TOPROL XL) 25 MG extended release tablet TAKE ONE TABLET BY MOUTH DAILY  Patient not taking: No sig reported 7/1/22 9/21/22  Júnior Bhatt MD   Dulaglutide (TRULICITY) 0.46 EDITH/1.2TH SOPN Inject 0.75 mg into the skin once a week    Historical Provider, MD   blood glucose test strips (TRUE METRIX BLOOD GLUCOSE TEST) strip TEST ONCE DAILY. DX:E11.9 1/20/22   Oneida Marrero MD   [facility]) 250 MCG capsule Take 1 capsule by mouth 2 times daily  Patient not taking: Reported on 9/7/2022 10/19/21 9/9/22  SOTERO Arguello MD   Easy Touch Lancets 32G/Twist MISC TEST DAILY. DX: E11.9 3/23/20   Oneida Marrero MD   blood glucose test strips (TRUE METRIX BLOOD GLUCOSE TEST) strip USE TO CHECK BLOOD GLUCOSE DAILY. DX: E11.9 3/23/20   Oneida Marrero MD   ACCU-CHEK MULTICLIX LANCETS MISC Check sugars once daily. Dx;E11.9 3/11/19   Oneida Marrero MD   Lancet Devices (EASY TOUCH LANCING DEVICE) MISC Test Daily. DX: E11.9 12/26/18   Oneida Marrero MD   Blood Glucose Monitoring Suppl (TRUE METRIX METER) VINNIE Use to check daily. DX;E11.9 12/22/18   Oneida Marrero MD   Lancets Misc. (ACCU-CHEK MULTICLIX LANCET DEV) KIT Check sugars once daily. DX;E11.9 12/21/18   Oneida Marrero MD   Biotin 5 MG TABS Take 5 mg by mouth daily    Historical Provider, MD   Cholecalciferol (VITAMIN D3) 5000 units TABS Take 5,000 Units by mouth daily    Historical Provider, MD   Cyanocobalamin (VITAMIN B-12 CR PO) Take 5,000 mcg by mouth every 7 days     Historical Provider, MD   Multiple Vitamins-Minerals (THERAPEUTIC MULTIVITAMIN-MINERALS) tablet Take 2 tablets by mouth daily     Historical Provider, MD   magnesium oxide (MAG-OX) 400 MG tablet Take 400 mg by mouth daily.     Historical Provider, MD       Allergies:   Bactrim [sulfamethoxazole-trimethoprim], Brilinta [ticagrelor], Ciprofloxacin, Macrobid [nitrofurantoin monohyd macro], Ozempic (0.25 or 0.5 mg-dose) [semaglutide(0.25 or 0.5mg-dos)], Sulfamethoxazole, Theophylline, Cefuroxime axetil, Cipro xr, Other, and Tape [adhesive tape]    Social:   reports that he quit smoking about 46 years ago. His smoking use included cigarettes. He has a 34.00 pack-year smoking history. He has never used smokeless tobacco.   reports no history of alcohol use. Social History     Substance and Sexual Activity   Drug Use No       Family History   Problem Relation Age of Onset    Cancer Mother         ABDOMIN    Kidney Disease Mother     Stroke Mother     Arthritis Father     Substance Abuse Father     Other Father         hardening of the arteries    Stroke Father        PHYSICAL EXAM:  I performed this physical examination. Vitals:  Patient Vitals for the past 24 hrs:   BP Temp Temp src Pulse Resp SpO2 Height Weight   10/05/22 1237 131/74 -- -- 78 19 99 % -- --   10/05/22 1119 130/70 97.8 °F (36.6 °C) Oral 80 20 100 % 6' 3\" (1.905 m) (!) 317 lb 9.6 oz (144.1 kg)     Room air    GEN:  Appearance:  Obese male in NAD . Level of Consciousness:  alert . Orientation:  full    HEENT: Sclera anicteric.  no conjunctival chemosis. moist mucus membranes. no specific or diagnostic oral lesions. NECK:  no signs of meningismus. Jugular veins not distended. Carotid pulses  2+.  no cervical lymphadenopathy. no thyromegaly. CV:  irregular rhythm. normal S1 & S2.    no murmur. no rub.  no gallop. CHEST: Cardiac monitor attached to chest    PULM:  Chest excursion is symmetric. Breath sounds are vesicular. Adventitious sounds:  none    AB:  Abdominal shape is obese. Bowel sounds are active. Generally soft to palpation. no tenderness is present. no involuntary guarding. no rebound guarding. EXTR:  Skin is warm. Capillary refill brisk. no specific or pathognomic rash. no clubbing. 2-3+ pitting edema as high as about 2/3 up the tibia  no active wound or ulcer.   Pulses 2+ x 4      LABS:  Lab Results   Component Value Date    WBC 8.9 10/05/2022    HGB 11.9 (L) 10/05/2022    HCT 35.9 (L) 10/05/2022    MCV 88.7 10/05/2022     10/05/2022     Lab Results   Component Value Date    CREATININE 1.4 (H) 10/05/2022    BUN 26 (H) 10/05/2022     10/05/2022    K 4.3 10/05/2022     10/05/2022    CO2 25 10/05/2022     Lab Results   Component Value Date    ALT 31 10/05/2022    AST 28 10/05/2022    ALKPHOS 88 10/05/2022    BILITOT 0.5 10/05/2022     Lab Results   Component Value Date    TROPONINI <0.01 10/05/2022     No results for input(s): PHART, JQQ5QOF, PO2ART in the last 72 hours. IMAGING:  Result Date: 10/5/2022  EXAMINATION: ONE XRAY VIEW OF THE CHEST 10/5/2022 11:17 am COMPARISON: Chest x-ray dated 5 September 2022 HISTORY: ORDERING SYSTEM PROVIDED HISTORY: SOB TECHNOLOGIST PROVIDED HISTORY: Reason for exam:->SOB Reason for Exam: Shortness of Breath; Dizziness FINDINGS: Stable cardiomediastinal silhouette. No acute airspace infiltrate. No pneumothorax or pleural effusion. No acute cardiopulmonary findings     I directly reviewed all recent imaging studies as well as pertinent prior studies. Radiology reports may or may not be available at the time of my review. My comments and findings are as follows:    EKG:  New and pertinent prior tracings were directly reviewed. My interpretation is as follows:  Atrial fibrillation at HR = 65 bpm.  One aberrantly conducted PAC. Active Hospital Problems    Diagnosis Date Noted    Moderate obstructive sleep apnea [G47.33] 01/04/2016     Priority: High    PAF (paroxysmal atrial fibrillation) ([facility] [I48.0] 10/05/2022     Priority: Medium    CAD S/P percutaneous coronary angioplasty [I25.10, Z98.61] 09/09/2022     Priority: Medium    History of venous thromboembolism [Z86.718] 01/03/2020    Morbid obesity with BMI of 40.0-44.9, adult ([facility] [E66.01, Z68.41] 04/03/2019    Type 2 diabetes mellitus with diabetic neuropathy ([facility] [E11.40] 02/25/2019       ASSESSMENT & PLAN  Persistent atrial fibrillation  -  Persistent.   S/P DCCV on 9/20/22 previously w/ improvement in exertional dyspnea. -  Ancitoagulated with warfarin. INR on admission = 1.84. Will continue w/ pharmacy dosing assistance. -  Continue home ASA, statin, metoprolol 12.5 mg BID, and torsemide 30mg daily.  -  Plan per Dr. Mary Grace Hennessy is for patient to undergo Afib ablation this hospitalization. Patient will be kept NPO after midnight. CAD s/p PCI   -  S/p PCI 9/8/22. No improvement in symptoms post PCI. MARIA VICTORIA  -  Compliant with CPAP at home. Spouse will bring his home unit to the hospital.    History of DVT   -  Anticoagulated with warfarin. Goal INR = 2-3. DM2  -  Hold all oral antidiabetic agents. Start s.c. Insulin regimen based on weight. Asthma   -  Continue home montelukast.    DVT prophylaxis: SCDs, warfarin  Stress ulcers:  Start pantoprazole and continue indefinitely for GI bleed risk reduction  Code Status:  Full  Disposition:  Inpatient. Anticipate eventual d/c back to home.     Rogelio Santoyo MD MD

## 2022-10-05 NOTE — ED NOTES
Pt ambulatory at bedside to use urinal. Pt states dizziness with movement. Pt denies any pain at this time.       Geno Smith, ANGELINA  10/05/22 9633

## 2022-10-05 NOTE — TELEPHONE ENCOUNTER
----- Message from Efrain Mahmood MD sent at 10/5/2022 12:38 PM EDT -----  Contact: [phone] (H)  No need for appt  It is likely with fluid build up   Take demadex bid x 3 days then back to once daily     ----- Message -----  From: Jose Cruz Gleason MA  Sent: 10/5/2022  10:11 AM EDT  To: Efrain Mahmood MD    Pt called he is having sob, dizziness and very fatigues this morning, he was at the [facility] this morning and had to stop several times just walking across the parking lot. His vitals are normal BP was 124/84 pulse 87 O2 99% was wanting to be seen today if possible please.

## 2022-10-05 NOTE — TELEPHONE ENCOUNTER
Writer contacted ED provider, Cortez Brand , to inform of 30-day readmission risk via phone . ED provider informed writer of readmission. Callback: If you need to callback to inform of disposition, please call [phone].

## 2022-10-05 NOTE — CARE COORDINATION
CASE MANAGEMENT INITIAL ASSESSMENT      Reviewed chart and completed assessment with patient, wife and son  Family present: wife and son  Explained Case Management role/services. yes    Primary contact information:wife, [facility] Decision Maker :   Primary Decision Maker: Joseph Magaña - [phone]          Can this person be reached and be able to respond quickly, such as within a few minutes or hours? Yes      Admit date/status:10/5/22  Diagnosis:SOB   Is this a Readmission?:  Yes      Insurance:medicare   Precert required for SNF: No       3 night stay required: waived due to [address] arrangements, Adls, care needs, prior to admission:from home with wife, Independent in ADLs    Durable Medical Equipment at home:  Walker__Cane__RTS__ BSC__Shower Chair__  02__ HHN__ CPAP_x_  BiPap__  Hospital Bed__ W/C___ Other_____    Services in the home and/or outpatient, prior to [address]    Current PCP:Dr. Jose Escoto                                Medications:yes Prescription coverageYes Will pt require financial assistance with medications No     Transportation needs: car     Dialysis Facility (if applicable)   Name:  Address:  Dialysis Schedule:  Phone:  Fax:    PT/OT recs:    Hospital Exemption Notification (HEN):    Barriers to discharge:medical complications    Plan/comments:Referred to patient for d/c planning. Spoke to patient, wife and son. Patient is a 69year old male admitted for SOB. Patient was at [facility] until Saturday when he returned home. Patient lives at home with wife. Patient is usually independent in ADLs. Patient is current with [facility]. Plans to return home on d/c. No other needs at this time.  Electronically signed by CHANCE Hicks on 10/5/2022 at 2:38 PM      ECOC on chart for MD signature

## 2022-10-05 NOTE — ED PROVIDER NOTES
EMERGENCY DEPARTMENT ENCOUNTER        Pt Name: Wendy Mcgraw  MRN: [id number]  Nohemygfarsne 1945  Date of evaluation: 10/5/2022  Provider: Monroe Boyer MD  PCP: Efrain Mahmood MD      [address] [place] [address]       Chief Complaint   Patient presents with    Shortness of Breath     Pt reporting an extensive cardiac hx and is scheduled for a loop recorder on 10/28. Had a cardioversion x 2 weeks ago and is wearing a halter monitor. Pt reports he went through rehab and felt great. However over the past two days he's had some dizziness and some SOB. Today he said he was at the [facility] when he felt dizzy SOB and flushed. Came to the ED for evaluation     Dizziness       HISTORY OFPRESENT ILLNESS   (Location/Symptom, Timing/Onset, Context/Setting, Quality, Duration, Modifying Factors,Severity)  Note limiting factors. Wendy Mcgraw is a 69 y.o. male presenting today due to concern for overall feeling well yesterday when he saw his cardiologist but then last night around 8 PM becoming very short of breath along with lightheaded and overall not feeling well. He denied any chest pain with this. He did have a recent cardioversion September 20, 2022 and following that had been feeling much better until last night. He is on Coumadin. He denies any falls or trauma. No headache or neck pain. No abdominal pain or vomiting or diarrhea. He has been taking his medications as prescribed. He denies any unilateral numbness or weakness but does feel weak all over. He became very short of breath with exertion today after taking just a couple of steps which is abnormal as well. Due to concern for being back in atrial fibrillation and not feeling well, he was brought to the emergency department for further evaluation. He also recently had a cardiac catheterization as well needing a stent per patient. He denies any fever or current COVID concerns and recently had COVID a few weeks ago.         REVIEW OF SYSTEMS    (2-9 systems for level 4, 10 or more for level 5)     Review of Systems   Constitutional:  Positive for activity change and fatigue. Negative for appetite change, chills, diaphoresis and fever. HENT:  Negative for congestion. Respiratory:  Positive for shortness of breath. Negative for chest tightness. Cardiovascular:  Positive for leg swelling. Negative for chest pain. Gastrointestinal:  Negative for abdominal pain, diarrhea and vomiting. Genitourinary:  Negative for difficulty urinating, dysuria and flank pain. Musculoskeletal:  Positive for gait problem (due to generalized weakness since yesterday per patient). Negative for back pain and neck pain. Skin:  Wound: no reported trauma per patient. Neurological:  Positive for weakness (generalized), light-headedness and numbness (both legs is chronic from knees down). Negative for syncope and headaches. Hematological:  Bruises/bleeds easily (on coumadin). Psychiatric/Behavioral:  Negative for confusion. The patient is nervous/anxious. Positives and Pertinent negatives as per HPI.       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 performed by Hector Gilbert MD at [address] [facility] Right 9-30-15    CARPAL TUNNEL RELEASE Left 3/20/16    CHOLECYSTECTOMY, LAPAROSCOPIC N/A 2/19/2019    LAPAROSCOPIC CHOLECYSTECTOMY WITH CHOLANGIOGRAMS performed by Kylah Henriquez MD at [address] [place] [address]      with removal stone    CYSTOSCOPY  2/8/13    with Laser Vaporization of Enlarged Prostate    DIAGNOSTIC CARDIAC CATH LAB PROCEDURE      ERCP  02/18/2019    Sphincterotomy, stone removal    ERCP N/A 2/18/2019    ERCP SPHINCTER/PAPILLOTOMY performed by Linn Merlos MD at [address] [facility]    ERCP  2/18/2019    ERCP STONE REMOVAL performed by Linn Merlos MD at [facility] Right 1/14/2020    PHACOEMULSIFICATION OF CATARACT RIGHT EYE WITH INTRAOCULAR LENS IMPLANT performed by Miranda Chiu MD at [place] [address] Left 1/21/2020    PHACOEMULSIFICATION OF CATARACT LEFT EYE WITH INTRAOCULAR LENS IMPLANT -SLEEP APNEA- performed by Miranda Chiu MD at [facility] Bilateral     right knee (2002) and left knee (2012)    KNEE ARTHROSCOPY  4/19/2011     left  knee    SKIN BIOPSY      skin Ca/SQUAMOUS CELL    THORACOTOMY      wedge resection    TONSILLECTOMY      TRANSESOPHAGEAL ECHOCARDIOGRAM N/A 9/20/2022    PIERO W/ANES.  (9:00) performed by Hector Gilbert MD at [address] [place] [address]       Current Discharge Medication List        CONTINUE these medications which have NOT CHANGED    Details   torsemide (DEMADEX) 20 MG tablet Take 1.5 tablets daily  Qty: 135 tablet, Refills: 0      albuterol (PROVENTIL) (2.5 MG/3ML) 0.083% nebulizer solution Take 3 mLs by nebulization every 4 hours as needed for Wheezing or Shortness of Breath  Qty: 1080 mL, Refills: 3    Associated Diagnoses: Persistent asthma without complication, unspecified asthma severity      metoprolol tartrate (LOPRESSOR) 25 MG tablet Take 0.5 tablets by mouth 2 times daily  Qty: 60 tablet, Refills: 3      albuterol sulfate HFA (VENTOLIN HFA) 108 (90 Base) MCG/ACT inhaler Inhale 2 puffs into the lungs 4 times daily as needed for Wheezing or Shortness of Breath  Qty: 1 each, Refills: 3    Comments: Auto sub proair, ventolin or proventil based upon lowest cost please      aspirin 81 MG chewable tablet Take 1 tablet by mouth daily  Qty: 30 tablet, Refills: 11      clopidogrel (PLAVIX) 75 MG tablet Take 1 tablet by mouth daily  Qty: 30 tablet, Refills: 0      warfarin (COUMADIN) 5 MG tablet TAKE ONE TABLET BY MOUTH DAILY  Qty: 90 tablet, Refills: 1      metFORMIN (GLUCOPHAGE) 1000 MG tablet Take 0.5 tablets by mouth in the morning and 0.5 tablets in the evening. Take with meals. Qty: 180 tablet, Refills: 0    Comments: Dose Adjustment. montelukast (SINGULAIR) 10 MG tablet TAKE ONE TABLET BY MOUTH DAILY  Qty: 90 tablet, Refills: 1      atorvastatin (LIPITOR) 40 MG tablet TAKE ONE TABLET BY MOUTH DAILY  Qty: 90 tablet, Refills: 0      Dulaglutide (TRULICITY) 6.04 LN/7.0LJ SOPN Inject 0.75 mg into the skin once a week      !! blood glucose test strips (TRUE METRIX BLOOD GLUCOSE TEST) strip TEST ONCE DAILY. DX:E11.9  Qty: 100 strip, Refills: 3      !! Easy Touch Lancets 32G/Twist MISC TEST DAILY. DX: E11.9  Qty: 100 each, Refills: 3      !! blood glucose test strips (TRUE METRIX BLOOD GLUCOSE TEST) strip USE TO CHECK BLOOD GLUCOSE DAILY. DX: E11.9  Qty: 100 each, Refills: 3    Associated Diagnoses: Type 2 diabetes mellitus without complication, without long-term current use of insulin ([address]      ! ! ACCU-CHEK MULTICLIX LANCETS MISC Check sugars once daily.  Dx;E11.9  Qty: 100 each, Refills: 11    Associated Diagnoses: Type 2 diabetes mellitus without complication, without long-term current use of insulin ([facility])      Lancet Devices (EASY TOUCH LANCING DEVICE) MISC Test Daily. DX: E11.9  Qty: 1 each, Refills: 0      Blood Glucose Monitoring Suppl (TRUE METRIX METER) VINNIE Use to check daily. DX;E11.9  Qty: 1 Device, Refills: 0      Lancets Misc. (ACCU-CHEK MULTICLIX LANCET DEV) KIT Check sugars once daily. DX;E11.9  Qty: 1 kit, Refills: 0      Biotin 5 MG TABS Take 5 mg by mouth daily      Cholecalciferol (VITAMIN D3) 5000 units TABS Take 5,000 Units by mouth daily      Cyanocobalamin (VITAMIN B-12 CR PO) Take 5,000 mcg by mouth every 7 days       Multiple Vitamins-Minerals (THERAPEUTIC MULTIVITAMIN-MINERALS) tablet Take 2 tablets by mouth daily       magnesium oxide (MAG-OX) 400 MG tablet Take 400 mg by mouth daily. !! - Potential duplicate medications found. Please discuss with provider.           ALLERGIES     Bactrim [sulfamethoxazole-trimethoprim], Brilinta [ticagrelor], Ciprofloxacin, Macrobid [nitrofurantoin monohyd macro], Ozempic (0.25 or 0.5 mg-dose) [semaglutide(0.25 or 0.5mg-dos)], Sulfamethoxazole, Theophylline, Cefuroxime axetil, Cipro xr, Other, and Tape [adhesive tape]    FAMILY HISTORY       Family History   Problem Relation Age of Onset    Cancer Mother         ABDOMIN    Kidney Disease Mother     Stroke Mother     Arthritis Father     Substance Abuse Father     Other Father         hardening of the arteries    Stroke Father           SOCIAL HISTORY       Social History     Socioeconomic History    Marital status:      Spouse name: None    Number of children: None    Years of education: None    Highest education level: None   Tobacco Use    Smoking status: Former     Packs/day: 2.00     Years: 17.00     Pack years: 34.00     Types: Cigarettes     Quit date: 1976     Years since quittin.4    Smokeless tobacco: Never   Vaping Use    Vaping Use: Never used   Substance and Sexual Activity    Alcohol use: No    Drug use: No    Sexual activity: Never     Partners: Female     Social Determinants of Health     Physical Activity: Insufficiently Active Days of Exercise per Week: 2 days    Minutes of Exercise per Session: 60 min       SCREENINGS    Guevara Coma Scale  Eye Opening: Spontaneous  Best Verbal Response: Oriented  Best Motor Response: Obeys commands  Guevara Coma Scale Score: 15           PHYSICAL EXAM    (up to 7 for level 4, 8 or more for level 5)     ED Triage Vitals   BP Temp Temp src Pulse Resp SpO2 Height Weight   -- -- -- -- -- -- -- --       Physical Exam  Vitals and nursing note reviewed. Constitutional:       General: He is awake. He is not in acute distress. Appearance: He is well-developed and well-groomed. He is obese. He is ill-appearing. He is not toxic-appearing or diaphoretic. Interventions: He is not intubated. HENT:      Head: Normocephalic and atraumatic. Right Ear: External ear normal.      Left Ear: External ear normal.      Nose: Nose normal.      Mouth/Throat:      Mouth: Mucous membranes are dry. Eyes:      General: No scleral icterus. Right eye: No discharge. Left eye: No discharge. Conjunctiva/sclera: Conjunctivae normal.      Pupils: Pupils are equal, round, and reactive to light. Neck:      Trachea: Trachea and phonation normal. No tracheal deviation. Cardiovascular:      Rate and Rhythm: Normal rate. Rhythm irregularly irregular. Pulses: Normal pulses. Radial pulses are 2+ on the right side and 2+ on the left side. Pulmonary:      Effort: Pulmonary effort is normal. No tachypnea, bradypnea, accessory muscle usage, prolonged expiration, respiratory distress or retractions. He is not intubated. Breath sounds: Normal breath sounds and air entry. No stridor. No decreased breath sounds, wheezing, rhonchi or rales. Chest:      Chest wall: No tenderness. Abdominal:      General: Bowel sounds are normal. There is no distension. Palpations: Abdomen is soft. Tenderness: There is no abdominal tenderness. There is no guarding or rebound.    Musculoskeletal: General: No tenderness, deformity or signs of injury. Normal range of motion. Cervical back: Full passive range of motion without pain, normal range of motion and neck supple. No edema, erythema, signs of trauma, rigidity, torticollis, tenderness or crepitus. No pain with movement, spinous process tenderness or muscular tenderness. Normal range of motion. Right lower leg: No tenderness. 3+ Pitting Edema present. Left lower leg: No tenderness. 3+ Pitting Edema present. Skin:     General: Skin is warm. Coloration: Skin is not jaundiced or pale. Findings: No erythema or rash. Neurological:      General: No focal deficit present. Mental Status: He is alert and oriented to person, place, and time. Mental status is at baseline. GCS: GCS eye subscore is 4. GCS verbal subscore is 5. GCS motor subscore is 6. Sensory: Sensation is intact. No sensory deficit. Motor: Motor function is intact. No weakness, tremor, atrophy, abnormal muscle tone or seizure activity. Psychiatric:         Attention and Perception: Attention normal.         Mood and Affect: Affect normal. Mood is anxious. Speech: Speech normal. Speech is not delayed or slurred. Behavior: Behavior normal. Behavior is not agitated. Behavior is cooperative.            DIAGNOSTIC RESULTS   :    Labs Reviewed   CBC WITH AUTO DIFFERENTIAL - Abnormal; Notable for the following components:       Result Value    RBC 4.05 (*)     Hemoglobin 11.9 (*)     Hematocrit 35.9 (*)     RDW 16.9 (*)     All other components within normal limits   COMPREHENSIVE METABOLIC PANEL - Abnormal; Notable for the following components:    BUN 26 (*)     Creatinine 1.4 (*)     GFR Non- 49 (*)     GFR  60 (*)     All other components within normal limits   PROTIME-INR - Abnormal; Notable for the following components:    Protime 21.3 (*)     INR 1.84 (*)     All other components within normal limits   BRAIN NATRIURETIC PEPTIDE - Abnormal; Notable for the following components:    Pro-BNP 1,634 (*)     All other components within normal limits   CBC WITH AUTO DIFFERENTIAL - Abnormal; Notable for the following components:    Hemoglobin 12.3 (*)     Hematocrit 37.9 (*)     RDW 17.3 (*)     All other components within normal limits   BASIC METABOLIC PANEL - Abnormal; Notable for the following components:    Glucose 121 (*)     BUN 27 (*)     GFR Non- 59 (*)     All other components within normal limits   PROTIME-INR - Abnormal; Notable for the following components:    Protime 19.9 (*)     INR 1.70 (*)     All other components within normal limits   POCT GLUCOSE - Abnormal; Notable for the following components:    POC Glucose 124 (*)     All other components within normal limits   POCT GLUCOSE - Abnormal; Notable for the following components:    POC Glucose 137 (*)     All other components within normal limits   POCT GLUCOSE - Abnormal; Notable for the following components:    POC Glucose 114 (*)     All other components within normal limits   POCT GLUCOSE - Abnormal; Notable for the following components:    POC Glucose 124 (*)     All other components within normal limits   POCT GLUCOSE - Abnormal; Notable for the following components:    POC Glucose 115 (*)     All other components within normal limits   TROPONIN   MAGNESIUM   URINALYSIS   HEMOGLOBIN A1C   MAGNESIUM   POCT GLUCOSE   POCT GLUCOSE   POCT GLUCOSE   POCT GLUCOSE   POCT GLUCOSE   POCT GLUCOSE   POCT GLUCOSE   POCT GLUCOSE   POCT GLUCOSE   POCT GLUCOSE   POCT GLUCOSE   POCT GLUCOSE   POCT GLUCOSE   POCT GLUCOSE   POCT GLUCOSE       All other labs were within normal range or not returned asof this dictation. EKG:  All EKG's are interpreted by the Emergency Department Physician who either signs or Co-signs this chart in the absence of a cardiologist.    The Ekg interpreted by me shows  atrial fibrillation with a rate of 65 with occasional PVC noted  Axis is   Normal  QTc is  normal  Intervals and Durations are unremarkable. ST Segments: nonspecific changes  Significant change from prior EKG dated - 9/30/22  No STEMI, now in afib (was sinus rhythm on old EKG)         RADIOLOGY:   Non-plain film images such as CT, Ultrasound and MRI are read by the radiologist. Janit Canny images are visualized and preliminarily interpreted by the  ED Provider with the belowfindings:        Interpretation per the Radiologist below, if available at the time of this note:    XR CHEST PORTABLE   Final Result   No acute cardiopulmonary findings               PROCEDURES   Unless otherwise noted below, none     Procedures    CRITICAL CARE TIME   N/A    CONSULTS: Spoke with Dr. Evelyn Chapman and he saw the patient at bedside and does plan to do a procedure tomorrow and would like the patient admitted. Paged hospitalist for admission. Spoke with Dr. Niya Clarke about admission.    IP CONSULT TO CARDIOLOGY  IP CONSULT TO HOSPITALIST  IP CONSULT TO PHARMACY    EMERGENCY DEPARTMENT COURSE and DIFFERENTIAL DIAGNOSIS/MDM:   Vitals:    Vitals:    10/06/22 0457 10/06/22 0523 10/06/22 0845 10/06/22 1157   BP: 114/72  120/79 109/67   Pulse: 76  69 80   Resp: 18  18 18   Temp: 97.6 °F (36.4 °C)  97.8 °F (36.6 °C) 97.6 °F (36.4 °C)   TempSrc: Oral  Oral Oral   SpO2: 99%  99% 99%   Weight:  (!) 313 lb 3.2 oz (142.1 kg)     Height:           Patient was given the following medications:  Medications   aspirin chewable tablet 81 mg (81 mg Oral Given 10/6/22 0907)   atorvastatin (LIPITOR) tablet 40 mg (40 mg Oral Given 10/5/22 2009)   metoprolol tartrate (LOPRESSOR) tablet 12.5 mg (12.5 mg Oral Given 10/6/22 0907)   montelukast (SINGULAIR) tablet 10 mg (10 mg Oral Given 10/5/22 2009)   torsemide (DEMADEX) tablet 30 mg (30 mg Oral Given 10/6/22 0908)   glucose chewable tablet 16 g (has no administration in time range)   dextrose bolus 10% 125 mL (has no administration in time range)     Or   dextrose bolus 10% 250 mL (has no administration in time range)   glucagon (rDNA) injection 1 mg (has no administration in time range)   dextrose 10 % infusion (has no administration in time range)   sodium chloride flush 0.9 % injection 10 mL (10 mLs IntraVENous Given 10/6/22 0909)   0.9 % sodium chloride infusion (has no administration in time range)   ondansetron (ZOFRAN-ODT) disintegrating tablet 4 mg (has no administration in time range)     Or   ondansetron (ZOFRAN) injection 4 mg (has no administration in time range)   acetaminophen (TYLENOL) tablet 650 mg (650 mg Oral Given 10/6/22 1026)     Or   acetaminophen (TYLENOL) suppository 650 mg ( Rectal See Alternative 10/6/22 1026)   insulin lispro (HUMALOG) injection vial 7 Units (7 Units SubCUTAneous Not Given 10/6/22 0836)   insulin lispro (HUMALOG) injection vial 0-8 Units (0 Units SubCUTAneous Not Given 10/6/22 0836)   melatonin tablet 3 mg (has no administration in time range)   calcium carbonate (TUMS) chewable tablet 1,000 mg (has no administration in time range)   pantoprazole (PROTONIX) tablet 40 mg (40 mg Oral Given 10/6/22 0651)   insulin glargine (LANTUS) injection vial 22 Units (has no administration in time range)   warfarin placeholder: dosing by pharmacy (has no administration in time range)   tetrahydrozoline 0.05 % ophthalmic solution 1 drop (1 drop Both Eyes Given 10/6/22 1056)     Patient was evaluated due to not feeling well since last night and converting back in atrial fibrillation to the point where he was very dyspneic on exertion although denies any chest pain. Initial troponin was negative and story not suggestive of acute coronary syndrome. He still felt very lightheaded and not well when I reevaluated him and therefore cardiology saw the patient and did recommend admission for further evaluation tomorrow with procedure plan. He is currently stable for the floor with telemetry and agrees with this plan.   He had no unilateral numbness or weakness and story not suggestive of TIA or stroke and he is on Coumadin. No reported falls or trauma. He denies any headache and therefore no CT of the head was obtained at this time. I was the primary provider for the patient. The patient tolerated their visit well. The patient and / or the family were informed of the results of any tests, a time was given to answer questions. FINAL IMPRESSION      1. PANG (dyspnea on exertion)    2. Atrial fibrillation, unspecified type ([address]    3. Generalized weakness          DISPOSITION/PLAN   DISPOSITION Admitted 10/05/2022 02:48:47 PM      PATIENT REFERRED TO:  No follow-up provider specified.     DISCHARGEMEDICATIONS:  Current Discharge Medication List          DISCONTINUED MEDICATIONS:  Current Discharge Medication List        STOP taking these medications       lisinopril (PRINIVIL;ZESTRIL) 5 MG tablet Comments:   Reason for Stopping:         spironolactone (ALDACTONE) 25 MG tablet Comments:   Reason for Stopping:         metoprolol succinate (TOPROL XL) 25 MG extended release tablet Comments:   Reason for Stopping:         dofetilide (TIKOSYN) 250 MCG capsule Comments:   Reason for Stopping:                      (Please note that portions of this note were completed with a voicerecognition program.  Efforts were made to edit the dictations but occasionally words are mis-transcribed.)    Adams Proctor MD (electronically signed)            Adams Proctor MD  10/06/22 9973

## 2022-10-05 NOTE — ED NOTES
consulted cardiology  Re: afib - need electrophysiology per Cortney Ramirez in ED & spoke to [address] [place] [address]  10/05/22 3404

## 2022-10-06 ENCOUNTER — ANESTHESIA (OUTPATIENT)
Dept: CARDIAC CATH/INVASIVE PROCEDURES | Age: 77
DRG: 274 | End: 2022-10-06
Payer: MEDICARE

## 2022-10-06 ENCOUNTER — ANESTHESIA EVENT (OUTPATIENT)
Dept: CARDIAC CATH/INVASIVE PROCEDURES | Age: 77
DRG: 274 | End: 2022-10-06
Payer: MEDICARE

## 2022-10-06 ENCOUNTER — APPOINTMENT (OUTPATIENT)
Dept: CARDIAC CATH/INVASIVE PROCEDURES | Age: 77
DRG: 274 | End: 2022-10-06
Payer: MEDICARE

## 2022-10-06 LAB
ANION GAP SERPL CALCULATED.3IONS-SCNC: 9 MMOL/L (ref 3–16)
BASOPHILS ABSOLUTE: 0.1 K/UL (ref 0–0.2)
BASOPHILS RELATIVE PERCENT: 0.9 %
BUN BLDV-MCNC: 27 MG/DL (ref 7–20)
CALCIUM SERPL-MCNC: 9.3 MG/DL (ref 8.3–10.6)
CHLORIDE BLD-SCNC: 103 MMOL/L (ref 99–110)
CO2: 27 MMOL/L (ref 21–32)
CREAT SERPL-MCNC: 1.2 MG/DL (ref 0.8–1.3)
EOSINOPHILS ABSOLUTE: 0.5 K/UL (ref 0–0.6)
EOSINOPHILS RELATIVE PERCENT: 7.7 %
ESTIMATED AVERAGE GLUCOSE: 142.7 MG/DL
GFR AFRICAN AMERICAN: >60
GFR NON-AFRICAN AMERICAN: 59
GLUCOSE BLD-MCNC: 104 MG/DL (ref 70–99)
GLUCOSE BLD-MCNC: 114 MG/DL (ref 70–99)
GLUCOSE BLD-MCNC: 115 MG/DL (ref 70–99)
GLUCOSE BLD-MCNC: 121 MG/DL (ref 70–99)
GLUCOSE BLD-MCNC: 124 MG/DL (ref 70–99)
GLUCOSE BLD-MCNC: 137 MG/DL (ref 70–99)
GLUCOSE BLD-MCNC: 247 MG/DL (ref 70–99)
HBA1C MFR BLD: 6.6 %
HCT VFR BLD CALC: 37.9 % (ref 40.5–52.5)
HEMOGLOBIN: 12.3 G/DL (ref 13.5–17.5)
INR BLD: 1.7 (ref 0.87–1.14)
LYMPHOCYTES ABSOLUTE: 1.1 K/UL (ref 1–5.1)
LYMPHOCYTES RELATIVE PERCENT: 16.3 %
MAGNESIUM: 2.4 MG/DL (ref 1.8–2.4)
MCH RBC QN AUTO: 29.2 PG (ref 26–34)
MCHC RBC AUTO-ENTMCNC: 32.5 G/DL (ref 31–36)
MCV RBC AUTO: 89.9 FL (ref 80–100)
MONOCYTES ABSOLUTE: 0.7 K/UL (ref 0–1.3)
MONOCYTES RELATIVE PERCENT: 9.5 %
NEUTROPHILS ABSOLUTE: 4.5 K/UL (ref 1.7–7.7)
NEUTROPHILS RELATIVE PERCENT: 65.6 %
PDW BLD-RTO: 17.3 % (ref 12.4–15.4)
PERFORMED ON: ABNORMAL
PLATELET # BLD: 267 K/UL (ref 135–450)
PMV BLD AUTO: 7.4 FL (ref 5–10.5)
POC ACT LR: 344 SEC
POC ACT LR: 384 SEC
POTASSIUM SERPL-SCNC: 4.2 MMOL/L (ref 3.5–5.1)
PROTHROMBIN TIME: 19.9 SEC (ref 11.7–14.5)
RBC # BLD: 4.22 M/UL (ref 4.2–5.9)
SODIUM BLD-SCNC: 139 MMOL/L (ref 136–145)
WBC # BLD: 6.9 K/UL (ref 4–11)

## 2022-10-06 PROCEDURE — 6360000002 HC RX W HCPCS: Performed by: ANESTHESIOLOGY

## 2022-10-06 PROCEDURE — C1894 INTRO/SHEATH, NON-LASER: HCPCS

## 2022-10-06 PROCEDURE — 93656 COMPRE EP EVAL ABLTJ ATR FIB: CPT

## 2022-10-06 PROCEDURE — 3700000001 HC ADD 15 MINUTES (ANESTHESIA)

## 2022-10-06 PROCEDURE — C1766 INTRO/SHEATH,STRBLE,NON-PEEL: HCPCS

## 2022-10-06 PROCEDURE — 93656 COMPRE EP EVAL ABLTJ ATR FIB: CPT | Performed by: INTERNAL MEDICINE

## 2022-10-06 PROCEDURE — 93655 ICAR CATH ABLTJ DSCRT ARRHYT: CPT | Performed by: INTERNAL MEDICINE

## 2022-10-06 PROCEDURE — 93312 ECHO TRANSESOPHAGEAL: CPT

## 2022-10-06 PROCEDURE — 02K83ZZ MAP CONDUCTION MECHANISM, PERCUTANEOUS APPROACH: ICD-10-PCS | Performed by: INTERNAL MEDICINE

## 2022-10-06 PROCEDURE — 6360000002 HC RX W HCPCS

## 2022-10-06 PROCEDURE — 6370000000 HC RX 637 (ALT 250 FOR IP): Performed by: INTERNAL MEDICINE

## 2022-10-06 PROCEDURE — 93657 TX L/R ATRIAL FIB ADDL: CPT

## 2022-10-06 PROCEDURE — C1893 INTRO/SHEATH, FIXED,NON-PEEL: HCPCS

## 2022-10-06 PROCEDURE — 85610 PROTHROMBIN TIME: CPT

## 2022-10-06 PROCEDURE — 2709999900 HC NON-CHARGEABLE SUPPLY

## 2022-10-06 PROCEDURE — 6360000002 HC RX W HCPCS: Performed by: INTERNAL MEDICINE

## 2022-10-06 PROCEDURE — C1713 ANCHOR/SCREW BN/BN,TIS/BN: HCPCS

## 2022-10-06 PROCEDURE — 93622 COMP EP EVAL L VENTR PAC&REC: CPT

## 2022-10-06 PROCEDURE — 2580000003 HC RX 258: Performed by: NURSE ANESTHETIST, CERTIFIED REGISTERED

## 2022-10-06 PROCEDURE — 3700000000 HC ANESTHESIA ATTENDED CARE

## 2022-10-06 PROCEDURE — 02583ZZ DESTRUCTION OF CONDUCTION MECHANISM, PERCUTANEOUS APPROACH: ICD-10-PCS | Performed by: INTERNAL MEDICINE

## 2022-10-06 PROCEDURE — 80048 BASIC METABOLIC PNL TOTAL CA: CPT

## 2022-10-06 PROCEDURE — 6370000000 HC RX 637 (ALT 250 FOR IP): Performed by: NURSE PRACTITIONER

## 2022-10-06 PROCEDURE — 99233 SBSQ HOSP IP/OBS HIGH 50: CPT | Performed by: NURSE PRACTITIONER

## 2022-10-06 PROCEDURE — 7100000001 HC PACU RECOVERY - ADDTL 15 MIN

## 2022-10-06 PROCEDURE — 7100000000 HC PACU RECOVERY - FIRST 15 MIN

## 2022-10-06 PROCEDURE — 2500000003 HC RX 250 WO HCPCS

## 2022-10-06 PROCEDURE — 6360000002 HC RX W HCPCS: Performed by: NURSE ANESTHETIST, CERTIFIED REGISTERED

## 2022-10-06 PROCEDURE — 83036 HEMOGLOBIN GLYCOSYLATED A1C: CPT

## 2022-10-06 PROCEDURE — 36415 COLL VENOUS BLD VENIPUNCTURE: CPT

## 2022-10-06 PROCEDURE — 93622 COMP EP EVAL L VENTR PAC&REC: CPT | Performed by: INTERNAL MEDICINE

## 2022-10-06 PROCEDURE — C1732 CATH, EP, DIAG/ABL, 3D/VECT: HCPCS

## 2022-10-06 PROCEDURE — 93657 TX L/R ATRIAL FIB ADDL: CPT | Performed by: INTERNAL MEDICINE

## 2022-10-06 PROCEDURE — 2580000003 HC RX 258: Performed by: INTERNAL MEDICINE

## 2022-10-06 PROCEDURE — 83735 ASSAY OF MAGNESIUM: CPT

## 2022-10-06 PROCEDURE — 4A023FZ MEASUREMENT OF CARDIAC RHYTHM, PERCUTANEOUS APPROACH: ICD-10-PCS | Performed by: INTERNAL MEDICINE

## 2022-10-06 PROCEDURE — C1759 CATH, INTRA ECHOCARDIOGRAPHY: HCPCS

## 2022-10-06 PROCEDURE — 2060000000 HC ICU INTERMEDIATE R&B

## 2022-10-06 PROCEDURE — 93623 PRGRMD STIMJ&PACG IV RX NFS: CPT | Performed by: INTERNAL MEDICINE

## 2022-10-06 PROCEDURE — 85347 COAGULATION TIME ACTIVATED: CPT

## 2022-10-06 PROCEDURE — 93609 INTRA-VNTR MAPG TCHYCAR SITE: CPT | Performed by: INTERNAL MEDICINE

## 2022-10-06 PROCEDURE — 2500000003 HC RX 250 WO HCPCS: Performed by: NURSE ANESTHETIST, CERTIFIED REGISTERED

## 2022-10-06 PROCEDURE — 85025 COMPLETE CBC W/AUTO DIFF WBC: CPT

## 2022-10-06 RX ORDER — OXYCODONE HYDROCHLORIDE AND ACETAMINOPHEN 5; 325 MG/1; MG/1
1 TABLET ORAL EVERY 4 HOURS PRN
Status: DISCONTINUED | OUTPATIENT
Start: 2022-10-06 | End: 2022-10-08 | Stop reason: HOSPADM

## 2022-10-06 RX ORDER — METOPROLOL SUCCINATE 25 MG/1
25 TABLET, EXTENDED RELEASE ORAL DAILY
Status: DISCONTINUED | OUTPATIENT
Start: 2022-10-07 | End: 2022-10-08 | Stop reason: HOSPADM

## 2022-10-06 RX ORDER — SUCRALFATE 1 G/1
1 TABLET ORAL EVERY 6 HOURS SCHEDULED
Status: DISCONTINUED | OUTPATIENT
Start: 2022-10-07 | End: 2022-10-08 | Stop reason: HOSPADM

## 2022-10-06 RX ORDER — TETRAHYDROZOLINE HCL 0.05 %
1 DROPS OPHTHALMIC (EYE) EVERY 4 HOURS PRN
Status: DISCONTINUED | OUTPATIENT
Start: 2022-10-06 | End: 2022-10-08 | Stop reason: HOSPADM

## 2022-10-06 RX ORDER — DIPHENHYDRAMINE HYDROCHLORIDE 50 MG/ML
12.5 INJECTION INTRAMUSCULAR; INTRAVENOUS
Status: DISCONTINUED | OUTPATIENT
Start: 2022-10-06 | End: 2022-10-06

## 2022-10-06 RX ORDER — SODIUM CHLORIDE 9 MG/ML
INJECTION, SOLUTION INTRAVENOUS CONTINUOUS PRN
Status: DISCONTINUED | OUTPATIENT
Start: 2022-10-06 | End: 2022-10-06 | Stop reason: SDUPTHER

## 2022-10-06 RX ORDER — DIPHENHYDRAMINE HCL 25 MG
25 TABLET ORAL NIGHTLY PRN
Status: DISCONTINUED | OUTPATIENT
Start: 2022-10-06 | End: 2022-10-08 | Stop reason: HOSPADM

## 2022-10-06 RX ORDER — PHENYLEPHRINE HCL IN 0.9% NACL 1 MG/10 ML
SYRINGE (ML) INTRAVENOUS PRN
Status: DISCONTINUED | OUTPATIENT
Start: 2022-10-06 | End: 2022-10-06 | Stop reason: SDUPTHER

## 2022-10-06 RX ORDER — OXYCODONE HYDROCHLORIDE 5 MG/1
5 TABLET ORAL PRN
Status: ACTIVE | OUTPATIENT
Start: 2022-10-06 | End: 2022-10-06

## 2022-10-06 RX ORDER — OXYCODONE HYDROCHLORIDE 5 MG/1
10 TABLET ORAL PRN
Status: ACTIVE | OUTPATIENT
Start: 2022-10-06 | End: 2022-10-06

## 2022-10-06 RX ORDER — FUROSEMIDE 10 MG/ML
40 INJECTION INTRAMUSCULAR; INTRAVENOUS ONCE
Status: COMPLETED | OUTPATIENT
Start: 2022-10-06 | End: 2022-10-07

## 2022-10-06 RX ORDER — WARFARIN SODIUM 5 MG/1
5 TABLET ORAL
Status: COMPLETED | OUTPATIENT
Start: 2022-10-06 | End: 2022-10-06

## 2022-10-06 RX ORDER — HEPARIN SODIUM 10000 [USP'U]/100ML
INJECTION, SOLUTION INTRAVENOUS CONTINUOUS PRN
Status: COMPLETED | OUTPATIENT
Start: 2022-10-06 | End: 2022-10-06

## 2022-10-06 RX ORDER — SODIUM CHLORIDE 0.9 % (FLUSH) 0.9 %
5-40 SYRINGE (ML) INJECTION EVERY 12 HOURS SCHEDULED
Status: DISCONTINUED | OUTPATIENT
Start: 2022-10-06 | End: 2022-10-08 | Stop reason: HOSPADM

## 2022-10-06 RX ORDER — PROPOFOL 10 MG/ML
INJECTION, EMULSION INTRAVENOUS PRN
Status: DISCONTINUED | OUTPATIENT
Start: 2022-10-06 | End: 2022-10-06 | Stop reason: SDUPTHER

## 2022-10-06 RX ORDER — SODIUM CHLORIDE 9 MG/ML
INJECTION, SOLUTION INTRAVENOUS PRN
Status: DISCONTINUED | OUTPATIENT
Start: 2022-10-06 | End: 2022-10-08 | Stop reason: HOSPADM

## 2022-10-06 RX ORDER — MAGNESIUM SULFATE IN WATER 40 MG/ML
2000 INJECTION, SOLUTION INTRAVENOUS ONCE
Status: COMPLETED | OUTPATIENT
Start: 2022-10-06 | End: 2022-10-07

## 2022-10-06 RX ORDER — LABETALOL HYDROCHLORIDE 5 MG/ML
5 INJECTION, SOLUTION INTRAVENOUS EVERY 10 MIN PRN
Status: DISCONTINUED | OUTPATIENT
Start: 2022-10-06 | End: 2022-10-08 | Stop reason: HOSPADM

## 2022-10-06 RX ORDER — MEPERIDINE HYDROCHLORIDE 50 MG/ML
12.5 INJECTION INTRAMUSCULAR; INTRAVENOUS; SUBCUTANEOUS EVERY 5 MIN PRN
Status: DISCONTINUED | OUTPATIENT
Start: 2022-10-06 | End: 2022-10-08 | Stop reason: HOSPADM

## 2022-10-06 RX ORDER — PANTOPRAZOLE SODIUM 40 MG/1
40 TABLET, DELAYED RELEASE ORAL
Status: DISCONTINUED | OUTPATIENT
Start: 2022-10-07 | End: 2022-10-08 | Stop reason: HOSPADM

## 2022-10-06 RX ORDER — DOFETILIDE 0.25 MG/1
250 CAPSULE ORAL EVERY 12 HOURS SCHEDULED
Status: DISCONTINUED | OUTPATIENT
Start: 2022-10-06 | End: 2022-10-08 | Stop reason: HOSPADM

## 2022-10-06 RX ORDER — ROCURONIUM BROMIDE 10 MG/ML
INJECTION, SOLUTION INTRAVENOUS PRN
Status: DISCONTINUED | OUTPATIENT
Start: 2022-10-06 | End: 2022-10-06 | Stop reason: SDUPTHER

## 2022-10-06 RX ORDER — LIDOCAINE HYDROCHLORIDE 10 MG/ML
INJECTION, SOLUTION INFILTRATION; PERINEURAL PRN
Status: DISCONTINUED | OUTPATIENT
Start: 2022-10-06 | End: 2022-10-06 | Stop reason: SDUPTHER

## 2022-10-06 RX ORDER — SODIUM CHLORIDE 0.9 % (FLUSH) 0.9 %
5-40 SYRINGE (ML) INJECTION PRN
Status: DISCONTINUED | OUTPATIENT
Start: 2022-10-06 | End: 2022-10-08 | Stop reason: HOSPADM

## 2022-10-06 RX ORDER — ONDANSETRON 2 MG/ML
4 INJECTION INTRAMUSCULAR; INTRAVENOUS
Status: DISCONTINUED | OUTPATIENT
Start: 2022-10-06 | End: 2022-10-08 | Stop reason: HOSPADM

## 2022-10-06 RX ORDER — HEPARIN SODIUM 1000 [USP'U]/ML
INJECTION, SOLUTION INTRAVENOUS; SUBCUTANEOUS
Status: COMPLETED | OUTPATIENT
Start: 2022-10-06 | End: 2022-10-06

## 2022-10-06 RX ORDER — ACETAMINOPHEN 325 MG/1
650 TABLET ORAL EVERY 4 HOURS PRN
Status: DISCONTINUED | OUTPATIENT
Start: 2022-10-06 | End: 2022-10-08 | Stop reason: HOSPADM

## 2022-10-06 RX ORDER — ONDANSETRON 2 MG/ML
INJECTION INTRAMUSCULAR; INTRAVENOUS PRN
Status: DISCONTINUED | OUTPATIENT
Start: 2022-10-06 | End: 2022-10-06 | Stop reason: SDUPTHER

## 2022-10-06 RX ORDER — COLCHICINE 0.6 MG/1
0.6 TABLET ORAL DAILY
Status: DISCONTINUED | OUTPATIENT
Start: 2022-10-07 | End: 2022-10-08 | Stop reason: HOSPADM

## 2022-10-06 RX ADMIN — Medication 100 MCG: at 15:54

## 2022-10-06 RX ADMIN — INSULIN GLARGINE 22 UNITS: 100 INJECTION, SOLUTION SUBCUTANEOUS at 22:07

## 2022-10-06 RX ADMIN — METOPROLOL TARTRATE 12.5 MG: 25 TABLET, FILM COATED ORAL at 09:07

## 2022-10-06 RX ADMIN — ASPIRIN 81 MG 81 MG: 81 TABLET ORAL at 09:07

## 2022-10-06 RX ADMIN — Medication 100 MCG: at 14:47

## 2022-10-06 RX ADMIN — ACETAMINOPHEN 650 MG: 325 TABLET ORAL at 22:06

## 2022-10-06 RX ADMIN — ACETAMINOPHEN 650 MG: 325 TABLET ORAL at 10:26

## 2022-10-06 RX ADMIN — ATORVASTATIN CALCIUM 40 MG: 40 TABLET, FILM COATED ORAL at 22:07

## 2022-10-06 RX ADMIN — ROCURONIUM BROMIDE 10 MG: 10 SOLUTION INTRAVENOUS at 15:45

## 2022-10-06 RX ADMIN — MEPERIDINE HYDROCHLORIDE 12.5 MG: 50 INJECTION, SOLUTION INTRAMUSCULAR; INTRAVENOUS; SUBCUTANEOUS at 18:05

## 2022-10-06 RX ADMIN — PHENYLEPHRINE HYDROCHLORIDE 40 MCG/MIN: 10 INJECTION INTRAVENOUS at 15:24

## 2022-10-06 RX ADMIN — ONDANSETRON 4 MG: 2 INJECTION INTRAMUSCULAR; INTRAVENOUS at 14:24

## 2022-10-06 RX ADMIN — ROCURONIUM BROMIDE 50 MG: 10 SOLUTION INTRAVENOUS at 14:19

## 2022-10-06 RX ADMIN — Medication 100 MCG: at 17:12

## 2022-10-06 RX ADMIN — ROCURONIUM BROMIDE 20 MG: 10 SOLUTION INTRAVENOUS at 15:19

## 2022-10-06 RX ADMIN — MONTELUKAST SODIUM 10 MG: 10 TABLET ORAL at 22:07

## 2022-10-06 RX ADMIN — Medication 100 MCG: at 14:40

## 2022-10-06 RX ADMIN — HEPARIN SODIUM 7000 UNITS/HR: 10000 INJECTION, SOLUTION INTRAVENOUS at 15:51

## 2022-10-06 RX ADMIN — WARFARIN SODIUM 5 MG: 5 TABLET ORAL at 22:07

## 2022-10-06 RX ADMIN — Medication 100 MCG: at 14:38

## 2022-10-06 RX ADMIN — LIDOCAINE HYDROCHLORIDE 60 MG: 10 INJECTION, SOLUTION INFILTRATION; PERINEURAL at 14:19

## 2022-10-06 RX ADMIN — Medication 100 MCG: at 14:26

## 2022-10-06 RX ADMIN — Medication 100 MCG: at 14:57

## 2022-10-06 RX ADMIN — ROCURONIUM BROMIDE 10 MG: 10 SOLUTION INTRAVENOUS at 14:50

## 2022-10-06 RX ADMIN — PROPOFOL 200 MG: 10 INJECTION, EMULSION INTRAVENOUS at 14:19

## 2022-10-06 RX ADMIN — TETRAHYDROZOLINE HCL 1 DROP: 0.05 SOLUTION/ DROPS OPHTHALMIC at 10:56

## 2022-10-06 RX ADMIN — HEPARIN SODIUM 7000 UNITS: 1000 INJECTION, SOLUTION INTRAVENOUS; SUBCUTANEOUS at 15:51

## 2022-10-06 RX ADMIN — SODIUM CHLORIDE: 9 INJECTION, SOLUTION INTRAVENOUS at 14:08

## 2022-10-06 RX ADMIN — Medication 10 ML: at 09:09

## 2022-10-06 RX ADMIN — SUGAMMADEX 200 MG: 100 INJECTION, SOLUTION INTRAVENOUS at 17:35

## 2022-10-06 RX ADMIN — PANTOPRAZOLE SODIUM 40 MG: 40 TABLET, DELAYED RELEASE ORAL at 06:51

## 2022-10-06 RX ADMIN — TORSEMIDE 30 MG: 20 TABLET ORAL at 09:08

## 2022-10-06 RX ADMIN — Medication 100 MCG: at 15:24

## 2022-10-06 RX ADMIN — DOFETILIDE 250 MCG: 0.25 CAPSULE ORAL at 23:44

## 2022-10-06 RX ADMIN — Medication 100 MCG: at 15:16

## 2022-10-06 RX ADMIN — TETRAHYDROZOLINE HCL 1 DROP: 0.05 SOLUTION/ DROPS OPHTHALMIC at 23:45

## 2022-10-06 RX ADMIN — DIPHENHYDRAMINE HCL 25 MG: 25 TABLET ORAL at 23:44

## 2022-10-06 RX ADMIN — METOPROLOL TARTRATE 12.5 MG: 25 TABLET, FILM COATED ORAL at 22:07

## 2022-10-06 RX ADMIN — MAGNESIUM SULFATE HEPTAHYDRATE 2000 MG: 40 INJECTION, SOLUTION INTRAVENOUS at 22:06

## 2022-10-06 ASSESSMENT — PAIN DESCRIPTION - LOCATION: LOCATION: HEAD

## 2022-10-06 ASSESSMENT — ENCOUNTER SYMPTOMS
BACK PAIN: 0
ABDOMINAL PAIN: 0
DIARRHEA: 0
CHEST TIGHTNESS: 0
SHORTNESS OF BREATH: 1
VOMITING: 0
SHORTNESS OF BREATH: 1

## 2022-10-06 ASSESSMENT — PAIN SCALES - GENERAL
PAINLEVEL_OUTOF10: 0
PAINLEVEL_OUTOF10: 7

## 2022-10-06 ASSESSMENT — PAIN DESCRIPTION - DESCRIPTORS: DESCRIPTORS: ACHING

## 2022-10-06 ASSESSMENT — PAIN SCALES - WONG BAKER: WONGBAKER_NUMERICALRESPONSE: 0

## 2022-10-06 NOTE — PROGRESS NOTES
A: Bedside report given to Chauncey Dill, all current drips, treatments, skin issues, and plan of care were reviewed by both RN's, patient transferred in stable condition. Updated patients family of room number and patient condition.

## 2022-10-06 NOTE — PROGRESS NOTES
[address]     Electrophysiology                                     Progress Note    Admission date:  10/5/2022    Reason for follow up visit: AF    HPI/CC: Rosa Marin was admitted on 10/5/2022 with symptomatic atrial fibrillation. He had a recent PIERO/CV on 2022. Rhythm has been AF. Subjective:  He reports some anxiety regarding this afternoon's procedure. Vitals:  Blood pressure 114/72, pulse 76, temperature 97.6 °F (36.4 °C), temperature source Oral, resp. rate 18, height 6' 3\" (1.905 m), weight (!) 313 lb 3.2 oz (142.1 kg), SpO2 99 %.   Temp  Av.9 °F (36.6 °C)  Min: 97.6 °F (36.4 °C)  Max: 98.4 °F (36.9 °C)  Pulse  Av.3  Min: 58  Max: 85  BP  Min: 98/61  Max: 147/82  SpO2  Av.4 %  Min: 97 %  Max: 100 %    24 hour I/O    Intake/Output Summary (Last 24 hours) at 10/6/2022 0852  Last data filed at 10/6/2022 8663  Gross per 24 hour   Intake 500 ml   Output 600 ml   Net -100 ml     Current Facility-Administered Medications   Medication Dose Route Frequency Provider Last Rate Last Admin    aspirin chewable tablet 81 mg  81 mg Oral Daily [place] Prabhakar MD        atorvastatin (LIPITOR) tablet 40 mg  40 mg Oral Nightly [place] Prabhakar MD   40 mg at 10/05/22 2009    metoprolol tartrate (LOPRESSOR) tablet 12.5 mg  12.5 mg Oral BID [place] Prabhakar MD   12.5 mg at 10/05/22 2009    montelukast (SINGULAIR) tablet 10 mg  10 mg Oral Nightly [place] MD Vanna   10 mg at 10/05/22 2009    torsemide (DEMADEX) tablet 30 mg  30 mg Oral Daily [place] Prabhakar MD        glucose chewable tablet 16 g  4 tablet Oral PRN Sukumar Prabhakar MD        dextrose bolus 10% 125 mL  125 mL IntraVENous PRN Sukumar Prabhakar MD        Or    dextrose bolus 10% 250 mL  250 mL IntraVENous PRN [place] MD Vanna        glucagon (rDNA) injection 1 mg  1 mg SubCUTAneous PRN [place] MD Vanna        dextrose 10 % infusion   IntraVENous Continuous PRN Sukumar Prabhakar MD        sodium chloride flush 0.9 % injection 10 mL  10 mL IntraVENous PRN Tree Laura MD        0.9 % sodium chloride infusion   IntraVENous PRN Tree Laura MD        ondansetron (ZOFRAN-ODT) disintegrating tablet 4 mg  4 mg Oral Q8H PRN Tree Laura MD        Or    ondansetron [facility]) injection 4 mg  4 mg IntraVENous Q6H PRN Tree Laura MD        acetaminophen (TYLENOL) tablet 650 mg  650 mg Oral Q6H PRN Tree Laura MD   650 mg at 10/05/22 1746    Or    acetaminophen (TYLENOL) suppository 650 mg  650 mg Rectal Q6H PRN Tree Laura MD        insulin lispro (HUMALOG) injection vial 7 Units  0.05 Units/kg SubCUTAneous TID WC Tree Laura MD   7 Units at 10/05/22 1845    insulin lispro (HUMALOG) injection vial 0-8 Units  0-8 Units SubCUTAneous Q4H Tree Laura MD        melatonin tablet 3 mg  3 mg Oral Nightly PRN Tree Laura MD        calcium carbonate (TUMS) chewable tablet 1,000 mg  1,000 mg Oral TID PRN Tree Laura MD        pantoprazole (PROTONIX) tablet 40 mg  40 mg Oral QAM AC Tree Laura MD   40 mg at 10/06/22 1691    insulin glargine (LANTUS) injection vial 22 Units  0.15 Units/kg SubCUTAneous Nightly Tree Laura MD        warfarin placeholder: dosing by pharmacy   Other Brenda Queen MD           Objective:     Telemetry monitor: AF    Physical Exam:  Constitutional and general appearance: alert, cooperative, no distress, and appears stated age  HEENT: PERRL, no cervical lymphadenopathy. No masses palpable. Normal oral mucosa  Respiratory:  Normal excursion and expansion without use of accessory muscles  Resp auscultation: Normal breath sounds without wheezing, rhonchi, and rales  Cardiovascular:   The apical impulse is not displaced  Heart tones are crisp and normal. irregular S1 and S2.  Jugular venous pulsation Normal  The carotid upstroke is normal in amplitude and contour without delay or bruit  Peripheral pulses are symmetrical and full Abdomen:  No masses or tenderness  Bowel sounds present  Extremities:   No cyanosis or clubbing   Nonpitting lower extremity edema   Skin: warm and dry  Neurological:  Alert and oriented  Moves all extremities well  No abnormalities of mood, affect, memory, mentation, or behavior are noted    Data  Echo: 09/06/2022   Summary   LV systolic function is normal with EF estimated at 55-60%. No regional wall motion abnormalities are noted. There is moderate concentric left ventricular hypertrophy. Normal left ventricular diastolic filling pressure. Biatrial enlargement. Mild posterior mitral annular calcification is present. Aortic valve appears sclerotic but opens adequately. Mild mitral and tricuspid regurgitation. Systolic pulmonary artery pressure (SPAP) estimated at 46mmHg (RA pressure 3   mmHg), consistent with mild pulmonary hypertension. 6- 55%, LVH, LAE, ANNIE    Cath: 09/08/2022     1. Left main coronary artery was normal. It gave off the left anterior descending artery and left circumflex. 2. Left anterior descending artery has severe atherosclerotic disease. It was large in size. It gave off septal perforators and a moderate sized diagonal branch. The LAD covered the entire apex of the left ventricle. ~There is evidence for Denovo disease of about 70% within the mid LAD. This is confirmed by intravascular ultrasound. Furthermore there is a previously placed stent within the mid LAD that was under deployed and under expanded. 3. Left circumflex has mild atherosclerotic disease. It was moderate in size. There was a moderate sized obtuse marginal branch. 4. Right coronary artery has mild atherosclerotic disease. It was moderate in size and was the dominant artery. 5. Left ventriculogram showed normal LVEF at 55-60%. Wall motion was normal . There was no significant mitral valve or aortic valve disease noted.  LVEDP was normal. There was no gradient noted across the aortic valve during pullback of the catheter. /81   Pulse (P) 69   Temp 97.7 °F (36.5 °C) (Oral)   Resp 18   Ht 6' 3\" (1.905 m)   Wt (!) 301 lb 8 oz (136.8 kg)   SpO2 97%   BMI 37.68 kg/m²      The access site was controlled with manual pressure and/or appropriate closure device. Moderate Conscious Sedation Details: An independent trained observer pushed medications at my direction. We monitoring the patient's level of consciousness and vital signs/physiologic status throughout the procedure. CPT codes 70812 and 06294        Start time: 1524  Stop time: 1612  ASA class: 3     Sedation totals:  Versad - 4mg  Fentanyl - 75mcg     EBL - minimal <5 cc blood loss     The patient was monitored continuously with the ECG, pulse oximetry, blood pressure, and direct observation. CONCLUSIONS:     1. Successful intravascular ultrasound-guided drug-eluting stent insertion to the left anterior descending artery     ASSESSMENT/RECOMMENDATIONS:     1. Continue dual antiplatelet therapy and guideline directed medical therapy. 2.  Follow-up with patient's primary cardiologist after discharge. All labs and testing reviewed.   Lab Review     Renal Profile:   Lab Results   Component Value Date/Time    CREATININE 1.2 10/06/2022 07:26 AM    BUN 27 10/06/2022 07:26 AM     10/06/2022 07:26 AM    K 4.2 10/06/2022 07:26 AM    K 4.2 09/16/2022 06:47 AM     10/06/2022 07:26 AM    CO2 27 10/06/2022 07:26 AM     CBC:    Lab Results   Component Value Date/Time    WBC 6.9 10/06/2022 07:26 AM    RBC 4.22 10/06/2022 07:26 AM    HGB 12.3 10/06/2022 07:26 AM    HCT 37.9 10/06/2022 07:26 AM    MCV 89.9 10/06/2022 07:26 AM    RDW 17.3 10/06/2022 07:26 AM     10/06/2022 07:26 AM     BNP:    Lab Results   Component Value Date/Time    BNP 53 09/29/2013 11:30 PM     Fasting Lipid Panel:    Lab Results   Component Value Date/Time    CHOL 119 09/05/2022 04:37 AM    HDL 30 09/05/2022 04:37 AM TRIG 147 09/05/2022 04:37 AM     Cardiac Enzymes:  CK/MbTroponin  Lab Results   Component Value Date/Time    TROPONINI <0.01 10/05/2022 11:32 AM     PT/ INR   Lab Results   Component Value Date/Time    INR 1.70 10/06/2022 07:26 AM    INR 1.84 10/05/2022 11:32 AM    INR 1.60 10/03/2022 12:00 AM    PROTIME 19.9 10/06/2022 07:26 AM    PROTIME 21.3 10/05/2022 11:32 AM    PROTIME 19.3 09/21/2022 04:16 AM     PTT No results found for: PTT   Lab Results   Component Value Date/Time    MG 2.40 10/06/2022 07:26 AM      Lab Results   Component Value Date/Time    TSH 2.18 09/19/2022 05:07 AM       Assessment:  Symptomatic paroxysmal atrial arrhythmias (AF/AFL) : ongoing    -[id number] score 7 (age, HTN, DM, CAD, DVT)   -intolerant of Tikosyn (prolonged QTc) and amiodarone (unclear)  History of atrial flutter    -s/p EPS, PVI and RFCA in 2017 Samantha Garcia)   -s/p PIERO/CV 9/20/2022  PVC's: stable   -s/p PVC ablation 2014  CAD    -s/p IVUS and PCI to LAD with FELIZ x 1   HTN: controlled   DM  MARIA VICTORIA: CPAP compliant   History of DVT   Obesity s/p gastric sleeve   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 Hollie Hunt (risks, benefits, and alternative therapy discussed)  Will plan to start Multaq post procedure. If unable to tolerate will plan for low dose amiodarone and monitor for side effects.          SERENA Barreto-CNP  [facility]  [phone]

## 2022-10-06 NOTE — PROGRESS NOTES
D: Patient here from Cath lab via stretcher, taken to bay 7 in PACU. All current drips, treatments, skin issues, and plan of care were reviewed by both RN's, patient transferred in stable condition, patient is awake and alert x 4 but is a little confused and does not know where he is, denies pain at present. A: Assessment completed and documented, call light is in reach, discussed plan of care with patient who agreed.

## 2022-10-06 NOTE — CARE COORDINATION
Chart reviewd. PAF. Management per cardiology/EP and IM. Pt recently discharged from [facility]. Was supposed to start services with [facility] yesterday, however pt began having symptoms and was brought to the hospital. Pt plans to discharge home and resume services with [facility]. Plan PIERO and AFIB ablation with Dr. Mc Marsh this afternoon. CM will continue to follow, should needs arise.  Veronique Lainez RN

## 2022-10-06 NOTE — PROGRESS NOTES
Pharmacy Note  Warfarin Consult  Dx: Afib  Goal INR range 2-3   Home Warfarin dose: 5mg daily , managed by Dr. Chicho Mondragon       Date  INR  Warfarin  10/5                1.84                5 mg ( took in am at home)  10/6                1.70               5 mg  Recommended warfarin 5mg X1 tonight ,  Daily INR ordered. Rx will continue to manage therapy per Dr. Tracy Parish consult order.   Martha Schwarz/Nathaniel. 10/6/22 5:12 PM EDT

## 2022-10-06 NOTE — PROGRESS NOTES
Hospitalist Progress Note      PCP: Migel Rader MD    Date of Admission: 10/5/2022    Chief Complaint: SOB/Dizziness    Subjective: no new c/o. Medications:  Reviewed    Infusion Medications    dextrose      sodium chloride       Scheduled Medications    aspirin  81 mg Oral Daily    atorvastatin  40 mg Oral Nightly    metoprolol tartrate  12.5 mg Oral BID    montelukast  10 mg Oral Nightly    torsemide  30 mg Oral Daily    insulin lispro  0.05 Units/kg SubCUTAneous TID WC    insulin lispro  0-8 Units SubCUTAneous Q4H    pantoprazole  40 mg Oral QAM AC    insulin glargine  0.15 Units/kg SubCUTAneous Nightly    warfarin placeholder: dosing by pharmacy   Other RX Placeholder     PRN Meds: glucose, dextrose bolus **OR** dextrose bolus, glucagon (rDNA), dextrose, sodium chloride flush, sodium chloride, ondansetron **OR** ondansetron, acetaminophen **OR** acetaminophen, melatonin, calcium carbonate      Intake/Output Summary (Last 24 hours) at 10/6/2022 0946  Last data filed at 10/6/2022 0523  Gross per 24 hour   Intake 500 ml   Output 600 ml   Net -100 ml       Physical Exam Performed:    /79   Pulse 69   Temp 97.8 °F (36.6 °C) (Oral)   Resp 18   Ht 6' 3\" (1.905 m)   Wt (!) 313 lb 3.2 oz (142.1 kg)   SpO2 99%   BMI 39.15 kg/m²     General appearance: No apparent distress, appears stated age and cooperative. HEENT: Pupils equal, round, and reactive to light. Conjunctivae/corneas clear. Neck: Supple, with full range of motion. No jugular venous distention. Trachea midline. Respiratory:  Normal respiratory effort. Clear to auscultation, bilaterally without Rales/Wheezes/Rhonchi. Cardiovascular: Regular rate and rhythm with normal S1/S2 without murmurs, rubs or gallops. Abdomen: Soft, non-tender, non-distended with normal bowel sounds. Musculoskeletal: No clubbing, cyanosis or edema bilaterally. Full range of motion without deformity.   Skin: Skin color, texture, turgor normal.  No rashes or lesions. Neurologic:  Neurovascularly intact without any focal sensory/motor deficits. Cranial nerves: II-XII intact, grossly non-focal.  Psychiatric: Alert and oriented, thought content appropriate, normal insight  Capillary Refill: Brisk,< 3 seconds   Peripheral Pulses: +2 palpable, equal bilaterally       Labs:   Recent Labs     10/05/22  1132 10/06/22  0726   WBC 8.9 6.9   HGB 11.9* 12.3*   HCT 35.9* 37.9*    267     Recent Labs     10/05/22  1132 10/06/22  0726    139   K 4.3 4.2    103   CO2 25 27   BUN 26* 27*   CREATININE 1.4* 1.2   CALCIUM 8.9 9.3     Recent Labs     10/05/22  1132   AST 28   ALT 31   BILITOT 0.5   ALKPHOS 88     Recent Labs     10/05/22  1132 10/06/22  0726   INR 1.84* 1.70*     Recent Labs     10/05/22  1132   TROPONINI <0.01       Urinalysis:      Lab Results   Component Value Date/Time    NITRU Negative 10/05/2022 01:26 PM    WBCUA 3-5 09/15/2022 04:00 PM    BACTERIA Rare 09/15/2022 04:00 PM    RBCUA 0-2 09/15/2022 04:00 PM    BLOODU Negative 10/05/2022 01:26 PM    SPECGRAV 1.010 10/05/2022 01:26 PM    GLUCOSEU Negative 10/05/2022 01:26 PM    GLUCOSEU NEGATIVE 02/05/2012 09:00 AM       Consults:    IP CONSULT TO CARDIOLOGY  IP CONSULT TO HOSPITALIST  IP CONSULT TO PHARMACY      Assessment/Plan:    Active Hospital Problems    Diagnosis     Moderate obstructive sleep apnea [G47.33]      Priority: High    PAF (paroxysmal atrial fibrillation) ([facility]) [I48.0]      Priority: Medium    CAD S/P percutaneous coronary angioplasty [I25.10, Z98.61]      Priority: Medium    History of venous thromboembolism [Z86.718]     Morbid obesity with BMI of 40.0-44.9, adult ([facility]) [E66.01, Z68.41]     Type 2 diabetes mellitus with diabetic neuropathy ([facility]) [E11.40]        Afib - chronic persistent of unspecified and clinically unable to determine etiology. Normally rate controlled on BBlocker - continued.   Anticoagulated at baseline on home Coumadin due to secondary hypercoaguable state due to Afib - continued. Monitored on tele. S/P DCCV on 20 Sept w/ improvement in exertional dyspnea. Plan per Dr. Sherrie Schmidt is for patient to undergo Afib ablation this hospitalization. HTN/CAD - w/ known CAD s/p PCI 8 Sept but no evidence of active signs/sxs of ischemia/failure. Currently controlled on home meds w/ vitals reviewed and documented as above. History of DVT   -  Anticoagulated with warfarin. Goal INR = 2-3. DM2  -  Hold all oral antidiabetic agents. Start s.c. Insulin regimen based on weight. Asthma   -  Continue home montelukast.     Obesity -  With Body mass index is 39.15 kg/m². Complicating assessment and treatment. Placing patient at risk for multiple co-morbidities as well as early death and contributing to the patient's presentation. Counseled on weight loss. MARIA VICTORIA - likely 2nd to obesity. Controlled on home CPAP/BiPap - continued, w/ outpatient f/u as previously arranged. DVT Prophylaxis: Coumadin/IPC     Recent Labs     10/05/22  1132 10/06/22  0726    267     Diet: Diet NPO Exceptions are: Sips of Water with Meds  Code Status: Full Code      PT/OT Eval Status: not yet ordered.      Dispo - Patient is likely to remain in-house at least until Friday/Sat 7/8 October pending clinical course and subspecialty Sera Hinkle MD

## 2022-10-06 NOTE — PLAN OF CARE
Problem: Discharge Planning  Goal: Discharge to home or other facility with appropriate resources  Outcome: Progressing  Flowsheets (Taken 10/5/2022 1624 by Rojas Newton, RN)  Discharge to home or other facility with appropriate resources:   Identify barriers to discharge with patient and caregiver   Arrange for needed discharge resources and transportation as appropriate     Problem: Pain  Goal: Verbalizes/displays adequate comfort level or baseline comfort level  Outcome: Progressing     Problem: ABCDS Injury Assessment  Goal: Absence of physical injury  Outcome: Progressing  Flowsheets (Taken 10/5/2022 1630 by Rojas Newton RN)  Absence of Physical Injury: Implement safety measures based on patient assessment

## 2022-10-06 NOTE — PLAN OF CARE
Full note to come. Successful afib ablation. Right radial arterial access. Bilateral femoral vein access closed with figure 8. NO immediate complications. Full note to come.     Valentino Bruce, MD  Cardiac Electrophysiology  [address] [facility]  [phone] [facility]

## 2022-10-06 NOTE — ANESTHESIA PRE PROCEDURE
Department of Anesthesiology  Preprocedure Note       Name:  Colton Shepherd   Age:  69 y.o.  :  1945                                          MRN:  [id number]         Date:  10/6/2022      Surgeon: * Surgery not found *    Procedure:     Medications prior to admission:   Prior to Admission medications    Medication Sig Start Date End Date Taking? Authorizing Provider   torsemide (DEMADEX) 20 MG tablet Take 1.5 tablets daily 10/4/22   Emi Ledezma MD   albuterol (PROVENTIL) (2.5 MG/3ML) 0.083% nebulizer solution Take 3 mLs by nebulization every 4 hours as needed for Wheezing or Shortness of Breath 22  COLIN Dennis   metoprolol tartrate (LOPRESSOR) 25 MG tablet Take 0.5 tablets by mouth 2 times daily 22   Jacob Borrego MD   albuterol sulfate HFA (VENTOLIN HFA) 108 (90 Base) MCG/ACT inhaler Inhale 2 puffs into the lungs 4 times daily as needed for Wheezing or Shortness of Breath 22   COLIN Dennis   aspirin 81 MG chewable tablet Take 1 tablet by mouth daily 9/10/22   Shahab Davison MD   clopidogrel (PLAVIX) 75 MG tablet Take 1 tablet by mouth daily 9/9/22 10/9/22  Shahab Davison MD   lisinopril (PRINIVIL;ZESTRIL) 5 MG tablet Take 1 tablet by mouth daily 9/10/22 9/21/22  Shahab Davison MD   warfarin (COUMADIN) 5 MG tablet TAKE ONE TABLET BY MOUTH DAILY 22   Alexandrea Scott MD   metFORMIN (GLUCOPHAGE) 1000 MG tablet Take 0.5 tablets by mouth in the morning and 0.5 tablets in the evening. Take with meals.  22   Alexandrea Scott MD   montelukast (SINGULAIR) 10 MG tablet TAKE ONE TABLET BY MOUTH DAILY 22   Alexandrea Scott MD   atorvastatin (LIPITOR) 40 MG tablet TAKE ONE TABLET BY MOUTH DAILY  Patient taking differently: Takes at night 22   Alexandrea Scott MD   spironolactone (ALDACTONE) 25 MG tablet TAKE ONE TABLET BY MOUTH DAILY  Patient not taking: No sig reported 7/1/22 9/15/22  Alexandrea Scott MD metoprolol succinate (TOPROL XL) 25 MG extended release tablet TAKE ONE TABLET BY MOUTH DAILY  Patient not taking: No sig reported 7/1/22 9/21/22  Francisca Carl MD   Dulaglutide (TRULICITY) 9.69 Sami/7.0ZI SOPN Inject 0.75 mg into the skin once a week    Historical Provider, MD   blood glucose test strips (TRUE METRIX BLOOD GLUCOSE TEST) strip TEST ONCE DAILY. DX:E11.9 1/20/22   Lamberto Botello MD   dofetilide [facility]) 250 MCG capsule Take 1 capsule by mouth 2 times daily  Patient not taking: Reported on 9/7/2022 10/19/21 9/9/22  SOTERO Sarmiento MD   Easy Touch Lancets 32G/Twist MISC TEST DAILY. DX: E11.9 3/23/20   Lambetro Botello MD   blood glucose test strips (TRUE METRIX BLOOD GLUCOSE TEST) strip USE TO CHECK BLOOD GLUCOSE DAILY. DX: E11.9 3/23/20   Lamberto Monday, MD   ACCU-CHEK MULTICLIX LANCETS MISC Check sugars once daily. Dx;E11.9 3/11/19   Lamberto Botello MD   Lancet Devices (EASY TOUCH LANCING DEVICE) MISC Test Daily. DX: E11.9 12/26/18   Lamberto Botello MD   Blood Glucose Monitoring Suppl (TRUE METRIX METER) VINNIE Use to check daily. DX;E11.9 12/22/18   Lamberto Botello MD   Lancets Misc. (ACCU-CHEK MULTICLIX LANCET DEV) KIT Check sugars once daily. DX;E11.9 12/21/18   Lambetro Botello MD   Biotin 5 MG TABS Take 5 mg by mouth daily    Historical Provider, MD   Cholecalciferol (VITAMIN D3) 5000 units TABS Take 5,000 Units by mouth daily    Historical Provider, MD   Cyanocobalamin (VITAMIN B-12 CR PO) Take 5,000 mcg by mouth every 7 days     Historical Provider, MD   Multiple Vitamins-Minerals (THERAPEUTIC MULTIVITAMIN-MINERALS) tablet Take 2 tablets by mouth daily     Historical Provider, MD   magnesium oxide (MAG-OX) 400 MG tablet Take 400 mg by mouth daily.     Historical Provider, MD       Current medications:    Current Facility-Administered Medications   Medication Dose Route Frequency Provider Last Rate Last Admin    tetrahydrozoline 0.05 % ophthalmic solution 1 drop  1 drop Both Eyes Q4H PRN Zuleyma Cedillo MD   1 drop at 10/06/22 1056    aspirin chewable tablet 81 mg  81 mg Oral Daily Juan Antonio Peterson MD   81 mg at 10/06/22 4574    atorvastatin (LIPITOR) tablet 40 mg  40 mg Oral Nightly Juan Antonio Peterson MD   40 mg at 10/05/22 2009    metoprolol tartrate (LOPRESSOR) tablet 12.5 mg  12.5 mg Oral BID Juan Antonio Peterson MD   12.5 mg at 10/06/22 1749    montelukast (SINGULAIR) tablet 10 mg  10 mg Oral Nightly Juan Antonio Peterson MD   10 mg at 10/05/22 2009    torsemide (DEMADEX) tablet 30 mg  30 mg Oral Daily Juan Antonio Peterson MD   30 mg at 10/06/22 0908    glucose chewable tablet 16 g  4 tablet Oral PRN Juan Antonio Peterson MD        dextrose bolus 10% 125 mL  125 mL IntraVENous PRN Juan Antonio Peterson MD        Or    dextrose bolus 10% 250 mL  250 mL IntraVENous PRN Juan Antonio Peterson MD        glucagon (rDNA) injection 1 mg  1 mg SubCUTAneous PRN Juan Antonio Peterson MD        dextrose 10 % infusion   IntraVENous Continuous PRN Juan Antonio Peterson MD        sodium chloride flush 0.9 % injection 10 mL  10 mL IntraVENous PRN Juan Antonio Peterson MD   10 mL at 10/06/22 0909    0.9 % sodium chloride infusion   IntraVENous PRN Juan Antonio Peterson MD        ondansetron (ZOFRAN-ODT) disintegrating tablet 4 mg  4 mg Oral Q8H PRN Juan Antonio Peterson MD        Or    ondansetron [facility]) injection 4 mg  4 mg IntraVENous Q6H PRN Juan Antonio Peterson MD        acetaminophen (TYLENOL) tablet 650 mg  650 mg Oral Q6H PRN Juan Antonio Peterson MD   650 mg at 10/06/22 1026    Or    acetaminophen (TYLENOL) suppository 650 mg  650 mg Rectal Q6H PRN Juan Antonio Peterson MD        insulin lispro (HUMALOG) injection vial 7 Units  0.05 Units/kg SubCUTAneous TID WC Juan Antonio Peterson MD   7 Units at 10/05/22 1845    insulin lispro (HUMALOG) injection vial 0-8 Units  0-8 Units SubCUTAneous Q4H Juan Antonio Peterson MD        melatonin tablet 3 mg  3 mg Oral Nightly PRN Juan Antonio Peterson MD       [facility] calcium carbonate (TUMS) chewable tablet 1,000 mg  1,000 mg Oral TID PRN Jersey Mena MD        pantoprazole (PROTONIX) tablet 40 mg  40 mg Oral QAM AC Jersey Mena MD   40 mg at 10/06/22 0651    insulin glargine (LANTUS) injection vial 22 Units  0.15 Units/kg SubCUTAneous Nightly Jersey Mena MD        warfarin placeholder: dosing by pharmacy   Other Sudhakar Bland MD           Allergies: Allergies   Allergen Reactions    Bactrim [Sulfamethoxazole-Trimethoprim] Diarrhea    Brilinta [Ticagrelor]      dyspnea    Ciprofloxacin      Bad headaches    Macrobid [Nitrofurantoin Monohyd Macro]     Ozempic (0.25 Or 0.5 Mg-Dose) [Semaglutide(0.25 Or 0.5mg-Dos)]     Sulfamethoxazole Diarrhea    Theophylline      Theodur-caused severe headaches    Cefuroxime Axetil Rash and Itching    Cipro Xr Rash    Other Rash and Other (See Comments)     Ekg leads-glue    Tape [Adhesive Tape] Rash     Skin irritaion  Eats away at skin, paper tape OK  Plastic tape       Problem List:    Patient Active Problem List   Diagnosis Code    Arthritis M19.90    Atrial fibrillation (HCC) I48.91    Essential hypertension I10    Carpal tunnel syndrome G56.00    Polyneuropathy G62.9    Numbness and tingling of both legs R20.0, R20.2    Moderate obstructive sleep apnea G47.33    S/P thoracotomy Z98.890    Atypical atrial flutter (HCC) I48.4    SOB (shortness of breath) R06.02    Type 2 diabetes mellitus with diabetic neuropathy ([facility]) E11.40    Morbid obesity with BMI of 40.0-44.9, adult ([facility]) E66.01, Z68.41    History of venous thromboembolism Z86.718    Cataract of both eyes H26.9    Abnormal stress test R94.39    Angina at rest ([facility] [address] I20.8    Status post insertion of drug-eluting stent into left anterior descending (LAD) artery for coronary artery disease Z95.5    Other fatigue R53.83    Obesity (BMI 30-39. 9) E66.9    COVID-19 U07.1    Acute on chronic heart failure with preserved ejection fraction (HCC) I50.33    Coronary artery disease involving native coronary artery of native heart I25.10    Anginal equivalent (HCC) I20.8    CAD S/P percutaneous coronary angioplasty I25.10, Z98.61    PANG (dyspnea on exertion) R06.09    Asthma J45.909    Atrial fibrillation with RVR (HCC) I48.91    PAF (paroxysmal atrial fibrillation) ([facility]) I48.0       Past Medical History:        Diagnosis Date    Arthritis     Asthma     past hx    Atrial fibrillation ([address]     Blood circulation, collateral     Diabetes mellitus ([address] 12/24/2018    DVT (deep venous thrombosis) ([facility])     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    History of knee replacement procedure of right knee     Hx of blood clots     2 DVT's    Hyperlipidemia     Hypertension     Knee osteoarthritis 1/17/2012    Left TKR 1/18/2012    Lung nodule     due to histoplasmosis    Neuromuscular disorder ([facility] [address]     Palpitations     stable with atenolol    Sleep apnea     uses CPAP    Stone, kidney     UTI (urinary tract infection) 01/23/2017       Past Surgical History:        Procedure Laterality Date    ABLATION OF DYSRHYTHMIC FOCUS  2013    a-fib    BARIATRIC SURGERY  2013    GASTRIC SLEEVE    CARDIAC SURGERY  2013    ablation    CARDIOVERSION N/A 9/20/2022    CARDIOVERSION W/ANES.  performed by Tiff Lopez MD at [address] [facility] Right 9-30-15    CARPAL TUNNEL RELEASE Left 3/20/16    CHOLECYSTECTOMY, LAPAROSCOPIC N/A 2/19/2019    LAPAROSCOPIC CHOLECYSTECTOMY WITH CHOLANGIOGRAMS performed by Jersey Flowers MD at [address]      with removal stone    CYSTOSCOPY  2/8/13    with Laser Vaporization of Enlarged Prostate    DIAGNOSTIC CARDIAC CATH LAB PROCEDURE      ERCP  02/18/2019    Sphincterotomy, stone removal    ERCP N/A 2/18/2019    ERCP SPHINCTER/PAPILLOTOMY performed by Richard Horton MD at [address] ERCP  2/18/2019    ERCP STONE REMOVAL performed by Efrain Byrne MD at [address] Right 2020    PHACOEMULSIFICATION OF CATARACT RIGHT EYE WITH INTRAOCULAR LENS IMPLANT performed by Baldev Dunbar MD at [facility] Left 2020    PHACOEMULSIFICATION OF CATARACT LEFT EYE WITH INTRAOCULAR LENS IMPLANT -SLEEP APNEA- performed by Baldev Dunbar MD at [place] [address] Bilateral     right knee () and left knee ()    KNEE ARTHROSCOPY  2011     left  knee    SKIN BIOPSY      skin Ca/SQUAMOUS CELL    THORACOTOMY      wedge resection    TONSILLECTOMY      TRANSESOPHAGEAL ECHOCARDIOGRAM N/A 2022    PIERO W/ANES. (9:00) performed by Kavitha Rueda MD at [facility]. [facility] History:    Social History     Tobacco Use    Smoking status: Former     Packs/day: 2.00     Years: 17.00     Pack years: 34.00     Types: Cigarettes     Quit date: 1976     Years since quittin.4    Smokeless tobacco: Never   Substance Use Topics    Alcohol use: No                                Counseling given: Not Answered      Vital Signs (Current):   Vitals:    10/06/22 0457 10/06/22 0523 10/06/22 0845 10/06/22 1157   BP: 114/72  120/79 109/67   Pulse: 76  69 80   Resp: 18  18 18   Temp: 97.6 °F (36.4 °C)  97.8 °F (36.6 °C) 97.6 °F (36.4 °C)   TempSrc: Oral  Oral Oral   SpO2: 99%  99% 99%   Weight:  (!) 313 lb 3.2 oz (142.1 kg)     Height:                                                  BP Readings from Last 3 Encounters:   10/06/22 109/67   10/04/22 126/74   22 136/78       NPO Status:                                                                                 BMI:   Wt Readings from Last 3 Encounters:   10/06/22 (!) 313 lb 3.2 oz (142.1 kg)   10/04/22 (!) 317 lb 9.6 oz (144.1 kg)   22 (!) 314 lb 6.4 oz (142.6 kg)     Body mass index is 39.15 kg/m².     CBC:   Lab Results   Component Value Date/Time    WBC 6.9 10/06/2022 07:26 AM    RBC 4.22 10/06/2022 07:26 AM    HGB 12.3 10/06/2022 07:26 AM    HCT 37.9 10/06/2022 07:26 AM    MCV 89.9 10/06/2022 07:26 AM    RDW 17.3 10/06/2022 07:26 AM     10/06/2022 07:26 AM       CMP:   Lab Results   Component Value Date/Time     10/06/2022 07:26 AM    K 4.2 10/06/2022 07:26 AM    K 4.2 09/16/2022 06:47 AM     10/06/2022 07:26 AM    CO2 27 10/06/2022 07:26 AM    BUN 27 10/06/2022 07:26 AM    CREATININE 1.2 10/06/2022 07:26 AM    GFRAA >60 10/06/2022 07:26 AM    GFRAA >60 03/15/2013 09:34 AM    AGRATIO 1.1 10/05/2022 11:32 AM    LABGLOM 59 10/06/2022 07:26 AM    LABGLOM 65 09/24/2015 12:10 PM    GLUCOSE 121 10/06/2022 07:26 AM    PROT 7.0 10/05/2022 11:32 AM    PROT 7.1 10/27/2012 02:50 PM    CALCIUM 9.3 10/06/2022 07:26 AM    BILITOT 0.5 10/05/2022 11:32 AM    ALKPHOS 88 10/05/2022 11:32 AM    AST 28 10/05/2022 11:32 AM    ALT 31 10/05/2022 11:32 AM       POC Tests:   Recent Labs     10/06/22  1233   POCGLU 115*       Coags:   Lab Results   Component Value Date/Time    PROTIME 19.9 10/06/2022 07:26 AM    INR 1.70 10/06/2022 07:26 AM    APTT 45.6 02/15/2019 01:47 PM       HCG (If Applicable): No results found for: PREGTESTUR, PREGSERUM, HCG, HCGQUANT     ABGs: No results found for: PHART, PO2ART, DDK7HWS, RCU8QTD, BEART, P1WGVBTF     Type & Screen (If Applicable):  Lab Results   Component Value Date    LABABO A 01/11/2012    [address] Negative 01/11/2012       Drug/Infectious Status (If Applicable):  No results found for: HIV, HEPCAB    COVID-19 Screening (If Applicable):   Lab Results   Component Value Date/Time    COVID19 NOT DETECTED 09/15/2022 03:52 PM    COVID19 DETECTED 12/30/2020 11:16 AM           Anesthesia Evaluation  Patient summary reviewed and Nursing notes reviewed no history of anesthetic complications:   Airway: Mallampati: II     Neck ROM: full     Dental:    (+) upper dentures      Pulmonary:Negative Pulmonary ROS and normal exam    (+) shortness of breath:  sleep apnea:  asthma:                            Cardiovascular:Negative CV ROS    (+) hypertension:, CAD:, CABG/stent:, dysrhythmias: atrial fibrillation and atrial flutter, CHF:, PANG:, hyperlipidemia    (-)  angina                Neuro/Psych:   Negative Neuro/Psych ROS  (+) neuromuscular disease (poly neuropathy):,             GI/Hepatic/Renal: Neg GI/Hepatic/Renal ROS       (-) hiatal hernia and GERD       Endo/Other: Negative Endo/Other ROS   (+) Diabetes, . Abdominal:             Vascular:   + DVT, . Other Findings:           Anesthesia Plan      general     ASA 3     (I discussed with the patient the risks and benefits of PIV, general anesthesia, IV Narcotics, PACU. All questions were answered the patient agrees with the plan and wishes to proceed.  )  Induction: intravenous. Pre-Operative Diagnosis: PAF (paroxysmal atrial fibrillation) ([address] [I48.0]    69 y.o.   BMI:  Body mass index is 39.15 kg/m².      Vitals:    10/06/22 0457 10/06/22 0523 10/06/22 0845 10/06/22 1157   BP: 114/72  120/79 109/67   Pulse: 76  69 80   Resp: 18  18 18   Temp: 97.6 °F (36.4 °C)  97.8 °F (36.6 °C) 97.6 °F (36.4 °C)   TempSrc: Oral  Oral Oral   SpO2: 99%  99% 99%   Weight:  (!) 313 lb 3.2 oz (142.1 kg)     Height:           Allergies   Allergen Reactions    Bactrim [Sulfamethoxazole-Trimethoprim] Diarrhea    Brilinta [Ticagrelor]      dyspnea    Ciprofloxacin      Bad headaches    Macrobid [Nitrofurantoin Monohyd Macro]     Ozempic (0.25 Or 0.5 Mg-Dose) [Semaglutide(0.25 Or 0.5mg-Dos)]     Sulfamethoxazole Diarrhea    Theophylline      Theodur-caused severe headaches    Cefuroxime Axetil Rash and Itching    Cipro Xr Rash    Other Rash and Other (See Comments)     Ekg leads-glue    Tape Christian Bougie Tape] Rash     Skin irritaion  Eats away at skin, paper tape OK  Plastic tape       Social History     Tobacco Use    Smoking status: Former     Packs/day: 2.00     Years: 17.00     Pack years: 34.00     Types: Cigarettes     Quit date: 1976     Years since quittin.4    Smokeless tobacco: Never   Substance Use Topics    Alcohol use: No       LABS:    CBC  Lab Results   Component Value Date/Time    WBC 6.9 10/06/2022 07:26 AM    HGB 12.3 (L) 10/06/2022 07:26 AM    HCT 37.9 (L) 10/06/2022 07:26 AM     10/06/2022 07:26 AM     RENAL  Lab Results   Component Value Date/Time     10/06/2022 07:26 AM    K 4.2 10/06/2022 07:26 AM    K 4.2 2022 06:47 AM     10/06/2022 07:26 AM    CO2 27 10/06/2022 07:26 AM    BUN 27 (H) 10/06/2022 07:26 AM    CREATININE 1.2 10/06/2022 07:26 AM    GLUCOSE 121 (H) 10/06/2022 07:26 AM     COAGS  Lab Results   Component Value Date/Time    PROTIME 19.9 (H) 10/06/2022 07:26 AM    INR 1.70 (H) 10/06/2022 07:26 AM    APTT 45.6 (H) 02/15/2019 01:47 PM     Summary   Left ventricular systolic function is normal with an estimated ejection   fraction of 55%. Moderate mitral regurgitation. Left atrial appendage is not well visualized. Redundancy of the interatrial septum with no obvious shunt. Signature      ------------------------------------------------------------------   Electronically signed by Joshua Lobo MD, [facility] (Interpreting   physician) on 2022 at 01:15 PM      CONCLUSIONS:     1. Successful intravascular ultrasound-guided drug-eluting stent insertion to the left anterior descending artery     ASSESSMENT/RECOMMENDATIONS:     1. Continue dual antiplatelet therapy and guideline directed medical therapy. 2.  Follow-up with patient's primary cardiologist after discharge.     Aaron Ballesteros MD   10/6/2022

## 2022-10-06 NOTE — PROGRESS NOTES
Pt arrived from PACU s/p ablation. Bilateral groin site with stopcocks in place. Right radial TR band in place with 12 mL of air. CMU aware of arrival. Will continue to monitor.

## 2022-10-06 NOTE — PLAN OF CARE
Problem: Pain  Goal: Verbalizes/displays adequate comfort level or baseline comfort level  10/6/2022 1123 by Mahesh Dyer RN  Outcome: Progressing       Problem: ABCDS Injury Assessment  Goal: Absence of physical injury  10/6/2022 1123 by Mahesh Dyer, RN  Outcome: Progressing

## 2022-10-06 NOTE — ACP (ADVANCE CARE PLANNING)
Advance Care Planning     General Advance Care Planning (ACP) Conversation    Date of Conversation: 10/5/2022  Conducted with: Patient with Decision Making Capacity    Healthcare Decision Maker:    Primary Decision Maker: Janet Clarke - [phone]    Secondary Decision Maker: Abhijit Grey - [phone]  Click here to complete Healthcare Decision Makers including selection of the Healthcare Decision Maker Relationship (ie \"Primary\"). Today we documented Decision Maker(s) consistent with Legal Next of Kin hierarchy. Content/Action Overview:   Has ACP document(s) on file - reflects the patient's care preferences  Reviewed DNR/DNI and patient elects Full Code (Attempt Resuscitation)        Length of Voluntary ACP Conversation in minutes:  <16 minutes (Non-Billable)    Veronique Roldan RN

## 2022-10-06 NOTE — PLAN OF CARE
Patient's EF (Ejection Fraction) is greater than 40%    Heart Failure Medications:  Diuretics[de-identified] Torsemide    (One of the following REQUIRED for EF </= 40%/SYSTOLIC FAILURE but MAY be used in EF% >40%/DIASTOLIC FAILURE)        ACE[de-identified] None        ARB[de-identified] None         ARNI[de-identified] None    (Beta Blockers)  NON- Evidenced Based Beta Blocker (for EF% >40%/DIASTOLIC FAILURE): Metoprolol TARTrate- Lopressor    Evidenced Based Beta Blocker::(REQUIRED for EF% <40%/SYSTOLIC FAILURE) None  . .................................................................................................................................................. Patient's weights and intake/output reviewed: Yes    Patient's Last Weight: 313 lbs obtained by standing scale. Difference of 2 ozmore than last documented weight. Intake/Output Summary (Last 24 hours) at 10/6/2022 0558  Last data filed at 10/6/2022 6210  Gross per 24 hour   Intake 500 ml   Output 300 ml   Net 200 ml       Education Booklet Provided: yes    Comorbidities Reviewed Yes    Patient has a past medical history of Arthritis, Asthma, Atrial fibrillation ([address] Blood circulation, collateral, Diabetes mellitus ([address] DVT (deep venous thrombosis) ([address] Histoplasmosis, History of knee replacement procedure of right knee, Hx of blood clots, Hyperlipidemia, Hypertension, Knee osteoarthritis, Left TKR, Lung nodule, Neuromuscular disorder ([address] Palpitations, Sleep apnea, Stone, kidney, and UTI (urinary tract infection). >>For CHF and Comorbidity documentation on Education Time and Topics, please see Education Tab    Progressive Mobility Assessment:  What is this patient's Current Level of Mobility?: Ambulatory-Up Ad Allyssa  How was this patient Mobilized today?: Edge of Bed, Up to Chair, Bedside Commode,  Up to Toilet/Shower, Up in Room, Up in [place], Unable to Mobilize, and Patient Refuses to Mobilize, ambulated 20 ft                 With Whom?  Nurse, PCA, and Self                 Level of Difficulty/Assistance: Independent     Pt resting in bed at this time on room air. Pt denies shortness of breath. Pt with nonpitting lower extremity edema.      Patient and/or Family's stated Goal of Care this Admission: reduce shortness of breath, increase activity tolerance, better understand heart failure and disease management, be more comfortable, and reduce lower extremity edema prior to discharge        :

## 2022-10-07 ENCOUNTER — TELEPHONE (OUTPATIENT)
Dept: PULMONOLOGY | Age: 77
End: 2022-10-07

## 2022-10-07 LAB
ANION GAP SERPL CALCULATED.3IONS-SCNC: 12 MMOL/L (ref 3–16)
BASOPHILS ABSOLUTE: 0.1 K/UL (ref 0–0.2)
BASOPHILS RELATIVE PERCENT: 0.9 %
BUN BLDV-MCNC: 27 MG/DL (ref 7–20)
CALCIUM SERPL-MCNC: 8.5 MG/DL (ref 8.3–10.6)
CHLORIDE BLD-SCNC: 101 MMOL/L (ref 99–110)
CO2: 26 MMOL/L (ref 21–32)
CREAT SERPL-MCNC: 1.3 MG/DL (ref 0.8–1.3)
EKG ATRIAL RATE: 100 BPM
EKG ATRIAL RATE: 37 BPM
EKG ATRIAL RATE: 97 BPM
EKG DIAGNOSIS: NORMAL
EKG P AXIS: 106 DEGREES
EKG P AXIS: 84 DEGREES
EKG P-R INTERVAL: 270 MS
EKG P-R INTERVAL: 272 MS
EKG Q-T INTERVAL: 344 MS
EKG Q-T INTERVAL: 364 MS
EKG Q-T INTERVAL: 370 MS
EKG QRS DURATION: 66 MS
EKG QRS DURATION: 76 MS
EKG QRS DURATION: 84 MS
EKG QTC CALCULATION (BAZETT): 443 MS
EKG QTC CALCULATION (BAZETT): 469 MS
EKG QTC CALCULATION (BAZETT): 481 MS
EKG R AXIS: 10 DEGREES
EKG R AXIS: 3 DEGREES
EKG R AXIS: 3 DEGREES
EKG T AXIS: 14 DEGREES
EKG T AXIS: 19 DEGREES
EKG T AXIS: 20 DEGREES
EKG VENTRICULAR RATE: 100 BPM
EKG VENTRICULAR RATE: 105 BPM
EKG VENTRICULAR RATE: 97 BPM
EOSINOPHILS ABSOLUTE: 0.4 K/UL (ref 0–0.6)
EOSINOPHILS RELATIVE PERCENT: 5 %
GFR AFRICAN AMERICAN: >60
GFR NON-AFRICAN AMERICAN: 54
GLUCOSE BLD-MCNC: 114 MG/DL (ref 70–99)
GLUCOSE BLD-MCNC: 130 MG/DL (ref 70–99)
GLUCOSE BLD-MCNC: 145 MG/DL (ref 70–99)
GLUCOSE BLD-MCNC: 160 MG/DL (ref 70–99)
GLUCOSE BLD-MCNC: 86 MG/DL (ref 70–99)
GLUCOSE BLD-MCNC: 98 MG/DL (ref 70–99)
HCT VFR BLD CALC: 34.7 % (ref 40.5–52.5)
HEMOGLOBIN: 11.1 G/DL (ref 13.5–17.5)
INR BLD: 1.82 (ref 0.87–1.14)
LYMPHOCYTES ABSOLUTE: 0.9 K/UL (ref 1–5.1)
LYMPHOCYTES RELATIVE PERCENT: 10.1 %
MAGNESIUM: 2.4 MG/DL (ref 1.8–2.4)
MCH RBC QN AUTO: 28.9 PG (ref 26–34)
MCHC RBC AUTO-ENTMCNC: 31.9 G/DL (ref 31–36)
MCV RBC AUTO: 90.6 FL (ref 80–100)
MONOCYTES ABSOLUTE: 0.8 K/UL (ref 0–1.3)
MONOCYTES RELATIVE PERCENT: 9.1 %
NEUTROPHILS ABSOLUTE: 6.4 K/UL (ref 1.7–7.7)
NEUTROPHILS RELATIVE PERCENT: 74.9 %
PDW BLD-RTO: 17 % (ref 12.4–15.4)
PERFORMED ON: ABNORMAL
PERFORMED ON: NORMAL
PERFORMED ON: NORMAL
PLATELET # BLD: 239 K/UL (ref 135–450)
PMV BLD AUTO: 7.6 FL (ref 5–10.5)
POTASSIUM SERPL-SCNC: 4.3 MMOL/L (ref 3.5–5.1)
PROTHROMBIN TIME: 21 SEC (ref 11.7–14.5)
RBC # BLD: 3.83 M/UL (ref 4.2–5.9)
SODIUM BLD-SCNC: 139 MMOL/L (ref 136–145)
WBC # BLD: 8.6 K/UL (ref 4–11)

## 2022-10-07 PROCEDURE — 93005 ELECTROCARDIOGRAM TRACING: CPT | Performed by: INTERNAL MEDICINE

## 2022-10-07 PROCEDURE — 93010 ELECTROCARDIOGRAM REPORT: CPT | Performed by: INTERNAL MEDICINE

## 2022-10-07 PROCEDURE — 6370000000 HC RX 637 (ALT 250 FOR IP): Performed by: INTERNAL MEDICINE

## 2022-10-07 PROCEDURE — 2580000003 HC RX 258: Performed by: INTERNAL MEDICINE

## 2022-10-07 PROCEDURE — 2580000003 HC RX 258: Performed by: ANESTHESIOLOGY

## 2022-10-07 PROCEDURE — 2060000000 HC ICU INTERMEDIATE R&B

## 2022-10-07 PROCEDURE — 83735 ASSAY OF MAGNESIUM: CPT

## 2022-10-07 PROCEDURE — 99233 SBSQ HOSP IP/OBS HIGH 50: CPT | Performed by: NURSE PRACTITIONER

## 2022-10-07 PROCEDURE — 85610 PROTHROMBIN TIME: CPT

## 2022-10-07 PROCEDURE — 6370000000 HC RX 637 (ALT 250 FOR IP): Performed by: NURSE PRACTITIONER

## 2022-10-07 PROCEDURE — 93005 ELECTROCARDIOGRAM TRACING: CPT

## 2022-10-07 PROCEDURE — 36415 COLL VENOUS BLD VENIPUNCTURE: CPT

## 2022-10-07 PROCEDURE — 85025 COMPLETE CBC W/AUTO DIFF WBC: CPT

## 2022-10-07 PROCEDURE — 6360000002 HC RX W HCPCS: Performed by: INTERNAL MEDICINE

## 2022-10-07 PROCEDURE — 80048 BASIC METABOLIC PNL TOTAL CA: CPT

## 2022-10-07 RX ORDER — INSULIN LISPRO 100 [IU]/ML
0-8 INJECTION, SOLUTION INTRAVENOUS; SUBCUTANEOUS
Status: DISCONTINUED | OUTPATIENT
Start: 2022-10-07 | End: 2022-10-08 | Stop reason: HOSPADM

## 2022-10-07 RX ORDER — WARFARIN SODIUM 2 MG/1
6 TABLET ORAL
Status: COMPLETED | OUTPATIENT
Start: 2022-10-07 | End: 2022-10-07

## 2022-10-07 RX ADMIN — Medication 3 MG: at 20:44

## 2022-10-07 RX ADMIN — Medication 10 ML: at 00:19

## 2022-10-07 RX ADMIN — WARFARIN SODIUM 6 MG: 2 TABLET ORAL at 17:38

## 2022-10-07 RX ADMIN — DOFETILIDE 250 MCG: 0.25 CAPSULE ORAL at 20:43

## 2022-10-07 RX ADMIN — PANTOPRAZOLE SODIUM 40 MG: 40 TABLET, DELAYED RELEASE ORAL at 16:20

## 2022-10-07 RX ADMIN — SUCRALFATE 1 G: 1 TABLET ORAL at 17:38

## 2022-10-07 RX ADMIN — TORSEMIDE 30 MG: 20 TABLET ORAL at 12:17

## 2022-10-07 RX ADMIN — COLCHICINE 0.6 MG: 0.6 TABLET, FILM COATED ORAL at 09:12

## 2022-10-07 RX ADMIN — ACETAMINOPHEN 650 MG: 325 TABLET ORAL at 04:35

## 2022-10-07 RX ADMIN — MONTELUKAST SODIUM 10 MG: 10 TABLET ORAL at 20:44

## 2022-10-07 RX ADMIN — ASPIRIN 81 MG 81 MG: 81 TABLET ORAL at 09:12

## 2022-10-07 RX ADMIN — Medication 10 ML: at 20:49

## 2022-10-07 RX ADMIN — ACETAMINOPHEN 650 MG: 325 TABLET ORAL at 16:21

## 2022-10-07 RX ADMIN — PANTOPRAZOLE SODIUM 40 MG: 40 TABLET, DELAYED RELEASE ORAL at 06:48

## 2022-10-07 RX ADMIN — ATORVASTATIN CALCIUM 40 MG: 40 TABLET, FILM COATED ORAL at 20:44

## 2022-10-07 RX ADMIN — SUCRALFATE 1 G: 1 TABLET ORAL at 11:51

## 2022-10-07 RX ADMIN — TETRAHYDROZOLINE HCL 1 DROP: 0.05 SOLUTION/ DROPS OPHTHALMIC at 20:43

## 2022-10-07 RX ADMIN — FUROSEMIDE 40 MG: 10 INJECTION, SOLUTION INTRAMUSCULAR; INTRAVENOUS at 04:26

## 2022-10-07 RX ADMIN — SUCRALFATE 1 G: 1 TABLET ORAL at 06:47

## 2022-10-07 RX ADMIN — METOPROLOL SUCCINATE 25 MG: 25 TABLET, EXTENDED RELEASE ORAL at 09:12

## 2022-10-07 RX ADMIN — DIPHENHYDRAMINE HCL 25 MG: 25 TABLET ORAL at 20:44

## 2022-10-07 RX ADMIN — DOFETILIDE 250 MCG: 0.25 CAPSULE ORAL at 10:17

## 2022-10-07 ASSESSMENT — PAIN SCALES - GENERAL
PAINLEVEL_OUTOF10: 0
PAINLEVEL_OUTOF10: 6
PAINLEVEL_OUTOF10: 3

## 2022-10-07 ASSESSMENT — PAIN DESCRIPTION - LOCATION
LOCATION: HEAD
LOCATION: HEAD

## 2022-10-07 ASSESSMENT — PAIN DESCRIPTION - DESCRIPTORS
DESCRIPTORS: ACHING
DESCRIPTORS: ACHING

## 2022-10-07 NOTE — PROCEDURES
[facility]  Electrophysiology Procedure Report    Attending   Steve Ng MD    Procedures Atrial fibrillation ablation  Diagnostic electrophysiologic study  3-D electroanatomical mapping of the left atrium and right atrium  Transseptal catheterization  Wide area circumferential ablation with PV isolation  Ablation of atypical atrial flutter  Intra-cardiac echocardiography    Indications  Symptomatic paroxysmal atrial fibrillation    Start: 1530  Stop: 1730    Description of Procedure   After the risks and benefits of the procedure were explained and informed consent was obtained, the patient was brought to the electrophysiology lab in the nonsedated and fasting state. General anesthesia was administered under anesthesia physician/nurse guidance (please see anesthesia records for medication details). A condom catheter was placed prior to the start of the procedure. The patient was connected to the EP lab monitoring system. The patient was prepared and draped in standard fashion. Using the modified Seldinger technique, the following sheaths and catheters were inserted under ultrasound guidance using micropuncture kits:    1. A 8 F sheath was inserted into the right femoral vein. 2.  A 8 F sheath was inserted into the right femoral vein. 3.  A 6 F sheath was inserted into the left femoral vein. 4.  A 9 F sheath was inserted into the left femoral vein. 5.  A 20 gauge sheath was placed in the right radial artery for invasive blood pressure monitoring. The patient arrived in atrial fibrillation/flutter that appeared to be roof dependent. The ICE catheter was advanced into the right right atrium and right venricle for transseptal, mapping, and function/mechanical evaluation during ablation. After anatomy was generated the relocality MONTICELLO catheter was advanced into the IVC, SVC and RV for right atrial anatomy using the Carto Mapping system (3D).   The [facility] was then advanced into the coronary sinus for left atrial pacing and electrophysiology study. The esophagus temperature probe was inserted with anesthesia's assistance. The esophageal temperature monitoring was performed using a multi-electrode probe throughout the ablation, and power and lesions were adjusted to avoid temperatures >= 1.0 C above baseline core temperature. High power short duration lesion sets were used (posterior 45W for up to 15 second and anterior 45W for up to 30 seconds). The right femoral sheath was exchanged for Vizigo sheath. A long BRK1 needled was inserted into the Vizigo sheath. The Vizigo sheath was advanced into the SVC using ablation catheter and RA map generated by decanav catheter. Right atrial pressure was measured. This was advanced gabe the the left atrium via the modified Brockenbrough procedure for trans-septal puncture under intracardiac echocardiographic and fluoroscopic guidance in Yi using a Responsaigo wire. The Responsaigo wire was advanced into the left superior pulmonary vein. Flush into the left atrium during transseptal was also used to confirm true transseptal puncture. The left atrial pressure was measured. After transseptal catheterization, IV heparin was admistered for a target ACT >300 for the duration of the procedure. The PVs were mapped with a Pentray catheter using Carto Mapping system, demonstrating conduction into the PVs.  There was one smaller left and two right pulmonary veins (markedly dilate; see below). Wide area circumferential catheter ablation was performed and bilateral PVAI was achieved. The PVs were remapped with the Pentray catheter and demonstrated entrance and exit block. Activation mapping of tachycardia suggested roof flutter. Roof line ablation lesion set was performed. Tachycardia persisted. Patient was cardioverted into NSR with synchronized cardioversion. Differential pacing around roof line confirmed bidirectional block.     LV pacing using ablation catheter was performed to assess retrograde conduction and possible accessory pathways (none identified). Patient was then started on isoproterenol drip get heart rate up to maximum tolerated (blood pressure) or at least 120 beats per minute. Entrance and exit block was confirmed. Programmed stimulation for CS was performed to induce atrial tachycardia/flutter on isoproterenol. No inducible sustained tachycardia. Differential pacing around prior CTI ablation showed bidirectional block. Basic intervals were obtained. An electrophysiology study was then performed. Catheters were removed (figure of 8 stitch bilaterally) and heparin was reversed per standard protocol using protamine. The patient was allowed to awake from anesthesia and was returned to prep and recovery for further recovery. Dr. Lucero Levin MD, the attending physician, was present throughout the procedure and for interpretation of the data. Complications None  EBL   <50cc    Pericardium: No pericardial effusion however fat pad seen. RESULTS    Fluoroscopy Time:    ECG   Baseline   Course atrial fibrillation vs flutter. Post Ablation   Normal sinus rhythm with nonspecific ST-T wave abnormalities    RESULTS  Basic Intervals  Status Rhythm PA AH HV FL QRS QT RR   Post NSR 88 61 67 200 88 [phone]     Stimulation measurements  2 to 1 CL Wenck CL Dual AVN  Pathway? VA conduction SVT Comment    480 No Present but delayed Absent      Refractory Periods  PCL A ERP PCL AVN ERP PCL V ERP PCL V ERP  PCL Access path ERP Comment   600 220 600 < 220 - - - -        Supraventricular Arryhthmia(s) induced  Arrhythmia Induced Arrhythmia CL Duration Term How Induced? Morphology AXIS Comment   none            Ventricular Arrhythmia(s) induced  Arrhythmia induced Arrhythmia CL Duration Term How Induced?  Morphology AXIS VT- Transition Comment   none             Hemodynamic Data  Arterial pressure mean 80 mmHg (phenylephrine drip, low dose per anesthesia). Right atrial pressure mean 14 mmHg  Left atrial presure mean 26 mmHg    Impression  1. Two left and two right pulmonary veins with normal right and left atrial pressures. 2.  Successful wide area circumferential ablation. 3.  Successful ablation and isolation of pulmonary veins. 4.  Successful ablation of suspected roof dependent flutter. Plan  - Restart dofetilide 250 mcg BID per protocol (ok to discharge on Saturday if gets 4 doses). - Transition to metoprolol succinate.  - Continue warfarin with goal INR 2-3.  - Start on pantoprazole 40 mg BID for 4 weeks post ablation.  - Start on sucralfate for 2 weeks post ablation for 2 weeks post ablation. Proceduralist Notes:  - Large right pulmonary veins.  - Rotated heart. - Difficult left femoral second access with wire going retrograde. Needed to re-stick without ultrasound with micropuncture kit. - Difficult to get from IVC into RA.  - Diffuse scar that improved in NSR. Septum with considerable scarring, see below for images. - Diuresis given elevated filling pressures. - Restart dofetilide with low threshold to decrease dose from home 250 mcg BID to 125 mcg BID vs mexiletine.     Images:  Left Atrium PA      Left Atrium AP      Pericardium

## 2022-10-07 NOTE — DISCHARGE INSTRUCTIONS
FOLLOW-UP APPOINTMENTS    [facility] OFFICE - Keep follow-up appointment on November 18th at 9:15am with Dr. Connie Winn, [address]. You and your one visitor will need to have your mouth and nose covered  with a mask. No children please. [facility],  [facility], [address] [place] [address] [address] [facility], [address] Office #: [phone]. If you are unable to make this appointment, please call to reschedule. [facility] OFFICE - Keep follow-up appointment on December 8th at 10:15am with Cy Vu MD, electrophysiologist, [address]. You and your one visitor will need to have your mouth and nose covered  with a mask. No children please. [facility],  [facility], [address] [place] [address] [address] [facility], [address]. Office #: [phone]. If you are unable to make this appointment, please call to reschedule. Directions to [place] [id number] towards [place]. [address] [facility] exit. Right off exit. Cross over [employer]. Right on [address] Left into hospital. Follow the signs to the emergency room ( turn left toward the Emergency room). Go right at the first stop sign. Just past the Emergency room at the second stop sign turn right and go up the ramp and park on the top level if possible. Go in the glass doors of the [facility] on the top level of the garage [address]. As soon as you get in the door turn left and our office is the one with the glass doors.

## 2022-10-07 NOTE — PLAN OF CARE
Patient's EF (Ejection Fraction) is greater than 40%    Heart Failure Medications:  Diuretics[de-identified] Torsemide    (One of the following REQUIRED for EF </= 40%/SYSTOLIC FAILURE but MAY be used in EF% >40%/DIASTOLIC FAILURE)        ACE[de-identified] None        ARB[de-identified] None         ARNI[de-identified] None    (Beta Blockers)  NON- Evidenced Based Beta Blocker (for EF% >40%/DIASTOLIC FAILURE): None    Evidenced Based Beta Blocker::(REQUIRED for EF% <40%/SYSTOLIC FAILURE) Metoprolol SUCCinate- Toprol XL  . .................................................................................................................................................. Patient's weights and intake/output reviewed: Yes    Patient's Last Weight: 310 lbs obtained by standing scale. Difference of 3 lbs less than last documented weight. Intake/Output Summary (Last 24 hours) at 10/7/2022 1417  Last data filed at 10/7/2022 1220  Gross per 24 hour   Intake 940 ml   Output 1350 ml   Net -410 ml       Education Booklet Provided: yes    Comorbidities Reviewed Yes    Patient has a past medical history of Arthritis, Asthma, Atrial fibrillation ([address] Blood circulation, collateral, Diabetes mellitus ([address] DVT (deep venous thrombosis) ([address] Histoplasmosis, History of knee replacement procedure of right knee, Hx of blood clots, Hyperlipidemia, Hypertension, Knee osteoarthritis, Left TKR, Lung nodule, Neuromuscular disorder ([address] Palpitations, Sleep apnea, Stone, kidney, and UTI (urinary tract infection). >>For CHF and Comorbidity documentation on Education Time and Topics, please see Education Tab    Progressive Mobility Assessment:  What is this patient's Current Level of Mobility?: Ambulatory-Up Ad Allyssa  How was this patient Mobilized today?: Edge of Bed,  Up to Toilet/Shower, and Up in Room, ambulated 15 ft                 With Whom? Self                 Level of Difficulty/Assistance: Independent     Pt resting in bed at this time on room air.  Pt denies shortness of breath. Pt with pitting lower extremity edema.      Patient and/or Family's stated Goal of Care this Admission: reduce shortness of breath, increase activity tolerance, better understand heart failure and disease management, be more comfortable, and reduce lower extremity edema prior to discharge        :

## 2022-10-07 NOTE — CARE COORDINATION
Spoke with RN who states patient is on Tikosyn dosing. He should be ready to discharge on Sunday. He is active with [facility] and will resume with them at discharge. Please notify CM should any additional needs arise.

## 2022-10-07 NOTE — PROGRESS NOTES
[facility]     Electrophysiology                                     Progress Note    Admission date:  10/5/2022    Reason for follow up visit: AF    HPI/CC: Shekhar Mortensen was admitted on 10/5/2022 with symptomatic atrial fibrillation. He had a recent PIERO/CV on 2022. On 10/6/2022, he had an RFCA of AF abd suspected roof dependent flutter. Post procedure, Tikosyn was resumed. Rhythm has been sinus with a first degree AVB. Subjective:  He complains of some bilateral groin tenderness. Denies chest pain, palpitations, shortness of breath, and dizziness. He has eaten, ambulated and voided. Vitals:  Blood pressure 116/82, pulse 62, temperature 98.4 °F (36.9 °C), temperature source Oral, resp. rate 17, height 6' 3\" (1.905 m), weight (!) 310 lb 6.4 oz (140.8 kg), SpO2 100 %.   Temp  Av.8 °F (36.6 °C)  Min: 96.9 °F (36.1 °C)  Max: 98.4 °F (36.9 °C)  Pulse  Av  Min: 62  Max: 101  BP  Min: 97/58  Max: 121/75  SpO2  Av.6 %  Min: 96 %  Max: 100 %    24 hour I/O    Intake/Output Summary (Last 24 hours) at 10/7/2022 1018  Last data filed at 10/7/2022 0945  Gross per 24 hour   Intake 940 ml   Output 1590 ml   Net -650 ml       Current Facility-Administered Medications   Medication Dose Route Frequency Provider Last Rate Last Admin    tetrahydrozoline 0.05 % ophthalmic solution 1 drop  1 drop Both Eyes Q4H PRN Levy William MD   1 drop at 10/06/22 4213    sodium chloride flush 0.9 % injection 5-40 mL  5-40 mL IntraVENous 2 times per day Chikis Swain MD        sodium chloride flush 0.9 % injection 5-40 mL  5-40 mL IntraVENous PRN Geovany Isabel MD        0.9 % sodium chloride infusion   IntraVENous PRN Chikis Swain MD        meperidine (DEMEROL) injection 12.5 mg  12.5 mg IntraVENous Q5 Min PRN Chikis Swain MD   12.5 mg at 10/06/22 1805    HYDROmorphone (DILAUDID) injection 0.25 mg  0.25 mg IntraVENous Q10 Min PRN Chikis Swain MD        HYDROmorphone (DILAUDID) injection 0.5 mg  0.5 mg IntraVENous Q10 Min PRN Cammy Agarwal MD        ondansetron [facility]) injection 4 mg  4 mg IntraVENous Q15 Min PRN Cammy Agarwal MD        labetalol (NORMODYNE;TRANDATE) injection 5 mg  5 mg IntraVENous Q10 Min PRN Sherran Moritz Duffy, MD        sodium chloride flush 0.9 % injection 5-40 mL  5-40 mL IntraVENous 2 times per day SOTERO Miles MD   10 mL at 10/07/22 0019    sodium chloride flush 0.9 % injection 5-40 mL  5-40 mL IntraVENous PRN Rylan Finley MD        0.9 % sodium chloride infusion   IntraVENous PRN Rylan Finley MD        acetaminophen (TYLENOL) tablet 650 mg  650 mg Oral Q4H PRN SOTERO Miles MD        HYDROmorphone (DILAUDID) injection 0.25 mg  0.25 mg IntraVENous Q4H PRN Rylan Finley MD        oxyCODONE-acetaminophen (PERCOCET) 5-325 MG per tablet 1 tablet  1 tablet Oral Q4H PRN SOTERO Miles MD        [facility]) capsule 250 mcg  250 mcg Oral 2 times per day Rylan Finley MD   250 mcg at 10/07/22 1017    diphenhydrAMINE (BENADRYL) tablet 25 mg  25 mg Oral Nightly PRN SERENA Alejandre - CNP   25 mg at 10/06/22 2344    metoprolol succinate (TOPROL XL) extended release tablet 25 mg  25 mg Oral Daily SOTERO Miles MD   25 mg at 10/07/22 0912    pantoprazole (PROTONIX) tablet 40 mg  40 mg Oral BID AC SOTERO Miles MD   40 mg at 10/07/22 0648    colchicine (COLCRYS) tablet 0.6 mg  0.6 mg Oral Daily SOTERO Miles MD   0.6 mg at 10/07/22 0912    sucralfate (CARAFATE) tablet 1 g  1 g Oral 4 times per day Rylan Finley MD   1 g at 10/07/22 3920    aspirin chewable tablet 81 mg  81 mg Oral Daily Selin Gutierrez MD   81 mg at 10/07/22 0912    atorvastatin (LIPITOR) tablet 40 mg  40 mg Oral Nightly Selin Gutierrez MD   40 mg at 10/06/22 2207    montelukast (SINGULAIR) tablet 10 mg  10 mg Oral Nightly Selin Gutierrez MD   10 mg at 10/06/22 2207    torsemide (DEMADEX) tablet 30 mg  30 mg Oral Daily Selin Gutierrez MD 30 mg at 10/06/22 0908    glucose chewable tablet 16 g  4 tablet Oral PRKADIE Steel MD        dextrose bolus 10% 125 mL  125 mL IntraVENous SAY Steel MD        Or    dextrose bolus 10% 250 mL  250 mL IntraVENous SAY Steel MD        glucagon (rDNA) injection 1 mg  1 mg SubCUTAneous PRN Hay Steel MD        dextrose 10 % infusion   IntraVENous Continuous SAY Steel MD        sodium chloride flush 0.9 % injection 10 mL  10 mL IntraVENous SAY Steel MD   10 mL at 10/06/22 0909    0.9 % sodium chloride infusion   IntraVENous SAY Steel MD        ondansetron (ZOFRAN-ODT) disintegrating tablet 4 mg  4 mg Oral Q8H PRKDAIE Steel MD        Or    ondansetron [facility]) injection 4 mg  4 mg IntraVENous Q6H SAY Steel MD        acetaminophen (TYLENOL) tablet 650 mg  650 mg Oral Q6H PRKADIE Steel MD   650 mg at 10/07/22 0435    Or    acetaminophen (TYLENOL) suppository 650 mg  650 mg Rectal Q6H SAY Steel MD        insulin lispro (HUMALOG) injection vial 7 Units  0.05 Units/kg SubCUTAneous TID WC Hay Steel MD   7 Units at 10/05/22 1845    insulin lispro (HUMALOG) injection vial 0-8 Units  0-8 Units SubCUTAneous Q4H Hay Steel MD        melatonin tablet 3 mg  3 mg Oral Nightly SAY Steel MD        calcium carbonate (TUMS) chewable tablet 1,000 mg  1,000 mg Oral TID PRKADIE Steel MD        insulin glargine (LANTUS) injection vial 22 Units  0.15 Units/kg SubCUTAneous Nightly Hay Steel MD   22 Units at 10/06/22 2207    warfarin placeholder: dosing by pharmacy   Other Koko Myles MD           Objective:     Telemetry monitor: SR    Physical Exam:  Constitutional and general appearance: alert, cooperative, no distress, and appears stated age  [de-identified]: PERRL, no cervical lymphadenopathy. No masses palpable.  Normal oral mucosa  Respiratory:  Normal excursion and expansion without use of accessory muscles  Resp auscultation: Normal breath sounds without wheezing, rhonchi, and rales  Cardiovascular: The apical impulse is not displaced  Heart tones are crisp and normal. Regular S1 and S2.  Jugular venous pulsation Normal  The carotid upstroke is normal in amplitude and contour without delay or bruit  Peripheral pulses are symmetrical and full   Abdomen:  No masses or tenderness  Bowel sounds present  Extremities:   No cyanosis or clubbing   Nonpitting lower extremity edema   Skin: warm and dry  Bilateral femoral access sites stable   Neurological:  Alert and oriented  Moves all extremities well  No abnormalities of mood, affect, memory, mentation, or behavior are noted    Data  Echo: 09/06/2022   Summary   LV systolic function is normal with EF estimated at 55-60%. No regional wall motion abnormalities are noted. There is moderate concentric left ventricular hypertrophy. Normal left ventricular diastolic filling pressure. Biatrial enlargement. Mild posterior mitral annular calcification is present. Aortic valve appears sclerotic but opens adequately. Mild mitral and tricuspid regurgitation. Systolic pulmonary artery pressure (SPAP) estimated at 46mmHg (RA pressure 3   mmHg), consistent with mild pulmonary hypertension. 6- 55%, LVH, LAE, ANNIE    Cath: 09/08/2022     1. Left main coronary artery was normal. It gave off the left anterior descending artery and left circumflex. 2. Left anterior descending artery has severe atherosclerotic disease. It was large in size. It gave off septal perforators and a moderate sized diagonal branch. The LAD covered the entire apex of the left ventricle. ~There is evidence for Denovo disease of about 70% within the mid LAD. This is confirmed by intravascular ultrasound. Furthermore there is a previously placed stent within the mid LAD that was under deployed and under expanded.      3. Left circumflex has mild atherosclerotic disease. It was moderate in size. There was a moderate sized obtuse marginal branch. 4. Right coronary artery has mild atherosclerotic disease. It was moderate in size and was the dominant artery. 5. Left ventriculogram showed normal LVEF at 55-60%. Wall motion was normal . There was no significant mitral valve or aortic valve disease noted. LVEDP was normal. There was no gradient noted across the aortic valve during pullback of the catheter. /81   Pulse (P) 69   Temp 97.7 °F (36.5 °C) (Oral)   Resp 18   Ht 6' 3\" (1.905 m)   Wt (!) 301 lb 8 oz (136.8 kg)   SpO2 97%   BMI 37.68 kg/m²      The access site was controlled with manual pressure and/or appropriate closure device. Moderate Conscious Sedation Details: An independent trained observer pushed medications at my direction. We monitoring the patient's level of consciousness and vital signs/physiologic status throughout the procedure. CPT codes 27297 and 27467        Start time: 1524  Stop time: 1612  ASA class: 3     Sedation totals:  Versad - 4mg  Fentanyl - 75mcg     EBL - minimal <5 cc blood loss     The patient was monitored continuously with the ECG, pulse oximetry, blood pressure, and direct observation. CONCLUSIONS:     1. Successful intravascular ultrasound-guided drug-eluting stent insertion to the left anterior descending artery     ASSESSMENT/RECOMMENDATIONS:     1. Continue dual antiplatelet therapy and guideline directed medical therapy. 2.  Follow-up with patient's primary cardiologist after discharge. All labs and testing reviewed.   Lab Review     Renal Profile:   Lab Results   Component Value Date/Time    CREATININE 1.3 10/07/2022 08:55 AM    BUN 27 10/07/2022 08:55 AM     10/07/2022 08:55 AM    K 4.3 10/07/2022 08:55 AM    K 4.2 09/16/2022 06:47 AM     10/07/2022 08:55 AM    CO2 26 10/07/2022 08:55 AM     CBC:    Lab Results   Component Value Date/Time    WBC 8.6 10/07/2022 08:55 AM    RBC 3.83 10/07/2022 08:55 AM    HGB 11.1 10/07/2022 08:55 AM    HCT 34.7 10/07/2022 08:55 AM    MCV 90.6 10/07/2022 08:55 AM    RDW 17.0 10/07/2022 08:55 AM     10/07/2022 08:55 AM     BNP:    Lab Results   Component Value Date/Time    BNP 53 09/29/2013 11:30 PM     Fasting Lipid Panel:    Lab Results   Component Value Date/Time    CHOL 119 09/05/2022 04:37 AM    HDL 30 09/05/2022 04:37 AM    TRIG 147 09/05/2022 04:37 AM     Cardiac Enzymes:  CK/MbTroponin  Lab Results   Component Value Date/Time    TROPONINI <0.01 10/05/2022 11:32 AM     PT/ INR   Lab Results   Component Value Date/Time    INR 1.82 10/07/2022 08:55 AM    INR 1.70 10/06/2022 07:26 AM    INR 1.84 10/05/2022 11:32 AM    PROTIME 21.0 10/07/2022 08:55 AM    PROTIME 19.9 10/06/2022 07:26 AM    PROTIME 21.3 10/05/2022 11:32 AM     PTT No results found for: PTT   Lab Results   Component Value Date/Time    MG 2.40 10/07/2022 08:55 AM      Lab Results   Component Value Date/Time    TSH 2.18 09/19/2022 05:07 AM       Assessment:  Symptomatic paroxysmal atrial arrhythmias (AF/AFL) : ongoing    -[id number] score 7 (age, HTN, DM, CAD, DVT)   -intolerant of Tikosyn (prolonged QTc) and amiodarone (unclear)   -s/p RFCA of AF 10/6/2022   -Tikosyn resumed post ablation 10/2022  History of atrial flutter: stable     -s/p EPS, PVI and RFCA in 2017 [facility])   -s/p PIERO/CV 9/20/2022  PVC's: stable   -s/p PVC ablation 2014  CAD    -s/p IVUS and PCI to LAD with FELIZ x 1   HTN: controlled   DM  MARIA VICTORIA: CPAP compliant   History of DVT   Obesity s/p gastric sleeve   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 Rema Lawrence ready for discharge tomorrow after 4th dose of Tikosyn      SERENA Osorio-CNP  [address]  [phone]

## 2022-10-07 NOTE — DISCHARGE INSTR - COC
Continuity of Care Form    Patient Name: Skyler Mak   :    MRN:  [id number]    Admit date:  10/5/2022  Discharge date:  2022    Code Status Order: Full Code   Advance Directives:     Admitting Physician:  Kajal Alejo MD  PCP: Alexandrea Benjamin MD    Discharging Nurse: [facility] Unit/Room#: 0220/0220-01  Discharging Unit Phone Number: ***    Emergency Contact:   Extended Emergency Contact Information  Primary Emergency Contact: Kayy Gallegos  Address: [address] Phone: [phone]  Mobile Phone: [phone]  Relation: Spouse  Secondary Emergency Contact: Nataliia Raines  Address: [place] [address] Phone: [phone]  Mobile Phone: [phone]  Relation: Child    Past Surgical History:  Past Surgical History:   Procedure Laterality Date    ABLATION OF DYSRHYTHMIC FOCUS  2013    a-fib    BARIATRIC SURGERY  2013    GASTRIC SLEEVE    CARDIAC SURGERY  2013    ablation    CARDIOVERSION N/A 2022    CARDIOVERSION W/ANES.  performed by Parviz Baeza MD at [address] [place] [address] Right 9-30-15    CARPAL TUNNEL RELEASE Left 3/20/16    CHOLECYSTECTOMY, LAPAROSCOPIC N/A 2019    LAPAROSCOPIC CHOLECYSTECTOMY WITH CHOLANGIOGRAMS performed by Alexander Sampson MD at [address] [place] [address]      with removal stone    CYSTOSCOPY  13    with Laser Vaporization of Enlarged Prostate    DIAGNOSTIC CARDIAC CATH LAB PROCEDURE      ERCP  2019    Sphincterotomy, stone removal    ERCP N/A 2019    ERCP SPHINCTER/PAPILLOTOMY performed by Cait Fuentes MD at [address] [facility]    ERCP  2019    ERCP STONE REMOVAL performed by Cait Fuentes MD at [facility] Right 2020    PHACOEMULSIFICATION OF CATARACT RIGHT EYE WITH INTRAOCULAR LENS IMPLANT performed by Fede Rivera Garland Olvera MD at [address] Left 1/21/2020    PHACOEMULSIFICATION OF CATARACT LEFT EYE WITH INTRAOCULAR LENS IMPLANT -SLEEP APNEA- performed by Ernie Gandara MD at [address] Bilateral     right knee (2002) and left knee (2012)    KNEE ARTHROSCOPY  4/19/2011     left  knee    SKIN BIOPSY      skin Ca/SQUAMOUS CELL    THORACOTOMY      wedge resection    TONSILLECTOMY      TRANSESOPHAGEAL ECHOCARDIOGRAM N/A 9/20/2022    PIERO W/ANES.  (9:00) performed by López Crawford MD at [address] [facility]       Immunization History:   Immunization History   Administered Date(s) Administered    COVID-19, [address], Primary or Immunocompromised, (age 12y+), IM, 100 mcg/0.5mL 02/17/2021, 03/17/2021, 01/11/2022    Hepatitis A Adult (Havrix, Vaqta) 09/03/2014    Influenza Vaccine, unspecified formulation 09/17/2014    Influenza Virus Vaccine 01/18/2012, 10/15/2013, 12/01/2014, 10/23/2015, 10/21/2016, 09/01/2017, 10/01/2018, 10/07/2021    Influenza, FLUAD, (age 72 y+), Adjuvanted, 0.5mL 10/08/2020    Influenza, FLUARIX, FLULAVAL, FLUZONE (age 10 mo+) AND AFLURIA, (age 1 y+), PF, 0.5mL 10/08/2020    Influenza, FLUCELVAX, (age 10 mo+), MDCK, PF, 0.5mL 09/20/2017    Influenza, High Dose (Fluzone 65 yrs and older) 10/21/2016, 10/31/2018, 11/04/2019    MMR 01/01/2005    Pneumococcal Conjugate 13-valent (Ybkwamn78) 02/16/2019    Pneumococcal Conjugate 7-valent (Prevnar7) 10/22/2012    Pneumococcal Conjugate Vaccine 10/22/2012    Pneumococcal Polysaccharide (Atvdspkcp04) 01/01/2001, 12/13/2011, 01/01/2013    Polio IPV (IPOL) 09/03/2014    Td vaccine (adult) 01/01/2013    Tdap (Boostrix, Adacel) 10/04/2020    Tetanus 08/27/2014    Tetanus Toxoid, absorbed 08/27/2014    Zoster Recombinant (Shingrix) 11/16/2018, 12/04/2019       Active Problems:  Patient Active Problem List   Diagnosis Code    Arthritis M19.90    Atrial fibrillation ([facility] I48.91    Essential hypertension I10    Carpal tunnel syndrome G56.00    Polyneuropathy G62.9    Numbness and tingling of both legs R20.0, R20.2    Moderate obstructive sleep apnea G47.33    S/P thoracotomy Z98.890    Atypical atrial flutter (HCC) I48.4    SOB (shortness of breath) R06.02    Type 2 diabetes mellitus with diabetic neuropathy (HCC) E11.40    Morbid obesity with BMI of 40.0-44.9, adult (HCC) E66.01, Z68.41    History of venous thromboembolism Z86.718    Cataract of both eyes H26.9    Abnormal stress test R94.39    Angina at rest [facility]) I20.8    Status post insertion of drug-eluting stent into left anterior descending (LAD) artery for coronary artery disease Z95.5    Other fatigue R53.83    Obesity (BMI 30-39. 9) E66.9    COVID-19 U07.1    Acute on chronic heart failure with preserved ejection fraction ([facility]) I50.33    Coronary artery disease involving native coronary artery of native heart I25.10    Anginal equivalent ([address] I20.8    CAD S/P percutaneous coronary angioplasty I25.10, Z98.61    PANG (dyspnea on exertion) R06.09    Asthma J45.909    Atrial fibrillation with RVR ([facility]) I48.91    PAF (paroxysmal atrial fibrillation) ([facility]) I48.0       Isolation/Infection:   Isolation            No Isolation          Patient Infection Status       Infection Onset Added Last Indicated Last Indicated By Review Planned Expiration Resolved Resolved By    None active    Resolved    COVID-19 (Rule Out) 09/15/22 09/15/22 09/15/22 COVID-19 & Influenza Combo (Ordered)   09/15/22 Rule-Out Test Resulted    COVID-19 22 COVID-19 & Influenza Combo   22 Divine Alarcon RN    Per PCU staff, pt us asymptomatic and does not meet criteria for covid isolation    COVID-19 (Rule Out) 22 COVID-19 & Influenza Combo (Ordered)   22 Rule-Out Test Resulted    COVID-19 20 COVID-19   21             Nurse Assessment:  Last Vital Signs: BP 94/68   Pulse (!) 104   Temp 98.4 °F (36.9 °C) (Oral) Resp 18   Ht 6' 3\" (1.905 m)   Wt (!) 310 lb 6.4 oz (140.8 kg)   SpO2 98%   BMI 38.80 kg/m²     Last documented pain score (0-10 scale): Pain Level: 0  Last Weight:   Wt Readings from Last 1 Encounters:   10/07/22 (!) 310 lb 6.4 oz (140.8 kg)     Mental Status:  {IP PT MENTAL STATUS:00385}    IV Access:  { COLE IV ACCESS:210057829}    Nursing Mobility/ADLs:  Walking   {CHP DME TQSH:400824023}  Transfer  {CHP DME KYBU:651841763}  Bathing  {CHP DME BIZR:111603807}  Dressing  {CHP DME RHPN:555432691}  Toileting  {CHP DME FNGQ:961056929}  Feeding  {CHP DME EKFI:780940549}  Med Admin  {P DME ZVHH:540558782}  Med Delivery   { COLE MED Delivery:174420731}    Wound Care Documentation and Therapy:        Elimination:  Continence: Bowel: {YES / GO:36353}  Bladder: {YES / OW:27645}  Urinary Catheter: {Urinary Catheter:913546591}   Colostomy/Ileostomy/Ileal Conduit: {YES / LR:79783}       Date of Last BM: ***    Intake/Output Summary (Last 24 hours) at 10/7/2022 1521  Last data filed at 10/7/2022 1456  Gross per 24 hour   Intake 1300 ml   Output 1350 ml   Net -50 ml     I/O last 3 completed shifts: In: 1200 [P.O.:500;  I.V.:700]  Out: 2190 [Urine:2190]    Safety Concerns:     [address] Safety Concerns:573506233}    Impairments/Disabilities:      [address] Impairments/Disabilities:947702701}    Nutrition Therapy:  Current Nutrition Therapy:   [address] Diet List:626282895}    Routes of Feeding: {CHP DME Other Feedings:157675354}  Liquids: {Slp liquid thickness:07006}  Daily Fluid Restriction: {CHP DME Yes amt example:139527880}  Last Modified Barium Swallow with Video (Video Swallowing Test): {Done Not Done YLAO:202685220}    Treatments at the Time of Hospital Discharge:   Respiratory Treatments: ***  Oxygen Therapy:  {Therapy; copd oxygen:43478}  Ventilator:    {ROS GONZALEZ Vent IVBZ:852289126}    Rehab Therapies: {THERAPEUTIC INTERVENTION:9383492522}  Weight Bearing Status/Restrictions: {ROS GONZALEZ Weight Bearin}  Other Medical Equipment (for information only, NOT a DME order):  {EQUIPMENT:350805085}  Other Treatments: ***    Patient's personal belongings (please select all that are sent with patient):  {CHP DME Belongings:138184641}    RN SIGNATURE:  {Esignature:383810231}    CASE MANAGEMENT/SOCIAL WORK SECTION    Inpatient Status Date: 10/5    Readmission Risk Assessment Score:  Readmission Risk              Risk of Unplanned Readmission:  29           Discharging to Facility/ Agency   Name: [facility]  Address:  [facility]:[phone]  Fax:  / signature: Electronically signed by Juan Pablo Richards RN on 10/8/22 at 2:05 PM EDT    PHYSICIAN SECTION    Prognosis: Good    Condition at Discharge: Stable    Rehab Potential (if transferring to Rehab): Good    Recommended Labs or Other Treatments After Discharge: None  Recommended Follow-up, Labs or Other Treatments After Discharge:                 Physician Certification: I certify the above information and transfer of Olayinka Barraza  is necessary for the continuing treatment of the diagnosis listed and that he requires [address] for less 30 days.      Update Admission H&P: No change in H&P    PHYSICIAN SIGNATURE:  Electronically signed by Juan Fernández MD on 10/8/22 at 1:48 PM EDT

## 2022-10-07 NOTE — PROGRESS NOTES
Pharmacy Note  Warfarin Consult  Dx: Afib  Goal INR range 2-3   Home Warfarin dose: 5 mg daily , managed by Dr. Cristy Boucher     Date  INR  Warfarin   10/5                1.84                    5 mg (took in am at home)  10/6                1.70                    5 mg  10/7                1.82                    6 mg    Recommend Warfarin 6 mg x1 tonight, daily INR ordered. Rx will continue to manage therapy per Dr. Juana Yung consult order.     Fransisco PatelD    10/7/2022 10:19 AM

## 2022-10-07 NOTE — TELEPHONE ENCOUNTER
Patient cancelled appointment on 10/12/22 with Dr. Santiago Hernandes for PFT/6MW fu.    Reason: Pt is hospitalized and has not done testing    Patient did not reschedule appointment. Appointment rescheduled:  Pt will reschedule at later date.

## 2022-10-07 NOTE — PROGRESS NOTES
Writer spoke to MD Bryanna to verify SSI orders - pt still has NPO algorithm & states he does not take insulin at home and doesn't want to take 7units for every meal due to dropping low yesterday. Per MD Bryanna DC Lantus, 7 unit meal dose and adjust SSI algorithm to medium dose correction ACHS. See adjusted orders.

## 2022-10-07 NOTE — PROGRESS NOTES
Hospitalist Progress Note      PCP: [place], MD    Date of Admission: 10/5/2022    Chief Complaint: SOB/Dizziness    Subjective: no new c/o s/p afib ablation 6 October w/out complications.  .       Medications:  Reviewed    Infusion Medications    sodium chloride      sodium chloride      dextrose      sodium chloride       Scheduled Medications    warfarin  6 mg Oral Once    sodium chloride flush  5-40 mL IntraVENous 2 times per day    sodium chloride flush  5-40 mL IntraVENous 2 times per day    dofetilide  250 mcg Oral 2 times per day    metoprolol succinate  25 mg Oral Daily    pantoprazole  40 mg Oral BID AC    colchicine  0.6 mg Oral Daily    sucralfate  1 g Oral 4 times per day    aspirin  81 mg Oral Daily    atorvastatin  40 mg Oral Nightly    montelukast  10 mg Oral Nightly    torsemide  30 mg Oral Daily    insulin lispro  0.05 Units/kg SubCUTAneous TID WC    insulin lispro  0-8 Units SubCUTAneous Q4H    insulin glargine  0.15 Units/kg SubCUTAneous Nightly    warfarin placeholder: dosing by pharmacy   Other RX Placeholder     PRN Meds: tetrahydrozoline, sodium chloride flush, sodium chloride, meperidine, HYDROmorphone, HYDROmorphone, ondansetron, labetalol, sodium chloride flush, sodium chloride, acetaminophen, HYDROmorphone, oxyCODONE-acetaminophen, diphenhydrAMINE, glucose, dextrose bolus **OR** dextrose bolus, glucagon (rDNA), dextrose, sodium chloride flush, sodium chloride, ondansetron **OR** ondansetron, acetaminophen **OR** acetaminophen, melatonin, calcium carbonate      Intake/Output Summary (Last 24 hours) at 10/7/2022 1030  Last data filed at 10/7/2022 0945  Gross per 24 hour   Intake 940 ml   Output 950 ml   Net -10 ml         Physical Exam Performed:    /82   Pulse 62   Temp 98.4 °F (36.9 °C) (Oral)   Resp 17   Ht 6' 3\" (1.905 m)   Wt (!) 310 lb 6.4 oz (140.8 kg)   SpO2 100%   BMI 38.80 kg/m²     General appearance: No apparent distress, appears stated age and cooperative. HEENT: Pupils equal, round, and reactive to light. Conjunctivae/corneas clear. Neck: Supple, with full range of motion. No jugular venous distention. Trachea midline. Respiratory:  Normal respiratory effort. Clear to auscultation, bilaterally without Rales/Wheezes/Rhonchi. Cardiovascular: Regular rate and rhythm with normal S1/S2 without murmurs, rubs or gallops. Abdomen: Soft, non-tender, non-distended with normal bowel sounds. Musculoskeletal: No clubbing, cyanosis or edema bilaterally. Full range of motion without deformity. Skin: Skin color, texture, turgor normal.  No rashes or lesions. Neurologic:  Neurovascularly intact without any focal sensory/motor deficits.  Cranial nerves: II-XII intact, grossly non-focal.  Psychiatric: Alert and oriented, thought content appropriate, normal insight  Capillary Refill: Brisk,< 3 seconds   Peripheral Pulses: +2 palpable, equal bilaterally       Labs:   Recent Labs     10/05/22  1132 10/06/22  0726 10/07/22  0855   WBC 8.9 6.9 8.6   HGB 11.9* 12.3* 11.1*   HCT 35.9* 37.9* 34.7*    267 239       Recent Labs     10/05/22  1132 10/06/22  0726 10/07/22  0855    139 139   K 4.3 4.2 4.3    103 101   CO2 25 27 26   BUN 26* 27* 27*   CREATININE 1.4* 1.2 1.3   CALCIUM 8.9 9.3 8.5       Recent Labs     10/05/22  1132   AST 28   ALT 31   BILITOT 0.5   ALKPHOS 88       Recent Labs     10/05/22  1132 10/06/22  0726 10/07/22  0855   INR 1.84* 1.70* 1.82*       Recent Labs     10/05/22  1132   TROPONINI <0.01         Urinalysis:      Lab Results   Component Value Date/Time    NITRU Negative 10/05/2022 01:26 PM    WBCUA 3-5 09/15/2022 04:00 PM    BACTERIA Rare 09/15/2022 04:00 PM    RBCUA 0-2 09/15/2022 04:00 PM    BLOODU Negative 10/05/2022 01:26 PM    SPECGRAV 1.010 10/05/2022 01:26 PM    GLUCOSEU Negative 10/05/2022 01:26 PM    GLUCOSEU NEGATIVE 02/05/2012 09:00 AM       Consults:    IP CONSULT TO CARDIOLOGY  IP CONSULT TO HOSPITALIST  IP CONSULT TO PHARMACY      Assessment/Plan:    Active Hospital Problems    Diagnosis     Moderate obstructive sleep apnea [G47.33]      Priority: High    PAF (paroxysmal atrial fibrillation) ([facility]) [I48.0]      Priority: Medium    CAD S/P percutaneous coronary angioplasty [I25.10, Z98.61]      Priority: Medium    History of venous thromboembolism [Z86.718]     Morbid obesity with BMI of 40.0-44.9, adult ([facility]) [E66.01, Z68.41]     Type 2 diabetes mellitus with diabetic neuropathy ([facility]) [E11.40]        Afib - chronic persistent of unspecified and clinically unable to determine etiology. Normally rate controlled on BBlocker - continued. Anticoagulated at baseline on home Coumadin due to secondary hypercoaguable state due to Afib - continued. Monitored on tele. S/P DCCV on 20 Sept w/ improvement in exertional dyspnea. S/P ablation 6 October per Dr. Renetta Tse w/out complications - now on Tikosyn started 6 October. HTN/CAD - w/ known CAD s/p PCI 8 Sept but no evidence of active signs/sxs of ischemia/failure. Currently controlled on home meds w/ vitals reviewed and documented as above. History of DVT   -  Anticoagulated with warfarin. Goal INR = 2-3. DM2  -  Hold all oral antidiabetic agents. Start s.c. Insulin regimen based on weight. Asthma   -  Continue home montelukast.     Obesity -  With Body mass index is 38.8 kg/m². Complicating assessment and treatment. Placing patient at risk for multiple co-morbidities as well as early death and contributing to the patient's presentation. Counseled on weight loss. MARIA VICTORIA - likely 2nd to obesity. Controlled on home CPAP/BiPap - continued, w/ outpatient f/u as previously arranged. DVT Prophylaxis: Coumadin/IPC     Recent Labs     10/05/22  1132 10/06/22  0726 10/07/22  0855    267 239       Diet: ADULT DIET; Regular  Code Status: Full Code      PT/OT Eval Status: not yet ordered.      Dispo - Patient is likely to remain in-house at least until Sat/Sunday 8/9 October pending clinical course and subspecialty Eben Damon MD

## 2022-10-07 NOTE — PROGRESS NOTES
Called EP NILSON Arteaga regarding tikosen administration. Orders to ensure magnesium level normal and administer Tikosyn. Repeat EKG in two hours.

## 2022-10-08 VITALS
BODY MASS INDEX: 38 KG/M2 | RESPIRATION RATE: 16 BRPM | OXYGEN SATURATION: 97 % | TEMPERATURE: 97.5 F | WEIGHT: 305.6 LBS | SYSTOLIC BLOOD PRESSURE: 129 MMHG | HEART RATE: 102 BPM | DIASTOLIC BLOOD PRESSURE: 82 MMHG | HEIGHT: 75 IN

## 2022-10-08 LAB
ANION GAP SERPL CALCULATED.3IONS-SCNC: 10 MMOL/L (ref 3–16)
BASOPHILS ABSOLUTE: 0.1 K/UL (ref 0–0.2)
BASOPHILS RELATIVE PERCENT: 0.8 %
BUN BLDV-MCNC: 25 MG/DL (ref 7–20)
CALCIUM SERPL-MCNC: 8.3 MG/DL (ref 8.3–10.6)
CHLORIDE BLD-SCNC: 105 MMOL/L (ref 99–110)
CO2: 26 MMOL/L (ref 21–32)
CREAT SERPL-MCNC: 1.3 MG/DL (ref 0.8–1.3)
EKG ATRIAL RATE: 100 BPM
EKG ATRIAL RATE: 92 BPM
EKG DIAGNOSIS: NORMAL
EKG DIAGNOSIS: NORMAL
EKG P AXIS: 69 DEGREES
EKG P AXIS: 80 DEGREES
EKG P-R INTERVAL: 272 MS
EKG P-R INTERVAL: 290 MS
EKG Q-T INTERVAL: 364 MS
EKG Q-T INTERVAL: 368 MS
EKG QRS DURATION: 80 MS
EKG QRS DURATION: 82 MS
EKG QTC CALCULATION (BAZETT): 450 MS
EKG QTC CALCULATION (BAZETT): 474 MS
EKG R AXIS: 1 DEGREES
EKG R AXIS: 3 DEGREES
EKG T AXIS: 10 DEGREES
EKG T AXIS: 15 DEGREES
EKG VENTRICULAR RATE: 100 BPM
EKG VENTRICULAR RATE: 92 BPM
EOSINOPHILS ABSOLUTE: 0.5 K/UL (ref 0–0.6)
EOSINOPHILS RELATIVE PERCENT: 7.2 %
GFR AFRICAN AMERICAN: >60
GFR NON-AFRICAN AMERICAN: 54
GLUCOSE BLD-MCNC: 119 MG/DL (ref 70–99)
GLUCOSE BLD-MCNC: 127 MG/DL (ref 70–99)
GLUCOSE BLD-MCNC: 145 MG/DL (ref 70–99)
HCT VFR BLD CALC: 32.8 % (ref 40.5–52.5)
HEMOGLOBIN: 10.7 G/DL (ref 13.5–17.5)
INR BLD: 2.13 (ref 0.87–1.14)
LYMPHOCYTES ABSOLUTE: 0.9 K/UL (ref 1–5.1)
LYMPHOCYTES RELATIVE PERCENT: 14.1 %
MAGNESIUM: 2.3 MG/DL (ref 1.8–2.4)
MCH RBC QN AUTO: 29.3 PG (ref 26–34)
MCHC RBC AUTO-ENTMCNC: 32.6 G/DL (ref 31–36)
MCV RBC AUTO: 89.8 FL (ref 80–100)
MONOCYTES ABSOLUTE: 0.8 K/UL (ref 0–1.3)
MONOCYTES RELATIVE PERCENT: 11.6 %
NEUTROPHILS ABSOLUTE: 4.5 K/UL (ref 1.7–7.7)
NEUTROPHILS RELATIVE PERCENT: 66.3 %
PDW BLD-RTO: 16.9 % (ref 12.4–15.4)
PERFORMED ON: ABNORMAL
PERFORMED ON: ABNORMAL
PLATELET # BLD: 218 K/UL (ref 135–450)
PMV BLD AUTO: 6.9 FL (ref 5–10.5)
POTASSIUM SERPL-SCNC: 4.1 MMOL/L (ref 3.5–5.1)
PROTHROMBIN TIME: 23.9 SEC (ref 11.7–14.5)
RBC # BLD: 3.65 M/UL (ref 4.2–5.9)
SODIUM BLD-SCNC: 141 MMOL/L (ref 136–145)
WBC # BLD: 6.7 K/UL (ref 4–11)

## 2022-10-08 PROCEDURE — 36415 COLL VENOUS BLD VENIPUNCTURE: CPT

## 2022-10-08 PROCEDURE — 85025 COMPLETE CBC W/AUTO DIFF WBC: CPT

## 2022-10-08 PROCEDURE — 2580000003 HC RX 258: Performed by: ANESTHESIOLOGY

## 2022-10-08 PROCEDURE — 2580000003 HC RX 258: Performed by: INTERNAL MEDICINE

## 2022-10-08 PROCEDURE — 85610 PROTHROMBIN TIME: CPT

## 2022-10-08 PROCEDURE — 6370000000 HC RX 637 (ALT 250 FOR IP): Performed by: INTERNAL MEDICINE

## 2022-10-08 PROCEDURE — 83735 ASSAY OF MAGNESIUM: CPT

## 2022-10-08 PROCEDURE — 99233 SBSQ HOSP IP/OBS HIGH 50: CPT | Performed by: NURSE PRACTITIONER

## 2022-10-08 PROCEDURE — 93005 ELECTROCARDIOGRAM TRACING: CPT | Performed by: INTERNAL MEDICINE

## 2022-10-08 PROCEDURE — 80048 BASIC METABOLIC PNL TOTAL CA: CPT

## 2022-10-08 RX ORDER — COLCHICINE 0.6 MG/1
0.6 TABLET ORAL DAILY
Qty: 4 TABLET | Refills: 0 | Status: ON HOLD | OUTPATIENT
Start: 2022-10-09 | End: 2022-10-19 | Stop reason: HOSPADM

## 2022-10-08 RX ORDER — DOFETILIDE 0.25 MG/1
250 CAPSULE ORAL EVERY 12 HOURS SCHEDULED
Qty: 60 CAPSULE | Refills: 3 | Status: SHIPPED | OUTPATIENT
Start: 2022-10-08

## 2022-10-08 RX ORDER — METOPROLOL SUCCINATE 25 MG/1
25 TABLET, EXTENDED RELEASE ORAL DAILY
Qty: 30 TABLET | Refills: 3 | Status: ON HOLD | OUTPATIENT
Start: 2022-10-09 | End: 2022-10-19 | Stop reason: HOSPADM

## 2022-10-08 RX ORDER — WARFARIN SODIUM 5 MG/1
5 TABLET ORAL
Status: DISCONTINUED | OUTPATIENT
Start: 2022-10-08 | End: 2022-10-08 | Stop reason: HOSPADM

## 2022-10-08 RX ORDER — SUCRALFATE 1 G/1
1 TABLET ORAL 3 TIMES DAILY
Qty: 30 TABLET | Refills: 0 | Status: ON HOLD | OUTPATIENT
Start: 2022-10-08 | End: 2022-10-19

## 2022-10-08 RX ORDER — PANTOPRAZOLE SODIUM 40 MG/1
40 TABLET, DELAYED RELEASE ORAL
Qty: 60 TABLET | Refills: 0 | Status: ON HOLD | OUTPATIENT
Start: 2022-10-08 | End: 2022-10-19 | Stop reason: SDUPTHER

## 2022-10-08 RX ADMIN — COLCHICINE 0.6 MG: 0.6 TABLET, FILM COATED ORAL at 08:53

## 2022-10-08 RX ADMIN — Medication 10 ML: at 08:54

## 2022-10-08 RX ADMIN — DOFETILIDE 250 MCG: 0.25 CAPSULE ORAL at 08:58

## 2022-10-08 RX ADMIN — ACETAMINOPHEN 650 MG: 325 TABLET ORAL at 12:25

## 2022-10-08 RX ADMIN — ASPIRIN 81 MG 81 MG: 81 TABLET ORAL at 08:52

## 2022-10-08 RX ADMIN — SUCRALFATE 1 G: 1 TABLET ORAL at 06:10

## 2022-10-08 RX ADMIN — PANTOPRAZOLE SODIUM 40 MG: 40 TABLET, DELAYED RELEASE ORAL at 06:10

## 2022-10-08 RX ADMIN — TORSEMIDE 30 MG: 20 TABLET ORAL at 08:52

## 2022-10-08 RX ADMIN — METOPROLOL SUCCINATE 25 MG: 25 TABLET, EXTENDED RELEASE ORAL at 08:52

## 2022-10-08 RX ADMIN — SUCRALFATE 1 G: 1 TABLET ORAL at 12:25

## 2022-10-08 NOTE — PROGRESS NOTES
[address]     Electrophysiology                                     Progress Note    Admission date:  10/5/2022    Reason for follow up visit: AF    HPI/CC: Skyler Mak was admitted on 10/5/2022 with symptomatic atrial fibrillation. He had a recent PIERO/CV on 2022. On 10/6/2022, he had an RFCA of AF abd suspected roof dependent flutter. Post procedure, Tikosyn was resumed. Rhythm has been sinus with a first degree AVB. Subjective:  Denies chest pain, palpitations, shortness of breath, and dizziness. Vitals:  Blood pressure 129/82, pulse (!) 102, temperature 97.5 °F (36.4 °C), temperature source Oral, resp. rate 16, height 6' 3\" (1.905 m), weight (!) 305 lb 9.6 oz (138.6 kg), SpO2 97 %.   Temp  Av.1 °F (36.7 °C)  Min: 97.5 °F (36.4 °C)  Max: 98.4 °F (36.9 °C)  Pulse  Av.2  Min: 92  Max: 104  BP  Min: 94/68  Max: 132/79  SpO2  Av.5 %  Min: 97 %  Max: 98 %    24 hour I/O    Intake/Output Summary (Last 24 hours) at 10/8/2022 1014  Last data filed at 10/8/2022 0850  Gross per 24 hour   Intake 360 ml   Output 2175 ml   Net -1815 ml       Current Facility-Administered Medications   Medication Dose Route Frequency Provider Last Rate Last Admin    warfarin (COUMADIN) tablet 5 mg  5 mg Oral Once Kajal Alejo MD        insulin lispro (HUMALOG) injection vial 0-8 Units  0-8 Units SubCUTAneous 4x Daily AC & HS Michel Lima MD        tetrahydrozoline 0.05 % ophthalmic solution 1 drop  1 drop Both Eyes Q4H PRN Michel Lima MD   1 drop at 10/07/22 2043    sodium chloride flush 0.9 % injection 5-40 mL  5-40 mL IntraVENous 2 times per day Bowen Curry MD   10 mL at 10/08/22 0854    sodium chloride flush 0.9 % injection 5-40 mL  5-40 mL IntraVENous PRN Loco Isabel MD        0.9 % sodium chloride infusion   IntraVENous PRN Bowen Curry MD        meperidine (DEMEROL) injection 12.5 mg  12.5 mg IntraVENous Q5 Min PRN Bowen Curry MD   12.5 mg at 10/06/22 7952 HYDROmorphone (DILAUDID) injection 0.25 mg  0.25 mg IntraVENous Q10 Min PRN Abisai Estrada MD        HYDROmorphone (DILAUDID) injection 0.5 mg  0.5 mg IntraVENous Q10 Min PRN Abisai Estrada MD        ondansetron [facility]) injection 4 mg  4 mg IntraVENous Q15 Min PRN Abisai Estrada MD        labetalol (NORMODYNE;TRANDATE) injection 5 mg  5 mg IntraVENous Q10 Min PRN Maria Guadalupe Isabel MD        sodium chloride flush 0.9 % injection 5-40 mL  5-40 mL IntraVENous 2 times per day Nichelle Alfaro MD   10 mL at 10/08/22 0854    sodium chloride flush 0.9 % injection 5-40 mL  5-40 mL IntraVENous PRN Nichelle Alfaro MD        0.9 % sodium chloride infusion   IntraVENous PRN Nichelle Alfaro MD        acetaminophen (TYLENOL) tablet 650 mg  650 mg Oral Q4H PRN SOTERO Butcher MD        HYDROmorphone (DILAUDID) injection 0.25 mg  0.25 mg IntraVENous Q4H PRN Nichelle Alfaro MD        oxyCODONE-acetaminophen (PERCOCET) 5-325 MG per tablet 1 tablet  1 tablet Oral Q4H PRN Nichelle Alfaro MD        [facility]) capsule 250 mcg  250 mcg Oral 2 times per day Nichelle Alfaro MD   250 mcg at 10/08/22 0858    diphenhydrAMINE (BENADRYL) tablet 25 mg  25 mg Oral Nightly PRN SERENA Cullen - CNP   25 mg at 10/07/22 2044    metoprolol succinate (TOPROL XL) extended release tablet 25 mg  25 mg Oral Daily SOTERO Butcher MD   25 mg at 10/08/22 0852    pantoprazole (PROTONIX) tablet 40 mg  40 mg Oral BID AC SOTERO Butcher MD   40 mg at 10/08/22 0610    colchicine (COLCRYS) tablet 0.6 mg  0.6 mg Oral Daily SOTERO Butcher MD   0.6 mg at 10/08/22 0853    sucralfate (CARAFATE) tablet 1 g  1 g Oral 4 times per day Nichelle Alfaro MD   1 g at 10/08/22 0610    aspirin chewable tablet 81 mg  81 mg Oral Daily Sam Yoder MD   81 mg at 10/08/22 0852    atorvastatin (LIPITOR) tablet 40 mg  40 mg Oral Nightly Sam Yoder MD   40 mg at 10/07/22 2044    montelukast (SINGULAIR) tablet 10 mg  10 mg Oral Nightly Rachna Anton MD   10 mg at 10/07/22 2044    torsemide (DEMADEX) tablet 30 mg  30 mg Oral Daily Rachna Anton MD   30 mg at 10/08/22 0852    glucose chewable tablet 16 g  4 tablet Oral PRN Rachna Anton MD        dextrose bolus 10% 125 mL  125 mL IntraVENous PRN Rachna Anton MD        Or    dextrose bolus 10% 250 mL  250 mL IntraVENous PRN Rachna Anton MD        glucagon (rDNA) injection 1 mg  1 mg SubCUTAneous PRN Rachna Anton MD        dextrose 10 % infusion   IntraVENous Continuous PRN Rachna Anton MD        sodium chloride flush 0.9 % injection 10 mL  10 mL IntraVENous PRN Rachna Anton MD   10 mL at 10/06/22 0909    0.9 % sodium chloride infusion   IntraVENous PRN Rachna Anton MD        ondansetron (ZOFRAN-ODT) disintegrating tablet 4 mg  4 mg Oral Q8H PRN Rachna Anton MD        Or    ondansetron [facility]) injection 4 mg  4 mg IntraVENous Q6H PRN Rachna Anton MD        acetaminophen (TYLENOL) tablet 650 mg  650 mg Oral Q6H PRN Rachna Anton MD   650 mg at 10/07/22 1621    Or    acetaminophen (TYLENOL) suppository 650 mg  650 mg Rectal Q6H PRN Rachna Anton MD        melatonin tablet 3 mg  3 mg Oral Nightly PRN Rachna Anton MD   3 mg at 10/07/22 2044    calcium carbonate (TUMS) chewable tablet 1,000 mg  1,000 mg Oral TID PRN Rachna Anton MD        warfarin placeholder: dosing by pharmacy   Other Yogi Cordoba MD           Objective:     Telemetry monitor: SR    Physical Exam:  Constitutional and general appearance: alert, cooperative, no distress, and appears stated age  [de-identified]: PERRL, no cervical lymphadenopathy. No masses palpable. Normal oral mucosa  Respiratory:  Normal excursion and expansion without use of accessory muscles  Resp auscultation: Normal breath sounds without wheezing, rhonchi, and rales  Cardiovascular:   The apical impulse is not displaced  Heart tones are crisp and normal. Regular S1 and S2.  Jugular venous pulsation Normal  The carotid upstroke is normal in amplitude and contour without delay or bruit  Peripheral pulses are symmetrical and full   Abdomen:  No masses or tenderness  Bowel sounds present  Extremities:   No cyanosis or clubbing   Nonpitting lower extremity edema   Skin: warm and dry  Bilateral femoral access sites stable   Neurological:  Alert and oriented  Moves all extremities well  No abnormalities of mood, affect, memory, mentation, or behavior are noted    Data  Echo: 09/06/2022   Summary   LV systolic function is normal with EF estimated at 55-60%. No regional wall motion abnormalities are noted. There is moderate concentric left ventricular hypertrophy. Normal left ventricular diastolic filling pressure. Biatrial enlargement. Mild posterior mitral annular calcification is present. Aortic valve appears sclerotic but opens adequately. Mild mitral and tricuspid regurgitation. Systolic pulmonary artery pressure (SPAP) estimated at 46mmHg (RA pressure 3   mmHg), consistent with mild pulmonary hypertension. 6- 55%, LVH, LAE, ANNIE    Cath: 09/08/2022     1. Left main coronary artery was normal. It gave off the left anterior descending artery and left circumflex. 2. Left anterior descending artery has severe atherosclerotic disease. It was large in size. It gave off septal perforators and a moderate sized diagonal branch. The LAD covered the entire apex of the left ventricle. ~There is evidence for Denovo disease of about 70% within the mid LAD. This is confirmed by intravascular ultrasound. Furthermore there is a previously placed stent within the mid LAD that was under deployed and under expanded. 3. Left circumflex has mild atherosclerotic disease. It was moderate in size. There was a moderate sized obtuse marginal branch. 4. Right coronary artery has mild atherosclerotic disease.  It was moderate in size and was the dominant artery. 5. Left ventriculogram showed normal LVEF at 55-60%. Wall motion was normal . There was no significant mitral valve or aortic valve disease noted. LVEDP was normal. There was no gradient noted across the aortic valve during pullback of the catheter. /81   Pulse (P) 69   Temp 97.7 °F (36.5 °C) (Oral)   Resp 18   Ht 6' 3\" (1.905 m)   Wt (!) 301 lb 8 oz (136.8 kg)   SpO2 97%   BMI 37.68 kg/m²      The access site was controlled with manual pressure and/or appropriate closure device. Moderate Conscious Sedation Details: An independent trained observer pushed medications at my direction. We monitoring the patient's level of consciousness and vital signs/physiologic status throughout the procedure. CPT codes 60629 and 51178        Start time: 1524  Stop time: 1612  ASA class: 3     Sedation totals:  Versad - 4mg  Fentanyl - 75mcg     EBL - minimal <5 cc blood loss     The patient was monitored continuously with the ECG, pulse oximetry, blood pressure, and direct observation. CONCLUSIONS:     1. Successful intravascular ultrasound-guided drug-eluting stent insertion to the left anterior descending artery     ASSESSMENT/RECOMMENDATIONS:     1. Continue dual antiplatelet therapy and guideline directed medical therapy. 2.  Follow-up with patient's primary cardiologist after discharge. All labs and testing reviewed.   Lab Review     Renal Profile:   Lab Results   Component Value Date/Time    CREATININE 1.3 10/08/2022 06:13 AM    BUN 25 10/08/2022 06:13 AM     10/08/2022 06:13 AM    K 4.1 10/08/2022 06:13 AM    K 4.2 09/16/2022 06:47 AM     10/08/2022 06:13 AM    CO2 26 10/08/2022 06:13 AM     CBC:    Lab Results   Component Value Date/Time    WBC 6.7 10/08/2022 06:13 AM    RBC 3.65 10/08/2022 06:13 AM    HGB 10.7 10/08/2022 06:13 AM    HCT 32.8 10/08/2022 06:13 AM    MCV 89.8 10/08/2022 06:13 AM    RDW 16.9 10/08/2022 06:13 AM     10/08/2022 06:13 AM BNP:    Lab Results   Component Value Date/Time    BNP 53 09/29/2013 11:30 PM     Fasting Lipid Panel:    Lab Results   Component Value Date/Time    CHOL 119 09/05/2022 04:37 AM    HDL 30 09/05/2022 04:37 AM    TRIG 147 09/05/2022 04:37 AM     Cardiac Enzymes:  CK/MbTroponin  Lab Results   Component Value Date/Time    TROPONINI <0.01 10/05/2022 11:32 AM     PT/ INR   Lab Results   Component Value Date/Time    INR 2.13 10/08/2022 06:13 AM    INR 1.82 10/07/2022 08:55 AM    INR 1.70 10/06/2022 07:26 AM    PROTIME 23.9 10/08/2022 06:13 AM    PROTIME 21.0 10/07/2022 08:55 AM    PROTIME 19.9 10/06/2022 07:26 AM     PTT No results found for: PTT   Lab Results   Component Value Date/Time    MG 2.30 10/08/2022 06:13 AM      Lab Results   Component Value Date/Time    TSH 2.18 09/19/2022 05:07 AM       Assessment:  Symptomatic paroxysmal atrial arrhythmias (AF/AFL) : ongoing    -[id number] score 7 (age, HTN, DM, CAD, DVT)   -intolerant of Tikosyn (prolonged QTc) and amiodarone (unclear)   -s/p RFCA of AF 10/6/2022   -Tikosyn resumed post ablation 10/2022  History of atrial flutter: stable     -s/p EPS, PVI and RFCA in 2017 Nicolette Lat)   -s/p PIERO/CV 9/20/2022  First degree AVB: stable   PVC's: stable   -s/p PVC ablation 2014  CAD    -s/p IVUS and PCI to LAD with FELIZ x 1   HTN: controlled   DM  MARIA VICTORIA: CPAP compliant   History of DVT   Obesity s/p gastric sleeve   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 If QTc stable, can go.          Reggie Avalos, APRN-CNP  [address]  [phone]

## 2022-10-08 NOTE — PROGRESS NOTES
Pharmacy Note  Warfarin Consult  Dx: Afib  Goal INR range 2-3   Home Warfarin dose: 5 mg daily , managed by Dr. Thao Silvestre     Date  INR  Warfarin   10/5                1.84                    5 mg (took in am at home)  10/6                1.70                    5 mg  10/7                1.82                    6 mg  10/8                2.13                    5 mg    Recommend Warfarin 5 mg x1 tonight, daily INR ordered. Rx will continue to manage therapy per Dr. Lorie Gamino consult order.     Naomi Dodd, PharmD  10/8/2022 at 8:18 AM

## 2022-10-08 NOTE — ANESTHESIA POSTPROCEDURE EVALUATION
Department of Anesthesiology  Postprocedure Note    Patient: Britney Madden  MRN: [id number]  YOB: 1945  Date of evaluation: 10/8/2022      Procedure Summary     Date: 10/06/22 Room / Location: [address] [place] [address] Cardiac Cath Lab    Anesthesia Start: [address] Anesthesia Stop: 3354    Procedure: ABLATION Diagnosis:     Scheduled Providers:  Responsible Provider: Farideh Giordano MD    Anesthesia Type: general ASA Status: 3          Anesthesia Type: No value filed.     Jean Marie Phase I: Jean Marie Score: 10    Jean Marie Phase II:        Anesthesia Post Evaluation    Comments: Postoperative Anesthesia Note    Name:    Britney Madden  MRN:      [id number]    Patient Vitals in the past 12 hrs:  10/08/22 0850, BP:129/82, Temp:97.5 °F (36.4 °C), Temp src:Oral, Pulse:(!) 102, Resp:16, SpO2:97 %  10/08/22 0430, BP:128/72, Temp:98.1 °F (36.7 °C), Temp src:Oral, Pulse:92, Resp:16, SpO2:98 %, Weight:(!) 305 lb 9.6 oz (138.6 kg)  10/07/22 2318, BP:132/79, Temp:98.2 °F (36.8 °C), Temp src:Oral, Pulse:95, Resp:16, SpO2:98 %     LABS:    CBC  Lab Results       Component                Value               Date/Time                  WBC                      6.7                 10/08/2022 06:13 AM        HGB                      10.7 (L)            10/08/2022 06:13 AM        HCT                      32.8 (L)            10/08/2022 06:13 AM        PLT                      218                 10/08/2022 06:13 AM   RENAL  Lab Results       Component                Value               Date/Time                  NA                       141                 10/08/2022 06:13 AM        K                        4.1                 10/08/2022 06:13 AM        K                        4.2                 09/16/2022 06:47 AM        CL                       105                 10/08/2022 06:13 AM        CO2                      26                  10/08/2022 06:13 AM        BUN                      25 (H)              10/08/2022 06:13 AM        CREATININE 1.3                 10/08/2022 06:13 AM        GLUCOSE                  127 (H)             10/08/2022 06:13 AM   COAGS  Lab Results       Component                Value               Date/Time                  PROTIME                  23.9 (H)            10/08/2022 06:13 AM        INR                      2.13 (H)            10/08/2022 06:13 AM        APTT                     45.6 (H)            02/15/2019 01:47 PM     Intake & Output:  @00FTNB@    Nausea & Vomiting:  No    Level of Consciousness:  Awake    Pain Assessment:  Adequate analgesia    Anesthesia Complications:  No apparent anesthetic complications    SUMMARY      Vital signs stable  OK to discharge from Stage I post anesthesia care.   Care transferred from Anesthesiology department on discharge from perioperative area

## 2022-10-08 NOTE — PROGRESS NOTES
Hospitalist Progress Note      PCP: Mavis Cooley MD    Date of Admission: 10/5/2022    Chief Complaint: SOB/Dizziness    Subjective: no new c/o s/p afib ablation 6 October w/out complications. .       Medications:  Reviewed    Infusion Medications    sodium chloride      sodium chloride      dextrose      sodium chloride       Scheduled Medications    warfarin  5 mg Oral Once    insulin lispro  0-8 Units SubCUTAneous 4x Daily AC & HS    sodium chloride flush  5-40 mL IntraVENous 2 times per day    sodium chloride flush  5-40 mL IntraVENous 2 times per day    dofetilide  250 mcg Oral 2 times per day    metoprolol succinate  25 mg Oral Daily    pantoprazole  40 mg Oral BID AC    colchicine  0.6 mg Oral Daily    sucralfate  1 g Oral 4 times per day    aspirin  81 mg Oral Daily    atorvastatin  40 mg Oral Nightly    montelukast  10 mg Oral Nightly    torsemide  30 mg Oral Daily    warfarin placeholder: dosing by pharmacy   Other RX Placeholder     PRN Meds: tetrahydrozoline, sodium chloride flush, sodium chloride, meperidine, HYDROmorphone, HYDROmorphone, ondansetron, labetalol, sodium chloride flush, sodium chloride, acetaminophen, HYDROmorphone, oxyCODONE-acetaminophen, diphenhydrAMINE, glucose, dextrose bolus **OR** dextrose bolus, glucagon (rDNA), dextrose, sodium chloride flush, sodium chloride, ondansetron **OR** ondansetron, acetaminophen **OR** acetaminophen, melatonin, calcium carbonate      Intake/Output Summary (Last 24 hours) at 10/8/2022 1347  Last data filed at 10/8/2022 1300  Gross per 24 hour   Intake 360 ml   Output 2575 ml   Net -2215 ml       Physical Exam Performed:    /82   Pulse (!) 102   Temp 97.5 °F (36.4 °C) (Oral)   Resp 16   Ht 6' 3\" (1.905 m)   Wt (!) 305 lb 9.6 oz (138.6 kg)   SpO2 97%   BMI 38.20 kg/m²     General appearance: No apparent distress, appears stated age and cooperative. HEENT: Pupils equal, round, and reactive to light.  Conjunctivae/corneas clear.  Neck: Supple, with full range of motion. No jugular venous distention. Trachea midline. Respiratory:  Normal respiratory effort. Clear to auscultation, bilaterally without Rales/Wheezes/Rhonchi. Cardiovascular: Regular rate and rhythm with normal S1/S2 without murmurs, rubs or gallops. Abdomen: Soft, non-tender, non-distended with normal bowel sounds. Musculoskeletal: No clubbing, cyanosis or edema bilaterally. Full range of motion without deformity. Skin: Skin color, texture, turgor normal.  No rashes or lesions. Neurologic:  Neurovascularly intact without any focal sensory/motor deficits. Cranial nerves: II-XII intact, grossly non-focal.  Psychiatric: Alert and oriented, thought content appropriate, normal insight  Capillary Refill: Brisk,< 3 seconds   Peripheral Pulses: +2 palpable, equal bilaterally       Labs:   Recent Labs     10/06/22  0726 10/07/22  0855 10/08/22  0613   WBC 6.9 8.6 6.7   HGB 12.3* 11.1* 10.7*   HCT 37.9* 34.7* 32.8*    239 218     Recent Labs     10/06/22  0726 10/07/22  0855 10/08/22  0613    139 141   K 4.2 4.3 4.1    101 105   CO2 27 26 26   BUN 27* 27* 25*   CREATININE 1.2 1.3 1.3   CALCIUM 9.3 8.5 8.3     No results for input(s): AST, ALT, BILIDIR, BILITOT, ALKPHOS in the last 72 hours. Recent Labs     10/06/22  0726 10/07/22  0855 10/08/22  0613   INR 1.70* 1.82* 2.13*     No results for input(s): CKTOTAL, TROPONINI in the last 72 hours.     Urinalysis:      Lab Results   Component Value Date/Time    NITRU Negative 10/05/2022 01:26 PM    WBCUA 3-5 09/15/2022 04:00 PM    BACTERIA Rare 09/15/2022 04:00 PM    RBCUA 0-2 09/15/2022 04:00 PM    BLOODU Negative 10/05/2022 01:26 PM    SPECGRAV 1.010 10/05/2022 01:26 PM    GLUCOSEU Negative 10/05/2022 01:26 PM    GLUCOSEU NEGATIVE 02/05/2012 09:00 AM       Consults:    IP CONSULT TO CARDIOLOGY  IP CONSULT TO HOSPITALIST  IP CONSULT TO PHARMACY  IP CONSULT TO HOME CARE NEEDS      Assessment/Plan:    Active Hospital Problems    Diagnosis     Moderate obstructive sleep apnea [G47.33]      Priority: High    PAF (paroxysmal atrial fibrillation) ([facility]) [I48.0]      Priority: Medium    CAD S/P percutaneous coronary angioplasty [I25.10, Z98.61]      Priority: Medium    History of venous thromboembolism [Z86.718]     Morbid obesity with BMI of 40.0-44.9, adult ([facility]) [E66.01, Z68.41]     Type 2 diabetes mellitus with diabetic neuropathy ([facility]) [E11.40]        Afib - chronic persistent of unspecified and clinically unable to determine etiology. Normally rate controlled on BBlocker - continued. Anticoagulated at baseline on home Coumadin due to secondary hypercoaguable state due to Afib - continued. Monitored on tele. S/P DCCV on 20 Sept w/ improvement in exertional dyspnea. S/P ablation 6 October per Dr. Gabby Humphrey w/out complications - now on Tikosyn started 6 October. HTN/CAD - w/ known CAD s/p PCI 8 Sept but no evidence of active signs/sxs of ischemia/failure. Currently controlled on home meds w/ vitals reviewed and documented as above. History of DVT   -  Anticoagulated with warfarin. Goal INR = 2-3. DM2  -  Hold all oral antidiabetic agents. Start s.c. Insulin regimen based on weight. Asthma   -  Continue home montelukast.     Obesity -  With Body mass index is 38.2 kg/m². Complicating assessment and treatment. Placing patient at risk for multiple co-morbidities as well as early death and contributing to the patient's presentation. Counseled on weight loss. MARIA VICTORIA - likely 2nd to obesity. Controlled on home CPAP/BiPap - continued, w/ outpatient f/u as previously arranged. DVT Prophylaxis: Coumadin/IPC     Recent Labs     10/06/22  0726 10/07/22  0855 10/08/22  0613    239 218     Diet: ADULT DIET; Regular  Code Status: Full Code      PT/OT Eval Status: not yet ordered.      Dispo - Patient is likely to remain in-house at least until Sat/Sunday 8/9 October pending clinical course and subspecialty Sera Hinkle MD

## 2022-10-08 NOTE — CARE COORDINATION
CASE MANAGEMENT DISCHARGE SUMMARY      Discharge to: Home with [facility] resumed. Writer faxed HCorder/AVS to [facility] and spoke with intake to confirm. Pt already discharged home, no additional needs noted.   CLEMENTE Bullock-ANGELINA

## 2022-10-09 NOTE — DISCHARGE SUMMARY
Hospital Medicine Discharge Summary    Patient ID: Wendy Mcgraw      Patient's PCP: Efrain Mahmood MD    Admit Date: 10/5/2022     Discharge Date: 10/8/2022      Admitting Physician: Amada Hatch MD     Discharge Physician: Marina Hargrove MD     Discharge Diagnoses: Active Hospital Problems    Diagnosis     Moderate obstructive sleep apnea [G47.33]      Priority: High    PAF (paroxysmal atrial fibrillation) ([facility]) [I48.0]      Priority: Medium    CAD S/P percutaneous coronary angioplasty [I25.10, Z98.61]      Priority: Medium    History of venous thromboembolism [Z86.718]     Morbid obesity with BMI of 40.0-44.9, adult ([facility]) [E66.01, Z68.41]     Type 2 diabetes mellitus with diabetic neuropathy ([facility] [E11.40]        The patient was seen and examined on day of discharge and this discharge summary is in conjunction with any daily progress note from day of discharge. Hospital Course:         Afib - chronic persistent of unspecified and clinically unable to determine etiology. Normally rate controlled on BBlocker - continued. Anticoagulated at baseline on home Coumadin due to secondary hypercoaguable state due to Afib - continued. Monitored on tele. S/P DCCV on 20 Sept w/ improvement in exertional dyspnea. S/P ablation 6 October per Dr. Sera Chávez w/out complications - now on Tikosyn started 6 October. HTN/CAD - w/ known CAD s/p PCI 8 Sept but no evidence of active signs/sxs of ischemia/failure. Currently controlled on home meds w/ vitals reviewed and documented as above. History of DVT   -  Anticoagulated with warfarin. Goal INR = 2-3. DM2  -  Hold all oral antidiabetic agents. Start s.c. Insulin regimen based on weight. Asthma   -  Continue home montelukast.     Obesity -  With Body mass index is 38.2 kg/m². Complicating assessment and treatment. Placing patient at risk for multiple co-morbidities as well as early death and contributing to the patient's presentation. Counseled on weight loss. MARIA VICTORIA - likely 2nd to obesity. Controlled on home CPAP/BiPap - continued, w/ outpatient f/u as previously arranged. Labs: For convenience and continuity at follow-up the following most recent labs are provided:      CBC:    Lab Results   Component Value Date/Time    WBC 6.7 10/08/2022 06:13 AM    HGB 10.7 10/08/2022 06:13 AM    HCT 32.8 10/08/2022 06:13 AM     10/08/2022 06:13 AM       Renal:    Lab Results   Component Value Date/Time     10/08/2022 06:13 AM    K 4.1 10/08/2022 06:13 AM    K 4.2 09/16/2022 06:47 AM     10/08/2022 06:13 AM    CO2 26 10/08/2022 06:13 AM    BUN 25 10/08/2022 06:13 AM    CREATININE 1.3 10/08/2022 06:13 AM    CALCIUM 8.3 10/08/2022 06:13 AM    PHOS 3.1 09/21/2022 04:16 AM         Significant Diagnostic Studies    Radiology:   XR CHEST PORTABLE   Final Result   No acute cardiopulmonary findings                Consults:     IP CONSULT TO CARDIOLOGY  IP CONSULT TO HOSPITALIST  IP CONSULT TO PHARMACY    Disposition: Home w/ Byron Man    Condition at Discharge: Stable    Discharge Instructions/Follow-up:  w/ PCP 1-2 weeks and subspecialists as arranged.      Code Status:  Full Code    Activity: activity as tolerated    Diet: regular diet      Discharge Medications:     Discharge Medication List as of 10/8/2022  2:06 PM             Details   colchicine (COLCRYS) 0.6 MG tablet Take 1 tablet by mouth daily for 4 days, Disp-4 tablet, R-0Normal      pantoprazole (PROTONIX) 40 MG tablet Take 1 tablet by mouth 2 times daily (before meals), Disp-60 tablet, R-0Normal      sucralfate (CARAFATE) 1 GM tablet Take 1 tablet by mouth in the morning, at noon, and at bedtime for 10 days, Disp-30 tablet, R-0Normal                Details   dofetilide (TIKOSYN) 250 MCG capsule Take 1 capsule by mouth every 12 hours, Disp-60 capsule, R-3Normal      metoprolol succinate (TOPROL XL) 25 MG extended release tablet Take 1 tablet by mouth daily, Disp-30 tablet, R-3Normal Details   torsemide (DEMADEX) 20 MG tablet Take 1.5 tablets daily, Disp-135 tablet, R-0Normal      albuterol (PROVENTIL) (2.5 MG/3ML) 0.083% nebulizer solution Take 3 mLs by nebulization every 4 hours as needed for Wheezing or Shortness of Breath, Disp-1080 mL, R-3Print      albuterol sulfate HFA (VENTOLIN HFA) 108 (90 Base) MCG/ACT inhaler Inhale 2 puffs into the lungs 4 times daily as needed for Wheezing or Shortness of Breath, Disp-1 each, R-3Auto sub proair, ventolin or proventil based upon lowest cost pleaseNormal      aspirin 81 MG chewable tablet Take 1 tablet by mouth daily, Disp-30 tablet, R-11Normal      clopidogrel (PLAVIX) 75 MG tablet Take 1 tablet by mouth daily, Disp-30 tablet, R-0Normal      warfarin (COUMADIN) 5 MG tablet TAKE ONE TABLET BY MOUTH DAILY, Disp-90 tablet, R-1Normal      metFORMIN (GLUCOPHAGE) 1000 MG tablet Take 0.5 tablets by mouth in the morning and 0.5 tablets in the evening. Take with meals. , Disp-180 tablet, R-0Dose Adjustment. NO PRINT      montelukast (SINGULAIR) 10 MG tablet TAKE ONE TABLET BY MOUTH DAILY, Disp-90 tablet, R-1Normal      atorvastatin (LIPITOR) 40 MG tablet TAKE ONE TABLET BY MOUTH DAILY, Disp-90 tablet, R-0Normal      Dulaglutide (TRULICITY) 7.35 GA/3.2ZE SOPN Inject 0.75 mg into the skin once a weekHistorical Med      !! blood glucose test strips (TRUE METRIX BLOOD GLUCOSE TEST) strip TEST ONCE DAILY. DX:E11.9, Disp-100 strip, R-3Normal      !! Easy Touch Lancets 32G/Twist MISC Disp-100 each, R-3, NormalTEST DAILY. DX: E11.9      !! blood glucose test strips (TRUE METRIX BLOOD GLUCOSE TEST) strip USE TO CHECK BLOOD GLUCOSE DAILY. DX: E11.9, Disp-100 each, R-3Normal      !! ACCU-CHEK MULTICLIX LANCETS MISC Disp-100 each, R-11, NormalCheck sugars once daily. Dx;E11.9      Lancet Devices (EASY TOUCH LANCING DEVICE) MISC Disp-1 each, R-0, NormalTest Daily. DX: E11.9      Blood Glucose Monitoring Suppl (TRUE METRIX METER) VINNIE Use to check daily. DX; E11.9, Disp-1 Device, R-0Normal      Lancets Misc. (ACCU-CHEK MULTICLIX LANCET DEV) KIT Disp-1 kit, R-0, NormalCheck sugars once daily. DX;E11.9      Biotin 5 MG TABS Take 5 mg by mouth dailyHistorical Med      Cholecalciferol (VITAMIN D3) 5000 units TABS Take 5,000 Units by mouth dailyHistorical Med      Cyanocobalamin (VITAMIN B-12 CR PO) Take 5,000 mcg by mouth every 7 days Historical Med      magnesium oxide (MAG-OX) 400 MG tablet Take 400 mg by mouth daily. Historical Med       !! - Potential duplicate medications found. Please discuss with provider. Time Spent on discharge is more than 30 minutes in the examination, evaluation, counseling and review of medications and discharge plan. Signed:    Marina Hargrove MD   10/9/2022      Thank you Efrain Mahmood MD for the opportunity to be involved in this patient's care. If you have any questions or concerns please feel free to contact me at [phone].

## 2022-10-10 ENCOUNTER — TELEPHONE (OUTPATIENT)
Dept: INTERNAL MEDICINE CLINIC | Age: 77
End: 2022-10-10

## 2022-10-10 ENCOUNTER — ANTI-COAG VISIT (OUTPATIENT)
Dept: INTERNAL MEDICINE CLINIC | Age: 77
End: 2022-10-10

## 2022-10-10 ENCOUNTER — TELEPHONE (OUTPATIENT)
Dept: CARDIOLOGY CLINIC | Age: 77
End: 2022-10-10

## 2022-10-10 ENCOUNTER — OFFICE VISIT (OUTPATIENT)
Dept: INTERNAL MEDICINE CLINIC | Age: 77
End: 2022-10-10

## 2022-10-10 VITALS
DIASTOLIC BLOOD PRESSURE: 75 MMHG | RESPIRATION RATE: 18 BRPM | HEIGHT: 75 IN | HEART RATE: 105 BPM | SYSTOLIC BLOOD PRESSURE: 110 MMHG | BODY MASS INDEX: 38.79 KG/M2 | WEIGHT: 312 LBS

## 2022-10-10 DIAGNOSIS — R06.02 SOB (SHORTNESS OF BREATH): ICD-10-CM

## 2022-10-10 DIAGNOSIS — I50.32 CHRONIC DIASTOLIC CHF (CONGESTIVE HEART FAILURE) (HCC): ICD-10-CM

## 2022-10-10 DIAGNOSIS — I25.118 CORONARY ARTERY DISEASE OF NATIVE ARTERY OF NATIVE HEART WITH STABLE ANGINA PECTORIS (HCC): ICD-10-CM

## 2022-10-10 DIAGNOSIS — E11.40 TYPE 2 DIABETES MELLITUS WITH DIABETIC NEUROPATHY, WITHOUT LONG-TERM CURRENT USE OF INSULIN (HCC): ICD-10-CM

## 2022-10-10 DIAGNOSIS — Z23 NEED FOR INFLUENZA VACCINATION: ICD-10-CM

## 2022-10-10 DIAGNOSIS — I10 ESSENTIAL HYPERTENSION: ICD-10-CM

## 2022-10-10 DIAGNOSIS — I48.0 PAROXYSMAL ATRIAL FIBRILLATION (HCC): ICD-10-CM

## 2022-10-10 DIAGNOSIS — Z09 HOSPITAL DISCHARGE FOLLOW-UP: Primary | ICD-10-CM

## 2022-10-10 LAB
ANION GAP SERPL CALCULATED.3IONS-SCNC: 12 MMOL/L (ref 3–16)
BUN BLDV-MCNC: 25 MG/DL (ref 7–20)
CALCIUM SERPL-MCNC: 8.8 MG/DL (ref 8.3–10.6)
CHLORIDE BLD-SCNC: 101 MMOL/L (ref 99–110)
CO2: 28 MMOL/L (ref 21–32)
CREAT SERPL-MCNC: 1.3 MG/DL (ref 0.8–1.3)
GFR AFRICAN AMERICAN: >60
GFR NON-AFRICAN AMERICAN: 54
GLUCOSE BLD-MCNC: 116 MG/DL (ref 70–99)
INR BLD: 2
POTASSIUM SERPL-SCNC: 3.6 MMOL/L (ref 3.5–5.1)
PRO-BNP: 537 PG/ML (ref 0–449)
SODIUM BLD-SCNC: 141 MMOL/L (ref 136–145)

## 2022-10-10 PROCEDURE — 99496 TRANSJ CARE MGMT HIGH F2F 7D: CPT | Performed by: INTERNAL MEDICINE

## 2022-10-10 PROCEDURE — G0008 ADMIN INFLUENZA VIRUS VAC: HCPCS | Performed by: INTERNAL MEDICINE

## 2022-10-10 PROCEDURE — 90662 IIV NO PRSV INCREASED AG IM: CPT | Performed by: INTERNAL MEDICINE

## 2022-10-10 PROCEDURE — 1111F DSCHRG MED/CURRENT MED MERGE: CPT | Performed by: INTERNAL MEDICINE

## 2022-10-10 NOTE — PROGRESS NOTES
Post-Discharge Transitional Care  Follow Up      Mary Lou Bryant   YOB: 1945    Date of Office Visit:  10/10/2022  Date of Hospital Admission: 10/5/22  Date of Hospital Discharge: 10/8/22  Risk of hospital readmission (high >=14%. Medium >=10%) :Readmission Risk Score: 24.6      Care management risk score Rising risk (score 2-5) and Complex Care (Scores >=6): No Risk Score On File     Non face to face  following discharge, date last encounter closed (first attempt may have been earlier): *No documented post hospital discharge outreach found in the last 14 days    Call initiated 2 business days of discharge: *No response recorded in the last 14 days    ASSESSMENT/PLAN:   Hospital discharge follow-up  -     LA DISCHARGE MEDS RECONCILED W/ CURRENT OUTPATIENT MED LIST  Coronary artery disease of native artery of native heart with stable angina pectoris ([facility]  Chronic diastolic CHF (congestive heart failure) ([facility]  -     Basic Metabolic Panel; Future  -     Brain Natriuretic Peptide; Future  Type 2 diabetes mellitus with diabetic neuropathy, without long-term current use of insulin (HCC)  Paroxysmal atrial fibrillation [facility])  Essential hypertension    Medical Decision Making: high complexity  Return in 3 months (on 1/10/2023) for htn dm. On this date 10/10/2022 I have spent 40 minutes reviewing previous notes, test results and face to face with the patient discussing the diagnosis and importance of compliance with the treatment plan as well as documenting on the day of the visit. Subjective:   HPI:  Follow up of Hospital problems/diagnosis(es):       69 y.o. 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 ,obestiy, MARIA VICTORIA , pedal edema here for  recent hospital dc f/w      Since last time, pt had another covid infection in 7/22  and in 9/22 , symptoms of fatigue continued with no new sob or cough or fevers.    Apparently he was admitted to [facility] for fatigue and dyspnea. Cardiology transferred him to [facility] and cath done with another stent placed in LAD , back on plavix and resumed coumadin    Pt reports his tikosyn was stopped for prolonged qtc (7/21) , remains in Afibb       Changes to meds or stents did not help him at all and remains fatigued and exertional dyspnea remains this was thought to be from recurrent afibb and again in 9/20 , he  was admitted at [facility] and had Cardioversion done with improved symptoms of fatigue and dyspnea, completed rehab at [place] [address] and went home     Again with recurrent dyspnea with recurrent Afibb last week ( 10/2/22) , this adm he had RF ablation done on 10/6/22- now remains in NSR , [place] resumed now with monitoring of QT     Chronic Diastolic CHF , recently his demadex increased to 30 mg daily for pedal edema    He is feeling some better but still weak and fatigued       MARIA VICTORIA - on home cpap, compliant with it    Inpatient course: Discharge summary reviewed- see chart. Interval history/Current status: home     Inpatient course: Discharge summary reviewed- see chart. Interval history/Current status: home with wife     Patient Active Problem List   Diagnosis    Arthritis    Atrial fibrillation ([address]    Essential hypertension    Carpal tunnel syndrome    Polyneuropathy    Numbness and tingling of both legs    Moderate obstructive sleep apnea    S/P thoracotomy    Atypical atrial flutter (HCC)    SOB (shortness of breath)    Type 2 diabetes mellitus with diabetic neuropathy ([address]    Morbid obesity with BMI of 40.0-44.9, adult ([address]    History of venous thromboembolism    Cataract of both eyes    Abnormal stress test    Angina at rest [facility])    Status post insertion of drug-eluting stent into left anterior descending (LAD) artery for coronary artery disease    Other fatigue    Obesity (BMI 30-39. 9)    COVID-19    Acute on chronic heart failure with preserved ejection fraction (HCC)    Coronary artery disease involving native coronary artery of native heart    Anginal equivalent (HCC)    CAD S/P percutaneous coronary angioplasty    PANG (dyspnea on exertion)    Asthma    Atrial fibrillation with RVR (HCC)    PAF ([facility]oxysmal atrial fibrillation) [facility]       Medications listed as ordered at the time of discharge from hospital     Medication List            Accurate as of October 10, 2022  1:52 PM. If you have any questions, ask your nurse or doctor. CHANGE how you take these medications      atorvastatin 40 MG tablet  Commonly known as: LIPITOR  TAKE ONE TABLET BY MOUTH DAILY  What changed: See the new instructions. CONTINUE taking these medications      Accu-Chek Multiclix Lancet Dev Kit  Check sugars once daily. DX;E11.9     * Accu-Chek Multiclix Lancets Misc  Check sugars once daily. Dx;E11.9     * Easy Touch Lancets 32G/Twist Misc  TEST DAILY. DX: E11.9     * albuterol sulfate  (90 Base) MCG/ACT inhaler  Commonly known as: Ventolin HFA  Inhale 2 puffs into the lungs 4 times daily as needed for Wheezing or Shortness of Breath     * albuterol (2.5 MG/3ML) 0.083% nebulizer solution  Commonly known as: PROVENTIL  Take 3 mLs by nebulization every 4 hours as needed for Wheezing or Shortness of Breath     aspirin 81 MG chewable tablet  Take 1 tablet by mouth daily     Biotin 5 MG Tabs     clopidogrel 75 MG tablet  Commonly known as: PLAVIX  Take 1 tablet by mouth daily     colchicine 0.6 MG tablet  Commonly known as: COLCRYS  Take 1 tablet by mouth daily for 4 days     dofetilide 250 MCG capsule  Commonly known as: TIKOSYN  Take 1 capsule by mouth every 12 hours     Easy Touch Lancing Device Misc  Test Daily. DX: E11.9     empagliflozin 25 MG tablet  Commonly known as: JARDIANCE     magnesium oxide 400 MG tablet  Commonly known as: MAG-OX     metFORMIN 1000 MG tablet  Commonly known as: GLUCOPHAGE  Take 0.5 tablets by mouth in the morning and 0.5 tablets in the evening. Take with meals.      metoprolol succinate 25 MG extended release tablet  Commonly known as: TOPROL XL  Take 1 tablet by mouth daily     montelukast 10 MG tablet  Commonly known as: SINGULAIR  TAKE ONE TABLET BY MOUTH DAILY     pantoprazole 40 MG tablet  Commonly known as: PROTONIX  Take 1 tablet by mouth 2 times daily (before meals)     sucralfate 1 GM tablet  Commonly known as: CARAFATE  Take 1 tablet by mouth in the morning, at noon, and at bedtime for 10 days     torsemide 20 MG tablet  Commonly known as: DEMADEX  Take 1.5 tablets daily     * True Metrix Blood Glucose Test strip  Generic drug: blood glucose test strips  USE TO CHECK BLOOD GLUCOSE DAILY. DX: E11.9     * True Metrix Blood Glucose Test strip  Generic drug: blood glucose test strips  TEST ONCE DAILY. DX:E11.9     True Metrix Meter Marcela  Use to check daily. AK;S67.3     Trulicity 9.07 HO/4.8IR Sopn  Generic drug: Dulaglutide     VITAMIN B-12 CR PO     Vitamin D3 125 MCG (5000 UT) Tabs     warfarin 5 MG tablet  Commonly known as: COUMADIN  Take as directed by the anticoagulation clinic. If you are unsure how to take this medication, talk to your nurse or doctor. Original instructions: TAKE ONE TABLET BY MOUTH DAILY           * This list has 6 medication(s) that are the same as other medications prescribed for you. Read the directions carefully, and ask your doctor or other care provider to review them with you. Medications marked \"taking\" at this time  Outpatient Medications Marked as Taking for the 10/10/22 encounter (Office Visit) with Rashida Brown MD   Medication Sig Dispense Refill    empagliflozin (JARDIANCE) 25 MG tablet 12.5 mg          Medications patient taking as of now reconciled against medications ordered at time of hospital discharge: Yes    A comprehensive review of systems was negative except for what was noted in the HPI.     Objective:    /75   Pulse (!) 105   Resp 18   Ht 6' 3\" (1.905 m)   Wt (!) 312 lb (141.5 kg)   BMI 39.00 kg/m² Vitals:    10/10/22 1301   BP: 110/75   Pulse: (!) 105   Resp: 18       General: obese, healthy appearing male  - ill appearing, dyspneic in room  Awake, alert and oriented. Appears to be not in any distress  Mucous Membranes:  Pink , anicteric  Neck: No JVD, no carotid bruit, no thyromegaly  Chest:  Clear to auscultation bilaterally,diminished in bases with crackles   Cardiovascular:  RRR S1S2 heard, no murmurs or gallops  Abdomen:  Soft, morbidly obese, undistended, non tender, no organomegaly, BS present  Extremities: chronic 2+ pedal edema  resolved  No cellulitis noted Distal pulses well felt      PIERO 9/22     Summary   Left ventricular systolic function is normal with an estimated ejection   fraction of 55%. Moderate mitral regurgitation. Left atrial appendage is not well visualized. Redundancy of the interatrial septum with no obvious shunt. Stress test 5/21          The overall quality of the study is fair. There is subdiaphragmatic    attenuation. Left ventricular cavity is noted to be normal size. The right ventricle is    normal in size. SPECT images demonstrate a small area of mildly decreased perfusion in the    apical lateral and mid-inferior lateral segments. The defect is mixed    ischemia and scar. There is associated wall motion abnormality, consistent    with ischemia. Gated SPECT imaging reveals normal myocardial thickening and    wall motion. Sum stress score of 7. No visual TID. Calculated TID of 0.99. The left ventricular ejection fraction is calculated to be 63%. Myocardial perfusion is abnormal. Consistent with mixed ischemia and scar. Low risk scan. Angiogram 6/16/22    1. Successful complex LAD diagonal stenting utilizing mini crush technique. ASSESSMENT/RECOMMENDATIONS:     1. Dual antiplatelet therapy along with anticoagulation for 30 days.   After 30 days we can discontinue dual antiplatelet therapy and continue with single antiplatelet therapy along with oral anticoagulation. 9/8/22  -1. Successful intravascular ultrasound-guided drug-eluting stent insertion to the left anterior descending artery     Wt Readings from Last 3 Encounters:   10/10/22 (!) 312 lb (141.5 kg)   10/08/22 (!) 305 lb 9.6 oz (138.6 kg)   10/04/22 (!) 317 lb 9.6 oz (144.1 kg)        Diagnosis Orders   1. Hospital discharge follow-up  ND DISCHARGE MEDS RECONCILED W/ CURRENT OUTPATIENT MED LIST      2. Coronary artery disease of native artery of native heart with stable angina pectoris ([facility]        3. Chronic diastolic CHF (congestive heart failure) ([facility])  Basic Metabolic Panel    Brain Natriuretic Peptide      4. Type 2 diabetes mellitus with diabetic neuropathy, without long-term current use of insulin ([facility])        5. Paroxysmal atrial fibrillation ([facility]        6. Essential hypertension               Symptomatic AFibb - s/p ablation - f/w Dr. Aldo Sadler in 2014 . With persistent symptoms    Had repeat Ablation in 4/17 and 9/17 with persistent symptoms. On multiple meds over the course of last 5 yrs including cardizem, BB, amio, digoxin and tikosyn    Sp recent ablation again in 10/6/22  Remains in NSR , but high rates at 110 today   Can increase BB to 1.5 per day      CAD s.p LAD stents 6/21 . 9/22  - on Plavix ,ASA statins   - no bleeding issues while on coumadin  - off BB for fatigue     CHF - chronic diastolic   Recently increased demadex to 30 mg daily   Off aldactone recently as well     Thyroid nodule - enlarged and going into mediastinum  - remote hx of bx in 2012 which was neg    An electronic signature was used to authenticate this note.   --Paulina Thayer MD

## 2022-10-10 NOTE — TELEPHONE ENCOUNTER
AYESHAK, NPAM OOT    Pt called in and sts that he had an Ablation on 10/5/22 w/[facility]. Pt would like clarification on his Metoprolol. Medication was given to him @ 12.5mg in the AM & 12.5mg in the PM while in the hosp. Medication was then changed to Toprol XL 25mg qd. Looked thru hosp discharge and his was d/c'd w/ Toprol XL 25mg qd? Please clarify. Pt was also told that he can not take his multivitamin? Can he continue that as well? And pt would also like to know when he can continue and finish cardiac rehab.  Please advise, thank you

## 2022-10-11 ENCOUNTER — CARE COORDINATION (OUTPATIENT)
Dept: CASE MANAGEMENT | Age: 77
End: 2022-10-11

## 2022-10-11 PROCEDURE — 93272 ECG/REVIEW INTERPRET ONLY: CPT | Performed by: INTERNAL MEDICINE

## 2022-10-11 NOTE — CARE COORDINATION
care or physicians 24 hr access line if assistance is needed after hours. Care Transition Nurse reviewed discharge instructions and medical action plan with patient who verbalized understanding. The patient was given an opportunity to ask questions and does not have any further questions or concerns at this time. Were discharge instructions available to patient? Yes. Reviewed appropriate site of care based on symptoms and resources available to patient including: PCP  Specialist  Home health  When to call 911. The patient agrees to contact the PCP office for questions related to their healthcare. Advance Care Planning:   Does patient have an Advance Directive: reviewed and current. Medication reconciliation was performed with patient, who verbalizes understanding of administration of home medications. Medications reviewed, 1111F entered: N/A, patient seen by PCP yesterday and completed by the provider.     Was patient discharged with a pulse oximeter? no    Non-face-to-face services provided:  Obtained and reviewed discharge summary and/or continuity of care documents  Communication with home health agencies or other community services the patient is currently using-[facility]    Offered patient enrollment in the Remote Patient Monitoring (RPM) program for in-home monitoring: Patient is not eligible for RPM program, provider not participating    Care Transitions 24 Hour Call    Do you have a copy of your discharge instructions?: Yes  Do you have all of your prescriptions and are they filled?: No  Have you been contacted by a [address]?: No  Have you scheduled your follow up appointment?: Yes  How are you going to get to your appointment?: Car - family or friend to transport  [address] [place]  Patient DME: Chair lift, Walker, Commode, Straight cane, Other  Other Patient DME: grab bars in shower and around toilet, HH shower  Do you have support at home?: Partner/Spouse/SO  Do you feel like you have everything you need to keep you well at home?: Yes  Are you an active caregiver in your home?: No  Care Transitions Interventions         Follow Up  Future Appointments   Date Time Provider Arslan Ardon   10/28/2022 10:00 AM SCHEDULE, [facility] CATH LAB ROOM 4 [facility]   11/18/2022  9:15 AM MD ABRAN Fields [facility]   11/23/2022  1:10 PM Oneida Marrero MD Med Int None   12/8/2022 10:15 AM MD Poncho Andrea [facility]   4/17/2023  1:20 PM SERENA Hoff - LISSA Rios [facility]       Care Transition Nurse provided contact information. Plan for follow-up call in 3-5 days based on severity of symptoms and risk factors.   Plan for next call: follow-up appointment-noted in system    Jj Templeton RN

## 2022-10-12 NOTE — TELEPHONE ENCOUNTER
Pt called [facility]. Message given. Pt stated that his pcp is recommending an ekg due to high hr. On 10/10 hr was averaging 100-120s. On 10/12 hr is averaging 70s-80s. Pt needs clarification on the dosage of Metoprolol. Pts pcp would like him to double it. Pt stated that he takes multivitamin everyday due to needing it after a stomach procedure.

## 2022-10-13 ENCOUNTER — CARE COORDINATION (OUTPATIENT)
Dept: CARE COORDINATION | Age: 77
End: 2022-10-13

## 2022-10-13 ENCOUNTER — TELEPHONE (OUTPATIENT)
Dept: CARDIOLOGY CLINIC | Age: 77
End: 2022-10-13

## 2022-10-13 NOTE — CARE COORDINATION
Chart reviewed. ACM referral. Patient is active with [facility] at this time. Will await for additional needs at hand off.

## 2022-10-13 NOTE — TELEPHONE ENCOUNTER
MD Susannah Marr MA 2 days ago     KA  OK to take Toprol XL at stated dose (equivalent to the prior smaller doses of metoprolol). Should be ok to participate in rehab if he feels OK (but precautions regarding heavy lifting and bending over too much given recent ablation). Question on multivitamin can be addressed to Dr. Nirav Gifford when he returns (not sure why it was stopped).      Thank you   Owen Burgess

## 2022-10-13 NOTE — TELEPHONE ENCOUNTER
10/13 pt called I and stated that he has called several time this week and would like to speak with AGK or NPAM. He states that he has questions that need to be answered. Please advise.

## 2022-10-14 ENCOUNTER — CARE COORDINATION (OUTPATIENT)
Dept: CASE MANAGEMENT | Age: 77
End: 2022-10-14

## 2022-10-14 DIAGNOSIS — R06.09 DOE (DYSPNEA ON EXERTION): ICD-10-CM

## 2022-10-14 DIAGNOSIS — I48.0 PAROXYSMAL ATRIAL FIBRILLATION (HCC): Primary | ICD-10-CM

## 2022-10-14 NOTE — CARE COORDINATION
[facility] Care Transitions Follow Up Call    Care Transition Nurse contacted the patient by telephone to follow up after admission. Verified name and  with patient as identifiers. Patient: Adan Munson  Patient : 1945   MRN: [id number]  Reason for Admission: PAF  Discharge Date: 10/8/22 RARS: Readmission Risk Score: 24.6      Needs to be reviewed by the provider   Additional needs identified to be addressed with provider: No  none             Method of communication with provider: none. Patient answered call and verified . Patient pleasant and agreeable to transition call. Patient stated that he attempted multiple times to contact cardio office and received call back today. Patient scheduled EKG on Monday. Stated that cardio will adjust medications if needed. Denied any acute needs at present time. Agreeable to f/u calls. Educated on the use of urgent care or physicians 24 hr access line if assistance is needed after hours. Addressed changes since last contact:  none  Discussed follow-up appointments. If no appointment was previously scheduled, appointment scheduling offered: Yes. Is follow up appointment scheduled within 7 days of discharge? Yes. Follow Up  Future Appointments   Date Time Provider Arslan Ardon   10/17/2022  8:00 AM SCHEDULE, Donnell Bonilla [facility]   10/28/2022 10:00 AM SCHEDULE, [facility] CATH LAB ROOM 3 [facility] CATH Clemencia Smith   2022  9:15 AM MD ABRAN Reese [facility]   2022  1:10 PM MD Med Reed Int None   2022 10:15 AM MD Lata Aguiar [facility]   2023  1:20 PM Tanika Dick APRN - LISSA CLERM PULM [facility]     Non[facility] follow up appointment(s):     Care Transition Nurse reviewed medical action plan with patient and discussed any barriers to care and/or understanding of plan of care after discharge.  Discussed appropriate site of care based on symptoms and resources available to patient including: PCP. The patient agrees to contact the PCP office for questions related to their healthcare. Advance Care Planning:   not on file. Offered patient enrollment in the Remote Patient Monitoring (RPM) program for in-home monitoring: Patient is not eligible for RPM program.     Care Transitions Subsequent and Final Call    Subsequent and Final Calls  Do you have any ongoing symptoms?: No  Have your medications changed?: No  Do you have any questions related to your medications?: No  Do you currently have any active services?: Yes  Are you currently active with any services?: Home Health  Do you have any needs or concerns that I can assist you with?: No  Identified Barriers: None  Care Transitions Interventions  Other Interventions:             Care Transition Nurse provided contact information for future needs. Plan for follow-up call in 7-10 days based on severity of symptoms and risk factors.   Alek Rees RN

## 2022-10-14 NOTE — TELEPHONE ENCOUNTER
Spoke with the patient and discussed medication and procedure instructions. No medications need held. Procedure and medications instructions emailed to the patient.

## 2022-10-14 NOTE — TELEPHONE ENCOUNTER
Evelyn Green pts ot from [facility] called to get medication clarification. Rachel Albrecht would like us to follow up with pt. Please advise.

## 2022-10-14 NOTE — TELEPHONE ENCOUNTER
Left message for Gogo to call back.  When she was here on 5/28 the MSPQ questions were not answered and it is prompting me to ask these questions. When patient calls back, please have her speak to Tierra at [phone] in cardiology to ask these questions.    The patient states that he saw his PCP. He states that at he OV his HR was 120 BPM. He states he placed him on 25 mg daily. He states his heart rate is around 80 - 90 BPM now. He states his legs are swollen. He states he takes 30 mg of torsemide daily.  He was scheduled for an EKG on 10/17/2022 at 8am.

## 2022-10-14 NOTE — TELEPHONE ENCOUNTER
Pt called [facility] to get medication clarification. Pt would like to speak with medical staff. Please advise.

## 2022-10-17 ENCOUNTER — NURSE ONLY (OUTPATIENT)
Dept: CARDIOLOGY CLINIC | Age: 77
End: 2022-10-17
Payer: MEDICARE

## 2022-10-17 ENCOUNTER — HOSPITAL ENCOUNTER (INPATIENT)
Age: 77
LOS: 1 days | Discharge: HOME | DRG: 292 | End: 2022-10-21
Attending: STUDENT IN AN ORGANIZED HEALTH CARE EDUCATION/TRAINING PROGRAM | Admitting: INTERNAL MEDICINE
Payer: MEDICARE

## 2022-10-17 ENCOUNTER — TELEPHONE (OUTPATIENT)
Dept: CARDIOLOGY CLINIC | Age: 77
End: 2022-10-17

## 2022-10-17 ENCOUNTER — APPOINTMENT (OUTPATIENT)
Dept: GENERAL RADIOLOGY | Age: 77
DRG: 292 | End: 2022-10-17
Payer: MEDICARE

## 2022-10-17 DIAGNOSIS — I48.0 PAROXYSMAL ATRIAL FIBRILLATION (HCC): Primary | ICD-10-CM

## 2022-10-17 DIAGNOSIS — R09.89 BIBASILAR CRACKLES: ICD-10-CM

## 2022-10-17 DIAGNOSIS — I50.1 PULMONARY EDEMA WITH CONGESTIVE HEART FAILURE (HCC): Primary | ICD-10-CM

## 2022-10-17 DIAGNOSIS — I50.33 ACUTE ON CHRONIC HEART FAILURE WITH PRESERVED EJECTION FRACTION (HCC): ICD-10-CM

## 2022-10-17 DIAGNOSIS — I50.9 ACUTE ON CHRONIC CONGESTIVE HEART FAILURE, UNSPECIFIED HEART FAILURE TYPE (HCC): ICD-10-CM

## 2022-10-17 LAB
A/G RATIO: 1.1 (ref 1.1–2.2)
ALBUMIN SERPL-MCNC: 3.7 G/DL (ref 3.4–5)
ALP BLD-CCNC: 107 U/L (ref 40–129)
ALT SERPL-CCNC: 27 U/L (ref 10–40)
ANION GAP SERPL CALCULATED.3IONS-SCNC: 12 MMOL/L (ref 3–16)
AST SERPL-CCNC: 24 U/L (ref 15–37)
BASOPHILS ABSOLUTE: 0.1 K/UL (ref 0–0.2)
BASOPHILS RELATIVE PERCENT: 0.9 %
BILIRUB SERPL-MCNC: 0.5 MG/DL (ref 0–1)
BUN BLDV-MCNC: 31 MG/DL (ref 7–20)
CALCIUM SERPL-MCNC: 9.1 MG/DL (ref 8.3–10.6)
CHLORIDE BLD-SCNC: 99 MMOL/L (ref 99–110)
CO2: 25 MMOL/L (ref 21–32)
CREAT SERPL-MCNC: 1.3 MG/DL (ref 0.8–1.3)
EOSINOPHILS ABSOLUTE: 0.5 K/UL (ref 0–0.6)
EOSINOPHILS RELATIVE PERCENT: 5.6 %
GFR SERPL CREATININE-BSD FRML MDRD: 57 ML/MIN/{1.73_M2}
GLUCOSE BLD-MCNC: 148 MG/DL (ref 70–99)
HCT VFR BLD CALC: 36.3 % (ref 40.5–52.5)
HEMOGLOBIN: 11.8 G/DL (ref 13.5–17.5)
LYMPHOCYTES ABSOLUTE: 1.3 K/UL (ref 1–5.1)
LYMPHOCYTES RELATIVE PERCENT: 15.7 %
MCH RBC QN AUTO: 29.2 PG (ref 26–34)
MCHC RBC AUTO-ENTMCNC: 32.4 G/DL (ref 31–36)
MCV RBC AUTO: 90.1 FL (ref 80–100)
MONOCYTES ABSOLUTE: 0.9 K/UL (ref 0–1.3)
MONOCYTES RELATIVE PERCENT: 11 %
NEUTROPHILS ABSOLUTE: 5.5 K/UL (ref 1.7–7.7)
NEUTROPHILS RELATIVE PERCENT: 66.8 %
PDW BLD-RTO: 16.4 % (ref 12.4–15.4)
PLATELET # BLD: 260 K/UL (ref 135–450)
PMV BLD AUTO: 7.3 FL (ref 5–10.5)
POC ACT LR: 375 SEC
POC ACT LR: 399 SEC
POTASSIUM REFLEX MAGNESIUM: 3.7 MMOL/L (ref 3.5–5.1)
PRO-BNP: 419 PG/ML (ref 0–449)
RBC # BLD: 4.03 M/UL (ref 4.2–5.9)
SODIUM BLD-SCNC: 136 MMOL/L (ref 136–145)
TOTAL PROTEIN: 7.2 G/DL (ref 6.4–8.2)
TROPONIN: <0.01 NG/ML
WBC # BLD: 8.2 K/UL (ref 4–11)

## 2022-10-17 PROCEDURE — 6360000002 HC RX W HCPCS: Performed by: STUDENT IN AN ORGANIZED HEALTH CARE EDUCATION/TRAINING PROGRAM

## 2022-10-17 PROCEDURE — 80053 COMPREHEN METABOLIC PANEL: CPT

## 2022-10-17 PROCEDURE — 85025 COMPLETE CBC W/AUTO DIFF WBC: CPT

## 2022-10-17 PROCEDURE — 96374 THER/PROPH/DIAG INJ IV PUSH: CPT

## 2022-10-17 PROCEDURE — 71045 X-RAY EXAM CHEST 1 VIEW: CPT

## 2022-10-17 PROCEDURE — 93005 ELECTROCARDIOGRAM TRACING: CPT | Performed by: STUDENT IN AN ORGANIZED HEALTH CARE EDUCATION/TRAINING PROGRAM

## 2022-10-17 PROCEDURE — 99285 EMERGENCY DEPT VISIT HI MDM: CPT

## 2022-10-17 PROCEDURE — 84484 ASSAY OF TROPONIN QUANT: CPT

## 2022-10-17 PROCEDURE — 93000 ELECTROCARDIOGRAM COMPLETE: CPT | Performed by: INTERNAL MEDICINE

## 2022-10-17 PROCEDURE — 83880 ASSAY OF NATRIURETIC PEPTIDE: CPT

## 2022-10-17 RX ORDER — FUROSEMIDE 10 MG/ML
60 INJECTION INTRAMUSCULAR; INTRAVENOUS ONCE
Status: COMPLETED | OUTPATIENT
Start: 2022-10-17 | End: 2022-10-17

## 2022-10-17 RX ORDER — ATORVASTATIN CALCIUM 40 MG/1
TABLET, FILM COATED ORAL
Qty: 90 TABLET | Refills: 0 | Status: ON HOLD | OUTPATIENT
Start: 2022-10-17 | End: 2022-10-19 | Stop reason: SDUPTHER

## 2022-10-17 RX ADMIN — FUROSEMIDE 60 MG: 10 INJECTION, SOLUTION INTRAMUSCULAR; INTRAVENOUS at 22:50

## 2022-10-17 ASSESSMENT — PAIN - FUNCTIONAL ASSESSMENT: PAIN_FUNCTIONAL_ASSESSMENT: NONE - DENIES PAIN

## 2022-10-17 NOTE — TELEPHONE ENCOUNTER
Pt came into office for EKG. [facility] reviewed ekg. Pt and wife have several questions would like address. Pt is still having weakness and tired. Pt stated that he is SOB with walking. Pt wife also stated that pt is  declining in physical health. Pt also stated that he is holding fluid. Pt weight when leaving hospital 305 lbs and today is 308 lbs. Pt and wife stated that pt weight has been up to 310 lbs this past week. Pt is currently taking torsemide 30 mg daily. Pt and wife would like a call back from [address]. I did inform pt and wife that call back may be at the end of work day. Pt and wife v/u.      Please advise [address]

## 2022-10-18 PROBLEM — I50.1 PULMONARY EDEMA WITH CONGESTIVE HEART FAILURE (HCC): Status: ACTIVE | Noted: 2022-10-18

## 2022-10-18 PROBLEM — R06.09 EXERTIONAL DYSPNEA: Status: ACTIVE | Noted: 2022-10-18

## 2022-10-18 LAB
ANION GAP SERPL CALCULATED.3IONS-SCNC: 14 MMOL/L (ref 3–16)
BUN BLDV-MCNC: 30 MG/DL (ref 7–20)
CALCIUM SERPL-MCNC: 9 MG/DL (ref 8.3–10.6)
CHLORIDE BLD-SCNC: 100 MMOL/L (ref 99–110)
CO2: 25 MMOL/L (ref 21–32)
CREAT SERPL-MCNC: 1.2 MG/DL (ref 0.8–1.3)
EKG ATRIAL RATE: 99 BPM
EKG DIAGNOSIS: NORMAL
EKG P-R INTERVAL: 296 MS
EKG Q-T INTERVAL: 370 MS
EKG QRS DURATION: 92 MS
EKG QTC CALCULATION (BAZETT): 474 MS
EKG R AXIS: -4 DEGREES
EKG T AXIS: 15 DEGREES
EKG VENTRICULAR RATE: 99 BPM
GFR SERPL CREATININE-BSD FRML MDRD: >60 ML/MIN/{1.73_M2}
GLUCOSE BLD-MCNC: 110 MG/DL (ref 70–99)
GLUCOSE BLD-MCNC: 118 MG/DL (ref 70–99)
GLUCOSE BLD-MCNC: 119 MG/DL (ref 70–99)
GLUCOSE BLD-MCNC: 128 MG/DL (ref 70–99)
GLUCOSE BLD-MCNC: 146 MG/DL (ref 70–99)
INR BLD: 1.8 (ref 0.87–1.14)
MAGNESIUM: 2.3 MG/DL (ref 1.8–2.4)
PERFORMED ON: ABNORMAL
POTASSIUM SERPL-SCNC: 3.6 MMOL/L (ref 3.5–5.1)
PROTHROMBIN TIME: 20.8 SEC (ref 11.7–14.5)
SODIUM BLD-SCNC: 139 MMOL/L (ref 136–145)

## 2022-10-18 PROCEDURE — 93010 ELECTROCARDIOGRAM REPORT: CPT | Performed by: INTERNAL MEDICINE

## 2022-10-18 PROCEDURE — 6370000000 HC RX 637 (ALT 250 FOR IP): Performed by: INTERNAL MEDICINE

## 2022-10-18 PROCEDURE — 80048 BASIC METABOLIC PNL TOTAL CA: CPT

## 2022-10-18 PROCEDURE — G0378 HOSPITAL OBSERVATION PER HR: HCPCS

## 2022-10-18 PROCEDURE — 2580000003 HC RX 258: Performed by: INTERNAL MEDICINE

## 2022-10-18 PROCEDURE — 99214 OFFICE O/P EST MOD 30 MIN: CPT | Performed by: INTERNAL MEDICINE

## 2022-10-18 PROCEDURE — 85610 PROTHROMBIN TIME: CPT

## 2022-10-18 PROCEDURE — 96376 TX/PRO/DX INJ SAME DRUG ADON: CPT

## 2022-10-18 PROCEDURE — 83735 ASSAY OF MAGNESIUM: CPT

## 2022-10-18 PROCEDURE — 6360000002 HC RX W HCPCS: Performed by: INTERNAL MEDICINE

## 2022-10-18 PROCEDURE — 99220 PR INITIAL OBSERVATION CARE/DAY 70 MINUTES: CPT | Performed by: INTERNAL MEDICINE

## 2022-10-18 PROCEDURE — 36415 COLL VENOUS BLD VENIPUNCTURE: CPT

## 2022-10-18 RX ORDER — DIPHENHYDRAMINE HCL 25 MG
25 TABLET ORAL NIGHTLY PRN
Status: DISCONTINUED | OUTPATIENT
Start: 2022-10-18 | End: 2022-10-21 | Stop reason: HOSPADM

## 2022-10-18 RX ORDER — PANTOPRAZOLE SODIUM 40 MG/1
40 TABLET, DELAYED RELEASE ORAL
Status: DISCONTINUED | OUTPATIENT
Start: 2022-10-18 | End: 2022-10-21 | Stop reason: HOSPADM

## 2022-10-18 RX ORDER — M-VIT,TX,IRON,MINS/CALC/FOLIC 27MG-0.4MG
2 TABLET ORAL DAILY
COMMUNITY

## 2022-10-18 RX ORDER — LANOLIN ALCOHOL/MO/W.PET/CERES
3 CREAM (GRAM) TOPICAL NIGHTLY PRN
Status: DISCONTINUED | OUTPATIENT
Start: 2022-10-18 | End: 2022-10-21 | Stop reason: HOSPADM

## 2022-10-18 RX ORDER — CALCIUM CARBONATE 200(500)MG
1000 TABLET,CHEWABLE ORAL 3 TIMES DAILY PRN
Status: DISCONTINUED | OUTPATIENT
Start: 2022-10-18 | End: 2022-10-21 | Stop reason: HOSPADM

## 2022-10-18 RX ORDER — INSULIN LISPRO 100 [IU]/ML
0-4 INJECTION, SOLUTION INTRAVENOUS; SUBCUTANEOUS NIGHTLY
Status: DISCONTINUED | OUTPATIENT
Start: 2022-10-18 | End: 2022-10-21 | Stop reason: HOSPADM

## 2022-10-18 RX ORDER — ACETAMINOPHEN 325 MG/1
650 TABLET ORAL EVERY 6 HOURS PRN
Status: DISCONTINUED | OUTPATIENT
Start: 2022-10-18 | End: 2022-10-21 | Stop reason: HOSPADM

## 2022-10-18 RX ORDER — ACETAMINOPHEN 650 MG/1
650 SUPPOSITORY RECTAL EVERY 6 HOURS PRN
Status: DISCONTINUED | OUTPATIENT
Start: 2022-10-18 | End: 2022-10-21 | Stop reason: HOSPADM

## 2022-10-18 RX ORDER — INSULIN LISPRO 100 [IU]/ML
0.05 INJECTION, SOLUTION INTRAVENOUS; SUBCUTANEOUS
Status: DISCONTINUED | OUTPATIENT
Start: 2022-10-18 | End: 2022-10-20

## 2022-10-18 RX ORDER — METOPROLOL SUCCINATE 50 MG/1
50 TABLET, EXTENDED RELEASE ORAL DAILY
Status: DISCONTINUED | OUTPATIENT
Start: 2022-10-19 | End: 2022-10-21 | Stop reason: HOSPADM

## 2022-10-18 RX ORDER — INSULIN LISPRO 100 [IU]/ML
0-8 INJECTION, SOLUTION INTRAVENOUS; SUBCUTANEOUS
Status: DISCONTINUED | OUTPATIENT
Start: 2022-10-18 | End: 2022-10-21 | Stop reason: HOSPADM

## 2022-10-18 RX ORDER — POTASSIUM CHLORIDE 20 MEQ/1
20 TABLET, EXTENDED RELEASE ORAL 2 TIMES DAILY
Status: DISCONTINUED | OUTPATIENT
Start: 2022-10-18 | End: 2022-10-21 | Stop reason: HOSPADM

## 2022-10-18 RX ORDER — ASPIRIN 81 MG/1
81 TABLET, CHEWABLE ORAL DAILY
Status: DISCONTINUED | OUTPATIENT
Start: 2022-10-18 | End: 2022-10-21 | Stop reason: HOSPADM

## 2022-10-18 RX ORDER — SODIUM CHLORIDE 0.9 % (FLUSH) 0.9 %
10 SYRINGE (ML) INJECTION PRN
Status: DISCONTINUED | OUTPATIENT
Start: 2022-10-18 | End: 2022-10-21 | Stop reason: HOSPADM

## 2022-10-18 RX ORDER — FUROSEMIDE 10 MG/ML
40 INJECTION INTRAMUSCULAR; INTRAVENOUS 2 TIMES DAILY
Status: DISCONTINUED | OUTPATIENT
Start: 2022-10-18 | End: 2022-10-18

## 2022-10-18 RX ORDER — SUCRALFATE 1 G/1
1 TABLET ORAL 3 TIMES DAILY
Status: DISCONTINUED | OUTPATIENT
Start: 2022-10-18 | End: 2022-10-21 | Stop reason: HOSPADM

## 2022-10-18 RX ORDER — MONTELUKAST SODIUM 10 MG/1
10 TABLET ORAL DAILY
Status: DISCONTINUED | OUTPATIENT
Start: 2022-10-18 | End: 2022-10-21 | Stop reason: HOSPADM

## 2022-10-18 RX ORDER — INSULIN GLARGINE 100 [IU]/ML
0.15 INJECTION, SOLUTION SUBCUTANEOUS NIGHTLY
Status: DISCONTINUED | OUTPATIENT
Start: 2022-10-18 | End: 2022-10-20

## 2022-10-18 RX ORDER — SODIUM CHLORIDE 9 MG/ML
INJECTION, SOLUTION INTRAVENOUS PRN
Status: DISCONTINUED | OUTPATIENT
Start: 2022-10-18 | End: 2022-10-21 | Stop reason: HOSPADM

## 2022-10-18 RX ORDER — DEXTROSE MONOHYDRATE 100 MG/ML
INJECTION, SOLUTION INTRAVENOUS CONTINUOUS PRN
Status: DISCONTINUED | OUTPATIENT
Start: 2022-10-18 | End: 2022-10-21 | Stop reason: HOSPADM

## 2022-10-18 RX ORDER — MAGNESIUM SULFATE 1 G/100ML
1000 INJECTION INTRAVENOUS PRN
Status: DISCONTINUED | OUTPATIENT
Start: 2022-10-18 | End: 2022-10-21 | Stop reason: HOSPADM

## 2022-10-18 RX ORDER — WARFARIN SODIUM 5 MG/1
5 TABLET ORAL
Status: COMPLETED | OUTPATIENT
Start: 2022-10-18 | End: 2022-10-18

## 2022-10-18 RX ORDER — ATORVASTATIN CALCIUM 40 MG/1
40 TABLET, FILM COATED ORAL DAILY
Status: DISCONTINUED | OUTPATIENT
Start: 2022-10-18 | End: 2022-10-21 | Stop reason: HOSPADM

## 2022-10-18 RX ORDER — POTASSIUM CHLORIDE 7.45 MG/ML
10 INJECTION INTRAVENOUS PRN
Status: DISCONTINUED | OUTPATIENT
Start: 2022-10-18 | End: 2022-10-21 | Stop reason: HOSPADM

## 2022-10-18 RX ORDER — DOFETILIDE 0.25 MG/1
250 CAPSULE ORAL EVERY 12 HOURS SCHEDULED
Status: DISCONTINUED | OUTPATIENT
Start: 2022-10-18 | End: 2022-10-21 | Stop reason: HOSPADM

## 2022-10-18 RX ORDER — METOPROLOL SUCCINATE 25 MG/1
25 TABLET, EXTENDED RELEASE ORAL DAILY
Status: DISCONTINUED | OUTPATIENT
Start: 2022-10-18 | End: 2022-10-18

## 2022-10-18 RX ORDER — POTASSIUM CHLORIDE 20 MEQ/1
40 TABLET, EXTENDED RELEASE ORAL PRN
Status: DISCONTINUED | OUTPATIENT
Start: 2022-10-18 | End: 2022-10-21 | Stop reason: HOSPADM

## 2022-10-18 RX ORDER — FUROSEMIDE 10 MG/ML
40 INJECTION INTRAMUSCULAR; INTRAVENOUS 3 TIMES DAILY
Status: DISCONTINUED | OUTPATIENT
Start: 2022-10-18 | End: 2022-10-19

## 2022-10-18 RX ADMIN — FUROSEMIDE 40 MG: 10 INJECTION, SOLUTION INTRAMUSCULAR; INTRAVENOUS at 08:52

## 2022-10-18 RX ADMIN — Medication 10 ML: at 08:52

## 2022-10-18 RX ADMIN — MONTELUKAST SODIUM 10 MG: 10 TABLET, COATED ORAL at 08:53

## 2022-10-18 RX ADMIN — Medication 10 ML: at 13:33

## 2022-10-18 RX ADMIN — ATORVASTATIN CALCIUM 40 MG: 40 TABLET, FILM COATED ORAL at 08:53

## 2022-10-18 RX ADMIN — FUROSEMIDE 40 MG: 10 INJECTION, SOLUTION INTRAMUSCULAR; INTRAVENOUS at 13:33

## 2022-10-18 RX ADMIN — Medication 10 ML: at 19:00

## 2022-10-18 RX ADMIN — SUCRALFATE 1 G: 1 TABLET ORAL at 08:53

## 2022-10-18 RX ADMIN — PANTOPRAZOLE SODIUM 40 MG: 40 TABLET, DELAYED RELEASE ORAL at 16:41

## 2022-10-18 RX ADMIN — DOFETILIDE 250 MCG: 0.25 CAPSULE ORAL at 08:52

## 2022-10-18 RX ADMIN — SUCRALFATE 1 G: 1 TABLET ORAL at 13:33

## 2022-10-18 RX ADMIN — DIPHENHYDRAMINE HCL 25 MG: 25 TABLET ORAL at 20:48

## 2022-10-18 RX ADMIN — SUCRALFATE 1 G: 1 TABLET ORAL at 20:48

## 2022-10-18 RX ADMIN — WARFARIN SODIUM 5 MG: 5 TABLET ORAL at 18:57

## 2022-10-18 RX ADMIN — DOFETILIDE 250 MCG: 0.25 CAPSULE ORAL at 20:52

## 2022-10-18 RX ADMIN — FUROSEMIDE 40 MG: 10 INJECTION, SOLUTION INTRAMUSCULAR; INTRAVENOUS at 19:00

## 2022-10-18 RX ADMIN — ASPIRIN 81 MG 81 MG: 81 TABLET ORAL at 08:53

## 2022-10-18 RX ADMIN — POTASSIUM CHLORIDE 20 MEQ: 1500 TABLET, EXTENDED RELEASE ORAL at 13:33

## 2022-10-18 RX ADMIN — METOPROLOL SUCCINATE 25 MG: 25 TABLET, EXTENDED RELEASE ORAL at 08:53

## 2022-10-18 RX ADMIN — POTASSIUM CHLORIDE 20 MEQ: 1500 TABLET, EXTENDED RELEASE ORAL at 20:48

## 2022-10-18 RX ADMIN — PANTOPRAZOLE SODIUM 40 MG: 40 TABLET, DELAYED RELEASE ORAL at 08:53

## 2022-10-18 ASSESSMENT — PAIN SCALES - GENERAL
PAINLEVEL_OUTOF10: 0
PAINLEVEL_OUTOF10: 0

## 2022-10-18 ASSESSMENT — ENCOUNTER SYMPTOMS
DIARRHEA: 0
SORE THROAT: 0
EYE PAIN: 0
NAUSEA: 0
COUGH: 0
SHORTNESS OF BREATH: 1
ABDOMINAL PAIN: 0
BACK PAIN: 0
VOMITING: 0

## 2022-10-18 NOTE — PROGRESS NOTES
Pt admitted to c3 from ED. Oriented to room, call light and POC. Pt placed on home CPAP. 300 mL UOP noted on arrival to floor. Pt denies SOB at rest, does still c/o PANG- LCTA after ER lasix administration. Call light within reach, will continue to monitor.

## 2022-10-18 NOTE — ED NOTES
Report given to Ashly Daugherty.  Pt transported to C3 with all belongings in stable condition at this time     Maine Mujica RN  10/18/22 [id number]

## 2022-10-18 NOTE — ACP (ADVANCE CARE PLANNING)
Advance Care Planning     General Advance Care Planning (ACP) Conversation    Date of Conversation: 10/17/2022  Conducted with: Patient with Decision Making Capacity    Healthcare Decision Maker:    Primary Decision Maker: Lady Anderson - [phone]    Secondary Decision Maker: Virgene Opitz Child - [phone]  Click here to complete Healthcare Decision Makers including selection of the Healthcare Decision Maker Relationship (ie \"Primary\"). Today we documented Decision Maker(s) consistent with Legal Next of Kin hierarchy. Content/Action Overview:   Has ACP document(s) on file - reflects the patient's care preferences  Reviewed DNR/DNI and patient elects Full Code (Attempt Resuscitation)      Length of Voluntary ACP Conversation in minutes:  <16 minutes (Non-Billable)    Cam Benavidez RN

## 2022-10-18 NOTE — ED PROVIDER NOTES
[facility]  ED  EMERGENCY DEPARTMENT ENCOUNTER      Pt Name: Shekhar Mortensen  MRN: [id number]  [place] 1945  Date of evaluation: 10/17/2022  Provider: Alyssia Corona MD    CHIEF COMPLAINT       Chief Complaint   Patient presents with    Shortness of Breath     Ongoing, cardio told pt to come and be seen/admitted for CHF exac     Fatigue     Shortness of breath, generalized weakness    HISTORY OF PRESENT ILLNESS   (Location/Symptom, Timing/Onset,Context/Setting, Quality, Duration, Modifying Factors, Severity)  Note limiting factors. Shekhar Mortensen is a 69 y.o. male who presents to the ED with a chief complaint of shortness of breath, generalized weakness and fatigue for the past several days. Onset gradual, progressively worse, not responsive to diuretics at home, was seen his cardiologist office today, referred to the emergency department for admission for IV medications for diuresis. The patient has had weight gain, he states greater than 3 pounds over the past several days, up from his dry weight of 305 pounds. He endorses bilateral lower extremity swelling, dyspnea on exertion, states his heart rate goes up to 110 when he is walking. Occasional cough productive of clear sputum. Orthopnea. Denies fevers, chest pain, sick contacts. Symptoms not otherwise alleviated or exacerbated by other factors. NursingNotes were reviewed. REVIEW OF SYSTEMS    (2-9 systems for level 4, 10 or more for level 5)     Review of Systems   Constitutional:  Positive for fatigue. Negative for chills and fever. HENT:  Negative for congestion and sore throat. Eyes:  Negative for pain and visual disturbance. Respiratory:  Positive for shortness of breath. Negative for cough. Cardiovascular:  Positive for leg swelling. Negative for chest pain and palpitations. Gastrointestinal:  Negative for abdominal pain, diarrhea, nausea and vomiting. Genitourinary:  Negative for dysuria and frequency. Musculoskeletal:  Negative for back pain and neck pain. Skin:  Negative for rash and wound. Neurological:  Positive for weakness (Generalized). Negative for dizziness and light-headedness. 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    History of knee replacement procedure of right knee     Hx of blood clots     2 DVT's    Hyperlipidemia     Hypertension     Knee osteoarthritis 1/17/2012    Left TKR 1/18/2012    Lung nodule     due to histoplasmosis    Neuromuscular disorder ([address]     Palpitations     stable with atenolol    Sleep apnea     uses CPAP    Stone, kidney     UTI (urinary tract infection) 01/23/2017         SURGICALHISTORY       Past Surgical History:   Procedure Laterality Date    ABLATION OF DYSRHYTHMIC FOCUS  2013    a-fib    BARIATRIC SURGERY  2013    GASTRIC SLEEVE    CARDIAC SURGERY  2013    ablation    CARDIOVERSION N/A 9/20/2022    CARDIOVERSION W/ANES.  performed by Henny Singh MD at [address] [place] [address] Right 9-30-15    CARPAL TUNNEL RELEASE Left 3/20/16    CHOLECYSTECTOMY, LAPAROSCOPIC N/A 2/19/2019    LAPAROSCOPIC CHOLECYSTECTOMY WITH CHOLANGIOGRAMS performed by Hong Jo MD at [address] [place] [address]      with removal stone    CYSTOSCOPY  2/8/13    with Laser Vaporization of Enlarged Prostate    DIAGNOSTIC CARDIAC CATH LAB PROCEDURE      ERCP  02/18/2019    Sphincterotomy, stone removal    ERCP N/A 2/18/2019    ERCP SPHINCTER/PAPILLOTOMY performed by Adolfo Groves MD at [address] [place] [address]    ERCP  2/18/2019    ERCP STONE REMOVAL performed by Adolfo Groves MD at [facility] 1/14/2020    PHACOEMULSIFICATION OF CATARACT RIGHT EYE WITH INTRAOCULAR LENS IMPLANT performed by Yamilet Gurrola MD at [address] Left 1/21/2020    PHACOEMULSIFICATION OF CATARACT LEFT EYE WITH INTRAOCULAR LENS IMPLANT -SLEEP APNEA- performed by Yamilet Gurrola MD at [address] Bilateral     right knee (2002) and left knee (2012)    KNEE ARTHROSCOPY  4/19/2011     left  knee    SKIN BIOPSY      skin Ca/SQUAMOUS CELL    THORACOTOMY      wedge resection    TONSILLECTOMY      TRANSESOPHAGEAL ECHOCARDIOGRAM N/A 9/20/2022    PIERO W/ANES. (9:00) performed by Kirk Bey MD at [address]       Previous Medications    ACCU-CHEK MULTICLIX LANCETS MISC    Check sugars once daily. Dx;E11.9    ALBUTEROL (PROVENTIL) (2.5 MG/3ML) 0.083% NEBULIZER SOLUTION    Take 3 mLs by nebulization every 4 hours as needed for Wheezing or Shortness of Breath    ALBUTEROL SULFATE HFA (VENTOLIN HFA) 108 (90 BASE) MCG/ACT INHALER    Inhale 2 puffs into the lungs 4 times daily as needed for Wheezing or Shortness of Breath    ASPIRIN 81 MG CHEWABLE TABLET    Take 1 tablet by mouth daily    ATORVASTATIN (LIPITOR) 40 MG TABLET    TAKE ONE TABLET BY MOUTH DAILY    BIOTIN 5 MG TABS    Take 5 mg by mouth daily    BLOOD GLUCOSE MONITORING SUPPL (TRUE METRIX METER) VINNIE    Use to check daily. DX;E11.9    BLOOD GLUCOSE TEST STRIPS (TRUE METRIX BLOOD GLUCOSE TEST) STRIP    USE TO CHECK BLOOD GLUCOSE DAILY. DX: E11.9    BLOOD GLUCOSE TEST STRIPS (TRUE METRIX BLOOD GLUCOSE TEST) STRIP    TEST ONCE DAILY.   DX:E11.9    CHOLECALCIFEROL (VITAMIN D3) 5000 UNITS TABS    Take 5,000 Units by mouth daily    CLOPIDOGREL (PLAVIX) 75 MG TABLET    Take 1 tablet by mouth daily    COLCHICINE (COLCRYS) 0.6 MG TABLET    Take 1 tablet by mouth daily for 4 days    CYANOCOBALAMIN (VITAMIN B-12 CR PO)    Take 5,000 mcg by mouth every 7 days     DOFETILIDE (TIKOSYN) 250 MCG CAPSULE    Take 1 capsule by mouth every 12 hours    DULAGLUTIDE (TRULICITY) 9.86 GS/4.9WN SOPN    Inject 0.75 mg into the skin once a week    EASY TOUCH LANCETS 32G/TWIST MISC    TEST DAILY. DX: E11.9    EMPAGLIFLOZIN (JARDIANCE) 25 MG TABLET    12.5 mg    LANCET DEVICES (EASY TOUCH LANCING DEVICE) MISC    Test Daily. DX: E11.9    LANCETS MISC. (ACCU-CHEK MULTICLIX LANCET DEV) KIT    Check sugars once daily. DX;E11.9    MAGNESIUM OXIDE (MAG-OX) 400 MG TABLET    Take 400 mg by mouth daily. METFORMIN (GLUCOPHAGE) 1000 MG TABLET    Take 0.5 tablets by mouth in the morning and 0.5 tablets in the evening. Take with meals.     METFORMIN (GLUCOPHAGE) 500 MG TABLET    TAKE ONE TABLET BY MOUTH TWICE A DAY    METOPROLOL SUCCINATE (TOPROL XL) 25 MG EXTENDED RELEASE TABLET    Take 1 tablet by mouth daily    MONTELUKAST (SINGULAIR) 10 MG TABLET    TAKE ONE TABLET BY MOUTH DAILY    PANTOPRAZOLE (PROTONIX) 40 MG TABLET    Take 1 tablet by mouth 2 times daily (before meals)    SUCRALFATE (CARAFATE) 1 GM TABLET    Take 1 tablet by mouth in the morning, at noon, and at bedtime for 10 days    TORSEMIDE (DEMADEX) 20 MG TABLET    Take 1.5 tablets daily    WARFARIN (COUMADIN) 5 MG TABLET    TAKE ONE TABLET BY MOUTH DAILY       ALLERGIES     Bactrim [sulfamethoxazole-trimethoprim], Brilinta [ticagrelor], Ciprofloxacin, Macrobid [nitrofurantoin monohyd macro], Ozempic (0.25 or 0.5 mg-dose) [semaglutide(0.25 or 0.5mg-dos)], Sulfamethoxazole, Theophylline, Cefuroxime axetil, Cipro xr, Other, and Tape [adhesive tape]    FAMILY HISTORY       Family History   Problem Relation Age of Onset    Cancer Mother         ABDOMIN    Kidney Disease Mother     Stroke Mother     Arthritis Father     Substance Abuse Father     Other Father         hardening of the arteries    Stroke Father           SOCIAL HISTORY       Social History     Socioeconomic History    Marital status:      Spouse name: None    Number of children: None    Years of education: None    Highest education level: None   Tobacco Use Smoking status: Former     Packs/day: 2.00     Years: 17.00     Pack years: 34.00     Types: Cigarettes     Quit date: 1976     Years since quittin.5    Smokeless tobacco: Never   Vaping Use    Vaping Use: Never used   Substance and Sexual Activity    Alcohol use: No    Drug use: No    Sexual activity: Never     Partners: Female     Social Determinants of Health     Physical Activity: Insufficiently Active    Days of Exercise per Week: 2 days    Minutes of Exercise per Session: 60 min       SCREENINGS             PHYSICAL EXAM    (up to 7 for level 4, 8 or more for level 5)     ED Triage Vitals   BP Temp Temp Source Heart Rate Resp SpO2 Height Weight   10/17/22 1930 10/17/22 1931 10/17/22 1931 10/17/22 1930 10/17/22 1930 10/17/22 1930 -- 10/17/22 1930   135/83 98 °F (36.7 °C) Oral (!) 105 18 97 %  (!) 308 lb (139.7 kg)       General: Alert and oriented appropriately for age, No acute distress. Eye: Normal conjunctiva. Sclera anicteric. HENT: Oral mucosa is moist.  Respiratory: Respirations even and non-labored. Bibasilar crackles  Cardiovascular: Normal rate, Regular rhythm. Intact peripheral pulses. Bilateral pitting edema. Given habitus, unable to assess for JVD. Gastrointestinal: Soft, Non-tender, Non-distended. : deferred. Musculoskeletal: No swelling. Integumentary: Warm, Dry. Neurologic: Alert and appropriate for age. No focal deficits. Psychiatric: Cooperative. DIAGNOSTIC RESULTS     EKG: All EKG's are interpreted by the Emergency Department Physician who either signs or Co-signsthis chart in the absence of a cardiologist.      The Ekg interpreted by me shows  Rhythm normal sinus rhythm with first-degree AV block. Rate of 99 bpm  Axis is normal  Intervals and durations normal  ST Segments: Normal  Compared to prior EKG dated 10/8/2022, no significant change.       RADIOLOGY:   Non-plain filmimages such as CT, Ultrasound and MRI are read by the radiologist. Plain radiographic images are visualized and preliminarily interpreted by the emergency physician with the below findings:      Interpretation per the Radiologist below, if available at the time ofthis note:    XR CHEST PORTABLE   Final Result   No radiographic evidence of acute cardiopulmonary disease. Stable postsurgical sequela right upper lung. LABS:  Labs Reviewed   CBC WITH AUTO DIFFERENTIAL - Abnormal; Notable for the following components:       Result Value    RBC 4.03 (*)     Hemoglobin 11.8 (*)     Hematocrit 36.3 (*)     RDW 16.4 (*)     All other components within normal limits   COMPREHENSIVE METABOLIC PANEL W/ REFLEX TO MG FOR LOW K - Abnormal; Notable for the following components:    Glucose 148 (*)     BUN 31 (*)     Est, Glom Filt Rate 57 (*)     All other components within normal limits   BRAIN NATRIURETIC PEPTIDE   TROPONIN       All other labs were within normal range or not returned as of this dictation. EMERGENCY DEPARTMENT COURSE and DIFFERENTIAL DIAGNOSIS/MDM:   Vitals:    Vitals:    10/17/22 1931 10/17/22 2246 10/17/22 2316 10/17/22 2346   BP:  (!) 142/85 130/79 128/86   Pulse:  94 79 77   Resp:  17 16    Temp: 98 °F (36.7 °C)      TempSrc: Oral      SpO2:  99% 99% 98%   Weight:             Medical decision making:  Nestor Borjas is a 68 yo M with CHF, CAD, MARIA VICTORIA, AFib, DM, HTN who p/w SOB with exertion, leg swelling and wt gain, increase by at least 3 lbs over dry weight, referred by [facility] for admission for IV diuresis and nephrology evaluation out of c/f past response to diuretics. Pt remains dyspneic when ambulating after IV Lasix. Requested admission. Cardiology on board. Medications   furosemide (LASIX) injection 60 mg (60 mg IntraVENous Given 10/17/22 2250)       Is this patient to be included in the SEP-1 Core Measure due to severe sepsis or septic shock? No   Exclusion criteria - the patient is NOT to be included for SEP-1 Core Measure due to:   Infection is not suspected CONSULTS:  IP CONSULT TO CARDIOLOGY  IP CONSULT TO HOSPITALIST          FINAL IMPRESSION      1. Pulmonary edema with congestive heart failure (HCC)    2. Bibasilar crackles    3. Acute on chronic congestive heart failure, unspecified heart failure type [facility])          DISPOSITION/PLAN   DISPOSITION    Admission.          (Please note that portions of this note were completed with a voice recognition program.Efforts were made to edit the dictations but occasionally words are mis-transcribed.)    Stevo Montes MD (electronically signed)  Attending Emergency Physician         Stevo Montes MD  10/18/22 7858

## 2022-10-18 NOTE — H&P
Hospital Medicine History & Physical      Patient: Alea Veliz  :  1945  MRN:  [id number]    Date of Service: 10/18/22    Chief Complaint   Patient presents with    Shortness of Breath     Ongoing, cardio told pt to come and be seen/admitted for CHF exac     Fatigue       HISTORY OF PRESENT ILLNESS:    Alea Veliz is a 69 y.o. male. He presented to the ER from home by private vehicle. He presented c/o primarily exertional dyspnea but also low energy and some degree of lower extremity edema. He has undergone extensive testing and invasive interventions because of this exertional dyspnea. He underwent LHC on 2022 to evaluate causes underlying his dyspnea. One stenotic lesion in the LAD was dilated and stented. He didn't notice any change in his symptoms. He went into atrial fibrillation thereafter so he then underwent PIERO/DCCV on 22. He felt much better in terms of dyspnea afterward. He went back into atrial fibrillation, became dyspneic again, and so was again hospitalized 10/5 - . He underwent atrial fibrillation ablation on 10/6/22. He was also started on dofetilide during that admission. He denies orthopnea and PND. He has been able to ambulate w/ a walker, but has been limited to short distances on level ground. Review of Systems:  All pertinent positives and negatives are as noted in the HPI section. All other systems were reviewed and are negative.     Past Medical History:   Diagnosis Date    Arthritis     Asthma     past hx    Atrial fibrillation (HCC)     Blood circulation, collateral     Diabetes mellitus ([facility] [address] 2018    DVT (deep venous thrombosis) (HCC)     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    History of knee replacement procedure of right knee     Hx of blood clots     2 DVT's    Hyperlipidemia     Hypertension     Knee osteoarthritis 2012    Left TKR 2012    Lung nodule     due to histoplasmosis    Neuromuscular disorder (HCC)     Palpitations stable with atenolol    Sleep apnea     uses CPAP    Stone, kidney     UTI (urinary tract infection) 01/23/2017       Past Surgical History:   Procedure Laterality Date    ABLATION OF DYSRHYTHMIC FOCUS  2013    a-fib    BARIATRIC SURGERY  2013    GASTRIC SLEEVE    CARDIAC SURGERY  2013    ablation    CARDIOVERSION N/A 9/20/2022    CARDIOVERSION W/ANES. performed by Trupti Walters MD at [address] [place] [address] Right 9-30-15    CARPAL TUNNEL RELEASE Left 3/20/16    CHOLECYSTECTOMY, LAPAROSCOPIC N/A 2/19/2019    LAPAROSCOPIC CHOLECYSTECTOMY WITH CHOLANGIOGRAMS performed by Hali Jennings MD at [address] [place] [address]      with removal stone    CYSTOSCOPY  2/8/13    with Laser Vaporization of Enlarged Prostate    DIAGNOSTIC CARDIAC CATH LAB PROCEDURE      ERCP  02/18/2019    Sphincterotomy, stone removal    ERCP N/A 2/18/2019    ERCP SPHINCTER/PAPILLOTOMY performed by Kip Joseph MD at [address] [place] [address]    ERCP  2/18/2019    ERCP STONE REMOVAL performed by Kip Joseph MD at [facility] Right 1/14/2020    PHACOEMULSIFICATION OF CATARACT RIGHT EYE WITH INTRAOCULAR LENS IMPLANT performed by Garret Serna MD at [place] [address] Left 1/21/2020    PHACOEMULSIFICATION OF CATARACT LEFT EYE WITH INTRAOCULAR LENS IMPLANT -SLEEP APNEA- performed by Garret Serna MD at [facility] Bilateral     right knee (2002) and left knee (2012)    KNEE ARTHROSCOPY  4/19/2011     left  knee    SKIN BIOPSY      skin Ca/SQUAMOUS CELL    THORACOTOMY      wedge resection    TONSILLECTOMY      TRANSESOPHAGEAL ECHOCARDIOGRAM N/A 9/20/2022    PIERO W/ANES. (9:00) performed by Trupti Walters MD at [address] [place] [address]         Prior to Admission medications    Medication Sig Start Date End Date Taking?  Authorizing Provider   metFORMIN (GLUCOPHAGE) 500 MG tablet TAKE ONE TABLET BY MOUTH TWICE A DAY 10/17/22   Dian Herrera MD   atorvastatin (LIPITOR) 40 MG tablet TAKE ONE TABLET BY MOUTH DAILY 10/17/22   Dian Herrera MD   empagliflozin (JARDIANCE) 25 MG tablet 12.5 mg 10/3/22   Mayuri Guzman MD   dofetilide (TIKOSYN) 250 MCG capsule Take 1 capsule by mouth every 12 hours 10/8/22   SERENA Gonzales CNP   metoprolol succinate (TOPROL XL) 25 MG extended release tablet Take 1 tablet by mouth daily 10/9/22   SERENA Gonzales CNP   colchicine (COLCRYS) 0.6 MG tablet Take 1 tablet by mouth daily for 4 days 10/9/22 10/13/22  SERENA Gonzales CNP   pantoprazole (PROTONIX) 40 MG tablet Take 1 tablet by mouth 2 times daily (before meals) 10/8/22 11/7/22  SERENA Gonzales CNP   sucralfate (CARAFATE) 1 GM tablet Take 1 tablet by mouth in the morning, at noon, and at bedtime for 10 days 10/8/22 10/18/22  SERENA Gonzales CNP   torsemide (DEMADEX) 20 MG tablet Take 1.5 tablets daily 10/4/22   Tonio Guardado MD   albuterol (PROVENTIL) (2.5 MG/3ML) 0.083% nebulizer solution Take 3 mLs by nebulization every 4 hours as needed for Wheezing or Shortness of Breath 9/22/22 9/22/23  COLIN Dennis   albuterol sulfate HFA (VENTOLIN HFA) 108 (90 Base) MCG/ACT inhaler Inhale 2 puffs into the lungs 4 times daily as needed for Wheezing or Shortness of Breath 9/18/22   COLIN Dennis   aspirin 81 MG chewable tablet Take 1 tablet by mouth daily 9/10/22   Lindsey Grant MD   clopidogrel (PLAVIX) 75 MG tablet Take 1 tablet by mouth daily 9/9/22 10/9/22  Lindsey Grant MD   lisinopril (PRINIVIL;ZESTRIL) 5 MG tablet Take 1 tablet by mouth daily 9/10/22 9/21/22  Lindsey Grant MD   warfarin (COUMADIN) 5 MG tablet TAKE ONE TABLET BY MOUTH DAILY 8/11/22   Dian Herrera MD   metFORMIN (GLUCOPHAGE) 1000 MG tablet Take 0.5 tablets by mouth in the morning and 0.5 tablets in the evening. Take with meals.  7/20/22   Dian Herrera MD   [facility] (SINGULAIR) 10 MG tablet TAKE ONE TABLET BY MOUTH DAILY 7/1/22   Umair Phelan MD   spironolactone (ALDACTONE) 25 MG tablet TAKE ONE TABLET BY MOUTH DAILY  Patient not taking: No sig reported 7/1/22 9/15/22  Umair Phelan MD   Dulaglutide (TRULICITY) 3.64 ZY/4.4DK SOPN Inject 0.75 mg into the skin once a week    Historical Provider, MD   blood glucose test strips (TRUE METRIX BLOOD GLUCOSE TEST) strip TEST ONCE DAILY. DX:E11.9 1/20/22   Umair Phelan MD   Easy Touch Lancets 32G/Twist MISC TEST DAILY. DX: E11.9 3/23/20   Umair Phelan MD   blood glucose test strips (TRUE METRIX BLOOD GLUCOSE TEST) strip USE TO CHECK BLOOD GLUCOSE DAILY. DX: E11.9 3/23/20   Umair Phelan MD   ACCU-CHEK MULTICLIX LANCETS MISC Check sugars once daily. Dx;E11.9 3/11/19   Umair Phelan MD   Lancet Devices (EASY TOUCH LANCING DEVICE) MISC Test Daily. DX: E11.9 12/26/18   Umair Phelan MD   Blood Glucose Monitoring Suppl (TRUE METRIX METER) VINNIE Use to check daily. DX;E11.9 12/22/18   Umair Phelan MD   Lancets Misc. (ACCU-CHEK MULTICLIX LANCET DEV) KIT Check sugars once daily. DX;E11.9 12/21/18   Umair Phelan MD   Biotin 5 MG TABS Take 5 mg by mouth daily    Historical Provider, MD   Cholecalciferol (VITAMIN D3) 5000 units TABS Take 5,000 Units by mouth daily    Historical Provider, MD   Cyanocobalamin (VITAMIN B-12 CR PO) Take 5,000 mcg by mouth every 7 days     Historical Provider, MD   magnesium oxide (MAG-OX) 400 MG tablet Take 400 mg by mouth daily. Historical Provider, MD       Allergies:   Bactrim [sulfamethoxazole-trimethoprim], Brilinta [ticagrelor], Ciprofloxacin, Macrobid [nitrofurantoin monohyd macro], Ozempic (0.25 or 0.5 mg-dose) [semaglutide(0.25 or 0.5mg-dos)], Sulfamethoxazole, Theophylline, Cefuroxime axetil, Cipro xr, Other, and Tape [adhesive tape]    Social:   reports that he quit smoking about 46 years ago. His smoking use included cigarettes.  He has a 34.00 pack-year smoking history. He has never used smokeless tobacco.   reports no history of alcohol use. Social History     Substance and Sexual Activity   Drug Use No       Family History   Problem Relation Age of Onset    Cancer Mother         ABDOMIN    Kidney Disease Mother     Stroke Mother     Arthritis Father     Substance Abuse Father     Other Father         hardening of the arteries    Stroke Father        PHYSICAL EXAM:  I performed this physical examination. Vitals:  Patient Vitals for the past 24 hrs:   BP Temp Temp src Pulse Resp SpO2 Weight   10/18/22 0219 133/86 -- -- 97 -- 100 % --   10/18/22 0115 (!) 129/90 -- -- 95 -- 94 % --   10/18/22 0045 134/89 -- -- 87 -- 94 % --   10/18/22 0015 (!) 136/93 -- -- 98 -- 97 % --   10/17/22 2346 128/86 -- -- 77 -- 98 % --   10/17/22 2316 130/79 -- -- 79 16 99 % --   10/17/22 2246 (!) 142/85 -- -- 94 17 99 % --   10/17/22 1931 -- 98 °F (36.7 °C) Oral -- -- -- --   10/17/22 1930 135/83 -- -- (!) 105 18 97 % (!) 308 lb (139.7 kg)       Intake/Output Summary (Last 24 hours) at 10/18/2022 0252  Last data filed at 10/18/2022 0220  Gross per 24 hour   Intake --   Output 1000 ml   Net -1000 ml     Room air    GEN:  Appearance:  Obese male in NAD . Level of Consciousness:  alert . Orientation:  full    HEENT: Sclera anicteric.  no conjunctival chemosis. moist mucus membranes. no specific or diagnostic oral lesions. NECK:  no signs of meningismus. Jugular veins not distended. Carotid pulses  2+.  no cervical lymphadenopathy. no thyromegaly. CV:  regular rhythm. normal S1 & S2.    no murmur. no rub.  no gallop. PULM:  Chest excursion is symmetric. Breath sounds are vesicular. Adventitious sounds:  none    AB:  Abdominal shape is obese. Bowel sounds are active. Generally soft to palpation. no tenderness is present. no involuntary guarding. no rebound guarding. EXTR:  Skin is warm. Capillary refill brisk.     no specific or pathognomic rash. no clubbing. 2-3+ pitting edema of both legs to almost the level of the knees. No active wound or ulcer. Pulses 2+ x 4      LABS:  Lab Results   Component Value Date    WBC 8.2 10/17/2022    HGB 11.8 (L) 10/17/2022    HCT 36.3 (L) 10/17/2022    MCV 90.1 10/17/2022     10/17/2022     Lab Results   Component Value Date    CREATININE 1.3 10/17/2022    BUN 31 (H) 10/17/2022     10/17/2022    K 3.7 10/17/2022    CL 99 10/17/2022    CO2 25 10/17/2022     Lab Results   Component Value Date    ALT 27 10/17/2022    AST 24 10/17/2022    ALKPHOS 107 10/17/2022    BILITOT 0.5 10/17/2022     Lab Results   Component Value Date    TROPONINI <0.01 10/17/2022     No results for input(s): PHART, DCP2XRW, PO2ART in the last 72 hours. IMAGING:  XR CHEST PORTABLE    Result Date: 10/17/2022  EXAMINATION: ONE XRAY VIEW OF THE CHEST 10/17/2022 8:07 pm COMPARISON: Chest radiograph 10/05/2022, chest CT 09/15/2022 HISTORY: ORDERING SYSTEM PROVIDED HISTORY: Shortness of Breath FINDINGS: The cardiomediastinal and hilar silhouettes appear unremarkable. Stable sequela from partial right upper lobe resection. The lungs appear otherwise clear. No pleural effusion evident. No pneumothorax is seen. No acute osseous abnormality is identified. No radiographic evidence of acute cardiopulmonary disease. Stable postsurgical sequela right upper lung. I directly reviewed all recent imaging studies as well as pertinent prior studies. Radiology reports may or may not be available at the time of my review. EKG:  New and pertinent prior tracings were directly reviewed. My interpretation is as follows:  Normal sinus rhythm with first degree AV block.     Active Hospital Problems    Diagnosis Date Noted    Moderate obstructive sleep apnea [G47.33] 01/04/2016     Priority: High    Exertional dyspnea [R06.09] 10/18/2022     Priority: Medium    PAF (paroxysmal atrial fibrillation) ([facility] [address] [I48.0] 10/05/2022 Priority: Medium    CAD S/P percutaneous coronary angioplasty [I25.10, Z98.61] 09/09/2022     Priority: Medium       ASSESSMENT & PLAN  Exertional Dyspnea  -  Patient is warm and minimally volume overloaded. His dyspnea does not seem to be due to heart failure or pulmonary edema. PAF, CAD s/p PCI   -  Currently in normal sinus rhythm. -  S/P Afib ablation 10/6.    -  S/p PCI 9/8/22. No improvement in symptoms post PCI. -  Ancitoagulated with warfarin. Will continue w/ pharmacy dosing assistance. -  Continue home ASA, statin, metoprolol XL 25 mg daily. -  Will start a short diuretic trial to see if his symptoms improve. Already received lasix 60mg IV once in the ER. Will give 2 more doses of lasix 40mg IV spaced 12 hours apart. -  Cardiology will be asked to follow the patient while hospitalized. MARIA VICTORIA  -  Compliant with CPAP at home. Has his home unit with him. History of DVT   -  Anticoagulated with warfarin. Goal INR = 2-3. DM2  -  Hold all oral antidiabetic agents. Start s.c. Insulin regimen based on weight.      Asthma   -  Continue home montelukast.    DVT prophylaxis: SCDs, warfarin  Code Status:  Full  Disposition:  Observation, anticipated d/c back to home    Sacha Garcia MD MD

## 2022-10-18 NOTE — CONSULTS
Pharmacy Note  Warfarin Consult  Dx: afib  Goal INR range 2-3   Home Warfarin dose: 5 mg daily  No results for input(s): INR in the last 72 hours. Recent Labs     10/17/22  1931   HGB 11.8*   HCT 36.3*     INR pending this am.  Recommend Warfarin mg tonight x1. Daily INR ordered. Rx will continue to manage therapy per consult order.   Wesley Deshpande, Pharm D.10/18/2022 6:16 AM

## 2022-10-18 NOTE — PROGRESS NOTES
Patient was supposed to see me in office  Admitted with volume overload. Please feel free to call with renal related issues.

## 2022-10-18 NOTE — PLAN OF CARE
Problem: Skin/Tissue Integrity  Goal: Absence of new skin breakdown  Description: 1. Monitor for areas of redness and/or skin breakdown  2. Assess vascular access sites hourly  3. Every 4-6 hours minimum:  Change oxygen saturation probe site  4. Every 4-6 hours:  If on nasal continuous positive airway pressure, respiratory therapy assess nares and determine need for appliance change or resting period.   Outcome: Progressing     Problem: Safety - Adult  Goal: Free from fall injury  10/18/2022 0951 by Dagoberto Baltazar RN  Outcome: Progressing  Flowsheets (Taken 10/18/2022 0949)  Free From Fall Injury:   Francesco Hall family/caregiver on patient safety   Based on caregiver fall risk screen, instruct family/caregiver to ask for assistance with transferring infant if caregiver noted to have fall risk factors  10/18/2022 0949 by Dagoberto Baltazar RN  Outcome: Progressing  Flowsheets (Taken 10/18/2022 0949)  Free From Fall Injury:   Instruct family/caregiver on patient safety   Based on caregiver fall risk screen, instruct family/caregiver to ask for assistance with transferring infant if caregiver noted to have fall risk factors     Problem: Pain  Goal: Verbalizes/displays adequate comfort level or baseline comfort level  Outcome: Progressing  Flowsheets (Taken 10/18/2022 0951)  Verbalizes/displays adequate comfort level or baseline comfort level:   Encourage patient to monitor pain and request assistance   Assess pain using appropriate pain scale   Administer analgesics based on type and severity of pain and evaluate response   Implement non-pharmacological measures as appropriate and evaluate response

## 2022-10-18 NOTE — PROGRESS NOTES
Patient admitted by partner this morning. Agree with plan of care. Nephrology consult added. Patient appeared in NAD during exam. No overt dyspnea. Plan to diurese and anticipate dc next 1-2 days.

## 2022-10-18 NOTE — CONSULTS
Interval History and plan:      Edema and shortness of breath getting better  Plan:  Agree with plan for discharge on torsemide 40 mg a day  He has diabetes but not proteinuria so Jardiance will be probably more helpful than ACE inhibitor or ARB for him given his  diastolic dysfunction to help prevent long-term rehospitalization with heart failure   He was on Jardiance at home, but not started yet, will need to start after DC  Will not resume today since getting iv lasix    Was supposed to be on 12.5 daily  Explained the side effect of DKA and also UTI    Agree with IV Lasix    Potassium is already 3.6-supplement potassium                       Assessment :     HFpEF  Known to have CAD-SP PCI 9/8/2022  Was on Jardiance 12.5 mg daily at home but not restarted  Was on torsemide 40 recently changed to 30 mg/day  Echo: 9/22-EF 55%, moderate MR,     Hypertension   BP: (126-144)/()  Heart Rate:  [87-98]   BP goal inpatient 690-132 systolic inpatient  Also has atrial fibrillation  Also has MARIA VICTORIA      Diabetes mellitus type 2  History of DVT        [address] [facility] would like to thank Rebecca Drake MD   for opportunity to serve this patient      Please call with questions at-   24 Hrs Answering service [phone] or  7 am- 5 pm via Perfect serve or cell phone  Lj Thompson MD          CC/reason for consult :     Fluid overload/SULAIMAN     HPI :     Britney Madden is a 69 y.o. male presented to   the hospital on 10/17/2022 with edema of the legs and shortness of breath. He is known to have chronic congestive heart failure diastolic dysfunction and coronary artery disease followed by cardiology. He was on 40 mg of torsemide which was decreased to 30 mg recently. He has shortness of breath and edema getting worse progressively for last several days because of which he came to the emergency room.   Was supposed to be seen by me in office but unfortunately he had to come to the hospital before given being seen. We are consulted for diastolic dysfunction/fluid overload, SULAIMAN    ROS:     Seen with-no family    positives in bold   Constitutional:  fever, chills, weakness, weight change, fatigue  Skin:  rash, pruritus, hair loss, bruising, dry skin, petechiae  Head, Face, Neck   headaches, swelling,  cervical adenopathy  Respiratory: shortness of breath, cough, or wheezing  Cardiovascular: chest pain, palpitations, dizzy, edema  Gastrointestinal: nausea, vomiting, diarrhea, constipation,belly pain    Yellow skin, blood in stool  Musculoskeletal:  back pain, muscle weakness, gait problems,       joint pain or swelling. Genitourinary:  dysuria, poor urine flow, flank pain, blood in urine  Neurologic:  vertigo, TIA'S, syncope, seizures, focal weakness  Psychosocial:  insomnia, anxiety, or depression. 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  performed by Arthur Wiley MD at [address] [place] [address] 9-30-15    CARPAL TUNNEL RELEASE Left 3/20/16    CHOLECYSTECTOMY, LAPAROSCOPIC N/A 2/19/2019    LAPAROSCOPIC CHOLECYSTECTOMY WITH CHOLANGIOGRAMS performed by Kylah Henriquez MD at [address] [place] [address]      with removal stone    CYSTOSCOPY  2/8/13    with Laser Vaporization of Enlarged Prostate    DIAGNOSTIC CARDIAC CATH LAB PROCEDURE      ERCP  02/18/2019    Sphincterotomy, stone removal    ERCP N/A 2/18/2019    ERCP SPHINCTER/PAPILLOTOMY performed by Linn Merlos MD at [address] [place] [address]    ERCP  2/18/2019    ERCP STONE REMOVAL performed by Linn Merlos MD at [facility] Right 1/14/2020    PHACOEMULSIFICATION OF CATARACT RIGHT EYE WITH INTRAOCULAR LENS IMPLANT performed by Miranda Chiu MD at [place] [address] Left 1/21/2020    PHACOEMULSIFICATION OF CATARACT LEFT EYE WITH INTRAOCULAR LENS IMPLANT -SLEEP APNEA- performed by Miranda Chiu MD at [address] [facility] Bilateral     right knee (2002) and left knee (2012)    KNEE ARTHROSCOPY  4/19/2011     left  knee    SKIN BIOPSY      skin Ca/SQUAMOUS CELL    THORACOTOMY      wedge resection    TONSILLECTOMY      TRANSESOPHAGEAL ECHOCARDIOGRAM N/A 9/20/2022    PIERO W/ANES. (9:00) performed by Hector Gilbert MD at [address] [place] [address]        reports that he quit smoking about 46 years ago. His smoking use included cigarettes. He has a 34.00 pack-year smoking history. He has never used smokeless tobacco. He reports that he does not drink alcohol and does not use drugs. family history includes Arthritis in his father; Cancer in his mother; Kidney Disease in his mother; Other in his father; Stroke in his father and mother; Substance Abuse in his father.          Medication:     Current Facility-Administered Medications: aspirin chewable tablet 81 mg, 81 mg, Oral, Daily  atorvastatin (LIPITOR) tablet 40 mg, 40 mg, Oral, Daily  dofetilide (TIKOSYN) capsule 250 mcg, 250 mcg, Oral, 2 times per day  montelukast (SINGULAIR) tablet 10 mg, 10 mg, Oral, Daily  pantoprazole (PROTONIX) tablet 40 mg, 40 mg, Oral, BID AC  sucralfate (CARAFATE) tablet 1 g, 1 g, Oral, TID  glucose chewable tablet 16 g, 4 tablet, Oral, PRN  dextrose bolus 10% 125 mL, 125 mL, IntraVENous, PRN **OR** dextrose bolus 10% 250 mL, 250 mL, IntraVENous, PRN  glucagon (rDNA) injection 1 mg, 1 mg, SubCUTAneous, PRN  dextrose 10 % infusion, , IntraVENous, Continuous PRN  sodium chloride flush 0.9 % injection 10 mL, 10 mL, IntraVENous, PRN  0.9 % sodium chloride infusion, , IntraVENous, PRN  potassium chloride (KLOR-CON M) extended release tablet 40 mEq, 40 mEq, Oral, PRN **OR** potassium bicarb-citric acid (EFFER-K) effervescent tablet 40 mEq, 40 mEq, Oral, PRN **OR** potassium chloride 10 mEq/100 mL IVPB (Peripheral Line), 10 mEq, IntraVENous, PRN  magnesium sulfate 1000 mg in dextrose 5% 100 mL IVPB, 1,000 mg, IntraVENous, PRN  acetaminophen (TYLENOL) tablet 650 mg, 650 mg, Oral, Q6H PRN **OR** acetaminophen (TYLENOL) suppository 650 mg, 650 mg, Rectal, Q6H PRN  insulin glargine (LANTUS) injection vial 21 Units, 0.15 Units/kg, SubCUTAneous, Nightly  insulin lispro (HUMALOG) injection vial 7 Units, 0.05 Units/kg, SubCUTAneous, TID WC  insulin lispro (HUMALOG) injection vial 0-8 Units, 0-8 Units, SubCUTAneous, TID WC  insulin lispro (HUMALOG) injection vial 0-4 Units, 0-4 Units, SubCUTAneous, Nightly  melatonin tablet 3 mg, 3 mg, Oral, Nightly PRN  calcium carbonate (TUMS) chewable tablet 1,000 mg, 1,000 mg, Oral, TID PRN  diphenhydrAMINE (BENADRYL) tablet 25 mg, 25 mg, Oral, Nightly PRN  warfarin placeholder: dosing by pharmacy, , Other, RX Placeholder  warfarin (COUMADIN) tablet 5 mg, 5 mg, Oral, Once  furosemide (LASIX) injection 40 mg, 40 mg, IntraVENous, TID  [START ON 10/19/2022] metoprolol succinate (TOPROL XL) extended release tablet 50 mg, 50 mg, Oral, Daily       Vitals :     Vitals:    10/18/22 4610 BP: 128/83   Pulse: 89   Resp: 18   Temp: 97.4 °F (36.3 °C)   SpO2: 99%       I & O :       Intake/Output Summary (Last 24 hours) at 10/18/2022 1116  Last data filed at 10/18/2022 7370  Gross per 24 hour   Intake 490 ml   Output 1600 ml   Net -1110 ml        Physical Examination :     General appearance: Anxious- no, distressed- no, in good spirits-  Is lying flat on bed    HEENT: Lips- normal, teeth- ok , oral mucosa- moist  Neck : Mass- no, appears symmetrical, JVD- not visible  Respiratory: Respiratory effort-  normal, wheeze- no, crackles -   Cardiovascular:  Ausculation- No M/R/G, Edema 1+  Abdomen: visible mass- no, distention- no, scar- no, tenderness- no                            hepatosplenomegaly-  no  Musculoskeletal:  clubbing no,cyanosis- no , digital ischemia- no                           muscle strength- grossly normal , tone - grossly normal  Skin: rashes- no , ulcers- no, induration- no, tightening - no  Psychiatric:  Judgement and insight- normal           AAO X 3  Additional finding:      LABS:     Recent Labs     10/17/22  1931   WBC 8.2   HGB 11.8*   HCT 36.3*        Recent Labs     10/17/22  1931 10/18/22  0521    139   K 3.7 3.6   CL 99 100   CO2 25 25   BUN 31* 30*   CREATININE 1.3 1.2   GLUCOSE 148* 128*   MG  --  2.30

## 2022-10-18 NOTE — DISCHARGE INSTR - COC
Continuity of Care Form    Patient Name: Vivian Thompson   :  1945  MRN:  [id number]    Admit date:  10/17/2022  Discharge date:  ***    Code Status Order: Full Code   Advance Directives:     Admitting Physician:  Juan Antonio Peterson MD  PCP: Migel Rader MD    Discharging Nurse: [facility] Unit/Room#: 5857/8058-02  Discharging Unit Phone Number: ***    Emergency Contact:   Extended Emergency Contact Information  Primary Emergency Contact: RosyKayy  Address: [address] Phone: [phone]  Mobile Phone: [phone]  Relation: Spouse  Secondary Emergency Contact: Kailey York  Address: [address] Phone: [phone]  Mobile Phone: [phone]  Relation: Child    Past Surgical History:  Past Surgical History:   Procedure Laterality Date    ABLATION OF DYSRHYTHMIC FOCUS      a-fib    BARIATRIC SURGERY  2013    GASTRIC SLEEVE    CARDIAC SURGERY  2013    ablation    CARDIOVERSION N/A 2022    CARDIOVERSION W/ANES.  performed by Chela Kay MD at [address] [place] [address] Right 9-30-15    CARPAL TUNNEL RELEASE Left 3/20/16    CHOLECYSTECTOMY, LAPAROSCOPIC N/A 2019    LAPAROSCOPIC CHOLECYSTECTOMY WITH CHOLANGIOGRAMS performed by Scarlett Pepe MD at [address] [place] [address]      with removal stone    CYSTOSCOPY  13    with Laser Vaporization of Enlarged Prostate    DIAGNOSTIC CARDIAC CATH LAB PROCEDURE      ERCP  2019    Sphincterotomy, stone removal    ERCP N/A 2019    ERCP SPHINCTER/PAPILLOTOMY performed by Emigdio Riley MD at [address] [place] [address] Real    ERCP  2019    ERCP STONE REMOVAL performed by Emigdio Riley MD at [facility] Right 2020    PHACOEMULSIFICATION OF CATARACT RIGHT EYE WITH INTRAOCULAR LENS IMPLANT performed by Carroll Goins MD at [facility] OR    INTRACAPSULAR CATARACT EXTRACTION Left 1/21/2020    PHACOEMULSIFICATION OF CATARACT LEFT EYE WITH INTRAOCULAR LENS IMPLANT -SLEEP APNEA- performed by Yamilet Gurrola MD at [address] Bilateral     right knee (2002) and left knee (2012)    KNEE ARTHROSCOPY  4/19/2011     left  knee    SKIN BIOPSY      skin Ca/SQUAMOUS CELL    THORACOTOMY      wedge resection    TONSILLECTOMY      TRANSESOPHAGEAL ECHOCARDIOGRAM N/A 9/20/2022    PIERO W/ANES.  (9:00) performed by Stephen Dickson MD at [address] [facility] Real       Immunization History:   Immunization History   Administered Date(s) Administered    COVID-19, [address] [facility] border, Primary or Immunocompromised, (age 12y+), IM, 100 mcg/0.5mL 02/17/2021, 03/17/2021, 01/11/2022    Hepatitis A Adult (Havrix, Vaqta) 09/03/2014    Influenza Vaccine, unspecified formulation 09/17/2014    Influenza Virus Vaccine 01/18/2012, 10/15/2013, 12/01/2014, 10/23/2015, 10/21/2016, 09/01/2017, 10/01/2018, 10/07/2021    Influenza, FLUAD, (age 72 y+), Adjuvanted, 0.5mL 10/08/2020    Influenza, FLUARIX, FLULAVAL, FLUZONE (age 10 mo+) AND AFLURIA, (age 1 y+), PF, 0.5mL 10/08/2020    Influenza, FLUCELVAX, (age 10 mo+), MDCK, PF, 0.5mL 09/20/2017    Influenza, FLUZONE (age 72 y+), High Dose, 0.7mL 10/10/2022    Influenza, High Dose (Fluzone 65 yrs and older) 10/21/2016, 10/31/2018, 11/04/2019    MMR 01/01/2005    Pneumococcal Conjugate 13-valent (Ufzirvs87) 02/16/2019    Pneumococcal Conjugate 7-valent (Prevnar7) 10/22/2012    Pneumococcal Conjugate Vaccine 10/22/2012    Pneumococcal Polysaccharide (Wgimsfahc06) 01/01/2001, 12/13/2011, 01/01/2013    Polio IPV (IPOL) 09/03/2014    Td vaccine (adult) 01/01/2013    Tdap (Boostrix, Adacel) 10/04/2020    Tetanus 08/27/2014    Tetanus Toxoid, absorbed 08/27/2014    Zoster Recombinant (Shingrix) 11/16/2018, 12/04/2019       Active Problems:  Patient Active Problem List   Diagnosis Code    Arthritis M19.90    Atrial fibrillation [facility]) I48.91    Essential hypertension I10    Carpal tunnel syndrome G56.00    Polyneuropathy G62.9    Numbness and tingling of both legs R20.0, R20.2    Moderate obstructive sleep apnea G47.33    S/P thoracotomy Z98.890    Atypical atrial flutter (HCC) I48.4    SOB (shortness of breath) R06.02    Type 2 diabetes mellitus with diabetic neuropathy ([facility]) E11.40    Morbid obesity with BMI of 40.0-44.9, adult ([facility]) E66.01, Z68.41    History of venous thromboembolism Z86.718    Cataract of both eyes H26.9    Abnormal stress test R94.39    Angina at rest [facility]) I20.8    Status post insertion of drug-eluting stent into left anterior descending (LAD) artery for coronary artery disease Z95.5    Other fatigue R53.83    Obesity (BMI 30-39. 9) E66.9    COVID-19 U07.1    Acute on chronic heart failure with preserved ejection fraction ([facility]) I50.33    Coronary artery disease involving native coronary artery of native heart I25.10    Anginal equivalent ([address] I20.8    CAD S/P percutaneous coronary angioplasty I25.10, Z98.61    PANG (dyspnea on exertion) R06.09    Asthma J45.909    Atrial fibrillation with RVR ([facility]) I48.91    PAF (paroxysmal atrial fibrillation) ([facility]) I48.0    Exertional dyspnea R06.09       Isolation/Infection:   Isolation            No Isolation          Patient Infection Status       Infection Onset Added Last Indicated Last Indicated By Review Planned Expiration Resolved Resolved By    None active    Resolved    COVID-19 (Rule Out) 09/15/22 09/15/22 09/15/22 COVID-19 & Influenza Combo (Ordered)   09/15/22 Rule-Out Test Resulted    COVID-19 22 COVID-19 & Influenza Combo   22 Demi Conner RN    Per PCU staff, pt us asymptomatic and does not meet criteria for covid isolation    COVID-19 (Rule Out) 22 COVID-19 & Influenza Combo (Ordered)   22 Rule-Out Test Resulted    COVID-19 20 COVID-21             Nurse Assessment:  Last Vital Signs: /83   Pulse 89   Temp 97.4 °F (36.3 °C) (Oral)   Resp 18   Ht 6' 3\" (1.905 m)   Wt (!) 304 lb 4.8 oz (138 kg)   SpO2 99%   BMI 38.03 kg/m²     Last documented pain score (0-10 scale): Pain Level: 0  Last Weight:   Wt Readings from Last 1 Encounters:   10/18/22 (!) 304 lb 4.8 oz (138 kg)     Mental Status:  {IP PT MENTAL STATUS:}    IV Access:  { COLE IV ACCESS:764067366}    Nursing Mobility/ADLs:  Walking   {CHP DME MDKD:255777549}  Transfer  {CHP DME AZGY:126055889}  Bathing  {CHP DME UYUE:795478451}  Dressing  {CHP DME KIKF:461499671}  Toileting  {CHP DME IIT}  Feeding  {P DME AKQQ:116207837}  Med Admin  {P DME XWFA:873949363}  Med Delivery   { COLE MED Delivery:928896225}    Wound Care Documentation and Therapy:        Elimination:  Continence: Bowel: {YES / OZ:42640}  Bladder: {YES / IF:01472}  Urinary Catheter: {Urinary Catheter:406245748}   Colostomy/Ileostomy/Ileal Conduit: {YES / EZ:57077}       Date of Last BM: ***    Intake/Output Summary (Last 24 hours) at 10/18/2022 0949  Last data filed at 10/18/2022 0852  Gross per 24 hour   Intake 490 ml   Output 1600 ml   Net -1110 ml     I/O last 3 completed shifts:   In: 240 [P.O.:240]  Out: 1300 [Urine:1300]    Safety Concerns:     [address] Safety Concerns:300713304}    Impairments/Disabilities:      [address] Impairments/Disabilities:286268790}    Nutrition Therapy:  Current Nutrition Therapy:   [address] Diet List:704627956}    Routes of Feeding: {CHP DME Other Feedings:023949172}  Liquids: {Slp liquid thickness:70710}  Daily Fluid Restriction: {CHP DME Yes amt example:105297491}  Last Modified Barium Swallow with Video (Video Swallowing Test): {[facility]570[facility]105}    Treatments at the Time of Hospital Discharge:   Respiratory Treatments: ***  Oxygen Therapy:  {Therapy; copd oxygen:04559}  Ventilator:    {MH CC Vent FRHO:679446613}    Rehab Therapies: {THERAPEUTIC INTERVENTION:3880122329}  Weight Bearing Status/Restrictions: 50Roseann Torres CC Weight Bearin}  Other Medical Equipment (for information only, NOT a DME order):  {EQUIPMENT:881090963}  Other Treatments: ***    Patient's personal belongings (please select all that are sent with patient):  {CHP DME Belongings:714809732}    RN SIGNATURE:  {Esignature:612095915}    CASE MANAGEMENT/SOCIAL WORK SECTION    Inpatient Status Date: 10/17/22    Readmission Risk Assessment Score:  Readmission Risk              Risk of Unplanned Readmission:  0           Discharging to Facility/ Agency   Name: [facility]   Address:  Phone:  Fax:      / signature: {Esignature:288558999}    PHYSICIAN SECTION    Prognosis: {Prognosis:4837977768}    Condition at Discharge: 50Roseann Torres Patient Condition:945410272}    Rehab Potential (if transferring to Rehab): {Prognosis:7770357162}    Recommended Labs or Other Treatments After Discharge: ***    Physician Certification: I certify the above information and transfer of Maddy Rowan  is necessary for the continuing treatment of the diagnosis listed and that he requires {Admit to Appropriate Level of Care:71418} for {GREATER/LESS:801043085} 30 days.      Update Admission H&P: {CHP DME Changes in QRRYX:340434804}    PHYSICIAN SIGNATURE:  {Esignature:740086685}

## 2022-10-18 NOTE — PROGRESS NOTES
Shift assessments completed and charted. Pt is ao x4, VSS, on RA, not in distress. Lung sounds clear to auscultation b/l, edema on BLE has decreased, no dyspnea on exertion noted. Denies any needs at this time, will continue to monitor.

## 2022-10-18 NOTE — CONSULTS
[address] [address]                                  CONSULTATION    PATIENT NAME: Rosalva Alvarez                      :        1945  MED REC NO:   [id number]                          ROOM:       4306  ACCOUNT NO:   [de-identified]                           ADMIT DATE: 10/17/2022  PROVIDER:     Ridge Cheney. Dwain Ashby MD    CONSULT DATE:  10/18/2022    REASON FOR CONSULTATION:  Shortness of breath. HISTORY OF PRESENT ILLNESS:  The patient presented to the hospital with  complaints of shortness of breath, symptoms have been chronic going on  for months, waxes and wanes in severity, it is almost over the last two  days. He has shortness of breath at rest, it is worst with activity. No associated chest pain. He feels better since admission and increased  diuresis. Shortness of breath is constant. The precipitating factor  made it worst than previously mentioned, this is exacerbated by  activity. PAST MEDICAL HISTORY:  1. Chronic shortness of breath, multifactorial.  Specific etiology has  been difficult to ascertain. In the past, attempts at increased  diuresis has seem to have little effect. He had an angioplasty in  2022, which had no effect. He did report some  improvement after he was cardioverted to normal sinus rhythm from atrial  fibrillation. He subsequently underwent ablation and currently remains  in sinus rhythm, but still has shortness of breath with activity. 2.  Chronic diastolic congestive heart failure. 3.  Coronary artery disease, status post angioplasty with stent on  2022, left anterior descending artery. 4.  Paroxysmal atrial fibrillation, status post ablation on 10/06/2022. Currently on Tikosyn anticoagulation. 5.  Nocturnal bradycardia with 2:1 AV block. 6.  Hypertension. 7.  Hyperlipidemia. 8.  Sleep apnea, on CPAP. 9.  History of DVT. 10.  Diabetes. 11.  Asthma.   12. Tachycardia. 13.  Chronic fatigue. 14.  COVID positive in 09/2022. SOCIAL HISTORY:  He does not smoke. FAMILY HISTORY:  Positive for heart disease. REVIEW OF SYSTEMS:  No fevers or chills. No cough. No focal neurologic  symptoms. No headache or visual changes. No recent GI or  bleeding. No recent or upcoming surgeries. All other systems are negative except  as present illness. ALLERGIES:  See list in the chart, which I have reviewed. MEDICATIONS:  See list in the chart, which I have reviewed. PHYSICAL EXAMINATION:  VITALS:  Blood pressure is 128/83, respirations 18, and temperature  97.4. He is 99% saturated on room air. GENERAL:  An obese white man in no acute distress. HEENT:  Normocephalic and atraumatic. Oropharynx clear. Moist mucous  membranes. NECK:  Supple. CHEST:  Clear. CARDIAC:  Regular S1 and S2. There is no S3 or S4 gallop. There is no  significant murmur. Jugular venous pressure is normal.  Carotids are 2+  and symmetric without bruit. ABDOMEN:  Soft and nontender. Positive bowel sounds. EXTREMITIES:  No cyanosis, trace edema on the feet and legs. PSYCHIATRIC:  Affect, calm. SKIN:  Warm and dry. NEUROLOGIC:  Peripheral pulses intact. Capillary refill is normal.    DIAGNOSTIC DATA:  Telemetry, normal sinus rhythm. EKG, sinus rhythm. Troponin less than 0.01. ProBNP 418. Chest x-ray, no evidence of  pulmonary edema. Hemoglobin 11.8. IMPRESSION:  1. Shortness of breath, symptoms are chronic, multifactorial.  It has  been ongoing for months. Etiology has been unclear. Increased diuresis  in the past, had little effect. He had an angioplasty in 09/2022, which  Had no effect. He reported feeling better after cardioversion in  09/2022 from atrial fibrillation to sinus rhythm. He subsequently  underwent atrial fibrillation ablation returning sinus rhythm, however he  continues to have shortness of breath.   His BNP on this  admission is the lowest it has been for the past six months. His chest  x-ray does not show significant pulmonary edema. He has no hypoxia on  room air. 2.  Paroxysmal atrial fibrillation, status post ablation on 10/06/2022  and he is on Tikosyn and anticoagulation. 3.  Nocturnal bradycardia with history of 2:1 AV block. 4.  Obstructive sleep apnea, on CPAP. 5.  Hypertension, controlled. 6.  Hyperlipidemia, stable. 7.  History of DVT. 8.  Diabetes. 9.  Asthma. 10.  Intermittent tachycardia. 11.  Chronic fatigue. 12.  COVID positive in 09/2022. RECOMMENDATIONS:  At this point, from cardiac stand point options are  limited. I did discuss with the patient attempting to increase the  diuresis understanding that this had little effect in the past and has  caused side effects. He would like to attempt to increase his  diuretics, as he feels since admission this seems to have provided some  relief of his shortness of breath. We will increase his IV Lasix in the  hospital to t.i.d. At discharge, recommend torsemide 40 mg daily. I  instructed him to take an extra 40 mg when his weight is over 300 pounds  and hold his daily dose if his weight is less than 295 pounds. He  understands the plan and the potential downside of this plan. Attempt   a goal weight of under 300 pounds. I also instructed the  patient that it is important that he proceeds with diet and exercise and  weight loss and that I believe significant weight loss will  significantly improve his symptoms as well. ELIZABETH Jacob MD    D: 10/18/2022 11:10:59       T: 10/18/2022 12:35:53     [id number]  Job#: [id number]     Doc#: [id number]    CC:

## 2022-10-18 NOTE — CARE COORDINATION
CASE MANAGEMENT INITIAL ASSESSMENT      Reviewed chart and completed assessment with patient:bedside  Family present: none  Explained Case Management role/services. Primary contact information:[facility] Decision Maker :   Primary Decision Maker: Maricruz Simpson - [phone]    Secondary Decision Maker: Mary Carmen Carlson Child - [phone]          Can this person be reached and be able to respond quickly, such as within a few minutes or hours? Yes      Admit date/status:10/17/22  Diagnosis:SOB   Is this a Readmission?:  Yes      Insurance:medicare   Precert required for SNF: No       3 night stay required: Yes    Living arrangements, Adls, care needs, prior to admission:lives in home with wife. IPTA, uses no DME, drives. Durable Medical Equipment at home:  Walker__Cane__RTS__ BSC__Shower Chair__  02__ HHN__ CPAP__x  BiPap__  Hospital Bed__ W/C___ Other_____    Services in the home and/or outpatient, prior to admission:[facility]    [facility]                                Medications Prescription coverage? yes    Transportation needs: none     PT/OT recs:none    Hospital Exemption Notification (HEN):needed for SNf    Barriers to discharge:none    Plan/comments:spoke with patient. Reported from home with wife. IPTA and drives. Active with [facility], would like to resume at d/c. CM will follow for any additional needs.  Brian Schmidt RN       ECOC on chart for MD signature

## 2022-10-18 NOTE — CONSULTS
Nutrition Education    Consult received for CHF diet education. Provided pt with written and verbal instruction on HF nutrition therapy. Discussed low sodium diet, daily weights, and fluid restriction. Pt reports adding some salt to foods, uses 50% salt/no salt seasoning. Encouraged pt to use salt free seasonings like Mrs. West instead, pt voiced understanding. Pt states he enjoys eating at restaurants and likes [place] food, encouraged to cook most meals at home and find lower sodium options/ reduce portions when eating out. Pt has a scale at home and weighs himself daily. Provided additional handouts on low sodium recipes per pt request.     Time spent: 15 minutes    Educated on HF nutrition therapy   Learners: Patient  Readiness: Acceptance  Method: Explanation and Handout  Response: Verbalizes Understanding  Contact name and number provided.     Bonifacio Gibson RD  Contact Number: [phone]

## 2022-10-18 NOTE — PLAN OF CARE
4 Eyes Skin Assessment     The patient is being assess for  Admission    I agree that 2 RN's have performed a thorough Head to Toe Skin Assessment on the patient. ALL assessment sites listed below have been assessed. Areas assessed by both nurses:   [x]   Head, Face, and Ears   [x]   Shoulders, Back, and Chest  [x]   Arms, Elbows, and Hands   [x]   Coccyx, Sacrum, and IschIum  [x]   Legs, Feet, and Heels        Does the Patient have Skin Breakdown?   No         Ferdinand Prevention initiated:  NA   Wound Care Orders initiated:  NA      WOC nurse consulted for Pressure Injury (Stage 3,4, Unstageable, DTI, NWPT, and Complex wounds), New and Established Ostomies:  NA      Nurse 1 eSignature: Electronically signed by Alis Wang RN on 10/18/22 at 5:51 AM EDT    **SHARE this note so that the co-signing nurse is able to place an eSignature**    Nurse 2 eSignature: Electronically signed by Janna Temple RN on 10/18/22 at 5:51 AM EDT

## 2022-10-18 NOTE — PROGRESS NOTES
Pharmacy Note  Warfarin Consult  Dx: AFib  Goal INR range 2-3   Home Warfarin dose: 5 mg daily     Date  INR  Warfarin  10/18              1.80                    5 mg     Recommend Warfarin 5 mg tonight x1. Daily INR ordered. Rx will continue to manage therapy per consult order.   Kamila Castaneda, PharmD 10/18/2022 9:45 AM

## 2022-10-18 NOTE — CONSULTS
[id number]    CAD - stable. S/p PCI 9/8/22. Noted little to no improvement in symptoms post PCI  C dCHF  PANG - chronic, multifactorial but specific etiology unclear. Increased diuresis in past has had little effect. PCI had little to no effect. Significant improvement per patient post DCCV to NSR. Recurrent AF lead to subsequent ablation and remains NSR but still w/ PANG. BNP is lowest in past 6 months, CXR w/o pulm edema and no hypoxia on RA. PAF - s/p ablation 10/6/2022. Tikosyn, AC  Nocturnal bradycardia 2:1 due to MARIA VICTORIA  Hypertension - stable  Hyperlipidemia - stable  Sleep apnea on CPAP  History of DVT   Diabetes Mellitus   Asthma   Tachycardia - intermittent  Fatigue   COVID + 9/4/2022    Increase diuresis w/ goal weight < 300 pounds  Discharge on torsemide 40 mg daily. Extra 40 mg for weight > 300 pounds. Hold daily dose for weight < 295 pounds.    Increase toprol

## 2022-10-19 LAB
ANION GAP SERPL CALCULATED.3IONS-SCNC: 12 MMOL/L (ref 3–16)
ANION GAP SERPL CALCULATED.3IONS-SCNC: 12 MMOL/L (ref 3–16)
BUN BLDV-MCNC: 30 MG/DL (ref 7–20)
BUN BLDV-MCNC: 37 MG/DL (ref 7–20)
CALCIUM SERPL-MCNC: 8.8 MG/DL (ref 8.3–10.6)
CALCIUM SERPL-MCNC: 9.2 MG/DL (ref 8.3–10.6)
CHLORIDE BLD-SCNC: 100 MMOL/L (ref 99–110)
CHLORIDE BLD-SCNC: 97 MMOL/L (ref 99–110)
CO2: 26 MMOL/L (ref 21–32)
CO2: 27 MMOL/L (ref 21–32)
CREAT SERPL-MCNC: 1.4 MG/DL (ref 0.8–1.3)
CREAT SERPL-MCNC: 2 MG/DL (ref 0.8–1.3)
GFR SERPL CREATININE-BSD FRML MDRD: 34 ML/MIN/{1.73_M2}
GFR SERPL CREATININE-BSD FRML MDRD: 52 ML/MIN/{1.73_M2}
GLUCOSE BLD-MCNC: 107 MG/DL (ref 70–99)
GLUCOSE BLD-MCNC: 111 MG/DL (ref 70–99)
GLUCOSE BLD-MCNC: 125 MG/DL (ref 70–99)
GLUCOSE BLD-MCNC: 126 MG/DL (ref 70–99)
GLUCOSE BLD-MCNC: 152 MG/DL (ref 70–99)
GLUCOSE BLD-MCNC: 99 MG/DL (ref 70–99)
INR BLD: 1.65 (ref 0.87–1.14)
MAGNESIUM: 2.4 MG/DL (ref 1.8–2.4)
MAGNESIUM: 2.4 MG/DL (ref 1.8–2.4)
PERFORMED ON: ABNORMAL
PERFORMED ON: NORMAL
POTASSIUM SERPL-SCNC: 3.8 MMOL/L (ref 3.5–5.1)
POTASSIUM SERPL-SCNC: 4.3 MMOL/L (ref 3.5–5.1)
PROTHROMBIN TIME: 19.5 SEC (ref 11.7–14.5)
SODIUM BLD-SCNC: 135 MMOL/L (ref 136–145)
SODIUM BLD-SCNC: 139 MMOL/L (ref 136–145)

## 2022-10-19 PROCEDURE — 6370000000 HC RX 637 (ALT 250 FOR IP): Performed by: INTERNAL MEDICINE

## 2022-10-19 PROCEDURE — 6360000002 HC RX W HCPCS: Performed by: INTERNAL MEDICINE

## 2022-10-19 PROCEDURE — 2580000003 HC RX 258: Performed by: INTERNAL MEDICINE

## 2022-10-19 PROCEDURE — 96376 TX/PRO/DX INJ SAME DRUG ADON: CPT

## 2022-10-19 PROCEDURE — 80048 BASIC METABOLIC PNL TOTAL CA: CPT

## 2022-10-19 PROCEDURE — 96372 THER/PROPH/DIAG INJ SC/IM: CPT

## 2022-10-19 PROCEDURE — 83735 ASSAY OF MAGNESIUM: CPT

## 2022-10-19 PROCEDURE — 99213 OFFICE O/P EST LOW 20 MIN: CPT | Performed by: INTERNAL MEDICINE

## 2022-10-19 PROCEDURE — 36415 COLL VENOUS BLD VENIPUNCTURE: CPT

## 2022-10-19 PROCEDURE — G0378 HOSPITAL OBSERVATION PER HR: HCPCS

## 2022-10-19 PROCEDURE — 85610 PROTHROMBIN TIME: CPT

## 2022-10-19 RX ORDER — SPIRONOLACTONE 25 MG/1
TABLET ORAL
Qty: 90 TABLET | Refills: 0 | Status: SHIPPED | OUTPATIENT
Start: 2022-10-19

## 2022-10-19 RX ORDER — ASPIRIN 81 MG/1
81 TABLET, CHEWABLE ORAL DAILY
Qty: 30 TABLET | Refills: 11 | Status: SHIPPED | OUTPATIENT
Start: 2022-10-19

## 2022-10-19 RX ORDER — SUCRALFATE 1 G/1
1 TABLET ORAL 3 TIMES DAILY
Qty: 30 TABLET | Refills: 0 | Status: CANCELLED | OUTPATIENT
Start: 2022-10-19 | End: 2022-10-29

## 2022-10-19 RX ORDER — WARFARIN SODIUM 5 MG/1
TABLET ORAL
Qty: 90 TABLET | Refills: 1 | Status: SHIPPED | OUTPATIENT
Start: 2022-10-19

## 2022-10-19 RX ORDER — SUCRALFATE 1 G/1
1 TABLET ORAL 3 TIMES DAILY
Qty: 30 TABLET | Refills: 0 | Status: SHIPPED | OUTPATIENT
Start: 2022-10-19 | End: 2022-11-23 | Stop reason: ALTCHOICE

## 2022-10-19 RX ORDER — WARFARIN SODIUM 5 MG/1
TABLET ORAL
Qty: 90 TABLET | Refills: 1 | Status: CANCELLED | OUTPATIENT
Start: 2022-10-19

## 2022-10-19 RX ORDER — PANTOPRAZOLE SODIUM 40 MG/1
40 TABLET, DELAYED RELEASE ORAL
Qty: 60 TABLET | Refills: 1 | Status: CANCELLED | OUTPATIENT
Start: 2022-10-19 | End: 2022-11-18

## 2022-10-19 RX ORDER — SPIRONOLACTONE 25 MG/1
TABLET ORAL
Qty: 90 TABLET | Refills: 1 | Status: CANCELLED | OUTPATIENT
Start: 2022-10-19

## 2022-10-19 RX ORDER — METOPROLOL SUCCINATE 50 MG/1
50 TABLET, EXTENDED RELEASE ORAL DAILY
Qty: 30 TABLET | Refills: 3 | Status: CANCELLED | OUTPATIENT
Start: 2022-10-20

## 2022-10-19 RX ORDER — MAGNESIUM OXIDE 400 MG/1
400 TABLET ORAL DAILY
Qty: 30 TABLET | Refills: 1 | Status: CANCELLED | OUTPATIENT
Start: 2022-10-19

## 2022-10-19 RX ORDER — CLOPIDOGREL BISULFATE 75 MG/1
75 TABLET ORAL DAILY
Status: DISCONTINUED | OUTPATIENT
Start: 2022-10-19 | End: 2022-10-21 | Stop reason: HOSPADM

## 2022-10-19 RX ORDER — CLOPIDOGREL BISULFATE 75 MG/1
75 TABLET ORAL DAILY
Qty: 30 TABLET | Refills: 0 | Status: CANCELLED | OUTPATIENT
Start: 2022-10-19 | End: 2022-11-18

## 2022-10-19 RX ORDER — PANTOPRAZOLE SODIUM 40 MG/1
40 TABLET, DELAYED RELEASE ORAL
Qty: 60 TABLET | Refills: 1 | Status: SHIPPED | OUTPATIENT
Start: 2022-10-19 | End: 2022-11-23 | Stop reason: ALTCHOICE

## 2022-10-19 RX ORDER — ASPIRIN 81 MG/1
81 TABLET, CHEWABLE ORAL DAILY
Qty: 30 TABLET | Refills: 11 | Status: CANCELLED | OUTPATIENT
Start: 2022-10-19

## 2022-10-19 RX ORDER — TORSEMIDE 10 MG/1
30 TABLET ORAL DAILY
Qty: 30 TABLET | Refills: 3 | Status: CANCELLED | OUTPATIENT
Start: 2022-10-20

## 2022-10-19 RX ORDER — TORSEMIDE 20 MG/1
30 TABLET ORAL DAILY
Status: DISCONTINUED | OUTPATIENT
Start: 2022-10-20 | End: 2022-10-21 | Stop reason: HOSPADM

## 2022-10-19 RX ORDER — ENOXAPARIN SODIUM 150 MG/ML
1 INJECTION SUBCUTANEOUS 2 TIMES DAILY
Qty: 14 ML | Refills: 0 | Status: SHIPPED | OUTPATIENT
Start: 2022-10-19 | End: 2022-10-26

## 2022-10-19 RX ORDER — ENOXAPARIN SODIUM 150 MG/ML
1 INJECTION SUBCUTANEOUS 2 TIMES DAILY
Qty: 14 ML | Refills: 0 | Status: CANCELLED | OUTPATIENT
Start: 2022-10-19 | End: 2022-10-26

## 2022-10-19 RX ORDER — ENOXAPARIN SODIUM 150 MG/ML
1 INJECTION SUBCUTANEOUS 2 TIMES DAILY
Status: DISCONTINUED | OUTPATIENT
Start: 2022-10-19 | End: 2022-10-21 | Stop reason: HOSPADM

## 2022-10-19 RX ORDER — WARFARIN SODIUM 5 MG/1
10 TABLET ORAL
Status: COMPLETED | OUTPATIENT
Start: 2022-10-19 | End: 2022-10-19

## 2022-10-19 RX ORDER — ATORVASTATIN CALCIUM 40 MG/1
TABLET, FILM COATED ORAL
Qty: 90 TABLET | Refills: 3 | Status: CANCELLED | OUTPATIENT
Start: 2022-10-19

## 2022-10-19 RX ORDER — ATORVASTATIN CALCIUM 40 MG/1
TABLET, FILM COATED ORAL
Qty: 90 TABLET | Refills: 3 | Status: SHIPPED | OUTPATIENT
Start: 2022-10-19

## 2022-10-19 RX ORDER — CLOPIDOGREL BISULFATE 75 MG/1
75 TABLET ORAL DAILY
Qty: 30 TABLET | Refills: 1 | Status: SHIPPED | OUTPATIENT
Start: 2022-10-19 | End: 2022-11-29

## 2022-10-19 RX ORDER — SPIRONOLACTONE 25 MG/1
TABLET ORAL
Qty: 90 TABLET | Refills: 0 | Status: CANCELLED | OUTPATIENT
Start: 2022-10-19

## 2022-10-19 RX ORDER — METOPROLOL SUCCINATE 50 MG/1
50 TABLET, EXTENDED RELEASE ORAL DAILY
Qty: 30 TABLET | Refills: 3 | Status: SHIPPED | OUTPATIENT
Start: 2022-10-20

## 2022-10-19 RX ORDER — TORSEMIDE 10 MG/1
30 TABLET ORAL DAILY
Qty: 30 TABLET | Refills: 3 | Status: SHIPPED | OUTPATIENT
Start: 2022-10-20

## 2022-10-19 RX ADMIN — METOPROLOL SUCCINATE 50 MG: 50 TABLET, EXTENDED RELEASE ORAL at 08:17

## 2022-10-19 RX ADMIN — SUCRALFATE 1 G: 1 TABLET ORAL at 14:19

## 2022-10-19 RX ADMIN — CLOPIDOGREL BISULFATE 75 MG: 75 TABLET ORAL at 16:43

## 2022-10-19 RX ADMIN — ENOXAPARIN SODIUM 135 MG: 150 INJECTION SUBCUTANEOUS at 16:43

## 2022-10-19 RX ADMIN — ASPIRIN 81 MG 81 MG: 81 TABLET ORAL at 08:17

## 2022-10-19 RX ADMIN — FUROSEMIDE 40 MG: 10 INJECTION, SOLUTION INTRAMUSCULAR; INTRAVENOUS at 08:17

## 2022-10-19 RX ADMIN — DOFETILIDE 250 MCG: 0.25 CAPSULE ORAL at 20:43

## 2022-10-19 RX ADMIN — PANTOPRAZOLE SODIUM 40 MG: 40 TABLET, DELAYED RELEASE ORAL at 14:19

## 2022-10-19 RX ADMIN — WARFARIN SODIUM 10 MG: 5 TABLET ORAL at 16:43

## 2022-10-19 RX ADMIN — Medication 10 ML: at 20:46

## 2022-10-19 RX ADMIN — POTASSIUM CHLORIDE 20 MEQ: 1500 TABLET, EXTENDED RELEASE ORAL at 08:18

## 2022-10-19 RX ADMIN — DOFETILIDE 250 MCG: 0.25 CAPSULE ORAL at 08:31

## 2022-10-19 RX ADMIN — MONTELUKAST SODIUM 10 MG: 10 TABLET, COATED ORAL at 08:16

## 2022-10-19 RX ADMIN — SUCRALFATE 1 G: 1 TABLET ORAL at 08:17

## 2022-10-19 RX ADMIN — DIPHENHYDRAMINE HCL 25 MG: 25 TABLET ORAL at 20:43

## 2022-10-19 RX ADMIN — PANTOPRAZOLE SODIUM 40 MG: 40 TABLET, DELAYED RELEASE ORAL at 06:09

## 2022-10-19 RX ADMIN — ATORVASTATIN CALCIUM 40 MG: 40 TABLET, FILM COATED ORAL at 08:17

## 2022-10-19 RX ADMIN — SUCRALFATE 1 G: 1 TABLET ORAL at 20:43

## 2022-10-19 RX ADMIN — POTASSIUM CHLORIDE 20 MEQ: 1500 TABLET, EXTENDED RELEASE ORAL at 20:43

## 2022-10-19 NOTE — PLAN OF CARE
Problem: Skin/Tissue Integrity  Goal: Absence of new skin breakdown  Description: 1. Monitor for areas of redness and/or skin breakdown  2. Assess vascular access sites hourly  3. Every 4-6 hours minimum:  Change oxygen saturation probe site  4. Every 4-6 hours:  If on nasal continuous positive airway pressure, respiratory therapy assess nares and determine need for appliance change or resting period.   10/19/2022 0924 by Sandre Nissen, RN  Outcome: Progressing     Problem: Safety - Adult  Goal: Free from fall injury  10/19/2022 0924 by Sandre Nissen, RN  Outcome: Progressing

## 2022-10-19 NOTE — PROGRESS NOTES
A&Ox4. No complaints of /SOB, chest pain or heaviness. No complaints of pain or discomfort. Refuses insulin. Will continue to monitor.

## 2022-10-19 NOTE — CONSULTS
Electrophysiology Consultation   Date: 10/18/2022  Admit Date:  10/17/2022  Reason for Consultation: Heart failure with preserved ejection fraction and atrial fibrillation status post ablation. Consult Requesting Physician: Bre Syed MD     Chief Complaint   Patient presents with    Shortness of Breath     Ongoing, cardio told pt to come and be seen/admitted for CHF exac     Fatigue     HPI:   Mr. Leah Alejo is a pleasant 69year old male  with a medical history significant for symptomatic paroxysmal atrial fibrillation (previous on dofetilide [prolonged Qtc] and amiodarone [side effects]) now status post ablation on 10/06/2022, atrial flutter status post ablation (2017 Marvin Mascot), premature ventricular contractions status post ablation (2014), ischemic cardiomyopathy status post PCI (09/08/2022), hypertension, diabetes mellitus type II, history of DVT, and obstructive sleep apnea who presents from home with heart failure exacerbation. According patient he has been suffering from progressively worsening shortness of breath since his discharge from the hospital on 10/08/2022. He called my office with complaints of shortness of breath that is progressively worsening despite having recently undergoing an ablation. I had him come in for an EKG which showed normal sinus rhythm with PACs. I called patient that afternoon and he 70 short of breath and so I advised that we do his wife to come to the emergency room for further evaluation. Patient was admitted to the hospital at [facility] for diuresis. Of note, patient had elevated pressures post her ablation with left atrial pressure of 26 mmHg and right atrial pressure of 14 mmHg.     Patient denies fevers, chest pain, orthopnea, PND, lower extremity edema, abdominal swelling, chills, visual changes, headaches, sore throat, cough, abdominal pain, nausea, vomiting, bleeding, bruising, dysuria, muscle/joint pain, confusion, depression, anxiety, skin lesions, etc.    Emergency Room/Hospital Course:  Patient was evaluated emergency room on 10/17/2022. His CMP was reassuring outside of hyperglycemia. His CBC was reassuring. His BNP was near baseline. His chest radiograph was reassuring. His EKG showed sinus rhythm. 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  performed by Katt Abel MD at [address] [place] [address] Right 9-30-15    CARPAL TUNNEL RELEASE Left 3/20/16    CHOLECYSTECTOMY, LAPAROSCOPIC N/A 2/19/2019    LAPAROSCOPIC CHOLECYSTECTOMY WITH CHOLANGIOGRAMS performed by Fariha Mena MD at [address] [place] [address]      with removal stone    CYSTOSCOPY  2/8/13    with Laser Vaporization of Enlarged Prostate    DIAGNOSTIC CARDIAC CATH LAB PROCEDURE      ERCP  02/18/2019    Sphincterotomy, stone removal    ERCP N/A 2/18/2019    ERCP SPHINCTER/PAPILLOTOMY performed by Narcisa Umana MD at [id number] [place] [address] Real    ERCP  2/18/2019    ERCP STONE REMOVAL performed by Narcisa Umana MD at [facility] Right 1/14/2020 PHACOEMULSIFICATION OF CATARACT RIGHT EYE WITH INTRAOCULAR LENS IMPLANT performed by Sydney Michelle MD at [address] Left 1/21/2020    PHACOEMULSIFICATION OF CATARACT LEFT EYE WITH INTRAOCULAR LENS IMPLANT -SLEEP APNEA- performed by Sydney Michelle MD at [address] Bilateral     right knee (2002) and left knee (2012)    KNEE ARTHROSCOPY  4/19/2011     left  knee    SKIN BIOPSY      skin Ca/SQUAMOUS CELL    THORACOTOMY      wedge resection    TONSILLECTOMY      TRANSESOPHAGEAL ECHOCARDIOGRAM N/A 9/20/2022    PIERO W/ANES. (9:00) performed by Ricky Burgos MD at [address] [facility] ENDOSCOPY       Allergies   Allergen Reactions    Bactrim [Sulfamethoxazole-Trimethoprim] Diarrhea    Brilinta [Ticagrelor]      dyspnea    Ciprofloxacin      Bad headaches    Macrobid [Nitrofurantoin Monohyd Macro]     Ozempic (0.25 Or 0.5 Mg-Dose) [Semaglutide(0.25 Or 0.5mg-Dos)]     Sulfamethoxazole Diarrhea    Theophylline      Theodur-caused severe headaches    Cefuroxime Axetil Rash and Itching    Cipro Xr Rash    Other Rash and Other (See Comments)     Ekg leads-glue    Tape Elwanda Busing Tape] Rash     Skin irritaion  Eats away at skin, paper tape OK  Plastic tape       Social History:  Reviewed. reports that he quit smoking about 46 years ago. His smoking use included cigarettes. He has a 34.00 pack-year smoking history. He has never used smokeless tobacco. He reports that he does not drink alcohol and does not use drugs. Family History:  Reviewed. family history includes Arthritis in his father; Cancer in his mother; Kidney Disease in his mother; Other in his father; Stroke in his father and mother; Substance Abuse in his father. No premature CAD. Review of System:  All other systems reviewed except for that noted above.  Pertinent negatives and positives are:     General: negative for fever, chills   Ophthalmic ROS: negative for - eye pain or loss of vision  ENT ROS: negative for - headaches, sore throat   Respiratory: negative for - cough, sputum  Cardiovascular: Reviewed in HPI  Gastrointestinal: negative for - abdominal pain, diarrhea, N/V  Hematology: negative for - bleeding, blood clots, bruising or jaundice  Genito-Urinary:  negative for - Dysuria or incontinence  Musculoskeletal: negative for - Joint swelling, muscle pain  Neurological: negative for - confusion, dizziness, headaches   Psychiatric: No anxiety, no depression. Dermatological: negative for - rash    Physical Examination:  Vitals:    10/18/22 2044   BP: 121/82   Pulse: 98   Resp: 20   Temp: 97.8 °F (36.6 °C)   SpO2: 97%        Intake/Output Summary (Last 24 hours) at 10/18/2022 2322  Last data filed at 10/18/2022 2039  Gross per 24 hour   Intake 1210 ml   Output 4675 ml   Net -3465 ml     In: 1210 [P.O.:1200; I.V.:10]  Out: 4675    Wt Readings from Last 3 Encounters:   10/18/22 (!) 304 lb 4.8 oz (138 kg)   10/10/22 (!) 312 lb (141.5 kg)   10/08/22 (!) 305 lb 9.6 oz (138.6 kg)     Temp  Av.7 °F (36.5 °C)  Min: 97 °F (36.1 °C)  Max: 98.3 °F (36.8 °C)  Pulse  Av.7  Min: 77  Max: 98  BP  Min: 118/83  Max: 144/105  SpO2  Av.1 %  Min: 94 %  Max: 100 %    Telemetry: Sinus rhythm with PACs. Constitutional: Alert. Oriented to person, place, and time. No distress. Head: Normocephalic and atraumatic. Mouth/Throat: Lips appear moist. Oropharynx is clear and moist.  Eyes: Conjunctivae normal. EOM are normal. .   Cardiovascular: Normal rate, regular rhythm. Normal S1&S2. Pulmonary/Chest: Bilateral respiratory sounds present. No respiratory accessory muscle use. No wheezes, No rhonchi. Abdominal: Soft. Normal bowel sounds present. No distension, No tenderness. No splenomegaly. No hernia. Musculoskeletal: No tenderness. No edema    Neurological: Alert and oriented. Cranial nerve II-XII grossly intact. Skin: Skin is warm and dry. No rash, lesions, ulcerations noted.   Psychiatric: No anxiety nor agitation. Labs:  Reviewed. Recent Labs     10/17/22  1931 10/18/22  0521    139   K 3.7 3.6   CL 99 100   CO2 25 25   BUN 31* 30*   CREATININE 1.3 1.2     Recent Labs     10/17/22  1931   WBC 8.2   HGB 11.8*   HCT 36.3*   MCV 90.1        Lab Results   Component Value Date/Time    TROPONINI <0.01 10/17/2022 07:31 PM     Lab Results   Component Value Date/Time    BNP 53 09/29/2013 11:30 PM    BNP 74 09/15/2013 06:30 PM    BNP 37 10/27/2012 02:50 PM     Lab Results   Component Value Date/Time    PROTIME 20.8 10/18/2022 07:46 AM    PROTIME 23.9 10/08/2022 06:13 AM    PROTIME 21.0 10/07/2022 08:55 AM    INR 1.80 10/18/2022 07:46 AM    INR 2.00 10/10/2022 12:00 AM    INR 2.13 10/08/2022 06:13 AM     Lab Results   Component Value Date/Time    CHOL 119 09/05/2022 04:37 AM    HDL 30 09/05/2022 04:37 AM    TRIG 147 09/05/2022 04:37 AM       Diagnostic and imaging results reviewed. ECG: Normal sinus rhythm. Echo: 09/06/2022   Summary   LV systolic function is normal with EF estimated at 55-60%. No regional wall motion abnormalities are noted. There is moderate concentric left ventricular hypertrophy. Normal left ventricular diastolic filling pressure. Biatrial enlargement. Mild posterior mitral annular calcification is present. Aortic valve appears sclerotic but opens adequately. Mild mitral and tricuspid regurgitation. Systolic pulmonary artery pressure (SPAP) estimated at 46mmHg (RA pressure 3   mmHg), consistent with mild pulmonary hypertension. 6- 55%, LVH, LAE, ANNIE     Cath: 09/08/2022  Findings:     1. Left main coronary artery was normal. It gave off the left anterior descending artery and left circumflex. 2. Left anterior descending artery has severe atherosclerotic disease. It was large in size. It gave off septal perforators and a moderate sized diagonal branch. The LAD covered the entire apex of the left ventricle.                ~There is evidence for [facility]'S disease of about 70% within the mid LAD. This is confirmed by intravascular ultrasound. Furthermore there is a previously placed stent within the mid LAD that was under deployed and under expanded. 3. Left circumflex has mild atherosclerotic disease. It was moderate in size. There was a moderate sized obtuse marginal branch. 4. Right coronary artery has mild atherosclerotic disease. It was moderate in size and was the dominant artery. 5. Left ventriculogram showed normal LVEF at 55-60%. Wall motion was normal . There was no significant mitral valve or aortic valve disease noted. LVEDP was normal. There was no gradient noted across the aortic valve during pullback of the catheter. I independently reviewed the ECG and telemetry.     Scheduled Meds:   aspirin  81 mg Oral Daily    atorvastatin  40 mg Oral Daily    dofetilide  250 mcg Oral 2 times per day    montelukast  10 mg Oral Daily    pantoprazole  40 mg Oral BID AC    sucralfate  1 g Oral TID    insulin glargine  0.15 Units/kg SubCUTAneous Nightly    insulin lispro  0.05 Units/kg SubCUTAneous TID WC    insulin lispro  0-8 Units SubCUTAneous TID WC    insulin lispro  0-4 Units SubCUTAneous Nightly    warfarin placeholder: dosing by pharmacy   Other RX Placeholder    furosemide  40 mg IntraVENous TID    [START ON 10/19/2022] metoprolol succinate  50 mg Oral Daily    potassium chloride  20 mEq Oral BID     Continuous Infusions:   dextrose      sodium chloride       PRN Meds:.glucose, dextrose bolus **OR** dextrose bolus, glucagon (rDNA), dextrose, sodium chloride flush, sodium chloride, potassium chloride **OR** potassium alternative oral replacement **OR** potassium chloride, magnesium sulfate, acetaminophen **OR** acetaminophen, melatonin, calcium carbonate, diphenhydrAMINE     Assessment:   Patient Active Problem List    Diagnosis Date Noted    SOB (shortness of breath)     Moderate obstructive sleep apnea 01/04/2016    Essential hypertension 07/31/2015    Atrial fibrillation ([facility] 02/05/2012    Exertional dyspnea 10/18/2022    PAF (paroxysmal atrial fibrillation) ([facility] 10/05/2022    Atrial fibrillation with RVR ([facility] 09/18/2022    Asthma 09/16/2022    PANG (dyspnea on exertion) 09/15/2022    CAD S/P percutaneous coronary angioplasty 09/09/2022    Anginal equivalent ([facility] 09/08/2022    Acute on chronic heart failure with preserved ejection fraction ([facility] 09/05/2022    Coronary artery disease involving native coronary artery of native heart 09/05/2022    COVID-19 09/04/2022    Obesity (BMI 30-39.9) 04/22/2022    Arthritis     Other fatigue 12/03/2021    Status post insertion of drug-eluting stent into left anterior descending (LAD) artery for coronary artery disease 06/29/2021    Angina at rest [facility]) 06/16/2021    Abnormal stress test 06/14/2021    History of venous thromboembolism 01/03/2020    Cataract of both eyes 01/03/2020    Morbid obesity with BMI of 40.0-44.9, adult ([facility] 04/03/2019    Type 2 diabetes mellitus with diabetic neuropathy ([facility] 02/25/2019    Atypical atrial flutter ([facility] 04/06/2017    S/P thoracotomy     Polyneuropathy 01/04/2016    Numbness and tingling of both legs 01/04/2016    Carpal tunnel syndrome 08/31/2015      Active Hospital Problems    Diagnosis Date Noted    Moderate obstructive sleep apnea [G47.33] 01/04/2016     Priority: High    Exertional dyspnea [R06.09] 10/18/2022     Priority: Medium    PAF (paroxysmal atrial fibrillation) ([facility] [I48.0] 10/05/2022     Priority: Medium    CAD S/P percutaneous coronary angioplasty [I25.10, Z98.61] 09/09/2022     Priority: Medium     Mr. Jessika Rangel is a pleasant 69year old male  with a medical history significant for symptomatic paroxysmal atrial fibrillation (previous on dofetilide [prolonged Qtc] and amiodarone [side effects]) now status post ablation on 10/06/2022, atrial flutter status post ablation (2017 - Melchormia Romberg), premature ventricular contractions status post ablation (2014), ischemic cardiomyopathy status post PCI (09/08/2022), hypertension, diabetes mellitus type II, history of DVT, and obstructive sleep apnea who presents from home with heart failure exacerbation. Problem List:  1. Heart failure with preserved ejection fraction exacerbation. 2. Atrial fibrillation/flutter status post ablation. 3. Ischemic cardiomyopathy. Assessment and Plan:  1. Heart failure with preserved ejection fraction exacerbation. Patient is a pleasant patient is a pleasant 69-year-old male with a complicated medical history significant for heart failure preserved ejection fraction, ischemic cardiomyopathy status post recent PCI, atrial fibrillation/flutter status post ablation, premature ventricular contractions status post ablation, diabetes mellitus type 2, morbid obesity, history of DVT, and obstructive sleep apnea who presents from home with heart failure with preserved ejection fraction exacerbation. Patient was volume overloaded during his ablation as marked by his right and left atrial pressures. Since his admission he has been diuresed with IV diuretics and has been feeling symptomatically improved however continues to be short of breath. He is laying flat in bed now and is not gasping for air. Since he has begun diuresis patient's creatinine has slightly improved. I agree with Dr. Alexandrea Srivastava. - Diuresis per cardiology and nephrology. - Agree with start Jardiance at time of discharge. 2. Atrial fibrillation/flutter status post ablation. Patient underwent successful. No recurrence as far as I can tell. His QTC is stable at 474. No changes recommended at this point.  - Continue apixaban. - Continue dofetilide 250 mcg BID (). - Continue increased metoprolol succinate 50 mg daily. - We will continue to follow along with you. 3. Ischemic cardiomyopathy. Stable.   -Continue to monitor clinically.;    Thank you for allowing me to participate in the care of Osman Daphne Rosy . If you have any questions/comments, please do not hesitate to contact us.     Steve Ng MD  Cardiac Electrophysiology  [address]  [phone] [facility]

## 2022-10-19 NOTE — PLAN OF CARE
Problem: Skin/Tissue Integrity  Goal: Absence of new skin breakdown  Description: 1. Monitor for areas of redness and/or skin breakdown  2. Assess vascular access sites hourly  3. Every 4-6 hours minimum:  Change oxygen saturation probe site  4. Every 4-6 hours:  If on nasal continuous positive airway pressure, respiratory therapy assess nares and determine need for appliance change or resting period.   10/18/2022 2055 by Lyndel Hamman, RN  Outcome: Progressing  10/18/2022 0949 by Maribell Sutton RN  Outcome: Progressing     Problem: Safety - Adult  Goal: Free from fall injury  10/18/2022 2055 by Lyndel Hamman, RN  Outcome: Progressing  10/18/2022 0951 by Maribell Sutton RN  Outcome: Progressing  Flowsheets (Taken 10/18/2022 0949)  Free From Fall Injury:   Arabella Low family/caregiver on patient safety   Based on caregiver fall risk screen, instruct family/caregiver to ask for assistance with transferring infant if caregiver noted to have fall risk factors  10/18/2022 0949 by Maribell Sutton RN  Outcome: Progressing  [address] (Taken 10/18/2022 0949)  Free From Fall Injury:   Instruct family/caregiver on patient safety   Based on caregiver fall risk screen, instruct family/caregiver to ask for assistance with transferring infant if caregiver noted to have fall risk factors     Problem: Pain  Goal: Verbalizes/displays adequate comfort level or baseline comfort level  10/18/2022 2055 by Lyndel Hamman, RN  Outcome: Progressing  10/18/2022 0951 by Maribell Sutton RN  Outcome: Progressing  Flowsheets (Taken 10/18/2022 3370)  Verbalizes/displays adequate comfort level or baseline comfort level:   Encourage patient to monitor pain and request assistance   Assess pain using appropriate pain scale   Administer analgesics based on type and severity of pain and evaluate response   Implement non-pharmacological measures as appropriate and evaluate response     Problem: Respiratory - Adult  Goal: Achieves optimal ventilation and oxygenation  Outcome: Progressing     Problem: Cardiovascular - Adult  Goal: Maintains optimal cardiac output and hemodynamic stability  Outcome: Progressing  Goal: Absence of cardiac dysrhythmias or at baseline  Outcome: Progressing     Problem: Skin/Tissue Integrity - Adult  Goal: Skin integrity remains intact  Outcome: Progressing     Problem: Gastrointestinal - Adult  Goal: Minimal or absence of nausea and vomiting  Outcome: Progressing  Goal: Maintains or returns to baseline bowel function  Outcome: Progressing     Problem: Metabolic/Fluid and Electrolytes - Adult  Goal: Electrolytes maintained within normal limits  Outcome: Progressing  Goal: Hemodynamic stability and optimal renal function maintained  Outcome: Progressing  Goal: Glucose maintained within prescribed range  Outcome: Progressing

## 2022-10-19 NOTE — PROGRESS NOTES
fatigue  Skin:  rash, pruritus, hair loss, bruising, dry skin, petechiae  Head, Face, Neck   headaches, swelling,  cervical adenopathy  Respiratory: shortness of breath, cough, or wheezing  Cardiovascular: chest pain, palpitations, dizzy, edema  Gastrointestinal: nausea, vomiting, diarrhea, constipation,belly pain    Yellow skin, blood in stool  Musculoskeletal:  back pain, muscle weakness, gait problems,       joint pain or swelling. Genitourinary:  dysuria, poor urine flow, flank pain, blood in urine  Neurologic:  vertigo, TIA'S, syncope, seizures, focal weakness  Psychosocial:  insomnia, anxiety, or depression. 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    History of knee replacement procedure of right knee     Hx of blood clots     2 DVT's    Hyperlipidemia     Hypertension     Knee osteoarthritis 1/17/2012    Left TKR 1/18/2012    Lung nodule     due to histoplasmosis    Neuromuscular disorder ([facility] [address]     Palpitations     stable with atenolol    Sleep apnea     uses CPAP    Stone, kidney     UTI (urinary tract infection) 01/23/2017       Past Surgical History:   Procedure Laterality Date    ABLATION OF DYSRHYTHMIC FOCUS  2013    a-fib    BARIATRIC SURGERY  2013    GASTRIC SLEEVE    CARDIAC SURGERY  2013    ablation    CARDIOVERSION N/A 9/20/2022    CARDIOVERSION W/ANES.  performed by Jeovanny Smiley MD at [address] [place] [address] Right 9-30-15    CARPAL TUNNEL RELEASE Left 3/20/16    CHOLECYSTECTOMY, LAPAROSCOPIC N/A 2/19/2019    LAPAROSCOPIC CHOLECYSTECTOMY WITH CHOLANGIOGRAMS performed by Vernon Hopson MD at [address] [facility] CYSTOSCOPY      with removal stone    CYSTOSCOPY  2/8/13    with Laser Vaporization of Enlarged Prostate    DIAGNOSTIC CARDIAC CATH LAB PROCEDURE      ERCP  02/18/2019    Sphincterotomy, stone removal    ERCP N/A 2/18/2019    ERCP SPHINCTER/PAPILLOTOMY performed by Mitzy Littlejohn MD at [address]  [address]    ERCP  2/18/2019    ERCP STONE REMOVAL performed by Mitzy Littlejohn MD at [facility] Right 1/14/2020    PHACOEMULSIFICATION OF CATARACT RIGHT EYE WITH INTRAOCULAR LENS IMPLANT performed by Nasra Ghotra MD at [address] Left 1/21/2020    PHACOEMULSIFICATION OF CATARACT LEFT EYE WITH INTRAOCULAR LENS IMPLANT -SLEEP APNEA- [facility]     right knee (2002) and left knee (2012)    KNEE ARTHROSCOPY  4/19/2011     left  knee    SKIN BIOPSY      skin Ca/SQUAMOUS CELL    THORACOTOMY      wedge resection    TONSILLECTOMY      TRANSESOPHAGEAL ECHOCARDIOGRAM N/A 9/20/2022    PIERO W/ANES. (9:00) performed by Linh Lepe MD at [address] [address]        reports that he quit smoking about 46 years ago. His smoking use included cigarettes. He has a 34.00 pack-year smoking history. He has never used smokeless tobacco. He reports that he does not drink alcohol and does not use drugs. family history includes Arthritis in his father; Cancer in his mother; Kidney Disease in his mother; Other in his father; Stroke in his father and mother; Substance Abuse in his father.          Medication:     Current Facility-Administered Medications: warfarin (COUMADIN) tablet 10 mg, 10 mg, Oral, Once  [START ON 10/20/2022] torsemide (DEMADEX) tablet 30 mg, 30 mg, Oral, Daily  clopidogrel (PLAVIX) tablet 75 mg, 75 mg, Oral, Daily  enoxaparin (LOVENOX) injection 135 mg, 1 mg/kg, SubCUTAneous, BID  aspirin chewable tablet 81 mg, 81 mg, Oral, Daily  atorvastatin (LIPITOR) tablet 40 mg, 40 mg, Oral, Daily  dofetilide (TIKOSYN) capsule 250 mcg, 250 mcg, Oral, 2 times per day  montelukast (SINGULAIR) tablet 10 mg, 10 mg, Oral, Daily  pantoprazole (PROTONIX) tablet 40 mg, 40 mg, Oral, BID AC  sucralfate (CARAFATE) tablet 1 g, 1 g, Oral, TID  glucose chewable tablet 16 g, 4 tablet, Oral, PRN  dextrose bolus 10% 125 mL, 125 mL, IntraVENous, PRN **OR** dextrose bolus 10% 250 mL, 250 mL, IntraVENous, PRN  glucagon (rDNA) injection 1 mg, 1 mg, SubCUTAneous, PRN  dextrose 10 % infusion, , IntraVENous, Continuous PRN  sodium chloride flush 0.9 % injection 10 mL, 10 mL, IntraVENous, PRN  0.9 % sodium chloride infusion, , IntraVENous, PRN  potassium chloride (KLOR-CON M) extended release tablet 40 mEq, 40 mEq, Oral, PRN **OR** potassium bicarb-citric acid (EFFER-K) effervescent tablet 40 mEq, 40 mEq, Oral, PRN **OR** potassium chloride 10 mEq/100 mL IVPB (Peripheral Line), 10 mEq, IntraVENous, PRN  magnesium sulfate 1000 mg in dextrose 5% 100 mL IVPB, 1,000 mg, IntraVENous, PRN  acetaminophen (TYLENOL) tablet 650 mg, 650 mg, Oral, Q6H PRN **OR** acetaminophen (TYLENOL) suppository 650 mg, 650 mg, Rectal, Q6H PRN  insulin glargine (LANTUS) injection vial 21 Units, 0.15 Units/kg, SubCUTAneous, Nightly  insulin lispro (HUMALOG) injection vial 7 Units, 0.05 Units/kg, SubCUTAneous, TID WC  insulin lispro (HUMALOG) injection vial 0-8 Units, 0-8 Units, SubCUTAneous, TID WC  insulin lispro (HUMALOG) injection vial 0-4 Units, 0-4 Units, SubCUTAneous, Nightly  melatonin tablet 3 mg, 3 mg, Oral, Nightly PRN  calcium carbonate (TUMS) chewable tablet 1,000 mg, 1,000 mg, Oral, TID PRN  diphenhydrAMINE (BENADRYL) tablet 25 mg, 25 mg, Oral, Nightly PRN  warfarin placeholder: dosing by pharmacy, , Other, RX Placeholder  metoprolol succinate (TOPROL XL) extended release tablet 50 mg, 50 mg, Oral, Daily  potassium chloride (KLOR-CON M) extended release tablet 20 mEq, 20 mEq, Oral, BID       Vitals :     Vitals: 10/19/22 1518   BP: 121/80   Pulse: 87   Resp: 18   Temp: 97.6 °F (36.4 °C)   SpO2: 96%       I & O :       Intake/Output Summary (Last 24 hours) at 10/19/2022 1556  Last data filed at 10/19/2022 1518  Gross per 24 hour   Intake 1200 ml   Output 3025 ml   Net -1825 ml          Physical Examination :     General appearance: Anxious- no, distressed- no, in good spirits-  Is lying flat on bed    HEENT: Lips- normal, teeth- ok , oral mucosa- moist  Neck : Mass- no, appears symmetrical, JVD- not visible  Respiratory: Respiratory effort-  normal, wheeze- no, crackles -   Cardiovascular:  Ausculation- No M/R/G, Edema 1+  Abdomen: visible mass- no, distention- no, scar- no, tenderness- no                            hepatosplenomegaly-  no  Musculoskeletal:  clubbing no,cyanosis- no , digital ischemia- no                           muscle strength- grossly normal , tone - grossly normal  Skin: rashes- no , ulcers- no, induration- no, tightening - no  Psychiatric:  Judgement and insight- normal           AAO X 3  Additional finding:      LABS:     Recent Labs     10/17/22  1931   WBC 8.2   HGB 11.8*   HCT 36.3*          Recent Labs     10/17/22  1931 10/18/22  0521 10/19/22  0535    139 139   K 3.7 3.6 3.8   CL 99 100 100   CO2 25 25 27   BUN 31* 30* 30*   CREATININE 1.3 1.2 1.4*   GLUCOSE 148* 128* 126*   MG  --  2.30 2.40

## 2022-10-19 NOTE — CARE COORDINATION
Chart reviewed. Spoke with MD and patient. Walk test did not qualify for home O2. Patient stated spoke with Cards today, [facility]. Spoke with Dr Isaac Isaacs, stated patient need to have anticoagulant therapy ordered. Stated she will order thru meds to beds. Updated Carolyn Baxter RN to watch for if needs lovenox injections, patient will need education. Wife at bedside. No further CM needs.  Omar Burgos, RN  Omar Burgos, RN

## 2022-10-19 NOTE — PROGRESS NOTES
Pharmacy Note  Warfarin Consult  Dx: AFib  Goal INR range 2-3   Home Warfarin dose: 5 mg daily     Date  INR  Warfarin  10/18              1.80                    5 mg  10/19              1.65                    10 mg     Recommend Warfarin 10 mg tonight x1. Daily INR ordered. Rx will continue to manage therapy per consult order.   Hadley Stokes, PharmD  10/19/2022 at 8:12 AM

## 2022-10-19 NOTE — PROGRESS NOTES
[facility], [place]   Progress Note  Cardiology    CC: sob    HPI: remains PANG but overall breathing a little better    Past Medical History   has a past medical history of Arthritis, Asthma, Atrial fibrillation ([facility] [address] Blood circulation, collateral, Diabetes mellitus ([address] DVT (deep venous thrombosis) ([facility] [address] Histoplasmosis, History of knee replacement procedure of right knee, Hx of blood clots, Hyperlipidemia, Hypertension, Knee osteoarthritis, Left TKR, Lung nodule, Neuromuscular disorder ([facility] [address] Palpitations, Sleep apnea, Stone, kidney, and UTI (urinary tract infection). Past Surgical History   has a past surgical history that includes Cystoscopy; Tonsillectomy; Knee arthroscopy (4/19/2011); Colonoscopy; Cystocopy (2/8/13); ablation of dysrhythmic focus (2013); Carpal tunnel release (Right, 9-30-15); Bariatric Surgery (2013); Carpal tunnel release (Left, 3/20/16); skin biopsy; Diagnostic Cardiac Cath Lab Procedure; joint replacement (Bilateral); Cardiac surgery (2013); thoracotomy; ERCP (02/18/2019); ERCP (N/A, 2/18/2019); ERCP (2/18/2019); Cholecystectomy, laparoscopic (N/A, 2/19/2019); Intracapsular cataract extraction (Right, 1/14/2020); Intracapsular cataract extraction (Left, 1/21/2020); transesophageal echocardiogram (N/A, 9/20/2022); and Cardioversion (N/A, 9/20/2022). Social History   reports that he quit smoking about 46 years ago. His smoking use included cigarettes. He has a 34.00 pack-year smoking history. He has never used smokeless tobacco. He reports that he does not drink alcohol and does not use drugs. Family History  family history includes Arthritis in his father; Cancer in his mother; Kidney Disease in his mother; Other in his father; Stroke in his father and mother; Substance Abuse in his father. Medications  Prior to Admission medications    Medication Sig Start Date End Date Taking?  Authorizing Provider   Multiple Vitamins-Minerals (THERAPEUTIC MULTIVITAMIN-MINERALS) tablet Take 2 tablets by mouth daily   Yes Historical Provider, MD   metFORMIN (GLUCOPHAGE) 500 MG tablet TAKE ONE TABLET BY MOUTH TWICE A DAY 10/17/22   Laneta Gosselin, MD   atorvastatin (LIPITOR) 40 MG tablet TAKE ONE TABLET BY MOUTH DAILY 10/17/22   Laneta Gosselin, MD   empagliflozin (JARDIANCE) 25 MG tablet 12.5 mg 10/3/22   Historical Provider, MD   dofetilide (TIKOSYN) 250 MCG capsule Take 1 capsule by mouth every 12 hours 10/8/22   SERENA Gonzlaes CNP   metoprolol succinate (TOPROL XL) 25 MG extended release tablet Take 1 tablet by mouth daily 10/9/22   SERENA Gonzales CNP   colchicine (COLCRYS) 0.6 MG tablet Take 1 tablet by mouth daily for 4 days 10/9/22 10/13/22  SERENA Gonzales CNP   pantoprazole (PROTONIX) 40 MG tablet Take 1 tablet by mouth 2 times daily (before meals) 10/8/22 11/7/22  SERENA Gonzales CNP   sucralfate (CARAFATE) 1 GM tablet Take 1 tablet by mouth in the morning, at noon, and at bedtime for 10 days 10/8/22 10/18/22  SERENA Gonzales CNP   torsemide (DEMADEX) 20 MG tablet Take 1.5 tablets daily 10/4/22   Lily Land MD   albuterol (PROVENTIL) (2.5 MG/3ML) 0.083% nebulizer solution Take 3 mLs by nebulization every 4 hours as needed for Wheezing or Shortness of Breath 9/22/22 9/22/23  COLIN Dennis   albuterol sulfate HFA (VENTOLIN HFA) 108 (90 Base) MCG/ACT inhaler Inhale 2 puffs into the lungs 4 times daily as needed for Wheezing or Shortness of Breath 9/18/22   COLIN Dennis   aspirin 81 MG chewable tablet Take 1 tablet by mouth daily 9/10/22   Cass Hernandez MD   clopidogrel (PLAVIX) 75 MG tablet Take 1 tablet by mouth daily 9/9/22 10/9/22  Cass Hernandez MD   lisinopril (PRINIVIL;ZESTRIL) 5 MG tablet Take 1 tablet by mouth daily 9/10/22 9/21/22  Cass Hernandez MD   warfarin (COUMADIN) 5 MG tablet TAKE ONE TABLET BY MOUTH DAILY 8/11/22   Laneta Gosselin, MD   montelukast (SINGULAIR) 10 MG tablet TAKE ONE TABLET BY MOUTH DAILY 7/1/22   Alexandrea Benjamin MD   spironolactone (ALDACTONE) 25 MG tablet TAKE ONE TABLET BY MOUTH DAILY  Patient not taking: No sig reported 7/1/22 9/15/22  Alexandrea Benjamin MD   Dulaglutide (TRULICITY) 5.24 FELICIANO/3.9IF SOPN Inject 0.75 mg into the skin once a week    Historical Provider, MD   blood glucose test strips (TRUE METRIX BLOOD GLUCOSE TEST) strip TEST ONCE DAILY. DX:E11.9 1/20/22   Alexandrea Benjamin MD   Easy Touch Lancets 32G/Twist MISC TEST DAILY. DX: E11.9 3/23/20   Alexandrea Benjamin MD   blood glucose test strips (TRUE METRIX BLOOD GLUCOSE TEST) strip USE TO CHECK BLOOD GLUCOSE DAILY. DX: E11.9 3/23/20   Alexandrea Benjamin MD   ACCU-CHEK MULTICLIX LANCETS MISC Check sugars once daily. Dx;E11.9 3/11/19   Alexandrea Benjamin MD   Lancet Devices (EASY TOUCH LANCING DEVICE) MISC Test Daily. DX: E11.9 12/26/18   Alexandrea Benjamin MD   Blood Glucose Monitoring Suppl (TRUE METRIX METER) VINNIE Use to check daily. DX;E11.9 12/22/18   Alexandrea Benjamin MD   Lancets Misc. (ACCU-CHEK MULTICLIX LANCET DEV) KIT Check sugars once daily. DX;E11.9 12/21/18   Alexandrea Benjamin MD   Biotin 5 MG TABS Take 5 mg by mouth daily    Historical Provider, MD   Cholecalciferol (VITAMIN D3) 5000 units TABS Take 5,000 Units by mouth daily    Historical Provider, MD   Cyanocobalamin (VITAMIN B-12 CR PO) Take 5,000 mcg by mouth every 7 days     Historical Provider, MD   magnesium oxide (MAG-OX) 400 MG tablet Take 400 mg by mouth daily. Historical Provider, MD       Allergies  Bactrim [sulfamethoxazole-trimethoprim], Brilinta [ticagrelor], Ciprofloxacin, Macrobid [nitrofurantoin monohyd macro], Ozempic (0.25 or 0.5 mg-dose) [semaglutide(0.25 or 0.5mg-dos)], Sulfamethoxazole, Theophylline, Cefuroxime axetil, Cipro xr, Other, and Tape [adhesive tape]    Review of Systems:   Reviewed.  No changes except as noted in HPI and A/P      Lab Results   Component Value Date    WBC 8.2 10/17/2022    HGB 11.8 (L) 10/17/2022    HCT 36.3 (L) 10/17/2022    MCV 90.1 10/17/2022     10/17/2022     Lab Results   Component Value Date    CREATININE 1.4 (H) 10/19/2022    BUN 30 (H) 10/19/2022     10/19/2022    K 3.8 10/19/2022     10/19/2022    CO2 27 10/19/2022    BNP 53 09/29/2013     Lab Results   Component Value Date    INR 1.65 (H) 10/19/2022    PROTIME 19.5 (H) 10/19/2022       I reviewed EKGs and radiology imaging. Pertinent findings and changes as described in assessment below. Physical Examination:    /84   Pulse 89   Temp 98.6 °F (37 °C) (Oral)   Resp 18   Ht 6' 3\" (1.905 m)   Wt (!) 300 lb 1.6 oz (136.1 kg)   SpO2 96%   BMI 37.51 kg/m²      General Appearance:  Alert, no distress, appears stated age   Head:  Normocephalic, without obvious abnormality, atraumatic   Eyes:  PERRL, conjunctiva/corneas clear         Nose: Nares normal, no drainage or sinus tenderness   Throat: Lips, mucosa, and tongue normal   Neck: Supple, symmetrical, trachea midline, no adenopathy         Lungs:   Clear to auscultation bilaterally    Chest Wall:  No tenderness or deformity   Heart:  Regular rate and rhythm, S1, S2 normal, no murmur, rub or gallop   Abdomen:   Soft, non tender, normal bowel sounds                Extremities: No cyanosis or edema   Pulses: 2+ and symmetric   Skin: Skin color, texture, turgor normal, no rashes    Pysch: Normal mood and affect   Neurologic: Normal gross motor and sensory exam.      Tele: NSR    Assessment:    CAD - stable. S/p PCI 9/8/22. Noted little to no improvement in symptoms post PCI  C dCHF - compensated post 2.5 liter negative fluid balance w/ IV diuretics   PANG - chronic, multifactorial but specific etiology unclear. Increased diuresis in past has had little effect. PCI had little to no effect. Significant improvement per patient post DCCV to NSR. Recurrent AF lead to subsequent ablation and remains NSR but still w/ PANG.  BNP is lowest in past 6 months, CXR w/o pulm edema and no hypoxia on RA. PAF - s/p ablation 10/6/2022. Tikosyn, AC  Nocturnal bradycardia 2:1 due to MARIA VICTORIA  Hypertension - stable  Hyperlipidemia - stable  Sleep apnea on CPAP  History of DVT   Diabetes Mellitus   Asthma   Tachycardia - intermittent  Fatigue   COVID + 9/4/2022     Plan  Diuretics to maintain goal weight < 300 pounds  May need to accept some elevation BUN/Cr as patient feels better when relatively dry. Discussed risk w/ pt - understands and agrees   Discussed options for diuretic regimen w/ pt and wife: Discharge on torsemide 30 mg daily. Extra 20 mg for weight > 300 pounds. Hold daily dose for weight < 295 pounds. He understands, agrees.    Discharge on Toprol 50 mg daily  BMP and BNP in 1 week   OK discharge from cardiology standpoint      Francisca Carl MD, 10/19/2022 11:40 AM

## 2022-10-19 NOTE — PROGRESS NOTES
Hospitalist Progress Note      PCP: Jose Hayes MD    Date of Admission: 10/17/2022    Chief Complaint:     Subjective: This morning he was lightheaded on rising, but this has improved and he is no longer experiencing symptom on rising in the afternoon. He also walked the hallway without significant dyspnea and did not desaturate on RA. He denies lightheadedness, chest pain, palpitations. He also denies nausea, abdominal pain, dysuria, diarrhea. He denies melena, rectal bleeding.     Medications:  Reviewed    Infusion Medications    dextrose      sodium chloride       Scheduled Medications    warfarin  10 mg Oral Once    aspirin  81 mg Oral Daily    atorvastatin  40 mg Oral Daily    dofetilide  250 mcg Oral 2 times per day    montelukast  10 mg Oral Daily    pantoprazole  40 mg Oral BID AC    sucralfate  1 g Oral TID    insulin glargine  0.15 Units/kg SubCUTAneous Nightly    insulin lispro  0.05 Units/kg SubCUTAneous TID WC    insulin lispro  0-8 Units SubCUTAneous TID WC    insulin lispro  0-4 Units SubCUTAneous Nightly    warfarin placeholder: dosing by pharmacy   Other RX Placeholder    furosemide  40 mg IntraVENous TID    metoprolol succinate  50 mg Oral Daily    potassium chloride  20 mEq Oral BID     PRN Meds: glucose, dextrose bolus **OR** dextrose bolus, glucagon (rDNA), dextrose, sodium chloride flush, sodium chloride, potassium chloride **OR** potassium alternative oral replacement **OR** potassium chloride, magnesium sulfate, acetaminophen **OR** acetaminophen, melatonin, calcium carbonate, diphenhydrAMINE      Intake/Output Summary (Last 24 hours) at 10/19/2022 1042  Last data filed at 10/19/2022 0535  Gross per 24 hour   Intake 1320 ml   Output 3725 ml   Net -2405 ml       Physical Exam Performed:    /83   Pulse 100   Temp 98 °F (36.7 °C) (Oral)   Resp 18   Ht 6' 3\" (1.905 m)   Wt (!) 300 lb 1.6 oz (136.1 kg)   SpO2 96%   BMI 37.51 kg/m²       GEN alert, in no distress  HEENT normocephalic, anicteric sclera, EOMI, mucosa moist, no stridor  NECK supple, trachea midline  CHEST  RESP on RA, in no distress, clear to auscultation  CARDS RRR, S1, S2, no murmurs, no edema, radial pulse 2+, DP pulse 2+  ABD +BS, soft nontender  MSK no cyanosis, no clubbing  SKIN warm, dry  NEURO alert, oriented x 3, no facial asymmetry, no dysarthria, moving spontaneously  PSYCH normal mood      Labs:   Recent Labs     10/17/22  1931   WBC 8.2   HGB 11.8*   HCT 36.3*        Recent Labs     10/17/22  1931 10/18/22  0521 10/19/22  0535    139 139   K 3.7 3.6 3.8   CL 99 100 100   CO2 25 25 27   BUN 31* 30* 30*   CREATININE 1.3 1.2 1.4*   CALCIUM 9.1 9.0 8.8     Recent Labs     10/17/22  1931   AST 24   ALT 27   BILITOT 0.5   ALKPHOS 107     Recent Labs     10/18/22  0746 10/19/22  0535   INR 1.80* 1.65*     Recent Labs     10/17/22  1931   TROPONINI <0.01       Urinalysis:      Lab Results   Component Value Date/Time    NITRU Negative 10/05/2022 01:26 PM    WBCUA 3-5 09/15/2022 04:00 PM    BACTERIA Rare 09/15/2022 04:00 PM    RBCUA 0-2 09/15/2022 04:00 PM    BLOODU Negative 10/05/2022 01:26 PM    SPECGRAV 1.010 10/05/2022 01:26 PM    GLUCOSEU Negative 10/05/2022 01:26 PM    GLUCOSEU NEGATIVE 02/05/2012 09:00 AM       Radiology:  XR CHEST PORTABLE   Final Result   No radiographic evidence of acute cardiopulmonary disease. Stable postsurgical sequela right upper lung.                  Assessment/Plan:    Active Hospital Problems    Diagnosis Date Noted    Moderate obstructive sleep apnea [G47.33] 01/04/2016     Priority: High    Exertional dyspnea [R06.09] 10/18/2022     Priority: Medium    Pulmonary edema with congestive heart failure ([facility] [address] [I50.1] 10/18/2022     Priority: Medium    PAF (paroxysmal atrial fibrillation) ([address] [I48.0] 10/05/2022     Priority: Medium    CAD S/P percutaneous coronary angioplasty [I25.10, Z98.61] 09/09/2022     Priority: Medium     This is a 69 y.o. male with history of class II obesity, HTN, DM c/b neuropathy, dyslipidemia, CAD s/p PCI to LAD, chronic HFpEF, Afib, atypical Flutter, VTE, asthma, who presented from home by private vehicle on 10/18 with low energy, exertional dyspnea, and lower extremity edema. He has undergone extensive testing and invasive interventions because of this exertional dyspnea. He underwent LHC on 9/8/2022 to evaluate causes underlying his dyspnea. One stenotic lesion in the LAD was dilated and stented. He didn't notice any change in his symptoms. He went into atrial fibrillation thereafter so he then underwent PIERO/DCCV on 9/20/22. He felt much better in terms of dyspnea afterward. He went back into atrial fibrillation, became dyspneic again, and so was again hospitalized 10/5 - 9/22. He underwent atrial fibrillation ablation on 10/6/22. He was also started on dofetilide during that admission. He denies orthopnea and PND. He has been able to ambulate w/ a walker, but has been limited to short distances on level ground. Acute on Chronic HFpEF  CAD s/p PCI to LAD in 9/2022  Ischemic CM  - Continue home ASA 81 mg daily, plavix 75 mg daily, lipitor 40 mg HS.  - Increased toprol XL to 50 mg daily.  - Diuresed with IV lasix (10/17-10/19). Transitioned to PO torsemide 30 mg daily (10/20-). - Restart jardiance 12.5 mg dailyat time of discharge    Afib  History of Atypical Flutter  - s/pTEE/DCCV on 9/20/22.  - s/p Ablation on 10/6/22.  - EKG 10/17 showed NSR, HR 87, 1st degree AV block, PAC's. - Continue dofetilide 250 mg BID.  - Increased toprol XL to 50 mg daily. - Continue coumadin. Start bridging with lovenox (10/19). History of VTE  - Continue coumadin  - Start lovenox bridging since INR downtrended    HTN  - Home on toprol XL 25 mg daily, lisinopril 5 mg daily, spironolactone 25 mg daily, and torsemide 30 mg daily.  - Increased toprol XL to 50 mg daily.  - Diuresing with IV lasix (10/17-10/19).   Transition to home torsemide on 10/20.    - Restart spironolactone as tolertated  - Restart low dose lisinopril as tolerated. DM c/b neuropathy  - Home on jardiance 12.5 mg daily, metformin 500 mg BID, dulaglutide 0.75 mg weekly. - Started lantus 21 units, lispro 7 units TID with meals, normal SSI. He has not required insulin adjustments and glucose has not been elevated. Discontinue scheduled insulin. Dyslipidemia  - continue home lipitor 40 mg HS. Class II obesity  - lifestyle modification. Asthma  - not in acute exacerbation. - continue home montelukast 10 mg daily. DVT Prophylaxis:   Diet: ADULT DIET; Regular; 3 carb choices (45 gm/meal); Low Fat/Low Chol/High Fiber/RUBEN  Code Status: Full Code    PT/OT Eval Status:     Dispo - discharge after INR therapeutic. Patient does not want to switch to another agent. He wants to continue coumadin.     Shantel Del Rosario MD

## 2022-10-19 NOTE — DISCHARGE SUMMARY
Hospital Medicine Discharge Summary    Patient ID: Indy Leon      Patient's PCP: Mo Trinh MD    Admit Date: 10/17/2022     Discharge Date:   10/19/2022    Admitting Physician: Jake Narvaez MD     Discharge Physician: Beau Larson MD     Discharge Diagnoses: Active Hospital Problems    Diagnosis Date Noted    Moderate obstructive sleep apnea [G47.33] 01/04/2016     Priority: High    Exertional dyspnea [R06.09] 10/18/2022     Priority: Medium    Pulmonary edema with congestive heart failure ([facility] [address] [I50.1] 10/18/2022     Priority: Medium    PAF (paroxysmal atrial fibrillation) ([facility] [address] [I48.0] 10/05/2022     Priority: Medium    CAD S/P percutaneous coronary angioplasty [I25.10, Z98.61] 09/09/2022     Priority: Medium       The patient was seen and examined on day of discharge and this discharge summary is in conjunction with any daily progress note from day of discharge. Hospital Course:     He does not require oxygen during 6-minute walk test.  Patient does not want HHC, he expressed desire to do cardiac rehabiliation. He denies lightheadedness, shortness of breath, chest pain, palpitations. He denies nausea, abdominal pain, dysuria, diarrhea. Physical Exam Performed:     /80   Pulse 87   Temp 97.6 °F (36.4 °C) (Oral)   Resp 18   Ht 6' 3\" (1.905 m)   Wt (!) 300 lb 1.6 oz (136.1 kg)   SpO2 96%   BMI 37.51 kg/m²     GEN alert, in no distress  HEENT normocephalic, anicteric sclera, EOMI, mucosa moist, no stridor  NECK supple, trachea midline  CHEST  RESP on RA, in no distress, clear to auscultation  CARDS RRR, S1, S2, no murmurs, no edema, radial pulse 2+, DP pulse 2+  ABD +BS, soft nontender  MSK no cyanosis, no clubbing  SKIN warm, dry  NEURO alert, oriented x 3, no facial asymmetry, no dysarthria, moving spontaneously  PSYCH normal mood    Labs:  For convenience and continuity at follow-up the following most recent labs are provided:      CBC:    Lab Results Component Value Date/Time    WBC 8.2 10/17/2022 07:31 PM    HGB 11.8 10/17/2022 07:31 PM    HCT 36.3 10/17/2022 07:31 PM     10/17/2022 07:31 PM       Renal:    Lab Results   Component Value Date/Time     10/19/2022 05:35 AM    K 3.8 10/19/2022 05:35 AM    K 3.7 10/17/2022 07:31 PM     10/19/2022 05:35 AM    CO2 27 10/19/2022 05:35 AM    BUN 30 10/19/2022 05:35 AM    CREATININE 1.4 10/19/2022 05:35 AM    CALCIUM 8.8 10/19/2022 05:35 AM    PHOS 3.1 09/21/2022 04:16 AM         Significant Diagnostic Studies    Radiology:   XR CHEST PORTABLE   Final Result   No radiographic evidence of acute cardiopulmonary disease. Stable postsurgical sequela right upper lung.                 Consults:     IP CONSULT TO CARDIOLOGY  IP CONSULT TO HOSPITALIST  IP CONSULT TO HEART FAILURE NURSE/COORDINATOR  IP CONSULT TO DIETITIAN  IP CONSULT TO CARDIOLOGY  IP CONSULT TO PHARMACY  IP CONSULT TO RESPIRATORY CARE  IP CONSULT TO NEPHROLOGY    Disposition:  improved    Condition at Discharge: Stable    Discharge Instructions/Follow-up:      Code Status:  Full Code     Activity: activity as tolerated    Diet: cardiac diet      Discharge Medications:     Current Discharge Medication List             Details   enoxaparin (LOVENOX) 150 MG/ML SOSY injection Inject 0.93 mLs into the skin 2 times daily for 7 days  Qty: 14 mL, Refills: 0                Details   aspirin 81 MG chewable tablet Take 1 tablet by mouth daily  Qty: 30 tablet, Refills: 11      warfarin (COUMADIN) 5 MG tablet TAKE ONE TABLET BY MOUTH DAILY  Qty: 90 tablet, Refills: 1      metFORMIN (GLUCOPHAGE) 500 MG tablet TAKE ONE TABLET BY MOUTH TWICE A DAY  Qty: 180 tablet, Refills: 0      atorvastatin (LIPITOR) 40 MG tablet TAKE ONE TABLET BY MOUTH DAILY  Qty: 90 tablet, Refills: 3      metoprolol succinate (TOPROL XL) 50 MG extended release tablet Take 1 tablet by mouth daily  Qty: 30 tablet, Refills: 3      spironolactone (ALDACTONE) 25 MG tablet TAKE ONE TABLET BY MOUTH DAILY  Qty: 90 tablet, Refills: 0      torsemide (DEMADEX) 10 MG tablet Take 3 tablets by mouth daily  Qty: 30 tablet, Refills: 3      clopidogrel (PLAVIX) 75 MG tablet Take 1 tablet by mouth daily  Qty: 30 tablet, Refills: 1      pantoprazole (PROTONIX) 40 MG tablet Take 1 tablet by mouth 2 times daily (before meals)  Qty: 60 tablet, Refills: 1      sucralfate (CARAFATE) 1 GM tablet Take 1 tablet by mouth in the morning, at noon, and at bedtime for 10 days  Qty: 30 tablet, Refills: 0                Details   Multiple Vitamins-Minerals (THERAPEUTIC MULTIVITAMIN-MINERALS) tablet Take 2 tablets by mouth daily      empagliflozin (JARDIANCE) 25 MG tablet 12.5 mg      dofetilide (TIKOSYN) 250 MCG capsule Take 1 capsule by mouth every 12 hours  Qty: 60 capsule, Refills: 3      albuterol (PROVENTIL) (2.5 MG/3ML) 0.083% nebulizer solution Take 3 mLs by nebulization every 4 hours as needed for Wheezing or Shortness of Breath  Qty: 1080 mL, Refills: 3    Associated Diagnoses: Persistent asthma without complication, unspecified asthma severity      albuterol sulfate HFA (VENTOLIN HFA) 108 (90 Base) MCG/ACT inhaler Inhale 2 puffs into the lungs 4 times daily as needed for Wheezing or Shortness of Breath  Qty: 1 each, Refills: 3    Comments: Auto sub proair, ventolin or proventil based upon lowest cost please      montelukast (SINGULAIR) 10 MG tablet TAKE ONE TABLET BY MOUTH DAILY  Qty: 90 tablet, Refills: 1      Dulaglutide (TRULICITY) 5.63 HZ/0.7PY SOPN Inject 0.75 mg into the skin once a week      !! blood glucose test strips (TRUE METRIX BLOOD GLUCOSE TEST) strip TEST ONCE DAILY. DX:E11.9  Qty: 100 strip, Refills: 3      !! Easy Touch Lancets 32G/Twist MISC TEST DAILY. DX: E11.9  Qty: 100 each, Refills: 3      !! blood glucose test strips (TRUE METRIX BLOOD GLUCOSE TEST) strip USE TO CHECK BLOOD GLUCOSE DAILY.  DX: E11.9  Qty: 100 each, Refills: 3    Associated Diagnoses: Type 2 diabetes mellitus without complication, without long-term current use of insulin ([facility] [address]      ! ! ACCU-CHEK MULTICLIX LANCETS MISC Check sugars once daily. Dx;E11.9  Qty: 100 each, Refills: 11    Associated Diagnoses: Type 2 diabetes mellitus without complication, without long-term current use of insulin ([facility])      Lancet Devices (EASY TOUCH LANCING DEVICE) MISC Test Daily. DX: E11.9  Qty: 1 each, Refills: 0      Blood Glucose Monitoring Suppl (TRUE METRIX METER) VINNIE Use to check daily. DX;E11.9  Qty: 1 Device, Refills: 0      Lancets Misc. (ACCU-CHEK MULTICLIX LANCET DEV) KIT Check sugars once daily. DX;E11.9  Qty: 1 kit, Refills: 0      Biotin 5 MG TABS Take 5 mg by mouth daily      Cholecalciferol (VITAMIN D3) 5000 units TABS Take 5,000 Units by mouth daily      Cyanocobalamin (VITAMIN B-12 CR PO) Take 5,000 mcg by mouth every 7 days        ! ! - Potential duplicate medications found. Please discuss with provider. Time Spent on discharge is more than 30 minutes in the examination, evaluation, counseling and review of medications and discharge plan. Signed:    Benito Young MD   10/19/2022      Thank you Jyotsna Diallo MD for the opportunity to be involved in this patient's care. If you have any questions or concerns please feel free to contact me at [phone].

## 2022-10-19 NOTE — PROGRESS NOTES
Oxygen documentation:    O2 saturation at REST on ROOM AIR = ___95___%    If saturation is 89% or above please proceed with steps 2 and 3.     O2 saturation with AMBULATION of ___100__ feet on ROOM AIR = ___93__%  O2 saturation with AMBULATION on _______ liter/min = ______%    DCP notified: ___yes___

## 2022-10-19 NOTE — DISCHARGE INSTRUCTIONS
Please give yourself lovenox injections every 12 hours to bridge coumadin, until coumadin becomes therapeutic at INR 2-3 for 24 hours. After 24 hours of therapeutic INR 2-3, stop lovenox. FOLLOW-UP APPOINTMENTS    [facility] - Follow-up appointment on December 8th at 2:15 pm with Dr Patrick Arango, [facility]. You and your one visitor will need to have your mouth and nose covered  with a mask. No children please. [facility],  [facility], [address] [place] [address], [address] [place] [address], [address] [place] [address]. Office #: [phone]. If you are unable to make this appointment, please call to reschedule. Directions to [place] [address] towards [place]. [address] [facility] exit. Right off exit. Cross over [employer]. Right on [address] Left into hospital. Follow the signs to the emergency room ( turn left toward the Emergency room). Go right at the first stop sign. Just past the Emergency room at the second stop sign turn right and go up the ramp and park on the top level if possible. Go in the glass doors of the [facility] we on the top level of the garage [address]. As soon as you get in the door turn left and our office is the one with the glass doors.

## 2022-10-20 LAB
ANION GAP SERPL CALCULATED.3IONS-SCNC: 10 MMOL/L (ref 3–16)
BUN BLDV-MCNC: 37 MG/DL (ref 7–20)
CALCIUM SERPL-MCNC: 9.3 MG/DL (ref 8.3–10.6)
CHLORIDE BLD-SCNC: 101 MMOL/L (ref 99–110)
CO2: 28 MMOL/L (ref 21–32)
CREAT SERPL-MCNC: 1.3 MG/DL (ref 0.8–1.3)
GFR SERPL CREATININE-BSD FRML MDRD: 57 ML/MIN/{1.73_M2}
GLUCOSE BLD-MCNC: 102 MG/DL (ref 70–99)
GLUCOSE BLD-MCNC: 117 MG/DL (ref 70–99)
GLUCOSE BLD-MCNC: 120 MG/DL (ref 70–99)
GLUCOSE BLD-MCNC: 131 MG/DL (ref 70–99)
GLUCOSE BLD-MCNC: 177 MG/DL (ref 70–99)
INR BLD: 1.73 (ref 0.87–1.14)
MAGNESIUM: 2.5 MG/DL (ref 1.8–2.4)
PERFORMED ON: ABNORMAL
POTASSIUM SERPL-SCNC: 4 MMOL/L (ref 3.5–5.1)
PROTHROMBIN TIME: 20.2 SEC (ref 11.7–14.5)
SODIUM BLD-SCNC: 139 MMOL/L (ref 136–145)

## 2022-10-20 PROCEDURE — 1200000000 HC SEMI PRIVATE

## 2022-10-20 PROCEDURE — 6370000000 HC RX 637 (ALT 250 FOR IP): Performed by: INTERNAL MEDICINE

## 2022-10-20 PROCEDURE — 85610 PROTHROMBIN TIME: CPT

## 2022-10-20 PROCEDURE — 6360000002 HC RX W HCPCS: Performed by: INTERNAL MEDICINE

## 2022-10-20 PROCEDURE — 83735 ASSAY OF MAGNESIUM: CPT

## 2022-10-20 PROCEDURE — 80048 BASIC METABOLIC PNL TOTAL CA: CPT

## 2022-10-20 PROCEDURE — 36415 COLL VENOUS BLD VENIPUNCTURE: CPT

## 2022-10-20 PROCEDURE — G0378 HOSPITAL OBSERVATION PER HR: HCPCS

## 2022-10-20 PROCEDURE — 96372 THER/PROPH/DIAG INJ SC/IM: CPT

## 2022-10-20 RX ORDER — WARFARIN SODIUM 7.5 MG/1
7.5 TABLET ORAL
Status: COMPLETED | OUTPATIENT
Start: 2022-10-20 | End: 2022-10-20

## 2022-10-20 RX ADMIN — DIPHENHYDRAMINE HCL 25 MG: 25 TABLET ORAL at 20:18

## 2022-10-20 RX ADMIN — SUCRALFATE 1 G: 1 TABLET ORAL at 20:19

## 2022-10-20 RX ADMIN — MONTELUKAST SODIUM 10 MG: 10 TABLET, COATED ORAL at 08:44

## 2022-10-20 RX ADMIN — SUCRALFATE 1 G: 1 TABLET ORAL at 08:44

## 2022-10-20 RX ADMIN — WARFARIN SODIUM 7.5 MG: 7.5 TABLET ORAL at 16:55

## 2022-10-20 RX ADMIN — PANTOPRAZOLE SODIUM 40 MG: 40 TABLET, DELAYED RELEASE ORAL at 05:29

## 2022-10-20 RX ADMIN — METOPROLOL SUCCINATE 50 MG: 50 TABLET, EXTENDED RELEASE ORAL at 08:43

## 2022-10-20 RX ADMIN — DOFETILIDE 250 MCG: 0.25 CAPSULE ORAL at 20:19

## 2022-10-20 RX ADMIN — ENOXAPARIN SODIUM 135 MG: 150 INJECTION SUBCUTANEOUS at 20:19

## 2022-10-20 RX ADMIN — TORSEMIDE 30 MG: 20 TABLET ORAL at 08:43

## 2022-10-20 RX ADMIN — ATORVASTATIN CALCIUM 40 MG: 40 TABLET, FILM COATED ORAL at 08:44

## 2022-10-20 RX ADMIN — POTASSIUM CHLORIDE 20 MEQ: 1500 TABLET, EXTENDED RELEASE ORAL at 20:18

## 2022-10-20 RX ADMIN — PANTOPRAZOLE SODIUM 40 MG: 40 TABLET, DELAYED RELEASE ORAL at 16:55

## 2022-10-20 RX ADMIN — CLOPIDOGREL BISULFATE 75 MG: 75 TABLET ORAL at 08:44

## 2022-10-20 RX ADMIN — POTASSIUM CHLORIDE 20 MEQ: 1500 TABLET, EXTENDED RELEASE ORAL at 08:43

## 2022-10-20 RX ADMIN — DOFETILIDE 250 MCG: 0.25 CAPSULE ORAL at 08:43

## 2022-10-20 RX ADMIN — ASPIRIN 81 MG 81 MG: 81 TABLET ORAL at 08:45

## 2022-10-20 RX ADMIN — ENOXAPARIN SODIUM 135 MG: 150 INJECTION SUBCUTANEOUS at 08:43

## 2022-10-20 NOTE — PLAN OF CARE
Problem: Safety - Adult  Goal: Free from fall injury  Outcome: Progressing     Problem: Respiratory - Adult  Goal: Achieves optimal ventilation and oxygenation  Outcome: Progressing     Problem: Skin/Tissue Integrity - Adult  Goal: Skin integrity remains intact  Outcome: Progressing

## 2022-10-20 NOTE — PROGRESS NOTES
Interval History and plan:     Creatinine now better at 1.3  EGFR of 57 mL/min  Now on 30 mg of torsemide a day  Oxygen on room air and weight is down to 301 pound-from peak  Of     Plan:    Going home with torsemide  Dose adjusted by cardiology  Now on oral diuretics and creatinine coming down  Will be followed up shortly by cardiology outpatient  Will also follow-up in office myself  He will benefit from [place] long-term but that has to be started once dose for diuretics is stabilized     Outpatient labs ordered- to go to [facility]                      Assessment :     HFpEF  Known to have CAD-SP PCI 9/8/2022  Was on Jardiance 12.5 mg daily at home but not restarted  Was on torsemide 40 recently changed to 30 mg/day  Echo: 9/22-EF 55%, moderate MR,     Hypertension   BP: (128-129)/(85-87)  Heart Rate:  []   BP goal inpatient [id number] systolic inpatient  Also has atrial fibrillation  Also has MARIA VICTORIA      Diabetes mellitus type 2  History of DVT        [facility] would like to thank Shantel Del Rosario MD   for opportunity to serve this patient      Please call with questions at-   24 Hrs Answering service [phone] or  7 am- 5 pm via Perfect serve or cell phone  Anna Diana MD          CC/reason for consult :     Fluid overload/SULAIMAN     HPI :     Colton Shepherd is a 69 y.o. male presented to   the hospital on 10/17/2022 with edema of the legs and shortness of breath. He is known to have chronic congestive heart failure diastolic dysfunction and coronary artery disease followed by cardiology. He was on 40 mg of torsemide which was decreased to 30 mg recently. He has shortness of breath and edema getting worse progressively for last several days because of which he came to the emergency room. Was supposed to be seen by me in office but unfortunately he had to come to the hospital before given being seen.   We are consulted for diastolic dysfunction/fluid overload, SULAIMAN    ROS:     Seen with-no family    positives in bold   Constitutional:  fever, chills, weakness, weight change, fatigue  Skin:  rash, pruritus, hair loss, bruising, dry skin, petechiae  Head, Face, Neck   headaches, swelling,  cervical adenopathy  Respiratory: shortness of breath, cough, or wheezing  Cardiovascular: chest pain, palpitations, dizzy, edema  Gastrointestinal: nausea, vomiting, diarrhea, constipation,belly pain    Yellow skin, blood in stool  Musculoskeletal:  back pain, muscle weakness, gait problems,       joint pain or swelling. Genitourinary:  dysuria, poor urine flow, flank pain, blood in urine  Neurologic:  vertigo, TIA'S, syncope, seizures, focal weakness  Psychosocial:  insomnia, anxiety, or depression. 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  performed by Donya Spears MD at [facility] Right 9-30-15    CARPAL TUNNEL RELEASE Left 3/20/16    CHOLECYSTECTOMY, LAPAROSCOPIC N/A 2/19/2019    LAPAROSCOPIC CHOLECYSTECTOMY WITH CHOLANGIOGRAMS performed by Sandhya Gutierrez MD at [facility]      with removal stone    CYSTOSCOPY  2/8/13    with Laser Vaporization of Enlarged Prostate    DIAGNOSTIC CARDIAC CATH LAB PROCEDURE      ERCP  02/18/2019    Sphincterotomy, stone removal    ERCP N/A 2/18/2019    ERCP SPHINCTER/PAPILLOTOMY performed by Sabrina Valle MD at [address] [place] [address]    ERCP  2/18/2019    ERCP STONE REMOVAL performed by Sabrina Valle MD at [facility] Right 1/14/2020    PHACOEMULSIFICATION OF CATARACT RIGHT EYE WITH INTRAOCULAR LENS IMPLANT performed by Hunter Shukla MD at [facility] [address] Left 1/21/2020    PHACOEMULSIFICATION OF CATARACT LEFT EYE WITH INTRAOCULAR LENS IMPLANT -SLEEP APNEA- performed by Hunter Shukla MD at [facility] 75 Bilateral     right knee (2002) and left knee (2012)    KNEE ARTHROSCOPY  4/19/2011     left  knee    SKIN BIOPSY      skin Ca/SQUAMOUS CELL    THORACOTOMY      wedge resection    TONSILLECTOMY      TRANSESOPHAGEAL ECHOCARDIOGRAM N/A 9/20/2022    PIERO W/ANES. (9:00) performed by Kalina Buitrago MD at [address] [place] [address]        reports that he quit smoking about 46 years ago. His smoking use included cigarettes. He has a 34.00 pack-year smoking history. He has never used smokeless tobacco. He reports that he does not drink alcohol and does not use drugs. family history includes Arthritis in his father; Cancer in his mother; Kidney Disease in his mother; Other in his father; Stroke in his father and mother; Substance Abuse in his father.          Medication:     Current Facility-Administered Medications: warfarin (COUMADIN) tablet 7.5 mg, 7.5 mg, Oral, Once  torsemide (DEMADEX) tablet 30 mg, 30 mg, Oral, Daily  clopidogrel (PLAVIX) tablet 75 mg, 75 mg, Oral, Daily  enoxaparin (LOVENOX) injection 135 mg, 1 mg/kg, SubCUTAneous, BID  aspirin chewable tablet 81 mg, 81 mg, Oral, Daily  atorvastatin (LIPITOR) tablet 40 mg, 40 mg, Oral, Daily  dofetilide (TIKOSYN) capsule 250 mcg, 250 mcg, Oral, 2 times per day  montelukast (SINGULAIR) tablet 10 mg, 10 mg, Oral, Daily  pantoprazole (PROTONIX) tablet 40 mg, 40 mg, Oral, BID AC  sucralfate (CARAFATE) tablet 1 g, 1 g, Oral, TID  glucose chewable tablet 16 g, 4 tablet, Oral, PRN  dextrose bolus 10% 125 mL, 125 mL, IntraVENous, PRN **OR** dextrose bolus 10% 250 mL, 250 mL, IntraVENous, PRN  glucagon (rDNA) injection 1 mg, 1 mg, SubCUTAneous, PRN  dextrose 10 % infusion, , IntraVENous, Continuous PRN  sodium chloride flush 0.9 % injection 10 mL, 10 mL, IntraVENous, PRN  0.9 % sodium chloride infusion, , IntraVENous, PRN  potassium chloride (KLOR-CON M) extended release tablet 40 mEq, 40 mEq, Oral, PRN **OR** potassium bicarb-citric acid (EFFER-K) effervescent tablet 40 mEq, 40 mEq, Oral, PRN **OR** potassium chloride 10 mEq/100 mL IVPB (Peripheral Line), 10 mEq, IntraVENous, PRN  magnesium sulfate 1000 mg in dextrose 5% 100 mL IVPB, 1,000 mg, IntraVENous, PRN  acetaminophen (TYLENOL) tablet 650 mg, 650 mg, Oral, Q6H PRN **OR** acetaminophen (TYLENOL) suppository 650 mg, 650 mg, Rectal, Q6H PRN  insulin lispro (HUMALOG) injection vial 0-8 Units, 0-8 Units, SubCUTAneous, TID WC  insulin lispro (HUMALOG) injection vial 0-4 Units, 0-4 Units, SubCUTAneous, Nightly  melatonin tablet 3 mg, 3 mg, Oral, Nightly PRN  calcium carbonate (TUMS) chewable tablet 1,000 mg, 1,000 mg, Oral, TID PRN  diphenhydrAMINE (BENADRYL) tablet 25 mg, 25 mg, Oral, Nightly PRN  warfarin placeholder: dosing by pharmacy, , Other, RX Placeholder  metoprolol succinate (TOPROL XL) extended release tablet 50 mg, 50 mg, Oral, Daily  potassium chloride (KLOR-CON M) extended release tablet 20 mEq, 20 mEq, Oral, BID       Vitals :     Vitals:    10/20/22 0842   BP: 129/85   Pulse: 100   Resp: 16   Temp: 98.3 °F (36.8 °C)   SpO2: 97%       I & O :       Intake/Output Summary (Last 24 hours) at 10/20/2022 0853  Last data filed at 10/20/2022 0850  Gross per 24 hour   Intake 480 ml   Output 2150 ml   Net -1670 ml          Physical Examination :     General appearance: Anxious- no, distressed- no, in good spirits-  Is lying flat on bed    HEENT: Lips- normal, teeth- ok , oral mucosa- moist  Neck : Mass- no, appears symmetrical, JVD- not visible  Respiratory: Respiratory effort-  normal, wheeze- no, crackles -   Cardiovascular:  Ausculation- No M/R/G, Edema 1+  Abdomen: visible mass- no, distention- no, scar- no, tenderness- no                            hepatosplenomegaly-  no  Musculoskeletal:  clubbing no,cyanosis- no , digital ischemia- no                           muscle strength- grossly normal , tone - grossly normal  Skin: rashes- no , ulcers- no, induration- no, tightening - no  Psychiatric:  Judgement and insight- normal           AAO X 3  Additional finding:      LABS:     Recent Labs     10/17/22  1931   WBC 8.2   HGB 11.8*   HCT 36.3*          Recent Labs     10/19/22  0535 10/19/22  1527 10/20/22  0534    135* 139   K 3.8 4.3 4.0    97* 101   CO2 27 26 28   BUN 30* 37* 37*   CREATININE 1.4* 2.0* 1.3   GLUCOSE 126* 125* 131*   MG 2.40 2.40 2.50*

## 2022-10-20 NOTE — CARE COORDINATION
Chart reviewed day 3. Care per nephrology, cardiology and IM. D/c with OP cardiac rehab pending anticoagulant plan. Patient declined resumption of HHC. Did not qualify for home O2.  Kylah Nash RN

## 2022-10-20 NOTE — PROGRESS NOTES
Shift assessment completed. Pt A&O x4, VSS. Non skid socks on. Pt independent with ambulation. Awaiting MD rounding, patient hoping to go home today. Pt using urinal at bedside. Bed locked and in lowest position. Call light and bedside table within reach. Will continue to monitor.

## 2022-10-20 NOTE — PROGRESS NOTES
Pharmacy Note  Warfarin Consult  Dx: AFib  Goal INR range 2-3   Home Warfarin dose: 5 mg daily     Date  INR  Warfarin  10/18              1.80                    5 mg  10/19              1.65                    10 mg  10/20              1.73                    7.5 mg     Recommend Warfarin 7.5 mg tonight x1. Daily INR ordered. Rx will continue to manage therapy per consult order.   Rubia Michael, PharmD  10/20/2022 at 8:30 AM

## 2022-10-21 ENCOUNTER — CARE COORDINATION (OUTPATIENT)
Dept: CASE MANAGEMENT | Age: 77
End: 2022-10-21

## 2022-10-21 VITALS
HEART RATE: 98 BPM | HEIGHT: 75 IN | TEMPERATURE: 98.1 F | BODY MASS INDEX: 37.42 KG/M2 | OXYGEN SATURATION: 97 % | SYSTOLIC BLOOD PRESSURE: 112 MMHG | DIASTOLIC BLOOD PRESSURE: 76 MMHG | WEIGHT: 301 LBS | RESPIRATION RATE: 16 BRPM

## 2022-10-21 LAB
ANION GAP SERPL CALCULATED.3IONS-SCNC: 9 MMOL/L (ref 3–16)
BUN BLDV-MCNC: 33 MG/DL (ref 7–20)
CALCIUM SERPL-MCNC: 8.9 MG/DL (ref 8.3–10.6)
CHLORIDE BLD-SCNC: 103 MMOL/L (ref 99–110)
CO2: 26 MMOL/L (ref 21–32)
CREAT SERPL-MCNC: 1.2 MG/DL (ref 0.8–1.3)
GFR SERPL CREATININE-BSD FRML MDRD: >60 ML/MIN/{1.73_M2}
GLUCOSE BLD-MCNC: 101 MG/DL (ref 70–99)
GLUCOSE BLD-MCNC: 102 MG/DL (ref 70–99)
GLUCOSE BLD-MCNC: 125 MG/DL (ref 70–99)
INR BLD: 2.08 (ref 0.87–1.14)
PERFORMED ON: ABNORMAL
PERFORMED ON: ABNORMAL
POTASSIUM SERPL-SCNC: 4.3 MMOL/L (ref 3.5–5.1)
PRO-BNP: 270 PG/ML (ref 0–449)
PROTHROMBIN TIME: 23.5 SEC (ref 11.7–14.5)
SODIUM BLD-SCNC: 138 MMOL/L (ref 136–145)

## 2022-10-21 PROCEDURE — 6370000000 HC RX 637 (ALT 250 FOR IP): Performed by: INTERNAL MEDICINE

## 2022-10-21 PROCEDURE — 83880 ASSAY OF NATRIURETIC PEPTIDE: CPT

## 2022-10-21 PROCEDURE — 80048 BASIC METABOLIC PNL TOTAL CA: CPT

## 2022-10-21 PROCEDURE — 85610 PROTHROMBIN TIME: CPT

## 2022-10-21 PROCEDURE — 6360000002 HC RX W HCPCS: Performed by: INTERNAL MEDICINE

## 2022-10-21 PROCEDURE — 36415 COLL VENOUS BLD VENIPUNCTURE: CPT

## 2022-10-21 RX ORDER — WARFARIN SODIUM 5 MG/1
5 TABLET ORAL
Status: DISCONTINUED | OUTPATIENT
Start: 2022-10-21 | End: 2022-10-21 | Stop reason: HOSPADM

## 2022-10-21 RX ADMIN — METOPROLOL SUCCINATE 50 MG: 50 TABLET, EXTENDED RELEASE ORAL at 08:32

## 2022-10-21 RX ADMIN — SUCRALFATE 1 G: 1 TABLET ORAL at 08:31

## 2022-10-21 RX ADMIN — POTASSIUM CHLORIDE 20 MEQ: 1500 TABLET, EXTENDED RELEASE ORAL at 08:32

## 2022-10-21 RX ADMIN — CLOPIDOGREL BISULFATE 75 MG: 75 TABLET ORAL at 08:31

## 2022-10-21 RX ADMIN — ASPIRIN 81 MG 81 MG: 81 TABLET ORAL at 08:31

## 2022-10-21 RX ADMIN — MONTELUKAST SODIUM 10 MG: 10 TABLET, COATED ORAL at 08:31

## 2022-10-21 RX ADMIN — DOFETILIDE 250 MCG: 0.25 CAPSULE ORAL at 08:32

## 2022-10-21 RX ADMIN — PANTOPRAZOLE SODIUM 40 MG: 40 TABLET, DELAYED RELEASE ORAL at 05:06

## 2022-10-21 RX ADMIN — ENOXAPARIN SODIUM 135 MG: 150 INJECTION SUBCUTANEOUS at 10:12

## 2022-10-21 RX ADMIN — TORSEMIDE 30 MG: 20 TABLET ORAL at 08:31

## 2022-10-21 RX ADMIN — ATORVASTATIN CALCIUM 40 MG: 40 TABLET, FILM COATED ORAL at 08:31

## 2022-10-21 NOTE — PROGRESS NOTES
Physician Progress Note      Yuliet Mari  [facility] #:                  [id number]  :                       1945  ADMIT DATE:       10/17/2022 7:19 PM  DISCH DATE:  RESPONDING  PROVIDER #:        Curly Saunders MD          QUERY TEXT:    Pt admitted with sob, increased edema, and weight gain. Pt noted to have acute   on chronic HFpEF with history of CAD, htn, and cardiomyopathy. If possible,   please document in progress notes and discharge summary the etiology of CHF,   if able to be determined. The medical record reflects the following:  Risk Factors: advanced age, CHF, CAD, afib/aflutter, ischemic cardiomyopathy,   DM, obesity  Clinical Indicators: Acute on Chronic HFpEF  Treatment: asa, plavix, lipitor, IV lasix, Home on toprol XL 25 mg daily,   lisinopril 5 mg daily, spironolactone 25 mg daily, and torsemide 30 mg daily. Thank you for your assistance,  Aamir Hammonds RN,BSN,CCDS,CRCR  Options provided:  -- CHF due to Hypertensive Heart Disease  -- CHF due to Hypertensive Heart Disease and CAD  -- CHF not due to Hypertension but due to CAD  -- CHF due to Hypertensive Heart Disease and ICMP  -- CHF not due to Hypertension but due to ICMP  -- Other - I will add my own diagnosis  -- Disagree - Not applicable / Not valid  -- Disagree - Clinically unable to determine / Unknown  -- Refer to Clinical Documentation Reviewer    PROVIDER RESPONSE TEXT:    This patient has CHF not due to hypertensive heart disease, CHF is due to   ischemic cardiomyopathy. Query created by: Leonardo Seip on 10/21/2022 6:27 AM      QUERY TEXT:    Patient admitted with CHF, noted to have atrial fibrillation and is maintained   on coumadin. If possible, please document in progress notes and discharge   summary if you are evaluating and/or treating any of the following:     The medical record reflects the following:  Risk Factors: advanced age, afib/aflutter, hx of VTE  Clinical Indicators: AFIB  Treatment: Continue dofetilide 250 mg BID. - Increased toprol XL to 50 mg   daily. - Continue coumadin. Start bridging with lovenox (10/19).     Thank you for your assistance,  Vince Calvert RN,BSN,CCDS,CRCR  Options provided:  -- Secondary hypercoagulable state in a patient with atrial fibrillation  -- Other - I will add my own diagnosis  -- Disagree - Not applicable / Not valid  -- Disagree - Clinically unable to determine / Unknown  -- Refer to Clinical Documentation Reviewer    PROVIDER RESPONSE TEXT:    Anticoagulant use in a patient with atrial fibrillation without secondary   hypercoagulable state    Query created by: Marlene Velez on 10/21/2022 6:30 AM      Electronically signed by:  Viridiana Narvaez MD 10/21/2022 12:54 PM

## 2022-10-21 NOTE — PROGRESS NOTES
Patient discharged home with family. IV removed with no complications. Discharge education complete with verbal understanding, all questions from patient and spouse answered. All belongings sent home with patient. Patient transported via wheelchair to outpatient pharmacy and to vehicle.

## 2022-10-21 NOTE — PROGRESS NOTES
Pharmacy Note  Warfarin Consult  Dx: AFib  Goal INR range 2-3   Home Warfarin dose: 5 mg daily   Lovenox bridging, consider discontinue when INR is therapeutic/stable    Date  INR  Warfarin  10/18              1.80                    5 mg  10/19              1.65                    10 mg  10/20              1.73                    7.5 mg   10/21              2.08                    5 mg    Recommend Warfarin 5 mg tonight x1. Daily INR ordered. Rx will continue to manage therapy per consult order.   Don Tanner PharmD  10/21/2022 at 9:16 AM

## 2022-10-21 NOTE — DISCHARGE INSTR - DIET

## 2022-10-21 NOTE — PROGRESS NOTES
Hospitalist Progress Note      PCP: Zaid Crespo MD    Date of Admission: 10/17/2022    Chief Complaint:     Subjective:     Patient states that he feels improved. He is walking around in his room. He denies lightheadedness, chest pain, palpitations on exertion. He also denies nausea, abdominal pain, dysuria, diarrhea. He denies melena, rectal bleeding. INR 1.73.     Medications:  Reviewed    Infusion Medications    dextrose      sodium chloride       Scheduled Medications    torsemide  30 mg Oral Daily    clopidogrel  75 mg Oral Daily    enoxaparin  1 mg/kg SubCUTAneous BID    aspirin  81 mg Oral Daily    atorvastatin  40 mg Oral Daily    dofetilide  250 mcg Oral 2 times per day    montelukast  10 mg Oral Daily    pantoprazole  40 mg Oral BID AC    sucralfate  1 g Oral TID    insulin lispro  0-8 Units SubCUTAneous TID WC    insulin lispro  0-4 Units SubCUTAneous Nightly    warfarin placeholder: dosing by pharmacy   Other RX Placeholder    metoprolol succinate  50 mg Oral Daily    potassium chloride  20 mEq Oral BID     PRN Meds: glucose, dextrose bolus **OR** dextrose bolus, glucagon (rDNA), dextrose, sodium chloride flush, sodium chloride, potassium chloride **OR** potassium alternative oral replacement **OR** potassium chloride, magnesium sulfate, acetaminophen **OR** acetaminophen, melatonin, calcium carbonate, diphenhydrAMINE      Intake/Output Summary (Last 24 hours) at 10/21/2022 0634  Last data filed at 10/21/2022 0556  Gross per 24 hour   Intake 360 ml   Output 1030 ml   Net -670 ml         Physical Exam Performed:    BP (!) 129/90   Pulse 89   Temp 97.8 °F (36.6 °C) (Oral)   Resp 16   Ht 6' 3\" (1.905 m)   Wt (!) 301 lb (136.5 kg)   SpO2 97%   BMI 37.62 kg/m²       GEN alert, in no distress  HEENT normocephalic, anicteric sclera, EOMI, mucosa moist, no stridor  NECK supple, trachea midline  RESP on RA, in no distress, clear to auscultation  CARDS RRR, S1, S2, no murmurs, no edema, radial pulse 2+, DP pulse 2+  ABD +BS, soft nontender  MSK no cyanosis, no clubbing  SKIN warm, dry  NEURO alert, oriented x 3, no facial asymmetry, no dysarthria, moving spontaneously  PSYCH normal mood      Labs:   No results for input(s): WBC, HGB, HCT, PLT in the last 72 hours. Recent Labs     10/19/22  1527 10/20/22  0534 10/21/22  0528   * 139 138   K 4.3 4.0 4.3   CL 97* 101 103   CO2 26 28 26   BUN 37* 37* 33*   CREATININE 2.0* 1.3 1.2   CALCIUM 9.2 9.3 8.9       No results for input(s): AST, ALT, BILIDIR, BILITOT, ALKPHOS in the last 72 hours. Recent Labs     10/19/22  0535 10/20/22  0534 10/21/22  0528   INR 1.65* 1.73* 2.08*       No results for input(s): Rebel Christianson in the last 72 hours. Urinalysis:      Lab Results   Component Value Date/Time    NITRU Negative 10/05/2022 01:26 PM    WBCUA 3-5 09/15/2022 04:00 PM    BACTERIA Rare 09/15/2022 04:00 PM    RBCUA 0-2 09/15/2022 04:00 PM    BLOODU Negative 10/05/2022 01:26 PM    SPECGRAV 1.010 10/05/2022 01:26 PM    GLUCOSEU Negative 10/05/2022 01:26 PM    GLUCOSEU NEGATIVE 02/05/2012 09:00 AM       Radiology:  XR CHEST PORTABLE   Final Result   No radiographic evidence of acute cardiopulmonary disease. Stable postsurgical sequela right upper lung.                  Assessment/Plan:    Active Hospital Problems    Diagnosis Date Noted    Moderate obstructive sleep apnea [G47.33] 01/04/2016     Priority: High    Exertional dyspnea [R06.09] 10/18/2022     Priority: Medium    Pulmonary edema with congestive heart failure ([facility] [I50.1] 10/18/2022     Priority: Medium    PAF (paroxysmal atrial fibrillation) ([facility] [address] [I48.0] 10/05/2022     Priority: Medium    CAD S/P percutaneous coronary angioplasty [I25.10, Z98.61] 09/09/2022     Priority: Medium     This is a 69 y.o. male with history of class II obesity, HTN, DM c/b neuropathy, dyslipidemia, CAD s/p PCI to LAD, chronic HFpEF, Afib, atypical Flutter, VTE, asthma, who presented from home by private vehicle on 10/18 with low energy, exertional dyspnea, and lower extremity edema. He has undergone extensive testing and invasive interventions because of this exertional dyspnea. He underwent LHC on 9/8/2022 to evaluate causes underlying his dyspnea. One stenotic lesion in the LAD was dilated and stented. He didn't notice any change in his symptoms. He went into atrial fibrillation thereafter so he then underwent PIERO/DCCV on 9/20/22. He felt much better in terms of dyspnea afterward. He went back into atrial fibrillation, became dyspneic again, and so was again hospitalized 10/5 - 9/22. He underwent atrial fibrillation ablation on 10/6/22. He was also started on dofetilide during that admission. He denies orthopnea and PND. He has been able to ambulate w/ a walker, but has been limited to short distances on level ground. Acute on Chronic HFpEF  CAD s/p PCI to LAD in 9/2022  Ischemic CM  - Continue home ASA 81 mg daily, plavix 75 mg daily, lipitor 40 mg HS.  - Increased toprol XL to 50 mg daily.  - Diuresed with IV lasix (10/17-10/19). Transitioned to PO torsemide 30 mg daily (10/20-). - Restart jardiance 12.5 mg dailyat time of discharge    Afib  History of Atypical Flutter  - s/pTEE/DCCV on 9/20/22.  - s/p Ablation on 10/6/22.  - EKG 10/17 showed NSR, HR 87, 1st degree AV block, PAC's. - Continue dofetilide 250 mg BID.  - Increased toprol XL to 50 mg daily. - Continue coumadin. Started bridging with lovenox (10/19). History of VTE  - Continue coumadin  - Started lovenox bridging since INR downtrended    HTN  - Home on toprol XL 25 mg daily, lisinopril 5 mg daily, spironolactone 25 mg daily, and torsemide 30 mg daily.  - Increased toprol XL to 50 mg daily.  - Diuresing with IV lasix (10/17-10/19). Transition to home torsemide on 10/20.    - Restart spironolactone as tolertated  - Restart low dose lisinopril as tolerated.     DM c/b neuropathy  - Home on jardiance 12.5 mg daily, metformin 500 mg BID, dulaglutide 0.75 mg weekly. - Started lantus 21 units, lispro 7 units TID with meals, normal SSI. He has not required insulin adjustments and glucose has not been elevated. Discontinue scheduled insulin. Dyslipidemia  - continue home lipitor 40 mg HS. Class II obesity  - lifestyle modification. Asthma  - not in acute exacerbation. - continue home montelukast 10 mg daily. DVT Prophylaxis:   Diet: ADULT DIET; Regular; 3 carb choices (45 gm/meal); Low Fat/Low Chol/High Fiber/RUBEN  Code Status: Full Code    PT/OT Eval Status:     Dispo - discharge after INR therapeutic in the setting of new cardiac stent, history of VTE. Patient does not want to switch to another agent. He wants to continue coumadin.     Miracle Gibson MD

## 2022-10-21 NOTE — CARE COORDINATION
Upon chart review, patient inpatient at [facility]. CTN to continue to monitor.   Phani Muniz RN   [phone]

## 2022-10-21 NOTE — DISCHARGE SUMMARY
Hospital Medicine Discharge Summary    Patient ID: Daphne Ashby      Patient's PCP: Mavis Cooley MD    Admit Date: 10/17/2022     Discharge Date:   10/21/22     Admitting Provider: No admitting provider for patient encounter. Discharge Provider: Buddy Garvin MD     Discharge Diagnoses: Active Hospital Problems    Diagnosis     Moderate obstructive sleep apnea [G47.33]      Priority: High    Exertional dyspnea [R06.09]      Priority: Medium    Pulmonary edema with congestive heart failure (HCC) [I50.1]      Priority: Medium    PAF (paroxysmal atrial fibrillation) (HCC) [I48.0]      Priority: Medium    CAD S/P percutaneous coronary angioplasty [I25.10, Z98.61]      Priority: Medium       The patient was seen and examined on day of discharge and this discharge summary is in conjunction with any daily progress note from day of discharge. Hospital Course:     Patient presented with worsening exertional dyspnea. Diuretics were slowly adjusted. Because of elevated creatinine, nephrology was also consulted in addition to cardiology. On the day of discharge, patient's body weight stabilized. Creatinine was better. Recommendations received from cardiology and nephrology regarding diuretics and other cardiac medications. Patient has been on warfarin for a long time and is comfortable continuing warfarin. Today's INR is 2.08. Will be discharged home. He is also willing to follow-up with cardiac rehab as outpatient. Discussed with the patient on the day of discharge. Questions answered. It was noted by cardiology that patient in the long-term may end up stabilizing at a higher baseline creatinine as he feels better when his body is on the dry side. This puts him at risk to worsening renal failure for which he needs to follow-up closely with the nephrologist.  Patient aware.       Physical Exam Performed:     /76   Pulse 98   Temp 98.1 °F (36.7 °C) (Oral)   Resp 16   Ht 6' 3\" (1.905 m)   Wt (!) 301 lb (136.5 kg)   SpO2 97%   BMI 37.62 kg/m²       General appearance:  No apparent distress, appears stated age and cooperative. HEENT:  Normal cephalic, atraumatic without obvious deformity. Pupils equal, round, and reactive to light. Extra ocular muscles intact. Conjunctivae/corneas clear. Neck: Supple, with full range of motion. No jugular venous distention. Trachea midline. Respiratory:  Normal respiratory effort. Clear to auscultation, bilaterally without Rales/Wheezes/Rhonchi. Cardiovascular:  Regular rate and rhythm with normal S1/S2 without murmurs, rubs or gallops. Abdomen: Soft, non-tender, non-distended with normal bowel sounds. Musculoskeletal:  No clubbing, cyanosis or edema bilaterally. Full range of motion without deformity. Skin: Skin color, texture, turgor normal.  No rashes or lesions. Neurologic:  Neurovascularly intact without any focal sensory/motor deficits. Cranial nerves: II-XII intact, grossly non-focal.  Psychiatric:  Alert and oriented, thought content appropriate, normal insight  Capillary Refill: Brisk,< 3 seconds   Peripheral Pulses: +2 palpable, equal bilaterally       Labs: For convenience and continuity at follow-up the following most recent labs are provided:      CBC:    Lab Results   Component Value Date/Time    WBC 8.2 10/17/2022 07:31 PM    HGB 11.8 10/17/2022 07:31 PM    HCT 36.3 10/17/2022 07:31 PM     10/17/2022 07:31 PM       Renal:    Lab Results   Component Value Date/Time     10/21/2022 05:28 AM    K 4.3 10/21/2022 05:28 AM    K 3.7 10/17/2022 07:31 PM     10/21/2022 05:28 AM    CO2 26 10/21/2022 05:28 AM    BUN 33 10/21/2022 05:28 AM    CREATININE 1.2 10/21/2022 05:28 AM    CALCIUM 8.9 10/21/2022 05:28 AM    PHOS 3.1 09/21/2022 04:16 AM         Significant Diagnostic Studies    Radiology:   XR CHEST PORTABLE   Final Result   No radiographic evidence of acute cardiopulmonary disease.       Stable postsurgical sequela right upper lung.                 Consults:     IP CONSULT TO CARDIOLOGY  IP CONSULT TO HOSPITALIST  IP CONSULT TO HEART FAILURE NURSE/COORDINATOR  IP CONSULT TO DIETITIAN  IP CONSULT TO CARDIOLOGY  IP CONSULT TO PHARMACY  IP CONSULT TO RESPIRATORY CARE  IP CONSULT TO NEPHROLOGY    Disposition: Home    Condition at Discharge: Stable    Discharge Instructions/Follow-up: PCP, cardiology, nephrology    Code Status:  Full Code     Activity: activity as tolerated    Diet: diabetic diet low-salt      Discharge Medications:     Current Discharge Medication List             Details   enoxaparin (LOVENOX) 150 MG/ML SOSY injection Inject 0.93 mLs into the skin 2 times daily for 7 days  Qty: 14 mL, Refills: 0                Details   aspirin 81 MG chewable tablet Take 1 tablet by mouth daily  Qty: 30 tablet, Refills: 11      warfarin (COUMADIN) 5 MG tablet TAKE ONE TABLET BY MOUTH DAILY  Qty: 90 tablet, Refills: 1      metFORMIN (GLUCOPHAGE) 500 MG tablet TAKE ONE TABLET BY MOUTH TWICE A DAY  Qty: 180 tablet, Refills: 0      atorvastatin (LIPITOR) 40 MG tablet TAKE ONE TABLET BY MOUTH DAILY  Qty: 90 tablet, Refills: 3      metoprolol succinate (TOPROL XL) 50 MG extended release tablet Take 1 tablet by mouth daily  Qty: 30 tablet, Refills: 3      spironolactone (ALDACTONE) 25 MG tablet TAKE ONE TABLET BY MOUTH DAILY  Qty: 90 tablet, Refills: 0      torsemide (DEMADEX) 10 MG tablet Take 3 tablets by mouth daily  Qty: 30 tablet, Refills: 3      clopidogrel (PLAVIX) 75 MG tablet Take 1 tablet by mouth daily  Qty: 30 tablet, Refills: 1      pantoprazole (PROTONIX) 40 MG tablet Take 1 tablet by mouth 2 times daily (before meals)  Qty: 60 tablet, Refills: 1      sucralfate (CARAFATE) 1 GM tablet Take 1 tablet by mouth in the morning, at noon, and at bedtime for 10 days  Qty: 30 tablet, Refills: 0                Details   Multiple Vitamins-Minerals (THERAPEUTIC MULTIVITAMIN-MINERALS) tablet Take 2 tablets by mouth daily empagliflozin (JARDIANCE) 25 MG tablet 12.5 mg      dofetilide (TIKOSYN) 250 MCG capsule Take 1 capsule by mouth every 12 hours  Qty: 60 capsule, Refills: 3      albuterol (PROVENTIL) (2.5 MG/3ML) 0.083% nebulizer solution Take 3 mLs by nebulization every 4 hours as needed for Wheezing or Shortness of Breath  Qty: 1080 mL, Refills: 3    Associated Diagnoses: Persistent asthma without complication, unspecified asthma severity      albuterol sulfate HFA (VENTOLIN HFA) 108 (90 Base) MCG/ACT inhaler Inhale 2 puffs into the lungs 4 times daily as needed for Wheezing or Shortness of Breath  Qty: 1 each, Refills: 3    Comments: Auto sub proair, ventolin or proventil based upon lowest cost please      montelukast (SINGULAIR) 10 MG tablet TAKE ONE TABLET BY MOUTH DAILY  Qty: 90 tablet, Refills: 1      Dulaglutide (TRULICITY) 0.46 LS/7.8JC SOPN Inject 0.75 mg into the skin once a week      !! blood glucose test strips (TRUE METRIX BLOOD GLUCOSE TEST) strip TEST ONCE DAILY. DX:E11.9  Qty: 100 strip, Refills: 3      !! Easy Touch Lancets 32G/Twist MISC TEST DAILY. DX: E11.9  Qty: 100 each, Refills: 3      !! blood glucose test strips (TRUE METRIX BLOOD GLUCOSE TEST) strip USE TO CHECK BLOOD GLUCOSE DAILY. DX: E11.9  Qty: 100 each, Refills: 3    Associated Diagnoses: Type 2 diabetes mellitus without complication, without long-term current use of insulin ([facility] [address]      ! ! ACCU-CHEK MULTICLIX LANCETS MISC Check sugars once daily. Dx;E11.9  Qty: 100 each, Refills: 11    Associated Diagnoses: Type 2 diabetes mellitus without complication, without long-term current use of insulin ([facility])      Lancet Devices (EASY TOUCH LANCING DEVICE) MISC Test Daily. DX: E11.9  Qty: 1 each, Refills: 0      Blood Glucose Monitoring Suppl (TRUE METRIX METER) VINNIE Use to check daily. DX;E11.9  Qty: 1 Device, Refills: 0      Lancets Misc. (ACCU-CHEK MULTICLIX LANCET DEV) KIT Check sugars once daily.  DX;E11.9  Qty: 1 kit, Refills: 0      Biotin 5 MG TABS Take 5 mg by mouth daily      Cholecalciferol (VITAMIN D3) 5000 units TABS Take 5,000 Units by mouth daily      Cyanocobalamin (VITAMIN B-12 CR PO) Take 5,000 mcg by mouth every 7 days        ! ! - Potential duplicate medications found. Please discuss with provider. Time Spent on discharge is more than 45 minutes in the examination, evaluation, counseling and review of medications and discharge plan. Signed:    Angelica Rodriguez MD   10/21/2022      Thank you Marylinda Lemons MD for the opportunity to be involved in this patient's care. If you have any questions or concerns, please feel free to contact me at [phone].

## 2022-10-21 NOTE — PLAN OF CARE
Problem: Skin/Tissue Integrity  Goal: Absence of new skin breakdown  Description: 1. Monitor for areas of redness and/or skin breakdown  2. Assess vascular access sites hourly  3. Every 4-6 hours minimum:  Change oxygen saturation probe site  4. Every 4-6 hours:  If on nasal continuous positive airway pressure, respiratory therapy assess nares and determine need for appliance change or resting period.   Outcome: Adequate for Discharge     Problem: Safety - Adult  Goal: Free from fall injury  Outcome: Adequate for Discharge     Problem: Pain  Goal: Verbalizes/displays adequate comfort level or baseline comfort level  Outcome: Adequate for Discharge  Flowsheets (Taken 10/21/2022 0815)  Verbalizes/displays adequate comfort level or baseline comfort level: Encourage patient to monitor pain and request assistance     Problem: Respiratory - Adult  Goal: Achieves optimal ventilation and oxygenation  Outcome: Adequate for Discharge  Flowsheets (Taken 10/21/2022 0099)  Achieves optimal ventilation and oxygenation: Assess for changes in respiratory status     Problem: Cardiovascular - Adult  Goal: Maintains optimal cardiac output and hemodynamic stability  Outcome: Adequate for Discharge  Flowsheets (Taken 10/21/2022 7387)  Maintains optimal cardiac output and hemodynamic stability:   Monitor blood pressure and heart rate   Monitor urine output and notify Licensed Independent Practitioner for values outside of normal range  Goal: Absence of cardiac dysrhythmias or at baseline  Outcome: Adequate for Discharge  Flowsheets (Taken 10/21/2022 3832)  Absence of cardiac dysrhythmias or at baseline: Assess for signs of decreased cardiac output     Problem: Skin/Tissue Integrity - Adult  Goal: Skin integrity remains intact  Outcome: Adequate for Discharge  Flowsheets (Taken 10/21/2022 0758)  Skin Integrity Remains Intact: Monitor for areas of redness and/or skin breakdown     Problem: Gastrointestinal - Adult  Goal: Minimal or absence of nausea and vomiting  Outcome: Adequate for Discharge  Flowsheets (Taken 10/21/2022 5217)  Minimal or absence of nausea and vomiting: Administer IV fluids as ordered to ensure adequate hydration  Goal: Maintains or returns to baseline bowel function  Outcome: Adequate for Discharge  Flowsheets (Taken 10/21/2022 4673)  Maintains or returns to baseline bowel function: Assess bowel function     Problem: Metabolic/Fluid and Electrolytes - Adult  Goal: Electrolytes maintained within normal limits  Outcome: Adequate for Discharge  Flowsheets (Taken 10/21/2022 5818)  Electrolytes maintained within normal limits: Monitor labs and assess patient for signs and symptoms of electrolyte imbalances  Goal: Hemodynamic stability and optimal renal function maintained  Outcome: Adequate for Discharge  Flowsheets (Taken 10/21/2022 1120)  Hemodynamic stability and optimal renal function maintained: Monitor labs and assess for signs and symptoms of volume excess or deficit  Goal: Glucose maintained within prescribed range  Outcome: Adequate for Discharge  Flowsheets (Taken 10/21/2022 0838)  Glucose maintained within prescribed range: Monitor blood glucose as ordered

## 2022-10-21 NOTE — PROGRESS NOTES
Assessment complete and charted. VSS with no pain on room air. Lovenox administered to bridge to coumadin. Patient is independent in the room. Patient will be discharged today. Patient has no other needs at this time.

## 2022-10-24 ENCOUNTER — TELEPHONE (OUTPATIENT)
Dept: INTERNAL MEDICINE CLINIC | Age: 77
End: 2022-10-24

## 2022-10-24 ENCOUNTER — CARE COORDINATION (OUTPATIENT)
Dept: CASE MANAGEMENT | Age: 77
End: 2022-10-24

## 2022-10-24 ENCOUNTER — TELEPHONE (OUTPATIENT)
Dept: PULMONOLOGY | Age: 77
End: 2022-10-24

## 2022-10-24 ENCOUNTER — ANTI-COAG VISIT (OUTPATIENT)
Dept: INTERNAL MEDICINE CLINIC | Age: 77
End: 2022-10-24

## 2022-10-24 ENCOUNTER — FOLLOWUP TELEPHONE ENCOUNTER (OUTPATIENT)
Dept: TELEMETRY | Age: 77
End: 2022-10-24

## 2022-10-24 DIAGNOSIS — R06.09 DOE (DYSPNEA ON EXERTION): Primary | ICD-10-CM

## 2022-10-24 DIAGNOSIS — I50.32 CHRONIC DIASTOLIC CONGESTIVE HEART FAILURE (HCC): ICD-10-CM

## 2022-10-24 DIAGNOSIS — R06.02 SOB (SHORTNESS OF BREATH): Primary | ICD-10-CM

## 2022-10-24 LAB — INR BLD: 2.1

## 2022-10-24 PROCEDURE — 1111F DSCHRG MED/CURRENT MED MERGE: CPT | Performed by: INTERNAL MEDICINE

## 2022-10-24 NOTE — CARE COORDINATION
[facility] Care Transitions Initial Follow Up Call    Call within 2 business days of discharge: Yes    Care Transition Nurse contacted the patient by telephone to perform post hospital discharge assessment. Verified name and  with patient as identifiers. Provided introduction to self, and explanation of the Care Transition Nurse role. Patient: Jayla Corona Patient : 1945   MRN: [id number]  Reason for Admission: PE w/ CHF  Discharge Date: 10/21/22 RARS: Readmission Risk Score: 25.8      Last Discharge  Street       Date Complaint Diagnosis Description Type Department Provider    10/17/22 Shortness of Breath; Fatigue Pulmonary edema with congestive heart failure ([facility] [address] . .. ED to Hosp-Admission (Discharged) (ADMITTED) Alisa Mcintosh MD; Álvaro Zarate. .. Was this an external facility discharge? No Discharge Facility:     Challenges to be reviewed by the provider   Additional needs identified to be addressed with provider: No  none               Method of communication with provider: none. Patient answered call and verified . Patient pleasant and agreeable to transition call. Patient known to CTN from previous episodes. Patient discussed recent hospitalization. Confirmed that he is taking medication as directed, but no longer needed lovenox injections. Patient has coag machine and has checked INR daily. He is in goal range and 2.1 this morning. No longer needing lovenox injections. Patient has follow up appts noted in system. Has support from wife and confirmed transportation. Denied any acute needs at present time. Agreeable to f/u calls. Educated on the use of urgent care or physicians 24 hr access line if assistance is needed after hours. Care Transition Nurse reviewed discharge instructions with patient who verbalized understanding. The patient was given an opportunity to ask questions and does not have any further questions or concerns at this time.  Were discharge instructions available to patient? Yes. Reviewed appropriate site of care based on symptoms and resources available to patient including: PCP  Specialist. The patient agrees to contact the PCP office for questions related to their healthcare. Advance Care Planning:   Does patient have an Advance Directive: reviewed and current. Medication reconciliation was performed with patient, who verbalizes understanding of administration of home medications.  Medications reviewed, 1111F entered: yes    Was patient discharged with a pulse oximeter? no    Non-face-to-face services provided:  Obtained and reviewed discharge summary and/or continuity of care documents    Offered patient enrollment in the Remote Patient Monitoring (RPM) program for in-home monitoring: Patient is not eligible for RPM program.    Care Transitions 24 Hour Call    Do you have a copy of your discharge instructions?: Yes  Do you have all of your prescriptions and are they filled?: Yes  Have you been contacted by a [address] [employer] Pharmacist?: No  Have you scheduled your follow up appointment?: Yes  How are you going to get to your appointment?: Car - family or friend to transport  [address]: [address]  Patient DME: Chair lift, Walker, [employer], Straight cane, Other  Other Patient DME: grab bars in shower and around toilet, [facility] shower  Do you have support at home?: Partner/Spouse/SO  Do you feel like you have everything you need to keep you well at home?: Yes  Are you an active caregiver in your home?: No  Care Transitions Interventions         Follow Up  Future Appointments   Date Time Provider Arslan Ardon   10/28/2022 10:00 AM SCHEDULE, [facility] LAB ROOM 3 [facility] TODD Tovar [facility]   11/18/2022  9:15 AM MD ABRAN Reese [facility]   11/23/2022  1:10 PM MD [facility] [place] [facility] None   12/8/2022 10:15 AM MD Lata Aguiar [facility]   4/17/2023  1:20 PM Tanika Dick APRN - LISSA Hsu [address] Transition Nurse provided contact information. Plan for follow-up call in 5-7 days based on severity of symptoms and risk factors.   Mehran Perkins RN

## 2022-10-24 NOTE — TELEPHONE ENCOUNTER
Wanted a second opinion from [facility] cardiology  We will make the referral  Discussed with cardiologist at [facility] and will send referral

## 2022-10-24 NOTE — TELEPHONE ENCOUNTER
[facility] Transitions Initial Follow Up Call    Call within 2 business days of discharge: Yes     Patient: Rosa Ramon Patient : 1945 MRN: [id number]    [unfilled]    RARS: Readmission Risk Score: 25.8       Spoke with: wife    Discharge department/facility: home with [facility]    Non-face-to-face services provided:  Scheduled appointment with PCP-7days, awaiting on PCP to call him    Spoke to patient wife for hospital follow up. Wife states he is not feeling well. He is in bed today stating that he feels like he is going to pass out. States they have a call out to PCP. Writer offered [facility]. Pt wife declines stating she doesn't want to do anything without speaking to him as this has been a rough couple of months. Provided resource line and dispatch health information. Wife declines any assistance at this time.      Follow Up  Future Appointments   Date Time Provider Arslan Ardon   10/28/2022 10:00 AM SCHEDULE, [facility] CATH LAB ROOM 3 [facility] TODD Morales   2022  9:15 AM MD ABRAN Bella [facility]   2022  1:10 PM Jyotsna Diallo MD [place] [facility] None   2022 10:15 AM MD Brad Ortiz [facility]   2023  1:20 PM SERENA Angel - CNP CLERM PULM [facility]       Delaney iHlton RN

## 2022-10-24 NOTE — LETTER
[facility] Pul[facility] Critical Care and Sleep  [address] [place] [address]  Phone: [phone]  Fax: [phone]    Marlo Cantrell MD        October 24, 2022     Patient: Kimmie Verduzco   YOB: 1945   Date of Visit: 10/24/2022         Wanting a second opinion in regards to diastolic dysfunction and issues of shortness of breath. Has been feeling very bad and short of breath.   Patient has recently been hospitalized      Marlo Cantrell MD

## 2022-10-24 NOTE — TELEPHONE ENCOUNTER
----- Message from Kylah Bonilla MD sent at 10/24/2022  1:50 PM EDT -----  2 weeks    ----- Message -----  From: Garland Solomon  Sent: 10/24/2022   9:06 AM EDT  To: Kylah Bonilla MD    INR: 2.1

## 2022-10-25 ENCOUNTER — TELEPHONE (OUTPATIENT)
Dept: CARDIOLOGY CLINIC | Age: 77
End: 2022-10-25

## 2022-10-25 NOTE — TELEPHONE ENCOUNTER
[facility] to inform [facility] pt was hospitalized last week for CHF and stated today his BP has been ranging in 90's/ 50's and pulse in 90's- 120's today. Stated pt is in afib.  Please advise

## 2022-10-26 ENCOUNTER — NURSE ONLY (OUTPATIENT)
Dept: CARDIOLOGY CLINIC | Age: 77
End: 2022-10-26
Payer: MEDICARE

## 2022-10-26 VITALS — HEART RATE: 89 BPM | DIASTOLIC BLOOD PRESSURE: 68 MMHG | SYSTOLIC BLOOD PRESSURE: 118 MMHG

## 2022-10-26 DIAGNOSIS — I48.0 PAROXYSMAL ATRIAL FIBRILLATION (HCC): Primary | ICD-10-CM

## 2022-10-26 PROCEDURE — 93000 ELECTROCARDIOGRAM COMPLETE: CPT | Performed by: INTERNAL MEDICINE

## 2022-10-26 NOTE — TELEPHONE ENCOUNTER
This is not atrial fibrillation. He remains in normal sinus rhythm. Please remind me tomorrow and I will call during office. Thanks.

## 2022-10-26 NOTE — TELEPHONE ENCOUNTER
AGK- please review EKG under media tab. /68 today in clinic. Patient is feeling weak.  Does not feel much different than he did prior to ablation 10/5

## 2022-10-27 NOTE — TELEPHONE ENCOUNTER
[address] [facility],[address]- it also looks like he was scheduled for ILR tomorrow and cancelled [facility]

## 2022-10-27 NOTE — TELEPHONE ENCOUNTER
I reached out to patient. In NSR. He will move forward with transition to [facility].  We will remain available to assist in any way moving forward.

## 2022-10-28 ENCOUNTER — HOSPITAL ENCOUNTER (OUTPATIENT)
Dept: CARDIAC CATH/INVASIVE PROCEDURES | Age: 77
Discharge: HOME | End: 2022-10-28
Attending: INTERNAL MEDICINE

## 2022-10-31 DIAGNOSIS — N28.9 RENAL INSUFFICIENCY: Primary | ICD-10-CM

## 2022-10-31 DIAGNOSIS — I50.1 PULMONARY EDEMA WITH CONGESTIVE HEART FAILURE (HCC): ICD-10-CM

## 2022-10-31 DIAGNOSIS — I48.0 PAROXYSMAL ATRIAL FIBRILLATION (HCC): ICD-10-CM

## 2022-10-31 DIAGNOSIS — I51.89 DIASTOLIC DYSFUNCTION: ICD-10-CM

## 2022-11-01 ENCOUNTER — CARE COORDINATION (OUTPATIENT)
Dept: CASE MANAGEMENT | Age: 77
End: 2022-11-01

## 2022-11-01 NOTE — CARE COORDINATION
[facility] Care Transitions Follow Up Call    Care Transition Nurse contacted the patient by telephone to follow up after admission. Verified name and  with patient as identifiers. Patient: Alize Jane  Patient : 1945   MRN: [id number]  Reason for Admission:  PE w/ CHF  Discharge Date: 10/21/22 RARS: Readmission Risk Score: 25.8      Needs to be reviewed by the provider   Additional needs identified to be addressed with provider: No  none             Method of communication with provider: none. Patient answered call and verified . Patient pleasant and agreeable to transition call. Patient was taking trash out and had just gotten back into his house. Patient uses scooter to get around. Patient feeling better, but continues to have weakness. Continues to have home care and therapy was at his home earlier today. Patient has spoke to Dr Tosha Iqbal and notes reviewed. Patient cancelled loop recorder placement and spoke to Dr Tosha Iqbal. Patient feels that he has been thru so much that he is wanting to recover and regain some of his strength. Denied any acute needs at present time. Agreeable to f/u calls. Educated on the use of urgent care or physicians 24 hr access line if assistance is needed after hours. Addressed changes since last contact:  none  Discussed follow-up appointments. If no appointment was previously scheduled, appointment scheduling offered: No.   Is follow up appointment scheduled within 7 days of discharge?  No.    Follow Up  Future Appointments   Date Time Provider Arslan Ardon   2022  9:15 AM Anup Braga MD [facility]   2022  1:10 PM Nitza Brooks MD [place] [facility] None   2022 10:15 AM SOTERO Rubio MD [employer] [facility]   2023  1:20 PM [facility]     [facility] follow up appointment(s):     Care Transition Nurse reviewed medical action plan with patient and discussed any barriers to care and/or understanding of plan of care after discharge. Discussed appropriate site of care based on symptoms and resources available to patient including: PCP  Specialist. The patient agrees to contact the PCP office for questions related to their healthcare. Advance Care Planning:   reviewed and current. Offered patient enrollment in the Remote Patient Monitoring (RPM) program for in-home monitoring: Patient is not eligible for RPM program.     Care Transitions Subsequent and Final Call    Subsequent and Final Calls  Do you have any ongoing symptoms?: No  Have your medications changed?: No  Do you have any questions related to your medications?: No  Do you currently have any active services?: Yes  Are you currently active with any services?: Home Health  Do you have any needs or concerns that I can assist you with?: No  Identified Barriers: None  Care Transitions Interventions  Other Interventions:             Care Transition Nurse provided contact information for future needs. Plan for follow-up call in 7-10 days based on severity of symptoms and risk factors.   Plan for next call: follow-up appointment-did patient get scheduled with [facility]    Lawrence Noriega RN

## 2022-11-08 ENCOUNTER — ANTI-COAG VISIT (OUTPATIENT)
Dept: INTERNAL MEDICINE CLINIC | Age: 77
End: 2022-11-08

## 2022-11-08 ENCOUNTER — TELEPHONE (OUTPATIENT)
Dept: INTERNAL MEDICINE CLINIC | Age: 77
End: 2022-11-08

## 2022-11-08 ENCOUNTER — CARE COORDINATION (OUTPATIENT)
Dept: CASE MANAGEMENT | Age: 77
End: 2022-11-08

## 2022-11-08 LAB — INR BLD: 1.7

## 2022-11-08 NOTE — CARE COORDINATION
[facility] Care Transitions Follow Up Call    Patient: Kenny Larkin  Patient : 1945   MRN: [id number]  Reason for Admission: PE w/ CHF  Discharge Date: 10/21/22 RARS: Readmission Risk Score: 25.8    Patient's wife answered call and stated that patient was napping. Requesting a call back later in the week.  CTN rescheduled call    Follow Up  Future Appointments   Date Time Provider Arslan Ardon   2022  9:15 AM Dagmar Mcdaniels MD [facility]   2022  1:10 PM Palomo Snyder MD [place] [facility] None   2022 10:15 AM MD Klaus Romero [facility]   2023  1:20 PM SERENA Recio - LISSA Small [facility]      Care Transitions Subsequent and Final Call    Subsequent and Final Calls  Care Transitions Interventions  Other Interventions:             Jaki Peters RN

## 2022-11-08 NOTE — TELEPHONE ENCOUNTER
Spouse informed and voiced understanding to have pt take 7.5 mg today, then resume 5 mg daily and recheck in 1 week.

## 2022-11-08 NOTE — TELEPHONE ENCOUNTER
----- Message from Miguel Angel Bailey MD sent at 11/8/2022  2:20 PM EST -----  Take 7.5 mg or one and half pill today   Resume regular dose tomorrow    1 week    ----- Message -----  From: Bonifacio Humphrey  Sent: 11/8/2022   2:07 PM EST  To: Miguel Angel Bailey MD    Pt does not know why it is low. Taking 5 mg daily.  ----- Message -----  From: Miguel Angel Bailey MD  Sent: 11/8/2022   2:05 PM EST  To: Bonifacio Humphrey    Why low   Dose ?    ----- Message -----  From: Bonifacio Humphrey  Sent: 11/8/2022  10:03 AM EST  To: Miguel Angel Bailey MD    INR- 1.7    Taking 5 mg daily. Only change is that he started taking Jardiance yesterday and his metoprolol was increased to 50 mg daily. Please advise.

## 2022-11-09 ENCOUNTER — TELEPHONE (OUTPATIENT)
Dept: CARDIAC REHAB | Age: 77
End: 2022-11-09

## 2022-11-09 NOTE — TELEPHONE ENCOUNTER
Patient has been released from nursing home and is now doing home physical therapy. Patient will call when he is ready to start cardiac rehab.

## 2022-11-10 ENCOUNTER — CARE COORDINATION (OUTPATIENT)
Dept: CASE MANAGEMENT | Age: 77
End: 2022-11-10

## 2022-11-10 NOTE — CARE COORDINATION
[facility] Care Transitions Follow Up Call    Care Transition Nurse contacted the patient by telephone to follow up after admission. Verified name and  with patient as identifiers. Patient: Rosa Ramon  Patient : 1945   MRN: [id number]  Reason for Admission: PE w/ CHF  Discharge Date: 10/21/22 RARS: Readmission Risk Score: 25.8      Needs to be reviewed by the provider   Additional needs identified to be addressed with provider: No  none             Method of communication with provider: none. Patient answered call and verified . Patient pleasant and agreeable to transition call. Patient had episode of dizziness when bending over to air up his car tire. Stated episode lasted a short period of time and has not happened again since then. Home physical therapy was at his home this morning and BP was 120/80 and HR was 101 after exercise. He is monitoring vitals on routine. Denied any pain or shortness of breath. Denies any acute needs at present time. Agreeable to f/u calls. Educated on the use of urgent care or physicians 24 hr access line if assistance is needed after hours. Addressed changes since last contact:  none  Discussed follow-up appointments. If no appointment was previously scheduled, appointment scheduling offered: Yes. Is follow up appointment scheduled within 7 days of discharge? Yes. Follow Up  Future Appointments   Date Time Provider Arslan Ardon   2022  9:15 AM Isaiah Griffiths MD [facility]   2022  1:10 PM Jyotsna Diallo MD [facility] None   2022 10:15 AM MD Brad Ortiz [facility]   2023  1:20 PM [facility]     [facility] follow up appointment(s):     Care Transition Nurse reviewed medical action plan with patient and discussed any barriers to care and/or understanding of plan of care after discharge.  Discussed appropriate site of care based on symptoms and resources available to patient including: PCP  Specialist. The patient agrees to contact the PCP office for questions related to their healthcare. Advance Care Planning:   reviewed and current. Care Transitions Subsequent and Final Call    Subsequent and Final Calls  Do you have any ongoing symptoms?: No  Have your medications changed?: No  Do you have any questions related to your medications?: No  Do you currently have any active services?: Yes  Are you currently active with any services?: Home Health  Do you have any needs or concerns that I can assist you with?: No  Identified Barriers: None  Care Transitions Interventions  Other Interventions:             Care Transition Nurse provided contact information for future needs. Plan for follow-up call in 7-10 days based on severity of symptoms and risk factors.   Bria Sanchez RN

## 2022-11-16 ENCOUNTER — TELEPHONE (OUTPATIENT)
Dept: INTERNAL MEDICINE CLINIC | Age: 77
End: 2022-11-16

## 2022-11-16 ENCOUNTER — ANTI-COAG VISIT (OUTPATIENT)
Dept: INTERNAL MEDICINE CLINIC | Age: 77
End: 2022-11-16

## 2022-11-16 LAB — INR BLD: 2.1

## 2022-11-16 NOTE — TELEPHONE ENCOUNTER
----- Message from Aneta Quintana MD sent at 11/16/2022 12:24 PM EST -----  Contact: Brian Edmonds [phone]  Good contnue same   2 week  ----- Message -----  From: George Ochs  Sent: 11/16/2022   8:14 AM EST  To: Aneta Quintana MD    Patient reports last week his INR was 1.7 and today it was 2.1

## 2022-11-17 NOTE — PROGRESS NOTES
[address]   Cardiac Followup    Referring Provider:  Kylah Bonilla MD     Chief Complaint   Patient presents with    Follow-up    Hypertension    Coronary Artery Disease    Atrial Fibrillation    Other     Atrial flutter             Ema Taylor   1945    History of Present Illness:    Ema Taylor is a 69 y.o. male who is here today for follow up for a past medical history of coronary artery disease, abnormal stress test, paroxysmal atrial fibrillation/atrial flutter, PVC's,  anticoagulation with warfarin, hypertension, hyperlipidemia, diabetes mellitus, MARIA VICTORIA, obesity status post gastric sleeve as well as history of DVT and histoplasmosis s/p thoracotomy. He has a stress test in 2011 which showed a small sized reversible perfusion defect involving the anteroseptal wall of the left ventricle to mid heart concerning for myocardial ischemia. He has a history of PVC ablation in 2014. He stated he continued to have occasional episodes of palpitations and was found to have atrial flutter. He underwent a CV, and an unsuccessful RFCA for typical atrial flutter on 4/12/17. Patient was started on dofetilide in June of 2017. He underwent EPS and aflutter ablation on 9/19/17. He had a normal stress test in 2017. Stress test 5/19/2021 was abnornmal consistent with mixed ischemia and scar. He had a left heart cath 6/16/2021- Findings significant for LAD/Diag lesions, LAD treated with FELIZ x2 and Diag FELIZ x1. ASA stopped one month post procedure and patient continued on Plavix and warfarin. Advised to have sleep study. Cardiac event monitor 2/25-3/11/2022  Average heart rate 79 (), nocturnal bradycardia 2:1 conduction PSVT, NSVT. Coumadin is managed by Kylah Bonilla MD Stress test 2/2022 showed no ischemia. Chest Xray negative. Last visit he reported having cellulitis to RLE. Brady Grimaldo He had COVID diagnosis July 2022 but believes the SOB pre-dated this. Gets associated chest tightness w/ the SOB. States he had an echocardiogram through the [place]. Patient was hospitalized in 9/2022 with c/o BLE edema and was in AF at the time. QTc was prolonged and Tikosyn was stopped. Patient underwent LHC and RHC for SOB on 9/8/2022 which demonstrated successful FELIZ to LAD. He presented again to hospital in 9/15/2022 with dyspnea and fatigue and was in rate controlled AF. He underwent PIERO and DCCV on 9/20/2022. patient was COVID + 9/4/2022.    10/4/22 he stated he had been feeling 100% better since his cardioversion. Stated that he had more energy and was able to walk further. Went to rehab after leaving the hospital and initially was not even able to walk across the room. States yesterday and he was walking up and down the street. He was currently wearing a cardiac event monitor and plan for loop recorder placement. Patient currently denied any weight gain, palpitations, chest pain, dizziness, and syncope. Today he presents with his wife. He states that he still is having weakness, but his breathing improved. He is taking 30 mg of the diuretic. Weight 296 this am. He states that he does still get some edema at times. He states that he gets his medicines and some care from the [facility]. He sees Dr. Aldair Vaughan at the [facility] along with a NP name Salvatore Gibbs there. He denies chest pain, dizziness, syncope. Past Medical History:   has a past medical history of Arthritis, Asthma, Atrial fibrillation ([address] Blood circulation, collateral, Diabetes mellitus ([address] DVT (deep venous thrombosis) ([address] Histoplasmosis, History of knee replacement procedure of right knee, Hx of blood clots, Hyperlipidemia, Hypertension, Knee osteoarthritis, Left TKR, Lung nodule, Neuromuscular disorder ([address] Palpitations, Sleep apnea, Stone, kidney, and UTI (urinary tract infection). Surgical History:   has a past surgical history that includes Cystoscopy; Tonsillectomy; Knee arthroscopy (4/19/2011); Colonoscopy;  Cystocopy (2/8/13); ablation of dysrhythmic focus (2013); Carpal tunnel release (Right, 9-30-15); Bariatric Surgery (2013); Carpal tunnel release (Left, 3/20/16); skin biopsy; Diagnostic Cardiac Cath Lab Procedure; joint replacement (Bilateral); Cardiac surgery (2013); thoracotomy; ERCP (02/18/2019); ERCP (N/A, 2/18/2019); ERCP (2/18/2019); Cholecystectomy, laparoscopic (N/A, 2/19/2019); Intracapsular cataract extraction (Right, 1/14/2020); Intracapsular cataract extraction (Left, 1/21/2020); transesophageal echocardiogram (N/A, 9/20/2022); and Cardioversion (N/A, 9/20/2022). Social History:   reports that he quit smoking about 46 years ago. His smoking use included cigarettes. He has a 34.00 pack-year smoking history. He has never used smokeless tobacco. He reports that he does not drink alcohol and does not use drugs. Family History:  family history includes Arthritis in his father; Cancer in his mother; Kidney Disease in his mother; Other in his father; Stroke in his father and mother; Substance Abuse in his father. Home Medications:  Prior to Admission medications    Medication Sig Start Date End Date Taking?  Authorizing Provider   aspirin 81 MG chewable tablet Take 1 tablet by mouth daily 10/19/22   Mauro Palacio MD   warfarin (COUMADIN) 5 MG tablet TAKE ONE TABLET BY MOUTH DAILY 10/19/22   Mauro Palacio MD   metFORMIN (GLUCOPHAGE) 500 MG tablet TAKE ONE TABLET BY MOUTH TWICE A DAY 10/19/22   Mauro Palacio MD   atorvastatin (LIPITOR) 40 MG tablet TAKE ONE TABLET BY MOUTH DAILY 10/19/22   Mauro Palacio MD   metoprolol succinate (TOPROL XL) 50 MG extended release tablet Take 1 tablet by mouth daily 10/20/22   Mauro Palacio MD   spironolactone (ALDACTONE) 25 MG tablet TAKE ONE TABLET BY MOUTH DAILY 10/19/22   Mauro Palacio MD   torsemide (DEMADEX) 10 MG tablet Take 3 tablets by mouth daily 10/20/22   Mauro Palacio MD   clopidogrel (PLAVIX) 75 MG tablet Take 1 tablet by mouth daily 10/19/22 11/18/22  Mauro Palacio MD pantoprazole (PROTONIX) 40 MG tablet Take 1 tablet by mouth 2 times daily (before meals) 10/19/22 11/18/22  Soham Bello MD   sucralfate (CARAFATE) 1 GM tablet Take 1 tablet by mouth in the morning, at noon, and at bedtime for 10 days 10/19/22 10/29/22  Soham Bello MD   Multiple Vitamins-Minerals (THERAPEUTIC MULTIVITAMIN-MINERALS) tablet Take 2 tablets by mouth daily    Historical Provider, MD   empagliflozin (JARDIANCE) 25 MG tablet 12.5 mg 10/3/22   Historical Provider, MD   dofetilide (TIKOSYN) 250 MCG capsule Take 1 capsule by mouth every 12 hours 10/8/22   SERENA Gonzales CNP   albuterol (PROVENTIL) (2.5 MG/3ML) 0.083% nebulizer solution Take 3 mLs by nebulization every 4 hours as needed for Wheezing or Shortness of Breath 9/22/22 9/22/23  COLIN Dennis   albuterol sulfate HFA (VENTOLIN HFA) 108 (90 Base) MCG/ACT inhaler Inhale 2 puffs into the lungs 4 times daily as needed for Wheezing or Shortness of Breath 9/18/22   COLIN Dennis   lisinopril (PRINIVIL;ZESTRIL) 5 MG tablet Take 1 tablet by mouth daily 9/10/22 9/21/22  Shani Knight MD   montelukast (SINGULAIR) 10 MG tablet TAKE ONE TABLET BY MOUTH DAILY 7/1/22   Que Rose MD   Dulaglutide (TRULICITY) 5.67 CA/1.7SH SOPN Inject 0.75 mg into the skin once a week    Historical Provider, MD   blood glucose test strips (TRUE METRIX BLOOD GLUCOSE TEST) strip TEST ONCE DAILY. DX:E11.9 1/20/22   Que Rose MD   Easy Touch Lancets 32G/Twist MISC TEST DAILY. DX: E11.9 3/23/20   Que Rose MD   blood glucose test strips (TRUE METRIX BLOOD GLUCOSE TEST) strip USE TO CHECK BLOOD GLUCOSE DAILY. DX: E11.9 3/23/20   Que Rose MD   ACCU-CHEK MULTICLIX LANCETS MISC Check sugars once daily. Dx;E11.9 3/11/19   Que Rose MD   Lancet Devices (EASY TOUCH LANCING DEVICE) MISC Test Daily.  DX: E11.9 12/26/18   Que Rose MD   Blood Glucose Monitoring Suppl (TRUE METRIX METER) VINNIE Use to check daily. DX;E11.9 12/22/18   Rio Luis MD   Lancets Misc. (ACCU-CHEK MULTICLIX LANCET DEV) KIT Check sugars once daily. DX;E11.9 12/21/18   Rio Luis MD   Biotin 5 MG TABS Take 5 mg by mouth daily    Historical Provider, MD   Cholecalciferol (VITAMIN D3) 5000 units TABS Take 5,000 Units by mouth daily    Historical Provider, MD   Cyanocobalamin (VITAMIN B-12 CR PO) Take 5,000 mcg by mouth every 7 days     Historical Provider, MD        Allergies:  Bactrim [sulfamethoxazole-trimethoprim], Brilinta [ticagrelor], Ciprofloxacin, Macrobid [nitrofurantoin monohyd macro], Ozempic (0.25 or 0.5 mg-dose) [semaglutide(0.25 or 0.5mg-dos)], Sulfamethoxazole, Theophylline, Cefuroxime axetil, Cipro xr, Other, and Tape [adhesive tape]     Review of Systems:   Constitutional: there has been no unanticipated weight loss. There's been no change in energy level, sleep pattern, or activity level. Eyes: No visual changes or diplopia. No scleral icterus. ENT: No Headaches, hearing loss or vertigo. No mouth sores or sore throat. Cardiovascular: Reviewed in HPI  Respiratory: No cough or wheezing, no sputum production. No hematemesis. Gastrointestinal: No abdominal pain, appetite loss, blood in stools. No change in bowel or bladder habits. Genitourinary: No dysuria, trouble voiding, or hematuria. Musculoskeletal:  No gait disturbance, weakness or joint complaints. Integumentary: No rash or pruritis. Neurological: No headache, diplopia, change in muscle strength, numbness or tingling. No change in gait, balance, coordination, mood, affect, memory, mentation, behavior. Psychiatric: No anxiety, no depression. Endocrine: No malaise, fatigue or temperature intolerance. No excessive thirst, fluid intake, or urination. No tremor. Hematologic/Lymphatic: No abnormal bruising or bleeding, blood clots or swollen lymph nodes. Allergic/Immunologic: No nasal congestion or hives.     Physical Examination: There were no vitals filed for this visit. Wt Readings from Last 3 Encounters:   10/21/22 (!) 301 lb (136.5 kg)   10/10/22 (!) 312 lb (141.5 kg)   10/08/22 (!) 305 lb 9.6 oz (138.6 kg)       Constitutional and General Appearance: NAD   Respiratory:  Normal excursion and expansion without use of accessory muscles  Resp Auscultation: Normal breath sounds without dullness  Cardiovascular: The apical impulses not displaced  Heart tones are crisp and normal  Cervical veins are not engorged  The carotid upstroke is normal in amplitude and contour without delay or bruit  Normal S1S2, No S3, No Murmur  Peripheral pulses are symmetrical and full  There is no clubbing, cyanosis of the extremities. Trace 2+ BLE edema  Femoral Arteries: 2+ and equal  Pedal Pulses: 2+ and equal   Abdomen:  No masses or tenderness  Liver/Spleen: No Abnormalities Noted  Neurological/Psychiatric:  Alert and oriented in all spheres  Moves all extremities well  Exhibits normal gait balance and coordination  No abnormalities of mood, affect, memory, mentation, or behavior are noted  Lab Results   Component Value Date    CHOL 119 09/05/2022    CHOL 139 06/16/2021    CHOL 222 (H) 12/19/2018     Lab Results   Component Value Date    TRIG 147 09/05/2022    TRIG 126 06/16/2021    TRIG 159 (H) 12/19/2018     Lab Results   Component Value Date    HDL 30 (L) 09/05/2022    HDL 38 (L) 03/24/2022    HDL 39 (L) 06/16/2021     Lab Results   Component Value Date    LDLCALC 60 09/05/2022    LDLCALC 70 03/24/2022    LDLCALC 75 06/16/2021     Lab Results   Component Value Date    LABVLDL 29 09/05/2022    LABVLDL 32 03/24/2022    LABVLDL 25 06/16/2021     No results found for: CHOLHDLRATIO      Stress test 12/23/11  Impression- 1. Small sized reversible perfusion defect involving the anteroseptal wall of the left ventricle to mid heart concerning for myocardial ischemia. Please correlate with EKG findings. 2. Left ventricular hypertrophy.  3. LVEF 59 % NAVNEET/Cardioversion 2/7/2012  Summary-   1. A Navneet was performed without complications. 2.  LVEF 55   3. No thrombus or valvular vegetation identified. CARDIOVERSION-      After an adequate level of sedation was achieved, 150J, then 200J in   biphasic synchronized delivery was administered. No change in   rhythm. The patient awoke without complications. A post procedure   12 L ECG was ordered and reviewed. There were no complications encountered. 1.  Unsuccessful attempt at cardioversion. 2.  Patient remains in atrial fibrillation. Echocardiogram 11/24/15  Conclusions   Summary   Moderate concentric left ventricular hypertrophy is present. Global ejection fraction is normal and estimated from 55 % to 60 %. No obvious regional wall motion abnormalities are noted. Diastolic filling parameters suggests diastolic dysfunction. Moderately dilated left atrium. Aortic valve appears sclerotic but opens adequately. Trivial mitral and tricuspid regurgitation. Systolic pulmonary artery pressure (SPAP) is normal and estimated at 23 mmHg   (RA pressure 3 mm Hg). Echocardiogram 6/23/17  Conclusions      Summary   Normal systolic function with an estimated ejection fraction of 55-60%. Mild to moderate concentric left ventricular hypertrophy. No regional wall motion abnormalities are seen. Left ventricular diastolic filling pressure is indeterminate. Left atrium is moderately dilated. No change from last echo on 11/24/1015. Limited Echocardiogram 11/31/2017   Conclusions      Summary   This is a limited study pericardial effusion and shunt. Normal left ventricular systolic function with an estimated ejection   fraction of 55%. A bubble study was performed and fails to show evidence of shunting. There is a very small circumferential pericardial effusion noted. Stress test 11/1/2017  Conclusions    Summary  There is no evidence of stress induced ischemia.  Normal LV function with  ejection fraction of 66%. There are no regional wall motion abnormalities. Low risk study. REECE 3/31-4/30/2021  No atrial fib   Predominately NSR  PSVT- symptomatic   NSVT/PVC's-  Symptomatic  Nocturnal bradycardia     Stress test 5/19/2021  Conclusions    Summary  The overall quality of the study is fair. There is subdiaphragmatic  attenuation. Left ventricular cavity is noted to be normal size. The right ventricle is  normal in size. SPECT images demonstrate a small area of mildly decreased perfusion in the  apical lateral and mid-inferior lateral segments. The defect is mixed  ischemia and scar. There is associated wall motion abnormality, consistent  with ischemia. Gated SPECT imaging reveals normal myocardial thickening and  wall motion. Sum stress score of 7. No visual TID. Calculated TID of 0.99. The left ventricular ejection fraction is calculated to be 63%. Myocardial perfusion is abnormal. Consistent with mixed ischemia and scar. Low risk scan. EKG 6/16/21  Sinus rhythm with marked sinus arrhythmia with 1st degree A-V blockLow voltage QRSProlonged QTAbnormal ECGConfirmed by John Olvera MD, Eddie Gayle ([id number]) on 6/16/2021 4:41:38 PM    ECHO 6/16/21  Summary   Left ventricular systolic function is normal with a visually estimated   ejection fraction of 55%. The left ventricle is normal in size with mild concentric hypertrophy. No obvious regional wall motion abnormalities noted. Increased left ventricular filling pressure. The left atrium appears moderately enlarged. The right atrium is severely enlarged. Right ventricle is enlarged but   normal function. Inadequate tricuspid valve regurgitation to estimate systolic pulmonary   artery pressure. The IVC is dilated in size (>2.1 cm) but appears to collapse >50% with   respiration consistent with elevated right atrial pressures (8 mmHg) .     EKG 6/16/21  Sinus rhythm with 1st degree A-V block with Premature atrial complexesOtherwise normal ECGConfirmed by Gavin Mccormack MD ([id number]) on 6/17/2021 6:58:06 AM    EKG 6/16/21  Sinus rhythm with sinus arrhythmia with 1st degree A-V blockSeptal infarct , age undeterminedAbnormal ECGConfirmed by Gavin Mccormack MD ([id number]) on 6/17/2021 6:58:18 AM    Left heart cath 6/16/2021 [facility]  Procedures Performed:   1. Selective left heart catheterization  2. Percutaneous coronary intervention of the left anterior descending and diagonal artery artery using complex bifurcation technique. Procedure Findings:  1. Critical LAD diagonal disease with angina classification 0, 0, 1 along with mid LAD 70% stenosis       Echocardiogram 6/16/2021  Summary   Left ventricular systolic function is normal with a visually estimated   ejection fraction of 55%. The left ventricle is normal in size with mild concentric hypertrophy. No obvious regional wall motion abnormalities noted. Increased left ventricular filling pressure. The left atrium appears moderately enlarged. The right atrium is severely enlarged. Right ventricle is enlarged but   normal function. Inadequate tricuspid valve regurgitation to estimate systolic pulmonary   artery pressure. The IVC is dilated in size (>2.1 cm) but appears to collapse >50% with   respiration consistent with elevated right atrial pressures (8 mmHg) . Chest Xray 2/22/2022  No acute process. Stress test 2/28/2022  Conclusions    Summary  Normal LV function. There is normal isotope uptake at stress and rest. There is no evidence of  myocardial ischemia or scar. Cardiac event monitor 2/25-3/11/2022  Average heart rate 79 (), nocturnal bradycardia 2:1 conduction PSVT, NSVT    Left heart cath VSP 9/8/2022  CONCLUSIONS:     1. Successful intravascular ultrasound-guided drug-eluting stent insertion to the left anterior descending artery  ASSESSMENT/RECOMMENDATIONS:     1.   Continue dual antiplatelet therapy and guideline directed medical therapy. 2.  Follow-up with patient's primary cardiologist after discharge. PIERO cardioversion RKG 9/20/2022  Op note  Procedure   Transesophageal gram  Direct current cardioversion  Indications  Dyspnea fatigue  Paroxysmal atrial fibrillations  Sedated by propofol by anesthesia  PIERO no intracardiac thrombus  Left atrial appendage poorly visualized no thrombus seen velocity >40cm/sec  Then a single synchronized shock of 200 Joules applied converted to NSR with occasional PAC's. Will transfer to recovery. He had a lot of secretions while doing PIERO requiring frequent suctioning. Cardiac monitor: 9/2-10/1/2022  Sinus rhythm with PAF  61.6 Afib burden  High grade AV Block    ECHO complete: 9/6/22   Summary   LV systolic function is normal with EF estimated at 55-60%. No regional wall motion abnormalities are noted. There is moderate concentric left ventricular hypertrophy. Normal left ventricular diastolic filling pressure. Biatrial enlargement. Mild posterior mitral annular calcification is present. Aortic valve appears sclerotic but opens adequately. Mild mitral and tricuspid regurgitation. Systolic pulmonary artery pressure (SPAP) estimated at 46mmHg (RA pressure 3   mmHg), consistent with mild pulmonary hypertension. 6- 55%, LVH, LAE, ANNIE    ECHO/PIERO: 10/6/22  Summary   Ejection fraction is visually estimated at 55%. Moderate thickening of the anterior mitral valve leaflet. There is no evidence of mass or thrombus in the left atrium or appendage. Latest Reference Range & Units 9/5/22 04:37   CHOLESTEROL, TOTAL, 114984 0 - 199 mg/dL 119   HDL Cholesterol 40 - 60 mg/dL 30 (L)   LDL Calculated <100 mg/dL 60   Triglycerides 0 - 150 mg/dL 147   VLDL Cholesterol Calculated Not Established mg/dL 29          Assessment:   CAD - stable. S/p PCI 9/8/22.  Noted little to no improvement in symptoms post PCI  PANG - multifactorial. Increased diuresis has had little effect and lead to SULAIMAN in past. PCI had little to no effect. Significant improvement post DCCV to NSR. Also suspect sig effect of covid (positive 12/2020 and 9/2022)  PAF - s/p DCCV. Followed by EP. SOB improved post sustained NST. Pt cancelled ILR. Again discussed R/B/A/E. Nocturnal bradycardia 2:1 due to MARIA VICTORIA  Hypertension - controlled    Hyperlipidemia - stable, controlled  Sleep apnea on CPAP  History of DVT   Diabetes Mellitus   Asthma   Tachycardia - intermittent  Fatigue - improved w/ return to NSR  COVID + 9/4/2022    Plan:  Having labs tali [place] provider and will follow up next week there. Will notify us of any abnormalities or recommended changes to tx plan. Diuretics to maintain goal weight < 300 pounds  May need to accept some elevation BUN/Cr as patient feels better when relatively dry. Discussed risk w/ pt - understands and agrees   Discussed options for diuretic regimen w/ pt and wife: Continue torsemide 30 mg daily. Extra 20 mg for weight > 300 pounds. Hold daily dose for weight < 295 pounds. He understands, agrees. Have care team at [facility] informed of any care notes and changes made to medications  Recommend ILR as previously dicussed w/ EP  Cardiac medications reviewed including indications and pertinent side effects. Medication list updated at this visit. Check blood pressure and heart rate at home a few times per week- keep a log with dates and times and bring to office visit   Regular exercise and following a healthy diet encouraged   Follow up with me in 3 months    Scribe's attestation: This note was scribed in the presence of Dr. Cynthia Rush by Gaurang Quezada LPN    The scribes documentation has been prepared under my direction and personally reviewed by me in its entirety. I confirm that the note above accurately reflects all work, treatment, procedures, and medical decision making performed by me.     Dr. Belkis Carrion MD    Thank you for allowing me to participate in the care of this individual.      Chencho Betancur.  Maddy Lee M.D., Freida Fleming

## 2022-11-18 ENCOUNTER — OFFICE VISIT (OUTPATIENT)
Dept: CARDIOLOGY CLINIC | Age: 77
End: 2022-11-18
Payer: MEDICARE

## 2022-11-18 VITALS
WEIGHT: 300 LBS | DIASTOLIC BLOOD PRESSURE: 76 MMHG | OXYGEN SATURATION: 100 % | HEART RATE: 61 BPM | HEIGHT: 75 IN | SYSTOLIC BLOOD PRESSURE: 120 MMHG | BODY MASS INDEX: 37.3 KG/M2

## 2022-11-18 DIAGNOSIS — I25.10 CORONARY ARTERY DISEASE INVOLVING NATIVE CORONARY ARTERY OF NATIVE HEART WITHOUT ANGINA PECTORIS: Primary | ICD-10-CM

## 2022-11-18 PROCEDURE — 99214 OFFICE O/P EST MOD 30 MIN: CPT | Performed by: INTERNAL MEDICINE

## 2022-11-18 PROCEDURE — 1036F TOBACCO NON-USER: CPT | Performed by: INTERNAL MEDICINE

## 2022-11-18 PROCEDURE — 1111F DSCHRG MED/CURRENT MED MERGE: CPT | Performed by: INTERNAL MEDICINE

## 2022-11-18 PROCEDURE — G8417 CALC BMI ABV UP PARAM F/U: HCPCS | Performed by: INTERNAL MEDICINE

## 2022-11-18 PROCEDURE — 1124F ACP DISCUSS-NO DSCNMKR DOCD: CPT | Performed by: INTERNAL MEDICINE

## 2022-11-18 PROCEDURE — 3078F DIAST BP <80 MM HG: CPT | Performed by: INTERNAL MEDICINE

## 2022-11-18 PROCEDURE — G8484 FLU IMMUNIZE NO ADMIN: HCPCS | Performed by: INTERNAL MEDICINE

## 2022-11-18 PROCEDURE — G8427 DOCREV CUR MEDS BY ELIG CLIN: HCPCS | Performed by: INTERNAL MEDICINE

## 2022-11-18 PROCEDURE — 3074F SYST BP LT 130 MM HG: CPT | Performed by: INTERNAL MEDICINE

## 2022-11-18 NOTE — PATIENT INSTRUCTIONS
Have care team at [facility] informed of any care notes and changes made to medications  Recommend speaking with Dr. Lizett Matos about proceeding with LOOP recorder implant  Cardiac medications reviewed including indications and pertinent side effects. Medication list updated at this visit.    Check blood pressure and heart rate at home a few times per week- keep a log with dates and times and bring to office visit   Regular exercise and following a healthy diet encouraged   Follow up with me in 3 months

## 2022-11-22 ENCOUNTER — CARE COORDINATION (OUTPATIENT)
Dept: CASE MANAGEMENT | Age: 77
End: 2022-11-22

## 2022-11-22 NOTE — TELEPHONE ENCOUNTER
Pt informed to continue same and re 2 weeks per Dr Iraida Gaffney. We have been filling this patient's Kevzara 200 mg/1.14 ml. We last filled on 10/27/2022. We have been trying to reach the patient to set up shipment of the medication but we have not been able to reach the patient after 3 attempts. We are unable to send the medication without speaking to the patient. If the patient calls your office, please direct them to [address] [place] [address] at [phone] to set up shipment.      Thank you,    [facility]  Phone: [phone]  [email or web address]

## 2022-11-22 NOTE — CARE COORDINATION
[facility] Care Transitions Follow Up Call    Care Transition Nurse contacted the patient by telephone to follow up after admission. Verified name and  with patient as identifiers. Patient: Katherine Wei  Patient : 1945   MRN: [id number]  Reason for Admission: PE w/ CHF  Discharge Date: 10/21/22 RARS: Readmission Risk Score: 25.8      Needs to be reviewed by the provider   Additional needs identified to be addressed with provider: No  none             Method of communication with provider: none. Patient answered call and verified . Patient pleasant and agreeable to transition call. Patient had physical therapy today and walked around his property. Patient managed walk relatively easy and denied any pain or shortness of breath. Continues to monitor BP and HR daily and results were 120/80 and HR was 100 with the therapist today. Patient stated that therapist is going to discharge him from services next week bc he is doing so well. Patient is eager to get enrolled to cardiac rehab again. Scheduled to see PCP tomorrow and Dr Daisy Huffman beginning of December. Confirmed transportation to visits. Denied any acute needs at present time. Agreeable to f/u calls. Educated on the use of urgent care or physicians 24 hr access line if assistance is needed after hours. Addressed changes since last contact:  none  Discussed follow-up appointments. If no appointment was previously scheduled, appointment scheduling offered: Yes. Is follow up appointment scheduled within 7 days of discharge? Yes.     Follow Up  Future Appointments   Date Time Provider Arslan Ardon   2022  1:10 PM MD [facility] None   2022 10:15 AM MD Mallika Tee [facility]   2023  9:00 AM Samir Novak MD [facility]   2023  1:20 PM [facility]     Non[facility] follow up appointment(s):     Care Transition Nurse reviewed medical action plan with patient and discussed any barriers to care and/or understanding of plan of care after discharge. Discussed appropriate site of care based on symptoms and resources available to patient including: PCP  Specialist. The patient agrees to contact the PCP office for questions related to their healthcare. Advance Care Planning:   reviewed and current. Care Transitions Subsequent and Final Call    Subsequent and Final Calls  Do you have any ongoing symptoms?: No  Have your medications changed?: No  Do you have any questions related to your medications?: No  Do you currently have any active services?: No  Are you currently active with any services?: Home Health  Do you have any needs or concerns that I can assist you with?: No  Identified Barriers: None  Care Transitions Interventions  Other Interventions:             Care Transition Nurse provided contact information for future needs. Plan for follow-up call in 7-10 days based on severity of symptoms and risk factors. Plan for next call: follow up with PCP and did home care d/c from services?     Robert Coppola RN

## 2022-11-23 ENCOUNTER — OFFICE VISIT (OUTPATIENT)
Dept: INTERNAL MEDICINE CLINIC | Age: 77
End: 2022-11-23

## 2022-11-23 ENCOUNTER — TELEPHONE (OUTPATIENT)
Dept: CARDIOLOGY CLINIC | Age: 77
End: 2022-11-23

## 2022-11-23 VITALS
HEART RATE: 100 BPM | HEIGHT: 75 IN | RESPIRATION RATE: 18 BRPM | WEIGHT: 297 LBS | SYSTOLIC BLOOD PRESSURE: 115 MMHG | BODY MASS INDEX: 36.93 KG/M2 | DIASTOLIC BLOOD PRESSURE: 65 MMHG

## 2022-11-23 DIAGNOSIS — I25.118 CORONARY ARTERY DISEASE OF NATIVE ARTERY OF NATIVE HEART WITH STABLE ANGINA PECTORIS (HCC): ICD-10-CM

## 2022-11-23 DIAGNOSIS — I10 ESSENTIAL HYPERTENSION: ICD-10-CM

## 2022-11-23 DIAGNOSIS — I50.32 CHRONIC DIASTOLIC CHF (CONGESTIVE HEART FAILURE) (HCC): ICD-10-CM

## 2022-11-23 DIAGNOSIS — E11.40 TYPE 2 DIABETES MELLITUS WITH DIABETIC NEUROPATHY, WITHOUT LONG-TERM CURRENT USE OF INSULIN (HCC): ICD-10-CM

## 2022-11-23 DIAGNOSIS — I48.0 PAROXYSMAL ATRIAL FIBRILLATION (HCC): Primary | ICD-10-CM

## 2022-11-23 PROCEDURE — G8484 FLU IMMUNIZE NO ADMIN: HCPCS | Performed by: INTERNAL MEDICINE

## 2022-11-23 PROCEDURE — 99213 OFFICE O/P EST LOW 20 MIN: CPT | Performed by: INTERNAL MEDICINE

## 2022-11-23 PROCEDURE — 1036F TOBACCO NON-USER: CPT | Performed by: INTERNAL MEDICINE

## 2022-11-23 PROCEDURE — 3078F DIAST BP <80 MM HG: CPT | Performed by: INTERNAL MEDICINE

## 2022-11-23 PROCEDURE — G8427 DOCREV CUR MEDS BY ELIG CLIN: HCPCS | Performed by: INTERNAL MEDICINE

## 2022-11-23 PROCEDURE — 1124F ACP DISCUSS-NO DSCNMKR DOCD: CPT | Performed by: INTERNAL MEDICINE

## 2022-11-23 PROCEDURE — G8417 CALC BMI ABV UP PARAM F/U: HCPCS | Performed by: INTERNAL MEDICINE

## 2022-11-23 PROCEDURE — 3074F SYST BP LT 130 MM HG: CPT | Performed by: INTERNAL MEDICINE

## 2022-11-23 PROCEDURE — 3044F HG A1C LEVEL LT 7.0%: CPT | Performed by: INTERNAL MEDICINE

## 2022-11-23 RX ORDER — DILTIAZEM HYDROCHLORIDE 120 MG/1
120 CAPSULE, COATED, EXTENDED RELEASE ORAL DAILY
Qty: 30 CAPSULE | Refills: 3 | Status: SHIPPED
Start: 2022-11-23 | End: 2022-11-29 | Stop reason: DRUGHIGH

## 2022-11-23 NOTE — TELEPHONE ENCOUNTER
He states his blood pressure has been in the 120's/80's range. He states that his heart rate has been in the 110's. He states he did not get an EKG today.

## 2022-11-23 NOTE — PROGRESS NOTES
Mariah Little (:  1945) is a 69 y.o. male,Established patient, here for evaluation of the following chief complaint(s):  Follow-up         HPI    69 y.o. 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 ,obestiy, MARIA VICTORIA , pedal edema here for  regular f.w     Multiple admissions to hospital recently for cardiac issues as below      pt had another covid infection in   and in  , symptoms of fatigue continued with no new sob or cough or fevers. Apparently he was admitted to [facility] for fatigue and dyspnea. Cardiology transferred him to [facility] and cath done with another stent placed in LAD , back on plavix and resumed coumadin    Pt reports his tikosyn was stopped for prolonged qtc () , remains in Afibb       Changes to meds or stents did not help him at all and remains fatigued and exertional dyspnea remains this was thought to be from recurrent afibb and again in  , he  was admitted at [facility] and had Cardioversion done with improved symptoms of fatigue and dyspnea, completed rehab at local [facility] and went home     Again with recurrent dyspnea with recurrent Afibb last week ( 10/2/22) , this adm he had RF ablation done on 10/6/22- now remains in NSR , Elesa Gentle resumed now with monitoring of QT     Admitted again on 10/21/22 for acute/Chronic Diastolic CHF , recently his demadex increased to 30 mg daily for pedal edema  Aldactone remains on hold. [facility]     He is feeling some better but still weak and fatigued and his  home HR goes up to 100 or 110 with ambulation with PT at home and gets dyspneic.          MARIA VICTORIA - on home cpap, compliant with it    No falls or bleeding      Allergies   Allergen Reactions    Bactrim [Sulfamethoxazole-Trimethoprim] Diarrhea    Brilinta [Ticagrelor]      dyspnea    Ciprofloxacin      Bad headaches    Macrobid [Nitrofurantoin Monohyd Macro]     Ozempic (0.25 Or 0.5 Mg-Dose) [Semaglutide(0.25 Or 0.5mg-Dos)] Sulfamethoxazole Diarrhea    Theophylline      Theodur-caused severe headaches    Cefuroxime Axetil Rash and Itching    Cipro Xr Rash    Other Rash and Other (See Comments)     Ekg leads-glue    Tape [Adhesive Tape] Rash     Skin irritaion  Eats away at skin, paper tape OK  Plastic tape     Current Outpatient Medications   Medication Sig Dispense Refill    dilTIAZem (CARDIZEM CD) 120 MG extended release capsule Take 1 capsule by mouth daily 30 capsule 3    aspirin 81 MG chewable tablet Take 1 tablet by mouth daily 30 tablet 11    warfarin (COUMADIN) 5 MG tablet TAKE ONE TABLET BY MOUTH DAILY 90 tablet 1    metFORMIN (GLUCOPHAGE) 500 MG tablet TAKE ONE TABLET BY MOUTH TWICE A  tablet 0    atorvastatin (LIPITOR) 40 MG tablet TAKE ONE TABLET BY MOUTH DAILY 90 tablet 3    metoprolol succinate (TOPROL XL) 50 MG extended release tablet Take 1 tablet by mouth daily 30 tablet 3    spironolactone (ALDACTONE) 25 MG tablet TAKE ONE TABLET BY MOUTH DAILY 90 tablet 0    torsemide (DEMADEX) 10 MG tablet Take 3 tablets by mouth daily 30 tablet 3    clopidogrel (PLAVIX) 75 MG tablet Take 1 tablet by mouth daily 30 tablet 1    Multiple Vitamins-Minerals (THERAPEUTIC MULTIVITAMIN-MINERALS) tablet Take 2 tablets by mouth daily      empagliflozin (JARDIANCE) 25 MG tablet 12.5 mg      dofetilide (TIKOSYN) 250 MCG capsule Take 1 capsule by mouth every 12 hours 60 capsule 3    albuterol (PROVENTIL) (2.5 MG/3ML) 0.083% nebulizer solution Take 3 mLs by nebulization every 4 hours as needed for Wheezing or Shortness of Breath 1080 mL 3    albuterol sulfate HFA (VENTOLIN HFA) 108 (90 Base) MCG/ACT inhaler Inhale 2 puffs into the lungs 4 times daily as needed for Wheezing or Shortness of Breath 1 each 3    montelukast (SINGULAIR) 10 MG tablet TAKE ONE TABLET BY MOUTH DAILY 90 tablet 1    Dulaglutide (TRULICITY) 2.96 BR/0.5YJ SOPN Inject 0.75 mg into the skin once a week      blood glucose test strips (TRUE METRIX BLOOD GLUCOSE TEST) strip TEST ONCE DAILY. DX:E11.9 100 strip 3    Easy Touch Lancets 32G/Twist MISC TEST DAILY. DX: E11.9 100 each 3    blood glucose test strips (TRUE METRIX BLOOD GLUCOSE TEST) strip USE TO CHECK BLOOD GLUCOSE DAILY. DX: E11.9 100 each 3    ACCU-CHEK MULTICLIX LANCETS MISC Check sugars once daily. Dx;E11.9 100 each 11    Lancet Devices (EASY TOUCH LANCING DEVICE) MISC Test Daily. DX: E11.9 1 each 0    Blood Glucose Monitoring Suppl (TRUE METRIX METER) VINNIE Use to check daily. DX;E11.9 1 Device 0    Lancets Misc. (ACCU-CHEK MULTICLIX LANCET DEV) KIT Check sugars once daily. DX;E11.9 1 kit 0    Biotin 5 MG TABS Take 5 mg by mouth daily      Cholecalciferol (VITAMIN D3) 5000 units TABS Take 5,000 Units by mouth daily      Cyanocobalamin (VITAMIN B-12 CR PO) Take 5,000 mcg by mouth every 7 days        No current facility-administered medications for this visit. Review of Systems     Constitutional: Negative for fever or chills + fatigue  HENT: Negative for sore throat   Eyes: Negative for redness   Respiratory: +ve dyspnea, cough   Cardiovascular: Negative for chest pain   Gastrointestinal:neg for abd pain, nausea or vomiting or diarrhea  No melena or BRPR  Genitourinary: Negative for hematuria   Musculoskeletal: +ve for arthralgias   Skin: Negative for rash   Neurological: Negative for syncope   Hematological: Negative for adenopathy   Psychiatric/Behavorial: Negative for anxiety      Objective   Physical Exam     Vitals:    11/23/22 1300   BP: 115/65   Pulse: 100   Resp: 18         General: obese, healthy appearing male   Awake, alert and oriented.  Appears to be not in any distress  Mucous Membranes:  Pink , anicteric  Neck: No JVD, no carotid bruit, no thyromegaly  Chest:  Clear to auscultation bilaterally,with no crackles  Cardiovascular:  RRR S1S2 heard, no murmurs or gallops  Abdomen:  Soft, morbidly obese, undistended, non tender, no organomegaly, BS present  Extremities: chronic 2+ pedal edema resolved  No cellulitis noted Distal pulses well felt      PIERO 9/22     Summary   Left ventricular systolic function is normal with an estimated ejection   fraction of 55%. Moderate mitral regurgitation. Left atrial appendage is not well visualized. Redundancy of the interatrial septum with no obvious shunt. Stress test 5/21          The overall quality of the study is fair. There is subdiaphragmatic    attenuation. Left ventricular cavity is noted to be normal size. The right ventricle is    normal in size. SPECT images demonstrate a small area of mildly decreased perfusion in the    apical lateral and mid-inferior lateral segments. The defect is mixed    ischemia and scar. There is associated wall motion abnormality, consistent    with ischemia. Gated SPECT imaging reveals normal myocardial thickening and    wall motion. Sum stress score of 7. No visual TID. Calculated TID of 0.99. The left ventricular ejection fraction is calculated to be 63%. Myocardial perfusion is abnormal. Consistent with mixed ischemia and scar. Low risk scan. Angiogram 6/16/22    1. Successful complex LAD diagonal stenting utilizing mini crush technique. ASSESSMENT/RECOMMENDATIONS:     1. Dual antiplatelet therapy along with anticoagulation for 30 days. After 30 days we can discontinue dual antiplatelet therapy and continue with single antiplatelet therapy along with oral anticoagulation. 9/8/22  -1. Successful intravascular ultrasound-guided drug-eluting stent insertion to the left anterior descending artery       Wt Readings from Last 3 Encounters:   11/23/22 297 lb (134.7 kg)   11/18/22 300 lb (136.1 kg)   10/21/22 (!) 301 lb (136.5 kg)          Diagnosis Orders   1. Paroxysmal atrial fibrillation (HCC)        2. Essential hypertension        3. Type 2 diabetes mellitus with diabetic neuropathy, without long-term current use of insulin ([address]        4.  Coronary artery disease of native artery of native heart with stable angina pectoris ([address]        5. Chronic diastolic CHF (congestive heart failure) (HCC)                 Symptomatic AFibb - s/p ablation - f/w Dr. Raquel Scott in 2014 . With persistent symptoms    Had repeat Ablation in 4/17 and 9/17 with persistent symptoms. On multiple meds over the course of last 5 yrs including cardizem, BB, amio, digoxin    Sp recent ablation again in 10/6/22- back on tikosyn 250  mcg bid  Remains in NSR clinically today but HR high at 110  Recent EKG at [facility] - NSR   Continue toprol xl 50 mg daily . Resume cardizem 120 mg ER daily  and monitor HR      CAD s.p LAD stents 6/21 . 9/22  - on Plavix ,ASA statins   - no bleeding issues while on coumadin  - continue BB     CHF - chronic diastolic - well compensated   Recently increased demadex to 30 mg daily   Off aldactone recently as well   Added jardiance 10 mg recently by [facility]    Thyroid nodule - enlarged and going into mediastinum  - remote hx of bx in 2012 which was neg      DM- 2, well controlled  Last A1c at 6.4  Now on metformin 440 mg bid and trulicity, recently added jardiance 10 mg daily     Urine microalbumin stable   Eye exam normal this year per pt   On statins     Polyneuropathy- dx 4 yrs  before DM     Upto date on vaccines    An electronic signature was used to authenticate this note.     --Honey Gomez MD

## 2022-11-23 NOTE — TELEPHONE ENCOUNTER
Spoke with CARITO in person and she would like to know if the patient feels like he is in AF. She stated that if he did he could start taking the Diltiazem and come in for an EKG on Monday if he feels worse or report to the ER over the weekend. He states that he does not feel like he is in atrial fibrillation. He states that he monitors his heart rate with his home blood pressure machine and pulse oximeter. He V/U and stated he would start Cardizem and keep us updated.

## 2022-11-23 NOTE — TELEPHONE ENCOUNTER
11/23 pt called and stated he went to see PCP today and pcp recommended pt start back on cardizem tomorrow morning . Pt has been dizzy, lightheaded and fatigued. HR has been elevated, stated at PCP it was 110.  Please advise

## 2022-11-23 NOTE — TELEPHONE ENCOUNTER
Is it okay for the patient to restart their diltiazem tomorrow to see if this improves heart rate and symptoms? AYESHAK is OOT.

## 2022-11-28 ENCOUNTER — TELEPHONE (OUTPATIENT)
Dept: INTERNAL MEDICINE CLINIC | Age: 77
End: 2022-11-28

## 2022-11-28 ENCOUNTER — ANTI-COAG VISIT (OUTPATIENT)
Dept: INTERNAL MEDICINE CLINIC | Age: 77
End: 2022-11-28

## 2022-11-28 LAB — INR BLD: 2

## 2022-11-28 NOTE — TELEPHONE ENCOUNTER
----- Message from Zaid Crespo MD sent at 11/28/2022 12:41 PM EST -----  No change   2 week  ----- Message -----  From: Sunil Nation  Sent: 11/28/2022  10:49 AM EST  To: Zaid Crespo MD    INR- 2.0

## 2022-11-28 NOTE — TELEPHONE ENCOUNTER
Pt sts still feels bad, was told to come in to office for EKG Monday. Lab schedule full. Spoke with Oanh You who said to schedule for tomorrow and if pt feels worse tell him to go to ED. Pt LUANN.

## 2022-11-29 ENCOUNTER — NURSE ONLY (OUTPATIENT)
Dept: CARDIOLOGY CLINIC | Age: 77
End: 2022-11-29
Payer: MEDICARE

## 2022-11-29 ENCOUNTER — TELEPHONE (OUTPATIENT)
Dept: CARDIOLOGY CLINIC | Age: 77
End: 2022-11-29

## 2022-11-29 ENCOUNTER — OFFICE VISIT (OUTPATIENT)
Dept: CARDIOLOGY CLINIC | Age: 77
End: 2022-11-29
Payer: MEDICARE

## 2022-11-29 VITALS
BODY MASS INDEX: 37.08 KG/M2 | DIASTOLIC BLOOD PRESSURE: 74 MMHG | WEIGHT: 298.2 LBS | HEART RATE: 87 BPM | OXYGEN SATURATION: 99 % | HEIGHT: 75 IN | SYSTOLIC BLOOD PRESSURE: 136 MMHG

## 2022-11-29 DIAGNOSIS — I48.0 PAROXYSMAL ATRIAL FIBRILLATION (HCC): Primary | ICD-10-CM

## 2022-11-29 DIAGNOSIS — I48.4 ATYPICAL ATRIAL FLUTTER (HCC): ICD-10-CM

## 2022-11-29 DIAGNOSIS — I48.0 PAF (PAROXYSMAL ATRIAL FIBRILLATION) (HCC): Primary | ICD-10-CM

## 2022-11-29 PROCEDURE — 99214 OFFICE O/P EST MOD 30 MIN: CPT

## 2022-11-29 PROCEDURE — 1036F TOBACCO NON-USER: CPT

## 2022-11-29 PROCEDURE — 1124F ACP DISCUSS-NO DSCNMKR DOCD: CPT

## 2022-11-29 PROCEDURE — 3074F SYST BP LT 130 MM HG: CPT

## 2022-11-29 PROCEDURE — G8427 DOCREV CUR MEDS BY ELIG CLIN: HCPCS

## 2022-11-29 PROCEDURE — G8417 CALC BMI ABV UP PARAM F/U: HCPCS

## 2022-11-29 PROCEDURE — 93000 ELECTROCARDIOGRAM COMPLETE: CPT | Performed by: INTERNAL MEDICINE

## 2022-11-29 PROCEDURE — G8484 FLU IMMUNIZE NO ADMIN: HCPCS

## 2022-11-29 PROCEDURE — 3078F DIAST BP <80 MM HG: CPT

## 2022-11-29 RX ORDER — DILTIAZEM HYDROCHLORIDE 180 MG/1
180 CAPSULE, COATED, EXTENDED RELEASE ORAL DAILY
Qty: 60 CAPSULE | Refills: 2 | Status: SHIPPED | OUTPATIENT
Start: 2022-11-29 | End: 2022-12-05 | Stop reason: ALTCHOICE

## 2022-11-29 NOTE — PROGRESS NOTES
[address]   Electrophysiology Outpatient Note              Date:  November 29, 2022  Patient name: Olayinka Barraza  YOB: 1945    Primary Care physician: Eulalia Oh MD    HISTORY OF PRESENT ILLNESS: The patient is a 69 y.o.  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 In 9/2022 he was admitted for BLE edema and AF. Tikosyn was stopped for prolonged QtC. He underwent LHC/RHC for SOB which demonstrated successful FELIZ to LAD. On 9/15/2022 he presented to the hospital again with SOB and fatigue. On 9/20/2022 he had PIERO/DCCV. In 10/2022 he was admitted with symptomatic atrial fibrillation. On 10/06/2022 he had an RFCA of AF and suspected dependent flutter. Post procedure, Tikosyn was resumed. On 10/17/2022 he was admitted for CHF. Nephrology consulted for fluid management assistance. On 11/23/2022 he called into the office reporting dizziness, lightheadedness and fatigue. He reported his HR had been elevated and his PCP started him on Cardizem. He was directed to come to the office for EKG. Today he is being seen for paroxysmal atrial fibrillation. EKG shows SR with first degree block, HR of 88 bpm. He presents today with his wife. He has not felt better since the ablation. Yesterday he felt winded after walking a loop around the garage. He recovered in a few minutes. He reports dizziness when he gets up to a standing position. He moves positions slowly. He feels rapid heart rates several times a day. He denies chest pain. He denies recent falls. He has unchanged swelling in his BLE. He has been watching his diet and limiting his sodium intake. His weight today was 294 lbs (the same as yesterday). Overall he has lost weight. This is the first time he's been under 300 lbs in years.  SBP is typically 120's, HR has been in the low 100's. He was working with PT who signed off because he was doing so well. He continues to work on getting strength in his legs. He has an upcoming appointment with another cardiologist because he would like a second opinion on his cardiology care. Past Medical History:   has a past medical history of Arthritis, Asthma, Atrial fibrillation ([address] Blood circulation, collateral, Diabetes mellitus ([address] DVT (deep venous thrombosis) ([address] Histoplasmosis, History of knee replacement procedure of right knee, Hx of blood clots, Hyperlipidemia, Hypertension, Knee osteoarthritis, Left TKR, Lung nodule, Neuromuscular disorder ([address] Palpitations, Sleep apnea, Stone, kidney, and UTI (urinary tract infection). Past Surgical History:   has a past surgical history that includes Cystoscopy; Tonsillectomy; Knee arthroscopy (4/19/2011); Colonoscopy; Cystocopy (2/8/13); ablation of dysrhythmic focus (2013); Carpal tunnel release (Right, 9-30-15); Bariatric Surgery (2013); Carpal tunnel release (Left, 3/20/16); skin biopsy; Diagnostic Cardiac Cath Lab Procedure; joint replacement (Bilateral); Cardiac surgery (2013); thoracotomy; ERCP (02/18/2019); ERCP (N/A, 2/18/2019); ERCP (2/18/2019); Cholecystectomy, laparoscopic (N/A, 2/19/2019); Intracapsular cataract extraction (Right, 1/14/2020); Intracapsular cataract extraction (Left, 1/21/2020); transesophageal echocardiogram (N/A, 9/20/2022); and Cardioversion (N/A, 9/20/2022). Home Medications:    Prior to Admission medications    Medication Sig Start Date End Date Taking?  Authorizing Provider   Magnesium 400 MG CAPS Take 400 mg by mouth daily   Yes Historical Provider, MD   dilTIAZem (CARDIZEM CD) 120 MG extended release capsule Take 1 capsule by mouth daily 11/23/22  Yes Alejandra Cheney MD   aspirin 81 MG chewable tablet Take 1 tablet by mouth daily 10/19/22  Yes Raenelle Jeans, MD   warfarin (COUMADIN) 5 MG tablet TAKE ONE TABLET BY MOUTH DAILY 10/19/22  Yes Sadie Jennings MD   metFORMIN (GLUCOPHAGE) 500 MG tablet TAKE ONE TABLET BY MOUTH TWICE A DAY 10/19/22  Yes Sadie Jennings MD   atorvastatin (LIPITOR) 40 MG tablet TAKE ONE TABLET BY MOUTH DAILY 10/19/22  Yes Sadie Jennings MD   metoprolol succinate (TOPROL XL) 50 MG extended release tablet Take 1 tablet by mouth daily 10/20/22  Yes Sadie Jennings MD   torsemide (DEMADEX) 10 MG tablet Take 3 tablets by mouth daily 10/20/22  Yes Sadie Jennings MD   clopidogrel (PLAVIX) 75 MG tablet Take 1 tablet by mouth daily 10/19/22 11/29/22 Yes Sadie Jennings MD   Multiple Vitamins-Minerals (THERAPEUTIC MULTIVITAMIN-MINERALS) tablet Take 2 tablets by mouth daily   Yes Historical Provider, MD   empagliflozin (JARDIANCE) 25 MG tablet 12.5 mg 10/3/22  Yes Historical Provider, MD   dofetilide (TIKOSYN) 250 MCG capsule Take 1 capsule by mouth every 12 hours 10/8/22  Yes SERENA Gonzales - CNP   albuterol (PROVENTIL) (2.5 MG/3ML) 0.083% nebulizer solution Take 3 mLs by nebulization every 4 hours as needed for Wheezing or Shortness of Breath 9/22/22 9/22/23 Yes COLIN Dennis   albuterol sulfate HFA (VENTOLIN HFA) 108 (90 Base) MCG/ACT inhaler Inhale 2 puffs into the lungs 4 times daily as needed for Wheezing or Shortness of Breath 9/18/22  Yes COLIN Dennis   montelukast (SINGULAIR) 10 MG tablet TAKE ONE TABLET BY MOUTH DAILY 7/1/22  Yes Arthur Ghotra MD   Dulaglutide (TRULICITY) 7.97 VA/5.3BM SOPN Inject 0.75 mg into the skin once a week   Yes Historical Provider, MD   blood glucose test strips (TRUE METRIX BLOOD GLUCOSE TEST) strip TEST ONCE DAILY. DX:E11.9 1/20/22  Yes Arthur Ghotra MD   Easy Touch Lancets 32G/Twist MISC TEST DAILY. DX: E11.9 3/23/20  Yes Arthur Ghotra MD   blood glucose test strips (TRUE METRIX BLOOD GLUCOSE TEST) strip USE TO CHECK BLOOD GLUCOSE DAILY. DX: E11.9 3/23/20  Yes Arthur Ghotra MD   ACCU-CHEK MULTICLIX LANCETS MISC Check sugars once daily. Dx; E11.9 3/11/19  Yes Paulina Thayer MD   Lancet Devices (EASY TOUCH LANCING DEVICE) MISC Test Daily. DX: E11.9 12/26/18  Yes Paulina Thayer MD   Blood Glucose Monitoring Suppl (TRUE METRIX METER) VINNIE Use to check daily. DX;E11.9 12/22/18  Yes Paulina Thayer MD   Lancets Misc. (ACCU-CHEK MULTICLIX LANCET DEV) KIT Check sugars once daily. DX;E11.9 12/21/18  Yes Paulina Thayer MD   Biotin 5 MG TABS Take 5 mg by mouth daily   Yes Historical Provider, MD   Cholecalciferol (VITAMIN D3) 5000 units TABS Take 5,000 Units by mouth daily   Yes Historical Provider, MD   Cyanocobalamin (VITAMIN B-12 CR PO) Take 5,000 mcg by mouth every 7 days    Yes Historical Provider, MD   spironolactone (ALDACTONE) 25 MG tablet TAKE ONE TABLET BY MOUTH DAILY  Patient not taking: Reported on 11/29/2022 10/19/22   Jerilyn Dobson MD   lisinopril (PRINIVIL;ZESTRIL) 5 MG tablet Take 1 tablet by mouth daily 9/10/22 9/21/22  Honorio Wade MD       Allergies:  Bactrim [sulfamethoxazole-trimethoprim], Brilinta [ticagrelor], Ciprofloxacin, Macrobid [nitrofurantoin monohyd macro], Ozempic (0.25 or 0.5 mg-dose) [semaglutide(0.25 or 0.5mg-dos)], Sulfamethoxazole, Theophylline, Cefuroxime axetil, Cipro xr, Other, and Tape [adhesive tape]    Social History:   reports that he quit smoking about 46 years ago. His smoking use included cigarettes. He has a 34.00 pack-year smoking history. He has never used smokeless tobacco. He reports that he does not drink alcohol and does not use drugs. Family History: family history includes Arthritis in his father; Cancer in his mother; Kidney Disease in his mother; Other in his father; Stroke in his father and mother; Substance Abuse in his father. All 14 point review of systems are completed and pertinent positives are mentioned in the history of present illness. Other systems are reviewed and are negative.      PHYSICAL EXAM:    Vital signs:    /74   Pulse 87   Ht 6' 3\" (1.905 m)   Wt 298 lb 3.2 oz (135.3 kg)   SpO2 99%   BMI 37.27 kg/m²      Constitutional and general appearance: alert, cooperative, no distress, and appears stated age  [de-identified]: PERRL, no cervical lymphadenopathy. No masses palpable. Normal oral mucosa  Respiratory:  Normal excursion and expansion without use of accessory muscles  Resp auscultation: Normal breath sounds without wheezing, rhonchi, and rales  Cardiovascular: The apical impulse is not displaced  Heart tones are crisp and normal. regular S1 and S2.  Jugular venous pulsation not assessed due to body habitus   The carotid upstroke is normal in amplitude and contour without delay or bruit  Peripheral pulses are symmetrical and full   Abdomen:  No masses or tenderness  Bowel sounds present  Extremities:   No cyanosis or clubbing   Yes, +1 pitting bilateral lower extremity edema   Skin: warm and dry  Neurological:  Alert and oriented  Moves all extremities well  No abnormalities of mood, affect, memory, mentation, or behavior are noted    DATA:    ECG 11/29/2022:  SR with 1st degree block, HR 88. QTc 460 ms    Echo: 09/06/2022   Summary   LV systolic function is normal with EF estimated at 55-60%. No regional wall motion abnormalities are noted. There is moderate concentric left ventricular hypertrophy. Normal left ventricular diastolic filling pressure. Biatrial enlargement. Mild posterior mitral annular calcification is present. Aortic valve appears sclerotic but opens adequately. Mild mitral and tricuspid regurgitation. Systolic pulmonary artery pressure (SPAP) estimated at 46mmHg (RA pressure 3   mmHg), consistent with mild pulmonary hypertension. 6- 55%, LVH, LAE, ANNIE     Cath: 09/08/2022  1. Left main coronary artery was normal. It gave off the left anterior descending artery and left circumflex. 2. Left anterior descending artery has severe atherosclerotic disease. It was large in size.  It gave off septal perforators and a moderate sized diagonal branch. The LAD covered the entire apex of the left ventricle. ~There is evidence for Denovo disease of about 70% within the mid LAD. This is confirmed by intravascular ultrasound. Furthermore there is a previously placed stent within the mid LAD that was under deployed and under expanded. 3. Left circumflex has mild atherosclerotic disease. It was moderate in size. There was a moderate sized obtuse marginal branch. 4. Right coronary artery has mild atherosclerotic disease. It was moderate in size and was the dominant artery. 5. Left ventriculogram showed normal LVEF at 55-60%. Wall motion was normal . There was no significant mitral valve or aortic valve disease noted. LVEDP was normal. There was no gradient noted across the aortic valve during pullback of the catheter. The patient was monitored continuously with the ECG, pulse oximetry, blood pressure, and direct observation. CONCLUSIONS:     1. Successful intravascular ultrasound-guided drug-eluting stent insertion to the left anterior descending artery     ASSESSMENT/RECOMMENDATIONS:     1. Continue dual antiplatelet therapy and guideline directed medical therapy. 2.  Follow-up with patient's primary cardiologist after discharge. All labs and testing reviewed. CARDIOLOGY LABS:   CBC: No results for input(s): WBC, HGB, HCT, PLT in the last 72 hours. BMP: No results for input(s): NA, K, CO2, BUN, CREATININE, LABGLOM, GLUCOSE in the last 72 hours. PT/INR:   Recent Labs     11/28/22  0000   INR 2.00     APTT:No results for input(s): APTT in the last 72 hours. FASTING LIPID PANEL:  Lab Results   Component Value Date/Time    HDL 30 09/05/2022 04:37 AM    LDLCALC 60 09/05/2022 04:37 AM    TRIG 147 09/05/2022 04:37 AM     LIVER PROFILE:No results for input(s): AST, ALT, ALB in the last 72 hours.     IMPRESSION:    Patient Active Problem List   Diagnosis    Arthritis    Atrial fibrillation ([address] Essential hypertension    Carpal tunnel syndrome    Polyneuropathy    Numbness and tingling of both legs    Moderate obstructive sleep apnea    S/P thoracotomy    Atypical atrial flutter (HCC)    SOB (shortness of breath)    Type 2 diabetes mellitus with diabetic neuropathy ([facility]    Morbid obesity with BMI of 40.0-44.9, adult (HCC)    History of venous thromboembolism    Cataract of both eyes    Abnormal stress test    Angina at rest [facility])    Status post insertion of drug-eluting stent into left anterior descending (LAD) artery for coronary artery disease    Other fatigue    Obesity (BMI 30-39. 9)    COVID-19    Acute on chronic heart failure with preserved ejection fraction ([facility])    Coronary artery disease involving native coronary artery of native heart    Anginal equivalent ([facility])    CAD S/P percutaneous coronary angioplasty    PANG (dyspnea on exertion)    Asthma    Atrial fibrillation with RVR ([facility])    PAF (paroxysmal atrial fibrillation) ([facility])    Exertional dyspnea    Pulmonary edema with congestive heart failure ([facility])     Assessment:   Symptomatic paroxysmal atrial arrhythmias (AF/AFL) : ongoing               -[id number] score 7 (age, HTN, DM, CAD, DVT)              -intolerant of Tikosyn (prolonged QTc) and amiodarone (unclear)   -s/p EPS, PVI and RFCA in 2017 [facility])   -s/p PIERO/DCCV 9/2022              -s/p RFCA of AF 10/6/2022              -Tikosyn resumed post ablation 10/2022  First degree AVB: stable   PVC's: stable              -s/p PVC ablation 2014  CAD :              -s/p PCI to LAD with FELIZ x 1 9/2022  HTN: controlled   HLD  DM  MARIA VICTORIA: CPAP compliant   History of DVT   Obesity s/p gastric sleeve   History of 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 Increase Cardizem to 180 mg once a day to help with elevated heart rates  2. Continue Tikosyn, Toprol, and Coumadin (INR managed by PCP)  3.  Continue aspirin, atorvastatin, Plavix, and torsemide 4. Breakthrough AF during the 3 months post procedure period after ablations is not unexpected. However, will order 2 week cardiac monitor to assess heart rate and rhythm   5. He reports he just got lab work done by the [facility] . I am unable to view these results. We discussed the importance of having the [address] [place] [address] fax lab work to the I   6. Continue to monitor heart rates and blood pressures at home  7. Wear compression stockings to help with lower extremity swelling  8. Continue daily weights and limiting sodium intake. He was directed to call his kidney MD or Dr. Dwain Ashby with weight gain for further directions on diuretics   9.  Follow up in 2 months with Dr. Javier Resendiz, APRN-CNP  [address]  [phone]

## 2022-11-29 NOTE — TELEPHONE ENCOUNTER
Pt stated that he called [facility] pharmacy and they do not have the prescription for Diltiazem 180mg. Advised pt that we show a confirmed receipt and to call pharmacy. Pt stated that he just checked with them and they do not have it. Please advise.

## 2022-11-29 NOTE — TELEPHONE ENCOUNTER
Spoke with [address] [place] [address] and RX has to also be approved by the [address] [place] [address] doctor so they are waiting on [address] [place] [address] doctor to approve. [facility] with [facility]. [facility] v[facility]u and [facility] if [facility] not [facility].

## 2022-11-29 NOTE — PATIENT INSTRUCTIONS
Increase Cardizem to 180 mg once a day to help with elevated heart rates    2 week cardiac monitor to assess heart rate and rhythm     Have the [place] fax lab work to [phone]     Continue to monitor heart rates and blood pressures at home    Wear compression stockings (if possible) to help with lower extremity swelling    Continue daily weights and limiting sodium intake.  Call your kidney MD or Dr. Rahul Longo with weight gain for further directions in water pills    Okay to push out appointment with Dr. Monica Crawley

## 2022-11-29 NOTE — TELEPHONE ENCOUNTER
Monitor placed by [employer]   Length of monitor 14 Day   Monitor ordered by [facility]  Serial number [id number]  Activation successful prior to pt leaving office?  Yes

## 2022-11-30 ENCOUNTER — CARE COORDINATION (OUTPATIENT)
Dept: CASE MANAGEMENT | Age: 77
End: 2022-11-30

## 2022-11-30 NOTE — CARE COORDINATION
[facility] Care Transitions Follow Up Call    Care Transition Nurse contacted the patient by telephone to follow up after admission. Verified name and  with patient as identifiers. Patient: Eloisa May  Patient : 1945   MRN: [id number]  Reason for Admission: PE w/ CHF  Discharge Date: 10/21/22 RARS: Readmission Risk Score: 25.8      Needs to be reviewed by the provider   Additional needs identified to be addressed with provider: No  none             Method of communication with provider: none. Patient answered call and verified . Patient pleasant and agreeable to transition call. Patient discussed recent \"heart racing\" episode that occurred over the weekend and follow up visits. Patient stated that his heart rate jumped up to 115 at rest and having shortness of breath with episode. Patient was seen by electrophysiology office yesterday and EKG was obtained (notes reviewed and SR w/ 1st degree block, HR 88). Patient was also placed on portable cardiac monitor for 2 weeks. Stated that he was given the \"patch\" instead of the 3 electrode system. Patient aware of directions and when to return device. Patient stated that his Cardizem was increased to 180mg daily at appt. Started new dose this morning. BP has been good around 120/80 and HR was 90 this morning when he checked. Patient has home care active for 1 more week and anticipate discharge from services next week. Patient eager to return to cardiac rehab, but waiting to hear back from facility for scheduling. Denies any acute needs at present time. Transition timeframe completed and explained ACM role. Patient agreeable and transitioned to [facility]. Educated on the use of urgent care or physicians 24 hr access line if assistance is needed after hours. Addressed changes since last contact:  none  Discussed follow-up appointments. If no appointment was previously scheduled, appointment scheduling offered: Yes.    Is follow up appointment scheduled within 7 days of discharge? Yes. Follow Up  Future Appointments   Date Time Provider Arslan Ardon   2/7/2023 10:15 AM MD Gretchen Vera [facility]   2/17/2023  9:00 AM Eliana Esparza MD I [facility]   4/17/2023  1:20 PM Washington Ochoa, APRN - CNP [facility]   4/20/2023  3:00 PM MD Brayden Sanders [facility] None     [facility] follow up appointment(s):     Care Transition Nurse reviewed medical action plan with patient and discussed any barriers to care and/or understanding of plan of care after discharge. Discussed appropriate site of care based on symptoms and resources available to patient including: PCP  Specialist  Home health  When to call 911. The patient agrees to contact the PCP office for questions related to their healthcare. Advance Care Planning:   reviewed and current. Patients top risk factors for readmission: functional physical ability  Interventions to address risk factors: Education of patient/family/caregiver/guardian to support self-management-     Offered patient enrollment in the Remote Patient Monitoring (RPM) program for in-home monitoring:  provider not participating . Care Transitions Subsequent and Final Call    Subsequent and Final Calls  Do you have any ongoing symptoms?: No  Have your medications changed?: Yes  Patient Reports: see note  Do you have any questions related to your medications?: No  Do you currently have any active services?: No  Are you currently active with any services?: Home Health  Do you have any needs or concerns that I can assist you with?: No  Identified Barriers: None  Care Transitions Interventions  Other Interventions:             Care Transition Nurse provided contact information for future needs. No further follow-up call indicated based on severity of symptoms and risk factors.   Plan for next call:  transfer to Morena Carreno, ANGELINA

## 2022-12-02 ENCOUNTER — CARE COORDINATION (OUTPATIENT)
Dept: CARE COORDINATION | Age: 77
End: 2022-12-02

## 2022-12-02 NOTE — CARE COORDINATION
ACM attempted outreach. Left message. Contact information for call back provided. CTN referral for care coordination . Continues to have some palpitations and had increase dosage of cardizem and currently on 2 week cardiac monitor for Dr Yesy Green. Hx of afib, asthma and htn.

## 2022-12-05 ENCOUNTER — CARE COORDINATION (OUTPATIENT)
Dept: CARE COORDINATION | Age: 77
End: 2022-12-05

## 2022-12-05 NOTE — CARE COORDINATION
Ambulatory Care Coordination Note  2022    ACC: Jade Machado, RN    ACM spoke with patient for follow up and outreach for care coordination. Enrolled for additional support. He had reported to me that he is going to be following ongoing with Dr. Jose Manuel Enriquez at [facility]. He was happy with her [id number] [facility] care but was looking for additional opinions. He has recently been taken off Cardizem and is on 100 mg of Metoprolol daily. He is tolerating this change so far. Bp was 107/78 and HR 94 this am. Discussed need to continue to check these readings and to report SBP less than 100 or HR lest than 50, or symptoms such as dizzness/confusion. He did feel a little dizzy with position change this am. Discussed slow movements, stand before taking off, and use of DME as needed for additional supports. He is feeling a little less fatigued today. Fatigue has been and issue or him for a while. Educated him on beta blocker use and how palpitation and uncontrolled HR affects him. Recording weights daily and has had a steady decline from 320 lbs to 294 lb today. Positive diet changes and sodium intact monitoring by this patient. Reinforced low sodium diet 2000 mg today and how to keep in check with this recommendation. He is currently in his second week of Holter monitoring and will complete soon. (This was previously ordered by his previous cardiology). Heart Failure Education outreach Date/Time: 2022    Ambulatory Care Manager (ACM) contacted the  patient and spouse  by telephone to perform Ambulatory Care Coordination. Verified name and  with  patient and spouse  as identifiers. Provided introduction to self, and explanation of the Ambulatory Care Manager's role. ACM reviewed that a Health Healthy tips for the Holiday packet has been sent to [place]. ACM reviewed CHF zones, daily weights, fluid restriction, the importance of low sodium diet, and healthy tips packet with the  patient and spouse .  Instructed  patient and spouse  to call their Cardiologist if they have a weight gain of 3 lbs in 2 days or 5 lbs in a week. Patient reminded that there is a physician on call 24 hours a day / 7 days a week should the patient have questions or concerns. The  patient and spouse  verbalized understanding. He is still active with home care but thinks this will complete soon. He has an interest in starting cardiac rehab. Patient reports controlled diabetes. Values have been stable   Lab Results   Component Value Date    LABA1C 6.6 10/06/2022    LABA1C 7.1 09/04/2022    LABA1C 7.1 08/29/2022     Lab Results   Component Value Date    .7 10/06/2022    .1 09/04/2022    .1 08/29/2022     Patient reports no issues with his Asthma and has not needed inhalers or nebulizer for a long time. Offered patient enrollment in the Remote Patient Monitoring (RPM) program for in-home monitoring: Patient is not eligible for RPM program. (Affiliate status)     Plan    Follow up call in 1 week   Continued support with medication education/monitorin  Weights, blood glucose, BP/HR reads  Continued cardiac education and support. ADs and SDOH next  call (ADs currently on file)     Ambulatory Care Coordination Assessment    Care Coordination Protocol  Referral from Primary Care Provider: No  Week 1 - Initial Assessment     Do you have all of your prescriptions and are they filled?: Yes  Barriers to medication adherence: None  Are you able to afford your medications?: Yes  How often do you have trouble taking your medications the way you have been told to take them?: I always take them as prescribed. Do you have Home O2 Therapy?: No      Ability to seek help/take action for Emergent Urgent situations i.e. fire, crime, inclement weather or health crisis. : Independent  Ability to ambulate to restroom: Independent  Ability handle personal hygeine needs (bathing/dressing/grooming):  Independent  Ability to manage Medications: Independent  Ability to prepare Food Preparation: Independent  Ability to maintain home (clean home, laundry): Independent  Ability to drive and/or has transportation: Independent  Ability to do shopping: Independent  Ability to manage finances: Independent  Is patient able to live independently?: Yes     Current Housing: Private Residence  Does the person that you care for see a [facility] PCP?: No        Per the Fall Risk Screening, did the patient have 2 or more falls or 1 fall with injury in the past year?: No     Frequent urination at night?: Yes  Do you use rails/bars?: No  Do you have a non-slip tub mat?: No     Are you experiencing loss of meaning?: No  Are you experiencing loss of hope and peace?: No     Thinking about your patient's physical health needs, are there any symptoms or problems (risk indicators) you are unsure about that require further investigation?: Moderate to severe symptoms or problems that impact on daily life   Are the patients physical health problems impacting on their mental well-being?: No identified areas of concern   Are there any problems with your patients lifestyle behaviors (alcohol, drugs, diet, exercise) that are impacting on physical or mental well-being?: No identified areas of concern   Do you have any other concerns about your patients mental well-being?  How would you rate their severity and impact on the patient?: No identified areas of concern   How would you rate their home environment in terms of safety and stability (including domestic violence, insecure housing, neighbor harassment)?: Consistently safe, supportive, stable, no identified problems   How do daily activities impact on the patient's well-being? (include current or anticipated unemployment, work, caregiving, access to transportation or other): No identified problems or perceived positive benefits   How would you rate their social network (family, work, friends)?: Adequate participation with social networks How would you rate their financial resources (including ability to afford all required medical care)?: Financially secure, some resource challenges   How wells does the patient now understand their health and well-being (symptoms, signs or risk factors) and what they need to do to manage their health?: Reasonable to good understanding and already engages in managing health or is willing to undertake better management   How well do you think your patient can engage in healthcare discussions? (Barriers include language, deafness, aphasia, alcohol or drug problems, learning difficulties, concentration): Clear and open communication, no identified barriers   Do other services need to be involved to help this patient?: Other care/services not required at this time   Are current services involved with this patient well-coordinated? (Include coordination with other services you are now recommendation): All required care/services in place and well-coordinated   Suggested Interventions and Community Resources   Cardiac Rehab: Not Started      [address]: Completed (Comment: active with home care still 12/5/22 trb)   Registered Dietician: Not Started   Transportation Services: Declined                  Prior to Admission medications    Medication Sig Start Date End Date Taking?  Authorizing Provider   Magnesium 400 MG CAPS Take 400 mg by mouth daily   Yes Historical Provider, MD   aspirin 81 MG chewable tablet Take 1 tablet by mouth daily 10/19/22  Yes Chava MD Simran   warfarin (COUMADIN) 5 MG tablet TAKE ONE TABLET BY MOUTH DAILY 10/19/22  Yes Chava Khalil MD   metFORMIN (GLUCOPHAGE) 500 MG tablet TAKE ONE TABLET BY MOUTH TWICE A DAY 10/19/22  Yes Chava Khalil MD   atorvastatin (LIPITOR) 40 MG tablet TAKE ONE TABLET BY MOUTH DAILY 10/19/22  Yes Chavamelissa Khalil MD   metoprolol succinate (TOPROL XL) 50 MG extended release tablet Take 1 tablet by mouth daily  Patient taking differently: Take 100 mg by mouth daily 10/20/22  Yes Melvin Jorgensen MD   torsemide (DEMADEX) 10 MG tablet Take 3 tablets by mouth daily 10/20/22  Yes Melvin Jorgensen MD   Multiple Vitamins-Minerals (THERAPEUTIC MULTIVITAMIN-MINERALS) tablet Take 2 tablets by mouth daily   Yes Historical Provider, MD   empagliflozin (JARDIANCE) 25 MG tablet 12.5 mg 10/3/22  Yes Historical Provider, MD   dofetilide (TIKOSYN) 250 MCG capsule Take 1 capsule by mouth every 12 hours 10/8/22  Yes SERENA Powers CNP   albuterol (PROVENTIL) (2.5 MG/3ML) 0.083% nebulizer solution Take 3 mLs by nebulization every 4 hours as needed for Wheezing or Shortness of Breath 9/22/22 9/22/23 Yes COLIN Dennis   albuterol sulfate HFA (VENTOLIN HFA) 108 (90 Base) MCG/ACT inhaler Inhale 2 puffs into the lungs 4 times daily as needed for Wheezing or Shortness of Breath 9/18/22  Yes COLIN Dennis   montelukast (SINGULAIR) 10 MG tablet TAKE ONE TABLET BY MOUTH DAILY 7/1/22  Yes Mirtha Randhawa MD   Biotin 5 MG TABS Take 5 mg by mouth daily   Yes Historical Provider, MD   Cholecalciferol (VITAMIN D3) 5000 units TABS Take 5,000 Units by mouth daily   Yes Historical Provider, MD   Cyanocobalamin (VITAMIN B-12 CR PO) Take 5,000 mcg by mouth every 7 days    Yes Historical Provider, MD   clopidogrel (PLAVIX) 75 MG tablet Take 1 tablet by mouth daily 10/19/22 11/29/22  Melvin Jorgensen MD   lisinopril (PRINIVIL;ZESTRIL) 5 MG tablet Take 1 tablet by mouth daily 9/10/22 9/21/22  Niki Pitts MD   Dulaglutide (TRULICITY) 6.67 PF/7.8CH SOPN Inject 0.75 mg into the skin once a week    Historical Provider, MD   blood glucose test strips (TRUE METRIX BLOOD GLUCOSE TEST) strip TEST ONCE DAILY. DX:E11.9 1/20/22   Mirtha Randhawa MD   Easy Touch Lancets 32G/Twist MISC TEST DAILY. DX: E11.9 3/23/20   Mirtha Randhawa MD   blood glucose test strips (TRUE METRIX BLOOD GLUCOSE TEST) strip USE TO CHECK BLOOD GLUCOSE DAILY.  DX: E11.9 3/23/20   Mirtha Randhawa MD   ACCU-CHEK MULTICLIX LANCETS MISC Check sugars once daily. Dx;E11.9 3/11/19   Rio Luis MD   Lancet Devices (EASY TOUCH LANCING DEVICE) MISC Test Daily. DX: E11.9 12/26/18   Roi Luis MD   Blood Glucose Monitoring Suppl (TRUE METRIX METER) VINNIE Use to check daily. DX;E11.9 12/22/18   Rio Luis MD   Lancets Misc. (ACCU-CHEK MULTICLIX LANCET DEV) KIT Check sugars once daily. DX;E11.9 12/21/18   Rio Luis MD       Future Appointments   Date Time Provider Arslan Reva   2/7/2023 10:15 AM MD Gretchen Vera [facility]   2/17/2023  9:00 AM Eliana Esparza MD [facility]   4/17/2023  1:20 PM Washington [place]a, APRN - CNP CLERM PULM [facility]   4/20/2023  3:00 PM Rio Luis MD [facility] None     ,   Diabetes Assessment    Medic Alert ID: No  Meal Planning: Avoidance of concentrated sweets, Plate Method, Carb counting   How often do you test your blood sugar?: Daily   Do you have barriers with adherence to non-pharmacologic self-management interventions? (Nutrition/Exercise/Self-Monitoring): No   Have you ever had to go to the ED for symptoms of low blood sugar?: No       No patient-reported symptoms   Do you have hyperglycemia symptoms?: No   Do you have hypoglycemia symptoms?: No   Last Blood Sugar Value: 121   Blood Sugar Monitoring Regimen: Once a Day   Blood Sugar Trends: No Change        , and   General Assessment    Do you have any symptoms that are causing concern?: Yes  Progression since Onset: Gradually Improving  Reported Symptoms: Fatigue, Other (Comment: Patient has some ongoing issues with fatigue. Heart rate has improved some with increased metoprolol.   some baseline edema in his legs but no weight increases.)

## 2022-12-15 ENCOUNTER — TELEPHONE (OUTPATIENT)
Dept: INTERNAL MEDICINE CLINIC | Age: 77
End: 2022-12-15

## 2022-12-15 ENCOUNTER — ANTI-COAG VISIT (OUTPATIENT)
Dept: INTERNAL MEDICINE CLINIC | Age: 77
End: 2022-12-15

## 2022-12-15 LAB — INR BLD: 2.4

## 2022-12-15 NOTE — TELEPHONE ENCOUNTER
----- Message from Jyotsna Diallo MD sent at 12/15/2022  1:02 PM EST -----  No change   2week  ----- Message -----  From: Royce Adams  Sent: 12/15/2022   8:40 AM EST  To: Jyotsna Diallo MD    Taking 5 mg daily.  ----- Message -----  From: Royce Adams  Sent: 12/15/2022   8:06 AM EST  To: Jyotsna Diallo MD    INR 2.4

## 2022-12-20 ENCOUNTER — TELEPHONE (OUTPATIENT)
Dept: INTERNAL MEDICINE CLINIC | Age: 77
End: 2022-12-20

## 2022-12-20 RX ORDER — AMOXICILLIN 500 MG/1
500 CAPSULE ORAL 3 TIMES DAILY
Qty: 15 CAPSULE | Refills: 0 | Status: SHIPPED | OUTPATIENT
Start: 2022-12-20 | End: 2022-12-25

## 2022-12-20 NOTE — TELEPHONE ENCOUNTER
----- Message from Umair Phelan MD sent at 12/20/2022  2:19 PM EST -----  Ok to do  ----- Message -----  From: Martha Robertson  Sent: 12/20/2022   1:19 PM EST  To: Umair Phelan MD    Allergy to cefuroxime axetil. Please advise. [address]  Patient is aware abx is being sent.  ----- Message -----  From: Umair Phelan MD  Sent: 12/20/2022  12:34 PM EST  To: Amber Evelyn Josafat    Amox 5500 mg tid x 5 days    ----- Message -----  From: Martha Robertson  Sent: 12/20/2022   8:30 AM EST  To: Umair Phelan MD    Patient thinks he has a sinus infection. Has been coughing a lot at night and in the morning has phlegm. When he blows his nose he gets little blood clots but no nose bleed. No fever. Has tested negative for covid. Has been going on for a couple of weeks. Please advise.

## 2022-12-20 NOTE — TELEPHONE ENCOUNTER
----- Message from Umair Phelan MD sent at 12/20/2022 12:34 PM EST -----  Amox 5500 mg tid x 5 days    ----- Message -----  From: Martha Robertson  Sent: 12/20/2022   8:30 AM EST  To: Umair Phelan MD    Patient thinks he has a sinus infection. Has been coughing a lot at night and in the morning has phlegm. When he blows his nose he gets little blood clots but no nose bleed. No fever. Has tested negative for covid. Has been going on for a couple of weeks. Please advise.

## 2022-12-28 ENCOUNTER — TELEPHONE (OUTPATIENT)
Dept: INTERNAL MEDICINE CLINIC | Age: 77
End: 2022-12-28

## 2022-12-28 ENCOUNTER — ANTI-COAG VISIT (OUTPATIENT)
Dept: INTERNAL MEDICINE CLINIC | Age: 77
End: 2022-12-28

## 2022-12-28 LAB — INR BLD: 1.9

## 2022-12-28 PROCEDURE — 93228 REMOTE 30 DAY ECG REV/REPORT: CPT | Performed by: INTERNAL MEDICINE

## 2022-12-28 NOTE — TELEPHONE ENCOUNTER
Patient informed to take 7.5 mg for two days then go back to 5 mg daily and re 2 weeks per Dr. Wilfrido Crowell.

## 2022-12-30 DIAGNOSIS — I48.0 PAROXYSMAL ATRIAL FIBRILLATION (HCC): Primary | ICD-10-CM

## 2023-01-11 ENCOUNTER — CARE COORDINATION (OUTPATIENT)
Dept: CARE COORDINATION | Age: 78
End: 2023-01-11

## 2023-01-11 NOTE — CARE COORDINATION
ACM attempted outreach. Spoke with his wife, Sherree Snellen, briefly. Patient is at the hospital for procedure. He is being sent home today. ACM plan for outreach call again on Friday. Plan    Follow up call planned for 1/13/22 -recent outpatient visit   Continued support with medication education/monitoring-med rec again   Weights, blood glucose, BP/HR reads  Continued cardiac education and support.    ADs and SDOH next  call (ADs currently on file)

## 2023-01-17 ENCOUNTER — TELEPHONE (OUTPATIENT)
Dept: INTERNAL MEDICINE CLINIC | Age: 78
End: 2023-01-17

## 2023-01-17 ENCOUNTER — TELEPHONE (OUTPATIENT)
Dept: CARDIOLOGY CLINIC | Age: 78
End: 2023-01-17

## 2023-01-17 ENCOUNTER — ANTI-COAG VISIT (OUTPATIENT)
Dept: INTERNAL MEDICINE CLINIC | Age: 78
End: 2023-01-17

## 2023-01-17 LAB — INR BLD: 1.4

## 2023-01-17 NOTE — TELEPHONE ENCOUNTER
----- Message from Kris Linder MD sent at 1/17/2023 12:54 PM EST -----  No change 1 week    ----- Message -----  From: Ibeth Marina  Sent: 1/17/2023   9:46 AM EST  To: Kris Linder MD    INR 1.4  Had a heart cath last Wednesday and was off coumadin for 5 days prior. INR was 1.2 before procedure. Patient started coumadin again the night of the procedure. Taking 5mg daily except yesterday and Sunday took 7.5 mg.  Please advise.

## 2023-01-17 NOTE — TELEPHONE ENCOUNTER
----- Message from Bard Hamilton sent at 1/17/2023  1:30 PM EST -----  5 mg daily?  ----- Message -----  From: Gulshan Conklin MD  Sent: 1/17/2023  12:54 PM EST  To: Toniann Habermann Poff    No change 1 week    ----- Message -----  From: Bard Hamilton  Sent: 1/17/2023   9:46 AM EST  To: Gulshan Conklin MD    INR 1.4  Had a heart cath last Wednesday and was off coumadin for 5 days prior. INR was 1.2 before procedure. Patient started coumadin again the night of the procedure. Taking 5mg daily except yesterday and Sunday took 7.5 mg.  Please advise.

## 2023-01-18 ENCOUNTER — HOSPITAL ENCOUNTER (OUTPATIENT)
Dept: CARDIAC REHAB | Age: 78
Setting detail: THERAPIES SERIES
Discharge: HOME | End: 2023-01-18

## 2023-01-18 VITALS — RESPIRATION RATE: 10 BRPM | WEIGHT: 298.8 LBS | BODY MASS INDEX: 37.15 KG/M2 | OXYGEN SATURATION: 98 % | HEIGHT: 75 IN

## 2023-01-18 RX ORDER — DIGOXIN 125 MCG
125 TABLET ORAL DAILY
COMMUNITY

## 2023-01-18 RX ORDER — CALCITRIOL 0.25 UG/1
0.25 CAPSULE, LIQUID FILLED ORAL DAILY
COMMUNITY

## 2023-01-18 RX ORDER — CLOPIDOGREL BISULFATE 75 MG/1
75 TABLET ORAL DAILY
COMMUNITY

## 2023-01-18 ASSESSMENT — PATIENT HEALTH QUESTIONNAIRE - PHQ9
SUM OF ALL RESPONSES TO PHQ9 QUESTIONS 1 & 2: 0
6. FEELING BAD ABOUT YOURSELF - OR THAT YOU ARE A FAILURE OR HAVE LET YOURSELF OR YOUR FAMILY DOWN: 1
SUM OF ALL RESPONSES TO PHQ QUESTIONS 1-9: 3
8. MOVING OR SPEAKING SO SLOWLY THAT OTHER PEOPLE COULD HAVE NOTICED. OR THE OPPOSITE, BEING SO FIGETY OR RESTLESS THAT YOU HAVE BEEN MOVING AROUND A LOT MORE THAN USUAL: 0
10. IF YOU CHECKED OFF ANY PROBLEMS, HOW DIFFICULT HAVE THESE PROBLEMS MADE IT FOR YOU TO DO YOUR WORK, TAKE CARE OF THINGS AT HOME, OR GET ALONG WITH OTHER PEOPLE: 0
7. TROUBLE CONCENTRATING ON THINGS, SUCH AS READING THE NEWSPAPER OR WATCHING TELEVISION: 0
3. TROUBLE FALLING OR STAYING ASLEEP: 0
5. POOR APPETITE OR OVEREATING: 0
SUM OF ALL RESPONSES TO PHQ QUESTIONS 1-9: 3
2. FEELING DOWN, DEPRESSED OR HOPELESS: 0
1. LITTLE INTEREST OR PLEASURE IN DOING THINGS: 0
SUM OF ALL RESPONSES TO PHQ QUESTIONS 1-9: 3
4. FEELING TIRED OR HAVING LITTLE ENERGY: 2
9. THOUGHTS THAT YOU WOULD BE BETTER OFF DEAD, OR OF HURTING YOURSELF: 0
SUM OF ALL RESPONSES TO PHQ QUESTIONS 1-9: 3

## 2023-01-18 ASSESSMENT — EJECTION FRACTION: EF_VALUE: 55

## 2023-01-18 ASSESSMENT — EXERCISE STRESS TEST
PEAK_HR: 111
PEAK_RPE: 13
PEAK_RPD: 3
PEAK_BP: 120/70

## 2023-01-18 NOTE — PLAN OF CARE
Individual Treatment Plan  Cardiac Rehabilitation    Initial Assessment  Name: Chase Ugalde to Rehab: 2023   : 1945 Primary Diagnosis: PCI    Age: 69 y.o. Referring Physician:  Marti Rowan   MRN: [id number] Primary Care Physician: Kris Linder MD     Allergies   Allergen Reactions    Bactrim [Sulfamethoxazole-Trimethoprim] Diarrhea    Brilinta [Ticagrelor]      dyspnea    Ciprofloxacin      Bad headaches    Macrobid [Nitrofurantoin Monohyd Macro]     Ozempic (0.25 Or 0.5 Mg-Dose) [Semaglutide(0.25 Or 0.5mg-Dos)]     Sulfamethoxazole Diarrhea    Theophylline      Theodur-caused severe headaches    Cefuroxime Axetil Rash and Itching    Cipro Xr Rash    Other Rash and Other (See Comments)     Ekg leads-glue    Tape [Adhesive Tape] Rash     Skin irritaion  Eats away at skin, paper tape OK  Plastic tape     Exercise Assessment  Stages of Change: Preparation   Risk Stratification: Medium    Fall Risk: Secondary diagnoses (15 pts)  Score: 15  Assistive Devices:None  Initial Assessment: 6 Minute Walk Test   Pre-Test Vitals: Data Measured Before Walk  Heart Rate: 74  Blood Pressure: 104/70  O2 Saturation: 98  O2 Device: Room air;     Peak Vitals: Data Measured Immediately After Walk  Distance Walked (ft): 694 ft  Peak Heart Rate: 111  Peak Blood Pressure: 120/70  RPE: 13  O2 Saturation: 92    During: Data Measured during Walk  Indicate Mode of RPE: 6 minutes  Any problems while exercising:  (dizziness)  Do You Have Shortness of Breath: Yes  Peak RPE: 13  Peak RPD: 3   Number of Rest: 0  Post-Test Vitals: Data Measured at 5 Minutes After Walk  Heart Rate: 73  Blood Pressure: 108/72  SpO2: 99 %  O2 Device: Room air  Key Anti-Hypertensive Meds            metoprolol succinate (TOPROL XL) 50 MG extended release tablet    Sig - Route: Take 1 tablet by mouth daily - Oral    Patient taking differently: Take 100 mg by mouth daily    torsemide (DEMADEX) 10 MG tablet    Sig - Route:  Take 3 tablets by mouth daily - Oral    lisinopril (PRINIVIL;ZESTRIL) 5 MG tablet (Discontinued)    Sig - Route: Take 1 tablet by mouth daily - Oral    Reason for Discontinue: Stop Taking at Discharge           Exercise Prescription        Mode: . Treadmill, Bike (Upright or recumbent), NuStep, SciFit, Airdyne, Arm Ergometer, Recumbent Eliptical, and Walking      Frequency: 2-3 days/week supervised      Intensity:RPE 11-14      Target Heart Rate: Resting HR + 20-30      Duration: 25-32+ minutes/day      Resistance Training: Yes, 3 days per week      Progression:Increase exercise by 5 minutes every week to goal of 30 to 50 minutes and Increase repetitions from 10 to 15 and/or sets from 1 to 3      Supplemental oxygen:No    Plan  Home Exercise: Yes  Mode:Treadmill and Walking  Resistance Training:No  Target Goals  Adhere to cardiac exercise guidelines , Increase exercise endurance and stamina , Increase functional capacity as measured by a 6 minute walk or shuttle walk , Participate in strength training , and Perform purse-lip breathing during exercise   Education  Mr. Gallegos was educated on the importance of   warm up and cool down, signs and symptoms to report, exercise safety, RPE scale, Panchito Scale of Perceived Exertion use, Importance of physical activity and exercise progression, and equipment orientation  Nutrition Assessment  Stages of Change: Preparation  Height: 6' 3\" (190.5 cm) Weight: 298 lb 12.8 oz (135.5 kg) BMI (Calculated): 37.4     Nutrition Survey: Rate Your Plate Score: Rate Your Plate Total Score: 58     Patient Weight Goal: 270 lb   Recommended DietMaintain adequate nutrition; eating a variety of fruits and vegetables, low fat and cholesterol foods, Decrease caloric intake, Decrease intake of processed foods, and Decrease intake of refined sugars  Diabetic:Yes  Key Antihyperglycemic Medications            metFORMIN (GLUCOPHAGE) 500 MG tablet    Sig: TAKE ONE TABLET BY MOUTH TWICE A DAY    empagliflozin (Gelene Mary) 25 MG tablet    Class: Historical Med    Dulaglutide (TRULICITY) 8.93 VG/5.6KU SOPN    Class: Historical Med           Key Hyperlipidemia Meds            atorvastatin (LIPITOR) 40 MG tablet    Sig: TAKE ONE TABLET BY MOUTH DAILY    Patient taking differently: 80 mg TAKE ONE TABLET BY MOUTH DAILY               Lab Results   Glucose   POC Glucose Monitoring Device   Component 2023          Glucose -- 130 High     124 High          LIPID PROFILE  [facility]  2022  Component      Triglycerides   Cholesterol   LDL Calculated   HDL   Chol/HDL Ratio     Component 2022        Triglycerides 142 147   Cholesterol 149 119   LDL Calculated 85 60   HDL 36 Low      30   Chol/HDL Ratio 4.1 --       Nutrition Intervention  Attend education classes related to nutrition, Clinician/Patient discussion, and Participate in an exercise program that promotes weight loss  Target Goals  Articulate heart healthy diet , Complete cardiac diet classes , Control blood pressure , Manage blood sugars independently , and Reduce weight   Education  Mr. Gallegos was educated on the importance ofdaily weight monitoring and signs and symptoms of hypoglycemia  Psychosocial Assessment  Stages of Change:Preparation  Social History     Socioeconomic History    Marital status:      Spouse name: Not on file    Number of children: Not on file    Years of education: Not on file    Highest education level: Not on file   Occupational History    Not on file   Tobacco Use    Smoking status: Former     Packs/day: 2.00     Years: 17.00     Pack years: 34.00     Types: Cigarettes     Quit date: 1976     Years since quittin.7    Smokeless tobacco: Never   Vaping Use    Vaping Use: Never used   Substance and Sexual Activity    Alcohol use: No    Drug use: No    Sexual activity: Never     Partners: Female   Other Topics Concern    Not on file   Social History Narrative    Not on file Social Determinants of Health     Financial Resource Strain: Not on file   Food Insecurity: Not on file   Transportation Needs: Not on file   Physical Activity: Insufficiently Active    Days of Exercise per Week: 2 days    Minutes of Exercise per Session: 60 min   Stress: Not on file   Social Connections: Not on file   Intimate Partner Violence: Not on file   Housing Stability: Not on file      Psychosocial Test  Today's PHQ:    [facility] [place] Scores 1/18/2023 3/14/2022 3/14/2022 12/1/2020 12/4/2019 12/4/2019 4/3/2019   PHQ2 Score 0 0 0 0 0 0 0   PHQ9 Score 3 0 0 0 0 0 0     Interpretation of Total Score Depression Severity: 1-4 = Minimal depression, 5-9 = Mild depression, 10-14 = Moderate depression, 15-19 = Moderately severe depression, 20-27 = Severe depression    Psychosocial Assessment  Does the Patient Report Any of the Following: Patient denies any issues at this time  Positive Aspects of Family and Home Situation That May Affect Treatment : Living spouse or significant other; [place] caring support system;  Socially included; Spiritual  Medication Changes: Yes (metoprolol, d/c digoxin)  Are You Aware of Any Stress and/or Tension in Your Life: Yes  Level of Stress and/or Tension: Low  What Areas Cause You the Most Stress and/or Tension: health  How Do You Deal/[place] With Stress and/or Tension: [place]  What Are Your Calming Techniques: Praying, fix things  Have You Been Abused or a Victim of a Crime: No  Evidence of Abuse or Neglect: Patient denies any issues at this time        Psychosocial Intervention  Participate in an exercise program that improves psychosocial symptoms  Target Goals  Return to ADLs/desired activities , Identify a positive support system , Maximize stress management/coping skills , Articulate confidence in adequate treatment of symptoms , and Improve quality of live, self-efficacy and depression screening scores as measured by the appropriate tool   Education Assessment  Stages of Change: Preparation  Barriers to Learning: No barriers  Personal Learning Style:Demonstration, Verbal, and Video  Social History     Tobacco Use   Smoking Status Former    Packs/day: 2.00    Years: 17.00    Pack years: 34.00    Types: Cigarettes    Quit date: 1976    Years since quittin.7   Smokeless Tobacco Never     Education Intervention/Learning Needs Identified  Blood pressure, Diabetes, Exercise, Fall prevention, Medications, Nutrition, Risk factor for CAD, and Signs and symptoms of MI/CVA  Target Goals  Adherence to medication regimen , Articulate understanding of self-management and prevention/treatment of cardiac disease , Attend appropriate group education classes , Attend appropriate web education classes , Complete cessation of tobacco , and Restore to highest level of independent functionality   Education  Mr. Gallegos was educated on the importance of relaxation techniques                     Provider Review and Approval of this ITP    The above treatment plan and goals have been set for your patient during Cardiac Rehabilitation.  Please review and Electronically Cosign        Electronically signed by Enrique Siegel RN on 23 at 3:03 PM EST

## 2023-01-18 NOTE — PROGRESS NOTES
Initial assessment completed. Pt will start CR on 1/23/23 at 0800.  ITP completed and routed to be signed by cardiologist.

## 2023-01-19 NOTE — TELEPHONE ENCOUNTER
Spoke to pt to relay monitor results. V/U     Pt stated he follows [facility] cardiology now and would like upcoming OV with [address] and [address] cancelled. Appointments canceled as requested.

## 2023-01-23 ENCOUNTER — HOSPITAL ENCOUNTER (OUTPATIENT)
Dept: CARDIAC REHAB | Age: 78
Setting detail: THERAPIES SERIES
Discharge: HOME | End: 2023-01-23
Payer: MEDICARE

## 2023-01-23 PROCEDURE — 93798 PHYS/QHP OP CAR RHAB W/ECG: CPT

## 2023-01-25 ENCOUNTER — HOSPITAL ENCOUNTER (OUTPATIENT)
Dept: CARDIAC REHAB | Age: 78
Setting detail: THERAPIES SERIES
Discharge: HOME | End: 2023-01-25
Payer: MEDICARE

## 2023-01-25 PROCEDURE — 93798 PHYS/QHP OP CAR RHAB W/ECG: CPT

## 2023-01-27 ENCOUNTER — HOSPITAL ENCOUNTER (OUTPATIENT)
Dept: CARDIAC REHAB | Age: 78
Setting detail: THERAPIES SERIES
Discharge: HOME | End: 2023-01-27
Payer: MEDICARE

## 2023-01-27 PROCEDURE — 93798 PHYS/QHP OP CAR RHAB W/ECG: CPT

## 2023-01-27 NOTE — PROGRESS NOTES
Patient's skin is red where he places electrodes for heart monitor. Patient has been using barrier wipe. Radio translucent foam, Stress foam electrode and general monitoring foam electrodes used. Patient washes areas with adhesive remover after exercise but is still red. Discussed going non monitored if no relief.

## 2023-01-30 ENCOUNTER — HOSPITAL ENCOUNTER (OUTPATIENT)
Dept: CARDIAC REHAB | Age: 78
Setting detail: THERAPIES SERIES
Discharge: HOME | End: 2023-01-30
Payer: MEDICARE

## 2023-01-30 PROCEDURE — 93798 PHYS/QHP OP CAR RHAB W/ECG: CPT

## 2023-02-01 ENCOUNTER — HOSPITAL ENCOUNTER (OUTPATIENT)
Dept: CARDIAC REHAB | Age: 78
Setting detail: THERAPIES SERIES
Discharge: HOME | End: 2023-02-01
Payer: MEDICARE

## 2023-02-01 PROCEDURE — 93798 PHYS/QHP OP CAR RHAB W/ECG: CPT

## 2023-02-03 ENCOUNTER — APPOINTMENT (OUTPATIENT)
Dept: CARDIAC REHAB | Age: 78
End: 2023-02-03
Payer: MEDICARE

## 2023-02-03 LAB
CATARACTS: NEGATIVE
DIABETIC RETINOPATHY: NEGATIVE
GLAUCOMA: NEGATIVE
INTRAOCULAR PRESSURE EYE: 10
INTRAOCULAR PRESSURE EYE: 9
VISUAL ACUITY DISTANCE LEFT EYE: NORMAL
VISUAL ACUITY DISTANCE RIGHT EYE: NORMAL

## 2023-02-04 LAB — INR BLD: 1.2

## 2023-02-06 ENCOUNTER — TELEPHONE (OUTPATIENT)
Dept: INTERNAL MEDICINE CLINIC | Age: 78
End: 2023-02-06

## 2023-02-06 ENCOUNTER — HOSPITAL ENCOUNTER (OUTPATIENT)
Dept: CARDIAC REHAB | Age: 78
Setting detail: THERAPIES SERIES
Discharge: HOME | End: 2023-02-06
Payer: MEDICARE

## 2023-02-06 ENCOUNTER — ANTI-COAG VISIT (OUTPATIENT)
Dept: INTERNAL MEDICINE CLINIC | Age: 78
End: 2023-02-06

## 2023-02-06 PROCEDURE — 93798 PHYS/QHP OP CAR RHAB W/ECG: CPT

## 2023-02-06 NOTE — TELEPHONE ENCOUNTER
----- Message from Nikki Lagos MD sent at 2/6/2023 12:53 PM EST -----  Contact: Patient [phone]  Continue same dose   Friday recheck    ----- Message -----  From: Moiz Wero  Sent: 2/6/2023   9:25 AM EST  To: Nikki Lagos MD    INR: 1.2. Takes 5 mg daily. Was off coumadin for 5 days for a procedure and started back on it on Friday. Also wants to let you know that the [facility] is going to be putting patient on Eliquis soon. They wanted him to get his levels back up first before doing this.

## 2023-02-08 ENCOUNTER — HOSPITAL ENCOUNTER (OUTPATIENT)
Dept: CARDIAC REHAB | Age: 78
Setting detail: THERAPIES SERIES
Discharge: HOME | End: 2023-02-08
Payer: MEDICARE

## 2023-02-08 PROCEDURE — 93798 PHYS/QHP OP CAR RHAB W/ECG: CPT

## 2023-02-10 ENCOUNTER — CARE COORDINATION (OUTPATIENT)
Dept: CARE COORDINATION | Age: 78
End: 2023-02-10

## 2023-02-10 ENCOUNTER — HOSPITAL ENCOUNTER (OUTPATIENT)
Dept: CARDIAC REHAB | Age: 78
Setting detail: THERAPIES SERIES
Discharge: HOME | End: 2023-02-10
Payer: MEDICARE

## 2023-02-10 PROCEDURE — 93798 PHYS/QHP OP CAR RHAB W/ECG: CPT

## 2023-02-10 NOTE — CARE COORDINATION
ACM attempted outreach. No answer. UTR      Plan      Continued support with medication education/monitoring-med rec again  Weights, blood glucose, BP/HR reads  Continued cardiac education and support.    ADs and SDOH next  call (ADs currently on file)

## 2023-02-13 ENCOUNTER — HOSPITAL ENCOUNTER (OUTPATIENT)
Dept: CARDIAC REHAB | Age: 78
Setting detail: THERAPIES SERIES
Discharge: HOME | End: 2023-02-13
Payer: MEDICARE

## 2023-02-13 PROCEDURE — 93798 PHYS/QHP OP CAR RHAB W/ECG: CPT

## 2023-02-15 ENCOUNTER — HOSPITAL ENCOUNTER (OUTPATIENT)
Dept: CARDIAC REHAB | Age: 78
Setting detail: THERAPIES SERIES
Discharge: HOME | End: 2023-02-15
Payer: MEDICARE

## 2023-02-15 PROCEDURE — 93798 PHYS/QHP OP CAR RHAB W/ECG: CPT

## 2023-02-15 NOTE — PLAN OF CARE
Individual Treatment Plan  Cardiac Rehabilitation    Reassessment  Name: Grace Serrano Reassessment: 30 days   : 1945 Primary Diagnosis:   PCI   Age: 69 y.o. Referring Physician:   Stephanie Dunn   MRN: [id number] Primary Care Physician: Amey Lesch, MD     Allergies   Allergen Reactions    Bactrim [Sulfamethoxazole-Trimethoprim] Diarrhea    Brilinta [Ticagrelor]      dyspnea    Ciprofloxacin      Bad headaches    Macrobid [Nitrofurantoin Monohyd Macro]     Ozempic (0.25 Or 0.5 Mg-Dose) [Semaglutide(0.25 Or 0.5mg-Dos)]     Sulfamethoxazole Diarrhea    Theophylline      Theodur-caused severe headaches    Cefuroxime Axetil Rash and Itching    Cipro Xr Rash    Other Rash and Other (See Comments)     Ekg leads-glue    Tape Donnel Sneddon Tape] Rash     Skin irritaion  Eats away at skin, paper tape OK  Plastic tape     Exercise Assessment  Stages of Change: Preparation   Risk Stratification: Medium  Fall Risk: : Secondary diagnoses (15 pts)  Score: 15    Amb Fall Risk Assessment and TUG Test 2022   Do you feel unsteady or are you worried about falling?  no   2 or more falls in past year? no   Fall with injury in past year? no   Timed Up and Go Test > 12 seconds? -      Assistive Devices:  None  Sessions completed:   Key Anti-Hypertensive Meds            metoprolol succinate (TOPROL XL) 50 MG extended release tablet    Sig - Route: Take 1 tablet by mouth daily - Oral    Patient taking differently: Take 100 mg by mouth daily    torsemide (DEMADEX) 10 MG tablet    Sig - Route: Take 3 tablets by mouth daily - Oral    lisinopril (PRINIVIL;ZESTRIL) 5 MG tablet (Discontinued)    Sig - Route: Take 1 tablet by mouth daily - Oral    Reason for Discontinue: Stop Taking at Discharge           Exercise Prescription        Mode: . Treadmill, Bike (Upright or recumbent), NuStep, SciFit, Airdyne, Arm Ergometer, Recumbent Eliptical, and Walking      Frequency: 2-3 days/week supervised      Intensity:RPE 11-14 Current Target Heart Rate: Resting HR + 20-30      New Target Heart Rate zone:  bpm      Duration: 32+ minutes/day      Resistance Training: Yes, 3 days per week      Progression: Increase exercise by 5 minutes every week to goal of 30 to 50 minutes and Increase repetitions from 10 to 15 and/or sets from 1 to 3      Supplemental oxygen: is not on home oxygen therapy. Plan  Home Exercise:Yes  Mode:Treadmill and Walking  Resistance Training: no   Target Goals  Adhere to cardiac exercise guidelines , Increase exercise endurance and stamina , Increase functional capacity as compared to initial assessment , Increase functional capacity as measured by a 6 minute walk or shuttle walk , Participate in strength training , and Perform purse-lip breathing during exercise   Previous goals Adhere to cardiac exercise guidelines , Increase exercise endurance and stamina , Increase functional capacity as measured by a 6 minute walk or shuttle walk , Participate in strength training , and Perform purse-lip breathing during exercise   Progress toward goal: Pt attends CR regularly and exercises 32+ minutes. Education  Mr. Gallegos was educated on the importance of warm up and cool down, signs and symptoms to report, exercise safety, RPE scale, Panchito Scale of Perceived Exertion use, Importance of physical activity and exercise progression, equipment orientation, home exercise program, and blood pressure medication    Nutrition Assessment  Stages of Change: Preparation   Ht 6'3\"  301 lb       Rate Your Plate Score:  58   Patient's weight goal/Progress: Goal 270 lb  Recommended Diet:Maintain adequate nutrition; eating a variety of fruits and vegetables, low fat and cholesterol foods, Reduce saturated fat and cholesterol, Follow low sodium diet, Limit dietary intake of saturated fat, cholesterol and sodium, Decrease intake of processed foods, and Decrease intake of refined sugars   Diabetic:Yes; Monitors blood sugars as prescribed Yes  Key Antihyperglycemic Medications            metFORMIN (GLUCOPHAGE) 500 MG tablet    Sig: TAKE ONE TABLET BY MOUTH TWICE A DAY    empagliflozin (JARDIANCE) 25 MG tablet    Class: Historical Med    Dulaglutide (TRULICITY) 2.36 AU/4.0IT SOPN    Class: Historical Med           Lab Results   Component Value Date    LABA1C 6.6 10/06/2022     Lab Results          Glucose   POC Glucose Monitoring Device   Component 01/11/2023 01/11/2023 12/29/2022 09/05/2022               Glucose -- 130 High     124 High            LIPID PROFILE  [facility]  12/29/2022  Component       Triglycerides   Cholesterol   LDL Calculated   HDL   Chol/HDL Ratio      Component 12/29/2022 09/05/2022           Triglycerides 142 147   Cholesterol 149 119   LDL Calculated 85 60   HDL 36 Low      30   Chol/HDL Ratio 4.1 --                Nutrition Intervention  Attend education classes related to nutrition, Clinician/Patient discussion, and Participate in an exercise program that promotes weight loss  Target Goals  Articulate heart healthy diet , Complete cardiac diet classes , Control blood pressure , Manage blood sugars independently , and Reduce weight   Previous goals Articulate heart healthy diet , Complete cardiac diet classes , Control blood pressure , Manage blood sugars independently , and Reduce weight   Progress toward goal: Pt attends education classes, BP is controlled,   Education  Mr. Gallegos was educated on the importance of proper hydration, fluid intake, daily weight monitoring, maintaining optimal weight/BMI, nutrition guidelines, cholesterol reduction, nutrition label literacy, portion control, signs and symptoms of hypoglycemia, and relationship of diabetes to cardiac disease.   Psychosocial Assessment  Stages of Change: Preparation    Psychosocial Test  PHQ-2/9 Today's PHQ:    PHQ Scores 1/18/2023 3/14/2022 3/14/2022 12/1/2020 12/4/2019 12/4/2019 4/3/2019   PHQ2 Score 0 0 0 0 0 0 0   PHQ9 Score 3 0 0 0 0 0 0 Interpretation of Total Score Depression Severity: 1-4 = Minimal depression, 5-9 = Mild depression, 10-14 = Moderate depression, 15-19 = Moderately severe depression, 20-27 = Severe depression    Psychosocial Intervention  Attend education classes related to stress management and relaxation, Learn and utilize stress management and coping skills, and Participate in an exercise program that improves psychosocial symptoms  Target Goals  Previous goals: Return to ADLs/desired activities , Identify a positive support system , Maximize stress management/coping skills , and Articulate confidence in adequate treatment of symptoms   Progress toward goal:Meeting goals  New goal: Return to ADLs/desired activities , Identify a positive support system , Maximize stress management/coping skills , and Articulate confidence in adequate treatment of symptoms   Stages of Change: Preparation  Barriers to Learning: No barriers  Personal Learning Style:Demonstration, Verbal, and Video     Education Intervention/Learning Needs Identified  Blood pressure, Diabetes, Exercise, Fall prevention, Medications, Nutrition, Risk factor for CAD, Signs and symptoms of MI/CVA, Stregnth training, Stress management and relaxation, and Weight management  Tobacco Assessment  Social History     Tobacco Use   Smoking Status Former    Packs/day: 2.00    Years: 17.00    Pack years: 34.00    Types: Cigarettes    Quit date: 1976    Years since quittin.8   Smokeless Tobacco Never       Target Goals  Previous goals: Adherence to medication regimen , Articulate understanding of self-management and prevention/treatment of cardiac disease , Attend appropriate group education classes , Attend appropriate web education classes , and Restore to highest level of independent functionality   Progress toward goals: Takes medications as prescribed, attends education classes.    New goals: Articulate understanding of self-management and prevention/treatment of cardiac disease , Attend appropriate group education classes , Attend appropriate web education classes , and Restore to highest level of independent functionality   Education  Mr. Gallegos was educated on the importance of healthy coping techniques and stress management, signs and symptoms of depression, relaxation techniques, and creating positive support systems                     Provider Review and Approval of this ITP    The above treatment plan and goals have been set for your patient during Cardiac Rehabilitation.  Please review and Electronically Cosign      Electronically signed by Bryan Samano RN on 2/15/23 at 7:13 AM EST

## 2023-02-17 ENCOUNTER — HOSPITAL ENCOUNTER (OUTPATIENT)
Dept: CARDIAC REHAB | Age: 78
Setting detail: THERAPIES SERIES
Discharge: HOME | End: 2023-02-17
Payer: MEDICARE

## 2023-02-17 ENCOUNTER — TELEPHONE (OUTPATIENT)
Dept: INTERNAL MEDICINE CLINIC | Age: 78
End: 2023-02-17

## 2023-02-17 PROCEDURE — 93798 PHYS/QHP OP CAR RHAB W/ECG: CPT

## 2023-02-17 NOTE — TELEPHONE ENCOUNTER
----- Message from Jame Ivy sent at 2/17/2023  3:37 PM EST -----  [facility].   ----- Message -----  From: Pippa Botello MD  Sent: 2/17/2023   3:20 PM EST  To: Jame Ivy    2 days  ----- Message -----  From: Jame Ivy  Sent: 2/17/2023   3:15 PM EST  To: Pippa Botello MD    How many days should pt take 7.5 mg daily?  ----- Message -----  From: Pippa Botello MD  Sent: 2/17/2023   3:05 PM EST  To: Jame Ivy    Take 7.5 m g daily then 5 mg daily and recheck in 7 days  ----- Message -----  From: Jame Ivy  Sent: 2/17/2023  10:22 AM EST  To: MD Dr. Dipak Hinds pt-    INR- 1.6    Taking 5 mg daily    Last INR- 1.2

## 2023-02-17 NOTE — TELEPHONE ENCOUNTER
----- Message from Shanae Lord sent at 2/17/2023  1:24 PM EST -----  Contact: Carol Azam [phone]  FYI[facility]. They will contact patient for his most recent INR reading that he took at his home.  ----- Message -----  From: Stephanie Antonio  Sent: 2/17/2023  10:36 AM EST  To: Julianna Dunbar from [facility] requesting patients INR from yesterday. They are moving his medication from warfarin to eliquis.  Please advise thank you

## 2023-02-20 ENCOUNTER — HOSPITAL ENCOUNTER (OUTPATIENT)
Dept: CARDIAC REHAB | Age: 78
Setting detail: THERAPIES SERIES
Discharge: HOME | End: 2023-02-20
Payer: MEDICARE

## 2023-02-20 ENCOUNTER — TELEPHONE (OUTPATIENT)
Dept: CARDIAC REHAB | Age: 78
End: 2023-02-20

## 2023-02-22 ENCOUNTER — HOSPITAL ENCOUNTER (OUTPATIENT)
Dept: CARDIAC REHAB | Age: 78
Setting detail: THERAPIES SERIES
Discharge: HOME | End: 2023-02-22
Payer: MEDICARE

## 2023-02-22 PROCEDURE — 93798 PHYS/QHP OP CAR RHAB W/ECG: CPT

## 2023-02-24 ENCOUNTER — HOSPITAL ENCOUNTER (OUTPATIENT)
Dept: CARDIAC REHAB | Age: 78
Setting detail: THERAPIES SERIES
Discharge: HOME | End: 2023-02-24
Payer: MEDICARE

## 2023-02-24 PROCEDURE — 93798 PHYS/QHP OP CAR RHAB W/ECG: CPT

## 2023-02-27 ENCOUNTER — APPOINTMENT (OUTPATIENT)
Dept: CARDIAC REHAB | Age: 78
End: 2023-02-27
Payer: MEDICARE

## 2023-02-27 PROCEDURE — 93798 PHYS/QHP OP CAR RHAB W/ECG: CPT

## 2023-03-01 ENCOUNTER — HOSPITAL ENCOUNTER (OUTPATIENT)
Dept: CARDIAC REHAB | Age: 78
Setting detail: THERAPIES SERIES
Discharge: HOME | End: 2023-03-01
Payer: MEDICARE

## 2023-03-01 VITALS — HEIGHT: 75 IN | BODY MASS INDEX: 37.05 KG/M2 | WEIGHT: 298 LBS

## 2023-03-01 PROCEDURE — 93798 PHYS/QHP OP CAR RHAB W/ECG: CPT

## 2023-03-03 ENCOUNTER — HOSPITAL ENCOUNTER (OUTPATIENT)
Dept: CARDIAC REHAB | Age: 78
Setting detail: THERAPIES SERIES
Discharge: HOME | End: 2023-03-03
Payer: MEDICARE

## 2023-03-03 PROCEDURE — 93798 PHYS/QHP OP CAR RHAB W/ECG: CPT

## 2023-03-06 ENCOUNTER — CARE COORDINATION (OUTPATIENT)
Dept: CARE COORDINATION | Age: 78
End: 2023-03-06

## 2023-03-06 ENCOUNTER — HOSPITAL ENCOUNTER (OUTPATIENT)
Dept: CARDIAC REHAB | Age: 78
Setting detail: THERAPIES SERIES
Discharge: HOME | End: 2023-03-06
Payer: MEDICARE

## 2023-03-06 PROCEDURE — 93798 PHYS/QHP OP CAR RHAB W/ECG: CPT

## 2023-03-06 NOTE — PROGRESS NOTES
Patient called to report he called MD and BP medication reduced.  Patient reports he has an appointment in AM.

## 2023-03-06 NOTE — CARE COORDINATION
ACM attempted outreach. Left message. Contact information for call back provided. UTR x 3. ACM plans to complete calls at this time.

## 2023-03-08 ENCOUNTER — HOSPITAL ENCOUNTER (OUTPATIENT)
Dept: CARDIAC REHAB | Age: 78
Setting detail: THERAPIES SERIES
Discharge: HOME | End: 2023-03-08
Payer: MEDICARE

## 2023-03-08 PROCEDURE — 93798 PHYS/QHP OP CAR RHAB W/ECG: CPT

## 2023-03-10 ENCOUNTER — HOSPITAL ENCOUNTER (OUTPATIENT)
Dept: CARDIAC REHAB | Age: 78
Setting detail: THERAPIES SERIES
Discharge: HOME | End: 2023-03-10
Payer: MEDICARE

## 2023-03-10 PROCEDURE — 93798 PHYS/QHP OP CAR RHAB W/ECG: CPT

## 2023-03-10 NOTE — PLAN OF CARE
Individual Treatment Plan  Cardiac Rehabilitation    Reassessment  Name: Laquita Hopson Reassessment: 61 days   : 1945 Primary Diagnosis:   PCI   Age: 69 y.o. Referring Physician:   Rubens Sam   MRN: [id number] Primary Care Physician: Jailene Ralph MD     Allergies   Allergen Reactions    Bactrim [Sulfamethoxazole-Trimethoprim] Diarrhea    Brilinta [Ticagrelor]      dyspnea    Ciprofloxacin      Bad headaches    Macrobid [Nitrofurantoin Monohyd Macro]     Ozempic (0.25 Or 0.5 Mg-Dose) [Semaglutide(0.25 Or 0.5mg-Dos)]     Sulfamethoxazole Diarrhea    Theophylline      Theodur-caused severe headaches    Cefuroxime Axetil Rash and Itching    Cipro Xr Rash    Other Rash and Other (See Comments)     Ekg leads-glue    Tape Fidel Standing Tape] Rash     Skin irritaion  Eats away at skin, paper tape OK  Plastic tape     Exercise Assessment  Stages of Change: Preparation   Risk Stratification: Medium  Fall Risk:  Low   Amb Fall Risk Assessment and TUG Test 2022   Do you feel unsteady or are you worried about falling?  no   2 or more falls in past year? no   Fall with injury in past year? no   Timed Up and Go Test > 12 seconds? -      Assistive Devices:  None  Sessions completed:   Key Anti-Hypertensive Meds            metoprolol succinate (TOPROL XL) 50 MG extended release tablet    Sig - Route: Take 1 tablet by mouth daily - Oral    Patient taking differently: Take 100 mg by mouth daily    torsemide (DEMADEX) 10 MG tablet    Sig - Route: Take 3 tablets by mouth daily - Oral    lisinopril (PRINIVIL;ZESTRIL) 5 MG tablet (Discontinued)    Sig - Route: Take 1 tablet by mouth daily - Oral    Reason for Discontinue: Stop Taking at Discharge           Exercise Prescription        Mode: . Treadmill, Bike (Upright or recumbent), NuStep, SciFit, Airdyne, Arm Ergometer, Recumbent Eliptical, and Walking      Frequency: 2-3 days/week supervised      Intensity:RPE 11-14      Current Target Heart Rate:  bpm New Target Heart Rate zone:  bpm      Duration: 32+ minutes/day      Resistance Training: Yes, 3 days per week      Progression: Increase exercise by 5 minutes every week to goal of 30 to 50 minutes and Increase repetitions from 10 to 15 and/or sets from 1 to 3      Supplemental oxygen: is not on home oxygen therapy. Plan  Home Exercise:Yes  Mode:Walking  Resistance Training: no   Target Goals  Adhere to cardiac exercise guidelines , Increase exercise endurance and stamina , Increase functional capacity as compared to initial assessment , Increase functional capacity as measured by a 6 minute walk or shuttle walk , Participate in strength training , and Perform purse-lip breathing during exercise   Previous goals Adhere to cardiac exercise guidelines , Increase exercise endurance and stamina , Increase functional capacity as compared to initial assessment , Increase functional capacity as measured by a 6 minute walk or shuttle walk , Participate in strength training , and Perform purse-lip breathing during exercise   Progress toward goal: Pt attends CR regularly. Pt exercising to RPE of 13, using 4 lb weights. Education  Mr. Gallegos was educated on the importance of warm up and cool down, signs and symptoms to report, exercise safety, RPE scale, Panchito Scale of Perceived Exertion use, Importance of physical activity and exercise progression, equipment orientation, blood pressure education, and blood pressure medication    Nutrition Assessment  Stages of Change: Preparation  Ht 6'3\"  Wt 298 lb       Rate Your Plate Score:  58   Patient's weight goal/Progress: Goal 270 lb  Recommended Diet:Maintain adequate nutrition; eating a variety of fruits and vegetables, low fat and cholesterol foods, Reduce saturated fat and cholesterol, Decrease caloric intake, Decrease intake of processed foods, and Decrease intake of refined sugars   Diabetic:Yes; Monitors blood sugars as prescribed Yes  Key Antihyperglycemic Medications            metFORMIN (GLUCOPHAGE) 500 MG tablet    Sig: TAKE ONE TABLET BY MOUTH TWICE A DAY    empagliflozin (JARDIANCE) 25 MG tablet    Class: Historical Med    Dulaglutide (TRULICITY) 6.32 SN/8.0JR SOPN    Class: Historical Med           Lab Results   Component Value Date    LABA1C 6.6 10/06/2022       Glucose   POC Glucose Monitoring Device   Component 01/11/2023 01/11/2023 12/29/2022 09/05/2022               Glucose -- 130 High     124 High            LIPID PROFILE  [facility]  12/29/2022  Component       Triglycerides   Cholesterol   LDL Calculated   HDL   Chol/HDL Ratio      Component 12/29/2022 09/05/2022           Triglycerides 142 147   Cholesterol 149 119   LDL Calculated 85 60   HDL 36 Low      30   Chol/HDL Ratio 4.1 --                Nutrition Intervention  Attend education classes related to nutrition and Participate in an exercise program that promotes weight loss  Target Goals  Articulate heart healthy diet , Complete cardiac diet classes , and Reduce weight   Previous goals Articulate heart healthy diet , Complete cardiac diet classes , Control blood pressure , Manage blood sugars independently , and Reduce weight   Progress toward goal: Pt attends diet classes, BP is controlled after change in BP med, and manages BGL independently. Education  Mr. Gallegos was educated on the importance of fluid intake, daily weight monitoring, maintaining optimal weight/BMI, nutrition guidelines, nutrition label literacy, portion control, signs and symptoms of hypoglycemia, and relationship of diabetes to cardiac disease.   Psychosocial Assessment  Stages of Change: Preparation  Psychosocial Test  PHQ-2/9 Today's PHQ:    PHQ Scores 1/18/2023 3/14/2022 3/14/2022 12/1/2020 12/4/2019 12/4/2019 4/3/2019   PHQ2 Score 0 0 0 0 0 0 0   PHQ9 Score 3 0 0 0 0 0 0     Interpretation of Total Score Depression Severity: 1-4 = Minimal depression, 5-9 = Mild depression, 10-14 = Moderate depression, 15-19 = Moderately severe depression, 20-27 = Severe depression    Psychosocial Intervention  Attend education classes related to stress management and relaxation, Learn and utilize stress management and coping skills, and Participate in an exercise program that improves psychosocial symptoms  Target Goals  Previous goals: Return to ADLs/desired activities , Identify a positive support system , Maximize stress management/coping skills , and Articulate confidence in adequate treatment of symptoms   Progress toward goal:Pt is meeting goals. New goal: Return to ADLs/desired activities , Identify a positive support system , Maximize stress management/coping skills , and Articulate confidence in adequate treatment of symptoms   Stages of Change: Preparation  Barriers to Learning: No barriers  Personal Learning Style:Demonstration, Verbal, and Video     Education Intervention/Learning Needs Identified  Blood pressure, Diabetes, Exercise, Fall prevention, Infection prevention, Medications, Nutrition, Risk factor for CAD, Signs and symptoms of MI/CVA, Stregnth training, Stress management and relaxation, and Weight management  Tobacco Assessment  Social History     Tobacco Use   Smoking Status Former    Packs/day: 2.00    Years: 17.00    Pack years: 34.00    Types: Cigarettes    Quit date: 1976    Years since quittin.9   Smokeless Tobacco Never     Target Goals  Previous goals: Adherence to medication regimen , Articulate understanding of self-management and prevention/treatment of cardiac disease , Attend appropriate group education classes , Attend appropriate web education classes , and Restore to highest level of independent functionality   Progress toward goals: Pt adheres to med regimen, and attends education classes regularly.    New goals: Adherence to medication regimen , Articulate understanding of self-management and prevention/treatment of cardiac disease , Attend appropriate group education classes , Attend appropriate web education classes , and Restore to highest level of independent functionality   Education  Mr. Gallegos was educated on the importance of healthy coping techniques and stress management, signs and symptoms of depression, relaxation techniques, and creating positive support systems                     Provider Review and Approval of this ITP    The above treatment plan and goals have been set for your patient during Cardiac Rehabilitation.  Please review and Electronically Cosign      Electronically signed by Terri Ryan RN on 3/10/23 at 7:08 AM EST

## 2023-03-13 ENCOUNTER — HOSPITAL ENCOUNTER (OUTPATIENT)
Dept: CARDIAC REHAB | Age: 78
Setting detail: THERAPIES SERIES
Discharge: HOME | End: 2023-03-13
Payer: MEDICARE

## 2023-03-13 PROCEDURE — 93798 PHYS/QHP OP CAR RHAB W/ECG: CPT

## 2023-03-15 ENCOUNTER — HOSPITAL ENCOUNTER (OUTPATIENT)
Dept: CARDIAC REHAB | Age: 78
Setting detail: THERAPIES SERIES
Discharge: HOME | End: 2023-03-15
Payer: MEDICARE

## 2023-03-15 PROCEDURE — 93798 PHYS/QHP OP CAR RHAB W/ECG: CPT

## 2023-03-17 ENCOUNTER — HOSPITAL ENCOUNTER (OUTPATIENT)
Dept: CARDIAC REHAB | Age: 78
Setting detail: THERAPIES SERIES
Discharge: HOME | End: 2023-03-17
Payer: MEDICARE

## 2023-03-17 PROCEDURE — 93798 PHYS/QHP OP CAR RHAB W/ECG: CPT

## 2023-03-20 ENCOUNTER — HOSPITAL ENCOUNTER (OUTPATIENT)
Dept: CARDIAC REHAB | Age: 78
Setting detail: THERAPIES SERIES
Discharge: HOME | End: 2023-03-20
Payer: MEDICARE

## 2023-03-20 PROCEDURE — 93798 PHYS/QHP OP CAR RHAB W/ECG: CPT

## 2023-03-21 ENCOUNTER — CARE COORDINATION (OUTPATIENT)
Dept: CARE COORDINATION | Age: 78
End: 2023-03-21

## 2023-03-22 ENCOUNTER — HOSPITAL ENCOUNTER (OUTPATIENT)
Dept: CARDIAC REHAB | Age: 78
Setting detail: THERAPIES SERIES
Discharge: HOME | End: 2023-03-22
Payer: MEDICARE

## 2023-03-22 NOTE — PROGRESS NOTES
Patient will not be at cardiac rehab today due to \"feeling ill\". Patient denies need to go to ER. Patient verbalized understanding to call PCP if he doesn't feel better.

## 2023-03-23 ENCOUNTER — OFFICE VISIT (OUTPATIENT)
Dept: INTERNAL MEDICINE CLINIC | Age: 78
End: 2023-03-23

## 2023-03-23 VITALS
RESPIRATION RATE: 18 BRPM | WEIGHT: 298 LBS | HEIGHT: 75 IN | SYSTOLIC BLOOD PRESSURE: 90 MMHG | BODY MASS INDEX: 37.05 KG/M2 | HEART RATE: 80 BPM | DIASTOLIC BLOOD PRESSURE: 65 MMHG

## 2023-03-23 DIAGNOSIS — I50.32 CHRONIC DIASTOLIC CHF (CONGESTIVE HEART FAILURE) (HCC): ICD-10-CM

## 2023-03-23 DIAGNOSIS — E11.40 TYPE 2 DIABETES MELLITUS WITH DIABETIC NEUROPATHY, WITHOUT LONG-TERM CURRENT USE OF INSULIN (HCC): Primary | ICD-10-CM

## 2023-03-23 DIAGNOSIS — J20.9 ACUTE BRONCHITIS, UNSPECIFIED ORGANISM: ICD-10-CM

## 2023-03-23 DIAGNOSIS — E66.01 SEVERE OBESITY (BMI 35.0-39.9) WITH COMORBIDITY (HCC): ICD-10-CM

## 2023-03-23 DIAGNOSIS — D50.0 IRON DEFICIENCY ANEMIA DUE TO CHRONIC BLOOD LOSS: ICD-10-CM

## 2023-03-23 DIAGNOSIS — I25.118 CORONARY ARTERY DISEASE OF NATIVE ARTERY OF NATIVE HEART WITH STABLE ANGINA PECTORIS (HCC): ICD-10-CM

## 2023-03-23 DIAGNOSIS — I48.0 PAF (PAROXYSMAL ATRIAL FIBRILLATION) (HCC): ICD-10-CM

## 2023-03-23 PROCEDURE — 1036F TOBACCO NON-USER: CPT | Performed by: INTERNAL MEDICINE

## 2023-03-23 PROCEDURE — 3074F SYST BP LT 130 MM HG: CPT | Performed by: INTERNAL MEDICINE

## 2023-03-23 PROCEDURE — G8484 FLU IMMUNIZE NO ADMIN: HCPCS | Performed by: INTERNAL MEDICINE

## 2023-03-23 PROCEDURE — 99213 OFFICE O/P EST LOW 20 MIN: CPT | Performed by: INTERNAL MEDICINE

## 2023-03-23 PROCEDURE — G8428 CUR MEDS NOT DOCUMENT: HCPCS | Performed by: INTERNAL MEDICINE

## 2023-03-23 PROCEDURE — 3078F DIAST BP <80 MM HG: CPT | Performed by: INTERNAL MEDICINE

## 2023-03-23 PROCEDURE — G8417 CALC BMI ABV UP PARAM F/U: HCPCS | Performed by: INTERNAL MEDICINE

## 2023-03-23 PROCEDURE — 1124F ACP DISCUSS-NO DSCNMKR DOCD: CPT | Performed by: INTERNAL MEDICINE

## 2023-03-23 RX ORDER — ATORVASTATIN CALCIUM 40 MG/1
TABLET, FILM COATED ORAL
Qty: 90 TABLET | Refills: 3 | Status: SHIPPED
Start: 2023-03-23

## 2023-03-23 RX ORDER — MIDODRINE HYDROCHLORIDE 5 MG/1
5 TABLET ORAL 2 TIMES DAILY
Qty: 60 TABLET | Refills: 0 | Status: SHIPPED | OUTPATIENT
Start: 2023-03-23

## 2023-03-23 RX ORDER — DOXYCYCLINE HYCLATE 100 MG
100 TABLET ORAL 2 TIMES DAILY
Qty: 10 TABLET | Refills: 0 | Status: SHIPPED | OUTPATIENT
Start: 2023-03-23 | End: 2023-03-28

## 2023-03-23 NOTE — PROGRESS NOTES
Lefty Means (:  1945) is a 69 y.o. male,Established patient, here for evaluation of the following chief complaint(s):  Follow-up         HPI    69 y.o. 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 ,obestiy, MARIA VICTORIA , pedal edema here for  regular f.w     Multiple admissions to hospital over last 2 yrs recently for cardiac issues as below      pt had 2nd covid infection in   and in  , symptoms of fatigue continued with no new sob or cough or fevers. Apparently he was admitted to [facility] for fatigue and dyspnea. Cardiology transferred him to [facility] and cath done with another stent placed in LAD , back on plavix and resumed coumadin    Pt reports his tikosyn was stopped for prolonged qtc () , remains in Afibb       Again with recurrent dyspnea with recurrent Afibb last week ( 10/2/22) , this adm he had RF ablation done on 10/6/22- now remains in NSR , Andres Carlson resumed  with monitoring of QT     Admitted again on 10/21/22 for acute/Chronic Diastolic CHF , recently his demadex increased to 30 mg daily for pedal edema  Aldactone remains on hold. [facility]    Changed to [facility] cardiology in  - had left and right heart cath Due to hemodynamics which were questionable for restrictive cardiomyopathy an MRI of the heart was  done - which showed asymmetric left  ventricular hypertrophy with a septal wall thickness of approximately 17 mmHg. 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

## 2023-03-24 ENCOUNTER — HOSPITAL ENCOUNTER (OUTPATIENT)
Dept: CARDIAC REHAB | Age: 78
Setting detail: THERAPIES SERIES
Discharge: HOME | End: 2023-03-24
Payer: MEDICARE

## 2023-03-24 NOTE — PROGRESS NOTES
Call received from patient he is not feeling well this morning and will not be at cardiac rehab today.

## 2023-03-27 ENCOUNTER — HOSPITAL ENCOUNTER (OUTPATIENT)
Dept: CARDIAC REHAB | Age: 78
Setting detail: THERAPIES SERIES
Discharge: HOME | End: 2023-03-27
Payer: MEDICARE

## 2023-03-27 PROCEDURE — 93798 PHYS/QHP OP CAR RHAB W/ECG: CPT

## 2023-03-27 RX ORDER — MONTELUKAST SODIUM 10 MG/1
TABLET ORAL
Qty: 90 TABLET | Refills: 1 | Status: SHIPPED | OUTPATIENT
Start: 2023-03-27

## 2023-03-29 ENCOUNTER — TELEPHONE (OUTPATIENT)
Dept: INTERNAL MEDICINE CLINIC | Age: 78
End: 2023-03-29

## 2023-03-29 ENCOUNTER — HOSPITAL ENCOUNTER (OUTPATIENT)
Dept: CARDIAC REHAB | Age: 78
Setting detail: THERAPIES SERIES
Discharge: HOME | End: 2023-03-29
Payer: MEDICARE

## 2023-03-29 PROCEDURE — 93798 PHYS/QHP OP CAR RHAB W/ECG: CPT

## 2023-03-29 RX ORDER — BENZONATATE 200 MG/1
200 CAPSULE ORAL 3 TIMES DAILY PRN
Qty: 30 CAPSULE | Refills: 0 | Status: SHIPPED | OUTPATIENT
Start: 2023-03-29

## 2023-03-29 NOTE — TELEPHONE ENCOUNTER
----- Message from Efren Man sent at 3/29/2023  4:02 PM EDT -----  Contact: [phone]  Patient states he is still experiencing a bad cough after finishing below medication. Patient requesting medication for the cough to stop.      doxycycline hyclate (VIBRA-TABS) 100 MG tablet     [facility] [id number] - [facility] [address], [place] - [address] - [address] [phone]   [address] [facility] [address]   Phone:  [phone]  Fax:  [phone] Signed, please fax

## 2023-03-31 ENCOUNTER — HOSPITAL ENCOUNTER (OUTPATIENT)
Age: 78
Discharge: HOME | End: 2023-03-31
Payer: MEDICARE

## 2023-03-31 ENCOUNTER — HOSPITAL ENCOUNTER (OUTPATIENT)
Dept: CARDIAC REHAB | Age: 78
Setting detail: THERAPIES SERIES
Discharge: HOME | End: 2023-03-31
Payer: MEDICARE

## 2023-03-31 ENCOUNTER — TELEPHONE (OUTPATIENT)
Dept: INTERNAL MEDICINE CLINIC | Age: 78
End: 2023-03-31

## 2023-03-31 ENCOUNTER — HOSPITAL ENCOUNTER (OUTPATIENT)
Dept: GENERAL RADIOLOGY | Age: 78
Discharge: HOME | End: 2023-03-31
Payer: MEDICARE

## 2023-03-31 DIAGNOSIS — R05.1 ACUTE COUGH: ICD-10-CM

## 2023-03-31 DIAGNOSIS — R05.1 ACUTE COUGH: Primary | ICD-10-CM

## 2023-03-31 PROCEDURE — 93798 PHYS/QHP OP CAR RHAB W/ECG: CPT

## 2023-03-31 PROCEDURE — 71046 X-RAY EXAM CHEST 2 VIEWS: CPT

## 2023-03-31 NOTE — TELEPHONE ENCOUNTER
----- Message from Joane Boxer, APRN - CNP sent at 3/31/2023 11:34 AM EDT -----  Contact: patient [phone]  Ordered chest x-ray    ----- Message -----  From: Erica Correia  Sent: 3/31/2023   9:55 AM EDT  To: Joane Boxer, APRN - CNP    Pt requesting if he is able to order an x-ray due to still coughing after benzonatate (TESSALON) 200 MG capsule , states he feels like it's helping but not all the way. Please advise.

## 2023-04-03 ENCOUNTER — HOSPITAL ENCOUNTER (OUTPATIENT)
Dept: CARDIAC REHAB | Age: 78
Setting detail: THERAPIES SERIES
Discharge: HOME | End: 2023-04-03
Payer: MEDICARE

## 2023-04-03 PROCEDURE — 93798 PHYS/QHP OP CAR RHAB W/ECG: CPT

## 2023-04-05 ENCOUNTER — HOSPITAL ENCOUNTER (OUTPATIENT)
Dept: CARDIAC REHAB | Age: 78
Setting detail: THERAPIES SERIES
Discharge: HOME | End: 2023-04-05
Payer: MEDICARE

## 2023-04-05 PROCEDURE — 93798 PHYS/QHP OP CAR RHAB W/ECG: CPT

## 2023-04-05 NOTE — PLAN OF CARE
Restore to highest level of independent functionality   Progress toward goals:Patient educated on blood pressure, cholesterol, diabetes, exercise, fall prevention, infection prevention, medications, nutrition, strength training, weight management, stress management and relaxation. New goals: Adherence to medication regimen , Articulate understanding of self-management and prevention/treatment of cardiac disease , Attend appropriate group education classes , Attend appropriate web education classes , and Restore to highest level of independent functionality                        Provider Review and Approval of this ITP    The above treatment plan and goals have been set for your patient during Cardiac Rehabilitation.  Please review and Electronically Cosign      Electronically signed by Logan Trinh RN on 4/5/23 at 8:11 AM EDT

## 2023-04-07 ENCOUNTER — HOSPITAL ENCOUNTER (OUTPATIENT)
Dept: CARDIAC REHAB | Age: 78
Setting detail: THERAPIES SERIES
Discharge: HOME | End: 2023-04-07
Payer: MEDICARE

## 2023-04-07 PROCEDURE — 93798 PHYS/QHP OP CAR RHAB W/ECG: CPT

## 2023-04-10 ENCOUNTER — APPOINTMENT (OUTPATIENT)
Dept: CARDIAC REHAB | Age: 78
End: 2023-04-10
Payer: MEDICARE

## 2023-04-17 ENCOUNTER — OFFICE VISIT (OUTPATIENT)
Dept: PULMONOLOGY | Age: 78
End: 2023-04-17
Payer: MEDICARE

## 2023-04-17 ENCOUNTER — HOSPITAL ENCOUNTER (OUTPATIENT)
Dept: CARDIAC REHAB | Age: 78
Setting detail: THERAPIES SERIES
Discharge: HOME | End: 2023-04-17
Payer: MEDICARE

## 2023-04-17 VITALS
SYSTOLIC BLOOD PRESSURE: 114 MMHG | HEIGHT: 75 IN | BODY MASS INDEX: 37.05 KG/M2 | RESPIRATION RATE: 16 BRPM | OXYGEN SATURATION: 97 % | DIASTOLIC BLOOD PRESSURE: 80 MMHG | WEIGHT: 298 LBS | HEART RATE: 95 BPM

## 2023-04-17 DIAGNOSIS — G47.33 MODERATE OBSTRUCTIVE SLEEP APNEA: Primary | ICD-10-CM

## 2023-04-17 DIAGNOSIS — E66.9 OBESITY (BMI 30-39.9): ICD-10-CM

## 2023-04-17 DIAGNOSIS — Z71.89 CPAP USE COUNSELING: ICD-10-CM

## 2023-04-17 DIAGNOSIS — I10 ESSENTIAL HYPERTENSION: ICD-10-CM

## 2023-04-17 DIAGNOSIS — R53.83 OTHER FATIGUE: ICD-10-CM

## 2023-04-17 PROCEDURE — 99214 OFFICE O/P EST MOD 30 MIN: CPT | Performed by: NURSE PRACTITIONER

## 2023-04-17 PROCEDURE — 3074F SYST BP LT 130 MM HG: CPT | Performed by: NURSE PRACTITIONER

## 2023-04-17 PROCEDURE — 3079F DIAST BP 80-89 MM HG: CPT | Performed by: NURSE PRACTITIONER

## 2023-04-17 PROCEDURE — 1036F TOBACCO NON-USER: CPT | Performed by: NURSE PRACTITIONER

## 2023-04-17 PROCEDURE — 93798 PHYS/QHP OP CAR RHAB W/ECG: CPT

## 2023-04-17 PROCEDURE — G8427 DOCREV CUR MEDS BY ELIG CLIN: HCPCS | Performed by: NURSE PRACTITIONER

## 2023-04-17 PROCEDURE — G8417 CALC BMI ABV UP PARAM F/U: HCPCS | Performed by: NURSE PRACTITIONER

## 2023-04-17 PROCEDURE — 1124F ACP DISCUSS-NO DSCNMKR DOCD: CPT | Performed by: NURSE PRACTITIONER

## 2023-04-17 ASSESSMENT — SLEEP AND FATIGUE QUESTIONNAIRES
NECK CIRCUMFERENCE (INCHES): 18
ESS TOTAL SCORE: 7
HOW LIKELY ARE YOU TO NOD OFF OR FALL ASLEEP WHILE SITTING AND TALKING TO SOMEONE: 0
HOW LIKELY ARE YOU TO NOD OFF OR FALL ASLEEP WHILE SITTING AND READING: 1
HOW LIKELY ARE YOU TO NOD OFF OR FALL ASLEEP WHILE SITTING QUIETLY AFTER LUNCH WITHOUT ALCOHOL: 0
HOW LIKELY ARE YOU TO NOD OFF OR FALL ASLEEP WHILE SITTING INACTIVE IN A PUBLIC PLACE: 0
HOW LIKELY ARE YOU TO NOD OFF OR FALL ASLEEP WHILE WATCHING TV: 3
HOW LIKELY ARE YOU TO NOD OFF OR FALL ASLEEP WHILE LYING DOWN TO REST IN THE AFTERNOON WHEN CIRCUMSTANCES PERMIT: 3
HOW LIKELY ARE YOU TO NOD OFF OR FALL ASLEEP WHEN YOU ARE A PASSENGER IN A CAR FOR AN HOUR WITHOUT A BREAK: 0
HOW LIKELY ARE YOU TO NOD OFF OR FALL ASLEEP IN A CAR, WHILE STOPPED FOR A FEW MINUTES IN TRAFFIC: 0

## 2023-04-17 NOTE — PATIENT INSTRUCTIONS
Never drive if you are feeling sleepy    Sleep Hygiene. .. Tips for better sleep. .. Avoid naps. This will ensure you are sleepy at bedtime. If you have to take a nap, sleep less than 1 hour, before 3 pm.  Sleep only when sleepy; this reduces the time you are awake in bed. Regular exercise is recommended to help you deepen your sleep, but not within 4-6 hours of your bedtime. Timing of exercise is important, aim to exercise early in the morning or early afternoon. A light snack may help you fall asleep. Warm milk and foods high in the amino acid tryptophan, such as bananas, may help you to sleep  Be sure to avoid heavy, spicy or sugary foods 4-6 hours before bedtime and avoid at snack time. Stay away from stimulants such as caffeine and nicotine for at least 4-6 hours before bed. Stimulants can interfere with your ability to fall asleep. Caffeine is found in tea, cola, coffee, cocoa and chocolate and is best avoided at bedtime. Nicotine is found in tobacco products. Avoid alcohol 4-6 hours before bedtime. Alcohol has an immediate sleep-inducing effect, after a few hours when alcohol levels fall there is a stimulant or wake-up effect and will cause fragmented sleep. Sleep rituals are important. Give your body clues it is time to slow down and sleep. Examples include; yoga, deep breathing, listen to relaxing music, a hot bath or a few minutes of reading. Have a fixed bedtime and awakening time, Even on weekends! You will feel better keeping a regular sleep cycle, even if you are retired or not working. Get into your favorite sleep position. If not asleep in 30 minutes, get up and do something boring until you feel sleepy. Remember not to expose yourself to bright lights such as TV, phone or tablet screens. Only use your bed for sleeping. Do not use your bed as an office, workroom or recreation room. Use comfortable bedding. Uncomfortable bedding can prevent good sleep.   Ensure your bedroom is quiet and

## 2023-04-17 NOTE — PROGRESS NOTES
with removal stone    CYSTOSCOPY  2/8/13    with Laser Vaporization of Enlarged Prostate    DIAGNOSTIC CARDIAC CATH LAB PROCEDURE      ERCP  02/18/2019    Sphincterotomy, stone removal    ERCP N/A 2/18/2019    ERCP SPHINCTER/PAPILLOTOMY performed by Milton Sarah MD at [address]    ERCP  2/18/2019    ERCP STONE REMOVAL [facility] 1/14/2020    PHACOEMULSIFICATION OF CATARACT RIGHT EYE WITH INTRAOCULAR LENS IMPLANT performed by Jose Carlos Nayak MD at [facility] [address] Left 1/21/2020    PHACOEMULSIFICATION OF CATARACT LEFT EYE WITH INTRAOCULAR LENS IMPLANT -SLEEP APNEA- performed by [facility] Bilateral     right knee (2002) and left knee (2012)    KNEE ARTHROSCOPY  4/19/2011     left  knee    SKIN BIOPSY      skin Ca/SQUAMOUS CELL    THORACOTOMY      wedge resection    TONSILLECTOMY      TRANSESOPHAGEAL ECHOCARDIOGRAM N/A 9/20/2022    PIERO W/ANES. (9:00) performed by Romana Bulla, MD at [facility]     Allergies   Allergen Reactions    Bactrim [Sulfamethoxazole-Trimethoprim] Diarrhea    Brilinta [Ticagrelor]      dyspnea    Ciprofloxacin      Bad headaches    Macrobid [Nitrofurantoin Monohyd Macro]     Ozempic (0.25 Or 0.5 Mg-Dose) [Semaglutide(0.25 Or 0.5mg-Dos)]     Sulfamethoxazole Diarrhea    Theophylline      Theodur-caused severe headaches    Cefuroxime Axetil Rash and Itching    Cipro Xr Rash    Other Rash and Other (See Comments)     Ekg leads-glue    Tape Jeremiah Nims Tape] Rash     Skin irritaion  Eats away at skin, paper tape OK  Plastic tape         Current Medications:    Current Outpatient Medications:     blood glucose test strips (TRUE METRIX BLOOD GLUCOSE TEST) strip, Use one strip to test daily.   DX:E11.9, Disp: 100 strip, Rfl: 3    montelukast (SINGULAIR) 10 MG tablet, TAKE ONE TABLET BY MOUTH DAILY, Disp: 90

## 2023-04-18 SDOH — ECONOMIC STABILITY: FOOD INSECURITY: WITHIN THE PAST 12 MONTHS, THE FOOD YOU BOUGHT JUST DIDN'T LAST AND YOU DIDN'T HAVE MONEY TO GET MORE.: NEVER TRUE

## 2023-04-18 SDOH — ECONOMIC STABILITY: TRANSPORTATION INSECURITY
IN THE PAST 12 MONTHS, HAS LACK OF TRANSPORTATION KEPT YOU FROM MEETINGS, WORK, OR FROM GETTING THINGS NEEDED FOR DAILY LIVING?: NO

## 2023-04-18 SDOH — HEALTH STABILITY: PHYSICAL HEALTH: ON AVERAGE, HOW MANY MINUTES DO YOU ENGAGE IN EXERCISE AT THIS LEVEL?: 60 MIN

## 2023-04-18 SDOH — ECONOMIC STABILITY: INCOME INSECURITY: HOW HARD IS IT FOR YOU TO PAY FOR THE VERY BASICS LIKE FOOD, HOUSING, MEDICAL CARE, AND HEATING?: NOT HARD AT ALL

## 2023-04-18 SDOH — ECONOMIC STABILITY: HOUSING INSECURITY
IN THE LAST 12 MONTHS, WAS THERE A TIME WHEN YOU DID NOT HAVE A STEADY PLACE TO SLEEP OR SLEPT IN A SHELTER (INCLUDING NOW)?: NO

## 2023-04-18 SDOH — ECONOMIC STABILITY: FOOD INSECURITY: WITHIN THE PAST 12 MONTHS, YOU WORRIED THAT YOUR FOOD WOULD RUN OUT BEFORE YOU GOT MONEY TO BUY MORE.: NEVER TRUE

## 2023-04-18 SDOH — HEALTH STABILITY: PHYSICAL HEALTH: ON AVERAGE, HOW MANY DAYS PER WEEK DO YOU ENGAGE IN MODERATE TO STRENUOUS EXERCISE (LIKE A BRISK WALK)?: 2 DAYS

## 2023-04-18 ASSESSMENT — PATIENT HEALTH QUESTIONNAIRE - PHQ9
SUM OF ALL RESPONSES TO PHQ QUESTIONS 1-9: 0
SUM OF ALL RESPONSES TO PHQ QUESTIONS 1-9: 0
SUM OF ALL RESPONSES TO PHQ9 QUESTIONS 1 & 2: 0
1. LITTLE INTEREST OR PLEASURE IN DOING THINGS: 0
SUM OF ALL RESPONSES TO PHQ QUESTIONS 1-9: 0
2. FEELING DOWN, DEPRESSED OR HOPELESS: 0
SUM OF ALL RESPONSES TO PHQ QUESTIONS 1-9: 0

## 2023-04-18 ASSESSMENT — LIFESTYLE VARIABLES
HOW OFTEN DO YOU HAVE SIX OR MORE DRINKS ON ONE OCCASION: 1
HOW MANY STANDARD DRINKS CONTAINING ALCOHOL DO YOU HAVE ON A TYPICAL DAY: 0
HOW MANY STANDARD DRINKS CONTAINING ALCOHOL DO YOU HAVE ON A TYPICAL DAY: PATIENT DOES NOT DRINK
HOW OFTEN DO YOU HAVE A DRINK CONTAINING ALCOHOL: NEVER
HOW OFTEN DO YOU HAVE A DRINK CONTAINING ALCOHOL: 1

## 2023-04-19 ENCOUNTER — HOSPITAL ENCOUNTER (OUTPATIENT)
Dept: CARDIAC REHAB | Age: 78
Setting detail: THERAPIES SERIES
Discharge: HOME | End: 2023-04-19
Payer: MEDICARE

## 2023-04-19 PROCEDURE — 93798 PHYS/QHP OP CAR RHAB W/ECG: CPT

## 2023-04-20 ENCOUNTER — OFFICE VISIT (OUTPATIENT)
Dept: INTERNAL MEDICINE CLINIC | Age: 78
End: 2023-04-20

## 2023-04-20 VITALS
SYSTOLIC BLOOD PRESSURE: 100 MMHG | BODY MASS INDEX: 36.31 KG/M2 | WEIGHT: 292 LBS | HEIGHT: 75 IN | DIASTOLIC BLOOD PRESSURE: 75 MMHG | HEART RATE: 80 BPM | RESPIRATION RATE: 18 BRPM

## 2023-04-20 DIAGNOSIS — D50.0 IRON DEFICIENCY ANEMIA DUE TO CHRONIC BLOOD LOSS: ICD-10-CM

## 2023-04-20 DIAGNOSIS — I48.0 PAROXYSMAL ATRIAL FIBRILLATION (HCC): ICD-10-CM

## 2023-04-20 DIAGNOSIS — Z00.00 MEDICARE ANNUAL WELLNESS VISIT, SUBSEQUENT: Primary | ICD-10-CM

## 2023-04-20 DIAGNOSIS — E66.01 SEVERE OBESITY (BMI 35.0-39.9) WITH COMORBIDITY (HCC): ICD-10-CM

## 2023-04-20 DIAGNOSIS — I50.32 CHRONIC DIASTOLIC CHF (CONGESTIVE HEART FAILURE) (HCC): ICD-10-CM

## 2023-04-20 DIAGNOSIS — I10 ESSENTIAL HYPERTENSION: ICD-10-CM

## 2023-04-20 DIAGNOSIS — I25.118 CORONARY ARTERY DISEASE OF NATIVE ARTERY OF NATIVE HEART WITH STABLE ANGINA PECTORIS (HCC): ICD-10-CM

## 2023-04-20 DIAGNOSIS — E11.40 TYPE 2 DIABETES MELLITUS WITH DIABETIC NEUROPATHY, WITHOUT LONG-TERM CURRENT USE OF INSULIN (HCC): ICD-10-CM

## 2023-04-20 DIAGNOSIS — I48.0 PAF (PAROXYSMAL ATRIAL FIBRILLATION) (HCC): ICD-10-CM

## 2023-04-20 PROCEDURE — 1124F ACP DISCUSS-NO DSCNMKR DOCD: CPT | Performed by: INTERNAL MEDICINE

## 2023-04-20 PROCEDURE — 3078F DIAST BP <80 MM HG: CPT | Performed by: INTERNAL MEDICINE

## 2023-04-20 PROCEDURE — G0439 PPPS, SUBSEQ VISIT: HCPCS | Performed by: INTERNAL MEDICINE

## 2023-04-20 PROCEDURE — 3074F SYST BP LT 130 MM HG: CPT | Performed by: INTERNAL MEDICINE

## 2023-04-20 PROCEDURE — 3044F HG A1C LEVEL LT 7.0%: CPT | Performed by: INTERNAL MEDICINE

## 2023-04-20 RX ORDER — METFORMIN HYDROCHLORIDE 500 MG/1
500 TABLET, EXTENDED RELEASE ORAL 2 TIMES DAILY
Qty: 180 TABLET | Refills: 1 | Status: SHIPPED | OUTPATIENT
Start: 2023-04-20

## 2023-04-20 RX ORDER — METOPROLOL SUCCINATE 50 MG/1
50 TABLET, EXTENDED RELEASE ORAL DAILY
Qty: 90 TABLET | Refills: 3 | Status: SHIPPED
Start: 2023-04-20 | End: 2023-08-08 | Stop reason: DRUGHIGH

## 2023-04-20 RX ORDER — MIDODRINE HYDROCHLORIDE 5 MG/1
5 TABLET ORAL 2 TIMES DAILY
Qty: 180 TABLET | Refills: 2 | Status: SHIPPED | OUTPATIENT
Start: 2023-04-20 | End: 2023-04-20 | Stop reason: SDUPTHER

## 2023-04-20 RX ORDER — MIDODRINE HYDROCHLORIDE 5 MG/1
5 TABLET ORAL 2 TIMES DAILY
Qty: 180 TABLET | Refills: 0 | Status: SHIPPED | OUTPATIENT
Start: 2023-04-20

## 2023-04-20 RX ORDER — PREDNISONE 20 MG/1
20 TABLET ORAL DAILY
Qty: 5 TABLET | Refills: 0 | Status: SHIPPED | OUTPATIENT
Start: 2023-04-20 | End: 2023-04-25

## 2023-04-20 RX ORDER — TORSEMIDE 10 MG/1
20 TABLET ORAL DAILY
Qty: 30 TABLET | Refills: 3 | Status: SHIPPED
Start: 2023-04-20 | End: 2023-08-08 | Stop reason: SDUPTHER

## 2023-04-20 ASSESSMENT — PATIENT HEALTH QUESTIONNAIRE - PHQ9
2. FEELING DOWN, DEPRESSED OR HOPELESS: 0
1. LITTLE INTEREST OR PLEASURE IN DOING THINGS: 0
SUM OF ALL RESPONSES TO PHQ9 QUESTIONS 1 & 2: 0
SUM OF ALL RESPONSES TO PHQ QUESTIONS 1-9: 0

## 2023-04-20 NOTE — PATIENT INSTRUCTIONS

## 2023-04-20 NOTE — PROGRESS NOTES
Medicare Annual Wellness Visit    Ronny Christine is here for Medicare AWV    Assessment & Plan   Medicare annual wellness visit, subsequent  -     PSA, Prostatic Specific Antigen; Future  Paroxysmal atrial fibrillation (HCC)  Essential hypertension  -     TSH with Reflex to FT4; Future  Severe obesity (BMI 35.0-39. 9) with comorbidity ([address])  Coronary artery disease of native artery of native heart with stable angina pectoris ([address])  Type 2 diabetes mellitus with diabetic neuropathy, without long-term current use of insulin (HCC)  -     Lipid, Fasting; Future  -     CBC with Auto Differential; Future  -     Comprehensive Metabolic Panel; Future  -     Hemoglobin A1C; Future  PAF (paroxysmal atrial fibrillation) (HCC)  Chronic diastolic CHF (congestive heart failure) (HCC)  Iron deficiency anemia due to chronic blood loss    Recommendations for Preventive Services Due: see orders and patient instructions/AVS.  Recommended screening schedule for the next 5-10 years is provided to the patient in written form: see Patient Instructions/AVS.     Return in about 3 months (around 7/20/2023) for htn dm. Subjective       69 y.o. 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 ,obestiy, MARIA VICTORIA , pedal edema here for  annual exam     Multiple admissions to hospital over last 2 yrs recently for cardiac issues as below      pt had 2nd covid infection in 7/22  and in 9/22 , symptoms of fatigue continued with no new sob or cough or fevers. Apparently he was admitted to [facility] for fatigue and dyspnea.  Cardiology transferred him to [facility] and cath done with another stent placed in LAD , back on plavix and resumed coumadin    Pt reports his tikosyn was stopped for prolonged qtc (7/21) , remained in Afibb       Again with recurrent dyspnea with recurrent Afibb last week ( 10/2/22) , this adm he had RF ablation done on 10/6/22- now remained in NSR , Sandra Hua resumed  with monitoring of QT     Admitted again

## 2023-04-21 ENCOUNTER — HOSPITAL ENCOUNTER (OUTPATIENT)
Dept: CARDIAC REHAB | Age: 78
Setting detail: THERAPIES SERIES
Discharge: HOME | End: 2023-04-21
Payer: MEDICARE

## 2023-04-21 PROCEDURE — 93798 PHYS/QHP OP CAR RHAB W/ECG: CPT

## 2023-04-24 ENCOUNTER — HOSPITAL ENCOUNTER (OUTPATIENT)
Dept: CARDIAC REHAB | Age: 78
Setting detail: THERAPIES SERIES
Discharge: HOME | End: 2023-04-24
Payer: MEDICARE

## 2023-04-24 DIAGNOSIS — E11.40 TYPE 2 DIABETES MELLITUS WITH DIABETIC NEUROPATHY, WITHOUT LONG-TERM CURRENT USE OF INSULIN (HCC): ICD-10-CM

## 2023-04-24 DIAGNOSIS — Z00.00 MEDICARE ANNUAL WELLNESS VISIT, SUBSEQUENT: ICD-10-CM

## 2023-04-24 DIAGNOSIS — I10 ESSENTIAL HYPERTENSION: ICD-10-CM

## 2023-04-24 LAB
ALBUMIN SERPL-MCNC: 4.6 G/DL (ref 3.4–5)
ALBUMIN/GLOB SERPL: 1.6 {RATIO} (ref 1.1–2.2)
ALP SERPL-CCNC: 132 U/L (ref 40–129)
ALT SERPL-CCNC: 34 U/L (ref 10–40)
ANION GAP SERPL CALCULATED.3IONS-SCNC: 15 MMOL/L (ref 3–16)
AST SERPL-CCNC: 25 U/L (ref 15–37)
BASOPHILS # BLD: 0.1 K/UL (ref 0–0.2)
BASOPHILS NFR BLD: 0.8 %
BILIRUB SERPL-MCNC: 0.8 MG/DL (ref 0–1)
BUN SERPL-MCNC: 33 MG/DL (ref 7–20)
CALCIUM SERPL-MCNC: 9.6 MG/DL (ref 8.3–10.6)
CHLORIDE SERPL-SCNC: 100 MMOL/L (ref 99–110)
CHOLEST SERPL-MCNC: 142 MG/DL (ref 0–199)
CO2 SERPL-SCNC: 25 MMOL/L (ref 21–32)
CREAT SERPL-MCNC: 1.4 MG/DL (ref 0.8–1.3)
DEPRECATED RDW RBC AUTO: 19 % (ref 12.4–15.4)
EOSINOPHIL # BLD: 0.2 K/UL (ref 0–0.6)
EOSINOPHIL NFR BLD: 2.3 %
GFR SERPLBLD CREATININE-BSD FMLA CKD-EPI: 52 ML/MIN/{1.73_M2}
GLUCOSE SERPL-MCNC: 144 MG/DL (ref 70–99)
HCT VFR BLD AUTO: 41.4 % (ref 40.5–52.5)
HDLC SERPL-MCNC: 51 MG/DL (ref 40–60)
HGB BLD-MCNC: 13.7 G/DL (ref 13.5–17.5)
LDL CHOLESTEROL CALCULATED: 63 MG/DL
LYMPHOCYTES # BLD: 0.8 K/UL (ref 1–5.1)
LYMPHOCYTES NFR BLD: 8.7 %
MCH RBC QN AUTO: 30.1 PG (ref 26–34)
MCHC RBC AUTO-ENTMCNC: 33.1 G/DL (ref 31–36)
MCV RBC AUTO: 90.8 FL (ref 80–100)
MONOCYTES # BLD: 0.4 K/UL (ref 0–1.3)
MONOCYTES NFR BLD: 4.8 %
NEUTROPHILS # BLD: 7.6 K/UL (ref 1.7–7.7)
NEUTROPHILS NFR BLD: 83.4 %
PLATELET # BLD AUTO: 225 K/UL (ref 135–450)
PMV BLD AUTO: 8.2 FL (ref 5–10.5)
POTASSIUM SERPL-SCNC: 3.9 MMOL/L (ref 3.5–5.1)
PROT SERPL-MCNC: 7.4 G/DL (ref 6.4–8.2)
PSA SERPL DL<=0.01 NG/ML-MCNC: 0.76 NG/ML (ref 0–4)
RBC # BLD AUTO: 4.56 M/UL (ref 4.2–5.9)
SODIUM SERPL-SCNC: 140 MMOL/L (ref 136–145)
TRIGL SERPL-MCNC: 140 MG/DL (ref 0–150)
TSH SERPL DL<=0.005 MIU/L-ACNC: 1.41 UIU/ML (ref 0.27–4.2)
VLDLC SERPL CALC-MCNC: 28 MG/DL
WBC # BLD AUTO: 9.2 K/UL (ref 4–11)

## 2023-04-24 PROCEDURE — 93798 PHYS/QHP OP CAR RHAB W/ECG: CPT

## 2023-04-25 LAB
EST. AVERAGE GLUCOSE BLD GHB EST-MCNC: 134.1 MG/DL
HBA1C MFR BLD: 6.3 %

## 2023-04-25 RX ORDER — MIDODRINE HYDROCHLORIDE 5 MG/1
5 TABLET ORAL 2 TIMES DAILY
Qty: 60 TABLET | OUTPATIENT
Start: 2023-04-25

## 2023-04-26 ENCOUNTER — HOSPITAL ENCOUNTER (OUTPATIENT)
Dept: CARDIAC REHAB | Age: 78
Setting detail: THERAPIES SERIES
Discharge: HOME | End: 2023-04-26
Payer: MEDICARE

## 2023-04-26 PROCEDURE — 93798 PHYS/QHP OP CAR RHAB W/ECG: CPT

## 2023-04-28 ENCOUNTER — HOSPITAL ENCOUNTER (OUTPATIENT)
Dept: CARDIAC REHAB | Age: 78
Setting detail: THERAPIES SERIES
Discharge: HOME | End: 2023-04-28
Payer: MEDICARE

## 2023-04-28 ASSESSMENT — EXERCISE STRESS TEST
PEAK_HR: 111
PEAK_BP: 122/64

## 2023-04-28 ASSESSMENT — PATIENT HEALTH QUESTIONNAIRE - PHQ9
9. THOUGHTS THAT YOU WOULD BE BETTER OFF DEAD, OR OF HURTING YOURSELF: 0
3. TROUBLE FALLING OR STAYING ASLEEP: 0
SUM OF ALL RESPONSES TO PHQ QUESTIONS 1-9: 1
SUM OF ALL RESPONSES TO PHQ QUESTIONS 1-9: 1
2. FEELING DOWN, DEPRESSED OR HOPELESS: 0
1. LITTLE INTEREST OR PLEASURE IN DOING THINGS: 0
6. FEELING BAD ABOUT YOURSELF - OR THAT YOU ARE A FAILURE OR HAVE LET YOURSELF OR YOUR FAMILY DOWN: 0
8. MOVING OR SPEAKING SO SLOWLY THAT OTHER PEOPLE COULD HAVE NOTICED. OR THE OPPOSITE, BEING SO FIGETY OR RESTLESS THAT YOU HAVE BEEN MOVING AROUND A LOT MORE THAN USUAL: 0
SUM OF ALL RESPONSES TO PHQ QUESTIONS 1-9: 1
10. IF YOU CHECKED OFF ANY PROBLEMS, HOW DIFFICULT HAVE THESE PROBLEMS MADE IT FOR YOU TO DO YOUR WORK, TAKE CARE OF THINGS AT HOME, OR GET ALONG WITH OTHER PEOPLE: 0
5. POOR APPETITE OR OVEREATING: 0
SUM OF ALL RESPONSES TO PHQ QUESTIONS 1-9: 1
7. TROUBLE CONCENTRATING ON THINGS, SUCH AS READING THE NEWSPAPER OR WATCHING TELEVISION: 0
SUM OF ALL RESPONSES TO PHQ9 QUESTIONS 1 & 2: 0
4. FEELING TIRED OR HAVING LITTLE ENERGY: 1

## 2023-04-28 NOTE — PLAN OF CARE
Key Hyperlipidemia Meds            atorvastatin (LIPITOR) 40 MG tablet    Sig: TAKE ONE TABLET BY MOUTH DAILY    Class: NO PRINT              Lab Results   Component Value Date    GLUCOSE 144 (H) 2023    LABA1C 6.3 2023    CHOL 119 2022    HDL 51 2023    LDLCALC 63 2023    TRIG 147 2022        Nutrition Intervention  Participate in an exercise program that promotes weight loss  Target Goals  Previous goals: Articulate heart healthy diet , Control blood pressure , and Reduce weight   Progress toward goals: Pt continues to continue with the heart healthy diet and reduce weight. Education  Mr. Gallegos was educated on the importance ofproper hydration, fluid intake, daily weight monitoring, maintaining optimal weight/BMI, nutrition guidelines, cholesterol reduction, nutrition label literacy, portion control, medical nutrition therapy, adequate protein intake, signs and symptoms of hypoglycemia, and relationship of diabetes to cardiac disease  Psychosocial Assessment  Stages of Change:Action  Social History     Socioeconomic History    Marital status:      Spouse name: Not on file    Number of children: Not on file    Years of education: Not on file    Highest education level: Not on file   Occupational History    Not on file   Tobacco Use    Smoking status: Former     Packs/day: 2.00     Years: 17.00     Pack years: 34.00     Types: Cigarettes     Quit date: 1976     Years since quittin.0    Smokeless tobacco: Never   Vaping Use    Vaping Use: Never used   Substance and Sexual Activity    Alcohol use: No    Drug use: No    Sexual activity: Never     Partners: Female   Other Topics Concern    Not on file   Social History Narrative    Not on file     Social Determinants of Health     Financial Resource Strain: Not on file   Food Insecurity: Not on file   Transportation Needs: Not on file   Physical Activity: Insufficiently Active    Days of Exercise per Week:

## 2023-06-19 ENCOUNTER — TELEPHONE (OUTPATIENT)
Dept: INTERNAL MEDICINE CLINIC | Age: 78
End: 2023-06-19

## 2023-06-19 ENCOUNTER — TELEPHONE (OUTPATIENT)
Dept: PULMONOLOGY | Age: 78
End: 2023-06-19

## 2023-06-19 DIAGNOSIS — G47.33 MODERATE OBSTRUCTIVE SLEEP APNEA: Primary | ICD-10-CM

## 2023-06-19 RX ORDER — LANCING DEVICE
EACH MISCELLANEOUS
Qty: 1 EACH | Refills: 0 | Status: SHIPPED | OUTPATIENT
Start: 2023-06-19

## 2023-06-19 RX ORDER — BLOOD-GLUCOSE METER
EACH MISCELLANEOUS
Qty: 1 EACH | Refills: 0 | Status: SHIPPED | OUTPATIENT
Start: 2023-06-19

## 2023-06-19 NOTE — TELEPHONE ENCOUNTER
Patient states he is having oral leak at night, waking his wife. Nasal pillow mask seems to be fitting well. CPAP pressure changed to 12 cm H2O. Will follow-up at appointment on 7/17/2023.

## 2023-06-19 NOTE — TELEPHONE ENCOUNTER
----- Message from Mallory Delgado sent at 6/19/2023 11:43 AM EDT -----  Contact: [phone]  Patient states his diabetic meter broke and needs a script for a new one and lancets.  Please advise     [employer] (EASY TOUCH LANCING DEVICE) Samuel Rios [id number] - [facility], [address]  - F [phone]   [address] [facility] [address]   Phone:  [phone]  Fax:  [phone]

## 2023-07-21 ENCOUNTER — TELEPHONE (OUTPATIENT)
Dept: PULMONOLOGY | Age: 78
End: 2023-07-21

## 2023-07-21 ENCOUNTER — TELEMEDICINE (OUTPATIENT)
Dept: PULMONOLOGY | Age: 78
End: 2023-07-21

## 2023-07-21 DIAGNOSIS — Z71.89 CPAP USE COUNSELING: ICD-10-CM

## 2023-07-21 DIAGNOSIS — I10 ESSENTIAL HYPERTENSION: ICD-10-CM

## 2023-07-21 DIAGNOSIS — E66.9 OBESITY (BMI 30-39.9): ICD-10-CM

## 2023-07-21 DIAGNOSIS — G47.33 MODERATE OBSTRUCTIVE SLEEP APNEA: Primary | ICD-10-CM

## 2023-07-21 DIAGNOSIS — R68.2 DRY MOUTH: ICD-10-CM

## 2023-07-21 DIAGNOSIS — R53.83 OTHER FATIGUE: ICD-10-CM

## 2023-07-21 ASSESSMENT — SLEEP AND FATIGUE QUESTIONNAIRES
HOW LIKELY ARE YOU TO NOD OFF OR FALL ASLEEP WHEN YOU ARE A PASSENGER IN A CAR FOR AN HOUR WITHOUT A BREAK: 0
HOW LIKELY ARE YOU TO NOD OFF OR FALL ASLEEP WHILE SITTING INACTIVE IN A PUBLIC PLACE: 0
HOW LIKELY ARE YOU TO NOD OFF OR FALL ASLEEP WHILE LYING DOWN TO REST IN THE AFTERNOON WHEN CIRCUMSTANCES PERMIT: 1
HOW LIKELY ARE YOU TO NOD OFF OR FALL ASLEEP WHILE WATCHING TV: 0
HOW LIKELY ARE YOU TO NOD OFF OR FALL ASLEEP WHILE SITTING AND TALKING TO SOMEONE: 0
ESS TOTAL SCORE: 4
HOW LIKELY ARE YOU TO NOD OFF OR FALL ASLEEP WHILE SITTING QUIETLY AFTER LUNCH WITHOUT ALCOHOL: 1
HOW LIKELY ARE YOU TO NOD OFF OR FALL ASLEEP IN A CAR, WHILE STOPPED FOR A FEW MINUTES IN TRAFFIC: 0
HOW LIKELY ARE YOU TO NOD OFF OR FALL ASLEEP WHILE SITTING AND READING: 2

## 2023-07-21 NOTE — PROGRESS NOTES
CHIEF COMPLAINT: MARIA VICTORIA    Quinton Crocker is a 69 y.o. male for televideo appointment via video and audio virtual visit for MARIA VICTORIA follow up. He received new CPAP after last visit. Patient is using CPAP 6-8 hrs/night. Using humidifier. No snoring on CPAP, but does have oral leak. The pressure is well tolerated, can feel high at times. The mask is comfortable- nasal pillows. No mask leak. No significant daytime sleepiness. No nodding off when driving. +dry dry mouth-states cannot tolerate chinstrap or full facemask. Some fatigue. Bedtime is 9-10 pm and rise time is 5-6 am. Sleep onset is few minutes. Wakes up 1-2 times at night total. 1-2 nocturia. It takes few minutes to fall back a sleep. Occasional naps during the day. No headache in am. No weight gain. 1-2 caffienated beverages during the day. No alcohol. ESS is 4        Previous 4/17/23  Quinton Crocker is a 69 y.o. male in office for MARIA VICTORIA follow up. He saw Dr. Marisa Juárez last September and pressure was changed to auto CPAP 10 -15 cm H2O. states he does need a new CPAP. Patient is using CPAP 8 hrs/night. Using humidifier. No snoring on CPAP. The pressure is well tolerated. The mask is comfortable- nasal pillows. No mask leak. No significant daytime sleepiness. No nodding off when driving. No dry nose or throat. +fatigue. Bedtime is 830-930 pm and rise time is 430 am. Sleep onset is 1  minutes. Wakes up 1-2 times at night total. 1 nocturia. It takes few minutes to fall back a sleep. Occasional naps during the day. No headache in am. No weight gain. 1-2 caffienated beverages during the day. No alcohol.  ESS is 7        Past Medical History:   Diagnosis Date    Arthritis     Asthma     past hx    Atrial fibrillation (HCC)     Blood circulation, collateral     Diabetes mellitus ([address]) 12/24/2018    DVT (deep venous thrombosis) (HCC)     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    History of knee replacement procedure of right knee     Hx of blood clots     2 DVT's    Hyperlipidemia

## 2023-08-08 ENCOUNTER — OFFICE VISIT (OUTPATIENT)
Dept: INTERNAL MEDICINE CLINIC | Age: 78
End: 2023-08-08

## 2023-08-08 VITALS — WEIGHT: 288 LBS | HEIGHT: 75 IN | BODY MASS INDEX: 35.81 KG/M2

## 2023-08-08 DIAGNOSIS — I25.118 CORONARY ARTERY DISEASE OF NATIVE ARTERY OF NATIVE HEART WITH STABLE ANGINA PECTORIS (HCC): ICD-10-CM

## 2023-08-08 DIAGNOSIS — I10 ESSENTIAL HYPERTENSION: ICD-10-CM

## 2023-08-08 DIAGNOSIS — D50.0 IRON DEFICIENCY ANEMIA DUE TO CHRONIC BLOOD LOSS: ICD-10-CM

## 2023-08-08 DIAGNOSIS — I50.32 CHRONIC DIASTOLIC CHF (CONGESTIVE HEART FAILURE) (HCC): ICD-10-CM

## 2023-08-08 DIAGNOSIS — I48.0 PAROXYSMAL ATRIAL FIBRILLATION (HCC): ICD-10-CM

## 2023-08-08 DIAGNOSIS — E11.40 TYPE 2 DIABETES MELLITUS WITH DIABETIC NEUROPATHY, WITHOUT LONG-TERM CURRENT USE OF INSULIN (HCC): Primary | ICD-10-CM

## 2023-08-08 PROCEDURE — 99213 OFFICE O/P EST LOW 20 MIN: CPT | Performed by: INTERNAL MEDICINE

## 2023-08-08 PROCEDURE — G8417 CALC BMI ABV UP PARAM F/U: HCPCS | Performed by: INTERNAL MEDICINE

## 2023-08-08 PROCEDURE — G8427 DOCREV CUR MEDS BY ELIG CLIN: HCPCS | Performed by: INTERNAL MEDICINE

## 2023-08-08 PROCEDURE — 3044F HG A1C LEVEL LT 7.0%: CPT | Performed by: INTERNAL MEDICINE

## 2023-08-08 PROCEDURE — 1124F ACP DISCUSS-NO DSCNMKR DOCD: CPT | Performed by: INTERNAL MEDICINE

## 2023-08-08 PROCEDURE — 1036F TOBACCO NON-USER: CPT | Performed by: INTERNAL MEDICINE

## 2023-08-08 RX ORDER — METOPROLOL SUCCINATE 25 MG/1
25 TABLET, EXTENDED RELEASE ORAL DAILY
Qty: 90 TABLET | Refills: 0 | Status: SHIPPED
Start: 2023-08-08

## 2023-08-08 RX ORDER — TORSEMIDE 20 MG/1
TABLET ORAL
Qty: 135 TABLET | Refills: 0 | OUTPATIENT
Start: 2023-08-08

## 2023-08-08 RX ORDER — TORSEMIDE 20 MG/1
20 TABLET ORAL DAILY
Qty: 90 TABLET | Refills: 1 | Status: SHIPPED | OUTPATIENT
Start: 2023-08-08

## 2023-08-08 NOTE — PROGRESS NOTES
amio, digoxin    Sp recent ablation again in 10/6/22- Remains in NSR clinically today   Recent EKG -NSR   Continue toprol xl 50 mg daily -back on tikosyn 250  mcg bid  Off cardizem and off digoxin   Coumadin changed to eliquis now- off plavix   Recent MRI cardiac as above s/o hypertrophic CM      CAD s.p LAD stents 6/21 . 9/22  - on Plavix - off ASA  - continue BB and  statins       CHF - chronic diastolic - well compensated   ECHO and MRI with hypertrophic CM  Demamdex at 20 mg now   Off aldactone recently   Added jardiance 12.5 mg by [facility] and doing well     Thyroid nodule - enlarged and going into mediastinum  - remote hx of bx in 2012 which was neg      DM- 2, well controlled  Last A1c at 6.6  Now on metformin 113 mg bid and trulicity, recently added jardiance 10 mg daily   Need a1c     Urine microalbumin stable - not on ACEI or ARB  Eye exam normal this year per pt   On statins     CKd 3a - likely with age, diuretis and CHF  - f/w Dr. Delacruz Lipoma   - creatinine at 1.26      Iron def anemia- bone marrow neg for MM  - s.p 3 venofer infusions  - hb at 11.5 >13    Polyneuropathy- dx 4 yrs  before DM - not on any meds       Upto date on vaccines    An electronic signature was used to authenticate this note.     --Delicia Rosales MD

## 2023-09-01 NOTE — TELEPHONE ENCOUNTER
CPAP download report from 8/1/2023 - 8/30/2023 on CPAP 11 cm H2O reviewed. Compliance is good 100%. AHI is good 2.2.

## 2023-09-19 RX ORDER — METFORMIN HYDROCHLORIDE 500 MG/1
500 TABLET, EXTENDED RELEASE ORAL 2 TIMES DAILY
Qty: 180 TABLET | Refills: 1 | Status: SHIPPED | OUTPATIENT
Start: 2023-09-19

## 2023-09-19 RX ORDER — MONTELUKAST SODIUM 10 MG/1
TABLET ORAL
Qty: 90 TABLET | Refills: 1 | Status: SHIPPED | OUTPATIENT
Start: 2023-09-19

## 2023-10-31 ENCOUNTER — TELEPHONE (OUTPATIENT)
Dept: INTERNAL MEDICINE CLINIC | Age: 78
End: 2023-10-31

## 2023-10-31 NOTE — TELEPHONE ENCOUNTER
----- Message from Gennaro Cooks, MD sent at 10/31/2023  1:30 PM EDT -----  Contact: Patient [phone]  He said he did not need any   ----- Message -----  From: Genaro Johnson: 10/31/2023   8:53 AM EDT  To: Gennaro Cooks, MD    Are you sending anything in for patient?   ----- Message -----  From: Gennaro Cooks, MD  Sent: 10/30/2023   1:21 PM EDT  To: Maria T Horton    Call him    ----- Message -----  From: Genaro Johnson: 10/30/2023  11:06 AM EDT  To: Gennaro Cooks, MD    Patient was seeing at [facility] ER for what he though was heart issues. They did not find anything and his cardiologist believes it might be indigestion and suggested that he contact you and gets something prescribed. [employer].

## 2023-12-04 ENCOUNTER — OFFICE VISIT (OUTPATIENT)
Dept: INTERNAL MEDICINE CLINIC | Age: 78
End: 2023-12-04

## 2023-12-04 ENCOUNTER — HOSPITAL ENCOUNTER (OUTPATIENT)
Dept: GENERAL RADIOLOGY | Age: 78
Discharge: HOME | End: 2023-12-04
Payer: MEDICARE

## 2023-12-04 ENCOUNTER — HOSPITAL ENCOUNTER (OUTPATIENT)
Age: 78
Discharge: HOME | End: 2023-12-04
Payer: MEDICARE

## 2023-12-04 VITALS
DIASTOLIC BLOOD PRESSURE: 68 MMHG | BODY MASS INDEX: 36.68 KG/M2 | WEIGHT: 295 LBS | HEIGHT: 75 IN | SYSTOLIC BLOOD PRESSURE: 106 MMHG | OXYGEN SATURATION: 99 % | HEART RATE: 96 BPM | RESPIRATION RATE: 16 BRPM | TEMPERATURE: 97.8 F

## 2023-12-04 DIAGNOSIS — R05.9 COUGH, UNSPECIFIED TYPE: ICD-10-CM

## 2023-12-04 DIAGNOSIS — R06.02 SHORTNESS OF BREATH: ICD-10-CM

## 2023-12-04 DIAGNOSIS — R53.83 OTHER FATIGUE: ICD-10-CM

## 2023-12-04 DIAGNOSIS — R53.83 OTHER FATIGUE: Primary | ICD-10-CM

## 2023-12-04 PROCEDURE — 1124F ACP DISCUSS-NO DSCNMKR DOCD: CPT | Performed by: NURSE PRACTITIONER

## 2023-12-04 PROCEDURE — G8417 CALC BMI ABV UP PARAM F/U: HCPCS | Performed by: NURSE PRACTITIONER

## 2023-12-04 PROCEDURE — 1036F TOBACCO NON-USER: CPT | Performed by: NURSE PRACTITIONER

## 2023-12-04 PROCEDURE — 3074F SYST BP LT 130 MM HG: CPT | Performed by: NURSE PRACTITIONER

## 2023-12-04 PROCEDURE — G8484 FLU IMMUNIZE NO ADMIN: HCPCS | Performed by: NURSE PRACTITIONER

## 2023-12-04 PROCEDURE — 71046 X-RAY EXAM CHEST 2 VIEWS: CPT

## 2023-12-04 PROCEDURE — 3078F DIAST BP <80 MM HG: CPT | Performed by: NURSE PRACTITIONER

## 2023-12-04 PROCEDURE — G8427 DOCREV CUR MEDS BY ELIG CLIN: HCPCS | Performed by: NURSE PRACTITIONER

## 2023-12-04 PROCEDURE — 99213 OFFICE O/P EST LOW 20 MIN: CPT | Performed by: NURSE PRACTITIONER

## 2023-12-04 RX ORDER — AMOXICILLIN 500 MG/1
500 CAPSULE ORAL 3 TIMES DAILY
Qty: 21 CAPSULE | Refills: 0 | Status: SHIPPED | OUTPATIENT
Start: 2023-12-04 | End: 2023-12-11

## 2023-12-04 ASSESSMENT — ENCOUNTER SYMPTOMS
SORE THROAT: 1
SHORTNESS OF BREATH: 1
WHEEZING: 1
COUGH: 1

## 2023-12-12 ENCOUNTER — OFFICE VISIT (OUTPATIENT)
Dept: INTERNAL MEDICINE CLINIC | Age: 78
End: 2023-12-12

## 2023-12-12 VITALS
BODY MASS INDEX: 36.87 KG/M2 | SYSTOLIC BLOOD PRESSURE: 112 MMHG | DIASTOLIC BLOOD PRESSURE: 70 MMHG | HEIGHT: 75 IN | HEART RATE: 62 BPM | RESPIRATION RATE: 18 BRPM

## 2023-12-12 DIAGNOSIS — I25.118 CORONARY ARTERY DISEASE OF NATIVE ARTERY OF NATIVE HEART WITH STABLE ANGINA PECTORIS (HCC): ICD-10-CM

## 2023-12-12 DIAGNOSIS — E11.40 TYPE 2 DIABETES MELLITUS WITH DIABETIC NEUROPATHY, WITHOUT LONG-TERM CURRENT USE OF INSULIN (HCC): ICD-10-CM

## 2023-12-12 DIAGNOSIS — G47.33 MODERATE OBSTRUCTIVE SLEEP APNEA: ICD-10-CM

## 2023-12-12 DIAGNOSIS — I50.32 CHRONIC DIASTOLIC CHF (CONGESTIVE HEART FAILURE) (HCC): Primary | ICD-10-CM

## 2023-12-12 DIAGNOSIS — D50.0 IRON DEFICIENCY ANEMIA DUE TO CHRONIC BLOOD LOSS: ICD-10-CM

## 2023-12-12 DIAGNOSIS — I50.32 CHRONIC DIASTOLIC CHF (CONGESTIVE HEART FAILURE) (HCC): ICD-10-CM

## 2023-12-12 DIAGNOSIS — I10 ESSENTIAL HYPERTENSION: ICD-10-CM

## 2023-12-12 DIAGNOSIS — Z23 NEED FOR INFLUENZA VACCINATION: ICD-10-CM

## 2023-12-12 DIAGNOSIS — E66.01 SEVERE OBESITY (BMI 35.0-39.9) WITH COMORBIDITY (HCC): ICD-10-CM

## 2023-12-12 DIAGNOSIS — I48.0 PAROXYSMAL ATRIAL FIBRILLATION (HCC): ICD-10-CM

## 2023-12-12 PROCEDURE — G8427 DOCREV CUR MEDS BY ELIG CLIN: HCPCS | Performed by: INTERNAL MEDICINE

## 2023-12-12 PROCEDURE — G0008 ADMIN INFLUENZA VIRUS VAC: HCPCS | Performed by: INTERNAL MEDICINE

## 2023-12-12 PROCEDURE — 1036F TOBACCO NON-USER: CPT | Performed by: INTERNAL MEDICINE

## 2023-12-12 PROCEDURE — 3044F HG A1C LEVEL LT 7.0%: CPT | Performed by: INTERNAL MEDICINE

## 2023-12-12 PROCEDURE — 90662 IIV NO PRSV INCREASED AG IM: CPT | Performed by: INTERNAL MEDICINE

## 2023-12-12 PROCEDURE — 99213 OFFICE O/P EST LOW 20 MIN: CPT | Performed by: INTERNAL MEDICINE

## 2023-12-12 PROCEDURE — G8484 FLU IMMUNIZE NO ADMIN: HCPCS | Performed by: INTERNAL MEDICINE

## 2023-12-12 PROCEDURE — 1124F ACP DISCUSS-NO DSCNMKR DOCD: CPT | Performed by: INTERNAL MEDICINE

## 2023-12-12 PROCEDURE — G8417 CALC BMI ABV UP PARAM F/U: HCPCS | Performed by: INTERNAL MEDICINE

## 2023-12-12 RX ORDER — FERROUS SULFATE 324(65)MG
65 TABLET, DELAYED RELEASE (ENTERIC COATED) ORAL
COMMUNITY
Start: 2023-12-08

## 2023-12-12 RX ORDER — TORSEMIDE 20 MG/1
40 TABLET ORAL DAILY
Qty: 90 TABLET | Refills: 1 | Status: SHIPPED
Start: 2023-12-12

## 2023-12-12 NOTE — PROGRESS NOTES
Hua Almaraz (:  1945) is a 69 y.o. male,Established patient, here for evaluation of the following chief complaint(s):  Follow-up         HPI    69 y.o. 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 ,obestiy, MARIA VICTORIA , pedal edema here for  regular f.w     Multiple admissions to hospital over last 2 yrs  for cardiac issues as below     CAD , ischemic CM,   Diastolic CHF   pt had 2nd covid infection in   and in  , symptoms of fatigue continued with no new sob or cough or fevers. Apparently he was admitted to [facility] for fatigue and dyspnea. Cardiology transferred him to [facility] and cath done with another stent placed in LAD , continued plavix and resumed coumadin    Pt reports his Tikosyn was stopped for prolonged qtc () , remained in Afibb       Again with recurrent dyspnea with recurrent Afibb last week ( 10/2/22) , this adm he had RF ablation done on 10/6/22- now remained in NSR , Edwin Serra resumed  with monitoring of QT     Admitted again on 10/21/22 for acute/Chronic Diastolic CHF , his demadex increased to 30 mg daily for pedal edema  Aldactone remained on hold. [facility]    Changed to [facility] cardiology in  - had left and right heart cath due to hemodynamics which were questionable for restrictive cardiomyopathy an MRI of the heart was  done - which showed asymmetric left ventricular hypertrophy with a septal wall thickness of approximately 17 mmHg.  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

## 2023-12-13 LAB
ANION GAP SERPL CALCULATED.3IONS-SCNC: 15 MMOL/L (ref 3–16)
BUN SERPL-MCNC: 36 MG/DL (ref 7–20)
CALCIUM SERPL-MCNC: 9.2 MG/DL (ref 8.3–10.6)
CHLORIDE SERPL-SCNC: 101 MMOL/L (ref 99–110)
CO2 SERPL-SCNC: 27 MMOL/L (ref 21–32)
CREAT SERPL-MCNC: 1.5 MG/DL (ref 0.8–1.3)
EST. AVERAGE GLUCOSE BLD GHB EST-MCNC: 151.3 MG/DL
GFR SERPLBLD CREATININE-BSD FMLA CKD-EPI: 47 ML/MIN/{1.73_M2}
GLUCOSE SERPL-MCNC: 171 MG/DL (ref 70–99)
HBA1C MFR BLD: 6.9 %
NT-PROBNP SERPL-MCNC: 1482 PG/ML (ref 0–449)
POTASSIUM SERPL-SCNC: 3.9 MMOL/L (ref 3.5–5.1)
SODIUM SERPL-SCNC: 143 MMOL/L (ref 136–145)

## 2023-12-28 ENCOUNTER — TELEPHONE (OUTPATIENT)
Dept: INTERNAL MEDICINE CLINIC | Age: 78
End: 2023-12-28

## 2023-12-28 RX ORDER — ATORVASTATIN CALCIUM 40 MG/1
TABLET, FILM COATED ORAL
Qty: 90 TABLET | Refills: 3 | Status: SHIPPED | OUTPATIENT
Start: 2023-12-28

## 2023-12-28 NOTE — TELEPHONE ENCOUNTER
----- Message from Linn Adam sent at 12/28/2023  1:36 PM EST -----  Contact: [phone]  Pt needs refill       atorvastatin (LIPITOR) 40 MG tablet     [facility] [id number] - [place]  [address] [facility] [phone] - F [phone] (Ph: [phone])

## 2024-01-31 ENCOUNTER — TELEPHONE (OUTPATIENT)
Dept: INTERNAL MEDICINE CLINIC | Age: 79
End: 2024-01-31

## 2024-01-31 NOTE — TELEPHONE ENCOUNTER
----- Message from Lina Cortes sent at 1/31/2024 10:28 AM EST -----  Contact: [facility], requesting last diabetic office note for pt with Dr. EMILY christensen. Please advise     Fax# [phone] & [phone]

## 2024-03-19 RX ORDER — MONTELUKAST SODIUM 10 MG/1
TABLET ORAL
Qty: 90 TABLET | Refills: 1 | Status: SHIPPED | OUTPATIENT
Start: 2024-03-19

## 2024-03-19 RX ORDER — METFORMIN HYDROCHLORIDE 500 MG/1
500 TABLET, EXTENDED RELEASE ORAL 2 TIMES DAILY
Qty: 180 TABLET | Refills: 1 | Status: SHIPPED | OUTPATIENT
Start: 2024-03-19

## 2024-04-20 SDOH — ECONOMIC STABILITY: FOOD INSECURITY: WITHIN THE PAST 12 MONTHS, YOU WORRIED THAT YOUR FOOD WOULD RUN OUT BEFORE YOU GOT MONEY TO BUY MORE.: NEVER TRUE

## 2024-04-20 SDOH — ECONOMIC STABILITY: FOOD INSECURITY: WITHIN THE PAST 12 MONTHS, THE FOOD YOU BOUGHT JUST DIDN'T LAST AND YOU DIDN'T HAVE MONEY TO GET MORE.: NEVER TRUE

## 2024-04-20 SDOH — ECONOMIC STABILITY: INCOME INSECURITY: HOW HARD IS IT FOR YOU TO PAY FOR THE VERY BASICS LIKE FOOD, HOUSING, MEDICAL CARE, AND HEATING?: NOT HARD AT ALL

## 2024-04-20 SDOH — HEALTH STABILITY: PHYSICAL HEALTH: ON AVERAGE, HOW MANY DAYS PER WEEK DO YOU ENGAGE IN MODERATE TO STRENUOUS EXERCISE (LIKE A BRISK WALK)?: 0 DAYS

## 2024-04-20 ASSESSMENT — PATIENT HEALTH QUESTIONNAIRE - PHQ9
SUM OF ALL RESPONSES TO PHQ QUESTIONS 1-9: 0
1. LITTLE INTEREST OR PLEASURE IN DOING THINGS: NOT AT ALL
2. FEELING DOWN, DEPRESSED OR HOPELESS: NOT AT ALL
SUM OF ALL RESPONSES TO PHQ QUESTIONS 1-9: 0
SUM OF ALL RESPONSES TO PHQ9 QUESTIONS 1 & 2: 0

## 2024-04-20 ASSESSMENT — LIFESTYLE VARIABLES
HOW OFTEN DO YOU HAVE A DRINK CONTAINING ALCOHOL: 1
HOW OFTEN DO YOU HAVE A DRINK CONTAINING ALCOHOL: NEVER
HOW OFTEN DO YOU HAVE SIX OR MORE DRINKS ON ONE OCCASION: 1
HOW MANY STANDARD DRINKS CONTAINING ALCOHOL DO YOU HAVE ON A TYPICAL DAY: 0
HOW MANY STANDARD DRINKS CONTAINING ALCOHOL DO YOU HAVE ON A TYPICAL DAY: PATIENT DOES NOT DRINK

## 2024-04-22 ENCOUNTER — OFFICE VISIT (OUTPATIENT)
Dept: INTERNAL MEDICINE CLINIC | Age: 79
End: 2024-04-22

## 2024-04-22 VITALS
SYSTOLIC BLOOD PRESSURE: 120 MMHG | BODY MASS INDEX: 35.81 KG/M2 | HEART RATE: 65 BPM | WEIGHT: 288 LBS | HEIGHT: 75 IN | DIASTOLIC BLOOD PRESSURE: 78 MMHG | RESPIRATION RATE: 18 BRPM

## 2024-04-22 DIAGNOSIS — I50.32 CHRONIC DIASTOLIC CHF (CONGESTIVE HEART FAILURE) (HCC): ICD-10-CM

## 2024-04-22 DIAGNOSIS — E11.40 TYPE 2 DIABETES MELLITUS WITH DIABETIC NEUROPATHY, WITHOUT LONG-TERM CURRENT USE OF INSULIN (HCC): ICD-10-CM

## 2024-04-22 DIAGNOSIS — I48.0 PAROXYSMAL ATRIAL FIBRILLATION (HCC): ICD-10-CM

## 2024-04-22 DIAGNOSIS — Z00.00 MEDICARE ANNUAL WELLNESS VISIT, SUBSEQUENT: Primary | ICD-10-CM

## 2024-04-22 DIAGNOSIS — E66.01 SEVERE OBESITY (BMI 35.0-39.9) WITH COMORBIDITY (HCC): ICD-10-CM

## 2024-04-22 DIAGNOSIS — I25.118 CORONARY ARTERY DISEASE OF NATIVE ARTERY OF NATIVE HEART WITH STABLE ANGINA PECTORIS (HCC): ICD-10-CM

## 2024-04-22 DIAGNOSIS — G47.33 MODERATE OBSTRUCTIVE SLEEP APNEA: ICD-10-CM

## 2024-04-22 DIAGNOSIS — I10 ESSENTIAL HYPERTENSION: ICD-10-CM

## 2024-04-22 PROBLEM — I50.33 ACUTE ON CHRONIC HEART FAILURE WITH PRESERVED EJECTION FRACTION (HCC): Status: RESOLVED | Noted: 2022-09-05 | Resolved: 2024-04-22

## 2024-04-22 PROCEDURE — 3078F DIAST BP <80 MM HG: CPT | Performed by: INTERNAL MEDICINE

## 2024-04-22 PROCEDURE — G0439 PPPS, SUBSEQ VISIT: HCPCS | Performed by: INTERNAL MEDICINE

## 2024-04-22 PROCEDURE — 3074F SYST BP LT 130 MM HG: CPT | Performed by: INTERNAL MEDICINE

## 2024-04-22 PROCEDURE — 1124F ACP DISCUSS-NO DSCNMKR DOCD: CPT | Performed by: INTERNAL MEDICINE

## 2024-04-22 ASSESSMENT — PATIENT HEALTH QUESTIONNAIRE - PHQ9
SUM OF ALL RESPONSES TO PHQ9 QUESTIONS 1 & 2: 0
SUM OF ALL RESPONSES TO PHQ QUESTIONS 1-9: 0
SUM OF ALL RESPONSES TO PHQ QUESTIONS 1-9: 0
2. FEELING DOWN, DEPRESSED OR HOPELESS: NOT AT ALL
SUM OF ALL RESPONSES TO PHQ QUESTIONS 1-9: 0
SUM OF ALL RESPONSES TO PHQ QUESTIONS 1-9: 0
1. LITTLE INTEREST OR PLEASURE IN DOING THINGS: NOT AT ALL

## 2024-04-22 NOTE — PROGRESS NOTES
(Cardiology)     Reviewed and updated this visit:  Tobacco  Allergies  Meds  Med Hx  Surg Hx  Soc Hx  Fam Hx

## 2024-04-22 NOTE — PATIENT INSTRUCTIONS
having a problem from this test. If dilating drops are used for a vision test, they may make the eyes sting and cause a medicine taste in the mouth.  Follow-up care is a key part of your treatment and safety. Be sure to make and go to all appointments, and call your doctor if you are having problems. It's also a good idea to know your test results and keep a list of the medicines you take.  Where can you learn more?  Go to https://[email or web address].net/patientEd and enter G551 to learn more about \"Learning About Vision Tests.\"  Current as of: June 5, 2023               Content Version: 14.0  © 4735-6370 [employer].   Care instructions adapted under license by [employer]. If you have questions about a medical condition or this instruction, always ask your healthcare professional. [employer] disclaims any warranty or liability for your use of this information.           Starting a Weight Loss Plan: Care Instructions  Overview     It can be a challenge to lose weight. But your doctor can help you make a weight-loss plan that meets your needs.  You don't have to make a lot of big changes at once. A better idea might be to focus on small changes and stick with them. When those changes become habit, you can add a few more changes.  Some people find it helpful to take an exercise or nutrition class. If you have questions, ask your doctor about seeing a registered dietitian or an exercise specialist. You might also think about joining a weight-loss support group.  If you're not ready to make changes right now, try to pick a date in the future. Then make an appointment with your doctor to talk about when and how you'll get started with a plan.  Follow-up care is a key part of your treatment and safety. Be sure to make and go to all appointments, and call your doctor if you are having problems. It's also a good idea to know your test results and keep a list of the medicines you take.  How can you

## 2024-04-24 DIAGNOSIS — Z00.00 MEDICARE ANNUAL WELLNESS VISIT, SUBSEQUENT: ICD-10-CM

## 2024-04-24 DIAGNOSIS — E11.40 TYPE 2 DIABETES MELLITUS WITH DIABETIC NEUROPATHY, WITHOUT LONG-TERM CURRENT USE OF INSULIN (HCC): ICD-10-CM

## 2024-04-24 DIAGNOSIS — I10 ESSENTIAL HYPERTENSION: ICD-10-CM

## 2024-04-24 LAB
CHOLEST SERPL-MCNC: 172 MG/DL (ref 0–199)
HDLC SERPL-MCNC: 47 MG/DL (ref 40–60)
LDL CHOLESTEROL CALCULATED: 91 MG/DL
PSA SERPL DL<=0.01 NG/ML-MCNC: 0.78 NG/ML (ref 0–4)
TRIGL SERPL-MCNC: 168 MG/DL (ref 0–150)
URATE SERPL-MCNC: 8 MG/DL (ref 3.5–7.2)
VLDLC SERPL CALC-MCNC: 34 MG/DL

## 2024-04-25 LAB
EST. AVERAGE GLUCOSE BLD GHB EST-MCNC: 154.2 MG/DL
HBA1C MFR BLD: 7 %

## 2024-04-25 RX ORDER — CALCIUM CITRATE/VITAMIN D3 200MG-6.25
TABLET ORAL
Qty: 100 STRIP | Refills: 3 | Status: SHIPPED | OUTPATIENT
Start: 2024-04-25

## 2024-05-16 ENCOUNTER — OFFICE VISIT (OUTPATIENT)
Dept: INTERNAL MEDICINE CLINIC | Age: 79
End: 2024-05-16

## 2024-05-16 ENCOUNTER — TELEPHONE (OUTPATIENT)
Dept: INTERNAL MEDICINE CLINIC | Age: 79
End: 2024-05-16

## 2024-05-16 VITALS — HEIGHT: 75 IN | WEIGHT: 285 LBS | BODY MASS INDEX: 35.43 KG/M2

## 2024-05-16 DIAGNOSIS — E11.40 TYPE 2 DIABETES MELLITUS WITH DIABETIC NEUROPATHY, WITHOUT LONG-TERM CURRENT USE OF INSULIN (HCC): ICD-10-CM

## 2024-05-16 DIAGNOSIS — L03.115 CELLULITIS OF RIGHT ANKLE: Primary | ICD-10-CM

## 2024-05-16 DIAGNOSIS — L89.516 PRESSURE INJURY OF DEEP TISSUE OF RIGHT ANKLE: ICD-10-CM

## 2024-05-16 PROBLEM — E78.5 HYPERLIPIDEMIA: Status: ACTIVE | Noted: 2024-05-16

## 2024-05-16 PROBLEM — E11.9 DIABETES MELLITUS (HCC): Status: ACTIVE | Noted: 2018-12-24

## 2024-05-16 PROCEDURE — G8428 CUR MEDS NOT DOCUMENT: HCPCS | Performed by: INTERNAL MEDICINE

## 2024-05-16 PROCEDURE — 1036F TOBACCO NON-USER: CPT | Performed by: INTERNAL MEDICINE

## 2024-05-16 PROCEDURE — 99212 OFFICE O/P EST SF 10 MIN: CPT | Performed by: INTERNAL MEDICINE

## 2024-05-16 PROCEDURE — 1124F ACP DISCUSS-NO DSCNMKR DOCD: CPT | Performed by: INTERNAL MEDICINE

## 2024-05-16 PROCEDURE — G8417 CALC BMI ABV UP PARAM F/U: HCPCS | Performed by: INTERNAL MEDICINE

## 2024-05-16 PROCEDURE — 3051F HG A1C>EQUAL 7.0%<8.0%: CPT | Performed by: INTERNAL MEDICINE

## 2024-05-16 RX ORDER — CLINDAMYCIN HYDROCHLORIDE 300 MG/1
300 CAPSULE ORAL 4 TIMES DAILY
Qty: 28 CAPSULE | Refills: 0 | Status: SHIPPED | OUTPATIENT
Start: 2024-05-16 | End: 2024-05-23

## 2024-05-16 RX ORDER — LACTOBACILLUS RHAMNOSUS GG 10B CELL
1 CAPSULE ORAL 2 TIMES DAILY
Qty: 20 CAPSULE | Refills: 0 | Status: SHIPPED | OUTPATIENT
Start: 2024-05-16 | End: 2024-05-26

## 2024-05-16 NOTE — TELEPHONE ENCOUNTER
----- Message from Calin Zheng MD sent at 5/16/2024  7:50 AM EDT -----  Contact: [phone]  Today is fine  1pm    ----- Message -----  From: Lina Cortes  Sent: 5/15/2024   3:46 PM EDT  To: Calin Zheng MD    Pt states he had a open wound on his right ankle that he was able to heal and close up. Pt states it is very tender to touch now and asking if he can come in to see you this Friday? Please advise

## 2024-05-16 NOTE — PROGRESS NOTES
kit 0    Biotin 5 MG TABS Take 1 tablet by mouth daily      Cholecalciferol (VITAMIN D3) 5000 units TABS Take 1 tablet by mouth daily      Cyanocobalamin (VITAMIN B-12 CR PO) Take 5,000 mcg by mouth every 7 days        No current facility-administered medications for this visit.         Review of Systems     Constitutional: Negative for fever or chills + fatigue  HENT: Negative for sore throat   Eyes: Negative for redness   Respiratory: +ve dyspnea, cough   Cardiovascular: Negative for chest pain   Gastrointestinal:neg for abd pain, nausea or vomiting or diarrhea  No melena or BRPR  Genitourinary: Negative for hematuria   Musculoskeletal: +ve for arthralgias   Skin: Negative for rash   Neurological: Negative for syncope   Hematological: Negative for adenopathy   Psychiatric/Behavorial: Negative for anxiety      Objective   Physical Exam     There were no vitals filed for this visit.      General: obese, healthy appearing male   Awake, alert and oriented. Appears to be not in any distress  Extremities: chronic 1 + pedal edema -  Right lateral malleolar ulcer with 1 cm diameter, spreading cellulitis around it with tenderness noted           PIERO 9/22   Left ventricular systolic function is normal with an estimated ejection   fraction of 55%.   Moderate mitral regurgitation.   Left atrial appendage is not well visualized.   Redundancy of the interatrial septum with no obvious shunt.        Stress test 5/21          The overall quality of the study is fair. There is subdiaphragmatic    attenuation.        Left ventricular cavity is noted to be normal size. The right ventricle is    normal in size.        SPECT images demonstrate a small area of mildly decreased perfusion in the    apical lateral and mid-inferior lateral segments. The defect is mixed    ischemia and scar. There is associated wall motion abnormality, consistent    with ischemia. Gated SPECT imaging reveals normal myocardial thickening and    wall motion.

## 2024-05-17 ENCOUNTER — HOSPITAL ENCOUNTER (OUTPATIENT)
Dept: WOUND CARE | Age: 79
Discharge: HOME | End: 2024-05-17
Attending: INTERNAL MEDICINE
Payer: MEDICARE

## 2024-05-17 VITALS
WEIGHT: 287.4 LBS | SYSTOLIC BLOOD PRESSURE: 117 MMHG | HEART RATE: 101 BPM | TEMPERATURE: 98.4 F | DIASTOLIC BLOOD PRESSURE: 80 MMHG | HEIGHT: 75 IN | BODY MASS INDEX: 35.73 KG/M2 | RESPIRATION RATE: 18 BRPM

## 2024-05-17 DIAGNOSIS — L97.313 DIABETIC ULCER OF RIGHT ANKLE ASSOCIATED WITH TYPE 2 DIABETES MELLITUS, WITH NECROSIS OF MUSCLE (HCC): ICD-10-CM

## 2024-05-17 DIAGNOSIS — L03.115 CELLULITIS OF RIGHT ANKLE: Primary | ICD-10-CM

## 2024-05-17 DIAGNOSIS — E11.622 DIABETIC ULCER OF RIGHT ANKLE ASSOCIATED WITH TYPE 2 DIABETES MELLITUS, WITH NECROSIS OF MUSCLE (HCC): ICD-10-CM

## 2024-05-17 DIAGNOSIS — E11.42 TYPE 2 DIABETES MELLITUS WITH DIABETIC POLYNEUROPATHY, WITHOUT LONG-TERM CURRENT USE OF INSULIN (HCC): ICD-10-CM

## 2024-05-17 PROBLEM — E11.9 DIABETES MELLITUS (HCC): Status: RESOLVED | Noted: 2018-12-24 | Resolved: 2024-05-17

## 2024-05-17 PROCEDURE — 87205 SMEAR GRAM STAIN: CPT

## 2024-05-17 PROCEDURE — 11042 DBRDMT SUBQ TIS 1ST 20SQCM/<: CPT

## 2024-05-17 PROCEDURE — 87186 SC STD MICRODIL/AGAR DIL: CPT

## 2024-05-17 PROCEDURE — 99213 OFFICE O/P EST LOW 20 MIN: CPT

## 2024-05-17 PROCEDURE — 87070 CULTURE OTHR SPECIMN AEROBIC: CPT

## 2024-05-17 PROCEDURE — 87077 CULTURE AEROBIC IDENTIFY: CPT

## 2024-05-17 RX ORDER — BACITRACIN ZINC AND POLYMYXIN B SULFATE 500; 1000 [USP'U]/G; [USP'U]/G
OINTMENT TOPICAL ONCE
OUTPATIENT
Start: 2024-05-17 | End: 2024-05-17

## 2024-05-17 RX ORDER — LIDOCAINE 40 MG/G
CREAM TOPICAL ONCE
Status: DISCONTINUED | OUTPATIENT
Start: 2024-05-17 | End: 2024-05-18 | Stop reason: HOSPADM

## 2024-05-17 RX ORDER — TRIAMCINOLONE ACETONIDE 1 MG/G
OINTMENT TOPICAL ONCE
OUTPATIENT
Start: 2024-05-17 | End: 2024-05-17

## 2024-05-17 RX ORDER — LIDOCAINE 50 MG/G
OINTMENT TOPICAL ONCE
OUTPATIENT
Start: 2024-05-17 | End: 2024-05-17

## 2024-05-17 RX ORDER — LIDOCAINE HYDROCHLORIDE 40 MG/ML
SOLUTION TOPICAL ONCE
OUTPATIENT
Start: 2024-05-17 | End: 2024-05-17

## 2024-05-17 RX ORDER — LIDOCAINE 40 MG/G
CREAM TOPICAL ONCE
OUTPATIENT
Start: 2024-05-17 | End: 2024-05-17

## 2024-05-17 NOTE — DISCHARGE INSTRUCTIONS
Dr. Blas in 1 week, on                                   at                       .     Your nurse  is ANGELINA Cleveland.     If we applied slip-resistant hospital socks today, be sure to remove them at least once a day to inspect your toes or feet, even if you're not changing the wraps or dressings underneath. If you see anything concerning (redness, excess moisture, etc), please call and let us know right away.    Should you experience any significant changes in your wound(s) (including redness, increased warmth, increased pain, increased drainage, odor, or fever) or have questions about your wound care, please contact the [facility] at [phone] Monday-Thursday from 8:00 am - 4:30 pm, or Friday from 8:00 am - 2:30 pm.  If you need help with your wound outside these hours and cannot wait until we are again available, contact your home-care company (if applicable), your PCP, or go to the nearest emergency room.

## 2024-05-17 NOTE — PLAN OF CARE
Patient seen in [facility] today for new patient evaluation. Patient with history of DM and extensive cardiac history. Patient with diabetic ulcer on his right ankle. Patient states it has been present for approximately two months. Patient reports wound healed for a short time but recently started draining again. Patient has not been using any dressings at home. Patient recently saw PCP for this issue and was given a course of Clindamycin. He is currently on day 3 of this medication. VAHID in [facility] today was normal. Wound debrided per Dr. Blas. Patient tolerated well.  Wound culture obtained. Dr. Blas will call patient with results,. Patient to remain on his current course of Clindamycin until results are received. If necessary, Dr. Blas will send new script to patient pharmacy. Patient to apply PSO/Triad mix to wound at home. Sample of Triad given to patient today. F/u in [facility] in 1 week as ordered, pt. Aware to call sooner with any changes or questions/concerns. Discharge instructions reviewed with patient, all questions answered, copy given to patient. Dressings were applied to all wounds per M.D. Instructions at this visit.

## 2024-05-19 LAB
BACTERIA SPEC AEROBE CULT: ABNORMAL
BACTERIA SPEC AEROBE CULT: ABNORMAL
GRAM STN SPEC: ABNORMAL
ORGANISM: ABNORMAL
ORGANISM: ABNORMAL

## 2024-05-20 NOTE — DISCHARGE INSTRUCTIONS
Wound Care Center Physician Orders and Discharge Instructions  [facility]  [address], [address]  [place] [address]  Telephone: [phone]      Fax: [phone]        Your home care company:   None .     Your wound-care supplies will be provided by:  Self .     NAME:  Lacho Gallegos   YOB: 1945  PRIMARY DIAGNOSIS FOR WOUND CARE CENTER:  Diabetic .     Wound cleansing:   Do not scrub or use excessive force.  Wash hands with soap and water before and after dressing changes.  Prior to applying a clean dressing, cleanse wound with normal saline, wound cleanser, or mild soap and water. Ask your physician or nurse before getting the wound(s) wet in the shower.                Wound care for home:     Right Lateral Ankle  Wash wound with gentle soap and water. Pat dry  Antibiotic ointment mixed with Triad (50/50 mix). At home if you notice the wound being more wet, use less antibiotic ointment and more Triad. If wound is dry, use less Triad and more antibiotic ointment   Dry Dressing (Band aid ok)  Change daily  Double Medium (F) Medigrip - place these on in the morning and removed at bedtime. You may hand wash and hang to dry if it becomes soiled. If double layer feels too tight, you may cut the medigrip in half and place just single layer on leg (Please provide patient extra today in [facility])      Please note, all wounds (unless stated otherwise here) were mechanically debrided at the time of cleansing here in the wound-care center today, so a small amount of pain, drainage or bleeding from that process might be expected, and is normal.      All products for home use, including multiple products for a single wound if applicable, are medically necessary in order to achieve the best chance at timely wound healing. See provider documentation for details if needed.     Substituted dressings applied in the [facility] today, if applicable:           New orders for this week (labs,

## 2024-05-22 LAB
BACTERIA SPEC AEROBE CULT: ABNORMAL
GRAM STN SPEC: ABNORMAL
ORGANISM: ABNORMAL

## 2024-05-24 ENCOUNTER — HOSPITAL ENCOUNTER (OUTPATIENT)
Dept: WOUND CARE | Age: 79
Discharge: HOME | End: 2024-05-24
Attending: INTERNAL MEDICINE
Payer: MEDICARE

## 2024-05-24 VITALS
WEIGHT: 287.2 LBS | HEART RATE: 96 BPM | SYSTOLIC BLOOD PRESSURE: 128 MMHG | HEIGHT: 75 IN | TEMPERATURE: 97.9 F | RESPIRATION RATE: 18 BRPM | DIASTOLIC BLOOD PRESSURE: 87 MMHG | BODY MASS INDEX: 35.71 KG/M2

## 2024-05-24 DIAGNOSIS — L97.313 DIABETIC ULCER OF RIGHT ANKLE ASSOCIATED WITH TYPE 2 DIABETES MELLITUS, WITH NECROSIS OF MUSCLE (HCC): ICD-10-CM

## 2024-05-24 DIAGNOSIS — E11.622 DIABETIC ULCER OF RIGHT ANKLE ASSOCIATED WITH TYPE 2 DIABETES MELLITUS, WITH NECROSIS OF MUSCLE (HCC): ICD-10-CM

## 2024-05-24 DIAGNOSIS — E11.42 TYPE 2 DIABETES MELLITUS WITH DIABETIC POLYNEUROPATHY, WITHOUT LONG-TERM CURRENT USE OF INSULIN (HCC): ICD-10-CM

## 2024-05-24 DIAGNOSIS — L03.115 CELLULITIS OF RIGHT ANKLE: Primary | ICD-10-CM

## 2024-05-24 PROCEDURE — 11042 DBRDMT SUBQ TIS 1ST 20SQCM/<: CPT

## 2024-05-24 RX ORDER — TRIAMCINOLONE ACETONIDE 1 MG/G
OINTMENT TOPICAL ONCE
OUTPATIENT
Start: 2024-05-24 | End: 2024-05-24

## 2024-05-24 RX ORDER — LIDOCAINE 40 MG/G
CREAM TOPICAL ONCE
Status: DISCONTINUED | OUTPATIENT
Start: 2024-05-24 | End: 2024-05-25 | Stop reason: HOSPADM

## 2024-05-24 RX ORDER — LIDOCAINE 40 MG/G
CREAM TOPICAL ONCE
OUTPATIENT
Start: 2024-05-24 | End: 2024-05-24

## 2024-05-24 RX ORDER — BACITRACIN ZINC AND POLYMYXIN B SULFATE 500; 1000 [USP'U]/G; [USP'U]/G
OINTMENT TOPICAL ONCE
OUTPATIENT
Start: 2024-05-24 | End: 2024-05-24

## 2024-05-24 RX ORDER — LIDOCAINE 50 MG/G
OINTMENT TOPICAL ONCE
OUTPATIENT
Start: 2024-05-24 | End: 2024-05-24

## 2024-05-24 RX ORDER — LIDOCAINE HYDROCHLORIDE 40 MG/ML
SOLUTION TOPICAL ONCE
OUTPATIENT
Start: 2024-05-24 | End: 2024-05-24

## 2024-05-24 ASSESSMENT — PAIN SCALES - GENERAL: PAINLEVEL_OUTOF10: 3

## 2024-05-24 ASSESSMENT — PAIN DESCRIPTION - DESCRIPTORS: DESCRIPTORS: THROBBING

## 2024-05-24 ASSESSMENT — PAIN DESCRIPTION - ORIENTATION: ORIENTATION: RIGHT

## 2024-05-24 ASSESSMENT — PAIN DESCRIPTION - LOCATION: LOCATION: ANKLE

## 2024-05-24 ASSESSMENT — PAIN - FUNCTIONAL ASSESSMENT: PAIN_FUNCTIONAL_ASSESSMENT: ACTIVITIES ARE NOT PREVENTED

## 2024-05-24 ASSESSMENT — PAIN DESCRIPTION - ONSET: ONSET: ON-GOING

## 2024-05-24 ASSESSMENT — PAIN DESCRIPTION - FREQUENCY: FREQUENCY: INTERMITTENT

## 2024-05-24 NOTE — PLAN OF CARE
Patient seen in [facility] today for follow up. Patient reports feeling well overall. Wounds showing signs of improvement. Continues on Augmentin without issues. Wound debrided per Dr. Blas. Patient tolerated well.  No change in plan of care this week. Patient to use medigrip to help with edema control. F/u in [facility] in 1 week as ordered, pt. Aware to call sooner with any changes or questions/concerns. Discharge instructions reviewed with patient, all questions answered, copy given to patient. Dressings were applied to all wounds per M.D. Instructions at this visit.

## 2024-05-29 ENCOUNTER — HOSPITAL ENCOUNTER (OUTPATIENT)
Dept: WOUND CARE | Age: 79
Discharge: HOME | End: 2024-05-29
Attending: INTERNAL MEDICINE
Payer: MEDICARE

## 2024-05-29 VITALS
HEIGHT: 75 IN | SYSTOLIC BLOOD PRESSURE: 121 MMHG | HEART RATE: 103 BPM | RESPIRATION RATE: 18 BRPM | WEIGHT: 282.2 LBS | TEMPERATURE: 98.1 F | DIASTOLIC BLOOD PRESSURE: 73 MMHG | BODY MASS INDEX: 35.09 KG/M2

## 2024-05-29 DIAGNOSIS — L03.115 CELLULITIS OF RIGHT ANKLE: Primary | ICD-10-CM

## 2024-05-29 DIAGNOSIS — E11.42 TYPE 2 DIABETES MELLITUS WITH DIABETIC POLYNEUROPATHY, WITHOUT LONG-TERM CURRENT USE OF INSULIN (HCC): ICD-10-CM

## 2024-05-29 DIAGNOSIS — L97.313 DIABETIC ULCER OF RIGHT ANKLE ASSOCIATED WITH TYPE 2 DIABETES MELLITUS, WITH NECROSIS OF MUSCLE (HCC): ICD-10-CM

## 2024-05-29 DIAGNOSIS — E11.622 DIABETIC ULCER OF RIGHT ANKLE ASSOCIATED WITH TYPE 2 DIABETES MELLITUS, WITH NECROSIS OF MUSCLE (HCC): ICD-10-CM

## 2024-05-29 PROCEDURE — 11042 DBRDMT SUBQ TIS 1ST 20SQCM/<: CPT

## 2024-05-29 PROCEDURE — 11042 DBRDMT SUBQ TIS 1ST 20SQCM/<: CPT | Performed by: INTERNAL MEDICINE

## 2024-05-29 RX ORDER — LIDOCAINE 40 MG/G
CREAM TOPICAL ONCE
Status: DISCONTINUED | OUTPATIENT
Start: 2024-05-29 | End: 2024-05-30 | Stop reason: HOSPADM

## 2024-05-29 RX ORDER — LIDOCAINE 40 MG/G
CREAM TOPICAL ONCE
OUTPATIENT
Start: 2024-05-29 | End: 2024-05-29

## 2024-05-29 RX ORDER — LIDOCAINE 50 MG/G
OINTMENT TOPICAL ONCE
Status: DISCONTINUED | OUTPATIENT
Start: 2024-05-29 | End: 2024-05-30 | Stop reason: HOSPADM

## 2024-05-29 RX ORDER — BACITRACIN ZINC AND POLYMYXIN B SULFATE 500; 1000 [USP'U]/G; [USP'U]/G
OINTMENT TOPICAL ONCE
Status: DISCONTINUED | OUTPATIENT
Start: 2024-05-29 | End: 2024-05-30 | Stop reason: HOSPADM

## 2024-05-29 RX ORDER — BACITRACIN ZINC AND POLYMYXIN B SULFATE 500; 1000 [USP'U]/G; [USP'U]/G
OINTMENT TOPICAL ONCE
OUTPATIENT
Start: 2024-05-29 | End: 2024-05-29

## 2024-05-29 RX ORDER — LIDOCAINE HYDROCHLORIDE 40 MG/ML
SOLUTION TOPICAL ONCE
OUTPATIENT
Start: 2024-05-29 | End: 2024-05-29

## 2024-05-29 RX ORDER — LIDOCAINE 50 MG/G
OINTMENT TOPICAL ONCE
OUTPATIENT
Start: 2024-05-29 | End: 2024-05-29

## 2024-05-29 RX ORDER — LIDOCAINE HYDROCHLORIDE 40 MG/ML
SOLUTION TOPICAL ONCE
Status: DISCONTINUED | OUTPATIENT
Start: 2024-05-29 | End: 2024-05-30 | Stop reason: HOSPADM

## 2024-05-29 RX ORDER — TRIAMCINOLONE ACETONIDE 1 MG/G
OINTMENT TOPICAL ONCE
Status: DISCONTINUED | OUTPATIENT
Start: 2024-05-29 | End: 2024-05-30 | Stop reason: HOSPADM

## 2024-05-29 RX ORDER — TRIAMCINOLONE ACETONIDE 1 MG/G
OINTMENT TOPICAL ONCE
OUTPATIENT
Start: 2024-05-29 | End: 2024-05-29

## 2024-05-29 NOTE — DISCHARGE INSTRUCTIONS
in edema control. Walking is great! If you are not up and walking, please elevate.     General comments for diabetic / neuropathic ulcers:  *  Unless you've been instructed not to remove your dressings, be sure to inspect your feet daily, and notify us of any major changes.  *  If you do not have a long-term podiatrist, be sure to let us know.  *  Moisturize your skin regularly with Vaseline, Aquaphor, Aveeno, CeraVe, Cetaphil, Eucerin, Lubriderm, etc; but keep the skin between your toes dry.  *  Always allow your wound-care doctor or podiatrist to cut nails, calluses & corns.   *  Be sure to always wear footwear that fits well, and NEVER go without shoes and socks.  *  Do you know your Hemoglobin A1c level? You should! Please ask if you have questions.  *  Be sure to adhere to any recommendations from your PCP or endocrinologist when it comes to diabetes medications and diet. If you have questions, please ask.  *  If you smoke, your wound can not heal properly -- please talk with us when you're ready to quit.   *  Do not soak your feet unless specifically instructed to do so by us.         F/U Appointment is with Dr. Blas in 1 week, on                                   at                       .     Your nurse  is ANGELINA Cleveland.      If we applied slip-resistant hospital socks today, be sure to remove them at least once a day to inspect your toes or feet, even if you're not changing the wraps or dressings underneath. If you see anything concerning (redness, excess moisture, etc), please call and let us know right away.     Should you experience any significant changes in your wound(s) (including redness, increased warmth, increased pain, increased drainage, odor, or fever) or have questions about your wound care, please contact the [facility] at [phone] Monday-Thursday from 8:00 am - 4:30 pm, or Friday from 8:00 am - 2:30 pm.  If you need help with your wound outside these hours

## 2024-05-29 NOTE — PLAN OF CARE
Wound stable, debridement per MD & pt. Tolerated well. Pt. Has completed antibiotics & no further s/s of infection noted at present. Will cont. With current wound care regime with dressings. Pt. States he was unable to tolerate double layer medigrip. Will use single layer medium compression medigrip for edema control. F/u in [facility] in 1 week as ordered, pt. Aware to call sooner with any changes or questions/concerns. Discharge instructions reviewed with patient, all questions answered, copy given to patient. Dressings were applied to all wounds per M.D. Instructions at this visit.

## 2024-06-01 PROBLEM — L03.115 CELLULITIS OF RIGHT ANKLE: Status: RESOLVED | Noted: 2024-05-17 | Resolved: 2024-06-01

## 2024-06-01 PROBLEM — Z95.5 STATUS POST INSERTION OF DRUG-ELUTING STENT INTO LEFT ANTERIOR DESCENDING (LAD) ARTERY FOR CORONARY ARTERY DISEASE: Status: RESOLVED | Noted: 2021-06-29 | Resolved: 2024-06-01

## 2024-06-01 PROBLEM — R53.83 OTHER FATIGUE: Status: RESOLVED | Noted: 2021-12-03 | Resolved: 2024-06-01

## 2024-06-01 PROBLEM — H26.9 CATARACT OF BOTH EYES: Status: RESOLVED | Noted: 2020-01-03 | Resolved: 2024-06-01

## 2024-06-01 PROBLEM — E66.01 CLASS 2 SEVERE OBESITY DUE TO EXCESS CALORIES WITH SERIOUS COMORBIDITY AND BODY MASS INDEX (BMI) OF 35.0 TO 35.9 IN ADULT (HCC): Status: ACTIVE | Noted: 2022-04-22

## 2024-06-01 PROBLEM — R94.39 ABNORMAL STRESS TEST: Status: RESOLVED | Noted: 2021-06-14 | Resolved: 2024-06-01

## 2024-06-01 PROBLEM — B39.9 HISTOPLASMOSIS: Status: RESOLVED | Noted: 2024-06-01 | Resolved: 2024-06-01

## 2024-06-01 PROBLEM — I20.89 ANGINA AT REST (HCC): Status: RESOLVED | Noted: 2021-06-16 | Resolved: 2024-06-01

## 2024-06-01 PROBLEM — R06.09 DOE (DYSPNEA ON EXERTION): Status: RESOLVED | Noted: 2022-09-15 | Resolved: 2024-06-01

## 2024-06-01 PROBLEM — I48.91 ATRIAL FIBRILLATION WITH RVR (HCC): Status: RESOLVED | Noted: 2022-09-18 | Resolved: 2024-06-01

## 2024-06-01 PROBLEM — E66.812 CLASS 2 SEVERE OBESITY DUE TO EXCESS CALORIES WITH SERIOUS COMORBIDITY AND BODY MASS INDEX (BMI) OF 35.0 TO 35.9 IN ADULT: Status: ACTIVE | Noted: 2022-04-22

## 2024-06-01 PROBLEM — U07.1 COVID-19: Status: RESOLVED | Noted: 2022-09-04 | Resolved: 2024-06-01

## 2024-06-01 PROBLEM — I50.1 PULMONARY EDEMA WITH CONGESTIVE HEART FAILURE (HCC): Status: RESOLVED | Noted: 2022-10-18 | Resolved: 2024-06-01

## 2024-06-01 PROBLEM — I87.303 VENOUS HYPERTENSION OF BOTH LOWER EXTREMITIES: Status: ACTIVE | Noted: 2020-01-03

## 2024-06-01 PROBLEM — R06.09 EXERTIONAL DYSPNEA: Status: RESOLVED | Noted: 2022-10-18 | Resolved: 2024-06-01

## 2024-06-01 PROBLEM — B39.9 HISTOPLASMOSIS: Status: ACTIVE | Noted: 2024-06-01

## 2024-06-01 PROBLEM — I20.89 ANGINAL EQUIVALENT (HCC): Status: RESOLVED | Noted: 2022-09-08 | Resolved: 2024-06-01

## 2024-06-01 PROBLEM — N18.31 STAGE 3A CHRONIC KIDNEY DISEASE (HCC): Status: ACTIVE | Noted: 2022-12-07

## 2024-06-01 PROBLEM — I48.4 ATYPICAL ATRIAL FLUTTER (HCC): Status: RESOLVED | Noted: 2017-04-06 | Resolved: 2024-06-01

## 2024-06-01 PROBLEM — I25.10 CAD S/P PERCUTANEOUS CORONARY ANGIOPLASTY: Status: RESOLVED | Noted: 2022-09-09 | Resolved: 2024-06-01

## 2024-06-01 PROBLEM — H90.3 SENSORY HEARING LOSS, BILATERAL: Status: ACTIVE | Noted: 2024-06-01

## 2024-06-01 PROBLEM — Z98.61 CAD S/P PERCUTANEOUS CORONARY ANGIOPLASTY: Status: RESOLVED | Noted: 2022-09-09 | Resolved: 2024-06-01

## 2024-06-01 PROBLEM — E66.01 MORBID OBESITY WITH BMI OF 40.0-44.9, ADULT (HCC): Status: RESOLVED | Noted: 2019-04-03 | Resolved: 2024-06-01

## 2024-06-01 NOTE — PROGRESS NOTES
and LLE with no gross effusions, joint misalignment or acute arthritis  Erika-ulcer skin: indurated, pink, warm, and dry, still a bit tender  Ulcer(s):  eschar resolved, no pus today, wound bed still mostly pale to yellow, a couple of small foci of red tissue starting to appear, serous exudate, a bit of fibrin and biofilm, probably still a bit of fat necrosis, no bone exposed. Photos also saved in electronic chart    Today's wound measurements, per RN documentation:  Wound 05/17/24 #  1 right lateral ankle, Diabetic, buchanan 2 (onset 2/2024)-Wound Length (cm): 0.6 cm    Wound 05/17/24 #  1 right lateral ankle, Diabetic, buchanan 2 (onset 2/2024)-Wound Width (cm): 0.8 cm    Wound 05/17/24 #  1 right lateral ankle, Diabetic, buchanan 2 (onset 2/2024)-Wound Depth (cm): 0.3 cm    Assessment:     Patient Active Problem List   Diagnosis Code    Arthritis M19.90    Essential hypertension I10    Moderate obstructive sleep apnea G47.33    Lung nodule R91.1    Type 2 diabetes mellitus with diabetic polyneuropathy, without long-term current use of insulin ([facility]) E11.42    Venous hypertension of both lower extremities I87.303    Class 2 severe obesity due to excess calories with serious comorbidity and body mass index (BMI) of 35.0 to 35.9 in adult ([facility]) E66.01, Z68.35    Coronary artery disease of native artery of native heart with stable angina pectoris ([facility]) I25.118    Asthma J45.909    PAF (paroxysmal atrial fibrillation) ([facility]) I48.0    Benign prostatic hyperplasia with urinary obstruction N40.1, N13.8    Hyperlipidemia E78.5    Diabetic ulcer of right ankle associated with type 2 diabetes mellitus, with exposure of muscle layer ([facility]) E11.622, L97.313    Sensory hearing loss, bilateral H90.3    Stage 3a chronic kidney disease ([facility]) N18.31       Assessment of today's active condition(s): DM2, neuropathy, some signs of venous disease, no concerns for PAD; traumatic injury to right ankle back in March, seemingly healed (but I wonder

## 2024-06-03 NOTE — DISCHARGE INSTRUCTIONS
Wound Care Center Physician Orders and Discharge Instructions  [facility]  [address], [address]  [place] [id number]  Telephone: [phone]      Fax: [phone]        Your home care company:   None .     Your wound-care supplies will be provided by:  Self .     NAME:  Lacho Gallegos   YOB: 1945  PRIMARY DIAGNOSIS FOR WOUND CARE CENTER:  Diabetic Ulcer .     Wound cleansing:   Do not scrub or use excessive force.  Wash hands with soap and water before and after dressing changes.  Prior to applying a clean dressing, cleanse wound with normal saline, wound cleanser, or mild soap and water. Ask your physician or nurse before getting the wound(s) wet in the shower.                Wound care for home:     Right Lateral Ankle  Wash wound with gentle soap and water, pat dry  Triad to ru wound  Santyl- thin layer    gauze  Cover with Dry Dressing - no bandaide - prefer gauze and tape  Change daily  Single layer Medium (F) Medigrip - place these on in the morning and removed at bedtime. You may hand wash and hang to dry if it becomes soiled.         Please note, all wounds (unless stated otherwise here) were mechanically debrided at the time of cleansing here in the wound-care center today, so a small amount of pain, drainage or bleeding from that process might be expected, and is normal.      All products for home use, including multiple products for a single wound if applicable, are medically necessary in order to achieve the best chance at timely wound healing. See provider documentation for details if needed.     Substituted dressings applied in the [facility] today, if applicable:     N/a     New orders for this week (labs, imaging, medications, etc.):   You have been prescribed Augmentin today.  It is important that you begin this medication and take as directed.   If this is an antibiotic, please take all tablets or capsules until gone.  Your Pharm[facility] of record is:[facility]

## 2024-06-07 ENCOUNTER — HOSPITAL ENCOUNTER (OUTPATIENT)
Dept: WOUND CARE | Age: 79
Discharge: HOME | End: 2024-06-07
Payer: MEDICARE

## 2024-06-07 VITALS
WEIGHT: 287.4 LBS | RESPIRATION RATE: 18 BRPM | SYSTOLIC BLOOD PRESSURE: 125 MMHG | HEART RATE: 80 BPM | BODY MASS INDEX: 35.73 KG/M2 | HEIGHT: 75 IN | TEMPERATURE: 97.9 F | DIASTOLIC BLOOD PRESSURE: 80 MMHG

## 2024-06-07 DIAGNOSIS — E11.622 DIABETIC ULCER OF RIGHT ANKLE ASSOCIATED WITH TYPE 2 DIABETES MELLITUS, WITH NECROSIS OF MUSCLE (HCC): Primary | ICD-10-CM

## 2024-06-07 DIAGNOSIS — E11.42 TYPE 2 DIABETES MELLITUS WITH DIABETIC POLYNEUROPATHY, WITHOUT LONG-TERM CURRENT USE OF INSULIN (HCC): ICD-10-CM

## 2024-06-07 DIAGNOSIS — L97.313 DIABETIC ULCER OF RIGHT ANKLE ASSOCIATED WITH TYPE 2 DIABETES MELLITUS, WITH NECROSIS OF MUSCLE (HCC): Primary | ICD-10-CM

## 2024-06-07 PROCEDURE — 11042 DBRDMT SUBQ TIS 1ST 20SQCM/<: CPT

## 2024-06-07 RX ORDER — TRIAMCINOLONE ACETONIDE 1 MG/G
OINTMENT TOPICAL ONCE
OUTPATIENT
Start: 2024-06-07 | End: 2024-06-07

## 2024-06-07 RX ORDER — BACITRACIN ZINC AND POLYMYXIN B SULFATE 500; 1000 [USP'U]/G; [USP'U]/G
OINTMENT TOPICAL ONCE
OUTPATIENT
Start: 2024-06-07 | End: 2024-06-07

## 2024-06-07 RX ORDER — LIDOCAINE 40 MG/G
CREAM TOPICAL ONCE
OUTPATIENT
Start: 2024-06-07 | End: 2024-06-07

## 2024-06-07 RX ORDER — LIDOCAINE 50 MG/G
OINTMENT TOPICAL ONCE
OUTPATIENT
Start: 2024-06-07 | End: 2024-06-07

## 2024-06-07 RX ORDER — AMOXICILLIN AND CLAVULANATE POTASSIUM 875; 125 MG/1; MG/1
1 TABLET, FILM COATED ORAL EVERY 12 HOURS SCHEDULED
Qty: 14 TABLET | Refills: 0 | Status: SHIPPED | OUTPATIENT
Start: 2024-06-07 | End: 2024-06-14

## 2024-06-07 RX ORDER — LIDOCAINE HYDROCHLORIDE 40 MG/ML
SOLUTION TOPICAL ONCE
OUTPATIENT
Start: 2024-06-07 | End: 2024-06-07

## 2024-06-07 RX ORDER — LIDOCAINE 40 MG/G
CREAM TOPICAL ONCE
Status: DISCONTINUED | OUTPATIENT
Start: 2024-06-07 | End: 2024-06-08 | Stop reason: HOSPADM

## 2024-06-10 NOTE — DISCHARGE INSTRUCTIONS
Wound Care Center Physician Orders and Discharge Instructions  [facility]  [address], [address]  [place] [address]  Telephone: [phone]      Fax: [phone]        Your home care company:   None .     Your wound-care supplies will be provided by:  Self .     NAME:  Lacho Gallegos   YOB: 1945  PRIMARY DIAGNOSIS FOR WOUND CARE CENTER:  Diabetic Ulcer .     Wound cleansing:   Do not scrub or use excessive force.  Wash hands with soap and water before and after dressing changes.  Prior to applying a clean dressing, cleanse wound with normal saline, wound cleanser, or mild soap and water. Ask your physician or nurse before getting the wound(s) wet in the shower.                Wound care for home:     Right Lateral Ankle  Wash wound with gentle soap and water, pat dry  Triad to ru wound  Santyl- thin layer   2x2 Gauze  Cover with Dry Dressing - no bandaide - prefer gauze and tape  Change daily  Single layer Medium (F) Medigrip - place these on in the morning and removed at bedtime. You may hand wash and hang to dry if it becomes soiled.         Please note, all wounds (unless stated otherwise here) were mechanically debrided at the time of cleansing here in the wound-care center today, so a small amount of pain, drainage or bleeding from that process might be expected, and is normal.      All products for home use, including multiple products for a single wound if applicable, are medically necessary in order to achieve the best chance at timely wound healing. See provider documentation for details if needed.     Substituted dressings applied in the [facility] today, if applicable:     N/a     New orders for this week (labs, imaging, medications, etc.):     Sample of Santyl given to pt for short term use     Additional instructions for specific diagnoses:     Elevate legs as much as possible to aid in edema control. Walking is great! If you are not up and walking, please

## 2024-06-14 ENCOUNTER — HOSPITAL ENCOUNTER (OUTPATIENT)
Dept: WOUND CARE | Age: 79
Discharge: HOME | End: 2024-06-14
Attending: INTERNAL MEDICINE
Payer: MEDICARE

## 2024-06-14 VITALS
WEIGHT: 285.8 LBS | TEMPERATURE: 97.2 F | HEART RATE: 96 BPM | DIASTOLIC BLOOD PRESSURE: 80 MMHG | HEIGHT: 75 IN | BODY MASS INDEX: 35.54 KG/M2 | RESPIRATION RATE: 18 BRPM | SYSTOLIC BLOOD PRESSURE: 121 MMHG

## 2024-06-14 DIAGNOSIS — E11.622 DIABETIC ULCER OF RIGHT ANKLE ASSOCIATED WITH TYPE 2 DIABETES MELLITUS, WITH NECROSIS OF MUSCLE (HCC): Primary | ICD-10-CM

## 2024-06-14 DIAGNOSIS — L97.313 DIABETIC ULCER OF RIGHT ANKLE ASSOCIATED WITH TYPE 2 DIABETES MELLITUS, WITH NECROSIS OF MUSCLE (HCC): Primary | ICD-10-CM

## 2024-06-14 DIAGNOSIS — E11.42 TYPE 2 DIABETES MELLITUS WITH DIABETIC POLYNEUROPATHY, WITHOUT LONG-TERM CURRENT USE OF INSULIN (HCC): ICD-10-CM

## 2024-06-14 PROCEDURE — 11042 DBRDMT SUBQ TIS 1ST 20SQCM/<: CPT

## 2024-06-14 RX ORDER — LIDOCAINE 40 MG/G
CREAM TOPICAL ONCE
Status: DISCONTINUED | OUTPATIENT
Start: 2024-06-14 | End: 2024-06-15 | Stop reason: HOSPADM

## 2024-06-14 RX ORDER — LIDOCAINE HYDROCHLORIDE 40 MG/ML
SOLUTION TOPICAL ONCE
OUTPATIENT
Start: 2024-06-14 | End: 2024-06-14

## 2024-06-14 RX ORDER — BACITRACIN ZINC AND POLYMYXIN B SULFATE 500; 1000 [USP'U]/G; [USP'U]/G
OINTMENT TOPICAL ONCE
OUTPATIENT
Start: 2024-06-14 | End: 2024-06-14

## 2024-06-14 RX ORDER — LIDOCAINE 40 MG/G
CREAM TOPICAL ONCE
OUTPATIENT
Start: 2024-06-14 | End: 2024-06-14

## 2024-06-14 RX ORDER — LIDOCAINE 50 MG/G
OINTMENT TOPICAL ONCE
OUTPATIENT
Start: 2024-06-14 | End: 2024-06-14

## 2024-06-14 RX ORDER — TRIAMCINOLONE ACETONIDE 1 MG/G
OINTMENT TOPICAL ONCE
OUTPATIENT
Start: 2024-06-14 | End: 2024-06-14

## 2024-06-14 ASSESSMENT — PAIN SCALES - GENERAL: PAINLEVEL_OUTOF10: 0

## 2024-06-14 NOTE — PLAN OF CARE
Patient seen in [facility] today for follow up. Patient reports feeling well overall. Wounds showing signs of improvement. No change in plan of care this week. Patient will follow up with Dr. Blas in * week. Discharge instructions given. Patient voiced understanding.   Patient complains of pain earlier in the week. This has since subsided. Wound debrided per Dr. Blas. Patient tolerated well.

## 2024-06-17 NOTE — DISCHARGE INSTRUCTIONS
Wound Care Center Physician Orders and Discharge Instructions  [facility]  [address], [address]  [place] [address]  Telephone: [phone]      Fax: [phone]        Your home care company:   None .     Your wound-care supplies will be provided by:  Self .     NAME:  Lacho Gallegos   YOB: 1945  PRIMARY DIAGNOSIS FOR WOUND CARE CENTER:  Diabetic Ulcer .     Wound cleansing:   Do not scrub or use excessive force.  Wash hands with soap and water before and after dressing changes.  Prior to applying a clean dressing, cleanse wound with normal saline, wound cleanser, or mild soap and water. Ask your physician or nurse before getting the wound(s) wet in the shower.                Wound care for home:     Right Lateral Ankle  Wash wound with gentle soap and water, pat dry  Nexodyn  Multidex Powder  Sorbact  JANE - Please give patient extra dressing and patient instructions   Leave in place for the week - Do NOT get wet!  Single layer Medium (F) Medigrip to keep dressing in place  You may hand wash and hang to dry if it becomes soiled.        Please note, all wounds (unless stated otherwise here) were mechanically debrided at the time of cleansing here in the wound-care center today, so a small amount of pain, drainage or bleeding from that process might be expected, and is normal.      All products for home use, including multiple products for a single wound if applicable, are medically necessary in order to achieve the best chance at timely wound healing. See provider documentation for details if needed.     Substituted dressings applied in the [facility] today, if applicable:     N/a     New orders for this week (labs, imaging, medications, etc.):     Sample of Santyl given to pt for short term use     Additional instructions for specific diagnoses:     Elevate legs as much as possible to aid in edema control. Walking is great! If you are not up and walking, please elevate.

## 2024-06-21 ENCOUNTER — HOSPITAL ENCOUNTER (OUTPATIENT)
Dept: WOUND CARE | Age: 79
Discharge: HOME | End: 2024-06-21
Attending: INTERNAL MEDICINE
Payer: MEDICARE

## 2024-06-21 VITALS
RESPIRATION RATE: 18 BRPM | WEIGHT: 284.2 LBS | TEMPERATURE: 97.1 F | SYSTOLIC BLOOD PRESSURE: 142 MMHG | BODY MASS INDEX: 35.34 KG/M2 | HEART RATE: 144 BPM | DIASTOLIC BLOOD PRESSURE: 82 MMHG | HEIGHT: 75 IN

## 2024-06-21 DIAGNOSIS — E11.622 DIABETIC ULCER OF RIGHT ANKLE ASSOCIATED WITH TYPE 2 DIABETES MELLITUS, WITH NECROSIS OF MUSCLE (HCC): Primary | ICD-10-CM

## 2024-06-21 DIAGNOSIS — L97.313 DIABETIC ULCER OF RIGHT ANKLE ASSOCIATED WITH TYPE 2 DIABETES MELLITUS, WITH NECROSIS OF MUSCLE (HCC): Primary | ICD-10-CM

## 2024-06-21 DIAGNOSIS — E11.42 TYPE 2 DIABETES MELLITUS WITH DIABETIC POLYNEUROPATHY, WITHOUT LONG-TERM CURRENT USE OF INSULIN (HCC): ICD-10-CM

## 2024-06-21 PROCEDURE — 97597 DBRDMT OPN WND 1ST 20 CM/<: CPT

## 2024-06-21 PROCEDURE — 97607 NEG PRS WND THR NDME<=50SQCM: CPT

## 2024-06-21 RX ORDER — LIDOCAINE 40 MG/G
CREAM TOPICAL ONCE
OUTPATIENT
Start: 2024-06-21 | End: 2024-06-21

## 2024-06-21 RX ORDER — LIDOCAINE HYDROCHLORIDE 40 MG/ML
SOLUTION TOPICAL ONCE
OUTPATIENT
Start: 2024-06-21 | End: 2024-06-21

## 2024-06-21 RX ORDER — LIDOCAINE 50 MG/G
OINTMENT TOPICAL ONCE
OUTPATIENT
Start: 2024-06-21 | End: 2024-06-21

## 2024-06-21 RX ORDER — BACITRACIN ZINC AND POLYMYXIN B SULFATE 500; 1000 [USP'U]/G; [USP'U]/G
OINTMENT TOPICAL ONCE
OUTPATIENT
Start: 2024-06-21 | End: 2024-06-21

## 2024-06-21 RX ORDER — TRIAMCINOLONE ACETONIDE 1 MG/G
OINTMENT TOPICAL ONCE
OUTPATIENT
Start: 2024-06-21 | End: 2024-06-21

## 2024-06-21 RX ORDER — LIDOCAINE 40 MG/G
CREAM TOPICAL ONCE
Status: DISCONTINUED | OUTPATIENT
Start: 2024-06-21 | End: 2024-06-22 | Stop reason: HOSPADM

## 2024-06-21 ASSESSMENT — PAIN SCALES - GENERAL: PAINLEVEL_OUTOF10: 0

## 2024-06-21 NOTE — PLAN OF CARE
Patient seen in [facility] today for follow up. Patient reports feeling well overall. Wounds showing signs of improvement. Wound debrided per Dr. Blas. Patient tolerated well. 25G needling done to encourage blood flow.  Graft and JANE discussed as options for future dressings. Patient agreeable to JANE this week. Dr. Blas gave detailed explanation of JANE. Patient voiced understanding. F/u in [facility] in 1 week as ordered, pt. Aware to call sooner with any changes or questions/concerns. Discharge instructions reviewed with patient, all questions answered, copy given to patient. Dressings were applied to all wounds per M.D. Instructions at this visit.

## 2024-06-24 NOTE — DISCHARGE INSTRUCTIONS
Wound Care Center Physician Orders and Discharge Instructions  [facility]  [address], [address]  [place] [address]  Telephone: [phone]      Fax: [phone]        Your home care company:   None .     Your wound-care supplies will be provided by:  Self .     NAME:  Lacho Gallegos   YOB: 1945  PRIMARY DIAGNOSIS FOR WOUND CARE CENTER:  Diabetic Ulcer .     Wound cleansing:   Do not scrub or use excessive force.  Wash hands with soap and water before and after dressing changes.  Prior to applying a clean dressing, cleanse wound with normal saline, wound cleanser, or mild soap and water. Ask your physician or nurse before getting the wound(s) wet in the shower.                Wound care for home:     Right Lateral Ankle  Wash wound with gentle soap and water, pat dry  Nexodyn  Multidex Powder  Sorbact  JANE - Please give patient extra dressing and patient instructions   Leave in place for the week - Do NOT get wet!  Single layer Medium (F) Medigrip to keep dressing in place  You may hand wash and hang to dry if it becomes soiled.         Please note, all wounds (unless stated otherwise here) were mechanically debrided at the time of cleansing here in the wound-care center today, so a small amount of pain, drainage or bleeding from that process might be expected, and is normal.      All products for home use, including multiple products for a single wound if applicable, are medically necessary in order to achieve the best chance at timely wound healing. See provider documentation for details if needed.     Substituted dressings applied in the [facility] today, if applicable:     N/a     New orders for this week (labs, imaging, medications, etc.):     Sample of Santyl given to pt for short term use     Additional instructions for specific diagnoses:     Elevate legs as much as possible to aid in edema control. Walking is great! If you are not up and walking, please elevate.

## 2024-06-28 ENCOUNTER — HOSPITAL ENCOUNTER (OUTPATIENT)
Dept: WOUND CARE | Age: 79
Discharge: HOME | End: 2024-06-28
Payer: MEDICARE

## 2024-06-28 VITALS
DIASTOLIC BLOOD PRESSURE: 79 MMHG | HEIGHT: 75 IN | RESPIRATION RATE: 18 BRPM | TEMPERATURE: 97.4 F | HEART RATE: 99 BPM | WEIGHT: 282 LBS | BODY MASS INDEX: 35.06 KG/M2 | SYSTOLIC BLOOD PRESSURE: 134 MMHG

## 2024-06-28 DIAGNOSIS — L97.313 DIABETIC ULCER OF RIGHT ANKLE ASSOCIATED WITH TYPE 2 DIABETES MELLITUS, WITH NECROSIS OF MUSCLE (HCC): Primary | ICD-10-CM

## 2024-06-28 DIAGNOSIS — E11.622 DIABETIC ULCER OF RIGHT ANKLE ASSOCIATED WITH TYPE 2 DIABETES MELLITUS, WITH NECROSIS OF MUSCLE (HCC): Primary | ICD-10-CM

## 2024-06-28 DIAGNOSIS — E11.42 TYPE 2 DIABETES MELLITUS WITH DIABETIC POLYNEUROPATHY, WITHOUT LONG-TERM CURRENT USE OF INSULIN (HCC): ICD-10-CM

## 2024-06-28 PROCEDURE — 97607 NEG PRS WND THR NDME<=50SQCM: CPT

## 2024-06-28 PROCEDURE — 11042 DBRDMT SUBQ TIS 1ST 20SQCM/<: CPT

## 2024-06-28 RX ORDER — LIDOCAINE 50 MG/G
OINTMENT TOPICAL ONCE
OUTPATIENT
Start: 2024-06-28 | End: 2024-06-28

## 2024-06-28 RX ORDER — LIDOCAINE 40 MG/G
CREAM TOPICAL ONCE
OUTPATIENT
Start: 2024-06-28 | End: 2024-06-28

## 2024-06-28 RX ORDER — LIDOCAINE HYDROCHLORIDE 40 MG/ML
SOLUTION TOPICAL ONCE
OUTPATIENT
Start: 2024-06-28 | End: 2024-06-28

## 2024-06-28 RX ORDER — BACITRACIN ZINC AND POLYMYXIN B SULFATE 500; 1000 [USP'U]/G; [USP'U]/G
OINTMENT TOPICAL ONCE
OUTPATIENT
Start: 2024-06-28 | End: 2024-06-28

## 2024-06-28 RX ORDER — TRIAMCINOLONE ACETONIDE 1 MG/G
OINTMENT TOPICAL ONCE
OUTPATIENT
Start: 2024-06-28 | End: 2024-06-28

## 2024-06-28 RX ORDER — LIDOCAINE 40 MG/G
CREAM TOPICAL ONCE
Status: DISCONTINUED | OUTPATIENT
Start: 2024-06-28 | End: 2024-06-29 | Stop reason: HOSPADM

## 2024-06-28 NOTE — PLAN OF CARE
Patient seen in [facility] today for follow up. Patient reports feeling well overall. Wounds showing signs of improvement. Wound debrided per Dr. Blas. Patient tolerated well.  Patient reports no issues with JANE. Will continue with JANE this week. Patient again educated on need for elevation to aid in edema control. Voiced understanding. F/u in [facility] in 1 week as ordered, pt. Aware to call sooner with any changes or questions/concerns. Discharge instructions reviewed with patient, all questions answered, copy given to patient. Dressings were applied to all wounds per M.D. Instructions at this visit.

## 2024-07-02 NOTE — DISCHARGE INSTRUCTIONS
Wound Care Center Physician Orders and Discharge Instructions  [facility]  [address], [address]  [place] [address]  Telephone: [phone]      Fax: [phone]        Your home care company:   None .     Your wound-care supplies will be provided by:  Self .     NAME:  Lacho Gallegos   YOB: 1945  PRIMARY DIAGNOSIS FOR WOUND CARE CENTER:  Diabetic Ulcer .     Wound cleansing:   Do not scrub or use excessive force.  Wash hands with soap and water before and after dressing changes.  Prior to applying a clean dressing, cleanse wound with normal saline, wound cleanser, or mild soap and water. Ask your physician or nurse before getting the wound(s) wet in the shower.                Wound care for home:     Right Lateral Ankle  Wash wound with gentle soap and water, pat dry  Nexodyn  Santyl to wound  Sorbact  JANE - Please give patient extra dressing and patient instructions   Leave in place for the week - Do NOT get wet!  Single layer Medium (F) Medigrip to keep dressing in place  You may hand wash and hang to dry if it becomes soiled.         Please note, all wounds (unless stated otherwise here) were mechanically debrided at the time of cleansing here in the wound-care center today, so a small amount of pain, drainage or bleeding from that process might be expected, and is normal.      All products for home use, including multiple products for a single wound if applicable, are medically necessary in order to achieve the best chance at timely wound healing. See provider documentation for details if needed.     Substituted dressings applied in the [facility] today, if applicable:     N/a     New orders for this week (labs, imaging, medications, etc.):     Sample of Santyl given to pt for short term use     Additional instructions for specific diagnoses:     Elevate legs as much as possible to aid in edema control. Walking is great! If you are not up and walking, please elevate.

## 2024-07-05 ENCOUNTER — HOSPITAL ENCOUNTER (OUTPATIENT)
Dept: WOUND CARE | Age: 79
Discharge: HOME | End: 2024-07-05
Attending: INTERNAL MEDICINE
Payer: MEDICARE

## 2024-07-05 VITALS
WEIGHT: 315 LBS | HEART RATE: 81 BPM | HEIGHT: 75 IN | SYSTOLIC BLOOD PRESSURE: 127 MMHG | RESPIRATION RATE: 18 BRPM | TEMPERATURE: 98.8 F | DIASTOLIC BLOOD PRESSURE: 80 MMHG | BODY MASS INDEX: 39.17 KG/M2

## 2024-07-05 DIAGNOSIS — E11.42 TYPE 2 DIABETES MELLITUS WITH DIABETIC POLYNEUROPATHY, WITHOUT LONG-TERM CURRENT USE OF INSULIN (HCC): ICD-10-CM

## 2024-07-05 DIAGNOSIS — L97.313 DIABETIC ULCER OF RIGHT ANKLE ASSOCIATED WITH TYPE 2 DIABETES MELLITUS, WITH NECROSIS OF MUSCLE (HCC): Primary | ICD-10-CM

## 2024-07-05 DIAGNOSIS — E11.622 DIABETIC ULCER OF RIGHT ANKLE ASSOCIATED WITH TYPE 2 DIABETES MELLITUS, WITH NECROSIS OF MUSCLE (HCC): Primary | ICD-10-CM

## 2024-07-05 PROCEDURE — 97607 NEG PRS WND THR NDME<=50SQCM: CPT

## 2024-07-05 PROCEDURE — 11042 DBRDMT SUBQ TIS 1ST 20SQCM/<: CPT

## 2024-07-05 RX ORDER — LIDOCAINE 50 MG/G
OINTMENT TOPICAL ONCE
OUTPATIENT
Start: 2024-07-05 | End: 2024-07-05

## 2024-07-05 RX ORDER — LIDOCAINE 40 MG/G
CREAM TOPICAL ONCE
OUTPATIENT
Start: 2024-07-05 | End: 2024-07-05

## 2024-07-05 RX ORDER — TRIAMCINOLONE ACETONIDE 1 MG/G
OINTMENT TOPICAL ONCE
OUTPATIENT
Start: 2024-07-05 | End: 2024-07-05

## 2024-07-05 RX ORDER — LIDOCAINE HYDROCHLORIDE 40 MG/ML
SOLUTION TOPICAL ONCE
OUTPATIENT
Start: 2024-07-05 | End: 2024-07-05

## 2024-07-05 RX ORDER — LIDOCAINE 40 MG/G
CREAM TOPICAL ONCE
Status: DISCONTINUED | OUTPATIENT
Start: 2024-07-05 | End: 2024-07-06 | Stop reason: HOSPADM

## 2024-07-05 RX ORDER — BACITRACIN ZINC AND POLYMYXIN B SULFATE 500; 1000 [USP'U]/G; [USP'U]/G
OINTMENT TOPICAL ONCE
OUTPATIENT
Start: 2024-07-05 | End: 2024-07-05

## 2024-07-05 ASSESSMENT — PAIN SCALES - GENERAL: PAINLEVEL_OUTOF10: 0

## 2024-07-05 NOTE — PLAN OF CARE
Patient seen in [facility] today for follow up. Patient reports feeling well overall. Biofilm remains an issue in wound bed. Wound debrided per Dr. Blas. Patient tolerated well.  Patient did complain of pain during procedure. 27G needling also completed to encourage blood flow. Will change Multidex to Santyl. No other changes made to plan of care. F/u in [facility] in 1 week as ordered, pt. Aware to call sooner with any changes or questions/concerns. Discharge instructions reviewed with patient, all questions answered, copy given to patient. Dressings were applied to all wounds per M.D. Instructions at this visit.

## 2024-07-08 NOTE — DISCHARGE INSTRUCTIONS
Wound Care Center Physician Orders and Discharge Instructions  [facility]  [address], [address]  [place] [address]  Telephone: [phone]      Fax: [phone]        Your home care company:   None .     Your wound-care supplies will be provided by:  Self .     NAME:  Lacho Gallegos   YOB: 1945  PRIMARY DIAGNOSIS FOR WOUND CARE CENTER:  Diabetic Ulcer .     Wound cleansing:   Do not scrub or use excessive force.  Wash hands with soap and water before and after dressing changes.  Prior to applying a clean dressing, cleanse wound with normal saline, wound cleanser, or mild soap and water. Ask your physician or nurse before getting the wound(s) wet in the shower.                Wound care for home:     Right Lateral Ankle  Wash wound with gentle soap and water, pat dry  Nexodyn  Santyl to wound  Sorbact  JANE - Please give patient extra dressing and patient instructions   Leave in place for the week - Do NOT get wet!  Single layer Medium (F) Medigrip to keep dressing in place  You may hand wash and hang to dry if it becomes soiled.         Please note, all wounds (unless stated otherwise here) were mechanically debrided at the time of cleansing here in the wound-care center today, so a small amount of pain, drainage or bleeding from that process might be expected, and is normal.      All products for home use, including multiple products for a single wound if applicable, are medically necessary in order to achieve the best chance at timely wound healing. See provider documentation for details if needed.     Substituted dressings applied in the [facility] today, if applicable:     N/a     New orders for this week (labs, imaging, medications, etc.):       Additional instructions for specific diagnoses:     Elevate legs as much as possible to aid in edema control. Walking is great! If you are not up and walking, please elevate.      General comments for diabetic / neuropathic

## 2024-07-11 ENCOUNTER — TELEPHONE (OUTPATIENT)
Dept: INTERNAL MEDICINE CLINIC | Age: 79
End: 2024-07-11

## 2024-07-11 NOTE — TELEPHONE ENCOUNTER
----- Message from Calin Zheng MD sent at 7/11/2024  2:16 PM EDT -----  Contact: [phone]  Tomorrow afternoon    ----- Message -----  From: Patricia Luong MA  Sent: 7/11/2024   8:09 AM EDT  To: Calin Zheng MD    Patients wife called and states that the patient has had right arm pain for the last 12 hours or so. It started as just elbow pain and has quickly expanded to pain with any movement of the arm and is unable to even  his arm. There are visible bites, scratches, scrapes or bumps that would cause this. His cardiologist recently increased his metoprolol and decreased his water pill last week. They are wanting to get seen, but you and lisa are fully booked for the day. They want to know what they should do. Please advise     [facility] [id number] - [address] [phone]  F [phone]

## 2024-07-12 ENCOUNTER — OFFICE VISIT (OUTPATIENT)
Dept: INTERNAL MEDICINE CLINIC | Age: 79
End: 2024-07-12

## 2024-07-12 ENCOUNTER — TELEMEDICINE (OUTPATIENT)
Dept: PULMONOLOGY | Age: 79
End: 2024-07-12
Payer: MEDICARE

## 2024-07-12 ENCOUNTER — HOSPITAL ENCOUNTER (OUTPATIENT)
Dept: WOUND CARE | Age: 79
Discharge: HOME | End: 2024-07-12
Attending: INTERNAL MEDICINE
Payer: MEDICARE

## 2024-07-12 ENCOUNTER — TELEPHONE (OUTPATIENT)
Dept: PULMONOLOGY | Age: 79
End: 2024-07-12

## 2024-07-12 VITALS
TEMPERATURE: 98.3 F | BODY MASS INDEX: 36.38 KG/M2 | HEART RATE: 95 BPM | HEIGHT: 75 IN | WEIGHT: 292.6 LBS | SYSTOLIC BLOOD PRESSURE: 142 MMHG | RESPIRATION RATE: 20 BRPM | DIASTOLIC BLOOD PRESSURE: 86 MMHG

## 2024-07-12 VITALS
BODY MASS INDEX: 36.31 KG/M2 | SYSTOLIC BLOOD PRESSURE: 110 MMHG | HEART RATE: 85 BPM | RESPIRATION RATE: 18 BRPM | HEIGHT: 75 IN | DIASTOLIC BLOOD PRESSURE: 75 MMHG | WEIGHT: 292 LBS

## 2024-07-12 DIAGNOSIS — I50.32 CHRONIC DIASTOLIC CHF (CONGESTIVE HEART FAILURE) (HCC): ICD-10-CM

## 2024-07-12 DIAGNOSIS — E66.9 OBESITY (BMI 30-39.9): ICD-10-CM

## 2024-07-12 DIAGNOSIS — I48.0 PAROXYSMAL ATRIAL FIBRILLATION (HCC): ICD-10-CM

## 2024-07-12 DIAGNOSIS — E11.42 TYPE 2 DIABETES MELLITUS WITH DIABETIC POLYNEUROPATHY, WITHOUT LONG-TERM CURRENT USE OF INSULIN (HCC): ICD-10-CM

## 2024-07-12 DIAGNOSIS — N18.31 STAGE 3A CHRONIC KIDNEY DISEASE (HCC): ICD-10-CM

## 2024-07-12 DIAGNOSIS — Z71.89 CPAP USE COUNSELING: ICD-10-CM

## 2024-07-12 DIAGNOSIS — M10.9 ACUTE GOUTY ARTHRITIS: Primary | ICD-10-CM

## 2024-07-12 DIAGNOSIS — R53.83 OTHER FATIGUE: ICD-10-CM

## 2024-07-12 DIAGNOSIS — I10 ESSENTIAL HYPERTENSION: ICD-10-CM

## 2024-07-12 DIAGNOSIS — G47.33 MODERATE OBSTRUCTIVE SLEEP APNEA: Primary | ICD-10-CM

## 2024-07-12 DIAGNOSIS — L97.313 DIABETIC ULCER OF RIGHT ANKLE ASSOCIATED WITH TYPE 2 DIABETES MELLITUS, WITH NECROSIS OF MUSCLE (HCC): Primary | ICD-10-CM

## 2024-07-12 DIAGNOSIS — R68.2 DRY MOUTH: ICD-10-CM

## 2024-07-12 DIAGNOSIS — E11.622 DIABETIC ULCER OF RIGHT ANKLE ASSOCIATED WITH TYPE 2 DIABETES MELLITUS, WITH NECROSIS OF MUSCLE (HCC): Primary | ICD-10-CM

## 2024-07-12 PROCEDURE — G8427 DOCREV CUR MEDS BY ELIG CLIN: HCPCS | Performed by: INTERNAL MEDICINE

## 2024-07-12 PROCEDURE — 97597 DBRDMT OPN WND 1ST 20 CM/<: CPT

## 2024-07-12 PROCEDURE — 3078F DIAST BP <80 MM HG: CPT | Performed by: INTERNAL MEDICINE

## 2024-07-12 PROCEDURE — 99213 OFFICE O/P EST LOW 20 MIN: CPT | Performed by: INTERNAL MEDICINE

## 2024-07-12 PROCEDURE — 1123F ACP DISCUSS/DSCN MKR DOCD: CPT | Performed by: INTERNAL MEDICINE

## 2024-07-12 PROCEDURE — G8427 DOCREV CUR MEDS BY ELIG CLIN: HCPCS | Performed by: NURSE PRACTITIONER

## 2024-07-12 PROCEDURE — G8417 CALC BMI ABV UP PARAM F/U: HCPCS | Performed by: INTERNAL MEDICINE

## 2024-07-12 PROCEDURE — 97607 NEG PRS WND THR NDME<=50SQCM: CPT

## 2024-07-12 PROCEDURE — 99214 OFFICE O/P EST MOD 30 MIN: CPT | Performed by: NURSE PRACTITIONER

## 2024-07-12 PROCEDURE — 3074F SYST BP LT 130 MM HG: CPT | Performed by: INTERNAL MEDICINE

## 2024-07-12 PROCEDURE — 1123F ACP DISCUSS/DSCN MKR DOCD: CPT | Performed by: NURSE PRACTITIONER

## 2024-07-12 PROCEDURE — 1036F TOBACCO NON-USER: CPT | Performed by: INTERNAL MEDICINE

## 2024-07-12 RX ORDER — LIDOCAINE 40 MG/G
CREAM TOPICAL ONCE
OUTPATIENT
Start: 2024-07-12 | End: 2024-07-12

## 2024-07-12 RX ORDER — LIDOCAINE 50 MG/G
OINTMENT TOPICAL ONCE
OUTPATIENT
Start: 2024-07-12 | End: 2024-07-12

## 2024-07-12 RX ORDER — METHYLPREDNISOLONE 4 MG/1
TABLET ORAL
COMMUNITY
Start: 2024-07-11

## 2024-07-12 RX ORDER — INDOMETHACIN 50 MG/1
1 CAPSULE ORAL
COMMUNITY
Start: 2024-07-11

## 2024-07-12 RX ORDER — LIDOCAINE HYDROCHLORIDE 40 MG/ML
SOLUTION TOPICAL ONCE
OUTPATIENT
Start: 2024-07-12 | End: 2024-07-12

## 2024-07-12 RX ORDER — BACITRACIN ZINC AND POLYMYXIN B SULFATE 500; 1000 [USP'U]/G; [USP'U]/G
OINTMENT TOPICAL ONCE
OUTPATIENT
Start: 2024-07-12 | End: 2024-07-12

## 2024-07-12 RX ORDER — TRIAMCINOLONE ACETONIDE 1 MG/G
OINTMENT TOPICAL ONCE
OUTPATIENT
Start: 2024-07-12 | End: 2024-07-12

## 2024-07-12 RX ORDER — METOPROLOL SUCCINATE 50 MG/1
50 TABLET, EXTENDED RELEASE ORAL
COMMUNITY
Start: 2024-07-03

## 2024-07-12 RX ORDER — LIDOCAINE 40 MG/G
CREAM TOPICAL ONCE
Status: DISCONTINUED | OUTPATIENT
Start: 2024-07-12 | End: 2024-07-13 | Stop reason: HOSPADM

## 2024-07-12 RX ORDER — ALLOPURINOL 100 MG/1
100 TABLET ORAL 2 TIMES DAILY
Qty: 60 TABLET | Refills: 2 | Status: SHIPPED | OUTPATIENT
Start: 2024-07-12

## 2024-07-12 ASSESSMENT — SLEEP AND FATIGUE QUESTIONNAIRES
HOW LIKELY ARE YOU TO NOD OFF OR FALL ASLEEP WHEN YOU ARE A PASSENGER IN A CAR FOR AN HOUR WITHOUT A BREAK: WOULD NEVER DOZE
HOW LIKELY ARE YOU TO NOD OFF OR FALL ASLEEP WHILE WATCHING TV: MODERATE CHANCE OF DOZING
HOW LIKELY ARE YOU TO NOD OFF OR FALL ASLEEP WHILE SITTING AND TALKING TO SOMEONE: WOULD NEVER DOZE
HOW LIKELY ARE YOU TO NOD OFF OR FALL ASLEEP WHILE SITTING QUIETLY AFTER LUNCH WITHOUT ALCOHOL: WOULD NEVER DOZE
ESS TOTAL SCORE: 5
HOW LIKELY ARE YOU TO NOD OFF OR FALL ASLEEP WHILE LYING DOWN TO REST IN THE AFTERNOON WHEN CIRCUMSTANCES PERMIT: MODERATE CHANCE OF DOZING
HOW LIKELY ARE YOU TO NOD OFF OR FALL ASLEEP WHILE SITTING AND READING: SLIGHT CHANCE OF DOZING
HOW LIKELY ARE YOU TO NOD OFF OR FALL ASLEEP IN A CAR, WHILE STOPPED FOR A FEW MINUTES IN TRAFFIC: WOULD NEVER DOZE
HOW LIKELY ARE YOU TO NOD OFF OR FALL ASLEEP WHILE SITTING INACTIVE IN A PUBLIC PLACE: WOULD NEVER DOZE

## 2024-07-12 NOTE — TELEPHONE ENCOUNTER
Faxed heated tubing order, nasal pillows mask order, CR, and ov notes to [facility] at [phone] via [employer].

## 2024-07-12 NOTE — PROGRESS NOTES
CHIEF COMPLAINT: MARIA VICTORIA    Lacho Gallegos is a 79 y.o. male for televideo appointment via video and audio virtual visit for MARIA VICTORIA follow up.  States he is sleeping okay.  Patient is using CPAP   7-8 hrs/night. Using humidifier. No snoring on CPAP. The pressure is well tolerated. The mask is comfortable- nasal pillows. No mask leak. No significant daytime sleepiness. No nodding off when driving.  Sometimes dry mouth, could not tolerate full facemask.. Increased fatigue- working with cardiology. Bedtime is 9 pm and rise time is 4-6 am. Sleep onset is few minutes. Wakes up 1-2 times at night total. 1-2 nocturia. It takes few minutes to fall back a sleep. Sometimes naps during the day. No headache in am. No weight gain. 1-2 caffienated beverages during the day. ESS is 5        Past Medical History:   Diagnosis Date    Angina at rest ([facility]) 06/16/2021    Atrial fibrillation with RVR ([facility]) 09/18/2022    Atypical atrial flutter ([facility]) 04/06/2017    Bilateral primary osteoarthritis of knee     Carpal tunnel syndrome 08/31/2015    Cataract of both eyes 01/03/2020    Cellulitis of right ankle 05/17/2024    COVID-19 09/04/2022    DVT (deep venous thrombosis) ([facility])     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    Morbid obesity with BMI of 40.0-44.9, adult ([facility]) 04/03/2019    Prostatitis, chronic 11/30/2012    Pulmonary edema with congestive heart failure ([facility]) 10/18/2022    Squamous cell skin cancer     Stone, kidney     Tobacco use     UTI (urinary tract infection) 01/23/2017     Past Surgical History:   Procedure Laterality Date    ABLATION OF DYSRHYTHMIC FOCUS  2013    a-fib    CARDIOVERSION N/A 09/20/2022    CARDIOVERSION W/ANES. performed by Jose Aguero MD at [facility]    CARPAL TUNNEL RELEASE Right 09/30/2015    CARPAL TUNNEL RELEASE Left 03/20/2016    CHOLECYSTECTOMY, LAPAROSCOPIC N/A 02/19/2019    LAPAROSCOPIC CHOLECYSTECTOMY WITH CHOLANGIOGRAMS performed by Amado Lua MD at [facility]    COLONOSCOPY      CYSTOSCOPY

## 2024-07-12 NOTE — PROGRESS NOTES
3 venofer infusions  - hb at 11.5 >13.2  - now on oral iron supplements    Polyneuropathy- dx 4 yrs  before DM - not on any meds       Upto date on vaccines    An electronic signature was used to authenticate this note.    --Calin Zheng MD

## 2024-07-15 NOTE — DISCHARGE INSTRUCTIONS
for diabetic / neuropathic ulcers:  *  Unless you've been instructed not to remove your dressings, be sure to inspect your feet daily, and notify us of any major changes.  *  If you do not have a long-term podiatrist, be sure to let us know.  *  Moisturize your skin regularly with Vaseline, Aquaphor, Aveeno, CeraVe, Cetaphil, Eucerin, Lubriderm, etc; but keep the skin between your toes dry.  *  Always allow your wound-care doctor or podiatrist to cut nails, calluses & corns.   *  Be sure to always wear footwear that fits well, and NEVER go without shoes and socks.  *  Do you know your Hemoglobin A1c level? You should! Please ask if you have questions.  *  Be sure to adhere to any recommendations from your PCP or endocrinologist when it comes to diabetes medications and diet. If you have questions, please ask.  *  If you smoke, your wound can not heal properly -- please talk with us when you're ready to quit.   *  Do not soak your feet unless specifically instructed to do so by us.         F/U Appointment is with Dr. Blas in 1 week, on                                   at                       .     Your nurse  is ANGELINA [place].      If we applied slip-resistant hospital socks today, be sure to remove them at least once a day to inspect your toes or feet, even if you're not changing the wraps or dressings underneath. If you see anything concerning (redness, excess moisture, etc), please call and let us know right away.     Should you experience any significant changes in your wound(s) (including redness, increased warmth, increased pain, increased drainage, odor, or fever) or have questions about your wound care, please contact the [facility] at [phone] Monday-Thursday from 8:00 am - 4:30 pm, or Friday from 8:00 am - 2:30 pm.  If you need help with your wound outside these hours and cannot wait until we are again available, contact your home-care company (if applicable), your PCP,

## 2024-07-19 ENCOUNTER — HOSPITAL ENCOUNTER (OUTPATIENT)
Dept: WOUND CARE | Age: 79
Discharge: HOME | End: 2024-07-19
Attending: INTERNAL MEDICINE
Payer: MEDICARE

## 2024-07-19 VITALS
RESPIRATION RATE: 20 BRPM | BODY MASS INDEX: 35.54 KG/M2 | HEART RATE: 98 BPM | TEMPERATURE: 98.1 F | HEIGHT: 75 IN | SYSTOLIC BLOOD PRESSURE: 136 MMHG | WEIGHT: 285.8 LBS | DIASTOLIC BLOOD PRESSURE: 87 MMHG

## 2024-07-19 DIAGNOSIS — E11.622 DIABETIC ULCER OF RIGHT ANKLE ASSOCIATED WITH TYPE 2 DIABETES MELLITUS, WITH NECROSIS OF MUSCLE (HCC): Primary | ICD-10-CM

## 2024-07-19 DIAGNOSIS — E11.42 TYPE 2 DIABETES MELLITUS WITH DIABETIC POLYNEUROPATHY, WITHOUT LONG-TERM CURRENT USE OF INSULIN (HCC): ICD-10-CM

## 2024-07-19 DIAGNOSIS — L97.313 DIABETIC ULCER OF RIGHT ANKLE ASSOCIATED WITH TYPE 2 DIABETES MELLITUS, WITH NECROSIS OF MUSCLE (HCC): Primary | ICD-10-CM

## 2024-07-19 PROCEDURE — 11042 DBRDMT SUBQ TIS 1ST 20SQCM/<: CPT

## 2024-07-19 PROCEDURE — 97607 NEG PRS WND THR NDME<=50SQCM: CPT

## 2024-07-19 RX ORDER — BACITRACIN ZINC AND POLYMYXIN B SULFATE 500; 1000 [USP'U]/G; [USP'U]/G
OINTMENT TOPICAL ONCE
OUTPATIENT
Start: 2024-07-19 | End: 2024-07-19

## 2024-07-19 RX ORDER — LIDOCAINE 40 MG/G
CREAM TOPICAL ONCE
OUTPATIENT
Start: 2024-07-19 | End: 2024-07-19

## 2024-07-19 RX ORDER — LIDOCAINE HYDROCHLORIDE 40 MG/ML
SOLUTION TOPICAL ONCE
OUTPATIENT
Start: 2024-07-19 | End: 2024-07-19

## 2024-07-19 RX ORDER — TRIAMCINOLONE ACETONIDE 1 MG/G
OINTMENT TOPICAL ONCE
OUTPATIENT
Start: 2024-07-19 | End: 2024-07-19

## 2024-07-19 RX ORDER — LIDOCAINE 50 MG/G
OINTMENT TOPICAL ONCE
OUTPATIENT
Start: 2024-07-19 | End: 2024-07-19

## 2024-07-19 RX ORDER — LIDOCAINE 40 MG/G
CREAM TOPICAL ONCE
Status: DISCONTINUED | OUTPATIENT
Start: 2024-07-19 | End: 2024-07-20 | Stop reason: HOSPADM

## 2024-07-19 NOTE — PLAN OF CARE
Patient seen in [facility] today for follow up. Patient reports feeling well overall. Wounds showing signs of improvement. Wound debrided per Dr. Blas. Patient tolerated well.  27 G needling also performed per Dr. Blas. Will add santyl to plan of care and continue with JANE this week. F/u in [facility] in 1 week as ordered, pt. Aware to call sooner with any changes or questions/concerns. Discharge instructions reviewed with patient, all questions answered, copy given to patient. Dressings were applied to all wounds per M.D. Instructions at this visit.

## 2024-07-22 NOTE — DISCHARGE INSTRUCTIONS
Wound Care Center Physician Orders and Discharge Instructions  [facility]  [address], [address]  [place] [address]  Telephone: [phone]      Fax: [phone]        Your home care company:   None .     Your wound-care supplies will be provided by:  Self .     NAME:  Lacho Gallegos   YOB: 1945  PRIMARY DIAGNOSIS FOR WOUND CARE CENTER:  Diabetic Ulcer .     Wound cleansing:   Do not scrub or use excessive force.  Wash hands with soap and water before and after dressing changes.  Prior to applying a clean dressing, cleanse wound with normal saline, wound cleanser, or mild soap and water. Ask your physician or nurse before getting the wound(s) wet in the shower.                Wound care for home:     Right Lateral Ankle  Wash wound with gentle soap and water, pat dry  Nexodyn  Santyl to wound  Sorbact  JANE    Leave in place for the week - Do NOT get wet! You may purchase a cast cover at [facility] or [facility]/[employer]     Single layer Medium (F) Medigrip to keep dressing in place  You may hand wash and hang to dry if it becomes soiled.         Please note, all wounds (unless stated otherwise here) were mechanically debrided at the time of cleansing here in the wound-care center today, so a small amount of pain, drainage or bleeding from that process might be expected, and is normal.      All products for home use, including multiple products for a single wound if applicable, are medically necessary in order to achieve the best chance at timely wound healing. See provider documentation for details if needed.     Substituted dressings applied in the [facility] today, if applicable:     N/a     New orders for this week (labs, imaging, medications, etc.):        Additional instructions for specific diagnoses:     Elevate legs as much as possible to aid in edema control. Walking is great! If you are not up and walking, please elevate.      General comments

## 2024-07-24 ENCOUNTER — PATIENT MESSAGE (OUTPATIENT)
Dept: CARDIAC REHAB | Age: 79
End: 2024-07-24

## 2024-07-25 ENCOUNTER — HOSPITAL ENCOUNTER (OUTPATIENT)
Dept: CARDIAC REHAB | Age: 79
Setting detail: THERAPIES SERIES
Discharge: HOME | End: 2024-07-25
Payer: MEDICARE

## 2024-07-25 PROCEDURE — 93798 PHYS/QHP OP CAR RHAB W/ECG: CPT

## 2024-07-25 ASSESSMENT — PATIENT HEALTH QUESTIONNAIRE - PHQ9
9. THOUGHTS THAT YOU WOULD BE BETTER OFF DEAD, OR OF HURTING YOURSELF: NOT AT ALL
4. FEELING TIRED OR HAVING LITTLE ENERGY: NOT AT ALL
SUM OF ALL RESPONSES TO PHQ QUESTIONS 1-9: 0
2. FEELING DOWN, DEPRESSED OR HOPELESS: NOT AT ALL
10. IF YOU CHECKED OFF ANY PROBLEMS, HOW DIFFICULT HAVE THESE PROBLEMS MADE IT FOR YOU TO DO YOUR WORK, TAKE CARE OF THINGS AT HOME, OR GET ALONG WITH OTHER PEOPLE: NOT DIFFICULT AT ALL
1. LITTLE INTEREST OR PLEASURE IN DOING THINGS: NOT AT ALL
3. TROUBLE FALLING OR STAYING ASLEEP: NOT AT ALL
5. POOR APPETITE OR OVEREATING: NOT AT ALL
8. MOVING OR SPEAKING SO SLOWLY THAT OTHER PEOPLE COULD HAVE NOTICED. OR THE OPPOSITE - BEING SO FIDGETY OR RESTLESS THAT YOU HAVE BEEN MOVING AROUND A LOT MORE THAN USUAL: NOT AT ALL
6. FEELING BAD ABOUT YOURSELF - OR THAT YOU ARE A FAILURE OR HAVE LET YOURSELF OR YOUR FAMILY DOWN: NOT AT ALL
7. TROUBLE CONCENTRATING ON THINGS, SUCH AS READING THE NEWSPAPER OR WATCHING TELEVISION: NOT AT ALL

## 2024-07-25 NOTE — PLAN OF CARE
Cardiopulmonary Rehab Treatment Plan   Name: Lacho Gallegos Assessment Date: 2024   : 1945 Primary Diagnosis: Treatment Diagnosis 1: Angina      Age: 79 y.o. Referring Physician:  Yaw Morillo   MRN: [id number] Primary Care Physician: Calin Zheng MD     Patient has wound on ankle with wound vac that he sees Dr. Blas about.         Treatment Diagnosis  Treatment Diagnosis 1: Angina  Referral Date: 24  Significant Cardiovascular History: Chronic atrial fibrillation  Co-morbidities: Diabetes    Individual Treatment Plan  ITP Visit Type: Initial assessment  1st Date of Exercise : 24  ITP Next Review Date: 24  Visit #/Total Visits:   EF%: 53 %  Risk Stratification: Moderate  ITP: Exercise; Psychosocial; Nutrition; Education    Exercise   Stages of Change: Pre-contemplation  Assisted Devices: None  Rodrigues Total Score: 25  Test: Six minute walk test    Data Measured Before Walk  Heart Rate: 93  Blood Pressure: 108/60  O2 Saturation: 98  O2 Device: Room air  RPD: 0    Data Measured during Walk  Indicate Mode of RPE: 6 minutes/submax  RPE Data Measured: Post    Data Measured Immediately After Walk  Distance Walked in : ft  Distance Walked (ft): 863 ft  Peak Heart Rate: 111  Peak Blood Pressure: 142/62  Modified Panchito Scale: 3; 0  RPE: 13  O2 Saturation: 91    Data Measured at 5 Minutes After Walk  Heart Rate: 96  Blood Pressure: 106/62  SpO2: 99 %  O2 Device: Room air    Exercise Prescription  Mode: Track; Treadmill; Bike; Stepper; Ergometer; Elliptical; Rower  Frequency per week: 2-3 supervised sessions weekly  Duration Per Session: 20-36+ minutes of steady aerobic exercise  Intensity METS      : 3-6 (Increase workloads 0.5-1.0 METS/weekly)  RPE: 11-14  Progression: Start w/1-2 sets of 8-12 repetitions for ea. muscle group. Use1-5# initially (very light resistance).  Resistance Training: Yes    Exercise Blood Pressures  Resting BP: 108/60  Peak BP: 142/62  Is BP WDL:

## 2024-07-26 ENCOUNTER — HOSPITAL ENCOUNTER (OUTPATIENT)
Dept: WOUND CARE | Age: 79
Discharge: HOME | End: 2024-07-26
Attending: INTERNAL MEDICINE
Payer: MEDICARE

## 2024-07-26 VITALS
SYSTOLIC BLOOD PRESSURE: 129 MMHG | TEMPERATURE: 98.1 F | HEART RATE: 98 BPM | RESPIRATION RATE: 20 BRPM | DIASTOLIC BLOOD PRESSURE: 84 MMHG

## 2024-07-26 DIAGNOSIS — E11.42 TYPE 2 DIABETES MELLITUS WITH DIABETIC POLYNEUROPATHY, WITHOUT LONG-TERM CURRENT USE OF INSULIN (HCC): ICD-10-CM

## 2024-07-26 DIAGNOSIS — L97.313 DIABETIC ULCER OF RIGHT ANKLE ASSOCIATED WITH TYPE 2 DIABETES MELLITUS, WITH NECROSIS OF MUSCLE (HCC): Primary | ICD-10-CM

## 2024-07-26 DIAGNOSIS — E11.622 DIABETIC ULCER OF RIGHT ANKLE ASSOCIATED WITH TYPE 2 DIABETES MELLITUS, WITH NECROSIS OF MUSCLE (HCC): Primary | ICD-10-CM

## 2024-07-26 PROCEDURE — 11042 DBRDMT SUBQ TIS 1ST 20SQCM/<: CPT

## 2024-07-26 PROCEDURE — 97607 NEG PRS WND THR NDME<=50SQCM: CPT

## 2024-07-26 RX ORDER — LIDOCAINE 40 MG/G
CREAM TOPICAL ONCE
OUTPATIENT
Start: 2024-07-26 | End: 2024-07-26

## 2024-07-26 RX ORDER — LIDOCAINE HYDROCHLORIDE 40 MG/ML
SOLUTION TOPICAL ONCE
OUTPATIENT
Start: 2024-07-26 | End: 2024-07-26

## 2024-07-26 RX ORDER — LIDOCAINE 40 MG/G
CREAM TOPICAL ONCE
Status: DISCONTINUED | OUTPATIENT
Start: 2024-07-26 | End: 2024-07-27 | Stop reason: HOSPADM

## 2024-07-26 RX ORDER — TRIAMCINOLONE ACETONIDE 1 MG/G
OINTMENT TOPICAL ONCE
OUTPATIENT
Start: 2024-07-26 | End: 2024-07-26

## 2024-07-26 RX ORDER — LIDOCAINE 50 MG/G
OINTMENT TOPICAL ONCE
OUTPATIENT
Start: 2024-07-26 | End: 2024-07-26

## 2024-07-26 RX ORDER — BACITRACIN ZINC AND POLYMYXIN B SULFATE 500; 1000 [USP'U]/G; [USP'U]/G
OINTMENT TOPICAL ONCE
OUTPATIENT
Start: 2024-07-26 | End: 2024-07-26

## 2024-07-26 NOTE — PLAN OF CARE
Patient seen in [facility] today for follow up. Patient reports feeling well overall. Wound is improving. Wound debrided per Dr. Blas. Patient tolerated well.  Will continue with JANE this week.

## 2024-07-29 ENCOUNTER — HOSPITAL ENCOUNTER (OUTPATIENT)
Dept: CARDIAC REHAB | Age: 79
Setting detail: THERAPIES SERIES
Discharge: HOME | End: 2024-07-29
Payer: MEDICARE

## 2024-07-29 ENCOUNTER — TELEPHONE (OUTPATIENT)
Dept: INTERNAL MEDICINE CLINIC | Age: 79
End: 2024-07-29

## 2024-07-29 VITALS — WEIGHT: 285.2 LBS | BODY MASS INDEX: 35.65 KG/M2

## 2024-07-29 PROCEDURE — 93798 PHYS/QHP OP CAR RHAB W/ECG: CPT

## 2024-07-29 RX ORDER — ALBUTEROL SULFATE 90 UG/1
2 AEROSOL, METERED RESPIRATORY (INHALATION) 4 TIMES DAILY PRN
Qty: 1 EACH | Refills: 3 | Status: SHIPPED | OUTPATIENT
Start: 2024-07-29

## 2024-07-29 NOTE — DISCHARGE INSTRUCTIONS
Wound Care Center Physician Orders and Discharge Instructions  [facility]  [address], [address]  [place] [id number]  Telephone: [phone]      Fax: [phone]        Your home care company:   None .     Your wound-care supplies will be provided by:  Self .     NAME:  Lacho Gallegos   YOB: 1945  PRIMARY DIAGNOSIS FOR WOUND CARE CENTER:  Diabetic Ulcer .     Wound cleansing:   Do not scrub or use excessive force.  Wash hands with soap and water before and after dressing changes.  Prior to applying a clean dressing, cleanse wound with normal saline, wound cleanser, or mild soap and water. Ask your physician or nurse before getting the wound(s) wet in the shower.                Wound care for home:     Right Lateral Ankle  Wash wound with gentle soap and water, pat dry  Nexodyn  Calmoseptine ru wound  Santyl to wound  Sorbact  JANE    Leave in place for the week - Do NOT get wet!   Single layer Medium (F) Medigrip to keep dressing in place  You may hand wash and hang to dry if it becomes soiled.         Please note, all wounds (unless stated otherwise here) were mechanically debrided at the time of cleansing here in the wound-care center today, so a small amount of pain, drainage or bleeding from that process might be expected, and is normal.      All products for home use, including multiple products for a single wound if applicable, are medically necessary in order to achieve the best chance at timely wound healing. See provider documentation for details if needed.     Substituted dressings applied in the [facility] today, if applicable:     N/a     New orders for this week (labs, imaging, medications, etc.):        Additional instructions for specific diagnoses:     Elevate legs as much as possible to aid in edema control. Walking is great! If you are not up and walking, please elevate.      General comments for diabetic / neuropathic ulcers:  *  Unless you've been

## 2024-07-29 NOTE — TELEPHONE ENCOUNTER
----- Message from Calin Zheng MD sent at 2024  9:49 AM EDT -----  Contact: [phone]  Ok to refill   ----- Message -----  From: Patricia Luong MA  Sent: 2024   8:54 AM EDT  To: Calin Zheng MD    Patient called and states that he is starting cardiac rehab today and was told to bring an inhaler with him. The patient currently has one that  in . He is hoping to have a new albuterol sulfate HFA (VENTOLIN HFA) 108 (90 Base) MCG/ACT inhaler script called in. Please advise.       [facility] [id number] - [address] [phone] - F [phone]  [address] [place] [address]  Phone: [phone]  Fax: [phone]

## 2024-07-31 ENCOUNTER — HOSPITAL ENCOUNTER (OUTPATIENT)
Dept: CARDIAC REHAB | Age: 79
Setting detail: THERAPIES SERIES
Discharge: HOME | End: 2024-07-31
Payer: MEDICARE

## 2024-07-31 PROCEDURE — 93798 PHYS/QHP OP CAR RHAB W/ECG: CPT

## 2024-08-02 ENCOUNTER — HOSPITAL ENCOUNTER (OUTPATIENT)
Dept: WOUND CARE | Age: 79
Discharge: HOME | End: 2024-08-02
Attending: INTERNAL MEDICINE
Payer: MEDICARE

## 2024-08-02 ENCOUNTER — HOSPITAL ENCOUNTER (OUTPATIENT)
Dept: CARDIAC REHAB | Age: 79
Setting detail: THERAPIES SERIES
Discharge: HOME | End: 2024-08-02
Payer: MEDICARE

## 2024-08-02 VITALS — WEIGHT: 286.4 LBS | BODY MASS INDEX: 35.8 KG/M2

## 2024-08-02 VITALS
BODY MASS INDEX: 35.49 KG/M2 | WEIGHT: 285.4 LBS | DIASTOLIC BLOOD PRESSURE: 83 MMHG | TEMPERATURE: 97.8 F | RESPIRATION RATE: 18 BRPM | HEART RATE: 93 BPM | HEIGHT: 75 IN | SYSTOLIC BLOOD PRESSURE: 124 MMHG

## 2024-08-02 DIAGNOSIS — E11.42 TYPE 2 DIABETES MELLITUS WITH DIABETIC POLYNEUROPATHY, WITHOUT LONG-TERM CURRENT USE OF INSULIN (HCC): ICD-10-CM

## 2024-08-02 DIAGNOSIS — L97.313 DIABETIC ULCER OF RIGHT ANKLE ASSOCIATED WITH TYPE 2 DIABETES MELLITUS, WITH NECROSIS OF MUSCLE (HCC): Primary | ICD-10-CM

## 2024-08-02 DIAGNOSIS — E11.622 DIABETIC ULCER OF RIGHT ANKLE ASSOCIATED WITH TYPE 2 DIABETES MELLITUS, WITH NECROSIS OF MUSCLE (HCC): Primary | ICD-10-CM

## 2024-08-02 PROCEDURE — 93798 PHYS/QHP OP CAR RHAB W/ECG: CPT

## 2024-08-02 PROCEDURE — 97597 DBRDMT OPN WND 1ST 20 CM/<: CPT

## 2024-08-02 PROCEDURE — 97607 NEG PRS WND THR NDME<=50SQCM: CPT

## 2024-08-02 RX ORDER — LIDOCAINE 40 MG/G
CREAM TOPICAL ONCE
Status: DISCONTINUED | OUTPATIENT
Start: 2024-08-02 | End: 2024-08-03 | Stop reason: HOSPADM

## 2024-08-02 RX ORDER — TRIAMCINOLONE ACETONIDE 1 MG/G
OINTMENT TOPICAL ONCE
OUTPATIENT
Start: 2024-08-02 | End: 2024-08-02

## 2024-08-02 RX ORDER — BACITRACIN ZINC AND POLYMYXIN B SULFATE 500; 1000 [USP'U]/G; [USP'U]/G
OINTMENT TOPICAL ONCE
OUTPATIENT
Start: 2024-08-02 | End: 2024-08-02

## 2024-08-02 RX ORDER — LIDOCAINE 50 MG/G
OINTMENT TOPICAL ONCE
OUTPATIENT
Start: 2024-08-02 | End: 2024-08-02

## 2024-08-02 RX ORDER — LIDOCAINE HYDROCHLORIDE 40 MG/ML
SOLUTION TOPICAL ONCE
OUTPATIENT
Start: 2024-08-02 | End: 2024-08-02

## 2024-08-02 RX ORDER — LIDOCAINE 40 MG/G
CREAM TOPICAL ONCE
OUTPATIENT
Start: 2024-08-02 | End: 2024-08-02

## 2024-08-02 ASSESSMENT — PAIN SCALES - GENERAL: PAINLEVEL_OUTOF10: 0

## 2024-08-02 NOTE — PLAN OF CARE
Patient seen in [facility] today for follow up. Patient reports feeling well overall. Wound is showing small signs of improvement. Wound debrided per Dr. Blas. Patient tolerated well.  27 G needling done to encourage blood flow. JANE stayed in place this week without issue. Will continue with JANE this week. F/u in [facility] in 1 week as ordered, pt. Aware to call sooner with any changes or questions/concerns. Discharge instructions reviewed with patient, all questions answered, copy given to patient. Dressings were applied to all wounds per M.D. Instructions at this visit.

## 2024-08-05 ENCOUNTER — HOSPITAL ENCOUNTER (OUTPATIENT)
Dept: CARDIAC REHAB | Age: 79
Setting detail: THERAPIES SERIES
Discharge: HOME | End: 2024-08-05
Payer: MEDICARE

## 2024-08-05 VITALS — BODY MASS INDEX: 35.62 KG/M2 | WEIGHT: 285 LBS

## 2024-08-05 PROCEDURE — 93798 PHYS/QHP OP CAR RHAB W/ECG: CPT

## 2024-08-07 ENCOUNTER — HOSPITAL ENCOUNTER (OUTPATIENT)
Dept: CARDIAC REHAB | Age: 79
Setting detail: THERAPIES SERIES
Discharge: HOME | End: 2024-08-07
Payer: MEDICARE

## 2024-08-07 ENCOUNTER — HOSPITAL ENCOUNTER (OUTPATIENT)
Dept: WOUND CARE | Age: 79
Discharge: HOME | End: 2024-08-07
Attending: INTERNAL MEDICINE
Payer: MEDICARE

## 2024-08-07 VITALS
BODY MASS INDEX: 35.86 KG/M2 | DIASTOLIC BLOOD PRESSURE: 81 MMHG | RESPIRATION RATE: 18 BRPM | TEMPERATURE: 98.1 F | WEIGHT: 288.4 LBS | HEART RATE: 87 BPM | HEIGHT: 75 IN | SYSTOLIC BLOOD PRESSURE: 118 MMHG

## 2024-08-07 VITALS — BODY MASS INDEX: 36.16 KG/M2 | WEIGHT: 289.3 LBS

## 2024-08-07 DIAGNOSIS — L97.313 DIABETIC ULCER OF RIGHT ANKLE ASSOCIATED WITH TYPE 2 DIABETES MELLITUS, WITH NECROSIS OF MUSCLE (HCC): Primary | ICD-10-CM

## 2024-08-07 DIAGNOSIS — E11.622 DIABETIC ULCER OF RIGHT ANKLE ASSOCIATED WITH TYPE 2 DIABETES MELLITUS, WITH NECROSIS OF MUSCLE (HCC): Primary | ICD-10-CM

## 2024-08-07 DIAGNOSIS — E11.42 TYPE 2 DIABETES MELLITUS WITH DIABETIC POLYNEUROPATHY, WITHOUT LONG-TERM CURRENT USE OF INSULIN (HCC): ICD-10-CM

## 2024-08-07 PROCEDURE — 11042 DBRDMT SUBQ TIS 1ST 20SQCM/<: CPT

## 2024-08-07 PROCEDURE — 93798 PHYS/QHP OP CAR RHAB W/ECG: CPT

## 2024-08-07 PROCEDURE — 97607 NEG PRS WND THR NDME<=50SQCM: CPT

## 2024-08-07 RX ORDER — LIDOCAINE HYDROCHLORIDE 40 MG/ML
SOLUTION TOPICAL ONCE
OUTPATIENT
Start: 2024-08-07 | End: 2024-08-07

## 2024-08-07 RX ORDER — BACITRACIN ZINC AND POLYMYXIN B SULFATE 500; 1000 [USP'U]/G; [USP'U]/G
OINTMENT TOPICAL ONCE
Status: DISCONTINUED | OUTPATIENT
Start: 2024-08-07 | End: 2024-08-08 | Stop reason: HOSPADM

## 2024-08-07 RX ORDER — TRIAMCINOLONE ACETONIDE 1 MG/G
OINTMENT TOPICAL ONCE
Status: DISCONTINUED | OUTPATIENT
Start: 2024-08-07 | End: 2024-08-08 | Stop reason: HOSPADM

## 2024-08-07 RX ORDER — LIDOCAINE 50 MG/G
OINTMENT TOPICAL ONCE
OUTPATIENT
Start: 2024-08-07 | End: 2024-08-07

## 2024-08-07 RX ORDER — LIDOCAINE 40 MG/G
CREAM TOPICAL ONCE
Status: DISCONTINUED | OUTPATIENT
Start: 2024-08-07 | End: 2024-08-08 | Stop reason: HOSPADM

## 2024-08-07 RX ORDER — BACITRACIN ZINC AND POLYMYXIN B SULFATE 500; 1000 [USP'U]/G; [USP'U]/G
OINTMENT TOPICAL ONCE
OUTPATIENT
Start: 2024-08-07 | End: 2024-08-07

## 2024-08-07 RX ORDER — TRIAMCINOLONE ACETONIDE 1 MG/G
OINTMENT TOPICAL ONCE
OUTPATIENT
Start: 2024-08-07 | End: 2024-08-07

## 2024-08-07 RX ORDER — LIDOCAINE 40 MG/G
CREAM TOPICAL ONCE
OUTPATIENT
Start: 2024-08-07 | End: 2024-08-07

## 2024-08-07 RX ORDER — LIDOCAINE 50 MG/G
OINTMENT TOPICAL ONCE
Status: DISCONTINUED | OUTPATIENT
Start: 2024-08-07 | End: 2024-08-08 | Stop reason: HOSPADM

## 2024-08-07 RX ORDER — LIDOCAINE HYDROCHLORIDE 40 MG/ML
SOLUTION TOPICAL ONCE
Status: DISCONTINUED | OUTPATIENT
Start: 2024-08-07 | End: 2024-08-08 | Stop reason: HOSPADM

## 2024-08-07 ASSESSMENT — PAIN DESCRIPTION - FREQUENCY: FREQUENCY: INTERMITTENT

## 2024-08-07 ASSESSMENT — PAIN DESCRIPTION - LOCATION: LOCATION: ANKLE

## 2024-08-07 ASSESSMENT — PAIN DESCRIPTION - ORIENTATION: ORIENTATION: RIGHT

## 2024-08-07 ASSESSMENT — PAIN DESCRIPTION - DESCRIPTORS: DESCRIPTORS: THROBBING

## 2024-08-07 ASSESSMENT — PAIN - FUNCTIONAL ASSESSMENT: PAIN_FUNCTIONAL_ASSESSMENT: ACTIVITIES ARE NOT PREVENTED

## 2024-08-07 ASSESSMENT — PAIN DESCRIPTION - ONSET: ONSET: PROGRESSIVE

## 2024-08-07 ASSESSMENT — PAIN SCALES - GENERAL: PAINLEVEL_OUTOF10: 3

## 2024-08-07 NOTE — DISCHARGE INSTRUCTIONS
Wound Care Center Physician Orders and Discharge Instructions  [facility]  [address], [address]  [place] [id number]  Telephone: [phone]      Fax: [phone]        Your home care company:   None .     Your wound-care supplies will be provided by:  Self .     NAME:  Lacho Gallegos   YOB: 1945  PRIMARY DIAGNOSIS FOR WOUND CARE CENTER:  Diabetic Ulcer .     Wound cleansing:   Do not scrub or use excessive force.  Wash hands with soap and water before and after dressing changes.  Prior to applying a clean dressing, cleanse wound with normal saline, wound cleanser, or mild soap and water. Ask your physician or nurse before getting the wound(s) wet in the shower.                Wound care for home:     Right Lateral Ankle  Nexodyn or Vashe spray  Calmoseptine ru wound  Multidex then Sorbact to wound  JANE NPWT  Leave in place for the week - Do NOT get wet!   Single layer Medium (F) Medigrip to keep dressing in place  You may hand wash medigrip and hang to dry if it becomes soiled.         Please note, all wounds (unless stated otherwise here) were mechanically debrided at the time of cleansing here in the wound-care center today, so a small amount of pain, drainage or bleeding from that process might be expected, and is normal.      All products for home use, including multiple products for a single wound if applicable, are medically necessary in order to achieve the best chance at timely wound healing. See provider documentation for details if needed.     Substituted dressings applied in the [facility] today, if applicable:     N/a     New orders for this week (labs, imaging, medications, etc.):     Please provide patient with JANE educational pamphlet.     Additional instructions for specific diagnoses:     Elevate legs as much as possible to aid in edema control. Walking is great! If you are not up and walking, please elevate.      General comments for diabetic /

## 2024-08-07 NOTE — PLAN OF CARE
Wound stable without much change, debridement per MD & pt. Tolerated well. Will stop Santyl this week & change to using Multidex, cont. With JANE NPWT. Pt. Using Medigrip for edema control. F/u in [facility] in 1 week as ordered, pt. Aware to call sooner with any changes or questions/concerns. Discharge instructions reviewed with patient, all questions answered, copy given to patient. Dressings were applied to all wounds per M.D. Instructions at this visit.

## 2024-08-08 NOTE — DISCHARGE INSTRUCTIONS
Wound Care Center Physician Orders and Discharge Instructions  [facility]  [address], [address]  [place] [address]  Telephone: [phone]      Fax: [phone]        Your home care company:   None .     Your wound-care supplies will be provided by:  Self .     NAME:  Lacho Gallegos   YOB: 1945  PRIMARY DIAGNOSIS FOR WOUND CARE CENTER:  Diabetic Ulcer .     Wound cleansing:   Do not scrub or use excessive force.  Wash hands with soap and water before and after dressing changes.  Prior to applying a clean dressing, cleanse wound with normal saline, wound cleanser, or mild soap and water. Ask your physician or nurse before getting the wound(s) wet in the shower.                Wound care for home:     Right Lateral Ankle  Nexodyn or Vashe spray  Betadine then Calmoseptine ru wound  Multidex then Sorbact to wound  JANE NPWT  Leave in place for the week - Do NOT get wet!   Single layer Medium (F) Medigrip to keep dressing in place  You may hand wash medigrip and hang to dry if it becomes soiled.         Please note, all wounds (unless stated otherwise here) were mechanically debrided at the time of cleansing here in the wound-care center today, so a small amount of pain, drainage or bleeding from that process might be expected, and is normal.      All products for home use, including multiple products for a single wound if applicable, are medically necessary in order to achieve the best chance at timely wound healing. See provider documentation for details if needed.     Substituted dressings applied in the [facility] today, if applicable:     N/a     New orders for this week (labs, imaging, medications, etc.):     Please provide patient with JANE educational pamphlet.      Additional instructions for specific diagnoses:     Elevate legs as much as possible to aid in edema control. Walking is great! If you are not up and walking, please elevate.      General comments for

## 2024-08-09 ENCOUNTER — TELEPHONE (OUTPATIENT)
Dept: INTERNAL MEDICINE CLINIC | Age: 79
End: 2024-08-09

## 2024-08-09 ENCOUNTER — HOSPITAL ENCOUNTER (OUTPATIENT)
Dept: CARDIAC REHAB | Age: 79
Setting detail: THERAPIES SERIES
Discharge: HOME | End: 2024-08-09
Payer: MEDICARE

## 2024-08-09 PROCEDURE — 93798 PHYS/QHP OP CAR RHAB W/ECG: CPT

## 2024-08-09 RX ORDER — BENZONATATE 100 MG/1
100 CAPSULE ORAL 3 TIMES DAILY PRN
Qty: 15 CAPSULE | Refills: 0 | Status: SHIPPED | OUTPATIENT
Start: 2024-08-09 | End: 2024-08-14

## 2024-08-09 NOTE — TELEPHONE ENCOUNTER
----- Message from Arpit Ramirez MD sent at 8/9/2024  8:26 AM EDT -----  Contact: [phone]  Tessalon perles 100 tid prn # 15   ----- Message -----  From: Patricia Luong MA  Sent: 8/9/2024   8:22 AM EDT  To: Arpit Ramirez MD    Patient called and states that he has completed [facility] for [facility] and sore throat. Unfortunately, the sore throat is still present during the day and coughing at night. He has retested for covid and is negative. He states that this came back about a day or so ago. He has been gargling salt water for the sore throat and taking tylenol, and it has helped a small amount. He wants to know if there is anything else that can be called in to help with this? Please advise.         [facility] [id number] - [address] [phone] - F [phone]  [address] [place] [address]  Phone: [phone]  Fax: [phone]

## 2024-08-12 ENCOUNTER — HOSPITAL ENCOUNTER (OUTPATIENT)
Dept: CARDIAC REHAB | Age: 79
Setting detail: THERAPIES SERIES
Discharge: HOME | End: 2024-08-12
Payer: MEDICARE

## 2024-08-12 VITALS — WEIGHT: 289.2 LBS | BODY MASS INDEX: 36.15 KG/M2

## 2024-08-12 PROCEDURE — 93798 PHYS/QHP OP CAR RHAB W/ECG: CPT

## 2024-08-14 ENCOUNTER — HOSPITAL ENCOUNTER (OUTPATIENT)
Dept: CARDIAC REHAB | Age: 79
Setting detail: THERAPIES SERIES
Discharge: HOME | End: 2024-08-14
Payer: MEDICARE

## 2024-08-14 ENCOUNTER — HOSPITAL ENCOUNTER (OUTPATIENT)
Age: 79
Discharge: HOME | End: 2024-08-14
Payer: MEDICARE

## 2024-08-14 ENCOUNTER — OFFICE VISIT (OUTPATIENT)
Dept: INTERNAL MEDICINE CLINIC | Age: 79
End: 2024-08-14

## 2024-08-14 ENCOUNTER — HOSPITAL ENCOUNTER (OUTPATIENT)
Dept: GENERAL RADIOLOGY | Age: 79
Discharge: HOME | End: 2024-08-14
Payer: MEDICARE

## 2024-08-14 ENCOUNTER — HOSPITAL ENCOUNTER (OUTPATIENT)
Dept: WOUND CARE | Age: 79
Discharge: HOME | End: 2024-08-14
Attending: INTERNAL MEDICINE
Payer: MEDICARE

## 2024-08-14 VITALS
BODY MASS INDEX: 36.18 KG/M2 | SYSTOLIC BLOOD PRESSURE: 126 MMHG | DIASTOLIC BLOOD PRESSURE: 78 MMHG | WEIGHT: 291 LBS | RESPIRATION RATE: 18 BRPM | HEART RATE: 65 BPM | HEIGHT: 75 IN

## 2024-08-14 VITALS — BODY MASS INDEX: 36.12 KG/M2 | WEIGHT: 289 LBS

## 2024-08-14 VITALS
RESPIRATION RATE: 20 BRPM | HEART RATE: 97 BPM | BODY MASS INDEX: 35.93 KG/M2 | SYSTOLIC BLOOD PRESSURE: 122 MMHG | HEIGHT: 75 IN | WEIGHT: 289 LBS | DIASTOLIC BLOOD PRESSURE: 77 MMHG | TEMPERATURE: 98.1 F

## 2024-08-14 DIAGNOSIS — L97.313 DIABETIC ULCER OF RIGHT ANKLE ASSOCIATED WITH TYPE 2 DIABETES MELLITUS, WITH NECROSIS OF MUSCLE (HCC): Primary | ICD-10-CM

## 2024-08-14 DIAGNOSIS — R05.2 SUBACUTE COUGH: Primary | ICD-10-CM

## 2024-08-14 DIAGNOSIS — J45.909 PERSISTENT ASTHMA WITHOUT COMPLICATION, UNSPECIFIED ASTHMA SEVERITY: ICD-10-CM

## 2024-08-14 DIAGNOSIS — E11.622 DIABETIC ULCER OF RIGHT ANKLE ASSOCIATED WITH TYPE 2 DIABETES MELLITUS, WITH NECROSIS OF MUSCLE (HCC): Primary | ICD-10-CM

## 2024-08-14 DIAGNOSIS — R05.2 SUBACUTE COUGH: ICD-10-CM

## 2024-08-14 DIAGNOSIS — E11.42 TYPE 2 DIABETES MELLITUS WITH DIABETIC POLYNEUROPATHY, WITHOUT LONG-TERM CURRENT USE OF INSULIN (HCC): ICD-10-CM

## 2024-08-14 PROCEDURE — G8417 CALC BMI ABV UP PARAM F/U: HCPCS | Performed by: INTERNAL MEDICINE

## 2024-08-14 PROCEDURE — 71046 X-RAY EXAM CHEST 2 VIEWS: CPT

## 2024-08-14 PROCEDURE — 93798 PHYS/QHP OP CAR RHAB W/ECG: CPT

## 2024-08-14 PROCEDURE — 97597 DBRDMT OPN WND 1ST 20 CM/<: CPT

## 2024-08-14 PROCEDURE — 99212 OFFICE O/P EST SF 10 MIN: CPT | Performed by: INTERNAL MEDICINE

## 2024-08-14 PROCEDURE — G8427 DOCREV CUR MEDS BY ELIG CLIN: HCPCS | Performed by: INTERNAL MEDICINE

## 2024-08-14 PROCEDURE — 3078F DIAST BP <80 MM HG: CPT | Performed by: INTERNAL MEDICINE

## 2024-08-14 PROCEDURE — 97607 NEG PRS WND THR NDME<=50SQCM: CPT

## 2024-08-14 PROCEDURE — 1036F TOBACCO NON-USER: CPT | Performed by: INTERNAL MEDICINE

## 2024-08-14 PROCEDURE — 1123F ACP DISCUSS/DSCN MKR DOCD: CPT | Performed by: INTERNAL MEDICINE

## 2024-08-14 PROCEDURE — 3074F SYST BP LT 130 MM HG: CPT | Performed by: INTERNAL MEDICINE

## 2024-08-14 RX ORDER — LIDOCAINE 50 MG/G
OINTMENT TOPICAL ONCE
OUTPATIENT
Start: 2024-08-14 | End: 2024-08-14

## 2024-08-14 RX ORDER — TRIAMCINOLONE ACETONIDE 1 MG/G
OINTMENT TOPICAL ONCE
OUTPATIENT
Start: 2024-08-14 | End: 2024-08-14

## 2024-08-14 RX ORDER — LIDOCAINE HYDROCHLORIDE 40 MG/ML
SOLUTION TOPICAL ONCE
OUTPATIENT
Start: 2024-08-14 | End: 2024-08-14

## 2024-08-14 RX ORDER — ALBUTEROL SULFATE 2.5 MG/3ML
2.5 SOLUTION RESPIRATORY (INHALATION) EVERY 4 HOURS PRN
Qty: 360 ML | Refills: 3 | Status: SHIPPED | OUTPATIENT
Start: 2024-08-14 | End: 2025-08-14

## 2024-08-14 RX ORDER — LIDOCAINE HYDROCHLORIDE 40 MG/ML
SOLUTION TOPICAL ONCE
Status: DISCONTINUED | OUTPATIENT
Start: 2024-08-14 | End: 2024-08-15 | Stop reason: HOSPADM

## 2024-08-14 RX ORDER — TRIAMCINOLONE ACETONIDE 1 MG/G
OINTMENT TOPICAL ONCE
Status: DISCONTINUED | OUTPATIENT
Start: 2024-08-14 | End: 2024-08-15 | Stop reason: HOSPADM

## 2024-08-14 RX ORDER — BACITRACIN ZINC AND POLYMYXIN B SULFATE 500; 1000 [USP'U]/G; [USP'U]/G
OINTMENT TOPICAL ONCE
OUTPATIENT
Start: 2024-08-14 | End: 2024-08-14

## 2024-08-14 RX ORDER — BACITRACIN ZINC AND POLYMYXIN B SULFATE 500; 1000 [USP'U]/G; [USP'U]/G
OINTMENT TOPICAL ONCE
Status: DISCONTINUED | OUTPATIENT
Start: 2024-08-14 | End: 2024-08-15 | Stop reason: HOSPADM

## 2024-08-14 RX ORDER — PREDNISONE 20 MG/1
40 TABLET ORAL DAILY
Qty: 10 TABLET | Refills: 0 | Status: SHIPPED | OUTPATIENT
Start: 2024-08-14 | End: 2024-08-19

## 2024-08-14 RX ORDER — LIDOCAINE 50 MG/G
OINTMENT TOPICAL ONCE
Status: DISCONTINUED | OUTPATIENT
Start: 2024-08-14 | End: 2024-08-15 | Stop reason: HOSPADM

## 2024-08-14 RX ORDER — LIDOCAINE 40 MG/G
CREAM TOPICAL ONCE
OUTPATIENT
Start: 2024-08-14 | End: 2024-08-14

## 2024-08-14 RX ORDER — LIDOCAINE 40 MG/G
CREAM TOPICAL ONCE
Status: DISCONTINUED | OUTPATIENT
Start: 2024-08-14 | End: 2024-08-15 | Stop reason: HOSPADM

## 2024-08-14 RX ORDER — AMOXICILLIN AND CLAVULANATE POTASSIUM 875; 125 MG/1; MG/1
1 TABLET, FILM COATED ORAL 2 TIMES DAILY
Qty: 14 TABLET | Refills: 0 | Status: SHIPPED | OUTPATIENT
Start: 2024-08-14 | End: 2024-08-21

## 2024-08-14 NOTE — PLAN OF CARE
Maceration ru-wound noted. Will apply Betadine & Calmoseptine ru-wound. Otherwise, cont. With current wound care regime with dressings & JANE NPWT as ordered. F/u in [facility] in 1 week as ordered, pt. Aware to call sooner with any changes or questions/concerns. Discharge instructions reviewed with patient, all questions answered, copy given to patient. Dressings were applied to all wounds per M.D. Instructions at this visit.

## 2024-08-14 NOTE — PROGRESS NOTES
Lacho Gallegos (:  1945) is a 79 y.o. male,Established patient, here for evaluation of the following chief complaint(s):  Cough      Cough        79 y.o. 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 ,obestiy, MARIA VICTORIA , pedal edema here for ongoing cough x 4 weeks    Reports he did z pack with no improvement and denies any new pedal edema or weight gain or new palpitations    Remote hx of asthma and no recent flare up until now        Allergies   Allergen Reactions    Bactrim [Sulfamethoxazole-Trimethoprim] Diarrhea    Brilinta [Ticagrelor]      dyspnea    Ciprofloxacin      Bad headaches    Macrobid [Nitrofurantoin Monohyd Macro]     Ozempic (0.25 Or 0.5 Mg-Dose) [Semaglutide(0.25 Or 0.5mg-Dos)]     Pioglitazone Swelling    Sulfamethoxazole Diarrhea    Theophylline      Theodur-caused severe headaches    Cefuroxime Axetil Rash and Itching    Cipro Xr Rash    Digoxin Nausea And Vomiting    Other Rash and Other (See Comments)     Ekg leads-glue    Tape [Adhesive Tape] Rash     Skin irritaion  Eats away at skin, paper tape OK  Plastic tape     Current Outpatient Medications   Medication Sig Dispense Refill    benzonatate (TESSALON) 100 MG capsule Take 1 capsule by mouth 3 times daily as needed for Cough 15 capsule 0    albuterol sulfate HFA (VENTOLIN HFA) 108 (90 Base) MCG/ACT inhaler Inhale 2 puffs into the lungs 4 times daily as needed for Wheezing or Shortness of Breath 1 each 3    indomethacin (INDOCIN) 50 MG capsule Take 1 capsule by mouth      metoprolol succinate (TOPROL XL) 50 MG extended release tablet 1 tablet      allopurinol (ZYLOPRIM) 100 MG tablet Take 1 tablet by mouth 2 times daily 60 tablet 2    blood glucose test strips (TRUE METRIX BLOOD GLUCOSE TEST) strip USE ONE STRIP TO TEST DAILY 100 strip 3    metFORMIN (GLUCOPHAGE-XR) 500 MG extended release tablet TAKE 1 TABLET BY MOUTH IN THE MORNING AND AT BEDTIME 180 tablet 1    montelukast (SINGULAIR) 10 MG

## 2024-08-15 NOTE — DISCHARGE INSTRUCTIONS
Wound Care Center Physician Orders and Discharge Instructions  [facility]  [address], [address]  [place] [address]  Telephone: [phone]      Fax: [phone]        Your home care company:   None .     Your wound-care supplies will be provided by:  Self .     NAME:  Lacho Gallegos   YOB: 1945  PRIMARY DIAGNOSIS FOR WOUND CARE CENTER:  Diabetic Ulcer .     Wound cleansing:   Do not scrub or use excessive force.  Wash hands with soap and water before and after dressing changes.  Prior to applying a clean dressing, cleanse wound with normal saline, wound cleanser, or mild soap and water. Ask your physician or nurse before getting the wound(s) wet in the shower.                Wound care for home:     Right Lateral Ankle  Nexodyn or Vashe spray  Betadine then Calmoseptine ru wound  Multidex then Sorbact to wound  JANE NPWT  Leave in place for the week - Do NOT get wet!   Single layer Medium (F) Medigrip to keep dressing in place  You may hand wash medigrip and hang to dry if it becomes soiled.         Please note, all wounds (unless stated otherwise here) were mechanically debrided at the time of cleansing here in the wound-care center today, so a small amount of pain, drainage or bleeding from that process might be expected, and is normal.      All products for home use, including multiple products for a single wound if applicable, are medically necessary in order to achieve the best chance at timely wound healing. See provider documentation for details if needed.     Substituted dressings applied in the [facility] today, if applicable:     N/a     New orders for this week (labs, imaging, medications, etc.):     Please provide patient with JANE educational pamphlet.      Additional instructions for specific diagnoses:     Elevate legs as much as possible to aid in edema control. Walking is great! If you are not up and walking, please elevate.      General comments for

## 2024-08-16 ENCOUNTER — HOSPITAL ENCOUNTER (OUTPATIENT)
Dept: CARDIAC REHAB | Age: 79
Setting detail: THERAPIES SERIES
Discharge: HOME | End: 2024-08-16
Payer: MEDICARE

## 2024-08-16 ENCOUNTER — TELEPHONE (OUTPATIENT)
Dept: INTERNAL MEDICINE CLINIC | Age: 79
End: 2024-08-16

## 2024-08-16 VITALS — WEIGHT: 289 LBS | BODY MASS INDEX: 36.12 KG/M2

## 2024-08-16 NOTE — TELEPHONE ENCOUNTER
----- Message from Dr. Calin Zheng MD sent at 8/16/2024 10:25 AM EDT -----  Continue steroids, and breathing tx  ----- Message -----  From: Maria T Carrion MA  Sent: 8/16/2024   9:26 AM EDT  To: Calin Zheng MD    FYI: Patient was able to get the albuterol. Patient states you wanted an update, still has a bad cough with green phlegm, still weak/no energy, wheezing, and no taste for 2 days. Did test for neg for COVID last night.  ----- Message -----  From: Calin Zheng MD  Sent: 8/16/2024   8:18 AM EDT  To: Maria T Carrion MA    Change to duonebs 3 ml every 8 hrs prn #120  ----- Message -----  From: Maria T Carrion MA  Sent: 8/15/2024  11:27 AM EDT  To: Calin Zheng MD    Patient was denied. ALBUTEROL SULFATE Nebu Soln is used in a nebulizer. A nebulizer is a piece of durable medical equipment (DME). Drugs used with DME in the home are covered under Medicare Part B. Our records show that you do not live in a long term care (LTC) facility. We cannot pay for drugs under Medicare Part D if they are covered under Medicare Part A or B. We did not decide whether ALBUTEROL SULFATE Nebu Soln is medically necessary. We made our decision only on the fact that we cannot pay for the drug under Medicare Part D. Please advise.

## 2024-08-19 ENCOUNTER — HOSPITAL ENCOUNTER (OUTPATIENT)
Dept: CARDIAC REHAB | Age: 79
Setting detail: THERAPIES SERIES
Discharge: HOME | End: 2024-08-19
Payer: MEDICARE

## 2024-08-19 VITALS — WEIGHT: 283.2 LBS | BODY MASS INDEX: 35.4 KG/M2

## 2024-08-19 PROCEDURE — 93798 PHYS/QHP OP CAR RHAB W/ECG: CPT

## 2024-08-21 ENCOUNTER — HOSPITAL ENCOUNTER (OUTPATIENT)
Dept: WOUND CARE | Age: 79
Discharge: HOME | End: 2024-08-21
Attending: INTERNAL MEDICINE
Payer: MEDICARE

## 2024-08-21 ENCOUNTER — HOSPITAL ENCOUNTER (OUTPATIENT)
Dept: CARDIAC REHAB | Age: 79
Setting detail: THERAPIES SERIES
Discharge: HOME | End: 2024-08-21
Payer: MEDICARE

## 2024-08-21 VITALS
HEART RATE: 90 BPM | DIASTOLIC BLOOD PRESSURE: 84 MMHG | TEMPERATURE: 97.9 F | RESPIRATION RATE: 18 BRPM | SYSTOLIC BLOOD PRESSURE: 125 MMHG | HEIGHT: 75 IN | BODY MASS INDEX: 34.66 KG/M2 | WEIGHT: 278.8 LBS

## 2024-08-21 VITALS — WEIGHT: 276.3 LBS | BODY MASS INDEX: 34.54 KG/M2

## 2024-08-21 DIAGNOSIS — L97.313 DIABETIC ULCER OF RIGHT ANKLE ASSOCIATED WITH TYPE 2 DIABETES MELLITUS, WITH NECROSIS OF MUSCLE (HCC): Primary | ICD-10-CM

## 2024-08-21 DIAGNOSIS — E11.42 TYPE 2 DIABETES MELLITUS WITH DIABETIC POLYNEUROPATHY, WITHOUT LONG-TERM CURRENT USE OF INSULIN (HCC): ICD-10-CM

## 2024-08-21 DIAGNOSIS — E11.622 DIABETIC ULCER OF RIGHT ANKLE ASSOCIATED WITH TYPE 2 DIABETES MELLITUS, WITH NECROSIS OF MUSCLE (HCC): Primary | ICD-10-CM

## 2024-08-21 PROCEDURE — 97608 NEG PRS WND THER NDME>50SQCM: CPT

## 2024-08-21 PROCEDURE — 97607 NEG PRS WND THR NDME<=50SQCM: CPT

## 2024-08-21 PROCEDURE — 93798 PHYS/QHP OP CAR RHAB W/ECG: CPT

## 2024-08-21 RX ORDER — TRIAMCINOLONE ACETONIDE 1 MG/G
OINTMENT TOPICAL ONCE
OUTPATIENT
Start: 2024-08-21 | End: 2024-08-21

## 2024-08-21 RX ORDER — BACITRACIN ZINC AND POLYMYXIN B SULFATE 500; 1000 [USP'U]/G; [USP'U]/G
OINTMENT TOPICAL ONCE
OUTPATIENT
Start: 2024-08-21 | End: 2024-08-21

## 2024-08-21 RX ORDER — LIDOCAINE HYDROCHLORIDE 40 MG/ML
SOLUTION TOPICAL ONCE
OUTPATIENT
Start: 2024-08-21 | End: 2024-08-21

## 2024-08-21 RX ORDER — LIDOCAINE 50 MG/G
OINTMENT TOPICAL ONCE
OUTPATIENT
Start: 2024-08-21 | End: 2024-08-21

## 2024-08-21 RX ORDER — LIDOCAINE 40 MG/G
CREAM TOPICAL ONCE
OUTPATIENT
Start: 2024-08-21 | End: 2024-08-21

## 2024-08-21 ASSESSMENT — PAIN SCALES - GENERAL: PAINLEVEL_OUTOF10: 0

## 2024-08-21 NOTE — PLAN OF CARE
Pt seen in [facility] for WR. Patient had no complaints of dressing over the week. JANE removed at home by patient as allowed per Dr Blas. Minimal drainage on JANE as patient brought to appt for visualization. Wounds and edema measured. Dressing reapplied per order. Pt tolerated well. Paperwork and appt time for next week given. No needs voiced at end of appt.

## 2024-08-21 NOTE — DISCHARGE INSTRUCTIONS
Wound Care Center Physician Orders and Discharge Instructions  [facility]  [address], [address]  [place] [address]  Telephone: [phone]      Fax: [phone]        Your home care company:   None .     Your wound-care supplies will be provided by:  Self .     NAME:  Lacho Gallegos   YOB: 1945  PRIMARY DIAGNOSIS FOR WOUND CARE CENTER:  Diabetic Ulcer .     Wound cleansing:   Do not scrub or use excessive force.  Wash hands with soap and water before and after dressing changes.  Prior to applying a clean dressing, cleanse wound with normal saline, wound cleanser, or mild soap and water. Ask your physician or nurse before getting the wound(s) wet in the shower.                Wound care for home:     Right Lateral Ankle  Nexodyn or Vashe spray  Betadine then Calmoseptine ru wound  Multidex then Sorbact to wound  JANE NPWT  Leave in place for the week - Do NOT get wet!   Single layer Medium (F) Medigrip to keep dressing in place  You may hand wash medigrip and hang to dry if it becomes soiled.         Please note, all wounds (unless stated otherwise here) were mechanically debrided at the time of cleansing here in the wound-care center today, so a small amount of pain, drainage or bleeding from that process might be expected, and is normal.      All products for home use, including multiple products for a single wound if applicable, are medically necessary in order to achieve the best chance at timely wound healing. See provider documentation for details if needed.     Substituted dressings applied in the [facility] today, if applicable:     N/a     New orders for this week (labs, imaging, medications, etc.):     Please provide patient with JANE educational pamphlet.      Additional instructions for specific diagnoses:     Elevate legs as much as possible to aid in edema control. Walking is great! If you are not up and walking, please elevate.      General comments for

## 2024-08-22 NOTE — DISCHARGE INSTRUCTIONS
Wound Care Center Physician Orders and Discharge Instructions  [facility]  [address], [address]  [place] [id number]  Telephone: [phone]      Fax: [phone]        Your home care company:   None .     Your wound-care supplies will be provided by:  Self .     NAME:  Lacho Gallegos   YOB: 1945  PRIMARY DIAGNOSIS FOR WOUND CARE CENTER:  Diabetic Ulcer .     Wound cleansing:   Do not scrub or use excessive force.  Wash hands with soap and water before and after dressing changes.  Prior to applying a clean dressing, cleanse wound with normal saline, wound cleanser, or mild soap and water. Ask your physician or nurse before getting the wound(s) wet in the shower.                Wound care for home:     Right Lateral Ankle  Nexodyn or Vashe spray  Calmoseptine ru wound  Santyl then Sorbact to wound  JANE NPWT  Leave in place for the week - Do NOT get wet!   Single layer Medium (F) Medigrip to keep dressing in place  You may hand wash medigrip and hang to dry if it becomes soiled.         Please note, all wounds (unless stated otherwise here) were mechanically debrided at the time of cleansing here in the wound-care center today, so a small amount of pain, drainage or bleeding from that process might be expected, and is normal.      All products for home use, including multiple products for a single wound if applicable, are medically necessary in order to achieve the best chance at timely wound healing. See provider documentation for details if needed.     Substituted dressings applied in the [facility] today, if applicable:     N/a     New orders for this week (labs, imaging, medications, etc.):     Please provide patient with JANE educational pamphlet.      Additional instructions for specific diagnoses:     Elevate legs as much as possible to aid in edema control. Walking is great! If you are not up and walking, please elevate.      General comments for diabetic /

## 2024-08-22 NOTE — PLAN OF CARE
Cardiopulmonary Rehab Treatment Plan   Name: Lacho Gallegos Assessment Date: 2024   : 1945 Primary Diagnosis: Treatment Diagnosis 1: Angina      Age: 79 y.o. Referring Physician:  Yaw Morillo   MRN: [id number] Primary Care Physician: Calin Zheng MD   Treatment Diagnosis  Treatment Diagnosis 1: Angina  Referral Date: 24  Significant Cardiovascular History: Chronic atrial fibrillation  Co-morbidities: Diabetes    Individual Treatment Plan  ITP Visit Type: Re-assessment  1st Date of Exercise : 24  ITP Next Review Date: 24  Visit #/Total Visits:   EF%: 53 %  Risk Stratification: Moderate  ITP: Exercise; Psychosocial; Nutrition; Education    Exercise   Stages of Change: Preparation; Action  Assisted Devices: None  Rodrigues Total Score: 25  Test: Six minute walk test    Data Measured Before Walk  Heart Rate: 93  Blood Pressure: 108/60  O2 Saturation: 98  O2 Device: Room air  RPD: 0    Data Measured during Walk  Indicate Mode of RPE: 6 minutes/submax  RPE Data Measured: Post    Data Measured Immediately After Walk  Distance Walked in : ft  Distance Walked (ft): 863 ft  Peak Heart Rate: 111  Peak Blood Pressure: 142/62  Modified Panchito Scale: 3; 0  RPE: 13  O2 Saturation: 91    Data Measured at 5 Minutes After Walk  Heart Rate: 96  Blood Pressure: 106/62  SpO2: 99 %  O2 Device: Room air    Exercise Prescription  Mode: Track; Treadmill; Bike; Stepper; Ergometer; Elliptical; Rower  Frequency per week: 2-3 supervised sessions weekly  Duration Per Session: 25-42+ minutes of steady aerobic exercise  Intensity METS      : 3-6 (Increase workloads 0.5-1.0 METS/weekly)  RPE: 11-14  Progression: 1-2 sets of 8-12 repetitions for ea. muscle group. 1-5 lbs  Resistance Training: Yes    Exercise Blood Pressures  Resting BP: 104/62  Peak BP: 142/62  Is BP WDL: Yes    Exercise Activity at Home  Type: walking  Frequency: Patient hasnt been feeling well so unable to exercise.  Resistance Training:

## 2024-08-23 ENCOUNTER — HOSPITAL ENCOUNTER (OUTPATIENT)
Dept: CARDIAC REHAB | Age: 79
Setting detail: THERAPIES SERIES
Discharge: HOME | End: 2024-08-23
Payer: MEDICARE

## 2024-08-23 ENCOUNTER — OFFICE VISIT (OUTPATIENT)
Dept: INTERNAL MEDICINE CLINIC | Age: 79
End: 2024-08-23

## 2024-08-23 VITALS
WEIGHT: 278 LBS | DIASTOLIC BLOOD PRESSURE: 75 MMHG | HEART RATE: 88 BPM | SYSTOLIC BLOOD PRESSURE: 90 MMHG | BODY MASS INDEX: 34.57 KG/M2 | HEIGHT: 75 IN | RESPIRATION RATE: 19 BRPM

## 2024-08-23 VITALS — BODY MASS INDEX: 35.12 KG/M2 | WEIGHT: 281 LBS

## 2024-08-23 DIAGNOSIS — I50.32 CHRONIC DIASTOLIC CHF (CONGESTIVE HEART FAILURE) (HCC): ICD-10-CM

## 2024-08-23 DIAGNOSIS — N18.31 STAGE 3A CHRONIC KIDNEY DISEASE (HCC): ICD-10-CM

## 2024-08-23 DIAGNOSIS — I48.0 PAROXYSMAL ATRIAL FIBRILLATION (HCC): ICD-10-CM

## 2024-08-23 DIAGNOSIS — E11.40 TYPE 2 DIABETES MELLITUS WITH DIABETIC NEUROPATHY, WITHOUT LONG-TERM CURRENT USE OF INSULIN (HCC): ICD-10-CM

## 2024-08-23 DIAGNOSIS — J45.40 MODERATE PERSISTENT ASTHMA WITHOUT COMPLICATION: Primary | ICD-10-CM

## 2024-08-23 LAB
ANION GAP SERPL CALCULATED.3IONS-SCNC: 16 MMOL/L (ref 3–16)
BUN SERPL-MCNC: 36 MG/DL (ref 7–20)
CALCIUM SERPL-MCNC: 9.3 MG/DL (ref 8.3–10.6)
CHLORIDE SERPL-SCNC: 98 MMOL/L (ref 99–110)
CO2 SERPL-SCNC: 22 MMOL/L (ref 21–32)
CREAT SERPL-MCNC: 1.4 MG/DL (ref 0.8–1.3)
GFR SERPLBLD CREATININE-BSD FMLA CKD-EPI: 51 ML/MIN/{1.73_M2}
GLUCOSE SERPL-MCNC: 182 MG/DL (ref 70–99)
HCT VFR BLD AUTO: 40.9 % (ref 40.5–52.5)
HGB BLD-MCNC: 13.8 G/DL (ref 13.5–17.5)
NT-PROBNP SERPL-MCNC: 527 PG/ML (ref 0–449)
POTASSIUM SERPL-SCNC: 4.3 MMOL/L (ref 3.5–5.1)
SODIUM SERPL-SCNC: 136 MMOL/L (ref 136–145)

## 2024-08-23 PROCEDURE — 3051F HG A1C>EQUAL 7.0%<8.0%: CPT | Performed by: INTERNAL MEDICINE

## 2024-08-23 PROCEDURE — G8417 CALC BMI ABV UP PARAM F/U: HCPCS | Performed by: INTERNAL MEDICINE

## 2024-08-23 PROCEDURE — 93798 PHYS/QHP OP CAR RHAB W/ECG: CPT

## 2024-08-23 PROCEDURE — 1123F ACP DISCUSS/DSCN MKR DOCD: CPT | Performed by: INTERNAL MEDICINE

## 2024-08-23 PROCEDURE — 3078F DIAST BP <80 MM HG: CPT | Performed by: INTERNAL MEDICINE

## 2024-08-23 PROCEDURE — 99213 OFFICE O/P EST LOW 20 MIN: CPT | Performed by: INTERNAL MEDICINE

## 2024-08-23 PROCEDURE — G8427 DOCREV CUR MEDS BY ELIG CLIN: HCPCS | Performed by: INTERNAL MEDICINE

## 2024-08-23 PROCEDURE — 3074F SYST BP LT 130 MM HG: CPT | Performed by: INTERNAL MEDICINE

## 2024-08-23 PROCEDURE — 1036F TOBACCO NON-USER: CPT | Performed by: INTERNAL MEDICINE

## 2024-08-23 RX ORDER — FLUTICASONE PROPIONATE AND SALMETEROL 250; 50 UG/1; UG/1
1 POWDER RESPIRATORY (INHALATION) EVERY 12 HOURS
Qty: 60 EACH | Refills: 3 | Status: SHIPPED | OUTPATIENT
Start: 2024-08-23

## 2024-08-23 ASSESSMENT — ENCOUNTER SYMPTOMS: COUGH: 1

## 2024-08-23 NOTE — PROGRESS NOTES
cardiac TTR amyloid.       Wt Readings from Last 3 Encounters:   08/23/24 126.1 kg (278 lb)   08/21/24 126.5 kg (278 lb 12.8 oz)   08/21/24 125.3 kg (276 lb 4.8 oz)          Diagnosis Orders   1. Moderate persistent asthma without complication        2. Type 2 diabetes mellitus with diabetic neuropathy, without long-term current use of insulin ([facility])        3. Chronic diastolic CHF (congestive heart failure) ([facility])  Brain Natriuretic Peptide      4. Stage 3a chronic kidney disease ([facility])  Hemoglobin and Hematocrit      5. Paroxysmal atrial fibrillation ([facility])  Basic Metabolic Panel        Suspect dyspnea related to asthma, no evidence of fluid overload and rather appears dry   - keep demadex as needed, hold jardiance for dehydration , dizziness issues  - add advair for residual asthma symptoms   - continue midodrine for chronic low BP , increase hydration   - check for Anemia, BNP      --Calin Zheng MD

## 2024-08-26 ENCOUNTER — TELEPHONE (OUTPATIENT)
Dept: INTERNAL MEDICINE CLINIC | Age: 79
End: 2024-08-26

## 2024-08-26 ENCOUNTER — HOSPITAL ENCOUNTER (OUTPATIENT)
Dept: CARDIAC REHAB | Age: 79
Setting detail: THERAPIES SERIES
Discharge: HOME | End: 2024-08-26
Payer: MEDICARE

## 2024-08-26 VITALS — BODY MASS INDEX: 35.12 KG/M2 | WEIGHT: 281 LBS

## 2024-08-26 PROCEDURE — 93798 PHYS/QHP OP CAR RHAB W/ECG: CPT

## 2024-08-26 RX ORDER — FLUTICASONE FUROATE AND VILANTEROL 200; 25 UG/1; UG/1
1 POWDER RESPIRATORY (INHALATION)
Qty: 60 EACH | Refills: 2 | Status: SHIPPED | OUTPATIENT
Start: 2024-08-26

## 2024-08-26 NOTE — TELEPHONE ENCOUNTER
----- Message from Dr. Calin Zheng MD sent at 8/26/2024  2:55 PM EDT -----  Can do Breo device 200-25 micrograms/dose bid  ----- Message -----  From: Maria T Carrion MA  Sent: 8/26/2024   1:00 PM EDT  To: Calin Zheng MD    PA denied. We are denying your request because we do not show that you have tried at least 2 covered drugs that can treat your condition. Other covered drug(s) is/are: Advair hfa inhalation hfa aerosol inhaler (45-21 micrograms/actuation, 115-21 mcg/actuation, 230-21 micrograms/actuation), Breo ellipta inhalation blister with device (50-25 micrograms/dose, 100-25 micrograms/dose, device 200-25 micrograms/dose), Dulera inhalation hfa aerosol inhaler (50-5 micrograms/actuation, 100-5 micrograms/actuation, 200-5 micrograms/actuation)

## 2024-08-27 ENCOUNTER — TELEPHONE (OUTPATIENT)
Dept: INTERNAL MEDICINE CLINIC | Age: 79
End: 2024-08-27

## 2024-08-27 NOTE — TELEPHONE ENCOUNTER
Dr. Zheng spoke with patient.   Met with patient and son during office appt with Dr. Pike.  Patient here for results of recent PET scan and treatment.    Patient reports some increased SOB with activity, such as mowing the lawn.    Dr. Pike reviewed labs from today which are stable for treatment.  Note made of hgb of 9.7.  Dr. Pike will start pt on IV iron due to anemia and pt unable to tolerate oral iron.    Dr. Pike reviewed recent PET scan.  IMPRESSION:   1. 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  2. No avid metastasis.  3. Stable small right pleural and pericardial effusions without pathologic avidity.    Dr. Pike explained pt had very good partial response to chemotherapy.  He is recommending maintenance therapy with gemcitabine.  Dr. Pike explained the rationale for de-escalation in therapy.  After discussion, pt voiced understanding and agreement.    Will proceed with maintenance gemcitabine D1 and D8.  Pt to receive IV feraheme for 3 doses.    All questions answered.    PLAN: proceed with maintenance gemcitabine and IV iron once prior auth obtained.  5/24 labs, Comfort WATSON C1 D8 gemcitabine, IV feraheme    Patient and son deny any additional needs or concerns.   Encouraged to call should any arise prior to next scheduled appt.  Will continue to follow.

## 2024-08-27 NOTE — TELEPHONE ENCOUNTER
----- Message from Dr. Calin Zheng MD sent at 8/27/2024  3:34 PM EDT -----  Double up midodrine to 10 mg bid  ----- Message -----  From: Mari aT Carrion MA  Sent: 8/26/2024   3:59 PM EDT  To: Calin Zheng MD    Patient states he is still getting dizzy despite the good blood work. He wants to know what to do next. Please advise.

## 2024-08-28 ENCOUNTER — HOSPITAL ENCOUNTER (OUTPATIENT)
Dept: CARDIAC REHAB | Age: 79
Setting detail: THERAPIES SERIES
Discharge: HOME | End: 2024-08-28
Payer: MEDICARE

## 2024-08-28 ENCOUNTER — APPOINTMENT (OUTPATIENT)
Dept: WOUND CARE | Age: 79
End: 2024-08-28
Attending: INTERNAL MEDICINE
Payer: MEDICARE

## 2024-08-28 ENCOUNTER — HOSPITAL ENCOUNTER (OUTPATIENT)
Dept: WOUND CARE | Age: 79
Discharge: HOME | End: 2024-08-28
Attending: INTERNAL MEDICINE
Payer: MEDICARE

## 2024-08-28 VITALS — WEIGHT: 279 LBS | BODY MASS INDEX: 34.87 KG/M2

## 2024-08-28 VITALS
TEMPERATURE: 97.9 F | BODY MASS INDEX: 34.71 KG/M2 | DIASTOLIC BLOOD PRESSURE: 85 MMHG | WEIGHT: 279.2 LBS | HEART RATE: 90 BPM | RESPIRATION RATE: 20 BRPM | SYSTOLIC BLOOD PRESSURE: 130 MMHG | HEIGHT: 75 IN

## 2024-08-28 DIAGNOSIS — E11.622 DIABETIC ULCER OF RIGHT ANKLE ASSOCIATED WITH TYPE 2 DIABETES MELLITUS, WITH NECROSIS OF MUSCLE (HCC): ICD-10-CM

## 2024-08-28 DIAGNOSIS — L97.313 DIABETIC ULCER OF RIGHT ANKLE ASSOCIATED WITH TYPE 2 DIABETES MELLITUS, WITH NECROSIS OF MUSCLE (HCC): ICD-10-CM

## 2024-08-28 DIAGNOSIS — E11.42 TYPE 2 DIABETES MELLITUS WITH DIABETIC POLYNEUROPATHY, WITHOUT LONG-TERM CURRENT USE OF INSULIN (HCC): Primary | ICD-10-CM

## 2024-08-28 PROCEDURE — 97607 NEG PRS WND THR NDME<=50SQCM: CPT

## 2024-08-28 PROCEDURE — 93798 PHYS/QHP OP CAR RHAB W/ECG: CPT

## 2024-08-28 PROCEDURE — 11042 DBRDMT SUBQ TIS 1ST 20SQCM/<: CPT

## 2024-08-28 RX ORDER — LIDOCAINE HYDROCHLORIDE 40 MG/ML
SOLUTION TOPICAL ONCE
OUTPATIENT
Start: 2024-08-28 | End: 2024-08-28

## 2024-08-28 RX ORDER — LIDOCAINE 40 MG/G
CREAM TOPICAL ONCE
OUTPATIENT
Start: 2024-08-28 | End: 2024-08-28

## 2024-08-28 RX ORDER — LIDOCAINE HYDROCHLORIDE 40 MG/ML
SOLUTION TOPICAL ONCE
Status: DISCONTINUED | OUTPATIENT
Start: 2024-08-28 | End: 2024-08-29 | Stop reason: HOSPADM

## 2024-08-28 RX ORDER — BACITRACIN ZINC AND POLYMYXIN B SULFATE 500; 1000 [USP'U]/G; [USP'U]/G
OINTMENT TOPICAL ONCE
OUTPATIENT
Start: 2024-08-28 | End: 2024-08-28

## 2024-08-28 RX ORDER — BACITRACIN ZINC AND POLYMYXIN B SULFATE 500; 1000 [USP'U]/G; [USP'U]/G
OINTMENT TOPICAL ONCE
Status: DISCONTINUED | OUTPATIENT
Start: 2024-08-28 | End: 2024-08-29 | Stop reason: HOSPADM

## 2024-08-28 RX ORDER — TRIAMCINOLONE ACETONIDE 1 MG/G
OINTMENT TOPICAL ONCE
Status: DISCONTINUED | OUTPATIENT
Start: 2024-08-28 | End: 2024-08-29 | Stop reason: HOSPADM

## 2024-08-28 RX ORDER — LIDOCAINE 50 MG/G
OINTMENT TOPICAL ONCE
Status: DISCONTINUED | OUTPATIENT
Start: 2024-08-28 | End: 2024-08-29 | Stop reason: HOSPADM

## 2024-08-28 RX ORDER — LIDOCAINE 40 MG/G
CREAM TOPICAL ONCE
Status: DISCONTINUED | OUTPATIENT
Start: 2024-08-28 | End: 2024-08-29 | Stop reason: HOSPADM

## 2024-08-28 RX ORDER — TRIAMCINOLONE ACETONIDE 1 MG/G
OINTMENT TOPICAL ONCE
OUTPATIENT
Start: 2024-08-28 | End: 2024-08-28

## 2024-08-28 RX ORDER — LIDOCAINE 50 MG/G
OINTMENT TOPICAL ONCE
OUTPATIENT
Start: 2024-08-28 | End: 2024-08-28

## 2024-08-28 NOTE — PLAN OF CARE
Wound stable, but more fibrotic this week, debridement per MD & pt. Tolerated well. Will switch to using Santyl on wound. Otherwise, cont. With current wound care regime with dressings, JANE NPWT & medigrip for edema control. F/u in WCC in 1 week as ordered, pt. Aware to call sooner with any changes or questions/concerns. Discharge instructions reviewed with patient, all questions answered, copy given to patient. Dressings were applied to all wounds per M.D. Instructions at this visit.

## 2024-08-29 NOTE — DISCHARGE INSTRUCTIONS
Wound Care Center Physician Orders and Discharge Instructions  [facility]  [address], [address]  [place] [id number]  Telephone: [phone]      Fax: [phone]        Your home care company:   None .     Your wound-care supplies will be provided by:  Self .     NAME:  Lacho Gallegos   YOB: 1945  PRIMARY DIAGNOSIS FOR WOUND CARE CENTER:  Diabetic Ulcer .     Wound cleansing:   Do not scrub or use excessive force.  Wash hands with soap and water before and after dressing changes.  Prior to applying a clean dressing, cleanse wound with normal saline, wound cleanser, or mild soap and water. Ask your physician or nurse before getting the wound(s) wet in the shower.                Wound care for home:     Right Lateral Ankle  Nexodyn or Vashe spray  Calmoseptine ru wound  Santyl then Sorbact to wound  JANE NPWT  Leave in place for the week - Do NOT get wet!   Single layer Medium (F) Medigrip to keep dressing in place  You may hand wash medigrip and hang to dry if it becomes soiled.         Please note, all wounds (unless stated otherwise here) were mechanically debrided at the time of cleansing here in the wound-care center today, so a small amount of pain, drainage or bleeding from that process might be expected, and is normal.      All products for home use, including multiple products for a single wound if applicable, are medically necessary in order to achieve the best chance at timely wound healing. See provider documentation for details if needed.     Substituted dressings applied in the [facility] today, if applicable:     N/a     New orders for this week (labs, imaging, medications, etc.):     Please provide patient with JANE educational pamphlet.      Additional instructions for specific diagnoses:     Elevate legs as much as possible to aid in edema control. Walking is great! If you are not up and walking, please elevate.      General comments for diabetic /

## 2024-08-30 ENCOUNTER — HOSPITAL ENCOUNTER (OUTPATIENT)
Dept: CARDIAC REHAB | Age: 79
Setting detail: THERAPIES SERIES
Discharge: HOME | End: 2024-08-30
Payer: MEDICARE

## 2024-08-30 VITALS — WEIGHT: 282.4 LBS | BODY MASS INDEX: 35.3 KG/M2

## 2024-08-30 PROCEDURE — 93798 PHYS/QHP OP CAR RHAB W/ECG: CPT

## 2024-09-02 ENCOUNTER — APPOINTMENT (OUTPATIENT)
Dept: CARDIAC REHAB | Age: 79
End: 2024-09-02
Payer: MEDICARE

## 2024-09-04 ENCOUNTER — HOSPITAL ENCOUNTER (OUTPATIENT)
Dept: WOUND CARE | Age: 79
Discharge: HOME | End: 2024-09-04
Attending: INTERNAL MEDICINE
Payer: MEDICARE

## 2024-09-04 ENCOUNTER — HOSPITAL ENCOUNTER (OUTPATIENT)
Dept: CARDIAC REHAB | Age: 79
Setting detail: THERAPIES SERIES
Discharge: HOME | End: 2024-09-04
Payer: MEDICARE

## 2024-09-04 ENCOUNTER — APPOINTMENT (OUTPATIENT)
Dept: WOUND CARE | Age: 79
End: 2024-09-04
Attending: INTERNAL MEDICINE
Payer: MEDICARE

## 2024-09-04 VITALS — WEIGHT: 287.4 LBS | BODY MASS INDEX: 35.92 KG/M2

## 2024-09-04 VITALS
HEART RATE: 88 BPM | HEIGHT: 75 IN | TEMPERATURE: 98.2 F | BODY MASS INDEX: 35.88 KG/M2 | SYSTOLIC BLOOD PRESSURE: 120 MMHG | RESPIRATION RATE: 18 BRPM | WEIGHT: 288.6 LBS | DIASTOLIC BLOOD PRESSURE: 77 MMHG

## 2024-09-04 DIAGNOSIS — E11.42 TYPE 2 DIABETES MELLITUS WITH DIABETIC POLYNEUROPATHY, WITHOUT LONG-TERM CURRENT USE OF INSULIN (HCC): ICD-10-CM

## 2024-09-04 DIAGNOSIS — E11.622 DIABETIC ULCER OF RIGHT ANKLE ASSOCIATED WITH TYPE 2 DIABETES MELLITUS, WITH NECROSIS OF MUSCLE (HCC): Primary | ICD-10-CM

## 2024-09-04 DIAGNOSIS — L97.313 DIABETIC ULCER OF RIGHT ANKLE ASSOCIATED WITH TYPE 2 DIABETES MELLITUS, WITH NECROSIS OF MUSCLE (HCC): Primary | ICD-10-CM

## 2024-09-04 PROCEDURE — 97607 NEG PRS WND THR NDME<=50SQCM: CPT

## 2024-09-04 PROCEDURE — 11042 DBRDMT SUBQ TIS 1ST 20SQCM/<: CPT

## 2024-09-04 PROCEDURE — 93798 PHYS/QHP OP CAR RHAB W/ECG: CPT

## 2024-09-04 RX ORDER — LIDOCAINE 40 MG/G
CREAM TOPICAL ONCE
OUTPATIENT
Start: 2024-09-04 | End: 2024-09-04

## 2024-09-04 RX ORDER — LIDOCAINE HYDROCHLORIDE 40 MG/ML
SOLUTION TOPICAL ONCE
OUTPATIENT
Start: 2024-09-04 | End: 2024-09-04

## 2024-09-04 RX ORDER — TRIAMCINOLONE ACETONIDE 1 MG/G
OINTMENT TOPICAL ONCE
OUTPATIENT
Start: 2024-09-04 | End: 2024-09-04

## 2024-09-04 RX ORDER — BACITRACIN ZINC AND POLYMYXIN B SULFATE 500; 1000 [USP'U]/G; [USP'U]/G
OINTMENT TOPICAL ONCE
OUTPATIENT
Start: 2024-09-04 | End: 2024-09-04

## 2024-09-04 RX ORDER — LIDOCAINE 40 MG/G
CREAM TOPICAL ONCE
Status: DISCONTINUED | OUTPATIENT
Start: 2024-09-04 | End: 2024-09-05 | Stop reason: HOSPADM

## 2024-09-04 RX ORDER — LIDOCAINE 50 MG/G
OINTMENT TOPICAL ONCE
OUTPATIENT
Start: 2024-09-04 | End: 2024-09-04

## 2024-09-04 RX ORDER — LIDOCAINE HYDROCHLORIDE 40 MG/ML
SOLUTION TOPICAL ONCE
Status: DISCONTINUED | OUTPATIENT
Start: 2024-09-04 | End: 2024-09-05 | Stop reason: HOSPADM

## 2024-09-04 RX ORDER — LIDOCAINE 50 MG/G
OINTMENT TOPICAL ONCE
Status: DISCONTINUED | OUTPATIENT
Start: 2024-09-04 | End: 2024-09-05 | Stop reason: HOSPADM

## 2024-09-04 RX ORDER — BACITRACIN ZINC AND POLYMYXIN B SULFATE 500; 1000 [USP'U]/G; [USP'U]/G
OINTMENT TOPICAL ONCE
Status: DISCONTINUED | OUTPATIENT
Start: 2024-09-04 | End: 2024-09-05 | Stop reason: HOSPADM

## 2024-09-04 RX ORDER — TRIAMCINOLONE ACETONIDE 1 MG/G
OINTMENT TOPICAL ONCE
Status: DISCONTINUED | OUTPATIENT
Start: 2024-09-04 | End: 2024-09-05 | Stop reason: HOSPADM

## 2024-09-04 NOTE — PLAN OF CARE
Wound stable & showing improvement this week, debridement per MD. Will cont. With current wound care regime with dressings-Santyl & sorbact to wound, JANE NPWT, single F Medigrip for edema control. F/u in [facility]CC in 1 week as ordered, pt. Aware to call sooner with any changes or questions/concerns. Discharge instructions reviewed with patient, all questions answered, copy given to patient. Dressings were applied to all wounds per M.D. Instructions at this visit.

## 2024-09-06 ENCOUNTER — HOSPITAL ENCOUNTER (OUTPATIENT)
Dept: CARDIAC REHAB | Age: 79
Setting detail: THERAPIES SERIES
Discharge: HOME | End: 2024-09-06
Payer: MEDICARE

## 2024-09-06 VITALS — WEIGHT: 288 LBS | BODY MASS INDEX: 36 KG/M2

## 2024-09-06 PROCEDURE — 93798 PHYS/QHP OP CAR RHAB W/ECG: CPT

## 2024-09-09 ENCOUNTER — HOSPITAL ENCOUNTER (OUTPATIENT)
Dept: CARDIAC REHAB | Age: 79
Setting detail: THERAPIES SERIES
Discharge: HOME | End: 2024-09-09
Payer: MEDICARE

## 2024-09-09 VITALS — BODY MASS INDEX: 36.47 KG/M2 | WEIGHT: 291.8 LBS

## 2024-09-09 PROCEDURE — 93798 PHYS/QHP OP CAR RHAB W/ECG: CPT

## 2024-09-11 ENCOUNTER — HOSPITAL ENCOUNTER (OUTPATIENT)
Dept: WOUND CARE | Age: 79
Discharge: HOME | End: 2024-09-11
Attending: INTERNAL MEDICINE
Payer: MEDICARE

## 2024-09-11 ENCOUNTER — APPOINTMENT (OUTPATIENT)
Dept: WOUND CARE | Age: 79
End: 2024-09-11
Attending: INTERNAL MEDICINE
Payer: MEDICARE

## 2024-09-11 ENCOUNTER — TELEPHONE (OUTPATIENT)
Dept: INTERNAL MEDICINE CLINIC | Age: 79
End: 2024-09-11

## 2024-09-11 ENCOUNTER — LAB (OUTPATIENT)
Dept: INTERNAL MEDICINE CLINIC | Age: 79
End: 2024-09-11

## 2024-09-11 ENCOUNTER — HOSPITAL ENCOUNTER (OUTPATIENT)
Dept: CARDIAC REHAB | Age: 79
Setting detail: THERAPIES SERIES
Discharge: HOME | End: 2024-09-11
Payer: MEDICARE

## 2024-09-11 ENCOUNTER — HOSPITAL ENCOUNTER (OUTPATIENT)
Age: 79
Setting detail: SPECIMEN
Discharge: HOME | End: 2024-09-11
Payer: MEDICARE

## 2024-09-11 VITALS — WEIGHT: 290 LBS | BODY MASS INDEX: 36.25 KG/M2

## 2024-09-11 VITALS
DIASTOLIC BLOOD PRESSURE: 82 MMHG | SYSTOLIC BLOOD PRESSURE: 125 MMHG | HEIGHT: 75 IN | HEART RATE: 98 BPM | RESPIRATION RATE: 18 BRPM | TEMPERATURE: 97.8 F | WEIGHT: 290 LBS | BODY MASS INDEX: 36.06 KG/M2

## 2024-09-11 DIAGNOSIS — E11.42 TYPE 2 DIABETES MELLITUS WITH DIABETIC POLYNEUROPATHY, WITHOUT LONG-TERM CURRENT USE OF INSULIN (HCC): ICD-10-CM

## 2024-09-11 DIAGNOSIS — E11.622 DIABETIC ULCER OF RIGHT ANKLE ASSOCIATED WITH TYPE 2 DIABETES MELLITUS, WITH NECROSIS OF MUSCLE (HCC): Primary | ICD-10-CM

## 2024-09-11 DIAGNOSIS — R39.9 UTI SYMPTOMS: Primary | ICD-10-CM

## 2024-09-11 DIAGNOSIS — L97.313 DIABETIC ULCER OF RIGHT ANKLE ASSOCIATED WITH TYPE 2 DIABETES MELLITUS, WITH NECROSIS OF MUSCLE (HCC): Primary | ICD-10-CM

## 2024-09-11 LAB
BILIRUBIN, POC: NORMAL
BLOOD URINE, POC: NORMAL
CLARITY, POC: NORMAL
COLOR, POC: NORMAL
GLUCOSE URINE, POC: NORMAL MG/DL
KETONES, POC: NORMAL MG/DL
LEUKOCYTE EST, POC: NORMAL
NITRITE, POC: NORMAL
PH, POC: NORMAL
PROTEIN, POC: NORMAL MG/DL
SPECIFIC GRAVITY, POC: NORMAL
UROBILINOGEN, POC: NORMAL MG/DL

## 2024-09-11 PROCEDURE — 93798 PHYS/QHP OP CAR RHAB W/ECG: CPT

## 2024-09-11 PROCEDURE — 29581 APPL MULTLAYER CMPRN SYS LEG: CPT

## 2024-09-11 PROCEDURE — 87077 CULTURE AEROBIC IDENTIFY: CPT

## 2024-09-11 PROCEDURE — 87086 URINE CULTURE/COLONY COUNT: CPT

## 2024-09-11 PROCEDURE — 87186 SC STD MICRODIL/AGAR DIL: CPT

## 2024-09-11 PROCEDURE — 11042 DBRDMT SUBQ TIS 1ST 20SQCM/<: CPT

## 2024-09-11 RX ORDER — LIDOCAINE 50 MG/G
OINTMENT TOPICAL ONCE
OUTPATIENT
Start: 2024-09-11 | End: 2024-09-11

## 2024-09-11 RX ORDER — BACITRACIN ZINC AND POLYMYXIN B SULFATE 500; 1000 [USP'U]/G; [USP'U]/G
OINTMENT TOPICAL ONCE
OUTPATIENT
Start: 2024-09-11 | End: 2024-09-11

## 2024-09-11 RX ORDER — LIDOCAINE HYDROCHLORIDE 40 MG/ML
SOLUTION TOPICAL ONCE
OUTPATIENT
Start: 2024-09-11 | End: 2024-09-11

## 2024-09-11 RX ORDER — TRIAMCINOLONE ACETONIDE 1 MG/G
OINTMENT TOPICAL ONCE
OUTPATIENT
Start: 2024-09-11 | End: 2024-09-11

## 2024-09-11 RX ORDER — TRIAMCINOLONE ACETONIDE 1 MG/G
OINTMENT TOPICAL ONCE
Status: DISCONTINUED | OUTPATIENT
Start: 2024-09-11 | End: 2024-09-12 | Stop reason: HOSPADM

## 2024-09-11 RX ORDER — AMOXICILLIN AND CLAVULANATE POTASSIUM 500; 125 MG/1; MG/1
1 TABLET, FILM COATED ORAL 2 TIMES DAILY
Qty: 14 TABLET | Refills: 0 | Status: SHIPPED | OUTPATIENT
Start: 2024-09-11 | End: 2024-09-18

## 2024-09-11 RX ORDER — BACITRACIN ZINC AND POLYMYXIN B SULFATE 500; 1000 [USP'U]/G; [USP'U]/G
OINTMENT TOPICAL ONCE
Status: DISCONTINUED | OUTPATIENT
Start: 2024-09-11 | End: 2024-09-12 | Stop reason: HOSPADM

## 2024-09-11 RX ORDER — LIDOCAINE 50 MG/G
OINTMENT TOPICAL ONCE
Status: DISCONTINUED | OUTPATIENT
Start: 2024-09-11 | End: 2024-09-12 | Stop reason: HOSPADM

## 2024-09-11 RX ORDER — LIDOCAINE 40 MG/G
CREAM TOPICAL ONCE
Status: DISCONTINUED | OUTPATIENT
Start: 2024-09-11 | End: 2024-09-12 | Stop reason: HOSPADM

## 2024-09-11 RX ORDER — LIDOCAINE HYDROCHLORIDE 40 MG/ML
SOLUTION TOPICAL ONCE
Status: DISCONTINUED | OUTPATIENT
Start: 2024-09-11 | End: 2024-09-12 | Stop reason: HOSPADM

## 2024-09-11 RX ORDER — LIDOCAINE 40 MG/G
CREAM TOPICAL ONCE
OUTPATIENT
Start: 2024-09-11 | End: 2024-09-11

## 2024-09-12 ENCOUNTER — TELEPHONE (OUTPATIENT)
Dept: INTERNAL MEDICINE CLINIC | Age: 79
End: 2024-09-12

## 2024-09-12 RX ORDER — METFORMIN HCL 500 MG
500 TABLET, EXTENDED RELEASE 24 HR ORAL 2 TIMES DAILY
Qty: 180 TABLET | Refills: 0 | Status: SHIPPED | OUTPATIENT
Start: 2024-09-12

## 2024-09-12 RX ORDER — MONTELUKAST SODIUM 10 MG/1
TABLET ORAL
Qty: 90 TABLET | Refills: 0 | Status: SHIPPED | OUTPATIENT
Start: 2024-09-12

## 2024-09-13 ENCOUNTER — HOSPITAL ENCOUNTER (OUTPATIENT)
Dept: CARDIAC REHAB | Age: 79
Setting detail: THERAPIES SERIES
Discharge: HOME | End: 2024-09-13
Payer: MEDICARE

## 2024-09-13 ENCOUNTER — TELEPHONE (OUTPATIENT)
Dept: INTERNAL MEDICINE CLINIC | Age: 79
End: 2024-09-13

## 2024-09-13 VITALS — BODY MASS INDEX: 35.92 KG/M2 | WEIGHT: 287.4 LBS

## 2024-09-13 LAB
BACTERIA UR CULT: ABNORMAL
ORGANISM: ABNORMAL

## 2024-09-13 PROCEDURE — 93798 PHYS/QHP OP CAR RHAB W/ECG: CPT

## 2024-09-16 ENCOUNTER — HOSPITAL ENCOUNTER (OUTPATIENT)
Dept: CARDIAC REHAB | Age: 79
Setting detail: THERAPIES SERIES
Discharge: HOME | End: 2024-09-16
Payer: MEDICARE

## 2024-09-16 VITALS — WEIGHT: 288 LBS | BODY MASS INDEX: 36 KG/M2

## 2024-09-16 PROCEDURE — 93798 PHYS/QHP OP CAR RHAB W/ECG: CPT

## 2024-09-18 ENCOUNTER — APPOINTMENT (OUTPATIENT)
Dept: WOUND CARE | Age: 79
End: 2024-09-18
Attending: INTERNAL MEDICINE
Payer: MEDICARE

## 2024-09-18 ENCOUNTER — HOSPITAL ENCOUNTER (OUTPATIENT)
Dept: WOUND CARE | Age: 79
Discharge: HOME | End: 2024-09-18
Attending: INTERNAL MEDICINE
Payer: MEDICARE

## 2024-09-18 ENCOUNTER — HOSPITAL ENCOUNTER (OUTPATIENT)
Dept: CARDIAC REHAB | Age: 79
Setting detail: THERAPIES SERIES
Discharge: HOME | End: 2024-09-18
Payer: MEDICARE

## 2024-09-18 VITALS
RESPIRATION RATE: 18 BRPM | BODY MASS INDEX: 35.59 KG/M2 | TEMPERATURE: 97.9 F | HEART RATE: 95 BPM | DIASTOLIC BLOOD PRESSURE: 84 MMHG | WEIGHT: 286.2 LBS | HEIGHT: 75 IN | SYSTOLIC BLOOD PRESSURE: 120 MMHG

## 2024-09-18 DIAGNOSIS — E11.622 DIABETIC ULCER OF RIGHT ANKLE ASSOCIATED WITH TYPE 2 DIABETES MELLITUS, WITH NECROSIS OF MUSCLE (HCC): Primary | ICD-10-CM

## 2024-09-18 DIAGNOSIS — E11.42 TYPE 2 DIABETES MELLITUS WITH DIABETIC POLYNEUROPATHY, WITHOUT LONG-TERM CURRENT USE OF INSULIN (HCC): ICD-10-CM

## 2024-09-18 DIAGNOSIS — L97.313 DIABETIC ULCER OF RIGHT ANKLE ASSOCIATED WITH TYPE 2 DIABETES MELLITUS, WITH NECROSIS OF MUSCLE (HCC): Primary | ICD-10-CM

## 2024-09-18 PROCEDURE — 97597 DBRDMT OPN WND 1ST 20 CM/<: CPT

## 2024-09-18 PROCEDURE — 93798 PHYS/QHP OP CAR RHAB W/ECG: CPT

## 2024-09-18 PROCEDURE — 29581 APPL MULTLAYER CMPRN SYS LEG: CPT

## 2024-09-18 RX ORDER — BACITRACIN ZINC AND POLYMYXIN B SULFATE 500; 1000 [USP'U]/G; [USP'U]/G
OINTMENT TOPICAL ONCE
Status: DISCONTINUED | OUTPATIENT
Start: 2024-09-18 | End: 2024-09-19 | Stop reason: HOSPADM

## 2024-09-18 RX ORDER — LIDOCAINE 50 MG/G
OINTMENT TOPICAL ONCE
Status: DISCONTINUED | OUTPATIENT
Start: 2024-09-18 | End: 2024-09-19 | Stop reason: HOSPADM

## 2024-09-18 RX ORDER — LIDOCAINE 40 MG/G
CREAM TOPICAL ONCE
OUTPATIENT
Start: 2024-09-18 | End: 2024-09-18

## 2024-09-18 RX ORDER — LIDOCAINE 40 MG/G
CREAM TOPICAL ONCE
Status: DISCONTINUED | OUTPATIENT
Start: 2024-09-18 | End: 2024-09-19 | Stop reason: HOSPADM

## 2024-09-18 RX ORDER — LIDOCAINE HYDROCHLORIDE 40 MG/ML
SOLUTION TOPICAL ONCE
Status: DISCONTINUED | OUTPATIENT
Start: 2024-09-18 | End: 2024-09-19 | Stop reason: HOSPADM

## 2024-09-18 RX ORDER — BACITRACIN ZINC AND POLYMYXIN B SULFATE 500; 1000 [USP'U]/G; [USP'U]/G
OINTMENT TOPICAL ONCE
OUTPATIENT
Start: 2024-09-18 | End: 2024-09-18

## 2024-09-18 RX ORDER — LIDOCAINE 50 MG/G
OINTMENT TOPICAL ONCE
OUTPATIENT
Start: 2024-09-18 | End: 2024-09-18

## 2024-09-18 RX ORDER — TRIAMCINOLONE ACETONIDE 1 MG/G
OINTMENT TOPICAL ONCE
OUTPATIENT
Start: 2024-09-18 | End: 2024-09-18

## 2024-09-18 RX ORDER — TRIAMCINOLONE ACETONIDE 1 MG/G
OINTMENT TOPICAL ONCE
Status: DISCONTINUED | OUTPATIENT
Start: 2024-09-18 | End: 2024-09-19 | Stop reason: HOSPADM

## 2024-09-18 RX ORDER — LIDOCAINE HYDROCHLORIDE 40 MG/ML
SOLUTION TOPICAL ONCE
OUTPATIENT
Start: 2024-09-18 | End: 2024-09-18

## 2024-09-20 ENCOUNTER — HOSPITAL ENCOUNTER (OUTPATIENT)
Dept: CARDIAC REHAB | Age: 79
Setting detail: THERAPIES SERIES
Discharge: HOME | End: 2024-09-20
Payer: MEDICARE

## 2024-09-20 VITALS — BODY MASS INDEX: 35.95 KG/M2 | WEIGHT: 287.6 LBS

## 2024-09-20 PROCEDURE — 93798 PHYS/QHP OP CAR RHAB W/ECG: CPT

## 2024-09-23 ENCOUNTER — HOSPITAL ENCOUNTER (OUTPATIENT)
Dept: CARDIAC REHAB | Age: 79
Setting detail: THERAPIES SERIES
Discharge: HOME | End: 2024-09-23
Payer: MEDICARE

## 2024-09-23 VITALS — BODY MASS INDEX: 35.87 KG/M2 | WEIGHT: 287 LBS

## 2024-09-23 PROCEDURE — 93798 PHYS/QHP OP CAR RHAB W/ECG: CPT

## 2024-09-23 RX ORDER — ALLOPURINOL 100 MG/1
100 TABLET ORAL 2 TIMES DAILY
Qty: 60 TABLET | Refills: 5 | Status: SHIPPED | OUTPATIENT
Start: 2024-09-23

## 2024-09-24 PROBLEM — R60.0 BILATERAL LOWER EXTREMITY EDEMA: Status: ACTIVE | Noted: 2024-09-24

## 2024-09-25 ENCOUNTER — HOSPITAL ENCOUNTER (OUTPATIENT)
Dept: CARDIAC REHAB | Age: 79
Setting detail: THERAPIES SERIES
Discharge: HOME | End: 2024-09-25
Payer: MEDICARE

## 2024-09-25 VITALS — WEIGHT: 288 LBS | BODY MASS INDEX: 36 KG/M2

## 2024-09-25 PROCEDURE — 93798 PHYS/QHP OP CAR RHAB W/ECG: CPT

## 2024-09-26 NOTE — DISCHARGE INSTRUCTIONS
Wound Care Center Physician Orders and Discharge Instructions  [facility]  [address], [address]  [place] [address]  Telephone: [phone]      Fax: [phone]        Your home care company:   None .     Your wound-care supplies will be provided by:  Self .     NAME:  Lacho Gallegos   YOB: 1945  PRIMARY DIAGNOSIS FOR WOUND CARE CENTER:  Diabetic Ulcer .     Wound cleansing:   Do not scrub or use excessive force.  Wash hands with soap and water before and after dressing changes.  Prior to applying a clean dressing, cleanse wound with normal saline, wound cleanser, or mild soap and water. Ask your physician or nurse before getting the wound(s) wet in the shower.                Wound care for home:     Right Lateral Ankle  Nexodyn or Vashe spray  Calmoseptine ru wound  Multidex then Sorbact to wound  Foam x2 over wound  Foam to anterior ankle  CoFlex TLC Calamine compression wrap   Leave in place for the week - Do NOT get wet!        Your physician has ordered Compression for treatment of venous ulcers, venous insufficiency,and/or Lymphedema.   * Do not remove until your next scheduled visit in [facility]- do not get wet.    * If your wrap falls down, becomes painful or you have decreased sensation, and/or excessive drainage- please call the [facility] for RN visit to get your compression wrap changed   * You need to exercice as tolerated- walking is good   * Elevate your legs preferrably above level of your heart when sitting as much as possible   * The Goal of this therapy is to reduce Edema and get into long term compression garments to control venous insufficiency, lymphedema and reduce occurrence of venous ulcers          Please note, all wounds (unless stated otherwise here) were mechanically debrided at the time of cleansing here in the wound-care center today, so a small amount of pain, drainage or bleeding from that process might be expected, and is normal.      All

## 2024-09-27 ENCOUNTER — HOSPITAL ENCOUNTER (OUTPATIENT)
Dept: CARDIAC REHAB | Age: 79
Setting detail: THERAPIES SERIES
Discharge: HOME | End: 2024-09-27
Payer: MEDICARE

## 2024-09-27 ENCOUNTER — HOSPITAL ENCOUNTER (OUTPATIENT)
Dept: WOUND CARE | Age: 79
Discharge: HOME | End: 2024-09-27
Attending: INTERNAL MEDICINE
Payer: MEDICARE

## 2024-09-27 VITALS
RESPIRATION RATE: 18 BRPM | BODY MASS INDEX: 35.61 KG/M2 | TEMPERATURE: 98.1 F | HEART RATE: 79 BPM | SYSTOLIC BLOOD PRESSURE: 117 MMHG | DIASTOLIC BLOOD PRESSURE: 81 MMHG | WEIGHT: 286.4 LBS | HEIGHT: 75 IN

## 2024-09-27 VITALS — WEIGHT: 286 LBS | BODY MASS INDEX: 35.75 KG/M2

## 2024-09-27 DIAGNOSIS — E11.42 TYPE 2 DIABETES MELLITUS WITH DIABETIC POLYNEUROPATHY, WITHOUT LONG-TERM CURRENT USE OF INSULIN (HCC): ICD-10-CM

## 2024-09-27 DIAGNOSIS — E11.622 DIABETIC ULCER OF RIGHT ANKLE ASSOCIATED WITH TYPE 2 DIABETES MELLITUS, WITH NECROSIS OF MUSCLE (HCC): ICD-10-CM

## 2024-09-27 DIAGNOSIS — R60.0 BILATERAL LOWER EXTREMITY EDEMA: Primary | ICD-10-CM

## 2024-09-27 DIAGNOSIS — I87.303 VENOUS HYPERTENSION OF BOTH LOWER EXTREMITIES: ICD-10-CM

## 2024-09-27 DIAGNOSIS — L97.313 DIABETIC ULCER OF RIGHT ANKLE ASSOCIATED WITH TYPE 2 DIABETES MELLITUS, WITH NECROSIS OF MUSCLE (HCC): ICD-10-CM

## 2024-09-27 PROCEDURE — 93798 PHYS/QHP OP CAR RHAB W/ECG: CPT

## 2024-09-27 PROCEDURE — 97597 DBRDMT OPN WND 1ST 20 CM/<: CPT

## 2024-09-27 PROCEDURE — 29581 APPL MULTLAYER CMPRN SYS LEG: CPT

## 2024-09-27 RX ORDER — TRIAMCINOLONE ACETONIDE 1 MG/G
OINTMENT TOPICAL ONCE
OUTPATIENT
Start: 2024-09-27 | End: 2024-09-27

## 2024-09-27 RX ORDER — BACITRACIN ZINC AND POLYMYXIN B SULFATE 500; 1000 [USP'U]/G; [USP'U]/G
OINTMENT TOPICAL ONCE
OUTPATIENT
Start: 2024-09-27 | End: 2024-09-27

## 2024-09-27 RX ORDER — LIDOCAINE 50 MG/G
OINTMENT TOPICAL ONCE
OUTPATIENT
Start: 2024-09-27 | End: 2024-09-27

## 2024-09-27 RX ORDER — LIDOCAINE HYDROCHLORIDE 40 MG/ML
SOLUTION TOPICAL ONCE
OUTPATIENT
Start: 2024-09-27 | End: 2024-09-27

## 2024-09-27 RX ORDER — LIDOCAINE 40 MG/G
CREAM TOPICAL ONCE
Status: DISCONTINUED | OUTPATIENT
Start: 2024-09-27 | End: 2024-09-28 | Stop reason: HOSPADM

## 2024-09-27 RX ORDER — LIDOCAINE 40 MG/G
CREAM TOPICAL ONCE
OUTPATIENT
Start: 2024-09-27 | End: 2024-09-27

## 2024-09-30 ENCOUNTER — HOSPITAL ENCOUNTER (OUTPATIENT)
Dept: CARDIAC REHAB | Age: 79
Setting detail: THERAPIES SERIES
Discharge: HOME | End: 2024-09-30
Payer: MEDICARE

## 2024-09-30 PROCEDURE — 93798 PHYS/QHP OP CAR RHAB W/ECG: CPT

## 2024-10-02 ENCOUNTER — HOSPITAL ENCOUNTER (OUTPATIENT)
Dept: WOUND CARE | Age: 79
Discharge: HOME | End: 2024-10-02
Attending: INTERNAL MEDICINE
Payer: MEDICARE

## 2024-10-02 ENCOUNTER — HOSPITAL ENCOUNTER (OUTPATIENT)
Dept: CARDIAC REHAB | Age: 79
Setting detail: THERAPIES SERIES
Discharge: HOME | End: 2024-10-02
Payer: MEDICARE

## 2024-10-02 VITALS
TEMPERATURE: 98.3 F | DIASTOLIC BLOOD PRESSURE: 85 MMHG | WEIGHT: 289.2 LBS | RESPIRATION RATE: 18 BRPM | BODY MASS INDEX: 35.96 KG/M2 | SYSTOLIC BLOOD PRESSURE: 134 MMHG | HEIGHT: 75 IN | HEART RATE: 82 BPM

## 2024-10-02 DIAGNOSIS — R60.0 BILATERAL LOWER EXTREMITY EDEMA: Primary | ICD-10-CM

## 2024-10-02 DIAGNOSIS — E11.42 TYPE 2 DIABETES MELLITUS WITH DIABETIC POLYNEUROPATHY, WITHOUT LONG-TERM CURRENT USE OF INSULIN (HCC): ICD-10-CM

## 2024-10-02 DIAGNOSIS — L97.313 DIABETIC ULCER OF RIGHT ANKLE ASSOCIATED WITH TYPE 2 DIABETES MELLITUS, WITH NECROSIS OF MUSCLE (HCC): ICD-10-CM

## 2024-10-02 DIAGNOSIS — I87.303 VENOUS HYPERTENSION OF BOTH LOWER EXTREMITIES: ICD-10-CM

## 2024-10-02 DIAGNOSIS — E11.622 DIABETIC ULCER OF RIGHT ANKLE ASSOCIATED WITH TYPE 2 DIABETES MELLITUS, WITH NECROSIS OF MUSCLE (HCC): ICD-10-CM

## 2024-10-02 PROCEDURE — 93798 PHYS/QHP OP CAR RHAB W/ECG: CPT

## 2024-10-02 PROCEDURE — 97597 DBRDMT OPN WND 1ST 20 CM/<: CPT

## 2024-10-02 PROCEDURE — 29581 APPL MULTLAYER CMPRN SYS LEG: CPT

## 2024-10-02 RX ORDER — LIDOCAINE 50 MG/G
OINTMENT TOPICAL ONCE
OUTPATIENT
Start: 2024-10-02 | End: 2024-10-02

## 2024-10-02 RX ORDER — TRIAMCINOLONE ACETONIDE 1 MG/G
OINTMENT TOPICAL ONCE
Status: DISCONTINUED | OUTPATIENT
Start: 2024-10-02 | End: 2024-10-03 | Stop reason: HOSPADM

## 2024-10-02 RX ORDER — TRIAMCINOLONE ACETONIDE 1 MG/G
OINTMENT TOPICAL ONCE
OUTPATIENT
Start: 2024-10-02 | End: 2024-10-02

## 2024-10-02 RX ORDER — BACITRACIN ZINC AND POLYMYXIN B SULFATE 500; 1000 [USP'U]/G; [USP'U]/G
OINTMENT TOPICAL ONCE
Status: DISCONTINUED | OUTPATIENT
Start: 2024-10-02 | End: 2024-10-03 | Stop reason: HOSPADM

## 2024-10-02 RX ORDER — LIDOCAINE HYDROCHLORIDE 40 MG/ML
SOLUTION TOPICAL ONCE
OUTPATIENT
Start: 2024-10-02 | End: 2024-10-02

## 2024-10-02 RX ORDER — LIDOCAINE 40 MG/G
CREAM TOPICAL ONCE
Status: DISCONTINUED | OUTPATIENT
Start: 2024-10-02 | End: 2024-10-03 | Stop reason: HOSPADM

## 2024-10-02 RX ORDER — BACITRACIN ZINC AND POLYMYXIN B SULFATE 500; 1000 [USP'U]/G; [USP'U]/G
OINTMENT TOPICAL ONCE
OUTPATIENT
Start: 2024-10-02 | End: 2024-10-02

## 2024-10-02 RX ORDER — LIDOCAINE 40 MG/G
CREAM TOPICAL ONCE
OUTPATIENT
Start: 2024-10-02 | End: 2024-10-02

## 2024-10-02 RX ORDER — LIDOCAINE HYDROCHLORIDE 40 MG/ML
SOLUTION TOPICAL ONCE
Status: DISCONTINUED | OUTPATIENT
Start: 2024-10-02 | End: 2024-10-03 | Stop reason: HOSPADM

## 2024-10-02 RX ORDER — LIDOCAINE 50 MG/G
OINTMENT TOPICAL ONCE
Status: DISCONTINUED | OUTPATIENT
Start: 2024-10-02 | End: 2024-10-03 | Stop reason: HOSPADM

## 2024-10-02 NOTE — PLAN OF CARE
Wound stable & showing improvement & tissue more granular this week. Will stop Santyl & change to using Multidex on wound. Otherwise, cont. With current wound care regime with dressings & compression wrap for edema control. Reinforced importance of exercise, elevation & compression to help with circulation, edema control & wound healing. F/u in WCC in 1 week as ordered, pt. Aware to call sooner with any changes or questions/concerns. Discharge instructions reviewed with patient, all questions answered, copy given to patient. Dressings were applied to all wounds per M.D. Instructions at this visit.

## 2024-10-03 NOTE — DISCHARGE INSTRUCTIONS
Wound Care Center Physician Orders and Discharge Instructions  [facility]  [address], [address]  [place] [address]  Telephone: [phone]      Fax: [phone]        Your home care company:   None .     Your wound-care supplies will be provided by:  Self .     NAME:  Lacho Gallegos   YOB: 1945  PRIMARY DIAGNOSIS FOR WOUND CARE CENTER:  Diabetic Ulcer .     Wound cleansing:   Do not scrub or use excessive force.  Wash hands with soap and water before and after dressing changes.  Prior to applying a clean dressing, cleanse wound with normal saline, wound cleanser, or mild soap and water. Ask your physician or nurse before getting the wound(s) wet in the shower.                Wound care for home:     Right Lateral Ankle  Nexodyn or Vashe spray  Calmoseptine ru wound  Santyl then Sorbact to wound  Foam x2 over wound  Foam to anterior ankle  CoFlex TLC Calamine compression wrap   Leave in place for the week - Do NOT get wet!        Your physician has ordered Compression for treatment of venous ulcers, venous insufficiency,and/or Lymphedema.   * Do not remove until your next scheduled visit in [facility]- do not get wet.    * If your wrap falls down, becomes painful or you have decreased sensation, and/or excessive drainage- please call the [facility] for RN visit to get your compression wrap changed   * You need to exercice as tolerated- walking is good   * Elevate your legs preferrably above level of your heart when sitting as much as possible   * The Goal of this therapy is to reduce Edema and get into long term compression garments to control venous insufficiency, lymphedema and reduce occurrence of venous ulcers          Please note, all wounds (unless stated otherwise here) were mechanically debrided at the time of cleansing here in the wound-care center today, so a small amount of pain, drainage or bleeding from that process might be expected, and is normal.      All

## 2024-10-04 ENCOUNTER — HOSPITAL ENCOUNTER (OUTPATIENT)
Dept: CARDIAC REHAB | Age: 79
Setting detail: THERAPIES SERIES
Discharge: HOME | End: 2024-10-04
Payer: MEDICARE

## 2024-10-04 VITALS — BODY MASS INDEX: 35.87 KG/M2 | WEIGHT: 287 LBS

## 2024-10-04 PROCEDURE — 93798 PHYS/QHP OP CAR RHAB W/ECG: CPT

## 2024-10-07 ENCOUNTER — HOSPITAL ENCOUNTER (OUTPATIENT)
Dept: CARDIAC REHAB | Age: 79
Setting detail: THERAPIES SERIES
Discharge: HOME | End: 2024-10-07
Payer: MEDICARE

## 2024-10-07 VITALS — BODY MASS INDEX: 36.24 KG/M2 | WEIGHT: 289.9 LBS

## 2024-10-07 PROCEDURE — 93798 PHYS/QHP OP CAR RHAB W/ECG: CPT

## 2024-10-09 ENCOUNTER — HOSPITAL ENCOUNTER (OUTPATIENT)
Dept: WOUND CARE | Age: 79
Discharge: HOME | End: 2024-10-09
Attending: INTERNAL MEDICINE
Payer: MEDICARE

## 2024-10-09 ENCOUNTER — HOSPITAL ENCOUNTER (OUTPATIENT)
Dept: CARDIAC REHAB | Age: 79
Setting detail: THERAPIES SERIES
Discharge: HOME | End: 2024-10-09
Payer: MEDICARE

## 2024-10-09 VITALS
HEIGHT: 75 IN | TEMPERATURE: 98.6 F | WEIGHT: 289.6 LBS | DIASTOLIC BLOOD PRESSURE: 86 MMHG | RESPIRATION RATE: 20 BRPM | HEART RATE: 82 BPM | SYSTOLIC BLOOD PRESSURE: 140 MMHG | BODY MASS INDEX: 36.01 KG/M2

## 2024-10-09 VITALS — BODY MASS INDEX: 36.25 KG/M2 | WEIGHT: 290 LBS

## 2024-10-09 DIAGNOSIS — I87.303 VENOUS HYPERTENSION OF BOTH LOWER EXTREMITIES: ICD-10-CM

## 2024-10-09 DIAGNOSIS — E11.42 TYPE 2 DIABETES MELLITUS WITH DIABETIC POLYNEUROPATHY, WITHOUT LONG-TERM CURRENT USE OF INSULIN (HCC): ICD-10-CM

## 2024-10-09 DIAGNOSIS — E11.622 DIABETIC ULCER OF RIGHT ANKLE ASSOCIATED WITH TYPE 2 DIABETES MELLITUS, WITH NECROSIS OF MUSCLE (HCC): ICD-10-CM

## 2024-10-09 DIAGNOSIS — R60.0 BILATERAL LOWER EXTREMITY EDEMA: Primary | ICD-10-CM

## 2024-10-09 DIAGNOSIS — L97.313 DIABETIC ULCER OF RIGHT ANKLE ASSOCIATED WITH TYPE 2 DIABETES MELLITUS, WITH NECROSIS OF MUSCLE (HCC): ICD-10-CM

## 2024-10-09 PROCEDURE — 29581 APPL MULTLAYER CMPRN SYS LEG: CPT

## 2024-10-09 PROCEDURE — 97597 DBRDMT OPN WND 1ST 20 CM/<: CPT

## 2024-10-09 PROCEDURE — 93798 PHYS/QHP OP CAR RHAB W/ECG: CPT

## 2024-10-09 RX ORDER — LIDOCAINE HYDROCHLORIDE 40 MG/ML
SOLUTION TOPICAL ONCE
OUTPATIENT
Start: 2024-10-09 | End: 2024-10-09

## 2024-10-09 RX ORDER — LIDOCAINE 50 MG/G
OINTMENT TOPICAL ONCE
Status: DISCONTINUED | OUTPATIENT
Start: 2024-10-09 | End: 2024-10-10 | Stop reason: HOSPADM

## 2024-10-09 RX ORDER — LIDOCAINE HYDROCHLORIDE 40 MG/ML
SOLUTION TOPICAL ONCE
Status: DISCONTINUED | OUTPATIENT
Start: 2024-10-09 | End: 2024-10-10 | Stop reason: HOSPADM

## 2024-10-09 RX ORDER — LIDOCAINE 40 MG/G
CREAM TOPICAL ONCE
OUTPATIENT
Start: 2024-10-09 | End: 2024-10-09

## 2024-10-09 RX ORDER — LIDOCAINE 40 MG/G
CREAM TOPICAL ONCE
Status: DISCONTINUED | OUTPATIENT
Start: 2024-10-09 | End: 2024-10-10 | Stop reason: HOSPADM

## 2024-10-09 RX ORDER — BACITRACIN ZINC AND POLYMYXIN B SULFATE 500; 1000 [USP'U]/G; [USP'U]/G
OINTMENT TOPICAL ONCE
OUTPATIENT
Start: 2024-10-09 | End: 2024-10-09

## 2024-10-09 RX ORDER — TRIAMCINOLONE ACETONIDE 1 MG/G
OINTMENT TOPICAL ONCE
OUTPATIENT
Start: 2024-10-09 | End: 2024-10-09

## 2024-10-09 RX ORDER — LIDOCAINE 50 MG/G
OINTMENT TOPICAL ONCE
OUTPATIENT
Start: 2024-10-09 | End: 2024-10-09

## 2024-10-09 RX ORDER — TRIAMCINOLONE ACETONIDE 1 MG/G
OINTMENT TOPICAL ONCE
Status: DISCONTINUED | OUTPATIENT
Start: 2024-10-09 | End: 2024-10-10 | Stop reason: HOSPADM

## 2024-10-09 RX ORDER — BACITRACIN ZINC AND POLYMYXIN B SULFATE 500; 1000 [USP'U]/G; [USP'U]/G
OINTMENT TOPICAL ONCE
Status: DISCONTINUED | OUTPATIENT
Start: 2024-10-09 | End: 2024-10-10 | Stop reason: HOSPADM

## 2024-10-09 NOTE — PLAN OF CARE
Wound more pale & fibrotic this week, debridement & wound stimulated with 27 gauge needle per MD. Will return to using Santyl & sorbact on wound. Otherwise, cont. With current wound care regime with dressings & compression wrap for edema control. Reinforced importance of exercise, elevation & compression to help with circulation, edema control & wound healing. F/u in [facility]CC in 1 week as ordered, pt. Aware to call sooner with any changes or questions/concerns. Discharge instructions reviewed with patient, all questions answered, copy given to patient. Dressings were applied to all wounds per M.D. Instructions at this visit.

## 2024-10-10 NOTE — DISCHARGE INSTRUCTIONS
Wound Care Center Physician Orders and Discharge Instructions  [facility]  [address], [address]  [place] [id number]  Telephone: [phone]      Fax: [phone]        Your home care company:   None .     Your wound-care supplies will be provided by:  Self .     NAME:  Lacho Gallegos   YOB: 1945  PRIMARY DIAGNOSIS FOR WOUND CARE CENTER:  Diabetic Ulcer .     Wound cleansing:   Do not scrub or use excessive force.  Wash hands with soap and water before and after dressing changes.  Prior to applying a clean dressing, cleanse wound with normal saline, wound cleanser, or mild soap and water. Ask your physician or nurse before getting the wound(s) wet in the shower.                Wound care for home:     Right Lateral Ankle  Nexodyn or Vashe spray  Calmoseptine ru wound  Santyl then Sorbact to wound  JANE NPWT  Aquaphor/Triamcinolone to dry itchy areas  Foam to anterior ankle  CoFlex TLC Calamine compression wrap   Leave in place for the week - Do NOT get wet!        Your physician has ordered Compression for treatment of venous ulcers, venous insufficiency,and/or Lymphedema.   * Do not remove until your next scheduled visit in [facility]- do not get wet.    * If your wrap falls down, becomes painful or you have decreased sensation, and/or excessive drainage- please call the [facility] for RN visit to get your compression wrap changed   * You need to exercice as tolerated- walking is good   * Elevate your legs preferrably above level of your heart when sitting as much as possible   * The Goal of this therapy is to reduce Edema and get into long term compression garments to control venous insufficiency, lymphedema and reduce occurrence of venous ulcers          Please note, all wounds (unless stated otherwise here) were mechanically debrided at the time of cleansing here in the wound-care center today, so a small amount of pain, drainage or bleeding from that process might be

## 2024-10-11 ENCOUNTER — HOSPITAL ENCOUNTER (OUTPATIENT)
Dept: CARDIAC REHAB | Age: 79
Setting detail: THERAPIES SERIES
Discharge: HOME | End: 2024-10-11
Payer: MEDICARE

## 2024-10-11 VITALS — BODY MASS INDEX: 35.87 KG/M2 | WEIGHT: 287 LBS

## 2024-10-11 PROCEDURE — 93798 PHYS/QHP OP CAR RHAB W/ECG: CPT

## 2024-10-14 ENCOUNTER — HOSPITAL ENCOUNTER (OUTPATIENT)
Dept: CARDIAC REHAB | Age: 79
Setting detail: THERAPIES SERIES
Discharge: HOME | End: 2024-10-14
Payer: MEDICARE

## 2024-10-14 VITALS — WEIGHT: 288 LBS | BODY MASS INDEX: 36 KG/M2

## 2024-10-14 PROCEDURE — 93798 PHYS/QHP OP CAR RHAB W/ECG: CPT

## 2024-10-14 ASSESSMENT — PATIENT HEALTH QUESTIONNAIRE - PHQ9
1. LITTLE INTEREST OR PLEASURE IN DOING THINGS: NOT AT ALL
10. IF YOU CHECKED OFF ANY PROBLEMS, HOW DIFFICULT HAVE THESE PROBLEMS MADE IT FOR YOU TO DO YOUR WORK, TAKE CARE OF THINGS AT HOME, OR GET ALONG WITH OTHER PEOPLE: SOMEWHAT DIFFICULT
4. FEELING TIRED OR HAVING LITTLE ENERGY: MORE THAN HALF THE DAYS
9. THOUGHTS THAT YOU WOULD BE BETTER OFF DEAD, OR OF HURTING YOURSELF: NOT AT ALL
3. TROUBLE FALLING OR STAYING ASLEEP: NOT AT ALL
7. TROUBLE CONCENTRATING ON THINGS, SUCH AS READING THE NEWSPAPER OR WATCHING TELEVISION: NOT AT ALL
SUM OF ALL RESPONSES TO PHQ QUESTIONS 1-9: 2
5. POOR APPETITE OR OVEREATING: NOT AT ALL
2. FEELING DOWN, DEPRESSED OR HOPELESS: NOT AT ALL
SUM OF ALL RESPONSES TO PHQ QUESTIONS 1-9: 2
6. FEELING BAD ABOUT YOURSELF - OR THAT YOU ARE A FAILURE OR HAVE LET YOURSELF OR YOUR FAMILY DOWN: NOT AT ALL
SUM OF ALL RESPONSES TO PHQ QUESTIONS 1-9: 2
8. MOVING OR SPEAKING SO SLOWLY THAT OTHER PEOPLE COULD HAVE NOTICED. OR THE OPPOSITE, BEING SO FIGETY OR RESTLESS THAT YOU HAVE BEEN MOVING AROUND A LOT MORE THAN USUAL: NOT AT ALL
SUM OF ALL RESPONSES TO PHQ9 QUESTIONS 1 & 2: 0
SUM OF ALL RESPONSES TO PHQ QUESTIONS 1-9: 2

## 2024-10-16 ENCOUNTER — HOSPITAL ENCOUNTER (OUTPATIENT)
Dept: WOUND CARE | Age: 79
Discharge: HOME | End: 2024-10-16
Attending: INTERNAL MEDICINE
Payer: MEDICARE

## 2024-10-16 ENCOUNTER — HOSPITAL ENCOUNTER (OUTPATIENT)
Dept: CARDIAC REHAB | Age: 79
Setting detail: THERAPIES SERIES
Discharge: HOME | End: 2024-10-16
Payer: MEDICARE

## 2024-10-16 VITALS
RESPIRATION RATE: 18 BRPM | DIASTOLIC BLOOD PRESSURE: 75 MMHG | HEART RATE: 82 BPM | BODY MASS INDEX: 36.23 KG/M2 | SYSTOLIC BLOOD PRESSURE: 125 MMHG | WEIGHT: 291.4 LBS | TEMPERATURE: 98.3 F | HEIGHT: 75 IN

## 2024-10-16 VITALS — WEIGHT: 292 LBS | BODY MASS INDEX: 36.5 KG/M2

## 2024-10-16 DIAGNOSIS — R60.0 BILATERAL LOWER EXTREMITY EDEMA: Primary | ICD-10-CM

## 2024-10-16 DIAGNOSIS — E11.42 TYPE 2 DIABETES MELLITUS WITH DIABETIC POLYNEUROPATHY, WITHOUT LONG-TERM CURRENT USE OF INSULIN (HCC): ICD-10-CM

## 2024-10-16 DIAGNOSIS — E11.622 DIABETIC ULCER OF RIGHT ANKLE ASSOCIATED WITH TYPE 2 DIABETES MELLITUS, WITH NECROSIS OF MUSCLE (HCC): ICD-10-CM

## 2024-10-16 DIAGNOSIS — L97.313 DIABETIC ULCER OF RIGHT ANKLE ASSOCIATED WITH TYPE 2 DIABETES MELLITUS, WITH NECROSIS OF MUSCLE (HCC): ICD-10-CM

## 2024-10-16 DIAGNOSIS — I87.303 VENOUS HYPERTENSION OF BOTH LOWER EXTREMITIES: ICD-10-CM

## 2024-10-16 PROCEDURE — 29581 APPL MULTLAYER CMPRN SYS LEG: CPT

## 2024-10-16 PROCEDURE — 93798 PHYS/QHP OP CAR RHAB W/ECG: CPT

## 2024-10-16 PROCEDURE — 97597 DBRDMT OPN WND 1ST 20 CM/<: CPT

## 2024-10-16 PROCEDURE — 97607 NEG PRS WND THR NDME<=50SQCM: CPT

## 2024-10-16 RX ORDER — BACITRACIN ZINC AND POLYMYXIN B SULFATE 500; 1000 [USP'U]/G; [USP'U]/G
OINTMENT TOPICAL ONCE
Status: DISCONTINUED | OUTPATIENT
Start: 2024-10-16 | End: 2024-10-17 | Stop reason: HOSPADM

## 2024-10-16 RX ORDER — LIDOCAINE HYDROCHLORIDE 40 MG/ML
SOLUTION TOPICAL ONCE
Status: DISCONTINUED | OUTPATIENT
Start: 2024-10-16 | End: 2024-10-17 | Stop reason: HOSPADM

## 2024-10-16 RX ORDER — LIDOCAINE 40 MG/G
CREAM TOPICAL ONCE
OUTPATIENT
Start: 2024-10-16 | End: 2024-10-16

## 2024-10-16 RX ORDER — LIDOCAINE 50 MG/G
OINTMENT TOPICAL ONCE
Status: DISCONTINUED | OUTPATIENT
Start: 2024-10-16 | End: 2024-10-17 | Stop reason: HOSPADM

## 2024-10-16 RX ORDER — LIDOCAINE HYDROCHLORIDE 40 MG/ML
SOLUTION TOPICAL ONCE
OUTPATIENT
Start: 2024-10-16 | End: 2024-10-16

## 2024-10-16 RX ORDER — LIDOCAINE 40 MG/G
CREAM TOPICAL ONCE
Status: DISCONTINUED | OUTPATIENT
Start: 2024-10-16 | End: 2024-10-17 | Stop reason: HOSPADM

## 2024-10-16 RX ORDER — BACITRACIN ZINC AND POLYMYXIN B SULFATE 500; 1000 [USP'U]/G; [USP'U]/G
OINTMENT TOPICAL ONCE
OUTPATIENT
Start: 2024-10-16 | End: 2024-10-16

## 2024-10-16 RX ORDER — TRIAMCINOLONE ACETONIDE 1 MG/G
OINTMENT TOPICAL ONCE
OUTPATIENT
Start: 2024-10-16 | End: 2024-10-16

## 2024-10-16 RX ORDER — TRIAMCINOLONE ACETONIDE 1 MG/G
OINTMENT TOPICAL ONCE
Status: DISCONTINUED | OUTPATIENT
Start: 2024-10-16 | End: 2024-10-17 | Stop reason: HOSPADM

## 2024-10-16 RX ORDER — LIDOCAINE 50 MG/G
OINTMENT TOPICAL ONCE
OUTPATIENT
Start: 2024-10-16 | End: 2024-10-16

## 2024-10-16 NOTE — PLAN OF CARE
Wound stable & showing improvement, debridement per MD & pt. Tolerated well. Will add Aquaphor/Triamcinolone to dry itching skin. Will add using JANE NWPT, otherwise cont. With current wound care regime with dressings & compression wrap for edema control. Reinforced importance of exercise, elevation & compression to help with circulation, edema control & wound healing. F/u in [facility] in 1 week as ordered, pt. Aware to call sooner with any changes or questions/concerns. Discharge instructions reviewed with patient, all questions answered, copy given to patient. Dressings were applied to all wounds per M.D. Instructions at this visit.

## 2024-10-18 ENCOUNTER — HOSPITAL ENCOUNTER (OUTPATIENT)
Dept: CARDIAC REHAB | Age: 79
Setting detail: THERAPIES SERIES
Discharge: HOME | End: 2024-10-18
Payer: MEDICARE

## 2024-10-18 VITALS — BODY MASS INDEX: 35.43 KG/M2 | HEIGHT: 75 IN | WEIGHT: 285 LBS

## 2024-10-18 PROCEDURE — 93798 PHYS/QHP OP CAR RHAB W/ECG: CPT

## 2024-10-18 NOTE — PLAN OF CARE
Cardiopulmonary Rehab Treatment Plan   Name: Lacho Gallegos Assessment Date: 10/18/2024   : 1945 Primary Diagnosis: Treatment Diagnosis 1: Angina      Age: 79 y.o. Referring Physician:  Yaw Morillo   MRN: [id number] Primary Care Physician: Calin Zheng MD   Treatment Diagnosis  Treatment Diagnosis 1: Angina  Referral Date: 24  Significant Cardiovascular History: Chronic atrial fibrillation  Co-morbidities: Diabetes    Individual Treatment Plan  ITP Visit Type: Discharge, completed program  Retired, Read Only 1st Date of Exercise : 24  ITP Next Review Date:  (D/C ITP SENT 10/18/24)  Visit #/Total Visits: 36/36  EF%: 53 %  Risk Stratification: Moderate  Retired, Read Only ITP: Exercise; Psychosocial; Nutrition; Education    Exercise Assessment  Stages of Change: Action  Assisted Devices: None  Rodrigues Total Score: 25  Rodrigues Fall Risk: Medium (25-44)  Test: Six minute walk test    Data Measured Before Test  Heart Rate: 84  Resting Blood Pressure: 124/68  O2 Saturation: 96  O2 Device: Room air  RPD: 0    Data Measured During Test  Indicate Mode of RPE: 6 minutes/submax  Retired, Read Only RPE Data Measured: Post    Data Measured at Peak  Distance Calculated in : ft  Distance (Feet-Actual): 920 ft  Peak Heart Rate: 111  Peak Blood Pressure: 144/72  Peak RPE: 13  SpO2: 94    Data Measured at 5 Minutes After Test  Heart Rate: 87  Blood Pressure: 118/68  SpO2: 97 %    Exercise Intervention/Prescription  Mode: Track; Treadmill; Bike; Stepper; Ergometer; Elliptical; Rower  Frequency per week: 2-3 supervised sessions weekly  Duration Per Session: 25-42+ minutes of steady aerobic exercise  Intensity METS      : 3-6 (Increase workloads 0.5-1.0 METS/weekly)  RPE: 11-14  Progression: 1-2 sets of 8-12 repetitions for ea. muscle group. 1-5 lbs  Resistance Training: Yes    Exercise Activity at Home  Type: -- (pt states he hasn't felt that great the last several weeks and hasnt been using the resisitance

## 2024-10-21 ENCOUNTER — APPOINTMENT (OUTPATIENT)
Dept: CARDIAC REHAB | Age: 79
End: 2024-10-21
Payer: MEDICARE

## 2024-10-23 ENCOUNTER — HOSPITAL ENCOUNTER (OUTPATIENT)
Dept: WOUND CARE | Age: 79
Discharge: HOME | End: 2024-10-23
Attending: INTERNAL MEDICINE
Payer: MEDICARE

## 2024-10-23 ENCOUNTER — APPOINTMENT (OUTPATIENT)
Dept: CARDIAC REHAB | Age: 79
End: 2024-10-23
Payer: MEDICARE

## 2024-10-23 VITALS
DIASTOLIC BLOOD PRESSURE: 90 MMHG | TEMPERATURE: 97.8 F | RESPIRATION RATE: 18 BRPM | SYSTOLIC BLOOD PRESSURE: 128 MMHG | HEIGHT: 75 IN | HEART RATE: 81 BPM | WEIGHT: 292 LBS | BODY MASS INDEX: 36.31 KG/M2

## 2024-10-23 DIAGNOSIS — L97.313 DIABETIC ULCER OF RIGHT ANKLE ASSOCIATED WITH TYPE 2 DIABETES MELLITUS, WITH NECROSIS OF MUSCLE (HCC): ICD-10-CM

## 2024-10-23 DIAGNOSIS — E11.622 DIABETIC ULCER OF RIGHT ANKLE ASSOCIATED WITH TYPE 2 DIABETES MELLITUS, WITH NECROSIS OF MUSCLE (HCC): ICD-10-CM

## 2024-10-23 DIAGNOSIS — I87.303 VENOUS HYPERTENSION OF BOTH LOWER EXTREMITIES: ICD-10-CM

## 2024-10-23 DIAGNOSIS — E11.42 TYPE 2 DIABETES MELLITUS WITH DIABETIC POLYNEUROPATHY, WITHOUT LONG-TERM CURRENT USE OF INSULIN (HCC): ICD-10-CM

## 2024-10-23 DIAGNOSIS — R60.0 BILATERAL LOWER EXTREMITY EDEMA: Primary | ICD-10-CM

## 2024-10-23 PROCEDURE — 97597 DBRDMT OPN WND 1ST 20 CM/<: CPT

## 2024-10-23 PROCEDURE — 97607 NEG PRS WND THR NDME<=50SQCM: CPT

## 2024-10-23 RX ORDER — TRIAMCINOLONE ACETONIDE 1 MG/G
OINTMENT TOPICAL ONCE
OUTPATIENT
Start: 2024-10-23 | End: 2024-10-23

## 2024-10-23 RX ORDER — LIDOCAINE 40 MG/G
CREAM TOPICAL ONCE
OUTPATIENT
Start: 2024-10-23 | End: 2024-10-23

## 2024-10-23 RX ORDER — LIDOCAINE 50 MG/G
OINTMENT TOPICAL ONCE
OUTPATIENT
Start: 2024-10-23 | End: 2024-10-23

## 2024-10-23 RX ORDER — BACITRACIN ZINC AND POLYMYXIN B SULFATE 500; 1000 [USP'U]/G; [USP'U]/G
OINTMENT TOPICAL ONCE
OUTPATIENT
Start: 2024-10-23 | End: 2024-10-23

## 2024-10-23 RX ORDER — LIDOCAINE 40 MG/G
CREAM TOPICAL ONCE
Status: DISCONTINUED | OUTPATIENT
Start: 2024-10-23 | End: 2024-10-24 | Stop reason: HOSPADM

## 2024-10-23 RX ORDER — LIDOCAINE HYDROCHLORIDE 40 MG/ML
SOLUTION TOPICAL ONCE
OUTPATIENT
Start: 2024-10-23 | End: 2024-10-23

## 2024-10-23 NOTE — PLAN OF CARE
Pt seen in [facility] - for f/u - ankle wound slow to heal - debride per Dr. Blas - treatment as follows   Right Lateral Ankle  Nexodyn or Vashe spray  Calmoseptine ru wound  Santyl then Sorbact to wound  JANE NPWT  Aquaphor/Triamcinolone to dry itchy areas  Foam to anterior ankle  CoFlex TLC Calamine compression wrap     Reviewed AVS - f/u in 1 week

## 2024-10-24 NOTE — DISCHARGE INSTRUCTIONS
Wound Care Center Physician Orders and Discharge Instructions  [facility]  [address], [address]  [place] [id number]  Telephone: [phone]      Fax: [phone]        Your home care company:   None .     Your wound-care supplies will be provided by:  Self .     NAME:  Lacho Gallegos   YOB: 1945  PRIMARY DIAGNOSIS FOR WOUND CARE CENTER:  Diabetic Ulcer .     Wound cleansing:   Do not scrub or use excessive force.  Wash hands with soap and water before and after dressing changes.  Prior to applying a clean dressing, cleanse wound with normal saline, wound cleanser, or mild soap and water. Ask your physician or nurse before getting the wound(s) wet in the shower.                Wound care for home:     Right Lateral Ankle  Nexodyn or Vashe spray  Calmoseptine ru wound  Santyl then Sorbact to wound  JANE NPWT  Aquaphor/Triamcinolone to dry itchy areas  Foam to anterior ankle  CoFlex TLC Calamine compression wrap   Leave in place for the week - Do NOT get wet!        Your physician has ordered Compression for treatment of venous ulcers, venous insufficiency,and/or Lymphedema.   * Do not remove until your next scheduled visit in [facility]- do not get wet.    * If your wrap falls down, becomes painful or you have decreased sensation, and/or excessive drainage- please call the [facility] for RN visit to get your compression wrap changed   * You need to exercice as tolerated- walking is good   * Elevate your legs preferrably above level of your heart when sitting as much as possible   * The Goal of this therapy is to reduce Edema and get into long term compression garments to control venous insufficiency, lymphedema and reduce occurrence of venous ulcers          Please note, all wounds (unless stated otherwise here) were mechanically debrided at the time of cleansing here in the wound-care center today, so a small amount of pain, drainage or bleeding from that process might be

## 2024-10-25 ENCOUNTER — APPOINTMENT (OUTPATIENT)
Dept: CARDIAC REHAB | Age: 79
End: 2024-10-25
Payer: MEDICARE

## 2024-10-28 ENCOUNTER — APPOINTMENT (OUTPATIENT)
Dept: CARDIAC REHAB | Age: 79
End: 2024-10-28
Payer: MEDICARE

## 2024-10-30 ENCOUNTER — HOSPITAL ENCOUNTER (OUTPATIENT)
Dept: WOUND CARE | Age: 79
Discharge: HOME | End: 2024-10-30
Attending: INTERNAL MEDICINE
Payer: MEDICARE

## 2024-10-30 VITALS
WEIGHT: 287 LBS | TEMPERATURE: 98 F | HEIGHT: 75 IN | HEART RATE: 98 BPM | SYSTOLIC BLOOD PRESSURE: 118 MMHG | RESPIRATION RATE: 18 BRPM | DIASTOLIC BLOOD PRESSURE: 73 MMHG | BODY MASS INDEX: 35.68 KG/M2

## 2024-10-30 DIAGNOSIS — L97.313 DIABETIC ULCER OF RIGHT ANKLE ASSOCIATED WITH TYPE 2 DIABETES MELLITUS, WITH NECROSIS OF MUSCLE (HCC): ICD-10-CM

## 2024-10-30 DIAGNOSIS — I87.303 VENOUS HYPERTENSION OF BOTH LOWER EXTREMITIES: ICD-10-CM

## 2024-10-30 DIAGNOSIS — R60.0 BILATERAL LOWER EXTREMITY EDEMA: Primary | ICD-10-CM

## 2024-10-30 DIAGNOSIS — E11.42 TYPE 2 DIABETES MELLITUS WITH DIABETIC POLYNEUROPATHY, WITHOUT LONG-TERM CURRENT USE OF INSULIN (HCC): ICD-10-CM

## 2024-10-30 DIAGNOSIS — E11.622 DIABETIC ULCER OF RIGHT ANKLE ASSOCIATED WITH TYPE 2 DIABETES MELLITUS, WITH NECROSIS OF MUSCLE (HCC): ICD-10-CM

## 2024-10-30 PROCEDURE — 97607 NEG PRS WND THR NDME<=50SQCM: CPT

## 2024-10-30 PROCEDURE — 11042 DBRDMT SUBQ TIS 1ST 20SQCM/<: CPT

## 2024-10-30 PROCEDURE — 29581 APPL MULTLAYER CMPRN SYS LEG: CPT

## 2024-10-30 RX ORDER — BACITRACIN ZINC AND POLYMYXIN B SULFATE 500; 1000 [USP'U]/G; [USP'U]/G
OINTMENT TOPICAL ONCE
OUTPATIENT
Start: 2024-10-30 | End: 2024-10-30

## 2024-10-30 RX ORDER — LIDOCAINE 40 MG/G
CREAM TOPICAL ONCE
OUTPATIENT
Start: 2024-10-30 | End: 2024-10-30

## 2024-10-30 RX ORDER — TRIAMCINOLONE ACETONIDE 1 MG/G
OINTMENT TOPICAL ONCE
OUTPATIENT
Start: 2024-10-30 | End: 2024-10-30

## 2024-10-30 RX ORDER — BACITRACIN ZINC AND POLYMYXIN B SULFATE 500; 1000 [USP'U]/G; [USP'U]/G
OINTMENT TOPICAL ONCE
Status: DISCONTINUED | OUTPATIENT
Start: 2024-10-30 | End: 2024-10-31 | Stop reason: HOSPADM

## 2024-10-30 RX ORDER — LIDOCAINE 50 MG/G
OINTMENT TOPICAL ONCE
Status: DISCONTINUED | OUTPATIENT
Start: 2024-10-30 | End: 2024-10-31 | Stop reason: HOSPADM

## 2024-10-30 RX ORDER — LIDOCAINE 40 MG/G
CREAM TOPICAL ONCE
Status: DISCONTINUED | OUTPATIENT
Start: 2024-10-30 | End: 2024-10-31 | Stop reason: HOSPADM

## 2024-10-30 RX ORDER — TRIAMCINOLONE ACETONIDE 1 MG/G
OINTMENT TOPICAL ONCE
Status: DISCONTINUED | OUTPATIENT
Start: 2024-10-30 | End: 2024-10-31 | Stop reason: HOSPADM

## 2024-10-30 RX ORDER — LIDOCAINE HYDROCHLORIDE 40 MG/ML
SOLUTION TOPICAL ONCE
Status: DISCONTINUED | OUTPATIENT
Start: 2024-10-30 | End: 2024-10-31 | Stop reason: HOSPADM

## 2024-10-30 RX ORDER — LIDOCAINE 50 MG/G
OINTMENT TOPICAL ONCE
OUTPATIENT
Start: 2024-10-30 | End: 2024-10-30

## 2024-10-30 RX ORDER — LIDOCAINE HYDROCHLORIDE 40 MG/ML
SOLUTION TOPICAL ONCE
OUTPATIENT
Start: 2024-10-30 | End: 2024-10-30

## 2024-10-30 NOTE — PLAN OF CARE
Wound stable & showing improvement with more granular tissue noted. Debridement per MD. Will cont. With current wound care regime with dressings, JANE NPWT & compression wrap for edema control. F/u in WCC in 1 week as ordered, pt. Aware to call sooner with any changes or questions/concerns. Discharge instructions reviewed with patient, all questions answered, copy given to patient. Dressings were applied to all wounds per M.D. Instructions at this visit.

## 2024-10-31 NOTE — DISCHARGE INSTRUCTIONS
Wound Care Center Physician Orders and Discharge Instructions  [facility]  [address], [address]  [place] [id number]  Telephone: [phone]      Fax: [phone]        Your home care company:   None .     Your wound-care supplies will be provided by:  Self .     NAME:  Lacho Gallegos   YOB: 1945  PRIMARY DIAGNOSIS FOR WOUND CARE CENTER:  Diabetic Ulcer .     Wound cleansing:   Do not scrub or use excessive force.  Wash hands with soap and water before and after dressing changes.  Prior to applying a clean dressing, cleanse wound with normal saline, wound cleanser, or mild soap and water. Ask your physician or nurse before getting the wound(s) wet in the shower.                Wound care for home:     Right Lateral Ankle  Nexodyn or Vashe spray  Triamcinolone/Calmoseptine ru wound  Santyl then Sorbact to wound  Foam donut over wound (on tray)  Aquaphor/Triamcinolone to dry itchy areas  Foam to anterior ankle  CoFlex TLC Calamine compression wrap   Leave in place for the week - Do NOT get wet!        Your physician has ordered Compression for treatment of venous ulcers, venous insufficiency,and/or Lymphedema.   * Do not remove until your next scheduled visit in [facility]- do not get wet.    * If your wrap falls down, becomes painful or you have decreased sensation, and/or excessive drainage- please call the [facility] for RN visit to get your compression wrap changed   * You need to exercice as tolerated- walking is good   * Elevate your legs preferrably above level of your heart when sitting as much as possible   * The Goal of this therapy is to reduce Edema and get into long term compression garments to control venous insufficiency, lymphedema and reduce occurrence of venous ulcers          Please note, all wounds (unless stated otherwise here) were mechanically debrided at the time of cleansing here in the wound-care center today, so a small amount of pain, drainage or

## 2024-11-06 ENCOUNTER — HOSPITAL ENCOUNTER (OUTPATIENT)
Dept: WOUND CARE | Age: 79
Discharge: HOME | End: 2024-11-06
Attending: INTERNAL MEDICINE
Payer: MEDICARE

## 2024-11-06 VITALS
BODY MASS INDEX: 35.98 KG/M2 | HEIGHT: 75 IN | TEMPERATURE: 97.8 F | SYSTOLIC BLOOD PRESSURE: 124 MMHG | RESPIRATION RATE: 16 BRPM | DIASTOLIC BLOOD PRESSURE: 87 MMHG | WEIGHT: 289.4 LBS | HEART RATE: 88 BPM

## 2024-11-06 DIAGNOSIS — L97.313 DIABETIC ULCER OF RIGHT ANKLE ASSOCIATED WITH TYPE 2 DIABETES MELLITUS, WITH NECROSIS OF MUSCLE (HCC): ICD-10-CM

## 2024-11-06 DIAGNOSIS — I87.303 VENOUS HYPERTENSION OF BOTH LOWER EXTREMITIES: ICD-10-CM

## 2024-11-06 DIAGNOSIS — E11.622 DIABETIC ULCER OF RIGHT ANKLE ASSOCIATED WITH TYPE 2 DIABETES MELLITUS, WITH NECROSIS OF MUSCLE (HCC): ICD-10-CM

## 2024-11-06 DIAGNOSIS — E11.42 TYPE 2 DIABETES MELLITUS WITH DIABETIC POLYNEUROPATHY, WITHOUT LONG-TERM CURRENT USE OF INSULIN (HCC): ICD-10-CM

## 2024-11-06 DIAGNOSIS — R60.0 BILATERAL LOWER EXTREMITY EDEMA: Primary | ICD-10-CM

## 2024-11-06 PROCEDURE — 29581 APPL MULTLAYER CMPRN SYS LEG: CPT

## 2024-11-06 PROCEDURE — 97597 DBRDMT OPN WND 1ST 20 CM/<: CPT

## 2024-11-06 RX ORDER — BACITRACIN ZINC AND POLYMYXIN B SULFATE 500; 1000 [USP'U]/G; [USP'U]/G
OINTMENT TOPICAL ONCE
Status: DISCONTINUED | OUTPATIENT
Start: 2024-11-06 | End: 2024-11-07 | Stop reason: HOSPADM

## 2024-11-06 RX ORDER — LIDOCAINE 40 MG/G
CREAM TOPICAL ONCE
OUTPATIENT
Start: 2024-11-06 | End: 2024-11-06

## 2024-11-06 RX ORDER — LIDOCAINE HYDROCHLORIDE 40 MG/ML
SOLUTION TOPICAL ONCE
OUTPATIENT
Start: 2024-11-06 | End: 2024-11-06

## 2024-11-06 RX ORDER — BACITRACIN ZINC AND POLYMYXIN B SULFATE 500; 1000 [USP'U]/G; [USP'U]/G
OINTMENT TOPICAL ONCE
OUTPATIENT
Start: 2024-11-06 | End: 2024-11-06

## 2024-11-06 RX ORDER — LIDOCAINE HYDROCHLORIDE 40 MG/ML
SOLUTION TOPICAL ONCE
Status: DISCONTINUED | OUTPATIENT
Start: 2024-11-06 | End: 2024-11-07 | Stop reason: HOSPADM

## 2024-11-06 RX ORDER — TRIAMCINOLONE ACETONIDE 1 MG/G
OINTMENT TOPICAL ONCE
OUTPATIENT
Start: 2024-11-06 | End: 2024-11-06

## 2024-11-06 RX ORDER — TRIAMCINOLONE ACETONIDE 1 MG/G
OINTMENT TOPICAL ONCE
Status: DISCONTINUED | OUTPATIENT
Start: 2024-11-06 | End: 2024-11-07 | Stop reason: HOSPADM

## 2024-11-06 RX ORDER — LIDOCAINE 50 MG/G
OINTMENT TOPICAL ONCE
OUTPATIENT
Start: 2024-11-06 | End: 2024-11-06

## 2024-11-06 RX ORDER — LIDOCAINE 40 MG/G
CREAM TOPICAL ONCE
Status: DISCONTINUED | OUTPATIENT
Start: 2024-11-06 | End: 2024-11-07 | Stop reason: HOSPADM

## 2024-11-06 RX ORDER — LIDOCAINE 50 MG/G
OINTMENT TOPICAL ONCE
Status: DISCONTINUED | OUTPATIENT
Start: 2024-11-06 | End: 2024-11-07 | Stop reason: HOSPADM

## 2024-11-06 NOTE — PLAN OF CARE
Ru-wound redness noted today, no warmth or other s/s of infection, possible contact reaction, will hold NPWT this week. Wound stable & showing signs of improvement, debridement per MD & pt. Tolerated well. Will otherwise cont. With Santyl & Sorbact to wound & add Triamcinolone with Calmoseptine ru-wound. Will also add foam over wound for additional padding. Cont. With compression wrap for edema control. Reinforced importance of exercise, elevation & compression to help with circulation, edema control & wound healing. F/u in WCC in 1 week as ordered, pt. Aware to call sooner with any changes or questions/concerns. Discharge instructions reviewed with patient, all questions answered, copy given to patient. Dressings were applied to all wounds per M.D. Instructions at this visit.

## 2024-11-07 NOTE — DISCHARGE INSTRUCTIONS
changing the wraps or dressings underneath. If you see anything concerning (redness, excess moisture, etc), please call and let us know right away.     Should you experience any significant changes in your wound(s) (including redness, increased warmth, increased pain, increased drainage, odor, or fever) or have questions about your wound care, please contact the [facility] at [phone] Monday-Thursday from 8:00 am - 4:30 pm, or Friday from 8:00 am - 2:30 pm.  If you need help with your wound outside these hours and cannot wait until we are again available, contact your home-care company (if applicable), your PCP, or go to the nearest emergency room.

## 2024-11-13 ENCOUNTER — HOSPITAL ENCOUNTER (OUTPATIENT)
Dept: WOUND CARE | Age: 79
Discharge: HOME | End: 2024-11-13
Attending: INTERNAL MEDICINE
Payer: MEDICARE

## 2024-11-13 VITALS
TEMPERATURE: 98 F | SYSTOLIC BLOOD PRESSURE: 136 MMHG | HEART RATE: 92 BPM | BODY MASS INDEX: 35.96 KG/M2 | WEIGHT: 289.2 LBS | DIASTOLIC BLOOD PRESSURE: 83 MMHG | HEIGHT: 75 IN | RESPIRATION RATE: 16 BRPM

## 2024-11-13 DIAGNOSIS — R60.0 BILATERAL LOWER EXTREMITY EDEMA: Primary | ICD-10-CM

## 2024-11-13 DIAGNOSIS — E11.42 TYPE 2 DIABETES MELLITUS WITH DIABETIC POLYNEUROPATHY, WITHOUT LONG-TERM CURRENT USE OF INSULIN (HCC): ICD-10-CM

## 2024-11-13 DIAGNOSIS — L97.313 DIABETIC ULCER OF RIGHT ANKLE ASSOCIATED WITH TYPE 2 DIABETES MELLITUS, WITH NECROSIS OF MUSCLE (HCC): ICD-10-CM

## 2024-11-13 DIAGNOSIS — I87.303 VENOUS HYPERTENSION OF BOTH LOWER EXTREMITIES: ICD-10-CM

## 2024-11-13 DIAGNOSIS — E11.622 DIABETIC ULCER OF RIGHT ANKLE ASSOCIATED WITH TYPE 2 DIABETES MELLITUS, WITH NECROSIS OF MUSCLE (HCC): ICD-10-CM

## 2024-11-13 PROCEDURE — 29581 APPL MULTLAYER CMPRN SYS LEG: CPT

## 2024-11-13 PROCEDURE — 97597 DBRDMT OPN WND 1ST 20 CM/<: CPT

## 2024-11-13 RX ORDER — BACITRACIN ZINC AND POLYMYXIN B SULFATE 500; 1000 [USP'U]/G; [USP'U]/G
OINTMENT TOPICAL ONCE
Status: DISCONTINUED | OUTPATIENT
Start: 2024-11-13 | End: 2024-11-14 | Stop reason: HOSPADM

## 2024-11-13 RX ORDER — TRIAMCINOLONE ACETONIDE 1 MG/G
OINTMENT TOPICAL ONCE
OUTPATIENT
Start: 2024-11-13 | End: 2024-11-13

## 2024-11-13 RX ORDER — TRIAMCINOLONE ACETONIDE 1 MG/G
OINTMENT TOPICAL ONCE
Status: DISCONTINUED | OUTPATIENT
Start: 2024-11-13 | End: 2024-11-14 | Stop reason: HOSPADM

## 2024-11-13 RX ORDER — LIDOCAINE HYDROCHLORIDE 40 MG/ML
SOLUTION TOPICAL ONCE
OUTPATIENT
Start: 2024-11-13 | End: 2024-11-13

## 2024-11-13 RX ORDER — BACITRACIN ZINC AND POLYMYXIN B SULFATE 500; 1000 [USP'U]/G; [USP'U]/G
OINTMENT TOPICAL ONCE
OUTPATIENT
Start: 2024-11-13 | End: 2024-11-13

## 2024-11-13 RX ORDER — LIDOCAINE HYDROCHLORIDE 40 MG/ML
SOLUTION TOPICAL ONCE
Status: DISCONTINUED | OUTPATIENT
Start: 2024-11-13 | End: 2024-11-14 | Stop reason: HOSPADM

## 2024-11-13 RX ORDER — LIDOCAINE 50 MG/G
OINTMENT TOPICAL ONCE
Status: DISCONTINUED | OUTPATIENT
Start: 2024-11-13 | End: 2024-11-14 | Stop reason: HOSPADM

## 2024-11-13 RX ORDER — LIDOCAINE 50 MG/G
OINTMENT TOPICAL ONCE
OUTPATIENT
Start: 2024-11-13 | End: 2024-11-13

## 2024-11-13 RX ORDER — LIDOCAINE 40 MG/G
CREAM TOPICAL ONCE
OUTPATIENT
Start: 2024-11-13 | End: 2024-11-13

## 2024-11-13 RX ORDER — LIDOCAINE 40 MG/G
CREAM TOPICAL ONCE
Status: DISCONTINUED | OUTPATIENT
Start: 2024-11-13 | End: 2024-11-14 | Stop reason: HOSPADM

## 2024-11-13 NOTE — PLAN OF CARE
Wound stable, debridement per MD & pt. Tolerated well. Will stop foam donut this week & Triamcinolone ru-wound as rash has improved. Otherwise, will cont. With current wound care regime with dressings & compression wrap for edema control. Reinforced importance of exercise, elevation & compression to help with circulation, edema control & wound healing. F/u in WCC in 1 week as ordered, pt. Aware to call sooner with any changes or questions/concerns. Discharge instructions reviewed with patient, all questions answered, copy given to patient. Dressings were applied to all wounds per M.D. Instructions at this visit.

## 2024-11-14 NOTE — DISCHARGE INSTRUCTIONS
Wound Care Center Physician Orders and Discharge Instructions  [facility]  [address], [address]  [place] [id number]  Telephone: [phone]      Fax: [phone]        Your home care company:   None .     Your wound-care supplies will be provided by:  Self .     NAME:  Lacho Gallegos   YOB: 1945  PRIMARY DIAGNOSIS FOR WOUND CARE CENTER:  Diabetic Ulcer .     Wound cleansing:   Do not scrub or use excessive force.  Wash hands with soap and water before and after dressing changes.  Prior to applying a clean dressing, cleanse wound with normal saline, wound cleanser, or mild soap and water. Ask your physician or nurse before getting the wound(s) wet in the shower.                Wound care for home:     Right Lateral Ankle  Nexodyn or Vashe spray  Triamcinolone/Calmoseptine ru wound  Santyl then Sorbact to wound  Foam donut over wound (on tray)  Aquaphor/Triamcinolone to dry itchy areas  Foam to anterior ankle  CoFlex TLC Calamine compression wrap   Leave in place for the week - Do NOT get wet!          Your physician has ordered Compression for treatment of venous ulcers, venous insufficiency,and/or Lymphedema.   * Do not remove until your next scheduled visit in [facility]- do not get wet.    * If your wrap falls down, becomes painful or you have decreased sensation, and/or excessive drainage- please call the [facility] for RN visit to get your compression wrap changed   * You need to exercice as tolerated- walking is good   * Elevate your legs preferrably above level of your heart when sitting as much as possible   * The Goal of this therapy is to reduce Edema and get into long term compression garments to control venous insufficiency, lymphedema and reduce occurrence of venous ulcers          Please note, all wounds (unless stated otherwise here) were mechanically debrided at the time of cleansing here in the wound-care center today, so a small amount of pain, drainage or

## 2024-11-20 ENCOUNTER — HOSPITAL ENCOUNTER (OUTPATIENT)
Dept: WOUND CARE | Age: 79
Discharge: HOME | End: 2024-11-20
Attending: INTERNAL MEDICINE
Payer: MEDICARE

## 2024-11-20 VITALS
DIASTOLIC BLOOD PRESSURE: 68 MMHG | TEMPERATURE: 97.8 F | BODY MASS INDEX: 36.21 KG/M2 | HEART RATE: 97 BPM | SYSTOLIC BLOOD PRESSURE: 115 MMHG | RESPIRATION RATE: 16 BRPM | HEIGHT: 75 IN | WEIGHT: 291.2 LBS

## 2024-11-20 DIAGNOSIS — R60.0 BILATERAL LOWER EXTREMITY EDEMA: Primary | ICD-10-CM

## 2024-11-20 DIAGNOSIS — I87.303 VENOUS HYPERTENSION OF BOTH LOWER EXTREMITIES: ICD-10-CM

## 2024-11-20 DIAGNOSIS — E11.42 TYPE 2 DIABETES MELLITUS WITH DIABETIC POLYNEUROPATHY, WITHOUT LONG-TERM CURRENT USE OF INSULIN (HCC): ICD-10-CM

## 2024-11-20 DIAGNOSIS — L97.313 DIABETIC ULCER OF RIGHT ANKLE ASSOCIATED WITH TYPE 2 DIABETES MELLITUS, WITH NECROSIS OF MUSCLE (HCC): ICD-10-CM

## 2024-11-20 DIAGNOSIS — E11.622 DIABETIC ULCER OF RIGHT ANKLE ASSOCIATED WITH TYPE 2 DIABETES MELLITUS, WITH NECROSIS OF MUSCLE (HCC): ICD-10-CM

## 2024-11-20 PROCEDURE — 29581 APPL MULTLAYER CMPRN SYS LEG: CPT

## 2024-11-20 PROCEDURE — 97597 DBRDMT OPN WND 1ST 20 CM/<: CPT

## 2024-11-20 RX ORDER — TRIAMCINOLONE ACETONIDE 1 MG/G
OINTMENT TOPICAL ONCE
Status: DISCONTINUED | OUTPATIENT
Start: 2024-11-20 | End: 2024-11-21 | Stop reason: HOSPADM

## 2024-11-20 RX ORDER — BACITRACIN ZINC AND POLYMYXIN B SULFATE 500; 1000 [USP'U]/G; [USP'U]/G
OINTMENT TOPICAL ONCE
OUTPATIENT
Start: 2024-11-20 | End: 2024-11-20

## 2024-11-20 RX ORDER — LIDOCAINE 50 MG/G
OINTMENT TOPICAL ONCE
Status: DISCONTINUED | OUTPATIENT
Start: 2024-11-20 | End: 2024-11-21 | Stop reason: HOSPADM

## 2024-11-20 RX ORDER — LIDOCAINE 50 MG/G
OINTMENT TOPICAL ONCE
OUTPATIENT
Start: 2024-11-20 | End: 2024-11-20

## 2024-11-20 RX ORDER — LIDOCAINE HYDROCHLORIDE 40 MG/ML
SOLUTION TOPICAL ONCE
Status: DISCONTINUED | OUTPATIENT
Start: 2024-11-20 | End: 2024-11-21 | Stop reason: HOSPADM

## 2024-11-20 RX ORDER — BACITRACIN ZINC AND POLYMYXIN B SULFATE 500; 1000 [USP'U]/G; [USP'U]/G
OINTMENT TOPICAL ONCE
Status: DISCONTINUED | OUTPATIENT
Start: 2024-11-20 | End: 2024-11-21 | Stop reason: HOSPADM

## 2024-11-20 RX ORDER — LIDOCAINE 40 MG/G
CREAM TOPICAL ONCE
Status: DISCONTINUED | OUTPATIENT
Start: 2024-11-20 | End: 2024-11-21 | Stop reason: HOSPADM

## 2024-11-20 RX ORDER — LIDOCAINE HYDROCHLORIDE 40 MG/ML
SOLUTION TOPICAL ONCE
OUTPATIENT
Start: 2024-11-20 | End: 2024-11-20

## 2024-11-20 RX ORDER — LIDOCAINE 40 MG/G
CREAM TOPICAL ONCE
OUTPATIENT
Start: 2024-11-20 | End: 2024-11-20

## 2024-11-20 RX ORDER — TRIAMCINOLONE ACETONIDE 1 MG/G
OINTMENT TOPICAL ONCE
OUTPATIENT
Start: 2024-11-20 | End: 2024-11-20

## 2024-11-20 NOTE — PLAN OF CARE
Ru-wound redness noted this week. Will return to using Triamcinolone/Calmoseptine ru-wound & add the foam donut over wound. Wound otherwise stable, debridement per MD. Wound care dressing orders updated per MD. Will cont. With compression wrap for edema control. Reinforced importance of exercise, elevation & compression to help with circulation, edema control & wound healing. Will plan to transition to Sigvaris 20-30 mmHg stockings when healed for long-term edema control. F/u in WCC in 1 week as ordered, pt. Aware to call sooner with any changes or questions/concerns. Discharge instructions reviewed with patient, all questions answered, copy given to patient. Dressings were applied to all wounds per M.D. Instructions at this visit.

## 2024-11-21 NOTE — DISCHARGE INSTRUCTIONS
Wound Care Center Physician Orders and Discharge Instructions  [facility]  [address], [address]  [place] [id number]  Telephone: [phone]      Fax: [phone]        Your home care company:   None .     Your wound-care supplies will be provided by:  Self .     NAME:  Lacho Gallegos   YOB: 1945  PRIMARY DIAGNOSIS FOR WOUND CARE CENTER:  Diabetic Ulcer .     Wound cleansing:   Do not scrub or use excessive force.  Wash hands with soap and water before and after dressing changes.  Prior to applying a clean dressing, cleanse wound with normal saline, wound cleanser, or mild soap and water. Ask your physician or nurse before getting the wound(s) wet in the shower.                Wound care for home:     Right Lateral Ankle  Nexodyn or Vashe spray  Triamcinolone/Calmoseptine ru wound  Santyl then Sorbact to wound  Foam donut over wound (on tray)  Aquaphor/Triamcinolone to dry itchy areas  Foam to anterior ankle  AccuWrap LITE compression wrap   Leave in place for the week - Do NOT get wet!           Your physician has ordered Compression for treatment of venous ulcers, venous insufficiency,and/or Lymphedema.   * Do not remove until your next scheduled visit in [facility]- do not get wet.    * If your wrap falls down, becomes painful or you have decreased sensation, and/or excessive drainage- please call the [facility] for RN visit to get your compression wrap changed   * You need to exercice as tolerated- walking is good   * Elevate your legs preferrably above level of your heart when sitting as much as possible   * The Goal of this therapy is to reduce Edema and get into long term compression garments to control venous insufficiency, lymphedema and reduce occurrence of venous ulcers          Please note, all wounds (unless stated otherwise here) were mechanically debrided at the time of cleansing here in the wound-care center today, so a small amount of pain, drainage or bleeding

## 2024-11-27 ENCOUNTER — HOSPITAL ENCOUNTER (OUTPATIENT)
Dept: WOUND CARE | Age: 79
Discharge: HOME | End: 2024-11-27
Attending: INTERNAL MEDICINE
Payer: MEDICARE

## 2024-11-27 VITALS
SYSTOLIC BLOOD PRESSURE: 105 MMHG | RESPIRATION RATE: 16 BRPM | HEART RATE: 85 BPM | WEIGHT: 284.6 LBS | TEMPERATURE: 98 F | HEIGHT: 75 IN | DIASTOLIC BLOOD PRESSURE: 78 MMHG | BODY MASS INDEX: 35.39 KG/M2

## 2024-11-27 DIAGNOSIS — L97.313 DIABETIC ULCER OF RIGHT ANKLE ASSOCIATED WITH TYPE 2 DIABETES MELLITUS, WITH NECROSIS OF MUSCLE (HCC): ICD-10-CM

## 2024-11-27 DIAGNOSIS — I87.303 VENOUS HYPERTENSION OF BOTH LOWER EXTREMITIES: ICD-10-CM

## 2024-11-27 DIAGNOSIS — E11.42 TYPE 2 DIABETES MELLITUS WITH DIABETIC POLYNEUROPATHY, WITHOUT LONG-TERM CURRENT USE OF INSULIN (HCC): ICD-10-CM

## 2024-11-27 DIAGNOSIS — E11.622 DIABETIC ULCER OF RIGHT ANKLE ASSOCIATED WITH TYPE 2 DIABETES MELLITUS, WITH NECROSIS OF MUSCLE (HCC): ICD-10-CM

## 2024-11-27 DIAGNOSIS — R60.0 BILATERAL LOWER EXTREMITY EDEMA: Primary | ICD-10-CM

## 2024-11-27 PROCEDURE — 29581 APPL MULTLAYER CMPRN SYS LEG: CPT

## 2024-11-27 PROCEDURE — 97597 DBRDMT OPN WND 1ST 20 CM/<: CPT

## 2024-11-27 RX ORDER — LIDOCAINE 50 MG/G
OINTMENT TOPICAL ONCE
OUTPATIENT
Start: 2024-11-27 | End: 2024-11-27

## 2024-11-27 RX ORDER — LIDOCAINE HYDROCHLORIDE 40 MG/ML
SOLUTION TOPICAL ONCE
Status: DISCONTINUED | OUTPATIENT
Start: 2024-11-27 | End: 2024-11-28 | Stop reason: HOSPADM

## 2024-11-27 RX ORDER — LIDOCAINE 50 MG/G
OINTMENT TOPICAL ONCE
Status: DISCONTINUED | OUTPATIENT
Start: 2024-11-27 | End: 2024-11-28 | Stop reason: HOSPADM

## 2024-11-27 RX ORDER — LIDOCAINE HYDROCHLORIDE 40 MG/ML
SOLUTION TOPICAL ONCE
OUTPATIENT
Start: 2024-11-27 | End: 2024-11-27

## 2024-11-27 RX ORDER — TRIAMCINOLONE ACETONIDE 1 MG/G
OINTMENT TOPICAL ONCE
Status: DISCONTINUED | OUTPATIENT
Start: 2024-11-27 | End: 2024-11-28 | Stop reason: HOSPADM

## 2024-11-27 RX ORDER — TRIAMCINOLONE ACETONIDE 1 MG/G
OINTMENT TOPICAL ONCE
OUTPATIENT
Start: 2024-11-27 | End: 2024-11-27

## 2024-11-27 RX ORDER — BACITRACIN ZINC AND POLYMYXIN B SULFATE 500; 1000 [USP'U]/G; [USP'U]/G
OINTMENT TOPICAL ONCE
OUTPATIENT
Start: 2024-11-27 | End: 2024-11-27

## 2024-11-27 RX ORDER — BACITRACIN ZINC AND POLYMYXIN B SULFATE 500; 1000 [USP'U]/G; [USP'U]/G
OINTMENT TOPICAL ONCE
Status: DISCONTINUED | OUTPATIENT
Start: 2024-11-27 | End: 2024-11-28 | Stop reason: HOSPADM

## 2024-11-27 RX ORDER — LIDOCAINE 40 MG/G
CREAM TOPICAL ONCE
OUTPATIENT
Start: 2024-11-27 | End: 2024-11-27

## 2024-11-27 RX ORDER — LIDOCAINE 40 MG/G
CREAM TOPICAL ONCE
Status: DISCONTINUED | OUTPATIENT
Start: 2024-11-27 | End: 2024-11-28 | Stop reason: HOSPADM

## 2024-11-27 NOTE — PLAN OF CARE
Wound stable, debridement per MD. Pt. States he had trouble with his wrap sliding down this past week & causing discomfort. Will cont. With current wound care regime with dressings. Will decrease to using AccuWrap lite this week for compression & edema control. Reinforced importance of exercise, elevation & compression to help with circulation, edema control & wound healing, pt. Verbalizes understanding. F/u in WCC in 1 week as ordered, pt. Aware to call sooner with any changes or questions/concerns. Discharge instructions reviewed with patient, all questions answered, copy given to patient. Dressings were applied to all wounds per M.D. Instructions at this visit.

## 2024-12-05 ENCOUNTER — HOSPITAL ENCOUNTER (OUTPATIENT)
Dept: WOUND CARE | Age: 79
Discharge: HOME | End: 2024-12-05
Attending: INTERNAL MEDICINE
Payer: MEDICARE

## 2024-12-05 VITALS
BODY MASS INDEX: 35.36 KG/M2 | SYSTOLIC BLOOD PRESSURE: 145 MMHG | TEMPERATURE: 98.1 F | WEIGHT: 284.4 LBS | HEIGHT: 75 IN | HEART RATE: 75 BPM | DIASTOLIC BLOOD PRESSURE: 87 MMHG | RESPIRATION RATE: 18 BRPM

## 2024-12-05 DIAGNOSIS — I87.303 VENOUS HYPERTENSION OF BOTH LOWER EXTREMITIES: ICD-10-CM

## 2024-12-05 DIAGNOSIS — E11.42 TYPE 2 DIABETES MELLITUS WITH DIABETIC POLYNEUROPATHY, WITHOUT LONG-TERM CURRENT USE OF INSULIN (HCC): ICD-10-CM

## 2024-12-05 DIAGNOSIS — R60.0 BILATERAL LOWER EXTREMITY EDEMA: Primary | ICD-10-CM

## 2024-12-05 DIAGNOSIS — E11.622 DIABETIC ULCER OF RIGHT ANKLE ASSOCIATED WITH TYPE 2 DIABETES MELLITUS, WITH NECROSIS OF MUSCLE (HCC): ICD-10-CM

## 2024-12-05 DIAGNOSIS — L97.313 DIABETIC ULCER OF RIGHT ANKLE ASSOCIATED WITH TYPE 2 DIABETES MELLITUS, WITH NECROSIS OF MUSCLE (HCC): ICD-10-CM

## 2024-12-05 PROCEDURE — 97597 DBRDMT OPN WND 1ST 20 CM/<: CPT

## 2024-12-05 PROCEDURE — 29581 APPL MULTLAYER CMPRN SYS LEG: CPT

## 2024-12-05 RX ORDER — BACITRACIN ZINC AND POLYMYXIN B SULFATE 500; 1000 [USP'U]/G; [USP'U]/G
OINTMENT TOPICAL ONCE
Status: DISCONTINUED | OUTPATIENT
Start: 2024-12-05 | End: 2024-12-06 | Stop reason: HOSPADM

## 2024-12-05 RX ORDER — LIDOCAINE 40 MG/G
CREAM TOPICAL ONCE
OUTPATIENT
Start: 2024-12-05 | End: 2024-12-05

## 2024-12-05 RX ORDER — TRIAMCINOLONE ACETONIDE 1 MG/G
OINTMENT TOPICAL ONCE
OUTPATIENT
Start: 2024-12-05 | End: 2024-12-05

## 2024-12-05 RX ORDER — LIDOCAINE 50 MG/G
OINTMENT TOPICAL ONCE
Status: DISCONTINUED | OUTPATIENT
Start: 2024-12-05 | End: 2024-12-06 | Stop reason: HOSPADM

## 2024-12-05 RX ORDER — BACITRACIN ZINC AND POLYMYXIN B SULFATE 500; 1000 [USP'U]/G; [USP'U]/G
OINTMENT TOPICAL ONCE
OUTPATIENT
Start: 2024-12-05 | End: 2024-12-05

## 2024-12-05 RX ORDER — LIDOCAINE HYDROCHLORIDE 40 MG/ML
SOLUTION TOPICAL ONCE
Status: DISCONTINUED | OUTPATIENT
Start: 2024-12-05 | End: 2024-12-06 | Stop reason: HOSPADM

## 2024-12-05 RX ORDER — LIDOCAINE 50 MG/G
OINTMENT TOPICAL ONCE
OUTPATIENT
Start: 2024-12-05 | End: 2024-12-05

## 2024-12-05 RX ORDER — LIDOCAINE 40 MG/G
CREAM TOPICAL ONCE
Status: DISCONTINUED | OUTPATIENT
Start: 2024-12-05 | End: 2024-12-06 | Stop reason: HOSPADM

## 2024-12-05 RX ORDER — LIDOCAINE HYDROCHLORIDE 40 MG/ML
SOLUTION TOPICAL ONCE
OUTPATIENT
Start: 2024-12-05 | End: 2024-12-05

## 2024-12-05 RX ORDER — TRIAMCINOLONE ACETONIDE 1 MG/G
OINTMENT TOPICAL ONCE
Status: DISCONTINUED | OUTPATIENT
Start: 2024-12-05 | End: 2024-12-06 | Stop reason: HOSPADM

## 2024-12-05 ASSESSMENT — PAIN SCALES - GENERAL: PAINLEVEL_OUTOF10: 0

## 2024-12-05 NOTE — DISCHARGE INSTRUCTIONS
Wound Care Center Physician Orders and Discharge Instructions  [facility]  [address], [address]  [place] [id number]  Telephone: [phone]      Fax: [phone]        Your home care company:   None .     Your wound-care supplies will be provided by:  Self .     NAME:  Lacho Gallegos   YOB: 1945  PRIMARY DIAGNOSIS FOR WOUND CARE CENTER:  Diabetic Ulcer .     Wound cleansing:   Do not scrub or use excessive force.  Wash hands with soap and water before and after dressing changes.  Prior to applying a clean dressing, cleanse wound with normal saline, wound cleanser, or mild soap and water. Ask your physician or nurse before getting the wound(s) wet in the shower.                Wound care for home:     Right Lateral Ankle  Nexodyn or Vashe spray  Calmoseptine to pinpoint wound & redness  Aquaphor/Triamcinolone to dry itchy areas  Foam over wound & to anterior ankle  AccuWrap LITE compression wrap   Leave in place for the week - Do NOT get wet!           Your physician has ordered Compression for treatment of venous ulcers, venous insufficiency,and/or Lymphedema.   * Do not remove until your next scheduled visit in [facility]- do not get wet.    * If your wrap falls down, becomes painful or you have decreased sensation, and/or excessive drainage- please call the [facility] for RN visit to get your compression wrap changed   * You need to exercice as tolerated- walking is good   * Elevate your legs preferrably above level of your heart when sitting as much as possible   * The Goal of this therapy is to reduce Edema and get into long term compression garments to control venous insufficiency, lymphedema and reduce occurrence of venous ulcers          Please note, all wounds (unless stated otherwise here) were mechanically debrided at the time of cleansing here in the wound-care center today, so a small amount of pain, drainage or bleeding from that process might be expected, and is

## 2024-12-05 NOTE — PLAN OF CARE
Follow up visit in [facility]. Wound stable, showing signs of improvement. Wound debridement per , Pt tolerated well. To continue current plan of care. Follow up in Wound Care Center in 1 week as ordered. Pt. Aware to call sooner with any problems or questions/concerns. MD orders/D/C instructions reviewed with patient, all questions answered; copy of instructions given to patient.

## 2024-12-05 NOTE — DISCHARGE INSTRUCTIONS
Wound Care Center Physician Orders and Discharge Instructions  [facility]  [address], [address]  [place] [address]  Telephone: [phone]      Fax: [phone]        Your home care company:   None .     Your wound-care supplies will be provided by:  Self .     NAME:  Lacho Gallegos   YOB: 1945  PRIMARY DIAGNOSIS FOR WOUND CARE CENTER:  Diabetic Ulcer .     Wound cleansing:   Do not scrub or use excessive force.  Wash hands with soap and water before and after dressing changes.  Prior to applying a clean dressing, cleanse wound with normal saline, wound cleanser, or mild soap and water. Ask your physician or nurse before getting the wound(s) wet in the shower.                Wound care for home:     Right Lateral Ankle  Nexodyn or Vashe spray  Calmoseptine ru wound  Santyl then Sorbact to wound  Foam donut over wound (on tray)  Aquaphor/Triamcinolone to dry itchy areas  Foam to anterior ankle  AccuWrap LITE compression wrap   Leave in place for the week - Do NOT get wet!           Your physician has ordered Compression for treatment of venous ulcers, venous insufficiency,and/or Lymphedema.   * Do not remove until your next scheduled visit in [facility]- do not get wet.    * If your wrap falls down, becomes painful or you have decreased sensation, and/or excessive drainage- please call the [facility] for RN visit to get your compression wrap changed   * You need to exercice as tolerated- walking is good   * Elevate your legs preferrably above level of your heart when sitting as much as possible   * The Goal of this therapy is to reduce Edema and get into long term compression garments to control venous insufficiency, lymphedema and reduce occurrence of venous ulcers          Please note, all wounds (unless stated otherwise here) were mechanically debrided at the time of cleansing here in the wound-care center today, so a small amount of pain, drainage or bleeding from that

## 2024-12-09 RX ORDER — MONTELUKAST SODIUM 10 MG/1
TABLET ORAL
Qty: 30 TABLET | Refills: 0 | Status: SHIPPED | OUTPATIENT
Start: 2024-12-09

## 2024-12-09 RX ORDER — METFORMIN HYDROCHLORIDE 500 MG/1
500 TABLET, EXTENDED RELEASE ORAL 2 TIMES DAILY
Qty: 60 TABLET | Refills: 0 | Status: SHIPPED | OUTPATIENT
Start: 2024-12-09

## 2024-12-11 ENCOUNTER — HOSPITAL ENCOUNTER (OUTPATIENT)
Dept: WOUND CARE | Age: 79
Discharge: HOME | End: 2024-12-11
Payer: MEDICARE

## 2024-12-11 VITALS
WEIGHT: 285.8 LBS | RESPIRATION RATE: 20 BRPM | DIASTOLIC BLOOD PRESSURE: 88 MMHG | SYSTOLIC BLOOD PRESSURE: 117 MMHG | HEART RATE: 75 BPM | BODY MASS INDEX: 35.54 KG/M2 | HEIGHT: 75 IN | TEMPERATURE: 98.1 F

## 2024-12-11 DIAGNOSIS — R60.0 BILATERAL LOWER EXTREMITY EDEMA: ICD-10-CM

## 2024-12-11 DIAGNOSIS — E11.622 DIABETIC ULCER OF RIGHT ANKLE ASSOCIATED WITH TYPE 2 DIABETES MELLITUS, WITH NECROSIS OF MUSCLE (HCC): ICD-10-CM

## 2024-12-11 DIAGNOSIS — L97.313 DIABETIC ULCER OF RIGHT ANKLE ASSOCIATED WITH TYPE 2 DIABETES MELLITUS, WITH NECROSIS OF MUSCLE (HCC): ICD-10-CM

## 2024-12-11 DIAGNOSIS — I87.303 VENOUS HYPERTENSION OF BOTH LOWER EXTREMITIES: ICD-10-CM

## 2024-12-11 DIAGNOSIS — E11.42 TYPE 2 DIABETES MELLITUS WITH DIABETIC POLYNEUROPATHY, WITHOUT LONG-TERM CURRENT USE OF INSULIN (HCC): Primary | ICD-10-CM

## 2024-12-11 PROCEDURE — 29581 APPL MULTLAYER CMPRN SYS LEG: CPT

## 2024-12-11 RX ORDER — TRIAMCINOLONE ACETONIDE 1 MG/G
OINTMENT TOPICAL ONCE
Status: DISCONTINUED | OUTPATIENT
Start: 2024-12-11 | End: 2024-12-12 | Stop reason: HOSPADM

## 2024-12-11 RX ORDER — TRIAMCINOLONE ACETONIDE 1 MG/G
OINTMENT TOPICAL ONCE
OUTPATIENT
Start: 2024-12-11 | End: 2024-12-11

## 2024-12-11 RX ORDER — LIDOCAINE HYDROCHLORIDE 40 MG/ML
SOLUTION TOPICAL ONCE
Status: DISCONTINUED | OUTPATIENT
Start: 2024-12-11 | End: 2024-12-12 | Stop reason: HOSPADM

## 2024-12-11 RX ORDER — LIDOCAINE HYDROCHLORIDE 40 MG/ML
SOLUTION TOPICAL ONCE
OUTPATIENT
Start: 2024-12-11 | End: 2024-12-11

## 2024-12-11 RX ORDER — LIDOCAINE 40 MG/G
CREAM TOPICAL ONCE
OUTPATIENT
Start: 2024-12-11 | End: 2024-12-11

## 2024-12-11 RX ORDER — LIDOCAINE 50 MG/G
OINTMENT TOPICAL ONCE
Status: DISCONTINUED | OUTPATIENT
Start: 2024-12-11 | End: 2024-12-12 | Stop reason: HOSPADM

## 2024-12-11 RX ORDER — LIDOCAINE 40 MG/G
CREAM TOPICAL ONCE
Status: DISCONTINUED | OUTPATIENT
Start: 2024-12-11 | End: 2024-12-12 | Stop reason: HOSPADM

## 2024-12-11 RX ORDER — LIDOCAINE 50 MG/G
OINTMENT TOPICAL ONCE
OUTPATIENT
Start: 2024-12-11 | End: 2024-12-11

## 2024-12-11 RX ORDER — BACITRACIN ZINC AND POLYMYXIN B SULFATE 500; 1000 [USP'U]/G; [USP'U]/G
OINTMENT TOPICAL ONCE
OUTPATIENT
Start: 2024-12-11 | End: 2024-12-11

## 2024-12-11 RX ORDER — BACITRACIN ZINC AND POLYMYXIN B SULFATE 500; 1000 [USP'U]/G; [USP'U]/G
OINTMENT TOPICAL ONCE
Status: DISCONTINUED | OUTPATIENT
Start: 2024-12-11 | End: 2024-12-12 | Stop reason: HOSPADM

## 2024-12-11 NOTE — PLAN OF CARE
Wound almost healed, no debridement. Wound care dressing orders updated per MD. Will apply Calmoseptine to wound, will stop foam donut & cover with foam instead. Will cont. With compression wrap for edema control. Reinforced importance of exercise, elevation & compression to help with circulation, edema control & wound healing. F/u in WCC in 1 week as ordered, pt. Aware to call sooner with any changes or questions/concerns. Discharge instructions reviewed with patient, all questions answered, copy given to patient. Dressings were applied to all wounds per M.D. Instructions at this visit.

## 2024-12-13 NOTE — DISCHARGE INSTRUCTIONS
be expected, and is normal.      All products for home use, including multiple products for a single wound if applicable, are medically necessary in order to achieve the best chance at timely wound healing. See provider documentation for details if needed.     Substituted dressings applied in the [facility] today, if applicable:     N/a     New orders for this week (labs, imaging, medications, etc.):  12/19-Please take a healed photo   12/20-OK to get back into the pool once wrap is removed next week     Additional instructions for specific diagnoses:     Sigvaris 20-30 mmHg stockings  Elevate legs as much as possible to aid in edema control. Walking is great! If you are not up and walking, please elevate.    WOUND CARE CENTER DISCHARGE INSTRUCTIONS:     CONGRATULATIONS! You have completed your treatment program!  We have created a list of general reminders to assist you in preventing future wounds:     1.   Check your skin daily for reddened areas, abrasions, or sores. If a new wound is noted, call the Wound Care Center at [phone].   2.   Clean your using mild soap and warm (not hot) water, daily.   3.   If your skin appears dry, apply lotion as needed, but not between the toes.   4.   Avoid pressure and trauma to recently healed wounds. The skin is still very fragile and will break down easily.     5.   Always wear socks and well-fitting shoes. Never walk barefoot.   6.   Maintain a nutritious diet, minimizing alcohol, caffeine, and excess sugar. We can give you recommendations for increasing the amount of protein in your diet, if you're interested.   7. Probably the best thing you can do to prevent wounds in the future is to stop smoking, if you are a current smoker. If we've not discussed this before, please ask about ways that we could help you quit.   8. If you are significantly overweight (a BMI of > 27), [facility] has several different treatment options that can help you. You

## 2024-12-16 RX ORDER — ATORVASTATIN CALCIUM 40 MG/1
TABLET, FILM COATED ORAL
Qty: 90 TABLET | Refills: 0 | Status: SHIPPED | OUTPATIENT
Start: 2024-12-16

## 2024-12-19 ENCOUNTER — HOSPITAL ENCOUNTER (OUTPATIENT)
Dept: WOUND CARE | Age: 79
Discharge: HOME | End: 2024-12-19
Attending: INTERNAL MEDICINE
Payer: MEDICARE

## 2024-12-19 VITALS
SYSTOLIC BLOOD PRESSURE: 131 MMHG | HEIGHT: 75 IN | HEART RATE: 79 BPM | RESPIRATION RATE: 18 BRPM | TEMPERATURE: 97.6 F | WEIGHT: 283 LBS | DIASTOLIC BLOOD PRESSURE: 85 MMHG | BODY MASS INDEX: 35.19 KG/M2

## 2024-12-19 DIAGNOSIS — R60.0 BILATERAL LOWER EXTREMITY EDEMA: Primary | ICD-10-CM

## 2024-12-19 DIAGNOSIS — I87.303 VENOUS HYPERTENSION OF BOTH LOWER EXTREMITIES: ICD-10-CM

## 2024-12-19 DIAGNOSIS — E11.42 TYPE 2 DIABETES MELLITUS WITH DIABETIC POLYNEUROPATHY, WITHOUT LONG-TERM CURRENT USE OF INSULIN (HCC): ICD-10-CM

## 2024-12-19 DIAGNOSIS — E11.622 DIABETIC ULCER OF RIGHT ANKLE ASSOCIATED WITH TYPE 2 DIABETES MELLITUS, WITH NECROSIS OF MUSCLE (HCC): ICD-10-CM

## 2024-12-19 DIAGNOSIS — L97.313 DIABETIC ULCER OF RIGHT ANKLE ASSOCIATED WITH TYPE 2 DIABETES MELLITUS, WITH NECROSIS OF MUSCLE (HCC): ICD-10-CM

## 2024-12-19 PROCEDURE — 99212 OFFICE O/P EST SF 10 MIN: CPT

## 2024-12-19 PROCEDURE — 29581 APPL MULTLAYER CMPRN SYS LEG: CPT

## 2024-12-19 RX ORDER — TRIAMCINOLONE ACETONIDE 1 MG/G
OINTMENT TOPICAL ONCE
OUTPATIENT
Start: 2024-12-19 | End: 2024-12-19

## 2024-12-19 RX ORDER — LIDOCAINE 50 MG/G
OINTMENT TOPICAL ONCE
Status: DISCONTINUED | OUTPATIENT
Start: 2024-12-19 | End: 2024-12-20 | Stop reason: HOSPADM

## 2024-12-19 RX ORDER — BACITRACIN ZINC AND POLYMYXIN B SULFATE 500; 1000 [USP'U]/G; [USP'U]/G
OINTMENT TOPICAL ONCE
OUTPATIENT
Start: 2024-12-19 | End: 2024-12-19

## 2024-12-19 RX ORDER — TRIAMCINOLONE ACETONIDE 1 MG/G
OINTMENT TOPICAL ONCE
Status: DISCONTINUED | OUTPATIENT
Start: 2024-12-19 | End: 2024-12-20 | Stop reason: HOSPADM

## 2024-12-19 RX ORDER — LIDOCAINE 50 MG/G
OINTMENT TOPICAL ONCE
OUTPATIENT
Start: 2024-12-19 | End: 2024-12-19

## 2024-12-19 RX ORDER — LIDOCAINE HYDROCHLORIDE 40 MG/ML
SOLUTION TOPICAL ONCE
OUTPATIENT
Start: 2024-12-19 | End: 2024-12-19

## 2024-12-19 RX ORDER — BACITRACIN ZINC AND POLYMYXIN B SULFATE 500; 1000 [USP'U]/G; [USP'U]/G
OINTMENT TOPICAL ONCE
Status: DISCONTINUED | OUTPATIENT
Start: 2024-12-19 | End: 2024-12-20 | Stop reason: HOSPADM

## 2024-12-19 RX ORDER — LIDOCAINE 40 MG/G
CREAM TOPICAL ONCE
OUTPATIENT
Start: 2024-12-19 | End: 2024-12-19

## 2024-12-19 RX ORDER — LIDOCAINE 40 MG/G
CREAM TOPICAL ONCE
Status: DISCONTINUED | OUTPATIENT
Start: 2024-12-19 | End: 2024-12-20 | Stop reason: HOSPADM

## 2024-12-19 RX ORDER — LIDOCAINE HYDROCHLORIDE 40 MG/ML
SOLUTION TOPICAL ONCE
Status: DISCONTINUED | OUTPATIENT
Start: 2024-12-19 | End: 2024-12-20 | Stop reason: HOSPADM

## 2024-12-19 NOTE — PLAN OF CARE
Pt to the [facility] for wound assessment. Wound healed. Pt to continue with documented plan, plan for 1 more week of insurance wrap, then pt to remove own dressing and to follow up in the [facility] as needed. Pt. aware to call sooner with any problems or questions/concerns. MD orders and D/C instructions reviewed, pt verbalized understanding.

## 2025-01-03 ENCOUNTER — TELEPHONE (OUTPATIENT)
Dept: INTERNAL MEDICINE CLINIC | Age: 80
End: 2025-01-03

## 2025-01-03 RX ORDER — AZITHROMYCIN 250 MG/1
TABLET, FILM COATED ORAL
Qty: 6 TABLET | Refills: 0 | Status: SHIPPED | OUTPATIENT
Start: 2025-01-03

## 2025-01-03 NOTE — TELEPHONE ENCOUNTER
----- Message from Dr. Arpit Ramirez MD sent at 1/3/2025 12:34 PM EST -----  Contact: [facility]IG[facility] [phone]  geri  ----- Message -----  From: Abilio Ocampo  Sent: 1/3/2025   8:33 AM EST  To: Arpit Ramirez MD    Starting yesterday 1/2 the patient began experiencing; cough, chest congestion, some wheezing, headache, sore throat, and post nasal drip. Symptoms have worsened this morning. Pt currently taking tylenol as he is on multiple heart medications and unsure what over the counter medication he is able to safely take. Pt is requesting a script sent to the below pharmacy. Covid negative. Please advise     [facility] [id number] - [place], [place] - [address] [place] [address] [phone] - F [phone]  [address] [facility] [address]  Phone: [phone]  Fax: [phone]  
sent  
chest pain

## 2025-01-07 PROBLEM — L97.313 DIABETIC ULCER OF RIGHT ANKLE ASSOCIATED WITH TYPE 2 DIABETES MELLITUS, WITH NECROSIS OF MUSCLE (HCC): Status: RESOLVED | Noted: 2024-05-17 | Resolved: 2025-01-07

## 2025-01-07 PROBLEM — E11.622 DIABETIC ULCER OF RIGHT ANKLE ASSOCIATED WITH TYPE 2 DIABETES MELLITUS, WITH NECROSIS OF MUSCLE (HCC): Status: RESOLVED | Noted: 2024-05-17 | Resolved: 2025-01-07

## 2025-01-07 RX ORDER — METFORMIN HYDROCHLORIDE 500 MG/1
TABLET, EXTENDED RELEASE ORAL
Qty: 180 TABLET | Refills: 0 | Status: SHIPPED | OUTPATIENT
Start: 2025-01-07

## 2025-01-07 RX ORDER — MONTELUKAST SODIUM 10 MG/1
TABLET ORAL
Qty: 90 TABLET | Refills: 0 | Status: SHIPPED | OUTPATIENT
Start: 2025-01-07

## 2025-01-21 RX ORDER — ALLOPURINOL 100 MG/1
100 TABLET ORAL 2 TIMES DAILY
Qty: 180 TABLET | Refills: 1 | Status: SHIPPED | OUTPATIENT
Start: 2025-01-21

## 2025-01-21 NOTE — PLAN OF CARE
Orders:    VYVANSE 40 MG CAPS; Take 40 mg by mouth daily for 30 days. Max Daily Amount: 40 mg     Problem: Discharge Planning  Goal: Discharge to home or other facility with appropriate resources  Outcome: Progressing     Problem: Safety - Adult  Goal: Free from fall injury  Outcome: Progressing     Problem: ABCDS Injury Assessment  Goal: Absence of physical injury  Outcome: Progressing     Problem: Chronic Conditions and Co-morbidities  Goal: Patient's chronic conditions and co-morbidity symptoms are monitored and maintained or improved  Outcome: Progressing

## 2025-02-17 ENCOUNTER — HOSPITAL ENCOUNTER (OUTPATIENT)
Age: 80
Discharge: HOME | End: 2025-02-17
Payer: MEDICARE

## 2025-02-17 ENCOUNTER — OFFICE VISIT (OUTPATIENT)
Dept: INTERNAL MEDICINE CLINIC | Age: 80
End: 2025-02-17

## 2025-02-17 VITALS
HEIGHT: 75 IN | RESPIRATION RATE: 18 BRPM | DIASTOLIC BLOOD PRESSURE: 65 MMHG | HEART RATE: 68 BPM | BODY MASS INDEX: 36.06 KG/M2 | SYSTOLIC BLOOD PRESSURE: 110 MMHG | WEIGHT: 290 LBS

## 2025-02-17 DIAGNOSIS — R06.00 DYSPNEA, UNSPECIFIED TYPE: ICD-10-CM

## 2025-02-17 DIAGNOSIS — J06.9 ACUTE URI: Primary | ICD-10-CM

## 2025-02-17 DIAGNOSIS — J45.21 MILD INTERMITTENT ASTHMA WITH ACUTE EXACERBATION: ICD-10-CM

## 2025-02-17 DIAGNOSIS — J06.9 ACUTE URI: ICD-10-CM

## 2025-02-17 LAB
FLUAV RNA UPPER RESP QL NAA+PROBE: NEGATIVE
FLUBV AG NPH QL: NEGATIVE

## 2025-02-17 PROCEDURE — G8428 CUR MEDS NOT DOCUMENT: HCPCS | Performed by: INTERNAL MEDICINE

## 2025-02-17 PROCEDURE — G8417 CALC BMI ABV UP PARAM F/U: HCPCS | Performed by: INTERNAL MEDICINE

## 2025-02-17 PROCEDURE — 3078F DIAST BP <80 MM HG: CPT | Performed by: INTERNAL MEDICINE

## 2025-02-17 PROCEDURE — 1036F TOBACCO NON-USER: CPT | Performed by: INTERNAL MEDICINE

## 2025-02-17 PROCEDURE — 87804 INFLUENZA ASSAY W/OPTIC: CPT

## 2025-02-17 PROCEDURE — 99212 OFFICE O/P EST SF 10 MIN: CPT | Performed by: INTERNAL MEDICINE

## 2025-02-17 PROCEDURE — 3074F SYST BP LT 130 MM HG: CPT | Performed by: INTERNAL MEDICINE

## 2025-02-17 PROCEDURE — 1123F ACP DISCUSS/DSCN MKR DOCD: CPT | Performed by: INTERNAL MEDICINE

## 2025-02-17 RX ORDER — DOXYCYCLINE HYCLATE 100 MG
100 TABLET ORAL 2 TIMES DAILY
Qty: 10 TABLET | Refills: 0 | Status: SHIPPED | OUTPATIENT
Start: 2025-02-17 | End: 2025-02-22

## 2025-02-17 RX ORDER — PREDNISONE 20 MG/1
40 TABLET ORAL DAILY
Qty: 10 TABLET | Refills: 0 | Status: SHIPPED | OUTPATIENT
Start: 2025-02-17 | End: 2025-02-21 | Stop reason: SDUPTHER

## 2025-02-17 RX ORDER — BENZONATATE 100 MG/1
100 CAPSULE ORAL 3 TIMES DAILY PRN
Qty: 30 CAPSULE | Refills: 0 | Status: SHIPPED | OUTPATIENT
Start: 2025-02-17 | End: 2025-02-27

## 2025-02-17 SDOH — ECONOMIC STABILITY: FOOD INSECURITY: WITHIN THE PAST 12 MONTHS, THE FOOD YOU BOUGHT JUST DIDN'T LAST AND YOU DIDN'T HAVE MONEY TO GET MORE.: NEVER TRUE

## 2025-02-17 SDOH — ECONOMIC STABILITY: INCOME INSECURITY: IN THE LAST 12 MONTHS, WAS THERE A TIME WHEN YOU WERE NOT ABLE TO PAY THE MORTGAGE OR RENT ON TIME?: NO

## 2025-02-17 SDOH — ECONOMIC STABILITY: TRANSPORTATION INSECURITY
IN THE PAST 12 MONTHS, HAS THE LACK OF TRANSPORTATION KEPT YOU FROM MEDICAL APPOINTMENTS OR FROM GETTING MEDICATIONS?: NO

## 2025-02-17 SDOH — ECONOMIC STABILITY: FOOD INSECURITY: WITHIN THE PAST 12 MONTHS, YOU WORRIED THAT YOUR FOOD WOULD RUN OUT BEFORE YOU GOT MONEY TO BUY MORE.: NEVER TRUE

## 2025-02-17 ASSESSMENT — ENCOUNTER SYMPTOMS: COUGH: 1

## 2025-02-17 ASSESSMENT — PATIENT HEALTH QUESTIONNAIRE - PHQ9
1. LITTLE INTEREST OR PLEASURE IN DOING THINGS: NOT AT ALL
2. FEELING DOWN, DEPRESSED OR HOPELESS: NOT AT ALL
SUM OF ALL RESPONSES TO PHQ QUESTIONS 1-9: 0
SUM OF ALL RESPONSES TO PHQ9 QUESTIONS 1 & 2: 0
SUM OF ALL RESPONSES TO PHQ9 QUESTIONS 1 & 2: 0
1. LITTLE INTEREST OR PLEASURE IN DOING THINGS: NOT AT ALL
2. FEELING DOWN, DEPRESSED OR HOPELESS: NOT AT ALL

## 2025-02-17 NOTE — PROGRESS NOTES
Lacho Gallegos (:  1945) is a 79 y.o. male,Established patient, here for evaluation of the following chief complaint(s):  Cold Symptoms          79 y.o. 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 ,obestiy, MARIA VICTORIA , pedal edema here for dizziness , fatigue and sob chills x 3 days  Sick contacts in family     Hx of asthma and uses albuterol nebs and advair at home  Now with above illness has dyspnea and unable to breathe properly with cough   No change in bladder or bowel habits  No dysuria  No fevers   Feels fatigued and dizzy    Using demadex only as needed      Allergies   Allergen Reactions    Bactrim [Sulfamethoxazole-Trimethoprim] Diarrhea    Brilinta [Ticagrelor]      dyspnea    Ciprofloxacin      Bad headaches    Macrobid [Nitrofurantoin Monohyd Macro]     Ozempic (0.25 Or 0.5 Mg-Dose) [Semaglutide(0.25 Or 0.5mg-Dos)]     Pioglitazone Swelling    Sulfamethoxazole Diarrhea    Theophylline      Theodur-caused severe headaches    Cefuroxime Axetil Rash and Itching    Cipro Xr Rash    Digoxin Nausea And Vomiting    Other Rash and Other (See Comments)     Ekg leads-glue    Tape [Adhesive Tape] Rash     Skin irritaion  Eats away at skin, paper tape OK  Plastic tape     Current Outpatient Medications   Medication Sig Dispense Refill    predniSONE (DELTASONE) 20 MG tablet Take 2 tablets by mouth daily for 5 days 10 tablet 0    doxycycline hyclate (VIBRA-TABS) 100 MG tablet Take 1 tablet by mouth 2 times daily for 5 days 10 tablet 0    allopurinol (ZYLOPRIM) 100 MG tablet Take 1 tablet by mouth 2 times daily 180 tablet 1    montelukast (SINGULAIR) 10 MG tablet TAKE 1 TABLET BY MOUTH DAILY 90 tablet 0    metFORMIN (GLUCOPHAGE-XR) 500 MG extended release tablet TAKE 1 TABLET BY MOUTH EVERY MORNING AND EVERY NIGHT AT BEDTIME 180 tablet 0    azithromycin (ZITHROMAX) 250 MG tablet Take 2 tabs (500 MG) on Day 1, and take 1 tab (250 MG) on days 2-5. 6 tablet 0    atorvastatin

## 2025-02-21 ENCOUNTER — HOSPITAL ENCOUNTER (OUTPATIENT)
Dept: GENERAL RADIOLOGY | Age: 80
Discharge: HOME | End: 2025-02-21
Payer: MEDICARE

## 2025-02-21 ENCOUNTER — HOSPITAL ENCOUNTER (OUTPATIENT)
Age: 80
Discharge: HOME | End: 2025-02-21
Payer: MEDICARE

## 2025-02-21 DIAGNOSIS — R05.9 COUGH, UNSPECIFIED TYPE: Primary | ICD-10-CM

## 2025-02-21 DIAGNOSIS — R05.9 COUGH, UNSPECIFIED TYPE: ICD-10-CM

## 2025-02-21 DIAGNOSIS — R05.1 ACUTE COUGH: Primary | ICD-10-CM

## 2025-02-21 PROCEDURE — 71046 X-RAY EXAM CHEST 2 VIEWS: CPT

## 2025-02-21 RX ORDER — PREDNISONE 20 MG/1
40 TABLET ORAL DAILY
Qty: 10 TABLET | Refills: 0 | Status: SHIPPED | OUTPATIENT
Start: 2025-02-21 | End: 2025-02-26

## 2025-02-21 RX ORDER — CEFUROXIME AXETIL 500 MG/1
500 TABLET ORAL 2 TIMES DAILY
Qty: 10 TABLET | Refills: 0 | Status: SHIPPED | OUTPATIENT
Start: 2025-02-21 | End: 2025-02-26

## 2025-02-21 RX ORDER — HYDROCODONE BITARTRATE AND HOMATROPINE METHYLBROMIDE ORAL SOLUTION 5; 1.5 MG/5ML; MG/5ML
5 LIQUID ORAL EVERY 6 HOURS PRN
Qty: 140 ML | Refills: 0 | Status: SHIPPED | OUTPATIENT
Start: 2025-02-21 | End: 2025-02-28

## 2025-02-21 NOTE — TELEPHONE ENCOUNTER
----- Message from Dr. Calin Zheng MD sent at 2/21/2025 11:14 AM EST -----  Contact: [facility] [phone]  Ceftin 500 mg bid x 5 days  Refill prednisone  Obtain cxr 2 view  ----- Message -----  From: Abilio Ocampo  Sent: 2/21/2025   8:24 AM EST  To: Calin Zheng MD    Patient states he has been taking the below script starting 2/17, but the following symptoms have had little to no improvement; heavy cough, lots of wheezing, shortness of breath, very little to no phlegm, and headaches. Negative for FLU and Covid. Using Nebulizer and inhaler as advised as well as CPAP at night. Pt states he feels a little better but overall still feels weak and unwell. Pt also is taking tylenol, but it is providing no relief for headaches. Please advise.    predniSONE (DELTASONE) 20 MG tablet     doxycycline hyclate (VIBRA-TABS) 100 MG tablet      benzonatate (TESSALON PERLES) 100 MG capsule    [facility] [id number] [address] [phone] -  [phone]  [address] [place] [id number]  Phone: [phone]  Fax: [phone]

## 2025-02-21 NOTE — TELEPHONE ENCOUNTER
Verbal given to Dr. DIAZ that addis doesn't work for him. Dr. DIAZ will be sending in [facility] for him. CXR ordered. Medication sent.

## 2025-02-26 ENCOUNTER — HOSPITAL ENCOUNTER (INPATIENT)
Age: 80
LOS: 2 days | Discharge: HOME | End: 2025-02-28
Attending: EMERGENCY MEDICINE | Admitting: INTERNAL MEDICINE
Payer: MEDICARE

## 2025-02-26 ENCOUNTER — APPOINTMENT (OUTPATIENT)
Dept: GENERAL RADIOLOGY | Age: 80
End: 2025-02-26
Payer: MEDICARE

## 2025-02-26 ENCOUNTER — TELEPHONE (OUTPATIENT)
Dept: INTERNAL MEDICINE CLINIC | Age: 80
End: 2025-02-26

## 2025-02-26 DIAGNOSIS — E11.65 HYPERGLYCEMIA DUE TO DIABETES MELLITUS (HCC): ICD-10-CM

## 2025-02-26 DIAGNOSIS — J10.1 INFLUENZA A: Primary | ICD-10-CM

## 2025-02-26 DIAGNOSIS — R42 LIGHTHEADEDNESS: ICD-10-CM

## 2025-02-26 PROBLEM — J11.1 INFLUENZA: Status: ACTIVE | Noted: 2025-02-26

## 2025-02-26 LAB
ALBUMIN SERPL-MCNC: 4.4 G/DL (ref 3.4–5)
ALBUMIN/GLOB SERPL: 1.2 {RATIO} (ref 1.1–2.2)
ALP SERPL-CCNC: 126 U/L (ref 40–129)
ALT SERPL-CCNC: 97 U/L (ref 10–40)
ANION GAP SERPL CALCULATED.3IONS-SCNC: 20 MMOL/L (ref 3–16)
AST SERPL-CCNC: 56 U/L (ref 15–37)
BASE EXCESS BLDV CALC-SCNC: -0.9 MMOL/L (ref -3–3)
BASOPHILS # BLD: 0.1 K/UL (ref 0–0.2)
BASOPHILS NFR BLD: 0.8 %
BILIRUB SERPL-MCNC: 1.4 MG/DL (ref 0–1)
BILIRUB UR QL STRIP.AUTO: NEGATIVE
BUN SERPL-MCNC: 38 MG/DL (ref 7–20)
CALCIUM SERPL-MCNC: 10.2 MG/DL (ref 8.3–10.6)
CHLORIDE SERPL-SCNC: 93 MMOL/L (ref 99–110)
CLARITY UR: CLEAR
CO2 BLDV-SCNC: 24 MMOL/L
CO2 SERPL-SCNC: 22 MMOL/L (ref 21–32)
COHGB MFR BLDV: 2.4 % (ref 0–1.5)
COLOR UR: YELLOW
CREAT SERPL-MCNC: 1.4 MG/DL (ref 0.8–1.3)
DEPRECATED RDW RBC AUTO: 17 % (ref 12.4–15.4)
EKG ATRIAL RATE: 85 BPM
EKG DIAGNOSIS: NORMAL
EKG P AXIS: 65 DEGREES
EKG P-R INTERVAL: 222 MS
EKG Q-T INTERVAL: 416 MS
EKG QRS DURATION: 78 MS
EKG QTC CALCULATION (BAZETT): 495 MS
EKG R AXIS: 17 DEGREES
EKG T AXIS: 34 DEGREES
EKG VENTRICULAR RATE: 85 BPM
EOSINOPHIL # BLD: 0 K/UL (ref 0–0.6)
EOSINOPHIL NFR BLD: 0.1 %
EPI CELLS #/AREA URNS HPF: ABNORMAL /HPF (ref 0–5)
FLUAV RNA RESP QL NAA+PROBE: DETECTED
FLUBV RNA RESP QL NAA+PROBE: NOT DETECTED
GFR SERPLBLD CREATININE-BSD FMLA CKD-EPI: 51 ML/MIN/{1.73_M2}
GLUCOSE BLD-MCNC: 272 MG/DL (ref 70–99)
GLUCOSE SERPL-MCNC: 288 MG/DL (ref 70–99)
GLUCOSE UR STRIP.AUTO-MCNC: >=1000 MG/DL
HCO3 BLDV-SCNC: 22.8 MMOL/L (ref 23–29)
HCT VFR BLD AUTO: 46.2 % (ref 40.5–52.5)
HGB BLD-MCNC: 15.2 G/DL (ref 13.5–17.5)
HGB UR QL STRIP.AUTO: ABNORMAL
KETONES UR STRIP.AUTO-MCNC: NEGATIVE MG/DL
LEUKOCYTE ESTERASE UR QL STRIP.AUTO: ABNORMAL
LYMPHOCYTES # BLD: 1.2 K/UL (ref 1–5.1)
LYMPHOCYTES NFR BLD: 11 %
MCH RBC QN AUTO: 30.8 PG (ref 26–34)
MCHC RBC AUTO-ENTMCNC: 32.9 G/DL (ref 31–36)
MCV RBC AUTO: 93.4 FL (ref 80–100)
METHGB MFR BLDV: 0.2 %
MONOCYTES # BLD: 0.5 K/UL (ref 0–1.3)
MONOCYTES NFR BLD: 4.4 %
NEUTROPHILS # BLD: 9.3 K/UL (ref 1.7–7.7)
NEUTROPHILS NFR BLD: 83.7 %
NITRITE UR QL STRIP.AUTO: NEGATIVE
NT-PROBNP SERPL-MCNC: 1261 PG/ML (ref 0–449)
O2 THERAPY: ABNORMAL
PCO2 BLDV: 35.1 MMHG (ref 40–50)
PERFORMED ON: ABNORMAL
PH BLDV: 7.43 [PH] (ref 7.35–7.45)
PH UR STRIP.AUTO: 6 [PH] (ref 5–8)
PLATELET # BLD AUTO: 234 K/UL (ref 135–450)
PMV BLD AUTO: 7.5 FL (ref 5–10.5)
PO2 BLDV: 29.2 MMHG (ref 25–40)
POTASSIUM SERPL-SCNC: 4.1 MMOL/L (ref 3.5–5.1)
PROT SERPL-MCNC: 8.1 G/DL (ref 6.4–8.2)
PROT UR STRIP.AUTO-MCNC: NEGATIVE MG/DL
RBC # BLD AUTO: 4.95 M/UL (ref 4.2–5.9)
RBC #/AREA URNS HPF: ABNORMAL /HPF (ref 0–4)
SAO2 % BLDV: 58 %
SARS-COV-2 RNA RESP QL NAA+PROBE: NOT DETECTED
SODIUM SERPL-SCNC: 135 MMOL/L (ref 136–145)
SP GR UR STRIP.AUTO: 1.01 (ref 1–1.03)
TROPONIN, HIGH SENSITIVITY: 12 NG/L (ref 0–22)
TROPONIN, HIGH SENSITIVITY: 13 NG/L (ref 0–22)
UA COMPLETE W REFLEX CULTURE PNL UR: ABNORMAL
UA DIPSTICK W REFLEX MICRO PNL UR: YES
URN SPEC COLLECT METH UR: ABNORMAL
UROBILINOGEN UR STRIP-ACNC: 0.2 E.U./DL
WBC # BLD AUTO: 11.1 K/UL (ref 4–11)
WBC #/AREA URNS HPF: ABNORMAL /HPF (ref 0–5)

## 2025-02-26 PROCEDURE — 2580000003 HC RX 258

## 2025-02-26 PROCEDURE — 6370000000 HC RX 637 (ALT 250 FOR IP)

## 2025-02-26 PROCEDURE — 87636 SARSCOV2 & INF A&B AMP PRB: CPT

## 2025-02-26 PROCEDURE — 6370000000 HC RX 637 (ALT 250 FOR IP): Performed by: INTERNAL MEDICINE

## 2025-02-26 PROCEDURE — 80053 COMPREHEN METABOLIC PANEL: CPT

## 2025-02-26 PROCEDURE — 5A09357 ASSISTANCE WITH RESPIRATORY VENTILATION, LESS THAN 24 CONSECUTIVE HOURS, CONTINUOUS POSITIVE AIRWAY PRESSURE: ICD-10-PCS | Performed by: INTERNAL MEDICINE

## 2025-02-26 PROCEDURE — 85025 COMPLETE CBC W/AUTO DIFF WBC: CPT

## 2025-02-26 PROCEDURE — 93010 ELECTROCARDIOGRAM REPORT: CPT | Performed by: INTERNAL MEDICINE

## 2025-02-26 PROCEDURE — 99285 EMERGENCY DEPT VISIT HI MDM: CPT

## 2025-02-26 PROCEDURE — 1200000000 HC SEMI PRIVATE

## 2025-02-26 PROCEDURE — 93005 ELECTROCARDIOGRAM TRACING: CPT

## 2025-02-26 PROCEDURE — 71045 X-RAY EXAM CHEST 1 VIEW: CPT

## 2025-02-26 PROCEDURE — 2500000003 HC RX 250 WO HCPCS: Performed by: INTERNAL MEDICINE

## 2025-02-26 PROCEDURE — 94640 AIRWAY INHALATION TREATMENT: CPT

## 2025-02-26 PROCEDURE — 83880 ASSAY OF NATRIURETIC PEPTIDE: CPT

## 2025-02-26 PROCEDURE — 81001 URINALYSIS AUTO W/SCOPE: CPT

## 2025-02-26 PROCEDURE — 6360000002 HC RX W HCPCS: Performed by: INTERNAL MEDICINE

## 2025-02-26 PROCEDURE — 82803 BLOOD GASES ANY COMBINATION: CPT

## 2025-02-26 PROCEDURE — 2580000003 HC RX 258: Performed by: INTERNAL MEDICINE

## 2025-02-26 PROCEDURE — 6370000000 HC RX 637 (ALT 250 FOR IP): Performed by: NURSE PRACTITIONER

## 2025-02-26 PROCEDURE — 94660 CPAP INITIATION&MGMT: CPT

## 2025-02-26 PROCEDURE — 96360 HYDRATION IV INFUSION INIT: CPT

## 2025-02-26 PROCEDURE — 84484 ASSAY OF TROPONIN QUANT: CPT

## 2025-02-26 PROCEDURE — 94669 MECHANICAL CHEST WALL OSCILL: CPT

## 2025-02-26 PROCEDURE — 36415 COLL VENOUS BLD VENIPUNCTURE: CPT

## 2025-02-26 RX ORDER — ATORVASTATIN CALCIUM 40 MG/1
40 TABLET, FILM COATED ORAL DAILY
Status: DISCONTINUED | OUTPATIENT
Start: 2025-02-27 | End: 2025-02-28 | Stop reason: HOSPADM

## 2025-02-26 RX ORDER — GLUCAGON 1 MG/ML
1 KIT INJECTION PRN
Status: DISCONTINUED | OUTPATIENT
Start: 2025-02-26 | End: 2025-02-28 | Stop reason: HOSPADM

## 2025-02-26 RX ORDER — ACETAMINOPHEN 650 MG/1
650 SUPPOSITORY RECTAL EVERY 6 HOURS PRN
Status: DISCONTINUED | OUTPATIENT
Start: 2025-02-26 | End: 2025-02-28 | Stop reason: HOSPADM

## 2025-02-26 RX ORDER — INSULIN GLARGINE 100 [IU]/ML
8 INJECTION, SOLUTION SUBCUTANEOUS NIGHTLY
Status: DISCONTINUED | OUTPATIENT
Start: 2025-02-26 | End: 2025-02-28 | Stop reason: HOSPADM

## 2025-02-26 RX ORDER — HYDROCODONE BITARTRATE AND HOMATROPINE METHYLBROMIDE ORAL SOLUTION 5; 1.5 MG/5ML; MG/5ML
5 LIQUID ORAL EVERY 6 HOURS PRN
Status: DISCONTINUED | OUTPATIENT
Start: 2025-02-26 | End: 2025-02-28 | Stop reason: HOSPADM

## 2025-02-26 RX ORDER — 0.9 % SODIUM CHLORIDE 0.9 %
500 INTRAVENOUS SOLUTION INTRAVENOUS ONCE
Status: COMPLETED | OUTPATIENT
Start: 2025-02-26 | End: 2025-02-26

## 2025-02-26 RX ORDER — PROCHLORPERAZINE EDISYLATE 5 MG/ML
10 INJECTION INTRAMUSCULAR; INTRAVENOUS EVERY 6 HOURS PRN
Status: DISCONTINUED | OUTPATIENT
Start: 2025-02-26 | End: 2025-02-26

## 2025-02-26 RX ORDER — INSULIN LISPRO 100 [IU]/ML
0-8 INJECTION, SOLUTION INTRAVENOUS; SUBCUTANEOUS
Status: DISCONTINUED | OUTPATIENT
Start: 2025-02-26 | End: 2025-02-28 | Stop reason: HOSPADM

## 2025-02-26 RX ORDER — SODIUM CHLORIDE 9 MG/ML
INJECTION, SOLUTION INTRAVENOUS PRN
Status: DISCONTINUED | OUTPATIENT
Start: 2025-02-26 | End: 2025-02-28 | Stop reason: HOSPADM

## 2025-02-26 RX ORDER — ACETAMINOPHEN 325 MG/1
650 TABLET ORAL EVERY 6 HOURS PRN
Status: DISCONTINUED | OUTPATIENT
Start: 2025-02-26 | End: 2025-02-28 | Stop reason: HOSPADM

## 2025-02-26 RX ORDER — SODIUM CHLORIDE 0.9 % (FLUSH) 0.9 %
5-40 SYRINGE (ML) INJECTION EVERY 12 HOURS SCHEDULED
Status: DISCONTINUED | OUTPATIENT
Start: 2025-02-26 | End: 2025-02-28 | Stop reason: HOSPADM

## 2025-02-26 RX ORDER — POLYETHYLENE GLYCOL 3350 17 G/17G
17 POWDER, FOR SOLUTION ORAL DAILY PRN
Status: DISCONTINUED | OUTPATIENT
Start: 2025-02-26 | End: 2025-02-28 | Stop reason: HOSPADM

## 2025-02-26 RX ORDER — MIDODRINE HYDROCHLORIDE 5 MG/1
10 TABLET ORAL 3 TIMES DAILY
Status: DISCONTINUED | OUTPATIENT
Start: 2025-02-26 | End: 2025-02-28 | Stop reason: HOSPADM

## 2025-02-26 RX ORDER — BUDESONIDE AND FORMOTEROL FUMARATE DIHYDRATE 160; 4.5 UG/1; UG/1
2 AEROSOL RESPIRATORY (INHALATION)
Status: DISCONTINUED | OUTPATIENT
Start: 2025-02-26 | End: 2025-02-28 | Stop reason: HOSPADM

## 2025-02-26 RX ORDER — ALLOPURINOL 100 MG/1
100 TABLET ORAL 2 TIMES DAILY
Status: DISCONTINUED | OUTPATIENT
Start: 2025-02-26 | End: 2025-02-28 | Stop reason: HOSPADM

## 2025-02-26 RX ORDER — MONTELUKAST SODIUM 10 MG/1
10 TABLET ORAL NIGHTLY
Status: DISCONTINUED | OUTPATIENT
Start: 2025-02-26 | End: 2025-02-28 | Stop reason: HOSPADM

## 2025-02-26 RX ORDER — BENZONATATE 100 MG/1
100 CAPSULE ORAL 3 TIMES DAILY PRN
Status: DISCONTINUED | OUTPATIENT
Start: 2025-02-26 | End: 2025-02-28 | Stop reason: HOSPADM

## 2025-02-26 RX ORDER — ALBUTEROL SULFATE 0.83 MG/ML
2.5 SOLUTION RESPIRATORY (INHALATION)
Status: DISCONTINUED | OUTPATIENT
Start: 2025-02-26 | End: 2025-02-27

## 2025-02-26 RX ORDER — ACETAMINOPHEN 500 MG
1000 TABLET ORAL
Status: COMPLETED | OUTPATIENT
Start: 2025-02-26 | End: 2025-02-26

## 2025-02-26 RX ORDER — DIPHENHYDRAMINE HCL 25 MG
25 TABLET ORAL NIGHTLY
COMMUNITY

## 2025-02-26 RX ORDER — MECOBALAMIN 5000 MCG
10 TABLET,DISINTEGRATING ORAL NIGHTLY
Status: DISCONTINUED | OUTPATIENT
Start: 2025-02-26 | End: 2025-02-28 | Stop reason: HOSPADM

## 2025-02-26 RX ORDER — ONDANSETRON 2 MG/ML
4 INJECTION INTRAMUSCULAR; INTRAVENOUS EVERY 6 HOURS PRN
Status: DISCONTINUED | OUTPATIENT
Start: 2025-02-26 | End: 2025-02-26

## 2025-02-26 RX ORDER — DOFETILIDE 0.25 MG/1
250 CAPSULE ORAL EVERY 12 HOURS SCHEDULED
Status: DISCONTINUED | OUTPATIENT
Start: 2025-02-26 | End: 2025-02-28 | Stop reason: HOSPADM

## 2025-02-26 RX ORDER — DEXTROSE MONOHYDRATE 100 MG/ML
INJECTION, SOLUTION INTRAVENOUS CONTINUOUS PRN
Status: DISCONTINUED | OUTPATIENT
Start: 2025-02-26 | End: 2025-02-28 | Stop reason: HOSPADM

## 2025-02-26 RX ORDER — METOPROLOL SUCCINATE 50 MG/1
50 TABLET, EXTENDED RELEASE ORAL DAILY
Status: DISCONTINUED | OUTPATIENT
Start: 2025-02-27 | End: 2025-02-28 | Stop reason: HOSPADM

## 2025-02-26 RX ORDER — GUAIFENESIN 600 MG/1
600 TABLET, EXTENDED RELEASE ORAL 2 TIMES DAILY
Status: DISCONTINUED | OUTPATIENT
Start: 2025-02-26 | End: 2025-02-28 | Stop reason: HOSPADM

## 2025-02-26 RX ORDER — SODIUM CHLORIDE 0.9 % (FLUSH) 0.9 %
5-40 SYRINGE (ML) INJECTION PRN
Status: DISCONTINUED | OUTPATIENT
Start: 2025-02-26 | End: 2025-02-28 | Stop reason: HOSPADM

## 2025-02-26 RX ORDER — ONDANSETRON 4 MG/1
4 TABLET, ORALLY DISINTEGRATING ORAL EVERY 8 HOURS PRN
Status: DISCONTINUED | OUTPATIENT
Start: 2025-02-26 | End: 2025-02-26

## 2025-02-26 RX ORDER — SODIUM CHLORIDE 9 MG/ML
INJECTION, SOLUTION INTRAVENOUS CONTINUOUS
Status: ACTIVE | OUTPATIENT
Start: 2025-02-26 | End: 2025-02-27

## 2025-02-26 RX ADMIN — Medication 2 PUFF: at 19:32

## 2025-02-26 RX ADMIN — GUAIFENESIN 600 MG: 600 TABLET ORAL at 22:40

## 2025-02-26 RX ADMIN — INSULIN GLARGINE 8 UNITS: 100 INJECTION, SOLUTION SUBCUTANEOUS at 22:40

## 2025-02-26 RX ADMIN — ALLOPURINOL 100 MG: 100 TABLET ORAL at 22:40

## 2025-02-26 RX ADMIN — INSULIN LISPRO 4 UNITS: 100 INJECTION, SOLUTION INTRAVENOUS; SUBCUTANEOUS at 22:40

## 2025-02-26 RX ADMIN — DOFETILIDE 250 MCG: 0.25 CAPSULE ORAL at 23:03

## 2025-02-26 RX ADMIN — Medication 10 ML: at 22:41

## 2025-02-26 RX ADMIN — APIXABAN 5 MG: 5 TABLET, FILM COATED ORAL at 22:40

## 2025-02-26 RX ADMIN — SODIUM CHLORIDE 500 ML: 9 INJECTION, SOLUTION INTRAVENOUS at 12:49

## 2025-02-26 RX ADMIN — ALBUTEROL SULFATE 2.5 MG: 2.5 SOLUTION RESPIRATORY (INHALATION) at 19:29

## 2025-02-26 RX ADMIN — MONTELUKAST 10 MG: 10 TABLET, FILM COATED ORAL at 22:40

## 2025-02-26 RX ADMIN — Medication 10 MG: at 23:57

## 2025-02-26 RX ADMIN — ACETAMINOPHEN 1000 MG: 500 TABLET ORAL at 13:25

## 2025-02-26 RX ADMIN — SODIUM CHLORIDE: 0.9 INJECTION, SOLUTION INTRAVENOUS at 18:47

## 2025-02-26 NOTE — ED PROVIDER NOTES
Patient seen and evaluated by ДМИТРИЙ:      EKG: Sinus rhythm with first-degree AV block.  Rate of 85.  QTc 495.  This has prolonged significantly from previous EKG on 12/1/2022.  Otherwise, no significant ST or T wave changes noted.     Jese Hinkle II, DO  02/26/25 1231

## 2025-02-26 NOTE — TELEPHONE ENCOUNTER
Spouse called stating that patient is having flu like symptoms but is testing negative for the flu.  BP is 81/58 and patient was blacking out in the bathroom.  Per Dr Zheng spouse informed and voiced understanding to go to ER.

## 2025-02-26 NOTE — ED NOTES
Lacho Gallegos is a 79 y.o. male admitted for  Principal Problem:    Influenza  Resolved Problems:    * No resolved hospital problems. *  .   Patient Home via family with   Chief Complaint   Patient presents with    Fatigue    Dizziness     Pt has felt bad for 3 weeks. Pt states has blacked out once.. pt states he has had negative flu and chest x-ray.. pt has cardiac hx   .  Patient is alert and Person, Place, Time, and Situation  Patient's baseline mobility: with assist x 1  Code Status: Prior   Cardiac Rhythm:       Is patient on baseline Oxygen: no how many Liters:   Abnormal Assessment Findings: dizziness upon standing. weakness    Isolation: Droplet      NIH Score:    C-SSRS: Risk of Suicide: No Risk  Bedside swallow:        Active LDA's:   Peripheral IV 02/26/25 Right Antecubital (Active)   Site Assessment Clean, dry & intact 02/26/25 1218   Line Status Blood return noted 02/26/25 1218   Phlebitis Assessment No symptoms 02/26/25 1218   Infiltration Assessment 0 02/26/25 1218           Family/Caregiver Present yes Any Concerns: no   Restraints no  Sitter no         Vitals: MEWS Score: 1    Vitals:    02/26/25 1158 02/26/25 1435 02/26/25 1526   BP: (!) 146/98 129/89 130/85   Pulse: 90 91 91   Resp: 18  16   Temp: 98.4 °F (36.9 °C)     TempSrc: Oral     SpO2: 98% 98% 97%   Weight: 131.5 kg (290 lb)     Height: 1.905 m (6' 3\")         Last documented pain score (0-10 scale)    Pain medication administered No.    Pertinent or High Risk Medications/Drips: No.    Pending Blood Product Administration: no    Abnormal labs:   Abnormal Labs Reviewed   COVID-19 & INFLUENZA COMBO - Abnormal; Notable for the following components:       Result Value    Influenza A DETECTED (*)     All other components within normal limits   CBC WITH AUTO DIFFERENTIAL - Abnormal; Notable for the following components:    WBC 11.1 (*)     RDW 17.0 (*)     Neutrophils Absolute 9.3 (*)     All other components within normal limits   COMPREHENSIVE

## 2025-02-26 NOTE — ED PROVIDER NOTES
Emergency Department Attending SUPERVISORY Provider Note  Location: [facility] EMERGENCY DEPARTMENT  2/26/2025     Chief Complaint   Patient presents with    Fatigue    Dizziness     Pt has felt bad for 3 weeks. Pt states has blacked out once.. pt states he has had negative flu and chest x-ray.. pt has cardiac hx        PATIENT ID:  Lacho Gallegos is a 79 y.o. 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 ([facility]) 02/2024    started in wound-care May 2024, healed Dec 2024    COVID-19 09/04/2022    DVT (deep venous thrombosis) ([facility])     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    Morbid obesity with BMI of 40.0-44.9, adult 04/03/2019    Prostatitis, chronic 11/30/2012    Pulmonary edema with congestive heart failure ([facility]) 10/18/2022    Squamous cell skin cancer     Stone, kidney     Tobacco use     UTI (urinary tract infection) 01/23/2017       Lacho Gallegos was evaluated in the Emergency Department for concerns of dry PERRL, conjunctiva normal weakness and fatigue, he says starting a couple weeks ago, but says it is getting much worse over the past couple days.  He was found to be influenza A positive, and I was asked to evaluate this patient after he had a very difficult time with ambulation, and is quite lightheaded upon standing, and hardly tolerates walking around.    No real chest pain.  No syncope, but he says he feels like his get a pass out.    No known fevers that he is experienced.  Denies any other concerns today in the emergency department..          Vitals:    02/26/25 1526   BP: 130/85   Pulse: 91   Resp: 16   Temp:    SpO2: 97%        BASIC EXAM:    Focused exam  shared critical care time provided.    This chart was generated in part by using Dragon Dictation system and may contain errors related to that system including errors in grammar, punctuation, and spelling, as well as words and phrases that may be inappropriate. If there are any questions or concerns please feel free to contact the dictating provider for clarification.     MARY MAYO DO   [facility]       Mary Mayo,   02/26/25 6040

## 2025-02-26 NOTE — H&P
Hospital Medicine History & Physical    V 1.6    Date of Admission: 2/26/2025    Date of Service:  Pt seen/examined on 2/26/2025     [x]Admitted to Inpatient with expected LOS greater than two midnights due to medical therapy.  []Placed in Observation status.    Chief Admission Complaint:  SOB, fatigue    Presenting Admission History:      79 y.o. male who presented to [facility] with SOB, fatigue.  PMHx significant for DM2, HTN, Paroxysmal Atrial Fibrillation, MARIA VICTORIA, Morbid Obesity. PCP Dr. Zheng. He has been ill fo the past 2-3 weeks. He has been treated with steroids, nebulizers and Abx as outpatient. On day of presentation he had a syncopal episode while standing to urinate; he struck his back on the vanity with no significant injury. Blood pressure at home was in the 70s afterwards, but has since improved. He continues to complain of dizziness upon standing. He reports a history of Atrial Fibrillation with difficult to control rates. When asked about his prescription for Midodrine, he reports his Cardiologist has him taking it to offset some of the BP effect from Metoprolol.     Assessment/Plan:      Influenza A: May be too late into illness to start Tamiflu. Supportive care.     Syncope: Likely orthostatic hypotension given that it occurred upon standing. He reports no missed doses of Midodrine at home. Volume expansion. Orthostatics when able.     Paroxysmal Atrial Fibrillation: Followed by Azam Parr. Continue Tikosyn, Toprol and Eliquis.     Diabetes Mellitus, Type 2: Controlled. Continue basal-bolus Insulin regimen. Monitor serial finger stick blood sugars given risk for toxicity/hypoglycemia due to Insulin and adjust regimen as needed.     Hypertension, benign: Controlled. May need to hold/adjust meds in setting of Syncope and Orthostatic Hypotension. Monitor and adjust as needed.     CKD: Stable. Monitor BMP.     Discussed management and the need for Hospitalization of the  use     UTI (urinary tract infection) 01/23/2017       Past Surgical History:        Procedure Laterality Date    ABLATION OF DYSRHYTHMIC FOCUS  2013    a-fib    APPENDECTOMY      CARDIOVERSION N/A 09/20/2022    CARDIOVERSION W/ANES. performed by Jose Aguero MD at [facility] ENDOSCOPY    CARPAL TUNNEL RELEASE Right 09/30/2015    CARPAL TUNNEL RELEASE Left 03/20/2016    CHOLECYSTECTOMY, LAPAROSCOPIC N/A 02/19/2019    LAPAROSCOPIC CHOLECYSTECTOMY WITH CHOLANGIOGRAMS performed by Amado Lua MD at [facility] OR    COLONOSCOPY      CYSTOSCOPY      with removal stone    CYSTOSCOPY  02/08/2013    with Laser Vaporization of Enlarged Prostate    DIAGNOSTIC CARDIAC CATH LAB PROCEDURE      ERCP N/A 02/18/2019    ERCP SPHINCTER/PAPILLOTOMY performed by Ivan Dougherty MD at [facility] ENDOSCOPY    ERCP  02/18/2019    ERCP STONE REMOVAL performed by Ivan Dougherty MD at [facility] ENDOSCOPY    INTRACAPSULAR CATARACT EXTRACTION Right 01/14/2020    PHACOEMULSIFICATION OF CATARACT RIGHT EYE WITH INTRAOCULAR LENS IMPLANT performed by Irving Ryan MD at [facility] OR    INTRACAPSULAR CATARACT EXTRACTION Left 01/21/2020    PHACOEMULSIFICATION OF CATARACT LEFT EYE WITH INTRAOCULAR LENS IMPLANT -SLEEP APNEA- performed by Irving Ryan MD at [facility] OR    KNEE ARTHROSCOPY Left 04/19/2011    OTHER SURGICAL HISTORY  04/2024    Ablation    SKIN BIOPSY      skin Ca/SQUAMOUS CELL    SLEEVE GASTRECTOMY  2013    THORACOTOMY      wedge resection    TONSILLECTOMY      TOTAL KNEE ARTHROPLASTY Right 2002    TOTAL KNEE ARTHROPLASTY Left 2012    TRANSESOPHAGEAL ECHOCARDIOGRAM N/A 09/20/2022    PIERO W/ANES. (9:00) performed by Jose Aguero MD at [facility] ENDOSCOPY       Medications Prior to Admission:   Prior to Admission medications    Medication Sig Start Date End Date Taking? Authorizing Provider   cefUROXime (CEFTIN) 500 MG tablet Take 1 tablet by mouth 2 times daily for 5 days 2/21/25 2/26/25  Calin Zheng MD

## 2025-02-26 NOTE — CARE COORDINATION
Case Management Assessment  Initial Evaluation    Date/Time of Evaluation: 2/26/2025 3:26 PM  Assessment Completed by: CHANCE Roman    If patient is discharged prior to next notation, then this note serves as note for discharge by case management.    Patient Name: Lacho Gallegos                   YOB: 1945  Diagnosis: No admission diagnoses are documented for this encounter.                   Date / Time: 2/26/2025 11:48 AM    Patient Admission Status: Emergency   Readmission Risk (Low < 19, Mod (19-27), High > 27): No data recorded  Current PCP: Calin Zheng MD  PCP verified by CM? (P) Yes    Chart Reviewed: Yes      History Provided by: (P) Patient  Patient Orientation: (P) Alert and Oriented    Patient Cognition: (P) Alert    Hospitalization in the last 30 days (Readmission):  No    If yes, Readmission Assessment in  Navigator will be completed.    Advance Directives:      Code Status: Prior   Patient's Primary Decision Maker is: (P) Legal Next of Kin    Primary Decision Maker: Kayy Gallegos - Spouse - [phone]    Secondary Decision Maker: Luciana Dela Cruz - Child - [phone]    Discharge Planning:    Patient lives with: (P) Spouse/Significant Other Type of Home: (P) House  Primary Care Giver: (P) Self  Patient Support Systems include: (P) Spouse/Significant Other   Current Financial resources: (P) None  Current community resources: (P) ECF/Home Care  Current services prior to admission: (P) Durable Medical Equipment            Current DME: (P) Cane, Walker            Type of Home Care services:  (P) None    ADLS  Prior functional level: (P) Independent in ADLs/IADLs  Current functional level: (P) Independent in ADLs/IADLs    PT AM-PAC:   /24  OT AM-PAC:   /24    Family can provide assistance at DC: (P) Yes  Would you like Case Management to discuss the discharge plan with any other family members/significant others, and if so, who? (P) No  Plans to Return to Present Housing: (P)  Yes  Other Identified Issues/Barriers to RETURNING to current housing: none noted  Potential Assistance needed at discharge: (P) N/A            Potential DME:    Patient expects to discharge to: (P) House  Plan for transportation at discharge: (P) Self    Financial    Payor: MEDICARE / Plan: MEDICARE PART A AND B / Product Type: *No Product type* /     Does insurance require precert for SNF: No    Potential assistance Purchasing Medications: (P) No  Meds-to-Beds request:        [facility] [id number] - [facility] [address] [phone] - F [phone]  [address] [address] [address]  Phone: [phone] Fax: [phone]      Notes:    Factors facilitating achievement of predicted outcomes: Family support, Motivated, Cooperative, and Pleasant    Barriers to discharge: Medical complications    Additional Case Management Notes: Referred to patient for d/c planning.  Spoke to patient and wife.  Patient is a 79 year old male admitted for flu.  Patient lives at home with wife.  Patient reports he is independent in ADLs.  Patient has PCP and is able to obtain medications.  Patient currently denies d/c needs.     The Plan for Transition of Care is related to the following treatment goals of No admission diagnoses are documented for this encounter.    IF APPLICABLE: The Patient and/or patient representative Lacho and his family were provided with a choice of provider and agrees with the discharge plan. Freedom of choice list with basic dialogue that supports the patient's individualized plan of care/goals and shares the quality data associated with the providers was provided to:     Patient Representative Name:       The Patient and/or Patient Representative Agree with the Discharge Plan?      CHANCE Roman, MAURA  Case Management Department  Ph: [phone] Fax: [phone]

## 2025-02-27 LAB
ANION GAP SERPL CALCULATED.3IONS-SCNC: 12 MMOL/L (ref 3–16)
BUN SERPL-MCNC: 41 MG/DL (ref 7–20)
CALCIUM SERPL-MCNC: 9.3 MG/DL (ref 8.3–10.6)
CHLORIDE SERPL-SCNC: 101 MMOL/L (ref 99–110)
CO2 SERPL-SCNC: 25 MMOL/L (ref 21–32)
CREAT SERPL-MCNC: 1.5 MG/DL (ref 0.8–1.3)
GFR SERPLBLD CREATININE-BSD FMLA CKD-EPI: 47 ML/MIN/{1.73_M2}
GLUCOSE BLD-MCNC: 235 MG/DL (ref 70–99)
GLUCOSE BLD-MCNC: 243 MG/DL (ref 70–99)
GLUCOSE BLD-MCNC: 249 MG/DL (ref 70–99)
GLUCOSE BLD-MCNC: 330 MG/DL (ref 70–99)
GLUCOSE SERPL-MCNC: 193 MG/DL (ref 70–99)
PERFORMED ON: ABNORMAL
POTASSIUM SERPL-SCNC: 3.9 MMOL/L (ref 3.5–5.1)
SODIUM SERPL-SCNC: 138 MMOL/L (ref 136–145)

## 2025-02-27 PROCEDURE — 36415 COLL VENOUS BLD VENIPUNCTURE: CPT

## 2025-02-27 PROCEDURE — 94669 MECHANICAL CHEST WALL OSCILL: CPT

## 2025-02-27 PROCEDURE — 6360000002 HC RX W HCPCS: Performed by: INTERNAL MEDICINE

## 2025-02-27 PROCEDURE — 6370000000 HC RX 637 (ALT 250 FOR IP): Performed by: INTERNAL MEDICINE

## 2025-02-27 PROCEDURE — 1200000000 HC SEMI PRIVATE

## 2025-02-27 PROCEDURE — 94640 AIRWAY INHALATION TREATMENT: CPT

## 2025-02-27 PROCEDURE — 6370000000 HC RX 637 (ALT 250 FOR IP): Performed by: NURSE PRACTITIONER

## 2025-02-27 PROCEDURE — 2500000003 HC RX 250 WO HCPCS: Performed by: INTERNAL MEDICINE

## 2025-02-27 PROCEDURE — 80048 BASIC METABOLIC PNL TOTAL CA: CPT

## 2025-02-27 RX ORDER — ALBUTEROL SULFATE 0.83 MG/ML
2.5 SOLUTION RESPIRATORY (INHALATION)
Status: DISCONTINUED | OUTPATIENT
Start: 2025-02-28 | End: 2025-02-28 | Stop reason: HOSPADM

## 2025-02-27 RX ADMIN — ACETAMINOPHEN 650 MG: 325 TABLET ORAL at 20:32

## 2025-02-27 RX ADMIN — Medication 10 ML: at 20:33

## 2025-02-27 RX ADMIN — Medication 10 MG: at 20:32

## 2025-02-27 RX ADMIN — APIXABAN 5 MG: 5 TABLET, FILM COATED ORAL at 20:37

## 2025-02-27 RX ADMIN — APIXABAN 5 MG: 5 TABLET, FILM COATED ORAL at 08:36

## 2025-02-27 RX ADMIN — MIDODRINE HYDROCHLORIDE 10 MG: 5 TABLET ORAL at 08:36

## 2025-02-27 RX ADMIN — ALBUTEROL SULFATE 2.5 MG: 2.5 SOLUTION RESPIRATORY (INHALATION) at 19:41

## 2025-02-27 RX ADMIN — GUAIFENESIN 600 MG: 600 TABLET ORAL at 20:32

## 2025-02-27 RX ADMIN — GUAIFENESIN 600 MG: 600 TABLET ORAL at 08:36

## 2025-02-27 RX ADMIN — ACETAMINOPHEN 650 MG: 325 TABLET ORAL at 03:25

## 2025-02-27 RX ADMIN — MIDODRINE HYDROCHLORIDE 10 MG: 5 TABLET ORAL at 16:46

## 2025-02-27 RX ADMIN — METOPROLOL SUCCINATE 50 MG: 50 TABLET, FILM COATED, EXTENDED RELEASE ORAL at 08:35

## 2025-02-27 RX ADMIN — ACETAMINOPHEN 650 MG: 325 TABLET ORAL at 08:48

## 2025-02-27 RX ADMIN — ALLOPURINOL 100 MG: 100 TABLET ORAL at 20:32

## 2025-02-27 RX ADMIN — MONTELUKAST 10 MG: 10 TABLET, FILM COATED ORAL at 20:32

## 2025-02-27 RX ADMIN — INSULIN LISPRO 6 UNITS: 100 INJECTION, SOLUTION INTRAVENOUS; SUBCUTANEOUS at 20:31

## 2025-02-27 RX ADMIN — INSULIN LISPRO 2 UNITS: 100 INJECTION, SOLUTION INTRAVENOUS; SUBCUTANEOUS at 12:10

## 2025-02-27 RX ADMIN — DOFETILIDE 250 MCG: 0.25 CAPSULE ORAL at 20:38

## 2025-02-27 RX ADMIN — ALBUTEROL SULFATE 2.5 MG: 2.5 SOLUTION RESPIRATORY (INHALATION) at 12:57

## 2025-02-27 RX ADMIN — Medication 2 PUFF: at 19:41

## 2025-02-27 RX ADMIN — ALLOPURINOL 100 MG: 100 TABLET ORAL at 08:35

## 2025-02-27 RX ADMIN — DOFETILIDE 250 MCG: 0.25 CAPSULE ORAL at 08:48

## 2025-02-27 RX ADMIN — ALBUTEROL SULFATE 2.5 MG: 2.5 SOLUTION RESPIRATORY (INHALATION) at 08:39

## 2025-02-27 RX ADMIN — INSULIN GLARGINE 8 UNITS: 100 INJECTION, SOLUTION SUBCUTANEOUS at 20:31

## 2025-02-27 RX ADMIN — ATORVASTATIN CALCIUM 40 MG: 40 TABLET, FILM COATED ORAL at 08:35

## 2025-02-27 RX ADMIN — Medication 2 PUFF: at 08:40

## 2025-02-27 RX ADMIN — INSULIN LISPRO 2 UNITS: 100 INJECTION, SOLUTION INTRAVENOUS; SUBCUTANEOUS at 16:46

## 2025-02-27 RX ADMIN — INSULIN LISPRO 2 UNITS: 100 INJECTION, SOLUTION INTRAVENOUS; SUBCUTANEOUS at 08:35

## 2025-02-27 RX ADMIN — MIDODRINE HYDROCHLORIDE 10 MG: 5 TABLET ORAL at 11:06

## 2025-02-27 RX ADMIN — ALBUTEROL SULFATE 2.5 MG: 2.5 SOLUTION RESPIRATORY (INHALATION) at 16:10

## 2025-02-27 ASSESSMENT — PAIN SCALES - GENERAL
PAINLEVEL_OUTOF10: 0
PAINLEVEL_OUTOF10: 6

## 2025-02-27 NOTE — PLAN OF CARE
Patient:   ADARSH ARGUETA            MRN: [id number]            FIN: [id number]              Age:   86 years     Sex:  MALE     :  34   Associated Diagnoses:   None   Author:   EVE DAVIS     Basic Information   History source: Significant other, not patient.   Medications:  (Selected)   Inpatient Medications  Ordered  LORazepam injection (Ativan): 0.5 mg = 0.25 mL, Slow IV Push, Q6H, PRN anxiety or agitation, Routine, Order Start: 19 18:58:00 CDT, Injection  acetaminophen (Tylenol).: 650 mg = 2 tab, Oral, Q6H, PRN fever, Routine, Order Start: 19 14:49:00 CDT, Tab  atorvastatin oral 10 mg tablet: 10 mg = 1 tab, Oral, Q Bedtime, Routine, Order Start: 19 21:00:00 CDT, Tab  carbidopa-levodopa oral  mg tablet: 1 tab, Oral, TID, Routine, Order Start: 19 17:00:00 CDT, Tab  dextrose (glucose) injection 50%.: 12.5 gm = 25 mL, IV Push, As Directed PRN, PRN low blood glucose, Routine, Order Start: 19 18:34:00 CDT, Injection, Give 12.5 grams first.  If still needed give an additional 12.5 grams.  dextrose (glucose) oral.: 15 gm, Oral, As Directed PRN, PRN low blood glucose, Routine, Order Start: 19 18:34:00 CDT, Gel  docusate sodium (Colace).: 100 mg = 1 cap, Oral, Daily, Routine, Order Start: 19 15:09:00 CDT, Cap  dutasteride (Avodart) [auto-sub to finasteride]: 5 mg = 1 tab, Oral, Daily, Routine, Order Start: 19 9:00:00 CDT, Tab, sub for dutasteride 0.5 mg daily.  glucagon (GlucaGen).: 1 mg = 1 mL, IM, As Directed PRN, PRN low blood glucose, Routine, Order Start: 19 18:34:00 CDT, Injection  insulin lispro (HumaLOG) sliding scale: 2-10 unit, Subcutaneous, QID [before meals & HS], Routine, Order Start: 19 21:00:00 CDT, Injection  pantoprazole oral 40 mg DR tablet: 40 mg = 1 tab, Oral, Daily, Routine, Order Start: 19 14:48:00 CDT, Tab DR  piperacillin-tazobactam extended infusion [4 hour] (Zosyn).: 3.375 gm, IVPB, Q8H, Rationale:    Problem: Chronic Conditions and Co-morbidities  Goal: Patient's chronic conditions and co-morbidity symptoms are monitored and maintained or improved  2/27/2025 1051 by Ines Hinton RN  Outcome: Progressing  2/27/2025 0500 by Rocío Panchal RN  Outcome: Progressing  2/27/2025 0455 by Rocío Panchal RN  Outcome: Progressing     Problem: Discharge Planning  Goal: Discharge to home or other facility with appropriate resources  2/27/2025 1051 by Ines Hinton RN  Outcome: Progressing  2/27/2025 0500 by Rocío Panchal RN  Outcome: Progressing  2/27/2025 0455 by Rocío Panchal RN  Outcome: Progressing     Problem: Safety - Adult  Goal: Free from fall injury  2/27/2025 1051 by Ines Hinton RN  Outcome: Progressing  2/27/2025 0500 by Rocío Panchal RN  Outcome: Progressing  2/27/2025 0455 by Rocío Panchal, RN  Outcome: Progressing      Therapeutic (documented infection), Indication: Intra-abdominal infection, RATE: 25 mL/hr, Infuse over 4 hr, mL TOTAL Volume 100, Routine, Order Start: 06/05/19 16:00:00 CDT, x 5 days, Order Stop: 06/10/19 11:00:00 CDT,...  tamsulosin oral 0.4 mg capsule: 0.4 mg = 1 cap, Oral, Daily [after dinner], Routine, Order Start: 06/05/19 18:00:00 CDT, Cap  Documented Medications  Documented  aspirin 81 mg oral tablet: 81 mg = 1 tab, Oral, Daily, Tab, Maintenance  atorvastatin oral 10 mg tablet: 10 mg = 1 tab, Oral, Q Bedtime, Tab, Maintenance  carbidopa-levodopa oral  mg tablet: = 1 tab, Oral, TID, takes at 0800, 1300 and 1800, Tab, Maintenance  dutasteride-tamsulosin 0.5 mg-0.4 mg oral capsule: = 1 cap, Oral, Q Bedtime, Cap, Maintenance  pantoprazole oral 40 mg DR tablet: 40 mg = 1 tab, Oral, Daily, Tab DR, Maintenance.     History of Present Illness   The patient was seen asleep in bed with wife at beside.   His wife states that the pt was agitated yesterday and has been unarousable today.  Daughter (patient's PCP) spoke to Dr. Conner about pt needing cholecystectomy, will consult with Dr. Ovalle for second opinion.    .       Physical Examination               Vital signs   Vital Signs   06/08/19 12:08 CDT Temperature - VS 37.5 deg_C  Normal    Temperature Source - VS Oral    Heart/Pulse Rate 50  LOW    Pulse Source Monitor    Respiration Rate 16 breaths/min  Normal    SpO2 98 %  Normal    NIBP Systolic 103  Normal    NIBP Diastolic 52  LOW    NIBP Source Left Arm    NIBP MAP 69  Normal   .   General:  Alert.   Skin:  Warm, dry.    Chest wall:  No tenderness.   Cardiovascular:  Regular rate and rhythm.   Respiratory:  Lungs are clear to auscultation.   Gastrointestinal:  Soft.   Neurological:  confused, Not Alert and oriented to person, place, time, and situation,   Lymphatics:  No lymphadenopathy.     Medical Decision Making   Results review:    Labs between:  07-JUN-2019 13:12 to 08-JUN-2019 13:12  CBC:                  WBC  HgB  Hct  Plt  MCV  RDW   08-JUN-2019 6.3  (L) 12.2  (L) 38.1  (L) 132  95.5  13.2   DIFF:                 Seg  Neutroph//ABS  Lymph//ABS  Mono//ABS  EOS/ABS  08-JUN-2019 NOT APPLICABLE  73 // 4.6 16 // 1.0 10 // 0.6 1 // 0.1  BMP:                 Na  Cl  BUN  Glu   08-JUN-2019 142  (H) 110  15  (H) 150                              K  CO2  Cr  Ca                              3.7  24  0.77  (L) 8.0   CMP:                 AST  ALT  AlkPhos  Bili  Albumin   08-JUN-2019 21  11  77  0.8  (L) 2.7   POC GLU:                 Latest Result  Latest Date  Minimum  Min Date  Maximum  Max Date                             (H) 167  08-JUN-2019 (H) 167  08-JUN-2019 75 07-JUN-2019                  .   Rationale:    ASSESSMENT & PLAN  Severe sepsis POA 2/2 acute cholecystitis s/p cholecystostomy tube placement (6/7/19)  As evidence by fever (resolved), tachypnea (resolved) with lactic acidosis (LA 1.9 6/5)  S/p colonoscopy (6/4) prior to arrival  Lipase 652 -> 253 (6/7)  HIDA scan (6/6): nonvisualization of gallbladder in the current study, suggestive of cystic duct obstruction due to acute cholecystitis  - Blood Cx x2 and drainage culture NGTD - following   - Continue IV Zosyn, monitor for 48-72 hours per GenSx   - Continue IVFs   - Advanced to FLD, per Surgery   - GI following   - Per request of shanique (patient's PCP), Dr. Ovalle consulted for second opinion regarding choelcystectomy    Asymptomatic Bradycardia  RRT called (6/5) for bradycardia (48-54 in the last 24hrs), no interventions  d/c tele   - Echo is pending   - PT/OT as able   - Cardiology is following - avoid HR lowering medications  Delirium most likely secondary to sepsis/ sundowning-                   RN to keep orienting the patient                   Seroquel to be given overnight if he is confused. NO ATIVAN  Thrombocytopenia- Plt 125 -> 132 today  Normocytic anemia -  Hgb 12.2 today, stable  Acute Kidney Injury - resolved  Elevated AST -  resolved  Parkinson's Disease - continue carbidopa-levodopa  Diabetes Mellitus - BS controlled on SSI  Hyperlipidemia - continue atorvastatin  Hx of Vitamin B12 Deficiency  DVT PPx: SCDs  CODE STATUS: Full Code  DISPOSTION: Daughter (PCP) will consult with Dr. Ovalle for second opinion regarding cholesytectomy  PCP: Dr. Ilia Shin  All labs and imaging reviewed.  All patient concerns addressed.  Charting performed by eliza Steiner and Sabina Silvestre for Dr. Hailey Frost, All medical record entries made by the scribe were at my direction. I have reviewed the chart and agree that the record accurately reflects my personal performance of the history, physical exam, hospital course, and assessment and plan..

## 2025-02-27 NOTE — PROGRESS NOTES
RESPIRATORY CARE        --------------------------------------------------      Medications:        Infusion Medications    dextrose      sodium chloride      sodium chloride 75 mL/hr at 02/26/25 1847     Scheduled Medications    albuterol  2.5 mg Nebulization 4x Daily RT    allopurinol  100 mg Oral BID    apixaban  5 mg Oral BID    atorvastatin  40 mg Oral Daily    dofetilide  250 mcg Oral 2 times per day    budesonide-formoterol  2 puff Inhalation BID RT    metoprolol succinate  50 mg Oral Daily    midodrine  10 mg Oral TID    montelukast  10 mg Oral Nightly    sodium chloride flush  5-40 mL IntraVENous 2 times per day    guaiFENesin  600 mg Oral BID    insulin glargine  8 Units SubCUTAneous Nightly    insulin lispro  0-8 Units SubCUTAneous 4x Daily AC & HS    melatonin  10 mg Oral Nightly     PRN Meds: benzonatate, HYDROcodone homatropine, glucose, dextrose bolus **OR** dextrose bolus, glucagon (rDNA), dextrose, sodium chloride flush, sodium chloride, polyethylene glycol, acetaminophen **OR** acetaminophen      Physical Exam Performed:      General appearance:  No apparent distress  Respiratory:  Normal respiratory effort. Clear lungs.   Cardiovascular:  Regular rate and rhythm.  Abdomen:  Soft, non-tender, non-distended.  Musculoskeletal:  No edema  Neurologic:  Non-focal  Psychiatric:  Alert and oriented    /75   Pulse (!) 106   Temp 98.2 °F (36.8 °C) (Oral)   Resp 16   Ht 1.905 m (6' 3\")   Wt 131.5 kg (290 lb)   SpO2 96%   BMI 36.25 kg/m²     Diet: ADULT DIET; Regular; 4 carb choices (60 gm/meal)  ADULT ORAL NUTRITION SUPPLEMENT; Breakfast, Lunch, Dinner; Standard High Calorie/High Protein Oral Supplement    DVT Prophylaxis: []PPx LMWH  []SQ Heparin  []IPC/SCDs  [x]Eliquis  []Xarelto  []Coumadin  [] Heparin Drip  []Other -      Code status: Full Code    PT/OT Eval Status:   [x]NOT yet ordered  []Ordered and Pending   []Seen with Recommendations for:   []Home independently  []Home w/ assist  [][facility]   []SNF  []Acute Rehab    Multi-Disciplinary Rounds with Case Management completed on 2/27/2025 with the following recommendations:  Anticipated Discharge Location: [x]Home w/ []HHC vs []SNF  []Acute Rehab  []LTAC  []Hospice  []Other -    Anticipated Discharge Day/Date:  2/28  Barriers to Discharge: Clinical improvement  --------------------------------------------------    MDM  Moderate       Labs:  Personally reviewed on 2/27/2025 and interpreted for clinical significance as documented above.     Recent Labs     02/26/25  1211   WBC 11.1*   HGB 15.2   HCT 46.2        Recent Labs     02/26/25  1211 02/27/25  0501   * 138   K 4.1 3.9   CL 93* 101   CO2 22 25   BUN 38* 41*   CREATININE 1.4* 1.5*   CALCIUM 10.2 9.3     Recent Labs     02/26/25  1211 02/26/25  1328   PROBNP 1,261*  --    TROPHS 13 12     No results for input(s): \"LABA1C\" in the last 72 hours.  Recent Labs     02/26/25  1211   AST 56*   ALT 97*   BILITOT 1.4*   ALKPHOS 126     No results for input(s): \"INR\", \"LACTA\", \"TSH\" in the last 72 hours.    Urine Cultures:   Lab Results   Component Value Date/Time    LABURIN >100,000 CFU/ml 09/11/2024 01:20 PM     Blood Cultures:   Lab Results   Component Value Date/Time    BC No Growth after 4 days of incubation. 09/04/2022 01:49 PM     Lab Results   Component Value Date/Time    BLOODCULT2 No Growth after 4 days of incubation. 09/04/2022 01:50 PM     Organism:   Lab Results   Component Value Date/Time    ORG Enterococcus faecalis 09/11/2024 01:20 PM         Jese Holguin MD

## 2025-02-27 NOTE — PROGRESS NOTES
02/26/25 5873   NIV Type   $NIV $Daily Charge   NIV Started/Stopped On   Equipment Type v60   Mode CPAP   Mask Type Full face mask   Mask Size Large   Assessment   Respirations 20   Comfort Level Good   Using Accessory Muscles No   Mask Compliance Good   Skin Assessment Clean, dry, & intact   Skin Protection for O2 Device Yes   Orientation Middle   Location Nose   Intervention(s) Skin Barrier   Settings/Measurements   CPAP/EPAP 11 cmH2O   Vt (Measured) 615 mL   FiO2  21 %   Minute Volume (L/min) 15.1 Liters   Mask Leak (lpm) 64 lpm   Patient's Home Machine No   Alarm Settings   Alarms On Y   Low Pressure (cmH2O) 6 cmH2O   High Pressure (cmH2O) 30 cmH2O   Apnea (secs) 20 secs   RR Low (bpm) 6   RR High (bpm) 40 br/min

## 2025-02-27 NOTE — CARE COORDINATION
Writer reviewed chart and spoke with RN, patient is feeling better today, he is still on RA. IPTA and likely NN at [facility].     Malathi Mendoza RN

## 2025-02-27 NOTE — ED PROVIDER NOTES
[facility] - MED SURG/ORTHO  Emergency Department Encounter    Patient Name: Lacho Gallegos  MRN: [id number]  YOB: 1945  Date of Evaluation: 2/26/2025  Provider: Calin Zheng MD  Note Started: 9:49 PM EST 2/26/25    CHIEF COMPLAINT  Fatigue and Dizziness (Pt has felt bad for 3 weeks. Pt states has blacked out once.. pt states he has had negative flu and chest x-ray.. pt has cardiac hx)    SHARED SERVICE VISIT   I have seen and evaluated this patient with my supervising physician, Dr. Phillips.    HISTORY OF PRESENT ILLNESS  History From: Patient.    Limitations to history : None    Lacho Gallegos is a 79 y.o. male with history of CHF who presents to the ED for evaluation of generalized fatigue and lightheadedness with near syncope.  Patient states that over the past 3 weeks he has been feeling ill.  Patient states generalized fatigue, cough, congestion, and shortness of breath.  Patient states that he has been seen by his PCP for the symptoms and has had negative flu testing as well as negative at home COVID testing.  Patient states that he has been on steroids, nebulizers, and antibiotics without any significant relief.  Patient states that he takes torsemide for history of CHF and this morning woke up and was peeing and afterwards developed severe lightheadedness and states that he did briefly lose his vision and fell backwards into the vanity however did not fully fall.  Patient states that his spouse was close by and was able to get him over to their bed and EMS was called.  Patient states that during this episode he checked his blood pressure and it was noted to be in the 70s systolically.  Patient denies any chest pain.  Denies any focal neurological weakness, numbness, tingling, slurred speech, facial droop, abdominal pain, vomiting, neck pain, back pain.    No other complaints, modifying factors or associated symptoms.     Nursing notes reviewed were all reviewed and agreed with or any    2. Lightheadedness    3. Hyperglycemia due to diabetes mellitus (HCC)      Patient referred to:  No follow-up provider specified.    Discharge Medications:   Current Discharge Medication List        Discontinued Medications:  Current Discharge Medication List          DISPOSITION:  Patient was admitted.    (Please note that portions of this note were completed with a voice recognition program.  Efforts were made to edit the dictations but occasionally words are mis-transcribed).         Irving Rahman PA-C  02/26/25 2151

## 2025-02-27 NOTE — PROGRESS NOTES
Comprehensive Nutrition Assessment    Type and Reason for Visit:  Initial, Positive nutrition screen (MST=2: weight loss, decreased appetite)    Nutrition Recommendations/Plan:   Continue carb control diet   Modify ONS to Glucerna BID   Encourage PO intakes as tolerated   Monitor nutrition adequacy, pertinent labs, bowel habits, wt changes, and clinical progress     Malnutrition Assessment:  Malnutrition Status:  At risk for malnutrition (02/27/25 1330)    Context:  Acute Illness     Findings of the 6 clinical characteristics of malnutrition:  Energy Intake:  Mild decrease in energy intake  Weight Loss:  Mild weight loss     Body Fat Loss:  Unable to assess     Muscle Mass Loss:  Unable to assess    Fluid Accumulation:  Unable to assess     Strength:  Not Performed    Nutrition Assessment:    Pt admitted with influenza. Hx of DM, CHF and gastric bypass. Pt nutritionally compromised AEB weight loss. New weight ordered today, current weight 274 lb. ~10 lb weight loss in 3 months per EMR. Currently ordered carb control diet with Ensure TID. Recommend modifying current ONS d/t high carbohydrate in current ONS, BG elevated. Will continue to monitor further nutritional needs.    Nutrition Related Findings:    3.2% weight loss in 3 months, not significant. Upper dentures. BUN 41. Cr 1.5.         Current Nutrition Intake & Therapies:    Average Meal Intake: Unable to assess  Average Supplements Intake: Unable to assess  ADULT DIET; Regular; 4 carb choices (60 gm/meal)  ADULT ORAL NUTRITION SUPPLEMENT; Breakfast, Lunch, Dinner; Standard High Calorie/High Protein Oral Supplement    Anthropometric Measures:  Height: 190.5 cm (6' 3\")  Ideal Body Weight (IBW): 196 lbs (89 kg)       Current Body Weight: 124.3 kg (274 lb), 139.8 % IBW. Weight Source: Not specified  Current BMI (kg/m2): 34.2  Usual Body Weight: 128.4 kg (283 lb) (standing scale 12/19/24)     % Weight Change (Calculated): -3.2                         Estimated

## 2025-02-28 VITALS
SYSTOLIC BLOOD PRESSURE: 138 MMHG | BODY MASS INDEX: 34.15 KG/M2 | OXYGEN SATURATION: 99 % | RESPIRATION RATE: 15 BRPM | TEMPERATURE: 98.4 F | HEIGHT: 75 IN | HEART RATE: 101 BPM | WEIGHT: 274.7 LBS | DIASTOLIC BLOOD PRESSURE: 82 MMHG

## 2025-02-28 LAB
ANION GAP SERPL CALCULATED.3IONS-SCNC: 10 MMOL/L (ref 3–16)
BUN SERPL-MCNC: 31 MG/DL (ref 7–20)
CALCIUM SERPL-MCNC: 9 MG/DL (ref 8.3–10.6)
CHLORIDE SERPL-SCNC: 105 MMOL/L (ref 99–110)
CO2 SERPL-SCNC: 26 MMOL/L (ref 21–32)
CREAT SERPL-MCNC: 1.2 MG/DL (ref 0.8–1.3)
GFR SERPLBLD CREATININE-BSD FMLA CKD-EPI: 62 ML/MIN/{1.73_M2}
GLUCOSE BLD-MCNC: 181 MG/DL (ref 70–99)
GLUCOSE BLD-MCNC: 204 MG/DL (ref 70–99)
GLUCOSE SERPL-MCNC: 197 MG/DL (ref 70–99)
PERFORMED ON: ABNORMAL
PERFORMED ON: ABNORMAL
POTASSIUM SERPL-SCNC: 4 MMOL/L (ref 3.5–5.1)
SODIUM SERPL-SCNC: 141 MMOL/L (ref 136–145)

## 2025-02-28 PROCEDURE — 6370000000 HC RX 637 (ALT 250 FOR IP): Performed by: INTERNAL MEDICINE

## 2025-02-28 PROCEDURE — 36415 COLL VENOUS BLD VENIPUNCTURE: CPT

## 2025-02-28 PROCEDURE — 94669 MECHANICAL CHEST WALL OSCILL: CPT

## 2025-02-28 PROCEDURE — 80048 BASIC METABOLIC PNL TOTAL CA: CPT

## 2025-02-28 PROCEDURE — 2500000003 HC RX 250 WO HCPCS: Performed by: INTERNAL MEDICINE

## 2025-02-28 PROCEDURE — 6360000002 HC RX W HCPCS: Performed by: INTERNAL MEDICINE

## 2025-02-28 PROCEDURE — 94640 AIRWAY INHALATION TREATMENT: CPT

## 2025-02-28 RX ORDER — GUAIFENESIN 600 MG/1
600 TABLET, EXTENDED RELEASE ORAL 2 TIMES DAILY
Qty: 14 TABLET | Refills: 0 | Status: SHIPPED | OUTPATIENT
Start: 2025-02-28 | End: 2025-03-07

## 2025-02-28 RX ORDER — MIDODRINE HYDROCHLORIDE 5 MG/1
10 TABLET ORAL 3 TIMES DAILY
COMMUNITY
Start: 2025-02-28

## 2025-02-28 RX ORDER — TORSEMIDE 20 MG/1
20 TABLET ORAL DAILY
COMMUNITY
Start: 2025-02-28

## 2025-02-28 RX ADMIN — Medication 2 PUFF: at 08:43

## 2025-02-28 RX ADMIN — Medication 10 ML: at 10:56

## 2025-02-28 RX ADMIN — ALLOPURINOL 100 MG: 100 TABLET ORAL at 08:41

## 2025-02-28 RX ADMIN — MIDODRINE HYDROCHLORIDE 10 MG: 5 TABLET ORAL at 05:58

## 2025-02-28 RX ADMIN — ATORVASTATIN CALCIUM 40 MG: 40 TABLET, FILM COATED ORAL at 08:40

## 2025-02-28 RX ADMIN — METOPROLOL SUCCINATE 50 MG: 50 TABLET, FILM COATED, EXTENDED RELEASE ORAL at 08:41

## 2025-02-28 RX ADMIN — DOFETILIDE 250 MCG: 0.25 CAPSULE ORAL at 08:41

## 2025-02-28 RX ADMIN — MIDODRINE HYDROCHLORIDE 10 MG: 5 TABLET ORAL at 10:56

## 2025-02-28 RX ADMIN — INSULIN LISPRO 2 UNITS: 100 INJECTION, SOLUTION INTRAVENOUS; SUBCUTANEOUS at 08:40

## 2025-02-28 RX ADMIN — ALBUTEROL SULFATE 2.5 MG: 2.5 SOLUTION RESPIRATORY (INHALATION) at 08:42

## 2025-02-28 RX ADMIN — APIXABAN 5 MG: 5 TABLET, FILM COATED ORAL at 08:41

## 2025-02-28 RX ADMIN — GUAIFENESIN 600 MG: 600 TABLET ORAL at 08:41

## 2025-02-28 NOTE — PROGRESS NOTES
[facility] Medicine Progress Note  V 1.6      Date of Admission: 2/26/2025    Hospital Day: 3      Chief Admission Complaint:  SOB    Subjective:  Reports some improvement in symptoms. Cough is more productive. No further episodes of syncope and tolerating ambulation well.     Presenting Admission History:       79 y.o. male who presented to [facility] with SOB, fatigue.  PMHx significant for DM2, HTN, Paroxysmal Atrial Fibrillation, MARIA VICTORIA, Morbid Obesity. PCP Dr. Zheng. He has been ill fo the past 2-3 weeks. He has been treated with steroids, nebulizers and Abx as outpatient. On day of presentation he had a syncopal episode while standing to urinate; he struck his back on the vanity with no significant injury. Blood pressure at home was in the 70s afterwards, but has since improved. He continues to complain of dizziness upon standing. He reports a history of Atrial Fibrillation with difficult to control rates. When asked about his prescription for Midodrine, he reports his Cardiologist has him taking it to offset some of the BP effect from Metoprolol.      Assessment/Plan:       Influenza A: Too late into illness to start Tamiflu. Supportive care. Continue pulmonary toilet with nebulizers, Mucinex and acapella.       Syncope: Likely orthostatic hypotension given that it occurred upon standing. He reports no missed doses of Midodrine at home. Volume expansion. Orthostatics VS.      Paroxysmal Atrial Fibrillation: Followed by Azam Parr. Continue Tikosyn, Toprol and Eliquis.      Diabetes Mellitus, Type 2: Controlled. Continue basal-bolus Insulin regimen. Monitor serial finger stick blood sugars given risk for toxicity/hypoglycemia due to Insulin and adjust regimen as needed.      Hypertension, benign: Controlled. May need to hold/adjust meds in setting of Syncope and Orthostatic Hypotension. Monitor and adjust as needed.      CKD: Stable. Monitor BMP.     Consults:      IP CONSULT TO  completed on 2/28/2025 with the following recommendations:  Anticipated Discharge Location: [x]Home w/ []HHC vs []SNF  []Acute Rehab  []LTAC  []Hospice  []Other -    Anticipated Discharge Day/Date:  2/28  Barriers to Discharge: Clinical improvement  --------------------------------------------------    MDM  Moderate       Labs:  Personally reviewed on 2/28/2025 and interpreted for clinical significance as documented above.     Recent Labs     02/26/25  1211   WBC 11.1*   HGB 15.2   HCT 46.2        Recent Labs     02/26/25  1211 02/27/25  0501 02/28/25  0530   * 138 141   K 4.1 3.9 4.0   CL 93* 101 105   CO2 22 25 26   BUN 38* 41* 31*   CREATININE 1.4* 1.5* 1.2   CALCIUM 10.2 9.3 9.0     Recent Labs     02/26/25  1211 02/26/25  1328   PROBNP 1,261*  --    TROPHS 13 12     No results for input(s): \"LABA1C\" in the last 72 hours.  Recent Labs     02/26/25  1211   AST 56*   ALT 97*   BILITOT 1.4*   ALKPHOS 126     No results for input(s): \"INR\", \"LACTA\", \"TSH\" in the last 72 hours.    Urine Cultures:   Lab Results   Component Value Date/Time    LABURIN >100,000 CFU/ml 09/11/2024 01:20 PM     Blood Cultures:   Lab Results   Component Value Date/Time    BC No Growth after 4 days of incubation. 09/04/2022 01:49 PM     Lab Results   Component Value Date/Time    BLOODCULT2 No Growth after 4 days of incubation. 09/04/2022 01:50 PM     Organism:   Lab Results   Component Value Date/Time    ORG Enterococcus faecalis 09/11/2024 01:20 PM         Jese Holguin MD

## 2025-02-28 NOTE — PROGRESS NOTES
02/27/25 2238   RT Protocol   History Pulmonary Disease 1   Respiratory pattern 0   Breath sounds 2   Cough 0   Indications for Bronchodilator Therapy On home bronchodilators   Bronchodilator Assessment Score 3

## 2025-02-28 NOTE — CARE COORDINATION
CASE MANAGEMENT DISCHARGE SUMMARY      Discharge to: Home- NN    Precertification completed: N/A  Hospital Exemption Notification (HENS) completed: N/A    IMM given: (date) 2/26/25    New Durable Medical Equipment ordered/agency: N/A    Transportation:    Family/car: Personal      Confirmed discharge plan with:     Patient: yes     Family:  yes          RN, name: Ines    Note: Discharging nurse to complete COLE, reconcile AVS, and place final copy with patient's discharge packet. RN to ensure that written prescriptions for  Level II medications are sent with patient to the facility as per protocol.    Malathi Mendoza, RN

## 2025-02-28 NOTE — PLAN OF CARE
Problem: Chronic Conditions and Co-morbidities  Goal: Patient's chronic conditions and co-morbidity symptoms are monitored and maintained or improved  2/28/2025 1044 by Ines Hinton RN  Outcome: Progressing  2/28/2025 0232 by Aida Dominguez RN  Outcome: Progressing  2/28/2025 0232 by Aida Dominguez RN  Outcome: Progressing     Problem: Discharge Planning  Goal: Discharge to home or other facility with appropriate resources  2/28/2025 1044 by Ines Hinton RN  Outcome: Progressing  2/28/2025 0232 by Aida Dominguez RN  Outcome: Progressing  2/28/2025 0232 by Aida Dominguez RN  Outcome: Progressing     Problem: Safety - Adult  Goal: Free from fall injury  2/28/2025 1044 by Ines Hinton RN  Outcome: Progressing  2/28/2025 0232 by Aida Dominguez RN  Outcome: Progressing  2/28/2025 0232 by Aida Dominguez RN  Outcome: Progressing     Problem: Nutrition Deficit:  Goal: Optimize nutritional status  2/28/2025 1044 by Ines Hinton RN  Outcome: Progressing  2/28/2025 0232 by Aida Dominguez RN  Outcome: Progressing  2/28/2025 0232 by Aida Dominguez RN  Outcome: Progressing

## 2025-02-28 NOTE — PLAN OF CARE
Problem: Chronic Conditions and Co-morbidities  Goal: Patient's chronic conditions and co-morbidity symptoms are monitored and maintained or improved  2/28/2025 0232 by Aida Dominguez RN  Outcome: Progressing     Problem: Discharge Planning  Goal: Discharge to home or other facility with appropriate resources  2/28/2025 0232 by Aida Dominguez RN  Outcome: Progressing     Problem: Safety - Adult  Goal: Free from fall injury  2/28/2025 0232 by Aida Dominguez RN  Outcome: Progressing    Problem: Nutrition Deficit:  Goal: Optimize nutritional status  2/28/2025 0232 by Aida Dominguez RN  Outcome: Progressing

## 2025-02-28 NOTE — PROGRESS NOTES
Patient and family given discharge instructions. All questions and concerns were addressed. Patient wheeled to car with all patient belongings.

## 2025-03-01 NOTE — PROGRESS NOTES
Physician Progress Note      PATIENT:               GIGI AMBRIZ  CSN #:                  [id number]  :                       1945  ADMIT DATE:       2025 11:48 AM  DISCH DATE:        2025 12:19 PM  RESPONDING  PROVIDER #:        Jese MALLOY MD        QUERY TEXT:    Stage of Chronic Kidney Disease: Please provide further specificity, if known.    Clinical indicators include: ckd, bun, creatinine, probnp, cr  Options provided:  -- Chronic kidney disease stage 1  -- Chronic kidney disease stage 2  -- Chronic kidney disease stage 3  -- Chronic kidney disease stage 3a  -- Chronic kidney disease stage 3b  -- Chronic kidney disease stage 4  -- Chronic kidney disease stage 5  -- Chronic kidney disease stage 5, requiring dialysis  -- End stage renal disease  -- Other - I will add my own diagnosis  -- Disagree - Not applicable / Not valid  -- Disagree - Clinically Unable to determine / Unknown        PROVIDER RESPONSE TEXT:    The patient has chronic kidney disease stage 3a.      Electronically signed by:  Jese MALLOY MD 2025 10:09 PM

## 2025-03-03 ENCOUNTER — CARE COORDINATION (OUTPATIENT)
Dept: CARE COORDINATION | Age: 80
End: 2025-03-03

## 2025-03-03 NOTE — CARE COORDINATION
Care Transitions Note    Initial Call - Call within 2 business days of discharge: Yes    Attempted to reach patient for transitions of care follow up. Unable to reach patient.    Outreach Attempts:   HIPAA compliant voicemail left for patient.     Patient: Lacho Gallegos    Patient : 1945   MRN: [id number]    Reason for Admission: flu A  Discharge Date: 25  RURS: Readmission Risk Score: 15.2    Last Discharge Facility ;i      Date Complaint Diagnosis Description Type Department Provider    25 Fatigue; Dizziness Influenza A ... ED to Hosp-Admission (Discharged) (ADMITTED) Jese Chu MD; Singh Phillips...            Was this an external facility discharge? No    Follow Up Appointment:   Patient does not have a follow up appointment scheduled at time of call.   Future Appointments         Provider Specialty Dept Phone    2025 3:30 PM Calin Zheng MD Internal Medicine [phone]    2025 9:20 AM Le Castaneda APRN [facility] Pulmonology [phone]            Plan for follow-up on next business day.      Liyah Muro    Care Transitions Note    Initial Call - Call within 2 business days of discharge: No    Patient Current Location:  Home: [address] [place] [address] [place] [address]    Care Transition Nurse contacted the patient by telephone to perform post hospital discharge assessment, verified name and  as identifiers. Provided introduction to self, and explanation of the Care Transition Nurse role.     Patient: Lacho Gallegos    Patient : 1945   MRN: [id number]    Reason for Admission: influenza A  Discharge Date: 25  RURS: Readmission Risk Score: 15.2      Last Discharge Facility       Date Complaint Diagnosis Description Type Department Provider    25 Fatigue; Dizziness Influenza A ... ED to Hosp-Admission (Discharged) (ADMITTED) Jese Chu MD; Singh Phillips...            Was this an external facility discharge? No    Additional needs

## 2025-03-06 ENCOUNTER — OFFICE VISIT (OUTPATIENT)
Dept: INTERNAL MEDICINE CLINIC | Age: 80
End: 2025-03-06

## 2025-03-06 VITALS
SYSTOLIC BLOOD PRESSURE: 100 MMHG | WEIGHT: 282 LBS | BODY MASS INDEX: 35.06 KG/M2 | RESPIRATION RATE: 14 BRPM | OXYGEN SATURATION: 100 % | DIASTOLIC BLOOD PRESSURE: 60 MMHG | HEIGHT: 75 IN | HEART RATE: 88 BPM

## 2025-03-06 DIAGNOSIS — Z09 HOSPITAL DISCHARGE FOLLOW-UP: Primary | ICD-10-CM

## 2025-03-06 DIAGNOSIS — J11.1 INFLUENZA: ICD-10-CM

## 2025-03-06 PROCEDURE — G8417 CALC BMI ABV UP PARAM F/U: HCPCS | Performed by: NURSE PRACTITIONER

## 2025-03-06 PROCEDURE — 1123F ACP DISCUSS/DSCN MKR DOCD: CPT | Performed by: NURSE PRACTITIONER

## 2025-03-06 PROCEDURE — 3078F DIAST BP <80 MM HG: CPT | Performed by: NURSE PRACTITIONER

## 2025-03-06 PROCEDURE — 3074F SYST BP LT 130 MM HG: CPT | Performed by: NURSE PRACTITIONER

## 2025-03-06 PROCEDURE — 1111F DSCHRG MED/CURRENT MED MERGE: CPT | Performed by: NURSE PRACTITIONER

## 2025-03-06 PROCEDURE — 1036F TOBACCO NON-USER: CPT | Performed by: NURSE PRACTITIONER

## 2025-03-06 PROCEDURE — 1159F MED LIST DOCD IN RCRD: CPT | Performed by: NURSE PRACTITIONER

## 2025-03-06 PROCEDURE — G8427 DOCREV CUR MEDS BY ELIG CLIN: HCPCS | Performed by: NURSE PRACTITIONER

## 2025-03-06 PROCEDURE — 99213 OFFICE O/P EST LOW 20 MIN: CPT | Performed by: NURSE PRACTITIONER

## 2025-03-06 PROCEDURE — 1160F RVW MEDS BY RX/DR IN RCRD: CPT | Performed by: NURSE PRACTITIONER

## 2025-03-06 NOTE — PROGRESS NOTES
Post-Discharge Transitional Care Follow Up      Lacho Gallegos   YOB: 1945    Date of Office Visit:  3/6/2025  Date of Hospital Admission: 2/26/25  Date of Hospital Discharge: 2/28/25  Readmission Risk Score (high >=14%. Medium >=10%):Readmission Risk Score: 15.2      Care management risk score Rising risk (score 2-5) and Complex Care (Scores >=6): No Risk Score On File     Non face to face  following discharge, date last encounter closed (first attempt may have been earlier): 03/03/2025     Call initiated 2 business days of discharge: No     Hospital discharge follow-up  -     IA DISCHARGE MEDS RECONCILED W/ CURRENT OUTPATIENT MED LIST  Influenza      Medical Decision Making: low complexity  Return if symptoms worsen or fail to improve.    On this date 3/6/2025 I have spent 35 minutes reviewing previous notes, test results and face to face with the patient discussing the diagnosis and importance of compliance with the treatment plan as well as documenting on the day of the visit.       Subjective:   HPI    Inpatient course: Discharge summary reviewed- see chart.    Interval history/Current status: Patient presents for hospital follow up.  States he was tested for Flu and this was negative.  Chest x-ray was negative.  He passed out in the bathroom.  BP was in the 70's. He went to [facility].  He was admitted there 2/26-2/28/25 for Influenza A.  Given IVF's.  Did not start Tamiflu.  Was given Mucinex.    He is feeling better.  Just still weak at times.      Patient Active Problem List   Diagnosis    Arthritis    Essential hypertension    Moderate obstructive sleep apnea    Lung nodule    Type 2 diabetes mellitus with diabetic polyneuropathy, without long-term current use of insulin (HCC)    Venous hypertension of both lower extremities    Class 2 severe obesity due to excess calories with serious comorbidity and body mass index (BMI) of 35.0 to 35.9 in adult    Coronary artery disease of native artery of

## 2025-03-07 ENCOUNTER — CARE COORDINATION (OUTPATIENT)
Dept: CASE MANAGEMENT | Age: 80
End: 2025-03-07

## 2025-03-07 NOTE — CARE COORDINATION
Care Transitions Note    Follow Up Call     Patient Current Location:  Home: [address] [place] [address] [place] [address]    Care Transition Nurse contacted the patient by telephone. Verified name and  as identifiers.    Additional needs identified to be addressed with provider   No needs identified                 Method of communication with provider: none.    Care Summary Note: Spoke with patient today for f/u call. He says he is doing better. Has seen PCP for f/u. He has no swelling right now. Says BP and BS are good but no particular readings given. Says occasional weakness, but overall better. No problems or concerns. CTN will follow up at a later time.     Plan of care updates since last contact:  No POC changes, doing better      Assessments:  Care Transitions Subsequent and Final Call    Subsequent and Final Calls  Are you currently active with any services?: Home Health  Care Transitions Interventions  Other Interventions:              Follow Up Appointment:   MEL appointment attended as scheduled   Future Appointments         Provider Specialty Dept Phone    2025 3:30 PM Calin Zheng MD Internal Medicine [phone]    2025 9:20 AM Le Castaneda APRN - [facility] Pulmonology [phone]            Care Transition Nurse provided contact information.  Plan for follow-up call in 6-10 days based on severity of symptoms and risk factors.  Plan for next call: symptom management-weakness, appetite, swelling, bp, bs      Palmira Jenkins RN

## 2025-03-14 ENCOUNTER — CARE COORDINATION (OUTPATIENT)
Dept: CARE COORDINATION | Age: 80
End: 2025-03-14

## 2025-03-14 NOTE — CARE COORDINATION
Care Transitions Note    Follow Up Call     Attempted to reach patient for transitions of care follow up.  Unable to reach patient.      Outreach Attempts:   HIPAA compliant voicemail left for patient.     Care Summary Note: Follow up outreach call attempt, no answer.  CTN left VM with contact information and request for return call.  CTN will continue with outreach call attempts.      Follow Up Appointment:   Future Appointments         Provider Specialty Dept Phone    5/6/2025 3:30 PM Calin Zheng MD Internal Medicine [phone]    7/11/2025 9:20 AM Le Castaneda APRN - [facility] Pulmonology [phone]            Plan for follow-up call in 2-5 days based on severity of symptoms and risk factors. Plan for next call: symptom management-.    CHRISTIANO Martin, RN   Care Transition Nurse  Mobile: [phone]

## 2025-03-19 RX ORDER — ATORVASTATIN CALCIUM 40 MG/1
40 TABLET, FILM COATED ORAL DAILY
Qty: 90 TABLET | Refills: 1 | Status: SHIPPED | OUTPATIENT
Start: 2025-03-19

## 2025-03-20 ENCOUNTER — CARE COORDINATION (OUTPATIENT)
Dept: CASE MANAGEMENT | Age: 80
End: 2025-03-20

## 2025-03-20 NOTE — CARE COORDINATION
Care Transitions Note    Follow Up Call     Attempted to reach patient for transitions of care follow up.  Unable to reach patient.      Outreach Attempts:   HIPAA compliant voicemail left for patient.     Care Summary Note:       Follow Up Appointment:   Future Appointments         Provider Specialty Dept Phone    5/6/2025 3:30 PM Calin Zheng MD Internal Medicine [phone]    7/11/2025 9:20 AM Le Castaneda APRN - [facility] Pulmonology [phone]            Plan for follow-up call in 6-10 days based on severity of symptoms and risk factors. Plan for next call: symptom management-   self management-     Lauren Abreu LPN

## 2025-03-28 ENCOUNTER — CARE COORDINATION (OUTPATIENT)
Dept: CASE MANAGEMENT | Age: 80
End: 2025-03-28

## 2025-03-28 NOTE — CARE COORDINATION
Care Transitions Note    Follow Up Call     Patient Current Location:  Home: [address] [place] [address] [place] [address]    Care Transition Nurse contacted the patient by telephone. Verified name and  as identifiers.    Additional needs identified to be addressed with provider   No needs identified                 Method of communication with provider: none.    Care Summary Note: Spoke with patient. States went to [facility] ED yesterday for low BP. States wife had to help him walk 15 ft and extremely weak and b/p dropped to 70/40-80/50's. States nothing was found as when he got there b/p returned to normal and then elevated.  States appetite is good. Denies any b/b issues.  States blood sugar was 152 today.  States has lower leg edema which is normal.  States very frustrated as why he feels so bad off and on. Scheduled fu with PCP on 25.       Advance Care Planning:   Does patient have an Advance Directive: reviewed during previous call, see note. .    Medication Review:  No changes since last call.     Remote Patient Monitoring:  Offered patient enrollment in the Remote Patient Monitoring (RPM) program for in-home monitoring: Yes, but did not enroll at this time: already monitoring with home equipment.    Assessments:  Care Transitions Subsequent and Final Call    Subsequent and Final Calls  Care Transitions Interventions  Other Interventions:              Follow Up Appointment:   Reviewed upcoming appointment(s).  Future Appointments         Provider Specialty Dept Phone    2025 11:30 AM Calin Zheng MD Internal Medicine [phone]    2025 3:30 PM Calin Zheng MD Internal Medicine [phone]    2025 9:20 AM Le Castaneda APRN - [facility] [phone]            Care Transition Nurse provided contact information.  Plan for follow-up call in 6-10 days based on severity of symptoms and risk factors.  Plan for next call:  FU with PCP visit on 25, blood pressure, any

## 2025-03-31 ENCOUNTER — TELEPHONE (OUTPATIENT)
Dept: INTERNAL MEDICINE CLINIC | Age: 80
End: 2025-03-31

## 2025-03-31 RX ORDER — MONTELUKAST SODIUM 10 MG/1
10 TABLET ORAL DAILY
Qty: 90 TABLET | Refills: 1 | Status: SHIPPED | OUTPATIENT
Start: 2025-03-31

## 2025-03-31 NOTE — TELEPHONE ENCOUNTER
----- Message from MA Nabila GARAY sent at 3/31/2025 10:32 AM EDT -----  Contact: [phone]  Office note placed on Dr. Zheng's folder. Requested results from [facility] to be faxed to us.  ----- Message -----  From: Calin Zheng MD  Sent: 3/31/2025  10:04 AM EDT  To: Nabila Bach    Where are the results  ----- Message -----  From: Lina Cortes  Sent: 3/24/2025   1:46 PM EDT  To: Calin Zheng MD    Pt states he is recommended by his foot doctor, to have varicose veins surgery. Pt requesting you to review that results that was faxed over and ask your opinion? Please advise

## 2025-04-02 ENCOUNTER — OFFICE VISIT (OUTPATIENT)
Dept: INTERNAL MEDICINE CLINIC | Age: 80
End: 2025-04-02

## 2025-04-02 VITALS
WEIGHT: 285 LBS | RESPIRATION RATE: 18 BRPM | DIASTOLIC BLOOD PRESSURE: 65 MMHG | SYSTOLIC BLOOD PRESSURE: 102 MMHG | BODY MASS INDEX: 35.43 KG/M2 | HEIGHT: 75 IN | HEART RATE: 62 BPM

## 2025-04-02 DIAGNOSIS — I83.12 VARICOSE VEINS OF BOTH LOWER EXTREMITIES WITH INFLAMMATION: Primary | ICD-10-CM

## 2025-04-02 DIAGNOSIS — I83.11 VARICOSE VEINS OF BOTH LOWER EXTREMITIES WITH INFLAMMATION: Primary | ICD-10-CM

## 2025-04-02 PROCEDURE — 1160F RVW MEDS BY RX/DR IN RCRD: CPT | Performed by: INTERNAL MEDICINE

## 2025-04-02 PROCEDURE — 1159F MED LIST DOCD IN RCRD: CPT | Performed by: INTERNAL MEDICINE

## 2025-04-02 PROCEDURE — G8417 CALC BMI ABV UP PARAM F/U: HCPCS | Performed by: INTERNAL MEDICINE

## 2025-04-02 PROCEDURE — 1036F TOBACCO NON-USER: CPT | Performed by: INTERNAL MEDICINE

## 2025-04-02 PROCEDURE — 3074F SYST BP LT 130 MM HG: CPT | Performed by: INTERNAL MEDICINE

## 2025-04-02 PROCEDURE — 99212 OFFICE O/P EST SF 10 MIN: CPT | Performed by: INTERNAL MEDICINE

## 2025-04-02 PROCEDURE — G8427 DOCREV CUR MEDS BY ELIG CLIN: HCPCS | Performed by: INTERNAL MEDICINE

## 2025-04-02 PROCEDURE — 1123F ACP DISCUSS/DSCN MKR DOCD: CPT | Performed by: INTERNAL MEDICINE

## 2025-04-02 PROCEDURE — 3078F DIAST BP <80 MM HG: CPT | Performed by: INTERNAL MEDICINE

## 2025-04-02 RX ORDER — MIDODRINE HYDROCHLORIDE 5 MG/1
10 TABLET ORAL 3 TIMES DAILY
Qty: 270 TABLET | Refills: 0
Start: 2025-04-02

## 2025-04-02 NOTE — PROGRESS NOTES
Lacho Gallegos (:  1945) is a 79 y.o. male,Established patient, here for evaluation of the following chief complaint(s):  Varicose Veins          79 y.o. 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 ,obestiy, MARIA VICTORIA , pedal edema here for several questions regarding varicose veins    Recently suffered a very slow healing wound to right lateral ankle region which took about 7 months at woundcare to heal and finally resolved and released from wound care    During his visits, he was referred to podiatry and eventually to vascular surgery given large varicose veins with insuff and was recommended varithena surgery and sclerotherapy . Pt has mixed opinion on further surgical intervention due to delayed wound healing     Also suffers with chronic hypotension needing 10 mg tid midodrine for last year with intermittent postural hypotension and ER visits . He was also told his BP might improve with varicose vein tx          Allergies   Allergen Reactions    Bactrim [Sulfamethoxazole-Trimethoprim] Diarrhea    Brilinta [Ticagrelor]      dyspnea    Ciprofloxacin      Bad headaches    Macrobid [Nitrofurantoin Monohyd Macro]     Ozempic (0.25 Or 0.5 Mg-Dose) [Semaglutide(0.25 Or 0.5mg-Dos)]     Pioglitazone Swelling    Sulfamethoxazole Diarrhea    Theophylline      Theodur-caused severe headaches    Cefuroxime Axetil Rash and Itching    Cipro Xr Rash    Digoxin Nausea And Vomiting    Other Rash and Other (See Comments)     Ekg leads-glue    Tape [Adhesive Tape] Rash     Skin irritaion  Eats away at skin, paper tape OK  Plastic tape     Current Outpatient Medications   Medication Sig Dispense Refill    montelukast (SINGULAIR) 10 MG tablet TAKE 1 TABLET BY MOUTH DAILY 90 tablet 1    atorvastatin (LIPITOR) 40 MG tablet TAKE 1 TABLET BY MOUTH DAILY 90 tablet 1    midodrine (PROAMATINE) 5 MG tablet Take 2 tablets by mouth in the morning, at noon, and at bedtime      torsemide (DEMADEX) 20

## 2025-04-03 ENCOUNTER — CARE COORDINATION (OUTPATIENT)
Dept: CARE COORDINATION | Age: 80
End: 2025-04-03

## 2025-04-03 NOTE — CARE COORDINATION
Care Transitions Note    Follow Up Call     Attempted to reach patient for transitions of care follow up.  Unable to reach patient.      Outreach Attempts:   HIPAA compliant voicemail left for patient.     Care Summary Note: Follow up outreach call attempt, no answer.  CTN left VM with contact information and request for return call.  CTN will continue with outreach call attempts.      Follow Up Appointment:   Future Appointments         Provider Specialty Dept Phone    5/6/2025 3:30 PM Calin Zheng MD Internal Medicine [phone]    7/11/2025 9:20 AM Le Castaneda APRN - [facility] [phone]            Plan for follow-up on next business day.  based on severity of symptoms and risk factors. Plan for next call: symptom management-.    CHRISTIANO Martin, RN   Care Transition Nurse  Mobile: [phone]

## 2025-04-04 ENCOUNTER — CARE COORDINATION (OUTPATIENT)
Dept: CARE COORDINATION | Age: 80
End: 2025-04-04

## 2025-04-04 NOTE — CARE COORDINATION
Care Transitions Note    Follow Up Call     Attempted to reach patient for transitions of care follow up.  Unable to reach patient.      Outreach Attempts:   HIPAA compliant voicemail left for patient.     Patient closed (unable to reach patient) from the Care Transitions program on 4/4.  Patient/family  UTR .      Handoff:   Patient was not referred to the ACM team due to unable to contact patient.      Care Summary Note: Second and final outreach call attempt, no answer.  CTN left VM with contact information and request for return call.  CTN will resolve episode and remain available.      Assessments:  No changes since last call    Upcoming Appointments:    Future Appointments         Provider Specialty Dept Phone    5/6/2025 3:30 PM Calin Zheng MD Internal Medicine [phone]    7/11/2025 9:20 AM Le Castaneda APRN - [facility] [phone]            CHRISTIANO Martin, RN   Care Transition Nurse  Mobile: [phone]

## 2025-04-07 RX ORDER — METFORMIN HYDROCHLORIDE 500 MG/1
TABLET, EXTENDED RELEASE ORAL
Qty: 180 TABLET | Refills: 1 | Status: SHIPPED | OUTPATIENT
Start: 2025-04-07

## 2025-05-01 SDOH — HEALTH STABILITY: PHYSICAL HEALTH: ON AVERAGE, HOW MANY MINUTES DO YOU ENGAGE IN EXERCISE AT THIS LEVEL?: 60 MIN

## 2025-05-01 SDOH — HEALTH STABILITY: PHYSICAL HEALTH: ON AVERAGE, HOW MANY DAYS PER WEEK DO YOU ENGAGE IN MODERATE TO STRENUOUS EXERCISE (LIKE A BRISK WALK)?: 2 DAYS

## 2025-05-01 ASSESSMENT — LIFESTYLE VARIABLES
HOW MANY STANDARD DRINKS CONTAINING ALCOHOL DO YOU HAVE ON A TYPICAL DAY: 0
HOW OFTEN DO YOU HAVE SIX OR MORE DRINKS ON ONE OCCASION: 1
HOW OFTEN DO YOU HAVE A DRINK CONTAINING ALCOHOL: 1
HOW MANY STANDARD DRINKS CONTAINING ALCOHOL DO YOU HAVE ON A TYPICAL DAY: PATIENT DOES NOT DRINK
HOW OFTEN DO YOU HAVE A DRINK CONTAINING ALCOHOL: NEVER

## 2025-05-01 ASSESSMENT — PATIENT HEALTH QUESTIONNAIRE - PHQ9
1. LITTLE INTEREST OR PLEASURE IN DOING THINGS: NOT AT ALL
SUM OF ALL RESPONSES TO PHQ QUESTIONS 1-9: 0
2. FEELING DOWN, DEPRESSED OR HOPELESS: NOT AT ALL
SUM OF ALL RESPONSES TO PHQ QUESTIONS 1-9: 0

## 2025-05-06 ENCOUNTER — OFFICE VISIT (OUTPATIENT)
Dept: INTERNAL MEDICINE CLINIC | Age: 80
End: 2025-05-06

## 2025-05-06 VITALS
SYSTOLIC BLOOD PRESSURE: 110 MMHG | HEART RATE: 58 BPM | DIASTOLIC BLOOD PRESSURE: 80 MMHG | BODY MASS INDEX: 35.19 KG/M2 | WEIGHT: 283 LBS | RESPIRATION RATE: 18 BRPM | HEIGHT: 75 IN

## 2025-05-06 DIAGNOSIS — I10 ESSENTIAL HYPERTENSION: ICD-10-CM

## 2025-05-06 DIAGNOSIS — I48.0 PAROXYSMAL ATRIAL FIBRILLATION (HCC): ICD-10-CM

## 2025-05-06 DIAGNOSIS — E66.01 CLASS 2 SEVERE OBESITY DUE TO EXCESS CALORIES WITH SERIOUS COMORBIDITY AND BODY MASS INDEX (BMI) OF 35.0 TO 35.9 IN ADULT (HCC): ICD-10-CM

## 2025-05-06 DIAGNOSIS — Z71.89 ACP (ADVANCE CARE PLANNING): ICD-10-CM

## 2025-05-06 DIAGNOSIS — N13.8 BENIGN PROSTATIC HYPERPLASIA WITH URINARY OBSTRUCTION: ICD-10-CM

## 2025-05-06 DIAGNOSIS — E11.42 TYPE 2 DIABETES MELLITUS WITH DIABETIC POLYNEUROPATHY, WITHOUT LONG-TERM CURRENT USE OF INSULIN (HCC): ICD-10-CM

## 2025-05-06 DIAGNOSIS — R30.0 DYSURIA: ICD-10-CM

## 2025-05-06 DIAGNOSIS — J45.40 MODERATE PERSISTENT ASTHMA WITHOUT COMPLICATION: ICD-10-CM

## 2025-05-06 DIAGNOSIS — N40.1 BENIGN PROSTATIC HYPERPLASIA WITH URINARY OBSTRUCTION: ICD-10-CM

## 2025-05-06 DIAGNOSIS — G47.33 MODERATE OBSTRUCTIVE SLEEP APNEA: ICD-10-CM

## 2025-05-06 DIAGNOSIS — E66.812 CLASS 2 SEVERE OBESITY DUE TO EXCESS CALORIES WITH SERIOUS COMORBIDITY AND BODY MASS INDEX (BMI) OF 35.0 TO 35.9 IN ADULT (HCC): ICD-10-CM

## 2025-05-06 DIAGNOSIS — R39.9 UTI SYMPTOMS: Primary | ICD-10-CM

## 2025-05-06 DIAGNOSIS — Z00.00 MEDICARE ANNUAL WELLNESS VISIT, SUBSEQUENT: Primary | ICD-10-CM

## 2025-05-06 DIAGNOSIS — E11.40 TYPE 2 DIABETES MELLITUS WITH DIABETIC NEUROPATHY, WITHOUT LONG-TERM CURRENT USE OF INSULIN (HCC): ICD-10-CM

## 2025-05-06 DIAGNOSIS — I50.32 CHRONIC DIASTOLIC CHF (CONGESTIVE HEART FAILURE) (HCC): ICD-10-CM

## 2025-05-06 DIAGNOSIS — I25.118 CORONARY ARTERY DISEASE OF NATIVE ARTERY OF NATIVE HEART WITH STABLE ANGINA PECTORIS: ICD-10-CM

## 2025-05-06 DIAGNOSIS — J45.21 MILD INTERMITTENT ASTHMA WITH ACUTE EXACERBATION: ICD-10-CM

## 2025-05-06 DIAGNOSIS — N18.31 STAGE 3A CHRONIC KIDNEY DISEASE (HCC): ICD-10-CM

## 2025-05-06 PROBLEM — I50.33 ACUTE ON CHRONIC DIASTOLIC HEART FAILURE (HCC): Status: RESOLVED | Noted: 2022-09-21 | Resolved: 2025-05-06

## 2025-05-06 PROBLEM — J11.1 INFLUENZA: Status: RESOLVED | Noted: 2025-02-26 | Resolved: 2025-05-06

## 2025-05-06 PROBLEM — I50.33 ACUTE ON CHRONIC DIASTOLIC HEART FAILURE (HCC): Status: ACTIVE | Noted: 2022-09-21

## 2025-05-06 PROBLEM — I25.10 ATHEROSCLEROSIS OF CORONARY ARTERY WITHOUT ANGINA PECTORIS: Status: ACTIVE | Noted: 2022-09-21

## 2025-05-06 LAB
BILIRUBIN, POC: NORMAL
BLOOD URINE, POC: NORMAL
CLARITY, POC: NORMAL
COLOR, POC: NORMAL
GLUCOSE URINE, POC: NORMAL MG/DL
KETONES, POC: NORMAL MG/DL
LEUKOCYTE EST, POC: POSITIVE
NITRITE, POC: NORMAL
PH, POC: NORMAL
PROTEIN, POC: NORMAL MG/DL
SPECIFIC GRAVITY, POC: NORMAL
UROBILINOGEN, POC: NORMAL MG/DL

## 2025-05-06 PROCEDURE — 1160F RVW MEDS BY RX/DR IN RCRD: CPT | Performed by: INTERNAL MEDICINE

## 2025-05-06 PROCEDURE — 3051F HG A1C>EQUAL 7.0%<8.0%: CPT | Performed by: INTERNAL MEDICINE

## 2025-05-06 PROCEDURE — 1159F MED LIST DOCD IN RCRD: CPT | Performed by: INTERNAL MEDICINE

## 2025-05-06 PROCEDURE — 3074F SYST BP LT 130 MM HG: CPT | Performed by: INTERNAL MEDICINE

## 2025-05-06 PROCEDURE — G0439 PPPS, SUBSEQ VISIT: HCPCS | Performed by: INTERNAL MEDICINE

## 2025-05-06 PROCEDURE — 1123F ACP DISCUSS/DSCN MKR DOCD: CPT | Performed by: INTERNAL MEDICINE

## 2025-05-06 PROCEDURE — 81002 URINALYSIS NONAUTO W/O SCOPE: CPT | Performed by: INTERNAL MEDICINE

## 2025-05-06 PROCEDURE — 3079F DIAST BP 80-89 MM HG: CPT | Performed by: INTERNAL MEDICINE

## 2025-05-06 RX ORDER — AMOXICILLIN 500 MG/1
500 CAPSULE ORAL 3 TIMES DAILY
Qty: 21 CAPSULE | Refills: 0 | Status: SHIPPED | OUTPATIENT
Start: 2025-05-06 | End: 2025-05-13

## 2025-05-06 ASSESSMENT — PATIENT HEALTH QUESTIONNAIRE - PHQ9
SUM OF ALL RESPONSES TO PHQ QUESTIONS 1-9: 1
SUM OF ALL RESPONSES TO PHQ QUESTIONS 1-9: 1
1. LITTLE INTEREST OR PLEASURE IN DOING THINGS: NOT AT ALL
SUM OF ALL RESPONSES TO PHQ QUESTIONS 1-9: 1
2. FEELING DOWN, DEPRESSED OR HOPELESS: SEVERAL DAYS
SUM OF ALL RESPONSES TO PHQ QUESTIONS 1-9: 1

## 2025-05-06 NOTE — PROGRESS NOTES
Medicare Annual Wellness Visit    Lcaho Gallegos is here for Medicare AWV    Assessment & Plan   Medicare annual wellness visit, subsequent  -     PSA, Prostatic Specific Antigen ([facility]); Future  Paroxysmal atrial fibrillation (HCC)  -     TSH reflex to FT4; Future  Type 2 diabetes mellitus with diabetic polyneuropathy, without long-term current use of insulin ([facility])  -     dulaglutide (TRULICITY) 1.5 MG/0.5ML SC injection; Inject 0.5 mLs into the skin once a week, Disp-2 mL, R-2Print  -     Lipid, Fasting; Future  -     Uric Acid; Future  -     Albumin/Creatinine Ratio, Urine; Future  Stage 3a chronic kidney disease (HCC)  Chronic diastolic CHF (congestive heart failure) ([facility])  Moderate persistent asthma without complication  Mild intermittent asthma with acute exacerbation  Type 2 diabetes mellitus with diabetic neuropathy, without long-term current use of insulin ([facility])  Essential hypertension  Moderate obstructive sleep apnea  Class 2 severe obesity due to excess calories with serious comorbidity and body mass index (BMI) of 35.0 to 35.9 in adult ([facility])  Coronary artery disease of native artery of native heart with stable angina pectoris  Benign prostatic hyperplasia with urinary obstruction  Dysuria  ACP (advance care planning)       Return in 4 months (on 9/6/2025) for htn .     Subjective       80 y.o.  y.o. 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 ,obestiy, MARIA VICTORIA , pedal edema here for  medicare wellness visit    Since last time pt has been doing ok regarding breathing  Losing weight with diet, no dizziness since midodrine increased  Has UTi symptoms today     Hx of CAD , ischemic CM,   Diastolic CHF   Hx of multiple LAD stents now f/w Dr Love at [facility]   Now completed plavix , remains on Eliquis for Afibb  No recent chest pain . Exertional dyspnea is chronic and remains    Afibb, paroxysmal with symptoms    Pt reports his Tikosyn was stopped for prolonged qtc

## 2025-05-06 NOTE — PATIENT INSTRUCTIONS
Learning About Being Active as an Older Adult  Why is being active important as you get older?     Being active is one of the best things you can do for your health. And it's never too late to start. Being active--or getting active, if you aren't already--has definite benefits. It can:  Give you more energy,  Keep your mind sharp.  Improve balance to reduce your risk of falls.  Help you manage chronic illness with fewer medicines.  No matter how old you are, how fit you are, or what health problems you have, there is a form of activity that will work for you. And the more physical activity you can do, the better your overall health will be.  What kinds of activity can help you stay healthy?  Being more active will make your daily activities easier. Physical activity includes planned exercise and things you do in daily life. There are four types of activity:  Aerobic.  Doing aerobic activity makes your heart and lungs strong.  Includes walking, dancing, and gardening.  Aim for at least 2½ hours spread throughout the week.  It improves your energy and can help you sleep better.  Muscle-strengthening.  This type of activity can help maintain muscle and strengthen bones.  Includes climbing stairs, using resistance bands, and lifting or carrying heavy loads.  Aim for at least twice a week.  It can help protect the knees and other joints.  Stretching.  Stretching gives you better range of motion in joints and muscles.  Includes upper arm stretches, calf stretches, and gentle yoga.  Aim for at least twice a week, preferably after your muscles are warmed up from other activities.  It can help you function better in daily life.  Balancing.  This helps you stay coordinated and have good posture.  Includes heel-to-toe walking, luis e chi, and certain types of yoga.  Aim for at least 3 days a week.  It can reduce your risk of falling.  Even if you have a hard time meeting the recommendations, it's better to be more active

## 2025-05-07 ENCOUNTER — HOSPITAL ENCOUNTER (OUTPATIENT)
Dept: WOUND CARE | Age: 80
Discharge: HOME | End: 2025-05-07
Attending: INTERNAL MEDICINE
Payer: MEDICARE

## 2025-05-07 ENCOUNTER — RESULTS FOLLOW-UP (OUTPATIENT)
Dept: INTERNAL MEDICINE CLINIC | Age: 80
End: 2025-05-07

## 2025-05-07 VITALS
BODY MASS INDEX: 35.31 KG/M2 | TEMPERATURE: 97.8 F | HEIGHT: 75 IN | RESPIRATION RATE: 18 BRPM | WEIGHT: 284 LBS | SYSTOLIC BLOOD PRESSURE: 128 MMHG | HEART RATE: 98 BPM | DIASTOLIC BLOOD PRESSURE: 80 MMHG

## 2025-05-07 DIAGNOSIS — R60.0 BILATERAL LOWER EXTREMITY EDEMA: ICD-10-CM

## 2025-05-07 DIAGNOSIS — E11.42 TYPE 2 DIABETES MELLITUS WITH DIABETIC POLYNEUROPATHY, WITHOUT LONG-TERM CURRENT USE OF INSULIN (HCC): Primary | ICD-10-CM

## 2025-05-07 DIAGNOSIS — L97.312 ULCER OF RIGHT ANKLE, WITH FAT LAYER EXPOSED (HCC): ICD-10-CM

## 2025-05-07 DIAGNOSIS — I87.303 VENOUS HYPERTENSION OF BOTH LOWER EXTREMITIES: ICD-10-CM

## 2025-05-07 LAB
CREAT UR-MCNC: 42.3 MG/DL (ref 39–259)
MICROALBUMIN UR DL<=1MG/L-MCNC: 14.8 MG/DL
MICROALBUMIN/CREAT UR: 349.9 MG/G (ref 0–30)

## 2025-05-07 PROCEDURE — 29581 APPL MULTLAYER CMPRN SYS LEG: CPT

## 2025-05-07 PROCEDURE — 99213 OFFICE O/P EST LOW 20 MIN: CPT

## 2025-05-07 RX ORDER — LIDOCAINE HYDROCHLORIDE 40 MG/ML
SOLUTION TOPICAL PRN
OUTPATIENT
Start: 2025-05-07

## 2025-05-07 RX ORDER — LIDOCAINE 50 MG/G
OINTMENT TOPICAL PRN
Status: DISCONTINUED | OUTPATIENT
Start: 2025-05-07 | End: 2025-05-08 | Stop reason: HOSPADM

## 2025-05-07 RX ORDER — LIDOCAINE HYDROCHLORIDE 40 MG/ML
SOLUTION TOPICAL PRN
Status: DISCONTINUED | OUTPATIENT
Start: 2025-05-07 | End: 2025-05-08 | Stop reason: HOSPADM

## 2025-05-07 RX ORDER — TRIAMCINOLONE ACETONIDE 1 MG/G
OINTMENT TOPICAL PRN
OUTPATIENT
Start: 2025-05-07

## 2025-05-07 RX ORDER — LIDOCAINE 40 MG/G
CREAM TOPICAL PRN
Status: DISCONTINUED | OUTPATIENT
Start: 2025-05-07 | End: 2025-05-08 | Stop reason: HOSPADM

## 2025-05-07 RX ORDER — LIDOCAINE 40 MG/G
CREAM TOPICAL PRN
OUTPATIENT
Start: 2025-05-07

## 2025-05-07 RX ORDER — BACITRACIN ZINC AND POLYMYXIN B SULFATE 500; 1000 [USP'U]/G; [USP'U]/G
OINTMENT TOPICAL PRN
Status: DISCONTINUED | OUTPATIENT
Start: 2025-05-07 | End: 2025-05-08 | Stop reason: HOSPADM

## 2025-05-07 RX ORDER — TRIAMCINOLONE ACETONIDE 1 MG/G
OINTMENT TOPICAL PRN
Status: DISCONTINUED | OUTPATIENT
Start: 2025-05-07 | End: 2025-05-08 | Stop reason: HOSPADM

## 2025-05-07 RX ORDER — LIDOCAINE 50 MG/G
OINTMENT TOPICAL PRN
OUTPATIENT
Start: 2025-05-07

## 2025-05-07 RX ORDER — BACITRACIN ZINC AND POLYMYXIN B SULFATE 500; 1000 [USP'U]/G; [USP'U]/G
OINTMENT TOPICAL PRN
OUTPATIENT
Start: 2025-05-07

## 2025-05-07 ASSESSMENT — PAIN SCALES - GENERAL: PAINLEVEL_OUTOF10: 3

## 2025-05-07 ASSESSMENT — PAIN DESCRIPTION - LOCATION: LOCATION: ANKLE

## 2025-05-07 ASSESSMENT — PAIN DESCRIPTION - DESCRIPTORS: DESCRIPTORS: ACHING

## 2025-05-07 ASSESSMENT — PAIN - FUNCTIONAL ASSESSMENT: PAIN_FUNCTIONAL_ASSESSMENT: ACTIVITIES ARE NOT PREVENTED

## 2025-05-07 ASSESSMENT — PAIN DESCRIPTION - ORIENTATION: ORIENTATION: RIGHT

## 2025-05-07 NOTE — PLAN OF CARE
Pt. Returning to [facility] today for wound on right lateral ankle that has just recently reopened in the same area as treated in the past. Wound with blanchable erythema & painful today, no debridement. Will apply dressings per MD orders with compression wrap for edema control.     Pt. States he has previously been recommended a vein procedure, but that is on hold until healed & will plan to revisit once healed. Pt. Also states he has had trouble with wearing compression stockings. Dr Blas advised would discuss other long-term compression options in the future when closer to healed.     F/u in [facility] in 1 week as ordered, pt. Aware to call sooner with any changes or questions/concerns. Discharge instructions reviewed with patient & wife, all questions answered, copy given to patient. Dressings were applied to all wounds per M.D. Instructions at this visit.

## 2025-05-07 NOTE — DISCHARGE INSTRUCTIONS
Wound Care Center Physician Orders and Discharge Instructions  [facility]  [address], [address]  [place] [address]  Telephone: [phone]      Fax: [phone]        Your home care company:   None .     Your wound-care supplies will be provided by:  Self .     NAME:  Lacho Gallegos   YOB: 1945  PRIMARY DIAGNOSIS FOR WOUND CARE CENTER:  Diabetic Ulcer .     Wound cleansing:   Do not scrub or use excessive force.  Wash hands with soap and water before and after dressing changes.  Prior to applying a clean dressing, cleanse wound with normal saline, wound cleanser, or mild soap and water. Ask your physician or nurse before getting the wound(s) wet in the shower.                Wound care for home:     Right Lateral Ankle  Aquaphor/Triamcinolone to dry skin  Nexodyn spray  Triad/PSO to wound  2 layers of foam to lateral ankle  1 foam to anterior ankle  CoFlex TLC Calamine compression wrap   Leave in place for the week - Do NOT get wet!        Your physician has ordered Compression for treatment of venous ulcers, venous insufficiency,and/or Lymphedema.   * Do not remove until your next scheduled visit in [facility]- do not get wet.    * If your wrap falls down, becomes painful or you have decreased sensation, and/or excessive drainage- please call the [facility] for RN visit to get your compression wrap changed   * You need to exercice as tolerated- walking is good   * Elevate your legs preferrably above level of your heart when sitting as much as possible   * The Goal of this therapy is to reduce Edema and get into long term compression garments to control venous insufficiency, lymphedema and reduce occurrence of venous ulcers          Please note, all wounds (unless stated otherwise here) were mechanically debrided at the time of cleansing here in the wound-care center today, so a small amount of pain, drainage or bleeding from that process might be expected, and is normal.

## 2025-05-08 ENCOUNTER — RESULTS FOLLOW-UP (OUTPATIENT)
Dept: INTERNAL MEDICINE CLINIC | Age: 80
End: 2025-05-08

## 2025-05-08 LAB
BACTERIA UR CULT: ABNORMAL
ORGANISM: ABNORMAL

## 2025-05-09 NOTE — PROGRESS NOTES
previously purchased, so that was the plan as of Dec, but he struggled with them more than he imagined. Venous Duplex exams done, ablations recommended, but he was hesitant, fearing that the procedural sites would be at risk of nonhealing.    -- Then a post-traumatic recurrence of that wound this month. Not large or deep, no signs of infection or ischemia, probably needs a bit of sharp debridement again soon, but for a first week, I just want to give some topical therapy, foam padding, resume weekly compression, and see how he does.      Factors contributing to occurrence and/or persistence of the chronic ulcer include edema, venous stasis, and diabetes. Medical necessity of today's visit is shown by the above documentation. Sharp debridement is not indicated today, based upon the exam findings in the ulcer(s) above.     Discharge plan:     Treatment in the wound care center today, per RN documentation: Wound 05/17/24 #1 right lateral ankle, Venous, partial thickness (onset 5/2025)-Dressing/Treatment: Other (comment) (Aquaphor/Triamcinolone to dry skin Triad/PSO to wound  2 layers of foam to lateral ankle  1 foam to anterior ankle  CoFlex TLC Calamine compression wrap).    Per physician order, right application of multilayer compression wrap was performed in the wound care center today, to help manage edema, stasis dermatitis, and/or venous ulcers. Leave primary dressing and multi-layer wrap(s) in place until the next appointment. Also discussed ways to keep the wrap dry for a shower, including a plastic cast-guard, available in retail pharmacies. He should call before his next visit if there is any significant pain, significant strike-through of drainage to the surface of the wrap, or any significant sense of the wrap sliding down more than an inch or so, bunching-up and abrading his skin.     I reminded the patient of the importance of weight management and smoking cessation, if applicable; also encouraged

## 2025-05-12 ENCOUNTER — TELEPHONE (OUTPATIENT)
Dept: INTERNAL MEDICINE CLINIC | Age: 80
End: 2025-05-12

## 2025-05-12 DIAGNOSIS — G47.33 MODERATE OBSTRUCTIVE SLEEP APNEA: Primary | ICD-10-CM

## 2025-05-12 PROBLEM — I87.2 VENOUS INSUFFICIENCY: Status: ACTIVE | Noted: 2020-01-03

## 2025-05-12 PROBLEM — I82.532 CHRONIC DEEP VEIN THROMBOSIS (DVT) OF LEFT POPLITEAL VEIN (HCC): Status: ACTIVE | Noted: 2025-05-12

## 2025-05-12 RX ORDER — PREDNISONE 20 MG/1
40 TABLET ORAL DAILY
Qty: 10 TABLET | Refills: 0 | Status: SHIPPED | OUTPATIENT
Start: 2025-05-12 | End: 2025-05-17

## 2025-05-12 NOTE — TELEPHONE ENCOUNTER
----- Message from Dr. Calin Zheng MD sent at 5/12/2025 11:16 AM EDT -----  Contact: [phone]  Ok to do inspire if he qualifies  He needs Dr. RAHMAN first and ENT later    Prednisone 40 mg daily x 5 days  Sugars will go up with steroids  Increase trulicity as mentioned last week  ----- Message -----  From: Lina Cortes  Sent: 5/12/2025   8:31 AM EDT  To: Calin Zheng MD    Pt states over the weekend his right elbow began to flare with with gout. Pt states he is currently taking allopurinol 100 MG tablet for this and asking if there is anything stronger?     Pt asking your opinion on his getting Inspire implant so he will not have to use his C-pap machine every night. Pt states he will also need a referral to Dr. Rahman if you are okay with him doing so. Please advise    Alexsandra Bird

## 2025-05-14 ENCOUNTER — HOSPITAL ENCOUNTER (OUTPATIENT)
Dept: WOUND CARE | Age: 80
Discharge: HOME | End: 2025-05-14
Attending: INTERNAL MEDICINE
Payer: MEDICARE

## 2025-05-14 VITALS
TEMPERATURE: 98.5 F | HEIGHT: 75 IN | BODY MASS INDEX: 35.68 KG/M2 | SYSTOLIC BLOOD PRESSURE: 130 MMHG | HEART RATE: 99 BPM | WEIGHT: 287 LBS | DIASTOLIC BLOOD PRESSURE: 84 MMHG | RESPIRATION RATE: 18 BRPM

## 2025-05-14 DIAGNOSIS — I87.2 VENOUS INSUFFICIENCY: ICD-10-CM

## 2025-05-14 DIAGNOSIS — R60.0 BILATERAL LOWER EXTREMITY EDEMA: Primary | ICD-10-CM

## 2025-05-14 DIAGNOSIS — E11.42 TYPE 2 DIABETES MELLITUS WITH DIABETIC POLYNEUROPATHY, WITHOUT LONG-TERM CURRENT USE OF INSULIN (HCC): ICD-10-CM

## 2025-05-14 DIAGNOSIS — L97.312 ULCER OF RIGHT ANKLE, WITH FAT LAYER EXPOSED (HCC): ICD-10-CM

## 2025-05-14 PROCEDURE — 29581 APPL MULTLAYER CMPRN SYS LEG: CPT

## 2025-05-14 RX ORDER — LIDOCAINE HYDROCHLORIDE 40 MG/ML
SOLUTION TOPICAL PRN
Status: DISCONTINUED | OUTPATIENT
Start: 2025-05-14 | End: 2025-05-15 | Stop reason: HOSPADM

## 2025-05-14 RX ORDER — LIDOCAINE 40 MG/G
CREAM TOPICAL PRN
Status: DISCONTINUED | OUTPATIENT
Start: 2025-05-14 | End: 2025-05-15 | Stop reason: HOSPADM

## 2025-05-14 RX ORDER — TRIAMCINOLONE ACETONIDE 1 MG/G
OINTMENT TOPICAL PRN
OUTPATIENT
Start: 2025-05-14

## 2025-05-14 RX ORDER — TADALAFIL 5 MG/1
5 TABLET ORAL PRN
COMMUNITY

## 2025-05-14 RX ORDER — LIDOCAINE 40 MG/G
CREAM TOPICAL PRN
OUTPATIENT
Start: 2025-05-14

## 2025-05-14 RX ORDER — LIDOCAINE HYDROCHLORIDE 40 MG/ML
SOLUTION TOPICAL PRN
OUTPATIENT
Start: 2025-05-14

## 2025-05-14 RX ORDER — BACITRACIN ZINC AND POLYMYXIN B SULFATE 500; 1000 [USP'U]/G; [USP'U]/G
OINTMENT TOPICAL PRN
Status: DISCONTINUED | OUTPATIENT
Start: 2025-05-14 | End: 2025-05-15 | Stop reason: HOSPADM

## 2025-05-14 RX ORDER — TRIAMCINOLONE ACETONIDE 1 MG/G
OINTMENT TOPICAL PRN
Status: DISCONTINUED | OUTPATIENT
Start: 2025-05-14 | End: 2025-05-15 | Stop reason: HOSPADM

## 2025-05-14 RX ORDER — BACITRACIN ZINC AND POLYMYXIN B SULFATE 500; 1000 [USP'U]/G; [USP'U]/G
OINTMENT TOPICAL PRN
OUTPATIENT
Start: 2025-05-14

## 2025-05-14 RX ORDER — LIDOCAINE 50 MG/G
OINTMENT TOPICAL PRN
Status: DISCONTINUED | OUTPATIENT
Start: 2025-05-14 | End: 2025-05-15 | Stop reason: HOSPADM

## 2025-05-14 RX ORDER — TAMSULOSIN HYDROCHLORIDE 0.4 MG/1
0.4 CAPSULE ORAL DAILY
COMMUNITY

## 2025-05-14 RX ORDER — LIDOCAINE 50 MG/G
OINTMENT TOPICAL PRN
OUTPATIENT
Start: 2025-05-14

## 2025-05-14 ASSESSMENT — PAIN SCALES - GENERAL: PAINLEVEL_OUTOF10: 0

## 2025-05-14 NOTE — PLAN OF CARE
Wound stable & showing improvement from last week, no debridement. Will stop Triad this week. Otherwise, cont. With current wound care regime with dressings & compression wrap for edema control. Reinforced importance of exercise, elevation & compression to help with circulation, edema control & wound healing. Dr Blas also discussed velcro compression garment options for long-term edema control to use when healed. Patient agreeable for Dr Blas to order [employer] ReadyWrap Fusion through [facility] to use when healed. F/u in [facility] in 1 week as ordered, pt. Aware to call sooner with any changes or questions/concerns. Discharge instructions reviewed with patient, all questions answered, copy given to patient. Dressings were applied to all wounds per M.D. Instructions at this visit.

## 2025-05-15 NOTE — DISCHARGE INSTRUCTIONS
sure to always wear footwear that fits well, and NEVER go without shoes and socks.  *  Do you know your Hemoglobin A1c level? You should! Please ask if you have questions.  *  Be sure to adhere to any recommendations from your PCP or endocrinologist when it comes to diabetes medications and diet. If you have questions, please ask.  *  If you smoke, your wound can not heal properly -- please talk with us when you're ready to quit.   *  Do not soak your feet unless specifically instructed to do so by us.         F/U Appointment is with Dr. Blas in 1 week, on                                   at                       .     Your nurse  is Jenni VALLE RN.      If we applied slip-resistant hospital socks today, be sure to remove them at least once a day to inspect your toes or feet, even if you're not changing the wraps or dressings underneath. If you see anything concerning (redness, excess moisture, etc), please call and let us know right away.     Should you experience any significant changes in your wound(s) (including redness, increased warmth, increased pain, increased drainage, odor, or fever) or have questions about your wound care, please contact the [facility] at [phone] Monday-Thursday from 8:00 am - 4:30 pm, or Friday from 8:00 am - 2:30 pm.  If you need help with your wound outside these hours and cannot wait until we are again available, contact your home-care company (if applicable), your PCP, or go to the nearest emergency room.

## 2025-05-17 ASSESSMENT — SLEEP AND FATIGUE QUESTIONNAIRES
HOW LIKELY ARE YOU TO NOD OFF OR FALL ASLEEP WHEN YOU ARE A PASSENGER IN A CAR FOR AN HOUR WITHOUT A BREAK: SLIGHT CHANCE OF DOZING
HOW LIKELY ARE YOU TO NOD OFF OR FALL ASLEEP WHILE SITTING QUIETLY AFTER LUNCH WITHOUT ALCOHOL: SLIGHT CHANCE OF DOZING
HOW LIKELY ARE YOU TO NOD OFF OR FALL ASLEEP WHILE LYING DOWN TO REST IN THE AFTERNOON WHEN CIRCUMSTANCES PERMIT: HIGH CHANCE OF DOZING
HOW LIKELY ARE YOU TO NOD OFF OR FALL ASLEEP IN A CAR, WHILE STOPPED FOR A FEW MINUTES IN TRAFFIC: WOULD NEVER DOZE
HOW LIKELY ARE YOU TO NOD OFF OR FALL ASLEEP WHILE SITTING AND READING: SLIGHT CHANCE OF DOZING
HOW LIKELY ARE YOU TO NOD OFF OR FALL ASLEEP WHEN YOU ARE A PASSENGER IN A CAR FOR AN HOUR WITHOUT A BREAK: SLIGHT CHANCE OF DOZING
HOW LIKELY ARE YOU TO NOD OFF OR FALL ASLEEP WHILE SITTING INACTIVE IN A PUBLIC PLACE: WOULD NEVER DOZE
HOW LIKELY ARE YOU TO NOD OFF OR FALL ASLEEP WHILE SITTING INACTIVE IN A PUBLIC PLACE: WOULD NEVER DOZE
HOW LIKELY ARE YOU TO NOD OFF OR FALL ASLEEP WHILE WATCHING TV: MODERATE CHANCE OF DOZING
ESS TOTAL SCORE: 8
HOW LIKELY ARE YOU TO NOD OFF OR FALL ASLEEP WHILE SITTING AND TALKING TO SOMEONE: WOULD NEVER DOZE
HOW LIKELY ARE YOU TO NOD OFF OR FALL ASLEEP WHILE WATCHING TV: MODERATE CHANCE OF DOZING
HOW LIKELY ARE YOU TO NOD OFF OR FALL ASLEEP WHILE SITTING AND READING: SLIGHT CHANCE OF DOZING
HOW LIKELY ARE YOU TO NOD OFF OR FALL ASLEEP WHILE SITTING QUIETLY AFTER LUNCH WITHOUT ALCOHOL: SLIGHT CHANCE OF DOZING
HOW LIKELY ARE YOU TO NOD OFF OR FALL ASLEEP WHILE LYING DOWN TO REST IN THE AFTERNOON WHEN CIRCUMSTANCES PERMIT: HIGH CHANCE OF DOZING
HOW LIKELY ARE YOU TO NOD OFF OR FALL ASLEEP IN A CAR, WHILE STOPPED FOR A FEW MINUTES IN TRAFFIC: WOULD NEVER DOZE
HOW LIKELY ARE YOU TO NOD OFF OR FALL ASLEEP WHILE SITTING AND TALKING TO SOMEONE: WOULD NEVER DOZE

## 2025-05-20 ENCOUNTER — OFFICE VISIT (OUTPATIENT)
Dept: PULMONOLOGY | Age: 80
End: 2025-05-20

## 2025-05-20 VITALS
SYSTOLIC BLOOD PRESSURE: 134 MMHG | WEIGHT: 280.2 LBS | RESPIRATION RATE: 16 BRPM | DIASTOLIC BLOOD PRESSURE: 89 MMHG | HEIGHT: 75 IN | HEART RATE: 84 BPM | TEMPERATURE: 97.2 F | BODY MASS INDEX: 34.84 KG/M2 | OXYGEN SATURATION: 98 %

## 2025-05-20 DIAGNOSIS — E66.9 OBESITY (BMI 30-39.9): ICD-10-CM

## 2025-05-20 DIAGNOSIS — G47.33 MODERATE OBSTRUCTIVE SLEEP APNEA: Primary | ICD-10-CM

## 2025-05-20 NOTE — PROGRESS NOTES
MA Communication:  The following orders are received by verbal communication from Gabe Skaggs MD        Orders include:          Pressure changed to Auto 7-11 EPR 1  Follow up with Le 5/26  
stridor. Clear to auscultation  Abdominal:   Obese. Non-distended   Musculoskeletal: No cyanosis. No clubbing.  Skin: Skin is warm and dry.   Psychiatric: Normal mood and affect.  Neurologic: speech fluent     DATA:  - Sleep Testing -  12/27/12 PSG AHI 18.8 REM AHI 40.7  CPAP titration 2/14/2013 CPAP 9 cmH2O  11/6/17 CPAP titration controlled sleep-related breathing with CPAP, PLMS 20.2, recommendation CPAP 12 cm H2O    Compliance download report from 6/9/24 to 7/8/24 reviewed today by me and showed patient is using machine 7:52 hrs/night with 100% compliance and AHI 2.2 within this time frame.  30/30days with greater than 4 hours of machine use.  CPAP 11 cm H20     - Cardiac Testing -   Compliance download report from 6/9/24 to 7/8/24 reviewed today by me and showed patient is using machine 7:52 hrs/night with 100% compliance and AHI 2.2 within this time frame.  30/30days with greater than 4 hours of machine use.  CPAP 11 cm H20     - Other Testing -     Assessment & Plan  1. Daytime sleepiness  - Using CPAP for several years, effective but issues with dry mouth and mask leaks  - Events reduced to 2/hr  - Advised CPAP tape or kinesiology tape for dry mouth (did not like chin strap)  - Adjust CPAP pressure settings to reduce leaks and improve comfort; revert if not beneficial; APAP 7-11 EPR of 1  - Supply orders for [employer] sent today   - Qualifies for Inspire based on BMI; sleep apnea severity needs reassessment if Inspire desired bur overall I think Inspire will be less effective  - Discussed potential benefits/drawbacks of Inspire; may not be as satisfied compared to CPAP    2. Obstructive sleep apnea, moderate with AHI of 19 on 2012 sleep study  - I advised optimization of CPAP.  He cannot sleep without CPAP and will likely be less satisfied with Inspire than CPAP     For patients with MARIA VICTORIA, I recommend   absolutely no driving motorized vehicles or operating heavy machinery while fatigued, drowsy or

## 2025-05-20 NOTE — PATIENT INSTRUCTIONS
Dr. Skaggs will adjust your pressures today. If this makes anything worse, let us know and we will go back to prior settings.    If mouth dryness is better and you're happy, stay put.    If mouth dryness still an issue, then try CPAP tape which is available from [employer]  An alternative to CPAP tape is kinesiology tape.       Please remember to bring a list of medications and any CPAP or BiPAP machines to your next appointment with the office.     Out of respect for other patients and providers, we ask that you arrive no later than your scheduled appointment time.  If you arrive later than your appointment time, you may be asked to reschedule your appointment.     Late cancellations result in other patients being unable to utilize the appointment slot to see their physician.  Please avoid cancelling your appointment less than 1 week prior to the appointment date.  Patients with multiple missed appointments within 2 years may be dismissed from the practice.     In the next few weeks, you will be receiving a survey from [facility] regarding your visit today.  Your input is valuable to us & surveys are regularly reviewed by [facility] leadership.  It is very important to us that our patients receive excellent care.  If your experience was excellent, please let us know!  If your experience was not a good one, please tell us so we can make needed corrections. We hope you are comfortable recommending us to others for their healthcare needs.     We thank you for choosing [facility]!

## 2025-05-21 ENCOUNTER — HOSPITAL ENCOUNTER (OUTPATIENT)
Dept: WOUND CARE | Age: 80
Discharge: HOME | End: 2025-05-21
Attending: INTERNAL MEDICINE
Payer: MEDICARE

## 2025-05-21 VITALS
DIASTOLIC BLOOD PRESSURE: 73 MMHG | HEART RATE: 99 BPM | BODY MASS INDEX: 35.73 KG/M2 | HEIGHT: 75 IN | TEMPERATURE: 97.6 F | WEIGHT: 287.4 LBS | SYSTOLIC BLOOD PRESSURE: 129 MMHG

## 2025-05-21 DIAGNOSIS — R60.0 BILATERAL LOWER EXTREMITY EDEMA: Primary | ICD-10-CM

## 2025-05-21 DIAGNOSIS — L97.312 ULCER OF RIGHT ANKLE, WITH FAT LAYER EXPOSED (HCC): ICD-10-CM

## 2025-05-21 DIAGNOSIS — E11.42 TYPE 2 DIABETES MELLITUS WITH DIABETIC POLYNEUROPATHY, WITHOUT LONG-TERM CURRENT USE OF INSULIN (HCC): ICD-10-CM

## 2025-05-21 DIAGNOSIS — I87.2 VENOUS INSUFFICIENCY: ICD-10-CM

## 2025-05-21 PROCEDURE — 29581 APPL MULTLAYER CMPRN SYS LEG: CPT

## 2025-05-21 RX ORDER — BACITRACIN ZINC AND POLYMYXIN B SULFATE 500; 1000 [USP'U]/G; [USP'U]/G
OINTMENT TOPICAL PRN
OUTPATIENT
Start: 2025-05-21

## 2025-05-21 RX ORDER — TRIAMCINOLONE ACETONIDE 1 MG/G
OINTMENT TOPICAL PRN
Status: DISCONTINUED | OUTPATIENT
Start: 2025-05-21 | End: 2025-05-22 | Stop reason: HOSPADM

## 2025-05-21 RX ORDER — LIDOCAINE HYDROCHLORIDE 40 MG/ML
SOLUTION TOPICAL PRN
OUTPATIENT
Start: 2025-05-21

## 2025-05-21 RX ORDER — LIDOCAINE 40 MG/G
CREAM TOPICAL PRN
Status: DISCONTINUED | OUTPATIENT
Start: 2025-05-21 | End: 2025-05-22 | Stop reason: HOSPADM

## 2025-05-21 RX ORDER — TRIAMCINOLONE ACETONIDE 1 MG/G
OINTMENT TOPICAL PRN
OUTPATIENT
Start: 2025-05-21

## 2025-05-21 RX ORDER — LIDOCAINE 50 MG/G
OINTMENT TOPICAL PRN
OUTPATIENT
Start: 2025-05-21

## 2025-05-21 RX ORDER — LIDOCAINE 40 MG/G
CREAM TOPICAL PRN
OUTPATIENT
Start: 2025-05-21

## 2025-05-21 RX ORDER — LIDOCAINE HYDROCHLORIDE 40 MG/ML
SOLUTION TOPICAL PRN
Status: DISCONTINUED | OUTPATIENT
Start: 2025-05-21 | End: 2025-05-22 | Stop reason: HOSPADM

## 2025-05-21 RX ORDER — LIDOCAINE 50 MG/G
OINTMENT TOPICAL PRN
Status: DISCONTINUED | OUTPATIENT
Start: 2025-05-21 | End: 2025-05-22 | Stop reason: HOSPADM

## 2025-05-21 RX ORDER — BACITRACIN ZINC AND POLYMYXIN B SULFATE 500; 1000 [USP'U]/G; [USP'U]/G
OINTMENT TOPICAL PRN
Status: DISCONTINUED | OUTPATIENT
Start: 2025-05-21 | End: 2025-05-22 | Stop reason: HOSPADM

## 2025-05-21 ASSESSMENT — PAIN SCALES - GENERAL: PAINLEVEL_OUTOF10: 0

## 2025-05-21 NOTE — PLAN OF CARE
Wound healed today, will apply aquaphor & protect with foam. Continue with compression wrap for edema control. Will plan to transition to ReadyWrap Fusion velcro compression garment for long-term edema control next week. Reinforced importance of exercise, elevation & compression to help with circulation, edema control & wound healing. F/u in [facility] in 1 week as ordered, pt. Aware to call sooner with any changes or questions/concerns. Discharge instructions reviewed with patient, all questions answered, copy given to patient. Dressings were applied to all wounds per M.D. Instructions at this visit.

## 2025-05-22 ENCOUNTER — TELEPHONE (OUTPATIENT)
Dept: PULMONOLOGY | Age: 80
End: 2025-05-22

## 2025-05-22 DIAGNOSIS — G47.33 MODERATE OBSTRUCTIVE SLEEP APNEA: Primary | ICD-10-CM

## 2025-05-22 NOTE — DISCHARGE INSTRUCTIONS
recommend you contact [employer]-provided to patient from Dr Blas.  ______________________________________________________________________    We strive for improvement. A survey will arrive in the mail from \"[facility]\". We appreciate your comments.      No further f/u in [facility] needed at this time. Call if a new wound opens or with any further questions/concerns.      Your nurse  is ANGELINA Arteaga.      If we applied slip-resistant hospital socks today, be sure to remove them at least once a day to inspect your toes or feet, even if you're not changing the wraps or dressings underneath. If you see anything concerning (redness, excess moisture, etc), please call and let us know right away.     Should you experience any significant changes in your wound(s) (including redness, increased warmth, increased pain, increased drainage, odor, or fever) or have questions about your wound care, please contact the [facility] at [phone] Monday-Thursday from 8:00 am - 4:30 pm, or Friday from 8:00 am - 2:30 pm.  If you need help with your wound outside these hours and cannot wait until we are again available, contact your home-care company (if applicable), your PCP, or go to the nearest emergency room.

## 2025-05-22 NOTE — TELEPHONE ENCOUNTER
Pt called stating that his pressures were decreased in office and he is not tolerating them.  Pt would like the pressures turned back up.  He feels like he cannot get enough air.

## 2025-05-22 NOTE — TELEPHONE ENCOUNTER
Will change back to CPAP 11 cmH2O as this appears to be prior setting.   Did not have access to machine under [facility] login.  Under [facility] login, changed back to CPAP 11 cmH2O.  Signed order.   Please notify pt of change.

## 2025-05-27 RX ORDER — CALCIUM CITRATE/VITAMIN D3 200MG-6.25
TABLET ORAL
Qty: 50 STRIP | Refills: 11 | Status: SHIPPED | OUTPATIENT
Start: 2025-05-27

## 2025-05-28 ENCOUNTER — HOSPITAL ENCOUNTER (OUTPATIENT)
Dept: WOUND CARE | Age: 80
Discharge: HOME | End: 2025-05-28
Attending: INTERNAL MEDICINE
Payer: MEDICARE

## 2025-05-28 VITALS
BODY MASS INDEX: 35.81 KG/M2 | DIASTOLIC BLOOD PRESSURE: 72 MMHG | HEIGHT: 75 IN | TEMPERATURE: 98.2 F | SYSTOLIC BLOOD PRESSURE: 128 MMHG | WEIGHT: 288 LBS | RESPIRATION RATE: 18 BRPM | HEART RATE: 102 BPM

## 2025-05-28 DIAGNOSIS — E11.42 TYPE 2 DIABETES MELLITUS WITH DIABETIC POLYNEUROPATHY, WITHOUT LONG-TERM CURRENT USE OF INSULIN (HCC): ICD-10-CM

## 2025-05-28 DIAGNOSIS — Z00.00 MEDICARE ANNUAL WELLNESS VISIT, SUBSEQUENT: ICD-10-CM

## 2025-05-28 DIAGNOSIS — R60.0 BILATERAL LOWER EXTREMITY EDEMA: Primary | ICD-10-CM

## 2025-05-28 DIAGNOSIS — L97.312 ULCER OF RIGHT ANKLE, WITH FAT LAYER EXPOSED (HCC): ICD-10-CM

## 2025-05-28 DIAGNOSIS — I87.2 VENOUS INSUFFICIENCY: ICD-10-CM

## 2025-05-28 DIAGNOSIS — I48.0 PAROXYSMAL ATRIAL FIBRILLATION (HCC): ICD-10-CM

## 2025-05-28 LAB
CHOLEST SERPL-MCNC: 151 MG/DL (ref 0–199)
HDLC SERPL-MCNC: 43 MG/DL (ref 40–60)
LDL CHOLESTEROL: 75 MG/DL
PSA SERPL DL<=0.01 NG/ML-MCNC: 1.32 NG/ML (ref 0–4)
TRIGL SERPL-MCNC: 165 MG/DL (ref 0–150)
TSH SERPL DL<=0.005 MIU/L-ACNC: 1.22 UIU/ML (ref 0.27–4.2)
URATE SERPL-MCNC: 5.4 MG/DL (ref 3.5–7.2)
VLDLC SERPL CALC-MCNC: 33 MG/DL

## 2025-05-28 PROCEDURE — 99212 OFFICE O/P EST SF 10 MIN: CPT

## 2025-05-28 RX ORDER — LIDOCAINE 40 MG/G
CREAM TOPICAL PRN
Status: DISCONTINUED | OUTPATIENT
Start: 2025-05-28 | End: 2025-05-28

## 2025-05-28 RX ORDER — LIDOCAINE 50 MG/G
OINTMENT TOPICAL PRN
Status: DISCONTINUED | OUTPATIENT
Start: 2025-05-28 | End: 2025-05-28

## 2025-05-28 RX ORDER — TRIAMCINOLONE ACETONIDE 1 MG/G
OINTMENT TOPICAL PRN
Status: CANCELLED | OUTPATIENT
Start: 2025-05-28

## 2025-05-28 RX ORDER — LIDOCAINE 50 MG/G
OINTMENT TOPICAL PRN
Status: CANCELLED | OUTPATIENT
Start: 2025-05-28

## 2025-05-28 RX ORDER — TRIAMCINOLONE ACETONIDE 1 MG/G
OINTMENT TOPICAL PRN
Status: DISCONTINUED | OUTPATIENT
Start: 2025-05-28 | End: 2025-05-28

## 2025-05-28 RX ORDER — LIDOCAINE HYDROCHLORIDE 40 MG/ML
SOLUTION TOPICAL PRN
Status: CANCELLED | OUTPATIENT
Start: 2025-05-28

## 2025-05-28 RX ORDER — BACITRACIN ZINC AND POLYMYXIN B SULFATE 500; 1000 [USP'U]/G; [USP'U]/G
OINTMENT TOPICAL PRN
Status: CANCELLED | OUTPATIENT
Start: 2025-05-28

## 2025-05-28 RX ORDER — LIDOCAINE HYDROCHLORIDE 40 MG/ML
SOLUTION TOPICAL PRN
Status: DISCONTINUED | OUTPATIENT
Start: 2025-05-28 | End: 2025-05-28

## 2025-05-28 RX ORDER — LIDOCAINE 40 MG/G
CREAM TOPICAL PRN
Status: CANCELLED | OUTPATIENT
Start: 2025-05-28

## 2025-05-28 RX ORDER — BACITRACIN ZINC AND POLYMYXIN B SULFATE 500; 1000 [USP'U]/G; [USP'U]/G
OINTMENT TOPICAL PRN
Status: DISCONTINUED | OUTPATIENT
Start: 2025-05-28 | End: 2025-05-28

## 2025-05-28 ASSESSMENT — PAIN SCALES - GENERAL: PAINLEVEL_OUTOF10: 0

## 2025-05-28 NOTE — PLAN OF CARE
Wound remains healed, no cover dressing needed at this time. Pt. To start  & [employer] ReadyWrap Fusion velcro compression garment for long-term edema control. Reinforced importance of exercise, elevation & compression to help with circulation, edema control & prevention of new wounds. Dr Blas discussed patient following up with MD for vein procedure, pt. Verbalizes understanding. Pt. May swim then pat dry & monitor newly healed area closely. No further f/u in WCC needed at this time. Pt. Aware to call if a new wound opens or with any further questions/concerns.  Discharge instructions reviewed with patient & wife, all questions answered, copy given to patient.

## 2025-05-29 ENCOUNTER — RESULTS FOLLOW-UP (OUTPATIENT)
Dept: INTERNAL MEDICINE CLINIC | Age: 80
End: 2025-05-29

## 2025-06-07 PROBLEM — L97.312 ULCER OF RIGHT ANKLE, WITH FAT LAYER EXPOSED (HCC): Status: RESOLVED | Noted: 2024-05-17 | Resolved: 2025-06-07

## 2025-06-10 ENCOUNTER — TELEPHONE (OUTPATIENT)
Dept: INTERNAL MEDICINE CLINIC | Age: 80
End: 2025-06-10

## 2025-06-10 DIAGNOSIS — R19.7 DIARRHEA, UNSPECIFIED TYPE: Primary | ICD-10-CM

## 2025-06-10 DIAGNOSIS — A09 INFECTIOUS DIARRHEA: Primary | ICD-10-CM

## 2025-06-10 NOTE — TELEPHONE ENCOUNTER
----- Message from Dr. Calin Zheng MD sent at 6/10/2025  2:42 PM EDT -----  Contact: [facility] [phone]  Check C diff  Hold metformin  ----- Message -----  From: Niki Schaeffer  Sent: 6/10/2025   1:40 PM EDT  To: Calin Zheng MD    Patient is on Trulicity and Metformin  ----- Message -----  From: Calin Zheng MD  Sent: 6/10/2025  12:57 PM EDT  To: Nabila Bach    Is he on ozempic or monjauro  ----- Message -----  From: Abilio Ocampo  Sent: 6/10/2025  10:46 AM EDT  To: Calin Zheng MD    Patient has been experiencing explosive diarrhea for a little over a week. No recent diet, or lifestyle changes.Overall feeling well, but having urgency to quickly need to use restroom. Patient requesting your suggestions. Patient has had to use the restroom about 5 times today 6/10. Please advise       [facility] [id number] - [place], [place] - [address] [place] [address] -  [phone] - F [phone]  [address] [place] [id number]  Phone: [phone]  Fax: [phone]

## 2025-06-16 ENCOUNTER — OFFICE VISIT (OUTPATIENT)
Dept: INTERNAL MEDICINE CLINIC | Age: 80
End: 2025-06-16

## 2025-06-16 VITALS
HEIGHT: 75 IN | DIASTOLIC BLOOD PRESSURE: 64 MMHG | RESPIRATION RATE: 16 BRPM | BODY MASS INDEX: 35.93 KG/M2 | HEART RATE: 88 BPM | SYSTOLIC BLOOD PRESSURE: 100 MMHG | WEIGHT: 289 LBS

## 2025-06-16 DIAGNOSIS — N13.8 BENIGN PROSTATIC HYPERPLASIA WITH URINARY OBSTRUCTION: ICD-10-CM

## 2025-06-16 DIAGNOSIS — I10 ESSENTIAL HYPERTENSION: ICD-10-CM

## 2025-06-16 DIAGNOSIS — Z01.818 PRE-OP EXAM: Primary | ICD-10-CM

## 2025-06-16 DIAGNOSIS — N40.1 BENIGN PROSTATIC HYPERPLASIA WITH URINARY OBSTRUCTION: ICD-10-CM

## 2025-06-16 DIAGNOSIS — I48.0 PAROXYSMAL ATRIAL FIBRILLATION (HCC): ICD-10-CM

## 2025-06-16 DIAGNOSIS — E11.42 TYPE 2 DIABETES MELLITUS WITH DIABETIC POLYNEUROPATHY, WITHOUT LONG-TERM CURRENT USE OF INSULIN (HCC): ICD-10-CM

## 2025-06-16 DIAGNOSIS — J45.40 MODERATE PERSISTENT ASTHMA WITHOUT COMPLICATION: ICD-10-CM

## 2025-06-16 DIAGNOSIS — G47.33 MODERATE OBSTRUCTIVE SLEEP APNEA: ICD-10-CM

## 2025-06-16 DIAGNOSIS — I50.32 CHRONIC DIASTOLIC CHF (CONGESTIVE HEART FAILURE) (HCC): ICD-10-CM

## 2025-06-16 DIAGNOSIS — I25.118 CORONARY ARTERY DISEASE OF NATIVE ARTERY OF NATIVE HEART WITH STABLE ANGINA PECTORIS: ICD-10-CM

## 2025-06-16 DIAGNOSIS — N18.31 STAGE 3A CHRONIC KIDNEY DISEASE (HCC): ICD-10-CM

## 2025-06-16 PROCEDURE — 99213 OFFICE O/P EST LOW 20 MIN: CPT | Performed by: NURSE PRACTITIONER

## 2025-06-16 PROCEDURE — 1159F MED LIST DOCD IN RCRD: CPT | Performed by: NURSE PRACTITIONER

## 2025-06-16 PROCEDURE — 3051F HG A1C>EQUAL 7.0%<8.0%: CPT | Performed by: NURSE PRACTITIONER

## 2025-06-16 PROCEDURE — G8427 DOCREV CUR MEDS BY ELIG CLIN: HCPCS | Performed by: NURSE PRACTITIONER

## 2025-06-16 PROCEDURE — 1160F RVW MEDS BY RX/DR IN RCRD: CPT | Performed by: NURSE PRACTITIONER

## 2025-06-16 PROCEDURE — 1036F TOBACCO NON-USER: CPT | Performed by: NURSE PRACTITIONER

## 2025-06-16 PROCEDURE — 3078F DIAST BP <80 MM HG: CPT | Performed by: NURSE PRACTITIONER

## 2025-06-16 PROCEDURE — 3074F SYST BP LT 130 MM HG: CPT | Performed by: NURSE PRACTITIONER

## 2025-06-16 PROCEDURE — 1123F ACP DISCUSS/DSCN MKR DOCD: CPT | Performed by: NURSE PRACTITIONER

## 2025-06-16 PROCEDURE — G8417 CALC BMI ABV UP PARAM F/U: HCPCS | Performed by: NURSE PRACTITIONER

## 2025-06-16 NOTE — PROGRESS NOTES
Subjective:      Lacho Gallegos is a 80 y.o. male who presents to the office today for a preoperative consultation at the request of surgeon Dr. Cotto who plans on performing cystoscopy with ablation tissue.   Planned anesthesia is General and MAC.       Past Medical History:   Diagnosis Date    Acute on chronic diastolic heart failure ([facility]) 09/21/2022    Angina at rest 06/16/2021    Atrial fibrillation with RVR ([facility]) 09/18/2022    Atypical atrial flutter ([facility]) 04/06/2017    Bilateral primary osteoarthritis of knee     Carpal tunnel syndrome 08/31/2015    Cataract of both eyes 01/03/2020    Cellulitis of right ankle 05/17/2024    Chronic venous/diabetic ulcer of right ankle with necrosis of muscle ([facility]) 02/2024    started in wound-care May 2024, healed Dec 2024    COVID-19 09/04/2022    DVT (deep venous thrombosis) ([facility])     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    Influenza 02/26/2025    Morbid obesity with BMI of 40.0-44.9, adult ([facility]) 04/03/2019    Prostatitis, chronic 11/30/2012    Pulmonary edema with congestive heart failure ([facility]) 10/18/2022    Squamous cell skin cancer     Stone, kidney     Tobacco use     UTI (urinary tract infection) 01/23/2017     Patient Active Problem List    Diagnosis Date Noted    Lung nodule     Moderate obstructive sleep apnea 01/04/2016    Essential hypertension 07/31/2015    PAF (paroxysmal atrial fibrillation) ([facility]) 10/05/2022    Asthma 09/16/2022    Coronary artery disease of native artery of native heart with stable angina pectoris 09/05/2022    Class 2 severe obesity due to excess calories with serious comorbidity and body mass index (BMI) of 35.0 to 35.9 in adult ([facility]) 04/22/2022    Arthritis     Chronic deep vein thrombosis (DVT) of left popliteal vein ([facility]) 05/12/2025    Bilateral lower extremity edema 09/24/2024    Sensory hearing loss, bilateral 06/01/2024    Hyperlipidemia 05/16/2024    Stage 3a chronic kidney disease ([facility]) 12/07/2022    Chronic diastolic CHF (congestive heart

## 2025-06-20 ENCOUNTER — TELEPHONE (OUTPATIENT)
Dept: PULMONOLOGY | Age: 80
End: 2025-06-20

## 2025-07-08 ENCOUNTER — HOSPITAL ENCOUNTER (OUTPATIENT)
Age: 80
Setting detail: SPECIMEN
Discharge: HOME | End: 2025-07-08
Payer: MEDICARE

## 2025-07-08 DIAGNOSIS — A09 INFECTIOUS DIARRHEA: ICD-10-CM

## 2025-07-08 LAB — C DIFF TOX A+B STL QL IA: NORMAL

## 2025-07-08 PROCEDURE — 87324 CLOSTRIDIUM AG IA: CPT

## 2025-07-08 PROCEDURE — 87449 NOS EACH ORGANISM AG IA: CPT

## 2025-07-17 ENCOUNTER — OFFICE VISIT (OUTPATIENT)
Dept: INTERNAL MEDICINE CLINIC | Age: 80
End: 2025-07-17

## 2025-07-17 VITALS
BODY MASS INDEX: 36.31 KG/M2 | SYSTOLIC BLOOD PRESSURE: 100 MMHG | HEIGHT: 75 IN | DIASTOLIC BLOOD PRESSURE: 75 MMHG | WEIGHT: 292 LBS | HEART RATE: 68 BPM | RESPIRATION RATE: 18 BRPM

## 2025-07-17 DIAGNOSIS — R14.0 ABDOMINAL BLOATING: ICD-10-CM

## 2025-07-17 DIAGNOSIS — R19.7 DIARRHEA, UNSPECIFIED TYPE: Primary | ICD-10-CM

## 2025-07-17 PROCEDURE — 3074F SYST BP LT 130 MM HG: CPT | Performed by: INTERNAL MEDICINE

## 2025-07-17 PROCEDURE — 1160F RVW MEDS BY RX/DR IN RCRD: CPT | Performed by: INTERNAL MEDICINE

## 2025-07-17 PROCEDURE — 1123F ACP DISCUSS/DSCN MKR DOCD: CPT | Performed by: INTERNAL MEDICINE

## 2025-07-17 PROCEDURE — G8417 CALC BMI ABV UP PARAM F/U: HCPCS | Performed by: INTERNAL MEDICINE

## 2025-07-17 PROCEDURE — G8427 DOCREV CUR MEDS BY ELIG CLIN: HCPCS | Performed by: INTERNAL MEDICINE

## 2025-07-17 PROCEDURE — 99213 OFFICE O/P EST LOW 20 MIN: CPT | Performed by: INTERNAL MEDICINE

## 2025-07-17 PROCEDURE — 1159F MED LIST DOCD IN RCRD: CPT | Performed by: INTERNAL MEDICINE

## 2025-07-17 PROCEDURE — 3078F DIAST BP <80 MM HG: CPT | Performed by: INTERNAL MEDICINE

## 2025-07-17 PROCEDURE — 1036F TOBACCO NON-USER: CPT | Performed by: INTERNAL MEDICINE

## 2025-07-17 RX ORDER — LACTOBACILLUS RHAMNOSUS GG 10B CELL
1 CAPSULE ORAL 2 TIMES DAILY
Qty: 20 CAPSULE | Refills: 0 | Status: SHIPPED | OUTPATIENT
Start: 2025-07-17 | End: 2025-07-27

## 2025-07-17 NOTE — PROGRESS NOTES
Lacho Gallegos (:  1945) is a 80 y.o. male,Established patient, here for evaluation of the following chief complaint(s):  Diarrhea (/)          80 y.o. 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 ,obestiy, MARIA VICTORIA , pedal edema here for ongoing diarrhea x 1 month    Reports loose multiple BM x 1 week with no new meds on board. Pt on metformin , trulicity and recent multiple abx rounds noted  No fever or chills  No abd pain or nausea.             Allergies   Allergen Reactions    Bactrim [Sulfamethoxazole-Trimethoprim] Diarrhea    Brilinta [Ticagrelor]      dyspnea    Ciprofloxacin      Bad headaches    Macrobid [Nitrofurantoin Monohyd Macro]     Ozempic (0.25 Or 0.5 Mg-Dose) [Semaglutide(0.25 Or 0.5mg-Dos)]     Pioglitazone Swelling    Sulfamethoxazole Diarrhea    Theophylline      Theodur-caused severe headaches    Cefuroxime Axetil Rash and Itching    Cipro Xr Rash    Digoxin Nausea And Vomiting    Other Rash and Other (See Comments)     Ekg leads-glue    Tape [Adhesive Tape] Rash     Skin irritaion  Eats away at skin, paper tape OK  Plastic tape     Current Outpatient Medications   Medication Sig Dispense Refill    lactobacillus (CULTURELLE) capsule Take 1 capsule by mouth 2 times daily for 10 days 20 capsule 0    blood glucose test strips (TRUE METRIX BLOOD GLUCOSE TEST) strip USE TO TEST BLOOD SUGAR DAILY. DX: E11.9 50 strip 11    dulaglutide (TRULICITY) 1.5 MG/0.5ML SC injection Inject 0.5 mLs into the skin once a week 2 mL 2    metFORMIN (GLUCOPHAGE-XR) 500 MG extended release tablet TAKE 1 TABLET BY MOUTH EVERY MORNING AND TAKE 1 TABLET BY MOUTH EVERY NIGHT AT BEDTIME (Patient not taking: Reported on 2025) 180 tablet 1    midodrine (PROAMATINE) 5 MG tablet Take 2 tablets by mouth 3 times daily 270 tablet 0    montelukast (SINGULAIR) 10 MG tablet TAKE 1 TABLET BY MOUTH DAILY 90 tablet 1    atorvastatin (LIPITOR) 40 MG tablet TAKE 1 TABLET BY MOUTH DAILY 90

## 2025-07-21 RX ORDER — ALLOPURINOL 100 MG/1
100 TABLET ORAL 2 TIMES DAILY
Qty: 180 TABLET | Refills: 0 | Status: SHIPPED | OUTPATIENT
Start: 2025-07-21

## 2025-07-21 NOTE — PROGRESS NOTES
Chief Complaint:   Mary Briones is a 71 y.o. male who presents for   Chief Complaint   Patient presents with   Claybon Primrose is a 71 y.o. who presents for initial evaluation of    Influenza   This is a new problem. Episode onset: 2 days ago. The problem occurs constantly. The problem has been gradually worsening. Associated symptoms include chest pain (sore from coughing), congestion, coughing (productive of clear mucus), fatigue, headaches, myalgias, a sore throat and weakness. Pertinent negatives include no abdominal pain, chills, fever, nausea or vomiting. Associated symptoms comments: Poor appetite. Nothing aggravates the symptoms. Treatments tried: Promethazine-Codeine; Tessalon; CPAP. The treatment provided mild relief. Review of Systems  Review of Systems   Constitutional: Positive for fatigue. Negative for chills and fever. HENT: Positive for congestion and sore throat. Respiratory: Positive for cough (productive of clear mucus). Cardiovascular: Positive for chest pain (sore from coughing). Gastrointestinal: Negative for abdominal pain, nausea and vomiting. Musculoskeletal: Positive for myalgias. Neurological: Positive for weakness and headaches. Allergies  Bactrim [sulfamethoxazole-trimethoprim]; Ciprofloxacin; Macrobid [nitrofurantoin monohyd macro]; Theophylline; Cefuroxime axetil; Cipro xr;  Other; and Tape [adhesive tape]      Vitals  /78 (Site: Right Arm, Position: Sitting)   Pulse 85   Temp 98.9 °F (37.2 °C) (Oral)   Resp 16   Ht 6' 3\" (1.905 m)   Wt (!) 341 lb (154.7 kg)   BMI 42.62 kg/m²     Current Medications  Current Outpatient Prescriptions   Medication Sig Dispense Refill    ondansetron (ZOFRAN) 4 MG tablet Take 1 tablet by mouth every 8 hours as needed for Nausea 20 tablet 0    diltiazem (CARDIZEM 12 HR) 120 MG extended release capsule Take 1 capsule by mouth 2 times daily 180 capsule 0    montelukast (SINGULAIR) 10 MG tablet TAKE No     Other Topics Concern    Not on file     Social History Narrative    No narrative on file       Surgical History  Past Surgical History:   Procedure Laterality Date    ABLATION OF DYSRHYTHMIC FOCUS  2013    a-fib    BARIATRIC SURGERY  2013    GASTRIC SLEEVE    CARDIAC SURGERY  2013    ablation    CARPAL TUNNEL RELEASE Right 9-30-15    CARPAL TUNNEL RELEASE Left 3/20/16    COLONOSCOPY      CYSTOSCOPY      with removal stone    CYSTOSCOPY  2/8/13    with Laser Vaporization of Enlarged Prostate    DIAGNOSTIC CARDIAC CATH LAB PROCEDURE      JOINT REPLACEMENT Bilateral     right knee (2002) and left knee (2012)    KNEE ARTHROSCOPY  4/19/2011     left  knee    SKIN BIOPSY      skin Ca/SQUAMOUS CELL    THORACOTOMY      wedge resection    TONSILLECTOMY         Physical Exam  Physical Exam   Constitutional: He is oriented to person, place, and time. He appears well-developed and well-nourished. No distress. HENT:   Head: Normocephalic. Mouth/Throat: Oropharynx is clear and moist. No oropharyngeal exudate. Eyes: Conjunctivae are normal. Pupils are equal, round, and reactive to light. Neck: No tracheal deviation present. No thyromegaly present. Cardiovascular: Normal rate, regular rhythm and normal heart sounds. Exam reveals no gallop and no friction rub. No murmur heard. Pulmonary/Chest: Effort normal. No respiratory distress. He has wheezes. He has no rales. Lymphadenopathy:     He has no cervical adenopathy. Neurological: He is alert and oriented to person, place, and time. Skin: Skin is warm and dry. Psychiatric: He has a normal mood and affect. His behavior is normal. Judgment and thought content normal.          Assessment and Plan    Acute Bronchitis  - Given Rx. For Doxycycline, short course of prednisone, and Tessalon.   - notify provider if symptoms worsen or do not improve.        Franck Morel FNP-C  2/9/2018 64

## 2025-08-19 ENCOUNTER — OFFICE VISIT (OUTPATIENT)
Dept: INTERNAL MEDICINE CLINIC | Age: 80
End: 2025-08-19

## 2025-08-19 VITALS
DIASTOLIC BLOOD PRESSURE: 68 MMHG | BODY MASS INDEX: 35.93 KG/M2 | WEIGHT: 289 LBS | HEIGHT: 75 IN | RESPIRATION RATE: 18 BRPM | SYSTOLIC BLOOD PRESSURE: 108 MMHG | HEART RATE: 65 BPM

## 2025-08-19 DIAGNOSIS — R14.0 ABDOMINAL BLOATING: ICD-10-CM

## 2025-08-19 DIAGNOSIS — R19.7 DIARRHEA, UNSPECIFIED TYPE: Primary | ICD-10-CM

## 2025-08-19 PROCEDURE — G8427 DOCREV CUR MEDS BY ELIG CLIN: HCPCS | Performed by: INTERNAL MEDICINE

## 2025-08-19 PROCEDURE — 3074F SYST BP LT 130 MM HG: CPT | Performed by: INTERNAL MEDICINE

## 2025-08-19 PROCEDURE — 3078F DIAST BP <80 MM HG: CPT | Performed by: INTERNAL MEDICINE

## 2025-08-19 PROCEDURE — 99212 OFFICE O/P EST SF 10 MIN: CPT | Performed by: INTERNAL MEDICINE

## 2025-08-19 PROCEDURE — 1036F TOBACCO NON-USER: CPT | Performed by: INTERNAL MEDICINE

## 2025-08-19 PROCEDURE — 1123F ACP DISCUSS/DSCN MKR DOCD: CPT | Performed by: INTERNAL MEDICINE

## 2025-08-19 PROCEDURE — G8417 CALC BMI ABV UP PARAM F/U: HCPCS | Performed by: INTERNAL MEDICINE

## 2025-08-19 PROCEDURE — 1159F MED LIST DOCD IN RCRD: CPT | Performed by: INTERNAL MEDICINE

## 2025-08-19 PROCEDURE — 1160F RVW MEDS BY RX/DR IN RCRD: CPT | Performed by: INTERNAL MEDICINE

## 2025-08-21 ENCOUNTER — HOSPITAL ENCOUNTER (OUTPATIENT)
Age: 80
Discharge: HOME | End: 2025-08-21
Payer: MEDICARE

## 2025-08-21 LAB — C DIFF TOX A+B STL QL IA: NORMAL

## 2025-08-21 PROCEDURE — 87449 NOS EACH ORGANISM AG IA: CPT

## 2025-08-21 PROCEDURE — 87324 CLOSTRIDIUM AG IA: CPT

## 2025-08-21 PROCEDURE — 87506 IADNA-DNA/RNA PROBE TQ 6-11: CPT

## 2025-08-21 PROCEDURE — 83993 ASSAY FOR CALPROTECTIN FECAL: CPT

## 2025-08-22 LAB — GI PATHOGENS PNL STL NAA+PROBE: NORMAL

## 2025-08-26 LAB — CALPROTECTIN STL-MCNT: 50 UG/G

## (undated) DEVICE — MAJOR SET UP PK

## (undated) DEVICE — SOLUTION IV IRRIG 500ML 0.9% SODIUM CHL 2F7123

## (undated) DEVICE — Device

## (undated) DEVICE — CIRCUIT ANES L72IN 3L BACT AND VIR FLTR EL CONN SGL LIMB

## (undated) DEVICE — ELECTRODE ECG MONITR FOAM TEAR DROP ADLT RED

## (undated) DEVICE — YANKAUER,BULB TIP,W/O VENT,RIGID,STERILE: Brand: MEDLINE

## (undated) DEVICE — DRAPE,ABDOMINAL,MAJOR,STERILE: Brand: MEDLINE

## (undated) DEVICE — BAG SPEC REM 224ML W4XL6IN DIA10MM 1 HND GYN DISP ENDOPCH

## (undated) DEVICE — SILICONE I/A TIP STRAIGHT: Brand: ALCON

## (undated) DEVICE — SOLUTION IRRIG 250ML STRL H2O PLAS POUR BTL USP

## (undated) DEVICE — KIT,ANTI FOG,W/SPONGE & FLUID,SOFT PACK: Brand: MEDLINE

## (undated) DEVICE — SOLUTION IV 1000ML LAC RINGERS PH 6.5 INJ USP VIAFLX PLAS

## (undated) DEVICE — TROCAR ENDOSCP L100MM DIA5MM BLDELSS STBL SL THRD OPT VW

## (undated) DEVICE — SYRINGE TB 1ML NDL 27GA L0.5IN PLAS SLIP TIP CONVENTIONAL

## (undated) DEVICE — ENDO CARRY-ON PROCEDURE KIT INCLUDES SUCTION TUBING, LUBRICANT, GAUZE, BIOHAZARD STICKER, TRANSPORT PAD AND INTERCEPT BEDSIDE KIT.: Brand: ENDO CARRY-ON PROCEDURE KIT

## (undated) DEVICE — CATHETER IV 20GA L1.25IN PNK FEP SFTY STR HUB RADPQ DISP

## (undated) DEVICE — TROCAR ENDOSCP L100MM DIA5MM BLDELSS STBL SL OBT RADLUC

## (undated) DEVICE — SURGICAL PROCEDURE PACK EYE ANDRSN

## (undated) DEVICE — GLOVE,SURG,SENSICARE,ALOE,LF,PF,7: Brand: MEDLINE

## (undated) DEVICE — 3M™ TEGADERM™ TRANSPARENT FILM DRESSING FRAME STYLE, 1624W, 2-3/8 IN X 2-3/4 IN (6 CM X 7 CM), 100/CT 4CT/CASE: Brand: 3M™ TEGADERM™

## (undated) DEVICE — SET ADMIN PRIMING 7ML L30IN 7.35LB 20 GTT 2ND RLER CLMP

## (undated) DEVICE — PAD, DEFIB, ADULT, RADIOTRANS, ZOLL: Brand: MEDLINE

## (undated) DEVICE — NEEDLE INSUF L120MM DIA2MM DISP FOR PNEUMOPERI ENDOPATH

## (undated) DEVICE — SYRINGE MED 10ML LUERLOCK TIP W/O SFTY DISP

## (undated) DEVICE — MICROSURGICAL INSTRUMENT ANTERIOR CHAMBER CANNULA 30GA: Brand: ALCON

## (undated) DEVICE — APPLIER CLP M L L11.4IN DIA10MM ENDOSCP ROT MULT FOR LIG

## (undated) DEVICE — STANDARD HYPODERMIC NEEDLE,POLYPROPYLENE HUB: Brand: MONOJECT

## (undated) DEVICE — HIGH PERFORMANCE GUIDEWIRE: Brand: JAGWIRE

## (undated) DEVICE — LOOP LIG SUT SZ 0 L18IN ABSRB POLYDIOXANONE MFIL PDS II

## (undated) DEVICE — SUTURE SZ 0 27IN 5/8 CIR UR-6  TAPER PT VIOLET ABSRB VICRYL J603H

## (undated) DEVICE — AIRLIFE™ NASAL OXYGEN CANNULA CURVED, FLARED TIP, WITH 7 FEET (2.1 M) CRUSH RESISTANT TUBING, OVER-THE-EAR STYLE: Brand: AIRLIFE™

## (undated) DEVICE — CO2 INSUFFLATION NEEDLE: Brand: CORE DYNAMICS

## (undated) DEVICE — GLOVE SURG SZ 75 L12IN FNGR THK94MIL STD WHT LTX FREE

## (undated) DEVICE — PUMP SUC IRR TBNG L10FT W/ HNDPC ASSEMB STRYKEFLOW 2

## (undated) DEVICE — PATIENT CARE KIT EYE POST OP

## (undated) DEVICE — CONMED SCOPE SAVER BITE BLOCK, 20X27 MM: Brand: SCOPE SAVER

## (undated) DEVICE — TUBING INSUF ISO CONN DISP

## (undated) DEVICE — ELECTRODE PT RET AD L9FT HI MOIST COND ADH HYDRGEL CORDED

## (undated) DEVICE — SET GRAV VENT NVENT CK VLV 3 NDL FREE PRT 10 GTT

## (undated) DEVICE — GLOVE SURG SZ 65 L12IN FNGR THK94MIL STD WHT LTX FREE

## (undated) DEVICE — 39" SINGLE PATIENT USE HOVERMATT: Brand: SINGLE PATIENT USE HOVERMATT

## (undated) DEVICE — CANNULATING SPHINCTEROTOME: Brand: TRUETOME 39

## (undated) DEVICE — RETRIEVAL BALLOON CATHETER: Brand: EXTRACTOR™ PRO XL

## (undated) DEVICE — TUBING, SUCTION, 3/16" X 10', STRAIGHT: Brand: MEDLINE

## (undated) DEVICE — INTENDED FOR TISSUE SEPARATION, AND OTHER PROCEDURES THAT REQUIRE A SHARP SURGICAL BLADE TO PUNCTURE OR CUT.: Brand: BARD-PARKER ® STAINLESS STEEL BLADES

## (undated) DEVICE — GLOVE ORANGE PI 8 1/2   MSG9085

## (undated) DEVICE — 3M™ STERI-STRIP™ COMPOUND BENZOIN TINCTURE 40 BAGS/CARTON 4 CARTONS/CASE C1544: Brand: 3M™ STERI-STRIP™

## (undated) DEVICE — APPLICATOR PREP 26ML 0.7% IOD POVACRYLEX 74% ISO ALC ST

## (undated) DEVICE — CORD ES L10FT MPLR LAP

## (undated) DEVICE — FILTER NEEDLE: Brand: MONOJECT

## (undated) DEVICE — SUTURE VCRL SZ 4-0 L18IN ABSRB UD L19MM PS-2 3/8 CIR PRIM J496H

## (undated) DEVICE — GOWN,AURORA,NONREINF,RAGLAN,XXL,STERILE: Brand: MEDLINE

## (undated) DEVICE — GAUZE SPONGES,8 PLY: Brand: CURITY